# Patient Record
Sex: MALE
[De-identification: names, ages, dates, MRNs, and addresses within clinical notes are randomized per-mention and may not be internally consistent; named-entity substitution may affect disease eponyms.]

---

## 2017-04-17 PROBLEM — Z00.00 ENCOUNTER FOR PREVENTIVE HEALTH EXAMINATION: Status: ACTIVE | Noted: 2017-04-17

## 2017-04-19 ENCOUNTER — APPOINTMENT (OUTPATIENT)
Dept: PHYSICAL MEDICINE AND REHAB | Facility: CLINIC | Age: 57
End: 2017-04-19

## 2017-04-19 VITALS
DIASTOLIC BLOOD PRESSURE: 78 MMHG | HEART RATE: 78 BPM | SYSTOLIC BLOOD PRESSURE: 154 MMHG | HEIGHT: 63 IN | WEIGHT: 165 LBS | TEMPERATURE: 98.3 F | OXYGEN SATURATION: 99 % | BODY MASS INDEX: 29.23 KG/M2

## 2017-04-19 DIAGNOSIS — Z94.0 KIDNEY TRANSPLANT STATUS: ICD-10-CM

## 2017-04-19 DIAGNOSIS — N18.9 CHRONIC KIDNEY DISEASE, UNSPECIFIED: ICD-10-CM

## 2017-04-19 DIAGNOSIS — E11.9 TYPE 2 DIABETES MELLITUS W/OUT COMPLICATIONS: ICD-10-CM

## 2017-04-19 RX ORDER — ASPIRIN 81 MG/1
81 TABLET ORAL DAILY
Qty: 30 | Refills: 0 | Status: ACTIVE | COMMUNITY
Start: 2017-04-19 | End: 1900-01-01

## 2017-04-19 RX ORDER — INSULIN GLARGINE 100 [IU]/ML
100 INJECTION, SOLUTION SUBCUTANEOUS
Qty: 30 | Refills: 0 | Status: ACTIVE | COMMUNITY
Start: 2017-04-19 | End: 1900-01-01

## 2017-04-19 RX ORDER — INSULIN LISPRO 100 [IU]/ML
100 INJECTION, SOLUTION INTRAVENOUS; SUBCUTANEOUS
Qty: 30 | Refills: 0 | Status: ACTIVE | COMMUNITY
Start: 2017-04-19 | End: 1900-01-01

## 2017-04-19 RX ORDER — TACROLIMUS 1 MG/1
1 CAPSULE ORAL
Qty: 30 | Refills: 0 | Status: ACTIVE | COMMUNITY
Start: 2017-04-19 | End: 1900-01-01

## 2017-04-19 RX ORDER — MYCOPHENOLATE MOFETIL 500 MG/1
500 TABLET ORAL DAILY
Qty: 30 | Refills: 0 | Status: ACTIVE | COMMUNITY
Start: 2017-04-19 | End: 1900-01-01

## 2017-04-19 RX ORDER — PREDNISONE 1 MG/1
1 TABLET ORAL
Qty: 30 | Refills: 0 | Status: ACTIVE | COMMUNITY
Start: 2017-04-19 | End: 1900-01-01

## 2017-05-08 ENCOUNTER — CHART COPY (OUTPATIENT)
Age: 57
End: 2017-05-08

## 2017-05-19 ENCOUNTER — RX RENEWAL (OUTPATIENT)
Age: 57
End: 2017-05-19

## 2017-10-11 ENCOUNTER — RX RENEWAL (OUTPATIENT)
Age: 57
End: 2017-10-11

## 2019-06-10 ENCOUNTER — APPOINTMENT (OUTPATIENT)
Dept: PHYSICAL MEDICINE AND REHAB | Facility: CLINIC | Age: 59
End: 2019-06-10

## 2019-07-30 ENCOUNTER — APPOINTMENT (OUTPATIENT)
Dept: PHYSICAL MEDICINE AND REHAB | Facility: CLINIC | Age: 59
End: 2019-07-30
Payer: MEDICAID

## 2019-07-30 VITALS — DIASTOLIC BLOOD PRESSURE: 85 MMHG | HEART RATE: 71 BPM | SYSTOLIC BLOOD PRESSURE: 179 MMHG

## 2019-07-30 DIAGNOSIS — Z89.611 ACQUIRED ABSENCE OF RIGHT LEG ABOVE KNEE: ICD-10-CM

## 2019-07-30 PROCEDURE — 99214 OFFICE O/P EST MOD 30 MIN: CPT

## 2019-07-30 NOTE — ASSESSMENT
[FreeTextEntry1] : Patient is a 58-year-old male history of DM, renal transplant, status post right AKA (2010) whose current AK prosthetic socket is too large causing discomfort and patient is unable to walk. In addition the patient's silicone liner is torn and requires replacement. Prescription provided for a right custom molded AK socket replacement with a total contact acrylic socket, flexible inner socket, silicone seal-in liner, Allignable components. Prescription provided to start a course of outpatient physical therapy 2- 3 times per week, see RX.

## 2019-07-30 NOTE — REVIEW OF SYSTEMS
[Difficulty Walking] : difficulty walking [Negative] : Gastrointestinal [Fever] : no fever [Muscle Weakness] : no muscle weakness [Skin Wound] : no skin wound

## 2019-07-30 NOTE — HISTORY OF PRESENT ILLNESS
[FreeTextEntry1] : Patient is a 58-year-old male history of DM, renal transplant, status post right AKA (2010) who received his current right AK prosthesis in June 2017. Patient and family report that the prosthesis initially fit well, patient was ambulating independently with a rolling walker in the home but did use a wheelchair/scooter outside. Patient feels that he would be able to ambulate better with his prosthesis but did not receive outpatient physical therapy at the last visit. Independent bed transfers, independent toileting, independent dressing. Patient was able to negotiate one flight of stairs with a handrail independently. Patient states that the current AK socket  is too large causing instability and rotation of the prosthesis. In addition patient was getting pain at the groin and buttocks, no skin breakdown. Patient states that the silicone liner is also torn and unusable. Patient has not ambulated with his prosthesis in approximately 3 months. \par Patient also complains that the prosthesis is heavy and his previous prosthesis with a hydraulic SNS knee unit was lighter.

## 2019-07-30 NOTE — PHYSICAL EXAM
[Normal] : Normal skin color and pigmentation, normal turgor and no rash [FreeTextEntry1] : Functional status: sit to stand transfer (I) [de-identified] : right above knee amputation with conical shaped residual limb, no skin breakdown and no adhesions. 5/5 strength in right hip flexion. Left lower extremity 5/5 strength throughout all muscles groups. [de-identified] : the patient endorsed low mood, his affect was mildly flat.

## 2020-04-10 ENCOUNTER — INPATIENT (INPATIENT)
Facility: HOSPITAL | Age: 60
LOS: 26 days | Discharge: NURSING, REHAB OR LONG-TERM CARE | End: 2020-05-07
Attending: INTERNAL MEDICINE | Admitting: INTERNAL MEDICINE
Payer: MEDICAID

## 2020-04-10 VITALS
SYSTOLIC BLOOD PRESSURE: 102 MMHG | OXYGEN SATURATION: 98 % | DIASTOLIC BLOOD PRESSURE: 62 MMHG | TEMPERATURE: 100 F | HEART RATE: 79 BPM | RESPIRATION RATE: 20 BRPM

## 2020-04-10 DIAGNOSIS — Z94.0 KIDNEY TRANSPLANT STATUS: Chronic | ICD-10-CM

## 2020-04-10 DIAGNOSIS — Z95.5 PRESENCE OF CORONARY ANGIOPLASTY IMPLANT AND GRAFT: Chronic | ICD-10-CM

## 2020-04-10 LAB
ALBUMIN SERPL ELPH-MCNC: 3.7 G/DL — SIGNIFICANT CHANGE UP (ref 3.3–5)
ALP SERPL-CCNC: 65 U/L — SIGNIFICANT CHANGE UP (ref 40–120)
ALT FLD-CCNC: 5 U/L — SIGNIFICANT CHANGE UP (ref 4–41)
ANION GAP SERPL CALC-SCNC: 18 MMO/L — HIGH (ref 7–14)
APTT BLD: 32.8 SEC — SIGNIFICANT CHANGE UP (ref 27.5–36.3)
AST SERPL-CCNC: 11 U/L — SIGNIFICANT CHANGE UP (ref 4–40)
BASE EXCESS BLDV CALC-SCNC: -3.1 MMOL/L — SIGNIFICANT CHANGE UP
BASOPHILS # BLD AUTO: 0.01 K/UL — SIGNIFICANT CHANGE UP (ref 0–0.2)
BASOPHILS NFR BLD AUTO: 0.2 % — SIGNIFICANT CHANGE UP (ref 0–2)
BILIRUB SERPL-MCNC: 0.3 MG/DL — SIGNIFICANT CHANGE UP (ref 0.2–1.2)
BLOOD GAS VENOUS - CREATININE: 6.03 MG/DL — HIGH (ref 0.5–1.3)
BUN SERPL-MCNC: 96 MG/DL — HIGH (ref 7–23)
CALCIUM SERPL-MCNC: 9.3 MG/DL — SIGNIFICANT CHANGE UP (ref 8.4–10.5)
CHLORIDE BLDV-SCNC: 95 MMOL/L — LOW (ref 96–108)
CHLORIDE SERPL-SCNC: 91 MMOL/L — LOW (ref 98–107)
CO2 SERPL-SCNC: 19 MMOL/L — LOW (ref 22–31)
CREAT SERPL-MCNC: 5.47 MG/DL — HIGH (ref 0.5–1.3)
CRP SERPL-MCNC: 130.8 MG/L — HIGH
D DIMER BLD IA.RAPID-MCNC: 534 NG/ML — SIGNIFICANT CHANGE UP
EOSINOPHIL # BLD AUTO: 0.03 K/UL — SIGNIFICANT CHANGE UP (ref 0–0.5)
EOSINOPHIL NFR BLD AUTO: 0.5 % — SIGNIFICANT CHANGE UP (ref 0–6)
GAS PNL BLDV: 130 MMOL/L — LOW (ref 136–146)
GLUCOSE BLDV-MCNC: 101 MG/DL — HIGH (ref 70–99)
GLUCOSE SERPL-MCNC: 105 MG/DL — HIGH (ref 70–99)
HCO3 BLDV-SCNC: 21 MMOL/L — SIGNIFICANT CHANGE UP (ref 20–27)
HCT VFR BLD CALC: 35.9 % — LOW (ref 39–50)
HCT VFR BLDV CALC: 35.2 % — LOW (ref 39–51)
HGB BLD-MCNC: 11.2 G/DL — LOW (ref 13–17)
HGB BLDV-MCNC: 11.4 G/DL — LOW (ref 13–17)
IMM GRANULOCYTES NFR BLD AUTO: 0.4 % — SIGNIFICANT CHANGE UP (ref 0–1.5)
INR BLD: 1.31 — HIGH (ref 0.88–1.17)
LACTATE BLDV-MCNC: 1.6 MMOL/L — SIGNIFICANT CHANGE UP (ref 0.5–2)
LYMPHOCYTES # BLD AUTO: 0.9 K/UL — LOW (ref 1–3.3)
LYMPHOCYTES # BLD AUTO: 15.8 % — SIGNIFICANT CHANGE UP (ref 13–44)
MCHC RBC-ENTMCNC: 25.1 PG — LOW (ref 27–34)
MCHC RBC-ENTMCNC: 31.2 % — LOW (ref 32–36)
MCV RBC AUTO: 80.5 FL — SIGNIFICANT CHANGE UP (ref 80–100)
MONOCYTES # BLD AUTO: 0.62 K/UL — SIGNIFICANT CHANGE UP (ref 0–0.9)
MONOCYTES NFR BLD AUTO: 10.9 % — SIGNIFICANT CHANGE UP (ref 2–14)
NEUTROPHILS # BLD AUTO: 4.13 K/UL — SIGNIFICANT CHANGE UP (ref 1.8–7.4)
NEUTROPHILS NFR BLD AUTO: 72.2 % — SIGNIFICANT CHANGE UP (ref 43–77)
NRBC # FLD: 0 K/UL — SIGNIFICANT CHANGE UP (ref 0–0)
PCO2 BLDV: 41 MMHG — SIGNIFICANT CHANGE UP (ref 41–51)
PH BLDV: 7.34 PH — SIGNIFICANT CHANGE UP (ref 7.32–7.43)
PLATELET # BLD AUTO: 160 K/UL — SIGNIFICANT CHANGE UP (ref 150–400)
PMV BLD: 8.8 FL — SIGNIFICANT CHANGE UP (ref 7–13)
PO2 BLDV: 27 MMHG — LOW (ref 35–40)
POTASSIUM BLDV-SCNC: 4.4 MMOL/L — SIGNIFICANT CHANGE UP (ref 3.4–4.5)
POTASSIUM SERPL-MCNC: 4.5 MMOL/L — SIGNIFICANT CHANGE UP (ref 3.5–5.3)
POTASSIUM SERPL-SCNC: 4.5 MMOL/L — SIGNIFICANT CHANGE UP (ref 3.5–5.3)
PROT SERPL-MCNC: 6.9 G/DL — SIGNIFICANT CHANGE UP (ref 6–8.3)
PROTHROM AB SERPL-ACNC: 15.2 SEC — HIGH (ref 9.8–13.1)
RBC # BLD: 4.46 M/UL — SIGNIFICANT CHANGE UP (ref 4.2–5.8)
RBC # FLD: 14.2 % — SIGNIFICANT CHANGE UP (ref 10.3–14.5)
SAO2 % BLDV: 45.8 % — LOW (ref 60–85)
SODIUM SERPL-SCNC: 128 MMOL/L — LOW (ref 135–145)
WBC # BLD: 5.71 K/UL — SIGNIFICANT CHANGE UP (ref 3.8–10.5)
WBC # FLD AUTO: 5.71 K/UL — SIGNIFICANT CHANGE UP (ref 3.8–10.5)

## 2020-04-10 PROCEDURE — 71045 X-RAY EXAM CHEST 1 VIEW: CPT | Mod: 26

## 2020-04-10 RX ORDER — ONDANSETRON 8 MG/1
4 TABLET, FILM COATED ORAL ONCE
Refills: 0 | Status: COMPLETED | OUTPATIENT
Start: 2020-04-10 | End: 2020-04-10

## 2020-04-10 RX ORDER — ACETAMINOPHEN 500 MG
650 TABLET ORAL EVERY 6 HOURS
Refills: 0 | Status: DISCONTINUED | OUTPATIENT
Start: 2020-04-10 | End: 2020-05-07

## 2020-04-10 RX ADMIN — ONDANSETRON 4 MILLIGRAM(S): 8 TABLET, FILM COATED ORAL at 22:03

## 2020-04-10 RX ADMIN — Medication 650 MILLIGRAM(S): at 22:03

## 2020-04-10 NOTE — ED ADULT NURSE NOTE - OBJECTIVE STATEMENT
Pt a&ox3, calm/cooperative, from nursing home, c/o of cough, fever, and sob. pt hx of renal transplant, stents placed about a month ago and right above the knee amputation, left av fistula. pt noted to have hiccups which he states has been ongoing for awhile. respirations even/unlabored, nad noted, 100% o2 room air, provider at bedside to eval. labs to be obtained. will continue to monitor

## 2020-04-10 NOTE — ED PROVIDER NOTE - PMH
BPH (benign prostatic hyperplasia)    CAD (coronary artery disease)    Chronic systolic heart failure    Diabetes    ESRD (end stage renal disease)    HTN (hypertension)    Hypothyroid    Kidney transplant recipient

## 2020-04-10 NOTE — ED PROVIDER NOTE - CLINICAL SUMMARY MEDICAL DECISION MAKING FREE TEXT BOX
59M w/ hx of HTN, HLD, DM, ESRD s/p kidney transplant in 05, CAD s/p stent a few months ago at [place], PVD s/p R AKA in 2010 presents today with fever, rhinorrhea and not feeling well for three days. Pt immunosuppressed. Fever of unclear source on initial presentation. will do full sepsis workup in addition to rule out covid. will need admission. will give cefepime pending cultures.

## 2020-04-10 NOTE — ED PROVIDER NOTE - NS ED ROS FT
Gen: normal appetite  Eyes: No eye irritation or discharge  ENT: +sore throat,  No ear pain, sore throat  Resp: No cough  Cardiovascular: No chest pain or palpitation  Gastroenteric: No  diarrhea, constipation  :  No change in urine output; no dysuria  MS: No joint or muscle pain  Skin: No rashes  Neuro: No headache; no abnormal movements  Remainder negative, except as per the HPI

## 2020-04-10 NOTE — ED ADULT TRIAGE NOTE - CHIEF COMPLAINT QUOTE
Sent in from [facility] for fever of 102.9. Patient has no complaint of cough and shortness of breath. no pain or difficulty with urination. patient is a kidney transplant patient- kidney transplant in 2005. complaining of some pain to lower back x 2 weeks. Sent in for r/o covid19/sepsis

## 2020-04-10 NOTE — ED PROVIDER NOTE - PROGRESS NOTE DETAILS
ED Attending: Fady  Pt signed out to me from Dr. Son to f/u and reassess. Pt is immunosuppressed and p/w fever and non specific sx. Though possible COVID given immunosupp will provide Cefepime x 1. Sepsis panel sent. Accepted to hospitalist.

## 2020-04-10 NOTE — ED PROVIDER NOTE - OBJECTIVE STATEMENT
59M w/ hx of HTN, HLD, DM, ESRD s/p kidney transplant in 05, CAD s/p stent a few months ago at [place], PVD s/p R AKA in 2010 presents today with fever, rhinorrhea and not feeling well for three days. Pt comes from [facility]. Denies urinary symptoms. Denies SOB or chest pain. Endorses hiccups and intermittent nausea. Tmax was 102.9 at NH.

## 2020-04-10 NOTE — ED PROVIDER NOTE - PHYSICAL EXAMINATION
General: Thin appearing, chronically ill, NAD  HEENT: PERRLA, EOMI, dry mucous membranes  Neurology: A&Ox3, nonfocal, MIRANDA x 3 (has amputation of RLE above the knee)  Respiratory: CTA B/L, normal respiratory effort, no wheezes, crackles, rales  CV: RRR, S1S2, no murmurs, rubs or gallops  Abdominal: Soft, NT, ND +BS  Extremities: s/p R AKA, No edema, + peripheral pulses  Incisions: well healed R AKA, LUE has AVF with thrill  Tubes: PIV

## 2020-04-11 DIAGNOSIS — E11.22 TYPE 2 DIABETES MELLITUS WITH DIABETIC CHRONIC KIDNEY DISEASE: ICD-10-CM

## 2020-04-11 DIAGNOSIS — N39.0 URINARY TRACT INFECTION, SITE NOT SPECIFIED: ICD-10-CM

## 2020-04-11 DIAGNOSIS — E03.9 HYPOTHYROIDISM, UNSPECIFIED: ICD-10-CM

## 2020-04-11 DIAGNOSIS — Z94.0 KIDNEY TRANSPLANT STATUS: ICD-10-CM

## 2020-04-11 DIAGNOSIS — I50.22 CHRONIC SYSTOLIC (CONGESTIVE) HEART FAILURE: ICD-10-CM

## 2020-04-11 DIAGNOSIS — R50.9 FEVER, UNSPECIFIED: ICD-10-CM

## 2020-04-11 DIAGNOSIS — U07.1 COVID-19: ICD-10-CM

## 2020-04-11 DIAGNOSIS — N18.6 END STAGE RENAL DISEASE: ICD-10-CM

## 2020-04-11 DIAGNOSIS — I10 ESSENTIAL (PRIMARY) HYPERTENSION: ICD-10-CM

## 2020-04-11 DIAGNOSIS — I25.10 ATHEROSCLEROTIC HEART DISEASE OF NATIVE CORONARY ARTERY WITHOUT ANGINA PECTORIS: ICD-10-CM

## 2020-04-11 LAB
APPEARANCE UR: SIGNIFICANT CHANGE UP
B PERT DNA SPEC QL NAA+PROBE: NOT DETECTED — SIGNIFICANT CHANGE UP
BACTERIA # UR AUTO: HIGH
BILIRUB UR-MCNC: NEGATIVE — SIGNIFICANT CHANGE UP
BLOOD UR QL VISUAL: HIGH
C PNEUM DNA SPEC QL NAA+PROBE: NOT DETECTED — SIGNIFICANT CHANGE UP
COLOR SPEC: COLORLESS — SIGNIFICANT CHANGE UP
EPI CELLS # UR: SIGNIFICANT CHANGE UP
FLUAV H1 2009 PAND RNA SPEC QL NAA+PROBE: NOT DETECTED — SIGNIFICANT CHANGE UP
FLUAV H1 RNA SPEC QL NAA+PROBE: NOT DETECTED — SIGNIFICANT CHANGE UP
FLUAV H3 RNA SPEC QL NAA+PROBE: NOT DETECTED — SIGNIFICANT CHANGE UP
FLUAV SUBTYP SPEC NAA+PROBE: NOT DETECTED — SIGNIFICANT CHANGE UP
FLUBV RNA SPEC QL NAA+PROBE: NOT DETECTED — SIGNIFICANT CHANGE UP
GLUCOSE BLDC GLUCOMTR-MCNC: 148 MG/DL — HIGH (ref 70–99)
GLUCOSE BLDC GLUCOMTR-MCNC: 189 MG/DL — HIGH (ref 70–99)
GLUCOSE BLDC GLUCOMTR-MCNC: 227 MG/DL — HIGH (ref 70–99)
GLUCOSE BLDC GLUCOMTR-MCNC: 279 MG/DL — HIGH (ref 70–99)
GLUCOSE UR-MCNC: NEGATIVE — SIGNIFICANT CHANGE UP
HADV DNA SPEC QL NAA+PROBE: NOT DETECTED — SIGNIFICANT CHANGE UP
HCOV PNL SPEC NAA+PROBE: SIGNIFICANT CHANGE UP
HMPV RNA SPEC QL NAA+PROBE: NOT DETECTED — SIGNIFICANT CHANGE UP
HPIV1 RNA SPEC QL NAA+PROBE: NOT DETECTED — SIGNIFICANT CHANGE UP
HPIV2 RNA SPEC QL NAA+PROBE: NOT DETECTED — SIGNIFICANT CHANGE UP
HPIV3 RNA SPEC QL NAA+PROBE: NOT DETECTED — SIGNIFICANT CHANGE UP
HPIV4 RNA SPEC QL NAA+PROBE: NOT DETECTED — SIGNIFICANT CHANGE UP
KETONES UR-MCNC: SIGNIFICANT CHANGE UP
LEUKOCYTE ESTERASE UR-ACNC: SIGNIFICANT CHANGE UP
MUCOUS THREADS # UR AUTO: PRESENT — SIGNIFICANT CHANGE UP
NITRITE UR-MCNC: POSITIVE — SIGNIFICANT CHANGE UP
PH UR: 6 — SIGNIFICANT CHANGE UP (ref 5–8)
PROT UR-MCNC: 100 — HIGH
RBC CASTS # UR COMP ASSIST: SIGNIFICANT CHANGE UP (ref 0–?)
RSV RNA SPEC QL NAA+PROBE: NOT DETECTED — SIGNIFICANT CHANGE UP
RV+EV RNA SPEC QL NAA+PROBE: NOT DETECTED — SIGNIFICANT CHANGE UP
SARS-COV-2 RNA SPEC QL NAA+PROBE: DETECTED
SP GR SPEC: 1.01 — SIGNIFICANT CHANGE UP (ref 1–1.04)
T3 SERPL-MCNC: 49.8 NG/DL — LOW (ref 80–200)
T4 FREE SERPL-MCNC: 1.4 NG/DL — SIGNIFICANT CHANGE UP (ref 0.9–1.8)
TACROLIMUS SERPL-MCNC: < 2 NG/ML — SIGNIFICANT CHANGE UP
UROBILINOGEN FLD QL: NORMAL — SIGNIFICANT CHANGE UP
WBC UR QL: >50 — HIGH (ref 0–?)

## 2020-04-11 PROCEDURE — 93010 ELECTROCARDIOGRAM REPORT: CPT

## 2020-04-11 PROCEDURE — 99223 1ST HOSP IP/OBS HIGH 75: CPT | Mod: GC

## 2020-04-11 RX ORDER — ASCORBIC ACID 60 MG
500 TABLET,CHEWABLE ORAL
Refills: 0 | Status: DISCONTINUED | OUTPATIENT
Start: 2020-04-11 | End: 2020-05-07

## 2020-04-11 RX ORDER — SODIUM CHLORIDE 9 MG/ML
1 INJECTION INTRAMUSCULAR; INTRAVENOUS; SUBCUTANEOUS
Refills: 0 | Status: DISCONTINUED | OUTPATIENT
Start: 2020-04-11 | End: 2020-04-23

## 2020-04-11 RX ORDER — FERROUS SULFATE 325(65) MG
325 TABLET ORAL DAILY
Refills: 0 | Status: DISCONTINUED | OUTPATIENT
Start: 2020-04-11 | End: 2020-05-07

## 2020-04-11 RX ORDER — CARVEDILOL PHOSPHATE 80 MG/1
3.12 CAPSULE, EXTENDED RELEASE ORAL
Refills: 0 | Status: DISCONTINUED | OUTPATIENT
Start: 2020-04-11 | End: 2020-04-17

## 2020-04-11 RX ORDER — SEVELAMER CARBONATE 2400 MG/1
800 POWDER, FOR SUSPENSION ORAL
Refills: 0 | Status: DISCONTINUED | OUTPATIENT
Start: 2020-04-11 | End: 2020-05-07

## 2020-04-11 RX ORDER — DEXTROSE 50 % IN WATER 50 %
12.5 SYRINGE (ML) INTRAVENOUS ONCE
Refills: 0 | Status: DISCONTINUED | OUTPATIENT
Start: 2020-04-11 | End: 2020-05-07

## 2020-04-11 RX ORDER — DEXTROSE 50 % IN WATER 50 %
25 SYRINGE (ML) INTRAVENOUS ONCE
Refills: 0 | Status: DISCONTINUED | OUTPATIENT
Start: 2020-04-11 | End: 2020-05-07

## 2020-04-11 RX ORDER — INSULIN LISPRO 100/ML
VIAL (ML) SUBCUTANEOUS
Refills: 0 | Status: DISCONTINUED | OUTPATIENT
Start: 2020-04-11 | End: 2020-05-07

## 2020-04-11 RX ORDER — POLYETHYLENE GLYCOL 3350 17 G/17G
1 POWDER, FOR SOLUTION ORAL
Qty: 0 | Refills: 0 | DISCHARGE

## 2020-04-11 RX ORDER — LEVOTHYROXINE SODIUM 125 MCG
75 TABLET ORAL DAILY
Refills: 0 | Status: DISCONTINUED | OUTPATIENT
Start: 2020-04-11 | End: 2020-05-07

## 2020-04-11 RX ORDER — MYCOPHENOLATE MOFETIL 250 MG/1
1000 CAPSULE ORAL
Refills: 0 | Status: DISCONTINUED | OUTPATIENT
Start: 2020-04-11 | End: 2020-04-13

## 2020-04-11 RX ORDER — CLOPIDOGREL BISULFATE 75 MG/1
75 TABLET, FILM COATED ORAL DAILY
Refills: 0 | Status: DISCONTINUED | OUTPATIENT
Start: 2020-04-11 | End: 2020-05-07

## 2020-04-11 RX ORDER — POTASSIUM CHLORIDE 20 MEQ
10 PACKET (EA) ORAL DAILY
Refills: 0 | Status: DISCONTINUED | OUTPATIENT
Start: 2020-04-11 | End: 2020-04-23

## 2020-04-11 RX ORDER — INSULIN LISPRO 100/ML
VIAL (ML) SUBCUTANEOUS AT BEDTIME
Refills: 0 | Status: DISCONTINUED | OUTPATIENT
Start: 2020-04-11 | End: 2020-05-07

## 2020-04-11 RX ORDER — TACROLIMUS 5 MG/1
1 CAPSULE ORAL EVERY 12 HOURS
Refills: 0 | Status: DISCONTINUED | OUTPATIENT
Start: 2020-04-11 | End: 2020-04-22

## 2020-04-11 RX ORDER — INSULIN GLARGINE 100 [IU]/ML
6 INJECTION, SOLUTION SUBCUTANEOUS
Qty: 0 | Refills: 0 | DISCHARGE

## 2020-04-11 RX ORDER — DEXTROSE 50 % IN WATER 50 %
15 SYRINGE (ML) INTRAVENOUS ONCE
Refills: 0 | Status: DISCONTINUED | OUTPATIENT
Start: 2020-04-11 | End: 2020-05-07

## 2020-04-11 RX ORDER — CEFEPIME 1 G/1
1000 INJECTION, POWDER, FOR SOLUTION INTRAMUSCULAR; INTRAVENOUS ONCE
Refills: 0 | Status: COMPLETED | OUTPATIENT
Start: 2020-04-11 | End: 2020-04-11

## 2020-04-11 RX ORDER — SUCRALFATE 1 G
1 TABLET ORAL EVERY 6 HOURS
Refills: 0 | Status: DISCONTINUED | OUTPATIENT
Start: 2020-04-11 | End: 2020-05-07

## 2020-04-11 RX ORDER — ACETAMINOPHEN 500 MG
650 TABLET ORAL EVERY 4 HOURS
Refills: 0 | Status: DISCONTINUED | OUTPATIENT
Start: 2020-04-11 | End: 2020-05-07

## 2020-04-11 RX ORDER — INSULIN GLARGINE 100 [IU]/ML
6 INJECTION, SOLUTION SUBCUTANEOUS AT BEDTIME
Refills: 0 | Status: DISCONTINUED | OUTPATIENT
Start: 2020-04-11 | End: 2020-04-27

## 2020-04-11 RX ORDER — ASPIRIN/CALCIUM CARB/MAGNESIUM 324 MG
81 TABLET ORAL DAILY
Refills: 0 | Status: DISCONTINUED | OUTPATIENT
Start: 2020-04-11 | End: 2020-05-07

## 2020-04-11 RX ORDER — POLYETHYLENE GLYCOL 3350 17 G/17G
17 POWDER, FOR SOLUTION ORAL DAILY
Refills: 0 | Status: DISCONTINUED | OUTPATIENT
Start: 2020-04-11 | End: 2020-05-07

## 2020-04-11 RX ORDER — ATORVASTATIN CALCIUM 80 MG/1
80 TABLET, FILM COATED ORAL AT BEDTIME
Refills: 0 | Status: DISCONTINUED | OUTPATIENT
Start: 2020-04-11 | End: 2020-05-07

## 2020-04-11 RX ORDER — CEFTRIAXONE 500 MG/1
1000 INJECTION, POWDER, FOR SOLUTION INTRAMUSCULAR; INTRAVENOUS EVERY 24 HOURS
Refills: 0 | Status: DISCONTINUED | OUTPATIENT
Start: 2020-04-11 | End: 2020-04-13

## 2020-04-11 RX ORDER — BUMETANIDE 0.25 MG/ML
1 INJECTION INTRAMUSCULAR; INTRAVENOUS
Refills: 0 | Status: DISCONTINUED | OUTPATIENT
Start: 2020-04-11 | End: 2020-04-23

## 2020-04-11 RX ORDER — CARVEDILOL PHOSPHATE 80 MG/1
1 CAPSULE, EXTENDED RELEASE ORAL
Qty: 0 | Refills: 0 | DISCHARGE

## 2020-04-11 RX ORDER — SODIUM CHLORIDE 9 MG/ML
1000 INJECTION, SOLUTION INTRAVENOUS
Refills: 0 | Status: DISCONTINUED | OUTPATIENT
Start: 2020-04-11 | End: 2020-05-07

## 2020-04-11 RX ORDER — HYDRALAZINE HCL 50 MG
25 TABLET ORAL
Refills: 0 | Status: DISCONTINUED | OUTPATIENT
Start: 2020-04-11 | End: 2020-05-07

## 2020-04-11 RX ORDER — HEPARIN SODIUM 5000 [USP'U]/ML
5000 INJECTION INTRAVENOUS; SUBCUTANEOUS EVERY 8 HOURS
Refills: 0 | Status: DISCONTINUED | OUTPATIENT
Start: 2020-04-11 | End: 2020-04-27

## 2020-04-11 RX ORDER — ASCORBIC ACID 60 MG
1 TABLET,CHEWABLE ORAL
Qty: 0 | Refills: 0 | DISCHARGE

## 2020-04-11 RX ORDER — GLUCAGON INJECTION, SOLUTION 0.5 MG/.1ML
1 INJECTION, SOLUTION SUBCUTANEOUS ONCE
Refills: 0 | Status: DISCONTINUED | OUTPATIENT
Start: 2020-04-11 | End: 2020-05-07

## 2020-04-11 RX ORDER — ONDANSETRON 8 MG/1
4 TABLET, FILM COATED ORAL ONCE
Refills: 0 | Status: COMPLETED | OUTPATIENT
Start: 2020-04-11 | End: 2020-04-11

## 2020-04-11 RX ORDER — ENOXAPARIN SODIUM 100 MG/ML
40 INJECTION SUBCUTANEOUS DAILY
Refills: 0 | Status: DISCONTINUED | OUTPATIENT
Start: 2020-04-11 | End: 2020-04-11

## 2020-04-11 RX ORDER — INSULIN ASPART 100 [IU]/ML
1 INJECTION, SOLUTION SUBCUTANEOUS
Qty: 0 | Refills: 0 | DISCHARGE

## 2020-04-11 RX ORDER — CHOLECALCIFEROL (VITAMIN D3) 125 MCG
50000 CAPSULE ORAL
Qty: 0 | Refills: 0 | DISCHARGE

## 2020-04-11 RX ADMIN — INSULIN GLARGINE 6 UNIT(S): 100 INJECTION, SOLUTION SUBCUTANEOUS at 23:06

## 2020-04-11 RX ADMIN — CLOPIDOGREL BISULFATE 75 MILLIGRAM(S): 75 TABLET, FILM COATED ORAL at 12:03

## 2020-04-11 RX ADMIN — Medication 6: at 18:24

## 2020-04-11 RX ADMIN — Medication 5 MILLIGRAM(S): at 12:03

## 2020-04-11 RX ADMIN — SEVELAMER CARBONATE 800 MILLIGRAM(S): 2400 POWDER, FOR SUSPENSION ORAL at 12:05

## 2020-04-11 RX ADMIN — Medication 325 MILLIGRAM(S): at 12:02

## 2020-04-11 RX ADMIN — CEFEPIME 100 MILLIGRAM(S): 1 INJECTION, POWDER, FOR SOLUTION INTRAMUSCULAR; INTRAVENOUS at 00:56

## 2020-04-11 RX ADMIN — TACROLIMUS 1 MILLIGRAM(S): 5 CAPSULE ORAL at 23:10

## 2020-04-11 RX ADMIN — HEPARIN SODIUM 5000 UNIT(S): 5000 INJECTION INTRAVENOUS; SUBCUTANEOUS at 23:07

## 2020-04-11 RX ADMIN — Medication 1 GRAM(S): at 12:04

## 2020-04-11 RX ADMIN — Medication 10 MILLIEQUIVALENT(S): at 12:03

## 2020-04-11 RX ADMIN — MYCOPHENOLATE MOFETIL 1000 MILLIGRAM(S): 250 CAPSULE ORAL at 23:07

## 2020-04-11 RX ADMIN — Medication 500 MILLIGRAM(S): at 18:25

## 2020-04-11 RX ADMIN — Medication 75 MICROGRAM(S): at 12:03

## 2020-04-11 RX ADMIN — CARVEDILOL PHOSPHATE 3.12 MILLIGRAM(S): 80 CAPSULE, EXTENDED RELEASE ORAL at 18:25

## 2020-04-11 RX ADMIN — Medication 81 MILLIGRAM(S): at 12:02

## 2020-04-11 RX ADMIN — ATORVASTATIN CALCIUM 80 MILLIGRAM(S): 80 TABLET, FILM COATED ORAL at 23:07

## 2020-04-11 RX ADMIN — ONDANSETRON 4 MILLIGRAM(S): 8 TABLET, FILM COATED ORAL at 08:59

## 2020-04-11 RX ADMIN — POLYETHYLENE GLYCOL 3350 17 GRAM(S): 17 POWDER, FOR SOLUTION ORAL at 12:04

## 2020-04-11 RX ADMIN — BUMETANIDE 1 MILLIGRAM(S): 0.25 INJECTION INTRAMUSCULAR; INTRAVENOUS at 23:07

## 2020-04-11 RX ADMIN — CEFTRIAXONE 100 MILLIGRAM(S): 500 INJECTION, POWDER, FOR SOLUTION INTRAMUSCULAR; INTRAVENOUS at 18:26

## 2020-04-11 RX ADMIN — SODIUM CHLORIDE 1 GRAM(S): 9 INJECTION INTRAMUSCULAR; INTRAVENOUS; SUBCUTANEOUS at 23:07

## 2020-04-11 RX ADMIN — Medication 25 MILLIGRAM(S): at 18:25

## 2020-04-11 NOTE — H&P ADULT - PROBLEM SELECTOR PLAN 3
- Monitor Glycemia three times a day with meals and at bed time   - Continue LANTUS 6 U at bed time + Sliding Novolog scale Prior labs per patient's Nursing home:  March 27/2020: Cr/BUN: 3.6/24  March 30/2020: Cr/BUN: 3.9/24  April 6/2020: Cr/BUN: 4.8/20   - Consulted Nephrology regarding increase in Cr from prior values, f/u recommendations  - Last hemodialyses > 15 years ago  - Primary Nephrologist: Dr. Adrián Hawkins

## 2020-04-11 NOTE — H&P ADULT - PROBLEM SELECTOR PROBLEM 7
Kidney transplant recipient Type 2 diabetes mellitus with chronic kidney disease, with long-term current use of insulin, unspecified CKD stage

## 2020-04-11 NOTE — H&P ADULT - HISTORY OF PRESENT ILLNESS
59M w/ hx of HTN, HLD, DM, ESRD s/p kidney transplant in 05, CAD s/p stent a few months ago at [place], PVD s/p R AKA in 2010 presents today with fever, rhinorrhea and not feeling well for three days. Pt comes from [facility]. Denies urinary symptoms. Denies SOB or chest pain. Endorses hiccups and intermittent nausea. Tmax was 102.9 at NH.

## 2020-04-11 NOTE — H&P ADULT - PROBLEM SELECTOR PLAN 1
- Continue double anti-thrombotic therapy: Aspirin + Plavix  - EKG as clinically indicated - Continue 2 LPM NC. Will escalate to Non-rebreather if saturations < 92%   - Relative (brother) refused to use Plaquenil  - CXR clear  - Patient full code

## 2020-04-11 NOTE — H&P ADULT - ASSESSMENT
59M w/ hx of HTN, HLD, DM, ESRD s/p kidney transplant in 05, CAD s/p stent a few months ago at [place], PVD s/p R AKA in 2010 now presenting with hypoxic respiratory failure must likely due to COVID 19 infection. 59M w/ hx of HTN, HLD, DM, ESRD s/p kidney transplant in 05, CAD s/p stent a few months ago at [place], PVD s/p R AKA in 2010 now presenting with hypoxic respiratory failure must likely due to COVID 19 infection.     - Asked patient regarding intubation status. Patient wants everything done. 59M w/ hx of HTN, HLD, DM, ESRD s/p kidney transplant in 05, CAD s/p stent a few months ago at [facility] s/p R AKA in 2010 now presenting with hypoxic respiratory failure must likely due to COVID 19 infection.     - Asked patient regarding GOC and intubation - patient wants everything done.     Prior labs per patient's Nursing home:  March 27/2020: Cr/BUN: 3.6/24  March 30/2020: Cr/BUN: 3.9/24  April 6/2020: Cr/BUN: 4.8/20     Prior Urine Culture + : Klebsiella Pneumoniae 02/11/2020  S Ciprofloxacin <=1  S Levofloxacin <= 2  S Cefuroxime 8  S Ceftriaxone S <=1  S Cefepime < 2  R Ampicillin 16   R Augmentin >16  R Cefazolin >16  R PIP/TAZOBACTAM >64

## 2020-04-11 NOTE — H&P ADULT - PROBLEM SELECTOR PLAN 2
- Continue Carvedilol 3.125 PO BID - UA suggestive of infection, prior UTI from 2/2020 sensitive to ceftriaxone, resistant to zosyn  - f/u Urinary culture.   - Patient already received one dose of Cefepime in ED. Based on Prior urine culture results continue with ceftriaxone 1g IV q 24 hours until culture results

## 2020-04-11 NOTE — H&P ADULT - PROBLEM SELECTOR PROBLEM 3
Type 2 diabetes mellitus with chronic kidney disease, with long-term current use of insulin, unspecified CKD stage ESRD (end stage renal disease)

## 2020-04-11 NOTE — H&P ADULT - PROBLEM SELECTOR PLAN 4
- Consult Nephrology regarding slow increase in Cr.  - Continue Vitamin C twice daily and Vitamin D once a month.   - Last hemodialyses > 15 years ago  - Primary Nephrologist: Dr. Adrián Hawkins. Will continue existing regimen of Immunosuppressants:   Mycophenolate + Tacrolimus and Prednisone   Monitor and trend Cr/BUN  - f/u renal recommendations

## 2020-04-11 NOTE — H&P ADULT - NSICDXPASTMEDICALHX_GEN_ALL_CORE_FT
PAST MEDICAL HISTORY:  BPH (benign prostatic hyperplasia)     CAD (coronary artery disease)     Chronic systolic heart failure     Diabetes     ESRD (end stage renal disease)     HTN (hypertension)     Hypothyroid     Kidney transplant recipient

## 2020-04-11 NOTE — H&P ADULT - PROBLEM SELECTOR PLAN 9
- Obtain Urinary culture.   - Patient already received one dose of Cefepime in ED. Based on Prior urine culture results will start Ceftriaxone 1g IV q 24 hours tomorrow. - Continue Synthroid 75 mcg PO q 24 hours

## 2020-04-11 NOTE — H&P ADULT - NSHPLABSRESULTS_GEN_ALL_CORE
.  LABS:                         11.2   5.71  )-----------( 160      ( 10 Apr 2020 22:15 )             35.9     04-10    128<L>  |  91<L>  |  96<H>  ----------------------------<  105<H>  4.5   |  19<L>  |  5.47<H>    Ca    9.3      10 Apr 2020 21:52    TPro  6.9  /  Alb  3.7  /  TBili  0.3  /  DBili  x   /  AST  11  /  ALT  5   /  AlkPhos  65  04-10    PT/INR - ( 10 Apr 2020 22:00 )   PT: 15.2 SEC;   INR: 1.31          PTT - ( 10 Apr 2020 22:00 )  PTT:32.8 SEC  Urinalysis Basic - ( 2020 08:30 )    Color: COLORLESS / Appearance: TURBID / S.014 / pH: 6.0  Gluc: NEGATIVE / Ketone: TRACE  / Bili: NEGATIVE / Urobili: NORMAL   Blood: MODERATE / Protein: 100 / Nitrite: POSITIVE   Leuk Esterase: LARGE / RBC: 10-20 / WBC >50   Sq Epi: x / Non Sq Epi: OCC / Bacteria: MANY                RADIOLOGY, EKG & ADDITIONAL TESTS: Reviewed. .  LABS:                         11.2   5.71  )-----------( 160      ( 10 Apr 2020 22:15 )             35.9     04-10    128<L>  |  91<L>  |  96<H>  ----------------------------<  105<H>  4.5   |  19<L>  |  5.47<H>    Ca    9.3      10 Apr 2020 21:52    TPro  6.9  /  Alb  3.7  /  TBili  0.3  /  DBili  x   /  AST  11  /  ALT  5   /  AlkPhos  65  04-10    PT/INR - ( 10 Apr 2020 22:00 )   PT: 15.2 SEC;   INR: 1.31          PTT - ( 10 Apr 2020 22:00 )  PTT:32.8 SEC  Urinalysis Basic - ( 2020 08:30 )    Color: COLORLESS / Appearance: TURBID / S.014 / pH: 6.0  Gluc: NEGATIVE / Ketone: TRACE  / Bili: NEGATIVE / Urobili: NORMAL   Blood: MODERATE / Protein: 100 / Nitrite: POSITIVE   Leuk Esterase: LARGE / RBC: 10-20 / WBC >50   Sq Epi: x / Non Sq Epi: OCC / Bacteria: MANY                RADIOLOGY, EKG & ADDITIONAL TESTS: Reviewed.        Called Nursing home for Prior Labs:    : Cr/BUN: 3.6/24  : Cr/BUN: 3.9/24  2020: Cr/BUN: 4.8/20       Prior Urine Culture + : Klebsiella Pneumoniae 2020  S Ciprofloxacin <=1  S Levofloxacin <= 2  S Cefuroxime 8  S Ceftriaxone S <=1  S Cefepime < 2    R Ampicillin 16   R Augmentin >16  R Cefazolin >16  R PIP/TAZOBACTAM >64 LABS:                         11.2   5.71  )-----------( 160      ( 10 Apr 2020 22:15 )             35.9     04-10    128<L>  |  91<L>  |  96<H>  ----------------------------<  105<H>  4.5   |  19<L>  |  5.47<H>    Ca    9.3      10 Apr 2020 21:52    TPro  6.9  /  Alb  3.7  /  TBili  0.3  /  DBili  x   /  AST  11  /  ALT  5   /  AlkPhos  65  04-10    PT/INR - ( 10 Apr 2020 22:00 )   PT: 15.2 SEC;   INR: 1.31          PTT - ( 10 Apr 2020 22:00 )  PTT:32.8 SEC  Urinalysis Basic - ( 2020 08:30 )    Color: COLORLESS / Appearance: TURBID / S.014 / pH: 6.0  Gluc: NEGATIVE / Ketone: TRACE  / Bili: NEGATIVE / Urobili: NORMAL   Blood: MODERATE / Protein: 100 / Nitrite: POSITIVE   Leuk Esterase: LARGE / RBC: 10-20 / WBC >50   Sq Epi: x / Non Sq Epi: OCC / Bacteria: MANY    RADIOLOGY, EKG & ADDITIONAL TESTS: Reviewed.      Prior labs per patient's Nursing home:    : Cr/BUN: 3.6/24  : Cr/BUN: 3.9/24  2020: Cr/BUN: 4.8/20     Prior Urine Culture + : Klebsiella Pneumoniae 2020  S Ciprofloxacin <=1  S Levofloxacin <= 2  S Cefuroxime 8  S Ceftriaxone S <=1  S Cefepime < 2  R Ampicillin 16   R Augmentin >16  R Cefazolin >16  R PIP/TAZOBACTAM >64    < from: Xray Chest 1 View-PORTABLE IMMEDIATE (04.10.20 @ 22:06) >  IMPRESSION:  No focal consolidation.  < end of copied text >

## 2020-04-11 NOTE — H&P ADULT - PROBLEM SELECTOR PLAN 7
Will continue Immunosuppressants:   Mycophenolate + Tacrolimus and Prednisone   Monitor and trend Cr/BUN - Monitor Glycemia three times a day with meals and at bed time   - Continue LANTUS 6 U at bed time + Sliding Novolog scale

## 2020-04-11 NOTE — H&P ADULT - PROBLEM SELECTOR PLAN 5
- Monitor BP q 4 hour s  - Continue Hydralazine 25 mg PO q 12 hours - Continue double anti-thrombotic therapy: Aspirin + Plavix  - Continue Carvedilol 3.125 PO BID  - EKG as clinically indicated

## 2020-04-11 NOTE — H&P ADULT - NSHPPHYSICALEXAM_GEN_ALL_CORE
Refer to ED physician note Vital Signs Last 24 Hrs  T(C): 36.4 (11 Apr 2020 14:37), Max: 37.6 (10 Apr 2020 20:38)  T(F): 97.6 (11 Apr 2020 14:37), Max: 99.6 (10 Apr 2020 20:38)  HR: 70 (11 Apr 2020 14:37) (62 - 91)  BP: 133/58 (11 Apr 2020 14:37) (102/62 - 166/71)  RR: 16 (11 Apr 2020 14:37) (16 - 20)  SpO2: 100% (11 Apr 2020 14:37) (96% - 100%)

## 2020-04-11 NOTE — H&P ADULT - PROBLEM SELECTOR PLAN 8
- Continue 2 LPM NC. Will escalate to Non-rebreather if saturations < 92%   - Relative (brother) refused to use Plaquenil - Monitor BP q 4 hour s  - Continue Hydralazine 25 mg PO q 12 hours

## 2020-04-12 DIAGNOSIS — N17.9 ACUTE KIDNEY FAILURE, UNSPECIFIED: ICD-10-CM

## 2020-04-12 DIAGNOSIS — Z94.0 KIDNEY TRANSPLANT STATUS: ICD-10-CM

## 2020-04-12 LAB
ALBUMIN SERPL ELPH-MCNC: 2.8 G/DL — LOW (ref 3.3–5)
ALP SERPL-CCNC: 62 U/L — SIGNIFICANT CHANGE UP (ref 40–120)
ALT FLD-CCNC: 6 U/L — SIGNIFICANT CHANGE UP (ref 4–41)
ANION GAP SERPL CALC-SCNC: 17 MMO/L — HIGH (ref 7–14)
AST SERPL-CCNC: 11 U/L — SIGNIFICANT CHANGE UP (ref 4–40)
BASOPHILS # BLD AUTO: 0.01 K/UL — SIGNIFICANT CHANGE UP (ref 0–0.2)
BASOPHILS NFR BLD AUTO: 0.2 % — SIGNIFICANT CHANGE UP (ref 0–2)
BILIRUB SERPL-MCNC: 0.2 MG/DL — SIGNIFICANT CHANGE UP (ref 0.2–1.2)
BUN SERPL-MCNC: 97 MG/DL — HIGH (ref 7–23)
CALCIUM SERPL-MCNC: 9 MG/DL — SIGNIFICANT CHANGE UP (ref 8.4–10.5)
CHLORIDE SERPL-SCNC: 92 MMOL/L — LOW (ref 98–107)
CO2 SERPL-SCNC: 19 MMOL/L — LOW (ref 22–31)
CREAT ?TM UR-MCNC: 78.3 MG/DL — SIGNIFICANT CHANGE UP
CREAT SERPL-MCNC: 5.55 MG/DL — HIGH (ref 0.5–1.3)
EOSINOPHIL # BLD AUTO: 0.04 K/UL — SIGNIFICANT CHANGE UP (ref 0–0.5)
EOSINOPHIL NFR BLD AUTO: 0.9 % — SIGNIFICANT CHANGE UP (ref 0–6)
GLUCOSE BLDC GLUCOMTR-MCNC: 166 MG/DL — HIGH (ref 70–99)
GLUCOSE BLDC GLUCOMTR-MCNC: 199 MG/DL — HIGH (ref 70–99)
GLUCOSE BLDC GLUCOMTR-MCNC: 205 MG/DL — HIGH (ref 70–99)
GLUCOSE BLDC GLUCOMTR-MCNC: 266 MG/DL — HIGH (ref 70–99)
GLUCOSE SERPL-MCNC: 161 MG/DL — HIGH (ref 70–99)
HBA1C BLD-MCNC: 6.6 % — HIGH (ref 4–5.6)
HCT VFR BLD CALC: 36.2 % — LOW (ref 39–50)
HCV AB S/CO SERPL IA: 0.35 S/CO — SIGNIFICANT CHANGE UP (ref 0–0.99)
HCV AB SERPL-IMP: SIGNIFICANT CHANGE UP
HGB BLD-MCNC: 11.4 G/DL — LOW (ref 13–17)
IMM GRANULOCYTES NFR BLD AUTO: 0.2 % — SIGNIFICANT CHANGE UP (ref 0–1.5)
LYMPHOCYTES # BLD AUTO: 0.69 K/UL — LOW (ref 1–3.3)
LYMPHOCYTES # BLD AUTO: 15.8 % — SIGNIFICANT CHANGE UP (ref 13–44)
MCHC RBC-ENTMCNC: 25.3 PG — LOW (ref 27–34)
MCHC RBC-ENTMCNC: 31.5 % — LOW (ref 32–36)
MCV RBC AUTO: 80.3 FL — SIGNIFICANT CHANGE UP (ref 80–100)
MONOCYTES # BLD AUTO: 0.48 K/UL — SIGNIFICANT CHANGE UP (ref 0–0.9)
MONOCYTES NFR BLD AUTO: 11 % — SIGNIFICANT CHANGE UP (ref 2–14)
NEUTROPHILS # BLD AUTO: 3.13 K/UL — SIGNIFICANT CHANGE UP (ref 1.8–7.4)
NEUTROPHILS NFR BLD AUTO: 71.9 % — SIGNIFICANT CHANGE UP (ref 43–77)
NRBC # FLD: 0 K/UL — SIGNIFICANT CHANGE UP (ref 0–0)
PLATELET # BLD AUTO: 153 K/UL — SIGNIFICANT CHANGE UP (ref 150–400)
PMV BLD: 8.8 FL — SIGNIFICANT CHANGE UP (ref 7–13)
POTASSIUM SERPL-MCNC: 4.5 MMOL/L — SIGNIFICANT CHANGE UP (ref 3.5–5.3)
POTASSIUM SERPL-SCNC: 4.5 MMOL/L — SIGNIFICANT CHANGE UP (ref 3.5–5.3)
PROT SERPL-MCNC: 6.3 G/DL — SIGNIFICANT CHANGE UP (ref 6–8.3)
RBC # BLD: 4.51 M/UL — SIGNIFICANT CHANGE UP (ref 4.2–5.8)
RBC # FLD: 14.1 % — SIGNIFICANT CHANGE UP (ref 10.3–14.5)
SODIUM SERPL-SCNC: 128 MMOL/L — LOW (ref 135–145)
WBC # BLD: 4.36 K/UL — SIGNIFICANT CHANGE UP (ref 3.8–10.5)
WBC # FLD AUTO: 4.36 K/UL — SIGNIFICANT CHANGE UP (ref 3.8–10.5)

## 2020-04-12 PROCEDURE — 99223 1ST HOSP IP/OBS HIGH 75: CPT | Mod: GC

## 2020-04-12 PROCEDURE — 76770 US EXAM ABDO BACK WALL COMP: CPT | Mod: 26

## 2020-04-12 PROCEDURE — 99233 SBSQ HOSP IP/OBS HIGH 50: CPT

## 2020-04-12 RX ORDER — ERGOCALCIFEROL 1.25 MG/1
50000 CAPSULE ORAL ONCE
Refills: 0 | Status: COMPLETED | OUTPATIENT
Start: 2020-04-12 | End: 2020-04-12

## 2020-04-12 RX ADMIN — Medication 5 MILLIGRAM(S): at 06:25

## 2020-04-12 RX ADMIN — BUMETANIDE 1 MILLIGRAM(S): 0.25 INJECTION INTRAMUSCULAR; INTRAVENOUS at 06:24

## 2020-04-12 RX ADMIN — HEPARIN SODIUM 5000 UNIT(S): 5000 INJECTION INTRAVENOUS; SUBCUTANEOUS at 21:50

## 2020-04-12 RX ADMIN — MYCOPHENOLATE MOFETIL 1000 MILLIGRAM(S): 250 CAPSULE ORAL at 12:46

## 2020-04-12 RX ADMIN — Medication 500 MILLIGRAM(S): at 17:48

## 2020-04-12 RX ADMIN — SEVELAMER CARBONATE 800 MILLIGRAM(S): 2400 POWDER, FOR SUSPENSION ORAL at 12:45

## 2020-04-12 RX ADMIN — CARVEDILOL PHOSPHATE 3.12 MILLIGRAM(S): 80 CAPSULE, EXTENDED RELEASE ORAL at 17:49

## 2020-04-12 RX ADMIN — Medication 10 MILLIEQUIVALENT(S): at 17:48

## 2020-04-12 RX ADMIN — SEVELAMER CARBONATE 800 MILLIGRAM(S): 2400 POWDER, FOR SUSPENSION ORAL at 17:48

## 2020-04-12 RX ADMIN — HEPARIN SODIUM 5000 UNIT(S): 5000 INJECTION INTRAVENOUS; SUBCUTANEOUS at 06:25

## 2020-04-12 RX ADMIN — ATORVASTATIN CALCIUM 80 MILLIGRAM(S): 80 TABLET, FILM COATED ORAL at 21:50

## 2020-04-12 RX ADMIN — SODIUM CHLORIDE 1 GRAM(S): 9 INJECTION INTRAMUSCULAR; INTRAVENOUS; SUBCUTANEOUS at 17:48

## 2020-04-12 RX ADMIN — Medication 500 MILLIGRAM(S): at 06:24

## 2020-04-12 RX ADMIN — Medication 2: at 12:45

## 2020-04-12 RX ADMIN — MYCOPHENOLATE MOFETIL 1000 MILLIGRAM(S): 250 CAPSULE ORAL at 21:50

## 2020-04-12 RX ADMIN — CLOPIDOGREL BISULFATE 75 MILLIGRAM(S): 75 TABLET, FILM COATED ORAL at 12:45

## 2020-04-12 RX ADMIN — CEFTRIAXONE 100 MILLIGRAM(S): 500 INJECTION, POWDER, FOR SOLUTION INTRAMUSCULAR; INTRAVENOUS at 17:47

## 2020-04-12 RX ADMIN — ERGOCALCIFEROL 50000 UNIT(S): 1.25 CAPSULE ORAL at 13:54

## 2020-04-12 RX ADMIN — Medication 5 MILLIGRAM(S): at 13:54

## 2020-04-12 RX ADMIN — SODIUM CHLORIDE 1 GRAM(S): 9 INJECTION INTRAMUSCULAR; INTRAVENOUS; SUBCUTANEOUS at 06:24

## 2020-04-12 RX ADMIN — TACROLIMUS 1 MILLIGRAM(S): 5 CAPSULE ORAL at 12:46

## 2020-04-12 RX ADMIN — Medication 325 MILLIGRAM(S): at 12:45

## 2020-04-12 RX ADMIN — Medication 81 MILLIGRAM(S): at 13:54

## 2020-04-12 RX ADMIN — Medication 2: at 09:11

## 2020-04-12 RX ADMIN — SEVELAMER CARBONATE 800 MILLIGRAM(S): 2400 POWDER, FOR SUSPENSION ORAL at 09:27

## 2020-04-12 RX ADMIN — Medication 25 MILLIGRAM(S): at 06:24

## 2020-04-12 RX ADMIN — Medication 6: at 17:46

## 2020-04-12 RX ADMIN — BUMETANIDE 1 MILLIGRAM(S): 0.25 INJECTION INTRAMUSCULAR; INTRAVENOUS at 17:49

## 2020-04-12 RX ADMIN — Medication 25 MILLIGRAM(S): at 17:49

## 2020-04-12 RX ADMIN — INSULIN GLARGINE 6 UNIT(S): 100 INJECTION, SOLUTION SUBCUTANEOUS at 21:50

## 2020-04-12 RX ADMIN — CARVEDILOL PHOSPHATE 3.12 MILLIGRAM(S): 80 CAPSULE, EXTENDED RELEASE ORAL at 06:24

## 2020-04-12 RX ADMIN — Medication 75 MICROGRAM(S): at 06:24

## 2020-04-12 RX ADMIN — TACROLIMUS 1 MILLIGRAM(S): 5 CAPSULE ORAL at 21:50

## 2020-04-12 RX ADMIN — HEPARIN SODIUM 5000 UNIT(S): 5000 INJECTION INTRAVENOUS; SUBCUTANEOUS at 13:55

## 2020-04-12 NOTE — PROGRESS NOTE ADULT - PROBLEM SELECTOR PLAN 2
- UA suggestive of infection, prior UTI from 2/2020 sensitive to ceftriaxone, resistant to zosyn  - f/u Urinary culture.   - Patient already received one dose of Cefepime in ED. Based on Prior urine culture results continue with ceftriaxone 1g IV q 24 hours until culture results

## 2020-04-12 NOTE — PROGRESS NOTE ADULT - PROBLEM SELECTOR PLAN 7
- Monitor Glycemia three times a day with meals and at bed time   - Continue LANTUS 6 U at bed time + Sliding Novolog scale - Monitor blood glucose QAC and QHS  - Continue LANTUS 6 U at bed time + Sliding Novolog scale

## 2020-04-12 NOTE — CONSULT NOTE ADULT - SUBJECTIVE AND OBJECTIVE BOX
[facility] Division of Kidney Diseases & Hypertension  INITIAL CONSULT NOTE  [phone]--------------------------------------------------------------------------------  HPI:  59M w/ hx of HTN, HLD, DM, ESRD s/p kidney transplant,  CAD s/p stent a few months ago at [place], PVD s/p R AKA in 2010 presents for fever, rhinorrhea and not feeling well for three days. Pt comes from [facility]. Patient found to be COVID 19 positive on 4/11/20. Nephrology team consulted for YURI and management of immunosuppressant.    Pt with history of ESRD s/p DDRT on 2005. Pt. does not recall his nephrologist but goes to [facility] with last ff-up 3 months prior. Pt recently residing in [facility] for the past 2 months. Pt is a poor historian and is not able to recall his transplant medications. On review pt is on Tacrolimus 1mg BID, Mycophenolate 1gm BID and Prednisone 5mg daily. On review of labs, pt with SCr: 3.6 on 3/27/20 increased to 3.9 on 3/30/20. Pt with last SCr: 4.8 as outpatient at [facility].  On admission, pt found to have SCr: 5.47 (4/10/20) increased to 4.5 today. Pt reports last renal biopsy 5-6 months ago. Pt found to have Tacro level <2. Pt reports being compliant to medications On examination at bedside, pt without subjective complaints.         PAST HISTORY  --------------------------------------------------------------------------------  PAST MEDICAL & SURGICAL HISTORY:  BPH (benign prostatic hyperplasia)  Hypothyroid  Diabetes  Chronic systolic heart failure  HTN (hypertension)  CAD (coronary artery disease)  Kidney transplant recipient  ESRD (end stage renal disease)  S/P drug eluting coronary stent placement  Kidney transplanted    FAMILY HISTORY:    PAST SOCIAL HISTORY:    ALLERGIES & MEDICATIONS  --------------------------------------------------------------------------------  Allergies    iodine (Vomiting)  IV Contrast (Unknown)    Intolerances      Standing Inpatient Medications  ascorbic acid  Oral Tab/Cap - Peds 500 milliGRAM(s) Oral two times a day  aspirin  chewable 81 milliGRAM(s) Oral daily  atorvastatin 80 milliGRAM(s) Oral at bedtime  bisacodyl 5 milliGRAM(s) Oral daily  buMETAnide 1 milliGRAM(s) Oral two times a day  carvedilol Oral Tab/Cap - Peds 3.125 milliGRAM(s) Oral two times a day  cefTRIAXone   IVPB 1000 milliGRAM(s) IV Intermittent every 24 hours  clopidogrel Tablet 75 milliGRAM(s) Oral daily  dextrose 5%. 1000 milliLiter(s) IV Continuous <Continuous>  dextrose 50% Injectable 12.5 Gram(s) IV Push once  dextrose 50% Injectable 25 Gram(s) IV Push once  dextrose 50% Injectable 25 Gram(s) IV Push once  ergocalciferol 69703 Unit(s) Oral once  ferrous sulfate Oral Tab/Cap - Peds 325 milliGRAM(s) Oral daily  heparin  Injectable 5000 Unit(s) SubCutaneous every 8 hours  hydrALAZINE 25 milliGRAM(s) Oral two times a day  insulin glargine SubCutaneous Injection (LANTUS) - Peds 6 Unit(s) SubCutaneous at bedtime  insulin lispro (HumaLOG) corrective regimen sliding scale   SubCutaneous three times a day before meals  insulin lispro (HumaLOG) corrective regimen sliding scale   SubCutaneous at bedtime  levothyroxine 75 MICROGram(s) Oral daily  mycophenolate mofetil  Oral Tab/Cap - Peds 1000 milliGRAM(s) Oral two times a day  polyethylene glycol 3350 17 Gram(s) Oral daily  potassium chloride    Tablet ER 10 milliEquivalent(s) Oral daily  predniSONE   Tablet 5 milliGRAM(s) Oral daily  sevelamer carbonate 800 milliGRAM(s) Oral three times a day with meals  sodium chloride 1 Gram(s) Oral two times a day  tacrolimus 1 milliGRAM(s) Oral every 12 hours    PRN Inpatient Medications  acetaminophen   Tablet .. 650 milliGRAM(s) Oral every 4 hours PRN  acetaminophen   Tablet .. 650 milliGRAM(s) Oral every 6 hours PRN  acetaminophen  Suppository .. 650 milliGRAM(s) Rectal every 4 hours PRN  dextrose 40% Gel 15 Gram(s) Oral once PRN  glucagon  Injectable 1 milliGRAM(s) IntraMuscular once PRN  sucralfate 1 Gram(s) Oral every 6 hours PRN      REVIEW OF SYSTEMS  --------------------------------------------------------------------------------  Gen: No  fevers/chills, weakness  Skin: No rashes  Head/Eyes/Ears/Mouth: No headache; Normal hearing; Normal vision w/o blurriness;   Respiratory: No dyspnea, cough, wheezing, hemoptysis  CV: No chest pain,   GI: No abdominal pain, diarrhea, constipation, nausea, vomiting  : No increased frequency, dysuria, hematuria, nocturia  MSK: No joint pain/swelling;   Neuro: No dizziness/lightheadedness, weakness, seizures    All other systems were reviewed and are negative, except as noted.    VITALS/PHYSICAL EXAM  --------------------------------------------------------------------------------  T(C): 36.8 (04-12-20 @ 09:29), Max: 37.1 (04-11-20 @ 18:54)  HR: 60 (04-12-20 @ 09:29) (60 - 70)  BP: 122/55 (04-12-20 @ 09:29) (122/55 - 151/68)  RR: 20 (04-12-20 @ 09:29) (16 - 20)  SpO2: 100% (04-12-20 @ 09:29) (98% - 100%)  Wt(kg): --        04-11-20 @ 07:01  -  04-12-20 @ 07:00  --------------------------------------------------------  IN: 610 mL / OUT: 250 mL / NET: 360 mL      Physical Exam:  	Gen: NAD, well-appearing  	HEENT: PERRL, supple neck  	Pulm: CTA B/L  	CV:  S1S2  	Abd: +BS, soft   	Ext: No B/L Lower ext edema  	Neuro: No focal deficits  	Skin: Warm, without rashes  	Vascular access:     LABS/STUDIES  --------------------------------------------------------------------------------              11.4   4.36  >-----------<  153      [04-12-20 @ 05:21]              36.2     128  |  92  |  97  ----------------------------<  161      [04-12-20 @ 05:21]  4.5   |  19  |  5.55        Ca     9.0     [04-12-20 @ 05:21]    TPro  6.3  /  Alb  2.8  /  TBili  0.2  /  DBili  x   /  AST  11  /  ALT  6   /  AlkPhos  62  [04-12-20 @ 05:21]    PT/INR: PT 15.2 , INR 1.31       [04-10-20 @ 22:00]  PTT: 32.8       [04-10-20 @ 22:00]      Creatinine Trend:  SCr 5.55 [04-12 @ 05:21]  SCr 5.47 [04-10 @ 21:52]    Urinalysis - [04-11-20 @ 08:30]      Color COLORLESS / Appearance TURBID / SG 1.014 / pH 6.0      Gluc NEGATIVE / Ketone TRACE  / Bili NEGATIVE / Urobili NORMAL       Blood MODERATE / Protein 100 / Leuk Est LARGE / Nitrite POSITIVE      RBC 10-20 / WBC >50 / Hyaline  / Gran  / Sq Epi  / Non Sq Epi OCC / Bacteria MANY      HbA1c 6.6      [04-12-20 @ 05:21]    HCV 0.35, Nonreactive Hepatitis C AB  S/CO Ratio                        Interpretation  < 1.00                                   Non-Reactive  1.00 - 4.99                         Weakly-Reactive  >= 5.00                                Reactive  Non-Reactive: Aperson with a non-reactive HCV antibody  result is considered uninfected.  No further action is  needed unless recent infection is suspected.  In these  cases, consider repeat testing later to detect  seroconversion..  Weakly-Reactive: HCV antibody test is abnormal, HCV RNA  Qualitative test will follow.  Reactive: HCV antibody test is abnormal, HCV RNA  Qualitative test will follow.  Note: HCV antibody testing is performed on the Abbott   system.      [04-12-20 @ 05:21] [facility] Division of Kidney Diseases & Hypertension  INITIAL CONSULT NOTE  [phone]--------------------------------------------------------------------------------  HPI:  59M w/ hx of HTN, HLD, DM, ESRD s/p kidney transplant,  CAD s/p stent a few months ago at [place], PVD s/p R AKA in 2010 presents for fever, rhinorrhea and not feeling well for three days. Pt comes from [facility]. Patient found to be COVID 19 positive on 4/11/20. Nephrology team consulted for YURI and management of immunosuppressant.    Pt with history of ESRD s/p DDRT on 2005. Pt. does not recall his nephrologist but goes to [facility] with last ff-up 3 months prior. Pt recently residing in [facility] [place] for the past 2 months. Pt is a poor historian and is not able to recall his transplant medications. On review pt is on Tacrolimus 1mg BID, Mycophenolate 1gm BID and Prednisone 5mg daily. On review of labs, pt with SCr: 3.6 on 3/27/20 increased to 3.9 on 3/30/20. Pt with last SCr: 4.8 as outpatient at [facility].  On admission, pt found to have SCr: 5.47 (4/10/20) increased to 5.5 today. Pt reports last renal biopsy 5-6 months ago. Pt found to have Tacro level <2. Pt reports being compliant to medications On examination at bedside, pt without subjective complaints.         PAST HISTORY  --------------------------------------------------------------------------------  PAST MEDICAL & SURGICAL HISTORY:  BPH (benign prostatic hyperplasia)  Hypothyroid  Diabetes  Chronic systolic heart failure  HTN (hypertension)  CAD (coronary artery disease)  Kidney transplant recipient  ESRD (end stage renal disease)  S/P drug eluting coronary stent placement  Kidney transplanted    FAMILY HISTORY:    PAST SOCIAL HISTORY:    ALLERGIES & MEDICATIONS  --------------------------------------------------------------------------------  Allergies    iodine (Vomiting)  IV Contrast (Unknown)    Intolerances      Standing Inpatient Medications  ascorbic acid  Oral Tab/Cap - Peds 500 milliGRAM(s) Oral two times a day  aspirin  chewable 81 milliGRAM(s) Oral daily  atorvastatin 80 milliGRAM(s) Oral at bedtime  bisacodyl 5 milliGRAM(s) Oral daily  buMETAnide 1 milliGRAM(s) Oral two times a day  carvedilol Oral Tab/Cap - Peds 3.125 milliGRAM(s) Oral two times a day  cefTRIAXone   IVPB 1000 milliGRAM(s) IV Intermittent every 24 hours  clopidogrel Tablet 75 milliGRAM(s) Oral daily  dextrose 5%. 1000 milliLiter(s) IV Continuous <Continuous>  dextrose 50% Injectable 12.5 Gram(s) IV Push once  dextrose 50% Injectable 25 Gram(s) IV Push once  dextrose 50% Injectable 25 Gram(s) IV Push once  ergocalciferol 68364 Unit(s) Oral once  ferrous sulfate Oral Tab/Cap - Peds 325 milliGRAM(s) Oral daily  heparin  Injectable 5000 Unit(s) SubCutaneous every 8 hours  hydrALAZINE 25 milliGRAM(s) Oral two times a day  insulin glargine SubCutaneous Injection (LANTUS) - Peds 6 Unit(s) SubCutaneous at bedtime  insulin lispro (HumaLOG) corrective regimen sliding scale   SubCutaneous three times a day before meals  insulin lispro (HumaLOG) corrective regimen sliding scale   SubCutaneous at bedtime  levothyroxine 75 MICROGram(s) Oral daily  mycophenolate mofetil  Oral Tab/Cap - Peds 1000 milliGRAM(s) Oral two times a day  polyethylene glycol 3350 17 Gram(s) Oral daily  potassium chloride    Tablet ER 10 milliEquivalent(s) Oral daily  predniSONE   Tablet 5 milliGRAM(s) Oral daily  sevelamer carbonate 800 milliGRAM(s) Oral three times a day with meals  sodium chloride 1 Gram(s) Oral two times a day  tacrolimus 1 milliGRAM(s) Oral every 12 hours    PRN Inpatient Medications  acetaminophen   Tablet .. 650 milliGRAM(s) Oral every 4 hours PRN  acetaminophen   Tablet .. 650 milliGRAM(s) Oral every 6 hours PRN  acetaminophen  Suppository .. 650 milliGRAM(s) Rectal every 4 hours PRN  dextrose 40% Gel 15 Gram(s) Oral once PRN  glucagon  Injectable 1 milliGRAM(s) IntraMuscular once PRN  sucralfate 1 Gram(s) Oral every 6 hours PRN      REVIEW OF SYSTEMS  --------------------------------------------------------------------------------  Gen: No  fevers/chills, weakness  Skin: No rashes  Head/Eyes/Ears/Mouth: No headache; Normal hearing; Normal vision w/o blurriness;   Respiratory: No dyspnea, cough, wheezing, hemoptysis  CV: No chest pain,   GI: No abdominal pain, diarrhea, constipation, nausea, vomiting  : No increased frequency, dysuria, hematuria, nocturia  MSK: No joint pain/swelling;   Neuro: No dizziness/lightheadedness, weakness, seizures    All other systems were reviewed and are negative, except as noted.    VITALS/PHYSICAL EXAM  --------------------------------------------------------------------------------  T(C): 36.8 (04-12-20 @ 09:29), Max: 37.1 (04-11-20 @ 18:54)  HR: 60 (04-12-20 @ 09:29) (60 - 70)  BP: 122/55 (04-12-20 @ 09:29) (122/55 - 151/68)  RR: 20 (04-12-20 @ 09:29) (16 - 20)  SpO2: 100% (04-12-20 @ 09:29) (98% - 100%)  Wt(kg): --        04-11-20 @ 07:01  -  04-12-20 @ 07:00  --------------------------------------------------------  IN: 610 mL / OUT: 250 mL / NET: 360 mL      Physical Exam:  	Gen: NAD, well-appearing  	HEENT: PERRL, supple neck  	Pulm: CTA B/L  	CV:  S1S2  	Abd: +BS, soft   	Ext: No B/L Lower ext edema  	Neuro: No focal deficits  	Skin: Warm, without rashes  	Vascular access:     LABS/STUDIES  --------------------------------------------------------------------------------              11.4   4.36  >-----------<  153      [04-12-20 @ 05:21]              36.2     128  |  92  |  97  ----------------------------<  161      [04-12-20 @ 05:21]  4.5   |  19  |  5.55        Ca     9.0     [04-12-20 @ 05:21]    TPro  6.3  /  Alb  2.8  /  TBili  0.2  /  DBili  x   /  AST  11  /  ALT  6   /  AlkPhos  62  [04-12-20 @ 05:21]    PT/INR: PT 15.2 , INR 1.31       [04-10-20 @ 22:00]  PTT: 32.8       [04-10-20 @ 22:00]      Creatinine Trend:  SCr 5.55 [04-12 @ 05:21]  SCr 5.47 [04-10 @ 21:52]    Urinalysis - [04-11-20 @ 08:30]      Color COLORLESS / Appearance TURBID / SG 1.014 / pH 6.0      Gluc NEGATIVE / Ketone TRACE  / Bili NEGATIVE / Urobili NORMAL       Blood MODERATE / Protein 100 / Leuk Est LARGE / Nitrite POSITIVE      RBC 10-20 / WBC >50 / Hyaline  / Gran  / Sq Epi  / Non Sq Epi OCC / Bacteria MANY      HbA1c 6.6      [04-12-20 @ 05:21]    HCV 0.35, Nonreactive Hepatitis C AB  S/CO Ratio                        Interpretation  < 1.00                                   Non-Reactive  1.00 - 4.99                         Weakly-Reactive  >= 5.00                                Reactive  Non-Reactive: Aperson with a non-reactive HCV antibody  result is considered uninfected.  No further action is  needed unless recent infection is suspected.  In these  cases, consider repeat testing later to detect  seroconversion..  Weakly-Reactive: HCV antibody test is abnormal, HCV RNA  Qualitative test will follow.  Reactive: HCV antibody test is abnormal, HCV RNA  Qualitative test will follow.  Note: HCV antibody testing is performed on the Abbott   system.      [04-12-20 @ 05:21] [facility] Division of Kidney Diseases & Hypertension  INITIAL CONSULT NOTE  [phone]--------------------------------------------------------------------------------  HPI:  59M w/ hx of HTN, HLD, DM, ESRD s/p kidney transplant,  CAD s/p stent a few months ago at [place], PVD s/p R AKA in 2010 presents for fever, rhinorrhea and not feeling well for three days. Pt comes from [facility]. Patient found to be COVID 19 positive on 4/11/20. Nephrology team consulted for YURI and management of immunosuppressant.    Pt with history of ESRD s/p DDRT on 2005. Pt. does not recall his nephrologist but goes to [facility] with last ff-up 3 months prior. Pt recently residing in [facility] for the past 2 months. Pt is a poor historian and is not able to recall his transplant medications. On review pt is on Tacrolimus 1mg BID, Mycophenolate 1gm BID and Prednisone 5mg daily. On review of labs, pt with SCr: 3.6 on 3/27/20 increased to 3.9 on 3/30/20. Pt with last SCr: 4.8 as outpatient at [facility].  On admission, pt found to have SCr: 5.47 (4/10/20) increased to 5.5 today. Pt reports last renal biopsy 5-6 months ago. Pt found to have Tacro level <2. Pt reports being compliant to medications On examination at bedside, pt without subjective complaints.         PAST HISTORY  --------------------------------------------------------------------------------  PAST MEDICAL & SURGICAL HISTORY:  BPH (benign prostatic hyperplasia)  Hypothyroid  Diabetes  Chronic systolic heart failure  HTN (hypertension)  CAD (coronary artery disease)  Kidney transplant recipient  ESRD (end stage renal disease)  S/P drug eluting coronary stent placement  Kidney transplanted    FAMILY HISTORY:    PAST SOCIAL HISTORY:    ALLERGIES & MEDICATIONS  --------------------------------------------------------------------------------  Allergies    iodine (Vomiting)  IV Contrast (Unknown)    Intolerances      Standing Inpatient Medications  ascorbic acid  Oral Tab/Cap - Peds 500 milliGRAM(s) Oral two times a day  aspirin  chewable 81 milliGRAM(s) Oral daily  atorvastatin 80 milliGRAM(s) Oral at bedtime  bisacodyl 5 milliGRAM(s) Oral daily  buMETAnide 1 milliGRAM(s) Oral two times a day  carvedilol Oral Tab/Cap - Peds 3.125 milliGRAM(s) Oral two times a day  cefTRIAXone   IVPB 1000 milliGRAM(s) IV Intermittent every 24 hours  clopidogrel Tablet 75 milliGRAM(s) Oral daily  dextrose 5%. 1000 milliLiter(s) IV Continuous <Continuous>  dextrose 50% Injectable 12.5 Gram(s) IV Push once  dextrose 50% Injectable 25 Gram(s) IV Push once  dextrose 50% Injectable 25 Gram(s) IV Push once  ergocalciferol 55553 Unit(s) Oral once  ferrous sulfate Oral Tab/Cap - Peds 325 milliGRAM(s) Oral daily  heparin  Injectable 5000 Unit(s) SubCutaneous every 8 hours  hydrALAZINE 25 milliGRAM(s) Oral two times a day  insulin glargine SubCutaneous Injection (LANTUS) - Peds 6 Unit(s) SubCutaneous at bedtime  insulin lispro (HumaLOG) corrective regimen sliding scale   SubCutaneous three times a day before meals  insulin lispro (HumaLOG) corrective regimen sliding scale   SubCutaneous at bedtime  levothyroxine 75 MICROGram(s) Oral daily  mycophenolate mofetil  Oral Tab/Cap - Peds 1000 milliGRAM(s) Oral two times a day  polyethylene glycol 3350 17 Gram(s) Oral daily  potassium chloride    Tablet ER 10 milliEquivalent(s) Oral daily  predniSONE   Tablet 5 milliGRAM(s) Oral daily  sevelamer carbonate 800 milliGRAM(s) Oral three times a day with meals  sodium chloride 1 Gram(s) Oral two times a day  tacrolimus 1 milliGRAM(s) Oral every 12 hours    PRN Inpatient Medications  acetaminophen   Tablet .. 650 milliGRAM(s) Oral every 4 hours PRN  acetaminophen   Tablet .. 650 milliGRAM(s) Oral every 6 hours PRN  acetaminophen  Suppository .. 650 milliGRAM(s) Rectal every 4 hours PRN  dextrose 40% Gel 15 Gram(s) Oral once PRN  glucagon  Injectable 1 milliGRAM(s) IntraMuscular once PRN  sucralfate 1 Gram(s) Oral every 6 hours PRN      REVIEW OF SYSTEMS  --------------------------------------------------------------------------------  Gen: No  fevers/chills  Skin: No rashes  Respiratory: No dyspnea  CV: No chest pain,   GI: No abdominal pain, diarrhea  MSK: No joint pain/swelling;   Neuro: No dizziness/lightheadedness    All other systems were reviewed and are negative, except as noted.    VITALS/PHYSICAL EXAM  --------------------------------------------------------------------------------  T(C): 36.8 (04-12-20 @ 09:29), Max: 37.1 (04-11-20 @ 18:54)  HR: 60 (04-12-20 @ 09:29) (60 - 70)  BP: 122/55 (04-12-20 @ 09:29) (122/55 - 151/68)  RR: 20 (04-12-20 @ 09:29) (16 - 20)  SpO2: 100% (04-12-20 @ 09:29) (98% - 100%)  Wt(kg): --        04-11-20 @ 07:01  -  04-12-20 @ 07:00  --------------------------------------------------------  IN: 610 mL / OUT: 250 mL / NET: 360 mL      Physical Exam:  	Gen: NAD, well-appearing  	HEENT:supple, no JVD  	Pulm: CTA B/L  	CV:  S1S2  	Abd: +BS, soft, transplant area without tenderness  	Ext: No B/L Lower ext edema  	Neuro: No focal deficits  	Skin: Warm, without rashes  	Vascular access: Left Arm AVF with palpable thrill, + aneurysmal dilation    LABS/STUDIES  --------------------------------------------------------------------------------              11.4   4.36  >-----------<  153      [04-12-20 @ 05:21]              36.2     128  |  92  |  97  ----------------------------<  161      [04-12-20 @ 05:21]  4.5   |  19  |  5.55        Ca     9.0     [04-12-20 @ 05:21]    TPro  6.3  /  Alb  2.8  /  TBili  0.2  /  DBili  x   /  AST  11  /  ALT  6   /  AlkPhos  62  [04-12-20 @ 05:21]    PT/INR: PT 15.2 , INR 1.31       [04-10-20 @ 22:00]  PTT: 32.8       [04-10-20 @ 22:00]      Creatinine Trend:  SCr 5.55 [04-12 @ 05:21]  SCr 5.47 [04-10 @ 21:52]    Urinalysis - [04-11-20 @ 08:30]      Color COLORLESS / Appearance TURBID / SG 1.014 / pH 6.0      Gluc NEGATIVE / Ketone TRACE  / Bili NEGATIVE / Urobili NORMAL       Blood MODERATE / Protein 100 / Leuk Est LARGE / Nitrite POSITIVE      RBC 10-20 / WBC >50 / Hyaline  / Gran  / Sq Epi  / Non Sq Epi OCC / Bacteria MANY      HbA1c 6.6      [04-12-20 @ 05:21]    HCV 0.35, Nonreactive Hepatitis C AB  S/CO Ratio                        Interpretation  < 1.00                                   Non-Reactive  1.00 - 4.99                         Weakly-Reactive  >= 5.00                                Reactive  Non-Reactive: Aperson with a non-reactive HCV antibody  result is considered uninfected.  No further action is  needed unless recent infection is suspected.  In these  cases, consider repeat testing later to detect  seroconversion..  Weakly-Reactive: HCV antibody test is abnormal, HCV RNA  Qualitative test will follow.  Reactive: HCV antibody test is abnormal, HCV RNA  Qualitative test will follow.  Note: HCV antibody testing is performed on the Abbott   system.      [04-12-20 @ 05:21] [facility] Division of Kidney Diseases & Hypertension  INITIAL CONSULT NOTE  [phone]--------------------------------------------------------------------------------  HPI:  59M w/ hx of HTN, HLD, DM, ESRD s/p kidney transplant,  CAD s/p stent a few months ago at [place], PVD s/p R AKA in 2010 presents for fever, rhinorrhea and not feeling well for three days. Pt comes from [facility]. Patient found to be COVID 19 positive on 4/11/20. Nephrology team consulted for YURI and management of immunosuppressant.    Pt with history of ESRD s/p DDRT on 2005. Pt. does not recall his nephrologist but goes to [facility] with last ff-up 3 months prior. Pt recently residing in [facility] for the past 2 months. Pt is a poor historian and is not able to recall his transplant medications. On review pt is on Tacrolimus 1mg BID, Mycophenolate 1gm BID and Prednisone 5mg daily. On review of labs, pt with SCr: 3.6 on 3/27/20 increased to 3.9 on 3/30/20. Pt with last SCr: 4.8 as outpatient at [facility].  On admission, pt found to have SCr: 5.47 (4/10/20) increased to 5.5 today. Pt reports last renal biopsy 5-6 months ago. Pt found to have Tacro level <2. Pt reports being compliant to medications On examination at bedside, pt without subjective complaints.         PAST HISTORY  --------------------------------------------------------------------------------  PAST MEDICAL & SURGICAL HISTORY:  BPH (benign prostatic hyperplasia)  Hypothyroid  Diabetes  Chronic systolic heart failure  HTN (hypertension)  CAD (coronary artery disease)  Kidney transplant recipient  ESRD (end stage renal disease)  S/P drug eluting coronary stent placement  Kidney transplanted    FAMILY HISTORY: patient unable to provide    PAST SOCIAL HISTORY: patient unable to provide    ALLERGIES & MEDICATIONS  --------------------------------------------------------------------------------  Allergies    iodine (Vomiting)  IV Contrast (Unknown)    Intolerances      Standing Inpatient Medications  ascorbic acid  Oral Tab/Cap - Peds 500 milliGRAM(s) Oral two times a day  aspirin  chewable 81 milliGRAM(s) Oral daily  atorvastatin 80 milliGRAM(s) Oral at bedtime  bisacodyl 5 milliGRAM(s) Oral daily  buMETAnide 1 milliGRAM(s) Oral two times a day  carvedilol Oral Tab/Cap - Peds 3.125 milliGRAM(s) Oral two times a day  cefTRIAXone   IVPB 1000 milliGRAM(s) IV Intermittent every 24 hours  clopidogrel Tablet 75 milliGRAM(s) Oral daily  dextrose 5%. 1000 milliLiter(s) IV Continuous <Continuous>  dextrose 50% Injectable 12.5 Gram(s) IV Push once  dextrose 50% Injectable 25 Gram(s) IV Push once  dextrose 50% Injectable 25 Gram(s) IV Push once  ergocalciferol 12512 Unit(s) Oral once  ferrous sulfate Oral Tab/Cap - Peds 325 milliGRAM(s) Oral daily  heparin  Injectable 5000 Unit(s) SubCutaneous every 8 hours  hydrALAZINE 25 milliGRAM(s) Oral two times a day  insulin glargine SubCutaneous Injection (LANTUS) - Peds 6 Unit(s) SubCutaneous at bedtime  insulin lispro (HumaLOG) corrective regimen sliding scale   SubCutaneous three times a day before meals  insulin lispro (HumaLOG) corrective regimen sliding scale   SubCutaneous at bedtime  levothyroxine 75 MICROGram(s) Oral daily  mycophenolate mofetil  Oral Tab/Cap - Peds 1000 milliGRAM(s) Oral two times a day  polyethylene glycol 3350 17 Gram(s) Oral daily  potassium chloride    Tablet ER 10 milliEquivalent(s) Oral daily  predniSONE   Tablet 5 milliGRAM(s) Oral daily  sevelamer carbonate 800 milliGRAM(s) Oral three times a day with meals  sodium chloride 1 Gram(s) Oral two times a day  tacrolimus 1 milliGRAM(s) Oral every 12 hours    PRN Inpatient Medications  acetaminophen   Tablet .. 650 milliGRAM(s) Oral every 4 hours PRN  acetaminophen   Tablet .. 650 milliGRAM(s) Oral every 6 hours PRN  acetaminophen  Suppository .. 650 milliGRAM(s) Rectal every 4 hours PRN  dextrose 40% Gel 15 Gram(s) Oral once PRN  glucagon  Injectable 1 milliGRAM(s) IntraMuscular once PRN  sucralfate 1 Gram(s) Oral every 6 hours PRN      REVIEW OF SYSTEMS  --------------------------------------------------------------------------------  Gen: No  fevers/chills  Skin: No rashes  Respiratory: No dyspnea  CV: No chest pain,   GI: No abdominal pain, diarrhea  MSK: No joint pain/swelling;   Neuro: No dizziness/lightheadedness    All other systems were reviewed and are negative, except as noted.    VITALS/PHYSICAL EXAM  --------------------------------------------------------------------------------  T(C): 36.8 (04-12-20 @ 09:29), Max: 37.1 (04-11-20 @ 18:54)  HR: 60 (04-12-20 @ 09:29) (60 - 70)  BP: 122/55 (04-12-20 @ 09:29) (122/55 - 151/68)  RR: 20 (04-12-20 @ 09:29) (16 - 20)  SpO2: 100% (04-12-20 @ 09:29) (98% - 100%)  Wt(kg): --        04-11-20 @ 07:01  -  04-12-20 @ 07:00  --------------------------------------------------------  IN: 610 mL / OUT: 250 mL / NET: 360 mL      Physical Exam:  	Gen: NAD, well-appearing  	HEENT:supple, no JVD  	Pulm: CTA B/L  	CV:  S1S2  	Abd: +BS, soft, transplant area without tenderness  	Ext: No B/L Lower ext edema  	Neuro: No focal deficits  	Skin: Warm, without rashes  	Vascular access: Left Arm AVF with palpable thrill, + aneurysmal dilation    LABS/STUDIES  --------------------------------------------------------------------------------              11.4   4.36  >-----------<  153      [04-12-20 @ 05:21]              36.2     128  |  92  |  97  ----------------------------<  161      [04-12-20 @ 05:21]  4.5   |  19  |  5.55        Ca     9.0     [04-12-20 @ 05:21]    TPro  6.3  /  Alb  2.8  /  TBili  0.2  /  DBili  x   /  AST  11  /  ALT  6   /  AlkPhos  62  [04-12-20 @ 05:21]    PT/INR: PT 15.2 , INR 1.31       [04-10-20 @ 22:00]  PTT: 32.8       [04-10-20 @ 22:00]      Creatinine Trend:  SCr 5.55 [04-12 @ 05:21]  SCr 5.47 [04-10 @ 21:52]    Urinalysis - [04-11-20 @ 08:30]      Color COLORLESS / Appearance TURBID / SG 1.014 / pH 6.0      Gluc NEGATIVE / Ketone TRACE  / Bili NEGATIVE / Urobili NORMAL       Blood MODERATE / Protein 100 / Leuk Est LARGE / Nitrite POSITIVE      RBC 10-20 / WBC >50 / Hyaline  / Gran  / Sq Epi  / Non Sq Epi OCC / Bacteria MANY      HbA1c 6.6      [04-12-20 @ 05:21]    HCV 0.35, Nonreactive Hepatitis C AB  S/CO Ratio                        Interpretation  < 1.00                                   Non-Reactive  1.00 - 4.99                         Weakly-Reactive  >= 5.00                                Reactive  Non-Reactive: Aperson with a non-reactive HCV antibody  result is considered uninfected.  No further action is  needed unless recent infection is suspected.  In these  cases, consider repeat testing later to detect  seroconversion..  Weakly-Reactive: HCV antibody test is abnormal, HCV RNA  Qualitative test will follow.  Reactive: HCV antibody test is abnormal, HCV RNA  Qualitative test will follow.  Note: HCV antibody testing is performed on the [employer]   system.      [04-12-20 @ 05:21]

## 2020-04-12 NOTE — PROGRESS NOTE ADULT - PROBLEM SELECTOR PLAN 1
- Continue 2 LPM NC. Will escalate to Non-rebreather if saturations < 92%   - Relative (brother) refused to use Plaquenil  - CXR clear  - Patient full code - Maintaining normal oxygenation off of supplemental O2. Cont to monitor.   -Trend inflammatory markers Q48h  - CXR clear  - Patient full code

## 2020-04-12 NOTE — PROGRESS NOTE ADULT - PROBLEM SELECTOR PLAN 4
Will continue existing regimen of Immunosuppressants:   Mycophenolate + Tacrolimus and Prednisone   Monitor and trend Cr/BUN  - f/u renal recommendations

## 2020-04-12 NOTE — CONSULT NOTE ADULT - PROBLEM SELECTOR RECOMMENDATION 9
Pt with YURI on CKD in the setting of COVID 19 infection.  Pt with last SCr: 4.8 as outpatient at senior living.  On admission, pt found to have SCr: 5.47 (4/10/20) increased to 4.5 today. Pt on chronic immunosuppression s/p DDRT. Current Tacro level <2. 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 Recommend check renal doppler US of transplanted kidney. Check urine spot TP/SCr. Please obtain previous records from [facility] and regarding pts last biopsy. Continue to monitor renal function. Avoid other potential nephrotoxins. Pt with YURI on CKD in the setting of COVID 19 infection.  Pt with last SCr: 4.8 as outpatient at senior care.  On admission, pt found to have SCr: 5.47 (4/10/20) increased to 5.55 today. Pt on chronic immunosuppression s/p DDRT. Current Tacro level <2. 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 Recommend check renal doppler US of transplanted kidney. Check urine spot TP/SCr. Please obtain previous records from [facility] and regarding pts last biopsy. Continue to monitor renal function. Avoid other potential nephrotoxins.

## 2020-04-12 NOTE — PROGRESS NOTE ADULT - ASSESSMENT
59M w/ hx of HTN, HLD, DM, ESRD s/p kidney transplant in 05, CAD s/p stent a few months ago at [facility] s/p R AKA in 2010 now presenting with hypoxic respiratory failure must likely due to COVID 19 infection.     - Asked patient regarding GOC and intubation - patient wants everything done.     Prior labs per patient's Nursing home:  March 27/2020: Cr/BUN: 3.6/24  March 30/2020: Cr/BUN: 3.9/24  April 6/2020: Cr/BUN: 4.8/20     Prior Urine Culture + : Klebsiella Pneumoniae 02/11/2020  S Ciprofloxacin <=1  S Levofloxacin <= 2  S Cefuroxime 8  S Ceftriaxone S <=1  S Cefepime < 2  R Ampicillin 16   R Augmentin >16  R Cefazolin >16  R PIP/TAZOBACTAM >64 Detail Level: Detailed 59M w/ hx of HTN, HLD, DM, ESRD s/p kidney transplant in 05, [facility] now presenting with hypoxic respiratory failure must likely due to COVID 19 infection.

## 2020-04-12 NOTE — PROGRESS NOTE ADULT - PROBLEM SELECTOR PLAN 3
Prior labs per patient's Nursing home:  March 27/2020: Cr/BUN: 3.6/24  March 30/2020: Cr/BUN: 3.9/24  April 6/2020: Cr/BUN: 4.8/20   - Consulted Nephrology regarding increase in Cr from prior values, f/u recommendations  - Last hemodialyses > 15 years ago  - Primary Nephrologist: Dr. Adrián Hawkins Prior labs per patient's Nursing home:  March 27/2020: Cr/BUN: 3.6/24  March 30/2020: Cr/BUN: 3.9/24  April 6/2020: Cr/BUN: 4.8/20   - Appreciate Nephrology consult - renal u/s ordered for transplanted kidney, spot urine protein, urine creatinine ordered, morning Tacrolimus level ordered  - Primary Nephrologist: Dr. Adrián Hawkins

## 2020-04-12 NOTE — PROGRESS NOTE ADULT - SUBJECTIVE AND OBJECTIVE BOX
Patient is a 59y old  Male who presents with a chief complaint of Rule out COVID 19 infection (2020 10:41)      SUBJECTIVE / OVERNIGHT EVENTS: No acute events overnight. +Hiccups    MEDICATIONS  (STANDING):  ascorbic acid  Oral Tab/Cap - Peds 500 milliGRAM(s) Oral two times a day  aspirin  chewable 81 milliGRAM(s) Oral daily  atorvastatin 80 milliGRAM(s) Oral at bedtime  bisacodyl 5 milliGRAM(s) Oral daily  buMETAnide 1 milliGRAM(s) Oral two times a day  carvedilol Oral Tab/Cap - Peds 3.125 milliGRAM(s) Oral two times a day  cefTRIAXone   IVPB 1000 milliGRAM(s) IV Intermittent every 24 hours  clopidogrel Tablet 75 milliGRAM(s) Oral daily  dextrose 5%. 1000 milliLiter(s) (50 mL/Hr) IV Continuous <Continuous>  dextrose 50% Injectable 12.5 Gram(s) IV Push once  dextrose 50% Injectable 25 Gram(s) IV Push once  dextrose 50% Injectable 25 Gram(s) IV Push once  ergocalciferol 26783 Unit(s) Oral once  ferrous sulfate Oral Tab/Cap - Peds 325 milliGRAM(s) Oral daily  heparin  Injectable 5000 Unit(s) SubCutaneous every 8 hours  hydrALAZINE 25 milliGRAM(s) Oral two times a day  insulin glargine SubCutaneous Injection (LANTUS) - Peds 6 Unit(s) SubCutaneous at bedtime  insulin lispro (HumaLOG) corrective regimen sliding scale   SubCutaneous three times a day before meals  insulin lispro (HumaLOG) corrective regimen sliding scale   SubCutaneous at bedtime  levothyroxine 75 MICROGram(s) Oral daily  mycophenolate mofetil  Oral Tab/Cap - Peds 1000 milliGRAM(s) Oral two times a day  polyethylene glycol 3350 17 Gram(s) Oral daily  potassium chloride    Tablet ER 10 milliEquivalent(s) Oral daily  predniSONE   Tablet 5 milliGRAM(s) Oral daily  sevelamer carbonate 800 milliGRAM(s) Oral three times a day with meals  sodium chloride 1 Gram(s) Oral two times a day  tacrolimus 1 milliGRAM(s) Oral every 12 hours    MEDICATIONS  (PRN):  acetaminophen   Tablet .. 650 milliGRAM(s) Oral every 4 hours PRN Temp greater or equal to 38.5C (101.3F)  acetaminophen   Tablet .. 650 milliGRAM(s) Oral every 6 hours PRN Temp greater or equal to 38C (100.4F), Mild Pain (1 - 3), Moderate Pain (4 - 6)  acetaminophen  Suppository .. 650 milliGRAM(s) Rectal every 4 hours PRN Temp greater or equal to 38.5C (101.3F)  dextrose 40% Gel 15 Gram(s) Oral once PRN Blood Glucose LESS THAN 70 milliGRAM(s)/deciliter  glucagon  Injectable 1 milliGRAM(s) IntraMuscular once PRN Glucose LESS THAN 70 milligrams/deciliter  sucralfate 1 Gram(s) Oral every 6 hours PRN With each meal      T(C): 36.8 (20 @ 09:29), Max: 37.1 (20 @ 18:54)  HR: 60 (20 @ 09:29) (60 - 70)  BP: 122/55 (20 @ 09:29) (122/55 - 151/68)  RR: 20 (20 @ 09:29) (16 - 20)  SpO2: 100% (20 @ 09:29) (98% - 100%)  CAPILLARY BLOOD GLUCOSE      POCT Blood Glucose.: 199 mg/dL (2020 12:05)  POCT Blood Glucose.: 166 mg/dL (2020 08:28)  POCT Blood Glucose.: 227 mg/dL (2020 22:53)  POCT Blood Glucose.: 279 mg/dL (2020 17:26)  POCT Blood Glucose.: 189 mg/dL (2020 12:53)    I&O's Summary    2020 07:01  -  2020 07:00  --------------------------------------------------------  IN: 610 mL / OUT: 250 mL / NET: 360 mL        PHYSICAL EXAM:  GENERAL: not in distress, on room air  EYES: open, sclera clear b/l  CHEST/LUNG: normal respiratory effort, not tachypneic, speaking in full sentences without difficulty  HEART: Not tachycardic, no lower extremity edema b/l  ABDOMEN: Soft, Nontender, Nondistended  EXTREMITIES: R AKA, L forearm AV fistula  NEUROLOGY: awake, alert, responds to Qs appropriately, no gross deficits  PSYCH: calm, cooperative  SKIN: No visible rashes or lesions    LABS:                        11.4   4.36  )-----------( 153      ( 2020 05:21 )             36.2     04-12    128<L>  |  92<L>  |  97<H>  ----------------------------<  161<H>  4.5   |  19<L>  |  5.55<H>    Ca    9.0      2020 05:21    TPro  6.3  /  Alb  2.8<L>  /  TBili  0.2  /  DBili  x   /  AST  11  /  ALT  6   /  AlkPhos  62  04-12    PT/INR - ( 10 Apr 2020 22:00 )   PT: 15.2 SEC;   INR: 1.31          PTT - ( 10 Apr 2020 22:00 )  PTT:32.8 SEC      Urinalysis Basic - ( 2020 08:30 )    Color: COLORLESS / Appearance: TURBID / S.014 / pH: 6.0  Gluc: NEGATIVE / Ketone: TRACE  / Bili: NEGATIVE / Urobili: NORMAL   Blood: MODERATE / Protein: 100 / Nitrite: POSITIVE   Leuk Esterase: LARGE / RBC: 10-20 / WBC >50   Sq Epi: x / Non Sq Epi: OCC / Bacteria: MANY        RADIOLOGY & ADDITIONAL TESTS:

## 2020-04-12 NOTE — PROGRESS NOTE ADULT - PROBLEM SELECTOR PLAN 5
- Continue double anti-thrombotic therapy: Aspirin + Plavix  - Continue Carvedilol 3.125 PO BID  - EKG as clinically indicated

## 2020-04-12 NOTE — CONSULT NOTE ADULT - PROBLEM SELECTOR RECOMMENDATION 2
Pt with history of ESRD s/p DDRT on 2005. Pt is on Tacrolimus 1mg BID, Mycophenolate 1gm BID and Prednisone 5mg daily. Last Tacro level <2.  Recommend continue current immunosuppressant medications.  Check early morning daily Tacro levels (30 min prior to morning dose)

## 2020-04-13 LAB
-  AMIKACIN: SIGNIFICANT CHANGE UP
-  AMPICILLIN/SULBACTAM: SIGNIFICANT CHANGE UP
-  AMPICILLIN: SIGNIFICANT CHANGE UP
-  AZTREONAM: SIGNIFICANT CHANGE UP
-  CEFAZOLIN: SIGNIFICANT CHANGE UP
-  CEFEPIME: SIGNIFICANT CHANGE UP
-  CEFOXITIN: SIGNIFICANT CHANGE UP
-  CEFTRIAXONE: SIGNIFICANT CHANGE UP
-  CIPROFLOXACIN: SIGNIFICANT CHANGE UP
-  ERTAPENEM: SIGNIFICANT CHANGE UP
-  GENTAMICIN: SIGNIFICANT CHANGE UP
-  IMIPENEM: SIGNIFICANT CHANGE UP
-  LEVOFLOXACIN: SIGNIFICANT CHANGE UP
-  MEROPENEM: SIGNIFICANT CHANGE UP
-  NITROFURANTOIN: SIGNIFICANT CHANGE UP
-  PIPERACILLIN/TAZOBACTAM: SIGNIFICANT CHANGE UP
-  TIGECYCLINE: SIGNIFICANT CHANGE UP
-  TOBRAMYCIN: SIGNIFICANT CHANGE UP
-  TRIMETHOPRIM/SULFAMETHOXAZOLE: SIGNIFICANT CHANGE UP
ALBUMIN SERPL ELPH-MCNC: 3.2 G/DL — LOW (ref 3.3–5)
ALP SERPL-CCNC: 71 U/L — SIGNIFICANT CHANGE UP (ref 40–120)
ALT FLD-CCNC: 10 U/L — SIGNIFICANT CHANGE UP (ref 4–41)
ANION GAP SERPL CALC-SCNC: 17 MMO/L — HIGH (ref 7–14)
AST SERPL-CCNC: 16 U/L — SIGNIFICANT CHANGE UP (ref 4–40)
BASOPHILS # BLD AUTO: 0.01 K/UL — SIGNIFICANT CHANGE UP (ref 0–0.2)
BASOPHILS NFR BLD AUTO: 0.3 % — SIGNIFICANT CHANGE UP (ref 0–2)
BILIRUB SERPL-MCNC: 0.2 MG/DL — SIGNIFICANT CHANGE UP (ref 0.2–1.2)
BUN SERPL-MCNC: 87 MG/DL — HIGH (ref 7–23)
CALCIUM SERPL-MCNC: 9.3 MG/DL — SIGNIFICANT CHANGE UP (ref 8.4–10.5)
CHLORIDE SERPL-SCNC: 94 MMOL/L — LOW (ref 98–107)
CO2 SERPL-SCNC: 19 MMOL/L — LOW (ref 22–31)
CREAT SERPL-MCNC: 5.01 MG/DL — HIGH (ref 0.5–1.3)
CULTURE RESULTS: SIGNIFICANT CHANGE UP
EOSINOPHIL # BLD AUTO: 0.07 K/UL — SIGNIFICANT CHANGE UP (ref 0–0.5)
EOSINOPHIL NFR BLD AUTO: 1.8 % — SIGNIFICANT CHANGE UP (ref 0–6)
GLUCOSE BLDC GLUCOMTR-MCNC: 133 MG/DL — HIGH (ref 70–99)
GLUCOSE BLDC GLUCOMTR-MCNC: 142 MG/DL — HIGH (ref 70–99)
GLUCOSE BLDC GLUCOMTR-MCNC: 162 MG/DL — HIGH (ref 70–99)
GLUCOSE BLDC GLUCOMTR-MCNC: 192 MG/DL — HIGH (ref 70–99)
GLUCOSE SERPL-MCNC: 153 MG/DL — HIGH (ref 70–99)
HCT VFR BLD CALC: 38.6 % — LOW (ref 39–50)
HGB BLD-MCNC: 12.2 G/DL — LOW (ref 13–17)
IMM GRANULOCYTES NFR BLD AUTO: 0.8 % — SIGNIFICANT CHANGE UP (ref 0–1.5)
LYMPHOCYTES # BLD AUTO: 0.71 K/UL — LOW (ref 1–3.3)
LYMPHOCYTES # BLD AUTO: 18.3 % — SIGNIFICANT CHANGE UP (ref 13–44)
MCHC RBC-ENTMCNC: 25.4 PG — LOW (ref 27–34)
MCHC RBC-ENTMCNC: 31.6 % — LOW (ref 32–36)
MCV RBC AUTO: 80.4 FL — SIGNIFICANT CHANGE UP (ref 80–100)
METHOD TYPE: SIGNIFICANT CHANGE UP
MONOCYTES # BLD AUTO: 0.47 K/UL — SIGNIFICANT CHANGE UP (ref 0–0.9)
MONOCYTES NFR BLD AUTO: 12.1 % — SIGNIFICANT CHANGE UP (ref 2–14)
NEUTROPHILS # BLD AUTO: 2.58 K/UL — SIGNIFICANT CHANGE UP (ref 1.8–7.4)
NEUTROPHILS NFR BLD AUTO: 66.7 % — SIGNIFICANT CHANGE UP (ref 43–77)
NRBC # FLD: 0 K/UL — SIGNIFICANT CHANGE UP (ref 0–0)
ORGANISM # SPEC MICROSCOPIC CNT: SIGNIFICANT CHANGE UP
ORGANISM # SPEC MICROSCOPIC CNT: SIGNIFICANT CHANGE UP
PLATELET # BLD AUTO: 182 K/UL — SIGNIFICANT CHANGE UP (ref 150–400)
PMV BLD: 8.8 FL — SIGNIFICANT CHANGE UP (ref 7–13)
POTASSIUM SERPL-MCNC: 4.2 MMOL/L — SIGNIFICANT CHANGE UP (ref 3.5–5.3)
POTASSIUM SERPL-SCNC: 4.2 MMOL/L — SIGNIFICANT CHANGE UP (ref 3.5–5.3)
PROT SERPL-MCNC: 6.6 G/DL — SIGNIFICANT CHANGE UP (ref 6–8.3)
RBC # BLD: 4.8 M/UL — SIGNIFICANT CHANGE UP (ref 4.2–5.8)
RBC # FLD: 14.4 % — SIGNIFICANT CHANGE UP (ref 10.3–14.5)
SODIUM SERPL-SCNC: 130 MMOL/L — LOW (ref 135–145)
SPECIMEN SOURCE: SIGNIFICANT CHANGE UP
TACROLIMUS SERPL-MCNC: < 2 NG/ML — SIGNIFICANT CHANGE UP
WBC # BLD: 3.87 K/UL — SIGNIFICANT CHANGE UP (ref 3.8–10.5)
WBC # FLD AUTO: 3.87 K/UL — SIGNIFICANT CHANGE UP (ref 3.8–10.5)

## 2020-04-13 PROCEDURE — 99233 SBSQ HOSP IP/OBS HIGH 50: CPT

## 2020-04-13 RX ORDER — ONDANSETRON 8 MG/1
4 TABLET, FILM COATED ORAL ONCE
Refills: 0 | Status: COMPLETED | OUTPATIENT
Start: 2020-04-13 | End: 2020-04-13

## 2020-04-13 RX ORDER — ERTAPENEM SODIUM 1 G/1
500 INJECTION, POWDER, LYOPHILIZED, FOR SOLUTION INTRAMUSCULAR; INTRAVENOUS EVERY 24 HOURS
Refills: 0 | Status: DISCONTINUED | OUTPATIENT
Start: 2020-04-14 | End: 2020-04-20

## 2020-04-13 RX ORDER — ERTAPENEM SODIUM 1 G/1
INJECTION, POWDER, LYOPHILIZED, FOR SOLUTION INTRAMUSCULAR; INTRAVENOUS
Refills: 0 | Status: DISCONTINUED | OUTPATIENT
Start: 2020-04-13 | End: 2020-04-20

## 2020-04-13 RX ORDER — ONDANSETRON 8 MG/1
4 TABLET, FILM COATED ORAL EVERY 8 HOURS
Refills: 0 | Status: DISCONTINUED | OUTPATIENT
Start: 2020-04-13 | End: 2020-05-07

## 2020-04-13 RX ORDER — ERTAPENEM SODIUM 1 G/1
500 INJECTION, POWDER, LYOPHILIZED, FOR SOLUTION INTRAMUSCULAR; INTRAVENOUS ONCE
Refills: 0 | Status: COMPLETED | OUTPATIENT
Start: 2020-04-13 | End: 2020-04-13

## 2020-04-13 RX ORDER — ERTAPENEM SODIUM 1 G/1
INJECTION, POWDER, LYOPHILIZED, FOR SOLUTION INTRAMUSCULAR; INTRAVENOUS
Refills: 0 | Status: DISCONTINUED | OUTPATIENT
Start: 2020-04-13 | End: 2020-04-13

## 2020-04-13 RX ADMIN — SEVELAMER CARBONATE 800 MILLIGRAM(S): 2400 POWDER, FOR SUSPENSION ORAL at 09:07

## 2020-04-13 RX ADMIN — ATORVASTATIN CALCIUM 80 MILLIGRAM(S): 80 TABLET, FILM COATED ORAL at 22:12

## 2020-04-13 RX ADMIN — HEPARIN SODIUM 5000 UNIT(S): 5000 INJECTION INTRAVENOUS; SUBCUTANEOUS at 13:40

## 2020-04-13 RX ADMIN — CARVEDILOL PHOSPHATE 3.12 MILLIGRAM(S): 80 CAPSULE, EXTENDED RELEASE ORAL at 05:41

## 2020-04-13 RX ADMIN — SEVELAMER CARBONATE 800 MILLIGRAM(S): 2400 POWDER, FOR SUSPENSION ORAL at 18:39

## 2020-04-13 RX ADMIN — Medication 2: at 18:15

## 2020-04-13 RX ADMIN — HEPARIN SODIUM 5000 UNIT(S): 5000 INJECTION INTRAVENOUS; SUBCUTANEOUS at 22:12

## 2020-04-13 RX ADMIN — BUMETANIDE 1 MILLIGRAM(S): 0.25 INJECTION INTRAMUSCULAR; INTRAVENOUS at 05:41

## 2020-04-13 RX ADMIN — CLOPIDOGREL BISULFATE 75 MILLIGRAM(S): 75 TABLET, FILM COATED ORAL at 12:39

## 2020-04-13 RX ADMIN — ONDANSETRON 4 MILLIGRAM(S): 8 TABLET, FILM COATED ORAL at 13:11

## 2020-04-13 RX ADMIN — Medication 5 MILLIGRAM(S): at 05:41

## 2020-04-13 RX ADMIN — Medication 325 MILLIGRAM(S): at 12:39

## 2020-04-13 RX ADMIN — CARVEDILOL PHOSPHATE 3.12 MILLIGRAM(S): 80 CAPSULE, EXTENDED RELEASE ORAL at 18:39

## 2020-04-13 RX ADMIN — ONDANSETRON 4 MILLIGRAM(S): 8 TABLET, FILM COATED ORAL at 06:30

## 2020-04-13 RX ADMIN — ONDANSETRON 4 MILLIGRAM(S): 8 TABLET, FILM COATED ORAL at 22:12

## 2020-04-13 RX ADMIN — SODIUM CHLORIDE 1 GRAM(S): 9 INJECTION INTRAMUSCULAR; INTRAVENOUS; SUBCUTANEOUS at 18:39

## 2020-04-13 RX ADMIN — Medication 81 MILLIGRAM(S): at 12:40

## 2020-04-13 RX ADMIN — Medication 10 MILLIEQUIVALENT(S): at 12:39

## 2020-04-13 RX ADMIN — TACROLIMUS 1 MILLIGRAM(S): 5 CAPSULE ORAL at 12:39

## 2020-04-13 RX ADMIN — Medication 500 MILLIGRAM(S): at 05:41

## 2020-04-13 RX ADMIN — SEVELAMER CARBONATE 800 MILLIGRAM(S): 2400 POWDER, FOR SUSPENSION ORAL at 12:40

## 2020-04-13 RX ADMIN — ERTAPENEM SODIUM 100 MILLIGRAM(S): 1 INJECTION, POWDER, LYOPHILIZED, FOR SOLUTION INTRAMUSCULAR; INTRAVENOUS at 22:11

## 2020-04-13 RX ADMIN — Medication 25 MILLIGRAM(S): at 18:39

## 2020-04-13 RX ADMIN — TACROLIMUS 1 MILLIGRAM(S): 5 CAPSULE ORAL at 22:12

## 2020-04-13 RX ADMIN — Medication 75 MICROGRAM(S): at 05:41

## 2020-04-13 RX ADMIN — Medication 500 MILLIGRAM(S): at 18:39

## 2020-04-13 RX ADMIN — Medication 5 MILLIGRAM(S): at 12:40

## 2020-04-13 RX ADMIN — SODIUM CHLORIDE 1 GRAM(S): 9 INJECTION INTRAMUSCULAR; INTRAVENOUS; SUBCUTANEOUS at 05:41

## 2020-04-13 RX ADMIN — Medication 25 MILLIGRAM(S): at 05:41

## 2020-04-13 RX ADMIN — BUMETANIDE 1 MILLIGRAM(S): 0.25 INJECTION INTRAMUSCULAR; INTRAVENOUS at 18:39

## 2020-04-13 RX ADMIN — INSULIN GLARGINE 6 UNIT(S): 100 INJECTION, SOLUTION SUBCUTANEOUS at 22:11

## 2020-04-13 RX ADMIN — POLYETHYLENE GLYCOL 3350 17 GRAM(S): 17 POWDER, FOR SOLUTION ORAL at 12:39

## 2020-04-13 RX ADMIN — HEPARIN SODIUM 5000 UNIT(S): 5000 INJECTION INTRAVENOUS; SUBCUTANEOUS at 05:40

## 2020-04-13 RX ADMIN — ONDANSETRON 4 MILLIGRAM(S): 8 TABLET, FILM COATED ORAL at 00:53

## 2020-04-13 RX ADMIN — Medication 2: at 12:40

## 2020-04-13 NOTE — PROGRESS NOTE ADULT - PROBLEM SELECTOR PLAN 3
Prior labs per patient's Nursing home:  March 27/2020: Cr/BUN: 3.6/24  March 30/2020: Cr/BUN: 3.9/24  April 6/2020: Cr/BUN: 4.8/20   - Appreciate Nephrology consult - renal u/s ordered for transplanted kidney, spot urine protein, urine creatinine ordered, morning Tacrolimus level ordered  - Primary Nephrologist: Dr. Adrián Hawkins Prior labs per patient's Nursing home:  March 27/2020: Cr/BUN: 3.6/24  March 30/2020: Cr/BUN: 3.9/24  April 6/2020: Cr/BUN: 4.8/20   - Appreciate Nephrology consult - renal u/s  for transplanted kidney WNL ,    -concern for noncompliance as tacrolimus level was low  -F/U spot urine protein, urine creatinine ordered, morning Tacrolimus level ordered  - Primary Nephrologist: Dr. Adrián Hawkins Prior labs per patient's Nursing home:  March 27/2020: Cr/BUN: 3.6/24  March 30/2020: Cr/BUN: 3.9/24  April 6/2020: Cr/BUN: 4.8/20   - Appreciate Nephrology consult - renal u/s  for transplanted kidney WNL ,    -concern for noncompliance as tacrolimus level was low  -F/U spot urine protein, urine creatinine ordered, morning Tacrolimus level ordered  -Cr slightly improved today   - Primary Nephrologist: Dr. Adrián Hawkins

## 2020-04-13 NOTE — PROGRESS NOTE ADULT - ASSESSMENT
59M w/ hx of HTN, HLD, DM, ESRD s/p kidney transplant in 05, [facility] now presenting with hypoxic respiratory failure must likely due to COVID 19 infection.

## 2020-04-13 NOTE — PROGRESS NOTE ADULT - SUBJECTIVE AND OBJECTIVE BOX
Patient is a 59y old  Male who presents with a chief complaint of Rule out COVID 19 infection (12 Apr 2020 13:44)      SUBJECTIVE / OVERNIGHT EVENTS:    MEDICATIONS  (STANDING):  ascorbic acid  Oral Tab/Cap - Peds 500 milliGRAM(s) Oral two times a day  aspirin  chewable 81 milliGRAM(s) Oral daily  atorvastatin 80 milliGRAM(s) Oral at bedtime  bisacodyl 5 milliGRAM(s) Oral daily  buMETAnide 1 milliGRAM(s) Oral two times a day  carvedilol Oral Tab/Cap - Peds 3.125 milliGRAM(s) Oral two times a day  cefTRIAXone   IVPB 1000 milliGRAM(s) IV Intermittent every 24 hours  clopidogrel Tablet 75 milliGRAM(s) Oral daily  dextrose 5%. 1000 milliLiter(s) (50 mL/Hr) IV Continuous <Continuous>  dextrose 50% Injectable 12.5 Gram(s) IV Push once  dextrose 50% Injectable 25 Gram(s) IV Push once  dextrose 50% Injectable 25 Gram(s) IV Push once  ferrous sulfate Oral Tab/Cap - Peds 325 milliGRAM(s) Oral daily  heparin  Injectable 5000 Unit(s) SubCutaneous every 8 hours  hydrALAZINE 25 milliGRAM(s) Oral two times a day  insulin glargine SubCutaneous Injection (LANTUS) - Peds 6 Unit(s) SubCutaneous at bedtime  insulin lispro (HumaLOG) corrective regimen sliding scale   SubCutaneous three times a day before meals  insulin lispro (HumaLOG) corrective regimen sliding scale   SubCutaneous at bedtime  levothyroxine 75 MICROGram(s) Oral daily  polyethylene glycol 3350 17 Gram(s) Oral daily  potassium chloride    Tablet ER 10 milliEquivalent(s) Oral daily  predniSONE   Tablet 5 milliGRAM(s) Oral daily  sevelamer carbonate 800 milliGRAM(s) Oral three times a day with meals  sodium chloride 1 Gram(s) Oral two times a day  tacrolimus 1 milliGRAM(s) Oral every 12 hours    MEDICATIONS  (PRN):  acetaminophen   Tablet .. 650 milliGRAM(s) Oral every 4 hours PRN Temp greater or equal to 38.5C (101.3F)  acetaminophen   Tablet .. 650 milliGRAM(s) Oral every 6 hours PRN Temp greater or equal to 38C (100.4F), Mild Pain (1 - 3), Moderate Pain (4 - 6)  acetaminophen  Suppository .. 650 milliGRAM(s) Rectal every 4 hours PRN Temp greater or equal to 38.5C (101.3F)  dextrose 40% Gel 15 Gram(s) Oral once PRN Blood Glucose LESS THAN 70 milliGRAM(s)/deciliter  glucagon  Injectable 1 milliGRAM(s) IntraMuscular once PRN Glucose LESS THAN 70 milligrams/deciliter  sucralfate 1 Gram(s) Oral every 6 hours PRN With each meal      Vital Signs Last 24 Hrs  T(C): 36.7 (13 Apr 2020 10:19), Max: 37.2 (13 Apr 2020 05:41)  T(F): 98.1 (13 Apr 2020 10:19), Max: 98.9 (13 Apr 2020 05:41)  HR: 61 (13 Apr 2020 10:19) (60 - 61)  BP: 130/60 (13 Apr 2020 10:19) (130/60 - 158/64)  BP(mean): --  RR: 20 (13 Apr 2020 10:19) (18 - 20)  SpO2: 100% (13 Apr 2020 10:19) (96% - 100%)  CAPILLARY BLOOD GLUCOSE      POCT Blood Glucose.: 133 mg/dL (13 Apr 2020 08:14)  POCT Blood Glucose.: 205 mg/dL (12 Apr 2020 21:36)  POCT Blood Glucose.: 266 mg/dL (12 Apr 2020 17:30)  POCT Blood Glucose.: 199 mg/dL (12 Apr 2020 12:05)    I&O's Summary    12 Apr 2020 07:01  -  13 Apr 2020 07:00  --------------------------------------------------------  IN: 0 mL / OUT: 600 mL / NET: -600 mL        PHYSICAL EXAM:  GENERAL: NAD, well-developed  HEAD:  Atraumatic, Normocephalic  EYES: EOMI, PERRLA, conjunctiva and sclera clear  NECK: Supple, No JVD  CHEST/LUNG: Clear to auscultation bilaterally; No wheeze  HEART: Regular rate and rhythm; No murmurs, rubs, or gallops  ABDOMEN: Soft, Nontender, Nondistended; Bowel sounds present  EXTREMITIES:  2+ Peripheral Pulses, No clubbing, cyanosis, or edema  PSYCH: AAOx3  NEUROLOGY: non-focal  SKIN: No rashes or lesions    LABS:                        11.4   4.36  )-----------( 153      ( 12 Apr 2020 05:21 )             36.2     04-12    128<L>  |  92<L>  |  97<H>  ----------------------------<  161<H>  4.5   |  19<L>  |  5.55<H>    Ca    9.0      12 Apr 2020 05:21    TPro  6.3  /  Alb  2.8<L>  /  TBili  0.2  /  DBili  x   /  AST  11  /  ALT  6   /  AlkPhos  62  04-12              RADIOLOGY & ADDITIONAL TESTS:    Imaging Personally Reviewed:    Consultant(s) Notes Reviewed:      Care Discussed with Consultants/Other Providers: Patient is a 59y old  Male who presents with a chief complaint of Rule out COVID 19 infection (12 Apr 2020 13:44)      SUBJECTIVE / OVERNIGHT EVENTS: patient seen and examined by bedside, pt denies headache, dizziness, SOB, CP, Palpitations , N/V/D, abdominal pain        MEDICATIONS  (STANDING):  ascorbic acid  Oral Tab/Cap - Peds 500 milliGRAM(s) Oral two times a day  aspirin  chewable 81 milliGRAM(s) Oral daily  atorvastatin 80 milliGRAM(s) Oral at bedtime  bisacodyl 5 milliGRAM(s) Oral daily  buMETAnide 1 milliGRAM(s) Oral two times a day  carvedilol Oral Tab/Cap - Peds 3.125 milliGRAM(s) Oral two times a day  cefTRIAXone   IVPB 1000 milliGRAM(s) IV Intermittent every 24 hours  clopidogrel Tablet 75 milliGRAM(s) Oral daily  dextrose 5%. 1000 milliLiter(s) (50 mL/Hr) IV Continuous <Continuous>  dextrose 50% Injectable 12.5 Gram(s) IV Push once  dextrose 50% Injectable 25 Gram(s) IV Push once  dextrose 50% Injectable 25 Gram(s) IV Push once  ferrous sulfate Oral Tab/Cap - Peds 325 milliGRAM(s) Oral daily  heparin  Injectable 5000 Unit(s) SubCutaneous every 8 hours  hydrALAZINE 25 milliGRAM(s) Oral two times a day  insulin glargine SubCutaneous Injection (LANTUS) - Peds 6 Unit(s) SubCutaneous at bedtime  insulin lispro (HumaLOG) corrective regimen sliding scale   SubCutaneous three times a day before meals  insulin lispro (HumaLOG) corrective regimen sliding scale   SubCutaneous at bedtime  levothyroxine 75 MICROGram(s) Oral daily  polyethylene glycol 3350 17 Gram(s) Oral daily  potassium chloride    Tablet ER 10 milliEquivalent(s) Oral daily  predniSONE   Tablet 5 milliGRAM(s) Oral daily  sevelamer carbonate 800 milliGRAM(s) Oral three times a day with meals  sodium chloride 1 Gram(s) Oral two times a day  tacrolimus 1 milliGRAM(s) Oral every 12 hours    MEDICATIONS  (PRN):  acetaminophen   Tablet .. 650 milliGRAM(s) Oral every 4 hours PRN Temp greater or equal to 38.5C (101.3F)  acetaminophen   Tablet .. 650 milliGRAM(s) Oral every 6 hours PRN Temp greater or equal to 38C (100.4F), Mild Pain (1 - 3), Moderate Pain (4 - 6)  acetaminophen  Suppository .. 650 milliGRAM(s) Rectal every 4 hours PRN Temp greater or equal to 38.5C (101.3F)  dextrose 40% Gel 15 Gram(s) Oral once PRN Blood Glucose LESS THAN 70 milliGRAM(s)/deciliter  glucagon  Injectable 1 milliGRAM(s) IntraMuscular once PRN Glucose LESS THAN 70 milligrams/deciliter  sucralfate 1 Gram(s) Oral every 6 hours PRN With each meal      Vital Signs Last 24 Hrs  T(C): 36.7 (13 Apr 2020 10:19), Max: 37.2 (13 Apr 2020 05:41)  T(F): 98.1 (13 Apr 2020 10:19), Max: 98.9 (13 Apr 2020 05:41)  HR: 61 (13 Apr 2020 10:19) (60 - 61)  BP: 130/60 (13 Apr 2020 10:19) (130/60 - 158/64)  BP(mean): --  RR: 20 (13 Apr 2020 10:19) (18 - 20)  SpO2: 100% (13 Apr 2020 10:19) (96% - 100%)  CAPILLARY BLOOD GLUCOSE      POCT Blood Glucose.: 133 mg/dL (13 Apr 2020 08:14)  POCT Blood Glucose.: 205 mg/dL (12 Apr 2020 21:36)  POCT Blood Glucose.: 266 mg/dL (12 Apr 2020 17:30)  POCT Blood Glucose.: 199 mg/dL (12 Apr 2020 12:05)    I&O's Summary    12 Apr 2020 07:01  -  13 Apr 2020 07:00  --------------------------------------------------------  IN: 0 mL / OUT: 600 mL / NET: -600 mL    PHYSICAL EXAM:  GENERAL: not in distress, on room air  EYES: open, sclera clear b/l  CHEST/LUNG: normal respiratory effort, not tachypneic, speaking in full sentences without difficulty  HEART: Not tachycardic, no lower extremity edema b/l  ABDOMEN: Soft, Nontender, Nondistended  EXTREMITIES: R AKA, L forearm AV fistula  NEUROLOGY: awake, alert, responds to Qs appropriately, no gross deficits  PSYCH: calm, cooperative        LABS:                        11.4   4.36  )-----------( 153      ( 12 Apr 2020 05:21 )             36.2     04-12    128<L>  |  92<L>  |  97<H>  ----------------------------<  161<H>  4.5   |  19<L>  |  5.55<H>    Ca    9.0      12 Apr 2020 05:21    TPro  6.3  /  Alb  2.8<L>  /  TBili  0.2  /  DBili  x   /  AST  11  /  ALT  6   /  AlkPhos  62  04-12              RADIOLOGY & ADDITIONAL TESTS:  < from: US Kidney and Bladder (04.12.20 @ 15:39) >    Native Right kidney: Diminutive measuring 7.1 cm. Diffusely increased echogenicity. No renal mass, hydronephrosis or calculi.    Native Left kidney: Diminutive measuring 7.6 cm. Diffusely increased echogenicity No renal mass, hydronephrosis or calculi.    Right pelvic transplant kidney: 13.1 cm. Normal echogenicity and echotexture. A 3.6 cm lower pole central cyst. No hydronephrosis, renal mass, calculi, or perinephric fluid collection.    Urinary bladder: Within normal limits.    IMPRESSION:   Normal-appearing right pelvic transplant kidney.        < end of copied text >    Imaging Personally Reviewed:    Consultant(s) Notes Reviewed:      Care Discussed with Consultants/Other Providers: Patient is a 59y old  Male who presents with a chief complaint of Rule out COVID 19 infection (12 Apr 2020 13:44)      SUBJECTIVE / OVERNIGHT EVENTS: patient seen and examined by bedside, pt denies headache, dizziness, SOB, CP, Palpitations , N/V/D, abdominal pain  pt saturating 100 % on RA      MEDICATIONS  (STANDING):  ascorbic acid  Oral Tab/Cap - Peds 500 milliGRAM(s) Oral two times a day  aspirin  chewable 81 milliGRAM(s) Oral daily  atorvastatin 80 milliGRAM(s) Oral at bedtime  bisacodyl 5 milliGRAM(s) Oral daily  buMETAnide 1 milliGRAM(s) Oral two times a day  carvedilol Oral Tab/Cap - Peds 3.125 milliGRAM(s) Oral two times a day  cefTRIAXone   IVPB 1000 milliGRAM(s) IV Intermittent every 24 hours  clopidogrel Tablet 75 milliGRAM(s) Oral daily  dextrose 5%. 1000 milliLiter(s) (50 mL/Hr) IV Continuous <Continuous>  dextrose 50% Injectable 12.5 Gram(s) IV Push once  dextrose 50% Injectable 25 Gram(s) IV Push once  dextrose 50% Injectable 25 Gram(s) IV Push once  ferrous sulfate Oral Tab/Cap - Peds 325 milliGRAM(s) Oral daily  heparin  Injectable 5000 Unit(s) SubCutaneous every 8 hours  hydrALAZINE 25 milliGRAM(s) Oral two times a day  insulin glargine SubCutaneous Injection (LANTUS) - Peds 6 Unit(s) SubCutaneous at bedtime  insulin lispro (HumaLOG) corrective regimen sliding scale   SubCutaneous three times a day before meals  insulin lispro (HumaLOG) corrective regimen sliding scale   SubCutaneous at bedtime  levothyroxine 75 MICROGram(s) Oral daily  polyethylene glycol 3350 17 Gram(s) Oral daily  potassium chloride    Tablet ER 10 milliEquivalent(s) Oral daily  predniSONE   Tablet 5 milliGRAM(s) Oral daily  sevelamer carbonate 800 milliGRAM(s) Oral three times a day with meals  sodium chloride 1 Gram(s) Oral two times a day  tacrolimus 1 milliGRAM(s) Oral every 12 hours    MEDICATIONS  (PRN):  acetaminophen   Tablet .. 650 milliGRAM(s) Oral every 4 hours PRN Temp greater or equal to 38.5C (101.3F)  acetaminophen   Tablet .. 650 milliGRAM(s) Oral every 6 hours PRN Temp greater or equal to 38C (100.4F), Mild Pain (1 - 3), Moderate Pain (4 - 6)  acetaminophen  Suppository .. 650 milliGRAM(s) Rectal every 4 hours PRN Temp greater or equal to 38.5C (101.3F)  dextrose 40% Gel 15 Gram(s) Oral once PRN Blood Glucose LESS THAN 70 milliGRAM(s)/deciliter  glucagon  Injectable 1 milliGRAM(s) IntraMuscular once PRN Glucose LESS THAN 70 milligrams/deciliter  sucralfate 1 Gram(s) Oral every 6 hours PRN With each meal      Vital Signs Last 24 Hrs  T(C): 36.7 (13 Apr 2020 10:19), Max: 37.2 (13 Apr 2020 05:41)  T(F): 98.1 (13 Apr 2020 10:19), Max: 98.9 (13 Apr 2020 05:41)  HR: 61 (13 Apr 2020 10:19) (60 - 61)  BP: 130/60 (13 Apr 2020 10:19) (130/60 - 158/64)  BP(mean): --  RR: 20 (13 Apr 2020 10:19) (18 - 20)  SpO2: 100% (13 Apr 2020 10:19) (96% - 100%)  CAPILLARY BLOOD GLUCOSE      POCT Blood Glucose.: 133 mg/dL (13 Apr 2020 08:14)  POCT Blood Glucose.: 205 mg/dL (12 Apr 2020 21:36)  POCT Blood Glucose.: 266 mg/dL (12 Apr 2020 17:30)  POCT Blood Glucose.: 199 mg/dL (12 Apr 2020 12:05)    I&O's Summary    12 Apr 2020 07:01  -  13 Apr 2020 07:00  --------------------------------------------------------  IN: 0 mL / OUT: 600 mL / NET: -600 mL    PHYSICAL EXAM:  GENERAL: not in distress, on room air  EYES: open, sclera clear b/l  CHEST/LUNG: normal respiratory effort, not tachypneic, speaking in full sentences without difficulty  HEART: Not tachycardic, no lower extremity edema b/l  ABDOMEN: Soft, Nontender, Nondistended  EXTREMITIES: R AKA, L forearm AV fistula  NEUROLOGY: awake, alert, responds to Qs appropriately, no gross deficits  PSYCH: calm, cooperative        LABS:                        11.4   4.36  )-----------( 153      ( 12 Apr 2020 05:21 )             36.2     04-12    128<L>  |  92<L>  |  97<H>  ----------------------------<  161<H>  4.5   |  19<L>  |  5.55<H>    Ca    9.0      12 Apr 2020 05:21    TPro  6.3  /  Alb  2.8<L>  /  TBili  0.2  /  DBili  x   /  AST  11  /  ALT  6   /  AlkPhos  62  04-12              RADIOLOGY & ADDITIONAL TESTS:  < from: US Kidney and Bladder (04.12.20 @ 15:39) >    Native Right kidney: Diminutive measuring 7.1 cm. Diffusely increased echogenicity. No renal mass, hydronephrosis or calculi.    Native Left kidney: Diminutive measuring 7.6 cm. Diffusely increased echogenicity No renal mass, hydronephrosis or calculi.    Right pelvic transplant kidney: 13.1 cm. Normal echogenicity and echotexture. A 3.6 cm lower pole central cyst. No hydronephrosis, renal mass, calculi, or perinephric fluid collection.    Urinary bladder: Within normal limits.    IMPRESSION:   Normal-appearing right pelvic transplant kidney.        < end of copied text >    Imaging Personally Reviewed:    Consultant(s) Notes Reviewed:      Care Discussed with Consultants/Other Providers:

## 2020-04-13 NOTE — PROGRESS NOTE ADULT - PROBLEM SELECTOR PLAN 1
- Maintaining normal oxygenation off of supplemental O2. Cont to monitor.   -Trend inflammatory markers Q48h  - CXR clear  - Patient full code

## 2020-04-14 LAB
ALBUMIN SERPL ELPH-MCNC: 3 G/DL — LOW (ref 3.3–5)
ALP SERPL-CCNC: 69 U/L — SIGNIFICANT CHANGE UP (ref 40–120)
ALT FLD-CCNC: 9 U/L — SIGNIFICANT CHANGE UP (ref 4–41)
ANION GAP SERPL CALC-SCNC: 14 MMO/L — SIGNIFICANT CHANGE UP (ref 7–14)
AST SERPL-CCNC: 12 U/L — SIGNIFICANT CHANGE UP (ref 4–40)
BASOPHILS # BLD AUTO: 0 K/UL — SIGNIFICANT CHANGE UP (ref 0–0.2)
BASOPHILS NFR BLD AUTO: 0 % — SIGNIFICANT CHANGE UP (ref 0–2)
BILIRUB SERPL-MCNC: < 0.2 MG/DL — LOW (ref 0.2–1.2)
BUN SERPL-MCNC: 88 MG/DL — HIGH (ref 7–23)
CALCIUM SERPL-MCNC: 9 MG/DL — SIGNIFICANT CHANGE UP (ref 8.4–10.5)
CHLORIDE SERPL-SCNC: 95 MMOL/L — LOW (ref 98–107)
CO2 SERPL-SCNC: 20 MMOL/L — LOW (ref 22–31)
CREAT SERPL-MCNC: 4.85 MG/DL — HIGH (ref 0.5–1.3)
CRP SERPL-MCNC: 43.7 MG/L — HIGH
D DIMER BLD IA.RAPID-MCNC: 355 NG/ML — SIGNIFICANT CHANGE UP
EOSINOPHIL # BLD AUTO: 0.06 K/UL — SIGNIFICANT CHANGE UP (ref 0–0.5)
EOSINOPHIL NFR BLD AUTO: 1.7 % — SIGNIFICANT CHANGE UP (ref 0–6)
FERRITIN SERPL-MCNC: 1169 NG/ML — HIGH (ref 30–400)
GLUCOSE BLDC GLUCOMTR-MCNC: 110 MG/DL — HIGH (ref 70–99)
GLUCOSE BLDC GLUCOMTR-MCNC: 120 MG/DL — HIGH (ref 70–99)
GLUCOSE BLDC GLUCOMTR-MCNC: 162 MG/DL — HIGH (ref 70–99)
GLUCOSE BLDC GLUCOMTR-MCNC: 98 MG/DL — SIGNIFICANT CHANGE UP (ref 70–99)
GLUCOSE SERPL-MCNC: 106 MG/DL — HIGH (ref 70–99)
HCT VFR BLD CALC: 35.4 % — LOW (ref 39–50)
HGB BLD-MCNC: 11.3 G/DL — LOW (ref 13–17)
IMM GRANULOCYTES NFR BLD AUTO: 0.8 % — SIGNIFICANT CHANGE UP (ref 0–1.5)
LYMPHOCYTES # BLD AUTO: 0.55 K/UL — LOW (ref 1–3.3)
LYMPHOCYTES # BLD AUTO: 15.3 % — SIGNIFICANT CHANGE UP (ref 13–44)
MCHC RBC-ENTMCNC: 25.6 PG — LOW (ref 27–34)
MCHC RBC-ENTMCNC: 31.9 % — LOW (ref 32–36)
MCV RBC AUTO: 80.3 FL — SIGNIFICANT CHANGE UP (ref 80–100)
MONOCYTES # BLD AUTO: 0.4 K/UL — SIGNIFICANT CHANGE UP (ref 0–0.9)
MONOCYTES NFR BLD AUTO: 11.1 % — SIGNIFICANT CHANGE UP (ref 2–14)
NEUTROPHILS # BLD AUTO: 2.56 K/UL — SIGNIFICANT CHANGE UP (ref 1.8–7.4)
NEUTROPHILS NFR BLD AUTO: 71.1 % — SIGNIFICANT CHANGE UP (ref 43–77)
NRBC # FLD: 0 K/UL — SIGNIFICANT CHANGE UP (ref 0–0)
PLATELET # BLD AUTO: 161 K/UL — SIGNIFICANT CHANGE UP (ref 150–400)
PMV BLD: 9 FL — SIGNIFICANT CHANGE UP (ref 7–13)
POTASSIUM SERPL-MCNC: 4.4 MMOL/L — SIGNIFICANT CHANGE UP (ref 3.5–5.3)
POTASSIUM SERPL-SCNC: 4.4 MMOL/L — SIGNIFICANT CHANGE UP (ref 3.5–5.3)
PROT SERPL-MCNC: 6.1 G/DL — SIGNIFICANT CHANGE UP (ref 6–8.3)
RBC # BLD: 4.41 M/UL — SIGNIFICANT CHANGE UP (ref 4.2–5.8)
RBC # FLD: 14.4 % — SIGNIFICANT CHANGE UP (ref 10.3–14.5)
SODIUM SERPL-SCNC: 129 MMOL/L — LOW (ref 135–145)
WBC # BLD: 3.6 K/UL — LOW (ref 3.8–10.5)
WBC # FLD AUTO: 3.6 K/UL — LOW (ref 3.8–10.5)

## 2020-04-14 PROCEDURE — 99254 IP/OBS CNSLTJ NEW/EST MOD 60: CPT

## 2020-04-14 PROCEDURE — 99233 SBSQ HOSP IP/OBS HIGH 50: CPT

## 2020-04-14 RX ADMIN — Medication 500 MILLIGRAM(S): at 18:09

## 2020-04-14 RX ADMIN — HEPARIN SODIUM 5000 UNIT(S): 5000 INJECTION INTRAVENOUS; SUBCUTANEOUS at 05:38

## 2020-04-14 RX ADMIN — Medication 75 MICROGRAM(S): at 05:39

## 2020-04-14 RX ADMIN — SEVELAMER CARBONATE 800 MILLIGRAM(S): 2400 POWDER, FOR SUSPENSION ORAL at 13:27

## 2020-04-14 RX ADMIN — CARVEDILOL PHOSPHATE 3.12 MILLIGRAM(S): 80 CAPSULE, EXTENDED RELEASE ORAL at 05:38

## 2020-04-14 RX ADMIN — Medication 25 MILLIGRAM(S): at 05:38

## 2020-04-14 RX ADMIN — Medication 25 MILLIGRAM(S): at 22:53

## 2020-04-14 RX ADMIN — Medication 5 MILLIGRAM(S): at 13:29

## 2020-04-14 RX ADMIN — CARVEDILOL PHOSPHATE 3.12 MILLIGRAM(S): 80 CAPSULE, EXTENDED RELEASE ORAL at 22:52

## 2020-04-14 RX ADMIN — ERTAPENEM SODIUM 100 MILLIGRAM(S): 1 INJECTION, POWDER, LYOPHILIZED, FOR SOLUTION INTRAMUSCULAR; INTRAVENOUS at 18:08

## 2020-04-14 RX ADMIN — SEVELAMER CARBONATE 800 MILLIGRAM(S): 2400 POWDER, FOR SUSPENSION ORAL at 18:09

## 2020-04-14 RX ADMIN — BUMETANIDE 1 MILLIGRAM(S): 0.25 INJECTION INTRAMUSCULAR; INTRAVENOUS at 05:38

## 2020-04-14 RX ADMIN — TACROLIMUS 1 MILLIGRAM(S): 5 CAPSULE ORAL at 13:27

## 2020-04-14 RX ADMIN — POLYETHYLENE GLYCOL 3350 17 GRAM(S): 17 POWDER, FOR SOLUTION ORAL at 13:27

## 2020-04-14 RX ADMIN — Medication 500 MILLIGRAM(S): at 05:38

## 2020-04-14 RX ADMIN — Medication 5 MILLIGRAM(S): at 05:39

## 2020-04-14 RX ADMIN — Medication 81 MILLIGRAM(S): at 13:26

## 2020-04-14 RX ADMIN — Medication 10 MILLIEQUIVALENT(S): at 13:27

## 2020-04-14 RX ADMIN — SODIUM CHLORIDE 1 GRAM(S): 9 INJECTION INTRAMUSCULAR; INTRAVENOUS; SUBCUTANEOUS at 05:38

## 2020-04-14 RX ADMIN — BUMETANIDE 1 MILLIGRAM(S): 0.25 INJECTION INTRAMUSCULAR; INTRAVENOUS at 18:08

## 2020-04-14 RX ADMIN — SODIUM CHLORIDE 1 GRAM(S): 9 INJECTION INTRAMUSCULAR; INTRAVENOUS; SUBCUTANEOUS at 18:08

## 2020-04-14 RX ADMIN — HEPARIN SODIUM 5000 UNIT(S): 5000 INJECTION INTRAVENOUS; SUBCUTANEOUS at 22:52

## 2020-04-14 RX ADMIN — INSULIN GLARGINE 6 UNIT(S): 100 INJECTION, SOLUTION SUBCUTANEOUS at 23:14

## 2020-04-14 RX ADMIN — HEPARIN SODIUM 5000 UNIT(S): 5000 INJECTION INTRAVENOUS; SUBCUTANEOUS at 13:26

## 2020-04-14 RX ADMIN — Medication 325 MILLIGRAM(S): at 13:27

## 2020-04-14 RX ADMIN — CLOPIDOGREL BISULFATE 75 MILLIGRAM(S): 75 TABLET, FILM COATED ORAL at 13:26

## 2020-04-14 RX ADMIN — ATORVASTATIN CALCIUM 80 MILLIGRAM(S): 80 TABLET, FILM COATED ORAL at 22:52

## 2020-04-14 RX ADMIN — SEVELAMER CARBONATE 800 MILLIGRAM(S): 2400 POWDER, FOR SUSPENSION ORAL at 09:37

## 2020-04-14 RX ADMIN — TACROLIMUS 1 MILLIGRAM(S): 5 CAPSULE ORAL at 22:53

## 2020-04-14 NOTE — PROGRESS NOTE ADULT - PROBLEM SELECTOR PLAN 2
- UA suggestive of infection, prior UTI from 2/2020 sensitive to ceftriaxone, resistant to zosyn  - f/u Urinary culture.   - Patient already received one dose of Cefepime in ED. Based on Prior urine culture results continue with ceftriaxone 1g IV q 24 hours until culture results - UA suggestive of infection, prior UTI from 2/2020 sensitive to ceftriaxone, resistant to zosyn  -   Urinary culture with ESBL Klebsiella   - Patient already received one dose of Cefepime in ED and was started on ceftriaxone , switched to Ertapenem, on 4/13 as U cx noted to be ESBL   case d/w ID, ID eval appreciated

## 2020-04-14 NOTE — PROGRESS NOTE ADULT - PROBLEM SELECTOR PLAN 1
- Maintaining normal oxygenation off of supplemental O2. Cont to monitor.   -Trend inflammatory markers Q48h  - CXR clear  - Patient full code - Maintaining normal oxygenation off of supplemental O2. Cont to monitor.   -Trend inflammatory markers Q48h, CRP trended down   - CXR clear  - Patient full code

## 2020-04-14 NOTE — CONSULT NOTE ADULT - ASSESSMENT
60 yo man with h/o renal transplant presenting from nursing home   covid+ oxygenating well on RA.  pyelo transplant kidney  ESBL Klebsiella    Suggest:  -ertapenem 500 mg iv q 24 h  -maintain aireborne /contact precautions    Clarita Perdomo MD  Pager: [phone]  After 5 PM or weekends please call fellow on call or office [phone]

## 2020-04-14 NOTE — PROGRESS NOTE ADULT - PROBLEM SELECTOR PLAN 3
Prior labs per patient's Nursing home:  March 27/2020: Cr/BUN: 3.6/24  March 30/2020: Cr/BUN: 3.9/24  April 6/2020: Cr/BUN: 4.8/20   - Appreciate Nephrology consult - renal u/s  for transplanted kidney WNL ,    -concern for noncompliance as tacrolimus level was low  -F/U spot urine protein, urine creatinine ordered, morning Tacrolimus level ordered  -Cr slightly improved today   - Primary Nephrologist: Dr. Adrián Hawkins Prior labs per patient's Nursing home:  March 27/2020: Cr/BUN: 3.6/24 March 30/2020: Cr/BUN: 3.9/24 April 6/2020: Cr/BUN: 4.8/20   - Appreciate Nephrology consult - renal u/s  for transplanted kidney WNL ,    -concern for noncompliance as tacrolimus level was low  -F/U spot urine creatinine noted ,  tac level <2 on 4/13   -Cr  improved  to 4.85 , pt was 4.8 on 4/6 ,  will continue to monitor   -case d/w Renal , will continue current plan and medications , renal does not recommend increasing dose of immunosuppressants with ongoing infection   - will not request records from [facility] as long as Renal fn improving as management not going to change, pt will need to f/u with his nephrologist as outpt after discharge     - Primary Nephrologist: Dr. Adrián Hawkins

## 2020-04-14 NOTE — PROGRESS NOTE ADULT - ASSESSMENT
59M w/ hx of HTN, HLD, DM, ESRD s/p kidney transplant in 05, CAD [facility] s/p R AKA in 2010 now presenting with hypoxic respiratory failure must likely due to COVID 19 infection.

## 2020-04-14 NOTE — PROGRESS NOTE ADULT - PROBLEM SELECTOR PLAN 4
Will continue existing regimen of Immunosuppressants:   Mycophenolate + Tacrolimus and Prednisone   Monitor and trend Cr/BUN  - f/u renal recommendations Will continue existing regimen of Immunosuppressants:   Mycophenolate + Tacrolimus and Prednisone   Monitor and trend Cr/BUN  - plan as above, case d/w Nephrology

## 2020-04-14 NOTE — PROGRESS NOTE ADULT - SUBJECTIVE AND OBJECTIVE BOX
Patient is a 59y old  Male who presents with a chief complaint of Rule out COVID 19 infection (13 Apr 2020 10:52)      SUBJECTIVE / OVERNIGHT EVENTS:    MEDICATIONS  (STANDING):  ascorbic acid  Oral Tab/Cap - Peds 500 milliGRAM(s) Oral two times a day  aspirin  chewable 81 milliGRAM(s) Oral daily  atorvastatin 80 milliGRAM(s) Oral at bedtime  bisacodyl 5 milliGRAM(s) Oral daily  buMETAnide 1 milliGRAM(s) Oral two times a day  carvedilol Oral Tab/Cap - Peds 3.125 milliGRAM(s) Oral two times a day  clopidogrel Tablet 75 milliGRAM(s) Oral daily  dextrose 5%. 1000 milliLiter(s) (50 mL/Hr) IV Continuous <Continuous>  dextrose 50% Injectable 12.5 Gram(s) IV Push once  dextrose 50% Injectable 25 Gram(s) IV Push once  dextrose 50% Injectable 25 Gram(s) IV Push once  ertapenem  IVPB      ertapenem  IVPB 500 milliGRAM(s) IV Intermittent every 24 hours  ferrous sulfate Oral Tab/Cap - Peds 325 milliGRAM(s) Oral daily  heparin  Injectable 5000 Unit(s) SubCutaneous every 8 hours  hydrALAZINE 25 milliGRAM(s) Oral two times a day  insulin glargine SubCutaneous Injection (LANTUS) - Peds 6 Unit(s) SubCutaneous at bedtime  insulin lispro (HumaLOG) corrective regimen sliding scale   SubCutaneous three times a day before meals  insulin lispro (HumaLOG) corrective regimen sliding scale   SubCutaneous at bedtime  levothyroxine 75 MICROGram(s) Oral daily  polyethylene glycol 3350 17 Gram(s) Oral daily  potassium chloride    Tablet ER 10 milliEquivalent(s) Oral daily  predniSONE   Tablet 5 milliGRAM(s) Oral daily  sevelamer carbonate 800 milliGRAM(s) Oral three times a day with meals  sodium chloride 1 Gram(s) Oral two times a day  tacrolimus 1 milliGRAM(s) Oral every 12 hours    MEDICATIONS  (PRN):  acetaminophen   Tablet .. 650 milliGRAM(s) Oral every 4 hours PRN Temp greater or equal to 38.5C (101.3F)  acetaminophen   Tablet .. 650 milliGRAM(s) Oral every 6 hours PRN Temp greater or equal to 38C (100.4F), Mild Pain (1 - 3), Moderate Pain (4 - 6)  acetaminophen  Suppository .. 650 milliGRAM(s) Rectal every 4 hours PRN Temp greater or equal to 38.5C (101.3F)  dextrose 40% Gel 15 Gram(s) Oral once PRN Blood Glucose LESS THAN 70 milliGRAM(s)/deciliter  glucagon  Injectable 1 milliGRAM(s) IntraMuscular once PRN Glucose LESS THAN 70 milligrams/deciliter  ondansetron Injectable 4 milliGRAM(s) IV Push every 8 hours PRN Nausea and/or Vomiting  sucralfate 1 Gram(s) Oral every 6 hours PRN With each meal      Vital Signs Last 24 Hrs  T(C): 37.5 (14 Apr 2020 05:36), Max: 37.5 (14 Apr 2020 05:36)  T(F): 99.5 (14 Apr 2020 05:36), Max: 99.5 (14 Apr 2020 05:36)  HR: 69 (14 Apr 2020 05:36) (58 - 69)  BP: 145/57 (14 Apr 2020 05:36) (140/62 - 149/72)  BP(mean): --  RR: 20 (14 Apr 2020 05:36) (19 - 20)  SpO2: 100% (14 Apr 2020 05:36) (100% - 100%)  CAPILLARY BLOOD GLUCOSE      POCT Blood Glucose.: 98 mg/dL (14 Apr 2020 08:18)  POCT Blood Glucose.: 142 mg/dL (13 Apr 2020 21:22)  POCT Blood Glucose.: 162 mg/dL (13 Apr 2020 17:17)  POCT Blood Glucose.: 192 mg/dL (13 Apr 2020 12:00)    I&O's Summary    13 Apr 2020 07:01  -  14 Apr 2020 07:00  --------------------------------------------------------  IN: 50 mL / OUT: 250 mL / NET: -200 mL        PHYSICAL EXAM:  GENERAL: NAD, well-developed  HEAD:  Atraumatic, Normocephalic  EYES: EOMI, PERRLA, conjunctiva and sclera clear  NECK: Supple, No JVD  CHEST/LUNG: Clear to auscultation bilaterally; No wheeze  HEART: Regular rate and rhythm; No murmurs, rubs, or gallops  ABDOMEN: Soft, Nontender, Nondistended; Bowel sounds present  EXTREMITIES:  2+ Peripheral Pulses, No clubbing, cyanosis, or edema  PSYCH: AAOx3  NEUROLOGY: non-focal  SKIN: No rashes or lesions    LABS:                        11.3   3.60  )-----------( 161      ( 14 Apr 2020 09:44 )             35.4     04-14    129<L>  |  95<L>  |  88<H>  ----------------------------<  106<H>  4.4   |  20<L>  |  4.85<H>    Ca    9.0      14 Apr 2020 09:44    TPro  6.1  /  Alb  3.0<L>  /  TBili  < 0.2<L>  /  DBili  x   /  AST  12  /  ALT  9   /  AlkPhos  69  04-14              RADIOLOGY & ADDITIONAL TESTS:    Imaging Personally Reviewed:    Consultant(s) Notes Reviewed:      Care Discussed with Consultants/Other Providers: Patient is a 59y old  Male who presents with a chief complaint of Rule out COVID 19 infection (13 Apr 2020 10:52)      SUBJECTIVE / OVERNIGHT EVENTS: patient seen and examined by bedside, c/o lower back pain, denies headache, dizziness, SOB, CP, Palpitations , N/V/D, abdominal pain  pt on RA ,saturating 100%       MEDICATIONS  (STANDING):  ascorbic acid  Oral Tab/Cap - Peds 500 milliGRAM(s) Oral two times a day  aspirin  chewable 81 milliGRAM(s) Oral daily  atorvastatin 80 milliGRAM(s) Oral at bedtime  bisacodyl 5 milliGRAM(s) Oral daily  buMETAnide 1 milliGRAM(s) Oral two times a day  carvedilol Oral Tab/Cap - Peds 3.125 milliGRAM(s) Oral two times a day  clopidogrel Tablet 75 milliGRAM(s) Oral daily  dextrose 5%. 1000 milliLiter(s) (50 mL/Hr) IV Continuous <Continuous>  dextrose 50% Injectable 12.5 Gram(s) IV Push once  dextrose 50% Injectable 25 Gram(s) IV Push once  dextrose 50% Injectable 25 Gram(s) IV Push once  ertapenem  IVPB      ertapenem  IVPB 500 milliGRAM(s) IV Intermittent every 24 hours  ferrous sulfate Oral Tab/Cap - Peds 325 milliGRAM(s) Oral daily  heparin  Injectable 5000 Unit(s) SubCutaneous every 8 hours  hydrALAZINE 25 milliGRAM(s) Oral two times a day  insulin glargine SubCutaneous Injection (LANTUS) - Peds 6 Unit(s) SubCutaneous at bedtime  insulin lispro (HumaLOG) corrective regimen sliding scale   SubCutaneous three times a day before meals  insulin lispro (HumaLOG) corrective regimen sliding scale   SubCutaneous at bedtime  levothyroxine 75 MICROGram(s) Oral daily  polyethylene glycol 3350 17 Gram(s) Oral daily  potassium chloride    Tablet ER 10 milliEquivalent(s) Oral daily  predniSONE   Tablet 5 milliGRAM(s) Oral daily  sevelamer carbonate 800 milliGRAM(s) Oral three times a day with meals  sodium chloride 1 Gram(s) Oral two times a day  tacrolimus 1 milliGRAM(s) Oral every 12 hours    MEDICATIONS  (PRN):  acetaminophen   Tablet .. 650 milliGRAM(s) Oral every 4 hours PRN Temp greater or equal to 38.5C (101.3F)  acetaminophen   Tablet .. 650 milliGRAM(s) Oral every 6 hours PRN Temp greater or equal to 38C (100.4F), Mild Pain (1 - 3), Moderate Pain (4 - 6)  acetaminophen  Suppository .. 650 milliGRAM(s) Rectal every 4 hours PRN Temp greater or equal to 38.5C (101.3F)  dextrose 40% Gel 15 Gram(s) Oral once PRN Blood Glucose LESS THAN 70 milliGRAM(s)/deciliter  glucagon  Injectable 1 milliGRAM(s) IntraMuscular once PRN Glucose LESS THAN 70 milligrams/deciliter  ondansetron Injectable 4 milliGRAM(s) IV Push every 8 hours PRN Nausea and/or Vomiting  sucralfate 1 Gram(s) Oral every 6 hours PRN With each meal      Vital Signs Last 24 Hrs  T(C): 37.5 (14 Apr 2020 05:36), Max: 37.5 (14 Apr 2020 05:36)  T(F): 99.5 (14 Apr 2020 05:36), Max: 99.5 (14 Apr 2020 05:36)  HR: 69 (14 Apr 2020 05:36) (58 - 69)  BP: 145/57 (14 Apr 2020 05:36) (140/62 - 149/72)  BP(mean): --  RR: 20 (14 Apr 2020 05:36) (19 - 20)  SpO2: 100% (14 Apr 2020 05:36) (100% - 100%)  CAPILLARY BLOOD GLUCOSE      POCT Blood Glucose.: 98 mg/dL (14 Apr 2020 08:18)  POCT Blood Glucose.: 142 mg/dL (13 Apr 2020 21:22)  POCT Blood Glucose.: 162 mg/dL (13 Apr 2020 17:17)  POCT Blood Glucose.: 192 mg/dL (13 Apr 2020 12:00)    I&O's Summary    13 Apr 2020 07:01  -  14 Apr 2020 07:00  --------------------------------------------------------  IN: 50 mL / OUT: 250 mL / NET: -200 mL      PHYSICAL EXAM:  GENERAL: not in distress, on room air  EYES: open, sclera clear b/l  CHEST/LUNG: normal respiratory effort, not tachypneic, speaking in full sentences without difficulty  HEART: Not tachycardic, no lower extremity edema b/l  ABDOMEN: Soft, Nontender, Nondistended  EXTREMITIES: R AKA, L forearm AV fistula  NEUROLOGY: awake, alert, responds to Qs appropriately, no gross deficits  PSYCH: calm, cooperative        LABS:                        11.3   3.60  )-----------( 161      ( 14 Apr 2020 09:44 )             35.4     04-14    129<L>  |  95<L>  |  88<H>  ----------------------------<  106<H>  4.4   |  20<L>  |  4.85<H>    Ca    9.0      14 Apr 2020 09:44    TPro  6.1  /  Alb  3.0<L>  /  TBili  < 0.2<L>  /  DBili  x   /  AST  12  /  ALT  9   /  AlkPhos  69  04-14          Culture - Urine (04.10.20 @ 08:30)    -  Gentamicin: S <=4    -  Imipenem: S <=1    -  Levofloxacin: S <=2    -  Meropenem: S <=1    -  Nitrofurantoin: S <=32 Should not be used to treat pyelonephritis    -  Piperacillin/Tazobactam: R >64    -  Tigecycline: S <=2    -  Tobramycin: S <=4    -  Trimethoprim/Sulfamethoxazole: R >2/38    -  Amikacin: S <=16    -  Ampicillin: R >16 These ampicillin results predict results for amoxicillin    -  Ampicillin/Sulbactam: R >16/8 Enterobacter, Citrobacter, and Serratia may develop resistance during prolonged therapy (3-4 days)    -  Aztreonam: R <=4    -  Cefazolin: R >16 ([id number]) For uncomplicated UTI with K. pneumoniae, E. coli, or P. mirablis: ISIAH <=16 is sensitive and ISIAH >=32 is resistant. This also predicts results for oral agents cefaclor, cefdinir, cefpodoxime, cefprozil, cefuroxime axetil, cephalexin and locarbef for uncomplicated UTI. Note that some isolates may be susceptible to these agents while testing resistant to cefazolin.    -  Cefepime: R <=4    -  Cefoxitin: S <=8    -  Ceftriaxone: R <=1 Enterobacter, Citrobacter, and Serratia may develop resistance during prolonged therapy    -  Ciprofloxacin: I 2    -  Ertapenem: S <=1    Specimen Source: .Urine Clean Catch (Midstream)    Culture Results:   >100,000 CFU/ml Klebsiella pneumoniae ESBL    Organism Identification: Klebsiella pneumoniae ESBL    Organism: Klebsiella pneumoniae ESBL    Method Type: ISIAH        RADIOLOGY & ADDITIONAL TESTS:    Imaging Personally Reviewed:    Consultant(s) Notes Reviewed:  ID ,     Care Discussed with Consultants/Other Providers: ID , renal

## 2020-04-14 NOTE — CONSULT NOTE ADULT - SUBJECTIVE AND OBJECTIVE BOX
HPI:  59M w/  DM, ESRD s/p kidney transplant in 05, CAD s/p stent a few months ago at [facility], PVD s/p R AKA in 2010 presents today with fever, rhinorrhea and not feeling well for three days. Pt comes from [facility].  Tmax was 102.9 at NH.   Found covid +.    Endorses low back pain right.   Poor appetite.       PAST MEDICAL & SURGICAL HISTORY:  BPH (benign prostatic hyperplasia)  Hypothyroid  Diabetes  Chronic systolic heart failure  HTN (hypertension)  CAD (coronary artery disease)  Kidney transplant recipient  ESRD (end stage renal disease)  S/P drug eluting coronary stent placement  Kidney transplanted      Allergies    iodine (Vomiting)  IV Contrast (Unknown)    Intolerances        ANTIMICROBIALS:  ertapenem  IVPB    ertapenem  IVPB 500 every 24 hours      OTHER MEDS:  acetaminophen   Tablet .. 650 milliGRAM(s) Oral every 4 hours PRN  acetaminophen   Tablet .. 650 milliGRAM(s) Oral every 6 hours PRN  acetaminophen  Suppository .. 650 milliGRAM(s) Rectal every 4 hours PRN  ascorbic acid  Oral Tab/Cap - Peds 500 milliGRAM(s) Oral two times a day  aspirin  chewable 81 milliGRAM(s) Oral daily  atorvastatin 80 milliGRAM(s) Oral at bedtime  bisacodyl 5 milliGRAM(s) Oral daily  buMETAnide 1 milliGRAM(s) Oral two times a day  carvedilol Oral Tab/Cap - Peds 3.125 milliGRAM(s) Oral two times a day  clopidogrel Tablet 75 milliGRAM(s) Oral daily  dextrose 40% Gel 15 Gram(s) Oral once PRN  dextrose 5%. 1000 milliLiter(s) IV Continuous <Continuous>  dextrose 50% Injectable 12.5 Gram(s) IV Push once  dextrose 50% Injectable 25 Gram(s) IV Push once  dextrose 50% Injectable 25 Gram(s) IV Push once  ferrous sulfate Oral Tab/Cap - Peds 325 milliGRAM(s) Oral daily  glucagon  Injectable 1 milliGRAM(s) IntraMuscular once PRN  heparin  Injectable 5000 Unit(s) SubCutaneous every 8 hours  hydrALAZINE 25 milliGRAM(s) Oral two times a day  insulin glargine SubCutaneous Injection (LANTUS) - Peds 6 Unit(s) SubCutaneous at bedtime  insulin lispro (HumaLOG) corrective regimen sliding scale   SubCutaneous three times a day before meals  insulin lispro (HumaLOG) corrective regimen sliding scale   SubCutaneous at bedtime  levothyroxine 75 MICROGram(s) Oral daily  ondansetron Injectable 4 milliGRAM(s) IV Push every 8 hours PRN  polyethylene glycol 3350 17 Gram(s) Oral daily  potassium chloride    Tablet ER 10 milliEquivalent(s) Oral daily  predniSONE   Tablet 5 milliGRAM(s) Oral daily  sevelamer carbonate 800 milliGRAM(s) Oral three times a day with meals  sodium chloride 1 Gram(s) Oral two times a day  sucralfate 1 Gram(s) Oral every 6 hours PRN  tacrolimus 1 milliGRAM(s) Oral every 12 hours      SOCIAL HISTORY: nursing home resident    FAMILY HISTORY: non contrib      REVIEW OF SYSTEMS  [  ] ROS unobtainable because:    [ x ] All other systems negative except as noted below:	    Constitutional:  [ ] fever [ ] chills  [ ] weight loss  [x ] weakness  Skin:  [ ] rash [ ] phlebitis	  Eyes: [ ] icterus [ ] pain  [ ] discharge	  ENMT: [ ] sore throat  [ ] thrush [ ] ulcers [ ] exudates  Respiratory: [ ] dyspnea [ ] hemoptysis [ ] cough [ ] sputum	  Cardiovascular:  [ ] chest pain [ ] palpitations [ ] edema	  Gastrointestinal:  [ ] nausea [ ] vomiting [ ] diarrhea [ ] constipation [ ] pain	  Genitourinary:  [ ] dysuria [ ] frequency [ ] hematuria [ ] discharge [x ] flank pain  [ ] incontinence  Musculoskeletal:  [ ] myalgias [ ] arthralgias [ ] arthritis  [ ] back pain  Neurological:  [ ] headache [ ] seizures  [ ] confusion/altered mental status  Psychiatric:  [ ] anxiety [ ] depression	  Hematology/Lymphatics:  [ ] lymphadenopathy  Endocrine:  [ ] adrenal [ ] thyroid  Allergic/Immunologic:	 [ ] transplant [ ] seasonal    PHYSICAL EXAM:  General: [x ] non-toxic  HEAD/EYES: [ ] PERRL [x ] white sclera [ ] icterus  ENT:  [ ] normal [x ] supple [ ] thrush [ ] pharyngeal exudate  Cardiovascular:   [ ] murmur [ x] normal [ ] PPM/AICD  Respiratory:  [ x] clear to ausculation bilaterally  GI:  [x ] soft, non-tender, normal bowel sounds, well healed scar RLQ  :  tender right flank  Musculoskeletal:  right AKA  Neurologic:  [x ] non-focal exam   Skin:  [x ] no rash  Lymph: [ ] no lymphadenopathy  Psychiatric:  [x ] appropriate affect [x ] alert & oriented  Lines:  [ ] no phlebitis [ ] central line          Drug Dosing Weight      Vital Signs Last 24 Hrs  T(F): 97.3 (04-14-20 @ 14:40), Max: 99.6 (04-10-20 @ 20:38)    Vital Signs Last 24 Hrs  HR: 64 (04-14-20 @ 14:40) (58 - 69)  BP: 140/64 (04-14-20 @ 14:40) (140/64 - 149/72)  RR: 20 (04-14-20 @ 14:40)  SpO2: 100% (04-14-20 @ 14:40) (100% - 100%)  Wt(kg): --                          11.3   3.60  )-----------( 161      ( 14 Apr 2020 09:44 )             35.4       04-14    129<L>  |  95<L>  |  88<H>  ----------------------------<  106<H>  4.4   |  20<L>  |  4.85<H>    Ca    9.0      14 Apr 2020 09:44    TPro  6.1  /  Alb  3.0<L>  /  TBili  < 0.2<L>  /  DBili  x   /  AST  12  /  ALT  9   /  AlkPhos  69  04-14    Urinalysis (04.11.20 @ 08:30)    Color: COLORLESS    Urine Appearance: TURBID    Glucose: NEGATIVE    Bilirubin: NEGATIVE    Ketone - Urine: TRACE    Specific Gravity: 1.014    Blood: MODERATE    pH - Urine: 6.0    Protein, Urine: 100    Urobilinogen: NORMAL    Nitrite: POSITIVE    Leukocyte Esterase Concentration: LARGE    Red Blood Cell - Urine: 10-20    White Blood Cell - Urine: >50    Epithelial Cells: OCC    Mucus: PRESENT    Bacteria: MANY          MICROBIOLOGY:  .Blood Blood-Peripheral  04-11-20   No growth to date.  --  --      .Blood Blood-Peripheral  04-11-20   No growth to date.  --  --      .Urine Clean Catch (Midstream)  04-10-20   >100,000 CFU/ml Klebsiella pneumoniae ESBL  --  Klebsiella pneumoniae ESBL          RADIOLOGY:    < from: US Kidney and Bladder (04.12.20 @ 15:39) >  TECHNIQUE: Sonography of the kidneys and bladder.    FINDINGS:    Native Right kidney: Diminutive measuring 7.1 cm. Diffusely increased echogenicity. No renal mass, hydronephrosis or calculi.    Native Left kidney: Diminutive measuring 7.6 cm. Diffusely increased echogenicity No renal mass, hydronephrosis or calculi.    Right pelvic transplant kidney: 13.1 cm. Normal echogenicity and echotexture. A 3.6 cm lower pole central cyst. No hydronephrosis, renal mass, calculi, or perinephric fluid collection.    Urinary bladder: Within normal limits.    IMPRESSION:   Normal-appearing right pelvic transplant kidney.    < end of copied text >  < from: Xray Chest 1 View-PORTABLE IMMEDIATE (04.10.20 @ 22:06) >  IMPRESSION:  No focal consolidation.      < end of copied text >

## 2020-04-14 NOTE — PROGRESS NOTE ADULT - PROBLEM SELECTOR PLAN 6
- Continue Carvedilol 3.125 PO BID - Continue Carvedilol 3.125 PO BID  - no evidence of exacerbation

## 2020-04-15 LAB
ALBUMIN SERPL ELPH-MCNC: 2.7 G/DL — LOW (ref 3.3–5)
ALP SERPL-CCNC: 68 U/L — SIGNIFICANT CHANGE UP (ref 40–120)
ALT FLD-CCNC: 6 U/L — SIGNIFICANT CHANGE UP (ref 4–41)
ANION GAP SERPL CALC-SCNC: 14 MMO/L — SIGNIFICANT CHANGE UP (ref 7–14)
AST SERPL-CCNC: 9 U/L — SIGNIFICANT CHANGE UP (ref 4–40)
BASOPHILS # BLD AUTO: 0.01 K/UL — SIGNIFICANT CHANGE UP (ref 0–0.2)
BASOPHILS NFR BLD AUTO: 0.4 % — SIGNIFICANT CHANGE UP (ref 0–2)
BILIRUB SERPL-MCNC: < 0.2 MG/DL — LOW (ref 0.2–1.2)
BUN SERPL-MCNC: 84 MG/DL — HIGH (ref 7–23)
CALCIUM SERPL-MCNC: 8.8 MG/DL — SIGNIFICANT CHANGE UP (ref 8.4–10.5)
CHLORIDE SERPL-SCNC: 93 MMOL/L — LOW (ref 98–107)
CO2 SERPL-SCNC: 21 MMOL/L — LOW (ref 22–31)
CREAT SERPL-MCNC: 4.61 MG/DL — HIGH (ref 0.5–1.3)
EOSINOPHIL # BLD AUTO: 0.06 K/UL — SIGNIFICANT CHANGE UP (ref 0–0.5)
EOSINOPHIL NFR BLD AUTO: 2.4 % — SIGNIFICANT CHANGE UP (ref 0–6)
GLUCOSE BLDC GLUCOMTR-MCNC: 110 MG/DL — HIGH (ref 70–99)
GLUCOSE BLDC GLUCOMTR-MCNC: 113 MG/DL — HIGH (ref 70–99)
GLUCOSE BLDC GLUCOMTR-MCNC: 143 MG/DL — HIGH (ref 70–99)
GLUCOSE BLDC GLUCOMTR-MCNC: 168 MG/DL — HIGH (ref 70–99)
GLUCOSE SERPL-MCNC: 101 MG/DL — HIGH (ref 70–99)
HCT VFR BLD CALC: 35.4 % — LOW (ref 39–50)
HGB BLD-MCNC: 11 G/DL — LOW (ref 13–17)
IMM GRANULOCYTES NFR BLD AUTO: 1.6 % — HIGH (ref 0–1.5)
LYMPHOCYTES # BLD AUTO: 0.99 K/UL — LOW (ref 1–3.3)
LYMPHOCYTES # BLD AUTO: 39.6 % — SIGNIFICANT CHANGE UP (ref 13–44)
MAGNESIUM SERPL-MCNC: 1.6 MG/DL — SIGNIFICANT CHANGE UP (ref 1.6–2.6)
MCHC RBC-ENTMCNC: 25 PG — LOW (ref 27–34)
MCHC RBC-ENTMCNC: 31.1 % — LOW (ref 32–36)
MCV RBC AUTO: 80.5 FL — SIGNIFICANT CHANGE UP (ref 80–100)
MONOCYTES # BLD AUTO: 0.36 K/UL — SIGNIFICANT CHANGE UP (ref 0–0.9)
MONOCYTES NFR BLD AUTO: 14.4 % — HIGH (ref 2–14)
NEUTROPHILS # BLD AUTO: 1.04 K/UL — LOW (ref 1.8–7.4)
NEUTROPHILS NFR BLD AUTO: 41.6 % — LOW (ref 43–77)
NRBC # FLD: 0 K/UL — SIGNIFICANT CHANGE UP (ref 0–0)
PHOSPHATE SERPL-MCNC: 4.1 MG/DL — SIGNIFICANT CHANGE UP (ref 2.5–4.5)
PLATELET # BLD AUTO: 149 K/UL — LOW (ref 150–400)
PMV BLD: 8.6 FL — SIGNIFICANT CHANGE UP (ref 7–13)
POTASSIUM SERPL-MCNC: 4.2 MMOL/L — SIGNIFICANT CHANGE UP (ref 3.5–5.3)
POTASSIUM SERPL-SCNC: 4.2 MMOL/L — SIGNIFICANT CHANGE UP (ref 3.5–5.3)
PROT SERPL-MCNC: 5.7 G/DL — LOW (ref 6–8.3)
RBC # BLD: 4.4 M/UL — SIGNIFICANT CHANGE UP (ref 4.2–5.8)
RBC # FLD: 14.5 % — SIGNIFICANT CHANGE UP (ref 10.3–14.5)
SODIUM SERPL-SCNC: 128 MMOL/L — LOW (ref 135–145)
WBC # BLD: 2.5 K/UL — LOW (ref 3.8–10.5)
WBC # FLD AUTO: 2.5 K/UL — LOW (ref 3.8–10.5)

## 2020-04-15 PROCEDURE — 99232 SBSQ HOSP IP/OBS MODERATE 35: CPT

## 2020-04-15 PROCEDURE — 99233 SBSQ HOSP IP/OBS HIGH 50: CPT

## 2020-04-15 PROCEDURE — 99232 SBSQ HOSP IP/OBS MODERATE 35: CPT | Mod: GC

## 2020-04-15 RX ADMIN — SODIUM CHLORIDE 1 GRAM(S): 9 INJECTION INTRAMUSCULAR; INTRAVENOUS; SUBCUTANEOUS at 05:11

## 2020-04-15 RX ADMIN — Medication 2: at 17:39

## 2020-04-15 RX ADMIN — INSULIN GLARGINE 6 UNIT(S): 100 INJECTION, SOLUTION SUBCUTANEOUS at 22:05

## 2020-04-15 RX ADMIN — CARVEDILOL PHOSPHATE 3.12 MILLIGRAM(S): 80 CAPSULE, EXTENDED RELEASE ORAL at 17:41

## 2020-04-15 RX ADMIN — Medication 325 MILLIGRAM(S): at 13:44

## 2020-04-15 RX ADMIN — ONDANSETRON 4 MILLIGRAM(S): 8 TABLET, FILM COATED ORAL at 05:11

## 2020-04-15 RX ADMIN — Medication 5 MILLIGRAM(S): at 05:11

## 2020-04-15 RX ADMIN — CARVEDILOL PHOSPHATE 3.12 MILLIGRAM(S): 80 CAPSULE, EXTENDED RELEASE ORAL at 10:43

## 2020-04-15 RX ADMIN — Medication 25 MILLIGRAM(S): at 17:42

## 2020-04-15 RX ADMIN — TACROLIMUS 1 MILLIGRAM(S): 5 CAPSULE ORAL at 22:05

## 2020-04-15 RX ADMIN — ERTAPENEM SODIUM 100 MILLIGRAM(S): 1 INJECTION, POWDER, LYOPHILIZED, FOR SOLUTION INTRAMUSCULAR; INTRAVENOUS at 17:53

## 2020-04-15 RX ADMIN — HEPARIN SODIUM 5000 UNIT(S): 5000 INJECTION INTRAVENOUS; SUBCUTANEOUS at 22:06

## 2020-04-15 RX ADMIN — HEPARIN SODIUM 5000 UNIT(S): 5000 INJECTION INTRAVENOUS; SUBCUTANEOUS at 05:10

## 2020-04-15 RX ADMIN — BUMETANIDE 1 MILLIGRAM(S): 0.25 INJECTION INTRAMUSCULAR; INTRAVENOUS at 05:11

## 2020-04-15 RX ADMIN — BUMETANIDE 1 MILLIGRAM(S): 0.25 INJECTION INTRAMUSCULAR; INTRAVENOUS at 17:43

## 2020-04-15 RX ADMIN — Medication 10 MILLIEQUIVALENT(S): at 13:45

## 2020-04-15 RX ADMIN — SEVELAMER CARBONATE 800 MILLIGRAM(S): 2400 POWDER, FOR SUSPENSION ORAL at 10:44

## 2020-04-15 RX ADMIN — HEPARIN SODIUM 5000 UNIT(S): 5000 INJECTION INTRAVENOUS; SUBCUTANEOUS at 13:44

## 2020-04-15 RX ADMIN — Medication 75 MICROGRAM(S): at 05:11

## 2020-04-15 RX ADMIN — TACROLIMUS 1 MILLIGRAM(S): 5 CAPSULE ORAL at 10:43

## 2020-04-15 RX ADMIN — Medication 500 MILLIGRAM(S): at 17:43

## 2020-04-15 RX ADMIN — ATORVASTATIN CALCIUM 80 MILLIGRAM(S): 80 TABLET, FILM COATED ORAL at 22:05

## 2020-04-15 RX ADMIN — Medication 25 MILLIGRAM(S): at 10:43

## 2020-04-15 RX ADMIN — SODIUM CHLORIDE 1 GRAM(S): 9 INJECTION INTRAMUSCULAR; INTRAVENOUS; SUBCUTANEOUS at 17:42

## 2020-04-15 RX ADMIN — SEVELAMER CARBONATE 800 MILLIGRAM(S): 2400 POWDER, FOR SUSPENSION ORAL at 13:43

## 2020-04-15 RX ADMIN — Medication 81 MILLIGRAM(S): at 13:43

## 2020-04-15 RX ADMIN — Medication 500 MILLIGRAM(S): at 05:11

## 2020-04-15 RX ADMIN — SEVELAMER CARBONATE 800 MILLIGRAM(S): 2400 POWDER, FOR SUSPENSION ORAL at 17:40

## 2020-04-15 RX ADMIN — CLOPIDOGREL BISULFATE 75 MILLIGRAM(S): 75 TABLET, FILM COATED ORAL at 13:44

## 2020-04-15 NOTE — PROGRESS NOTE ADULT - PROBLEM SELECTOR PLAN 3
Prior labs per patient's Nursing home:  March 27/2020: Cr/BUN: 3.6/24 March 30/2020: Cr/BUN: 3.9/24 April 6/2020: Cr/BUN: 4.8/20   - Appreciate Nephrology consult - renal u/s  for transplanted kidney WNL ,    -concern for noncompliance as tacrolimus level was low  -F/U spot urine creatinine noted ,  tac level <2 on 4/13   -Cr  improved  to 4.85 , pt was 4.8 on 4/6 ,  will continue to monitor   -case d/w Renal , will continue current plan and medications , renal does not recommend increasing dose of immunosuppressants with ongoing infection   - will not request records from [facility] as long as Renal fn improving as management not going to change, pt will need to f/u with his nephrologist as outpt after discharge     - Primary Nephrologist: Dr. Adrián Hawkins Prior labs per patient's Nursing home:  March 27/2020: Cr/BUN: 3.6/24 March 30/2020: Cr/BUN: 3.9/24 April 6/2020: Cr/BUN: 4.8/20   - Appreciate Nephrology consult - renal u/s  for transplanted kidney WNL ,    -concern for noncompliance as tacrolimus level was low  -F/U spot urine creatinine noted ,  tac level <2 on 4/13   -Cr  improved  to 4.6 today  , pt was 4.8 on 4/6 ,  will continue to monitor   - Renal f/u appreciated , will continue current plan and medications , renal does not recommend increasing dose of immunosuppressants with ongoing infection, holding Cellcept, c/w tacrolimus and prednisone  , will recheck AM trough 30 minutes prior to dose. Goal Tacrolimus level is 3-5 as per Renal   - will not request records from [facility] as long as Renal fn improving as management not going to change, pt will need to f/u with his nephrologist as outpt after discharge     - Primary Nephrologist: Dr. Adrián Hawkins

## 2020-04-15 NOTE — PROGRESS NOTE ADULT - SUBJECTIVE AND OBJECTIVE BOX
[facility] DIVISION OF KIDNEY DISEASES AND HYPERTENSION -- FOLLOW UP NOTE  --------------------------------------------------------------------------------  HPI: 59M PMH of HTN, HLD, DM, ESRD s/p kidney transplant, admitted for COVID-19. Nephrology team consulted for YURI and management of immunosuppressant. Pt with history of ESRD s/p DDRT on 2005. Pt. does not recall his nephrologist but goes to [facility] with last ff-up 3 months prior. Pt recently residing in [facility] for the past 2 months. On review pt is on Tacrolimus 1mg BID, Mycophenolate 1gm BID and Prednisone 5mg daily. On review of labs, pt with SCr: 3.6 on 3/27/20 increased to 3.9 on 3/30/20. Pt with last outpatient SCr of 4.8 as outpatient at snf. On admission, SCr was 5.47 (4/10/20) which has improved to 4.61 today.    PAST HISTORY  --------------------------------------------------------------------------------  No significant changes to PMH, PSH, FHx, SHx, unless otherwise noted    ALLERGIES & MEDICATIONS  --------------------------------------------------------------------------------  Allergies    iodine (Vomiting)  IV Contrast (Unknown)    Intolerances    Standing Inpatient Medications  ascorbic acid  Oral Tab/Cap - Peds 500 milliGRAM(s) Oral two times a day  aspirin  chewable 81 milliGRAM(s) Oral daily  atorvastatin 80 milliGRAM(s) Oral at bedtime  bisacodyl 5 milliGRAM(s) Oral daily  buMETAnide 1 milliGRAM(s) Oral two times a day  carvedilol Oral Tab/Cap - Peds 3.125 milliGRAM(s) Oral two times a day  clopidogrel Tablet 75 milliGRAM(s) Oral daily  dextrose 5%. 1000 milliLiter(s) IV Continuous <Continuous>  dextrose 50% Injectable 12.5 Gram(s) IV Push once  dextrose 50% Injectable 25 Gram(s) IV Push once  dextrose 50% Injectable 25 Gram(s) IV Push once  ertapenem  IVPB      ertapenem  IVPB 500 milliGRAM(s) IV Intermittent every 24 hours  ferrous sulfate Oral Tab/Cap - Peds 325 milliGRAM(s) Oral daily  heparin  Injectable 5000 Unit(s) SubCutaneous every 8 hours  hydrALAZINE 25 milliGRAM(s) Oral two times a day  insulin glargine SubCutaneous Injection (LANTUS) - Peds 6 Unit(s) SubCutaneous at bedtime  insulin lispro (HumaLOG) corrective regimen sliding scale   SubCutaneous three times a day before meals  insulin lispro (HumaLOG) corrective regimen sliding scale   SubCutaneous at bedtime  levothyroxine 75 MICROGram(s) Oral daily  polyethylene glycol 3350 17 Gram(s) Oral daily  potassium chloride    Tablet ER 10 milliEquivalent(s) Oral daily  predniSONE   Tablet 5 milliGRAM(s) Oral daily  sevelamer carbonate 800 milliGRAM(s) Oral three times a day with meals  sodium chloride 1 Gram(s) Oral two times a day  tacrolimus 1 milliGRAM(s) Oral every 12 hours    REVIEW OF SYSTEMS  --------------------------------------------------------------------------------  Gen: No  fevers/chills  Skin: No rashes  Respiratory: No dyspnea  CV: No chest pain,   GI: No abdominal pain, diarrhea  MSK: No joint pain/swelling;   Neuro: No dizziness/lightheadedness    All other systems were reviewed and are negative, except as noted.    VITALS/PHYSICAL EXAM  --------------------------------------------------------------------------------  T(C): 36.6 (04-15-20 @ 10:40), Max: 36.8 (04-14-20 @ 22:33)  HR: 61 (04-15-20 @ 10:40) (60 - 64)  BP: 140/56 (04-15-20 @ 10:40) (105/55 - 140/64)  RR: 18 (04-15-20 @ 10:40) (18 - 20)  SpO2: 100% (04-15-20 @ 10:40) (99% - 100%)  Wt(kg): --    04-14-20 @ 07:01  -  04-15-20 @ 07:00  --------------------------------------------------------  IN: 0 mL / OUT: 300 mL / NET: -300 mL    Physical Exam:  	Gen: NAD, well-appearing  	HEENT:supple, no JVD  	Pulm: CTA B/L  	CV:  S1S2  	Abd: +BS, soft, transplant area without tenderness  	Ext: No B/L Lower ext edema  	Neuro: No focal deficits  	Skin: Warm, without rashes  	Vascular access: Left Arm AVF with palpable thrill, + aneurysmal dilation    LABS/STUDIES  --------------------------------------------------------------------------------              11.0   2.50  >-----------<  149      [04-15-20 @ 04:10]              35.4     128  |  93  |  84  ----------------------------<  101      [04-15-20 @ 04:10]  4.2   |  21  |  4.61        Ca     8.8     [04-15-20 @ 04:10]      Mg     1.6     [04-15-20 @ 04:10]      Phos  4.1     [04-15-20 @ 04:10]    TPro  5.7  /  Alb  2.7  /  TBili  < 0.2  /  DBili  x   /  AST  9   /  ALT  6   /  AlkPhos  68  [04-15-20 @ 04:10]    Creatinine Trend:  SCr 4.61 [04-15 @ 04:10]  SCr 4.85 [04-14 @ 09:44]  SCr 5.01 [04-13 @ 11:21]  SCr 5.55 [04-12 @ 05:21]  SCr 5.47 [04-10 @ 21:52]    Urinalysis - [04-11-20 @ 08:30]      Color COLORLESS / Appearance TURBID / SG 1.014 / pH 6.0      Gluc NEGATIVE / Ketone TRACE  / Bili NEGATIVE / Urobili NORMAL       Blood MODERATE / Protein 100 / Leuk Est LARGE / Nitrite POSITIVE      RBC 10-20 / WBC >50 / Hyaline  / Gran  / Sq Epi  / Non Sq Epi OCC / Bacteria MANY    Urine Creatinine 78.30      [04-12-20 @ 16:15]    Ferritin 1169      [04-14-20 @ 09:44]  HbA1c 6.6      [04-12-20 @ 05:21]    HCV 0.35, Nonreactive Hepatitis C AB

## 2020-04-15 NOTE — PROGRESS NOTE ADULT - PROBLEM SELECTOR PLAN 1
Pt with YURI on CKD in the setting of COVID-19 vs chronic graft rejection. Pt with last SCr of 4.8 as outpatient at care home. On admission, pt found to have SCr: 5.47 (4/10/20) which has improved to 4.6 today. Encourage PO intake. Continue to monitor renal function. Avoid other potential nephrotoxins.

## 2020-04-15 NOTE — PROGRESS NOTE ADULT - SUBJECTIVE AND OBJECTIVE BOX
Follow Up:  covid infection, pyelo with ESBL klebsiella, renal dysfunction    Inverval History/ROS: no respiratory distress.     Allergies  iodine (Vomiting)  IV Contrast (Unknown)        ANTIMICROBIALS:  ertapenem  IVPB    ertapenem  IVPB 500 every 24 hours      OTHER MEDS:  acetaminophen   Tablet .. 650 milliGRAM(s) Oral every 4 hours PRN  acetaminophen   Tablet .. 650 milliGRAM(s) Oral every 6 hours PRN  acetaminophen  Suppository .. 650 milliGRAM(s) Rectal every 4 hours PRN  ascorbic acid  Oral Tab/Cap - Peds 500 milliGRAM(s) Oral two times a day  aspirin  chewable 81 milliGRAM(s) Oral daily  atorvastatin 80 milliGRAM(s) Oral at bedtime  bisacodyl 5 milliGRAM(s) Oral daily  buMETAnide 1 milliGRAM(s) Oral two times a day  carvedilol Oral Tab/Cap - Peds 3.125 milliGRAM(s) Oral two times a day  clopidogrel Tablet 75 milliGRAM(s) Oral daily  dextrose 40% Gel 15 Gram(s) Oral once PRN  dextrose 5%. 1000 milliLiter(s) IV Continuous <Continuous>  dextrose 50% Injectable 12.5 Gram(s) IV Push once  dextrose 50% Injectable 25 Gram(s) IV Push once  dextrose 50% Injectable 25 Gram(s) IV Push once  ferrous sulfate Oral Tab/Cap - Peds 325 milliGRAM(s) Oral daily  glucagon  Injectable 1 milliGRAM(s) IntraMuscular once PRN  heparin  Injectable 5000 Unit(s) SubCutaneous every 8 hours  hydrALAZINE 25 milliGRAM(s) Oral two times a day  insulin glargine SubCutaneous Injection (LANTUS) - Peds 6 Unit(s) SubCutaneous at bedtime  insulin lispro (HumaLOG) corrective regimen sliding scale   SubCutaneous three times a day before meals  insulin lispro (HumaLOG) corrective regimen sliding scale   SubCutaneous at bedtime  levothyroxine 75 MICROGram(s) Oral daily  ondansetron Injectable 4 milliGRAM(s) IV Push every 8 hours PRN  polyethylene glycol 3350 17 Gram(s) Oral daily  potassium chloride    Tablet ER 10 milliEquivalent(s) Oral daily  predniSONE   Tablet 5 milliGRAM(s) Oral daily  sevelamer carbonate 800 milliGRAM(s) Oral three times a day with meals  sodium chloride 1 Gram(s) Oral two times a day  sucralfate 1 Gram(s) Oral every 6 hours PRN  tacrolimus 1 milliGRAM(s) Oral every 12 hours      Vital Signs Last 24 Hrs  T(C): 36.6 (15 Apr 2020 10:40), Max: 36.8 (14 Apr 2020 22:33)  T(F): 97.9 (15 Apr 2020 10:40), Max: 98.3 (14 Apr 2020 22:33)  HR: 61 (15 Apr 2020 10:40) (60 - 64)  BP: 140/56 (15 Apr 2020 10:40) (105/55 - 140/56)  BP(mean): --  RR: 18 (15 Apr 2020 10:40) (18 - 20)  SpO2: 100% (15 Apr 2020 10:40) (99% - 100%)    PHYSICAL EXAM:  General: [x ] non-toxic  HEAD/EYES: [ ] PERRL [x ] white sclera [ ] icterus  ENT:  [ ] normal [x ] supple [ ] thrush [ ] pharyngeal exudate  Cardiovascular:   [ ] murmur [x ] normal [ ] PPM/AICD  Respiratory:  [x ] clear to ausculation bilaterally  GI:  [x ] soft, non-tender, well healed scar RLQ  :  right lower flank pain  Musculoskeletal:  right AKA  Neurologic:  [x ] non-focal exam   Skin:  [x ] no rash  Lymph: [ ] no lymphadenopathy  Psychiatric:  [ ] appropriate affect [x ] alert & oriented  Lines:  [ ] no phlebitis [ ] central line                                11.0   2.50  )-----------( 149      ( 15 Apr 2020 04:10 )             35.4       04-15    128<L>  |  93<L>  |  84<H>  ----------------------------<  101<H>  4.2   |  21<L>  |  4.61<H>    Ca    8.8      15 Apr 2020 04:10  Phos  4.1     04-15  Mg     1.6     04-15    TPro  5.7<L>  /  Alb  2.7<L>  /  TBili  < 0.2<L>  /  DBili  x   /  AST  9   /  ALT  6   /  AlkPhos  68  04-15          MICROBIOLOGY:  v  .Urine Clean Catch (Midstream)  04-11-20   >100,000 CFU/ml Klebsiella pneumoniae ESBL  --  Klebsiella pneumoniae ESBL      .Blood Blood-Peripheral  04-11-20   No growth to date.  --  --      .Blood Blood-Peripheral  04-11-20   No growth to date.  --  --                RADIOLOGY:

## 2020-04-15 NOTE — PROGRESS NOTE ADULT - PROBLEM SELECTOR PLAN 2
Pt with history of ESRD s/p DDRT on 2005. Pt is on Tacrolimus 1mg BID, Cellcept 1 gm BID and Prednisone 5mg daily. Last Tacro level <2. Hold Cellcept and continue Tacrolimus and Prednisone. Please check AM trough 30 minutes prior to dose. Goal Tacrolimus level is 3-5. Monitor Scr.    If any questions, please feel free to contact me  Chu Mims   Nephrology Fellow  [phone]  (After 5 pm or on weekends please page the on-call fellow)

## 2020-04-15 NOTE — PROGRESS NOTE ADULT - ASSESSMENT
59M w/ hx of HTN, HLD, DM, ESRD s/p kidney transplant in 05, [facility] now presenting with hypoxic respiratory failure must likely due to COVID 19 infection.

## 2020-04-15 NOTE — PROGRESS NOTE ADULT - PROBLEM SELECTOR PLAN 1
- Maintaining normal oxygenation off of supplemental O2. Cont to monitor.   -Trend inflammatory markers Q48h, CRP trended down   - CXR clear  - Patient full code

## 2020-04-15 NOTE — PROGRESS NOTE ADULT - PROBLEM SELECTOR PLAN 2
- UA suggestive of infection, prior UTI from 2/2020 sensitive to ceftriaxone, resistant to zosyn  -   Urinary culture with ESBL Klebsiella   - Patient already received one dose of Cefepime in ED and was started on ceftriaxone , switched to Ertapenem, on 4/13 as U cx noted to be ESBL   case d/w ID, ID eval appreciated

## 2020-04-15 NOTE — PROGRESS NOTE ADULT - PROBLEM SELECTOR PLAN 4
Will continue existing regimen of Immunosuppressants:   Mycophenolate + Tacrolimus and Prednisone   Monitor and trend Cr/BUN  - plan as above, case d/w Nephrology Will continue existing regimen of Immunosuppressants:   on Mycophenolate + Tacrolimus and Prednisone , c/w Tacrolimus and prednisone, holding Cellcept for now   Monitor and trend Cr/BUN  - plan as above,  Nephrology f/u appreciated  hyponatremia: chronic pt on Nacl tabs, fluid restriction to 1 L

## 2020-04-15 NOTE — PROGRESS NOTE ADULT - SUBJECTIVE AND OBJECTIVE BOX
Patient is a 59y old  Male who presents with a chief complaint of Rule out COVID 19 infection (14 Apr 2020 11:34)      SUBJECTIVE / OVERNIGHT EVENTS:    MEDICATIONS  (STANDING):  ascorbic acid  Oral Tab/Cap - Peds 500 milliGRAM(s) Oral two times a day  aspirin  chewable 81 milliGRAM(s) Oral daily  atorvastatin 80 milliGRAM(s) Oral at bedtime  bisacodyl 5 milliGRAM(s) Oral daily  buMETAnide 1 milliGRAM(s) Oral two times a day  carvedilol Oral Tab/Cap - Peds 3.125 milliGRAM(s) Oral two times a day  clopidogrel Tablet 75 milliGRAM(s) Oral daily  dextrose 5%. 1000 milliLiter(s) (50 mL/Hr) IV Continuous <Continuous>  dextrose 50% Injectable 12.5 Gram(s) IV Push once  dextrose 50% Injectable 25 Gram(s) IV Push once  dextrose 50% Injectable 25 Gram(s) IV Push once  ertapenem  IVPB      ertapenem  IVPB 500 milliGRAM(s) IV Intermittent every 24 hours  ferrous sulfate Oral Tab/Cap - Peds 325 milliGRAM(s) Oral daily  heparin  Injectable 5000 Unit(s) SubCutaneous every 8 hours  hydrALAZINE 25 milliGRAM(s) Oral two times a day  insulin glargine SubCutaneous Injection (LANTUS) - Peds 6 Unit(s) SubCutaneous at bedtime  insulin lispro (HumaLOG) corrective regimen sliding scale   SubCutaneous three times a day before meals  insulin lispro (HumaLOG) corrective regimen sliding scale   SubCutaneous at bedtime  levothyroxine 75 MICROGram(s) Oral daily  polyethylene glycol 3350 17 Gram(s) Oral daily  potassium chloride    Tablet ER 10 milliEquivalent(s) Oral daily  predniSONE   Tablet 5 milliGRAM(s) Oral daily  sevelamer carbonate 800 milliGRAM(s) Oral three times a day with meals  sodium chloride 1 Gram(s) Oral two times a day  tacrolimus 1 milliGRAM(s) Oral every 12 hours    MEDICATIONS  (PRN):  acetaminophen   Tablet .. 650 milliGRAM(s) Oral every 4 hours PRN Temp greater or equal to 38.5C (101.3F)  acetaminophen   Tablet .. 650 milliGRAM(s) Oral every 6 hours PRN Temp greater or equal to 38C (100.4F), Mild Pain (1 - 3), Moderate Pain (4 - 6)  acetaminophen  Suppository .. 650 milliGRAM(s) Rectal every 4 hours PRN Temp greater or equal to 38.5C (101.3F)  dextrose 40% Gel 15 Gram(s) Oral once PRN Blood Glucose LESS THAN 70 milliGRAM(s)/deciliter  glucagon  Injectable 1 milliGRAM(s) IntraMuscular once PRN Glucose LESS THAN 70 milligrams/deciliter  ondansetron Injectable 4 milliGRAM(s) IV Push every 8 hours PRN Nausea and/or Vomiting  sucralfate 1 Gram(s) Oral every 6 hours PRN With each meal      Vital Signs Last 24 Hrs  T(C): 36.2 (15 Apr 2020 04:52), Max: 36.8 (14 Apr 2020 22:33)  T(F): 97.2 (15 Apr 2020 04:52), Max: 98.3 (14 Apr 2020 22:33)  HR: 62 (15 Apr 2020 04:52) (60 - 64)  BP: 105/55 (15 Apr 2020 04:52) (105/55 - 140/64)  BP(mean): --  RR: 20 (15 Apr 2020 04:52) (19 - 20)  SpO2: 100% (15 Apr 2020 04:52) (99% - 100%)  CAPILLARY BLOOD GLUCOSE      POCT Blood Glucose.: 110 mg/dL (15 Apr 2020 07:58)  POCT Blood Glucose.: 162 mg/dL (14 Apr 2020 22:59)  POCT Blood Glucose.: 120 mg/dL (14 Apr 2020 17:13)  POCT Blood Glucose.: 110 mg/dL (14 Apr 2020 12:20)    I&O's Summary    14 Apr 2020 07:01  -  15 Apr 2020 07:00  --------------------------------------------------------  IN: 0 mL / OUT: 300 mL / NET: -300 mL      PHYSICAL EXAM:  GENERAL: not in distress, on room air  EYES: open, sclera clear b/l  CHEST/LUNG: normal respiratory effort, not tachypneic, speaking in full sentences without difficulty  HEART: Not tachycardic, no lower extremity edema b/l  ABDOMEN: Soft, Nontender, Nondistended  EXTREMITIES: R AKA, L forearm AV fistula  NEUROLOGY: awake, alert, responds to Qs appropriately, no gross deficits  PSYCH: calm, cooperative        LABS:                        11.0   2.50  )-----------( 149      ( 15 Apr 2020 04:10 )             35.4     04-15    128<L>  |  93<L>  |  84<H>  ----------------------------<  101<H>  4.2   |  21<L>  |  4.61<H>    Ca    8.8      15 Apr 2020 04:10  Phos  4.1     04-15  Mg     1.6     04-15    TPro  5.7<L>  /  Alb  2.7<L>  /  TBili  < 0.2<L>  /  DBili  x   /  AST  9   /  ALT  6   /  AlkPhos  68  04-15              RADIOLOGY & ADDITIONAL TESTS:    Imaging Personally Reviewed:    Consultant(s) Notes Reviewed:      Care Discussed with Consultants/Other Providers: Patient is a 59y old  Male who presents with a chief complaint of Rule out COVID 19 infection (14 Apr 2020 11:34)      SUBJECTIVE / OVERNIGHT EVENTS: patient seen and examined by bedside, awake alert, denies headache, dizziness, SOB, CP, Palpitations , N/V/D, abdominal pain, Pt on RA, no respiratory distress noted         MEDICATIONS  (STANDING):  ascorbic acid  Oral Tab/Cap - Peds 500 milliGRAM(s) Oral two times a day  aspirin  chewable 81 milliGRAM(s) Oral daily  atorvastatin 80 milliGRAM(s) Oral at bedtime  bisacodyl 5 milliGRAM(s) Oral daily  buMETAnide 1 milliGRAM(s) Oral two times a day  carvedilol Oral Tab/Cap - Peds 3.125 milliGRAM(s) Oral two times a day  clopidogrel Tablet 75 milliGRAM(s) Oral daily  dextrose 5%. 1000 milliLiter(s) (50 mL/Hr) IV Continuous <Continuous>  dextrose 50% Injectable 12.5 Gram(s) IV Push once  dextrose 50% Injectable 25 Gram(s) IV Push once  dextrose 50% Injectable 25 Gram(s) IV Push once  ertapenem  IVPB      ertapenem  IVPB 500 milliGRAM(s) IV Intermittent every 24 hours  ferrous sulfate Oral Tab/Cap - Peds 325 milliGRAM(s) Oral daily  heparin  Injectable 5000 Unit(s) SubCutaneous every 8 hours  hydrALAZINE 25 milliGRAM(s) Oral two times a day  insulin glargine SubCutaneous Injection (LANTUS) - Peds 6 Unit(s) SubCutaneous at bedtime  insulin lispro (HumaLOG) corrective regimen sliding scale   SubCutaneous three times a day before meals  insulin lispro (HumaLOG) corrective regimen sliding scale   SubCutaneous at bedtime  levothyroxine 75 MICROGram(s) Oral daily  polyethylene glycol 3350 17 Gram(s) Oral daily  potassium chloride    Tablet ER 10 milliEquivalent(s) Oral daily  predniSONE   Tablet 5 milliGRAM(s) Oral daily  sevelamer carbonate 800 milliGRAM(s) Oral three times a day with meals  sodium chloride 1 Gram(s) Oral two times a day  tacrolimus 1 milliGRAM(s) Oral every 12 hours    MEDICATIONS  (PRN):  acetaminophen   Tablet .. 650 milliGRAM(s) Oral every 4 hours PRN Temp greater or equal to 38.5C (101.3F)  acetaminophen   Tablet .. 650 milliGRAM(s) Oral every 6 hours PRN Temp greater or equal to 38C (100.4F), Mild Pain (1 - 3), Moderate Pain (4 - 6)  acetaminophen  Suppository .. 650 milliGRAM(s) Rectal every 4 hours PRN Temp greater or equal to 38.5C (101.3F)  dextrose 40% Gel 15 Gram(s) Oral once PRN Blood Glucose LESS THAN 70 milliGRAM(s)/deciliter  glucagon  Injectable 1 milliGRAM(s) IntraMuscular once PRN Glucose LESS THAN 70 milligrams/deciliter  ondansetron Injectable 4 milliGRAM(s) IV Push every 8 hours PRN Nausea and/or Vomiting  sucralfate 1 Gram(s) Oral every 6 hours PRN With each meal      Vital Signs Last 24 Hrs  T(C): 36.2 (15 Apr 2020 04:52), Max: 36.8 (14 Apr 2020 22:33)  T(F): 97.2 (15 Apr 2020 04:52), Max: 98.3 (14 Apr 2020 22:33)  HR: 62 (15 Apr 2020 04:52) (60 - 64)  BP: 105/55 (15 Apr 2020 04:52) (105/55 - 140/64)  BP(mean): --  RR: 20 (15 Apr 2020 04:52) (19 - 20)  SpO2: 100% (15 Apr 2020 04:52) (99% - 100%)  CAPILLARY BLOOD GLUCOSE      POCT Blood Glucose.: 110 mg/dL (15 Apr 2020 07:58)  POCT Blood Glucose.: 162 mg/dL (14 Apr 2020 22:59)  POCT Blood Glucose.: 120 mg/dL (14 Apr 2020 17:13)  POCT Blood Glucose.: 110 mg/dL (14 Apr 2020 12:20)    I&O's Summary    14 Apr 2020 07:01  -  15 Apr 2020 07:00  --------------------------------------------------------  IN: 0 mL / OUT: 300 mL / NET: -300 mL      PHYSICAL EXAM:  GENERAL: not in distress, on room air  EYES: open, sclera clear b/l  CHEST/LUNG: normal respiratory effort, not tachypneic, speaking in full sentences without difficulty  HEART: Not tachycardic, no lower extremity edema b/l  ABDOMEN: Soft, Nontender, Nondistended  EXTREMITIES: R AKA, L forearm AV fistula  NEUROLOGY: awake, alert, responds to Qs appropriately, no gross deficits  PSYCH: calm, cooperative        LABS:                        11.0   2.50  )-----------( 149      ( 15 Apr 2020 04:10 )             35.4     04-15    128<L>  |  93<L>  |  84<H>  ----------------------------<  101<H>  4.2   |  21<L>  |  4.61<H>    Ca    8.8      15 Apr 2020 04:10  Phos  4.1     04-15  Mg     1.6     04-15    TPro  5.7<L>  /  Alb  2.7<L>  /  TBili  < 0.2<L>  /  DBili  x   /  AST  9   /  ALT  6   /  AlkPhos  68  04-15              RADIOLOGY & ADDITIONAL TESTS:    Imaging Personally Reviewed:    Consultant(s) Notes Reviewed:  nephrology     Care Discussed with Consultants/Other Providers:

## 2020-04-15 NOTE — PROGRESS NOTE ADULT - ASSESSMENT
60 yo man with h/o renal transplant presenting from nursing home   covid+ oxygenating well on RA.  pyelo transplant kidney with  ESBL Klebsiella    Suggest:  - continue ertapenem 500 mg iv q 24 h x 14 days (longer duration in view of transplanted kidney)  -maintain aireborne /contact precautions    Clarita Perdomo MD  Pager: [phone]  After 5 PM or weekends please call fellow on call or office [phone]

## 2020-04-16 LAB
ANION GAP SERPL CALC-SCNC: 17 MMO/L — HIGH (ref 7–14)
BASOPHILS # BLD AUTO: 0.01 K/UL — SIGNIFICANT CHANGE UP (ref 0–0.2)
BASOPHILS NFR BLD AUTO: 0.4 % — SIGNIFICANT CHANGE UP (ref 0–2)
BUN SERPL-MCNC: 88 MG/DL — HIGH (ref 7–23)
CALCIUM SERPL-MCNC: 8.7 MG/DL — SIGNIFICANT CHANGE UP (ref 8.4–10.5)
CHLORIDE SERPL-SCNC: 99 MMOL/L — SIGNIFICANT CHANGE UP (ref 98–107)
CO2 SERPL-SCNC: 18 MMOL/L — LOW (ref 22–31)
CREAT SERPL-MCNC: 4.85 MG/DL — HIGH (ref 0.5–1.3)
CULTURE RESULTS: SIGNIFICANT CHANGE UP
CULTURE RESULTS: SIGNIFICANT CHANGE UP
D DIMER BLD IA.RAPID-MCNC: 304 NG/ML — SIGNIFICANT CHANGE UP
EOSINOPHIL # BLD AUTO: 0.06 K/UL — SIGNIFICANT CHANGE UP (ref 0–0.5)
EOSINOPHIL NFR BLD AUTO: 2.2 % — SIGNIFICANT CHANGE UP (ref 0–6)
FERRITIN SERPL-MCNC: 1360 NG/ML — HIGH (ref 30–400)
GLUCOSE BLDC GLUCOMTR-MCNC: 100 MG/DL — HIGH (ref 70–99)
GLUCOSE BLDC GLUCOMTR-MCNC: 112 MG/DL — HIGH (ref 70–99)
GLUCOSE BLDC GLUCOMTR-MCNC: 177 MG/DL — HIGH (ref 70–99)
GLUCOSE BLDC GLUCOMTR-MCNC: 88 MG/DL — SIGNIFICANT CHANGE UP (ref 70–99)
GLUCOSE SERPL-MCNC: 94 MG/DL — SIGNIFICANT CHANGE UP (ref 70–99)
HCT VFR BLD CALC: 36.7 % — LOW (ref 39–50)
HGB BLD-MCNC: 11.2 G/DL — LOW (ref 13–17)
IMM GRANULOCYTES NFR BLD AUTO: 1.1 % — SIGNIFICANT CHANGE UP (ref 0–1.5)
LYMPHOCYTES # BLD AUTO: 0.86 K/UL — LOW (ref 1–3.3)
LYMPHOCYTES # BLD AUTO: 31.3 % — SIGNIFICANT CHANGE UP (ref 13–44)
MAGNESIUM SERPL-MCNC: 1.7 MG/DL — SIGNIFICANT CHANGE UP (ref 1.6–2.6)
MCHC RBC-ENTMCNC: 24.8 PG — LOW (ref 27–34)
MCHC RBC-ENTMCNC: 30.5 % — LOW (ref 32–36)
MCV RBC AUTO: 81.2 FL — SIGNIFICANT CHANGE UP (ref 80–100)
MONOCYTES # BLD AUTO: 0.36 K/UL — SIGNIFICANT CHANGE UP (ref 0–0.9)
MONOCYTES NFR BLD AUTO: 13.1 % — SIGNIFICANT CHANGE UP (ref 2–14)
NEUTROPHILS # BLD AUTO: 1.43 K/UL — LOW (ref 1.8–7.4)
NEUTROPHILS NFR BLD AUTO: 51.9 % — SIGNIFICANT CHANGE UP (ref 43–77)
NRBC # FLD: 0 K/UL — SIGNIFICANT CHANGE UP (ref 0–0)
PHOSPHATE SERPL-MCNC: 3.9 MG/DL — SIGNIFICANT CHANGE UP (ref 2.5–4.5)
PLATELET # BLD AUTO: 133 K/UL — LOW (ref 150–400)
PMV BLD: 8.4 FL — SIGNIFICANT CHANGE UP (ref 7–13)
POTASSIUM SERPL-MCNC: 4.2 MMOL/L — SIGNIFICANT CHANGE UP (ref 3.5–5.3)
POTASSIUM SERPL-SCNC: 4.2 MMOL/L — SIGNIFICANT CHANGE UP (ref 3.5–5.3)
PROCALCITONIN SERPL-MCNC: 0.22 NG/ML — HIGH (ref 0.02–0.1)
RBC # BLD: 4.52 M/UL — SIGNIFICANT CHANGE UP (ref 4.2–5.8)
RBC # FLD: 14.5 % — SIGNIFICANT CHANGE UP (ref 10.3–14.5)
SODIUM SERPL-SCNC: 134 MMOL/L — LOW (ref 135–145)
SPECIMEN SOURCE: SIGNIFICANT CHANGE UP
SPECIMEN SOURCE: SIGNIFICANT CHANGE UP
TACROLIMUS SERPL-MCNC: < 2 NG/ML — SIGNIFICANT CHANGE UP
WBC # BLD: 2.75 K/UL — LOW (ref 3.8–10.5)
WBC # FLD AUTO: 2.75 K/UL — LOW (ref 3.8–10.5)

## 2020-04-16 PROCEDURE — 99232 SBSQ HOSP IP/OBS MODERATE 35: CPT | Mod: GC

## 2020-04-16 PROCEDURE — 99233 SBSQ HOSP IP/OBS HIGH 50: CPT

## 2020-04-16 RX ADMIN — Medication 500 MILLIGRAM(S): at 06:17

## 2020-04-16 RX ADMIN — TACROLIMUS 1 MILLIGRAM(S): 5 CAPSULE ORAL at 09:55

## 2020-04-16 RX ADMIN — HEPARIN SODIUM 5000 UNIT(S): 5000 INJECTION INTRAVENOUS; SUBCUTANEOUS at 06:17

## 2020-04-16 RX ADMIN — TACROLIMUS 1 MILLIGRAM(S): 5 CAPSULE ORAL at 21:54

## 2020-04-16 RX ADMIN — SEVELAMER CARBONATE 800 MILLIGRAM(S): 2400 POWDER, FOR SUSPENSION ORAL at 08:33

## 2020-04-16 RX ADMIN — POLYETHYLENE GLYCOL 3350 17 GRAM(S): 17 POWDER, FOR SOLUTION ORAL at 12:23

## 2020-04-16 RX ADMIN — BUMETANIDE 1 MILLIGRAM(S): 0.25 INJECTION INTRAMUSCULAR; INTRAVENOUS at 17:09

## 2020-04-16 RX ADMIN — Medication 25 MILLIGRAM(S): at 06:17

## 2020-04-16 RX ADMIN — SEVELAMER CARBONATE 800 MILLIGRAM(S): 2400 POWDER, FOR SUSPENSION ORAL at 17:09

## 2020-04-16 RX ADMIN — SODIUM CHLORIDE 1 GRAM(S): 9 INJECTION INTRAMUSCULAR; INTRAVENOUS; SUBCUTANEOUS at 06:17

## 2020-04-16 RX ADMIN — CARVEDILOL PHOSPHATE 3.12 MILLIGRAM(S): 80 CAPSULE, EXTENDED RELEASE ORAL at 06:17

## 2020-04-16 RX ADMIN — HEPARIN SODIUM 5000 UNIT(S): 5000 INJECTION INTRAVENOUS; SUBCUTANEOUS at 21:54

## 2020-04-16 RX ADMIN — Medication 325 MILLIGRAM(S): at 12:24

## 2020-04-16 RX ADMIN — SODIUM CHLORIDE 1 GRAM(S): 9 INJECTION INTRAMUSCULAR; INTRAVENOUS; SUBCUTANEOUS at 17:09

## 2020-04-16 RX ADMIN — Medication 5 MILLIGRAM(S): at 06:17

## 2020-04-16 RX ADMIN — Medication 25 MILLIGRAM(S): at 17:09

## 2020-04-16 RX ADMIN — Medication 81 MILLIGRAM(S): at 12:24

## 2020-04-16 RX ADMIN — CARVEDILOL PHOSPHATE 3.12 MILLIGRAM(S): 80 CAPSULE, EXTENDED RELEASE ORAL at 17:09

## 2020-04-16 RX ADMIN — ATORVASTATIN CALCIUM 80 MILLIGRAM(S): 80 TABLET, FILM COATED ORAL at 21:54

## 2020-04-16 RX ADMIN — BUMETANIDE 1 MILLIGRAM(S): 0.25 INJECTION INTRAMUSCULAR; INTRAVENOUS at 06:17

## 2020-04-16 RX ADMIN — INSULIN GLARGINE 6 UNIT(S): 100 INJECTION, SOLUTION SUBCUTANEOUS at 21:57

## 2020-04-16 RX ADMIN — Medication 10 MILLIEQUIVALENT(S): at 12:23

## 2020-04-16 RX ADMIN — Medication 75 MICROGRAM(S): at 06:17

## 2020-04-16 RX ADMIN — SEVELAMER CARBONATE 800 MILLIGRAM(S): 2400 POWDER, FOR SUSPENSION ORAL at 12:24

## 2020-04-16 RX ADMIN — ERTAPENEM SODIUM 100 MILLIGRAM(S): 1 INJECTION, POWDER, LYOPHILIZED, FOR SOLUTION INTRAMUSCULAR; INTRAVENOUS at 17:09

## 2020-04-16 RX ADMIN — Medication 500 MILLIGRAM(S): at 17:09

## 2020-04-16 RX ADMIN — CLOPIDOGREL BISULFATE 75 MILLIGRAM(S): 75 TABLET, FILM COATED ORAL at 12:24

## 2020-04-16 NOTE — PROGRESS NOTE ADULT - ASSESSMENT
60 yo man with h/o renal transplant presenting from nursing home  covid+ oxygenating well on RA.  pyelo transplant kidney with  ESBL Klebsiella    Suggest:  - continue ertapenem 500 mg iv q 24 h x 14 days (longer duration in view of transplanted kidney)  -maintain airborne /contact precautions    d/w primary team

## 2020-04-16 NOTE — PROGRESS NOTE ADULT - PROBLEM SELECTOR PLAN 2
- UA suggestive of infection, prior UTI from 2/2020 sensitive to ceftriaxone, resistant to zosyn  -   Urinary culture with ESBL Klebsiella   - Patient already received one dose of Cefepime in ED and was started on ceftriaxone , switched to Ertapenem, on 4/13 as U cx noted to be ESBL, ID recs appreciated, 14 day course per ID

## 2020-04-16 NOTE — PROGRESS NOTE ADULT - SUBJECTIVE AND OBJECTIVE BOX
Follow Up:  pyelonephritis, renal transplant    Interval History: No fevers overnight. Still some flank pain. Complains of decreased appetite.      Allergies  iodine (Vomiting)  IV Contrast (Unknown)        ANTIMICROBIALS:  ertapenem  IVPB    ertapenem  IVPB 500 every 24 hours      OTHER MEDS:  acetaminophen   Tablet .. 650 milliGRAM(s) Oral every 4 hours PRN  acetaminophen   Tablet .. 650 milliGRAM(s) Oral every 6 hours PRN  acetaminophen  Suppository .. 650 milliGRAM(s) Rectal every 4 hours PRN  ascorbic acid  Oral Tab/Cap - Peds 500 milliGRAM(s) Oral two times a day  aspirin  chewable 81 milliGRAM(s) Oral daily  atorvastatin 80 milliGRAM(s) Oral at bedtime  bisacodyl 5 milliGRAM(s) Oral daily  buMETAnide 1 milliGRAM(s) Oral two times a day  carvedilol Oral Tab/Cap - Peds 3.125 milliGRAM(s) Oral two times a day  clopidogrel Tablet 75 milliGRAM(s) Oral daily  dextrose 40% Gel 15 Gram(s) Oral once PRN  dextrose 5%. 1000 milliLiter(s) IV Continuous <Continuous>  dextrose 50% Injectable 12.5 Gram(s) IV Push once  dextrose 50% Injectable 25 Gram(s) IV Push once  dextrose 50% Injectable 25 Gram(s) IV Push once  ferrous sulfate Oral Tab/Cap - Peds 325 milliGRAM(s) Oral daily  glucagon  Injectable 1 milliGRAM(s) IntraMuscular once PRN  heparin  Injectable 5000 Unit(s) SubCutaneous every 8 hours  hydrALAZINE 25 milliGRAM(s) Oral two times a day  insulin glargine SubCutaneous Injection (LANTUS) - Peds 6 Unit(s) SubCutaneous at bedtime  insulin lispro (HumaLOG) corrective regimen sliding scale   SubCutaneous three times a day before meals  insulin lispro (HumaLOG) corrective regimen sliding scale   SubCutaneous at bedtime  levothyroxine 75 MICROGram(s) Oral daily  ondansetron Injectable 4 milliGRAM(s) IV Push every 8 hours PRN  polyethylene glycol 3350 17 Gram(s) Oral daily  potassium chloride    Tablet ER 10 milliEquivalent(s) Oral daily  predniSONE   Tablet 5 milliGRAM(s) Oral daily  sevelamer carbonate 800 milliGRAM(s) Oral three times a day with meals  sodium chloride 1 Gram(s) Oral two times a day  sucralfate 1 Gram(s) Oral every 6 hours PRN  tacrolimus 1 milliGRAM(s) Oral every 12 hours      Vital Signs Last 24 Hrs  T(C): 36.7 (16 Apr 2020 06:15), Max: 36.9 (15 Apr 2020 22:02)  T(F): 98.1 (16 Apr 2020 06:15), Max: 98.5 (15 Apr 2020 22:02)  HR: 63 (16 Apr 2020 06:15) (61 - 63)  BP: 130/59 (16 Apr 2020 06:15) (130/59 - 145/68)  BP(mean): --  RR: 18 (16 Apr 2020 06:15) (18 - 18)  SpO2: 100% (16 Apr 2020 06:15) (100% - 100%)    Physical Exam:  General:    NAD,  non toxic  HEENt: atraumatic, normocephalic, normal sclera and conjunctiva  Respiratory:  breathing comfortably  :   no  duvall  Musculoskeletal:   no edema  Skin:    no rash  Neurologic:     no focal deficit  psych: normal affect                          11.2   2.75  )-----------( 133      ( 16 Apr 2020 09:44 )             36.7       04-16    134<L>  |  99  |  88<H>  ----------------------------<  94  4.2   |  18<L>  |  4.85<H>    Ca    8.7      16 Apr 2020 09:44  Phos  3.9     04-16  Mg     1.7     04-16    TPro  5.7<L>  /  Alb  2.7<L>  /  TBili  < 0.2<L>  /  DBili  x   /  AST  9   /  ALT  6   /  AlkPhos  68  04-15          MICROBIOLOGY:  Urine Clean Catch (Midstream)  04-11-20   >100,000 CFU/ml Klebsiella pneumoniae ESBL  --  Klebsiella pneumoniae ESBL      .Blood Blood-Peripheral  04-11-20   No Growth Final  --  --      .Blood Blood-Peripheral  04-10-20   No Growth Final  --  --      RADIOLOGY:      EXAM:  US KIDNEYS AND BLADDER        PROCEDURE DATE:  Apr 12 2020         INTERPRETATION:  CLINICAL INFORMATION: Elevated creatinine. Right pelvic renal transplant.    COMPARISON: None available.    TECHNIQUE: Sonography of the kidneys and bladder.    FINDINGS:    Native Right kidney: Diminutive measuring 7.1 cm. Diffusely increased echogenicity. No renal mass, hydronephrosis or calculi.    Native Left kidney: Diminutive measuring 7.6 cm. Diffusely increased echogenicity No renal mass, hydronephrosis or calculi.    Right pelvic transplant kidney: 13.1 cm. Normal echogenicity and echotexture. A 3.6 cm lower pole central cyst. No hydronephrosis, renal mass, calculi, or perinephric fluid collection.    Urinary bladder: Within normal limits.    IMPRESSION:   Normal-appearing right pelvic transplant kidney.                      MARIA A FREDERICK M.D., RADIOLOGY RESIDENT  This document has been electronically signed.  CYNDY HAWKINS M.D., ATTENDING RADIOLOGIST  This document has been electronically signed. Apr 12 2020  4:16PM

## 2020-04-16 NOTE — CHART NOTE - NSCHARTNOTEFT_GEN_A_CORE
Please add sodium bicarbonate 650 mg TID and call when Tacrolimus level available.    If any questions, please feel free to contact me  Chu Mims   Nephrology Fellow  [phone]  (After 5 pm or on weekends please page the on-call fellow)

## 2020-04-16 NOTE — PROGRESS NOTE ADULT - PROBLEM SELECTOR PLAN 4
Will continue existing regimen of Immunosuppressants:   on Mycophenolate + Tacrolimus and Prednisone , c/w Tacrolimus and prednisone, holding Cellcept for now   Monitor and trend Cr/BUN  - plan as above,  Nephrology f/u appreciated  hyponatremia: chronic pt on Nacl tabs, fluid restriction to 1 L

## 2020-04-16 NOTE — PROGRESS NOTE ADULT - ATTENDING COMMENTS
Clarita Perdomo MD  Pager: [phone]  After 5 PM or weekends please call fellow on call or office [phone]

## 2020-04-16 NOTE — PROGRESS NOTE ADULT - SUBJECTIVE AND OBJECTIVE BOX
Patient is a 59y old  Male who presents with a chief complaint of Rule out COVID 19 infection (14 Apr 2020 11:34)    SUBJECTIVE / OVERNIGHT EVENTS: No acute events. this AM denies pain or discomfort    MEDICATIONS  (STANDING):  ascorbic acid  Oral Tab/Cap - Peds 500 milliGRAM(s) Oral two times a day  aspirin  chewable 81 milliGRAM(s) Oral daily  atorvastatin 80 milliGRAM(s) Oral at bedtime  bisacodyl 5 milliGRAM(s) Oral daily  buMETAnide 1 milliGRAM(s) Oral two times a day  carvedilol Oral Tab/Cap - Peds 3.125 milliGRAM(s) Oral two times a day  clopidogrel Tablet 75 milliGRAM(s) Oral daily  dextrose 5%. 1000 milliLiter(s) (50 mL/Hr) IV Continuous <Continuous>  dextrose 50% Injectable 12.5 Gram(s) IV Push once  dextrose 50% Injectable 25 Gram(s) IV Push once  dextrose 50% Injectable 25 Gram(s) IV Push once  ertapenem  IVPB      ertapenem  IVPB 500 milliGRAM(s) IV Intermittent every 24 hours  ferrous sulfate Oral Tab/Cap - Peds 325 milliGRAM(s) Oral daily  heparin  Injectable 5000 Unit(s) SubCutaneous every 8 hours  hydrALAZINE 25 milliGRAM(s) Oral two times a day  insulin glargine SubCutaneous Injection (LANTUS) - Peds 6 Unit(s) SubCutaneous at bedtime  insulin lispro (HumaLOG) corrective regimen sliding scale   SubCutaneous three times a day before meals  insulin lispro (HumaLOG) corrective regimen sliding scale   SubCutaneous at bedtime  levothyroxine 75 MICROGram(s) Oral daily  polyethylene glycol 3350 17 Gram(s) Oral daily  potassium chloride    Tablet ER 10 milliEquivalent(s) Oral daily  predniSONE   Tablet 5 milliGRAM(s) Oral daily  sevelamer carbonate 800 milliGRAM(s) Oral three times a day with meals  sodium chloride 1 Gram(s) Oral two times a day  tacrolimus 1 milliGRAM(s) Oral every 12 hours    MEDICATIONS  (PRN):  acetaminophen   Tablet .. 650 milliGRAM(s) Oral every 4 hours PRN Temp greater or equal to 38.5C (101.3F)  acetaminophen   Tablet .. 650 milliGRAM(s) Oral every 6 hours PRN Temp greater or equal to 38C (100.4F), Mild Pain (1 - 3), Moderate Pain (4 - 6)  acetaminophen  Suppository .. 650 milliGRAM(s) Rectal every 4 hours PRN Temp greater or equal to 38.5C (101.3F)  dextrose 40% Gel 15 Gram(s) Oral once PRN Blood Glucose LESS THAN 70 milliGRAM(s)/deciliter  glucagon  Injectable 1 milliGRAM(s) IntraMuscular once PRN Glucose LESS THAN 70 milligrams/deciliter  ondansetron Injectable 4 milliGRAM(s) IV Push every 8 hours PRN Nausea and/or Vomiting  sucralfate 1 Gram(s) Oral every 6 hours PRN With each meal      Vital Signs Last 24 Hrs  T(C): 36.7 (16 Apr 2020 06:15), Max: 36.9 (15 Apr 2020 22:02)  T(F): 98.1 (16 Apr 2020 06:15), Max: 98.5 (15 Apr 2020 22:02)  HR: 63 (16 Apr 2020 06:15) (61 - 63)  BP: 130/59 (16 Apr 2020 06:15) (130/59 - 145/68)  BP(mean): --  RR: 18 (16 Apr 2020 06:15) (18 - 18)  SpO2: 100% (16 Apr 2020 06:15) (100% - 100%)    I&O's Detail    CAPILLARY BLOOD GLUCOSE    POCT Blood Glucose.: 88 mg/dL (16 Apr 2020 11:53)  POCT Blood Glucose.: 100 mg/dL (16 Apr 2020 08:00)  POCT Blood Glucose.: 113 mg/dL (15 Apr 2020 21:30)  POCT Blood Glucose.: 168 mg/dL (15 Apr 2020 17:04)    PHYSICAL EXAM:  GENERAL: NAD, laying in bed  EYES: open, sclera clear b/l  CHEST/LUNG: normal respiratory effort, not tachypneic, speaking in full sentences without difficulty  HEART: Not tachycardic, no lower extremity edema b/l  ABDOMEN: Soft, Nontender, Nondistended  EXTREMITIES: R AKA, L forearm AV fistula  NEUROLOGY: awake, alert, no gross deficits  PSYCH: calm, cooperative    LABS:                         11.2   2.75  )-----------( 133      ( 16 Apr 2020 09:44 )             36.7     04-16    134<L>  |  99  |  88<H>  ----------------------------<  94  4.2   |  18<L>  |  4.85<H>    Ca    8.7      16 Apr 2020 09:44  Phos  3.9     04-16  Mg     1.7     04-16    TPro  5.7<L>  /  Alb  2.7<L>  /  TBili  < 0.2<L>  /  DBili  x   /  AST  9   /  ALT  6   /  AlkPhos  68  04-15    RADIOLOGY & ADDITIONAL TESTS: Reviewed.

## 2020-04-16 NOTE — PROGRESS NOTE ADULT - PROBLEM SELECTOR PLAN 1
- Maintaining normal oxygenation off of supplemental O2. Cont to monitor.   - Trend inflammatory markers Q48h, CRP trended down   - CXR clear  - Patient full code

## 2020-04-16 NOTE — PROGRESS NOTE ADULT - ASSESSMENT
59M w/ hx of HTN, HLD, DM, ESRD s/p kidney transplant in 05, [facility] now presenting with hypoxic respiratory failure must likely due to COVID 19 infection.

## 2020-04-16 NOTE — PROGRESS NOTE ADULT - PROBLEM SELECTOR PLAN 3
Prior labs per patient's Nursing home:  March 27/2020: Cr/BUN: 3.6/24  March 30/2020: Cr/BUN: 3.9/24  April 6/2020: Cr/BUN: 4.8/20   - Appreciate Nephrology consult - renal u/s  for transplanted kidney WNL ,    -concern for noncompliance as tacrolimus level was low  -F/U spot urine creatinine noted ,  tac level <2 on 4/13   - Renal f/u appreciated , will continue current plan and medications , renal does not recommend increasing dose of immunosuppressants with ongoing infection, holding Cellcept, c/w tacrolimus and prednisone, tac level pending - trough 30 minutes prior to dose. Goal Tacrolimus level is 3-5 as per Renal   - will not request records from [facility] as long as Renal fn improving as management not going to change, pt will need to f/u with his nephrologist as outpt after discharge   - Primary Nephrologist: Dr. Adrián Hawkins

## 2020-04-17 ENCOUNTER — TRANSCRIPTION ENCOUNTER (OUTPATIENT)
Age: 60
End: 2020-04-17

## 2020-04-17 LAB
ANION GAP SERPL CALC-SCNC: 14 MMO/L — SIGNIFICANT CHANGE UP (ref 7–14)
BASOPHILS # BLD AUTO: 0.01 K/UL — SIGNIFICANT CHANGE UP (ref 0–0.2)
BASOPHILS NFR BLD AUTO: 0.4 % — SIGNIFICANT CHANGE UP (ref 0–2)
BUN SERPL-MCNC: 79 MG/DL — HIGH (ref 7–23)
CALCIUM SERPL-MCNC: 9.1 MG/DL — SIGNIFICANT CHANGE UP (ref 8.4–10.5)
CHLORIDE SERPL-SCNC: 100 MMOL/L — SIGNIFICANT CHANGE UP (ref 98–107)
CO2 SERPL-SCNC: 20 MMOL/L — LOW (ref 22–31)
CREAT SERPL-MCNC: 4.29 MG/DL — HIGH (ref 0.5–1.3)
EOSINOPHIL # BLD AUTO: 0.02 K/UL — SIGNIFICANT CHANGE UP (ref 0–0.5)
EOSINOPHIL NFR BLD AUTO: 0.9 % — SIGNIFICANT CHANGE UP (ref 0–6)
GLUCOSE BLDC GLUCOMTR-MCNC: 103 MG/DL — HIGH (ref 70–99)
GLUCOSE BLDC GLUCOMTR-MCNC: 127 MG/DL — HIGH (ref 70–99)
GLUCOSE BLDC GLUCOMTR-MCNC: 176 MG/DL — HIGH (ref 70–99)
GLUCOSE BLDC GLUCOMTR-MCNC: 179 MG/DL — HIGH (ref 70–99)
GLUCOSE SERPL-MCNC: 104 MG/DL — HIGH (ref 70–99)
HCT VFR BLD CALC: 35.9 % — LOW (ref 39–50)
HGB BLD-MCNC: 11.3 G/DL — LOW (ref 13–17)
IMM GRANULOCYTES NFR BLD AUTO: 0.9 % — SIGNIFICANT CHANGE UP (ref 0–1.5)
LYMPHOCYTES # BLD AUTO: 0.79 K/UL — LOW (ref 1–3.3)
LYMPHOCYTES # BLD AUTO: 35 % — SIGNIFICANT CHANGE UP (ref 13–44)
MAGNESIUM SERPL-MCNC: 1.7 MG/DL — SIGNIFICANT CHANGE UP (ref 1.6–2.6)
MCHC RBC-ENTMCNC: 25.5 PG — LOW (ref 27–34)
MCHC RBC-ENTMCNC: 31.5 % — LOW (ref 32–36)
MCV RBC AUTO: 81 FL — SIGNIFICANT CHANGE UP (ref 80–100)
MONOCYTES # BLD AUTO: 0.28 K/UL — SIGNIFICANT CHANGE UP (ref 0–0.9)
MONOCYTES NFR BLD AUTO: 12.4 % — SIGNIFICANT CHANGE UP (ref 2–14)
NEUTROPHILS # BLD AUTO: 1.14 K/UL — LOW (ref 1.8–7.4)
NEUTROPHILS NFR BLD AUTO: 50.4 % — SIGNIFICANT CHANGE UP (ref 43–77)
NRBC # FLD: 0 K/UL — SIGNIFICANT CHANGE UP (ref 0–0)
PLATELET # BLD AUTO: 130 K/UL — LOW (ref 150–400)
PMV BLD: 8.6 FL — SIGNIFICANT CHANGE UP (ref 7–13)
POTASSIUM SERPL-MCNC: 4.5 MMOL/L — SIGNIFICANT CHANGE UP (ref 3.5–5.3)
POTASSIUM SERPL-SCNC: 4.5 MMOL/L — SIGNIFICANT CHANGE UP (ref 3.5–5.3)
RBC # BLD: 4.43 M/UL — SIGNIFICANT CHANGE UP (ref 4.2–5.8)
RBC # FLD: 14.5 % — SIGNIFICANT CHANGE UP (ref 10.3–14.5)
SODIUM SERPL-SCNC: 134 MMOL/L — LOW (ref 135–145)
WBC # BLD: 2.26 K/UL — LOW (ref 3.8–10.5)
WBC # FLD AUTO: 2.26 K/UL — LOW (ref 3.8–10.5)

## 2020-04-17 PROCEDURE — 99232 SBSQ HOSP IP/OBS MODERATE 35: CPT | Mod: GC

## 2020-04-17 PROCEDURE — 99232 SBSQ HOSP IP/OBS MODERATE 35: CPT

## 2020-04-17 RX ORDER — CARVEDILOL PHOSPHATE 80 MG/1
3.12 CAPSULE, EXTENDED RELEASE ORAL EVERY 12 HOURS
Refills: 0 | Status: DISCONTINUED | OUTPATIENT
Start: 2020-04-17 | End: 2020-05-07

## 2020-04-17 RX ORDER — TACROLIMUS 5 MG/1
1 CAPSULE ORAL
Qty: 0 | Refills: 0 | DISCHARGE

## 2020-04-17 RX ORDER — ATORVASTATIN CALCIUM 80 MG/1
1 TABLET, FILM COATED ORAL
Qty: 0 | Refills: 0 | DISCHARGE

## 2020-04-17 RX ORDER — POTASSIUM CHLORIDE 20 MEQ
1 PACKET (EA) ORAL
Qty: 0 | Refills: 0 | DISCHARGE

## 2020-04-17 RX ORDER — ERTAPENEM SODIUM 1 G/1
1 INJECTION, POWDER, LYOPHILIZED, FOR SOLUTION INTRAMUSCULAR; INTRAVENOUS
Qty: 0 | Refills: 0 | DISCHARGE
Start: 2020-04-17

## 2020-04-17 RX ORDER — FERROUS SULFATE 325(65) MG
1 TABLET ORAL
Qty: 0 | Refills: 0 | DISCHARGE
Start: 2020-04-17

## 2020-04-17 RX ORDER — TACROLIMUS 5 MG/1
1 CAPSULE ORAL
Qty: 0 | Refills: 0 | DISCHARGE
Start: 2020-04-17

## 2020-04-17 RX ORDER — SODIUM CHLORIDE 9 MG/ML
1 INJECTION INTRAMUSCULAR; INTRAVENOUS; SUBCUTANEOUS
Qty: 0 | Refills: 0 | DISCHARGE
Start: 2020-04-17

## 2020-04-17 RX ORDER — FERROUS SULFATE 325(65) MG
1 TABLET ORAL
Qty: 0 | Refills: 0 | DISCHARGE

## 2020-04-17 RX ORDER — HYDRALAZINE HCL 50 MG
1 TABLET ORAL
Qty: 0 | Refills: 0 | DISCHARGE

## 2020-04-17 RX ORDER — POTASSIUM CHLORIDE 20 MEQ
1 PACKET (EA) ORAL
Qty: 0 | Refills: 0 | DISCHARGE
Start: 2020-04-17

## 2020-04-17 RX ORDER — ASPIRIN/CALCIUM CARB/MAGNESIUM 324 MG
1 TABLET ORAL
Qty: 0 | Refills: 0 | DISCHARGE
Start: 2020-04-17

## 2020-04-17 RX ORDER — BUMETANIDE 0.25 MG/ML
1 INJECTION INTRAMUSCULAR; INTRAVENOUS
Qty: 0 | Refills: 0 | DISCHARGE

## 2020-04-17 RX ORDER — LEVOTHYROXINE SODIUM 125 MCG
1 TABLET ORAL
Qty: 0 | Refills: 0 | DISCHARGE

## 2020-04-17 RX ORDER — BUMETANIDE 0.25 MG/ML
1 INJECTION INTRAMUSCULAR; INTRAVENOUS
Qty: 0 | Refills: 0 | DISCHARGE
Start: 2020-04-17

## 2020-04-17 RX ORDER — CLOPIDOGREL BISULFATE 75 MG/1
1 TABLET, FILM COATED ORAL
Qty: 0 | Refills: 0 | DISCHARGE

## 2020-04-17 RX ORDER — SODIUM CHLORIDE 9 MG/ML
1 INJECTION INTRAMUSCULAR; INTRAVENOUS; SUBCUTANEOUS
Qty: 0 | Refills: 0 | DISCHARGE

## 2020-04-17 RX ORDER — ASPIRIN/CALCIUM CARB/MAGNESIUM 324 MG
1 TABLET ORAL
Qty: 0 | Refills: 0 | DISCHARGE

## 2020-04-17 RX ORDER — HYDRALAZINE HCL 50 MG
1 TABLET ORAL
Qty: 0 | Refills: 0 | DISCHARGE
Start: 2020-04-17

## 2020-04-17 RX ORDER — MYCOPHENOLATE MOFETIL 250 MG/1
4 CAPSULE ORAL
Qty: 0 | Refills: 0 | DISCHARGE

## 2020-04-17 RX ORDER — CLOPIDOGREL BISULFATE 75 MG/1
1 TABLET, FILM COATED ORAL
Qty: 0 | Refills: 0 | DISCHARGE
Start: 2020-04-17

## 2020-04-17 RX ORDER — ATORVASTATIN CALCIUM 80 MG/1
1 TABLET, FILM COATED ORAL
Qty: 0 | Refills: 0 | DISCHARGE
Start: 2020-04-17

## 2020-04-17 RX ORDER — LEVOTHYROXINE SODIUM 125 MCG
1 TABLET ORAL
Qty: 0 | Refills: 0 | DISCHARGE
Start: 2020-04-17

## 2020-04-17 RX ORDER — SEVELAMER CARBONATE 2400 MG/1
1 POWDER, FOR SUSPENSION ORAL
Qty: 0 | Refills: 0 | DISCHARGE

## 2020-04-17 RX ORDER — SUCRALFATE 1 G
1 TABLET ORAL
Qty: 0 | Refills: 0 | DISCHARGE

## 2020-04-17 RX ORDER — ACETAMINOPHEN 500 MG
2 TABLET ORAL
Qty: 0 | Refills: 0 | DISCHARGE
Start: 2020-04-17

## 2020-04-17 RX ORDER — SEVELAMER CARBONATE 2400 MG/1
1 POWDER, FOR SUSPENSION ORAL
Qty: 0 | Refills: 0 | DISCHARGE
Start: 2020-04-17

## 2020-04-17 RX ORDER — ISOSORBIDE 45 %
60 SOLUTION, ORAL ORAL
Qty: 0 | Refills: 0 | DISCHARGE

## 2020-04-17 RX ORDER — SUCRALFATE 1 G
1 TABLET ORAL
Qty: 0 | Refills: 0 | DISCHARGE
Start: 2020-04-17

## 2020-04-17 RX ADMIN — CLOPIDOGREL BISULFATE 75 MILLIGRAM(S): 75 TABLET, FILM COATED ORAL at 12:54

## 2020-04-17 RX ADMIN — SEVELAMER CARBONATE 800 MILLIGRAM(S): 2400 POWDER, FOR SUSPENSION ORAL at 12:47

## 2020-04-17 RX ADMIN — SEVELAMER CARBONATE 800 MILLIGRAM(S): 2400 POWDER, FOR SUSPENSION ORAL at 17:34

## 2020-04-17 RX ADMIN — Medication 2: at 12:46

## 2020-04-17 RX ADMIN — ERTAPENEM SODIUM 100 MILLIGRAM(S): 1 INJECTION, POWDER, LYOPHILIZED, FOR SOLUTION INTRAMUSCULAR; INTRAVENOUS at 17:35

## 2020-04-17 RX ADMIN — POLYETHYLENE GLYCOL 3350 17 GRAM(S): 17 POWDER, FOR SOLUTION ORAL at 12:47

## 2020-04-17 RX ADMIN — SEVELAMER CARBONATE 800 MILLIGRAM(S): 2400 POWDER, FOR SUSPENSION ORAL at 08:36

## 2020-04-17 RX ADMIN — Medication 25 MILLIGRAM(S): at 06:30

## 2020-04-17 RX ADMIN — TACROLIMUS 1 MILLIGRAM(S): 5 CAPSULE ORAL at 12:47

## 2020-04-17 RX ADMIN — CARVEDILOL PHOSPHATE 3.12 MILLIGRAM(S): 80 CAPSULE, EXTENDED RELEASE ORAL at 08:36

## 2020-04-17 RX ADMIN — SODIUM CHLORIDE 1 GRAM(S): 9 INJECTION INTRAMUSCULAR; INTRAVENOUS; SUBCUTANEOUS at 06:31

## 2020-04-17 RX ADMIN — HEPARIN SODIUM 5000 UNIT(S): 5000 INJECTION INTRAVENOUS; SUBCUTANEOUS at 21:20

## 2020-04-17 RX ADMIN — Medication 325 MILLIGRAM(S): at 12:47

## 2020-04-17 RX ADMIN — Medication 75 MICROGRAM(S): at 06:30

## 2020-04-17 RX ADMIN — SODIUM CHLORIDE 1 GRAM(S): 9 INJECTION INTRAMUSCULAR; INTRAVENOUS; SUBCUTANEOUS at 17:34

## 2020-04-17 RX ADMIN — Medication 5 MILLIGRAM(S): at 12:47

## 2020-04-17 RX ADMIN — Medication 81 MILLIGRAM(S): at 12:48

## 2020-04-17 RX ADMIN — HEPARIN SODIUM 5000 UNIT(S): 5000 INJECTION INTRAVENOUS; SUBCUTANEOUS at 13:39

## 2020-04-17 RX ADMIN — Medication 5 MILLIGRAM(S): at 06:31

## 2020-04-17 RX ADMIN — HEPARIN SODIUM 5000 UNIT(S): 5000 INJECTION INTRAVENOUS; SUBCUTANEOUS at 06:31

## 2020-04-17 RX ADMIN — BUMETANIDE 1 MILLIGRAM(S): 0.25 INJECTION INTRAMUSCULAR; INTRAVENOUS at 17:35

## 2020-04-17 RX ADMIN — INSULIN GLARGINE 6 UNIT(S): 100 INJECTION, SOLUTION SUBCUTANEOUS at 21:41

## 2020-04-17 RX ADMIN — Medication 500 MILLIGRAM(S): at 17:35

## 2020-04-17 RX ADMIN — Medication 10 MILLIEQUIVALENT(S): at 12:53

## 2020-04-17 RX ADMIN — CARVEDILOL PHOSPHATE 3.12 MILLIGRAM(S): 80 CAPSULE, EXTENDED RELEASE ORAL at 21:47

## 2020-04-17 RX ADMIN — BUMETANIDE 1 MILLIGRAM(S): 0.25 INJECTION INTRAMUSCULAR; INTRAVENOUS at 06:30

## 2020-04-17 RX ADMIN — ATORVASTATIN CALCIUM 80 MILLIGRAM(S): 80 TABLET, FILM COATED ORAL at 21:20

## 2020-04-17 RX ADMIN — Medication 25 MILLIGRAM(S): at 17:35

## 2020-04-17 RX ADMIN — TACROLIMUS 1 MILLIGRAM(S): 5 CAPSULE ORAL at 21:20

## 2020-04-17 RX ADMIN — Medication 500 MILLIGRAM(S): at 06:30

## 2020-04-17 NOTE — DISCHARGE NOTE PROVIDER - NSDCCAREPROVSEEN_GEN_ALL_CORE_FT
[facility] Medicine ACP, Team [facility] Medicine ACP, Team  [facility] Medicine,  4

## 2020-04-17 NOTE — PROGRESS NOTE ADULT - ASSESSMENT
59M w/ hx of HTN, HLD, DM, ESRD s/p kidney transplant in 05, [facility] now presenting with hypoxic respiratory failure must likely due to COVID 19 infection.

## 2020-04-17 NOTE — DISCHARGE NOTE PROVIDER - NSDCCPCAREPLAN_GEN_ALL_CORE_FT
PRINCIPAL DISCHARGE DIAGNOSIS  Diagnosis: COVID-19  Assessment and Plan of Treatment: You were found to be positive COVID 19 virus. Please follow full instructions listed in your paperwork. Please self quarantine at home for 14 days from discharge and stay 6 feet away minimum from other individuals or animals. Do not go to work, school, public areas, or use public transportaion. Restrict outside activity except for  medical care. Please call ahead if you have an appointment with your doctor to inform them of your COVID positive status. Always wear a facemask at home. Cover your sneezes and coughs, and wash hands with soap and water for at least 20 seconds frequently. Avoid sharing personal household items. Clean all surfaces where you contact frequently such as coutners and doorknobs frequently.  If you have any worsening breathing, faster breathing or trouble with breathing call 911 immediately or your healthcare provider ahead of time and wear facemask before being seen by medical personel.   Take tylenol for your fevers. Please follow state and local rules and regulations regarding coming off of isolation.      SECONDARY DISCHARGE DIAGNOSES  Diagnosis: Kidney transplant recipient  Assessment and Plan of Treatment: Continue Tacro and Prednisone, Holding Cellcept.    Diagnosis: Acute lower UTI  Assessment and Plan of Treatment: Continue antibiotics as directed and monitor for signs/symptoms of infection, such as, fever/chills, burning/pain with urination, urinary frequency/hesitancy, cloudy urine, or blood in urine. Continue Ertapenem through 4/26 (via PIV)

## 2020-04-17 NOTE — PROGRESS NOTE ADULT - PROBLEM SELECTOR PLAN 2
- UA suggestive of infection, prior UTI from 2/2020 sensitive to ceftriaxone, resistant to zosyn. Urinary culture with ESBL Klebsiella   - Patient already received one dose of Cefepime in ED and was started on ceftriaxone, switched to Ertapenem, on 4/13 as U cx noted to be ESBL, ID recs appreciated, 14 day course per ID through 4/26/20

## 2020-04-17 NOTE — DISCHARGE NOTE NURSING/CASE MANAGEMENT/SOCIAL WORK - PATIENT PORTAL LINK FT
You can access the FollowMyHealth Patient Portal offered by [facility] by registering at the following website: [email or web address]. By joining [facility]’s FollowMyHealth portal, you will also be able to view your health information using other applications (apps) compatible with our system.

## 2020-04-17 NOTE — PROGRESS NOTE ADULT - PROBLEM SELECTOR PLAN 4
Will continue existing regimen of Immunosuppressants:   on Mycophenolate + Tacrolimus and Prednisone , c/w Tacrolimus and prednisone, holding Cellcept for now   Monitor and trend Cr/BUN  - plan as above,  Nephrology f/u appreciated  hyponatremia: chronic pt on Nacl tabs, fluid restriction to 1 L Will continue existing regimen of Immunosuppressants:   on Mycophenolate + Tacrolimus and Prednisone , c/w Tacrolimus and prednisone, holding Cellcept for now while inpatient  - plan as above,  Nephrology f/u appreciated  hyponatremia: chronic pt on Nacl tabs, fluid restriction to 1 L

## 2020-04-17 NOTE — PROGRESS NOTE ADULT - PROBLEM SELECTOR PLAN 3
Prior labs per patient's Nursing home:  March 27/2020: Cr/BUN: 3.6/24  March 30/2020: Cr/BUN: 3.9/24  April 6/2020: Cr/BUN: 4.8/20   - Appreciate Nephrology consult - renal u/s  for transplanted kidney WNL ,    -concern for noncompliance as tacrolimus level was low  -F/U spot urine creatinine noted ,  tac level <2 on 4/13   - Renal f/u appreciated , will continue current plan and medications , renal does not recommend increasing dose of immunosuppressants with ongoing infection, holding Cellcept, c/w tacrolimus and prednisone, Goal Tacrolimus level is 3-5 as per Renal, holding off on dose adjustment today given YURI per renal  - will not request records from [facility] as long as Renal fn improving as management not going to change, pt will need to f/u with his nephrologist as outpt after discharge   - Primary Nephrologist: Dr. Adrián Hawkins

## 2020-04-17 NOTE — DISCHARGE NOTE PROVIDER - CARE PROVIDER_API CALL
your primary care doctor,   Phone: (   )    -  Fax: (   )    -  Follow Up Time: your primary care doctor,   Phone: (   )    -  Fax: (   )    -  Follow Up Time:     Dr. Adrián Hawkins,   Phone: (   )    -  Fax: (   )    -  Follow Up Time: 1-3 days

## 2020-04-17 NOTE — PROGRESS NOTE ADULT - PROBLEM SELECTOR PLAN 1
- Maintaining normal oxygenation off of supplemental O2. Cont to monitor.   - Trend inflammatory markers Q48h, CRP trended down   - CXR clear  - Patient full code - Maintaining normal oxygenation off of supplemental O2. No acute respiratory issues.   - CXR clear  - Patient full code

## 2020-04-17 NOTE — PROGRESS NOTE ADULT - ATTENDING COMMENTS
Updated Brother via telephone  Case discussed with ID, Nephrology, ACP, patient, and SW  Pending return to nursing home when possible, discussed with CM/SW, attempting to continue antibiotics via arrow/IV if possible at NH as ertapenem needed until 4/26/20. If PICC required, will reach out to IR for placement. Updated Brother via telephone  Case discussed with ID, Nephrology, ACP, patient, and SW  Medically stable for return to nursing home when possible, discussed with CM/SW,  ertapenem needed through 4/26/20 per ID recommendations. Discharge planning time 35 minutes.

## 2020-04-17 NOTE — PROGRESS NOTE ADULT - PROBLEM SELECTOR PLAN 5
- Continue double anti-thrombotic therapy: Aspirin + Plavix  - Continue Carvedilol 3.125 PO BID  - EKG as clinically indicated - Continue dual anti-platelet therapy: Aspirin + Plavix  - Continue Carvedilol 3.125 PO BID

## 2020-04-17 NOTE — PROGRESS NOTE ADULT - PROBLEM SELECTOR PLAN 1
Pt with YURI on CKD in the setting of COVID-19 vs chronic graft rejection. Pt with last SCr of 4.8 as outpatient at [facility]. On admission, pt found to have SCr of 5.47 (4/10/20) which has improved to 4.29 today. Encourage PO intake. Continue to monitor renal function. Avoid other potential nephrotoxins.

## 2020-04-17 NOTE — PROGRESS NOTE ADULT - SUBJECTIVE AND OBJECTIVE BOX
Patient is a 59y old  Male who presents with a chief complaint of Rule out COVID 19 infection (14 Apr 2020 11:34)    SUBJECTIVE / OVERNIGHT EVENTS: No acute events. patient denies pain or discomfort    MEDICATIONS  (STANDING):  ascorbic acid  Oral Tab/Cap - Peds 500 milliGRAM(s) Oral two times a day  aspirin  chewable 81 milliGRAM(s) Oral daily  atorvastatin 80 milliGRAM(s) Oral at bedtime  bisacodyl 5 milliGRAM(s) Oral daily  buMETAnide 1 milliGRAM(s) Oral two times a day  carvedilol Oral Tab/Cap - Peds 3.125 milliGRAM(s) Oral two times a day  clopidogrel Tablet 75 milliGRAM(s) Oral daily  dextrose 5%. 1000 milliLiter(s) (50 mL/Hr) IV Continuous <Continuous>  dextrose 50% Injectable 12.5 Gram(s) IV Push once  dextrose 50% Injectable 25 Gram(s) IV Push once  dextrose 50% Injectable 25 Gram(s) IV Push once  ertapenem  IVPB      ertapenem  IVPB 500 milliGRAM(s) IV Intermittent every 24 hours  ferrous sulfate Oral Tab/Cap - Peds 325 milliGRAM(s) Oral daily  heparin  Injectable 5000 Unit(s) SubCutaneous every 8 hours  hydrALAZINE 25 milliGRAM(s) Oral two times a day  insulin glargine SubCutaneous Injection (LANTUS) - Peds 6 Unit(s) SubCutaneous at bedtime  insulin lispro (HumaLOG) corrective regimen sliding scale   SubCutaneous three times a day before meals  insulin lispro (HumaLOG) corrective regimen sliding scale   SubCutaneous at bedtime  levothyroxine 75 MICROGram(s) Oral daily  polyethylene glycol 3350 17 Gram(s) Oral daily  potassium chloride    Tablet ER 10 milliEquivalent(s) Oral daily  predniSONE   Tablet 5 milliGRAM(s) Oral daily  sevelamer carbonate 800 milliGRAM(s) Oral three times a day with meals  sodium chloride 1 Gram(s) Oral two times a day  tacrolimus 1 milliGRAM(s) Oral every 12 hours    MEDICATIONS  (PRN):  acetaminophen   Tablet .. 650 milliGRAM(s) Oral every 4 hours PRN Temp greater or equal to 38.5C (101.3F)  acetaminophen   Tablet .. 650 milliGRAM(s) Oral every 6 hours PRN Temp greater or equal to 38C (100.4F), Mild Pain (1 - 3), Moderate Pain (4 - 6)  acetaminophen  Suppository .. 650 milliGRAM(s) Rectal every 4 hours PRN Temp greater or equal to 38.5C (101.3F)  dextrose 40% Gel 15 Gram(s) Oral once PRN Blood Glucose LESS THAN 70 milliGRAM(s)/deciliter  glucagon  Injectable 1 milliGRAM(s) IntraMuscular once PRN Glucose LESS THAN 70 milligrams/deciliter  ondansetron Injectable 4 milliGRAM(s) IV Push every 8 hours PRN Nausea and/or Vomiting  sucralfate 1 Gram(s) Oral every 6 hours PRN With each meal    Vital Signs Last 24 Hrs  T(C): 37.1 (17 Apr 2020 12:13), Max: 37.1 (17 Apr 2020 12:13)  T(F): 98.7 (17 Apr 2020 12:13), Max: 98.7 (17 Apr 2020 12:13)  HR: 63 (17 Apr 2020 12:13) (59 - 63)  BP: 118/50 (17 Apr 2020 12:13) (118/50 - 160/63)  BP(mean): --  RR: 18 (17 Apr 2020 12:13) (18 - 18)  SpO2: 98% (17 Apr 2020 12:13) (98% - 100%)    I&O's Detail    16 Apr 2020 07:01  -  17 Apr 2020 07:00  --------------------------------------------------------  IN:    Oral Fluid: 500 mL  Total IN: 500 mL    OUT:    Voided: 200 mL  Total OUT: 200 mL    Total NET: 300 mL    CAPILLARY BLOOD GLUCOSE    POCT Blood Glucose.: 179 mg/dL (17 Apr 2020 12:00)  POCT Blood Glucose.: 103 mg/dL (17 Apr 2020 08:25)  POCT Blood Glucose.: 177 mg/dL (16 Apr 2020 21:27)  POCT Blood Glucose.: 112 mg/dL (16 Apr 2020 17:01)    PHYSICAL EXAM:  GENERAL: NAD, laying in bed  EYES: sclera clear b/l  CHEST/LUNG: normal respiratory effort, not tachypneic, speaking in full sentences without difficulty  HEART: Not tachycardic, no lower extremity edema b/l  ABDOMEN: Soft, Nontender, Nondistended  EXTREMITIES: R AKA, L forearm AV fistula  NEUROLOGY: awake, alert, no gross deficits  PSYCH: calm, cooperative    LABS:                         11.3   2.26  )-----------( 130      ( 17 Apr 2020 05:07 )             35.9     04-17    134<L>  |  100  |  79<H>  ----------------------------<  104<H>  4.5   |  20<L>  |  4.29<H>    Ca    9.1      17 Apr 2020 05:07  Phos  3.9     04-16  Mg     1.7     04-17    RADIOLOGY & ADDITIONAL TESTS: Reviewed.

## 2020-04-17 NOTE — DISCHARGE NOTE NURSING/CASE MANAGEMENT/SOCIAL WORK - NSDCCRNAME_GEN_ALL_CORE_FT
[facility]   [address] [facility] [address] [facility] , [address] [place] [phone]- Ambulance  at 4pm

## 2020-04-17 NOTE — PROGRESS NOTE ADULT - SUBJECTIVE AND OBJECTIVE BOX
Occupational Therapy Evaluation    Initial Evaluation Date: 4/12/2017  Referred by: Dr. Juan Jose Jade MD  Medical Diagnosis (from order):    Left hand pain [M79.642]    Contusion of left hand, subsequent encounter [S60.222D]    Left hand tendonitis [M77.8]    Treatment Diagnosis: Wrist/Hand Symptoms with Pain, Impaired Range of Motion, Impaired Motor Function/Muscle Performance and edema   Primary Insurance: [employer] MEDICARE SOLUTION  Secondary Insurance: MEDICAID [id number]  Requirements:    [facility]  Co-pay= none  Visit limit= none  Auth req? = none  No ded  6510 out of 6700 oop remains  80% coinsurance  4/11 brit  Date of Onset: new onset;  Left dorsal hand pain after falling on 3/20/2017 and hitting the back of her left hand.   Date of Surgery: history of:  Left ORIF of distal radius on 9/10/2015  Status post ORIF Hardware removal of left wrist on 9/15/2016  Diagnosis Precautions:  History of left wrist fracture, fall risk   Relevant co-morbidities and medications: history of left ORIF distal radius fracture 9/10/2016    Relevant Tests: Relevant diagnostic tests: plain film radiograph   FINDINGS:   Radiographs reveal no evidence of any type of metacarpal fracture, especially on the ulnar side where she is complaining of discomfort. There is some soft tissue swelling noted directly over that fifth metacarpal. There are no obvious lytic bony lesions. Soft tissue shadows otherwise unremarkable.  Medical/surgical history, medications and relevant tests have been reviewed.    SUBJECTIVE   Refer above for details   Pain:  Intensity: Now: 7/10; Best: 7/10; Worst: 9/10 (in the last 2 weeks)  Location: Base of 5th metacarpal   Quality/Description: Ache, Sharp, Sore, Numbness, Tingling  Relieving/Alleviating factors: rest, over the counter medication, avoiding movement in the involved area  Hand Dominance: right    Function:  Limitations and exacerbating factors (patient reported): pain, pain avoidance  [facility] DIVISION OF KIDNEY DISEASES AND HYPERTENSION -- FOLLOW UP NOTE  --------------------------------------------------------------------------------  HPI: 59M PMH of HTN, HLD, DM, ESRD s/p kidney transplant, admitted for COVID-19. Nephrology team consulted for YURI and management of immunosuppressant. Pt with history of ESRD s/p DDRT on 2005. On review pt is on Tacrolimus 1mg BID, Mycophenolate 1gm BID and Prednisone 5mg daily. On review of labs, pt with SCr: 3.6 on 3/27/20 increased to 3.9 on 3/30/20. Pt with last outpatient SCr of 4.8 as outpatient at halfway. On admission, SCr was 5.47 (4/10/20) which has continued to improve to 4.29.    PAST HISTORY  --------------------------------------------------------------------------------  No significant changes to PMH, PSH, FHx, SHx, unless otherwise noted    ALLERGIES & MEDICATIONS  --------------------------------------------------------------------------------  Allergies    iodine (Vomiting)  IV Contrast (Unknown)    Intolerances    Standing Inpatient Medications  ascorbic acid  Oral Tab/Cap - Peds 500 milliGRAM(s) Oral two times a day  aspirin  chewable 81 milliGRAM(s) Oral daily  atorvastatin 80 milliGRAM(s) Oral at bedtime  bisacodyl 5 milliGRAM(s) Oral daily  buMETAnide 1 milliGRAM(s) Oral two times a day  carvedilol Oral Tab/Cap - Peds 3.125 milliGRAM(s) Oral two times a day  clopidogrel Tablet 75 milliGRAM(s) Oral daily  dextrose 5%. 1000 milliLiter(s) IV Continuous <Continuous>  dextrose 50% Injectable 12.5 Gram(s) IV Push once  dextrose 50% Injectable 25 Gram(s) IV Push once  dextrose 50% Injectable 25 Gram(s) IV Push once  ertapenem  IVPB      ertapenem  IVPB 500 milliGRAM(s) IV Intermittent every 24 hours  ferrous sulfate Oral Tab/Cap - Peds 325 milliGRAM(s) Oral daily  heparin  Injectable 5000 Unit(s) SubCutaneous every 8 hours  hydrALAZINE 25 milliGRAM(s) Oral two times a day  insulin glargine SubCutaneous Injection (LANTUS) - Peds 6 Unit(s) SubCutaneous at bedtime  insulin lispro (HumaLOG) corrective regimen sliding scale   SubCutaneous three times a day before meals  insulin lispro (HumaLOG) corrective regimen sliding scale   SubCutaneous at bedtime  levothyroxine 75 MICROGram(s) Oral daily  polyethylene glycol 3350 17 Gram(s) Oral daily  potassium chloride    Tablet ER 10 milliEquivalent(s) Oral daily  predniSONE   Tablet 5 milliGRAM(s) Oral daily  sevelamer carbonate 800 milliGRAM(s) Oral three times a day with meals  sodium chloride 1 Gram(s) Oral two times a day  tacrolimus 1 milliGRAM(s) Oral every 12 hours    REVIEW OF SYSTEMS  --------------------------------------------------------------------------------  Gen: No  fevers/chills  Skin: No rashes  Respiratory: No dyspnea  CV: No chest pain,   GI: No abdominal pain, diarrhea  MSK: No joint pain/swelling;   Neuro: No dizziness/lightheadedness    All other systems were reviewed and are negative, except as noted.    VITALS/PHYSICAL EXAM  --------------------------------------------------------------------------------  T(C): 37.1 (04-17-20 @ 12:13), Max: 37.1 (04-17-20 @ 12:13)  HR: 63 (04-17-20 @ 12:13) (59 - 63)  BP: 118/50 (04-17-20 @ 12:13) (118/50 - 160/63)  RR: 18 (04-17-20 @ 12:13) (18 - 18)  SpO2: 98% (04-17-20 @ 12:13) (98% - 100%)  Wt(kg): --    04-16-20 @ 07:01  -  04-17-20 @ 07:00  --------------------------------------------------------  IN: 500 mL / OUT: 200 mL / NET: 300 mL    Physical Exam:  	Gen: NAD, well-appearing  	HEENT: supple, no JVD  	Pulm: CTA B/L  	CV:  S1S2  	Abd: +BS, soft, transplant area without tenderness  	Ext: No B/L Lower ext edema  	Neuro: No focal deficits  	Skin: Warm, without rashes  	Vascular access: Left Arm AVF with palpable thrill, + aneurysmal dilation    LABS/STUDIES  --------------------------------------------------------------------------------              11.3   2.26  >-----------<  130      [04-17-20 @ 05:07]              35.9     134  |  100  |  79  ----------------------------<  104      [04-17-20 @ 05:07]  4.5   |  20  |  4.29        Ca     9.1     [04-17-20 @ 05:07]      Mg     1.7     [04-17-20 @ 05:07]      Phos  3.9     [04-16-20 @ 09:44]    Creatinine Trend:  SCr 4.29 [04-17 @ 05:07]  SCr 4.85 [04-16 @ 09:44]  SCr 4.61 [04-15 @ 04:10]  SCr 4.85 [04-14 @ 09:44]  SCr 5.01 [04-13 @ 11:21]    Urinalysis - [04-11-20 @ 08:30]      Color COLORLESS / Appearance TURBID / SG 1.014 / pH 6.0      Gluc NEGATIVE / Ketone TRACE  / Bili NEGATIVE / Urobili NORMAL       Blood MODERATE / Protein 100 / Leuk Est LARGE / Nitrite POSITIVE      RBC 10-20 / WBC >50 / Hyaline  / Gran  / Sq Epi  / Non Sq Epi OCC / Bacteria MANY    Urine Creatinine 78.30      [04-12-20 @ 16:15]    Ferritin 1360      [04-16-20 @ 09:44]  HbA1c 6.6      [04-12-20 @ 05:21]    HCV 0.35, Nonreactive Hepatitis C AB patterns/compensation, increased pain level with all activities/tasks with involved extremity  Prior level (patient reported): patient has caregiver to assist with functional tasks at home     Prior Treatment: no therapies in the past year for current condition.  Patient was seen for outpatient occupational therapy following distal radius fracture Hospitalization, home health services or skilled nursing facility in the last 30 days: No, per patient.    Home Environment/Social Support: Patient lives with significant other and children.   Patient has consistent assist from family/friends due to dizziness     Safety:  Do you feel safe at home, work and/or school? yes, per patient  Falls: Yes - 3 or more falls in past year indicating further testing recommended  Injuries: Yes: Left wrist contusion   Patient reports she has a caregiver/family member due to increased fall risk    Patient Goals/Concerns:   Be able to use hand and increase wrist stability/decrease pain.      OBJECTIVE   Posture/Observation:  Patient ambulated into clinic without wearing immobilizing splint (ulnar gutter)    Range of Motion (degrees): Active Range of Motion  [] All motions within functional/normal limits except those noted.   [x] Only those motions that were assessed are noted.   [] Uninvolved motion within functional/normal limits.   Norm Left Right Involved   Date  Initial Initial    Wrist Flexion 80°  49 81    Wrist Extension 70° 54 62    Radial Deviation 20° 11 30    Ulnar Deviation 45° 19 42    Forearm Supination 90° WFL WFL    Forearm Pronation 90° WFL WFL    [standard testing positions unless otherwise noted]  Comments: history of left ORIF of distal radius * denotes pain    Strength: /Pinch (measured in pounds of force)  [] Gross muscle strength within functional/normal limits except as noted.  [x] Only muscle strength that was assessed are noted.  [] Uninvolved muscle strength within functional/normal limits.   Left Right  Involved   Date Initial Initial Current    43 95    Lateral Pinch 14 24    3 Point Pinch 13 21    [standard testing positions unless otherwise noted]  Comments: average of 3 reported,  *denotes pain  Norms:  : Age: 40-49: male: left 94.1-123.6, right 96.1-132.9; female: left 48.5-68.7, right 56.9-77.3  Key/lateral pinch: Age: 45-49: male: left 24.8+/-4.4, right 25.8+/-3.9; female: left 16.6+/-2.9, right 17.6+/-2.9  Palmar/3 point pinch: Age: 45-49: male: left 23.7+/-3.8, right 24.0+/-3.3; female: left 17.5+/-2.8, right 17.9+/-3.0  Tip pinch: Age: 45-49: male: left 17.6+/-4.1, right 18.7+/-4.9; female: left 12.1+/-2.7, right 13.2+/-3.0          Wrist/Hand Circumferential: (cm)   Left Right   Date: 4/12/2017     Proximal palmar crease 21.2 cm 21.0 cm   Metacarpophalangeal Row 20 cm 20 cm   Wrist at Styloids     Comment: localized swelling of dorsal 5th metacarpal      Palpation:  Tender to palpate 5th metacarpal; swelling noted     Outcome Measures:   Disabilities of the Arm, Shoulder and Hand (DASH): DASH Score Calculated: 89.66 (scored 0-100; a higher score indicates greater disability)     Initial Treatment   Initial evaluation completed.    Therapeutic Exercise:   Flexor tendon glides x 5 reps each   Wrist AROM     -Flexion/extension x 10 reps     - Radial/ulnar deviation x 10 reps   Forearm AROM     - Supination/pronation x 10 reps     Therapeutic Activity:  Fabricated ulnar gutter splint with IP joints free      Home Exercise Program:  Exercise: Date issued Date DC Comments   Flexor Tendon glides  4/12/17  5 Reps; 2-3 times/day    Wrist AROM flexion/extension and radial/ulnar deviation  4/12/17  10-12 reps; 2-3 times/day    Forearm AROM; supination and pronation  4/12/17  10-12; 2-3 times/day                  Plan for next session:   Check swelling   Fludio to start   Gentle wrist PROM with gentle curved glides   Wrist AROM  Flexor tendon glides   Patient begin weaning out of splint after 4/19/17, per  MD's note on 4/5/17    ASSESSMENT   50 year old female presents to therapy with significant decline in prior level of function due to signs and symptoms consistent with left wrist pain. Patient demonstrates increased swelling localized over the proximal portion of the 5th metacarpal. Patient also demonstrates decreased wrist active range of motion, limited /pinch strength, and increased wrist pain. Initial home exercise program reviewed to begin gentle wrist active range of motion and tendon glides. Patient arrived to initial therapy session without wearing splint as MD recommended. An ulnar gutter splint was fabricated to immobilize the 4th and 5th digits, leaving the IP joints free. Therapist encouraged patient to use ice to decrease inflammation and help control painful symptoms.     Outcome:    Benefit from skilled therapy: yes   Rehab potential is good due to positive factors motivation level and negative factors not apparent at this time, multiple surgeries    Predicted patient presentation: clinical decision making of low complexity, no task modification   Plan of care to increase strength/stability, increase range of motion, decrease pain, decrease swelling/edema, improve activities of daily living and instrumental activites of daily living, improve functional independence to address functional limitations listed above.    Goals:  To be obtained by end of this plan of care:  1. Patient independent with modified and progressed home exercise program.  2. Patient will decrease left wrist pain to 2/10 to carry over into all activities of daily living and instrumental activities of daily living  3. Patient will increase left wrist active range of motion for flexion and extension to 55 degrees to aid in dressing and bathing activities.   4. Patient will increase left wrist strength to 55# to increase ease with opening jars and increased pinch strength to 15# to increase ease with turning a key.    5. DASH:  Patient will complete form to reflect an improved score from initial score of 89.66 to less than or equal to 40 (scored 0-100; a higher score indicates greater disability) to indicate pt reported improvement in function/disability/impairment (minimal detectable change: 15 points).    PLAN   Frequency/Duration: 2 times per week for 4 weeks with tapering as the patient progresses  Skilled training and instruction for the following interventions:  Manual Therapy (30665)  Neuromuscular Re-Education (98793)  Therapeutic Activity (95869)  Therapeutic Exercise (19283)  Fluidotherapy/Whirlpool (99187/32948)  Heat/Cold (51454)  Ultrasound/Phonophoresis (63117)    The plan of care and goals were established with the patient who concurs. The referring provider's initial service to therapy order and/or electronic signature on the evaluation authorizes the therapy plan of care. Patient has been given attendance policy at time of initial evaluation.    Patient Education:  Who will be receiving education: patient  Are they ready to learn: yes  Preferred learning style: written, verbal, demonstration  Barriers to learning: no barriers apparent at this time   Result of initial outlined education: Verbalizes understanding, Demonstrates understanding and Needs reinforcement    THERAPY DAILY BILLING   Primary Insurance: [facility] MEDICARE SOLUTION  Secondary Insurance: MEDICAID [id number]    Evaluation Procedures:  Occupational Therapy Evaluation: Low Complexity    Timed Procedures:  Therapeutic Activity, 20 minutes  Therapeutic Exercise, 10 minutes    Untimed Procedures:  No untimed codes were used on this date of service    Total Treatment Time: 60 minutes    Certification from: 4/12/2017 through: 7/12/2017.  I certify the need for these services, furnished under this plan of treatment and while under my care  Electronically sent for physician signature  Care approved by and performed under the direction of therapist.  Student's  note read and approved.   Ludmila Short, OTR CHT

## 2020-04-17 NOTE — PROGRESS NOTE ADULT - SUBJECTIVE AND OBJECTIVE BOX
Follow Up:  covid-19, pyelonephritis, renal transplant    Interval History: No fevers overnight. Pt has body aches. Still decreased appetite- wants applesauce and fruit.        Allergies  iodine (Vomiting)  IV Contrast (Unknown)        ANTIMICROBIALS:  ertapenem  IVPB    ertapenem  IVPB 500 every 24 hours      OTHER MEDS:  acetaminophen   Tablet .. 650 milliGRAM(s) Oral every 4 hours PRN  acetaminophen   Tablet .. 650 milliGRAM(s) Oral every 6 hours PRN  acetaminophen  Suppository .. 650 milliGRAM(s) Rectal every 4 hours PRN  ascorbic acid  Oral Tab/Cap - Peds 500 milliGRAM(s) Oral two times a day  aspirin  chewable 81 milliGRAM(s) Oral daily  atorvastatin 80 milliGRAM(s) Oral at bedtime  bisacodyl 5 milliGRAM(s) Oral daily  buMETAnide 1 milliGRAM(s) Oral two times a day  carvedilol Oral Tab/Cap - Peds 3.125 milliGRAM(s) Oral two times a day  clopidogrel Tablet 75 milliGRAM(s) Oral daily  dextrose 40% Gel 15 Gram(s) Oral once PRN  dextrose 5%. 1000 milliLiter(s) IV Continuous <Continuous>  dextrose 50% Injectable 12.5 Gram(s) IV Push once  dextrose 50% Injectable 25 Gram(s) IV Push once  dextrose 50% Injectable 25 Gram(s) IV Push once  ferrous sulfate Oral Tab/Cap - Peds 325 milliGRAM(s) Oral daily  glucagon  Injectable 1 milliGRAM(s) IntraMuscular once PRN  heparin  Injectable 5000 Unit(s) SubCutaneous every 8 hours  hydrALAZINE 25 milliGRAM(s) Oral two times a day  insulin glargine SubCutaneous Injection (LANTUS) - Peds 6 Unit(s) SubCutaneous at bedtime  insulin lispro (HumaLOG) corrective regimen sliding scale   SubCutaneous three times a day before meals  insulin lispro (HumaLOG) corrective regimen sliding scale   SubCutaneous at bedtime  levothyroxine 75 MICROGram(s) Oral daily  ondansetron Injectable 4 milliGRAM(s) IV Push every 8 hours PRN  polyethylene glycol 3350 17 Gram(s) Oral daily  potassium chloride    Tablet ER 10 milliEquivalent(s) Oral daily  predniSONE   Tablet 5 milliGRAM(s) Oral daily  sevelamer carbonate 800 milliGRAM(s) Oral three times a day with meals  sodium chloride 1 Gram(s) Oral two times a day  sucralfate 1 Gram(s) Oral every 6 hours PRN  tacrolimus 1 milliGRAM(s) Oral every 12 hours      Vital Signs Last 24 Hrs  T(C): 37.1 (17 Apr 2020 12:13), Max: 37.1 (17 Apr 2020 12:13)  T(F): 98.7 (17 Apr 2020 12:13), Max: 98.7 (17 Apr 2020 12:13)  HR: 63 (17 Apr 2020 12:13) (59 - 63)  BP: 118/50 (17 Apr 2020 12:13) (118/50 - 160/63)  BP(mean): --  RR: 18 (17 Apr 2020 12:13) (18 - 18)  SpO2: 98% (17 Apr 2020 12:13) (98% - 100%)    Physical Exam:  General:   looks unhappy  HEENT: atraumatic, normocephalic, normal sclera and conjunctiva  Respiratory:   breathing comfortably  :   no  duvall  Musculoskeletal:   no edema  Skin:    no rash  Neurologic: no focal deficit  psych: normal affect                          11.3   2.26  )-----------( 130      ( 17 Apr 2020 05:07 )             35.9       04-17    134<L>  |  100  |  79<H>  ----------------------------<  104<H>  4.5   |  20<L>  |  4.29<H>    Ca    9.1      17 Apr 2020 05:07  Phos  3.9     04-16  Mg     1.7     04-17            MICROBIOLOGY:    .Urine Clean Catch (Midstream)  04-11-20   >100,000 CFU/ml Klebsiella pneumoniae ESBL  --  Klebsiella pneumoniae ESBL      .Blood Blood-Peripheral  04-11-20   No Growth Final  --  --      .Blood Blood-Peripheral  04-10-20   No Growth Final  --  --      RADIOLOGY:      EXAM:  US KIDNEYS AND BLADDER        PROCEDURE DATE:  Apr 12 2020         INTERPRETATION:  CLINICAL INFORMATION: Elevated creatinine. Right pelvic renal transplant.    COMPARISON: None available.    TECHNIQUE: Sonography of the kidneys and bladder.    FINDINGS:    Native Right kidney: Diminutive measuring 7.1 cm. Diffusely increased echogenicity. No renal mass, hydronephrosis or calculi.    Native Left kidney: Diminutive measuring 7.6 cm. Diffusely increased echogenicity No renal mass, hydronephrosis or calculi.    Right pelvic transplant kidney: 13.1 cm. Normal echogenicity and echotexture. A 3.6 cm lower pole central cyst. No hydronephrosis, renal mass, calculi, or perinephric fluid collection.    Urinary bladder: Within normal limits.    IMPRESSION:   Normal-appearing right pelvic transplant kidney.                      MARIA A FREDERICK M.D., RADIOLOGY RESIDENT  This document has been electronically signed.  CYNDY HAWKINS M.D., ATTENDING RADIOLOGIST  This document has been electronically signed. Apr 12 2020  4:16PM

## 2020-04-17 NOTE — DISCHARGE NOTE PROVIDER - HOSPITAL COURSE
59M w/ hx of HTN, HLD, DM, ESRD s/p kidney transplant in 05, CAD s/p stent a few months ago at [facility] s/p R AKA in 2010 now presenting with hypoxic respiratory failure must likely due to COVID 19 infection.         2019 novel coronavirus disease (COVID-19).     - Maintaining normal oxygenation off of supplemental O2. Cont to monitor.     - CRP trended down     - CXR clear    - Patient full code.         Acute lower Uti    - UA suggestive of infection, prior UTI from 2/2020 sensitive to ceftriaxone, resistant to zosyn. Urinary culture with ESBL Klebsiella     - Patient already received one dose of Cefepime in ED and was started on ceftriaxone, switched to Ertapenem, on 4/13 as U cx noted to be ESBL, ID recs appreciated, 14 day course per ID through 4/26/20.         ESRD (end stage renal disease).    - Appreciate Nephrology consult     - renal u/s for transplanted kidney: WNL     - concern for non-compliance as tacrolimus level was low    - Goal Tacrolimus level is 3-5 as per Renal    - will not request records from [facility] as long as Renal fn improving as management not going to change, pt will need to f/u with his nephrologist as outpt after discharge     - Primary Nephrologist: Dr. Adrián Hawkins.         Kidney transplant recipient.     - Will continue existing regimen of Immunosuppressants:     on Mycophenolate + Tacrolimus and Prednisone , c/w Tacrolimus and prednisone, holding Cellcept for now     - Monitor and trend Cr/BUN    - plan as above,  Nephrology f/u appreciated        Hyponatremia: chronic pt on Nacl tabs, fluid restriction to 1 L.         CAD (coronary artery disease).     - Continue double anti-thrombotic therapy: Aspirin + Plavix    - Continue Carvedilol 3.125 PO BID    - EKG as clinically indicated.         Chronic systolic heart failure.    - Continue Carvedilol 3.125 PO BID    - no evidence of exacerbation.        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     - Monitor blood glucose QAC and QHS    - Continue LANTUS 6 U at bed time + Sliding Novolog scale.         Hypertension, unspecified type.      - Monitor BP     - Continue Hydralazine 25 mg PO q 12 hours.         Hypothyroidism, unspecified type.      - Continue Synthroid 75 mcg PO q 24 hours.         Dispo back to NH 59M w/ hx of HTN, HLD, DM, ESRD s/p kidney transplant in 05, CAD s/p stent a few months ago at [facility] s/p R AKA in 2010 now presenting with hypoxic respiratory failure must likely due to COVID 19 infection.         2019 novel coronavirus disease (COVID-19).     - Maintaining normal oxygenation off of supplemental O2. Cont to monitor.     - CRP trended down     - CXR clear    - Patient full code.         Acute lower Uti    - UA suggestive of infection, prior UTI from 2/2020 sensitive to ceftriaxone, resistant to zosyn. Urinary culture with ESBL Klebsiella     - Patient already received one dose of Cefepime in ED and was started on ceftriaxone, switched to Ertapenem, on 4/13 as U cx noted to be ESBL, ID recs appreciated, 14 day course per ID through 4/26/20.         ESRD (end stage renal disease).    - Appreciate Nephrology consult     - renal u/s for transplanted kidney: WNL     - concern for non-compliance as tacrolimus level was low    - Goal Tacrolimus level is 3-5 as per Renal    - will not request records from [facility] as long as Renal fn improving as management not going to change, pt will need to f/u with his nephrologist as outpt after discharge     - Primary Nephrologist: Dr. Adrián Hawkins        Kidney transplant recipient.     - Will continue existing regimen of Immunosuppressants:     on Mycophenolate + Tacrolimus and Prednisone , c/w Tacrolimus and prednisone, holding Cellcept for now     - Monitor and trend Cr/BUN    - plan as above,  Nephrology f/u appreciated        Hyponatremia: chronic pt on Nacl tabs, fluid restriction to 1 L.         CAD (coronary artery disease).     - Continue double anti-thrombotic therapy: Aspirin + Plavix    - Continue Carvedilol 3.125 PO BID    - EKG as clinically indicated.         Chronic systolic heart failure.    - Continue Carvedilol 3.125 PO BID    - no evidence of exacerbation.        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     - Monitor blood glucose QAC and QHS    - Continue LANTUS 6 U at bed time + Sliding Novolog scale.         Hypertension, unspecified type.      - Monitor BP     - Continue Hydralazine 25 mg PO q 12 hours.         Hypothyroidism, unspecified type.      - Continue Synthroid 75 mcg PO q 24 hours.         Dispo back to NH 59M w/ hx of HTN, HLD, DM, ESRD s/p kidney transplant on tacrolimus and prednisone in 2005, CAD s/p stent, PVD s/p R AKA in 2010 admitted to medicine on 4/11 with fever, rhinorrhea and not feeling well for three days found to be COVID19 positive. Course complicated by ESBL UTI and delirium. S/p mechanical fall 5/1.             2019 novel coronavirus disease (COVID-19).     - monitored on room air, resolved, no fevers since 4/17        Acute lower Uti    - UA suggestive of infection, prior UTI from 2/2020 sensitive to ceftriaxone, resistant to zosyn. Urinary culture with ESBL Klebsiella     - Patient already received one dose of Cefepime in ED and was started on ceftriaxone, switched to Ertapenem, on 4/13 as U cx noted to be ESBL, ID recs appreciated, 14 day course per ID through 4/26/20.         ESRD (end stage renal disease).    - given tacrolimus; goal is 3-5; to be followed as outpatient on discharge     Primary Nephrologist: Dr. Adrián Hawkins        Kidney transplant recipient.     - Will continue existing regimen of Immunosuppressants:     on Mycophenolate + Tacrolimus and Prednisone , c/w Tacrolimus and prednisone, holding Cellcept for now     - Monitor and trend Cr/BUN    - plan as above,  Nephrology f/u appreciated        Hyponatremia: chronic pt on Nacl tabs, fluid restriction to 1 L.         CAD (coronary artery disease).     - Continue double anti-thrombotic therapy: Aspirin + Plavix    - Continue Carvedilol 3.125 PO BID    - EKG as clinically indicated.         Chronic systolic heart failure.     Carvedilol 3.125 PO BID        Type 2 diabetes mellitus with chronic kidney disease, with long-term current use of insulin, unspecified CKD stage.     - Monitor blood glucose QAC and QHS    - Continue LANTUS 6 U at bed time + Sliding Novolog scale.         Hypertension, unspecified type.      - Monitor BP     - Continue Hydralazine 25 mg PO q 12 hours.         Hypothyroidism, unspecified type.      - Continue Synthroid 75 mcg PO q 24 hours.         Dispo back to NH

## 2020-04-17 NOTE — DISCHARGE NOTE PROVIDER - NSDCMRMEDTOKEN_GEN_ALL_CORE_FT
acetaminophen 325 mg oral tablet: 2 tab(s) orally every 6 hours, As needed, Temp greater or equal to 38C (100.4F), Mild Pain (1 - 3), Moderate Pain (4 - 6)  aspirin 81 mg oral tablet, chewable: 1 tab(s) orally once a day  atorvastatin 80 mg oral tablet: 1 tab(s) orally once a day (at bedtime)  Basaglar KwikPen 100 units/mL subcutaneous solution: 6 unit(s) subcutaneous once a day (at bedtime)  bisacodyl 5 mg oral delayed release tablet: 1 tab(s) orally once a day  bumetanide 1 mg oral tablet: 1 tab(s) orally 2 times a day  carvedilol 3.125 mg oral tablet: 1 tab(s) orally 2 times a day  cholecalciferol oral tablet: 09946 unit(s) orally every 30 days  clopidogrel 75 mg oral tablet: 1 tab(s) orally once a day  ertapenem 1 g injection: 1 gram(s) injectable once a day  Through 4/26  ferrous sulfate 325 mg (65 mg elemental iron) oral tablet: 1 tab(s) orally once a day  hydrALAZINE 25 mg oral tablet: 1 tab(s) orally 2 times a day  levothyroxine 75 mcg (0.075 mg) oral tablet: 1 tab(s) orally once a day  MiraLax oral powder for reconstitution: 1 dose(s) orally once a day  NovoLOG 100 units/mL injectable solution: Subcutaneous with meals (8:00 AM, 12:00 PM 5:00 pm)   BS 0-200: 0 U  201-250:  2U  251-300: 4U  301-350: 6U  351-400: 8U  Call MD id &lt;70 or &gt;400   potassium chloride 10 mEq oral tablet, extended release: 1 tab(s) orally once a day  predniSONE 5 mg oral tablet: 1 tab(s) orally once a day  sevelamer carbonate 800 mg oral tablet: 1 tab(s) orally 3 times a day (with meals)  sodium chloride 1 g oral tablet: 1 tab(s) orally 2 times a day  sucralfate 1 g oral tablet: 1 tab(s) orally every 6 hours, As needed, With each meal  tacrolimus 1 mg oral capsule: 1 cap(s) orally every 12 hours  Vitamin C 500 mg oral tablet: 1 tab(s) orally 2 times a day acetaminophen 325 mg oral tablet: 2 tab(s) orally every 6 hours, As needed, Temp greater or equal to 38C (100.4F), Mild Pain (1 - 3), Moderate Pain (4 - 6)  aspirin 81 mg oral tablet, chewable: 1 tab(s) orally once a day  atorvastatin 80 mg oral tablet: 1 tab(s) orally once a day (at bedtime)  Basaglar KwikPen 100 units/mL subcutaneous solution: 6 unit(s) subcutaneous once a day (at bedtime)  bisacodyl 5 mg oral delayed release tablet: 1 tab(s) orally once a day  bumetanide 1 mg oral tablet: 1 tab(s) orally 2 times a day  carvedilol 3.125 mg oral tablet: 1 tab(s) orally 2 times a day  cholecalciferol oral tablet: 24068 unit(s) orally every 30 days  clopidogrel 75 mg oral tablet: 1 tab(s) orally once a day  ertapenem 1 g injection: 1 gram(s) injectable once a day  Through 4/26  ferrous sulfate 325 mg (65 mg elemental iron) oral tablet: 1 tab(s) orally once a day  hydrALAZINE 25 mg oral tablet: 1 tab(s) orally 2 times a day  levothyroxine 75 mcg (0.075 mg) oral tablet: 1 tab(s) orally once a day  MiraLax oral powder for reconstitution: 1 dose(s) orally once a day  NovoLOG 100 units/mL injectable solution: Subcutaneous with meals (8:00 AM, 12:00 PM 5:00 pm)   BS 0-200: 0 U  201-250:  2U  251-300: 4U  301-350: 6U  351-400: 8U  Call MD id &lt;70 or &gt;400   potassium chloride 10 mEq oral tablet, extended release: 1 tab(s) orally once a day  predniSONE 5 mg oral tablet: 1 tab(s) orally once a day  sevelamer carbonate 800 mg oral tablet: 1 tab(s) orally 3 times a day (with meals)  sodium chloride 1 g oral tablet: 1 tab(s) orally 2 times a day  sucralfate 1 g oral tablet: 1 tab(s) orally every 6 hours, As needed, With each meal  tacrolimus 1 mg oral capsule: 1 cap(s) orally every 12 hours  Vitamin C 500 mg oral tablet: 1 tab(s) orally 2 times a day acetaminophen 325 mg oral tablet: 2 tab(s) orally every 6 hours, As needed, Temp greater or equal to 38C (100.4F), Mild Pain (1 - 3), Moderate Pain (4 - 6)  aspirin 81 mg oral tablet, chewable: 1 tab(s) orally once a day  atorvastatin 80 mg oral tablet: 1 tab(s) orally once a day (at bedtime)  Basaglar KwikPen 100 units/mL subcutaneous solution: 6 unit(s) subcutaneous once a day (at bedtime)  bisacodyl 5 mg oral delayed release tablet: 1 tab(s) orally once a day  bumetanide 1 mg oral tablet: 1 tab(s) orally 2 times a day  carvedilol 3.125 mg oral tablet: 1 tab(s) orally 2 times a day  cholecalciferol oral tablet: 73055 unit(s) orally every 30 days  clopidogrel 75 mg oral tablet: 1 tab(s) orally once a day  ertapenem 1 g injection: 1 gram(s) injectable once a day  Through 4/26  ferrous sulfate 325 mg (65 mg elemental iron) oral tablet: 1 tab(s) orally once a day  hydrALAZINE 25 mg oral tablet: 1 tab(s) orally 2 times a day  levothyroxine 75 mcg (0.075 mg) oral tablet: 1 tab(s) orally once a day  MiraLax oral powder for reconstitution: 1 dose(s) orally once a day  NovoLOG 100 units/mL injectable solution: Subcutaneous with meals (8:00 AM, 12:00 PM 5:00 pm)   BS 0-200: 0 U  201-250:  2U  251-300: 4U  301-350: 6U  351-400: 8U  Call MD id &lt;70 or &gt;400   potassium chloride 10 mEq oral tablet, extended release: 1 tab(s) orally once a day  predniSONE 5 mg oral tablet: 1 tab(s) orally once a day  sevelamer carbonate 800 mg oral tablet: 1 tab(s) orally 3 times a day (with meals)  sodium chloride 1 g oral tablet: 1 tab(s) orally 2 times a day  sucralfate 1 g oral tablet: 1 tab(s) orally every 6 hours, As needed, With each meal  tacrolimus 1 mg oral capsule: 1 cap(s) orally every 12 hours  Vitamin C 500 mg oral tablet: 1 tab(s) orally 2 times a day acetaminophen 325 mg oral tablet: 2 tab(s) orally every 6 hours, As needed, Temp greater or equal to 38C (100.4F), Mild Pain (1 - 3), Moderate Pain (4 - 6)  aspirin 81 mg oral tablet, chewable: 1 tab(s) orally once a day  atorvastatin 80 mg oral tablet: 1 tab(s) orally once a day (at bedtime)  Basaglar KwikPen 100 units/mL subcutaneous solution: 6 unit(s) subcutaneous once a day (at bedtime)  bisacodyl 5 mg oral delayed release tablet: 1 tab(s) orally once a day  carvedilol 3.125 mg oral tablet: 1 tab(s) orally 2 times a day  cholecalciferol oral tablet: 94789 unit(s) orally every 30 days  clopidogrel 75 mg oral tablet: 1 tab(s) orally once a day  ferrous sulfate 325 mg (65 mg elemental iron) oral tablet: 1 tab(s) orally once a day  folic acid 1 mg oral tablet: 1 tab(s) orally once a day  hydrALAZINE 25 mg oral tablet: 1 tab(s) orally 2 times a day  levothyroxine 75 mcg (0.075 mg) oral tablet: 1 tab(s) orally once a day  MiraLax oral powder for reconstitution: 1 dose(s) orally once a day  NovoLOG 100 units/mL injectable solution: Subcutaneous with meals (8:00 AM, 12:00 PM 5:00 pm)   BS 0-200: 0 U  201-250:  2U  251-300: 4U  301-350: 6U  351-400: 8U  Call MD id &lt;70 or &gt;400   potassium chloride 10 mEq oral tablet, extended release: 1 tab(s) orally once a day  predniSONE 5 mg oral tablet: 1 tab(s) orally once a day  sodium chloride 1 g oral tablet: 1 tab(s) orally 2 times a day  sucralfate 1 g oral tablet: 1 tab(s) orally every 6 hours, As needed, With each meal  tacrolimus 1 mg oral capsule: 2 cap(s) orally   tacrolimus 1 mg oral capsule: 2 cap(s) orally   tamsulosin 0.4 mg oral capsule: 1 cap(s) orally once a day (at bedtime)  thiamine 100 mg oral tablet: 1 tab(s) orally once a day  Vitamin C 500 mg oral tablet: 1 tab(s) orally 2 times a day acetaminophen 325 mg oral tablet: 2 tab(s) orally every 6 hours, As needed, Temp greater or equal to 38C (100.4F), Mild Pain (1 - 3), Moderate Pain (4 - 6)  aspirin 81 mg oral tablet, chewable: 1 tab(s) orally once a day  atorvastatin 80 mg oral tablet: 1 tab(s) orally once a day (at bedtime)  Basaglar KwikPen 100 units/mL subcutaneous solution: 6 unit(s) subcutaneous once a day (at bedtime)  bisacodyl 5 mg oral delayed release tablet: 1 tab(s) orally once a day  carvedilol 3.125 mg oral tablet: 1 tab(s) orally 2 times a day  cholecalciferol oral tablet: 18626 unit(s) orally every 30 days  clopidogrel 75 mg oral tablet: 1 tab(s) orally once a day  ferrous sulfate 325 mg (65 mg elemental iron) oral tablet: 1 tab(s) orally once a day  folic acid 1 mg oral tablet: 1 tab(s) orally once a day  hydrALAZINE 25 mg oral tablet: 1 tab(s) orally 2 times a day  levothyroxine 75 mcg (0.075 mg) oral tablet: 1 tab(s) orally once a day  MiraLax oral powder for reconstitution: 1 dose(s) orally once a day  NovoLOG 100 units/mL injectable solution: Subcutaneous with meals (8:00 AM, 12:00 PM 5:00 pm)   BS 0-200: 0 U  201-250:  2U  251-300: 4U  301-350: 6U  351-400: 8U  Call MD id &lt;70 or &gt;400   potassium chloride 10 mEq oral tablet, extended release: 1 tab(s) orally once a day  predniSONE 5 mg oral tablet: 1 tab(s) orally once a day  sodium chloride 1 g oral tablet: 1 tab(s) orally 2 times a day  sucralfate 1 g oral tablet: 1 tab(s) orally every 6 hours, As needed, With each meal  tacrolimus 1 mg oral capsule: 3 cap(s) orally   tacrolimus 1 mg oral capsule: 3 cap(s) orally   tamsulosin 0.4 mg oral capsule: 1 cap(s) orally once a day (at bedtime)  thiamine 100 mg oral tablet: 1 tab(s) orally once a day  Vitamin C 500 mg oral tablet: 1 tab(s) orally 2 times a day

## 2020-04-17 NOTE — PROGRESS NOTE ADULT - ASSESSMENT
60 yo man with h/o renal transplant presenting from nursing home  covid+ oxygenating well on RA.  pyelo transplant kidney with  ESBL Klebsiella    1) Pyelonephritis  - continue ertapenem 500 mg iv q 24 h x 14 days (longer duration in view of transplanted kidney) (4/13 --> 4/26)  - nephrology following for renal transplant    2) COVID-19  - oxygenating well on RA  - supportive care  -maintain airborne /contact precautions    d/w primary team

## 2020-04-17 NOTE — DISCHARGE NOTE PROVIDER - PROVIDER TOKENS
FREE:[LAST:[your primary care doctor],PHONE:[(   )    -],FAX:[(   )    -]] FREE:[LAST:[your primary care doctor],PHONE:[(   )    -],FAX:[(   )    -]],FREE:[LAST:[Dr. Adrián Hawkins],PHONE:[(   )    -],FAX:[(   )    -],FOLLOWUP:[1-3 days]]

## 2020-04-17 NOTE — PROGRESS NOTE ADULT - ATTENDING COMMENTS
Clarita Perdomo MD  Pager: [phone]  After 5 PM or weekends please call fellow on call or office [phone]

## 2020-04-17 NOTE — CHART NOTE - NSCHARTNOTEFT_GEN_A_CORE
Called by nurse because patient refusing discharge to [facility].  Patient alert and oriented x 4, stating that he was not informed of discharge prior to .  Patient does not want not want to return back to the same facility.  I explained to the patient that it would be impossible to make different arrangement at current time and he might be in the hospital until Monday to get acceptance into another facility.  Also explained the risk of remaining in the hospital during covid crisis, patient insists on not wanting to leave.  on call notified.  Will sign out to AM [facility] staff to follow up with .

## 2020-04-17 NOTE — PROGRESS NOTE ADULT - PROBLEM SELECTOR PLAN 2
Pt with history of ESRD s/p DDRT on 2005. Pt is on Tacrolimus 1mg BID, Cellcept 1 gm BID and Prednisone 5mg daily. Last Tacro level <2. Hold Cellcept and continue Tacrolimus at same dose and Prednisone. Would not increase Tacrolimus at this time as patient is in recovering YURI. Please check AM trough 30 minutes prior to dose. Goal Tacrolimus level is 3-5. Monitor Scr.    If any questions, please feel free to contact me  Chu Mims   Nephrology Fellow  [phone]  (After 5 pm or on weekends please page the on-call fellow)

## 2020-04-18 LAB
GLUCOSE BLDC GLUCOMTR-MCNC: 113 MG/DL — HIGH (ref 70–99)
GLUCOSE BLDC GLUCOMTR-MCNC: 85 MG/DL — SIGNIFICANT CHANGE UP (ref 70–99)
GLUCOSE BLDC GLUCOMTR-MCNC: 89 MG/DL — SIGNIFICANT CHANGE UP (ref 70–99)
GLUCOSE BLDC GLUCOMTR-MCNC: 90 MG/DL — SIGNIFICANT CHANGE UP (ref 70–99)

## 2020-04-18 PROCEDURE — 99232 SBSQ HOSP IP/OBS MODERATE 35: CPT

## 2020-04-18 RX ADMIN — SODIUM CHLORIDE 1 GRAM(S): 9 INJECTION INTRAMUSCULAR; INTRAVENOUS; SUBCUTANEOUS at 21:30

## 2020-04-18 RX ADMIN — INSULIN GLARGINE 6 UNIT(S): 100 INJECTION, SOLUTION SUBCUTANEOUS at 22:04

## 2020-04-18 RX ADMIN — SEVELAMER CARBONATE 800 MILLIGRAM(S): 2400 POWDER, FOR SUSPENSION ORAL at 11:55

## 2020-04-18 RX ADMIN — BUMETANIDE 1 MILLIGRAM(S): 0.25 INJECTION INTRAMUSCULAR; INTRAVENOUS at 11:54

## 2020-04-18 RX ADMIN — Medication 25 MILLIGRAM(S): at 22:23

## 2020-04-18 RX ADMIN — ATORVASTATIN CALCIUM 80 MILLIGRAM(S): 80 TABLET, FILM COATED ORAL at 21:30

## 2020-04-18 RX ADMIN — Medication 75 MICROGRAM(S): at 11:54

## 2020-04-18 RX ADMIN — CARVEDILOL PHOSPHATE 3.12 MILLIGRAM(S): 80 CAPSULE, EXTENDED RELEASE ORAL at 09:37

## 2020-04-18 RX ADMIN — Medication 500 MILLIGRAM(S): at 09:38

## 2020-04-18 RX ADMIN — Medication 500 MILLIGRAM(S): at 21:30

## 2020-04-18 RX ADMIN — Medication 5 MILLIGRAM(S): at 11:54

## 2020-04-18 RX ADMIN — ONDANSETRON 4 MILLIGRAM(S): 8 TABLET, FILM COATED ORAL at 09:29

## 2020-04-18 RX ADMIN — TACROLIMUS 1 MILLIGRAM(S): 5 CAPSULE ORAL at 09:38

## 2020-04-18 RX ADMIN — SEVELAMER CARBONATE 800 MILLIGRAM(S): 2400 POWDER, FOR SUSPENSION ORAL at 17:10

## 2020-04-18 RX ADMIN — POLYETHYLENE GLYCOL 3350 17 GRAM(S): 17 POWDER, FOR SOLUTION ORAL at 11:54

## 2020-04-18 RX ADMIN — Medication 81 MILLIGRAM(S): at 11:54

## 2020-04-18 RX ADMIN — Medication 5 MILLIGRAM(S): at 11:55

## 2020-04-18 RX ADMIN — ERTAPENEM SODIUM 100 MILLIGRAM(S): 1 INJECTION, POWDER, LYOPHILIZED, FOR SOLUTION INTRAMUSCULAR; INTRAVENOUS at 17:38

## 2020-04-18 RX ADMIN — CLOPIDOGREL BISULFATE 75 MILLIGRAM(S): 75 TABLET, FILM COATED ORAL at 11:55

## 2020-04-18 RX ADMIN — SODIUM CHLORIDE 1 GRAM(S): 9 INJECTION INTRAMUSCULAR; INTRAVENOUS; SUBCUTANEOUS at 11:55

## 2020-04-18 RX ADMIN — HEPARIN SODIUM 5000 UNIT(S): 5000 INJECTION INTRAVENOUS; SUBCUTANEOUS at 21:30

## 2020-04-18 RX ADMIN — HEPARIN SODIUM 5000 UNIT(S): 5000 INJECTION INTRAVENOUS; SUBCUTANEOUS at 17:10

## 2020-04-18 RX ADMIN — TACROLIMUS 1 MILLIGRAM(S): 5 CAPSULE ORAL at 21:30

## 2020-04-18 RX ADMIN — BUMETANIDE 1 MILLIGRAM(S): 0.25 INJECTION INTRAMUSCULAR; INTRAVENOUS at 21:30

## 2020-04-18 RX ADMIN — Medication 325 MILLIGRAM(S): at 11:54

## 2020-04-18 RX ADMIN — CARVEDILOL PHOSPHATE 3.12 MILLIGRAM(S): 80 CAPSULE, EXTENDED RELEASE ORAL at 21:30

## 2020-04-18 RX ADMIN — Medication 25 MILLIGRAM(S): at 11:56

## 2020-04-18 NOTE — PROGRESS NOTE ADULT - ASSESSMENT
59M w/ hx of HTN, HLD, DM, ESRD s/p kidney transplant in 05, [facility] 2010 now presenting with hypoxic respiratory failure must likely due to COVID 19 infection.

## 2020-04-18 NOTE — PHYSICAL THERAPY INITIAL EVALUATION ADULT - ADDITIONAL COMMENTS
Pt presents to [facility] from [facility]. Pt states he uses a wheelchair to get around and has not walked in 3 months. Pt states he requires assistance for ADL including transfers.

## 2020-04-18 NOTE — PROGRESS NOTE ADULT - PROBLEM SELECTOR PLAN 1
- Maintaining normal oxygenation off of supplemental O2. No acute respiratory issues.   - CXR clear  - Patient full code

## 2020-04-18 NOTE — PROGRESS NOTE ADULT - PROBLEM SELECTOR PLAN 3
Prior labs per patient's Nursing home:  March 27/2020: Cr/BUN: 3.6/24  March 30/2020: Cr/BUN: 3.9/24  April 6/2020: Cr/BUN: 4.8/20   - Appreciate Nephrology consult - renal u/s  for transplanted kidney WNL ,    -concern for noncompliance as tacrolimus level was low  -F/U spot urine creatinine noted ,  tac level <2 on 4/13   - Renal f/u appreciated , will continue current plan and medications , renal does not recommend increasing dose of immunosuppressants with ongoing infection, holding Cellcept, c/w tacrolimus and prednisone, Goal Tacrolimus level is 3-5 as per Renal, holding off on dose adjustment today given YURI per renal  - will not request records from [facility] as long as Renal fn improving as management not going to change, pt will need to f/u with his nephrologist as outpt after discharge   - Primary Nephrologist: Dr. Adrián Hawkins

## 2020-04-18 NOTE — PROGRESS NOTE ADULT - ATTENDING COMMENTS
Plan was for DC to NH on 4/17 but pt refused   will clarify IV access for abx at NH  case d/w brother, patient and family want to go to [facility], will inform SW

## 2020-04-18 NOTE — PROGRESS NOTE ADULT - PROBLEM SELECTOR PLAN 4
Will continue existing regimen of Immunosuppressants:   on Mycophenolate + Tacrolimus and Prednisone , c/w Tacrolimus and prednisone, holding Cellcept for now while inpatient  - plan as above,  Nephrology f/u appreciated  hyponatremia: chronic pt on Nacl tabs, fluid restriction to 1 L

## 2020-04-18 NOTE — PROGRESS NOTE ADULT - SUBJECTIVE AND OBJECTIVE BOX
Patient is a 59y old  Male who presents with a chief complaint of Rule out COVID 19 infection (17 Apr 2020 17:15)      SUBJECTIVE / OVERNIGHT EVENTS:    MEDICATIONS  (STANDING):  ascorbic acid  Oral Tab/Cap - Peds 500 milliGRAM(s) Oral two times a day  aspirin  chewable 81 milliGRAM(s) Oral daily  atorvastatin 80 milliGRAM(s) Oral at bedtime  bisacodyl 5 milliGRAM(s) Oral daily  buMETAnide 1 milliGRAM(s) Oral two times a day  carvedilol 3.125 milliGRAM(s) Oral every 12 hours  clopidogrel Tablet 75 milliGRAM(s) Oral daily  dextrose 5%. 1000 milliLiter(s) (50 mL/Hr) IV Continuous <Continuous>  dextrose 50% Injectable 12.5 Gram(s) IV Push once  dextrose 50% Injectable 25 Gram(s) IV Push once  dextrose 50% Injectable 25 Gram(s) IV Push once  ertapenem  IVPB      ertapenem  IVPB 500 milliGRAM(s) IV Intermittent every 24 hours  ferrous sulfate Oral Tab/Cap - Peds 325 milliGRAM(s) Oral daily  heparin  Injectable 5000 Unit(s) SubCutaneous every 8 hours  hydrALAZINE 25 milliGRAM(s) Oral two times a day  insulin glargine SubCutaneous Injection (LANTUS) - Peds 6 Unit(s) SubCutaneous at bedtime  insulin lispro (HumaLOG) corrective regimen sliding scale   SubCutaneous three times a day before meals  insulin lispro (HumaLOG) corrective regimen sliding scale   SubCutaneous at bedtime  levothyroxine 75 MICROGram(s) Oral daily  polyethylene glycol 3350 17 Gram(s) Oral daily  potassium chloride    Tablet ER 10 milliEquivalent(s) Oral daily  predniSONE   Tablet 5 milliGRAM(s) Oral daily  sevelamer carbonate 800 milliGRAM(s) Oral three times a day with meals  sodium chloride 1 Gram(s) Oral two times a day  tacrolimus 1 milliGRAM(s) Oral every 12 hours    MEDICATIONS  (PRN):  acetaminophen   Tablet .. 650 milliGRAM(s) Oral every 4 hours PRN Temp greater or equal to 38.5C (101.3F)  acetaminophen   Tablet .. 650 milliGRAM(s) Oral every 6 hours PRN Temp greater or equal to 38C (100.4F), Mild Pain (1 - 3), Moderate Pain (4 - 6)  acetaminophen  Suppository .. 650 milliGRAM(s) Rectal every 4 hours PRN Temp greater or equal to 38.5C (101.3F)  dextrose 40% Gel 15 Gram(s) Oral once PRN Blood Glucose LESS THAN 70 milliGRAM(s)/deciliter  glucagon  Injectable 1 milliGRAM(s) IntraMuscular once PRN Glucose LESS THAN 70 milligrams/deciliter  ondansetron Injectable 4 milliGRAM(s) IV Push every 8 hours PRN Nausea and/or Vomiting  sucralfate 1 Gram(s) Oral every 6 hours PRN With each meal      Vital Signs Last 24 Hrs  T(C): 37.1 (18 Apr 2020 09:27), Max: 37.1 (17 Apr 2020 12:13)  T(F): 98.7 (18 Apr 2020 09:27), Max: 98.7 (17 Apr 2020 12:13)  HR: 65 (18 Apr 2020 09:27) (60 - 65)  BP: 129/68 (18 Apr 2020 09:27) (114/53 - 141/61)  BP(mean): --  RR: 18 (18 Apr 2020 09:27) (18 - 18)  SpO2: 99% (18 Apr 2020 09:27) (98% - 100%)  CAPILLARY BLOOD GLUCOSE      POCT Blood Glucose.: 113 mg/dL (18 Apr 2020 08:30)  POCT Blood Glucose.: 176 mg/dL (17 Apr 2020 21:26)  POCT Blood Glucose.: 127 mg/dL (17 Apr 2020 16:59)  POCT Blood Glucose.: 179 mg/dL (17 Apr 2020 12:00)    I&O's Summary    17 Apr 2020 07:01  -  18 Apr 2020 07:00  --------------------------------------------------------  IN: 0 mL / OUT: 250 mL / NET: -250 mL        PHYSICAL EXAM:  GENERAL: NAD, well-developed  HEAD:  Atraumatic, Normocephalic  EYES: EOMI, PERRLA, conjunctiva and sclera clear  NECK: Supple, No JVD  CHEST/LUNG: Clear to auscultation bilaterally; No wheeze  HEART: Regular rate and rhythm; No murmurs, rubs, or gallops  ABDOMEN: Soft, Nontender, Nondistended; Bowel sounds present  EXTREMITIES:  2+ Peripheral Pulses, No clubbing, cyanosis, or edema  PSYCH: AAOx3  NEUROLOGY: non-focal  SKIN: No rashes or lesions    LABS:                        11.3   2.26  )-----------( 130      ( 17 Apr 2020 05:07 )             35.9     04-17    134<L>  |  100  |  79<H>  ----------------------------<  104<H>  4.5   |  20<L>  |  4.29<H>    Ca    9.1      17 Apr 2020 05:07  Mg     1.7     04-17                RADIOLOGY & ADDITIONAL TESTS:    Imaging Personally Reviewed:    Consultant(s) Notes Reviewed:      Care Discussed with Consultants/Other Providers: Patient is a 59y old  Male who presents with a chief complaint of Rule out COVID 19 infection (17 Apr 2020 17:15)      SUBJECTIVE / OVERNIGHT EVENTS: patient seen and examined by bedside, no acute distress noted , denies headache, dizziness, SOB, CP, Palpitations , N/V/D,   Pt says he does not want to go back to the same NH     MEDICATIONS  (STANDING):  ascorbic acid  Oral Tab/Cap - Peds 500 milliGRAM(s) Oral two times a day  aspirin  chewable 81 milliGRAM(s) Oral daily  atorvastatin 80 milliGRAM(s) Oral at bedtime  bisacodyl 5 milliGRAM(s) Oral daily  buMETAnide 1 milliGRAM(s) Oral two times a day  carvedilol 3.125 milliGRAM(s) Oral every 12 hours  clopidogrel Tablet 75 milliGRAM(s) Oral daily  dextrose 5%. 1000 milliLiter(s) (50 mL/Hr) IV Continuous <Continuous>  dextrose 50% Injectable 12.5 Gram(s) IV Push once  dextrose 50% Injectable 25 Gram(s) IV Push once  dextrose 50% Injectable 25 Gram(s) IV Push once  ertapenem  IVPB      ertapenem  IVPB 500 milliGRAM(s) IV Intermittent every 24 hours  ferrous sulfate Oral Tab/Cap - Peds 325 milliGRAM(s) Oral daily  heparin  Injectable 5000 Unit(s) SubCutaneous every 8 hours  hydrALAZINE 25 milliGRAM(s) Oral two times a day  insulin glargine SubCutaneous Injection (LANTUS) - Peds 6 Unit(s) SubCutaneous at bedtime  insulin lispro (HumaLOG) corrective regimen sliding scale   SubCutaneous three times a day before meals  insulin lispro (HumaLOG) corrective regimen sliding scale   SubCutaneous at bedtime  levothyroxine 75 MICROGram(s) Oral daily  polyethylene glycol 3350 17 Gram(s) Oral daily  potassium chloride    Tablet ER 10 milliEquivalent(s) Oral daily  predniSONE   Tablet 5 milliGRAM(s) Oral daily  sevelamer carbonate 800 milliGRAM(s) Oral three times a day with meals  sodium chloride 1 Gram(s) Oral two times a day  tacrolimus 1 milliGRAM(s) Oral every 12 hours    MEDICATIONS  (PRN):  acetaminophen   Tablet .. 650 milliGRAM(s) Oral every 4 hours PRN Temp greater or equal to 38.5C (101.3F)  acetaminophen   Tablet .. 650 milliGRAM(s) Oral every 6 hours PRN Temp greater or equal to 38C (100.4F), Mild Pain (1 - 3), Moderate Pain (4 - 6)  acetaminophen  Suppository .. 650 milliGRAM(s) Rectal every 4 hours PRN Temp greater or equal to 38.5C (101.3F)  dextrose 40% Gel 15 Gram(s) Oral once PRN Blood Glucose LESS THAN 70 milliGRAM(s)/deciliter  glucagon  Injectable 1 milliGRAM(s) IntraMuscular once PRN Glucose LESS THAN 70 milligrams/deciliter  ondansetron Injectable 4 milliGRAM(s) IV Push every 8 hours PRN Nausea and/or Vomiting  sucralfate 1 Gram(s) Oral every 6 hours PRN With each meal      Vital Signs Last 24 Hrs  T(C): 37.1 (18 Apr 2020 09:27), Max: 37.1 (17 Apr 2020 12:13)  T(F): 98.7 (18 Apr 2020 09:27), Max: 98.7 (17 Apr 2020 12:13)  HR: 65 (18 Apr 2020 09:27) (60 - 65)  BP: 129/68 (18 Apr 2020 09:27) (114/53 - 141/61)  BP(mean): --  RR: 18 (18 Apr 2020 09:27) (18 - 18)  SpO2: 99% (18 Apr 2020 09:27) (98% - 100%)  CAPILLARY BLOOD GLUCOSE      POCT Blood Glucose.: 113 mg/dL (18 Apr 2020 08:30)  POCT Blood Glucose.: 176 mg/dL (17 Apr 2020 21:26)  POCT Blood Glucose.: 127 mg/dL (17 Apr 2020 16:59)  POCT Blood Glucose.: 179 mg/dL (17 Apr 2020 12:00)    I&O's Summary    17 Apr 2020 07:01  -  18 Apr 2020 07:00  --------------------------------------------------------  IN: 0 mL / OUT: 250 mL / NET: -250 mL      PHYSICAL EXAM:  GENERAL: NAD, laying in bed  EYES: sclera clear b/l  CHEST/LUNG: normal respiratory effort, not tachypneic,   HEART:  S1 S2 regular   ABDOMEN: Soft, Nontender, Nondistended  EXTREMITIES: R AKA, L forearm AV fistula  NEUROLOGY: awake, alert, no gross deficits  PSYCH: calm, cooperative      LABS:                        11.3   2.26  )-----------( 130      ( 17 Apr 2020 05:07 )             35.9     04-17    134<L>  |  100  |  79<H>  ----------------------------<  104<H>  4.5   |  20<L>  |  4.29<H>    Ca    9.1      17 Apr 2020 05:07  Mg     1.7     04-17                RADIOLOGY & ADDITIONAL TESTS:    Imaging Personally Reviewed:    Consultant(s) Notes Reviewed:      Care Discussed with Consultants/Other Providers:

## 2020-04-19 LAB
ANION GAP SERPL CALC-SCNC: 11 MMO/L — SIGNIFICANT CHANGE UP (ref 7–14)
BUN SERPL-MCNC: 74 MG/DL — HIGH (ref 7–23)
CALCIUM SERPL-MCNC: 9.2 MG/DL — SIGNIFICANT CHANGE UP (ref 8.4–10.5)
CHLORIDE SERPL-SCNC: 103 MMOL/L — SIGNIFICANT CHANGE UP (ref 98–107)
CO2 SERPL-SCNC: 22 MMOL/L — SIGNIFICANT CHANGE UP (ref 22–31)
CREAT SERPL-MCNC: 4.34 MG/DL — HIGH (ref 0.5–1.3)
GLUCOSE BLDC GLUCOMTR-MCNC: 108 MG/DL — HIGH (ref 70–99)
GLUCOSE BLDC GLUCOMTR-MCNC: 114 MG/DL — HIGH (ref 70–99)
GLUCOSE BLDC GLUCOMTR-MCNC: 73 MG/DL — SIGNIFICANT CHANGE UP (ref 70–99)
GLUCOSE SERPL-MCNC: 81 MG/DL — SIGNIFICANT CHANGE UP (ref 70–99)
HCT VFR BLD CALC: 37 % — LOW (ref 39–50)
HGB BLD-MCNC: 11.4 G/DL — LOW (ref 13–17)
MCHC RBC-ENTMCNC: 25.4 PG — LOW (ref 27–34)
MCHC RBC-ENTMCNC: 30.8 % — LOW (ref 32–36)
MCV RBC AUTO: 82.4 FL — SIGNIFICANT CHANGE UP (ref 80–100)
NRBC # FLD: 0 K/UL — SIGNIFICANT CHANGE UP (ref 0–0)
PLATELET # BLD AUTO: 133 K/UL — LOW (ref 150–400)
PMV BLD: 9 FL — SIGNIFICANT CHANGE UP (ref 7–13)
POTASSIUM SERPL-MCNC: 4.4 MMOL/L — SIGNIFICANT CHANGE UP (ref 3.5–5.3)
POTASSIUM SERPL-SCNC: 4.4 MMOL/L — SIGNIFICANT CHANGE UP (ref 3.5–5.3)
RBC # BLD: 4.49 M/UL — SIGNIFICANT CHANGE UP (ref 4.2–5.8)
RBC # FLD: 14.6 % — HIGH (ref 10.3–14.5)
SODIUM SERPL-SCNC: 136 MMOL/L — SIGNIFICANT CHANGE UP (ref 135–145)
WBC # BLD: 2.98 K/UL — LOW (ref 3.8–10.5)
WBC # FLD AUTO: 2.98 K/UL — LOW (ref 3.8–10.5)

## 2020-04-19 PROCEDURE — 99233 SBSQ HOSP IP/OBS HIGH 50: CPT

## 2020-04-19 RX ADMIN — BUMETANIDE 1 MILLIGRAM(S): 0.25 INJECTION INTRAMUSCULAR; INTRAVENOUS at 13:25

## 2020-04-19 RX ADMIN — Medication 10 MILLIEQUIVALENT(S): at 13:25

## 2020-04-19 RX ADMIN — SODIUM CHLORIDE 1 GRAM(S): 9 INJECTION INTRAMUSCULAR; INTRAVENOUS; SUBCUTANEOUS at 17:49

## 2020-04-19 RX ADMIN — HEPARIN SODIUM 5000 UNIT(S): 5000 INJECTION INTRAVENOUS; SUBCUTANEOUS at 08:25

## 2020-04-19 RX ADMIN — Medication 25 MILLIGRAM(S): at 08:25

## 2020-04-19 RX ADMIN — Medication 325 MILLIGRAM(S): at 13:24

## 2020-04-19 RX ADMIN — HEPARIN SODIUM 5000 UNIT(S): 5000 INJECTION INTRAVENOUS; SUBCUTANEOUS at 17:49

## 2020-04-19 RX ADMIN — Medication 500 MILLIGRAM(S): at 08:25

## 2020-04-19 RX ADMIN — TACROLIMUS 1 MILLIGRAM(S): 5 CAPSULE ORAL at 08:25

## 2020-04-19 RX ADMIN — POLYETHYLENE GLYCOL 3350 17 GRAM(S): 17 POWDER, FOR SOLUTION ORAL at 13:24

## 2020-04-19 RX ADMIN — INSULIN GLARGINE 6 UNIT(S): 100 INJECTION, SOLUTION SUBCUTANEOUS at 22:28

## 2020-04-19 RX ADMIN — Medication 5 MILLIGRAM(S): at 13:24

## 2020-04-19 RX ADMIN — SEVELAMER CARBONATE 800 MILLIGRAM(S): 2400 POWDER, FOR SUSPENSION ORAL at 17:49

## 2020-04-19 RX ADMIN — CARVEDILOL PHOSPHATE 3.12 MILLIGRAM(S): 80 CAPSULE, EXTENDED RELEASE ORAL at 22:55

## 2020-04-19 RX ADMIN — ERTAPENEM SODIUM 100 MILLIGRAM(S): 1 INJECTION, POWDER, LYOPHILIZED, FOR SOLUTION INTRAMUSCULAR; INTRAVENOUS at 18:11

## 2020-04-19 RX ADMIN — TACROLIMUS 1 MILLIGRAM(S): 5 CAPSULE ORAL at 22:29

## 2020-04-19 RX ADMIN — SEVELAMER CARBONATE 800 MILLIGRAM(S): 2400 POWDER, FOR SUSPENSION ORAL at 13:24

## 2020-04-19 RX ADMIN — Medication 81 MILLIGRAM(S): at 13:24

## 2020-04-19 RX ADMIN — Medication 500 MILLIGRAM(S): at 17:54

## 2020-04-19 RX ADMIN — Medication 650 MILLIGRAM(S): at 14:54

## 2020-04-19 RX ADMIN — Medication 25 MILLIGRAM(S): at 17:49

## 2020-04-19 RX ADMIN — BUMETANIDE 1 MILLIGRAM(S): 0.25 INJECTION INTRAMUSCULAR; INTRAVENOUS at 22:29

## 2020-04-19 RX ADMIN — Medication 75 MICROGRAM(S): at 08:21

## 2020-04-19 RX ADMIN — ATORVASTATIN CALCIUM 80 MILLIGRAM(S): 80 TABLET, FILM COATED ORAL at 22:25

## 2020-04-19 RX ADMIN — SEVELAMER CARBONATE 800 MILLIGRAM(S): 2400 POWDER, FOR SUSPENSION ORAL at 08:23

## 2020-04-19 RX ADMIN — Medication 5 MILLIGRAM(S): at 08:23

## 2020-04-19 RX ADMIN — HEPARIN SODIUM 5000 UNIT(S): 5000 INJECTION INTRAVENOUS; SUBCUTANEOUS at 22:27

## 2020-04-19 RX ADMIN — CLOPIDOGREL BISULFATE 75 MILLIGRAM(S): 75 TABLET, FILM COATED ORAL at 13:24

## 2020-04-19 RX ADMIN — CARVEDILOL PHOSPHATE 3.12 MILLIGRAM(S): 80 CAPSULE, EXTENDED RELEASE ORAL at 08:25

## 2020-04-19 RX ADMIN — SODIUM CHLORIDE 1 GRAM(S): 9 INJECTION INTRAMUSCULAR; INTRAVENOUS; SUBCUTANEOUS at 08:21

## 2020-04-19 NOTE — PROGRESS NOTE ADULT - PROBLEM SELECTOR PLAN 3
Prior labs per patient's Nursing home:  March 27/2020: Cr/BUN: 3.6/24  March 30/2020: Cr/BUN: 3.9/24  April 6/2020: Cr/BUN: 4.8/20   - Appreciate Nephrology consult - renal u/s  for transplanted kidney WNL ,    -concern for noncompliance as tacrolimus level was low  -F/U spot urine creatinine noted ,  tac level <2 on 4/13   - Renal f/u appreciated , will continue current plan and medications , renal does not recommend increasing dose of immunosuppressants with ongoing infection, holding Cellcept, c/w tacrolimus and prednisone, Goal Tacrolimus level is 3-5 as per Renal, holding off on dose adjustment today given YURI per renal  - will not request records from [facility] as long as Renal fn improving as management not going to change, pt will need to f/u with his nephrologist as outpt after discharge   - Primary Nephrologist: Dr. Adrián Hawkins

## 2020-04-19 NOTE — PROVIDER CONTACT NOTE (FALL NOTIFICATION) - RECOMMENDATIONS
MD was present at bedside, patient assisted back into bed, vitals obtained and patient assessed. Bed alarm turned back on, and patient re-educated on using call bell for assistance.

## 2020-04-19 NOTE — PROGRESS NOTE ADULT - PROBLEM SELECTOR PLAN 2
- UA suggestive of infection, prior UTI from 2/2020 sensitive to ceftriaxone, resistant to zosyn. Urinary culture with ESBL Klebsiella   - Patient already received one dose of Cefepime in ED and was started on ceftriaxone, switched to Ertapenem, on 4/13 as U cx noted to be ESBL, ID recs appreciated, 14 day course per ID through 4/26/20 - UA suggestive of infection, prior UTI from 2/2020 sensitive to ceftriaxone, resistant to zosyn. Urinary culture with ESBL Klebsiella   - Patient already received one dose of Cefepime in ED and was started on ceftriaxone, switched to Ertapenem, on 4/13 as U cx noted to be ESBL, ID recs appreciated, 14 day course per ID through 4/26/20  pt febrile today, will repeat blood cx and request ID f/u    will check C diff if pt has diarrhea as pt on Abx

## 2020-04-19 NOTE — PROGRESS NOTE ADULT - SUBJECTIVE AND OBJECTIVE BOX
Patient is a 59y old  Male who presents with a chief complaint of Rule out COVID 19 infection (18 Apr 2020 10:47)      SUBJECTIVE / OVERNIGHT EVENTS:    MEDICATIONS  (STANDING):  ascorbic acid  Oral Tab/Cap - Peds 500 milliGRAM(s) Oral two times a day  aspirin  chewable 81 milliGRAM(s) Oral daily  atorvastatin 80 milliGRAM(s) Oral at bedtime  bisacodyl 5 milliGRAM(s) Oral daily  buMETAnide 1 milliGRAM(s) Oral two times a day  carvedilol 3.125 milliGRAM(s) Oral every 12 hours  clopidogrel Tablet 75 milliGRAM(s) Oral daily  dextrose 5%. 1000 milliLiter(s) (50 mL/Hr) IV Continuous <Continuous>  dextrose 50% Injectable 12.5 Gram(s) IV Push once  dextrose 50% Injectable 25 Gram(s) IV Push once  dextrose 50% Injectable 25 Gram(s) IV Push once  ertapenem  IVPB      ertapenem  IVPB 500 milliGRAM(s) IV Intermittent every 24 hours  ferrous sulfate Oral Tab/Cap - Peds 325 milliGRAM(s) Oral daily  heparin  Injectable 5000 Unit(s) SubCutaneous every 8 hours  hydrALAZINE 25 milliGRAM(s) Oral two times a day  insulin glargine SubCutaneous Injection (LANTUS) - Peds 6 Unit(s) SubCutaneous at bedtime  insulin lispro (HumaLOG) corrective regimen sliding scale   SubCutaneous three times a day before meals  insulin lispro (HumaLOG) corrective regimen sliding scale   SubCutaneous at bedtime  levothyroxine 75 MICROGram(s) Oral daily  polyethylene glycol 3350 17 Gram(s) Oral daily  potassium chloride    Tablet ER 10 milliEquivalent(s) Oral daily  predniSONE   Tablet 5 milliGRAM(s) Oral daily  sevelamer carbonate 800 milliGRAM(s) Oral three times a day with meals  sodium chloride 1 Gram(s) Oral two times a day  tacrolimus 1 milliGRAM(s) Oral every 12 hours    MEDICATIONS  (PRN):  acetaminophen   Tablet .. 650 milliGRAM(s) Oral every 4 hours PRN Temp greater or equal to 38.5C (101.3F)  acetaminophen   Tablet .. 650 milliGRAM(s) Oral every 6 hours PRN Temp greater or equal to 38C (100.4F), Mild Pain (1 - 3), Moderate Pain (4 - 6)  acetaminophen  Suppository .. 650 milliGRAM(s) Rectal every 4 hours PRN Temp greater or equal to 38.5C (101.3F)  dextrose 40% Gel 15 Gram(s) Oral once PRN Blood Glucose LESS THAN 70 milliGRAM(s)/deciliter  glucagon  Injectable 1 milliGRAM(s) IntraMuscular once PRN Glucose LESS THAN 70 milligrams/deciliter  ondansetron Injectable 4 milliGRAM(s) IV Push every 8 hours PRN Nausea and/or Vomiting  sucralfate 1 Gram(s) Oral every 6 hours PRN With each meal      Vital Signs Last 24 Hrs  T(C): 38.1 (19 Apr 2020 08:20), Max: 38.1 (19 Apr 2020 08:20)  T(F): 100.6 (19 Apr 2020 08:20), Max: 100.6 (19 Apr 2020 08:20)  HR: 70 (19 Apr 2020 08:20) (70 - 77)  BP: 139/55 (19 Apr 2020 08:20) (112/60 - 139/55)  BP(mean): --  RR: 17 (19 Apr 2020 08:20) (17 - 18)  SpO2: 98% (19 Apr 2020 08:20) (98% - 100%)  CAPILLARY BLOOD GLUCOSE      POCT Blood Glucose.: 85 mg/dL (18 Apr 2020 21:25)  POCT Blood Glucose.: 90 mg/dL (18 Apr 2020 16:56)  POCT Blood Glucose.: 89 mg/dL (18 Apr 2020 12:05)    I&O's Summary    18 Apr 2020 07:01  -  19 Apr 2020 07:00  --------------------------------------------------------  IN: 0 mL / OUT: 700 mL / NET: -700 mL        PHYSICAL EXAM:  GENERAL: NAD, well-developed  HEAD:  Atraumatic, Normocephalic  EYES: EOMI, PERRLA, conjunctiva and sclera clear  NECK: Supple, No JVD  CHEST/LUNG: Clear to auscultation bilaterally; No wheeze  HEART: Regular rate and rhythm; No murmurs, rubs, or gallops  ABDOMEN: Soft, Nontender, Nondistended; Bowel sounds present  EXTREMITIES:  2+ Peripheral Pulses, No clubbing, cyanosis, or edema  PSYCH: AAOx3  NEUROLOGY: non-focal  SKIN: No rashes or lesions    LABS:                    RADIOLOGY & ADDITIONAL TESTS:    Imaging Personally Reviewed:    Consultant(s) Notes Reviewed:      Care Discussed with Consultants/Other Providers: Patient is a 59y old  Male who presents with a chief complaint of Rule out COVID 19 infection (18 Apr 2020 10:47)      SUBJECTIVE / OVERNIGHT EVENTS: patient seen and examined by bedside, pt was found on the floor  this morning , was trying to go the bathroom , denies any injury. Pt also noted to be febrile, c/o abdominal discomfort , denies dysuria       MEDICATIONS  (STANDING):  ascorbic acid  Oral Tab/Cap - Peds 500 milliGRAM(s) Oral two times a day  aspirin  chewable 81 milliGRAM(s) Oral daily  atorvastatin 80 milliGRAM(s) Oral at bedtime  bisacodyl 5 milliGRAM(s) Oral daily  buMETAnide 1 milliGRAM(s) Oral two times a day  carvedilol 3.125 milliGRAM(s) Oral every 12 hours  clopidogrel Tablet 75 milliGRAM(s) Oral daily  dextrose 5%. 1000 milliLiter(s) (50 mL/Hr) IV Continuous <Continuous>  dextrose 50% Injectable 12.5 Gram(s) IV Push once  dextrose 50% Injectable 25 Gram(s) IV Push once  dextrose 50% Injectable 25 Gram(s) IV Push once  ertapenem  IVPB      ertapenem  IVPB 500 milliGRAM(s) IV Intermittent every 24 hours  ferrous sulfate Oral Tab/Cap - Peds 325 milliGRAM(s) Oral daily  heparin  Injectable 5000 Unit(s) SubCutaneous every 8 hours  hydrALAZINE 25 milliGRAM(s) Oral two times a day  insulin glargine SubCutaneous Injection (LANTUS) - Peds 6 Unit(s) SubCutaneous at bedtime  insulin lispro (HumaLOG) corrective regimen sliding scale   SubCutaneous three times a day before meals  insulin lispro (HumaLOG) corrective regimen sliding scale   SubCutaneous at bedtime  levothyroxine 75 MICROGram(s) Oral daily  polyethylene glycol 3350 17 Gram(s) Oral daily  potassium chloride    Tablet ER 10 milliEquivalent(s) Oral daily  predniSONE   Tablet 5 milliGRAM(s) Oral daily  sevelamer carbonate 800 milliGRAM(s) Oral three times a day with meals  sodium chloride 1 Gram(s) Oral two times a day  tacrolimus 1 milliGRAM(s) Oral every 12 hours    MEDICATIONS  (PRN):  acetaminophen   Tablet .. 650 milliGRAM(s) Oral every 4 hours PRN Temp greater or equal to 38.5C (101.3F)  acetaminophen   Tablet .. 650 milliGRAM(s) Oral every 6 hours PRN Temp greater or equal to 38C (100.4F), Mild Pain (1 - 3), Moderate Pain (4 - 6)  acetaminophen  Suppository .. 650 milliGRAM(s) Rectal every 4 hours PRN Temp greater or equal to 38.5C (101.3F)  dextrose 40% Gel 15 Gram(s) Oral once PRN Blood Glucose LESS THAN 70 milliGRAM(s)/deciliter  glucagon  Injectable 1 milliGRAM(s) IntraMuscular once PRN Glucose LESS THAN 70 milligrams/deciliter  ondansetron Injectable 4 milliGRAM(s) IV Push every 8 hours PRN Nausea and/or Vomiting  sucralfate 1 Gram(s) Oral every 6 hours PRN With each meal      Vital Signs Last 24 Hrs  T(C): 38.1 (19 Apr 2020 08:20), Max: 38.1 (19 Apr 2020 08:20)  T(F): 100.6 (19 Apr 2020 08:20), Max: 100.6 (19 Apr 2020 08:20)  HR: 70 (19 Apr 2020 08:20) (70 - 77)  BP: 139/55 (19 Apr 2020 08:20) (112/60 - 139/55)  BP(mean): --  RR: 17 (19 Apr 2020 08:20) (17 - 18)  SpO2: 98% (19 Apr 2020 08:20) (98% - 100%)  CAPILLARY BLOOD GLUCOSE      POCT Blood Glucose.: 85 mg/dL (18 Apr 2020 21:25)  POCT Blood Glucose.: 90 mg/dL (18 Apr 2020 16:56)  POCT Blood Glucose.: 89 mg/dL (18 Apr 2020 12:05)    I&O's Summary    18 Apr 2020 07:01  -  19 Apr 2020 07:00  --------------------------------------------------------  IN: 0 mL / OUT: 700 mL / NET: -700 mL  PHYSICAL EXAM:  GENERAL: NAD, laying in bed  EYES: sclera clear b/l  CHEST/LUNG: normal respiratory effort, not tachypneic,   HEART:  S1 S2 regular   ABDOMEN: Soft, Nontender, Nondistended  EXTREMITIES: R AKA, L forearm AV fistula  NEUROLOGY: awake, alert, no gross deficits  PSYCH: calm, cooperative          LABS:                          11.4   2.98  )-----------( 133      ( 19 Apr 2020 10:35 )             37.0       04-19    136  |  103  |  74<H>  ----------------------------<  81  4.4   |  22  |  4.34<H>    Ca    9.2      19 Apr 2020 10:35                RADIOLOGY & ADDITIONAL TESTS:    Imaging Personally Reviewed:    Consultant(s) Notes Reviewed:      Care Discussed with Consultants/Other Providers: Patient is a 59y old  Male who presents with a chief complaint of Rule out COVID 19 infection (18 Apr 2020 10:47)      SUBJECTIVE / OVERNIGHT EVENTS: patient seen and examined by bedside, pt was found on the floor  this morning , was trying to go the bathroom , denies any injury. Pt also noted to be febrile, c/o abdominal discomfort , denies dysuria       MEDICATIONS  (STANDING):  ascorbic acid  Oral Tab/Cap - Peds 500 milliGRAM(s) Oral two times a day  aspirin  chewable 81 milliGRAM(s) Oral daily  atorvastatin 80 milliGRAM(s) Oral at bedtime  bisacodyl 5 milliGRAM(s) Oral daily  buMETAnide 1 milliGRAM(s) Oral two times a day  carvedilol 3.125 milliGRAM(s) Oral every 12 hours  clopidogrel Tablet 75 milliGRAM(s) Oral daily  dextrose 5%. 1000 milliLiter(s) (50 mL/Hr) IV Continuous <Continuous>  dextrose 50% Injectable 12.5 Gram(s) IV Push once  dextrose 50% Injectable 25 Gram(s) IV Push once  dextrose 50% Injectable 25 Gram(s) IV Push once  ertapenem  IVPB      ertapenem  IVPB 500 milliGRAM(s) IV Intermittent every 24 hours  ferrous sulfate Oral Tab/Cap - Peds 325 milliGRAM(s) Oral daily  heparin  Injectable 5000 Unit(s) SubCutaneous every 8 hours  hydrALAZINE 25 milliGRAM(s) Oral two times a day  insulin glargine SubCutaneous Injection (LANTUS) - Peds 6 Unit(s) SubCutaneous at bedtime  insulin lispro (HumaLOG) corrective regimen sliding scale   SubCutaneous three times a day before meals  insulin lispro (HumaLOG) corrective regimen sliding scale   SubCutaneous at bedtime  levothyroxine 75 MICROGram(s) Oral daily  polyethylene glycol 3350 17 Gram(s) Oral daily  potassium chloride    Tablet ER 10 milliEquivalent(s) Oral daily  predniSONE   Tablet 5 milliGRAM(s) Oral daily  sevelamer carbonate 800 milliGRAM(s) Oral three times a day with meals  sodium chloride 1 Gram(s) Oral two times a day  tacrolimus 1 milliGRAM(s) Oral every 12 hours    MEDICATIONS  (PRN):  acetaminophen   Tablet .. 650 milliGRAM(s) Oral every 4 hours PRN Temp greater or equal to 38.5C (101.3F)  acetaminophen   Tablet .. 650 milliGRAM(s) Oral every 6 hours PRN Temp greater or equal to 38C (100.4F), Mild Pain (1 - 3), Moderate Pain (4 - 6)  acetaminophen  Suppository .. 650 milliGRAM(s) Rectal every 4 hours PRN Temp greater or equal to 38.5C (101.3F)  dextrose 40% Gel 15 Gram(s) Oral once PRN Blood Glucose LESS THAN 70 milliGRAM(s)/deciliter  glucagon  Injectable 1 milliGRAM(s) IntraMuscular once PRN Glucose LESS THAN 70 milligrams/deciliter  ondansetron Injectable 4 milliGRAM(s) IV Push every 8 hours PRN Nausea and/or Vomiting  sucralfate 1 Gram(s) Oral every 6 hours PRN With each meal      Vital Signs Last 24 Hrs  T(C): 38.1 (19 Apr 2020 08:20), Max: 38.1 (19 Apr 2020 08:20)  T(F): 100.6 (19 Apr 2020 08:20), Max: 100.6 (19 Apr 2020 08:20)  HR: 70 (19 Apr 2020 08:20) (70 - 77)  BP: 139/55 (19 Apr 2020 08:20) (112/60 - 139/55)  BP(mean): --  RR: 17 (19 Apr 2020 08:20) (17 - 18)  SpO2: 98% (19 Apr 2020 08:20) (98% - 100%)  CAPILLARY BLOOD GLUCOSE      POCT Blood Glucose.: 85 mg/dL (18 Apr 2020 21:25)  POCT Blood Glucose.: 90 mg/dL (18 Apr 2020 16:56)  POCT Blood Glucose.: 89 mg/dL (18 Apr 2020 12:05)    I&O's Summary    18 Apr 2020 07:01  -  19 Apr 2020 07:00  --------------------------------------------------------  IN: 0 mL / OUT: 700 mL / NET: -700 mL  PHYSICAL EXAM:  GENERAL: NAD, laying in bed  EYES: sclera clear b/l  CHEST/LUNG: normal respiratory effort, not tachypneic,   HEART:  S1 S2 regular   ABDOMEN: Soft, Nontender, Nondistended  EXTREMITIES: R AKA, L forearm AV fistula  NEUROLOGY: awake, alert, no gross deficits  PSYCH: calm, cooperative          LABS:                          11.4   2.98  )-----------( 133      ( 19 Apr 2020 10:35 )             37.0       04-19    136  |  103  |  74<H>  ----------------------------<  81  4.4   |  22  |  4.34<H>    Ca    9.2      19 Apr 2020 10:35                RADIOLOGY & ADDITIONAL TESTS:    Imaging Personally Reviewed:    Consultant(s) Notes Reviewed:      Care Discussed with Consultants/Other Providers: ID fellow

## 2020-04-19 NOTE — PROVIDER CONTACT NOTE (FALL NOTIFICATION) - ASSESSMENT
Patient denies shortness of breath, chest pain, trouble breathing. Patient denies any signs/symptoms of distress, pain or headache. Patient currently back in bed and appears comfortable.

## 2020-04-19 NOTE — PROGRESS NOTE ADULT - ASSESSMENT
59M w/ hx of HTN, HLD, DM, ESRD s/p kidney transplant in 05, [facility] now presenting with hypoxic respiratory failure must likely due to COVID 19 infection.

## 2020-04-19 NOTE — CHART NOTE - NSCHARTNOTEFT_GEN_A_CORE
Pt was found on floor in bathroom when I entered room.  Pt was awake, alert oriented x3. He states that he had called for the RN to try to go to the bathroom, but then stated he could not wait so got up himself to try and go.  Then he fell on the floor. Pt was found lying on his left side. He denies hitting his head, he denies LOC, he denies pain at this current time.  Pt was assisted back to bed by myself and RNs. He was able to assist us - moving all extremities. He was educated about not getting out of bed himself and he in agreement.   No further testing needed at this time.  Pt's brother was made aware of incident via phone.  Attending aware.

## 2020-04-19 NOTE — PROVIDER CONTACT NOTE (FALL NOTIFICATION) - ACTION/TREATMENT ORDERED:
MD aware and present at bedside. Patient was assisted back into bed and assessed, vitals were taken. MD aware and present at bedside. Patient was assisted back into bed and assessed, vitals were taken. Call bell at bedside with patient. Toileting plan scheduled.

## 2020-04-19 NOTE — PROVIDER CONTACT NOTE (FALL NOTIFICATION) - SITUATION
Patient found on floor in bathroom and reports that he was going to the bathroom with walker as assistive device. 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 Patient found on floor in bathroom and reports that he was going to the bathroom with walker as assistive device. Patient walks with prosthetic at baseline for R BKA but is without prosthetic.

## 2020-04-19 NOTE — PROGRESS NOTE ADULT - ATTENDING COMMENTS
Plan was for DC to NH on 4/17 but pt refused   will clarify IV access for abx at NH  case d/w brother, patient and family want to go to [facility], will inform SW Plan was for DC to NH on 4/17 but pt refused   As per brother, patient and family want to go to [facility], will inform SW  plan d/w bother again on 4/19 , updates provided

## 2020-04-20 LAB
ALBUMIN SERPL ELPH-MCNC: 2.8 G/DL — LOW (ref 3.3–5)
ALP SERPL-CCNC: 67 U/L — SIGNIFICANT CHANGE UP (ref 40–120)
ALT FLD-CCNC: < 5 U/L — SIGNIFICANT CHANGE UP (ref 4–41)
ANION GAP SERPL CALC-SCNC: 12 MMO/L — SIGNIFICANT CHANGE UP (ref 7–14)
AST SERPL-CCNC: 11 U/L — SIGNIFICANT CHANGE UP (ref 4–40)
BASOPHILS # BLD AUTO: 0.01 K/UL — SIGNIFICANT CHANGE UP (ref 0–0.2)
BASOPHILS NFR BLD AUTO: 0.3 % — SIGNIFICANT CHANGE UP (ref 0–2)
BILIRUB SERPL-MCNC: 0.2 MG/DL — SIGNIFICANT CHANGE UP (ref 0.2–1.2)
BUN SERPL-MCNC: 78 MG/DL — HIGH (ref 7–23)
CALCIUM SERPL-MCNC: 9.1 MG/DL — SIGNIFICANT CHANGE UP (ref 8.4–10.5)
CHLORIDE SERPL-SCNC: 105 MMOL/L — SIGNIFICANT CHANGE UP (ref 98–107)
CO2 SERPL-SCNC: 19 MMOL/L — LOW (ref 22–31)
CREAT SERPL-MCNC: 4.36 MG/DL — HIGH (ref 0.5–1.3)
CRP SERPL-MCNC: 23.9 MG/L — HIGH
EOSINOPHIL # BLD AUTO: 0.01 K/UL — SIGNIFICANT CHANGE UP (ref 0–0.5)
EOSINOPHIL NFR BLD AUTO: 0.3 % — SIGNIFICANT CHANGE UP (ref 0–6)
FERRITIN SERPL-MCNC: 1601 NG/ML — HIGH (ref 30–400)
GLUCOSE BLDC GLUCOMTR-MCNC: 130 MG/DL — HIGH (ref 70–99)
GLUCOSE BLDC GLUCOMTR-MCNC: 144 MG/DL — HIGH (ref 70–99)
GLUCOSE BLDC GLUCOMTR-MCNC: 156 MG/DL — HIGH (ref 70–99)
GLUCOSE BLDC GLUCOMTR-MCNC: 95 MG/DL — SIGNIFICANT CHANGE UP (ref 70–99)
GLUCOSE SERPL-MCNC: 84 MG/DL — SIGNIFICANT CHANGE UP (ref 70–99)
HCT VFR BLD CALC: 36.4 % — LOW (ref 39–50)
HGB BLD-MCNC: 11 G/DL — LOW (ref 13–17)
IMM GRANULOCYTES NFR BLD AUTO: 0.7 % — SIGNIFICANT CHANGE UP (ref 0–1.5)
LYMPHOCYTES # BLD AUTO: 0.98 K/UL — LOW (ref 1–3.3)
LYMPHOCYTES # BLD AUTO: 34.1 % — SIGNIFICANT CHANGE UP (ref 13–44)
MCHC RBC-ENTMCNC: 24.8 PG — LOW (ref 27–34)
MCHC RBC-ENTMCNC: 30.2 % — LOW (ref 32–36)
MCV RBC AUTO: 82.2 FL — SIGNIFICANT CHANGE UP (ref 80–100)
MONOCYTES # BLD AUTO: 0.31 K/UL — SIGNIFICANT CHANGE UP (ref 0–0.9)
MONOCYTES NFR BLD AUTO: 10.8 % — SIGNIFICANT CHANGE UP (ref 2–14)
NEUTROPHILS # BLD AUTO: 1.54 K/UL — LOW (ref 1.8–7.4)
NEUTROPHILS NFR BLD AUTO: 53.8 % — SIGNIFICANT CHANGE UP (ref 43–77)
NRBC # FLD: 0 K/UL — SIGNIFICANT CHANGE UP (ref 0–0)
PLATELET # BLD AUTO: 119 K/UL — LOW (ref 150–400)
PMV BLD: 8.8 FL — SIGNIFICANT CHANGE UP (ref 7–13)
POTASSIUM SERPL-MCNC: 4.8 MMOL/L — SIGNIFICANT CHANGE UP (ref 3.5–5.3)
POTASSIUM SERPL-SCNC: 4.8 MMOL/L — SIGNIFICANT CHANGE UP (ref 3.5–5.3)
PROT SERPL-MCNC: 6.3 G/DL — SIGNIFICANT CHANGE UP (ref 6–8.3)
RBC # BLD: 4.43 M/UL — SIGNIFICANT CHANGE UP (ref 4.2–5.8)
RBC # FLD: 14.8 % — HIGH (ref 10.3–14.5)
SODIUM SERPL-SCNC: 136 MMOL/L — SIGNIFICANT CHANGE UP (ref 135–145)
TACROLIMUS SERPL-MCNC: < 2 NG/ML — SIGNIFICANT CHANGE UP
WBC # BLD: 2.87 K/UL — LOW (ref 3.8–10.5)
WBC # FLD AUTO: 2.87 K/UL — LOW (ref 3.8–10.5)

## 2020-04-20 PROCEDURE — 99233 SBSQ HOSP IP/OBS HIGH 50: CPT

## 2020-04-20 PROCEDURE — 99232 SBSQ HOSP IP/OBS MODERATE 35: CPT

## 2020-04-20 RX ORDER — ERTAPENEM SODIUM 1 G/1
500 INJECTION, POWDER, LYOPHILIZED, FOR SOLUTION INTRAMUSCULAR; INTRAVENOUS EVERY 24 HOURS
Refills: 0 | Status: COMPLETED | OUTPATIENT
Start: 2020-04-20 | End: 2020-04-25

## 2020-04-20 RX ADMIN — Medication 10 MILLIEQUIVALENT(S): at 12:12

## 2020-04-20 RX ADMIN — BUMETANIDE 1 MILLIGRAM(S): 0.25 INJECTION INTRAMUSCULAR; INTRAVENOUS at 06:12

## 2020-04-20 RX ADMIN — Medication 75 MICROGRAM(S): at 06:11

## 2020-04-20 RX ADMIN — HEPARIN SODIUM 5000 UNIT(S): 5000 INJECTION INTRAVENOUS; SUBCUTANEOUS at 06:12

## 2020-04-20 RX ADMIN — SODIUM CHLORIDE 1 GRAM(S): 9 INJECTION INTRAMUSCULAR; INTRAVENOUS; SUBCUTANEOUS at 06:12

## 2020-04-20 RX ADMIN — ERTAPENEM SODIUM 100 MILLIGRAM(S): 1 INJECTION, POWDER, LYOPHILIZED, FOR SOLUTION INTRAMUSCULAR; INTRAVENOUS at 18:00

## 2020-04-20 RX ADMIN — HEPARIN SODIUM 5000 UNIT(S): 5000 INJECTION INTRAVENOUS; SUBCUTANEOUS at 12:27

## 2020-04-20 RX ADMIN — Medication 25 MILLIGRAM(S): at 18:01

## 2020-04-20 RX ADMIN — SEVELAMER CARBONATE 800 MILLIGRAM(S): 2400 POWDER, FOR SUSPENSION ORAL at 08:16

## 2020-04-20 RX ADMIN — POLYETHYLENE GLYCOL 3350 17 GRAM(S): 17 POWDER, FOR SOLUTION ORAL at 12:12

## 2020-04-20 RX ADMIN — HEPARIN SODIUM 5000 UNIT(S): 5000 INJECTION INTRAVENOUS; SUBCUTANEOUS at 22:41

## 2020-04-20 RX ADMIN — Medication 500 MILLIGRAM(S): at 07:54

## 2020-04-20 RX ADMIN — SEVELAMER CARBONATE 800 MILLIGRAM(S): 2400 POWDER, FOR SUSPENSION ORAL at 18:01

## 2020-04-20 RX ADMIN — Medication 81 MILLIGRAM(S): at 12:12

## 2020-04-20 RX ADMIN — ATORVASTATIN CALCIUM 80 MILLIGRAM(S): 80 TABLET, FILM COATED ORAL at 22:41

## 2020-04-20 RX ADMIN — CARVEDILOL PHOSPHATE 3.12 MILLIGRAM(S): 80 CAPSULE, EXTENDED RELEASE ORAL at 18:01

## 2020-04-20 RX ADMIN — Medication 5 MILLIGRAM(S): at 06:11

## 2020-04-20 RX ADMIN — SODIUM CHLORIDE 1 GRAM(S): 9 INJECTION INTRAMUSCULAR; INTRAVENOUS; SUBCUTANEOUS at 18:01

## 2020-04-20 RX ADMIN — BUMETANIDE 1 MILLIGRAM(S): 0.25 INJECTION INTRAMUSCULAR; INTRAVENOUS at 18:01

## 2020-04-20 RX ADMIN — TACROLIMUS 1 MILLIGRAM(S): 5 CAPSULE ORAL at 22:41

## 2020-04-20 RX ADMIN — SEVELAMER CARBONATE 800 MILLIGRAM(S): 2400 POWDER, FOR SUSPENSION ORAL at 12:12

## 2020-04-20 RX ADMIN — Medication 25 MILLIGRAM(S): at 06:11

## 2020-04-20 RX ADMIN — INSULIN GLARGINE 6 UNIT(S): 100 INJECTION, SOLUTION SUBCUTANEOUS at 22:41

## 2020-04-20 RX ADMIN — Medication 500 MILLIGRAM(S): at 18:03

## 2020-04-20 RX ADMIN — CARVEDILOL PHOSPHATE 3.12 MILLIGRAM(S): 80 CAPSULE, EXTENDED RELEASE ORAL at 06:12

## 2020-04-20 RX ADMIN — TACROLIMUS 1 MILLIGRAM(S): 5 CAPSULE ORAL at 08:16

## 2020-04-20 RX ADMIN — CLOPIDOGREL BISULFATE 75 MILLIGRAM(S): 75 TABLET, FILM COATED ORAL at 12:12

## 2020-04-20 RX ADMIN — Medication 5 MILLIGRAM(S): at 12:12

## 2020-04-20 RX ADMIN — Medication 325 MILLIGRAM(S): at 12:12

## 2020-04-20 NOTE — PROGRESS NOTE ADULT - ATTENDING COMMENTS
Clarita Perdomo MD  Pager: [phone]  After 5 PM or weekends please call fellow on call or office [phone]

## 2020-04-20 NOTE — PROGRESS NOTE ADULT - PROBLEM SELECTOR PLAN 2
- UA suggestive of infection, prior UTI from 2/2020 sensitive to ceftriaxone, resistant to zosyn. Urinary culture with ESBL Klebsiella   - Patient already received one dose of Cefepime in ED and was started on ceftriaxone, switched to Ertapenem, on 4/13 as U cx noted to be ESBL, ID recs appreciated, 14 day course per ID through 4/26/20  pt febrile yesterday, no sign of new infection, f/u repeat blood cx

## 2020-04-20 NOTE — PROGRESS NOTE ADULT - ASSESSMENT
59M w/ hx of HTN, HLD, DM, ESRD s/p kidney transplant in 05, CAD s/p stent a few months ago at [facility] s/p R AKA in 2010 now COVID positive, initially requiring O2 now improved not requiring O2, also with ESBL klebsiella urinary infection on IV abx per ID.

## 2020-04-20 NOTE — PROGRESS NOTE ADULT - ATTENDING COMMENTS
Awaiting disposition to NH  Spiked fever on 4/19/20, cultures obtained, no signs of new infection, may be 2/2 recent COVID+ infection  Continue IV antibiotics per ID  Updated brother via telephone

## 2020-04-20 NOTE — PROGRESS NOTE ADULT - PROBLEM SELECTOR PLAN 3
Prior labs per patient's Nursing home:  March 27/2020: Cr/BUN: 3.6/24  March 30/2020: Cr/BUN: 3.9/24  April 6/2020: Cr/BUN: 4.8/20   - Appreciate Nephrology consult - renal u/s  for transplanted kidney WNL ,    -concern for noncompliance as tacrolimus level was low  -F/U spot urine creatinine noted ,  tac level <2 on 4/13   - Renal f/u appreciated , will continue current plan and medications , renal does not recommend increasing dose of immunosuppressants with ongoing infection, holding Cellcept, c/w tacrolimus and prednisone, Goal Tacrolimus level is 3-5 as per Renal, holding off on dose adjustment given YURI per renal  - will not request records from [facility] as long as Renal fn improving as management not going to change, pt will need to f/u with his nephrologist as outpt after discharge   - Primary Nephrologist: Dr. Adrián Hawkins

## 2020-04-20 NOTE — PROGRESS NOTE ADULT - ASSESSMENT
58 yo man with h/o renal transplant presenting 4/11 from nursing home  covid+ oxygenating well on RA.  pyelo transplant kidney with  ESBL Klebsiella in urine    1) Pyelonephritis  - continue ertapenem 500 mg iv q 24 h x 14 days (longer duration in view of transplanted kidney) (4/13 --> 4/26)  - nephrology following for renal transplant    2) COVID-19  - oxygenating well on RA  - supportive care  -maintain airborne /contact precautions    3) fever 4/19  -blood culture testing    4) abdominal pain   - monitor exam  -if diarrhea send stool for c dif    Clarita Perdomo MD  Pager: [phone]  After 5 PM or weekends please call fellow on call or office [phone]

## 2020-04-20 NOTE — PROGRESS NOTE ADULT - SUBJECTIVE AND OBJECTIVE BOX
Patient is a 59y old  Male who presents with a chief complaint of Rule out COVID 19 infection (18 Apr 2020 10:47)    SUBJECTIVE / OVERNIGHT EVENTS: Spiked fever to 102 at 3pm yesterday. No acute overnight events. Patient comfortable this AM, no breathing difficulty, no fever or chills.     MEDICATIONS  (STANDING):  ascorbic acid  Oral Tab/Cap - Peds 500 milliGRAM(s) Oral two times a day  aspirin  chewable 81 milliGRAM(s) Oral daily  atorvastatin 80 milliGRAM(s) Oral at bedtime  bisacodyl 5 milliGRAM(s) Oral daily  buMETAnide 1 milliGRAM(s) Oral two times a day  carvedilol 3.125 milliGRAM(s) Oral every 12 hours  clopidogrel Tablet 75 milliGRAM(s) Oral daily  dextrose 5%. 1000 milliLiter(s) (50 mL/Hr) IV Continuous <Continuous>  dextrose 50% Injectable 12.5 Gram(s) IV Push once  dextrose 50% Injectable 25 Gram(s) IV Push once  dextrose 50% Injectable 25 Gram(s) IV Push once  ertapenem  IVPB 500 milliGRAM(s) IV Intermittent every 24 hours  ferrous sulfate Oral Tab/Cap - Peds 325 milliGRAM(s) Oral daily  heparin  Injectable 5000 Unit(s) SubCutaneous every 8 hours  hydrALAZINE 25 milliGRAM(s) Oral two times a day  insulin glargine SubCutaneous Injection (LANTUS) - Peds 6 Unit(s) SubCutaneous at bedtime  insulin lispro (HumaLOG) corrective regimen sliding scale   SubCutaneous three times a day before meals  insulin lispro (HumaLOG) corrective regimen sliding scale   SubCutaneous at bedtime  levothyroxine 75 MICROGram(s) Oral daily  polyethylene glycol 3350 17 Gram(s) Oral daily  potassium chloride    Tablet ER 10 milliEquivalent(s) Oral daily  predniSONE   Tablet 5 milliGRAM(s) Oral daily  sevelamer carbonate 800 milliGRAM(s) Oral three times a day with meals  sodium chloride 1 Gram(s) Oral two times a day  tacrolimus 1 milliGRAM(s) Oral every 12 hours    MEDICATIONS  (PRN):  acetaminophen   Tablet .. 650 milliGRAM(s) Oral every 4 hours PRN Temp greater or equal to 38.5C (101.3F)  acetaminophen   Tablet .. 650 milliGRAM(s) Oral every 6 hours PRN Temp greater or equal to 38C (100.4F), Mild Pain (1 - 3), Moderate Pain (4 - 6)  acetaminophen  Suppository .. 650 milliGRAM(s) Rectal every 4 hours PRN Temp greater or equal to 38.5C (101.3F)  dextrose 40% Gel 15 Gram(s) Oral once PRN Blood Glucose LESS THAN 70 milliGRAM(s)/deciliter  glucagon  Injectable 1 milliGRAM(s) IntraMuscular once PRN Glucose LESS THAN 70 milligrams/deciliter  ondansetron Injectable 4 milliGRAM(s) IV Push every 8 hours PRN Nausea and/or Vomiting  sucralfate 1 Gram(s) Oral every 6 hours PRN With each meal    Vital Signs Last 24 Hrs  T(C): 37 (19 Apr 2020 22:54), Max: 38.5 (19 Apr 2020 14:53)  T(F): 98.6 (19 Apr 2020 22:54), Max: 101.3 (19 Apr 2020 14:53)  HR: 65 (20 Apr 2020 05:59) (62 - 65)  BP: 143/70 (20 Apr 2020 05:59) (139/69 - 143/70)  BP(mean): --  RR: 20 (20 Apr 2020 09:23) (18 - 20)  SpO2: 98% (20 Apr 2020 09:23) (98% - 100%)    I&O's Detail    19 Apr 2020 07:01  -  20 Apr 2020 07:00  --------------------------------------------------------  IN:  Total IN: 0 mL    OUT:    Voided: 200 mL  Total OUT: 200 mL    Total NET: -200 mL    20 Apr 2020 07:01  -  20 Apr 2020 14:20  --------------------------------------------------------  IN:  Total IN: 0 mL    OUT:    Voided: 250 mL  Total OUT: 250 mL    Total NET: -250 mL    CAPILLARY BLOOD GLUCOSE  POCT Blood Glucose.: 130 mg/dL (20 Apr 2020 11:27)  POCT Blood Glucose.: 95 mg/dL (20 Apr 2020 08:14)  POCT Blood Glucose.: 108 mg/dL (19 Apr 2020 21:56)  POCT Blood Glucose.: 114 mg/dL (19 Apr 2020 17:26)    PHYSICAL EXAM:  GENERAL: NAD, sitting up in bed  EYES: sclera clear b/l  CHEST/LUNG: normal respiratory effort, not tachypneic,   HEART: RRR, nl S1 S2   ABDOMEN: Soft, Nontender, Nondistended  EXTREMITIES: R AKA, L forearm AV fistula  NEUROLOGY: awake, alert, no gross deficits  PSYCH: calm, cooperative    LABS:                         11.0   2.87  )-----------( 119      ( 20 Apr 2020 05:33 )             36.4     04-20    136  |  105  |  78<H>  ----------------------------<  84  4.8   |  19<L>  |  4.36<H>    Ca    9.1      20 Apr 2020 05:33    TPro  6.3  /  Alb  2.8<L>  /  TBili  0.2  /  DBili  x   /  AST  11  /  ALT  < 5  /  AlkPhos  67  04-20    RADIOLOGY & ADDITIONAL TESTS: Reviewed.

## 2020-04-20 NOTE — PROGRESS NOTE ADULT - SUBJECTIVE AND OBJECTIVE BOX
Follow Up:  covid-19, pyelonephritis, renal transplant    Interval History:   fell yesterday in bathroom.  febrile late afternoon yesterday- blood culture repeated.    notes mid abdominal pain "comes and goes"   8/10        Allergies  iodine (Vomiting)  IV Contrast (Unknown)        ANTIMICROBIALS:  ertapenem  IVPB 500 every 24 hours    MEDICATIONS  (STANDING):  acetaminophen   Tablet .. 650 every 4 hours PRN  acetaminophen   Tablet .. 650 every 6 hours PRN  acetaminophen  Suppository .. 650 every 4 hours PRN  aspirin  chewable 81 daily  atorvastatin 80 at bedtime  bisacodyl 5 daily  buMETAnide 1 two times a day  carvedilol 3.125 every 12 hours  clopidogrel Tablet 75 daily  dextrose 40% Gel 15 once PRN  dextrose 50% Injectable 12.5 once  dextrose 50% Injectable 25 once  dextrose 50% Injectable 25 once  glucagon  Injectable 1 once PRN  heparin  Injectable 5000 every 8 hours  hydrALAZINE 25 two times a day  insulin glargine SubCutaneous Injection (LANTUS) - Peds 6 at bedtime  insulin lispro (HumaLOG) corrective regimen sliding scale  three times a day before meals  insulin lispro (HumaLOG) corrective regimen sliding scale  at bedtime  levothyroxine 75 daily  ondansetron Injectable 4 every 8 hours PRN  polyethylene glycol 3350 17 daily  predniSONE   Tablet 5 daily  sucralfate 1 every 6 hours PRN  tacrolimus 1 every 12 hours    Vital Signs Last 24 Hrs  T(F): 98.6 (04-19-20 @ 22:54), Max: 99.3 (04-19-20 @ 16:00)  HR: 65 (04-20-20 @ 05:59)  BP: 143/70 (04-20-20 @ 05:59)  RR: 20 (04-20-20 @ 09:23)  SpO2: 98% (04-20-20 @ 09:23) (98% - 100%)    Physical Exam:  General:   no distress  HEENT:  normal sclera and conjunctiva  Respiratory:   breathing comfortably  :   no  duvall  Musculoskeletal: right AKA   Skin:    no rash  Neurologic: no focal deficit  psych: normal affect                                     11.0   2.87  )-----------( 119      ( 20 Apr 2020 05:33 )             36.4 04-20    136  |  105  |  78  ----------------------------<  84  4.8   |  19  |  4.36  Ca    9.1      20 Apr 2020 05:33  TPro  6.3  /  Alb  2.8  /  TBili  0.2  /  DBili  x   /  AST  11  /  ALT  < 5  /  AlkPhos  67  04-20        MICROBIOLOGY:    blood culture 4/19  testing                       4/20 testing    .Urine Clean Catch (Midstream)  04-11-20   >100,000 CFU/ml Klebsiella pneumoniae ESBL  --  Klebsiella pneumoniae ESBL      .Blood Blood-Peripheral  04-11-20   No Growth Final  --  --      .Blood Blood-Peripheral  04-10-20   No Growth Final  --  --      RADIOLOGY:      EXAM:  US KIDNEYS AND BLADDER        PROCEDURE DATE:  Apr 12 2020         INTERPRETATION:  CLINICAL INFORMATION: Elevated creatinine. Right pelvic renal transplant.    COMPARISON: None available.    TECHNIQUE: Sonography of the kidneys and bladder.    FINDINGS:    Native Right kidney: Diminutive measuring 7.1 cm. Diffusely increased echogenicity. No renal mass, hydronephrosis or calculi.    Native Left kidney: Diminutive measuring 7.6 cm. Diffusely increased echogenicity No renal mass, hydronephrosis or calculi.    Right pelvic transplant kidney: 13.1 cm. Normal echogenicity and echotexture. A 3.6 cm lower pole central cyst. No hydronephrosis, renal mass, calculi, or perinephric fluid collection.    Urinary bladder: Within normal limits.    IMPRESSION:   Normal-appearing right pelvic transplant kidney.                      MARIA A FREDERICK M.D., RADIOLOGY RESIDENT  This document has been electronically signed.  CYNDY HAWKINS M.D., ATTENDING RADIOLOGIST  This document has been electronically signed. Apr 12 2020  4:16PM

## 2020-04-21 DIAGNOSIS — R50.9 FEVER, UNSPECIFIED: ICD-10-CM

## 2020-04-21 LAB
ANION GAP SERPL CALC-SCNC: 12 MMO/L — SIGNIFICANT CHANGE UP (ref 7–14)
BUN SERPL-MCNC: 78 MG/DL — HIGH (ref 7–23)
CALCIUM SERPL-MCNC: 8.9 MG/DL — SIGNIFICANT CHANGE UP (ref 8.4–10.5)
CHLORIDE SERPL-SCNC: 103 MMOL/L — SIGNIFICANT CHANGE UP (ref 98–107)
CO2 SERPL-SCNC: 21 MMOL/L — LOW (ref 22–31)
CREAT SERPL-MCNC: 4.3 MG/DL — HIGH (ref 0.5–1.3)
GLUCOSE BLDC GLUCOMTR-MCNC: 113 MG/DL — HIGH (ref 70–99)
GLUCOSE BLDC GLUCOMTR-MCNC: 116 MG/DL — HIGH (ref 70–99)
GLUCOSE BLDC GLUCOMTR-MCNC: 133 MG/DL — HIGH (ref 70–99)
GLUCOSE BLDC GLUCOMTR-MCNC: 158 MG/DL — HIGH (ref 70–99)
GLUCOSE SERPL-MCNC: 111 MG/DL — HIGH (ref 70–99)
HCT VFR BLD CALC: 35.2 % — LOW (ref 39–50)
HGB BLD-MCNC: 10.7 G/DL — LOW (ref 13–17)
MAGNESIUM SERPL-MCNC: 1.6 MG/DL — SIGNIFICANT CHANGE UP (ref 1.6–2.6)
MCHC RBC-ENTMCNC: 24.8 PG — LOW (ref 27–34)
MCHC RBC-ENTMCNC: 30.4 % — LOW (ref 32–36)
MCV RBC AUTO: 81.5 FL — SIGNIFICANT CHANGE UP (ref 80–100)
NRBC # FLD: 0 K/UL — SIGNIFICANT CHANGE UP (ref 0–0)
PHOSPHATE SERPL-MCNC: 3.4 MG/DL — SIGNIFICANT CHANGE UP (ref 2.5–4.5)
PLATELET # BLD AUTO: 117 K/UL — LOW (ref 150–400)
PMV BLD: 8.4 FL — SIGNIFICANT CHANGE UP (ref 7–13)
POTASSIUM SERPL-MCNC: 4.8 MMOL/L — SIGNIFICANT CHANGE UP (ref 3.5–5.3)
POTASSIUM SERPL-SCNC: 4.8 MMOL/L — SIGNIFICANT CHANGE UP (ref 3.5–5.3)
RBC # BLD: 4.32 M/UL — SIGNIFICANT CHANGE UP (ref 4.2–5.8)
RBC # FLD: 14.9 % — HIGH (ref 10.3–14.5)
SODIUM SERPL-SCNC: 136 MMOL/L — SIGNIFICANT CHANGE UP (ref 135–145)
WBC # BLD: 3.65 K/UL — LOW (ref 3.8–10.5)
WBC # FLD AUTO: 3.65 K/UL — LOW (ref 3.8–10.5)

## 2020-04-21 PROCEDURE — 99232 SBSQ HOSP IP/OBS MODERATE 35: CPT | Mod: GC

## 2020-04-21 PROCEDURE — 71250 CT THORAX DX C-: CPT | Mod: 26

## 2020-04-21 PROCEDURE — 74176 CT ABD & PELVIS W/O CONTRAST: CPT | Mod: 26

## 2020-04-21 PROCEDURE — 99233 SBSQ HOSP IP/OBS HIGH 50: CPT

## 2020-04-21 RX ADMIN — Medication 10 MILLIEQUIVALENT(S): at 11:54

## 2020-04-21 RX ADMIN — ATORVASTATIN CALCIUM 80 MILLIGRAM(S): 80 TABLET, FILM COATED ORAL at 21:49

## 2020-04-21 RX ADMIN — BUMETANIDE 1 MILLIGRAM(S): 0.25 INJECTION INTRAMUSCULAR; INTRAVENOUS at 06:13

## 2020-04-21 RX ADMIN — Medication 25 MILLIGRAM(S): at 06:14

## 2020-04-21 RX ADMIN — BUMETANIDE 1 MILLIGRAM(S): 0.25 INJECTION INTRAMUSCULAR; INTRAVENOUS at 18:20

## 2020-04-21 RX ADMIN — SODIUM CHLORIDE 1 GRAM(S): 9 INJECTION INTRAMUSCULAR; INTRAVENOUS; SUBCUTANEOUS at 06:13

## 2020-04-21 RX ADMIN — CLOPIDOGREL BISULFATE 75 MILLIGRAM(S): 75 TABLET, FILM COATED ORAL at 11:54

## 2020-04-21 RX ADMIN — Medication 325 MILLIGRAM(S): at 11:54

## 2020-04-21 RX ADMIN — SODIUM CHLORIDE 1 GRAM(S): 9 INJECTION INTRAMUSCULAR; INTRAVENOUS; SUBCUTANEOUS at 18:21

## 2020-04-21 RX ADMIN — HEPARIN SODIUM 5000 UNIT(S): 5000 INJECTION INTRAVENOUS; SUBCUTANEOUS at 23:52

## 2020-04-21 RX ADMIN — Medication 75 MICROGRAM(S): at 06:13

## 2020-04-21 RX ADMIN — SEVELAMER CARBONATE 800 MILLIGRAM(S): 2400 POWDER, FOR SUSPENSION ORAL at 11:54

## 2020-04-21 RX ADMIN — SEVELAMER CARBONATE 800 MILLIGRAM(S): 2400 POWDER, FOR SUSPENSION ORAL at 18:20

## 2020-04-21 RX ADMIN — Medication 500 MILLIGRAM(S): at 06:13

## 2020-04-21 RX ADMIN — Medication 500 MILLIGRAM(S): at 16:27

## 2020-04-21 RX ADMIN — HEPARIN SODIUM 5000 UNIT(S): 5000 INJECTION INTRAVENOUS; SUBCUTANEOUS at 06:13

## 2020-04-21 RX ADMIN — SEVELAMER CARBONATE 800 MILLIGRAM(S): 2400 POWDER, FOR SUSPENSION ORAL at 09:06

## 2020-04-21 RX ADMIN — Medication 5 MILLIGRAM(S): at 11:54

## 2020-04-21 RX ADMIN — Medication 5 MILLIGRAM(S): at 06:13

## 2020-04-21 RX ADMIN — Medication 81 MILLIGRAM(S): at 11:54

## 2020-04-21 RX ADMIN — Medication 25 MILLIGRAM(S): at 18:20

## 2020-04-21 RX ADMIN — TACROLIMUS 1 MILLIGRAM(S): 5 CAPSULE ORAL at 21:49

## 2020-04-21 RX ADMIN — Medication 2: at 11:54

## 2020-04-21 RX ADMIN — Medication 650 MILLIGRAM(S): at 09:54

## 2020-04-21 RX ADMIN — HEPARIN SODIUM 5000 UNIT(S): 5000 INJECTION INTRAVENOUS; SUBCUTANEOUS at 16:27

## 2020-04-21 RX ADMIN — POLYETHYLENE GLYCOL 3350 17 GRAM(S): 17 POWDER, FOR SOLUTION ORAL at 11:53

## 2020-04-21 RX ADMIN — TACROLIMUS 1 MILLIGRAM(S): 5 CAPSULE ORAL at 09:06

## 2020-04-21 RX ADMIN — CARVEDILOL PHOSPHATE 3.12 MILLIGRAM(S): 80 CAPSULE, EXTENDED RELEASE ORAL at 06:14

## 2020-04-21 RX ADMIN — INSULIN GLARGINE 6 UNIT(S): 100 INJECTION, SOLUTION SUBCUTANEOUS at 21:49

## 2020-04-21 RX ADMIN — ERTAPENEM SODIUM 100 MILLIGRAM(S): 1 INJECTION, POWDER, LYOPHILIZED, FOR SOLUTION INTRAMUSCULAR; INTRAVENOUS at 16:27

## 2020-04-21 NOTE — PROGRESS NOTE ADULT - ASSESSMENT
58 yo man with h/o renal transplant presenting 4/11 from nursing home  covid+ oxygenating well on RA.  pyelo transplant kidney with  ESBL Klebsiella in urine    1) Pyelonephritis  - continue ertapenem 500 mg iv q 24 h  - nephrology following for renal transplant    2) COVID-19  - oxygenating well on RA  - supportive care  -maintain airborne /contact precautions    3) fever  - Bcx from 4/19 and 4/20 are so far neg to date  - monitor fevers  - check ct c/a/p (unlikely to be able to get IV contrast in setting of renal transplant)  - check CMV PCR    4) abdominal pain   - monitor exam  -if diarrhea send stool for c diff  - check CMV PCR    d/w primary team

## 2020-04-21 NOTE — PROGRESS NOTE ADULT - SUBJECTIVE AND OBJECTIVE BOX
Patient is a 59y old  Male who presents with a chief complaint of Rule out COVID 19 infection (18 Apr 2020 10:47)    SUBJECTIVE / OVERNIGHT EVENTS: Spiked fever to 102.7 at 9:30am today. No acute overnight events. Patient denies pain or discomfort, feels fatigued, no shortness of breath.     MEDICATIONS  (STANDING):  ascorbic acid  Oral Tab/Cap - Peds 500 milliGRAM(s) Oral two times a day  aspirin  chewable 81 milliGRAM(s) Oral daily  atorvastatin 80 milliGRAM(s) Oral at bedtime  bisacodyl 5 milliGRAM(s) Oral daily  buMETAnide 1 milliGRAM(s) Oral two times a day  carvedilol 3.125 milliGRAM(s) Oral every 12 hours  clopidogrel Tablet 75 milliGRAM(s) Oral daily  dextrose 5%. 1000 milliLiter(s) (50 mL/Hr) IV Continuous <Continuous>  dextrose 50% Injectable 12.5 Gram(s) IV Push once  dextrose 50% Injectable 25 Gram(s) IV Push once  dextrose 50% Injectable 25 Gram(s) IV Push once  ertapenem  IVPB 500 milliGRAM(s) IV Intermittent every 24 hours  ferrous sulfate Oral Tab/Cap - Peds 325 milliGRAM(s) Oral daily  heparin  Injectable 5000 Unit(s) SubCutaneous every 8 hours  hydrALAZINE 25 milliGRAM(s) Oral two times a day  insulin glargine SubCutaneous Injection (LANTUS) - Peds 6 Unit(s) SubCutaneous at bedtime  insulin lispro (HumaLOG) corrective regimen sliding scale   SubCutaneous three times a day before meals  insulin lispro (HumaLOG) corrective regimen sliding scale   SubCutaneous at bedtime  levothyroxine 75 MICROGram(s) Oral daily  polyethylene glycol 3350 17 Gram(s) Oral daily  potassium chloride    Tablet ER 10 milliEquivalent(s) Oral daily  predniSONE   Tablet 5 milliGRAM(s) Oral daily  sevelamer carbonate 800 milliGRAM(s) Oral three times a day with meals  sodium chloride 1 Gram(s) Oral two times a day  tacrolimus 1 milliGRAM(s) Oral every 12 hours    MEDICATIONS  (PRN):  acetaminophen   Tablet .. 650 milliGRAM(s) Oral every 4 hours PRN Temp greater or equal to 38.5C (101.3F)  acetaminophen   Tablet .. 650 milliGRAM(s) Oral every 6 hours PRN Temp greater or equal to 38C (100.4F), Mild Pain (1 - 3), Moderate Pain (4 - 6)  acetaminophen  Suppository .. 650 milliGRAM(s) Rectal every 4 hours PRN Temp greater or equal to 38.5C (101.3F)  dextrose 40% Gel 15 Gram(s) Oral once PRN Blood Glucose LESS THAN 70 milliGRAM(s)/deciliter  glucagon  Injectable 1 milliGRAM(s) IntraMuscular once PRN Glucose LESS THAN 70 milligrams/deciliter  ondansetron Injectable 4 milliGRAM(s) IV Push every 8 hours PRN Nausea and/or Vomiting  sucralfate 1 Gram(s) Oral every 6 hours PRN With each meal    Vital Signs Last 24 Hrs  T(C): 37.2 (21 Apr 2020 12:45), Max: 39.3 (21 Apr 2020 09:24)  T(F): 99 (21 Apr 2020 12:45), Max: 102.7 (21 Apr 2020 09:24)  HR: 68 (21 Apr 2020 12:45) (62 - 69)  BP: 98/55 (21 Apr 2020 12:45) (98/55 - 153/50)  BP(mean): --  RR: 20 (21 Apr 2020 12:45) (20 - 20)  SpO2: 99% (21 Apr 2020 12:45) (97% - 100%)    I&O's Detail    20 Apr 2020 07:01  -  21 Apr 2020 07:00  --------------------------------------------------------  IN:  Total IN: 0 mL    OUT:    Voided: 450 mL  Total OUT: 450 mL    Total NET: -450 mL    21 Apr 2020 07:01  -  21 Apr 2020 13:46  --------------------------------------------------------  IN:  Total IN: 0 mL    OUT:    Voided: 150 mL  Total OUT: 150 mL    Total NET: -150 mL        CAPILLARY BLOOD GLUCOSE      POCT Blood Glucose.: 158 mg/dL (21 Apr 2020 11:50)  POCT Blood Glucose.: 133 mg/dL (21 Apr 2020 08:53)  POCT Blood Glucose.: 156 mg/dL (20 Apr 2020 22:13)  POCT Blood Glucose.: 144 mg/dL (20 Apr 2020 17:12)    PHYSICAL EXAM:  GENERAL: NAD, sitting up in bed  EYES: sclera clear b/l  CHEST/LUNG: normal respiratory effort, not tachypneic  HEART: RRR, nl S1 S2   ABDOMEN: Soft, Nontender, Nondistended  EXTREMITIES: R AKA, L forearm AV fistula  NEUROLOGY: awake, alert, no gross deficits  PSYCH: calm, cooperative    LABS:                         10.7   3.65  )-----------( 117      ( 21 Apr 2020 05:36 )             35.2     04-21    136  |  103  |  78<H>  ----------------------------<  111<H>  4.8   |  21<L>  |  4.30<H>    Ca    8.9      21 Apr 2020 05:36  Phos  3.4     04-21  Mg     1.6     04-21    TPro  6.3  /  Alb  2.8<L>  /  TBili  0.2  /  DBili  x   /  AST  11  /  ALT  < 5  /  AlkPhos  67  04-20      RADIOLOGY & ADDITIONAL TESTS: Reviewed.

## 2020-04-21 NOTE — PROGRESS NOTE ADULT - ATTENDING COMMENTS
Clarita Perdomo MD  Pager: [phone]  After 5 PM or weekends please call fellow on call or office [phone]

## 2020-04-21 NOTE — PROGRESS NOTE ADULT - ASSESSMENT
59M w/ hx of HTN, HLD, DM, ESRD s/p kidney transplant in 05, CAD s/p stent a few months ago at [facility] s/p R AKA in 2010 now COVID positive, initially requiring O2 now improved not requiring O2, also with ESBL klebsiella urinary infection on IV abx per ID.

## 2020-04-21 NOTE — PROGRESS NOTE ADULT - PROBLEM SELECTOR PLAN 3
- UA suggestive of infection, prior UTI from 2/2020 sensitive to ceftriaxone, resistant to zosyn. Urinary culture with ESBL Klebsiella   - Patient already received one dose of Cefepime in ED and was started on ceftriaxone, switched to Ertapenem, on 4/13 as U cx noted to be ESBL, ID recs appreciated, 14 day course per ID through 4/26/20  pt febrile yesterday, no sign of new infection, repeat blood cx without growth

## 2020-04-21 NOTE — PROVIDER CONTACT NOTE (OTHER) - SITUATION
Pt. is lethargic but arousable, BP=98/55 manually checked, afebrile. Pt. had 150cc urine output since this am.

## 2020-04-21 NOTE — PROGRESS NOTE ADULT - PROBLEM SELECTOR PLAN 5
Will continue existing regimen of Immunosuppressants:   on Mycophenolate + Tacrolimus and Prednisone , c/w Tacrolimus and prednisone, holding Cellcept for now while inpatient  - plan as above,  Nephrology f/u appreciated  hyponatremia: chronic pt on Nacl tabs, fluid restriction to 1 L Will continue existing regimen of Immunosuppressants:   on Mycophenolate + Tacrolimus and Prednisone , c/w Tacrolimus and prednisone, holding Cellcept for now while inpatient  - plan as above,  Nephrology f/u appreciated, repeat tac level ordered  hyponatremia: chronic pt on Nacl tabs, fluid restriction to 1 L

## 2020-04-21 NOTE — PROGRESS NOTE ADULT - PROBLEM SELECTOR PLAN 4
Prior labs per patient's Nursing home:  March 27/2020: Cr/BUN: 3.6/24  March 30/2020: Cr/BUN: 3.9/24  April 6/2020: Cr/BUN: 4.8/20   - Appreciate Nephrology consult - renal u/s  for transplanted kidney WNL ,    -concern for noncompliance as tacrolimus level was low  -F/U spot urine creatinine noted ,  tac level <2 on 4/13   - Renal f/u appreciated , will continue current plan and medications , renal does not recommend increasing dose of immunosuppressants with ongoing infection, holding Cellcept, c/w tacrolimus and prednisone, Goal Tacrolimus level is 3-5 as per Renal, holding off on dose adjustment given YURI per renal  - will not request records from [facility] as long as Renal fn improving as management not going to change, pt will need to f/u with his nephrologist as outpt after discharge   - Primary Nephrologist: Dr. Adrián Hawkins

## 2020-04-21 NOTE — PROGRESS NOTE ADULT - PROBLEM SELECTOR PLAN 1
Continues to spike fevers, most recently this AM to 102  - Known COVID19+ and currently on IV ertapenem for +urine with klebsiella ESBL as below  - Repeat blood culture from prior fever without growth so far  - Continue to monitor, prn tylenol, follow up additional ID recommendations

## 2020-04-21 NOTE — PROGRESS NOTE ADULT - SUBJECTIVE AND OBJECTIVE BOX
Follow Up:  fever, renal transplant    Interval History: has high grade fever 102.7F. No diarrhea. pt more lethargic today, RN states pt not complaining of any cp or abd pain.     Allergies  iodine (Vomiting)  IV Contrast (Unknown)        ANTIMICROBIALS:  ertapenem  IVPB 500 every 24 hours      OTHER MEDS:  acetaminophen   Tablet .. 650 milliGRAM(s) Oral every 4 hours PRN  acetaminophen   Tablet .. 650 milliGRAM(s) Oral every 6 hours PRN  acetaminophen  Suppository .. 650 milliGRAM(s) Rectal every 4 hours PRN  ascorbic acid  Oral Tab/Cap - Peds 500 milliGRAM(s) Oral two times a day  aspirin  chewable 81 milliGRAM(s) Oral daily  atorvastatin 80 milliGRAM(s) Oral at bedtime  bisacodyl 5 milliGRAM(s) Oral daily  buMETAnide 1 milliGRAM(s) Oral two times a day  carvedilol 3.125 milliGRAM(s) Oral every 12 hours  clopidogrel Tablet 75 milliGRAM(s) Oral daily  dextrose 40% Gel 15 Gram(s) Oral once PRN  dextrose 5%. 1000 milliLiter(s) IV Continuous <Continuous>  dextrose 50% Injectable 12.5 Gram(s) IV Push once  dextrose 50% Injectable 25 Gram(s) IV Push once  dextrose 50% Injectable 25 Gram(s) IV Push once  ferrous sulfate Oral Tab/Cap - Peds 325 milliGRAM(s) Oral daily  glucagon  Injectable 1 milliGRAM(s) IntraMuscular once PRN  heparin  Injectable 5000 Unit(s) SubCutaneous every 8 hours  hydrALAZINE 25 milliGRAM(s) Oral two times a day  insulin glargine SubCutaneous Injection (LANTUS) - Peds 6 Unit(s) SubCutaneous at bedtime  insulin lispro (HumaLOG) corrective regimen sliding scale   SubCutaneous three times a day before meals  insulin lispro (HumaLOG) corrective regimen sliding scale   SubCutaneous at bedtime  levothyroxine 75 MICROGram(s) Oral daily  ondansetron Injectable 4 milliGRAM(s) IV Push every 8 hours PRN  polyethylene glycol 3350 17 Gram(s) Oral daily  potassium chloride    Tablet ER 10 milliEquivalent(s) Oral daily  predniSONE   Tablet 5 milliGRAM(s) Oral daily  sevelamer carbonate 800 milliGRAM(s) Oral three times a day with meals  sodium chloride 1 Gram(s) Oral two times a day  sucralfate 1 Gram(s) Oral every 6 hours PRN  tacrolimus 1 milliGRAM(s) Oral every 12 hours      Vital Signs Last 24 Hrs  T(C): 37.2 (21 Apr 2020 12:45), Max: 39.3 (21 Apr 2020 09:24)  T(F): 99 (21 Apr 2020 12:45), Max: 102.7 (21 Apr 2020 09:24)  HR: 68 (21 Apr 2020 12:45) (62 - 69)  BP: 98/55 (21 Apr 2020 12:45) (98/55 - 153/50)  BP(mean): --  RR: 20 (21 Apr 2020 12:45) (20 - 20)  SpO2: 99% (21 Apr 2020 12:45) (97% - 100%)    Physical Exam:  General: looks weak  Head: atraumatic, normocephalic, normal sclera and conjunctiva  Respiratory:   breathing comfortably  abd:   not distended  :   no  duvall  vascular: Nurse placing RUE PIV  Skin:    no rash  Neurologic:   no focal deficit                            10.7   3.65  )-----------( 117      ( 21 Apr 2020 05:36 )             35.2       04-21    136  |  103  |  78<H>  ----------------------------<  111<H>  4.8   |  21<L>  |  4.30<H>    Ca    8.9      21 Apr 2020 05:36  Phos  3.4     04-21  Mg     1.6     04-21    TPro  6.3  /  Alb  2.8<L>  /  TBili  0.2  /  DBili  x   /  AST  11  /  ALT  < 5  /  AlkPhos  67  04-20          MICROBIOLOGY:    .Blood Blood  04-20-20   No growth to date.  --  --      .Blood Blood  04-19-20   No growth to date.  --  --      .Urine Clean Catch (Midstream)  04-11-20   >100,000 CFU/ml Klebsiella pneumoniae ESBL  --  Klebsiella pneumoniae ESBL      .Blood Blood-Peripheral  04-11-20   No Growth Final  --  --      .Blood Blood-Peripheral  04-10-20   No Growth Final  --  --    RADIOLOGY:      EXAM:  US KIDNEYS AND BLADDER    PROCEDURE DATE:  Apr 12 2020   IMPRESSION:   Normal-appearing right pelvic transplant kidney.

## 2020-04-22 LAB
ALBUMIN SERPL ELPH-MCNC: 3.2 G/DL — LOW (ref 3.3–5)
ALP SERPL-CCNC: 69 U/L — SIGNIFICANT CHANGE UP (ref 40–120)
ALT FLD-CCNC: 8 U/L — SIGNIFICANT CHANGE UP (ref 4–41)
ANION GAP SERPL CALC-SCNC: 12 MMO/L — SIGNIFICANT CHANGE UP (ref 7–14)
AST SERPL-CCNC: 13 U/L — SIGNIFICANT CHANGE UP (ref 4–40)
BASOPHILS # BLD AUTO: 0.01 K/UL — SIGNIFICANT CHANGE UP (ref 0–0.2)
BASOPHILS NFR BLD AUTO: 0.3 % — SIGNIFICANT CHANGE UP (ref 0–2)
BILIRUB SERPL-MCNC: 0.2 MG/DL — SIGNIFICANT CHANGE UP (ref 0.2–1.2)
BUN SERPL-MCNC: 74 MG/DL — HIGH (ref 7–23)
CALCIUM SERPL-MCNC: 9.4 MG/DL — SIGNIFICANT CHANGE UP (ref 8.4–10.5)
CHLORIDE SERPL-SCNC: 103 MMOL/L — SIGNIFICANT CHANGE UP (ref 98–107)
CO2 SERPL-SCNC: 22 MMOL/L — SIGNIFICANT CHANGE UP (ref 22–31)
CREAT SERPL-MCNC: 4.38 MG/DL — HIGH (ref 0.5–1.3)
D DIMER BLD IA.RAPID-MCNC: 361 NG/ML — SIGNIFICANT CHANGE UP
EOSINOPHIL # BLD AUTO: 0.01 K/UL — SIGNIFICANT CHANGE UP (ref 0–0.5)
EOSINOPHIL NFR BLD AUTO: 0.3 % — SIGNIFICANT CHANGE UP (ref 0–6)
FERRITIN SERPL-MCNC: 2157 NG/ML — HIGH (ref 30–400)
GLUCOSE BLDC GLUCOMTR-MCNC: 120 MG/DL — HIGH (ref 70–99)
GLUCOSE BLDC GLUCOMTR-MCNC: 81 MG/DL — SIGNIFICANT CHANGE UP (ref 70–99)
GLUCOSE BLDC GLUCOMTR-MCNC: 83 MG/DL — SIGNIFICANT CHANGE UP (ref 70–99)
GLUCOSE BLDC GLUCOMTR-MCNC: 90 MG/DL — SIGNIFICANT CHANGE UP (ref 70–99)
GLUCOSE SERPL-MCNC: 62 MG/DL — LOW (ref 70–99)
HCT VFR BLD CALC: 38.1 % — LOW (ref 39–50)
HGB BLD-MCNC: 11.7 G/DL — LOW (ref 13–17)
IMM GRANULOCYTES NFR BLD AUTO: 0.5 % — SIGNIFICANT CHANGE UP (ref 0–1.5)
LYMPHOCYTES # BLD AUTO: 1.24 K/UL — SIGNIFICANT CHANGE UP (ref 1–3.3)
LYMPHOCYTES # BLD AUTO: 32.9 % — SIGNIFICANT CHANGE UP (ref 13–44)
MCHC RBC-ENTMCNC: 25.2 PG — LOW (ref 27–34)
MCHC RBC-ENTMCNC: 30.7 % — LOW (ref 32–36)
MCV RBC AUTO: 82.1 FL — SIGNIFICANT CHANGE UP (ref 80–100)
MONOCYTES # BLD AUTO: 0.32 K/UL — SIGNIFICANT CHANGE UP (ref 0–0.9)
MONOCYTES NFR BLD AUTO: 8.5 % — SIGNIFICANT CHANGE UP (ref 2–14)
NEUTROPHILS # BLD AUTO: 2.17 K/UL — SIGNIFICANT CHANGE UP (ref 1.8–7.4)
NEUTROPHILS NFR BLD AUTO: 57.5 % — SIGNIFICANT CHANGE UP (ref 43–77)
NRBC # FLD: 0 K/UL — SIGNIFICANT CHANGE UP (ref 0–0)
PLATELET # BLD AUTO: 149 K/UL — LOW (ref 150–400)
PMV BLD: 9.2 FL — SIGNIFICANT CHANGE UP (ref 7–13)
POTASSIUM SERPL-MCNC: 4.7 MMOL/L — SIGNIFICANT CHANGE UP (ref 3.5–5.3)
POTASSIUM SERPL-SCNC: 4.7 MMOL/L — SIGNIFICANT CHANGE UP (ref 3.5–5.3)
PROCALCITONIN SERPL-MCNC: 0.17 NG/ML — HIGH (ref 0.02–0.1)
PROT SERPL-MCNC: 6.9 G/DL — SIGNIFICANT CHANGE UP (ref 6–8.3)
RBC # BLD: 4.64 M/UL — SIGNIFICANT CHANGE UP (ref 4.2–5.8)
RBC # FLD: 15 % — HIGH (ref 10.3–14.5)
SODIUM SERPL-SCNC: 137 MMOL/L — SIGNIFICANT CHANGE UP (ref 135–145)
TACROLIMUS SERPL-MCNC: < 2 NG/ML — SIGNIFICANT CHANGE UP
WBC # BLD: 3.77 K/UL — LOW (ref 3.8–10.5)
WBC # FLD AUTO: 3.77 K/UL — LOW (ref 3.8–10.5)

## 2020-04-22 PROCEDURE — 99233 SBSQ HOSP IP/OBS HIGH 50: CPT

## 2020-04-22 PROCEDURE — 99233 SBSQ HOSP IP/OBS HIGH 50: CPT | Mod: GC

## 2020-04-22 RX ORDER — TACROLIMUS 5 MG/1
1 CAPSULE ORAL
Refills: 0 | Status: DISCONTINUED | OUTPATIENT
Start: 2020-04-22 | End: 2020-04-24

## 2020-04-22 RX ADMIN — BUMETANIDE 1 MILLIGRAM(S): 0.25 INJECTION INTRAMUSCULAR; INTRAVENOUS at 06:23

## 2020-04-22 RX ADMIN — SEVELAMER CARBONATE 800 MILLIGRAM(S): 2400 POWDER, FOR SUSPENSION ORAL at 18:05

## 2020-04-22 RX ADMIN — CARVEDILOL PHOSPHATE 3.12 MILLIGRAM(S): 80 CAPSULE, EXTENDED RELEASE ORAL at 06:24

## 2020-04-22 RX ADMIN — Medication 81 MILLIGRAM(S): at 12:16

## 2020-04-22 RX ADMIN — Medication 500 MILLIGRAM(S): at 06:23

## 2020-04-22 RX ADMIN — SODIUM CHLORIDE 1 GRAM(S): 9 INJECTION INTRAMUSCULAR; INTRAVENOUS; SUBCUTANEOUS at 18:05

## 2020-04-22 RX ADMIN — ONDANSETRON 4 MILLIGRAM(S): 8 TABLET, FILM COATED ORAL at 12:44

## 2020-04-22 RX ADMIN — Medication 500 MILLIGRAM(S): at 18:04

## 2020-04-22 RX ADMIN — INSULIN GLARGINE 6 UNIT(S): 100 INJECTION, SOLUTION SUBCUTANEOUS at 22:00

## 2020-04-22 RX ADMIN — Medication 25 MILLIGRAM(S): at 06:23

## 2020-04-22 RX ADMIN — ATORVASTATIN CALCIUM 80 MILLIGRAM(S): 80 TABLET, FILM COATED ORAL at 22:00

## 2020-04-22 RX ADMIN — Medication 75 MICROGRAM(S): at 06:23

## 2020-04-22 RX ADMIN — SEVELAMER CARBONATE 800 MILLIGRAM(S): 2400 POWDER, FOR SUSPENSION ORAL at 07:42

## 2020-04-22 RX ADMIN — HEPARIN SODIUM 5000 UNIT(S): 5000 INJECTION INTRAVENOUS; SUBCUTANEOUS at 18:05

## 2020-04-22 RX ADMIN — TACROLIMUS 1 MILLIGRAM(S): 5 CAPSULE ORAL at 09:02

## 2020-04-22 RX ADMIN — SODIUM CHLORIDE 1 GRAM(S): 9 INJECTION INTRAMUSCULAR; INTRAVENOUS; SUBCUTANEOUS at 06:23

## 2020-04-22 RX ADMIN — ERTAPENEM SODIUM 100 MILLIGRAM(S): 1 INJECTION, POWDER, LYOPHILIZED, FOR SOLUTION INTRAMUSCULAR; INTRAVENOUS at 17:27

## 2020-04-22 RX ADMIN — Medication 650 MILLIGRAM(S): at 07:42

## 2020-04-22 RX ADMIN — TACROLIMUS 1 MILLIGRAM(S): 5 CAPSULE ORAL at 17:27

## 2020-04-22 RX ADMIN — HEPARIN SODIUM 5000 UNIT(S): 5000 INJECTION INTRAVENOUS; SUBCUTANEOUS at 07:42

## 2020-04-22 RX ADMIN — Medication 5 MILLIGRAM(S): at 06:24

## 2020-04-22 RX ADMIN — CLOPIDOGREL BISULFATE 75 MILLIGRAM(S): 75 TABLET, FILM COATED ORAL at 12:17

## 2020-04-22 NOTE — PROGRESS NOTE ADULT - PROBLEM SELECTOR PLAN 2
- Maintaining normal oxygenation off of supplemental O2. No acute respiratory issues.   - CXR clear  - Patient full code - Maintaining normal oxygenation off of supplemental O2. No acute respiratory issues.   - CT chest with  Patchy bilateral ground glass opacities noted predominantly in the periphery of both lungs in keeping with known Covid 19 infection  - Patient full code

## 2020-04-22 NOTE — PROGRESS NOTE ADULT - SUBJECTIVE AND OBJECTIVE BOX
[facility] Division of Kidney Diseases & Hypertension  FOLLOW UP NOTE  [phone]--------------------------------------------------------------------------------  HPI: 59M PMH of HTN, HLD, DM, ESRD s/p kidney transplant, admitted for COVID-19. Nephrology team consulted for YURI and management of immunosuppressant. Pt with history of ESRD s/p DDRT on 2005. On review pt is on Tacrolimus 1mg BID, Mycophenolate 1gm BID and Prednisone 5mg daily. On review of labs, pt with SCr: 3.6 on 3/27/20 increased to 3.9 on 3/30/20. Pt with last outpatient SCr of 4.8 as outpatient at [facility]. On admission, SCr was 5.47 (4/10/20) which has continued to improve. SCr: 4.38 today stable. Last Tacro level <2    Pt see this morning having hiccups having chills  Temp: 101.9 documented with RN this AM  Pt without other complaints         PAST HISTORY  --------------------------------------------------------------------------------  No significant changes to PMH, PSH, FHx, SHx, unless otherwise noted    ALLERGIES & MEDICATIONS  --------------------------------------------------------------------------------  Allergies    iodine (Vomiting)  IV Contrast (Unknown)    Intolerances      Standing Inpatient Medications  ascorbic acid  Oral Tab/Cap - Peds 500 milliGRAM(s) Oral two times a day  aspirin  chewable 81 milliGRAM(s) Oral daily  atorvastatin 80 milliGRAM(s) Oral at bedtime  bisacodyl 5 milliGRAM(s) Oral daily  buMETAnide 1 milliGRAM(s) Oral two times a day  carvedilol 3.125 milliGRAM(s) Oral every 12 hours  clopidogrel Tablet 75 milliGRAM(s) Oral daily  dextrose 5%. 1000 milliLiter(s) IV Continuous <Continuous>  dextrose 50% Injectable 12.5 Gram(s) IV Push once  dextrose 50% Injectable 25 Gram(s) IV Push once  dextrose 50% Injectable 25 Gram(s) IV Push once  ertapenem  IVPB 500 milliGRAM(s) IV Intermittent every 24 hours  ferrous sulfate Oral Tab/Cap - Peds 325 milliGRAM(s) Oral daily  heparin  Injectable 5000 Unit(s) SubCutaneous every 8 hours  hydrALAZINE 25 milliGRAM(s) Oral two times a day  insulin glargine SubCutaneous Injection (LANTUS) - Peds 6 Unit(s) SubCutaneous at bedtime  insulin lispro (HumaLOG) corrective regimen sliding scale   SubCutaneous three times a day before meals  insulin lispro (HumaLOG) corrective regimen sliding scale   SubCutaneous at bedtime  levothyroxine 75 MICROGram(s) Oral daily  polyethylene glycol 3350 17 Gram(s) Oral daily  potassium chloride    Tablet ER 10 milliEquivalent(s) Oral daily  predniSONE   Tablet 5 milliGRAM(s) Oral daily  sevelamer carbonate 800 milliGRAM(s) Oral three times a day with meals  sodium chloride 1 Gram(s) Oral two times a day  tacrolimus 1 milliGRAM(s) Oral every 12 hours    PRN Inpatient Medications  acetaminophen   Tablet .. 650 milliGRAM(s) Oral every 4 hours PRN  acetaminophen   Tablet .. 650 milliGRAM(s) Oral every 6 hours PRN  acetaminophen  Suppository .. 650 milliGRAM(s) Rectal every 4 hours PRN  dextrose 40% Gel 15 Gram(s) Oral once PRN  glucagon  Injectable 1 milliGRAM(s) IntraMuscular once PRN  ondansetron Injectable 4 milliGRAM(s) IV Push every 8 hours PRN  sucralfate 1 Gram(s) Oral every 6 hours PRN    REVIEW OF SYSTEMS  --------------------------------------------------------------------------------  Gen: + fever, + chills  Skin: No rashes  Respiratory: No dyspnea  CV: No chest pain,   GI: No abdominal pain, diarrhea  Neuro: No dizziness/lightheadedness    All other systems were reviewed and are negative, except as noted.    VITALS/PHYSICAL EXAM  --------------------------------------------------------------------------------  T(C): 37.6 (04-22-20 @ 09:01), Max: 38.8 (04-22-20 @ 07:31)  HR: 88 (04-22-20 @ 09:01) (57 - 88)  BP: 145/79 (04-22-20 @ 09:01) (98/55 - 174/74)  RR: 16 (04-22-20 @ 09:01) (16 - 20)  SpO2: 100% (04-22-20 @ 09:01) (99% - 100%)  Wt(kg): --        04-21-20 @ 07:01  -  04-22-20 @ 07:00  --------------------------------------------------------  IN: 0 mL / OUT: 900 mL / NET: -900 mL      Physical Exam:  	Gen: + having chills and hiccups  	HEENT: , no JVD  	Pulm: + rale on right lung anteriorly  	CV:  S1S2  	Abd: +BS, soft, transplant area without tenderness  	Ext: No B/L Lower ext edema  	Neuro: awake, responsive to commands  	Skin: Warm, without rashes  	Vascular access: Left Arm AVF with palpable thrill, + aneurysmal dilation    LABS/STUDIES  --------------------------------------------------------------------------------              11.7   3.77  >-----------<  149      [04-22-20 @ 06:15]              38.1     137  |  103  |  74  ----------------------------<  62      [04-22-20 @ 06:15]  4.7   |  22  |  4.38        Ca     9.4     [04-22-20 @ 06:15]      Mg     1.6     [04-21-20 @ 05:36]      Phos  3.4     [04-21-20 @ 05:36]    TPro  6.9  /  Alb  3.2  /  TBili  0.2  /  DBili  x   /  AST  13  /  ALT  8   /  AlkPhos  69  [04-22-20 @ 06:15]          Creatinine Trend:  SCr 4.38 [04-22 @ 06:15]  SCr 4.30 [04-21 @ 05:36]  SCr 4.36 [04-20 @ 05:33]  SCr 4.34 [04-19 @ 10:35]  SCr 4.29 [04-17 @ 05:07]        Ferritin 2157      [04-22-20 @ 06:15] [facility] Division of Kidney Diseases & Hypertension  FOLLOW UP NOTE  [phone]--------------------------------------------------------------------------------  HPI: 59M PMH of HTN, HLD, DM, ESRD s/p kidney transplant, admitted for COVID-19. Nephrology team consulted for YURI and management of immunosuppressant. Pt with history of ESRD s/p DDRT on 2005. On review pt is on Tacrolimus 1mg BID, Mycophenolate 1gm BID and Prednisone 5mg daily. On review of labs, pt with SCr: 3.6 on 3/27/20 increased to 3.9 on 3/30/20. Pt with last outpatient SCr of 4.8 as outpatient at [facility]. On admission, SCr was 5.47 (4/10/20) which has continued to improve. SCr: 4.38 today stable. Last Tacro level <2    Pt see this morning having hiccups having chills  Temp: 101.9 documented with RN this AM  Pt without other complaints         PAST HISTORY  --------------------------------------------------------------------------------  No significant changes to PMH, PSH, FHx, SHx, unless otherwise noted    ALLERGIES & MEDICATIONS  --------------------------------------------------------------------------------  Allergies    iodine (Vomiting)  IV Contrast (Unknown)    Intolerances      Standing Inpatient Medications  ascorbic acid  Oral Tab/Cap - Peds 500 milliGRAM(s) Oral two times a day  aspirin  chewable 81 milliGRAM(s) Oral daily  atorvastatin 80 milliGRAM(s) Oral at bedtime  bisacodyl 5 milliGRAM(s) Oral daily  buMETAnide 1 milliGRAM(s) Oral two times a day  carvedilol 3.125 milliGRAM(s) Oral every 12 hours  clopidogrel Tablet 75 milliGRAM(s) Oral daily  dextrose 5%. 1000 milliLiter(s) IV Continuous <Continuous>  dextrose 50% Injectable 12.5 Gram(s) IV Push once  dextrose 50% Injectable 25 Gram(s) IV Push once  dextrose 50% Injectable 25 Gram(s) IV Push once  ertapenem  IVPB 500 milliGRAM(s) IV Intermittent every 24 hours  ferrous sulfate Oral Tab/Cap - Peds 325 milliGRAM(s) Oral daily  heparin  Injectable 5000 Unit(s) SubCutaneous every 8 hours  hydrALAZINE 25 milliGRAM(s) Oral two times a day  insulin glargine SubCutaneous Injection (LANTUS) - Peds 6 Unit(s) SubCutaneous at bedtime  insulin lispro (HumaLOG) corrective regimen sliding scale   SubCutaneous three times a day before meals  insulin lispro (HumaLOG) corrective regimen sliding scale   SubCutaneous at bedtime  levothyroxine 75 MICROGram(s) Oral daily  polyethylene glycol 3350 17 Gram(s) Oral daily  potassium chloride    Tablet ER 10 milliEquivalent(s) Oral daily  predniSONE   Tablet 5 milliGRAM(s) Oral daily  sevelamer carbonate 800 milliGRAM(s) Oral three times a day with meals  sodium chloride 1 Gram(s) Oral two times a day  tacrolimus 1 milliGRAM(s) Oral every 12 hours    PRN Inpatient Medications  acetaminophen   Tablet .. 650 milliGRAM(s) Oral every 4 hours PRN  acetaminophen   Tablet .. 650 milliGRAM(s) Oral every 6 hours PRN  acetaminophen  Suppository .. 650 milliGRAM(s) Rectal every 4 hours PRN  dextrose 40% Gel 15 Gram(s) Oral once PRN  glucagon  Injectable 1 milliGRAM(s) IntraMuscular once PRN  ondansetron Injectable 4 milliGRAM(s) IV Push every 8 hours PRN  sucralfate 1 Gram(s) Oral every 6 hours PRN    REVIEW OF SYSTEMS  --------------------------------------------------------------------------------  Gen: + fever, + chills  Skin: No rashes  Respiratory: No dyspnea  CV: No chest pain,   GI: No abdominal pain, diarrhea  Neuro: No dizziness/lightheadedness    All other systems were reviewed and are negative, except as noted.    VITALS/PHYSICAL EXAM  --------------------------------------------------------------------------------  T(C): 37.6 (04-22-20 @ 09:01), Max: 38.8 (04-22-20 @ 07:31)  HR: 88 (04-22-20 @ 09:01) (57 - 88)  BP: 145/79 (04-22-20 @ 09:01) (98/55 - 174/74)  RR: 16 (04-22-20 @ 09:01) (16 - 20)  SpO2: 100% (04-22-20 @ 09:01) (99% - 100%)  Wt(kg): --        04-21-20 @ 07:01  -  04-22-20 @ 07:00  --------------------------------------------------------  IN: 0 mL / OUT: 900 mL / NET: -900 mL      Physical Exam:  	Gen: no distress  	HEENT: , no JVD  	Pulm: normal respiratory pattern  	CV:  not tachy  	Abd: +BS, soft, transplant area without tenderness  	Ext: No B/L Lower ext edema s/p amputation  	Neuro: awake, responsive to commands  	Skin: Warm, without rashes  	Vascular access: Left Arm AVF with palpable thrill, + aneurysmal dilation    LABS/STUDIES  --------------------------------------------------------------------------------              11.7   3.77  >-----------<  149      [04-22-20 @ 06:15]              38.1     137  |  103  |  74  ----------------------------<  62      [04-22-20 @ 06:15]  4.7   |  22  |  4.38        Ca     9.4     [04-22-20 @ 06:15]      Mg     1.6     [04-21-20 @ 05:36]      Phos  3.4     [04-21-20 @ 05:36]    TPro  6.9  /  Alb  3.2  /  TBili  0.2  /  DBili  x   /  AST  13  /  ALT  8   /  AlkPhos  69  [04-22-20 @ 06:15]          Creatinine Trend:  SCr 4.38 [04-22 @ 06:15]  SCr 4.30 [04-21 @ 05:36]  SCr 4.36 [04-20 @ 05:33]  SCr 4.34 [04-19 @ 10:35]  SCr 4.29 [04-17 @ 05:07]        Ferritin 2157      [04-22-20 @ 06:15]

## 2020-04-22 NOTE — PROGRESS NOTE ADULT - PROBLEM SELECTOR PLAN 3
- UA suggestive of infection, prior UTI from 2/2020 sensitive to ceftriaxone, resistant to zosyn. Urinary culture with ESBL Klebsiella   - Patient already received one dose of Cefepime in ED and was started on ceftriaxone, switched to Ertapenem, on 4/13 as U cx noted to be ESBL, ID recs appreciated, 14 day course per ID through 4/26/20  pt febrile yesterday, no sign of new infection, repeat blood cx without growth - UA suggestive of infection, prior UTI from 2/2020 sensitive to ceftriaxone, resistant to zosyn. Urinary culture with ESBL Klebsiella   - Patient already received one dose of Cefepime in ED and was started on ceftriaxone, switched to Ertapenem, on 4/13 as U cx noted to be ESBL, ID recs appreciated, 14 day course per ID through 4/26/20  pt febrile,  no sign of new infection, repeat blood cx without growth

## 2020-04-22 NOTE — PROGRESS NOTE ADULT - PROBLEM SELECTOR PLAN 4
Prior labs per patient's Nursing home:  March 27/2020: Cr/BUN: 3.6/24  March 30/2020: Cr/BUN: 3.9/24  April 6/2020: Cr/BUN: 4.8/20   - Appreciate Nephrology consult - renal u/s  for transplanted kidney WNL ,    -concern for noncompliance as tacrolimus level was low  -F/U spot urine creatinine noted ,  tac level <2 on 4/13   - Renal f/u appreciated , will continue current plan and medications , renal does not recommend increasing dose of immunosuppressants with ongoing infection, holding Cellcept, c/w tacrolimus and prednisone, Goal Tacrolimus level is 3-5 as per Renal, holding off on dose adjustment given YURI per renal  - will not request records from [facility] as long as Renal fn improving as management not going to change, pt will need to f/u with his nephrologist as outpt after discharge   - Primary Nephrologist: Dr. Adrián Hawkins Prior labs per patient's Nursing home:  March 27/2020: Cr/BUN: 3.6/24 March 30/2020: Cr/BUN: 3.9/24 April 6/2020: Cr/BUN: 4.8/20   - Appreciate Nephrology consult - renal u/s  for transplanted kidney WNL ,    -concern for noncompliance as tacrolimus level was low  -F/U spot urine creatinine noted ,  tac level <2 on 4/22  - Renal f/u appreciated , will continue current plan and medications , renal does not recommend increasing dose of immunosuppressants with ongoing infection, holding Cellcept, c/w tacrolimus and prednisone, Goal Tacrolimus level is 3-5 as per Renal, holding off on dose adjustment given YURI per renal  - will not request records from [facility] as long as Renal fn improving as management not going to change, pt will need to f/u with his nephrologist as outpt after discharge   - Primary Nephrologist: Dr. Adrián Hawkins

## 2020-04-22 NOTE — PROGRESS NOTE ADULT - PROBLEM SELECTOR PLAN 2
Pt with history of ESRD s/p DDRT on 2005. Pt is on Tacrolimus 1mg BID, Cellcept 1 gm BID and Prednisone 5mg daily. Last Tacro level <2. Hold Cellcept and continue Tacrolimus at same dose and Prednisone. Would not increase Tacrolimus at this time as patient is in recovering YURI. Please check daily AM trough 30 minutes prior to dose. Goal Tacrolimus level is 3-5. Monitor Scr.    Candida Diana  Nephrology Fellow  Pager: [phone] Pt with history of ESRD s/p DDRT on 2005. Pt is on Tacrolimus 1mg BID, Cellcept 1 gm BID and Prednisone 5mg daily. Last Tacro level <2. Hold Cellcept until discharge and continue Tacrolimus at same dose and Prednisone. Would not increase Tacrolimus at this time as patient is in recovering YURI. Please check daily AM trough 30 minutes prior to dose. Goal Tacrolimus level is 3-5. Monitor Scr.    Candida Diana  Nephrology Fellow  Pager: [phone]

## 2020-04-22 NOTE — PROGRESS NOTE ADULT - SUBJECTIVE AND OBJECTIVE BOX
Patient is a 59y old  Male who presents with a chief complaint of Rule out COVID 19 infection (22 Apr 2020 09:53)      SUBJECTIVE / OVERNIGHT EVENTS:    MEDICATIONS  (STANDING):  ascorbic acid  Oral Tab/Cap - Peds 500 milliGRAM(s) Oral two times a day  aspirin  chewable 81 milliGRAM(s) Oral daily  atorvastatin 80 milliGRAM(s) Oral at bedtime  bisacodyl 5 milliGRAM(s) Oral daily  buMETAnide 1 milliGRAM(s) Oral two times a day  carvedilol 3.125 milliGRAM(s) Oral every 12 hours  clopidogrel Tablet 75 milliGRAM(s) Oral daily  dextrose 5%. 1000 milliLiter(s) (50 mL/Hr) IV Continuous <Continuous>  dextrose 50% Injectable 12.5 Gram(s) IV Push once  dextrose 50% Injectable 25 Gram(s) IV Push once  dextrose 50% Injectable 25 Gram(s) IV Push once  ertapenem  IVPB 500 milliGRAM(s) IV Intermittent every 24 hours  ferrous sulfate Oral Tab/Cap - Peds 325 milliGRAM(s) Oral daily  heparin  Injectable 5000 Unit(s) SubCutaneous every 8 hours  hydrALAZINE 25 milliGRAM(s) Oral two times a day  insulin glargine SubCutaneous Injection (LANTUS) - Peds 6 Unit(s) SubCutaneous at bedtime  insulin lispro (HumaLOG) corrective regimen sliding scale   SubCutaneous three times a day before meals  insulin lispro (HumaLOG) corrective regimen sliding scale   SubCutaneous at bedtime  levothyroxine 75 MICROGram(s) Oral daily  polyethylene glycol 3350 17 Gram(s) Oral daily  potassium chloride    Tablet ER 10 milliEquivalent(s) Oral daily  predniSONE   Tablet 5 milliGRAM(s) Oral daily  sevelamer carbonate 800 milliGRAM(s) Oral three times a day with meals  sodium chloride 1 Gram(s) Oral two times a day  tacrolimus 1 milliGRAM(s) Oral <User Schedule>    MEDICATIONS  (PRN):  acetaminophen   Tablet .. 650 milliGRAM(s) Oral every 4 hours PRN Temp greater or equal to 38.5C (101.3F)  acetaminophen   Tablet .. 650 milliGRAM(s) Oral every 6 hours PRN Temp greater or equal to 38C (100.4F), Mild Pain (1 - 3), Moderate Pain (4 - 6)  acetaminophen  Suppository .. 650 milliGRAM(s) Rectal every 4 hours PRN Temp greater or equal to 38.5C (101.3F)  dextrose 40% Gel 15 Gram(s) Oral once PRN Blood Glucose LESS THAN 70 milliGRAM(s)/deciliter  glucagon  Injectable 1 milliGRAM(s) IntraMuscular once PRN Glucose LESS THAN 70 milligrams/deciliter  ondansetron Injectable 4 milliGRAM(s) IV Push every 8 hours PRN Nausea and/or Vomiting  sucralfate 1 Gram(s) Oral every 6 hours PRN With each meal      Vital Signs Last 24 Hrs  T(C): 37.6 (22 Apr 2020 09:01), Max: 38.8 (22 Apr 2020 07:31)  T(F): 99.6 (22 Apr 2020 09:01), Max: 101.9 (22 Apr 2020 07:31)  HR: 88 (22 Apr 2020 09:01) (57 - 88)  BP: 145/79 (22 Apr 2020 09:01) (98/55 - 174/74)  BP(mean): --  RR: 16 (22 Apr 2020 09:01) (16 - 20)  SpO2: 100% (22 Apr 2020 09:01) (99% - 100%)  CAPILLARY BLOOD GLUCOSE      POCT Blood Glucose.: 83 mg/dL (22 Apr 2020 07:52)  POCT Blood Glucose.: 116 mg/dL (21 Apr 2020 21:25)  POCT Blood Glucose.: 113 mg/dL (21 Apr 2020 17:10)  POCT Blood Glucose.: 158 mg/dL (21 Apr 2020 11:50)    I&O's Summary    21 Apr 2020 07:01  -  22 Apr 2020 07:00  --------------------------------------------------------  IN: 0 mL / OUT: 900 mL / NET: -900 mL        PHYSICAL EXAM:  GENERAL: NAD, well-developed  HEAD:  Atraumatic, Normocephalic  EYES: EOMI, PERRLA, conjunctiva and sclera clear  NECK: Supple, No JVD  CHEST/LUNG: Clear to auscultation bilaterally; No wheeze  HEART: Regular rate and rhythm; No murmurs, rubs, or gallops  ABDOMEN: Soft, Nontender, Nondistended; Bowel sounds present  EXTREMITIES:  2+ Peripheral Pulses, No clubbing, cyanosis, or edema  PSYCH: AAOx3  NEUROLOGY: non-focal  SKIN: No rashes or lesions    LABS:                        11.7   3.77  )-----------( 149      ( 22 Apr 2020 06:15 )             38.1     04-22    137  |  103  |  74<H>  ----------------------------<  62<L>  4.7   |  22  |  4.38<H>    Ca    9.4      22 Apr 2020 06:15  Phos  3.4     04-21  Mg     1.6     04-21    TPro  6.9  /  Alb  3.2<L>  /  TBili  0.2  /  DBili  x   /  AST  13  /  ALT  8   /  AlkPhos  69  04-22              RADIOLOGY & ADDITIONAL TESTS:    Imaging Personally Reviewed:    Consultant(s) Notes Reviewed:      Care Discussed with Consultants/Other Providers: Patient is a 59y old  Male who presents with a chief complaint of Rule out COVID 19 infection (22 Apr 2020 09:53)      SUBJECTIVE / OVERNIGHT EVENTS: patient seen and examined by bedside, Pt lying in bed with wyes closed ,  pt still febrile, says he does not feel good but denies headache, dizziness, SOB, CP, Palpitations , N/V/D, , Pt says he has mild abdominal pain ,      MEDICATIONS  (STANDING):  ascorbic acid  Oral Tab/Cap - Peds 500 milliGRAM(s) Oral two times a day  aspirin  chewable 81 milliGRAM(s) Oral daily  atorvastatin 80 milliGRAM(s) Oral at bedtime  bisacodyl 5 milliGRAM(s) Oral daily  buMETAnide 1 milliGRAM(s) Oral two times a day  carvedilol 3.125 milliGRAM(s) Oral every 12 hours  clopidogrel Tablet 75 milliGRAM(s) Oral daily  dextrose 5%. 1000 milliLiter(s) (50 mL/Hr) IV Continuous <Continuous>  dextrose 50% Injectable 12.5 Gram(s) IV Push once  dextrose 50% Injectable 25 Gram(s) IV Push once  dextrose 50% Injectable 25 Gram(s) IV Push once  ertapenem  IVPB 500 milliGRAM(s) IV Intermittent every 24 hours  ferrous sulfate Oral Tab/Cap - Peds 325 milliGRAM(s) Oral daily  heparin  Injectable 5000 Unit(s) SubCutaneous every 8 hours  hydrALAZINE 25 milliGRAM(s) Oral two times a day  insulin glargine SubCutaneous Injection (LANTUS) - Peds 6 Unit(s) SubCutaneous at bedtime  insulin lispro (HumaLOG) corrective regimen sliding scale   SubCutaneous three times a day before meals  insulin lispro (HumaLOG) corrective regimen sliding scale   SubCutaneous at bedtime  levothyroxine 75 MICROGram(s) Oral daily  polyethylene glycol 3350 17 Gram(s) Oral daily  potassium chloride    Tablet ER 10 milliEquivalent(s) Oral daily  predniSONE   Tablet 5 milliGRAM(s) Oral daily  sevelamer carbonate 800 milliGRAM(s) Oral three times a day with meals  sodium chloride 1 Gram(s) Oral two times a day  tacrolimus 1 milliGRAM(s) Oral <User Schedule>    MEDICATIONS  (PRN):  acetaminophen   Tablet .. 650 milliGRAM(s) Oral every 4 hours PRN Temp greater or equal to 38.5C (101.3F)  acetaminophen   Tablet .. 650 milliGRAM(s) Oral every 6 hours PRN Temp greater or equal to 38C (100.4F), Mild Pain (1 - 3), Moderate Pain (4 - 6)  acetaminophen  Suppository .. 650 milliGRAM(s) Rectal every 4 hours PRN Temp greater or equal to 38.5C (101.3F)  dextrose 40% Gel 15 Gram(s) Oral once PRN Blood Glucose LESS THAN 70 milliGRAM(s)/deciliter  glucagon  Injectable 1 milliGRAM(s) IntraMuscular once PRN Glucose LESS THAN 70 milligrams/deciliter  ondansetron Injectable 4 milliGRAM(s) IV Push every 8 hours PRN Nausea and/or Vomiting  sucralfate 1 Gram(s) Oral every 6 hours PRN With each meal      Vital Signs Last 24 Hrs  T(C): 37.6 (22 Apr 2020 09:01), Max: 38.8 (22 Apr 2020 07:31)  T(F): 99.6 (22 Apr 2020 09:01), Max: 101.9 (22 Apr 2020 07:31)  HR: 88 (22 Apr 2020 09:01) (57 - 88)  BP: 145/79 (22 Apr 2020 09:01) (98/55 - 174/74)  BP(mean): --  RR: 16 (22 Apr 2020 09:01) (16 - 20)  SpO2: 100% (22 Apr 2020 09:01) (99% - 100%)  CAPILLARY BLOOD GLUCOSE      POCT Blood Glucose.: 83 mg/dL (22 Apr 2020 07:52)  POCT Blood Glucose.: 116 mg/dL (21 Apr 2020 21:25)  POCT Blood Glucose.: 113 mg/dL (21 Apr 2020 17:10)  POCT Blood Glucose.: 158 mg/dL (21 Apr 2020 11:50)    I&O's Summary    21 Apr 2020 07:01  -  22 Apr 2020 07:00  --------------------------------------------------------  IN: 0 mL / OUT: 900 mL / NET: -900 mL        PHYSICAL EXAM:  GENERAL:  pt  lying in bed with eyes closed, appears tired   EYES: sclera clear b/l  CHEST/LUNG: normal respiratory effort, not tachypneic  HEART: RRR, nl S1 S2   ABDOMEN: Soft, Nontender, Nondistended  EXTREMITIES: R AKA, L forearm AV fistula  NEUROLOGY: appears drowsy but answering questions appropriately ,  no gross deficits  PSYCH: calm,     LABS:                        11.7   3.77  )-----------( 149      ( 22 Apr 2020 06:15 )             38.1     04-22    137  |  103  |  74<H>  ----------------------------<  62<L>  4.7   |  22  |  4.38<H>    Ca    9.4      22 Apr 2020 06:15  Phos  3.4     04-21  Mg     1.6     04-21    TPro  6.9  /  Alb  3.2<L>  /  TBili  0.2  /  DBili  x   /  AST  13  /  ALT  8   /  AlkPhos  69  04-22              RADIOLOGY & ADDITIONAL TESTS:    Imaging Personally Reviewed:< from: CT Abdomen and Pelvis No Cont (04.21.20 @ 17:45) >    Tubes/Lines: None.    Mediastinum/Vessels/Heart: Aorta and pulmonary arteries are normal in size. There is trace pericardial effusion. No lymphadenopathy. Thyroid gland is unremarkable    Lungs/Pleura/Airways: Patchy bilateral groundglass opacities noted predominantly in the periphery of both lungs in keeping with known Covid 19 infection. No pleural effusion, pneumothorax or lobar consolidation.    CT abdomen and pelvis: The unenhanced liver, spleen, pancreas and adrenals are unremarkable. Bilateral renal atrophy is noted. No retroperitoneal lymphadenopathy. The abdominal vasculature demonstrates vascular calcifications. There is a transplanted kidney in the right lower quadrant of the abdomen high density material within the transplant likely a surgical clip. No hydronephrosis.    The urinary bladder is unremarkable. No evidence of bowel obstruction.    Bones and soft tissues: No suspicious osseous lesions. Degenerative changes noted throughout the spine.    IMPRESSION:    CT chest:  Pattern of GGO suggests infection including atypical pneumonia/viral infection from atypical agents including COVID-19 (C19V-1). No empyema or abscess.     CT abdomen and pelvis: No acute intra-abdominal pathology.    Incidental findings as above      < end of copied text >      Consultant(s) Notes Reviewed:  ID, Nephrology     Care Discussed with Consultants/Other Providers:

## 2020-04-22 NOTE — PROGRESS NOTE ADULT - ASSESSMENT
59M w/ hx of HTN, HLD, DM, ESRD s/p kidney transplant in 05, CAD s/p stent a few months ago at [facility] s/p R AKA in 2010 now COVID positive, initially requiring O2 now improved not requiring O2, also with ESBL klebsiella urinary infection on IV abx per ID.

## 2020-04-22 NOTE — PROVIDER CONTACT NOTE (OTHER) - BACKGROUND
Pt with history of ESRD with kidney transplant, CAD with cardiac stents DM, BPH, systolic heart failure, HTN

## 2020-04-22 NOTE — PROGRESS NOTE ADULT - PROBLEM SELECTOR PLAN 1
Pt with YURI on CKD in the setting of COVID-19 vs chronic graft rejection. Pt with last SCr of 4.8 as outpatient at [facility]. On admission, pt found to have SCr of 5.47 (4/10/20) which has improved to 4.38 today. Pt with SCr stable. Encourage PO intake. Continue to monitor renal function. Avoid other potential nephrotoxins. Pt with YURI on CKD in the setting of COVID-19 vs chronic graft rejection. Pt with last SCr of 4.8 as outpatient at residential. On admission, pt found to have SCr of 5.47 (4/10/20) which has improved to 4.38 today. Pt with SCr stable. Encourage PO intake. Continue to monitor renal function. Consider stopping salt tabs.  Avoid other potential nephrotoxins.

## 2020-04-22 NOTE — PROVIDER CONTACT NOTE (OTHER) - ASSESSMENT
Bp 98/53, HR 57. Pt sleeping but easily arousable. Pt also refusing multiple medications through out the day

## 2020-04-22 NOTE — PROGRESS NOTE ADULT - PROBLEM SELECTOR PLAN 1
Continues to spike fevers, most recently this AM to 102  - Known COVID19+ and currently on IV ertapenem for +urine with klebsiella ESBL as below  - Repeat blood culture from prior fever without growth so far  - Continue to monitor, prn tylenol, follow up additional ID recommendations Continues to spike fevers, most recently this AM to 101.9   - Known COVID19+ and currently on IV ertapenem for +urine with klebsiella ESBL as below  - Repeat blood culture from prior fever without growth so far  - Continue to monitor, prn tylenol, follow up additional ID recommendations  -CT chest and A/P noted as above,  Patchy bilateral groundglass opacities noted predominantly in the periphery of both lungs but no  acute intra-abdominal pathology.  ID f/u appreciated , will f/u CMV PCR

## 2020-04-22 NOTE — PROGRESS NOTE ADULT - PROBLEM SELECTOR PLAN 5
Will continue existing regimen of Immunosuppressants:   on Mycophenolate + Tacrolimus and Prednisone , c/w Tacrolimus and prednisone, holding Cellcept for now while inpatient  - plan as above,  Nephrology f/u appreciated, repeat tac level ordered  hyponatremia: chronic pt on Nacl tabs, fluid restriction to 1 L

## 2020-04-23 LAB
ALBUMIN SERPL ELPH-MCNC: 2.8 G/DL — LOW (ref 3.3–5)
ALP SERPL-CCNC: 60 U/L — SIGNIFICANT CHANGE UP (ref 40–120)
ALT FLD-CCNC: 8 U/L — SIGNIFICANT CHANGE UP (ref 4–41)
ANION GAP SERPL CALC-SCNC: 12 MMO/L — SIGNIFICANT CHANGE UP (ref 7–14)
AST SERPL-CCNC: 16 U/L — SIGNIFICANT CHANGE UP (ref 4–40)
BASOPHILS # BLD AUTO: 0.01 K/UL — SIGNIFICANT CHANGE UP (ref 0–0.2)
BASOPHILS NFR BLD AUTO: 0.3 % — SIGNIFICANT CHANGE UP (ref 0–2)
BILIRUB SERPL-MCNC: 0.3 MG/DL — SIGNIFICANT CHANGE UP (ref 0.2–1.2)
BUN SERPL-MCNC: 83 MG/DL — HIGH (ref 7–23)
C DIFF TOX GENS STL QL NAA+PROBE: SIGNIFICANT CHANGE UP
CALCIUM SERPL-MCNC: 9 MG/DL — SIGNIFICANT CHANGE UP (ref 8.4–10.5)
CHLORIDE SERPL-SCNC: 106 MMOL/L — SIGNIFICANT CHANGE UP (ref 98–107)
CMV DNA CSF QL NAA+PROBE: 379 IU/ML — HIGH
CMV DNA SPEC NAA+PROBE-LOG#: 2.58 — HIGH
CO2 SERPL-SCNC: 20 MMOL/L — LOW (ref 22–31)
CREAT SERPL-MCNC: 5.06 MG/DL — HIGH (ref 0.5–1.3)
EOSINOPHIL # BLD AUTO: 0.01 K/UL — SIGNIFICANT CHANGE UP (ref 0–0.5)
EOSINOPHIL NFR BLD AUTO: 0.3 % — SIGNIFICANT CHANGE UP (ref 0–6)
GLUCOSE BLDC GLUCOMTR-MCNC: 124 MG/DL — HIGH (ref 70–99)
GLUCOSE BLDC GLUCOMTR-MCNC: 130 MG/DL — HIGH (ref 70–99)
GLUCOSE BLDC GLUCOMTR-MCNC: 151 MG/DL — HIGH (ref 70–99)
GLUCOSE BLDC GLUCOMTR-MCNC: 99 MG/DL — SIGNIFICANT CHANGE UP (ref 70–99)
GLUCOSE BLDC GLUCOMTR-MCNC: 99 MG/DL — SIGNIFICANT CHANGE UP (ref 70–99)
GLUCOSE SERPL-MCNC: 80 MG/DL — SIGNIFICANT CHANGE UP (ref 70–99)
HCT VFR BLD CALC: 35.1 % — LOW (ref 39–50)
HGB BLD-MCNC: 10.8 G/DL — LOW (ref 13–17)
IMM GRANULOCYTES NFR BLD AUTO: 0.6 % — SIGNIFICANT CHANGE UP (ref 0–1.5)
LYMPHOCYTES # BLD AUTO: 1.11 K/UL — SIGNIFICANT CHANGE UP (ref 1–3.3)
LYMPHOCYTES # BLD AUTO: 30.7 % — SIGNIFICANT CHANGE UP (ref 13–44)
MAGNESIUM SERPL-MCNC: 1.7 MG/DL — SIGNIFICANT CHANGE UP (ref 1.6–2.6)
MCHC RBC-ENTMCNC: 25.1 PG — LOW (ref 27–34)
MCHC RBC-ENTMCNC: 30.8 % — LOW (ref 32–36)
MCV RBC AUTO: 81.4 FL — SIGNIFICANT CHANGE UP (ref 80–100)
MONOCYTES # BLD AUTO: 0.34 K/UL — SIGNIFICANT CHANGE UP (ref 0–0.9)
MONOCYTES NFR BLD AUTO: 9.4 % — SIGNIFICANT CHANGE UP (ref 2–14)
NEUTROPHILS # BLD AUTO: 2.12 K/UL — SIGNIFICANT CHANGE UP (ref 1.8–7.4)
NEUTROPHILS NFR BLD AUTO: 58.7 % — SIGNIFICANT CHANGE UP (ref 43–77)
NRBC # FLD: 0 K/UL — SIGNIFICANT CHANGE UP (ref 0–0)
PHOSPHATE SERPL-MCNC: 4.1 MG/DL — SIGNIFICANT CHANGE UP (ref 2.5–4.5)
PLATELET # BLD AUTO: 124 K/UL — LOW (ref 150–400)
PMV BLD: 8.9 FL — SIGNIFICANT CHANGE UP (ref 7–13)
POTASSIUM SERPL-MCNC: 5.2 MMOL/L — SIGNIFICANT CHANGE UP (ref 3.5–5.3)
POTASSIUM SERPL-SCNC: 5.2 MMOL/L — SIGNIFICANT CHANGE UP (ref 3.5–5.3)
PROT SERPL-MCNC: 6.2 G/DL — SIGNIFICANT CHANGE UP (ref 6–8.3)
RBC # BLD: 4.31 M/UL — SIGNIFICANT CHANGE UP (ref 4.2–5.8)
RBC # FLD: 15.1 % — HIGH (ref 10.3–14.5)
SODIUM SERPL-SCNC: 138 MMOL/L — SIGNIFICANT CHANGE UP (ref 135–145)
WBC # BLD: 3.61 K/UL — LOW (ref 3.8–10.5)
WBC # FLD AUTO: 3.61 K/UL — LOW (ref 3.8–10.5)

## 2020-04-23 PROCEDURE — 99232 SBSQ HOSP IP/OBS MODERATE 35: CPT | Mod: GC

## 2020-04-23 PROCEDURE — 99233 SBSQ HOSP IP/OBS HIGH 50: CPT

## 2020-04-23 RX ORDER — POTASSIUM CHLORIDE 20 MEQ
10 PACKET (EA) ORAL DAILY
Refills: 0 | Status: DISCONTINUED | OUTPATIENT
Start: 2020-04-23 | End: 2020-04-28

## 2020-04-23 RX ORDER — ACETAMINOPHEN 500 MG
1000 TABLET ORAL ONCE
Refills: 0 | Status: COMPLETED | OUTPATIENT
Start: 2020-04-23 | End: 2020-04-23

## 2020-04-23 RX ORDER — SODIUM CHLORIDE 9 MG/ML
1000 INJECTION INTRAMUSCULAR; INTRAVENOUS; SUBCUTANEOUS
Refills: 0 | Status: DISCONTINUED | OUTPATIENT
Start: 2020-04-23 | End: 2020-04-26

## 2020-04-23 RX ORDER — CHLORHEXIDINE GLUCONATE 213 G/1000ML
1 SOLUTION TOPICAL
Refills: 0 | Status: DISCONTINUED | OUTPATIENT
Start: 2020-04-23 | End: 2020-05-07

## 2020-04-23 RX ADMIN — ERTAPENEM SODIUM 100 MILLIGRAM(S): 1 INJECTION, POWDER, LYOPHILIZED, FOR SOLUTION INTRAMUSCULAR; INTRAVENOUS at 17:58

## 2020-04-23 RX ADMIN — BUMETANIDE 1 MILLIGRAM(S): 0.25 INJECTION INTRAMUSCULAR; INTRAVENOUS at 05:45

## 2020-04-23 RX ADMIN — TACROLIMUS 1 MILLIGRAM(S): 5 CAPSULE ORAL at 17:58

## 2020-04-23 RX ADMIN — Medication 650 MILLIGRAM(S): at 04:55

## 2020-04-23 RX ADMIN — CLOPIDOGREL BISULFATE 75 MILLIGRAM(S): 75 TABLET, FILM COATED ORAL at 12:37

## 2020-04-23 RX ADMIN — HEPARIN SODIUM 5000 UNIT(S): 5000 INJECTION INTRAVENOUS; SUBCUTANEOUS at 17:58

## 2020-04-23 RX ADMIN — Medication 2: at 17:51

## 2020-04-23 RX ADMIN — Medication 25 MILLIGRAM(S): at 17:58

## 2020-04-23 RX ADMIN — INSULIN GLARGINE 6 UNIT(S): 100 INJECTION, SOLUTION SUBCUTANEOUS at 21:26

## 2020-04-23 RX ADMIN — SEVELAMER CARBONATE 800 MILLIGRAM(S): 2400 POWDER, FOR SUSPENSION ORAL at 12:37

## 2020-04-23 RX ADMIN — Medication 325 MILLIGRAM(S): at 12:37

## 2020-04-23 RX ADMIN — Medication 75 MICROGRAM(S): at 05:45

## 2020-04-23 RX ADMIN — Medication 400 MILLIGRAM(S): at 10:17

## 2020-04-23 RX ADMIN — Medication 500 MILLIGRAM(S): at 17:58

## 2020-04-23 RX ADMIN — Medication 500 MILLIGRAM(S): at 05:45

## 2020-04-23 RX ADMIN — TACROLIMUS 1 MILLIGRAM(S): 5 CAPSULE ORAL at 05:45

## 2020-04-23 RX ADMIN — HEPARIN SODIUM 5000 UNIT(S): 5000 INJECTION INTRAVENOUS; SUBCUTANEOUS at 00:04

## 2020-04-23 RX ADMIN — ATORVASTATIN CALCIUM 80 MILLIGRAM(S): 80 TABLET, FILM COATED ORAL at 21:26

## 2020-04-23 RX ADMIN — SODIUM CHLORIDE 1 GRAM(S): 9 INJECTION INTRAMUSCULAR; INTRAVENOUS; SUBCUTANEOUS at 05:45

## 2020-04-23 RX ADMIN — Medication 5 MILLIGRAM(S): at 05:45

## 2020-04-23 RX ADMIN — SEVELAMER CARBONATE 800 MILLIGRAM(S): 2400 POWDER, FOR SUSPENSION ORAL at 08:38

## 2020-04-23 RX ADMIN — SEVELAMER CARBONATE 800 MILLIGRAM(S): 2400 POWDER, FOR SUSPENSION ORAL at 17:58

## 2020-04-23 RX ADMIN — Medication 81 MILLIGRAM(S): at 12:36

## 2020-04-23 RX ADMIN — HEPARIN SODIUM 5000 UNIT(S): 5000 INJECTION INTRAVENOUS; SUBCUTANEOUS at 08:38

## 2020-04-23 RX ADMIN — SODIUM CHLORIDE 50 MILLILITER(S): 9 INJECTION INTRAMUSCULAR; INTRAVENOUS; SUBCUTANEOUS at 15:26

## 2020-04-23 NOTE — PROGRESS NOTE ADULT - PROBLEM SELECTOR PLAN 3
- UA suggestive of infection, prior UTI from 2/2020 sensitive to ceftriaxone, resistant to zosyn. Urinary culture with ESBL Klebsiella   - Patient already received one dose of Cefepime in ED and was started on ceftriaxone, switched to Ertapenem, on 4/13 as U cx noted to be ESBL, ID recs appreciated, 14 day course per ID through 4/26/20  pt febrile,  no sign of new infection, repeat blood cx without growth

## 2020-04-23 NOTE — PROGRESS NOTE ADULT - ATTENDING COMMENTS
Clarita Perdomo MD  Pager: [phone]  After 5 PM or weekends please call fellow on call or office [phone]

## 2020-04-23 NOTE — PROGRESS NOTE ADULT - ASSESSMENT
59M w/ hx of HTN, HLD, DM, ESRD s/p kidney transplant in 05, CAD s/p stent a few months ago at [facility] s/p R AKA in 2010 now COVID positive, initially requiring O2 now improved not requiring O2, also with ESBL klebsiella urinary infection on IV abx per ID.

## 2020-04-23 NOTE — PROGRESS NOTE ADULT - ASSESSMENT
60 yo man with h/o renal transplant presenting 4/11 from nursing home  covid+ oxygenating well on RA.  pyelo transplant kidney with  ESBL Klebsiella in urine    1) Pyelonephritis  - d/c ertapenem  - start meropenem, renally dosed   - nephrology following for renal transplant    2) COVID-19  - oxygenating well on RA  - supportive care  - maintain airborne /contact precautions    3) fever  - Bcx from 4/19 and 4/20 are so far neg to date  - monitor fevers  - ct c/a/p reviewed -->somewhat limited given no IV contrast but no acute abd pathology, ct chest w some ggo  - f/u cdiff  - f/u CMV PCR    4) abdominal pain   - monitor exam  - f/u c diff  - f/u CMV PCR    d/w Dr. Buchanan

## 2020-04-23 NOTE — PROGRESS NOTE ADULT - PROBLEM SELECTOR PLAN 4
Prior labs per patient's Nursing home:  March 27/2020: Cr/BUN: 3.6/24 March 30/2020: Cr/BUN: 3.9/24 April 6/2020: Cr/BUN: 4.8/20   - Appreciate Nephrology consult - renal u/s  for transplanted kidney WNL ,    -concern for noncompliance as tacrolimus level was low  -F/U spot urine creatinine noted ,  tac level <2 on 4/22  - Renal f/u appreciated , will continue current plan and medications , renal does not recommend increasing dose of immunosuppressants with ongoing infection, holding Cellcept, c/w tacrolimus and prednisone, Goal Tacrolimus level is 3-5 as per Renal, holding off on dose adjustment given YURI per renal  - will not request records from [facility] as long as Renal fn improving as management not going to change, pt will need to f/u with his nephrologist as outpt after discharge   - Primary Nephrologist: Dr. Adrián Hawkins Prior labs per patient's Nursing home:  March 27/2020: Cr/BUN: 3.6/24 March 30/2020: Cr/BUN: 3.9/24 April 6/2020: Cr/BUN: 4.8/20   - Appreciate Nephrology consult - renal u/s  for transplanted kidney WNL ,    -concern for noncompliance as tacrolimus level was low  -F/U spot urine creatinine noted ,  tac level <2 on 4/22  - Renal f/u appreciated , will continue current plan and medications , renal does not recommend increasing dose of immunosuppressants with ongoing infection, holding Cellcept, c/w tacrolimus and prednisone, Goal Tacrolimus level is 3-5 as per Renal, holding off on dose adjustment given YURI per renal  - Case d/w Renal, Renal function worse today, will hold Bumex and NaCl tabs, give gentle hydration for 10 hrs , will monitor renal function closely , avoid nephrotoxics, Renally dose meds   , pt will need to f/u with his nephrologist as outpt after discharge   - Primary Nephrologist: Dr. Adrián Hawkins

## 2020-04-23 NOTE — PROGRESS NOTE ADULT - PROBLEM SELECTOR PLAN 5
Will continue existing regimen of Immunosuppressants:   on Mycophenolate + Tacrolimus and Prednisone , c/w Tacrolimus and prednisone, holding Cellcept for now while inpatient  - plan as above,  Nephrology f/u appreciated, repeat tac level ordered  hyponatremia: chronic pt on Nacl tabs, fluid restriction to 1 L Will continue existing regimen of Immunosuppressants:   on Mycophenolate + Tacrolimus and Prednisone , c/w Tacrolimus and prednisone, holding Cellcept for now while inpatient  - plan as above,  Nephrology f/u appreciated, repeat tac level ordered  hyponatremia: chronic pt on Nacl tabs, fluid restriction to 1 L, now resolved, case d/w renal, will hold off on Nacl tabs for now

## 2020-04-23 NOTE — PROGRESS NOTE ADULT - SUBJECTIVE AND OBJECTIVE BOX
Follow Up: fevers, pyelo    Interval History:  Fevers overnight. Sleeping but easily awoken. Some abd discomfort.    Allergies  iodine (Vomiting)  IV Contrast (Unknown)      ANTIMICROBIALS:  ertapenem  IVPB 500 every 24 hours      OTHER MEDS:  acetaminophen   Tablet .. 650 milliGRAM(s) Oral every 4 hours PRN  acetaminophen   Tablet .. 650 milliGRAM(s) Oral every 6 hours PRN  acetaminophen  Suppository .. 650 milliGRAM(s) Rectal every 4 hours PRN  ascorbic acid  Oral Tab/Cap - Peds 500 milliGRAM(s) Oral two times a day  aspirin  chewable 81 milliGRAM(s) Oral daily  atorvastatin 80 milliGRAM(s) Oral at bedtime  bisacodyl 5 milliGRAM(s) Oral daily  buMETAnide 1 milliGRAM(s) Oral two times a day  carvedilol 3.125 milliGRAM(s) Oral every 12 hours  clopidogrel Tablet 75 milliGRAM(s) Oral daily  dextrose 40% Gel 15 Gram(s) Oral once PRN  dextrose 5%. 1000 milliLiter(s) IV Continuous <Continuous>  dextrose 50% Injectable 12.5 Gram(s) IV Push once  dextrose 50% Injectable 25 Gram(s) IV Push once  dextrose 50% Injectable 25 Gram(s) IV Push once  ferrous sulfate Oral Tab/Cap - Peds 325 milliGRAM(s) Oral daily  glucagon  Injectable 1 milliGRAM(s) IntraMuscular once PRN  heparin  Injectable 5000 Unit(s) SubCutaneous every 8 hours  hydrALAZINE 25 milliGRAM(s) Oral two times a day  insulin glargine SubCutaneous Injection (LANTUS) - Peds 6 Unit(s) SubCutaneous at bedtime  insulin lispro (HumaLOG) corrective regimen sliding scale   SubCutaneous three times a day before meals  insulin lispro (HumaLOG) corrective regimen sliding scale   SubCutaneous at bedtime  levothyroxine 75 MICROGram(s) Oral daily  ondansetron Injectable 4 milliGRAM(s) IV Push every 8 hours PRN  polyethylene glycol 3350 17 Gram(s) Oral daily  potassium chloride    Tablet ER 10 milliEquivalent(s) Oral daily  predniSONE   Tablet 5 milliGRAM(s) Oral daily  sevelamer carbonate 800 milliGRAM(s) Oral three times a day with meals  sodium chloride 1 Gram(s) Oral two times a day  sucralfate 1 Gram(s) Oral every 6 hours PRN  tacrolimus 1 milliGRAM(s) Oral <User Schedule>      Vital Signs Last 24 Hrs  T(C): 37.4 (23 Apr 2020 12:36), Max: 38.9 (23 Apr 2020 09:16)  T(F): 99.3 (23 Apr 2020 12:36), Max: 102.1 (23 Apr 2020 09:16)  HR: 62 (23 Apr 2020 12:36) (57 - 80)  BP: 124/58 (23 Apr 2020 09:16) (91/49 - 135/60)  BP(mean): --  RR: 20 (23 Apr 2020 09:16) (18 - 20)  SpO2: 100% (23 Apr 2020 12:36) (97% - 100%)    Physical Exam:  General: NAD,  non toxic  HEENT: atraumatic, normocephalic, normal sclera and conjunctiva  Cardio:     regular S1, S2  Respiratory:    clear b/l,    no wheezing  abd:     soft,   BS +,   no tenderness  Musculoskeletal:  no edema  Skin:    no rash  Neurologic:   no focal deficit  psych: calm                          10.8   3.61  )-----------( 124      ( 23 Apr 2020 05:50 )             35.1       04-23    138  |  106  |  83<H>  ----------------------------<  80  5.2   |  20<L>  |  5.06<H>    Ca    9.0      23 Apr 2020 05:50  Phos  4.1     04-23  Mg     1.7     04-23    TPro  6.2  /  Alb  2.8<L>  /  TBili  0.3  /  DBili  x   /  AST  16  /  ALT  8   /  AlkPhos  60  04-23          MICROBIOLOGY:    .Blood Blood  04-20-20   No growth to date.  --  --      .Blood Blood  04-19-20   No growth to date.  --  --      .Urine Clean Catch (Midstream)  04-11-20   >100,000 CFU/ml Klebsiella pneumoniae ESBL  --  Klebsiella pneumoniae ESBL      .Blood Blood-Peripheral  04-11-20   No Growth Final  --  --      .Blood Blood-Peripheral  04-10-20   No Growth Final  --  --      RADIOLOGY:    EXAM:  CT ABDOMEN AND PELVIS    EXAM:  CT CHEST    PROCEDURE DATE:  Apr 21 2020   IMPRESSION:  CT chest:  Pattern of GGO suggests infection including atypical pneumonia/viral infection from atypical agents including COVID-19 (C19V-1). No empyema or abscess.   CT abdomen and pelvis: No acute intra-abdominal pathology.  Incidental findings as above    GHADA DON M.D., ATTENDING RADIOLOGIST  This document has been electronically signed. Apr 21 2020  6:18PM

## 2020-04-23 NOTE — PROGRESS NOTE ADULT - SUBJECTIVE AND OBJECTIVE BOX
Patient is a 59y old  Male who presents with a chief complaint of Rule out COVID 19 infection (22 Apr 2020 10:40)      SUBJECTIVE / OVERNIGHT EVENTS:    MEDICATIONS  (STANDING):  ascorbic acid  Oral Tab/Cap - Peds 500 milliGRAM(s) Oral two times a day  aspirin  chewable 81 milliGRAM(s) Oral daily  atorvastatin 80 milliGRAM(s) Oral at bedtime  bisacodyl 5 milliGRAM(s) Oral daily  buMETAnide 1 milliGRAM(s) Oral two times a day  carvedilol 3.125 milliGRAM(s) Oral every 12 hours  clopidogrel Tablet 75 milliGRAM(s) Oral daily  dextrose 5%. 1000 milliLiter(s) (50 mL/Hr) IV Continuous <Continuous>  dextrose 50% Injectable 12.5 Gram(s) IV Push once  dextrose 50% Injectable 25 Gram(s) IV Push once  dextrose 50% Injectable 25 Gram(s) IV Push once  ertapenem  IVPB 500 milliGRAM(s) IV Intermittent every 24 hours  ferrous sulfate Oral Tab/Cap - Peds 325 milliGRAM(s) Oral daily  heparin  Injectable 5000 Unit(s) SubCutaneous every 8 hours  hydrALAZINE 25 milliGRAM(s) Oral two times a day  insulin glargine SubCutaneous Injection (LANTUS) - Peds 6 Unit(s) SubCutaneous at bedtime  insulin lispro (HumaLOG) corrective regimen sliding scale   SubCutaneous three times a day before meals  insulin lispro (HumaLOG) corrective regimen sliding scale   SubCutaneous at bedtime  levothyroxine 75 MICROGram(s) Oral daily  polyethylene glycol 3350 17 Gram(s) Oral daily  potassium chloride    Tablet ER 10 milliEquivalent(s) Oral daily  predniSONE   Tablet 5 milliGRAM(s) Oral daily  sevelamer carbonate 800 milliGRAM(s) Oral three times a day with meals  sodium chloride 1 Gram(s) Oral two times a day  tacrolimus 1 milliGRAM(s) Oral <User Schedule>    MEDICATIONS  (PRN):  acetaminophen   Tablet .. 650 milliGRAM(s) Oral every 4 hours PRN Temp greater or equal to 38.5C (101.3F)  acetaminophen   Tablet .. 650 milliGRAM(s) Oral every 6 hours PRN Temp greater or equal to 38C (100.4F), Mild Pain (1 - 3), Moderate Pain (4 - 6)  acetaminophen  Suppository .. 650 milliGRAM(s) Rectal every 4 hours PRN Temp greater or equal to 38.5C (101.3F)  dextrose 40% Gel 15 Gram(s) Oral once PRN Blood Glucose LESS THAN 70 milliGRAM(s)/deciliter  glucagon  Injectable 1 milliGRAM(s) IntraMuscular once PRN Glucose LESS THAN 70 milligrams/deciliter  ondansetron Injectable 4 milliGRAM(s) IV Push every 8 hours PRN Nausea and/or Vomiting  sucralfate 1 Gram(s) Oral every 6 hours PRN With each meal      Vital Signs Last 24 Hrs  T(C): 38.9 (23 Apr 2020 09:16), Max: 38.9 (23 Apr 2020 09:16)  T(F): 102.1 (23 Apr 2020 09:16), Max: 102.1 (23 Apr 2020 09:16)  HR: 71 (23 Apr 2020 09:16) (57 - 80)  BP: 124/58 (23 Apr 2020 09:16) (91/49 - 147/76)  BP(mean): --  RR: 20 (23 Apr 2020 09:16) (16 - 20)  SpO2: 97% (23 Apr 2020 09:16) (97% - 100%)  CAPILLARY BLOOD GLUCOSE      POCT Blood Glucose.: 99 mg/dL (23 Apr 2020 07:45)  POCT Blood Glucose.: 99 mg/dL (23 Apr 2020 07:35)  POCT Blood Glucose.: 120 mg/dL (22 Apr 2020 21:25)  POCT Blood Glucose.: 81 mg/dL (22 Apr 2020 16:54)  POCT Blood Glucose.: 90 mg/dL (22 Apr 2020 11:42)    I&O's Summary    22 Apr 2020 07:01  -  23 Apr 2020 07:00  --------------------------------------------------------  IN: 0 mL / OUT: 300 mL / NET: -300 mL    23 Apr 2020 07:01  -  23 Apr 2020 10:37  --------------------------------------------------------  IN: 0 mL / OUT: 0 mL / NET: 0 mL        PHYSICAL EXAM:  GENERAL: NAD, well-developed  HEAD:  Atraumatic, Normocephalic  EYES: EOMI, PERRLA, conjunctiva and sclera clear  NECK: Supple, No JVD  CHEST/LUNG: Clear to auscultation bilaterally; No wheeze  HEART: Regular rate and rhythm; No murmurs, rubs, or gallops  ABDOMEN: Soft, Nontender, Nondistended; Bowel sounds present  EXTREMITIES:  2+ Peripheral Pulses, No clubbing, cyanosis, or edema  PSYCH: AAOx3  NEUROLOGY: non-focal  SKIN: No rashes or lesions    LABS:                        10.8   3.61  )-----------( 124      ( 23 Apr 2020 05:50 )             35.1     04-23    138  |  106  |  83<H>  ----------------------------<  80  5.2   |  20<L>  |  5.06<H>    Ca    9.0      23 Apr 2020 05:50  Phos  4.1     04-23  Mg     1.7     04-23    TPro  6.2  /  Alb  2.8<L>  /  TBili  0.3  /  DBili  x   /  AST  16  /  ALT  8   /  AlkPhos  60  04-23              RADIOLOGY & ADDITIONAL TESTS:    Imaging Personally Reviewed:    Consultant(s) Notes Reviewed:      Care Discussed with Consultants/Other Providers: Patient is a 59y old  Male who presents with a chief complaint of Rule out COVID 19 infection (22 Apr 2020 10:40)      SUBJECTIVE / OVERNIGHT EVENTS: patient seen and examined by bedside, pt was febrile to 102 this morning , pt lying in bed with eyes closed , answering on repeating questions several times, keeps eyes closed during interview  , says he feels better than yesterday , denies headache, dizziness, SOB, CP, Palpitations , N/V/D, Abdominal pain has improved today         MEDICATIONS  (STANDING):  ascorbic acid  Oral Tab/Cap - Peds 500 milliGRAM(s) Oral two times a day  aspirin  chewable 81 milliGRAM(s) Oral daily  atorvastatin 80 milliGRAM(s) Oral at bedtime  bisacodyl 5 milliGRAM(s) Oral daily  buMETAnide 1 milliGRAM(s) Oral two times a day  carvedilol 3.125 milliGRAM(s) Oral every 12 hours  clopidogrel Tablet 75 milliGRAM(s) Oral daily  dextrose 5%. 1000 milliLiter(s) (50 mL/Hr) IV Continuous <Continuous>  dextrose 50% Injectable 12.5 Gram(s) IV Push once  dextrose 50% Injectable 25 Gram(s) IV Push once  dextrose 50% Injectable 25 Gram(s) IV Push once  ertapenem  IVPB 500 milliGRAM(s) IV Intermittent every 24 hours  ferrous sulfate Oral Tab/Cap - Peds 325 milliGRAM(s) Oral daily  heparin  Injectable 5000 Unit(s) SubCutaneous every 8 hours  hydrALAZINE 25 milliGRAM(s) Oral two times a day  insulin glargine SubCutaneous Injection (LANTUS) - Peds 6 Unit(s) SubCutaneous at bedtime  insulin lispro (HumaLOG) corrective regimen sliding scale   SubCutaneous three times a day before meals  insulin lispro (HumaLOG) corrective regimen sliding scale   SubCutaneous at bedtime  levothyroxine 75 MICROGram(s) Oral daily  polyethylene glycol 3350 17 Gram(s) Oral daily  potassium chloride    Tablet ER 10 milliEquivalent(s) Oral daily  predniSONE   Tablet 5 milliGRAM(s) Oral daily  sevelamer carbonate 800 milliGRAM(s) Oral three times a day with meals  sodium chloride 1 Gram(s) Oral two times a day  tacrolimus 1 milliGRAM(s) Oral <User Schedule>    MEDICATIONS  (PRN):  acetaminophen   Tablet .. 650 milliGRAM(s) Oral every 4 hours PRN Temp greater or equal to 38.5C (101.3F)  acetaminophen   Tablet .. 650 milliGRAM(s) Oral every 6 hours PRN Temp greater or equal to 38C (100.4F), Mild Pain (1 - 3), Moderate Pain (4 - 6)  acetaminophen  Suppository .. 650 milliGRAM(s) Rectal every 4 hours PRN Temp greater or equal to 38.5C (101.3F)  dextrose 40% Gel 15 Gram(s) Oral once PRN Blood Glucose LESS THAN 70 milliGRAM(s)/deciliter  glucagon  Injectable 1 milliGRAM(s) IntraMuscular once PRN Glucose LESS THAN 70 milligrams/deciliter  ondansetron Injectable 4 milliGRAM(s) IV Push every 8 hours PRN Nausea and/or Vomiting  sucralfate 1 Gram(s) Oral every 6 hours PRN With each meal      Vital Signs Last 24 Hrs  T(C): 38.9 (23 Apr 2020 09:16), Max: 38.9 (23 Apr 2020 09:16)  T(F): 102.1 (23 Apr 2020 09:16), Max: 102.1 (23 Apr 2020 09:16)  HR: 71 (23 Apr 2020 09:16) (57 - 80)  BP: 124/58 (23 Apr 2020 09:16) (91/49 - 147/76)  BP(mean): --  RR: 20 (23 Apr 2020 09:16) (16 - 20)  SpO2: 97% (23 Apr 2020 09:16) (97% - 100%)  CAPILLARY BLOOD GLUCOSE      POCT Blood Glucose.: 99 mg/dL (23 Apr 2020 07:45)  POCT Blood Glucose.: 99 mg/dL (23 Apr 2020 07:35)  POCT Blood Glucose.: 120 mg/dL (22 Apr 2020 21:25)  POCT Blood Glucose.: 81 mg/dL (22 Apr 2020 16:54)  POCT Blood Glucose.: 90 mg/dL (22 Apr 2020 11:42)    I&O's Summary    22 Apr 2020 07:01  -  23 Apr 2020 07:00  --------------------------------------------------------  IN: 0 mL / OUT: 300 mL / NET: -300 mL    23 Apr 2020 07:01  -  23 Apr 2020 10:37  --------------------------------------------------------  IN: 0 mL / OUT: 0 mL / NET: 0 mL        PHYSICAL EXAM:  GENERAL:  pt  lying in bed with eyes closed, appears tired   EYES: sclera clear b/l  CHEST/LUNG: normal respiratory effort, not tachypneic  HEART: RRR, nl S1 S2   ABDOMEN: Soft, Nontender, Nondistended  EXTREMITIES: R AKA, L forearm AV fistula  NEUROLOGY: appears drowsy but answering questions appropriately ,  no gross deficits  PSYCH: calm,       LABS:                        10.8   3.61  )-----------( 124      ( 23 Apr 2020 05:50 )             35.1     04-23    138  |  106  |  83<H>  ----------------------------<  80  5.2   |  20<L>  |  5.06<H>    Ca    9.0      23 Apr 2020 05:50  Phos  4.1     04-23  Mg     1.7     04-23    TPro  6.2  /  Alb  2.8<L>  /  TBili  0.3  /  DBili  x   /  AST  16  /  ALT  8   /  AlkPhos  60  04-23              RADIOLOGY & ADDITIONAL TESTS:    Imaging Personally Reviewed:    Consultant(s) Notes Reviewed:      Care Discussed with Consultants/Other Providers: ID , Nephrology

## 2020-04-23 NOTE — PROVIDER CONTACT NOTE (OTHER) - BACKGROUND
pt. admitted for fever and hypoxia ; covid -19 positive, Hx: ESRD, CAD, HTN, DM, BPH, HF, Hypothyroidism

## 2020-04-23 NOTE — PROGRESS NOTE ADULT - PROBLEM SELECTOR PLAN 1
Continues to spike fevers, most recently this AM to 101.9   - Known COVID19+ and currently on IV ertapenem for +urine with klebsiella ESBL as below  - Repeat blood culture from prior fever without growth so far  - Continue to monitor, prn tylenol, follow up additional ID recommendations  -CT chest and A/P noted as above,  Patchy bilateral groundglass opacities noted predominantly in the periphery of both lungs but no  acute intra-abdominal pathology.  ID f/u appreciated , will f/u CMV PCR Continues to spike fevers, most recently this AM to 102.1   - Known COVID19+ and currently on IV ertapenem for +urine with klebsiella ESBL as below  - Repeat blood culture from prior fever without growth so far  - Continue to monitor, prn tylenol, follow up additional ID recommendations  -CT chest and A/P noted   Patchy bilateral ground glass opacities noted predominantly in the periphery of both lungs but no  acute intra-abdominal pathology.  case d/w ID  , will f/u CMV PCR and stool for Cdiff

## 2020-04-23 NOTE — PROGRESS NOTE ADULT - PROBLEM SELECTOR PLAN 2
- Maintaining normal oxygenation off of supplemental O2. No acute respiratory issues.   - CT chest with  Patchy bilateral ground glass opacities noted predominantly in the periphery of both lungs in keeping with known Covid 19 infection  - Patient full code

## 2020-04-23 NOTE — PROVIDER CONTACT NOTE (OTHER) - SITUATION
EA=032.1, HR=71, HH=759/58, O2 sat=97% RA, RR=20 CO=752.1, HR=71, PK=240/58, O2 sat=97% RA, RR=20, Pt. had Tylenol PO 650mg at 0455am.

## 2020-04-24 LAB
ALBUMIN SERPL ELPH-MCNC: 2.7 G/DL — LOW (ref 3.3–5)
ALP SERPL-CCNC: 66 U/L — SIGNIFICANT CHANGE UP (ref 40–120)
ALT FLD-CCNC: 10 U/L — SIGNIFICANT CHANGE UP (ref 4–41)
ANION GAP SERPL CALC-SCNC: 13 MMO/L — SIGNIFICANT CHANGE UP (ref 7–14)
AST SERPL-CCNC: 26 U/L — SIGNIFICANT CHANGE UP (ref 4–40)
BASOPHILS # BLD AUTO: 0.01 K/UL — SIGNIFICANT CHANGE UP (ref 0–0.2)
BASOPHILS NFR BLD AUTO: 0.3 % — SIGNIFICANT CHANGE UP (ref 0–2)
BILIRUB SERPL-MCNC: 0.2 MG/DL — SIGNIFICANT CHANGE UP (ref 0.2–1.2)
BUN SERPL-MCNC: 93 MG/DL — HIGH (ref 7–23)
CALCIUM SERPL-MCNC: 8.3 MG/DL — LOW (ref 8.4–10.5)
CHLORIDE SERPL-SCNC: 107 MMOL/L — SIGNIFICANT CHANGE UP (ref 98–107)
CO2 SERPL-SCNC: 20 MMOL/L — LOW (ref 22–31)
CREAT SERPL-MCNC: 5.25 MG/DL — HIGH (ref 0.5–1.3)
CRP SERPL-MCNC: 32.8 MG/L — HIGH
CULTURE RESULTS: SIGNIFICANT CHANGE UP
D DIMER BLD IA.RAPID-MCNC: 300 NG/ML — SIGNIFICANT CHANGE UP
EOSINOPHIL # BLD AUTO: 0.03 K/UL — SIGNIFICANT CHANGE UP (ref 0–0.5)
EOSINOPHIL NFR BLD AUTO: 0.8 % — SIGNIFICANT CHANGE UP (ref 0–6)
FERRITIN SERPL-MCNC: 2513 NG/ML — HIGH (ref 30–400)
GLUCOSE BLDC GLUCOMTR-MCNC: 102 MG/DL — HIGH (ref 70–99)
GLUCOSE BLDC GLUCOMTR-MCNC: 103 MG/DL — HIGH (ref 70–99)
GLUCOSE BLDC GLUCOMTR-MCNC: 103 MG/DL — HIGH (ref 70–99)
GLUCOSE BLDC GLUCOMTR-MCNC: 90 MG/DL — SIGNIFICANT CHANGE UP (ref 70–99)
GLUCOSE SERPL-MCNC: 84 MG/DL — SIGNIFICANT CHANGE UP (ref 70–99)
HCT VFR BLD CALC: 33.8 % — LOW (ref 39–50)
HGB BLD-MCNC: 10.1 G/DL — LOW (ref 13–17)
IMM GRANULOCYTES NFR BLD AUTO: 0.5 % — SIGNIFICANT CHANGE UP (ref 0–1.5)
LDH SERPL L TO P-CCNC: 229 U/L — HIGH (ref 135–225)
LYMPHOCYTES # BLD AUTO: 1.11 K/UL — SIGNIFICANT CHANGE UP (ref 1–3.3)
LYMPHOCYTES # BLD AUTO: 28.8 % — SIGNIFICANT CHANGE UP (ref 13–44)
MAGNESIUM SERPL-MCNC: 1.6 MG/DL — SIGNIFICANT CHANGE UP (ref 1.6–2.6)
MCHC RBC-ENTMCNC: 24.7 PG — LOW (ref 27–34)
MCHC RBC-ENTMCNC: 29.9 % — LOW (ref 32–36)
MCV RBC AUTO: 82.6 FL — SIGNIFICANT CHANGE UP (ref 80–100)
MONOCYTES # BLD AUTO: 0.3 K/UL — SIGNIFICANT CHANGE UP (ref 0–0.9)
MONOCYTES NFR BLD AUTO: 7.8 % — SIGNIFICANT CHANGE UP (ref 2–14)
NEUTROPHILS # BLD AUTO: 2.38 K/UL — SIGNIFICANT CHANGE UP (ref 1.8–7.4)
NEUTROPHILS NFR BLD AUTO: 61.8 % — SIGNIFICANT CHANGE UP (ref 43–77)
NRBC # FLD: 0 K/UL — SIGNIFICANT CHANGE UP (ref 0–0)
PHOSPHATE SERPL-MCNC: 4.1 MG/DL — SIGNIFICANT CHANGE UP (ref 2.5–4.5)
PLATELET # BLD AUTO: 130 K/UL — LOW (ref 150–400)
PMV BLD: 9.4 FL — SIGNIFICANT CHANGE UP (ref 7–13)
POTASSIUM SERPL-MCNC: 4.9 MMOL/L — SIGNIFICANT CHANGE UP (ref 3.5–5.3)
POTASSIUM SERPL-SCNC: 4.9 MMOL/L — SIGNIFICANT CHANGE UP (ref 3.5–5.3)
PROT SERPL-MCNC: 5.3 G/DL — LOW (ref 6–8.3)
RBC # BLD: 4.09 M/UL — LOW (ref 4.2–5.8)
RBC # FLD: 15.2 % — HIGH (ref 10.3–14.5)
SODIUM SERPL-SCNC: 140 MMOL/L — SIGNIFICANT CHANGE UP (ref 135–145)
SPECIMEN SOURCE: SIGNIFICANT CHANGE UP
TACROLIMUS SERPL-MCNC: < 2 NG/ML — SIGNIFICANT CHANGE UP
WBC # BLD: 3.85 K/UL — SIGNIFICANT CHANGE UP (ref 3.8–10.5)
WBC # FLD AUTO: 3.85 K/UL — SIGNIFICANT CHANGE UP (ref 3.8–10.5)

## 2020-04-24 PROCEDURE — 99233 SBSQ HOSP IP/OBS HIGH 50: CPT | Mod: GC

## 2020-04-24 PROCEDURE — 99232 SBSQ HOSP IP/OBS MODERATE 35: CPT | Mod: GC

## 2020-04-24 PROCEDURE — 99233 SBSQ HOSP IP/OBS HIGH 50: CPT

## 2020-04-24 RX ORDER — TACROLIMUS 5 MG/1
2 CAPSULE ORAL
Refills: 0 | Status: DISCONTINUED | OUTPATIENT
Start: 2020-04-24 | End: 2020-04-28

## 2020-04-24 RX ADMIN — HEPARIN SODIUM 5000 UNIT(S): 5000 INJECTION INTRAVENOUS; SUBCUTANEOUS at 07:59

## 2020-04-24 RX ADMIN — CARVEDILOL PHOSPHATE 3.12 MILLIGRAM(S): 80 CAPSULE, EXTENDED RELEASE ORAL at 17:22

## 2020-04-24 RX ADMIN — ERTAPENEM SODIUM 100 MILLIGRAM(S): 1 INJECTION, POWDER, LYOPHILIZED, FOR SOLUTION INTRAMUSCULAR; INTRAVENOUS at 17:21

## 2020-04-24 RX ADMIN — INSULIN GLARGINE 6 UNIT(S): 100 INJECTION, SOLUTION SUBCUTANEOUS at 22:11

## 2020-04-24 RX ADMIN — Medication 325 MILLIGRAM(S): at 11:49

## 2020-04-24 RX ADMIN — Medication 650 MILLIGRAM(S): at 02:07

## 2020-04-24 RX ADMIN — Medication 81 MILLIGRAM(S): at 11:49

## 2020-04-24 RX ADMIN — CHLORHEXIDINE GLUCONATE 1 APPLICATION(S): 213 SOLUTION TOPICAL at 06:00

## 2020-04-24 RX ADMIN — CLOPIDOGREL BISULFATE 75 MILLIGRAM(S): 75 TABLET, FILM COATED ORAL at 11:49

## 2020-04-24 RX ADMIN — TACROLIMUS 1 MILLIGRAM(S): 5 CAPSULE ORAL at 06:21

## 2020-04-24 RX ADMIN — Medication 500 MILLIGRAM(S): at 06:00

## 2020-04-24 RX ADMIN — SEVELAMER CARBONATE 800 MILLIGRAM(S): 2400 POWDER, FOR SUSPENSION ORAL at 11:49

## 2020-04-24 RX ADMIN — TACROLIMUS 2 MILLIGRAM(S): 5 CAPSULE ORAL at 17:22

## 2020-04-24 RX ADMIN — Medication 75 MICROGRAM(S): at 06:00

## 2020-04-24 RX ADMIN — HEPARIN SODIUM 5000 UNIT(S): 5000 INJECTION INTRAVENOUS; SUBCUTANEOUS at 23:14

## 2020-04-24 RX ADMIN — ATORVASTATIN CALCIUM 80 MILLIGRAM(S): 80 TABLET, FILM COATED ORAL at 22:11

## 2020-04-24 RX ADMIN — HEPARIN SODIUM 5000 UNIT(S): 5000 INJECTION INTRAVENOUS; SUBCUTANEOUS at 00:26

## 2020-04-24 RX ADMIN — Medication 5 MILLIGRAM(S): at 06:00

## 2020-04-24 RX ADMIN — Medication 10 MILLIEQUIVALENT(S): at 11:49

## 2020-04-24 RX ADMIN — Medication 500 MILLIGRAM(S): at 17:22

## 2020-04-24 RX ADMIN — SEVELAMER CARBONATE 800 MILLIGRAM(S): 2400 POWDER, FOR SUSPENSION ORAL at 07:59

## 2020-04-24 RX ADMIN — CHLORHEXIDINE GLUCONATE 1 APPLICATION(S): 213 SOLUTION TOPICAL at 17:22

## 2020-04-24 NOTE — PROGRESS NOTE ADULT - SUBJECTIVE AND OBJECTIVE BOX
Patient is a 59y old  Male who presents with a chief complaint of Rule out COVID 19 infection (22 Apr 2020 10:40)    SUBJECTIVE / OVERNIGHT EVENTS: No acute events. Reports feeling better than yesterday. Reports feeling "cool" despite numerous blankets. No other discomfort.    MEDICATIONS  (STANDING):  ascorbic acid  Oral Tab/Cap - Peds 500 milliGRAM(s) Oral two times a day  aspirin  chewable 81 milliGRAM(s) Oral daily  atorvastatin 80 milliGRAM(s) Oral at bedtime  bisacodyl 5 milliGRAM(s) Oral daily  carvedilol 3.125 milliGRAM(s) Oral every 12 hours  chlorhexidine 4% Liquid 1 Application(s) Topical two times a day  clopidogrel Tablet 75 milliGRAM(s) Oral daily  dextrose 5%. 1000 milliLiter(s) (50 mL/Hr) IV Continuous <Continuous>  dextrose 50% Injectable 12.5 Gram(s) IV Push once  dextrose 50% Injectable 25 Gram(s) IV Push once  dextrose 50% Injectable 25 Gram(s) IV Push once  ertapenem  IVPB 500 milliGRAM(s) IV Intermittent every 24 hours  ferrous sulfate Oral Tab/Cap - Peds 325 milliGRAM(s) Oral daily  heparin  Injectable 5000 Unit(s) SubCutaneous every 8 hours  hydrALAZINE 25 milliGRAM(s) Oral two times a day  insulin glargine SubCutaneous Injection (LANTUS) - Peds 6 Unit(s) SubCutaneous at bedtime  insulin lispro (HumaLOG) corrective regimen sliding scale   SubCutaneous three times a day before meals  insulin lispro (HumaLOG) corrective regimen sliding scale   SubCutaneous at bedtime  levothyroxine 75 MICROGram(s) Oral daily  polyethylene glycol 3350 17 Gram(s) Oral daily  potassium chloride    Tablet ER 10 milliEquivalent(s) Oral daily  predniSONE   Tablet 5 milliGRAM(s) Oral daily  sevelamer carbonate 800 milliGRAM(s) Oral three times a day with meals  sodium chloride 0.9%. 1000 milliLiter(s) (50 mL/Hr) IV Continuous <Continuous>  tacrolimus 2 milliGRAM(s) Oral <User Schedule>    MEDICATIONS  (PRN):  acetaminophen   Tablet .. 650 milliGRAM(s) Oral every 4 hours PRN Temp greater or equal to 38.5C (101.3F)  acetaminophen   Tablet .. 650 milliGRAM(s) Oral every 6 hours PRN Temp greater or equal to 38C (100.4F), Mild Pain (1 - 3), Moderate Pain (4 - 6)  acetaminophen  Suppository .. 650 milliGRAM(s) Rectal every 4 hours PRN Temp greater or equal to 38.5C (101.3F)  dextrose 40% Gel 15 Gram(s) Oral once PRN Blood Glucose LESS THAN 70 milliGRAM(s)/deciliter  glucagon  Injectable 1 milliGRAM(s) IntraMuscular once PRN Glucose LESS THAN 70 milligrams/deciliter  ondansetron Injectable 4 milliGRAM(s) IV Push every 8 hours PRN Nausea and/or Vomiting  sucralfate 1 Gram(s) Oral every 6 hours PRN With each meal    Vital Signs Last 24 Hrs  T(C): 36.9 (24 Apr 2020 11:45), Max: 37.9 (24 Apr 2020 02:05)  T(F): 98.4 (24 Apr 2020 11:45), Max: 100.2 (24 Apr 2020 02:05)  HR: 69 (24 Apr 2020 11:45) (58 - 69)  BP: 146/78 (24 Apr 2020 11:45) (93/46 - 146/78)  BP(mean): --  RR: 18 (24 Apr 2020 11:45) (18 - 19)  SpO2: 99% (24 Apr 2020 11:45) (98% - 100%)    I&O's Detail    23 Apr 2020 07:01  -  24 Apr 2020 07:00  --------------------------------------------------------  IN:  Total IN: 0 mL    OUT:    Voided: 300 mL  Total OUT: 300 mL    Total NET: -300 mL    CAPILLARY BLOOD GLUCOSE    POCT Blood Glucose.: 103 mg/dL (24 Apr 2020 11:39)  POCT Blood Glucose.: 103 mg/dL (24 Apr 2020 07:45)  POCT Blood Glucose.: 130 mg/dL (23 Apr 2020 21:08)  POCT Blood Glucose.: 151 mg/dL (23 Apr 2020 16:59)    PHYSICAL EXAM:  GENERAL:  laying in bed, NAD  EYES: sclera clear b/l  CHEST/LUNG: normal respiratory effort, not tachypneic, no wheezing  HEART: RRR, nl S1 S2   ABDOMEN: Soft, Nontender, Nondistended  EXTREMITIES: R AKA, L forearm AV fistula  NEUROLOGY: appears drowsy but answering questions appropriately ,  no gross deficits  PSYCH: calm    LABS:                         10.1   3.85  )-----------( 130      ( 24 Apr 2020 05:10 )             33.8     04-24    140  |  107  |  93<H>  ----------------------------<  84  4.9   |  20<L>  |  5.25<H>    Ca    8.3<L>      24 Apr 2020 05:10  Phos  4.1     04-24  Mg     1.6     04-24    TPro  5.3<L>  /  Alb  2.7<L>  /  TBili  0.2  /  DBili  x   /  AST  26  /  ALT  10  /  AlkPhos  66  04-24      RADIOLOGY, & ADDITIONAL TESTS: Reviewed.

## 2020-04-24 NOTE — PROGRESS NOTE ADULT - SUBJECTIVE AND OBJECTIVE BOX
[facility] Division of Kidney Diseases & Hypertension  FOLLOW UP NOTE  [phone]--------------------------------------------------------------------------------  HPI: 59M PMH of HTN, HLD, DM, ESRD s/p kidney transplant, admitted for COVID-19. Nephrology team consulted for YURI and management of immunosuppressant. Pt with history of ESRD s/p DDRT on 2005. On review pt is on Tacrolimus 1mg BID, Mycophenolate 1gm BID and Prednisone 5mg daily. On review of labs, pt with SCr: 3.6 on 3/27/20 increased to 3.9 on 3/30/20. Pt with last outpatient SCr of 4.8 as outpatient at [facility]. On admission, SCr was 5.47 (4/10/20) which has continued to improve. SCr: 4.30 however increased to 5.25 today.    Pt see this morning breathing well on RA without significant signs of distress.  Pt reports decreased PO intake.  No other subjective complaints  No acute events overnight.     PAST HISTORY  --------------------------------------------------------------------------------  No significant changes to PMH, PSH, FHx, SHx, unless otherwise noted    ALLERGIES & MEDICATIONS  --------------------------------------------------------------------------------  Allergies    iodine (Vomiting)  IV Contrast (Unknown)    Intolerances      Standing Inpatient Medications  ascorbic acid  Oral Tab/Cap - Peds 500 milliGRAM(s) Oral two times a day  aspirin  chewable 81 milliGRAM(s) Oral daily  atorvastatin 80 milliGRAM(s) Oral at bedtime  bisacodyl 5 milliGRAM(s) Oral daily  carvedilol 3.125 milliGRAM(s) Oral every 12 hours  chlorhexidine 4% Liquid 1 Application(s) Topical two times a day  clopidogrel Tablet 75 milliGRAM(s) Oral daily  dextrose 5%. 1000 milliLiter(s) IV Continuous <Continuous>  dextrose 50% Injectable 12.5 Gram(s) IV Push once  dextrose 50% Injectable 25 Gram(s) IV Push once  dextrose 50% Injectable 25 Gram(s) IV Push once  ertapenem  IVPB 500 milliGRAM(s) IV Intermittent every 24 hours  ferrous sulfate Oral Tab/Cap - Peds 325 milliGRAM(s) Oral daily  heparin  Injectable 5000 Unit(s) SubCutaneous every 8 hours  hydrALAZINE 25 milliGRAM(s) Oral two times a day  insulin glargine SubCutaneous Injection (LANTUS) - Peds 6 Unit(s) SubCutaneous at bedtime  insulin lispro (HumaLOG) corrective regimen sliding scale   SubCutaneous three times a day before meals  insulin lispro (HumaLOG) corrective regimen sliding scale   SubCutaneous at bedtime  levothyroxine 75 MICROGram(s) Oral daily  polyethylene glycol 3350 17 Gram(s) Oral daily  potassium chloride    Tablet ER 10 milliEquivalent(s) Oral daily  predniSONE   Tablet 5 milliGRAM(s) Oral daily  sevelamer carbonate 800 milliGRAM(s) Oral three times a day with meals  sodium chloride 0.9%. 1000 milliLiter(s) IV Continuous <Continuous>  tacrolimus 1 milliGRAM(s) Oral <User Schedule>    PRN Inpatient Medications  acetaminophen   Tablet .. 650 milliGRAM(s) Oral every 4 hours PRN  acetaminophen   Tablet .. 650 milliGRAM(s) Oral every 6 hours PRN  acetaminophen  Suppository .. 650 milliGRAM(s) Rectal every 4 hours PRN  dextrose 40% Gel 15 Gram(s) Oral once PRN  glucagon  Injectable 1 milliGRAM(s) IntraMuscular once PRN  ondansetron Injectable 4 milliGRAM(s) IV Push every 8 hours PRN  sucralfate 1 Gram(s) Oral every 6 hours PRN      REVIEW OF SYSTEMS  --------------------------------------------------------------------------------  Gen: no fever/ chills   Skin: No rashes  Respiratory: No dyspnea  CV: No chest pain,   GI: No abdominal pain, diarrhea  Neuro: No dizziness/lightheadedness    All other systems were reviewed and are negative, except as noted.  VITALS/PHYSICAL EXAM  --------------------------------------------------------------------------------  T(C): 36.8 (04-24-20 @ 05:57), Max: 37.9 (04-24-20 @ 02:05)  HR: 65 (04-24-20 @ 05:57) (58 - 65)  BP: 93/46 (04-24-20 @ 05:57) (93/46 - 127/66)  RR: 19 (04-24-20 @ 05:57) (18 - 19)  SpO2: 98% (04-24-20 @ 05:57) (98% - 100%)  Wt(kg): --  Height (cm): 160 (04-23-20 @ 09:16)  Weight (kg): 56.4 (04-23-20 @ 10:23)  BMI (kg/m2): 22 (04-23-20 @ 10:23)  BSA (m2): 1.58 (04-23-20 @ 10:23)      04-23-20 @ 07:01  -  04-24-20 @ 07:00  --------------------------------------------------------  IN: 0 mL / OUT: 300 mL / NET: -300 mL      Physical Exam:  	Gen: no distress  	HEENT: , no JVD  	Pulm: normal respiratory pattern  	CV:  not tachy  	Abd: +BS, soft, transplant area without tenderness  	Ext: No B/L Lower ext edema s/p R amputation  	Neuro: awake, responsive to commands  	Skin: Warm, without rashes  	Vascular access: Left Arm AVF with palpable thrill, + aneurysmal dilation      LABS/STUDIES  --------------------------------------------------------------------------------              10.1   3.85  >-----------<  130      [04-24-20 @ 05:10]              33.8     140  |  107  |  93  ----------------------------<  84      [04-24-20 @ 05:10]  4.9   |  20  |  5.25        Ca     8.3     [04-24-20 @ 05:10]      Mg     1.6     [04-24-20 @ 05:10]      Phos  4.1     [04-24-20 @ 05:10]    TPro  5.3  /  Alb  2.7  /  TBili  0.2  /  DBili  x   /  AST  26  /  ALT  10  /  AlkPhos  66  [04-24-20 @ 05:10]              [04-24-20 @ 05:10]    Creatinine Trend:  SCr 5.25 [04-24 @ 05:10]  SCr 5.06 [04-23 @ 05:50]  SCr 4.38 [04-22 @ 06:15]  SCr 4.30 [04-21 @ 05:36]  SCr 4.36 [04-20 @ 05:33]        Ferritin 2513      [04-24-20 @ 05:10]

## 2020-04-24 NOTE — PROGRESS NOTE ADULT - PROBLEM SELECTOR PLAN 2
Pt with history of ESRD s/p DDRT on 2005. Pt is on Tacrolimus 1mg BID, Cellcept 1 gm BID and Prednisone 5mg daily. Last Tacro level <2. Hold Cellcept until discharge and continue Tacrolimus at same dose and Prednisone. Increase Tacrolimus 2mg BID. Please check daily AM trough 30 minutes prior to dose. Goal Tacrolimus level is 3-5. Monitor Scr.      Candida Diana  Nephrology Fellow  Pager: [phone] Pt with history of ESRD s/p DDRT on 2005. Pt is on Tacrolimus 1mg BID, Cellcept 1 gm BID and Prednisone 5mg daily. Last Tacro level <2. Hold Cellcept until discharge and continue Tacrolimus at same dose and Prednisone. Increase Tacrolimus 2mg BID to be given specifically at 6 am and 6 pm. Please check daily AM trough 30 minutes prior to dose. Goal Tacrolimus level is 3-5. Monitor Scr.      Candida Diana  Nephrology Fellow  Pager: [phone]

## 2020-04-24 NOTE — PROGRESS NOTE ADULT - PROBLEM SELECTOR PLAN 4
Prior labs per patient's Nursing home:  March 27/2020: Cr/BUN: 3.6/24 March 30/2020: Cr/BUN: 3.9/24 April 6/2020: Cr/BUN: 4.8/20   - Appreciate Nephrology consult - renal u/s  for transplanted kidney WNL ,    -concern for noncompliance as tacrolimus level was low  -F/U spot urine creatinine noted ,  tac level <2 on 4/22  - Renal f/u appreciated , will continue current plan and medications, renal does not recommend increasing dose of immunosuppressants with ongoing infection, holding Cellcept, c/w tacrolimus and prednisone, Goal Tacrolimus level is 3-5 as per Renal, holding off on dose adjustment given YURI per renal  - Case d/w Renal, Renal function worse today, will hold Bumex and NaCl tabs, give gentle hydration for 10 hrs , will monitor renal function closely , avoid nephrotoxics, Renally dose meds   , pt will need to f/u with his nephrologist as outpt after discharge   - Primary Nephrologist: Dr. Adrián Hawkins

## 2020-04-24 NOTE — DIETITIAN INITIAL EVALUATION ADULT. - PERTINENT MEDS FT
MEDICATIONS  (STANDING):  ascorbic acid  Oral Tab/Cap - Peds 500 milliGRAM(s) Oral two times a day  aspirin  chewable 81 milliGRAM(s) Oral daily  atorvastatin 80 milliGRAM(s) Oral at bedtime  bisacodyl 5 milliGRAM(s) Oral daily  carvedilol 3.125 milliGRAM(s) Oral every 12 hours  chlorhexidine 4% Liquid 1 Application(s) Topical two times a day  clopidogrel Tablet 75 milliGRAM(s) Oral daily  dextrose 5%. 1000 milliLiter(s) (50 mL/Hr) IV Continuous <Continuous>  dextrose 50% Injectable 12.5 Gram(s) IV Push once  dextrose 50% Injectable 25 Gram(s) IV Push once  dextrose 50% Injectable 25 Gram(s) IV Push once  ertapenem  IVPB 500 milliGRAM(s) IV Intermittent every 24 hours  ferrous sulfate Oral Tab/Cap - Peds 325 milliGRAM(s) Oral daily  heparin  Injectable 5000 Unit(s) SubCutaneous every 8 hours  hydrALAZINE 25 milliGRAM(s) Oral two times a day  insulin glargine SubCutaneous Injection (LANTUS) - Peds 6 Unit(s) SubCutaneous at bedtime  insulin lispro (HumaLOG) corrective regimen sliding scale   SubCutaneous three times a day before meals  insulin lispro (HumaLOG) corrective regimen sliding scale   SubCutaneous at bedtime  levothyroxine 75 MICROGram(s) Oral daily  polyethylene glycol 3350 17 Gram(s) Oral daily  potassium chloride    Tablet ER 10 milliEquivalent(s) Oral daily  predniSONE   Tablet 5 milliGRAM(s) Oral daily  sevelamer carbonate 800 milliGRAM(s) Oral three times a day with meals  sodium chloride 0.9%. 1000 milliLiter(s) (50 mL/Hr) IV Continuous <Continuous>  tacrolimus 1 milliGRAM(s) Oral <User Schedule>    MEDICATIONS  (PRN):  acetaminophen   Tablet .. 650 milliGRAM(s) Oral every 4 hours PRN Temp greater or equal to 38.5C (101.3F)  acetaminophen   Tablet .. 650 milliGRAM(s) Oral every 6 hours PRN Temp greater or equal to 38C (100.4F), Mild Pain (1 - 3), Moderate Pain (4 - 6)  acetaminophen  Suppository .. 650 milliGRAM(s) Rectal every 4 hours PRN Temp greater or equal to 38.5C (101.3F)  dextrose 40% Gel 15 Gram(s) Oral once PRN Blood Glucose LESS THAN 70 milliGRAM(s)/deciliter  glucagon  Injectable 1 milliGRAM(s) IntraMuscular once PRN Glucose LESS THAN 70 milligrams/deciliter  ondansetron Injectable 4 milliGRAM(s) IV Push every 8 hours PRN Nausea and/or Vomiting  sucralfate 1 Gram(s) Oral every 6 hours PRN With each meal

## 2020-04-24 NOTE — DIETITIAN INITIAL EVALUATION ADULT. - PROBLEM SELECTOR PLAN 1
- Continue 2 LPM NC. Will escalate to Non-rebreather if saturations < 92%   - Relative (brother) refused to use Plaquenil  - CXR clear  - Patient full code

## 2020-04-24 NOTE — PROGRESS NOTE ADULT - PROBLEM SELECTOR PLAN 5
Will continue existing regimen of Immunosuppressants:   on Mycophenolate + Tacrolimus and Prednisone , c/w Tacrolimus and prednisone, holding Cellcept for now while inpatient  - plan as above,  Nephrology f/u appreciated, tac dose increased per renal  hyponatremia: chronic pt on Nacl tabs, fluid restriction to 1 L, now resolved, per renal, will hold off on Nacl tabs for now

## 2020-04-24 NOTE — PROGRESS NOTE ADULT - SUBJECTIVE AND OBJECTIVE BOX
Follow Up: pyelo, renal tx     Interval History: No issues overnight. Sleeping and doesn't want to be woken up.     Allergies  iodine (Vomiting)  IV Contrast (Unknown)        ANTIMICROBIALS:  ertapenem  IVPB 500 every 24 hours      OTHER MEDS:  acetaminophen   Tablet .. 650 milliGRAM(s) Oral every 4 hours PRN  acetaminophen   Tablet .. 650 milliGRAM(s) Oral every 6 hours PRN  acetaminophen  Suppository .. 650 milliGRAM(s) Rectal every 4 hours PRN  ascorbic acid  Oral Tab/Cap - Peds 500 milliGRAM(s) Oral two times a day  aspirin  chewable 81 milliGRAM(s) Oral daily  atorvastatin 80 milliGRAM(s) Oral at bedtime  bisacodyl 5 milliGRAM(s) Oral daily  carvedilol 3.125 milliGRAM(s) Oral every 12 hours  chlorhexidine 4% Liquid 1 Application(s) Topical two times a day  clopidogrel Tablet 75 milliGRAM(s) Oral daily  dextrose 40% Gel 15 Gram(s) Oral once PRN  dextrose 5%. 1000 milliLiter(s) IV Continuous <Continuous>  dextrose 50% Injectable 12.5 Gram(s) IV Push once  dextrose 50% Injectable 25 Gram(s) IV Push once  dextrose 50% Injectable 25 Gram(s) IV Push once  ferrous sulfate Oral Tab/Cap - Peds 325 milliGRAM(s) Oral daily  glucagon  Injectable 1 milliGRAM(s) IntraMuscular once PRN  heparin  Injectable 5000 Unit(s) SubCutaneous every 8 hours  hydrALAZINE 25 milliGRAM(s) Oral two times a day  insulin glargine SubCutaneous Injection (LANTUS) - Peds 6 Unit(s) SubCutaneous at bedtime  insulin lispro (HumaLOG) corrective regimen sliding scale   SubCutaneous three times a day before meals  insulin lispro (HumaLOG) corrective regimen sliding scale   SubCutaneous at bedtime  levothyroxine 75 MICROGram(s) Oral daily  ondansetron Injectable 4 milliGRAM(s) IV Push every 8 hours PRN  polyethylene glycol 3350 17 Gram(s) Oral daily  potassium chloride    Tablet ER 10 milliEquivalent(s) Oral daily  predniSONE   Tablet 5 milliGRAM(s) Oral daily  sevelamer carbonate 800 milliGRAM(s) Oral three times a day with meals  sodium chloride 0.9%. 1000 milliLiter(s) IV Continuous <Continuous>  sucralfate 1 Gram(s) Oral every 6 hours PRN  tacrolimus 2 milliGRAM(s) Oral <User Schedule>      Vital Signs Last 24 Hrs  T(C): 36.9 (2020 11:45), Max: 37.9 (2020 02:05)  T(F): 98.4 (2020 11:45), Max: 100.2 (2020 02:05)  HR: 69 (2020 11:45) (58 - 69)  BP: 146/78 (2020 11:45) (93/46 - 146/78)  BP(mean): --  RR: 18 (2020 11:45) (18 - 19)  SpO2: 99% (2020 11:45) (98% - 100%)    Physical Exam:  General:  sleeping  HEENT: atraumatic, normocephalic, normal sclera and conjunctiva  Respiratory:    breathing comfortably   :   no  duvall  Musculoskeletal:   no edema  vascular: old L AVF +thrill  Skin:    no rash  Neurologic:   hard to assess as sleeping and didn't want to wake up                          10.1   3.85  )-----------( 130      ( 2020 05:10 )             33.8           140  |  107  |  93<H>  ----------------------------<  84  4.9   |  20<L>  |  5.25<H>    Ca    8.3<L>      2020 05:10  Phos  4.1       Mg     1.6         TPro  5.3<L>  /  Alb  2.7<L>  /  TBili  0.2  /  DBili  x   /  AST  26  /  ALT  10  /  AlkPhos  66            MICROBIOLOGY:    .Blood Blood  20   No growth to date.  --  --      .Blood Blood  20   No growth to date.  --  --      .Urine Clean Catch (Midstream)  20   >100,000 CFU/ml Klebsiella pneumoniae ESBL  --  Klebsiella pneumoniae ESBL      .Blood Blood-Peripheral  20   No Growth Final  --  --      .Blood Blood-Peripheral  04-10-20   No Growth Final  --  --    CMVPCR Lo.58 ( @ 06:15)      RADIOLOGY:      EXAM:  CT ABDOMEN AND PELVIS      EXAM:  CT CHEST        PROCEDURE DATE:  2020         INTERPRETATION:  Reason for Exam:  Fever and shortness of breath    CT of the chest, abdomen and pelvis was performed from the thoracic inlet to the levelof the adrenal glands without contrast injection.    Comparison: Chest x-ray performed on April 10, 2020 and ultrasound abdomen performed on 2020    CT CHEST:    Tubes/Lines: None.    Mediastinum/Vessels/Heart: Aorta and pulmonary arteries are normal in size. There is trace pericardial effusion. No lymphadenopathy. Thyroid gland is unremarkable    Lungs/Pleura/Airways: Patchy bilateral groundglass opacities noted predominantly in the periphery of both lungs in keeping with known Covid 19 infection. No pleural effusion, pneumothorax or lobar consolidation.    CT abdomen and pelvis: The unenhanced liver, spleen, pancreas and adrenals are unremarkable. Bilateral renal atrophy is noted. No retroperitoneal lymphadenopathy. The abdominal vasculature demonstrates vascular calcifications. There is a transplanted kidney in the right lower quadrant of the abdomen high density material within the transplant likely a surgical clip. No hydronephrosis.    The urinary bladder is unremarkable. No evidence of bowel obstruction.    Bones and soft tissues: No suspicious osseous lesions. Degenerative changes noted throughout the spine.    IMPRESSION:    CT chest:  Pattern of GGO suggests infection including atypical pneumonia/viral infection from atypical agents including COVID-19 (C19V-1). No empyema or abscess.     CT abdomen and pelvis: No acute intra-abdominal pathology.    Incidental findings as above    GHADA DON M.D., ATTENDING RADIOLOGIST  This document has been electronically signed. 2020  6:18PM

## 2020-04-24 NOTE — PROGRESS NOTE ADULT - PROBLEM SELECTOR PLAN 1
Pt with YURI on CKD in the setting of COVID-19 vs. chronic graft rejection. Pt with last SCr of 4.8 outpatient. On admission, SCr of 5.47 (4/10/20) which has improved to 4.38 however increased to 5.25 today. Pt started on IVF. Recommend bladder scan and straight cath if necessary. Continue IV NS @ 75 cc/hour Continue to monitor renal function.  Avoid other potential nephrotoxins. Pt with YURI on CKD in the setting of COVID-19 vs. chronic graft rejection. Pt with last SCr of 4.8 outpatient. On admission, SCr of 5.47 (4/10/20) which has improved to 4.38 however increased to 5.25 today. Pt started on IVF. Recommend bladder scan and straight cath if necessary. Continue with IV NS @ 75 cc/hour Continue to monitor renal function.  Avoid other potential nephrotoxins.

## 2020-04-24 NOTE — DIETITIAN INITIAL EVALUATION ADULT. - OTHER INFO
59-year-old male patient with PMH of HTN, HLD, type 2 diabetes mellitus with long-term insulin use, ESRD s/p kidney transplant in 2005, CAD s/p stent x few months, PVD s/p R AKA in 2010. Presented to hospital with hypoxic respiratory failure. Now + COVID-19.    Unable to conduct a face to face interview or nutrition-focused physical exam due to limited contact restrictions related to patient's medical condition and isolation precautions. Obtained subjective information from extensive chart review. Patient with current weight of 56.4 kg (4/23) Unable to obtain information on PO intake and usual body weight to assess trend. Patient with 3 noted BMs (4/23) and intermittent nausea upon admission. Patient also reported mild abdominal pain (4/22) which has since resolved. Given elevated BUN and creatinine in the setting of ESRD, patient to continue on renal diet. Patient to also continue with Nepro 1x daily (425 kcals, 19.1g protein) for additional protein and calories. If PO intake poor, suggest increasing Nepro 2x daily (850 kcals, 38.2g protein).     RD services to remain available.

## 2020-04-24 NOTE — PROGRESS NOTE ADULT - ASSESSMENT
60 yo man with h/o renal transplant presenting 4/11 from nursing home  covid+ oxygenating well on RA.  pyelo transplant kidney with  ESBL Klebsiella in urine    1) Pyelonephritis  - c/w ertapenem x 14 days (4/13 -->4/26) (longer duration given renal transplant)   - nephrology following for renal transplant    2) COVID-19  - oxygenating well on RA  - supportive care  - maintain airborne /contact precautions    3) fever  - suspect from pyelo and covid-19  - Bcx from 4/19 and 4/20 are so far neg to date  - monitor fevers  - ct c/a/p reviewed -->somewhat limited given no IV contrast but no acute abd pathology, ct chest w some ggo    4) abdominal pain   - monitor exam  - cdiff negative    5) CMV infection  - CMV  --> low viremia, not suggestive of CMV disease  - no anti-virals needed at this time    d/w  primary team

## 2020-04-24 NOTE — PROGRESS NOTE ADULT - ASSESSMENT
59M w/ hx of HTN, HLD, DM, ESRD s/p kidney transplant in 05, CAD s/p stent a few months ago at [facility] s/p R AKA in 2010 now COVID positive, initially requiring O2 now improved not requiring O2, also with ESBL klebsiella urinary infection on IV abx per ID.

## 2020-04-24 NOTE — DIETITIAN INITIAL EVALUATION ADULT. - ADD RECOMMEND
1) If PO intake poor, suggest Nepro 2x daily (850 kcals, 38.2g protein) 2) Monitor weights, pertinent labs, BMs, skin integrity, and PO intake 3) RD services to remain available

## 2020-04-24 NOTE — PROGRESS NOTE ADULT - PROBLEM SELECTOR PLAN 1
Fever curve appears to be downtrending, most recent 100.2 at 2am last night  - Known COVID19+ and currently on IV ertapenem for +urine with klebsiella ESBL as below  - Repeat blood culture from prior fever without growth so far  - Continue to monitor, prn tylenol, follow up additional ID recommendations  -CT chest and A/P noted   Patchy bilateral ground glass opacities noted predominantly in the periphery of both lungs but no  acute intra-abdominal pathology.  C.diff negative  - f/u CMV PCR

## 2020-04-24 NOTE — DIETITIAN INITIAL EVALUATION ADULT. - PROBLEM SELECTOR PLAN 7
- Monitor Glycemia three times a day with meals and at bed time   - Continue LANTUS 6 U at bed time + Sliding Novolog scale

## 2020-04-24 NOTE — PROGRESS NOTE ADULT - ATTENDING COMMENTS
Clarita Perdomo MD  Pager: [phone]  After 5 PM or weekends please call fellow on call or office [phone]

## 2020-04-24 NOTE — DIETITIAN INITIAL EVALUATION ADULT. - PERTINENT LABORATORY DATA
04-24 Na 140 mmol/L Glu 84 mg/dL K+ 4.9 mmol/L Cr 5.25 mg/dL<H> BUN 93 mg/dL<H> Phos 4.1 mg/dL  04-12-20 HbA1c 6.6 %  04-24 @ 07:45 POCT 103 mg/dL  04-23 @ 21:08 POCT 130 mg/dL  04-23 @ 16:59 POCT 151 mg/dL  04-23 @ 11:35 POCT 124 mg/dL

## 2020-04-24 NOTE — DIETITIAN INITIAL EVALUATION ADULT. - DIET TYPE
Diet, Renal Restrictions:   For patients receiving Renal Replacement - No Protein Restr, No Conc K, No Conc Phos, Low Sodium  Supplement Feeding Modality:  Oral  Nepro Cans or Servings Per Day:  1       Frequency:  Three Times a day (04-20-20 @ 14:17)

## 2020-04-25 LAB
ALBUMIN SERPL ELPH-MCNC: 2.4 G/DL — LOW (ref 3.3–5)
ALP SERPL-CCNC: 64 U/L — SIGNIFICANT CHANGE UP (ref 40–120)
ALT FLD-CCNC: 14 U/L — SIGNIFICANT CHANGE UP (ref 4–41)
ANION GAP SERPL CALC-SCNC: 9 MMO/L — SIGNIFICANT CHANGE UP (ref 7–14)
AST SERPL-CCNC: 24 U/L — SIGNIFICANT CHANGE UP (ref 4–40)
BILIRUB SERPL-MCNC: < 0.2 MG/DL — LOW (ref 0.2–1.2)
BUN SERPL-MCNC: 90 MG/DL — HIGH (ref 7–23)
CALCIUM SERPL-MCNC: 8.8 MG/DL — SIGNIFICANT CHANGE UP (ref 8.4–10.5)
CHLORIDE SERPL-SCNC: 107 MMOL/L — SIGNIFICANT CHANGE UP (ref 98–107)
CO2 SERPL-SCNC: 19 MMOL/L — LOW (ref 22–31)
CREAT SERPL-MCNC: 4.79 MG/DL — HIGH (ref 0.5–1.3)
CULTURE RESULTS: SIGNIFICANT CHANGE UP
GLUCOSE BLDC GLUCOMTR-MCNC: 105 MG/DL — HIGH (ref 70–99)
GLUCOSE BLDC GLUCOMTR-MCNC: 120 MG/DL — HIGH (ref 70–99)
GLUCOSE BLDC GLUCOMTR-MCNC: 150 MG/DL — HIGH (ref 70–99)
GLUCOSE BLDC GLUCOMTR-MCNC: 98 MG/DL — SIGNIFICANT CHANGE UP (ref 70–99)
GLUCOSE SERPL-MCNC: 90 MG/DL — SIGNIFICANT CHANGE UP (ref 70–99)
HCT VFR BLD CALC: 33.7 % — LOW (ref 39–50)
HGB BLD-MCNC: 10 G/DL — LOW (ref 13–17)
MAGNESIUM SERPL-MCNC: 1.9 MG/DL — SIGNIFICANT CHANGE UP (ref 1.6–2.6)
MCHC RBC-ENTMCNC: 24.8 PG — LOW (ref 27–34)
MCHC RBC-ENTMCNC: 29.7 % — LOW (ref 32–36)
MCV RBC AUTO: 83.4 FL — SIGNIFICANT CHANGE UP (ref 80–100)
NRBC # FLD: 0 K/UL — SIGNIFICANT CHANGE UP (ref 0–0)
PHOSPHATE SERPL-MCNC: 4.3 MG/DL — SIGNIFICANT CHANGE UP (ref 2.5–4.5)
PLATELET # BLD AUTO: 138 K/UL — LOW (ref 150–400)
PMV BLD: 9.2 FL — SIGNIFICANT CHANGE UP (ref 7–13)
POTASSIUM SERPL-MCNC: 4.9 MMOL/L — SIGNIFICANT CHANGE UP (ref 3.5–5.3)
POTASSIUM SERPL-SCNC: 4.9 MMOL/L — SIGNIFICANT CHANGE UP (ref 3.5–5.3)
PROT SERPL-MCNC: 5.8 G/DL — LOW (ref 6–8.3)
RBC # BLD: 4.04 M/UL — LOW (ref 4.2–5.8)
RBC # FLD: 15.1 % — HIGH (ref 10.3–14.5)
SODIUM SERPL-SCNC: 135 MMOL/L — SIGNIFICANT CHANGE UP (ref 135–145)
SPECIMEN SOURCE: SIGNIFICANT CHANGE UP
WBC # BLD: 3.18 K/UL — LOW (ref 3.8–10.5)
WBC # FLD AUTO: 3.18 K/UL — LOW (ref 3.8–10.5)

## 2020-04-25 PROCEDURE — 93010 ELECTROCARDIOGRAM REPORT: CPT | Mod: 76

## 2020-04-25 PROCEDURE — 99233 SBSQ HOSP IP/OBS HIGH 50: CPT

## 2020-04-25 RX ADMIN — Medication 81 MILLIGRAM(S): at 12:01

## 2020-04-25 RX ADMIN — CHLORHEXIDINE GLUCONATE 1 APPLICATION(S): 213 SOLUTION TOPICAL at 16:42

## 2020-04-25 RX ADMIN — ERTAPENEM SODIUM 100 MILLIGRAM(S): 1 INJECTION, POWDER, LYOPHILIZED, FOR SOLUTION INTRAMUSCULAR; INTRAVENOUS at 16:42

## 2020-04-25 RX ADMIN — CHLORHEXIDINE GLUCONATE 1 APPLICATION(S): 213 SOLUTION TOPICAL at 06:27

## 2020-04-25 RX ADMIN — ATORVASTATIN CALCIUM 80 MILLIGRAM(S): 80 TABLET, FILM COATED ORAL at 21:43

## 2020-04-25 RX ADMIN — Medication 500 MILLIGRAM(S): at 17:30

## 2020-04-25 RX ADMIN — Medication 500 MILLIGRAM(S): at 06:26

## 2020-04-25 RX ADMIN — Medication 325 MILLIGRAM(S): at 12:01

## 2020-04-25 RX ADMIN — INSULIN GLARGINE 6 UNIT(S): 100 INJECTION, SOLUTION SUBCUTANEOUS at 21:44

## 2020-04-25 RX ADMIN — Medication 25 MILLIGRAM(S): at 06:26

## 2020-04-25 RX ADMIN — Medication 10 MILLIEQUIVALENT(S): at 12:01

## 2020-04-25 RX ADMIN — HEPARIN SODIUM 5000 UNIT(S): 5000 INJECTION INTRAVENOUS; SUBCUTANEOUS at 17:30

## 2020-04-25 RX ADMIN — Medication 25 MILLIGRAM(S): at 17:30

## 2020-04-25 RX ADMIN — SEVELAMER CARBONATE 800 MILLIGRAM(S): 2400 POWDER, FOR SUSPENSION ORAL at 09:07

## 2020-04-25 RX ADMIN — Medication 5 MILLIGRAM(S): at 06:26

## 2020-04-25 RX ADMIN — HEPARIN SODIUM 5000 UNIT(S): 5000 INJECTION INTRAVENOUS; SUBCUTANEOUS at 09:06

## 2020-04-25 RX ADMIN — SEVELAMER CARBONATE 800 MILLIGRAM(S): 2400 POWDER, FOR SUSPENSION ORAL at 17:30

## 2020-04-25 RX ADMIN — CLOPIDOGREL BISULFATE 75 MILLIGRAM(S): 75 TABLET, FILM COATED ORAL at 12:01

## 2020-04-25 RX ADMIN — SEVELAMER CARBONATE 800 MILLIGRAM(S): 2400 POWDER, FOR SUSPENSION ORAL at 12:01

## 2020-04-25 RX ADMIN — TACROLIMUS 2 MILLIGRAM(S): 5 CAPSULE ORAL at 09:06

## 2020-04-25 RX ADMIN — CARVEDILOL PHOSPHATE 3.12 MILLIGRAM(S): 80 CAPSULE, EXTENDED RELEASE ORAL at 17:30

## 2020-04-25 RX ADMIN — HEPARIN SODIUM 5000 UNIT(S): 5000 INJECTION INTRAVENOUS; SUBCUTANEOUS at 21:43

## 2020-04-25 RX ADMIN — TACROLIMUS 2 MILLIGRAM(S): 5 CAPSULE ORAL at 17:30

## 2020-04-25 RX ADMIN — Medication 75 MICROGRAM(S): at 06:26

## 2020-04-25 RX ADMIN — CARVEDILOL PHOSPHATE 3.12 MILLIGRAM(S): 80 CAPSULE, EXTENDED RELEASE ORAL at 06:26

## 2020-04-25 NOTE — PROGRESS NOTE ADULT - SUBJECTIVE AND OBJECTIVE BOX
Patient is a 59y old  Male who presents with a chief complaint of Rule out COVID 19 infection (24 Apr 2020 16:02)    Contact:  [facility] Pager: [phone]  [facility] Pager: [phone]    SUBJECTIVE / OVERNIGHT EVENTS:  No acute events reported overnight. Pt seen and examined at bedside.     **Note in progress  MEDICATIONS  (STANDING):  ascorbic acid  Oral Tab/Cap - Peds 500 milliGRAM(s) Oral two times a day  aspirin  chewable 81 milliGRAM(s) Oral daily  atorvastatin 80 milliGRAM(s) Oral at bedtime  bisacodyl 5 milliGRAM(s) Oral daily  carvedilol 3.125 milliGRAM(s) Oral every 12 hours  chlorhexidine 4% Liquid 1 Application(s) Topical two times a day  clopidogrel Tablet 75 milliGRAM(s) Oral daily  dextrose 5%. 1000 milliLiter(s) (50 mL/Hr) IV Continuous <Continuous>  dextrose 50% Injectable 12.5 Gram(s) IV Push once  dextrose 50% Injectable 25 Gram(s) IV Push once  dextrose 50% Injectable 25 Gram(s) IV Push once  ertapenem  IVPB 500 milliGRAM(s) IV Intermittent every 24 hours  ferrous sulfate Oral Tab/Cap - Peds 325 milliGRAM(s) Oral daily  heparin  Injectable 5000 Unit(s) SubCutaneous every 8 hours  hydrALAZINE 25 milliGRAM(s) Oral two times a day  insulin glargine SubCutaneous Injection (LANTUS) - Peds 6 Unit(s) SubCutaneous at bedtime  insulin lispro (HumaLOG) corrective regimen sliding scale   SubCutaneous three times a day before meals  insulin lispro (HumaLOG) corrective regimen sliding scale   SubCutaneous at bedtime  levothyroxine 75 MICROGram(s) Oral daily  polyethylene glycol 3350 17 Gram(s) Oral daily  potassium chloride    Tablet ER 10 milliEquivalent(s) Oral daily  predniSONE   Tablet 5 milliGRAM(s) Oral daily  sevelamer carbonate 800 milliGRAM(s) Oral three times a day with meals  sodium chloride 0.9%. 1000 milliLiter(s) (50 mL/Hr) IV Continuous <Continuous>  tacrolimus 2 milliGRAM(s) Oral <User Schedule>    MEDICATIONS  (PRN):  acetaminophen   Tablet .. 650 milliGRAM(s) Oral every 4 hours PRN Temp greater or equal to 38.5C (101.3F)  acetaminophen   Tablet .. 650 milliGRAM(s) Oral every 6 hours PRN Temp greater or equal to 38C (100.4F), Mild Pain (1 - 3), Moderate Pain (4 - 6)  acetaminophen  Suppository .. 650 milliGRAM(s) Rectal every 4 hours PRN Temp greater or equal to 38.5C (101.3F)  dextrose 40% Gel 15 Gram(s) Oral once PRN Blood Glucose LESS THAN 70 milliGRAM(s)/deciliter  glucagon  Injectable 1 milliGRAM(s) IntraMuscular once PRN Glucose LESS THAN 70 milligrams/deciliter  ondansetron Injectable 4 milliGRAM(s) IV Push every 8 hours PRN Nausea and/or Vomiting  sucralfate 1 Gram(s) Oral every 6 hours PRN With each meal      Vital Signs Last 24 Hrs  T(C): 37.2 (25 Apr 2020 05:50), Max: 37.4 (24 Apr 2020 19:06)  T(F): 99 (25 Apr 2020 05:50), Max: 99.3 (24 Apr 2020 19:06)  HR: 60 (25 Apr 2020 05:50) (60 - 69)  BP: 118/64 (25 Apr 2020 05:50) (100/57 - 146/78)  BP(mean): --  RR: 18 (25 Apr 2020 05:50) (18 - 18)  SpO2: 100% (25 Apr 2020 05:50) (98% - 100%)    CAPILLARY BLOOD GLUCOSE      POCT Blood Glucose.: 98 mg/dL (25 Apr 2020 07:51)  POCT Blood Glucose.: 102 mg/dL (24 Apr 2020 21:54)  POCT Blood Glucose.: 90 mg/dL (24 Apr 2020 16:57)  POCT Blood Glucose.: 103 mg/dL (24 Apr 2020 11:39)    I&O's Summary    24 Apr 2020 07:01  -  25 Apr 2020 07:00  --------------------------------------------------------  IN: 655 mL / OUT: 250 mL / NET: 405 mL        PHYSICAL EXAM:  GENERAL: NAD, well-developed  HEAD:  Atraumatic, Normocephalic  EYES: EOMI, PERRLA, conjunctiva and sclera clear  NECK: Supple, No JVD  CHEST/LUNG: Clear to auscultation bilaterally; No wheeze  HEART: Regular rate and rhythm; No murmurs, rubs, or gallops  ABDOMEN: Soft, Nontender, Nondistended; Bowel sounds present  EXTREMITIES:  2+ Peripheral Pulses, No clubbing, cyanosis, or edema  PSYCH: AAOx3  NEUROLOGY: non-focal  SKIN: No rashes or lesions    LABS:                        10.0   3.18  )-----------( 138      ( 25 Apr 2020 05:00 )             33.7     Auto Eosinophil # x     / Auto Eosinophil % x     / Auto Neutrophil # x     / Auto Neutrophil % x     / BANDS % x                            10.1   3.85  )-----------( 130      ( 24 Apr 2020 05:10 )             33.8     Auto Eosinophil # 0.03  / Auto Eosinophil % 0.8   / Auto Neutrophil # 2.38  / Auto Neutrophil % 61.8  / BANDS % x        04-25    135  |  107  |  90<H>  ----------------------------<  90  4.9   |  19<L>  |  4.79<H>  04-24    140  |  107  |  93<H>  ----------------------------<  84  4.9   |  20<L>  |  5.25<H>    Ca    8.8      25 Apr 2020 05:00  Mg     1.9     04-25  Phos  4.3     04-25  TPro  5.8<L>  /  Alb  2.4<L>  /  TBili  < 0.2<L>  /  DBili  x   /  AST  24  /  ALT  14  /  AlkPhos  64  04-25  TPro  5.3<L>  /  Alb  2.7<L>  /  TBili  0.2  /  DBili  x   /  AST  26  /  ALT  10  /  AlkPhos  66  04-24                RESPIRATORY  VENT:    ABG:     VBG:     RADIOLOGY & ADDITIONAL TESTS:  (Imaging Personally Reviewed)    Consultant(s) Notes Reviewed:      Care Discussed with Consultants/Other Providers:

## 2020-04-25 NOTE — PROGRESS NOTE ADULT - PROBLEM SELECTOR PLAN 2
Pt with history of ESRD s/p DDRT on 2005. Pt is on Tacrolimus 1mg BID, Cellcept 1 gm BID and Prednisone 5mg daily. Last Tacro level <2. Hold Cellcept until discharge and continue Tacrolimus 2 mg BID and Prednisone. Please check daily AM trough 30 minutes prior to dose (remains subtherapeutic, may need to increase dose). Goal Tacrolimus level is 3-5. Monitor Scr

## 2020-04-25 NOTE — PROGRESS NOTE ADULT - PROBLEM SELECTOR PLAN 1
Pt with YURI on CKD in the setting of COVID-19 vs. chronic graft rejection. Pt with last SCr of 4.8 outpatient. On admission, SCr of 5.47 (4/10/20) which has improved to 4.38 however increased to 5.25 yesterday. Pt started on IVF with improvement of Scr to 4.79 today. Continue with IV NS @ 75 cc/hour x 1 liter. Encourage PO intake. Continue to monitor renal function.  Avoid other potential nephrotoxins.

## 2020-04-25 NOTE — PROGRESS NOTE ADULT - ASSESSMENT
59M w/ hx of HTN, HLD, DM, ESRD s/p kidney transplant in 05, CAD s/p stent a few months ago at [facility] [place] s/p R AKA in 2010 now COVID positive, initially requiring O2 now improved not requiring O2, also with ESBL klebsiella urinary infection on IV abx per ID.

## 2020-04-25 NOTE — PROGRESS NOTE ADULT - PROBLEM SELECTOR PLAN 1
no fevers over the last 24 hours  - Known COVID19+ and currently on IV ertapenem for +urine with klebsiella ESBL as below  - Repeat blood culture from prior fever without growth so far  - Continue to monitor, prn tylenol, follow up additional ID recommendations  -CT chest and A/P noted   Patchy bilateral ground glass opacities noted predominantly in the periphery of both lungs but no  acute intra-abdominal pathology.  C.diff negative  - f/u CMV PCR

## 2020-04-25 NOTE — PROGRESS NOTE ADULT - SUBJECTIVE AND OBJECTIVE BOX
[facility] DIVISION OF KIDNEY DISEASES AND HYPERTENSION -- FOLLOW UP NOTE  --------------------------------------------------------------------------------  HPI: 59M PMH of HTN, HLD, DM, ESRD s/p kidney transplant, admitted for COVID-19. Nephrology team consulted for YURI and management of immunosuppressant. Pt with history of ESRD s/p DDRT on 2005. On review pt is on Tacrolimus 1mg BID, Mycophenolate 1gm BID and Prednisone 5mg daily. On review of labs, pt with SCr: 3.6 on 3/27/20 increased to 3.9 on 3/30/20. Pt with last outpatient SCr of 4.8 as outpatient at senior living. On admission, SCr was 5.47 (4/10/20) which continued to improve to  4.30 on 4/21/20. Scr began to increase to 5.25 yesterday, responded to IVF, repeat today is 4.79.    PAST HISTORY  --------------------------------------------------------------------------------  No significant changes to PMH, PSH, FHx, SHx, unless otherwise noted    ALLERGIES & MEDICATIONS  --------------------------------------------------------------------------------  Allergies    iodine (Vomiting)  IV Contrast (Unknown)    Intolerances      Standing Inpatient Medications  ascorbic acid  Oral Tab/Cap - Peds 500 milliGRAM(s) Oral two times a day  aspirin  chewable 81 milliGRAM(s) Oral daily  atorvastatin 80 milliGRAM(s) Oral at bedtime  bisacodyl 5 milliGRAM(s) Oral daily  carvedilol 3.125 milliGRAM(s) Oral every 12 hours  chlorhexidine 4% Liquid 1 Application(s) Topical two times a day  clopidogrel Tablet 75 milliGRAM(s) Oral daily  dextrose 5%. 1000 milliLiter(s) IV Continuous <Continuous>  dextrose 50% Injectable 12.5 Gram(s) IV Push once  dextrose 50% Injectable 25 Gram(s) IV Push once  dextrose 50% Injectable 25 Gram(s) IV Push once  ertapenem  IVPB 500 milliGRAM(s) IV Intermittent every 24 hours  ferrous sulfate Oral Tab/Cap - Peds 325 milliGRAM(s) Oral daily  heparin  Injectable 5000 Unit(s) SubCutaneous every 8 hours  hydrALAZINE 25 milliGRAM(s) Oral two times a day  insulin glargine SubCutaneous Injection (LANTUS) - Peds 6 Unit(s) SubCutaneous at bedtime  insulin lispro (HumaLOG) corrective regimen sliding scale   SubCutaneous three times a day before meals  insulin lispro (HumaLOG) corrective regimen sliding scale   SubCutaneous at bedtime  levothyroxine 75 MICROGram(s) Oral daily  polyethylene glycol 3350 17 Gram(s) Oral daily  potassium chloride    Tablet ER 10 milliEquivalent(s) Oral daily  predniSONE   Tablet 5 milliGRAM(s) Oral daily  sevelamer carbonate 800 milliGRAM(s) Oral three times a day with meals  sodium chloride 0.9%. 1000 milliLiter(s) IV Continuous <Continuous>  tacrolimus 2 milliGRAM(s) Oral <User Schedule>    REVIEW OF SYSTEMS  --------------------------------------------------------------------------------  Gen: no fever/ chills   Skin: No rashes  Respiratory: No dyspnea  CV: No chest pain,   GI: No abdominal pain, diarrhea  Neuro: No dizziness/lightheadedness    All other systems were reviewed and are negative, except as noted.    VITALS/PHYSICAL EXAM  --------------------------------------------------------------------------------  T(C): 37.2 (04-25-20 @ 05:50), Max: 37.4 (04-24-20 @ 19:06)  HR: 60 (04-25-20 @ 05:50) (60 - 69)  BP: 118/64 (04-25-20 @ 05:50) (100/57 - 146/78)  RR: 18 (04-25-20 @ 05:50) (18 - 18)  SpO2: 100% (04-25-20 @ 05:50) (98% - 100%)  Wt(kg): --    Weight (kg): 56.4 (04-23-20 @ 10:23)    04-24-20 @ 07:01  -  04-25-20 @ 07:00  --------------------------------------------------------  IN: 655 mL / OUT: 250 mL / NET: 405 mL    Physical Exam:  	Gen: no distress  	HEENT: , no JVD  	Pulm: normal respiratory pattern  	CV:  not tachy  	Abd: +BS, soft, transplant area without tenderness  	Ext: No B/L Lower ext edema s/p R amputation  	Neuro: awake, responsive to commands  	Skin: Warm, without rashes  	Vascular access: Left Arm AVF with palpable thrill, + aneurysmal dilation    LABS/STUDIES  --------------------------------------------------------------------------------              10.0   3.18  >-----------<  138      [04-25-20 @ 05:00]              33.7     135  |  107  |  90  ----------------------------<  90      [04-25-20 @ 05:00]  4.9   |  19  |  4.79        Ca     8.8     [04-25-20 @ 05:00]      Mg     1.9     [04-25-20 @ 05:00]      Phos  4.3     [04-25-20 @ 05:00]    TPro  5.8  /  Alb  2.4  /  TBili  < 0.2  /  DBili  x   /  AST  24  /  ALT  14  /  AlkPhos  64  [04-25-20 @ 05:00]          [04-24-20 @ 05:10]    Creatinine Trend:  SCr 4.79 [04-25 @ 05:00]  SCr 5.25 [04-24 @ 05:10]  SCr 5.06 [04-23 @ 05:50]  SCr 4.38 [04-22 @ 06:15]  SCr 4.30 [04-21 @ 05:36]    Urinalysis - [04-11-20 @ 08:30]      Color COLORLESS / Appearance TURBID / SG 1.014 / pH 6.0      Gluc NEGATIVE / Ketone TRACE  / Bili NEGATIVE / Urobili NORMAL       Blood MODERATE / Protein 100 / Leuk Est LARGE / Nitrite POSITIVE      RBC 10-20 / WBC >50 / Hyaline  / Gran  / Sq Epi  / Non Sq Epi OCC / Bacteria MANY    Ferritin 2513      [04-24-20 @ 05:10]  HbA1c 6.6      [04-12-20 @ 05:21]    HCV 0.35, Nonreactive Hepatitis C AB [facility] DIVISION OF KIDNEY DISEASES AND HYPERTENSION -- FOLLOW UP NOTE  --------------------------------------------------------------------------------  HPI: 59M PMH of HTN, HLD, DM, ESRD s/p kidney transplant, admitted for COVID-19. Nephrology team consulted for YURI and management of immunosuppressant. Pt with history of ESRD s/p DDRT on 2005. On review pt is on Tacrolimus 1mg BID, Mycophenolate 1gm BID and Prednisone 5mg daily. On review of labs, pt with SCr: 3.6 on 3/27/20 increased to 3.9 on 3/30/20. Pt with last outpatient SCr of 4.8 as outpatient at [facility]. On admission, SCr was 5.47 (4/10/20) which continued to improve to  4.30 on 4/21/20. Scr began to increase to 5.25 yesterday, responded to IVF, repeat today is 4.79.    On evaluation today he was able to express more history stating his baseline creatinine is 4.5.    PAST HISTORY  --------------------------------------------------------------------------------  No significant changes to PMH, PSH, FHx, SHx, unless otherwise noted    ALLERGIES & MEDICATIONS  --------------------------------------------------------------------------------  Allergies    iodine (Vomiting)  IV Contrast (Unknown)    Intolerances      Standing Inpatient Medications  ascorbic acid  Oral Tab/Cap - Peds 500 milliGRAM(s) Oral two times a day  aspirin  chewable 81 milliGRAM(s) Oral daily  atorvastatin 80 milliGRAM(s) Oral at bedtime  bisacodyl 5 milliGRAM(s) Oral daily  carvedilol 3.125 milliGRAM(s) Oral every 12 hours  chlorhexidine 4% Liquid 1 Application(s) Topical two times a day  clopidogrel Tablet 75 milliGRAM(s) Oral daily  dextrose 5%. 1000 milliLiter(s) IV Continuous <Continuous>  dextrose 50% Injectable 12.5 Gram(s) IV Push once  dextrose 50% Injectable 25 Gram(s) IV Push once  dextrose 50% Injectable 25 Gram(s) IV Push once  ertapenem  IVPB 500 milliGRAM(s) IV Intermittent every 24 hours  ferrous sulfate Oral Tab/Cap - Peds 325 milliGRAM(s) Oral daily  heparin  Injectable 5000 Unit(s) SubCutaneous every 8 hours  hydrALAZINE 25 milliGRAM(s) Oral two times a day  insulin glargine SubCutaneous Injection (LANTUS) - Peds 6 Unit(s) SubCutaneous at bedtime  insulin lispro (HumaLOG) corrective regimen sliding scale   SubCutaneous three times a day before meals  insulin lispro (HumaLOG) corrective regimen sliding scale   SubCutaneous at bedtime  levothyroxine 75 MICROGram(s) Oral daily  polyethylene glycol 3350 17 Gram(s) Oral daily  potassium chloride    Tablet ER 10 milliEquivalent(s) Oral daily  predniSONE   Tablet 5 milliGRAM(s) Oral daily  sevelamer carbonate 800 milliGRAM(s) Oral three times a day with meals  sodium chloride 0.9%. 1000 milliLiter(s) IV Continuous <Continuous>  tacrolimus 2 milliGRAM(s) Oral <User Schedule>    REVIEW OF SYSTEMS  --------------------------------------------------------------------------------  Gen: no fever/ chills   Skin: No rashes  Respiratory: No dyspnea  CV: No chest pain,   GI: No abdominal pain, diarrhea  Neuro: No dizziness/lightheadedness    All other systems were reviewed and are negative, except as noted.    VITALS/PHYSICAL EXAM  --------------------------------------------------------------------------------  T(C): 37.2 (04-25-20 @ 05:50), Max: 37.4 (04-24-20 @ 19:06)  HR: 60 (04-25-20 @ 05:50) (60 - 69)  BP: 118/64 (04-25-20 @ 05:50) (100/57 - 146/78)  RR: 18 (04-25-20 @ 05:50) (18 - 18)  SpO2: 100% (04-25-20 @ 05:50) (98% - 100%)  Wt(kg): --    Weight (kg): 56.4 (04-23-20 @ 10:23)    04-24-20 @ 07:01  -  04-25-20 @ 07:00  --------------------------------------------------------  IN: 655 mL / OUT: 250 mL / NET: 405 mL    Physical Exam:  	Gen: no distress  	HEENT: , no JVD  	Pulm: normal respiratory pattern  	CV:  not tachy  	Abd: +BS, soft, transplant area without tenderness  	Ext: No B/L Lower ext edema s/p R amputation  	Neuro: awake, responsive to commands  	Skin: Warm, without rashes  	Vascular access: Left Arm AVF with palpable thrill, + aneurysmal dilation    LABS/STUDIES  --------------------------------------------------------------------------------              10.0   3.18  >-----------<  138      [04-25-20 @ 05:00]              33.7     135  |  107  |  90  ----------------------------<  90      [04-25-20 @ 05:00]  4.9   |  19  |  4.79        Ca     8.8     [04-25-20 @ 05:00]      Mg     1.9     [04-25-20 @ 05:00]      Phos  4.3     [04-25-20 @ 05:00]    TPro  5.8  /  Alb  2.4  /  TBili  < 0.2  /  DBili  x   /  AST  24  /  ALT  14  /  AlkPhos  64  [04-25-20 @ 05:00]          [04-24-20 @ 05:10]    Creatinine Trend:  SCr 4.79 [04-25 @ 05:00]  SCr 5.25 [04-24 @ 05:10]  SCr 5.06 [04-23 @ 05:50]  SCr 4.38 [04-22 @ 06:15]  SCr 4.30 [04-21 @ 05:36]    Urinalysis - [04-11-20 @ 08:30]      Color COLORLESS / Appearance TURBID / SG 1.014 / pH 6.0      Gluc NEGATIVE / Ketone TRACE  / Bili NEGATIVE / Urobili NORMAL       Blood MODERATE / Protein 100 / Leuk Est LARGE / Nitrite POSITIVE      RBC 10-20 / WBC >50 / Hyaline  / Gran  / Sq Epi  / Non Sq Epi OCC / Bacteria MANY    Ferritin 2513      [04-24-20 @ 05:10]  HbA1c 6.6      [04-12-20 @ 05:21]    HCV 0.35, Nonreactive Hepatitis C AB

## 2020-04-25 NOTE — PROGRESS NOTE ADULT - ASSESSMENT
59M w/ hx of HTN, HLD, DM, ESRD s/p kidney transplant in 05, CAD s/p stent a few months ago at [facility] s/p R AKA in 2010 now COVID positive, initially requiring O2 now improved not requiring O2, also with ESBL klebsiella urinary infection on IV abx per ID.

## 2020-04-26 DIAGNOSIS — R41.82 ALTERED MENTAL STATUS, UNSPECIFIED: ICD-10-CM

## 2020-04-26 LAB
ANION GAP SERPL CALC-SCNC: 10 MMO/L — SIGNIFICANT CHANGE UP (ref 7–14)
BUN SERPL-MCNC: 94 MG/DL — HIGH (ref 7–23)
CALCIUM SERPL-MCNC: 9 MG/DL — SIGNIFICANT CHANGE UP (ref 8.4–10.5)
CHLORIDE SERPL-SCNC: 110 MMOL/L — HIGH (ref 98–107)
CO2 SERPL-SCNC: 19 MMOL/L — LOW (ref 22–31)
CREAT SERPL-MCNC: 4.23 MG/DL — HIGH (ref 0.5–1.3)
GLUCOSE BLDC GLUCOMTR-MCNC: 101 MG/DL — HIGH (ref 70–99)
GLUCOSE BLDC GLUCOMTR-MCNC: 105 MG/DL — HIGH (ref 70–99)
GLUCOSE BLDC GLUCOMTR-MCNC: 106 MG/DL — HIGH (ref 70–99)
GLUCOSE BLDC GLUCOMTR-MCNC: 115 MG/DL — HIGH (ref 70–99)
GLUCOSE SERPL-MCNC: 103 MG/DL — HIGH (ref 70–99)
HCT VFR BLD CALC: 37.1 % — LOW (ref 39–50)
HGB BLD-MCNC: 11 G/DL — LOW (ref 13–17)
MAGNESIUM SERPL-MCNC: 1.9 MG/DL — SIGNIFICANT CHANGE UP (ref 1.6–2.6)
MCHC RBC-ENTMCNC: 24.6 PG — LOW (ref 27–34)
MCHC RBC-ENTMCNC: 29.6 % — LOW (ref 32–36)
MCV RBC AUTO: 82.8 FL — SIGNIFICANT CHANGE UP (ref 80–100)
NRBC # FLD: 0 K/UL — SIGNIFICANT CHANGE UP (ref 0–0)
PHOSPHATE SERPL-MCNC: 3.7 MG/DL — SIGNIFICANT CHANGE UP (ref 2.5–4.5)
PLATELET # BLD AUTO: 163 K/UL — SIGNIFICANT CHANGE UP (ref 150–400)
PMV BLD: 9.1 FL — SIGNIFICANT CHANGE UP (ref 7–13)
POTASSIUM SERPL-MCNC: 5.1 MMOL/L — SIGNIFICANT CHANGE UP (ref 3.5–5.3)
POTASSIUM SERPL-SCNC: 5.1 MMOL/L — SIGNIFICANT CHANGE UP (ref 3.5–5.3)
RBC # BLD: 4.48 M/UL — SIGNIFICANT CHANGE UP (ref 4.2–5.8)
RBC # FLD: 15.1 % — HIGH (ref 10.3–14.5)
SODIUM SERPL-SCNC: 139 MMOL/L — SIGNIFICANT CHANGE UP (ref 135–145)
WBC # BLD: 4.08 K/UL — SIGNIFICANT CHANGE UP (ref 3.8–10.5)
WBC # FLD AUTO: 4.08 K/UL — SIGNIFICANT CHANGE UP (ref 3.8–10.5)

## 2020-04-26 PROCEDURE — 70450 CT HEAD/BRAIN W/O DYE: CPT | Mod: 26

## 2020-04-26 PROCEDURE — 99233 SBSQ HOSP IP/OBS HIGH 50: CPT

## 2020-04-26 RX ORDER — LANOLIN ALCOHOL/MO/W.PET/CERES
3 CREAM (GRAM) TOPICAL AT BEDTIME
Refills: 0 | Status: DISCONTINUED | OUTPATIENT
Start: 2020-04-26 | End: 2020-05-07

## 2020-04-26 RX ORDER — OLANZAPINE 15 MG/1
5 TABLET, FILM COATED ORAL ONCE
Refills: 0 | Status: COMPLETED | OUTPATIENT
Start: 2020-04-26 | End: 2020-04-26

## 2020-04-26 RX ADMIN — OLANZAPINE 5 MILLIGRAM(S): 15 TABLET, FILM COATED ORAL at 11:16

## 2020-04-26 RX ADMIN — Medication 500 MILLIGRAM(S): at 17:16

## 2020-04-26 RX ADMIN — Medication 25 MILLIGRAM(S): at 04:39

## 2020-04-26 RX ADMIN — Medication 5 MILLIGRAM(S): at 04:39

## 2020-04-26 RX ADMIN — POLYETHYLENE GLYCOL 3350 17 GRAM(S): 17 POWDER, FOR SOLUTION ORAL at 12:24

## 2020-04-26 RX ADMIN — Medication 81 MILLIGRAM(S): at 12:23

## 2020-04-26 RX ADMIN — Medication 1 GRAM(S): at 12:24

## 2020-04-26 RX ADMIN — Medication 5 MILLIGRAM(S): at 12:23

## 2020-04-26 RX ADMIN — SEVELAMER CARBONATE 800 MILLIGRAM(S): 2400 POWDER, FOR SUSPENSION ORAL at 12:24

## 2020-04-26 RX ADMIN — Medication 500 MILLIGRAM(S): at 04:41

## 2020-04-26 RX ADMIN — Medication 650 MILLIGRAM(S): at 12:35

## 2020-04-26 RX ADMIN — Medication 75 MICROGRAM(S): at 04:39

## 2020-04-26 RX ADMIN — Medication 325 MILLIGRAM(S): at 12:24

## 2020-04-26 RX ADMIN — HEPARIN SODIUM 5000 UNIT(S): 5000 INJECTION INTRAVENOUS; SUBCUTANEOUS at 04:39

## 2020-04-26 RX ADMIN — SEVELAMER CARBONATE 800 MILLIGRAM(S): 2400 POWDER, FOR SUSPENSION ORAL at 17:16

## 2020-04-26 RX ADMIN — Medication 1 MILLIGRAM(S): at 15:19

## 2020-04-26 RX ADMIN — Medication 10 MILLIEQUIVALENT(S): at 12:24

## 2020-04-26 RX ADMIN — CHLORHEXIDINE GLUCONATE 1 APPLICATION(S): 213 SOLUTION TOPICAL at 17:16

## 2020-04-26 RX ADMIN — CHLORHEXIDINE GLUCONATE 1 APPLICATION(S): 213 SOLUTION TOPICAL at 04:41

## 2020-04-26 RX ADMIN — Medication 25 MILLIGRAM(S): at 17:16

## 2020-04-26 RX ADMIN — HEPARIN SODIUM 5000 UNIT(S): 5000 INJECTION INTRAVENOUS; SUBCUTANEOUS at 15:19

## 2020-04-26 RX ADMIN — CLOPIDOGREL BISULFATE 75 MILLIGRAM(S): 75 TABLET, FILM COATED ORAL at 12:24

## 2020-04-26 RX ADMIN — TACROLIMUS 2 MILLIGRAM(S): 5 CAPSULE ORAL at 04:39

## 2020-04-26 RX ADMIN — CARVEDILOL PHOSPHATE 3.12 MILLIGRAM(S): 80 CAPSULE, EXTENDED RELEASE ORAL at 04:39

## 2020-04-26 RX ADMIN — INSULIN GLARGINE 6 UNIT(S): 100 INJECTION, SOLUTION SUBCUTANEOUS at 21:49

## 2020-04-26 RX ADMIN — SEVELAMER CARBONATE 800 MILLIGRAM(S): 2400 POWDER, FOR SUSPENSION ORAL at 08:20

## 2020-04-26 RX ADMIN — TACROLIMUS 2 MILLIGRAM(S): 5 CAPSULE ORAL at 17:16

## 2020-04-26 NOTE — PROGRESS NOTE ADULT - PROBLEM SELECTOR PLAN 4
Prior labs per patient's Nursing home:  March 27/2020: Cr/BUN: 3.6/24 March 30/2020: Cr/BUN: 3.9/24 April 6/2020: Cr/BUN: 4.8/20   - Appreciate Nephrology consult - renal u/s  for transplanted kidney WNL ,    -concern for noncompliance as tacrolimus level was low  -F/U spot urine creatinine noted ,  tac level <2 on 4/22  - Renal f/u appreciated , will continue current plan and medications, renal does not recommend increasing dose of immunosuppressants with ongoing infection, holding Cellcept, c/w tacrolimus and prednisone, Goal Tacrolimus level is 3-5 as per Renal, holding off on dose adjustment given YURI per renal  - Case d/w Renal, Renal function worse today, will hold Bumex and NaCl tabs, give gentle hydration for 10 hrs , will monitor renal function closely , avoid nephrotoxics, Renally dose meds   , pt will need to f/u with his nephrologist as outpt after discharge   - Primary Nephrologist: Dr. Adrián Hawkins Noted to have YURI on CKD of unclear etiology. Differential includes COVID19 infection vs. chronic rejection. Per outpatient records: SCr 4.8 as outpatient on 4/6, elevated from SCr 3.6 on 3/27.   - Appreciate Nephrology recs  - Continue immunosuppressant regimen (see below)  - Holding Bumex and NaCl tabs in setting of worsened renal function; s/p trial of IV fluid x10 hours  - Trend BMP  - Avoid nephrotoxins  - Renally dose medications

## 2020-04-26 NOTE — PROGRESS NOTE ADULT - PROBLEM SELECTOR PLAN 6
S/p recent stent at [facility].   - C/w DAPT  - Continue carvedilol S/p recent stent at [facility].   - C/w DAPT  - Continue carvedilol  - Continue atorvastatin

## 2020-04-26 NOTE — PROGRESS NOTE ADULT - PROBLEM SELECTOR PLAN 8
- Monitor BP q 4 hour s  - Continue Hydralazine 25 mg PO q 12 hours - Continue hydralazine 25mg q12h

## 2020-04-26 NOTE — PROGRESS NOTE ADULT - PROBLEM SELECTOR PLAN 7
- Monitor blood glucose QAC and QHS  - Continue LANTUS 6 U at bed time + Sliding Novolog scale - Lantus 6 units qHS  - ISS  - Monitor FSG

## 2020-04-26 NOTE — PROGRESS NOTE ADULT - ASSESSMENT
59M with ESRD s/p renal transplant (2005) on chronic immunosuppressive therapy, HTN, HLD, CAD s/p recent stent, and PVD s/p R AKA admitted with COVID19 pneumonia with course c/b ESBL Klebsiella UTI.

## 2020-04-26 NOTE — PROGRESS NOTE ADULT - PROBLEM SELECTOR PLAN 2
Found to have positive UA with urine culture growing ESBL Klebsiella. Treating for longer course per ID due to renal transplant history.   - C/w ertapenem (4/13-4/26)

## 2020-04-26 NOTE — PROGRESS NOTE ADULT - PROBLEM SELECTOR PLAN 5
Will continue existing regimen of Immunosuppressants:   on Mycophenolate + Tacrolimus and Prednisone , c/w Tacrolimus and prednisone, holding Cellcept for now while inpatient  - plan as above,  Nephrology f/u appreciated, tac dose increased per renal  hyponatremia: chronic pt on Nacl tabs, fluid restriction to 1 L, now resolved, per renal, will hold off on Nacl tabs for now Home immunosuppressant regimen: Tacrolimus 1mg BID, prednisone 5mg daily, Cellcept 1g BID. Tacro level subtherapeutic on admission concerning for medication non-adherence.   - Continue tacrolimus 2mg BID  - Continue prednisone 5mg daily  - Continue to hold Cellcept until discharge  - Monitor tacrolimus level daily in AM 30 minutes prior to administration of dose  - Will need to follow up with outpatient nephrologist upon discharge (Dr. Adrián Hawkins)

## 2020-04-26 NOTE — PROGRESS NOTE ADULT - PROBLEM SELECTOR PLAN 1
Noted to be confused this morning with reported baseline of AOx4. Suspect related to hospital-associated delirium given that patient was just moved to a different hospital floor, however, will need to r/o organic causes of encephalopathy including new infection.   - Start melatonin to help regulate circadian rhythm  - Will monitor over the course of the day and consider CTH if persists  - Consult Psych if remains confused and agitated

## 2020-04-26 NOTE — PROGRESS NOTE ADULT - SUBJECTIVE AND OBJECTIVE BOX
Medicine Progress Note  Authored by Kathi Campos MD PGY3, pager [phone]    Patient is a 59y old  Male who presents with a chief complaint of Rule out COVID 19 infection (25 Apr 2020 13:45)    SUBJECTIVE / OVERNIGHT EVENTS:    MEDICATIONS  (STANDING):  ascorbic acid  Oral Tab/Cap - Peds 500 milliGRAM(s) Oral two times a day  aspirin  chewable 81 milliGRAM(s) Oral daily  atorvastatin 80 milliGRAM(s) Oral at bedtime  bisacodyl 5 milliGRAM(s) Oral daily  carvedilol 3.125 milliGRAM(s) Oral every 12 hours  chlorhexidine 4% Liquid 1 Application(s) Topical two times a day  clopidogrel Tablet 75 milliGRAM(s) Oral daily  dextrose 5%. 1000 milliLiter(s) (50 mL/Hr) IV Continuous <Continuous>  dextrose 50% Injectable 12.5 Gram(s) IV Push once  dextrose 50% Injectable 25 Gram(s) IV Push once  dextrose 50% Injectable 25 Gram(s) IV Push once  ferrous sulfate Oral Tab/Cap - Peds 325 milliGRAM(s) Oral daily  heparin  Injectable 5000 Unit(s) SubCutaneous every 8 hours  hydrALAZINE 25 milliGRAM(s) Oral two times a day  insulin glargine SubCutaneous Injection (LANTUS) - Peds 6 Unit(s) SubCutaneous at bedtime  insulin lispro (HumaLOG) corrective regimen sliding scale   SubCutaneous three times a day before meals  insulin lispro (HumaLOG) corrective regimen sliding scale   SubCutaneous at bedtime  levothyroxine 75 MICROGram(s) Oral daily  polyethylene glycol 3350 17 Gram(s) Oral daily  potassium chloride    Tablet ER 10 milliEquivalent(s) Oral daily  predniSONE   Tablet 5 milliGRAM(s) Oral daily  sevelamer carbonate 800 milliGRAM(s) Oral three times a day with meals  sodium chloride 0.9%. 1000 milliLiter(s) (50 mL/Hr) IV Continuous <Continuous>  tacrolimus 2 milliGRAM(s) Oral <User Schedule>    MEDICATIONS  (PRN):  acetaminophen   Tablet .. 650 milliGRAM(s) Oral every 4 hours PRN Temp greater or equal to 38.5C (101.3F)  acetaminophen   Tablet .. 650 milliGRAM(s) Oral every 6 hours PRN Temp greater or equal to 38C (100.4F), Mild Pain (1 - 3), Moderate Pain (4 - 6)  acetaminophen  Suppository .. 650 milliGRAM(s) Rectal every 4 hours PRN Temp greater or equal to 38.5C (101.3F)  dextrose 40% Gel 15 Gram(s) Oral once PRN Blood Glucose LESS THAN 70 milliGRAM(s)/deciliter  glucagon  Injectable 1 milliGRAM(s) IntraMuscular once PRN Glucose LESS THAN 70 milligrams/deciliter  ondansetron Injectable 4 milliGRAM(s) IV Push every 8 hours PRN Nausea and/or Vomiting  sucralfate 1 Gram(s) Oral every 6 hours PRN With each meal    CAPILLARY BLOOD GLUCOSE  POCT Blood Glucose.: 105 mg/dL (25 Apr 2020 21:41)  POCT Blood Glucose.: 150 mg/dL (25 Apr 2020 16:49)  POCT Blood Glucose.: 120 mg/dL (25 Apr 2020 11:46)  POCT Blood Glucose.: 98 mg/dL (25 Apr 2020 07:51)    PHYSICAL EXAM:  Vital Signs Last 24 Hrs  T(C): 36.4 (26 Apr 2020 04:35), Max: 36.7 (25 Apr 2020 12:36)  T(F): 97.6 (26 Apr 2020 04:35), Max: 98 (25 Apr 2020 12:36)  HR: 60 (26 Apr 2020 04:35) (58 - 62)  BP: 138/70 (26 Apr 2020 04:35) (132/71 - 150/73)  RR: 18 (26 Apr 2020 04:35) (18 - 18)  SpO2: 100% (26 Apr 2020 04:35) (100% - 100%)    CONSTITUTIONAL: NAD  HEENT: EOMI, PERRL, normal sclera and conjunctiva; normal nasal and oral mucosa, no oral lesions  RESPIRATORY: Normal respiratory effort; lungs are clear to auscultation bilaterally  CARDIOVASCULAR: Regular rate and rhythm, normal S1 and S2, no murmur/rub/gallop; No lower extremity edema; Peripheral pulses are 2+ bilaterally  ABDOMEN: Nontender to palpation, normoactive bowel sounds, no rebound/guarding; No hepatosplenomegaly  NEUROLOGY: AOx3, CN 2-12 are intact and symmetric; no gross sensory deficits   SKIN: No rashes; no palpable lesions    LABS:      COVID-19 PCR: Detected (11 Apr 2020 05:25)    RADIOLOGY & ADDITIONAL TESTS:  Imaging from Last 24 Hours:    Electrocardiogram/QTc Interval:    COORDINATION OF CARE:  Care Discussed with Consultants/Other Providers: Medicine Progress Note  Authored by Kathi Campos MD PGY3, pager [phone]    Patient is a 59y old  Male who presents with a chief complaint of Rule out COVID 19 infection (25 Apr 2020 13:45)    SUBJECTIVE / OVERNIGHT EVENTS:  No overnight events reported.   Patient seen and examined this morning. He is confused, trying to call someone and asking unrelated questions. When asked his location, [place]". He is otherwise without complaints.     MEDICATIONS  (STANDING):  ascorbic acid  Oral Tab/Cap - Peds 500 milliGRAM(s) Oral two times a day  aspirin  chewable 81 milliGRAM(s) Oral daily  atorvastatin 80 milliGRAM(s) Oral at bedtime  bisacodyl 5 milliGRAM(s) Oral daily  carvedilol 3.125 milliGRAM(s) Oral every 12 hours  chlorhexidine 4% Liquid 1 Application(s) Topical two times a day  clopidogrel Tablet 75 milliGRAM(s) Oral daily  dextrose 5%. 1000 milliLiter(s) (50 mL/Hr) IV Continuous <Continuous>  dextrose 50% Injectable 12.5 Gram(s) IV Push once  dextrose 50% Injectable 25 Gram(s) IV Push once  dextrose 50% Injectable 25 Gram(s) IV Push once  ferrous sulfate Oral Tab/Cap - Peds 325 milliGRAM(s) Oral daily  heparin  Injectable 5000 Unit(s) SubCutaneous every 8 hours  hydrALAZINE 25 milliGRAM(s) Oral two times a day  insulin glargine SubCutaneous Injection (LANTUS) - Peds 6 Unit(s) SubCutaneous at bedtime  insulin lispro (HumaLOG) corrective regimen sliding scale   SubCutaneous three times a day before meals  insulin lispro (HumaLOG) corrective regimen sliding scale   SubCutaneous at bedtime  levothyroxine 75 MICROGram(s) Oral daily  polyethylene glycol 3350 17 Gram(s) Oral daily  potassium chloride    Tablet ER 10 milliEquivalent(s) Oral daily  predniSONE   Tablet 5 milliGRAM(s) Oral daily  sevelamer carbonate 800 milliGRAM(s) Oral three times a day with meals  sodium chloride 0.9%. 1000 milliLiter(s) (50 mL/Hr) IV Continuous <Continuous>  tacrolimus 2 milliGRAM(s) Oral <User Schedule>    MEDICATIONS  (PRN):  acetaminophen   Tablet .. 650 milliGRAM(s) Oral every 4 hours PRN Temp greater or equal to 38.5C (101.3F)  acetaminophen   Tablet .. 650 milliGRAM(s) Oral every 6 hours PRN Temp greater or equal to 38C (100.4F), Mild Pain (1 - 3), Moderate Pain (4 - 6)  acetaminophen  Suppository .. 650 milliGRAM(s) Rectal every 4 hours PRN Temp greater or equal to 38.5C (101.3F)  dextrose 40% Gel 15 Gram(s) Oral once PRN Blood Glucose LESS THAN 70 milliGRAM(s)/deciliter  glucagon  Injectable 1 milliGRAM(s) IntraMuscular once PRN Glucose LESS THAN 70 milligrams/deciliter  ondansetron Injectable 4 milliGRAM(s) IV Push every 8 hours PRN Nausea and/or Vomiting  sucralfate 1 Gram(s) Oral every 6 hours PRN With each meal    CAPILLARY BLOOD GLUCOSE  POCT Blood Glucose.: 105 mg/dL (25 Apr 2020 21:41)  POCT Blood Glucose.: 150 mg/dL (25 Apr 2020 16:49)  POCT Blood Glucose.: 120 mg/dL (25 Apr 2020 11:46)  POCT Blood Glucose.: 98 mg/dL (25 Apr 2020 07:51)    PHYSICAL EXAM:  Vital Signs Last 24 Hrs  T(C): 36.4 (26 Apr 2020 04:35), Max: 36.7 (25 Apr 2020 12:36)  T(F): 97.6 (26 Apr 2020 04:35), Max: 98 (25 Apr 2020 12:36)  HR: 60 (26 Apr 2020 04:35) (58 - 62)  BP: 138/70 (26 Apr 2020 04:35) (132/71 - 150/73)  RR: 18 (26 Apr 2020 04:35) (18 - 18)  SpO2: 100% (26 Apr 2020 04:35) (100% - 100%)    CONSTITUTIONAL: Restless  HEENT: EOMI, PERRL, normal sclera and conjunctiva; normal nasal and oral mucosa, no oral lesions  RESPIRATORY: Normal respiratory effort; lungs are clear to auscultation bilaterally  CARDIOVASCULAR: Regular rate and rhythm, normal S1 and S2, no murmur/rub/gallop; No lower extremity edema; Peripheral pulses are 2+ bilaterally  ABDOMEN: Nontender to palpation, normoactive bowel sounds, no rebound/guarding; No hepatosplenomegaly  NEUROLOGY: AOx1, CN 2-12 are intact and symmetric; no gross sensory deficits   SKIN: No rashes; no palpable lesions    LABS:  Pending    COVID-19 PCR: Detected (11 Apr 2020 05:25)    RADIOLOGY & ADDITIONAL TESTS:  Imaging from Last 24 Hours: n/a    Electrocardiogram/QTc Interval:    COORDINATION OF CARE:  Care Discussed with Consultants/Other Providers: n/a

## 2020-04-27 DIAGNOSIS — Z71.89 OTHER SPECIFIED COUNSELING: ICD-10-CM

## 2020-04-27 LAB
ALBUMIN SERPL ELPH-MCNC: 3 G/DL — LOW (ref 3.3–5)
ALP SERPL-CCNC: 64 U/L — SIGNIFICANT CHANGE UP (ref 40–120)
ALT FLD-CCNC: 12 U/L — SIGNIFICANT CHANGE UP (ref 4–41)
AMMONIA BLD-MCNC: 17 UMOL/L — SIGNIFICANT CHANGE UP (ref 11–55)
ANION GAP SERPL CALC-SCNC: 11 MMO/L — SIGNIFICANT CHANGE UP (ref 7–14)
ANION GAP SERPL CALC-SCNC: 13 MMO/L — SIGNIFICANT CHANGE UP (ref 7–14)
APPEARANCE UR: CLEAR — SIGNIFICANT CHANGE UP
APTT BLD: 37.4 SEC — HIGH (ref 27.5–36.3)
AST SERPL-CCNC: 30 U/L — SIGNIFICANT CHANGE UP (ref 4–40)
BACTERIA # UR AUTO: NEGATIVE — SIGNIFICANT CHANGE UP
BASE EXCESS BLDV CALC-SCNC: -3 MMOL/L — SIGNIFICANT CHANGE UP
BILIRUB SERPL-MCNC: 0.3 MG/DL — SIGNIFICANT CHANGE UP (ref 0.2–1.2)
BILIRUB UR-MCNC: NEGATIVE — SIGNIFICANT CHANGE UP
BLOOD GAS VENOUS - CREATININE: 3.75 MG/DL — HIGH (ref 0.5–1.3)
BLOOD UR QL VISUAL: NEGATIVE — SIGNIFICANT CHANGE UP
BUN SERPL-MCNC: 96 MG/DL — HIGH (ref 7–23)
BUN SERPL-MCNC: 97 MG/DL — HIGH (ref 7–23)
CALCIUM SERPL-MCNC: 9.2 MG/DL — SIGNIFICANT CHANGE UP (ref 8.4–10.5)
CALCIUM SERPL-MCNC: 9.5 MG/DL — SIGNIFICANT CHANGE UP (ref 8.4–10.5)
CHLORIDE BLDV-SCNC: 116 MMOL/L — HIGH (ref 96–108)
CHLORIDE SERPL-SCNC: 107 MMOL/L — SIGNIFICANT CHANGE UP (ref 98–107)
CHLORIDE SERPL-SCNC: 107 MMOL/L — SIGNIFICANT CHANGE UP (ref 98–107)
CO2 SERPL-SCNC: 17 MMOL/L — LOW (ref 22–31)
CO2 SERPL-SCNC: 22 MMOL/L — SIGNIFICANT CHANGE UP (ref 22–31)
COLOR SPEC: YELLOW — SIGNIFICANT CHANGE UP
CREAT SERPL-MCNC: 4.13 MG/DL — HIGH (ref 0.5–1.3)
CREAT SERPL-MCNC: 4.18 MG/DL — HIGH (ref 0.5–1.3)
CRP SERPL-MCNC: 22 MG/L — HIGH
CRP SERPL-MCNC: 45.1 MG/L — HIGH
D DIMER BLD IA.RAPID-MCNC: 355 NG/ML — SIGNIFICANT CHANGE UP
D DIMER BLD IA.RAPID-MCNC: SIGNIFICANT CHANGE UP NG/ML
FERRITIN SERPL-MCNC: 2209 NG/ML — HIGH (ref 30–400)
GAS PNL BLDV: 140 MMOL/L — SIGNIFICANT CHANGE UP (ref 136–146)
GLUCOSE BLDC GLUCOMTR-MCNC: 103 MG/DL — HIGH (ref 70–99)
GLUCOSE BLDC GLUCOMTR-MCNC: 56 MG/DL — LOW (ref 70–99)
GLUCOSE BLDC GLUCOMTR-MCNC: 64 MG/DL — LOW (ref 70–99)
GLUCOSE BLDC GLUCOMTR-MCNC: 75 MG/DL — SIGNIFICANT CHANGE UP (ref 70–99)
GLUCOSE BLDC GLUCOMTR-MCNC: 86 MG/DL — SIGNIFICANT CHANGE UP (ref 70–99)
GLUCOSE BLDC GLUCOMTR-MCNC: 86 MG/DL — SIGNIFICANT CHANGE UP (ref 70–99)
GLUCOSE BLDC GLUCOMTR-MCNC: 98 MG/DL — SIGNIFICANT CHANGE UP (ref 70–99)
GLUCOSE BLDV-MCNC: 350 MG/DL — HIGH (ref 70–99)
GLUCOSE SERPL-MCNC: 50 MG/DL — LOW (ref 70–99)
GLUCOSE SERPL-MCNC: 97 MG/DL — SIGNIFICANT CHANGE UP (ref 70–99)
GLUCOSE UR-MCNC: NEGATIVE — SIGNIFICANT CHANGE UP
HCO3 BLDV-SCNC: 21 MMOL/L — SIGNIFICANT CHANGE UP (ref 20–27)
HCT VFR BLD CALC: 42.2 % — SIGNIFICANT CHANGE UP (ref 39–50)
HCT VFR BLDV CALC: 31.4 % — LOW (ref 39–51)
HGB BLD-MCNC: 12.5 G/DL — LOW (ref 13–17)
HGB BLDV-MCNC: 10.2 G/DL — LOW (ref 13–17)
HYALINE CASTS # UR AUTO: NEGATIVE — SIGNIFICANT CHANGE UP
INR BLD: 1.18 — HIGH (ref 0.88–1.17)
KETONES UR-MCNC: NEGATIVE — SIGNIFICANT CHANGE UP
LACTATE BLDV-MCNC: 0.9 MMOL/L — SIGNIFICANT CHANGE UP (ref 0.5–2)
LDH SERPL L TO P-CCNC: 304 U/L — HIGH (ref 135–225)
LEUKOCYTE ESTERASE UR-ACNC: NEGATIVE — SIGNIFICANT CHANGE UP
MAGNESIUM SERPL-MCNC: 2 MG/DL — SIGNIFICANT CHANGE UP (ref 1.6–2.6)
MAGNESIUM SERPL-MCNC: 2 MG/DL — SIGNIFICANT CHANGE UP (ref 1.6–2.6)
MCHC RBC-ENTMCNC: 24.5 PG — LOW (ref 27–34)
MCHC RBC-ENTMCNC: 29.6 % — LOW (ref 32–36)
MCV RBC AUTO: 82.6 FL — SIGNIFICANT CHANGE UP (ref 80–100)
NITRITE UR-MCNC: NEGATIVE — SIGNIFICANT CHANGE UP
NRBC # FLD: 0 K/UL — SIGNIFICANT CHANGE UP (ref 0–0)
PCO2 BLDV: 42 MMHG — SIGNIFICANT CHANGE UP (ref 41–51)
PH BLDV: 7.34 PH — SIGNIFICANT CHANGE UP (ref 7.32–7.43)
PH UR: 6.5 — SIGNIFICANT CHANGE UP (ref 5–8)
PHOSPHATE SERPL-MCNC: 3.6 MG/DL — SIGNIFICANT CHANGE UP (ref 2.5–4.5)
PHOSPHATE SERPL-MCNC: 3.8 MG/DL — SIGNIFICANT CHANGE UP (ref 2.5–4.5)
PLATELET # BLD AUTO: 213 K/UL — SIGNIFICANT CHANGE UP (ref 150–400)
PMV BLD: 9.2 FL — SIGNIFICANT CHANGE UP (ref 7–13)
PO2 BLDV: 27 MMHG — LOW (ref 35–40)
POTASSIUM BLDV-SCNC: 4.4 MMOL/L — SIGNIFICANT CHANGE UP (ref 3.4–4.5)
POTASSIUM SERPL-MCNC: 5 MMOL/L — SIGNIFICANT CHANGE UP (ref 3.5–5.3)
POTASSIUM SERPL-MCNC: 5.7 MMOL/L — HIGH (ref 3.5–5.3)
POTASSIUM SERPL-SCNC: 5 MMOL/L — SIGNIFICANT CHANGE UP (ref 3.5–5.3)
POTASSIUM SERPL-SCNC: 5.7 MMOL/L — HIGH (ref 3.5–5.3)
PROCALCITONIN SERPL-MCNC: 0.17 NG/ML — HIGH (ref 0.02–0.1)
PROT SERPL-MCNC: 7 G/DL — SIGNIFICANT CHANGE UP (ref 6–8.3)
PROT UR-MCNC: 600 — HIGH
PROTHROM AB SERPL-ACNC: 13.5 SEC — HIGH (ref 9.8–13.1)
RBC # BLD: 5.11 M/UL — SIGNIFICANT CHANGE UP (ref 4.2–5.8)
RBC # FLD: 14.7 % — HIGH (ref 10.3–14.5)
RBC CASTS # UR COMP ASSIST: HIGH (ref 0–?)
SAO2 % BLDV: 36 % — LOW (ref 60–85)
SODIUM SERPL-SCNC: 137 MMOL/L — SIGNIFICANT CHANGE UP (ref 135–145)
SODIUM SERPL-SCNC: 140 MMOL/L — SIGNIFICANT CHANGE UP (ref 135–145)
SP GR SPEC: 1.02 — SIGNIFICANT CHANGE UP (ref 1–1.04)
SQUAMOUS # UR AUTO: SIGNIFICANT CHANGE UP
UROBILINOGEN FLD QL: NORMAL — SIGNIFICANT CHANGE UP
WBC # BLD: 5.54 K/UL — SIGNIFICANT CHANGE UP (ref 3.8–10.5)
WBC # FLD AUTO: 5.54 K/UL — SIGNIFICANT CHANGE UP (ref 3.8–10.5)
WBC UR QL: SIGNIFICANT CHANGE UP (ref 0–?)

## 2020-04-27 PROCEDURE — 99233 SBSQ HOSP IP/OBS HIGH 50: CPT

## 2020-04-27 PROCEDURE — 99291 CRITICAL CARE FIRST HOUR: CPT

## 2020-04-27 PROCEDURE — 99231 SBSQ HOSP IP/OBS SF/LOW 25: CPT | Mod: GC

## 2020-04-27 PROCEDURE — 99232 SBSQ HOSP IP/OBS MODERATE 35: CPT

## 2020-04-27 PROCEDURE — 99233 SBSQ HOSP IP/OBS HIGH 50: CPT | Mod: GC

## 2020-04-27 RX ORDER — SODIUM CHLORIDE 9 MG/ML
1000 INJECTION, SOLUTION INTRAVENOUS
Refills: 0 | Status: DISCONTINUED | OUTPATIENT
Start: 2020-04-27 | End: 2020-04-28

## 2020-04-27 RX ORDER — INSULIN GLARGINE 100 [IU]/ML
3 INJECTION, SOLUTION SUBCUTANEOUS AT BEDTIME
Refills: 0 | Status: DISCONTINUED | OUTPATIENT
Start: 2020-04-27 | End: 2020-04-27

## 2020-04-27 RX ORDER — HALOPERIDOL DECANOATE 100 MG/ML
5 INJECTION INTRAMUSCULAR ONCE
Refills: 0 | Status: COMPLETED | OUTPATIENT
Start: 2020-04-27 | End: 2020-04-27

## 2020-04-27 RX ORDER — SODIUM CHLORIDE 9 MG/ML
1000 INJECTION, SOLUTION INTRAVENOUS
Refills: 0 | Status: DISCONTINUED | OUTPATIENT
Start: 2020-04-27 | End: 2020-04-27

## 2020-04-27 RX ORDER — ARGATROBAN 50 MG/50ML
0.5 INJECTION, SOLUTION INTRAVENOUS
Qty: 250 | Refills: 0 | Status: DISCONTINUED | OUTPATIENT
Start: 2020-04-27 | End: 2020-04-27

## 2020-04-27 RX ORDER — HEPARIN SODIUM 5000 [USP'U]/ML
5000 INJECTION INTRAVENOUS; SUBCUTANEOUS EVERY 8 HOURS
Refills: 0 | Status: DISCONTINUED | OUTPATIENT
Start: 2020-04-27 | End: 2020-05-07

## 2020-04-27 RX ORDER — SODIUM ZIRCONIUM CYCLOSILICATE 10 G/10G
10 POWDER, FOR SUSPENSION ORAL ONCE
Refills: 0 | Status: COMPLETED | OUTPATIENT
Start: 2020-04-27 | End: 2020-04-27

## 2020-04-27 RX ADMIN — Medication 25 MILLIGRAM(S): at 05:46

## 2020-04-27 RX ADMIN — Medication 1 MILLIGRAM(S): at 20:27

## 2020-04-27 RX ADMIN — POLYETHYLENE GLYCOL 3350 17 GRAM(S): 17 POWDER, FOR SOLUTION ORAL at 12:01

## 2020-04-27 RX ADMIN — CHLORHEXIDINE GLUCONATE 1 APPLICATION(S): 213 SOLUTION TOPICAL at 05:47

## 2020-04-27 RX ADMIN — HEPARIN SODIUM 5000 UNIT(S): 5000 INJECTION INTRAVENOUS; SUBCUTANEOUS at 08:17

## 2020-04-27 RX ADMIN — ATORVASTATIN CALCIUM 80 MILLIGRAM(S): 80 TABLET, FILM COATED ORAL at 21:18

## 2020-04-27 RX ADMIN — SODIUM CHLORIDE 50 MILLILITER(S): 9 INJECTION, SOLUTION INTRAVENOUS at 18:11

## 2020-04-27 RX ADMIN — HALOPERIDOL DECANOATE 5 MILLIGRAM(S): 100 INJECTION INTRAMUSCULAR at 15:42

## 2020-04-27 RX ADMIN — Medication 5 MILLIGRAM(S): at 12:01

## 2020-04-27 RX ADMIN — Medication 10 MILLIEQUIVALENT(S): at 12:02

## 2020-04-27 RX ADMIN — SEVELAMER CARBONATE 800 MILLIGRAM(S): 2400 POWDER, FOR SUSPENSION ORAL at 12:02

## 2020-04-27 RX ADMIN — CARVEDILOL PHOSPHATE 3.12 MILLIGRAM(S): 80 CAPSULE, EXTENDED RELEASE ORAL at 17:28

## 2020-04-27 RX ADMIN — SODIUM CHLORIDE 50 MILLILITER(S): 9 INJECTION, SOLUTION INTRAVENOUS at 12:00

## 2020-04-27 RX ADMIN — SODIUM CHLORIDE 50 MILLILITER(S): 9 INJECTION, SOLUTION INTRAVENOUS at 21:18

## 2020-04-27 RX ADMIN — CLOPIDOGREL BISULFATE 75 MILLIGRAM(S): 75 TABLET, FILM COATED ORAL at 12:01

## 2020-04-27 RX ADMIN — Medication 500 MILLIGRAM(S): at 17:28

## 2020-04-27 RX ADMIN — HEPARIN SODIUM 5000 UNIT(S): 5000 INJECTION INTRAVENOUS; SUBCUTANEOUS at 21:18

## 2020-04-27 RX ADMIN — SEVELAMER CARBONATE 800 MILLIGRAM(S): 2400 POWDER, FOR SUSPENSION ORAL at 17:27

## 2020-04-27 RX ADMIN — Medication 500 MILLIGRAM(S): at 05:46

## 2020-04-27 RX ADMIN — SODIUM ZIRCONIUM CYCLOSILICATE 10 GRAM(S): 10 POWDER, FOR SUSPENSION ORAL at 11:58

## 2020-04-27 RX ADMIN — Medication 5 MILLIGRAM(S): at 05:46

## 2020-04-27 RX ADMIN — TACROLIMUS 2 MILLIGRAM(S): 5 CAPSULE ORAL at 05:45

## 2020-04-27 RX ADMIN — Medication 325 MILLIGRAM(S): at 12:01

## 2020-04-27 RX ADMIN — CARVEDILOL PHOSPHATE 3.12 MILLIGRAM(S): 80 CAPSULE, EXTENDED RELEASE ORAL at 05:46

## 2020-04-27 RX ADMIN — Medication 25 MILLIGRAM(S): at 17:28

## 2020-04-27 RX ADMIN — CHLORHEXIDINE GLUCONATE 1 APPLICATION(S): 213 SOLUTION TOPICAL at 17:28

## 2020-04-27 RX ADMIN — Medication 75 MICROGRAM(S): at 05:46

## 2020-04-27 RX ADMIN — Medication 81 MILLIGRAM(S): at 12:01

## 2020-04-27 RX ADMIN — TACROLIMUS 2 MILLIGRAM(S): 5 CAPSULE ORAL at 18:05

## 2020-04-27 NOTE — PROGRESS NOTE ADULT - PROBLEM SELECTOR PLAN 1
Worsening encephalopathy, with reported baseline of AOx4.   Suspect related to hospital-associated delirium given that patient was just moved to a different hospital floor, however, will need to r/o organic causes of encephalopathy including new infection.   Can also be encephalopathic as a result of the COVID infection  BUN elevated but has been elevated this entire course   CTH neg for acute hemorrhage, but evidence of older infarcts   - f/u B12, folate, TSH, RPR   - repeat UA   - Consider LP   - c/w melatonin to help regulate circadian rhythm  - Consult Psych if remains confused and agitated Worsening encephalopathy, with reported baseline of AOx4.   Suspect related to hospital-associated delirium given that patient was just moved to a different hospital floor, however, will need to r/o organic causes of encephalopathy including new infection.   Can also be encephalopathic as a result of the COVID infection  BUN elevated but has been elevated this entire course   CTH neg for acute hemorrhage, but evidence of older infarcts   - f/u B12, folate, TSH, RPR   - repeat UA   - Consider LP, MR brain if feasible   - c/w melatonin to help regulate circadian rhythm  - Consult Psych if remains confused and agitated

## 2020-04-27 NOTE — PROGRESS NOTE ADULT - PROBLEM SELECTOR PLAN 3
currently on room air  - Ddimer 21158 from [facility] 4/24   - No evidence of respiratory compromise   - Consult heme - is heparin gtt indicated?   - f/u LE dopplers   - May not comply with V/Q scan which may limit clinical utility currently on room air  - Ddimer 53792 from 300 4/24   - No evidence of respiratory compromise   - Consult heme rec appreciated  - MICU consulted, will start argatroban gtt  - f/u LE dopplers   - May not comply with V/Q scan which may limit clinical utility

## 2020-04-27 NOTE — PROGRESS NOTE ADULT - ASSESSMENT
58 yo man with h/o renal transplant presenting 4/11 from nursing home  covid+ pneumonia  oxygenating well on RA.  pyelo transplant kidney with  ESBL Klebsiella in urine.  completed 2 weeks ertapenem.  Today noted restless and confused.     1) Pyelonephritis  - completed 2 weeks antibiotics   - nephrology following for renal transplant    2) COVID-19  - oxygenating well on RA  - supportive care  - maintain airborne /contact precautions    3) encephalopathy  - unclear etiology. ?drug related  ?sepsis  -check tacrolimus serum level  -check blood cultures      4) CMV viremia  - CMV  --> low viremia, not suggestive of CMV disease  - no anti-virals needed at this time    Clarita Perdomo MD  Pager: [phone]  After 5 PM or weekends please call fellow on call or office [phone]

## 2020-04-27 NOTE — PROGRESS NOTE ADULT - ATTENDING COMMENTS
YURI on CKD  Encephalopathy    Unlikely renal related given acuity of symptoms and patient has had elevated BUN for a while. Please r/o other causes ie intracranial pathology, infection.

## 2020-04-27 NOTE — PROGRESS NOTE ADULT - PROBLEM SELECTOR PLAN 6
S/p recent stent at [facility].   - C/w DAPT  - Continue carvedilol  - Continue atorvastatin

## 2020-04-27 NOTE — PROGRESS NOTE ADULT - PROBLEM SELECTOR PLAN 10
Family updated on clinical status. Patient remains encephalopathic, and will now begin systemic anticoagulation given elevated D-Dimer.   Remains full code

## 2020-04-27 NOTE — PROGRESS NOTE ADULT - PROBLEM SELECTOR PLAN 4
Noted to have YURI on CKD of unclear etiology. Differential includes COVID19 infection vs. chronic rejection. Per outpatient records: SCr 4.8 as outpatient on 4/6, elevated from SCr 3.6 on 3/27.   - Fluid responsive will administer D5LR @ 50cc/hr   - Per nephrology does not this Uremia is source of encephalopathy   - Appreciate Nephrology recs  - Continue immunosuppressant regimen (see below)  - Holding Bumex and NaCl tabs in setting of worsened renal function  - Trend BMP  - Avoid nephrotoxins  - Renally dose medications

## 2020-04-27 NOTE — CHART NOTE - NSCHARTNOTEFT_GEN_A_CORE
Evens is a 59-year-old with ESRD s/p renal transplant (2005), HTN, HL, CAD, and PVD who is admitted with COVID pneumonia. He has become progressively more encephalopathic since his admission on 4/11.    MICU called for D-dimer trend (300 on 4/24 to >40,000 today).    Chart review shows no acute explanation on head CT from 4/26.    On evaluation, the patient is combative, confused, and disoriented. Recommend the following:  - start anticoagulation with argatroban (or heparin as a last resort if unable to use argatroban)  - check LEs for DVTs  - check echo for RV strain  - repeat labs: CRP, D-dimer, pro-calcitonin, coags Evens is a 59-year-old with ESRD s/p renal transplant (2005), HTN, HL, CAD, and PVD who is admitted with COVID pneumonia. He has become progressively more encephalopathic since his admission on 4/11.    MICU called for D-dimer trend (300 on 4/24 to >40,000 today).    Chart review shows no acute explanation on head CT from 4/26.    On evaluation, the patient is combative, confused, and disoriented. Recommend the following:  - start anticoagulation with argatroban (or heparin as a last resort if unable to use argatroban)  - check LEs for DVTs  - check echo for RV strain  - repeat labs: CRP, D-dimer, pro-calcitonin, coags; add on ammonia to further investigate encephalopathy  - if able, obtain an MRI of the brain Evens is a 59-year-old with ESRD s/p renal transplant (2005), HTN, HL, CAD, and PVD who is admitted with COVID pneumonia. He has become progressively more encephalopathic since his admission on 4/11.    MICU called for D-dimer trend (300 on 4/24 to >40,000 today).    Chart review shows no acute explanation on head CT from 4/26.    On evaluation, the patient is combative, confused, and disoriented. Recommend the following:  - start anticoagulation with argatroban (or heparin as a last resort if unable to use argatroban)  - check LEs for DVTs  - check echo for RV strain  - repeat labs: CRP, D-dimer, pro-calcitonin, coags; add on ammonia to further investigate encephalopathy  - if able, obtain an MRI of the brain      ---ATTENDING ATTESTATION---    Agree with above. Patient with COVID positive pneumonia now with AMS of unknown etiology. Would suggest to treat as though the patient is coagulopathic. Start argatroban or heparin. Please check TTE for RV strain. Please get CRP, D-dimer, and pro-hermilo. Please check for usual metabolic encephalopathy (this may need an LP ). finally, if the patient has worsening COVID he may need an MRI brain to evaluate for COVID related neurologic symptoms.     Thank you for your consult. Please call back if you have any additional questions. Evens is a 59-year-old with ESRD s/p renal transplant (2005), HTN, HL, CAD, and PVD who is admitted with COVID pneumonia. He has become progressively more encephalopathic since his admission on 4/11.    MICU called for D-dimer trend (300 on 4/24 to >40,000 today).    Chart review shows no acute explanation on head CT from 4/26.    On evaluation, the patient is combative, confused, and disoriented. Recommend the following:  - start anticoagulation with argatroban (or heparin as a last resort if unable to use argatroban)  - check LEs for DVTs  - check echo for RV strain  - repeat labs: CRP, D-dimer, pro-calcitonin, coags; add on ammonia to further investigate encephalopathy  - if able, obtain an MRI of the brain      ---ATTENDING ATTESTATION---    Agree with above. Patient with COVID positive pneumonia now with AMS of unknown etiology. Would suggest to treat as though the patient is coagulopathic. Start argatroban or heparin. Please check TTE for RV strain. Please get CRP, D-dimer, and pro-hermilo. Please check for usual metabolic encephalopathy (this may need an LP ). finally, if the patient has worsening COVID he may need an MRI brain to evaluate for COVID related neurologic symptoms.     Critical Care Attestation:  Attending with resident/fellow:  I have personally provided 35 minutes of critical care time concurrently with the resident/fellow. This time excludes time spent on separate procedures and time spent teaching. I have reviewed the resident/fellow’s documentation and I agree with the assessment and plan of care.

## 2020-04-27 NOTE — CHART NOTE - NSCHARTNOTEFT_GEN_A_CORE
59M with ESRD s/p renal transplant (2005) on chronic immunosuppressive therapy (Mycophenolate + Tacrolimus and Prednisone(,  HTN, HLD, CAD s/p recent stent, and PVD s/p R AKA admitted with COVID19 pneumonia with course c/b ESBL Klebsiella UTI, s/p 14 day course of ertapenem (4/13-4/26).  Hematology consulted for Ddimer 53896 from 300 4/24 and AC recommendations. Patient was on heparin prophylactic dose in this hospitalization.       REVIEW OF SYSTEMS: as above    Vital Signs Last 24 Hrs  T(C): 36.6 (27 Apr 2020 11:19), Max: 36.9 (27 Apr 2020 08:17)  T(F): 97.8 (27 Apr 2020 11:19), Max: 98.4 (27 Apr 2020 08:17)  HR: 73 (27 Apr 2020 11:19) (55 - 74)  BP: 132/78 (27 Apr 2020 11:19) (119/76 - 155/75)  BP(mean): --  RR: 18 (27 Apr 2020 11:19) (16 - 18)  SpO2: 100% (27 Apr 2020 11:19) (100% - 100%)                          12.5   5.54  )-----------( 213      ( 27 Apr 2020 05:26 )             42.2     04-27    140  |  107  |  97<H>  ----------------------------<  97  5.0   |  22  |  4.18<H>    Ca    9.2      27 Apr 2020 09:53  Phos  3.6     04-27  Mg     2.0     04-27    TPro  7.0  /  Alb  3.0<L>  /  TBili  0.3  /  DBili  x   /  AST  30  /  ALT  12  /  AlkPhos  64  04-27      Assessment/Plan: 59M with ESRD s/p renal transplant (2005) on chronic immunosuppressive therapy (Mycophenolate + Tacrolimus and Prednisone(,  HTN, HLD, CAD s/p recent stent, and PVD s/p R AKA admitted with COVID19 pneumonia with course c/b ESBL Klebsiella UTI, s/p 14 day course of ertapenem (4/13-4/26).  Hematology consulted for Ddimer 69302 from 300 4/24 and AC recommendations. Patient was on heparin prophylactic dose in this hospitalization.       REVIEW OF SYSTEMS: as above    Vital Signs Last 24 Hrs  T(C): 36.6 (27 Apr 2020 11:19), Max: 36.9 (27 Apr 2020 08:17)  T(F): 97.8 (27 Apr 2020 11:19), Max: 98.4 (27 Apr 2020 08:17)  HR: 73 (27 Apr 2020 11:19) (55 - 74)  BP: 132/78 (27 Apr 2020 11:19) (119/76 - 155/75)  BP(mean): --  RR: 18 (27 Apr 2020 11:19) (16 - 18)  SpO2: 100% (27 Apr 2020 11:19) (100% - 100%)                          12.5   5.54  )-----------( 213      ( 27 Apr 2020 05:26 )             42.2     04-27    140  |  107  |  97<H>  ----------------------------<  97  5.0   |  22  |  4.18<H>    Ca    9.2      27 Apr 2020 09:53  Phos  3.6     04-27  Mg     2.0     04-27    TPro  7.0  /  Alb  3.0<L>  /  TBili  0.3  /  DBili  x   /  AST  30  /  ALT  12  /  AlkPhos  64  04-27      Assessment/Plan:          Attg Addendum  - COVID pos with rising D-dimer  - recc obtain US bilateral LEs to assess for DVT  - consider VQ scan if any evidence of decline in pulm respiratory status  - cont px anticoagulation.   - would not start full dose AC unless e/o acute VTE. 59M with ESRD s/p renal transplant (2005) on chronic immunosuppressive therapy (Mycophenolate + Tacrolimus and Prednisone(,  HTN, HLD, CAD s/p recent stent, and PVD s/p R AKA admitted with COVID19 pneumonia with course c/b ESBL Klebsiella UTI, s/p 14 day course of ertapenem (4/13-4/26).  Hematology consulted for Ddimer 36239 from 300 4/24 and AC recommendations. Patient was on heparin prophylactic dose in this hospitalization.       REVIEW OF SYSTEMS: as above    Vital Signs Last 24 Hrs  T(C): 36.6 (27 Apr 2020 11:19), Max: 36.9 (27 Apr 2020 08:17)  T(F): 97.8 (27 Apr 2020 11:19), Max: 98.4 (27 Apr 2020 08:17)  HR: 73 (27 Apr 2020 11:19) (55 - 74)  BP: 132/78 (27 Apr 2020 11:19) (119/76 - 155/75)  BP(mean): --  RR: 18 (27 Apr 2020 11:19) (16 - 18)  SpO2: 100% (27 Apr 2020 11:19) (100% - 100%)                          12.5   5.54  )-----------( 213      ( 27 Apr 2020 05:26 )             42.2     04-27    140  |  107  |  97<H>  ----------------------------<  97  5.0   |  22  |  4.18<H>    Ca    9.2      27 Apr 2020 09:53  Phos  3.6     04-27  Mg     2.0     04-27    TPro  7.0  /  Alb  3.0<L>  /  TBili  0.3  /  DBili  x   /  AST  30  /  ALT  12  /  AlkPhos  64  04-27      Assessment/Plan:  59M with ESRD s/p renal transplant (2005) on chronic immunosuppressive therapy (Mycophenolate + Tacrolimus and Prednisone), also HTN, HLD, CAD s/p recent stent, and PVD s/p R AKA admitted with COVID19 pneumonia with course c/b ESBL Klebsiella UTI.  Hematology consulted for rinsing D-dimer level.    #Rising D-dimer level  -D-Dimer 91914 from 300 on 4/24.  -Please obtain ultrasound of LE b/l and V/Q scan if any evidence of hypoxia and decline in respiratory status  -Can c/w prophylactic AC for now  -Hematology will follow    Mallory Esparza PGY4  Heme/Onc fellow  [phone]      Attg Addendum  - COVID pos with rising D-dimer  - recc obtain US bilateral LEs to assess for DVT  - consider VQ scan if any evidence of decline in pulm respiratory status  - cont px anticoagulation.   - would not start full dose AC unless e/o acute VTE.

## 2020-04-27 NOTE — PROGRESS NOTE ADULT - PROBLEM SELECTOR PLAN 7
- Lantus 6 units qHS  - ISS  - Monitor FSG - d/c Lantus 6 units qHS in setting of hypoglycemia 2/2 poor PO intake   - ISS  - Monitor FSG

## 2020-04-27 NOTE — PROGRESS NOTE ADULT - PROBLEM SELECTOR PLAN 5
Home immunosuppressant regimen: Tacrolimus 1mg BID, prednisone 5mg daily, Cellcept 1g BID. Tacro level subtherapeutic on admission concerning for medication non-adherence.   - Continue tacrolimus 2mg BID  - Continue prednisone 5mg daily  - Continue to hold Cellcept until discharge  - Monitor tacrolimus level daily in AM 30 minutes prior to administration of dose  - Will need to follow up with outpatient nephrologist upon discharge (Dr. Adrián Hawkins)

## 2020-04-27 NOTE — CHART NOTE - NSCHARTNOTEFT_GEN_A_CORE
AM D-Dimer 40k  MICU consulted recommended Agatroban gtt  Repeat D-Dimer 355 consistent with hospital trend  - Elevated D-Dimer may be lab error  - Will hold off on systemic full AC, d/c agatroban gtt  - Repeat D-Dimer in AM

## 2020-04-27 NOTE — PROGRESS NOTE ADULT - SUBJECTIVE AND OBJECTIVE BOX
[facility] Division of Kidney Diseases & Hypertension  FOLLOW UP NOTE  [phone]--------------------------------------------------------------------------------  HPI: 59M PMH of HTN, HLD, DM, ESRD s/p kidney transplant, admitted for COVID-19. Nephrology team consulted for YURI and management of immunosuppressant. Pt with history of ESRD s/p DDRT on . On review pt is on Tacrolimus 1mg BID, Mycophenolate 1gm BID and Prednisone 5mg daily. On review of labs, pt with SCr: 3.6 on 3/27/20 increased to 3.9 on 3/30/20. Pt with last outpatient SCr of 4.8 as outpatient at [facility]. On admission, SCr was 5.47 (4/10/20) which continued to improve to  4.30 on 20. Scr began to increase to 5.25 and responded to IVF with repeat  4.79.    Labs and charts reviewed  Pt appears confused this AM and is unable to respond to commands  Pt hypoglycemic this AM B  No other significant events overnight      PAST HISTORY  --------------------------------------------------------------------------------  No significant changes to PMH, PSH, FHx, SHx, unless otherwise noted    ALLERGIES & MEDICATIONS  --------------------------------------------------------------------------------  Allergies    iodine (Vomiting)  IV Contrast (Unknown)    Intolerances      Standing Inpatient Medications  ascorbic acid  Oral Tab/Cap - Peds 500 milliGRAM(s) Oral two times a day  aspirin  chewable 81 milliGRAM(s) Oral daily  atorvastatin 80 milliGRAM(s) Oral at bedtime  bisacodyl 5 milliGRAM(s) Oral daily  carvedilol 3.125 milliGRAM(s) Oral every 12 hours  chlorhexidine 4% Liquid 1 Application(s) Topical two times a day  clopidogrel Tablet 75 milliGRAM(s) Oral daily  dextrose 5% + sodium chloride 0.9%. 1000 milliLiter(s) IV Continuous <Continuous>  dextrose 5%. 1000 milliLiter(s) IV Continuous <Continuous>  dextrose 50% Injectable 12.5 Gram(s) IV Push once  dextrose 50% Injectable 25 Gram(s) IV Push once  dextrose 50% Injectable 25 Gram(s) IV Push once  ferrous sulfate Oral Tab/Cap - Peds 325 milliGRAM(s) Oral daily  heparin  Injectable 5000 Unit(s) SubCutaneous every 8 hours  hydrALAZINE 25 milliGRAM(s) Oral two times a day  insulin glargine SubCutaneous Injection (LANTUS) - Peds 6 Unit(s) SubCutaneous at bedtime  insulin lispro (HumaLOG) corrective regimen sliding scale   SubCutaneous three times a day before meals  insulin lispro (HumaLOG) corrective regimen sliding scale   SubCutaneous at bedtime  levothyroxine 75 MICROGram(s) Oral daily  melatonin 3 milliGRAM(s) Oral at bedtime  polyethylene glycol 3350 17 Gram(s) Oral daily  potassium chloride    Tablet ER 10 milliEquivalent(s) Oral daily  predniSONE   Tablet 5 milliGRAM(s) Oral daily  sevelamer carbonate 800 milliGRAM(s) Oral three times a day with meals  tacrolimus 2 milliGRAM(s) Oral <User Schedule>    PRN Inpatient Medications  acetaminophen   Tablet .. 650 milliGRAM(s) Oral every 4 hours PRN  acetaminophen   Tablet .. 650 milliGRAM(s) Oral every 6 hours PRN  acetaminophen  Suppository .. 650 milliGRAM(s) Rectal every 4 hours PRN  dextrose 40% Gel 15 Gram(s) Oral once PRN  glucagon  Injectable 1 milliGRAM(s) IntraMuscular once PRN  ondansetron Injectable 4 milliGRAM(s) IV Push every 8 hours PRN  sucralfate 1 Gram(s) Oral every 6 hours PRN      REVIEW OF SYSTEMS  --------------------------------------------------------------------------------  Gen: No  fevers/chills  Skin: No rashes  Head/Eyes/Ears/Mouth: No headache; Normal hearing; Normal vision w/o blurriness  Respiratory: No dyspnea, cough, wheezing, hemoptysis  CV: No chest pain, PND, orthopnea  GI: No abdominal pain, diarrhea, constipation, nausea, vomiting  : No increased frequency, dysuria, hematuria, nocturia  MSK: No joint pain/swelling; no back pain; no edema  Neuro: No dizziness/lightheadedness, weakness, seizures, numbness, tingling      All other systems were reviewed and are negative, except as noted.    VITALS/PHYSICAL EXAM  --------------------------------------------------------------------------------  T(C): 36.6 (20 @ 11:19), Max: 36.9 (20 @ 08:17)  HR: 73 (20 @ 11:19) (55 - 74)  BP: 132/78 (20 @ 11:19) (119/76 - 155/75)  RR: 18 (20 @ 11:19) (16 - 18)  SpO2: 100% (20 @ 11:19) (100% - 100%)  Wt(kg): --        20 @ 07:01  -  20 @ 07:00  --------------------------------------------------------  IN: 100 mL / OUT: 0 mL / NET: 100 mL      Physical Exam:  	Gen: NAD, well-appearing  	HEENT: PERRL, supple neck, clear oropharynx  	Pulm: CTA B/L  	CV: RRR, S1S2;  	Back: No spinal or CVA tenderness  	Abd: +BS, soft, nontender/nondistended  	: No suprapubic tenderness                      Extremities: no bilateral LE edema noted.                       Neuro: No focal deficits, intact gait  	Skin: Warm, without rashes  	Vascular access:    LABS/STUDIES  --------------------------------------------------------------------------------              12.5   5.54  >-----------<  213      [20 05:26]              42.2     140  |  107  |  97  ----------------------------<  97      [20 09:53]  5.0   |  22  |  4.18        Ca     9.2     [20 09:53]      Mg     2.0     [20 09:53]      Phos  3.6     [20 09:53]    TPro  7.0  /  Alb  3.0  /  TBili  0.3  /  DBili  x   /  AST  30  /  ALT  12  /  AlkPhos  64  [20 05:26]              [20 05:26]    Creatinine Trend:  SCr 4.18 [ 09:53]  SCr 4.13 [:26]  SCr 4.23 [ 10:03]  SCr 4.79 [ 05:00]  SCr 5.25 [ 05:10]        Ferritin 2209      [20 05:26] [facility] Division of Kidney Diseases & Hypertension  FOLLOW UP NOTE  [phone]--------------------------------------------------------------------------------  HPI: 59M PMH of HTN, HLD, DM, ESRD s/p kidney transplant, admitted for COVID-19. Nephrology team consulted for YURI and management of immunosuppressant. Pt with history of ESRD s/p DDRT on . On review pt is on Tacrolimus 1mg BID, Mycophenolate 1gm BID and Prednisone 5mg daily. On review of labs, pt with SCr: 3.6 on 3/27/20 increased to 3.9 on 3/30/20. Pt with last outpatient SCr of 4.8 as outpatient at [facility] term. On admission, SCr was 5.47 (4/10/20) which continued to improve to  4.30 on 20. Scr began to increase to 5.25 and responded to IVF with repeat  4.79.    Labs and charts reviewed  Pt appears confused this AM and is unable to respond to commands  Pt hypoglycemic this AM B  No other significant events overnight      PAST HISTORY  --------------------------------------------------------------------------------  No significant changes to PMH, PSH, FHx, SHx, unless otherwise noted    ALLERGIES & MEDICATIONS  --------------------------------------------------------------------------------  Allergies    iodine (Vomiting)  IV Contrast (Unknown)    Intolerances      Standing Inpatient Medications  ascorbic acid  Oral Tab/Cap - Peds 500 milliGRAM(s) Oral two times a day  aspirin  chewable 81 milliGRAM(s) Oral daily  atorvastatin 80 milliGRAM(s) Oral at bedtime  bisacodyl 5 milliGRAM(s) Oral daily  carvedilol 3.125 milliGRAM(s) Oral every 12 hours  chlorhexidine 4% Liquid 1 Application(s) Topical two times a day  clopidogrel Tablet 75 milliGRAM(s) Oral daily  dextrose 5% + sodium chloride 0.9%. 1000 milliLiter(s) IV Continuous <Continuous>  dextrose 5%. 1000 milliLiter(s) IV Continuous <Continuous>  dextrose 50% Injectable 12.5 Gram(s) IV Push once  dextrose 50% Injectable 25 Gram(s) IV Push once  dextrose 50% Injectable 25 Gram(s) IV Push once  ferrous sulfate Oral Tab/Cap - Peds 325 milliGRAM(s) Oral daily  heparin  Injectable 5000 Unit(s) SubCutaneous every 8 hours  hydrALAZINE 25 milliGRAM(s) Oral two times a day  insulin glargine SubCutaneous Injection (LANTUS) - Peds 6 Unit(s) SubCutaneous at bedtime  insulin lispro (HumaLOG) corrective regimen sliding scale   SubCutaneous three times a day before meals  insulin lispro (HumaLOG) corrective regimen sliding scale   SubCutaneous at bedtime  levothyroxine 75 MICROGram(s) Oral daily  melatonin 3 milliGRAM(s) Oral at bedtime  polyethylene glycol 3350 17 Gram(s) Oral daily  potassium chloride    Tablet ER 10 milliEquivalent(s) Oral daily  predniSONE   Tablet 5 milliGRAM(s) Oral daily  sevelamer carbonate 800 milliGRAM(s) Oral three times a day with meals  tacrolimus 2 milliGRAM(s) Oral <User Schedule>    PRN Inpatient Medications  acetaminophen   Tablet .. 650 milliGRAM(s) Oral every 4 hours PRN  acetaminophen   Tablet .. 650 milliGRAM(s) Oral every 6 hours PRN  acetaminophen  Suppository .. 650 milliGRAM(s) Rectal every 4 hours PRN  dextrose 40% Gel 15 Gram(s) Oral once PRN  glucagon  Injectable 1 milliGRAM(s) IntraMuscular once PRN  ondansetron Injectable 4 milliGRAM(s) IV Push every 8 hours PRN  sucralfate 1 Gram(s) Oral every 6 hours PRN      REVIEW OF SYSTEMS  --------------------------------------------------------------------------------  Unable to assess due to current clinical condition    VITALS/PHYSICAL EXAM  --------------------------------------------------------------------------------  T(C): 36.6 (20 @ 11:19), Max: 36.9 (20 @ 08:17)  HR: 73 (20 @ 11:19) (55 - 74)  BP: 132/78 (20 @ 11:19) (119/76 - 155/75)  RR: 18 (20 @ 11:19) (16 - 18)  SpO2: 100% (20 @ 11:19) (100% - 100%)  Wt(kg): --        20 @ 07:01  -  20 @ 07:00  --------------------------------------------------------  IN: 100 mL / OUT: 0 mL / NET: 100 mL    Physical Exam:  	Gen: no distress  	HEENT: no JVD  	Pulm: normal respiratory pattern  	CV:  not tachy  	Abd: +BS, soft, transplant area without tenderness  	Ext: No B/L Lower ext edema s/p R amputation  	Neuro: awake,  confused  	Skin: Warm, without rashes  	Vascular access: Left Arm AVF with palpable thrill, + aneurysmal dilation    LABS/STUDIES  --------------------------------------------------------------------------------              12.5   5.54  >-----------<  213      [20 05:26]              42.2     140  |  107  |  97  ----------------------------<  97      [20 09:53]  5.0   |  22  |  4.18        Ca     9.2     [20:53]      Mg     2.0     [20:53]      Phos  3.6     [20:53]    TPro  7.0  /  Alb  3.0  /  TBili  0.3  /  DBili  x   /  AST  30  /  ALT  12  /  AlkPhos  64  [20 05:26]              [20 05:26]    Creatinine Trend:  SCr 4.18 [:53]  SCr 4.13 [:26]  SCr 4.23 [ 10:03]  SCr 4.79 [ 05:00]  SCr 5.25 [ 05:10]        Ferritin 2209      [20 05:26]

## 2020-04-27 NOTE — PROGRESS NOTE ADULT - PROBLEM SELECTOR PLAN 1
Pt with YURI on CKD in the setting of COVID-19 vs. chronic graft rejection. Pt with last SCr of 4.8 outpatient. On admission, SCr of 5.47 (4/10/20) which has improved to 4.38 however increased to 5.25.  Pt started on IVF with improvement of Scr to 4.79. Labs and charts reviewed. Pt now appears confused. BUN 97 however stable. Doubt uremia as source of AMS. Recommend IV fluids and Encourage PO intake. Continue to monitor renal function.  Avoid other potential nephrotoxins.

## 2020-04-27 NOTE — PROGRESS NOTE ADULT - SUBJECTIVE AND OBJECTIVE BOX
Follow Up: pyelo, renal transplant, covid pneumonia    Interval History:  agitiated and confused today.  on 1:1.      Allergies  iodine (Vomiting)  IV Contrast (Unknown)        ANTIMICROBIALS:  ertapenem  IVPB 500 every 24 hours  -       OTHER MEDS:  acetaminophen   Tablet .. 650 milliGRAM(s) Oral every 4 hours PRN  acetaminophen   Tablet .. 650 milliGRAM(s) Oral every 6 hours PRN  acetaminophen  Suppository .. 650 milliGRAM(s) Rectal every 4 hours PRN  ascorbic acid  Oral Tab/Cap - Peds 500 milliGRAM(s) Oral two times a day  aspirin  chewable 81 milliGRAM(s) Oral daily  atorvastatin 80 milliGRAM(s) Oral at bedtime  bisacodyl 5 milliGRAM(s) Oral daily  carvedilol 3.125 milliGRAM(s) Oral every 12 hours  chlorhexidine 4% Liquid 1 Application(s) Topical two times a day  clopidogrel Tablet 75 milliGRAM(s) Oral daily  dextrose 40% Gel 15 Gram(s) Oral once PRN  dextrose 5%. 1000 milliLiter(s) IV Continuous <Continuous>  dextrose 50% Injectable 12.5 Gram(s) IV Push once  dextrose 50% Injectable 25 Gram(s) IV Push once  dextrose 50% Injectable 25 Gram(s) IV Push once  ferrous sulfate Oral Tab/Cap - Peds 325 milliGRAM(s) Oral daily  glucagon  Injectable 1 milliGRAM(s) IntraMuscular once PRN  heparin  Injectable 5000 Unit(s) SubCutaneous every 8 hours  hydrALAZINE 25 milliGRAM(s) Oral two times a day  insulin glargine SubCutaneous Injection (LANTUS) - Peds 6 Unit(s) SubCutaneous at bedtime  insulin lispro (HumaLOG) corrective regimen sliding scale   SubCutaneous three times a day before meals  insulin lispro (HumaLOG) corrective regimen sliding scale   SubCutaneous at bedtime  levothyroxine 75 MICROGram(s) Oral daily  ondansetron Injectable 4 milliGRAM(s) IV Push every 8 hours PRN  polyethylene glycol 3350 17 Gram(s) Oral daily  potassium chloride    Tablet ER 10 milliEquivalent(s) Oral daily  predniSONE   Tablet 5 milliGRAM(s) Oral daily  sevelamer carbonate 800 milliGRAM(s) Oral three times a day with meals  sodium chloride 0.9%. 1000 milliLiter(s) IV Continuous <Continuous>  sucralfate 1 Gram(s) Oral every 6 hours PRN  tacrolimus 2 milliGRAM(s) Oral <User Schedule>    Vital Signs Last 24 Hrs  T(F): 97.8 (20 @ 11:19), Max: 98.4 (20 @ 08:17)  HR: 73 (20 @ 11:19)  BP: 132/78 (20 @ 11:19)  RR: 18 (20 @ 11:19)  SpO2: 100% (20 @ 11:19) (100% - 100%)    Physical Exam:  General: awake  HEENT: atraumatic  Respiratory:    breathing comfortably on RA  :   no  duvall  Musculoskeletal:  right AKA  vascular: old L AVF +thrill  Skin:    no rash  Neurologic: restless                          12.5   5.54  )-----------( 213      ( 2020 05:26 )             42.2     140  |  107  |  97  ----------------------------<  97  5.0   |  22  |  4.18  Ca    9.2      2020 09:53Phos  3.6     Mg     2.0       TPro  7.0  /  Alb  3.0  /  TBili  0.3  /  DBili  x   /  AST  30  /  ALT  12  /  AlkPhos  64            MICROBIOLOGY:    Culture - Blood in AM (20 @ 05:33)    Specimen Source: .Blood Blood    Culture Results:   No Growth Final    Culture - Blood (20 @ 17:56)    Specimen Source: .Blood Blood    Culture Results:   No Growth Final        .Urine Clean Catch (Midstream)  20   >100,000 CFU/ml Klebsiella pneumoniae ESBL  --  Klebsiella pneumoniae ESBL      .Blood Blood-Peripheral  20   No Growth Final  --  --      .Blood Blood-Peripheral  04-10-20   No Growth Final  --  --    CMVPCR Lo.58 ( @ 06:15)      RADIOLOGY:      EXAM:  CT ABDOMEN AND PELVIS      EXAM:  CT CHEST        PROCEDURE DATE:  2020         INTERPRETATION:  Reason for Exam:  Fever and shortness of breath    CT of the chest, abdomen and pelvis was performed from the thoracic inlet to the levelof the adrenal glands without contrast injection.    Comparison: Chest x-ray performed on April 10, 2020 and ultrasound abdomen performed on 2020    CT CHEST:    Tubes/Lines: None.    Mediastinum/Vessels/Heart: Aorta and pulmonary arteries are normal in size. There is trace pericardial effusion. No lymphadenopathy. Thyroid gland is unremarkable    Lungs/Pleura/Airways: Patchy bilateral groundglass opacities noted predominantly in the periphery of both lungs in keeping with known Covid 19 infection. No pleural effusion, pneumothorax or lobar consolidation.    CT abdomen and pelvis: The unenhanced liver, spleen, pancreas and adrenals are unremarkable. Bilateral renal atrophy is noted. No retroperitoneal lymphadenopathy. The abdominal vasculature demonstrates vascular calcifications. There is a transplanted kidney in the right lower quadrant of the abdomen high density material within the transplant likely a surgical clip. No hydronephrosis.    The urinary bladder is unremarkable. No evidence of bowel obstruction.    Bones and soft tissues: No suspicious osseous lesions. Degenerative changes noted throughout the spine.    IMPRESSION:    CT chest:  Pattern of GGO suggests infection including atypical pneumonia/viral infection from atypical agents including COVID-19 (C19V-1). No empyema or abscess.     CT abdomen and pelvis: No acute intra-abdominal pathology.    Incidental findings as above    GHADA DON M.D., ATTENDING RADIOLOGIST  This document has been electronically signed. 2020  6:18PM

## 2020-04-27 NOTE — PROGRESS NOTE ADULT - PROBLEM SELECTOR PLAN 2
Pt with history of ESRD s/p DDRT on 2005. Pt is on Tacrolimus 1mg BID, Cellcept 1 gm BID and Prednisone 5mg daily. Last Tacro level <2. Hold Cellcept until discharge and continue Tacrolimus 2 mg BID and Prednisone. Please check daily AM trough 30 minutes prior to dose (remains subtherapeutic, may need to increase dose). Goal Tacrolimus level is 3-5. Monitor Scr. Pending AM tacro level today.      Candida Diana  Nephrology Fellow  Pager: [phone]

## 2020-04-27 NOTE — PROGRESS NOTE ADULT - SUBJECTIVE AND OBJECTIVE BOX
Medicine Progress Note    Patient is a 59y old  Male who presents with a chief complaint of Rule out COVID 19 infection (26 Apr 2020 07:20)      SUBJECTIVE / OVERNIGHT EVENTS:    ADDITIONAL REVIEW OF SYSTEMS:    MEDICATIONS  (STANDING):  ascorbic acid  Oral Tab/Cap - Peds 500 milliGRAM(s) Oral two times a day  aspirin  chewable 81 milliGRAM(s) Oral daily  atorvastatin 80 milliGRAM(s) Oral at bedtime  bisacodyl 5 milliGRAM(s) Oral daily  carvedilol 3.125 milliGRAM(s) Oral every 12 hours  chlorhexidine 4% Liquid 1 Application(s) Topical two times a day  clopidogrel Tablet 75 milliGRAM(s) Oral daily  dextrose 5% + sodium chloride 0.9%. 1000 milliLiter(s) (50 mL/Hr) IV Continuous <Continuous>  dextrose 5%. 1000 milliLiter(s) (50 mL/Hr) IV Continuous <Continuous>  dextrose 50% Injectable 12.5 Gram(s) IV Push once  dextrose 50% Injectable 25 Gram(s) IV Push once  dextrose 50% Injectable 25 Gram(s) IV Push once  ferrous sulfate Oral Tab/Cap - Peds 325 milliGRAM(s) Oral daily  heparin  Injectable 5000 Unit(s) SubCutaneous every 8 hours  hydrALAZINE 25 milliGRAM(s) Oral two times a day  insulin glargine SubCutaneous Injection (LANTUS) - Peds 6 Unit(s) SubCutaneous at bedtime  insulin lispro (HumaLOG) corrective regimen sliding scale   SubCutaneous three times a day before meals  insulin lispro (HumaLOG) corrective regimen sliding scale   SubCutaneous at bedtime  levothyroxine 75 MICROGram(s) Oral daily  melatonin 3 milliGRAM(s) Oral at bedtime  polyethylene glycol 3350 17 Gram(s) Oral daily  potassium chloride    Tablet ER 10 milliEquivalent(s) Oral daily  predniSONE   Tablet 5 milliGRAM(s) Oral daily  sevelamer carbonate 800 milliGRAM(s) Oral three times a day with meals  sodium zirconium cyclosilicate 10 Gram(s) Oral once  tacrolimus 2 milliGRAM(s) Oral <User Schedule>    MEDICATIONS  (PRN):  acetaminophen   Tablet .. 650 milliGRAM(s) Oral every 4 hours PRN Temp greater or equal to 38.5C (101.3F)  acetaminophen   Tablet .. 650 milliGRAM(s) Oral every 6 hours PRN Temp greater or equal to 38C (100.4F), Mild Pain (1 - 3), Moderate Pain (4 - 6)  acetaminophen  Suppository .. 650 milliGRAM(s) Rectal every 4 hours PRN Temp greater or equal to 38.5C (101.3F)  dextrose 40% Gel 15 Gram(s) Oral once PRN Blood Glucose LESS THAN 70 milliGRAM(s)/deciliter  glucagon  Injectable 1 milliGRAM(s) IntraMuscular once PRN Glucose LESS THAN 70 milligrams/deciliter  ondansetron Injectable 4 milliGRAM(s) IV Push every 8 hours PRN Nausea and/or Vomiting  sucralfate 1 Gram(s) Oral every 6 hours PRN With each meal    CAPILLARY BLOOD GLUCOSE      POCT Blood Glucose.: 75 mg/dL (27 Apr 2020 08:02)  POCT Blood Glucose.: 64 mg/dL (27 Apr 2020 07:25)  POCT Blood Glucose.: 56 mg/dL (27 Apr 2020 07:24)  POCT Blood Glucose.: 105 mg/dL (26 Apr 2020 20:53)  POCT Blood Glucose.: 115 mg/dL (26 Apr 2020 16:40)  POCT Blood Glucose.: 101 mg/dL (26 Apr 2020 12:04)    I&O's Summary    26 Apr 2020 07:01  -  27 Apr 2020 07:00  --------------------------------------------------------  IN: 100 mL / OUT: 0 mL / NET: 100 mL        PHYSICAL EXAM:  Vital Signs Last 24 Hrs  T(C): 36.9 (27 Apr 2020 08:17), Max: 36.9 (27 Apr 2020 08:17)  T(F): 98.4 (27 Apr 2020 08:17), Max: 98.4 (27 Apr 2020 08:17)  HR: 74 (27 Apr 2020 08:17) (55 - 74)  BP: 119/76 (27 Apr 2020 08:17) (119/72 - 155/75)  BP(mean): --  RR: 18 (27 Apr 2020 08:17) (16 - 18)  SpO2: 100% (27 Apr 2020 08:17) (100% - 100%)  CONSTITUTIONAL: NAD, well-developed, well-groomed  ENMT: Moist oral mucosa, no pharyngeal injection or exudates; normal dentition  RESPIRATORY: Normal respiratory effort; lungs are clear to auscultation bilaterally  CARDIOVASCULAR: Regular rate and rhythm, normal S1 and S2, no murmur/rub/gallop; No lower extremity edema; Peripheral pulses are 2+ bilaterally  ABDOMEN: Nontender to palpation, normoactive bowel sounds, no rebound/guarding; No hepatosplenomegaly  PSYCH: A+O to person, place, and time; affect appropriate  NEUROLOGY: CN 2-12 are intact and symmetric; no gross sensory deficits   SKIN: No rashes; no palpable lesions    LABS:                        12.5   5.54  )-----------( 213      ( 27 Apr 2020 05:26 )             42.2     04-27    137  |  107  |  96<H>  ----------------------------<  50<L>  5.7<H>   |  17<L>  |  4.13<H>    Ca    9.5      27 Apr 2020 05:26  Phos  3.8     04-27  Mg     2.0     04-27    TPro  7.0  /  Alb  3.0<L>  /  TBili  0.3  /  DBili  x   /  AST  30  /  ALT  12  /  AlkPhos  64  04-27              COVID-19 PCR: Detected (11 Apr 2020 05:25)      RADIOLOGY & ADDITIONAL TESTS:  Imaging from Last 24 Hours:    Electrocardiogram/QTc Interval:    COORDINATION OF CARE:  Care Discussed with Consultants/Other Providers: Medicine Progress Note    Patient is a 59y old  Male who presents with a chief complaint of Rule out COVID 19 infection (26 Apr 2020 07:20)    SUBJECTIVE / OVERNIGHT EVENTS:  Still remains encephalopathic   hypoglycemic this am  Combative and altered unable to obtain subjective history     MEDICATIONS  (STANDING):  ascorbic acid  Oral Tab/Cap - Peds 500 milliGRAM(s) Oral two times a day  aspirin  chewable 81 milliGRAM(s) Oral daily  atorvastatin 80 milliGRAM(s) Oral at bedtime  bisacodyl 5 milliGRAM(s) Oral daily  carvedilol 3.125 milliGRAM(s) Oral every 12 hours  chlorhexidine 4% Liquid 1 Application(s) Topical two times a day  clopidogrel Tablet 75 milliGRAM(s) Oral daily  dextrose 5% + sodium chloride 0.9%. 1000 milliLiter(s) (50 mL/Hr) IV Continuous <Continuous>  dextrose 5%. 1000 milliLiter(s) (50 mL/Hr) IV Continuous <Continuous>  dextrose 50% Injectable 12.5 Gram(s) IV Push once  dextrose 50% Injectable 25 Gram(s) IV Push once  dextrose 50% Injectable 25 Gram(s) IV Push once  ferrous sulfate Oral Tab/Cap - Peds 325 milliGRAM(s) Oral daily  heparin  Injectable 5000 Unit(s) SubCutaneous every 8 hours  hydrALAZINE 25 milliGRAM(s) Oral two times a day  insulin glargine SubCutaneous Injection (LANTUS) - Peds 6 Unit(s) SubCutaneous at bedtime  insulin lispro (HumaLOG) corrective regimen sliding scale   SubCutaneous three times a day before meals  insulin lispro (HumaLOG) corrective regimen sliding scale   SubCutaneous at bedtime  levothyroxine 75 MICROGram(s) Oral daily  melatonin 3 milliGRAM(s) Oral at bedtime  polyethylene glycol 3350 17 Gram(s) Oral daily  potassium chloride    Tablet ER 10 milliEquivalent(s) Oral daily  predniSONE   Tablet 5 milliGRAM(s) Oral daily  sevelamer carbonate 800 milliGRAM(s) Oral three times a day with meals  sodium zirconium cyclosilicate 10 Gram(s) Oral once  tacrolimus 2 milliGRAM(s) Oral <User Schedule>    MEDICATIONS  (PRN):  acetaminophen   Tablet .. 650 milliGRAM(s) Oral every 4 hours PRN Temp greater or equal to 38.5C (101.3F)  acetaminophen   Tablet .. 650 milliGRAM(s) Oral every 6 hours PRN Temp greater or equal to 38C (100.4F), Mild Pain (1 - 3), Moderate Pain (4 - 6)  acetaminophen  Suppository .. 650 milliGRAM(s) Rectal every 4 hours PRN Temp greater or equal to 38.5C (101.3F)  dextrose 40% Gel 15 Gram(s) Oral once PRN Blood Glucose LESS THAN 70 milliGRAM(s)/deciliter  glucagon  Injectable 1 milliGRAM(s) IntraMuscular once PRN Glucose LESS THAN 70 milligrams/deciliter  ondansetron Injectable 4 milliGRAM(s) IV Push every 8 hours PRN Nausea and/or Vomiting  sucralfate 1 Gram(s) Oral every 6 hours PRN With each meal    CAPILLARY BLOOD GLUCOSE  POCT Blood Glucose.: 75 mg/dL (27 Apr 2020 08:02)  POCT Blood Glucose.: 64 mg/dL (27 Apr 2020 07:25)  POCT Blood Glucose.: 56 mg/dL (27 Apr 2020 07:24)    I&O's Summary    26 Apr 2020 07:01  -  27 Apr 2020 07:00  --------------------------------------------------------  IN: 100 mL / OUT: 0 mL / NET: 100 mL    PHYSICAL EXAM:  Vital Signs Last 24 Hrs  T(C): 36.9 (27 Apr 2020 08:17), Max: 36.9 (27 Apr 2020 08:17)  T(F): 98.4 (27 Apr 2020 08:17), Max: 98.4 (27 Apr 2020 08:17)  HR: 74 (27 Apr 2020 08:17) (55 - 74)  BP: 119/76 (27 Apr 2020 08:17) (119/72 - 155/75)  RR: 18 (27 Apr 2020 08:17) (16 - 18)  SpO2: 100% (27 Apr 2020 08:17) (100% - 100%)    CONSTITUTIONAL: Agitated, combative   ENMT: EOMI, PERRL, normal sclera and conjunctiva; normal nasal and oral mucosa, no oral lesions  RESPIRATORY: Normal respiratory effort; lungs are clear to auscultation bilaterally  CARDIOVASCULAR: Regular rate and rhythm, normal S1 and S2, no murmur/rub/gallop; No lower extremity edema; Peripheral pulses are 2+ bilaterally  ABDOMEN: Nontender to palpation, normoactive bowel sounds, no rebound/guarding; No hepatosplenomegaly  NEUROLOGY: AOx1, CN 2-12 are intact and symmetric; no gross sensory deficits   SKIN: No rashes; no palpable lesions    LABS:                        12.5   5.54  )-----------( 213      ( 27 Apr 2020 05:26 )             42.2     04-27    137  |  107  |  96<H>  ----------------------------<  50<L>  5.7<H>   |  17<L>  |  4.13<H>    Ca    9.5      27 Apr 2020 05:26  Phos  3.8     04-27  Mg     2.0     04-27    TPro  7.0  /  Alb  3.0<L>  /  TBili  0.3  /  DBili  x   /  AST  30  /  ALT  12  /  AlkPhos  64  04-27    COVID-19 PCR: Detected (11 Apr 2020 05:25)

## 2020-04-27 NOTE — PROGRESS NOTE ADULT - PROBLEM SELECTOR PLAN 2
Found to have positive on admission based on UA with urine culture growing ESBL Klebsiella. Treating for longer course per ID due to renal transplant history.   - s/p 14 day course of ertapenem (4/13-4/26)

## 2020-04-28 LAB
AMMONIA BLD-MCNC: 23 UMOL/L — SIGNIFICANT CHANGE UP (ref 11–55)
ANION GAP SERPL CALC-SCNC: 10 MMO/L — SIGNIFICANT CHANGE UP (ref 7–14)
ANION GAP SERPL CALC-SCNC: 11 MMO/L — SIGNIFICANT CHANGE UP (ref 7–14)
ANION GAP SERPL CALC-SCNC: 13 MMO/L — SIGNIFICANT CHANGE UP (ref 7–14)
BUN SERPL-MCNC: 84 MG/DL — HIGH (ref 7–23)
BUN SERPL-MCNC: 86 MG/DL — HIGH (ref 7–23)
BUN SERPL-MCNC: 86 MG/DL — HIGH (ref 7–23)
CALCIUM SERPL-MCNC: 8.9 MG/DL — SIGNIFICANT CHANGE UP (ref 8.4–10.5)
CALCIUM SERPL-MCNC: 9 MG/DL — SIGNIFICANT CHANGE UP (ref 8.4–10.5)
CALCIUM SERPL-MCNC: 9.4 MG/DL — SIGNIFICANT CHANGE UP (ref 8.4–10.5)
CHLORIDE SERPL-SCNC: 110 MMOL/L — HIGH (ref 98–107)
CHLORIDE SERPL-SCNC: 110 MMOL/L — HIGH (ref 98–107)
CHLORIDE SERPL-SCNC: 112 MMOL/L — HIGH (ref 98–107)
CO2 SERPL-SCNC: 18 MMOL/L — LOW (ref 22–31)
CO2 SERPL-SCNC: 19 MMOL/L — LOW (ref 22–31)
CO2 SERPL-SCNC: 20 MMOL/L — LOW (ref 22–31)
CREAT SERPL-MCNC: 3.52 MG/DL — HIGH (ref 0.5–1.3)
CREAT SERPL-MCNC: 3.56 MG/DL — HIGH (ref 0.5–1.3)
CREAT SERPL-MCNC: 3.58 MG/DL — HIGH (ref 0.5–1.3)
D DIMER BLD IA.RAPID-MCNC: 438 NG/ML — SIGNIFICANT CHANGE UP
FOLATE SERPL-MCNC: 3.8 NG/ML — LOW (ref 4.7–20)
GLUCOSE BLDC GLUCOMTR-MCNC: 125 MG/DL — HIGH (ref 70–99)
GLUCOSE BLDC GLUCOMTR-MCNC: 156 MG/DL — HIGH (ref 70–99)
GLUCOSE BLDC GLUCOMTR-MCNC: 221 MG/DL — HIGH (ref 70–99)
GLUCOSE BLDC GLUCOMTR-MCNC: 74 MG/DL — SIGNIFICANT CHANGE UP (ref 70–99)
GLUCOSE SERPL-MCNC: 169 MG/DL — HIGH (ref 70–99)
GLUCOSE SERPL-MCNC: 218 MG/DL — HIGH (ref 70–99)
GLUCOSE SERPL-MCNC: 79 MG/DL — SIGNIFICANT CHANGE UP (ref 70–99)
HCT VFR BLD CALC: 39 % — SIGNIFICANT CHANGE UP (ref 39–50)
HGB BLD-MCNC: 11.8 G/DL — LOW (ref 13–17)
MAGNESIUM SERPL-MCNC: 1.8 MG/DL — SIGNIFICANT CHANGE UP (ref 1.6–2.6)
MAGNESIUM SERPL-MCNC: 2.1 MG/DL — SIGNIFICANT CHANGE UP (ref 1.6–2.6)
MCHC RBC-ENTMCNC: 25.3 PG — LOW (ref 27–34)
MCHC RBC-ENTMCNC: 30.3 % — LOW (ref 32–36)
MCV RBC AUTO: 83.5 FL — SIGNIFICANT CHANGE UP (ref 80–100)
NRBC # FLD: 0 K/UL — SIGNIFICANT CHANGE UP (ref 0–0)
PHOSPHATE SERPL-MCNC: 3.5 MG/DL — SIGNIFICANT CHANGE UP (ref 2.5–4.5)
PHOSPHATE SERPL-MCNC: 4.2 MG/DL — SIGNIFICANT CHANGE UP (ref 2.5–4.5)
PLATELET # BLD AUTO: 204 K/UL — SIGNIFICANT CHANGE UP (ref 150–400)
PMV BLD: 9.4 FL — SIGNIFICANT CHANGE UP (ref 7–13)
POTASSIUM SERPL-MCNC: 5.4 MMOL/L — HIGH (ref 3.5–5.3)
POTASSIUM SERPL-MCNC: 5.4 MMOL/L — HIGH (ref 3.5–5.3)
POTASSIUM SERPL-MCNC: 5.5 MMOL/L — HIGH (ref 3.5–5.3)
POTASSIUM SERPL-SCNC: 5.4 MMOL/L — HIGH (ref 3.5–5.3)
POTASSIUM SERPL-SCNC: 5.4 MMOL/L — HIGH (ref 3.5–5.3)
POTASSIUM SERPL-SCNC: 5.5 MMOL/L — HIGH (ref 3.5–5.3)
RBC # BLD: 4.67 M/UL — SIGNIFICANT CHANGE UP (ref 4.2–5.8)
RBC # FLD: 15.1 % — HIGH (ref 10.3–14.5)
SODIUM SERPL-SCNC: 139 MMOL/L — SIGNIFICANT CHANGE UP (ref 135–145)
SODIUM SERPL-SCNC: 142 MMOL/L — SIGNIFICANT CHANGE UP (ref 135–145)
SODIUM SERPL-SCNC: 142 MMOL/L — SIGNIFICANT CHANGE UP (ref 135–145)
TACROLIMUS SERPL-MCNC: 6.6 NG/ML — SIGNIFICANT CHANGE UP
TSH SERPL-MCNC: 3.3 UIU/ML — SIGNIFICANT CHANGE UP (ref 0.27–4.2)
VIT B12 SERPL-MCNC: 1015 PG/ML — HIGH (ref 200–900)
WBC # BLD: 4.8 K/UL — SIGNIFICANT CHANGE UP (ref 3.8–10.5)
WBC # FLD AUTO: 4.8 K/UL — SIGNIFICANT CHANGE UP (ref 3.8–10.5)

## 2020-04-28 PROCEDURE — 99223 1ST HOSP IP/OBS HIGH 75: CPT | Mod: GC

## 2020-04-28 PROCEDURE — 93306 TTE W/DOPPLER COMPLETE: CPT | Mod: 26

## 2020-04-28 PROCEDURE — 99231 SBSQ HOSP IP/OBS SF/LOW 25: CPT

## 2020-04-28 PROCEDURE — 99233 SBSQ HOSP IP/OBS HIGH 50: CPT

## 2020-04-28 PROCEDURE — 99233 SBSQ HOSP IP/OBS HIGH 50: CPT | Mod: GC

## 2020-04-28 RX ORDER — INSULIN HUMAN 100 [IU]/ML
5 INJECTION, SOLUTION SUBCUTANEOUS ONCE
Refills: 0 | Status: COMPLETED | OUTPATIENT
Start: 2020-04-28 | End: 2020-04-28

## 2020-04-28 RX ORDER — FUROSEMIDE 40 MG
40 TABLET ORAL ONCE
Refills: 0 | Status: COMPLETED | OUTPATIENT
Start: 2020-04-28 | End: 2020-04-28

## 2020-04-28 RX ORDER — TACROLIMUS 5 MG/1
1 CAPSULE ORAL
Refills: 0 | Status: DISCONTINUED | OUTPATIENT
Start: 2020-04-28 | End: 2020-05-01

## 2020-04-28 RX ORDER — DEXTROSE 50 % IN WATER 50 %
50 SYRINGE (ML) INTRAVENOUS ONCE
Refills: 0 | Status: COMPLETED | OUTPATIENT
Start: 2020-04-28 | End: 2020-04-28

## 2020-04-28 RX ORDER — SODIUM CHLORIDE 9 MG/ML
1000 INJECTION, SOLUTION INTRAVENOUS
Refills: 0 | Status: DISCONTINUED | OUTPATIENT
Start: 2020-04-28 | End: 2020-04-29

## 2020-04-28 RX ORDER — DEXTROSE 50 % IN WATER 50 %
25 SYRINGE (ML) INTRAVENOUS ONCE
Refills: 0 | Status: COMPLETED | OUTPATIENT
Start: 2020-04-28 | End: 2020-04-28

## 2020-04-28 RX ORDER — SODIUM ZIRCONIUM CYCLOSILICATE 10 G/10G
10 POWDER, FOR SUSPENSION ORAL ONCE
Refills: 0 | Status: COMPLETED | OUTPATIENT
Start: 2020-04-28 | End: 2020-04-28

## 2020-04-28 RX ORDER — TACROLIMUS 5 MG/1
2 CAPSULE ORAL
Refills: 0 | Status: DISCONTINUED | OUTPATIENT
Start: 2020-04-28 | End: 2020-05-07

## 2020-04-28 RX ORDER — THIAMINE MONONITRATE (VIT B1) 100 MG
500 TABLET ORAL
Refills: 0 | Status: COMPLETED | OUTPATIENT
Start: 2020-04-29 | End: 2020-05-01

## 2020-04-28 RX ORDER — FOLIC ACID 0.8 MG
1 TABLET ORAL DAILY
Refills: 0 | Status: DISCONTINUED | OUTPATIENT
Start: 2020-04-28 | End: 2020-05-07

## 2020-04-28 RX ADMIN — SEVELAMER CARBONATE 800 MILLIGRAM(S): 2400 POWDER, FOR SUSPENSION ORAL at 08:28

## 2020-04-28 RX ADMIN — Medication 25 MILLIGRAM(S): at 06:41

## 2020-04-28 RX ADMIN — CHLORHEXIDINE GLUCONATE 1 APPLICATION(S): 213 SOLUTION TOPICAL at 08:27

## 2020-04-28 RX ADMIN — CLOPIDOGREL BISULFATE 75 MILLIGRAM(S): 75 TABLET, FILM COATED ORAL at 13:14

## 2020-04-28 RX ADMIN — Medication 3 MILLIGRAM(S): at 21:35

## 2020-04-28 RX ADMIN — HEPARIN SODIUM 5000 UNIT(S): 5000 INJECTION INTRAVENOUS; SUBCUTANEOUS at 18:45

## 2020-04-28 RX ADMIN — SEVELAMER CARBONATE 800 MILLIGRAM(S): 2400 POWDER, FOR SUSPENSION ORAL at 13:14

## 2020-04-28 RX ADMIN — TACROLIMUS 2 MILLIGRAM(S): 5 CAPSULE ORAL at 23:24

## 2020-04-28 RX ADMIN — Medication 75 MICROGRAM(S): at 06:40

## 2020-04-28 RX ADMIN — Medication 81 MILLIGRAM(S): at 13:14

## 2020-04-28 RX ADMIN — Medication 5 MILLIGRAM(S): at 06:40

## 2020-04-28 RX ADMIN — Medication 2: at 18:47

## 2020-04-28 RX ADMIN — Medication 500 MILLIGRAM(S): at 06:40

## 2020-04-28 RX ADMIN — HEPARIN SODIUM 5000 UNIT(S): 5000 INJECTION INTRAVENOUS; SUBCUTANEOUS at 21:35

## 2020-04-28 RX ADMIN — Medication 50 MILLILITER(S): at 18:45

## 2020-04-28 RX ADMIN — Medication 325 MILLIGRAM(S): at 13:14

## 2020-04-28 RX ADMIN — ATORVASTATIN CALCIUM 80 MILLIGRAM(S): 80 TABLET, FILM COATED ORAL at 21:27

## 2020-04-28 RX ADMIN — CHLORHEXIDINE GLUCONATE 1 APPLICATION(S): 213 SOLUTION TOPICAL at 18:30

## 2020-04-28 RX ADMIN — INSULIN HUMAN 5 UNIT(S): 100 INJECTION, SOLUTION SUBCUTANEOUS at 18:47

## 2020-04-28 RX ADMIN — Medication 5 MILLIGRAM(S): at 13:14

## 2020-04-28 RX ADMIN — SODIUM CHLORIDE 75 MILLILITER(S): 9 INJECTION, SOLUTION INTRAVENOUS at 09:30

## 2020-04-28 RX ADMIN — CARVEDILOL PHOSPHATE 3.12 MILLIGRAM(S): 80 CAPSULE, EXTENDED RELEASE ORAL at 06:40

## 2020-04-28 RX ADMIN — TACROLIMUS 2 MILLIGRAM(S): 5 CAPSULE ORAL at 08:27

## 2020-04-28 RX ADMIN — POLYETHYLENE GLYCOL 3350 17 GRAM(S): 17 POWDER, FOR SOLUTION ORAL at 13:14

## 2020-04-28 RX ADMIN — HEPARIN SODIUM 5000 UNIT(S): 5000 INJECTION INTRAVENOUS; SUBCUTANEOUS at 06:39

## 2020-04-28 NOTE — PROGRESS NOTE ADULT - PROBLEM SELECTOR PLAN 6
S/p recent stent at [facility].   - C/w DAPT  - Continue carvedilol  - Continue atorvastatin

## 2020-04-28 NOTE — CONSULT NOTE ADULT - ATTENDING COMMENTS
Patient seen and examined and above neurology note reviewed and I agree with assessment and plan as outlined. Patient remains obtunded and stuporous and has episodes of apnea while I am examining him.  He has no response to verbal stimuli and minimal to tactile sternal rub. He has a right leg amputation and left leg the plantars are downgoing bilaterally.    I agree with obtaining ABG to assess respiratory status and would also obtain folate, ammonia and thiamine levels.  Would check routine EEG to assess for seizure activity although low yield but would still want to rule out nonconvulsive seizures.     Continue medical mgt and supportive care.
Patient with progressive deterioration in renal transplant function.  Unclear if compliant with Tacrolimus given low levels, though patient receiving transplant medication via nursing facility.  As noted above, need to get last renal biopsy and baseline renal function from the [facility] transplant division where he is receiving care.  For now, continue with current medications.   1. YURI - monitor  2. Renal Transplant - repeat Tacrolimus levels with patient currently taking stated dosing.  Further adjustments per clinical course.   3. Hypertension - monitor on current regimen   4. Anemia - remains on oral iron.  Monitor CBCs  5. Hyponatremia - continue with NaCl and monitor serial labs.   6. Hyperphosphatemia - monitor serial phosphorus levels on Sevelamer.   Will monitor with you.

## 2020-04-28 NOTE — PROGRESS NOTE ADULT - PROBLEM SELECTOR PLAN 1
Worsening encephalopathy, with reported baseline of AOx4.   Suspect related to hospital-associated delirium given that patient was just moved to a different hospital floor, however, will need to r/o organic causes of encephalopathy including new infection.   Can also be encephalopathic as a result of the COVID infection  BUN elevated but has been elevated this entire course   CTH neg for acute hemorrhage, but evidence of older infarcts   - f/u B12, folate, TSH, RPR   - repeat UA   - Consider LP, MR brain if feasible   - c/w melatonin to help regulate circadian rhythm  - Consult Psych if remains confused and agitated Worsening encephalopathy, with reported baseline of AOx4.   Suspect related to hospital-associated delirium given that patient was just moved to a different hospital floor, however, will need to r/o organic causes of encephalopathy including new infection.   Can also be encephalopathic as a result of the COVID infection  BUN elevated but has been elevated this entire course   CTH neg for acute hemorrhage, but evidence of older infarcts   - f/u B12, folate, TSH, RPR   - repeat UA   - Consider LP, MR brain if feasible, Neurology consult requested.   - c/w melatonin to help regulate circadian rhythm

## 2020-04-28 NOTE — CHART NOTE - NSCHARTNOTEFT_GEN_A_CORE
59M with ESRD s/p renal transplant (2005) on chronic immunosuppressive therapy (Mycophenolate + Tacrolimus and Prednisone), also HTN, HLD, CAD s/p recent stent, and PVD s/p R AKA admitted with COVID19 pneumonia with course c/b ESBL Klebsiella UTI.  Hematology consulted for rinsing D-dimer level.    #Rising D-dimer level  -D-Dimer today 438 (yesterday 355), so the high number was lab error  -Can c/w prophylactic AC for now  -Ultrasound of LE if clinically indicated  -Hematology will sign off, please call us if any other questions    Mallory Esparza PGY4  Heme/Onc fellow  [phone] 59M with ESRD s/p renal transplant (2005) on chronic immunosuppressive therapy (Mycophenolate + Tacrolimus and Prednisone), also HTN, HLD, CAD s/p recent stent, and PVD s/p R AKA admitted with COVID19 pneumonia with course c/b ESBL Klebsiella UTI.  Hematology consulted for rinsing D-dimer level.    #Rising D-dimer level  -D-Dimer today 438 (yesterday 355), so the high number was lab error  -Can c/w prophylactic AC for now  -Ultrasound of LE if clinically indicated  -Hematology will sign off, please call us if any other questions    Mallory Esparza PGY4  Heme/Onc fellow  [phone]      Attg addendum  - lab error for D-dimer, no acute hematologic intervention needed at this time  - reconsult prn  - will sign off

## 2020-04-28 NOTE — PROGRESS NOTE ADULT - PROBLEM SELECTOR PLAN 3
currently on room air  - Ddimer [id number] from 300 4/24   - No evidence of respiratory compromise   - Consult heme rec appreciated  - MICU consulted, will start argatroban gtt  - f/u LE dopplers   - May not comply with V/Q scan which may limit clinical utility currently on room air  - No evidence of respiratory compromise   - Consult heme rec appreciated  - MICU consulted, will start argatroban gtt  - f/u LE dopplers   - May not comply with V/Q scan which may limit clinical utility currently on room air  - No evidence of respiratory compromise   - Consult heme rec appreciated  - D-dimer down to 400s

## 2020-04-28 NOTE — PROGRESS NOTE ADULT - PROBLEM SELECTOR PLAN 10
Family updated on clinical status. Patient remains encephalopathic, and will now begin systemic anticoagulation given elevated D-Dimer.   Remains full code Family updated on clinical status, questions answered. Patient remains encephalopathic, advised calling neurology consult. GOC reviewed, remains full code.

## 2020-04-28 NOTE — CONSULT NOTE ADULT - SUBJECTIVE AND OBJECTIVE BOX
*************************************  NEUROLOGY CONSULT  SERVICE  **************************************    WANG ARELLANO  Male  MRN-[id number]    HPI:  59M w/ hx of HTN, HLD, DM, ESRD s/p kidney transplant on tacrolimus and prednisone in 2005, CAD s/p stent a few months ago at [place], PVD s/p R AKA in 2010 admitted to medicine on 4/11 with fever, rhinorrhea and not feeling well for three days found to be COVID19 +ve, with low tacrolimus level and course complicated by ESBL pyelo, YURI, uremia. Pt comes from [facility]. Tmax was 102.9 at NH. Per chart review pt was deemed to be 'confused' since 4/26 thinking [place] the beach and the following days found agitated requiring 1-1. Neurology consulted for encephalopathy. PT found snoring but comfortable in his bed, with noted periods of apnea followed by loud snoring. Did not open eyes to voice but localized in all 3 limbs robustly to pain.     PAST MEDICAL & SURGICAL HISTORY:  BPH (benign prostatic hyperplasia)  Hypothyroid  Diabetes  Chronic systolic heart failure  HTN (hypertension)  CAD (coronary artery disease)  Kidney transplant recipient  ESRD (end stage renal disease)  S/P drug eluting coronary stent placement  Kidney transplanted    MEDICATIONS  (STANDING):  ascorbic acid  Oral Tab/Cap - Peds 500 milliGRAM(s) Oral two times a day  aspirin  chewable 81 milliGRAM(s) Oral daily  atorvastatin 80 milliGRAM(s) Oral at bedtime  bisacodyl 5 milliGRAM(s) Oral daily  carvedilol 3.125 milliGRAM(s) Oral every 12 hours  chlorhexidine 4% Liquid 1 Application(s) Topical two times a day  clopidogrel Tablet 75 milliGRAM(s) Oral daily  dextrose 5%. 1000 milliLiter(s) (75 mL/Hr) IV Continuous <Continuous>  dextrose 5%. 1000 milliLiter(s) (50 mL/Hr) IV Continuous <Continuous>  dextrose 50% Injectable 12.5 Gram(s) IV Push once  dextrose 50% Injectable 25 Gram(s) IV Push once  dextrose 50% Injectable 25 Gram(s) IV Push once  ferrous sulfate Oral Tab/Cap - Peds 325 milliGRAM(s) Oral daily  heparin   Injectable 5000 Unit(s) SubCutaneous every 8 hours  hydrALAZINE 25 milliGRAM(s) Oral two times a day  insulin lispro (HumaLOG) corrective regimen sliding scale   SubCutaneous three times a day before meals  insulin lispro (HumaLOG) corrective regimen sliding scale   SubCutaneous at bedtime  levothyroxine 75 MICROGram(s) Oral daily  melatonin 3 milliGRAM(s) Oral at bedtime  polyethylene glycol 3350 17 Gram(s) Oral daily  potassium chloride    Tablet ER 10 milliEquivalent(s) Oral daily  predniSONE   Tablet 5 milliGRAM(s) Oral daily  sevelamer carbonate 800 milliGRAM(s) Oral three times a day with meals  tacrolimus 2 milliGRAM(s) Oral <User Schedule>  tacrolimus 1 milliGRAM(s) Oral <User Schedule>    MEDICATIONS  (PRN):  acetaminophen   Tablet .. 650 milliGRAM(s) Oral every 4 hours PRN Temp greater or equal to 38.5C (101.3F)  acetaminophen   Tablet .. 650 milliGRAM(s) Oral every 6 hours PRN Temp greater or equal to 38C (100.4F), Mild Pain (1 - 3), Moderate Pain (4 - 6)  acetaminophen  Suppository .. 650 milliGRAM(s) Rectal every 4 hours PRN Temp greater or equal to 38.5C (101.3F)  dextrose 40% Gel 15 Gram(s) Oral once PRN Blood Glucose LESS THAN 70 milliGRAM(s)/deciliter  glucagon  Injectable 1 milliGRAM(s) IntraMuscular once PRN Glucose LESS THAN 70 milligrams/deciliter  ondansetron Injectable 4 milliGRAM(s) IV Push every 8 hours PRN Nausea and/or Vomiting  sucralfate 1 Gram(s) Oral every 6 hours PRN With each meal    Allergies  iodine (Vomiting)  IV Contrast (Unknown)    Intolerances        VITAL SIGNS:  Vital Signs Last 24 Hrs  T(C): 36.4 (28 Apr 2020 11:38), Max: 36.7 (28 Apr 2020 06:36)  T(F): 97.6 (28 Apr 2020 11:38), Max: 98 (28 Apr 2020 06:36)  HR: 65 (28 Apr 2020 11:38) (65 - 79)  BP: 104/57 (28 Apr 2020 11:38) (104/57 - 151/96)  BP(mean): --  RR: 18 (28 Apr 2020 11:38) (17 - 18)  SpO2: 100% (28 Apr 2020 11:38) (99% - 100%)    PHYSICAL EXAMINATION:  General:  appears much older than age, unkempt   neck supple, soft, non-tender  asleep, snoring, with notable episodic apnea followed by snoring. Grimaced and localized with all limbs with noxious stimuli but did not awake  resists eye opening with good strength, face grossly symmetric  withdraws in all 3 limbs. RBKA. moves spont rest    LABS:                          11.8   4.80  )-----------( 204      ( 28 Apr 2020 05:14 )             39.0     04-28    142  |  112<H>  |  86<H>  ----------------------------<  79  5.5<H>   |  20<L>  |  3.58<H>    Ca    9.4      28 Apr 2020 05:14  Phos  3.5     04-28  Mg     2.1     04-28    TPro  7.0  /  Alb  3.0<L>  /  TBili  0.3  /  DBili  x   /  AST  30  /  ALT  12  /  AlkPhos  64  04-27    PT/INR - ( 27 Apr 2020 16:44 )   PT: 13.5 SEC;   INR: 1.18          PTT - ( 27 Apr 2020 16:44 )  PTT:37.4 SEC    RADIOLOGY & ADDITIONAL STUDIES:    < from: CT Head No Cont (04.26.20 @ 17:35) >  IMPRESSION:   No acute intracranial bleeding, mass effect, or shift.  Cerebral volume loss.  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    < end of copied text >

## 2020-04-28 NOTE — PROGRESS NOTE ADULT - PROBLEM SELECTOR PLAN 4
Noted to have YURI on CKD of unclear etiology. Differential includes COVID19 infection vs. chronic rejection. Per outpatient records: SCr 4.8 as outpatient on 4/6, elevated from SCr 3.6 on 3/27.   - Fluid responsive will administer D5LR @ 50cc/hr   - Per nephrology does not this Uremia is source of encephalopathy   - Appreciate Nephrology recs  - Continue immunosuppressant regimen (see below)  - Holding Bumex and NaCl tabs in setting of worsened renal function  - Trend BMP  - Avoid nephrotoxins  - Renally dose medications Noted to have YURI on CKD of unclear etiology. Differential includes COVID19 infection vs. chronic rejection. Per outpatient records: SCr 4.8 as outpatient on 4/6, elevated from SCr 3.6 on 3/27.   - Fluid responsive will administer D5LR @ 50cc/hr   - Per nephrology does not this Uremia is source of encephalopathy   - Appreciate Nephrology recs  - Continue immunosuppressant regimen (see below)  - Holding Bumex and NaCl tabs in setting of worsened renal function  - Trend BMP, recheck today for hemolyzed K result  - Avoid nephrotoxins  - Renally dose medications

## 2020-04-28 NOTE — PROGRESS NOTE ADULT - SUBJECTIVE AND OBJECTIVE BOX
Division of Kidney Diseases & Hypertension  FOLLOW UP NOTE  [phone]--------------------------------------------------------------------------------  HPI: 59M PMH of HTN, HLD, DM, ESRD s/p kidney transplant, admitted for COVID-19. Nephrology team consulted for YURI and management of immunosuppressant. Pt with history of ESRD s/p DDRT on 2005. On review pt is on Tacrolimus 1mg BID, Mycophenolate 1gm BID and Prednisone 5mg daily. On review of labs, pt with SCr: 3.6 on 3/27/20 increased to 3.9 on 3/30/20. Pt with last outpatient SCr of 4.8 as outpatient at skilled nursing. On admission, SCr was 5.47 (4/10/20) which continued to improve to  4.30 on 4/21/20. Scr began to increase to 5.25 and responded to IVF with repeat  4.79 then 3.58 today.    Labs and charts reviewed  Pt still appears confused this AM, worsening mental status  Renal function improved on IVF        PAST HISTORY  --------------------------------------------------------------------------------  No significant changes to PMH, PSH, FHx, SHx, unless otherwise noted    ALLERGIES & MEDICATIONS  --------------------------------------------------------------------------------  Allergies    iodine (Vomiting)  IV Contrast (Unknown)    Intolerances      Standing Inpatient Medications  ascorbic acid  Oral Tab/Cap - Peds 500 milliGRAM(s) Oral two times a day  aspirin  chewable 81 milliGRAM(s) Oral daily  atorvastatin 80 milliGRAM(s) Oral at bedtime  bisacodyl 5 milliGRAM(s) Oral daily  carvedilol 3.125 milliGRAM(s) Oral every 12 hours  chlorhexidine 4% Liquid 1 Application(s) Topical two times a day  clopidogrel Tablet 75 milliGRAM(s) Oral daily  dextrose 5%. 1000 milliLiter(s) IV Continuous <Continuous>  dextrose 5%. 1000 milliLiter(s) IV Continuous <Continuous>  dextrose 50% Injectable 12.5 Gram(s) IV Push once  dextrose 50% Injectable 25 Gram(s) IV Push once  dextrose 50% Injectable 25 Gram(s) IV Push once  ferrous sulfate Oral Tab/Cap - Peds 325 milliGRAM(s) Oral daily  heparin   Injectable 5000 Unit(s) SubCutaneous every 8 hours  hydrALAZINE 25 milliGRAM(s) Oral two times a day  insulin lispro (HumaLOG) corrective regimen sliding scale   SubCutaneous three times a day before meals  insulin lispro (HumaLOG) corrective regimen sliding scale   SubCutaneous at bedtime  levothyroxine 75 MICROGram(s) Oral daily  melatonin 3 milliGRAM(s) Oral at bedtime  polyethylene glycol 3350 17 Gram(s) Oral daily  potassium chloride    Tablet ER 10 milliEquivalent(s) Oral daily  predniSONE   Tablet 5 milliGRAM(s) Oral daily  sevelamer carbonate 800 milliGRAM(s) Oral three times a day with meals  tacrolimus 2 milliGRAM(s) Oral <User Schedule>    PRN Inpatient Medications  acetaminophen   Tablet .. 650 milliGRAM(s) Oral every 4 hours PRN  acetaminophen   Tablet .. 650 milliGRAM(s) Oral every 6 hours PRN  acetaminophen  Suppository .. 650 milliGRAM(s) Rectal every 4 hours PRN  dextrose 40% Gel 15 Gram(s) Oral once PRN  glucagon  Injectable 1 milliGRAM(s) IntraMuscular once PRN  ondansetron Injectable 4 milliGRAM(s) IV Push every 8 hours PRN  sucralfate 1 Gram(s) Oral every 6 hours PRN        REVIEW OF SYSTEMS  --------------------------------------------------------------------------------  Unable to assess due to current clinical condition    VITALS/PHYSICAL EXAM  --------------------------------------------------------------------------------  T(C): 36.7 (04-28-20 @ 06:36), Max: 36.7 (04-28-20 @ 06:36)  HR: 79 (04-28-20 @ 06:36) (73 - 79)  BP: 151/96 (04-28-20 @ 06:36) (126/77 - 151/96)  RR: 18 (04-28-20 @ 06:36) (17 - 18)  SpO2: 99% (04-28-20 @ 06:36) (99% - 100%)  Wt(kg): --        Physical Exam:  	Gen: no distress  	HEENT: no JVD  	Pulm: normal respiratory pattern  	CV:  not tachy  	Abd: +BS, soft, transplant area without tenderness  	Ext: No B/L Lower ext edema s/p R amputation  	Neuro: awake,  confused  	Skin: Warm, without rashes  	Vascular access: Left Arm AVF with palpable thrill, + aneurysmal dilation    LABS/STUDIES  --------------------------------------------------------------------------------              11.8   4.80  >-----------<  204      [04-28-20 @ 05:14]              39.0     142  |  112  |  86  ----------------------------<  79      [04-28-20 @ 05:14]  5.5   |  20  |  3.58        Ca     9.4     [04-28-20 @ 05:14]      Mg     2.1     [04-28-20 @ 05:14]      Phos  3.5     [04-28-20 @ 05:14]    TPro  7.0  /  Alb  3.0  /  TBili  0.3  /  DBili  x   /  AST  30  /  ALT  12  /  AlkPhos  64  [04-27-20 @ 05:26]    PT/INR: PT 13.5 , INR 1.18       [04-27-20 @ 16:44]  PTT: 37.4       [04-27-20 @ 16:44]          [04-27-20 @ 05:26]    Creatinine Trend:  SCr 3.58 [04-28 @ 05:14]  SCr 4.18 [04-27 @ 09:53]  SCr 4.13 [04-27 @ 05:26]  SCr 4.23 [04-26 @ 10:03]  SCr 4.79 [04-25 @ 05:00]    Urinalysis - [04-27-20 @ 18:41]      Color YELLOW / Appearance CLEAR / SG 1.016 / pH 6.5      Gluc NEGATIVE / Ketone NEGATIVE  / Bili NEGATIVE / Urobili NORMAL       Blood NEGATIVE / Protein 600 / Leuk Est NEGATIVE / Nitrite NEGATIVE      RBC 6-10 / WBC 3-5 / Hyaline NEGATIVE / Gran  / Sq Epi FEW / Non Sq Epi  / Bacteria NEGATIVE      Ferritin 2209      [04-27-20 @ 05:26]  TSH 3.30      [04-28-20 @ 05:14]

## 2020-04-28 NOTE — PROGRESS NOTE ADULT - PROBLEM SELECTOR PLAN 2
Pt with history of ESRD s/p DDRT on 2005. Pt is on Tacrolimus 1mg BID, Cellcept 1 gm BID and Prednisone 5mg daily. Last Tacro level <2. Hold Cellcept until discharge and continue Tacrolimus 2 mg BID and Prednisone. Please check daily AM trough 30 minutes prior to dose (remains subtherapeutic, may need to increase dose). Goal Tacrolimus level is 3-5. Monitor Scr. Please check tacro level today and tomorrow.      Candida Diana  Nephrology Fellow  Pager: [phone] Pt with history of ESRD s/p DDRT on 2005. Pt is on Tacrolimus 1mg BID, Cellcept 1 gm BID and Prednisone 5mg daily. Last Tacro level 6.6. Hold Cellcept until discharge. Change Tacrolimus to 2mg in AM and 1mg in PM. Continue  Prednisone. Please check daily AM trough 30 minutes prior to dose (remains subtherapeutic, may need to increase dose). Goal Tacrolimus level is 3-5. Monitor Scr. Please check tacro level today and tomorrow.      Candida Diana  Nephrology Fellow  Pager: [phone] Pt with history of ESRD s/p DDRT on 2005. Pt is on Tacrolimus 1mg BID, Cellcept 1 gm BID and Prednisone 5mg daily. Last Tacro level 6.6. Hold Cellcept until discharge. Change Tacrolimus to 2mg in AM and 1mg in PM. Continue  Prednisone.  Goal Tacrolimus level is 3-5. Monitor Scr. Please check AM Tacro level on Thursay 4/29/20      Candida Diana  Nephrology Fellow  Pager: [phone]

## 2020-04-28 NOTE — CONSULT NOTE ADULT - ASSESSMENT
59M w/ hx of HTN, HLD, DM, ESRD s/p kidney transplant on tacrolimus and prednisone in 2005, CAD s/p stent a few months ago at [place], PVD s/p R AKA in 2010 admitted to medicine on 4/11 with fever, rhinorrhea and not feeling well for three days found to be COVID19 +ve, with low tacrolimus level and course complicated by ESBL pyelo, YURI, uremia. Neurology consulted for AMS.    Impression: Pt noted to have apneic episodes while sleeping. Suspect narcolepsy in the setting of dysregulated circadian rhythym which may explain his agitation at times and sleepiness at others; all compounded by COVID19 and low cerebral reserve. Low suspicion for CNS infection or acute ischemic insult at this time.     [] eval and address likely sleep apnea  [] check ABG  [] can consider trial of low dose stimulant in early AM and melatonin in PM to help cycle - if no medical contraindications  [x]  B12, ammonia, UA  wnl  [] In my judgment MRI would be low yield 59M w/ hx of HTN, HLD, DM, ESRD s/p kidney transplant on tacrolimus and prednisone in 2005, CAD s/p stent a few months ago at [place], PVD s/p R AKA in 2010 admitted to medicine on 4/11 with fever, rhinorrhea and not feeling well for three days found to be COVID19 +ve, with low tacrolimus level and course complicated by ESBL pyelo, YURI, uremia. Neurology consulted for AMS.    Impression: Pt noted to have apneic episodes while sleeping. Suspect narcolepsy in the setting of dysregulated circadian rhythm which may explain his agitation at times and sleepiness at others; all compounded by COVID19 and low cerebral reserve. Low suspicion for CNS infection or acute ischemic insult at this time.     [] eval and address likely sleep apnea  [] check ABG  [] low folate , would check serum thiamine    [] then replete thiamine 500mg IV BID x 3 days  [x]  B12, ammonia, UA  wnl  [] In my judgment MRI and EEG would be low yield at this time.     d/w neuro attending Dr. Huang 59M w/ hx of HTN, HLD, DM, ESRD s/p kidney transplant on tacrolimus and prednisone in 2005, CAD s/p stent a few months ago at [place], PVD s/p R AKA in 2010 admitted to medicine on 4/11 with fever, rhinorrhea and not feeling well for three days found to be COVID19 +ve, with low tacrolimus level and course complicated by ESBL pyelo, YURI, uremia. Neurology consulted for AMS.    Impression:  Suspect sleep apnea with dysregulated circadian rhythm which may explain his agitation at times and sleepiness at others; all compounded by COVID19 and low cerebral reserve. Low suspicion for CNS infection or acute ischemic insult at this time.     [] eval and address likely sleep apnea  [] check ABG  [] low folate , would check serum thiamine    [] then replete thiamine 500mg IV BID x 3 days  [x]  B12, ammonia, UA  wnl  [] In my judgment MRI and EEG would be low yield at this time.     d/w neuro attending Dr. Huang 59M w/ hx of HTN, HLD, DM, ESRD s/p kidney transplant on tacrolimus and prednisone in 2005, CAD s/p stent a few months ago at [place], PVD s/p R AKA in 2010 admitted to medicine on 4/11 with fever, rhinorrhea and not feeling well for three days found to be COVID19 +ve, with low tacrolimus level and course complicated by ESBL pyelo, YURI, uremia. Neurology consulted for AMS.    Impression:  Suspect sleep apnea with dysregulated circadian rhythm which may explain his agitation at times and sleepiness at others; all compounded by COVID19 and low cerebral reserve. Low suspicion for CNS infection or acute ischemic insult at this time.     [] eval and address likely sleep apnea  [] check ABG vs. trending vbg  [] low folate , would check serum thiamine    [] then replete thiamine 500mg IV BID x 3 days  [x]  B12, ammonia, UA  wnl  [] In my judgment MRI and EEG would be low yield at this time.     d/w neuro attending Dr. Huang 59M w/ hx of HTN, HLD, DM, ESRD s/p kidney transplant on tacrolimus and prednisone in 2005, CAD s/p stent a few months ago at [place], PVD s/p R AKA in 2010 admitted to medicine on 4/11 with fever, rhinorrhea and not feeling well for three days found to be COVID19 +ve, with low tacrolimus level and course complicated by ESBL pyelo, YURI, uremia. Neurology consulted for AMS.    Impression:  Suspect sleep apnea with dysregulated circadian rhythm which may explain his agitation at times and sleepiness at others; all compounded by COVID19 and low cerebral reserve. Low suspicion for CNS infection or acute ischemic insult at this time.     [] eval and address likely sleep apnea  [] check ABG vs. trending VBG  [] low folate , would check serum thiamine    [] then replete thiamine 500mg IV BID x 3 days  [x]  B12, ammonia, UA  wnl  [] In my judgment MRI and EEG would be low yield at this time.     d/w neuro attending Dr. Huang

## 2020-04-28 NOTE — PROGRESS NOTE ADULT - SUBJECTIVE AND OBJECTIVE BOX
Medicine Progress Note    Patient is a 59y old  Male who presents with a chief complaint of Rule out COVID 19 infection (26 Apr 2020 07:20)    SUBJECTIVE / OVERNIGHT EVENTS:  Still remains encephalopathic   hypoglycemic this am  Combative and altered unable to obtain subjective history     MEDICATIONS  (STANDING):  ascorbic acid  Oral Tab/Cap - Peds 500 milliGRAM(s) Oral two times a day  aspirin  chewable 81 milliGRAM(s) Oral daily  atorvastatin 80 milliGRAM(s) Oral at bedtime  bisacodyl 5 milliGRAM(s) Oral daily  carvedilol 3.125 milliGRAM(s) Oral every 12 hours  chlorhexidine 4% Liquid 1 Application(s) Topical two times a day  clopidogrel Tablet 75 milliGRAM(s) Oral daily  dextrose 5% + sodium chloride 0.9%. 1000 milliLiter(s) (50 mL/Hr) IV Continuous <Continuous>  dextrose 5%. 1000 milliLiter(s) (50 mL/Hr) IV Continuous <Continuous>  dextrose 50% Injectable 12.5 Gram(s) IV Push once  dextrose 50% Injectable 25 Gram(s) IV Push once  dextrose 50% Injectable 25 Gram(s) IV Push once  ferrous sulfate Oral Tab/Cap - Peds 325 milliGRAM(s) Oral daily  heparin  Injectable 5000 Unit(s) SubCutaneous every 8 hours  hydrALAZINE 25 milliGRAM(s) Oral two times a day  insulin glargine SubCutaneous Injection (LANTUS) - Peds 6 Unit(s) SubCutaneous at bedtime  insulin lispro (HumaLOG) corrective regimen sliding scale   SubCutaneous three times a day before meals  insulin lispro (HumaLOG) corrective regimen sliding scale   SubCutaneous at bedtime  levothyroxine 75 MICROGram(s) Oral daily  melatonin 3 milliGRAM(s) Oral at bedtime  polyethylene glycol 3350 17 Gram(s) Oral daily  potassium chloride    Tablet ER 10 milliEquivalent(s) Oral daily  predniSONE   Tablet 5 milliGRAM(s) Oral daily  sevelamer carbonate 800 milliGRAM(s) Oral three times a day with meals  sodium zirconium cyclosilicate 10 Gram(s) Oral once  tacrolimus 2 milliGRAM(s) Oral <User Schedule>    MEDICATIONS  (PRN):  acetaminophen   Tablet .. 650 milliGRAM(s) Oral every 4 hours PRN Temp greater or equal to 38.5C (101.3F)  acetaminophen   Tablet .. 650 milliGRAM(s) Oral every 6 hours PRN Temp greater or equal to 38C (100.4F), Mild Pain (1 - 3), Moderate Pain (4 - 6)  acetaminophen  Suppository .. 650 milliGRAM(s) Rectal every 4 hours PRN Temp greater or equal to 38.5C (101.3F)  dextrose 40% Gel 15 Gram(s) Oral once PRN Blood Glucose LESS THAN 70 milliGRAM(s)/deciliter  glucagon  Injectable 1 milliGRAM(s) IntraMuscular once PRN Glucose LESS THAN 70 milligrams/deciliter  ondansetron Injectable 4 milliGRAM(s) IV Push every 8 hours PRN Nausea and/or Vomiting  sucralfate 1 Gram(s) Oral every 6 hours PRN With each meal    CAPILLARY BLOOD GLUCOSE  POCT Blood Glucose.: 75 mg/dL (27 Apr 2020 08:02)  POCT Blood Glucose.: 64 mg/dL (27 Apr 2020 07:25)  POCT Blood Glucose.: 56 mg/dL (27 Apr 2020 07:24)    I&O's Summary    26 Apr 2020 07:01  -  27 Apr 2020 07:00  --------------------------------------------------------  IN: 100 mL / OUT: 0 mL / NET: 100 mL    PHYSICAL EXAM:  Vital Signs Last 24 Hrs  T(C): 36.9 (27 Apr 2020 08:17), Max: 36.9 (27 Apr 2020 08:17)  T(F): 98.4 (27 Apr 2020 08:17), Max: 98.4 (27 Apr 2020 08:17)  HR: 74 (27 Apr 2020 08:17) (55 - 74)  BP: 119/76 (27 Apr 2020 08:17) (119/72 - 155/75)  RR: 18 (27 Apr 2020 08:17) (16 - 18)  SpO2: 100% (27 Apr 2020 08:17) (100% - 100%)    CONSTITUTIONAL: Agitated, combative   ENMT: EOMI, PERRL, normal sclera and conjunctiva; normal nasal and oral mucosa, no oral lesions  RESPIRATORY: Normal respiratory effort; lungs are clear to auscultation bilaterally  CARDIOVASCULAR: Regular rate and rhythm, normal S1 and S2, no murmur/rub/gallop; No lower extremity edema; Peripheral pulses are 2+ bilaterally  ABDOMEN: Nontender to palpation, normoactive bowel sounds, no rebound/guarding; No hepatosplenomegaly  NEUROLOGY: AOx1, CN 2-12 are intact and symmetric; no gross sensory deficits   SKIN: No rashes; no palpable lesions    LABS:                        12.5   5.54  )-----------( 213      ( 27 Apr 2020 05:26 )             42.2     04-27    137  |  107  |  96<H>  ----------------------------<  50<L>  5.7<H>   |  17<L>  |  4.13<H>    Ca    9.5      27 Apr 2020 05:26  Phos  3.8     04-27  Mg     2.0     04-27    TPro  7.0  /  Alb  3.0<L>  /  TBili  0.3  /  DBili  x   /  AST  30  /  ALT  12  /  AlkPhos  64  04-27    COVID-19 PCR: Detected (11 Apr 2020 05:25) Medicine Progress Note    Patient is a 59y old  Male who presents with a chief complaint of COVID 19 infection (26 Apr 2020 07:20)    SUBJECTIVE / OVERNIGHT EVENTS:  Still remains encephalopathic. Combative and altered unable to obtain subjective history     MEDICATIONS  (STANDING):  ascorbic acid  Oral Tab/Cap - Peds 500 milliGRAM(s) Oral two times a day  aspirin  chewable 81 milliGRAM(s) Oral daily  atorvastatin 80 milliGRAM(s) Oral at bedtime  bisacodyl 5 milliGRAM(s) Oral daily  carvedilol 3.125 milliGRAM(s) Oral every 12 hours  chlorhexidine 4% Liquid 1 Application(s) Topical two times a day  clopidogrel Tablet 75 milliGRAM(s) Oral daily  dextrose 5% + sodium chloride 0.9%. 1000 milliLiter(s) (50 mL/Hr) IV Continuous <Continuous>  dextrose 5%. 1000 milliLiter(s) (50 mL/Hr) IV Continuous <Continuous>  dextrose 50% Injectable 12.5 Gram(s) IV Push once  dextrose 50% Injectable 25 Gram(s) IV Push once  dextrose 50% Injectable 25 Gram(s) IV Push once  ferrous sulfate Oral Tab/Cap - Peds 325 milliGRAM(s) Oral daily  heparin  Injectable 5000 Unit(s) SubCutaneous every 8 hours  hydrALAZINE 25 milliGRAM(s) Oral two times a day  insulin glargine SubCutaneous Injection (LANTUS) - Peds 6 Unit(s) SubCutaneous at bedtime  insulin lispro (HumaLOG) corrective regimen sliding scale   SubCutaneous three times a day before meals  insulin lispro (HumaLOG) corrective regimen sliding scale   SubCutaneous at bedtime  levothyroxine 75 MICROGram(s) Oral daily  melatonin 3 milliGRAM(s) Oral at bedtime  polyethylene glycol 3350 17 Gram(s) Oral daily  potassium chloride    Tablet ER 10 milliEquivalent(s) Oral daily  predniSONE   Tablet 5 milliGRAM(s) Oral daily  sevelamer carbonate 800 milliGRAM(s) Oral three times a day with meals  sodium zirconium cyclosilicate 10 Gram(s) Oral once  tacrolimus 2 milliGRAM(s) Oral <User Schedule>    MEDICATIONS  (PRN):  acetaminophen   Tablet .. 650 milliGRAM(s) Oral every 4 hours PRN Temp greater or equal to 38.5C (101.3F)  acetaminophen   Tablet .. 650 milliGRAM(s) Oral every 6 hours PRN Temp greater or equal to 38C (100.4F), Mild Pain (1 - 3), Moderate Pain (4 - 6)  acetaminophen  Suppository .. 650 milliGRAM(s) Rectal every 4 hours PRN Temp greater or equal to 38.5C (101.3F)  dextrose 40% Gel 15 Gram(s) Oral once PRN Blood Glucose LESS THAN 70 milliGRAM(s)/deciliter  glucagon  Injectable 1 milliGRAM(s) IntraMuscular once PRN Glucose LESS THAN 70 milligrams/deciliter  ondansetron Injectable 4 milliGRAM(s) IV Push every 8 hours PRN Nausea and/or Vomiting  sucralfate 1 Gram(s) Oral every 6 hours PRN With each meal    CAPILLARY BLOOD GLUCOSE  POCT Blood Glucose.: 75 mg/dL (27 Apr 2020 08:02)  POCT Blood Glucose.: 64 mg/dL (27 Apr 2020 07:25)  POCT Blood Glucose.: 56 mg/dL (27 Apr 2020 07:24)    I&O's Summary    26 Apr 2020 07:01  -  27 Apr 2020 07:00  --------------------------------------------------------  IN: 100 mL / OUT: 0 mL / NET: 100 mL    PHYSICAL EXAM:  Vital Signs Last 24 Hrs  T(C): 36.9 (27 Apr 2020 08:17), Max: 36.9 (27 Apr 2020 08:17)  T(F): 98.4 (27 Apr 2020 08:17), Max: 98.4 (27 Apr 2020 08:17)  HR: 74 (27 Apr 2020 08:17) (55 - 74)  BP: 119/76 (27 Apr 2020 08:17) (119/72 - 155/75)  RR: 18 (27 Apr 2020 08:17) (16 - 18)  SpO2: 100% (27 Apr 2020 08:17) (100% - 100%)    CONSTITUTIONAL: Agitated, combative   ENMT: EOMI, PERRL, normal sclera and conjunctiva; normal nasal and oral mucosa, no oral lesions  RESPIRATORY: Normal respiratory effort; lungs are clear to auscultation bilaterally  CARDIOVASCULAR: Regular rate and rhythm, normal S1 and S2, no murmur/rub/gallop; No lower extremity edema; Peripheral pulses are 2+ bilaterally  ABDOMEN: Nontender to palpation, normoactive bowel sounds, no rebound/guarding; No hepatosplenomegaly  NEUROLOGY: AOx1, CN 2-12 are intact and symmetric; no gross sensory deficits   SKIN: No rashes; no palpable lesions    LABS:                        12.5   5.54  )-----------( 213      ( 27 Apr 2020 05:26 )             42.2     04-27    137  |  107  |  96<H>  ----------------------------<  50<L>  5.7<H>   |  17<L>  |  4.13<H>    Ca    9.5      27 Apr 2020 05:26  Phos  3.8     04-27  Mg     2.0     04-27    TPro  7.0  /  Alb  3.0<L>  /  TBili  0.3  /  DBili  x   /  AST  30  /  ALT  12  /  AlkPhos  64  04-27    COVID-19 PCR: Detected (11 Apr 2020 05:25)

## 2020-04-28 NOTE — PROGRESS NOTE ADULT - PROBLEM SELECTOR PLAN 1
Pt with YURI on CKD in the setting of COVID-19 vs. chronic graft rejection. Pt with last SCr of 4.8 outpatient. On admission, SCr of 5.47 (4/10/20) which has improved to 4.38 however increased to 5.25.  Pt started on IVF with improvement. Labs and charts reviewed. Pt with worsening AMS. BUN has been stable throughout admission.  Doubt uremia as source of AMS. Pt responding to IVF. Recommend Continue IV fluids and Encourage PO intake. Continue to monitor renal function.  Avoid other potential nephrotoxins.

## 2020-04-29 DIAGNOSIS — F05 DELIRIUM DUE TO KNOWN PHYSIOLOGICAL CONDITION: ICD-10-CM

## 2020-04-29 LAB
ALBUMIN SERPL ELPH-MCNC: 2.6 G/DL — LOW (ref 3.3–5)
ALP SERPL-CCNC: 49 U/L — SIGNIFICANT CHANGE UP (ref 40–120)
ALT FLD-CCNC: 10 U/L — SIGNIFICANT CHANGE UP (ref 4–41)
AMMONIA BLD-MCNC: 26 UMOL/L — SIGNIFICANT CHANGE UP (ref 11–55)
ANION GAP SERPL CALC-SCNC: 9 MMO/L — SIGNIFICANT CHANGE UP (ref 7–14)
AST SERPL-CCNC: 17 U/L — SIGNIFICANT CHANGE UP (ref 4–40)
BASE EXCESS BLDCOV CALC-SCNC: -2.6 MMOL/L — SIGNIFICANT CHANGE UP (ref -9.3–0.3)
BILIRUB SERPL-MCNC: 0.2 MG/DL — SIGNIFICANT CHANGE UP (ref 0.2–1.2)
BUN SERPL-MCNC: 83 MG/DL — HIGH (ref 7–23)
CALCIUM SERPL-MCNC: 9.1 MG/DL — SIGNIFICANT CHANGE UP (ref 8.4–10.5)
CHLORIDE SERPL-SCNC: 113 MMOL/L — HIGH (ref 98–107)
CO2 SERPL-SCNC: 21 MMOL/L — LOW (ref 22–31)
CREAT SERPL-MCNC: 3.69 MG/DL — HIGH (ref 0.5–1.3)
FOLATE SERPL-MCNC: 2.4 NG/ML — LOW (ref 4.7–20)
GLUCOSE BLDC GLUCOMTR-MCNC: 112 MG/DL — HIGH (ref 70–99)
GLUCOSE BLDC GLUCOMTR-MCNC: 132 MG/DL — HIGH (ref 70–99)
GLUCOSE BLDC GLUCOMTR-MCNC: 155 MG/DL — HIGH (ref 70–99)
GLUCOSE BLDC GLUCOMTR-MCNC: 245 MG/DL — HIGH (ref 70–99)
GLUCOSE BLDC GLUCOMTR-MCNC: 96 MG/DL — SIGNIFICANT CHANGE UP (ref 70–99)
GLUCOSE SERPL-MCNC: 108 MG/DL — HIGH (ref 70–99)
HCT VFR BLD CALC: 34.9 % — LOW (ref 39–50)
HGB BLD-MCNC: 10.1 G/DL — LOW (ref 13–17)
MCHC RBC-ENTMCNC: 24.1 PG — LOW (ref 27–34)
MCHC RBC-ENTMCNC: 28.9 % — LOW (ref 32–36)
MCV RBC AUTO: 83.3 FL — SIGNIFICANT CHANGE UP (ref 80–100)
NRBC # FLD: 0 K/UL — SIGNIFICANT CHANGE UP (ref 0–0)
PCO2 BLDCOV: 45 MMHG — SIGNIFICANT CHANGE UP (ref 27–49)
PH BLDCOV: 7.32 PH — SIGNIFICANT CHANGE UP (ref 7.25–7.45)
PLATELET # BLD AUTO: 176 K/UL — SIGNIFICANT CHANGE UP (ref 150–400)
PMV BLD: 8.6 FL — SIGNIFICANT CHANGE UP (ref 7–13)
PO2 BLDCOA: 33.9 MMHG — SIGNIFICANT CHANGE UP (ref 17–41)
POTASSIUM SERPL-MCNC: 4.9 MMOL/L — SIGNIFICANT CHANGE UP (ref 3.5–5.3)
POTASSIUM SERPL-SCNC: 4.9 MMOL/L — SIGNIFICANT CHANGE UP (ref 3.5–5.3)
PROT SERPL-MCNC: 5.9 G/DL — LOW (ref 6–8.3)
RBC # BLD: 4.19 M/UL — LOW (ref 4.2–5.8)
RBC # FLD: 15.2 % — HIGH (ref 10.3–14.5)
SODIUM SERPL-SCNC: 143 MMOL/L — SIGNIFICANT CHANGE UP (ref 135–145)
T PALLIDUM AB TITR SER: NEGATIVE — SIGNIFICANT CHANGE UP
WBC # BLD: 3.7 K/UL — LOW (ref 3.8–10.5)
WBC # FLD AUTO: 3.7 K/UL — LOW (ref 3.8–10.5)

## 2020-04-29 PROCEDURE — 93970 EXTREMITY STUDY: CPT | Mod: 26

## 2020-04-29 PROCEDURE — 90792 PSYCH DIAG EVAL W/MED SRVCS: CPT | Mod: 95

## 2020-04-29 PROCEDURE — 99233 SBSQ HOSP IP/OBS HIGH 50: CPT | Mod: GC

## 2020-04-29 PROCEDURE — 99233 SBSQ HOSP IP/OBS HIGH 50: CPT

## 2020-04-29 PROCEDURE — 99232 SBSQ HOSP IP/OBS MODERATE 35: CPT | Mod: GC

## 2020-04-29 RX ORDER — HALOPERIDOL DECANOATE 100 MG/ML
2.5 INJECTION INTRAMUSCULAR EVERY 6 HOURS
Refills: 0 | Status: DISCONTINUED | OUTPATIENT
Start: 2020-04-29 | End: 2020-05-07

## 2020-04-29 RX ORDER — HALOPERIDOL DECANOATE 100 MG/ML
2.5 INJECTION INTRAMUSCULAR EVERY 6 HOURS
Refills: 0 | Status: DISCONTINUED | OUTPATIENT
Start: 2020-04-29 | End: 2020-04-29

## 2020-04-29 RX ADMIN — Medication 2: at 18:17

## 2020-04-29 RX ADMIN — CARVEDILOL PHOSPHATE 3.12 MILLIGRAM(S): 80 CAPSULE, EXTENDED RELEASE ORAL at 08:14

## 2020-04-29 RX ADMIN — Medication 25 MILLILITER(S): at 00:00

## 2020-04-29 RX ADMIN — CARVEDILOL PHOSPHATE 3.12 MILLIGRAM(S): 80 CAPSULE, EXTENDED RELEASE ORAL at 18:17

## 2020-04-29 RX ADMIN — Medication 5 MILLIGRAM(S): at 12:16

## 2020-04-29 RX ADMIN — TACROLIMUS 2 MILLIGRAM(S): 5 CAPSULE ORAL at 06:20

## 2020-04-29 RX ADMIN — HEPARIN SODIUM 5000 UNIT(S): 5000 INJECTION INTRAVENOUS; SUBCUTANEOUS at 14:35

## 2020-04-29 RX ADMIN — Medication 5 MILLIGRAM(S): at 06:20

## 2020-04-29 RX ADMIN — Medication 25 MILLIGRAM(S): at 08:14

## 2020-04-29 RX ADMIN — Medication 325 MILLIGRAM(S): at 12:16

## 2020-04-29 RX ADMIN — SEVELAMER CARBONATE 800 MILLIGRAM(S): 2400 POWDER, FOR SUSPENSION ORAL at 12:16

## 2020-04-29 RX ADMIN — Medication 1 MILLIGRAM(S): at 12:16

## 2020-04-29 RX ADMIN — ATORVASTATIN CALCIUM 80 MILLIGRAM(S): 80 TABLET, FILM COATED ORAL at 20:39

## 2020-04-29 RX ADMIN — Medication 75 MICROGRAM(S): at 06:20

## 2020-04-29 RX ADMIN — Medication 500 MILLIGRAM(S): at 06:20

## 2020-04-29 RX ADMIN — Medication 105 MILLIGRAM(S): at 20:40

## 2020-04-29 RX ADMIN — Medication 105 MILLIGRAM(S): at 09:34

## 2020-04-29 RX ADMIN — INSULIN HUMAN 5 UNIT(S): 100 INJECTION, SOLUTION SUBCUTANEOUS at 00:53

## 2020-04-29 RX ADMIN — Medication 3 MILLIGRAM(S): at 20:40

## 2020-04-29 RX ADMIN — TACROLIMUS 1 MILLIGRAM(S): 5 CAPSULE ORAL at 18:18

## 2020-04-29 RX ADMIN — Medication 40 MILLIGRAM(S): at 02:26

## 2020-04-29 RX ADMIN — CLOPIDOGREL BISULFATE 75 MILLIGRAM(S): 75 TABLET, FILM COATED ORAL at 12:16

## 2020-04-29 RX ADMIN — Medication 500 MILLIGRAM(S): at 18:17

## 2020-04-29 RX ADMIN — SEVELAMER CARBONATE 800 MILLIGRAM(S): 2400 POWDER, FOR SUSPENSION ORAL at 18:18

## 2020-04-29 RX ADMIN — POLYETHYLENE GLYCOL 3350 17 GRAM(S): 17 POWDER, FOR SOLUTION ORAL at 12:16

## 2020-04-29 RX ADMIN — CHLORHEXIDINE GLUCONATE 1 APPLICATION(S): 213 SOLUTION TOPICAL at 18:02

## 2020-04-29 RX ADMIN — HEPARIN SODIUM 5000 UNIT(S): 5000 INJECTION INTRAVENOUS; SUBCUTANEOUS at 20:40

## 2020-04-29 RX ADMIN — Medication 25 MILLIGRAM(S): at 18:17

## 2020-04-29 RX ADMIN — Medication 81 MILLIGRAM(S): at 12:16

## 2020-04-29 RX ADMIN — HEPARIN SODIUM 5000 UNIT(S): 5000 INJECTION INTRAVENOUS; SUBCUTANEOUS at 06:20

## 2020-04-29 NOTE — PROGRESS NOTE ADULT - ATTENDING COMMENTS
Clarita Perdomo MD  Pager: [phone]  After 5 PM or weekends please call fellow on call or office [phone]

## 2020-04-29 NOTE — BEHAVIORAL HEALTH ASSESSMENT NOTE - NSBHCHARTREVIEWLAB_PSY_A_CORE FT
CBC Full  -  ( 29 Apr 2020 04:35 )  WBC Count : 3.70 K/uL  RBC Count : 4.19 M/uL  Hemoglobin : 10.1 g/dL  Hematocrit : 34.9 %  Platelet Count - Automated : 176 K/uL  Mean Cell Volume : 83.3 fL  Mean Cell Hemoglobin : 24.1 pg  Mean Cell Hemoglobin Concentration : 28.9 %  Auto Neutrophil # : x  Auto Lymphocyte # : x  Auto Monocyte # : x  Auto Eosinophil # : x  Auto Basophil # : x  Auto Neutrophil % : x  Auto Lymphocyte % : x  Auto Monocyte % : x  Auto Eosinophil % : x  Auto Basophil % : x

## 2020-04-29 NOTE — PROGRESS NOTE ADULT - PROBLEM SELECTOR PLAN 2
Pt with history of ESRD s/p DDRT on 2005. Pt is on Tacrolimus 1mg BID, Cellcept 1 gm BID and Prednisone 5mg daily. Last Tacro level 6.6. Hold Cellcept until discharge. Change Tacrolimus to 2mg in AM and 1mg in PM. Continue  Prednisone.  Goal Tacrolimus level is 3-5. Monitor Scr. Please check AM Tacro level on Thursday 4/29/20      Candida Diana  Nephrology Fellow  Pager: [phone]

## 2020-04-29 NOTE — PROGRESS NOTE ADULT - SUBJECTIVE AND OBJECTIVE BOX
Medicine Progress Note  Authored by Kahti Campos MD PGY3, pager [phone]    Patient is a 59y old  Male who presents with a chief complaint of Rule out COVID 19 infection (2020 15:13)    SUBJECTIVE / OVERNIGHT EVENTS:      MEDICATIONS  (STANDING):  ascorbic acid  Oral Tab/Cap - Peds 500 milliGRAM(s) Oral two times a day  aspirin  chewable 81 milliGRAM(s) Oral daily  atorvastatin 80 milliGRAM(s) Oral at bedtime  bisacodyl 5 milliGRAM(s) Oral daily  carvedilol 3.125 milliGRAM(s) Oral every 12 hours  chlorhexidine 4% Liquid 1 Application(s) Topical two times a day  clopidogrel Tablet 75 milliGRAM(s) Oral daily  dextrose 5%. 1000 milliLiter(s) (75 mL/Hr) IV Continuous <Continuous>  dextrose 5%. 1000 milliLiter(s) (50 mL/Hr) IV Continuous <Continuous>  dextrose 50% Injectable 12.5 Gram(s) IV Push once  dextrose 50% Injectable 25 Gram(s) IV Push once  dextrose 50% Injectable 25 Gram(s) IV Push once  ferrous sulfate Oral Tab/Cap - Peds 325 milliGRAM(s) Oral daily  folic acid 1 milliGRAM(s) Oral daily  heparin   Injectable 5000 Unit(s) SubCutaneous every 8 hours  hydrALAZINE 25 milliGRAM(s) Oral two times a day  insulin lispro (HumaLOG) corrective regimen sliding scale   SubCutaneous three times a day before meals  insulin lispro (HumaLOG) corrective regimen sliding scale   SubCutaneous at bedtime  levothyroxine 75 MICROGram(s) Oral daily  melatonin 3 milliGRAM(s) Oral at bedtime  polyethylene glycol 3350 17 Gram(s) Oral daily  predniSONE   Tablet 5 milliGRAM(s) Oral daily  sevelamer carbonate 800 milliGRAM(s) Oral three times a day with meals  tacrolimus 2 milliGRAM(s) Oral <User Schedule>  tacrolimus 1 milliGRAM(s) Oral <User Schedule>  thiamine IVPB 500 milliGRAM(s) IV Intermittent <User Schedule>    MEDICATIONS  (PRN):  acetaminophen   Tablet .. 650 milliGRAM(s) Oral every 4 hours PRN Temp greater or equal to 38.5C (101.3F)  acetaminophen   Tablet .. 650 milliGRAM(s) Oral every 6 hours PRN Temp greater or equal to 38C (100.4F), Mild Pain (1 - 3), Moderate Pain (4 - 6)  acetaminophen  Suppository .. 650 milliGRAM(s) Rectal every 4 hours PRN Temp greater or equal to 38.5C (101.3F)  dextrose 40% Gel 15 Gram(s) Oral once PRN Blood Glucose LESS THAN 70 milliGRAM(s)/deciliter  glucagon  Injectable 1 milliGRAM(s) IntraMuscular once PRN Glucose LESS THAN 70 milligrams/deciliter  ondansetron Injectable 4 milliGRAM(s) IV Push every 8 hours PRN Nausea and/or Vomiting  sucralfate 1 Gram(s) Oral every 6 hours PRN With each meal    CAPILLARY BLOOD GLUCOSE  POCT Blood Glucose.: 245 mg/dL (2020 00:51)  POCT Blood Glucose.: 221 mg/dL (2020 21:33)  POCT Blood Glucose.: 156 mg/dL (2020 17:21)  POCT Blood Glucose.: 125 mg/dL (2020 11:34)  POCT Blood Glucose.: 74 mg/dL (2020 07:39)    I&O's Summary    2020 07:01  -  2020 07:00  --------------------------------------------------------  IN: 600 mL / OUT: 400 mL / NET: 200 mL    PHYSICAL EXAM:  Vital Signs Last 24 Hrs  T(C): 36.4 (2020 15:13), Max: 36.4 (2020 11:38)  T(F): 97.6 (2020 15:13), Max: 97.6 (2020 11:38)  HR: 73 (2020 02:25) (61 - 74)  BP: 107/63 (2020 02:25) (104/57 - 149/65)  RR: 18 (2020 21:00) (18 - 18)  SpO2: 100% (2020 21:00) (100% - 100%)    CONSTITUTIONAL: NAD  HEENT: EOMI, PERRL, normal sclera and conjunctiva; normal nasal and oral mucosa, no oral lesions  RESPIRATORY: Normal respiratory effort; lungs are clear to auscultation bilaterally  CARDIOVASCULAR: Regular rate and rhythm, normal S1 and S2, no murmur/rub/gallop; No lower extremity edema; Peripheral pulses are 2+ bilaterally  ABDOMEN: Nontender to palpation, normoactive bowel sounds, no rebound/guarding; No hepatosplenomegaly  NEUROLOGY: AOx3, CN 2-12 are intact and symmetric; no gross sensory deficits   SKIN: No rashes; no palpable lesions    LABS:                        10.1   3.70  )-----------( 176      ( 2020 04:35 )             34.9     04    143  |  113<H>  |  83<H>  ----------------------------<  108<H>  4.9   |  21<L>  |  3.69<H>    Ca    9.1      2020 04:35    TPro  5.9<L>  /  Alb  2.6<L>  /  TBili  0.2  /  DBili  x   /  AST  17  /  ALT  10  /  AlkPhos  49  29    Urinalysis Basic - ( 2020 18:41 )  Color: YELLOW / Appearance: CLEAR / S.016 / pH: 6.5  Gluc: NEGATIVE / Ketone: NEGATIVE  / Bili: NEGATIVE / Urobili: NORMAL   Blood: NEGATIVE / Protein: 600 / Nitrite: NEGATIVE   Leuk Esterase: NEGATIVE / RBC: 6-10 / WBC 3-5   Sq Epi: FEW / Non Sq Epi: x / Bacteria: NEGATIVE    Culture - Blood (collected 2020 01:53)  Source: .Blood Blood-Venous  Preliminary Report (2020 02:01):    No growth to date.    Culture - Blood (collected 2020 01:53)  Source: .Blood Blood-Peripheral  Preliminary Report (2020 02:01):    No growth to date.    COVID-19 PCR: Detected (2020 05:25)    RADIOLOGY & ADDITIONAL TESTS:  Imaging from Last 24 Hours:    Electrocardiogram/QTc Interval:    COORDINATION OF CARE:  Care Discussed with Consultants/Other Providers: Medicine Progress Note  Authored by Kathi Campos MD PGY3, pager [phone]    Patient is a 59y old  Male who presents with a chief complaint of Rule out COVID 19 infection (2020 15:13)    SUBJECTIVE / OVERNIGHT EVENTS:  No overnight events reported.   Patient seen and examined this morning. Sitting in bed with blanket over his head. Distracted, not answering questions appropriately or following commands.     MEDICATIONS  (STANDING):  ascorbic acid  Oral Tab/Cap - Peds 500 milliGRAM(s) Oral two times a day  aspirin  chewable 81 milliGRAM(s) Oral daily  atorvastatin 80 milliGRAM(s) Oral at bedtime  bisacodyl 5 milliGRAM(s) Oral daily  carvedilol 3.125 milliGRAM(s) Oral every 12 hours  chlorhexidine 4% Liquid 1 Application(s) Topical two times a day  clopidogrel Tablet 75 milliGRAM(s) Oral daily  dextrose 5%. 1000 milliLiter(s) (75 mL/Hr) IV Continuous <Continuous>  dextrose 5%. 1000 milliLiter(s) (50 mL/Hr) IV Continuous <Continuous>  dextrose 50% Injectable 12.5 Gram(s) IV Push once  dextrose 50% Injectable 25 Gram(s) IV Push once  dextrose 50% Injectable 25 Gram(s) IV Push once  ferrous sulfate Oral Tab/Cap - Peds 325 milliGRAM(s) Oral daily  folic acid 1 milliGRAM(s) Oral daily  heparin   Injectable 5000 Unit(s) SubCutaneous every 8 hours  hydrALAZINE 25 milliGRAM(s) Oral two times a day  insulin lispro (HumaLOG) corrective regimen sliding scale   SubCutaneous three times a day before meals  insulin lispro (HumaLOG) corrective regimen sliding scale   SubCutaneous at bedtime  levothyroxine 75 MICROGram(s) Oral daily  melatonin 3 milliGRAM(s) Oral at bedtime  polyethylene glycol 3350 17 Gram(s) Oral daily  predniSONE   Tablet 5 milliGRAM(s) Oral daily  sevelamer carbonate 800 milliGRAM(s) Oral three times a day with meals  tacrolimus 2 milliGRAM(s) Oral <User Schedule>  tacrolimus 1 milliGRAM(s) Oral <User Schedule>  thiamine IVPB 500 milliGRAM(s) IV Intermittent <User Schedule>    MEDICATIONS  (PRN):  acetaminophen   Tablet .. 650 milliGRAM(s) Oral every 4 hours PRN Temp greater or equal to 38.5C (101.3F)  acetaminophen   Tablet .. 650 milliGRAM(s) Oral every 6 hours PRN Temp greater or equal to 38C (100.4F), Mild Pain (1 - 3), Moderate Pain (4 - 6)  acetaminophen  Suppository .. 650 milliGRAM(s) Rectal every 4 hours PRN Temp greater or equal to 38.5C (101.3F)  dextrose 40% Gel 15 Gram(s) Oral once PRN Blood Glucose LESS THAN 70 milliGRAM(s)/deciliter  glucagon  Injectable 1 milliGRAM(s) IntraMuscular once PRN Glucose LESS THAN 70 milligrams/deciliter  ondansetron Injectable 4 milliGRAM(s) IV Push every 8 hours PRN Nausea and/or Vomiting  sucralfate 1 Gram(s) Oral every 6 hours PRN With each meal    CAPILLARY BLOOD GLUCOSE  POCT Blood Glucose.: 245 mg/dL (2020 00:51)  POCT Blood Glucose.: 221 mg/dL (2020 21:33)  POCT Blood Glucose.: 156 mg/dL (2020 17:21)  POCT Blood Glucose.: 125 mg/dL (2020 11:34)  POCT Blood Glucose.: 74 mg/dL (2020 07:39)    I&O's Summary    2020 07:01  -  2020 07:00  --------------------------------------------------------  IN: 600 mL / OUT: 400 mL / NET: 200 mL    PHYSICAL EXAM:  Vital Signs Last 24 Hrs  T(C): 36.4 (2020 15:13), Max: 36.4 (2020 11:38)  T(F): 97.6 (2020 15:13), Max: 97.6 (2020 11:38)  HR: 73 (2020 02:25) (61 - 74)  BP: 107/63 (2020 02:25) (104/57 - 149/65)  RR: 18 (2020 21:00) (18 - 18)  SpO2: 100% (2020 21:00) (100% - 100%)    CONSTITUTIONAL: NAD  HEENT: EOMI, PERRL, normal sclera and conjunctiva; normal nasal and oral mucosa, no oral lesions  RESPIRATORY: Normal respiratory effort; lungs are clear to auscultation bilaterally  CARDIOVASCULAR: Regular rate and rhythm, normal S1 and S2, no murmur/rub/gallop; No lower extremity edema; Peripheral pulses are 2+ bilaterally  ABDOMEN: Nontender to palpation, normoactive bowel sounds, no rebound/guarding; No hepatosplenomegaly  NEUROLOGY: AOx2, no focal deficits  SKIN: No rashes; no palpable lesions    LABS:                        10.1   3.70  )-----------( 176      ( 2020 04:35 )             34.9     04-29    143  |  113<H>  |  83<H>  ----------------------------<  108<H>  4.9   |  21<L>  |  3.69<H>    Ca    9.1      2020 04:35    TPro  5.9<L>  /  Alb  2.6<L>  /  TBili  0.2  /  DBili  x   /  AST  17  /  ALT  10  /  AlkPhos  49  04-29    Urinalysis Basic - ( 2020 18:41 )  Color: YELLOW / Appearance: CLEAR / S.016 / pH: 6.5  Gluc: NEGATIVE / Ketone: NEGATIVE  / Bili: NEGATIVE / Urobili: NORMAL   Blood: NEGATIVE / Protein: 600 / Nitrite: NEGATIVE   Leuk Esterase: NEGATIVE / RBC: 6-10 / WBC 3-5   Sq Epi: FEW / Non Sq Epi: x / Bacteria: NEGATIVE    Culture - Blood (collected 2020 01:53)  Source: .Blood Blood-Venous  Preliminary Report (2020 02:01):    No growth to date.    Culture - Blood (collected 2020 01:53)  Source: .Blood Blood-Peripheral  Preliminary Report (2020 02:01):    No growth to date.    COVID-19 PCR: Detected (2020 05:25)    RADIOLOGY & ADDITIONAL TESTS:  Imaging from Last 24 Hours: n/a    Electrocardiogram/QTc Interval:    COORDINATION OF CARE:  Care Discussed with Consultants/Other Providers:

## 2020-04-29 NOTE — BEHAVIORAL HEALTH ASSESSMENT NOTE - RISK ASSESSMENT
Low Acute Suicide Risk Risk factors; acute/chronic medical issues Risk factors; acute/chronic medical issues, agitated in the context of delirium  Protective factors; Denies past SA, denies h/o inpt. admission, denies SI and HI    Pt. can be agitated in the context of delirium

## 2020-04-29 NOTE — BEHAVIORAL HEALTH ASSESSMENT NOTE - SUMMARY
Patient is a 59 man with pmh of  ESRD s/p renal transplant (2005) on chronic immunosuppressive therapy, HTN, HLD, CAD s/p recent stent, and PVD s/p R AKA admitted with COVID19 pneumonia with course c/b ESBL Klebsiella UTI. Psychiatry consulted for delirium.   Patient received Zyprexa 5mg x1 and Ativan 1mg x1 on 4/26.   Haldol 5mg x1and ATivan 1mg x1 on 4/27.    The following interview was performed using Zoom video platform with the help of telepresenter. Patient was not seen in person due to infection risk mitigation efforts, in light of novel coronavirus pandemic. Patient consented to tele-psychiatric eval.    Patient initially irritable, however as the interview progressed he became more cooperative, AAOX2, stated that he came ot the hospital for breathing issues, not able to elaborate further. Patient stated that he lives alone  and gave consent to talk to his brother. Patient was confused with impaired attention during an interview. Denies past psychiatric admission, denies h/o SA, denies h/o drinking alcohol, denies other substance use. Patient denies AH and VH, denies SI and HI. Reported feeling safe in the hospital. Patient was not able to further engage in interview. Patient is a 59 man with pmh of  ESRD s/p renal transplant (2005) on chronic immunosuppressive therapy, HTN, HLD, CAD s/p recent stent, and PVD s/p R AKA admitted with COVID19 pneumonia with course c/b ESBL Klebsiella UTI. Psychiatry consulted for delirium.   Patient received Zyprexa 5mg x1 and Ativan 1mg x1 on 4/26.   Haldol 5mg x1and ATivan 1mg x1 on 4/27.    The following interview was performed using Zoom video platform with the help of telepresenter. Patient was not seen in person due to infection risk mitigation efforts, in light of novel coronavirus pandemic. Patient consented to tele-psychiatric eval.    Patient initially irritable, however as the interview progressed he became more cooperative, AAOX2, stated that he came ot the hospital for breathing issues, not able to elaborate further. Patient stated that he lives alone  and gave consent to talk to his brother. Patient was confused with impaired attention during an interview. Denies past psychiatric admission, denies h/o SA, denies h/o drinking alcohol, denies other substance use. Patient denies AH and VH, denies SI and HI. Reported feeling safe in the hospital. Patient was not able to further engage in interview.    [place] 2[place]2 [facility] [place]    [place]:  1-Haldol 2.5mg PO/IM/IV q6h prn for agitation if qtc <500  2- Monitor EKG for qtc    Case discussed with DR. Gaona, recs given  [place] TSH, B12 and folate Patient is a 59 man with pmh of  ESRD s/p renal transplant (2005) on chronic immunosuppressive therapy, HTN, HLD, CAD s/p recent stent, and PVD s/p R AKA admitted with COVID19 pneumonia with course c/b ESBL Klebsiella UTI. Psychiatry consulted for delirium.   Patient received Zyprexa 5mg x1 and Ativan 1mg x1 on 4/26.   Haldol 5mg x1and ATivan 1mg x1 on 4/27.    The following interview was performed using Zoom video platform with the help of telepresenter. Patient was not seen in person due to infection risk mitigation efforts, in light of novel coronavirus pandemic. Patient consented to tele-psychiatric eval.    Patient initially irritable, however as the interview progressed he became more cooperative, AAOX2, stated that he came ot the hospital for breathing issues, not able to elaborate further. Patient stated that he lives alone  and gave consent to talk to his brother. Patient was confused with impaired attention during an interview. Denies past psychiatric admission, denies h/o SA, denies h/o drinking alcohol, denies other substance use. Patient denies AH and VH, denies SI and HI. Reported feeling safe in the hospital. Patient was not able to further engage in interview.    [place] 2[place] [facility] [place]    [place]AN:  1-Haldol 2.5mg PO/IM/IV q6h prn for agitation if qtc <500  2- Monitor EKG for qtc  3- Melatonin 3mg po hs    Case discussed with Dr. Gaona, recs given  [place] TSH, B12 and folate Patient is a 59 man with pmh of  ESRD s/p renal transplant (2005) on chronic immunosuppressive therapy, HTN, HLD, CAD s/p recent stent, and PVD s/p R AKA admitted with COVID19 pneumonia with course c/b ESBL Klebsiella UTI. Psychiatry consulted for delirium.   Patient received Zyprexa 5mg x1 and Ativan 1mg x1 on 4/26.   Haldol 5mg x1and ATivan 1mg x1 on 4/27.    The following interview was performed using Zoom video platform with the help of telepresenter. Patient was not seen in person due to infection risk mitigation efforts, in light of novel coronavirus pandemic. Patient consented to tele-psychiatric eval.    Patient initially irritable, however as the interview progressed he became more cooperative, AAOX2, stated that he came ot the hospital for breathing issues, not able to elaborate further. Patient stated that he lives alone  and gave consent to talk to his brother. Patient was confused with impaired attention during an interview. Denies past psychiatric admission, denies h/o SA, denies h/o drinking alcohol, denies other substance use. Patient denies AH and VH, denies SI and HI. Reported feeling safe in the hospital. Patient was not able to further engage in interview.    Presentation is c/w Delirium 2/2 [facility] [place]    PLAN:  1-Haldol 2.5mg PO/IM/IV q6h prn for agitation if qtc <500  2- Monitor EKG for qtc  3- Melatonin 3mg po hs  4- Check TSH, B12 and folate    Case discussed with Dr. Gaona, recs given Patient is a 59 man with pmh of  ESRD s/p renal transplant (2005) on chronic immunosuppressive therapy, HTN, HLD, CAD s/p recent stent, and PVD s/p R AKA admitted with COVID19 pneumonia with course c/b ESBL Klebsiella UTI. Psychiatry consulted for delirium.   Patient received Zyprexa 5mg x1 and Ativan 1mg x1 on 4/26.   Haldol 5mg x1and ATivan 1mg x1 on 4/27.    The following interview was performed using Zoom video platform with the help of telepresenter. Patient was not seen in person due to infection risk mitigation efforts, in light of novel coronavirus pandemic. Patient consented to tele-psychiatric eval.    Patient initially irritable, however as the interview progressed he became more cooperative, AAOX2, stated that he came ot the hospital for breathing issues, not able to elaborate further. Patient stated that he lives alone  and gave consent to talk to his brother. Patient was confused with impaired attention during an interview. Denies past psychiatric admission, denies h/o SA, denies h/o drinking alcohol, denies other substance use. Patient denies AH and VH, denies SI and HI. Reported feeling safe in the hospital. Patient was not able to further engage in interview.    Presentation is c[place]w [place]irium 2[place]2 [facility] [place]    [place]AN:  1-Haldol 2mg PO/IM/IV q6h prn for agitation if qtc <500  2- Monitor EKG for qtc  3- Melatonin 3mg po hs  4- Check TSH, B12 and folate    Case discussed with Dr. Gaona, recs given

## 2020-04-29 NOTE — PROGRESS NOTE ADULT - PROBLEM SELECTOR PLAN 1
Worsening encephalopathy, with reported baseline of AOx4. Suspect multifactorial related to hospital-associated delirium and worsening renal function. Patient found to have acute urinary retention today with >1100mL urine on bladder scan s/p straight cath. CTH on 4/26 unremarkable.   - Appreciate Neurology recs  - Will proceed with EEG to r/o subclinical seizure  - Place [place] for urinary retention  - Folate and thiamine repletion  - Will consider MRI if not improving  - Psychiatry consulted today, appreciate recs  - C/w melatonin to help regulate circadian rhythm

## 2020-04-29 NOTE — PROGRESS NOTE ADULT - ASSESSMENT
60 yo man with h/o renal transplant presenting 4/11 from nursing home  covid+ pneumonia  oxygenating well on RA.  pyelo transplant kidney with  ESBL Klebsiella in urine.  completed 2 weeks ertapenem.      1) Pyelonephritis  - completed 2 weeks antibiotics   - nephrology following for renal transplant    2) COVID-19  - oxygenating well on RA  - supportive care  - maintain airborne /contact precautions    3) encephalopathy  - unclear etiology. ?drug related  ?sepsis  - tacrolimus 6.6 (within range)  - BCx 4/28 - neg to date, continue to monitor  - neurology following- wants eeg      4) CMV viremia  - CMV  --> low viremia, not suggestive of CMV disease  - no anti-virals needed at this time

## 2020-04-29 NOTE — PROGRESS NOTE ADULT - ASSESSMENT
59M with ESRD s/p renal transplant (2005) on chronic immunosuppressive therapy, HTN, HLD, CAD s/p recent stent, and PVD s/p R AKA admitted with COVID19 pneumonia with course c/b ESBL Klebsiella UTI. Course further c/b acute encephalopathy.

## 2020-04-29 NOTE — BEHAVIORAL HEALTH ASSESSMENT NOTE - OTHER
delirium acute emdical issues delirious denies HI cooperative but irritable, difficult to engage at times not assessed, interview was performed using Zoom video platform at times difficult to understand fine confused, confused, denies SI and HI acute medical issues

## 2020-04-29 NOTE — BEHAVIORAL HEALTH ASSESSMENT NOTE - HPI (INCLUDE ILLNESS QUALITY, SEVERITY, DURATION, TIMING, CONTEXT, MODIFYING FACTORS, ASSOCIATED SIGNS AND SYMPTOMS)
Patient is a 59 man with pmh of  ESRD s/p renal transplant (2005) on chronic immunosuppressive therapy, HTN, HLD, CAD s/p recent stent, and PVD s/p R AKA admitted with COVID19 pneumonia with course c/b ESBL Klebsiella UTI. Psychiatry consulted for delirium.   Patient received Zyprexa 5mg x1 and Ativan 1mg x1 on 4/26.   Haldol 5mg x1and ATivan 1mg x1 on 4/27.    The following interview was performed using Zoom video platform with the help of telepresenter. Patient was not seen in person due to infection risk mitigation efforts, in light of novel coronavirus pandemic. Patient consented to tele-psychiatric eval.    Patient initially irritable, however as the interview progressed he became more cooperative, AAOX2, stated that he came ot the hospital for breathing issues, not able to elaborate further. Patient stated that he lives alone  and gave consent to talk to his brother. Patient was confused with impaired attention during an interview. Denies past psychiatric admission, denies h/o SA, denies h/o drinking alcohol, denies other substance use. Patient denies AH and VH, denies SI and HI. Reported feeling safe in the hospital. Patient was not able to further engage in interview. Patient is a 59 man with pmh of  ESRD s/p renal transplant (2005) on chronic immunosuppressive therapy, HTN, HLD, CAD s/p recent stent, and PVD s/p R AKA admitted with COVID19 pneumonia with course c/b ESBL Klebsiella UTI. Psychiatry consulted for delirium.   Patient received Zyprexa 5mg x1 and Ativan 1mg x1 on 4/26.   Haldol 5mg x1and ATivan 1mg x1 on 4/27.    The following interview was performed using Zoom video platform with the help of telepresenter. Patient was not seen in person due to infection risk mitigation efforts, in light of novel coronavirus pandemic. Patient consented to tele-psychiatric eval. Primary team in agreement.    Patient initially irritable, however as the interview progressed he became more cooperative, AAOX2, stated that he came ot the hospital for breathing issues, not able to elaborate further. Patient stated that he lives alone  and gave consent to talk to his brother. Patient was confused with impaired attention during an interview. Denies past psychiatric admission, denies h/o SA, denies h/o drinking alcohol, denies other substance use. Patient denies AH and VH, denies SI and HI. Reported feeling safe in the hospital. Patient was not able to further engage in interview.

## 2020-04-29 NOTE — PROGRESS NOTE ADULT - ATTENDING COMMENTS
AMS  YURI  Renal Transplant    Cont to monitor while on IVF AMS  YURI  Renal Transplant  Urinary retention    Please place duvall, Is/Os, consider trial of void tomorrow

## 2020-04-29 NOTE — BEHAVIORAL HEALTH ASSESSMENT NOTE - NSBHCHARTREVIEWVS_PSY_A_CORE FT
ICU Vital Signs Last 24 Hrs  T(C): 36.4 (28 Apr 2020 15:13), Max: 36.4 (28 Apr 2020 15:13)  T(F): 97.6 (28 Apr 2020 15:13), Max: 97.6 (28 Apr 2020 15:13)  HR: 67 (29 Apr 2020 08:00) (61 - 74)  BP: 114/53 (29 Apr 2020 08:00) (107/63 - 149/65)  BP(mean): --  ABP: --  ABP(mean): --  RR: 18 (28 Apr 2020 21:00) (18 - 18)  SpO2: 100% (28 Apr 2020 21:00) (100% - 100%)

## 2020-04-29 NOTE — BEHAVIORAL HEALTH ASSESSMENT NOTE - NSBHCONSULTFOLLOWAFTERCARE_PSY_A_CORE FT
May provide following referrals  [facility] outpt. walk in clinic : [phone] (9AM-3PM)  [facility] Outpatient clinic: [phone]

## 2020-04-29 NOTE — PROGRESS NOTE ADULT - PROBLEM SELECTOR PLAN 10
Family (patient's brother) updated via phone today on clinical status. Patient remains encephalopathic; confirmed with brother patient is AOx4 at baseline and this is an acute change. Discussed with brother we are actively investigating potential causes and will continue to update.

## 2020-04-29 NOTE — PROGRESS NOTE ADULT - PROBLEM SELECTOR PLAN 2
Noted to have YURI on CKD of unclear etiology. Differential includes COVID19 infection vs. chronic rejection. Per outpatient records: SCr 4.8 as outpatient on 4/6, elevated from SCr 3.6 on 3/27.   - Appreciate Nephrology recs  - Continue immunosuppressant regimen (see below)  - Holding Bumex and NaCl tabs in setting of worsened renal function  - Trend BMP  - Avoid nephrotoxins  - Renally dose medications

## 2020-04-29 NOTE — PROGRESS NOTE ADULT - SUBJECTIVE AND OBJECTIVE BOX
Follow Up: covid, leukocytosis, pyelo    Interval History: Has had periods of agitation.       Allergies  iodine (Vomiting)  IV Contrast (Unknown)      ANTIMICROBIALS:      OTHER MEDS:  acetaminophen   Tablet .. 650 milliGRAM(s) Oral every 4 hours PRN  acetaminophen   Tablet .. 650 milliGRAM(s) Oral every 6 hours PRN  acetaminophen  Suppository .. 650 milliGRAM(s) Rectal every 4 hours PRN  ascorbic acid  Oral Tab/Cap - Peds 500 milliGRAM(s) Oral two times a day  aspirin  chewable 81 milliGRAM(s) Oral daily  atorvastatin 80 milliGRAM(s) Oral at bedtime  bisacodyl 5 milliGRAM(s) Oral daily  carvedilol 3.125 milliGRAM(s) Oral every 12 hours  chlorhexidine 4% Liquid 1 Application(s) Topical two times a day  clopidogrel Tablet 75 milliGRAM(s) Oral daily  dextrose 40% Gel 15 Gram(s) Oral once PRN  dextrose 5%. 1000 milliLiter(s) IV Continuous <Continuous>  dextrose 5%. 1000 milliLiter(s) IV Continuous <Continuous>  dextrose 50% Injectable 12.5 Gram(s) IV Push once  dextrose 50% Injectable 25 Gram(s) IV Push once  dextrose 50% Injectable 25 Gram(s) IV Push once  ferrous sulfate Oral Tab/Cap - Peds 325 milliGRAM(s) Oral daily  folic acid 1 milliGRAM(s) Oral daily  glucagon  Injectable 1 milliGRAM(s) IntraMuscular once PRN  haloperidol IVPB 2.5 milliGRAM(s) IV Intermittent every 6 hours PRN  heparin   Injectable 5000 Unit(s) SubCutaneous every 8 hours  hydrALAZINE 25 milliGRAM(s) Oral two times a day  insulin lispro (HumaLOG) corrective regimen sliding scale   SubCutaneous three times a day before meals  insulin lispro (HumaLOG) corrective regimen sliding scale   SubCutaneous at bedtime  levothyroxine 75 MICROGram(s) Oral daily  melatonin 3 milliGRAM(s) Oral at bedtime  ondansetron Injectable 4 milliGRAM(s) IV Push every 8 hours PRN  polyethylene glycol 3350 17 Gram(s) Oral daily  predniSONE   Tablet 5 milliGRAM(s) Oral daily  sevelamer carbonate 800 milliGRAM(s) Oral three times a day with meals  sucralfate 1 Gram(s) Oral every 6 hours PRN  tacrolimus 2 milliGRAM(s) Oral <User Schedule>  tacrolimus 1 milliGRAM(s) Oral <User Schedule>  thiamine IVPB 500 milliGRAM(s) IV Intermittent <User Schedule>      Vital Signs Last 24 Hrs  T(C): 36.4 (2020 15:13), Max: 36.4 (2020 15:13)  T(F): 97.6 (2020 15:13), Max: 97.6 (2020 15:13)  HR: 67 (2020 08:00) (61 - 74)  BP: 114/53 (2020 08:00) (107/63 - 149/65)  BP(mean): --  RR: 18 (2020 21:00) (18 - 18)  SpO2: 100% (2020 21:00) (100% - 100%)    Physical Exam:  General:    NAD  HEENT: atraumatic, normocephalic  Respiratory: breathing comfortably  :   no  duvall  Musculoskeletal:  no edema  Skin:    no rash  Neuro: sleeping  psych: unable to assess                          10.1   3.70  )-----------( 176      ( 2020 04:35 )             34.9           143  |  113<H>  |  83<H>  ----------------------------<  108<H>  4.9   |  21<L>  |  3.69<H>    Ca    9.1      2020 04:35  Phos  4.2       Mg     1.8         TPro  5.9<L>  /  Alb  2.6<L>  /  TBili  0.2  /  DBili  x   /  AST  17  /  ALT  10  /  AlkPhos  49        Urinalysis Basic - ( 2020 18:41 )    Color: YELLOW / Appearance: CLEAR / S.016 / pH: 6.5  Gluc: NEGATIVE / Ketone: NEGATIVE  / Bili: NEGATIVE / Urobili: NORMAL   Blood: NEGATIVE / Protein: 600 / Nitrite: NEGATIVE   Leuk Esterase: NEGATIVE / RBC: 6-10 / WBC 3-5   Sq Epi: FEW / Non Sq Epi: x / Bacteria: NEGATIVE        MICROBIOLOGY:  v  .Blood Blood-Peripheral  20   No growth to date.  --  --      .Blood Blood  20   No Growth Final  --  --      .Blood Blood  20   No Growth Final  --  --      .Urine Clean Catch (Midstream)  20   >100,000 CFU/ml Klebsiella pneumoniae ESBL  --  Klebsiella pneumoniae ESBL      .Blood Blood-Peripheral  20   No Growth Final  --  --      .Blood Blood-Peripheral  04-10-20   No Growth Final  --  --      RADIOLOGY:      EXAM:  CT BRAIN        PROCEDURE DATE:  2020         INTERPRETATION:  HISTORY: Patient COVID+, status post renal transplant. New altered mental status.    COMPARISON: None.    TECHNIQUE: Axial noncontrast CT images from the skull base to thevertex were obtained and submitted for interpretation. Coronal and sagittal reformatted images were performed. Bone and soft tissue windows were evaluated.    FINDINGS:      The current study is limited due to motion artifact.    There is no acute intracranial mass-effect, hemorrhage, midline shift, or abnormal extra-axial fluid collection. The gray-white junction is grossly preserved. Chronic mild to moderate microvascular ischemic changes.    Small areas of low density are seen within the left corona radiata/lentiform nucleus and right caudate head compatible with lacunae which appear chronic. An area of low density is seen within the left inferior cerebellum compatible with an infarct which appears long-standing.    Ventricles, sulci, and cisterns are enlarged for the patient's age compatible with moderate cerebral volume loss. No hydrocephalus. Basal cisterns are patent.     Paranasal sinuses and mastoid air cells are clear. Calvarium is intact.     IMPRESSION:     No acute intracranial bleeding, mass effect, or shift.    Cerebral volume loss.    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    This represents a change from the preliminary interpretation which stated only no acute findingsand was submitted electronically to the emergency department 2020 at 7:53 AM.?        EMILY RAVI M.D., RADIOLOGY RESIDENT  This document has been electronically signed.  NATTY ZAYAS M.D., ATTENDING RADIOLOGIST  This document has been electronically signed. 2020  7:55AM

## 2020-04-29 NOTE — PROGRESS NOTE ADULT - SUBJECTIVE AND OBJECTIVE BOX
[facility] Division of Kidney Diseases & Hypertension  FOLLOW UP NOTE  [phone]--------------------------------------------------------------------------------  HPI: 59M PMH of HTN, HLD, DM, ESRD s/p kidney transplant, admitted for COVID-19. Nephrology team consulted for YURI and management of immunosuppressant. Pt with history of ESRD s/p DDRT on 2005. On review pt is on Tacrolimus 1mg BID, Mycophenolate 1gm BID and Prednisone 5mg daily. On review of labs, pt with SCr: 3.6 on 3/27/20 increased to 3.9 on 3/30/20. Pt with last outpatient SCr of 4.8 as outpatient at [facility]. On admission, SCr was 5.47 (4/10/20) which continued to improve to  4.30 on 4/21/20. Scr began to increase to 5.25 and responded to IVF with repeat  3.58 yesterday.    Labs and charts reviewed  Pt appears more alert today and responds to commands.  Pt complaints of abdominal pain  Serum creatinine increased today  Bladder scan with 1100 urine    PAST HISTORY  --------------------------------------------------------------------------------  No significant changes to PMH, PSH, FHx, SHx, unless otherwise noted    ALLERGIES & MEDICATIONS  --------------------------------------------------------------------------------  Allergies    iodine (Vomiting)  IV Contrast (Unknown)    Intolerances      Standing Inpatient Medications  ascorbic acid  Oral Tab/Cap - Peds 500 milliGRAM(s) Oral two times a day  aspirin  chewable 81 milliGRAM(s) Oral daily  atorvastatin 80 milliGRAM(s) Oral at bedtime  bisacodyl 5 milliGRAM(s) Oral daily  carvedilol 3.125 milliGRAM(s) Oral every 12 hours  chlorhexidine 4% Liquid 1 Application(s) Topical two times a day  clopidogrel Tablet 75 milliGRAM(s) Oral daily  dextrose 5%. 1000 milliLiter(s) IV Continuous <Continuous>  dextrose 5%. 1000 milliLiter(s) IV Continuous <Continuous>  dextrose 50% Injectable 12.5 Gram(s) IV Push once  dextrose 50% Injectable 25 Gram(s) IV Push once  dextrose 50% Injectable 25 Gram(s) IV Push once  ferrous sulfate Oral Tab/Cap - Peds 325 milliGRAM(s) Oral daily  folic acid 1 milliGRAM(s) Oral daily  heparin   Injectable 5000 Unit(s) SubCutaneous every 8 hours  hydrALAZINE 25 milliGRAM(s) Oral two times a day  insulin lispro (HumaLOG) corrective regimen sliding scale   SubCutaneous three times a day before meals  insulin lispro (HumaLOG) corrective regimen sliding scale   SubCutaneous at bedtime  levothyroxine 75 MICROGram(s) Oral daily  melatonin 3 milliGRAM(s) Oral at bedtime  polyethylene glycol 3350 17 Gram(s) Oral daily  predniSONE   Tablet 5 milliGRAM(s) Oral daily  sevelamer carbonate 800 milliGRAM(s) Oral three times a day with meals  tacrolimus 2 milliGRAM(s) Oral <User Schedule>  tacrolimus 1 milliGRAM(s) Oral <User Schedule>  thiamine IVPB 500 milliGRAM(s) IV Intermittent <User Schedule>    PRN Inpatient Medications  acetaminophen   Tablet .. 650 milliGRAM(s) Oral every 4 hours PRN  acetaminophen   Tablet .. 650 milliGRAM(s) Oral every 6 hours PRN  acetaminophen  Suppository .. 650 milliGRAM(s) Rectal every 4 hours PRN  dextrose 40% Gel 15 Gram(s) Oral once PRN  glucagon  Injectable 1 milliGRAM(s) IntraMuscular once PRN  ondansetron Injectable 4 milliGRAM(s) IV Push every 8 hours PRN  sucralfate 1 Gram(s) Oral every 6 hours PRN      REVIEW OF SYSTEMS  --------------------------------------------------------------------------------  Unable to assess due to current clinical condition    VITALS/PHYSICAL EXAM  --------------------------------------------------------------------------------  T(C): 36.4 (04-28-20 @ 15:13), Max: 36.4 (04-28-20 @ 15:13)  HR: 67 (04-29-20 @ 08:00) (61 - 74)  BP: 114/53 (04-29-20 @ 08:00) (107/63 - 149/65)  RR: 18 (04-28-20 @ 21:00) (18 - 18)  SpO2: 100% (04-28-20 @ 21:00) (100% - 100%)  Wt(kg): --        04-28-20 @ 07:01  -  04-29-20 @ 07:00  --------------------------------------------------------  IN: 600 mL / OUT: 400 mL / NET: 200 mL      Physical Exam:  	Gen: no distress  	HEENT: no JVD  	Pulm: normal respiratory pattern  	CV:  not tachy  	Abd: +BS, soft, transplant area without tenderness  	Ext: No B/L Lower ext edema s/p R amputation  	Neuro: awake,  confused  	Skin: Warm, without rashes  	Vascular access: Left Arm AVF with palpable thrill, + aneurysmal dilation    LABS/STUDIES  --------------------------------------------------------------------------------              10.1   3.70  >-----------<  176      [04-29-20 @ 04:35]              34.9     143  |  113  |  83  ----------------------------<  108      [04-29-20 @ 04:35]  4.9   |  21  |  3.69        Ca     9.1     [04-29-20 @ 04:35]      Mg     1.8     [04-28-20 @ 16:36]      Phos  4.2     [04-28-20 @ 16:36]    TPro  5.9  /  Alb  2.6  /  TBili  0.2  /  DBili  x   /  AST  17  /  ALT  10  /  AlkPhos  49  [04-29-20 @ 04:35]    PT/INR: PT 13.5 , INR 1.18       [04-27-20 @ 16:44]  PTT: 37.4       [04-27-20 @ 16:44]      Creatinine Trend:  SCr 3.69 [04-29 @ 04:35]  SCr 3.52 [04-28 @ 20:44]  SCr 3.56 [04-28 @ 16:36]  SCr 3.58 [04-28 @ 05:14]  SCr 4.18 [04-27 @ 09:53]    Urinalysis - [04-27-20 @ 18:41]      Color YELLOW / Appearance CLEAR / SG 1.016 / pH 6.5      Gluc NEGATIVE / Ketone NEGATIVE  / Bili NEGATIVE / Urobili NORMAL       Blood NEGATIVE / Protein 600 / Leuk Est NEGATIVE / Nitrite NEGATIVE      RBC 6-10 / WBC 3-5 / Hyaline NEGATIVE / Gran  / Sq Epi FEW / Non Sq Epi  / Bacteria NEGATIVE      Ferritin 2209      [04-27-20 @ 05:26]  TSH 3.30      [04-28-20 @ 05:14]      Syphilis Screen (Treponema Pallidum Ab) Negative      [04-28-20 @ 05:14]

## 2020-04-29 NOTE — PROGRESS NOTE ADULT - PROBLEM SELECTOR PLAN 1
Pt with YURI on CKD in the setting of COVID-19 vs. chronic graft rejection. Pt with last SCr of 4.8 outpatient. On admission, SCr of 5.47 (4/10/20) which has improved to 4.38 however increased to 5.25.  Pt started on IVF with improvement to 3.58 yesterday however increased to 6.25 today. Pt with urinary obstruction. Labs and charts reviewed. Recommend Moody catheter placement. Hold IVF for now. Continue to monitor renal function.  Avoid other potential nephrotoxins.

## 2020-04-30 LAB
ANION GAP SERPL CALC-SCNC: 14 MMO/L — SIGNIFICANT CHANGE UP (ref 7–14)
BASOPHILS # BLD AUTO: 0.01 K/UL — SIGNIFICANT CHANGE UP (ref 0–0.2)
BASOPHILS NFR BLD AUTO: 0.2 % — SIGNIFICANT CHANGE UP (ref 0–2)
BUN SERPL-MCNC: 87 MG/DL — HIGH (ref 7–23)
CALCIUM SERPL-MCNC: 9 MG/DL — SIGNIFICANT CHANGE UP (ref 8.4–10.5)
CHLORIDE SERPL-SCNC: 111 MMOL/L — HIGH (ref 98–107)
CO2 SERPL-SCNC: 19 MMOL/L — LOW (ref 22–31)
CREAT SERPL-MCNC: 4.14 MG/DL — HIGH (ref 0.5–1.3)
EOSINOPHIL # BLD AUTO: 0.11 K/UL — SIGNIFICANT CHANGE UP (ref 0–0.5)
EOSINOPHIL NFR BLD AUTO: 2 % — SIGNIFICANT CHANGE UP (ref 0–6)
GLUCOSE BLDC GLUCOMTR-MCNC: 109 MG/DL — HIGH (ref 70–99)
GLUCOSE BLDC GLUCOMTR-MCNC: 113 MG/DL — HIGH (ref 70–99)
GLUCOSE BLDC GLUCOMTR-MCNC: 82 MG/DL — SIGNIFICANT CHANGE UP (ref 70–99)
GLUCOSE BLDC GLUCOMTR-MCNC: 86 MG/DL — SIGNIFICANT CHANGE UP (ref 70–99)
GLUCOSE SERPL-MCNC: 100 MG/DL — HIGH (ref 70–99)
HCT VFR BLD CALC: 32.6 % — LOW (ref 39–50)
HGB BLD-MCNC: 9.9 G/DL — LOW (ref 13–17)
IMM GRANULOCYTES NFR BLD AUTO: 0.7 % — SIGNIFICANT CHANGE UP (ref 0–1.5)
LYMPHOCYTES # BLD AUTO: 0.89 K/UL — LOW (ref 1–3.3)
LYMPHOCYTES # BLD AUTO: 16.1 % — SIGNIFICANT CHANGE UP (ref 13–44)
MAGNESIUM SERPL-MCNC: 1.9 MG/DL — SIGNIFICANT CHANGE UP (ref 1.6–2.6)
MCHC RBC-ENTMCNC: 25.3 PG — LOW (ref 27–34)
MCHC RBC-ENTMCNC: 30.4 % — LOW (ref 32–36)
MCV RBC AUTO: 83.2 FL — SIGNIFICANT CHANGE UP (ref 80–100)
MONOCYTES # BLD AUTO: 0.42 K/UL — SIGNIFICANT CHANGE UP (ref 0–0.9)
MONOCYTES NFR BLD AUTO: 7.6 % — SIGNIFICANT CHANGE UP (ref 2–14)
NEUTROPHILS # BLD AUTO: 4.05 K/UL — SIGNIFICANT CHANGE UP (ref 1.8–7.4)
NEUTROPHILS NFR BLD AUTO: 73.4 % — SIGNIFICANT CHANGE UP (ref 43–77)
NRBC # FLD: 0 K/UL — SIGNIFICANT CHANGE UP (ref 0–0)
PHOSPHATE SERPL-MCNC: 3.5 MG/DL — SIGNIFICANT CHANGE UP (ref 2.5–4.5)
PLATELET # BLD AUTO: 203 K/UL — SIGNIFICANT CHANGE UP (ref 150–400)
PMV BLD: 9 FL — SIGNIFICANT CHANGE UP (ref 7–13)
POTASSIUM SERPL-MCNC: 5.2 MMOL/L — SIGNIFICANT CHANGE UP (ref 3.5–5.3)
POTASSIUM SERPL-SCNC: 5.2 MMOL/L — SIGNIFICANT CHANGE UP (ref 3.5–5.3)
RBC # BLD: 3.92 M/UL — LOW (ref 4.2–5.8)
RBC # FLD: 15.4 % — HIGH (ref 10.3–14.5)
SODIUM SERPL-SCNC: 144 MMOL/L — SIGNIFICANT CHANGE UP (ref 135–145)
TACROLIMUS SERPL-MCNC: < 2 NG/ML — SIGNIFICANT CHANGE UP
VIT B12 SERPL-MCNC: 898 PG/ML — SIGNIFICANT CHANGE UP (ref 200–900)
WBC # BLD: 5.52 K/UL — SIGNIFICANT CHANGE UP (ref 3.8–10.5)
WBC # FLD AUTO: 5.52 K/UL — SIGNIFICANT CHANGE UP (ref 3.8–10.5)

## 2020-04-30 PROCEDURE — 99233 SBSQ HOSP IP/OBS HIGH 50: CPT

## 2020-04-30 PROCEDURE — 95816 EEG AWAKE AND DROWSY: CPT | Mod: 26

## 2020-04-30 RX ORDER — TAMSULOSIN HYDROCHLORIDE 0.4 MG/1
0.4 CAPSULE ORAL AT BEDTIME
Refills: 0 | Status: DISCONTINUED | OUTPATIENT
Start: 2020-04-30 | End: 2020-05-07

## 2020-04-30 RX ADMIN — ATORVASTATIN CALCIUM 80 MILLIGRAM(S): 80 TABLET, FILM COATED ORAL at 22:38

## 2020-04-30 RX ADMIN — Medication 0: at 09:20

## 2020-04-30 RX ADMIN — Medication 5 MILLIGRAM(S): at 07:06

## 2020-04-30 RX ADMIN — Medication 500 MILLIGRAM(S): at 07:05

## 2020-04-30 RX ADMIN — Medication 500 MILLIGRAM(S): at 17:25

## 2020-04-30 RX ADMIN — TACROLIMUS 2 MILLIGRAM(S): 5 CAPSULE ORAL at 07:05

## 2020-04-30 RX ADMIN — Medication 3 MILLIGRAM(S): at 22:38

## 2020-04-30 RX ADMIN — Medication 75 MICROGRAM(S): at 07:06

## 2020-04-30 RX ADMIN — HEPARIN SODIUM 5000 UNIT(S): 5000 INJECTION INTRAVENOUS; SUBCUTANEOUS at 22:38

## 2020-04-30 RX ADMIN — Medication 325 MILLIGRAM(S): at 13:36

## 2020-04-30 RX ADMIN — CARVEDILOL PHOSPHATE 3.12 MILLIGRAM(S): 80 CAPSULE, EXTENDED RELEASE ORAL at 17:25

## 2020-04-30 RX ADMIN — HEPARIN SODIUM 5000 UNIT(S): 5000 INJECTION INTRAVENOUS; SUBCUTANEOUS at 13:36

## 2020-04-30 RX ADMIN — Medication 25 MILLIGRAM(S): at 07:06

## 2020-04-30 RX ADMIN — Medication 5 MILLIGRAM(S): at 13:36

## 2020-04-30 RX ADMIN — POLYETHYLENE GLYCOL 3350 17 GRAM(S): 17 POWDER, FOR SOLUTION ORAL at 13:37

## 2020-04-30 RX ADMIN — SEVELAMER CARBONATE 800 MILLIGRAM(S): 2400 POWDER, FOR SUSPENSION ORAL at 17:24

## 2020-04-30 RX ADMIN — HEPARIN SODIUM 5000 UNIT(S): 5000 INJECTION INTRAVENOUS; SUBCUTANEOUS at 07:07

## 2020-04-30 RX ADMIN — TAMSULOSIN HYDROCHLORIDE 0.4 MILLIGRAM(S): 0.4 CAPSULE ORAL at 22:38

## 2020-04-30 RX ADMIN — TACROLIMUS 1 MILLIGRAM(S): 5 CAPSULE ORAL at 17:24

## 2020-04-30 RX ADMIN — Medication 25 MILLIGRAM(S): at 17:24

## 2020-04-30 RX ADMIN — CARVEDILOL PHOSPHATE 3.12 MILLIGRAM(S): 80 CAPSULE, EXTENDED RELEASE ORAL at 07:05

## 2020-04-30 RX ADMIN — SEVELAMER CARBONATE 800 MILLIGRAM(S): 2400 POWDER, FOR SUSPENSION ORAL at 07:07

## 2020-04-30 RX ADMIN — Medication 1 MILLIGRAM(S): at 13:36

## 2020-04-30 RX ADMIN — Medication 81 MILLIGRAM(S): at 13:36

## 2020-04-30 RX ADMIN — Medication 105 MILLIGRAM(S): at 09:20

## 2020-04-30 RX ADMIN — SEVELAMER CARBONATE 800 MILLIGRAM(S): 2400 POWDER, FOR SUSPENSION ORAL at 13:37

## 2020-04-30 RX ADMIN — CLOPIDOGREL BISULFATE 75 MILLIGRAM(S): 75 TABLET, FILM COATED ORAL at 13:36

## 2020-04-30 RX ADMIN — CHLORHEXIDINE GLUCONATE 1 APPLICATION(S): 213 SOLUTION TOPICAL at 13:36

## 2020-04-30 RX ADMIN — Medication 105 MILLIGRAM(S): at 22:38

## 2020-04-30 NOTE — EEG REPORT - NS EEG TEXT BOX
WANG ARELLANO MRN-[id number]     Study Date: 		04-30-20    ROUTINE EEG    Technical Information:			  		  Placement and Labeling of Electrodes:  The EEG was performed utilizing 20 channels referential EEG connections (coronal over temporal over parasagittal montage) using all standard 10-20 electrode placements with EKG.  Recording was at a sampling rate of 256 samples per second per channel.  Time synchronized digital video recording was done simultaneously with EEG recording.  A low light infrared camera was used for low light recording.  Diaz and seizure detection algorithms were utilized.    CSA Technical Component:  Quantitative EEG analysis using a separate Compressed Spectral Array (CSA) software package was conducted in real-time and run at bedside after set up by the technician, digitally displaying the power of electrographic frequencies included in the 1-30Hz band using a graded color map.  This data was reviewed and interpreted independently, and is reported in a separate section below.    --------------------------------------------------------------------------------------------------  History:  CC/ HPI Patient is a 59y old  Male who presents with a chief complaint of Rule out COVID 19 infection (30 Apr 2020 08:51)    MEDICATIONS  (STANDING):  ascorbic acid  Oral Tab/Cap - Peds 500 milliGRAM(s) Oral two times a day  aspirin  chewable 81 milliGRAM(s) Oral daily  atorvastatin 80 milliGRAM(s) Oral at bedtime  bisacodyl 5 milliGRAM(s) Oral daily  carvedilol 3.125 milliGRAM(s) Oral every 12 hours  chlorhexidine 4% Liquid 1 Application(s) Topical two times a day  clopidogrel Tablet 75 milliGRAM(s) Oral daily  dextrose 5%. 1000 milliLiter(s) (50 mL/Hr) IV Continuous <Continuous>  dextrose 50% Injectable 12.5 Gram(s) IV Push once  dextrose 50% Injectable 25 Gram(s) IV Push once  dextrose 50% Injectable 25 Gram(s) IV Push once  ferrous sulfate Oral Tab/Cap - Peds 325 milliGRAM(s) Oral daily  folic acid 1 milliGRAM(s) Oral daily  heparin   Injectable 5000 Unit(s) SubCutaneous every 8 hours  hydrALAZINE 25 milliGRAM(s) Oral two times a day  insulin lispro (HumaLOG) corrective regimen sliding scale   SubCutaneous three times a day before meals  insulin lispro (HumaLOG) corrective regimen sliding scale   SubCutaneous at bedtime  levothyroxine 75 MICROGram(s) Oral daily  melatonin 3 milliGRAM(s) Oral at bedtime  polyethylene glycol 3350 17 Gram(s) Oral daily  predniSONE   Tablet 5 milliGRAM(s) Oral daily  sevelamer carbonate 800 milliGRAM(s) Oral three times a day with meals  tacrolimus 2 milliGRAM(s) Oral <User Schedule>  tacrolimus 1 milliGRAM(s) Oral <User Schedule>  tamsulosin 0.4 milliGRAM(s) Oral at bedtime  thiamine IVPB 500 milliGRAM(s) IV Intermittent <User Schedule>    --------------------------------------------------------------------------------------------------  Study Interpretation:    [[[Abbreviation Key:  PDR=alpha rhythm/posterior dominant rhythm. A-P=anterior posterior gradient.  Amplitude: ‘very low’:<20; ‘low’:20-50; ‘medium’:; ‘high’:>200uV.  Persistence for periodic/rhythmic patterns (% of epoch) ‘rare’:<1%; ‘occasional’:1-10%; ‘frequent’:10-50%; ‘abundant’:50-90%; ‘continuous’:>90%.  Persistence for sporadic discharges: ‘rare’:<1/hr; ‘occasional’:1/min-1/hr; ‘frequent’:>1/min; ‘abundant’:>1/10 sec.  GRDA=generalized rhythmic delta activity, LRDA=lateralized rhythmic delta activity, TIRDA=temporal intermittent rhythmic delta activity, FIRDA=frontal intermittent rhythmic activity. LPD=PLED=lateralized periodic discharges, GPD=generalized periodic discharges, BiPDs=BiPLEDs=bilateral independent periodic epileptiform discharges, SIRPID=stimulus induced rhythmic, periodic, or ictal appearing discharges.  Modifiers: +F=with fast component, +S=with spike component, +R=with rhythmic component.  S-B=burst suppression pattern.  Max=maximal. N1-drowsy, N2-stage II sleep, N3-slow wave sleep.  HV=hyperventilation, PS=photic stimulation]]]    FINDINGS:  The background was continuous, spontaneously variable and reactive.  During wakefulness, the posteriorly dominant rhythm was not seen.      There was more diffuse irregular theta and polymorphic delta activity present.    Focal slowing: Continuous right posterior quadrant polymorphic delta/ theta slowing.     Sleep Background:  Stage II sleep transients were not recorded.    Epileptiform Activity:   No epileptiform discharges were present.    Events:  No clinical events were recorded.  No seizures were recorded.    Activation Procedures:   -Hyperventilation was not performed.    -Photic stimulation was not performed.    Artifacts:  Intermittent myogenic and movement artifacts were noted.    ECG:  The heart rate on single channel ECG at baseline was predominantly near BPM = 60-70  -----------------------------------------------------------------------------------------------------    EEG Classification / Summary:  Abnormal EEG study in an altered patient.  Continuous right posterior quadrant polymorphic delta/ theta slowing.  Moderate background slowing    -----------------------------------------------------------------------------------------------------    Clinical Impression:  Structural abnormality in the right posterior quadrant.   Moderate diffuse or multifocal cerebral dysfunction, not specific as to etiology.  There were no epileptiform abnormalities recorded.      -------------------------------------------------------------------------------------------------------  Renan Brown DO  Epilepsy Fellow, [facility]ilepsy [place] WANG ARELLANO [id number]     Study Date: 		04-30-20    ROUTINE EEG    Technical Information:			  		  Placement and Labeling of Electrodes:  The EEG was performed utilizing 20 channels referential EEG connections (coronal over temporal over parasagittal montage) using all standard 10-20 electrode placements with EKG.  Recording was at a sampling rate of 256 samples per second per channel.  Time synchronized digital video recording was done simultaneously with EEG recording.  A low light infrared camera was used for low light recording.  Diaz and seizure detection algorithms were utilized.    CSA Technical Component:  Quantitative EEG analysis using a separate Compressed Spectral Array (CSA) software package was conducted in real-time and run at bedside after set up by the technician, digitally displaying the power of electrographic frequencies included in the 1-30Hz band using a graded color map.  This data was reviewed and interpreted independently, and is reported in a separate section below.    --------------------------------------------------------------------------------------------------  History:  CC/ HPI Patient is a 59y old  Male who presents with a chief complaint of Rule out COVID 19 infection (30 Apr 2020 08:51)    MEDICATIONS  (STANDING):  ascorbic acid  Oral Tab/Cap - Peds 500 milliGRAM(s) Oral two times a day  aspirin  chewable 81 milliGRAM(s) Oral daily  atorvastatin 80 milliGRAM(s) Oral at bedtime  bisacodyl 5 milliGRAM(s) Oral daily  carvedilol 3.125 milliGRAM(s) Oral every 12 hours  chlorhexidine 4% Liquid 1 Application(s) Topical two times a day  clopidogrel Tablet 75 milliGRAM(s) Oral daily  dextrose 5%. 1000 milliLiter(s) (50 mL/Hr) IV Continuous <Continuous>  dextrose 50% Injectable 12.5 Gram(s) IV Push once  dextrose 50% Injectable 25 Gram(s) IV Push once  dextrose 50% Injectable 25 Gram(s) IV Push once  ferrous sulfate Oral Tab/Cap - Peds 325 milliGRAM(s) Oral daily  folic acid 1 milliGRAM(s) Oral daily  heparin   Injectable 5000 Unit(s) SubCutaneous every 8 hours  hydrALAZINE 25 milliGRAM(s) Oral two times a day  insulin lispro (HumaLOG) corrective regimen sliding scale   SubCutaneous three times a day before meals  insulin lispro (HumaLOG) corrective regimen sliding scale   SubCutaneous at bedtime  levothyroxine 75 MICROGram(s) Oral daily  melatonin 3 milliGRAM(s) Oral at bedtime  polyethylene glycol 3350 17 Gram(s) Oral daily  predniSONE   Tablet 5 milliGRAM(s) Oral daily  sevelamer carbonate 800 milliGRAM(s) Oral three times a day with meals  tacrolimus 2 milliGRAM(s) Oral <User Schedule>  tacrolimus 1 milliGRAM(s) Oral <User Schedule>  tamsulosin 0.4 milliGRAM(s) Oral at bedtime  thiamine IVPB 500 milliGRAM(s) IV Intermittent <User Schedule>    --------------------------------------------------------------------------------------------------  Study Interpretation:    [[[Abbreviation Key:  PDR=alpha rhythm/posterior dominant rhythm. A-P=anterior posterior gradient.  Amplitude: ‘very low’:<20; ‘low’:20-50; ‘medium’:; ‘high’:>200uV.  Persistence for periodic/rhythmic patterns (% of epoch) ‘rare’:<1%; ‘occasional’:1-10%; ‘frequent’:10-50%; ‘abundant’:50-90%; ‘continuous’:>90%.  Persistence for sporadic discharges: ‘rare’:<1/hr; ‘occasional’:1/min-1/hr; ‘frequent’:>1/min; ‘abundant’:>1/10 sec.  GRDA=generalized rhythmic delta activity, LRDA=lateralized rhythmic delta activity, TIRDA=temporal intermittent rhythmic delta activity, FIRDA=frontal intermittent rhythmic activity. LPD=PLED=lateralized periodic discharges, GPD=generalized periodic discharges, BiPDs=BiPLEDs=bilateral independent periodic epileptiform discharges, SIRPID=stimulus induced rhythmic, periodic, or ictal appearing discharges.  Modifiers: +F=with fast component, +S=with spike component, +R=with rhythmic component.  S-B=burst suppression pattern.  Max=maximal. N1-drowsy, N2-stage II sleep, N3-slow wave sleep.  HV=hyperventilation, PS=photic stimulation]]]    FINDINGS:  The background was continuous, spontaneously variable and reactive.  During wakefulness, the posteriorly dominant rhythm was not seen.      There was more diffuse irregular theta and polymorphic delta activity present.    Focal slowing: Continuous right posterior quadrant polymorphic delta/ theta slowing.     Sleep Background:  Stage II sleep transients were not recorded.    Epileptiform Activity:   No epileptiform discharges were present.    Events:  No clinical events were recorded.  No seizures were recorded.    Activation Procedures:   -Hyperventilation was not performed.    -Photic stimulation was not performed.    Artifacts:  Intermittent myogenic and movement artifacts were noted.    ECG:  The heart rate on single channel ECG at baseline was predominantly near BPM = 60-70  -----------------------------------------------------------------------------------------------------    EEG Classification / Summary:  Abnormal EEG study in an altered patient.  Continuous right posterior quadrant polymorphic delta/ theta slowing.  Moderate background slowing    -----------------------------------------------------------------------------------------------------    Clinical Impression:  Structural abnormality in the right posterior quadrant.   Moderate diffuse or multifocal cerebral dysfunction, not specific as to etiology.  There were no epileptiform abnormalities recorded.      -------------------------------------------------------------------------------------------------------  Renan Brown DO  Epilepsy Fellow, [facility] Epilepsy [place]    Refugio Mccann MD

## 2020-04-30 NOTE — PROGRESS NOTE ADULT - PROBLEM SELECTOR PLAN 2
Noted to have YURI on CKD, worsening over past 24 hours. Found to have acute urinary retention with >1L urine on bladder scan s/p Moody placement on 4/29.   - Appreciate Nephrology recs  - C/w Moody; will need ToV prior to discharge  - Continue immunosuppressant regimen (see below)  - Holding home Cellcept and Bumex  - Continue to trend BMP  - Avoid nephrotoxins, renally dose medications

## 2020-04-30 NOTE — PROGRESS NOTE ADULT - SUBJECTIVE AND OBJECTIVE BOX
Medicine Progress Note  Authored by Kathi Campos MD PGY3, pager [phone]    Patient is a 59y old  Male who presents with a chief complaint of Rule out COVID 19 infection (29 Apr 2020 14:58)    SUBJECTIVE / OVERNIGHT EVENTS:      MEDICATIONS  (STANDING):  ascorbic acid  Oral Tab/Cap - Peds 500 milliGRAM(s) Oral two times a day  aspirin  chewable 81 milliGRAM(s) Oral daily  atorvastatin 80 milliGRAM(s) Oral at bedtime  bisacodyl 5 milliGRAM(s) Oral daily  carvedilol 3.125 milliGRAM(s) Oral every 12 hours  chlorhexidine 4% Liquid 1 Application(s) Topical two times a day  clopidogrel Tablet 75 milliGRAM(s) Oral daily  dextrose 5%. 1000 milliLiter(s) (50 mL/Hr) IV Continuous <Continuous>  dextrose 50% Injectable 12.5 Gram(s) IV Push once  dextrose 50% Injectable 25 Gram(s) IV Push once  dextrose 50% Injectable 25 Gram(s) IV Push once  ferrous sulfate Oral Tab/Cap - Peds 325 milliGRAM(s) Oral daily  folic acid 1 milliGRAM(s) Oral daily  heparin   Injectable 5000 Unit(s) SubCutaneous every 8 hours  hydrALAZINE 25 milliGRAM(s) Oral two times a day  insulin lispro (HumaLOG) corrective regimen sliding scale   SubCutaneous three times a day before meals  insulin lispro (HumaLOG) corrective regimen sliding scale   SubCutaneous at bedtime  levothyroxine 75 MICROGram(s) Oral daily  melatonin 3 milliGRAM(s) Oral at bedtime  polyethylene glycol 3350 17 Gram(s) Oral daily  predniSONE   Tablet 5 milliGRAM(s) Oral daily  sevelamer carbonate 800 milliGRAM(s) Oral three times a day with meals  tacrolimus 2 milliGRAM(s) Oral <User Schedule>  tacrolimus 1 milliGRAM(s) Oral <User Schedule>  thiamine IVPB 500 milliGRAM(s) IV Intermittent <User Schedule>    MEDICATIONS  (PRN):  acetaminophen   Tablet .. 650 milliGRAM(s) Oral every 4 hours PRN Temp greater or equal to 38.5C (101.3F)  acetaminophen   Tablet .. 650 milliGRAM(s) Oral every 6 hours PRN Temp greater or equal to 38C (100.4F), Mild Pain (1 - 3), Moderate Pain (4 - 6)  acetaminophen  Suppository .. 650 milliGRAM(s) Rectal every 4 hours PRN Temp greater or equal to 38.5C (101.3F)  dextrose 40% Gel 15 Gram(s) Oral once PRN Blood Glucose LESS THAN 70 milliGRAM(s)/deciliter  glucagon  Injectable 1 milliGRAM(s) IntraMuscular once PRN Glucose LESS THAN 70 milligrams/deciliter  haloperidol    Injectable 2.5 milliGRAM(s) IV Push every 6 hours PRN Agitation  ondansetron Injectable 4 milliGRAM(s) IV Push every 8 hours PRN Nausea and/or Vomiting  sucralfate 1 Gram(s) Oral every 6 hours PRN With each meal    PHYSICAL EXAM:  Vital Signs Last 24 Hrs  T(C): 36.9 (30 Apr 2020 07:00), Max: 36.9 (30 Apr 2020 07:00)  T(F): 98.5 (30 Apr 2020 07:00), Max: 98.5 (30 Apr 2020 07:00)  HR: 79 (30 Apr 2020 07:00) (77 - 79)  BP: 140/67 (30 Apr 2020 07:00) (138/60 - 140/67)  RR: 18 (30 Apr 2020 07:00) (18 - 18)  SpO2: 100% (30 Apr 2020 07:00) (100% - 100%)    CONSTITUTIONAL: NAD  HEENT: EOMI, PERRL, normal sclera and conjunctiva; normal nasal and oral mucosa, no oral lesions  RESPIRATORY: Normal respiratory effort; lungs are clear to auscultation bilaterally  CARDIOVASCULAR: Regular rate and rhythm, normal S1 and S2, no murmur/rub/gallop; No lower extremity edema; Peripheral pulses are 2+ bilaterally  ABDOMEN: Nontender to palpation, normoactive bowel sounds, no rebound/guarding; No hepatosplenomegaly  NEUROLOGY: AOx3, CN 2-12 are intact and symmetric; no gross sensory deficits   SKIN: No rashes; no palpable lesions    LABS:      Culture - Blood (collected 28 Apr 2020 01:53)  Source: .Blood Blood-Venous  Preliminary Report (29 Apr 2020 02:01):    No growth to date.    Culture - Blood (collected 28 Apr 2020 01:53)  Source: .Blood Blood-Peripheral  Preliminary Report (29 Apr 2020 02:01):    No growth to date.    COVID-19 PCR: Detected (11 Apr 2020 05:25)    RADIOLOGY & ADDITIONAL TESTS:  Imaging from Last 24 Hours:    Electrocardiogram/QTc Interval:    COORDINATION OF CARE:  Care Discussed with Consultants/Other Providers: Medicine Progress Note  Authored by Kathi Campos MD PGY3, pager [phone]    Patient is a 59y old  Male who presents with a chief complaint of Rule out COVID 19 infection (29 Apr 2020 14:58)    SUBJECTIVE / OVERNIGHT EVENTS:  No overnight events reported.   Patient seen and examined this morning. More alert today, able to converse and answer questions appropriately.     MEDICATIONS  (STANDING):  ascorbic acid  Oral Tab/Cap - Peds 500 milliGRAM(s) Oral two times a day  aspirin  chewable 81 milliGRAM(s) Oral daily  atorvastatin 80 milliGRAM(s) Oral at bedtime  bisacodyl 5 milliGRAM(s) Oral daily  carvedilol 3.125 milliGRAM(s) Oral every 12 hours  chlorhexidine 4% Liquid 1 Application(s) Topical two times a day  clopidogrel Tablet 75 milliGRAM(s) Oral daily  dextrose 5%. 1000 milliLiter(s) (50 mL/Hr) IV Continuous <Continuous>  dextrose 50% Injectable 12.5 Gram(s) IV Push once  dextrose 50% Injectable 25 Gram(s) IV Push once  dextrose 50% Injectable 25 Gram(s) IV Push once  ferrous sulfate Oral Tab/Cap - Peds 325 milliGRAM(s) Oral daily  folic acid 1 milliGRAM(s) Oral daily  heparin   Injectable 5000 Unit(s) SubCutaneous every 8 hours  hydrALAZINE 25 milliGRAM(s) Oral two times a day  insulin lispro (HumaLOG) corrective regimen sliding scale   SubCutaneous three times a day before meals  insulin lispro (HumaLOG) corrective regimen sliding scale   SubCutaneous at bedtime  levothyroxine 75 MICROGram(s) Oral daily  melatonin 3 milliGRAM(s) Oral at bedtime  polyethylene glycol 3350 17 Gram(s) Oral daily  predniSONE   Tablet 5 milliGRAM(s) Oral daily  sevelamer carbonate 800 milliGRAM(s) Oral three times a day with meals  tacrolimus 2 milliGRAM(s) Oral <User Schedule>  tacrolimus 1 milliGRAM(s) Oral <User Schedule>  thiamine IVPB 500 milliGRAM(s) IV Intermittent <User Schedule>    MEDICATIONS  (PRN):  acetaminophen   Tablet .. 650 milliGRAM(s) Oral every 4 hours PRN Temp greater or equal to 38.5C (101.3F)  acetaminophen   Tablet .. 650 milliGRAM(s) Oral every 6 hours PRN Temp greater or equal to 38C (100.4F), Mild Pain (1 - 3), Moderate Pain (4 - 6)  acetaminophen  Suppository .. 650 milliGRAM(s) Rectal every 4 hours PRN Temp greater or equal to 38.5C (101.3F)  dextrose 40% Gel 15 Gram(s) Oral once PRN Blood Glucose LESS THAN 70 milliGRAM(s)/deciliter  glucagon  Injectable 1 milliGRAM(s) IntraMuscular once PRN Glucose LESS THAN 70 milligrams/deciliter  haloperidol    Injectable 2.5 milliGRAM(s) IV Push every 6 hours PRN Agitation  ondansetron Injectable 4 milliGRAM(s) IV Push every 8 hours PRN Nausea and/or Vomiting  sucralfate 1 Gram(s) Oral every 6 hours PRN With each meal    PHYSICAL EXAM:  Vital Signs Last 24 Hrs  T(C): 36.9 (30 Apr 2020 07:00), Max: 36.9 (30 Apr 2020 07:00)  T(F): 98.5 (30 Apr 2020 07:00), Max: 98.5 (30 Apr 2020 07:00)  HR: 79 (30 Apr 2020 07:00) (77 - 79)  BP: 140/67 (30 Apr 2020 07:00) (138/60 - 140/67)  RR: 18 (30 Apr 2020 07:00) (18 - 18)  SpO2: 100% (30 Apr 2020 07:00) (100% - 100%)    CONSTITUTIONAL: NAD  HEENT: EOMI, PERRL, normal sclera and conjunctiva; normal nasal and oral mucosa, no oral lesions  RESPIRATORY: Normal respiratory effort; lungs are clear to auscultation bilaterally  CARDIOVASCULAR: Regular rate and rhythm, normal S1 and S2, no murmur/rub/gallop; No lower extremity edema; Peripheral pulses are 2+ bilaterally  ABDOMEN: Nontender to palpation, normoactive bowel sounds, no rebound/guarding; No hepatosplenomegaly  NEUROLOGY: AOx3, CN 2-12 are intact and symmetric; no gross sensory deficits   SKIN: No rashes; no palpable lesions    LABS:  CBC Full  -  ( 30 Apr 2020 10:27 )  WBC Count : 5.52 K/uL  RBC Count : 3.92 M/uL  Hemoglobin : 9.9 g/dL  Hematocrit : 32.6 %  Platelet Count - Automated : 203 K/uL  Mean Cell Volume : 83.2 fL  Mean Cell Hemoglobin : 25.3 pg  Mean Cell Hemoglobin Concentration : 30.4 %  Auto Neutrophil # : 4.05 K/uL  Auto Lymphocyte # : 0.89 K/uL  Auto Monocyte # : 0.42 K/uL  Auto Eosinophil # : 0.11 K/uL  Auto Basophil # : 0.01 K/uL  Auto Neutrophil % : 73.4 %  Auto Lymphocyte % : 16.1 %  Auto Monocyte % : 7.6 %  Auto Eosinophil % : 2.0 %  Auto Basophil % : 0.2 %    04-30    144  |  111<H>  |  87<H>  ----------------------------<  100<H>  5.2   |  19<L>  |  4.14<H>    04-29    143  |  113<H>  |  83<H>  ----------------------------<  108<H>  4.9   |  21<L>  |  3.69<H>    Ca    9.0      30 Apr 2020 10:05  Phos  3.5     04-30  Mg     1.9     04-30    Culture - Blood (collected 28 Apr 2020 01:53)  Source: .Blood Blood-Venous  Preliminary Report (29 Apr 2020 02:01):    No growth to date.    Culture - Blood (collected 28 Apr 2020 01:53)  Source: .Blood Blood-Peripheral  Preliminary Report (29 Apr 2020 02:01):    No growth to date.    COVID-19 PCR: Detected (11 Apr 2020 05:25)    RADIOLOGY & ADDITIONAL TESTS:  Imaging from Last 24 Hours: n/a    Electrocardiogram/QTc Interval: n/a    COORDINATION OF CARE:  Care Discussed with Consultants/Other Providers: n/a

## 2020-04-30 NOTE — CHART NOTE - NSCHARTNOTEFT_GEN_A_CORE
Nephrology Follow-up    Tacro <2.2 today.  Would recommend to continue Tacro 2mg at 6 AM and 1mg at 6PM  Repeat Tacro trough levels tomorrow.      Candida Diana  Nephrology Fellow  Pager: [phone]

## 2020-04-30 NOTE — PROGRESS NOTE ADULT - PROBLEM SELECTOR PLAN 1
Significantly improved today. Suspect etiology was multifactorial related to acute urinary retention w/ associated acute renal failure, hospital-associated delirium, and vitamin deficiency (?).   - Appreciate Neurology recs  - EEG on 4/30 with nonspecific moderate diffuse cerebral dysfunction, no seizure activity  - Appreciate Psychiatry recs; Haldol 2.5mg IV/IM/PO q6h PRN agitation  - C/w folate and thiamine repletion  - C/w melatonin to help regulate circadian rhythm

## 2020-04-30 NOTE — PROGRESS NOTE ADULT - ASSESSMENT
59M with ESRD s/p renal transplant (2005) on chronic immunosuppressive therapy, HTN, HLD, CAD s/p recent stent, and PVD s/p R AKA admitted with COVID19 pneumonia with course c/b ESBL Klebsiella UTI. Course further c/b acute encephalopathy and urinary retention s/p Moody.

## 2020-04-30 NOTE — PROGRESS NOTE ADULT - PROBLEM SELECTOR PLAN 5
Home immunosuppressant regimen: Tacrolimus 1mg BID, prednisone 5mg daily, Cellcept 1g BID. Tacro level subtherapeutic on admission concerning for medication non-adherence.   - C/w Tacrolimus 2mg in AM, 1mg in PM  - Continue prednisone 5mg daily  - Continue to hold Cellcept until discharge  - Next Tacrolimus level tomorrow morning prior to AM dose (5/1)  - Will need to follow up with outpatient nephrologist upon discharge (Dr. Adrián Hawkins)

## 2020-04-30 NOTE — PROGRESS NOTE ADULT - PROBLEM SELECTOR PLAN 6
S/p recent stent at [facility].   - C/w DAPT  - Continue carvedilol  - Continue atorvastatin

## 2020-05-01 LAB
ANION GAP SERPL CALC-SCNC: 14 MMO/L — SIGNIFICANT CHANGE UP (ref 7–14)
APPEARANCE UR: CLEAR — SIGNIFICANT CHANGE UP
BACTERIA # UR AUTO: NEGATIVE — SIGNIFICANT CHANGE UP
BILIRUB UR-MCNC: NEGATIVE — SIGNIFICANT CHANGE UP
BLOOD UR QL VISUAL: SIGNIFICANT CHANGE UP
BUN SERPL-MCNC: 90 MG/DL — HIGH (ref 7–23)
CALCIUM SERPL-MCNC: 9 MG/DL — SIGNIFICANT CHANGE UP (ref 8.4–10.5)
CHLORIDE SERPL-SCNC: 112 MMOL/L — HIGH (ref 98–107)
CHLORIDE UR-SCNC: < 20 MMOL/L — SIGNIFICANT CHANGE UP
CO2 SERPL-SCNC: 19 MMOL/L — LOW (ref 22–31)
COLOR SPEC: YELLOW — SIGNIFICANT CHANGE UP
CREAT SERPL-MCNC: 4.43 MG/DL — HIGH (ref 0.5–1.3)
GLUCOSE BLDC GLUCOMTR-MCNC: 137 MG/DL — HIGH (ref 70–99)
GLUCOSE BLDC GLUCOMTR-MCNC: 142 MG/DL — HIGH (ref 70–99)
GLUCOSE BLDC GLUCOMTR-MCNC: 162 MG/DL — HIGH (ref 70–99)
GLUCOSE BLDC GLUCOMTR-MCNC: 226 MG/DL — HIGH (ref 70–99)
GLUCOSE SERPL-MCNC: 143 MG/DL — HIGH (ref 70–99)
GLUCOSE UR-MCNC: 50 — SIGNIFICANT CHANGE UP
HCT VFR BLD CALC: 35 % — LOW (ref 39–50)
HGB BLD-MCNC: 10.2 G/DL — LOW (ref 13–17)
HYALINE CASTS # UR AUTO: NEGATIVE — SIGNIFICANT CHANGE UP
KETONES UR-MCNC: NEGATIVE — SIGNIFICANT CHANGE UP
LEUKOCYTE ESTERASE UR-ACNC: NEGATIVE — SIGNIFICANT CHANGE UP
MAGNESIUM SERPL-MCNC: 2.1 MG/DL — SIGNIFICANT CHANGE UP (ref 1.6–2.6)
MCHC RBC-ENTMCNC: 24.5 PG — LOW (ref 27–34)
MCHC RBC-ENTMCNC: 29.1 % — LOW (ref 32–36)
MCV RBC AUTO: 83.9 FL — SIGNIFICANT CHANGE UP (ref 80–100)
NITRITE UR-MCNC: NEGATIVE — SIGNIFICANT CHANGE UP
NRBC # FLD: 0 K/UL — SIGNIFICANT CHANGE UP (ref 0–0)
PH UR: 6 — SIGNIFICANT CHANGE UP (ref 5–8)
PHOSPHATE SERPL-MCNC: 4.3 MG/DL — SIGNIFICANT CHANGE UP (ref 2.5–4.5)
PLATELET # BLD AUTO: 213 K/UL — SIGNIFICANT CHANGE UP (ref 150–400)
PMV BLD: 8.9 FL — SIGNIFICANT CHANGE UP (ref 7–13)
POTASSIUM SERPL-MCNC: 4.8 MMOL/L — SIGNIFICANT CHANGE UP (ref 3.5–5.3)
POTASSIUM SERPL-SCNC: 4.8 MMOL/L — SIGNIFICANT CHANGE UP (ref 3.5–5.3)
POTASSIUM UR-SCNC: 33.6 MMOL/L — SIGNIFICANT CHANGE UP
PROT UR-MCNC: >600 — HIGH
RBC # BLD: 4.17 M/UL — LOW (ref 4.2–5.8)
RBC # FLD: 15.3 % — HIGH (ref 10.3–14.5)
RBC CASTS # UR COMP ASSIST: HIGH (ref 0–?)
SODIUM SERPL-SCNC: 145 MMOL/L — SIGNIFICANT CHANGE UP (ref 135–145)
SODIUM UR-SCNC: < 20 MMOL/L — SIGNIFICANT CHANGE UP
SP GR SPEC: 1.02 — SIGNIFICANT CHANGE UP (ref 1–1.04)
SQUAMOUS # UR AUTO: SIGNIFICANT CHANGE UP
TACROLIMUS SERPL-MCNC: 2.9 NG/ML — SIGNIFICANT CHANGE UP
UROBILINOGEN FLD QL: NORMAL — SIGNIFICANT CHANGE UP
WBC # BLD: 4.29 K/UL — SIGNIFICANT CHANGE UP (ref 3.8–10.5)
WBC # FLD AUTO: 4.29 K/UL — SIGNIFICANT CHANGE UP (ref 3.8–10.5)
WBC UR QL: SIGNIFICANT CHANGE UP (ref 0–?)

## 2020-05-01 PROCEDURE — 70490 CT SOFT TISSUE NECK W/O DYE: CPT | Mod: 26

## 2020-05-01 PROCEDURE — 99233 SBSQ HOSP IP/OBS HIGH 50: CPT | Mod: GC

## 2020-05-01 PROCEDURE — 70450 CT HEAD/BRAIN W/O DYE: CPT | Mod: 26,77

## 2020-05-01 PROCEDURE — 99233 SBSQ HOSP IP/OBS HIGH 50: CPT

## 2020-05-01 PROCEDURE — 70450 CT HEAD/BRAIN W/O DYE: CPT | Mod: 26

## 2020-05-01 RX ORDER — TACROLIMUS 5 MG/1
1.5 CAPSULE ORAL
Refills: 0 | Status: DISCONTINUED | OUTPATIENT
Start: 2020-05-01 | End: 2020-05-03

## 2020-05-01 RX ADMIN — Medication 325 MILLIGRAM(S): at 11:30

## 2020-05-01 RX ADMIN — SEVELAMER CARBONATE 800 MILLIGRAM(S): 2400 POWDER, FOR SUSPENSION ORAL at 06:09

## 2020-05-01 RX ADMIN — CLOPIDOGREL BISULFATE 75 MILLIGRAM(S): 75 TABLET, FILM COATED ORAL at 11:30

## 2020-05-01 RX ADMIN — Medication 105 MILLIGRAM(S): at 09:26

## 2020-05-01 RX ADMIN — HEPARIN SODIUM 5000 UNIT(S): 5000 INJECTION INTRAVENOUS; SUBCUTANEOUS at 06:09

## 2020-05-01 RX ADMIN — HEPARIN SODIUM 5000 UNIT(S): 5000 INJECTION INTRAVENOUS; SUBCUTANEOUS at 22:00

## 2020-05-01 RX ADMIN — Medication 25 MILLIGRAM(S): at 06:09

## 2020-05-01 RX ADMIN — CARVEDILOL PHOSPHATE 3.12 MILLIGRAM(S): 80 CAPSULE, EXTENDED RELEASE ORAL at 06:44

## 2020-05-01 RX ADMIN — Medication 5 MILLIGRAM(S): at 11:30

## 2020-05-01 RX ADMIN — POLYETHYLENE GLYCOL 3350 17 GRAM(S): 17 POWDER, FOR SOLUTION ORAL at 11:30

## 2020-05-01 RX ADMIN — Medication 500 MILLIGRAM(S): at 06:09

## 2020-05-01 RX ADMIN — TACROLIMUS 2 MILLIGRAM(S): 5 CAPSULE ORAL at 06:09

## 2020-05-01 RX ADMIN — Medication 1 MILLIGRAM(S): at 11:30

## 2020-05-01 RX ADMIN — TACROLIMUS 1.5 MILLIGRAM(S): 5 CAPSULE ORAL at 17:46

## 2020-05-01 RX ADMIN — Medication 500 MILLIGRAM(S): at 17:45

## 2020-05-01 RX ADMIN — Medication 5 MILLIGRAM(S): at 06:09

## 2020-05-01 RX ADMIN — Medication 2: at 12:22

## 2020-05-01 RX ADMIN — Medication 4: at 17:46

## 2020-05-01 RX ADMIN — CARVEDILOL PHOSPHATE 3.12 MILLIGRAM(S): 80 CAPSULE, EXTENDED RELEASE ORAL at 17:45

## 2020-05-01 RX ADMIN — SEVELAMER CARBONATE 800 MILLIGRAM(S): 2400 POWDER, FOR SUSPENSION ORAL at 17:46

## 2020-05-01 RX ADMIN — CHLORHEXIDINE GLUCONATE 1 APPLICATION(S): 213 SOLUTION TOPICAL at 17:45

## 2020-05-01 RX ADMIN — Medication 81 MILLIGRAM(S): at 11:30

## 2020-05-01 RX ADMIN — Medication 25 MILLIGRAM(S): at 17:45

## 2020-05-01 RX ADMIN — CHLORHEXIDINE GLUCONATE 1 APPLICATION(S): 213 SOLUTION TOPICAL at 06:09

## 2020-05-01 RX ADMIN — Medication 105 MILLIGRAM(S): at 22:00

## 2020-05-01 RX ADMIN — SEVELAMER CARBONATE 800 MILLIGRAM(S): 2400 POWDER, FOR SUSPENSION ORAL at 11:30

## 2020-05-01 RX ADMIN — Medication 75 MICROGRAM(S): at 06:09

## 2020-05-01 RX ADMIN — HEPARIN SODIUM 5000 UNIT(S): 5000 INJECTION INTRAVENOUS; SUBCUTANEOUS at 13:03

## 2020-05-01 NOTE — RAPID RESPONSE TEAM SUMMARY - NSSITUATIONBACKGROUNDRRT_GEN_ALL_CORE
59M w/ hx of HTN, HLD, DM, ESRD s/p kidney transplant on tacrolimus and prednisone in 2005, CAD s/p stent, PVD s/p R AKA in 2010 admitted to medicine on 4/11 with fever, rhinorrhea and not feeling well for three days found to be COVID19 positive. Course complicated by ESBL UTI and delirium. S/p mechanical fall 5/1. RRT called for repeat fall. Patient states he was trying to get up to go to the bathroom and fell (has R AKA). On floor, patient alert and awake with normal vitals signs. Reporting neck pain and R shoulder/upper arm pain. ROM limited of RUE d/t pain, but is able to move it. Patient transferred to bed. Had a CT earlier this morning for fall, but will obtain again given patient on DAPT and heparin subq. Will also obtain CT neck soft tissue (altho patient without midline TTP). Neuro exam otherwise intact except for chronic issues. Pelvis stable. Tenderness to R shoulder. Will obtain shoulder xray and humerus xray (on R). Attempted to inform primary team. Imaging ordered by me. 59M w/ hx of HTN, HLD, DM, ESRD s/p kidney transplant on tacrolimus and prednisone in 2005, CAD s/p stent, PVD s/p R AKA in 2010 admitted to medicine on 4/11 with fever, rhinorrhea and not feeling well for three days found to be COVID19 positive. Course complicated by ESBL UTI and delirium. S/p mechanical fall 5/1. RRT called for repeat fall. Patient states he was trying to get up to go to the bathroom and fell (has R AKA). On floor, patient alert and awake with normal vitals signs. Reporting neck pain and R shoulder/upper arm pain. ROM limited of RUE d/t pain, but is able to move it. Patient transferred to bed. Had a CT earlier this morning for fall, but will obtain again given patient on DAPT and heparin subq. Will also obtain CT neck soft tissue (altho patient without midline TTP). Neuro exam otherwise intact except for chronic issues. Pelvis stable. Tenderness to R shoulder. Will obtain shoulder xray and humerus xray (on R). Informed primary team. Imaging ordered by me.

## 2020-05-01 NOTE — PROGRESS NOTE ADULT - PROBLEM SELECTOR PLAN 2
Noted to have YURI on CKD, worsening over past 24 hours. Found to have acute urinary retention with >1L urine on bladder scan s/p Moody placement on 4/29.   - Appreciate Nephrology recs  - C/w Moody; will need ToV prior to discharge  - Continue immunosuppressant regimen (see below)  - Holding home Cellcept and Bumex  - Continue to trend BMP  - Avoid nephrotoxins, renally dose medications Noted to have YURI on CKD, worsening over past 24 hours. Found to have acute urinary retention with >1L urine on bladder scan s/p Moody placement on 4/29.   - Appreciate Nephrology recs  - C/w Moody; will need ToV prior to discharge.  Will Saturday or Sunday  - f/u U/A and urine electrolytes   - Continue immunosuppressant regimen (see below)  - Holding home Cellcept and Bumex  - Continue to trend BMP  - Avoid nephrotoxins, renally dose medications

## 2020-05-01 NOTE — PROGRESS NOTE ADULT - SUBJECTIVE AND OBJECTIVE BOX
[facility] Division of Kidney Diseases & Hypertension  FOLLOW UP NOTE  [phone]--------------------------------------------------------------------------------  HPI: 59M PMH of HTN, HLD, DM, ESRD s/p kidney transplant, admitted for COVID-19. Nephrology team consulted for YURI and management of immunosuppressant. Pt with history of ESRD s/p DDRT on 2005. On review pt is on Tacrolimus 1mg BID, Mycophenolate 1gm BID and Prednisone 5mg daily. On review of labs, pt with SCr: 3.6 on 3/27/20 increased to 3.9 on 3/30/20. Pt with last outpatient SCr of 4.8. On admission, SCr was 5.47 (4/10/20) which continued to improve to SCr: 3.6. with IVF. Pt off IV, duvall catheter place. SCr: 4.43 today increased.    Labs and charts reviewed.  Patient more awake and verbal this AM denies any complaints    PAST HISTORY  --------------------------------------------------------------------------------  No significant changes to PMH, PSH, FHx, SHx, unless otherwise noted    ALLERGIES & MEDICATIONS  --------------------------------------------------------------------------------  Allergies    iodine (Vomiting)  IV Contrast (Unknown)    Intolerances      Standing Inpatient Medications  ascorbic acid  Oral Tab/Cap - Peds 500 milliGRAM(s) Oral two times a day  aspirin  chewable 81 milliGRAM(s) Oral daily  atorvastatin 80 milliGRAM(s) Oral at bedtime  bisacodyl 5 milliGRAM(s) Oral daily  carvedilol 3.125 milliGRAM(s) Oral every 12 hours  chlorhexidine 4% Liquid 1 Application(s) Topical two times a day  clopidogrel Tablet 75 milliGRAM(s) Oral daily  dextrose 5%. 1000 milliLiter(s) IV Continuous <Continuous>  dextrose 50% Injectable 12.5 Gram(s) IV Push once  dextrose 50% Injectable 25 Gram(s) IV Push once  dextrose 50% Injectable 25 Gram(s) IV Push once  ferrous sulfate Oral Tab/Cap - Peds 325 milliGRAM(s) Oral daily  folic acid 1 milliGRAM(s) Oral daily  heparin   Injectable 5000 Unit(s) SubCutaneous every 8 hours  hydrALAZINE 25 milliGRAM(s) Oral two times a day  insulin lispro (HumaLOG) corrective regimen sliding scale   SubCutaneous three times a day before meals  insulin lispro (HumaLOG) corrective regimen sliding scale   SubCutaneous at bedtime  levothyroxine 75 MICROGram(s) Oral daily  melatonin 3 milliGRAM(s) Oral at bedtime  polyethylene glycol 3350 17 Gram(s) Oral daily  predniSONE   Tablet 5 milliGRAM(s) Oral daily  sevelamer carbonate 800 milliGRAM(s) Oral three times a day with meals  tacrolimus 2 milliGRAM(s) Oral <User Schedule>  tacrolimus 1 milliGRAM(s) Oral <User Schedule>  tamsulosin 0.4 milliGRAM(s) Oral at bedtime  thiamine IVPB 500 milliGRAM(s) IV Intermittent <User Schedule>    PRN Inpatient Medications  acetaminophen   Tablet .. 650 milliGRAM(s) Oral every 4 hours PRN  acetaminophen   Tablet .. 650 milliGRAM(s) Oral every 6 hours PRN  acetaminophen  Suppository .. 650 milliGRAM(s) Rectal every 4 hours PRN  dextrose 40% Gel 15 Gram(s) Oral once PRN  glucagon  Injectable 1 milliGRAM(s) IntraMuscular once PRN  haloperidol    Injectable 2.5 milliGRAM(s) IV Push every 6 hours PRN  ondansetron Injectable 4 milliGRAM(s) IV Push every 8 hours PRN  sucralfate 1 Gram(s) Oral every 6 hours PRN      REVIEW OF SYSTEMS  --------------------------------------------------------------------------------    Unable to assess due to current clinical condition    VITALS/PHYSICAL EXAM  --------------------------------------------------------------------------------  T(C): 36.7 (05-01-20 @ 10:53), Max: 36.7 (04-30-20 @ 22:18)  HR: 70 (05-01-20 @ 10:53) (70 - 80)  BP: 146/64 (05-01-20 @ 10:53) (121/58 - 155/75)  RR: 18 (05-01-20 @ 10:53) (17 - 18)  SpO2: 100% (05-01-20 @ 10:53) (98% - 100%)  Wt(kg): --        04-30-20 @ 07:01  -  05-01-20 @ 07:00  --------------------------------------------------------  IN: 0 mL / OUT: 950 mL / NET: -950 mL    05-01-20 @ 07:01  -  05-01-20 @ 13:21  --------------------------------------------------------  IN: 100 mL / OUT: 150 mL / NET: -50 mL        Physical Exam:  	Gen: no distress  	HEENT: no JVD  	Pulm: normal respiratory pattern  	CV:  not tachy  	Abd: +BS, soft, transplant area without tenderness  	Ext: No B/L Lower ext edema s/p R amputation  	Neuro: awake,  confused  	Skin: Warm, without rashes  	Vascular access: Left Arm AVF with palpable thrill, + aneurysmal dilation    LABS/STUDIES  --------------------------------------------------------------------------------              10.2   4.29  >-----------<  213      [05-01-20 @ 05:55]              35.0     145  |  112  |  90  ----------------------------<  143      [05-01-20 @ 05:55]  4.8   |  19  |  4.43        Ca     9.0     [05-01-20 @ 05:55]      Mg     2.1     [05-01-20 @ 05:55]      Phos  4.3     [05-01-20 @ 05:55]            Creatinine Trend:  SCr 4.43 [05-01 @ 05:55]  SCr 4.14 [04-30 @ 10:05]  SCr 3.69 [04-29 @ 04:35]  SCr 3.52 [04-28 @ 20:44]  SCr 3.56 [04-28 @ 16:36]    Urinalysis - [04-27-20 @ 18:41]      Color YELLOW / Appearance CLEAR / SG 1.016 / pH 6.5      Gluc NEGATIVE / Ketone NEGATIVE  / Bili NEGATIVE / Urobili NORMAL       Blood NEGATIVE / Protein 600 / Leuk Est NEGATIVE / Nitrite NEGATIVE      RBC 6-10 / WBC 3-5 / Hyaline NEGATIVE / Gran  / Sq Epi FEW / Non Sq Epi  / Bacteria NEGATIVE      Ferritin 2209      [04-27-20 @ 05:26]  TSH 3.30      [04-28-20 @ 05:14]      Syphilis Screen (Treponema Pallidum Ab) Negative      [04-28-20 @ 05:14]

## 2020-05-01 NOTE — PROGRESS NOTE ADULT - PROBLEM SELECTOR PLAN 1
Significantly improved.  Suspect etiology was multifactorial related to acute urinary retention w/ associated acute renal failure, hospital-associated delirium, and vitamin deficiency (?).   - Appreciate Neurology recs  - EEG on 4/30 with nonspecific moderate diffuse cerebral dysfunction, no seizure activity  - Appreciate Psychiatry recs; Haldol 2.5mg IV/IM/PO q6h PRN agitation  - C/w folate and thiamine repletion  - C/w melatonin to help regulate circadian rhythm

## 2020-05-01 NOTE — PROGRESS NOTE ADULT - PROBLEM SELECTOR PLAN 2
Pt with history of ESRD s/p DDRT on 2005. Pt is on Tacrolimus 1mg BID, Cellcept 1 gm BID and Prednisone 5mg daily. Last Tacro level 2.9. Hold Cellcept until discharge. Change Tacrolimus to 2mg in AM and 1mg in PM. Continue  Prednisone.  Goal Tacrolimus level is 3-5. Monitor Scr. Please check AM Tacro level on Monday 5/4/20      Candida Diana  Nephrology Fellow  Pager: [phone]

## 2020-05-01 NOTE — CHART NOTE - NSCHARTNOTEFT_GEN_A_CORE
Briefly, 59M w/ hx of HTN, HLD, DM, ESRD s/p kidney transplant on tacrolimus and prednisone in 2005, CAD s/p stent a few months ago at [place], PVD s/p R AKA in 2010 admitted to medicine on 4/11 with fever, rhinorrhea and not feeling well for three days found to be COVID19 +ve, with low tacrolimus level and course complicated by ESBL pyelo, YURI, uremia. Neurology consulted for AMS. Mental status appears to be improving as per primary team.     EEG 5/1/2020: Continuos R. posterior quadrant polymorphic delta/theta slowing. Moderate background slowing.  CTH 5/1/2020: No acute pathology. Chronic bilateral BG infarcts, Bilateral cerebellar infarcts.     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 Neurology consulted for AMS. Mental status appears to be improving as per primary team.     EEG 5/1/2020: Continuos R. posterior quadrant polymorphic delta/theta slowing. Moderate background slowing.  CTH 5/1/2020: No acute pathology. Chronic bilateral BG infarcts, Bilateral cerebellar infarcts.     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 Narinder Huang Neurology Chart Note:      Briefly, 59M w/ hx of HTN, HLD, DM, ESRD s/p kidney transplant on tacrolimus and prednisone in 2005, CAD s/p stent a few months ago at [place], PVD s/p R AKA in 2010 admitted to medicine on 4/11 with fever, rhinorrhea and not feeling well for three days found to be COVID19 +ve, with low tacrolimus level and course complicated by ESBL pyelo, YURI, uremia. Neurology consulted for AMS. Mental status appears to be improving as per primary team.     EEG 5/1/2020: Continuos R. posterior quadrant polymorphic delta/theta slowing. Moderate background slowing.    CTH 5/1/2020: No acute pathology. Chronic bilateral BG infarcts, Bilateral cerebellar infarcts.     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 Daniel Huang    Please call with any additional questions

## 2020-05-01 NOTE — PROVIDER CONTACT NOTE (FALL NOTIFICATION) - BACKGROUND
COVID19. Course c/b YURI on CKD and UTI 2/2 ESBL Klebsiella. Course c/b acute encephalopathy and urinary retention s/p Moody.

## 2020-05-01 NOTE — PROGRESS NOTE ADULT - SUBJECTIVE AND OBJECTIVE BOX
Gianna Fitch MD  Internal Medicine, PGY-2   [phone]    Patient is a 59y old  Male who presents with a chief complaint of Rule out COVID 19 infection (30 Apr 2020 08:51)      SUBJECTIVE / OVERNIGHT EVENTS:      REVIEW OF SYSTEMS:    CONSTITUTIONAL: No weakness, fevers or chills  EYES: No visual changes; No blurry vision  ENT:  No pain or stiffness; No vertigo or throat pain  RESPIRATORY: No cough, wheezing, hemoptysis; No shortness of breath  CARDIOVASCULAR: No chest pain or palpitations  GASTROINTESTINAL: No abdominal or epigastric pain. No nausea, vomiting, or hematemesis; No diarrhea or constipation. No melena or hematochezia.  GENITOURINARY: No dysuria, frequency or hematuria  NEUROLOGICAL: No numbness, No HA  SKIN: No itching, rashes  MSK: no joint pain, no muscle pain  MEDICATIONS  (STANDING):  ascorbic acid  Oral Tab/Cap - Peds 500 milliGRAM(s) Oral two times a day  aspirin  chewable 81 milliGRAM(s) Oral daily  atorvastatin 80 milliGRAM(s) Oral at bedtime  bisacodyl 5 milliGRAM(s) Oral daily  carvedilol 3.125 milliGRAM(s) Oral every 12 hours  chlorhexidine 4% Liquid 1 Application(s) Topical two times a day  clopidogrel Tablet 75 milliGRAM(s) Oral daily  dextrose 5%. 1000 milliLiter(s) (50 mL/Hr) IV Continuous <Continuous>  dextrose 50% Injectable 12.5 Gram(s) IV Push once  dextrose 50% Injectable 25 Gram(s) IV Push once  dextrose 50% Injectable 25 Gram(s) IV Push once  ferrous sulfate Oral Tab/Cap - Peds 325 milliGRAM(s) Oral daily  folic acid 1 milliGRAM(s) Oral daily  heparin   Injectable 5000 Unit(s) SubCutaneous every 8 hours  hydrALAZINE 25 milliGRAM(s) Oral two times a day  insulin lispro (HumaLOG) corrective regimen sliding scale   SubCutaneous three times a day before meals  insulin lispro (HumaLOG) corrective regimen sliding scale   SubCutaneous at bedtime  levothyroxine 75 MICROGram(s) Oral daily  melatonin 3 milliGRAM(s) Oral at bedtime  polyethylene glycol 3350 17 Gram(s) Oral daily  predniSONE   Tablet 5 milliGRAM(s) Oral daily  sevelamer carbonate 800 milliGRAM(s) Oral three times a day with meals  tacrolimus 2 milliGRAM(s) Oral <User Schedule>  tacrolimus 1 milliGRAM(s) Oral <User Schedule>  tamsulosin 0.4 milliGRAM(s) Oral at bedtime  thiamine IVPB 500 milliGRAM(s) IV Intermittent <User Schedule>    MEDICATIONS  (PRN):  acetaminophen   Tablet .. 650 milliGRAM(s) Oral every 4 hours PRN Temp greater or equal to 38.5C (101.3F)  acetaminophen   Tablet .. 650 milliGRAM(s) Oral every 6 hours PRN Temp greater or equal to 38C (100.4F), Mild Pain (1 - 3), Moderate Pain (4 - 6)  acetaminophen  Suppository .. 650 milliGRAM(s) Rectal every 4 hours PRN Temp greater or equal to 38.5C (101.3F)  dextrose 40% Gel 15 Gram(s) Oral once PRN Blood Glucose LESS THAN 70 milliGRAM(s)/deciliter  glucagon  Injectable 1 milliGRAM(s) IntraMuscular once PRN Glucose LESS THAN 70 milligrams/deciliter  haloperidol    Injectable 2.5 milliGRAM(s) IV Push every 6 hours PRN Agitation  ondansetron Injectable 4 milliGRAM(s) IV Push every 8 hours PRN Nausea and/or Vomiting  sucralfate 1 Gram(s) Oral every 6 hours PRN With each meal      CAPILLARY BLOOD GLUCOSE      POCT Blood Glucose.: 142 mg/dL (01 May 2020 08:27)  POCT Blood Glucose.: 82 mg/dL (30 Apr 2020 21:59)  POCT Blood Glucose.: 109 mg/dL (30 Apr 2020 18:13)  POCT Blood Glucose.: 113 mg/dL (30 Apr 2020 11:55)  POCT Blood Glucose.: 86 mg/dL (30 Apr 2020 08:59)    I&O's Summary    30 Apr 2020 07:01  -  01 May 2020 07:00  --------------------------------------------------------  IN: 0 mL / OUT: 950 mL / NET: -950 mL        PHYSICAL EXAM:  Vital Signs Last 24 Hrs  T(C): 36.7 (01 May 2020 06:05), Max: 37 (30 Apr 2020 10:19)  T(F): 98 (01 May 2020 06:05), Max: 98.6 (30 Apr 2020 10:19)  HR: 72 (01 May 2020 06:05) (72 - 80)  BP: 121/58 (01 May 2020 06:05) (121/58 - 155/75)  BP(mean): --  RR: 17 (01 May 2020 06:05) (17 - 19)  SpO2: 100% (01 May 2020 06:05) (98% - 100%)    PHYSICAL EXAM:  GENERAL: NAD, well-groomed, well-developed  HEAD:  Atraumatic, Normocephalic  EYES: EOMI, PERRLA, conjunctiva and sclera clear  ENMT: No tonsillar erythema, exudates, or enlargement; Moist mucous membranes, Good dentition, No lesions  NECK: Supple, No JVD, Normal thyroid  CHEST/LUNG: Clear to ausculation bilaterally; No rales, rhonchi, wheezing, or rubs  HEART: Regular rate and rhythm; No murmurs, rubs, or gallops  ABDOMEN: Soft, Nontender, Nondistended; Bowel sounds present  EXTREMITIES:  2+ Peripheral Pulses, No clubbing, cyanosis, or edema  LYMPH: No lymphadenopathy noted  SKIN: No rashes or lesions  NERVOUS SYSTEM:  Alert & Oriented X3, Good concentration; Motor Strength 5/5 B/L upper and lower extremities; DTRs 2+ intact and symmetric      LABS:                        10.2   4.29  )-----------( 213      ( 01 May 2020 05:55 )             35.0     05-01    145  |  112<H>  |  90<H>  ----------------------------<  143<H>  4.8   |  19<L>  |  4.43<H>    Ca    9.0      01 May 2020 05:55  Phos  4.3     05-01  Mg     2.1     05-01                Procalcitonin, Serum: 0.17 (04-27)  Procalcitonin, Serum: 0.17 (04-22)    C-Reactive Protein, Serum: 45.1 (04-27)  C-Reactive Protein, Serum: 22.0 (04-27)  C-Reactive Protein, Serum: 32.8 (04-24)  C-Reactive Protein, Serum: 23.9 (04-20)    Ferritin, Serum: 2209 (04-27)  Ferritin, Serum: 2513 (04-24)  Ferritin, Serum: 2157 (04-22)  Ferritin, Serum: 1601 (04-20)      D-Dimer Assay, Quantitative: 438 (04-28)  D-Dimer Assay, Quantitative: 355 (04-27)  D-Dimer Assay, Quantitative: 16506 (04-27)  D-Dimer Assay, Quantitative: 300 (04-24)  D-Dimer Assay, Quantitative: 361 (04-22)        RADIOLOGY & ADDITIONAL TESTS:  Results Reviewed:   Imaging Personally Reviewed:  Electrocardiogram Personally Reviewed:    COORDINATION OF CARE:  Care Discussed with Consultants/Other Providers [Y/N]:  Prior or Outpatient Records Reviewed [Y/N]: Gianna Fitch MD  Internal Medicine, PGY-2   [phone]    Patient is a 59y old  Male who presents with a chief complaint of Rule out COVID 19 infection (30 Apr 2020 08:51)      SUBJECTIVE / OVERNIGHT EVENTS: Pt fell overnight, CT head was negative for bleed       REVIEW OF SYSTEMS: unable to obtain 2/2 poor participation in interview however pt denies CP, SOB, abdominal pain, N/V/D/C     CONSTITUTIONAL: No weakness, fevers or chills  RESPIRATORY: No cough, SOB  CARDIOVASCULAR: No chest pain or palpitations  GASTROINTESTINAL: No abdominal pain. No nausea, vomiting.   GENITOURINARY: No dysuria, frequency or hematuria      MEDICATIONS  (STANDING):  ascorbic acid  Oral Tab/Cap - Peds 500 milliGRAM(s) Oral two times a day  aspirin  chewable 81 milliGRAM(s) Oral daily  atorvastatin 80 milliGRAM(s) Oral at bedtime  bisacodyl 5 milliGRAM(s) Oral daily  carvedilol 3.125 milliGRAM(s) Oral every 12 hours  chlorhexidine 4% Liquid 1 Application(s) Topical two times a day  clopidogrel Tablet 75 milliGRAM(s) Oral daily  dextrose 5%. 1000 milliLiter(s) (50 mL/Hr) IV Continuous <Continuous>  dextrose 50% Injectable 12.5 Gram(s) IV Push once  dextrose 50% Injectable 25 Gram(s) IV Push once  dextrose 50% Injectable 25 Gram(s) IV Push once  ferrous sulfate Oral Tab/Cap - Peds 325 milliGRAM(s) Oral daily  folic acid 1 milliGRAM(s) Oral daily  heparin   Injectable 5000 Unit(s) SubCutaneous every 8 hours  hydrALAZINE 25 milliGRAM(s) Oral two times a day  insulin lispro (HumaLOG) corrective regimen sliding scale   SubCutaneous three times a day before meals  insulin lispro (HumaLOG) corrective regimen sliding scale   SubCutaneous at bedtime  levothyroxine 75 MICROGram(s) Oral daily  melatonin 3 milliGRAM(s) Oral at bedtime  polyethylene glycol 3350 17 Gram(s) Oral daily  predniSONE   Tablet 5 milliGRAM(s) Oral daily  sevelamer carbonate 800 milliGRAM(s) Oral three times a day with meals  tacrolimus 2 milliGRAM(s) Oral <User Schedule>  tacrolimus 1 milliGRAM(s) Oral <User Schedule>  tamsulosin 0.4 milliGRAM(s) Oral at bedtime  thiamine IVPB 500 milliGRAM(s) IV Intermittent <User Schedule>    MEDICATIONS  (PRN):  acetaminophen   Tablet .. 650 milliGRAM(s) Oral every 4 hours PRN Temp greater or equal to 38.5C (101.3F)  acetaminophen   Tablet .. 650 milliGRAM(s) Oral every 6 hours PRN Temp greater or equal to 38C (100.4F), Mild Pain (1 - 3), Moderate Pain (4 - 6)  acetaminophen  Suppository .. 650 milliGRAM(s) Rectal every 4 hours PRN Temp greater or equal to 38.5C (101.3F)  dextrose 40% Gel 15 Gram(s) Oral once PRN Blood Glucose LESS THAN 70 milliGRAM(s)/deciliter  glucagon  Injectable 1 milliGRAM(s) IntraMuscular once PRN Glucose LESS THAN 70 milligrams/deciliter  haloperidol    Injectable 2.5 milliGRAM(s) IV Push every 6 hours PRN Agitation  ondansetron Injectable 4 milliGRAM(s) IV Push every 8 hours PRN Nausea and/or Vomiting  sucralfate 1 Gram(s) Oral every 6 hours PRN With each meal      CAPILLARY BLOOD GLUCOSE      POCT Blood Glucose.: 142 mg/dL (01 May 2020 08:27)  POCT Blood Glucose.: 82 mg/dL (30 Apr 2020 21:59)  POCT Blood Glucose.: 109 mg/dL (30 Apr 2020 18:13)  POCT Blood Glucose.: 113 mg/dL (30 Apr 2020 11:55)  POCT Blood Glucose.: 86 mg/dL (30 Apr 2020 08:59)    I&O's Summary    30 Apr 2020 07:01  -  01 May 2020 07:00  --------------------------------------------------------  IN: 0 mL / OUT: 950 mL / NET: -950 mL        PHYSICAL EXAM:  Vital Signs Last 24 Hrs  T(C): 36.7 (01 May 2020 06:05), Max: 37 (30 Apr 2020 10:19)  T(F): 98 (01 May 2020 06:05), Max: 98.6 (30 Apr 2020 10:19)  HR: 72 (01 May 2020 06:05) (72 - 80)  BP: 121/58 (01 May 2020 06:05) (121/58 - 155/75)  BP(mean): --  RR: 17 (01 May 2020 06:05) (17 - 19)  SpO2: 100% (01 May 2020 06:05) (98% - 100%)    PHYSICAL EXAM:  GENERAL: NAD, well-groomed, well-developed  HEAD:  Atraumatic, Normocephalic  EYES: EOMI, PERRLA, conjunctiva and sclera clear  ENMT: No tonsillar erythema, exudates, or enlargement; Moist mucous membranes, Good dentition, No lesions  NECK: Supple, No JVD, Normal thyroid  CHEST/LUNG: Clear to ausculation bilaterally; No rales, rhonchi, wheezing, or rubs  HEART: Regular rate and rhythm; No murmurs, rubs, or gallops  ABDOMEN: Soft, Nontender, Nondistended; Bowel sounds present  EXTREMITIES:  2+ Peripheral Pulses, No clubbing, cyanosis, or edema  LYMPH: No lymphadenopathy noted  SKIN: No rashes or lesions  NERVOUS SYSTEM:  Alert & Oriented X3, Good concentration; Motor Strength 5/5 B/L upper and lower extremities; DTRs 2+ intact and symmetric      LABS:                        10.2   4.29  )-----------( 213      ( 01 May 2020 05:55 )             35.0     05-01    145  |  112<H>  |  90<H>  ----------------------------<  143<H>  4.8   |  19<L>  |  4.43<H>    Ca    9.0      01 May 2020 05:55  Phos  4.3     05-01  Mg     2.1     05-01                Procalcitonin, Serum: 0.17 (04-27)  Procalcitonin, Serum: 0.17 (04-22)    C-Reactive Protein, Serum: 45.1 (04-27)  C-Reactive Protein, Serum: 22.0 (04-27)  C-Reactive Protein, Serum: 32.8 (04-24)  C-Reactive Protein, Serum: 23.9 (04-20)    Ferritin, Serum: 2209 (04-27)  Ferritin, Serum: 2513 (04-24)  Ferritin, Serum: 2157 (04-22)  Ferritin, Serum: 1601 (04-20)      D-Dimer Assay, Quantitative: 438 (04-28)  D-Dimer Assay, Quantitative: 355 (04-27)  D-Dimer Assay, Quantitative: 71897 (04-27)  D-Dimer Assay, Quantitative: 300 (04-24)  D-Dimer Assay, Quantitative: 361 (04-22)

## 2020-05-01 NOTE — PROGRESS NOTE ADULT - PROBLEM SELECTOR PLAN 6
S/p recent stent at [facility].   - C/w DAPT  - Continue carvedilol  - Continue atorvastatin

## 2020-05-01 NOTE — PROVIDER CONTACT NOTE (FALL NOTIFICATION) - ASSESSMENT
Patient was found on floor next to bed. Patient was laying on stool and did not recall when and how he fell. Patient did admit to hitting head but didn't feel any pain or discomfort on head. Patient was immediately cleaned on floor and then assisted to bed. Patient denied any injuries and discomfort. Vitals were taken.

## 2020-05-01 NOTE — PROGRESS NOTE ADULT - PROBLEM SELECTOR PLAN 1
Pt with YURI on CKD in the setting of COVID-19 vs. chronic graft rejection. Pt with last SCr of 4.8 outpatient. On admission, SCr of 5.47 (4/10/20) which has improved to 4.38 however increased to 5.25.  Pt started on IVF with improvement to 3.58  however increased to 6.25 again in the setting of   urinary obstruction. Labs and charts reviewed. Recommend  repeat UA with urine electrolytes. Continue to monitor renal function.  Avoid other potential nephrotoxins. Pt with YURI on CKD in the setting of COVID-19 vs. chronic graft rejection. Pt with last SCr of 4.8 outpatient. On admission, SCr of 5.47 (4/10/20) which has improved to 4.38 however increased to 5.25.  Pt started on IVF with improvement to 3.58  however increased to 6.25 again in the setting of   urinary obstruction. Labs and charts reviewed. Recommend  repeat UA with urine electrolytes. Continue to monitor renal function.  Avoid other potential nephrotoxins. Can start IV LR 75cc/hour.

## 2020-05-01 NOTE — PROGRESS NOTE ADULT - ATTENDING COMMENTS
ESRD s/p renal transplant   Cr elevation   - chronic rejection v acute on chronic process  - duvall placed  - flomax started   - consider voiding trial in 48-72 h   - monitor Cr  - appreciate nephrology input

## 2020-05-01 NOTE — CHART NOTE - NSCHARTNOTEFT_GEN_A_CORE
CC: Nurses found pt lying on floor covered in stool.     Subjective: On questioning pt states he had sudden urge to defecate and tried to rush to the toilet but felt weak and fell to the floor. Denies LOC or head trauma. Denies pain. States he feels cold but no other complaints. Asking for Ensure with ice.     Objective:  VSS  General: middle aged male lying in bed, no acute distress   Head: no obvious lesions   Neuro: AAOx3, knows name, states "[facility]," "5th month 2020". EOMI intact. Left eye ptosis however states it is chronic. Strength and sensation intact in bilateral upper extremities. Able to move R AKA stump and LLE. Appears tremulous, states he feels cold.     Assessment:  59M w/ hx of HTN, HLD, DM, ESRD s/p kidney transplant on tacrolimus and prednisone in 2005, CAD s/p stent, PVD s/p R AKA in 2010 admitted to medicine on 4/11 with fever, rhinorrhea and not feeling well for three days found to be COVID19 positive. Course complicated by USBL UTI and delirium.  S/p mechanical fall 5/1.     Plan:  -CT Head ordered to rule out occult bleed given DAPT and HSQ, however exam reassuring.   -urged pt to ring call bell in the future, pt expressed understanding    Nikki Kimura, PGY-1 CC: Nurses found pt lying on floor covered in stool.     Subjective: On questioning pt states he had sudden urge to defecate and tried to rush to the toilet but felt weak and fell to the floor. Denies LOC or head trauma. Denies pain. States he feels cold but no other complaints. Asking for Ensure with ice.     Objective:  VSS  General: middle aged male lying in bed, no acute distress   Head: no obvious lesions   Neuro: AAOx3, knows name, states "[facility]," "5th month 2020". EOMI intact. Left eye ptosis however states it is chronic. Strength and sensation intact in bilateral upper extremities. Able to move R AKA stump and LLE. Appears tremulous, states he feels cold.     Assessment:  59M w/ hx of HTN, HLD, DM, ESRD s/p kidney transplant on tacrolimus and prednisone in 2005, CAD s/p stent, PVD s/p R AKA in 2010 admitted to medicine on 4/11 with fever, rhinorrhea and not feeling well for three days found to be COVID19 positive. Course complicated by ESBL UTI and delirium.  S/p mechanical fall 5/1.     Plan:  -CT Head ordered to rule out occult bleed given DAPT and HSQ, however exam reassuring.   -urged pt to ring call bell in the future, pt expressed understanding    Nikki Kimura, PGY-1

## 2020-05-01 NOTE — PROVIDER CONTACT NOTE (FALL NOTIFICATION) - SITUATION
Patient was found on floor next to bed. Patient was laying on stool and did not recall when and how he fell. VS Patient was found on floor next to bed. Patient was laying on stool and did not recall when and how he fell. Temp 97.6, HR 79, /80, 98% O2, 18 RR
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
## 2020-05-02 LAB
ANION GAP SERPL CALC-SCNC: 8 MMO/L — SIGNIFICANT CHANGE UP (ref 7–14)
BUN SERPL-MCNC: 89 MG/DL — HIGH (ref 7–23)
CALCIUM SERPL-MCNC: 8.9 MG/DL — SIGNIFICANT CHANGE UP (ref 8.4–10.5)
CHLORIDE SERPL-SCNC: 112 MMOL/L — HIGH (ref 98–107)
CO2 SERPL-SCNC: 20 MMOL/L — LOW (ref 22–31)
CREAT ?TM UR-MCNC: 123.3 MG/DL — SIGNIFICANT CHANGE UP
CREAT SERPL-MCNC: 4.85 MG/DL — HIGH (ref 0.5–1.3)
GLUCOSE BLDC GLUCOMTR-MCNC: 155 MG/DL — HIGH (ref 70–99)
GLUCOSE BLDC GLUCOMTR-MCNC: 164 MG/DL — HIGH (ref 70–99)
GLUCOSE BLDC GLUCOMTR-MCNC: 183 MG/DL — HIGH (ref 70–99)
GLUCOSE BLDC GLUCOMTR-MCNC: 215 MG/DL — HIGH (ref 70–99)
GLUCOSE BLDC GLUCOMTR-MCNC: 244 MG/DL — HIGH (ref 70–99)
GLUCOSE SERPL-MCNC: 132 MG/DL — HIGH (ref 70–99)
HCT VFR BLD CALC: 30.4 % — LOW (ref 39–50)
HGB BLD-MCNC: 9.1 G/DL — LOW (ref 13–17)
MAGNESIUM SERPL-MCNC: 2.2 MG/DL — SIGNIFICANT CHANGE UP (ref 1.6–2.6)
MCHC RBC-ENTMCNC: 24.9 PG — LOW (ref 27–34)
MCHC RBC-ENTMCNC: 29.9 % — LOW (ref 32–36)
MCV RBC AUTO: 83.1 FL — SIGNIFICANT CHANGE UP (ref 80–100)
NRBC # FLD: 0 K/UL — SIGNIFICANT CHANGE UP (ref 0–0)
PHOSPHATE SERPL-MCNC: 3.1 MG/DL — SIGNIFICANT CHANGE UP (ref 2.5–4.5)
PLATELET # BLD AUTO: 207 K/UL — SIGNIFICANT CHANGE UP (ref 150–400)
PMV BLD: 8.8 FL — SIGNIFICANT CHANGE UP (ref 7–13)
POTASSIUM SERPL-MCNC: 4.5 MMOL/L — SIGNIFICANT CHANGE UP (ref 3.5–5.3)
POTASSIUM SERPL-SCNC: 4.5 MMOL/L — SIGNIFICANT CHANGE UP (ref 3.5–5.3)
PROT UR-MCNC: 506.2 MG/DL — SIGNIFICANT CHANGE UP
RBC # BLD: 3.66 M/UL — LOW (ref 4.2–5.8)
RBC # FLD: 15.3 % — HIGH (ref 10.3–14.5)
SODIUM SERPL-SCNC: 140 MMOL/L — SIGNIFICANT CHANGE UP (ref 135–145)
VIT B1 SERPL-MCNC: 43.4 NMOL/L — LOW (ref 66.5–200)
WBC # BLD: 3.7 K/UL — LOW (ref 3.8–10.5)
WBC # FLD AUTO: 3.7 K/UL — LOW (ref 3.8–10.5)

## 2020-05-02 PROCEDURE — 73030 X-RAY EXAM OF SHOULDER: CPT | Mod: 26,RT

## 2020-05-02 PROCEDURE — 99233 SBSQ HOSP IP/OBS HIGH 50: CPT

## 2020-05-02 PROCEDURE — 73060 X-RAY EXAM OF HUMERUS: CPT | Mod: 26,RT

## 2020-05-02 RX ORDER — SODIUM CHLORIDE 9 MG/ML
1000 INJECTION INTRAMUSCULAR; INTRAVENOUS; SUBCUTANEOUS
Refills: 0 | Status: DISCONTINUED | OUTPATIENT
Start: 2020-05-02 | End: 2020-05-04

## 2020-05-02 RX ADMIN — Medication 81 MILLIGRAM(S): at 11:51

## 2020-05-02 RX ADMIN — Medication 2: at 16:52

## 2020-05-02 RX ADMIN — Medication 75 MICROGRAM(S): at 05:44

## 2020-05-02 RX ADMIN — SEVELAMER CARBONATE 800 MILLIGRAM(S): 2400 POWDER, FOR SUSPENSION ORAL at 08:26

## 2020-05-02 RX ADMIN — Medication 5 MILLIGRAM(S): at 11:52

## 2020-05-02 RX ADMIN — TAMSULOSIN HYDROCHLORIDE 0.4 MILLIGRAM(S): 0.4 CAPSULE ORAL at 23:28

## 2020-05-02 RX ADMIN — SEVELAMER CARBONATE 800 MILLIGRAM(S): 2400 POWDER, FOR SUSPENSION ORAL at 16:52

## 2020-05-02 RX ADMIN — CARVEDILOL PHOSPHATE 3.12 MILLIGRAM(S): 80 CAPSULE, EXTENDED RELEASE ORAL at 05:44

## 2020-05-02 RX ADMIN — CLOPIDOGREL BISULFATE 75 MILLIGRAM(S): 75 TABLET, FILM COATED ORAL at 11:51

## 2020-05-02 RX ADMIN — Medication 3 MILLIGRAM(S): at 23:29

## 2020-05-02 RX ADMIN — SODIUM CHLORIDE 50 MILLILITER(S): 9 INJECTION INTRAMUSCULAR; INTRAVENOUS; SUBCUTANEOUS at 11:52

## 2020-05-02 RX ADMIN — CHLORHEXIDINE GLUCONATE 1 APPLICATION(S): 213 SOLUTION TOPICAL at 10:24

## 2020-05-02 RX ADMIN — Medication 325 MILLIGRAM(S): at 11:51

## 2020-05-02 RX ADMIN — ATORVASTATIN CALCIUM 80 MILLIGRAM(S): 80 TABLET, FILM COATED ORAL at 23:28

## 2020-05-02 RX ADMIN — HEPARIN SODIUM 5000 UNIT(S): 5000 INJECTION INTRAVENOUS; SUBCUTANEOUS at 13:25

## 2020-05-02 RX ADMIN — Medication 3 MILLIGRAM(S): at 01:26

## 2020-05-02 RX ADMIN — HEPARIN SODIUM 5000 UNIT(S): 5000 INJECTION INTRAVENOUS; SUBCUTANEOUS at 05:44

## 2020-05-02 RX ADMIN — Medication 5 MILLIGRAM(S): at 05:44

## 2020-05-02 RX ADMIN — Medication 500 MILLIGRAM(S): at 05:44

## 2020-05-02 RX ADMIN — CARVEDILOL PHOSPHATE 3.12 MILLIGRAM(S): 80 CAPSULE, EXTENDED RELEASE ORAL at 16:53

## 2020-05-02 RX ADMIN — TACROLIMUS 2 MILLIGRAM(S): 5 CAPSULE ORAL at 05:44

## 2020-05-02 RX ADMIN — Medication 25 MILLIGRAM(S): at 16:52

## 2020-05-02 RX ADMIN — ATORVASTATIN CALCIUM 80 MILLIGRAM(S): 80 TABLET, FILM COATED ORAL at 01:25

## 2020-05-02 RX ADMIN — Medication 2: at 08:26

## 2020-05-02 RX ADMIN — Medication 1 MILLIGRAM(S): at 11:51

## 2020-05-02 RX ADMIN — Medication 4: at 11:56

## 2020-05-02 RX ADMIN — CHLORHEXIDINE GLUCONATE 1 APPLICATION(S): 213 SOLUTION TOPICAL at 16:52

## 2020-05-02 RX ADMIN — TACROLIMUS 1.5 MILLIGRAM(S): 5 CAPSULE ORAL at 17:54

## 2020-05-02 RX ADMIN — Medication 500 MILLIGRAM(S): at 16:52

## 2020-05-02 RX ADMIN — SEVELAMER CARBONATE 800 MILLIGRAM(S): 2400 POWDER, FOR SUSPENSION ORAL at 11:51

## 2020-05-02 RX ADMIN — TAMSULOSIN HYDROCHLORIDE 0.4 MILLIGRAM(S): 0.4 CAPSULE ORAL at 01:26

## 2020-05-02 RX ADMIN — HEPARIN SODIUM 5000 UNIT(S): 5000 INJECTION INTRAVENOUS; SUBCUTANEOUS at 23:29

## 2020-05-02 RX ADMIN — Medication 25 MILLIGRAM(S): at 05:44

## 2020-05-02 NOTE — PROGRESS NOTE ADULT - PROBLEM SELECTOR PLAN 2
Noted to have YURI on CKD, worsening over past 24 hours. Found to have acute urinary retention with >1L urine on bladder scan s/p Moody placement on 4/29.   - TOV 5/2 -   - Appreciate Nephrology recs  - f/u U/A and urine electrolytes   - Continue immunosuppressant regimen (see below)  - Holding home Cellcept and Bumex  - Continue to trend BMP  - Avoid nephrotoxins, renally dose medications Noted to have YURI on CKD, worsening over past 24 hours. Found to have acute urinary retention with >1L urine on bladder scan s/p Moody placement on 4/29.   - TOV 5/3.   - Appreciate Nephrology recs  - f/u U/A and urine electrolytes   - Continue immunosuppressant regimen (see below)  - Holding home Cellcept and Bumex  - Continue to trend BMP  - Avoid nephrotoxins, renally dose medications

## 2020-05-02 NOTE — PROGRESS NOTE ADULT - PROBLEM SELECTOR PLAN 7
S/p recent stent at [facility].   - C/w DAPT  - Continue carvedilol  - Continue atorvastatin

## 2020-05-02 NOTE — PROGRESS NOTE ADULT - PROBLEM SELECTOR PLAN 4
- Pt now s/p 2 falls in hospital - 4/30, 5/1.  [facility] 4/30 and 5/1 showed no bleed   - enhanced supervision  - fall precautions

## 2020-05-02 NOTE — PROGRESS NOTE ADULT - PROBLEM SELECTOR PLAN 8
"5/4/2017       RE: Vince Krueger  [id number]  [facility] [address]     Dear Colleague,    Thank you for referring your patient, Vince Krueger, to the [facility] at [facility]. Please see a copy of my visit note below.        [facility], Orthopaedic Surgery Consultation  by John Hudson M.D.    Vince Krueger MRN# [id number]   Age: 44 year old YOB: 1972     Requesting physician: Georgi Hammond MD [facility]  Dr. Marlon Pulliam, [facility]  Dr. Radames Alvares [facility]  Tanya Wen NP primary care, Kanu Collins DC, [facility]            Assessment and Plan:   Assessment:  1. 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 R/o lymphoma, myeloma, MetCA or other aggressive benign neoplasms.  2. High risk of path fracture (Mirel's score 10 of 12)  3. Humeral fracture brace orthosis and sling while at work.    Plan:  1. Metastatic screening labs  2. SPEP  3. Whole body bone scan  4. CT of chest abd pelvis  5. MRI of humerus  6. Ultimately may require open biopsy, intralesional curettage depending upon dx, and prophylactic internal fixation.  7. RTC in 1 week to review above testing results          History of Present Illness:   44 year old male  chief complaint    R hand dominant male.   Otherwise healthy.  Initially saw Kimber Alvares and Kang who Rx'd PT and brace and injection for clinical dx of \"tennis elbow\".  Eventually saw Chiropracter Steven Collins who obtained XR and lesion noted on XR R humerus.  No personal hx of CA.  No night sweats or chills.    Current symptoms:  Problem: Progressive Pain and weakness in R arm, trouble turning arm, twisting problematic, can lift and flex elbow.  Cannot turn steering wheel easily.  Onset and duration: x 1.5 years  Awakens from sleep due to sx's:  No  Precipitating Injury:  No    Other joints or sites painful:  No  Fever: " No  Appetite change or weight loss: No           Physical Exam:     EXAMINATION pertinent findings:   VITAL SIGNS: Height 1.829 m (6'), weight 78.7 kg (173 lb 6.4 oz).  Body mass index is 23.52 kg/(m^2).  RESP: non labored breathing   ABD: benign . No organomegaly.  SKIN: grossly normal   LYMPHATIC: grossly normal . Supraclavicular or cervical or axillary adenopathy.  NEURO: grossly normal . Marked weakness R shoulder. Strength not tested due to fx risk.  VASCULAR: satisfactory perfusion of all extremities   MUSCULOSKELETAL:   Tender R humerus.  Full AROM R shoulder            Data:   All laboratory data reviewed  All imaging studies reviewed by me         John Hudson MD  [facility] Family Professor  Oncology and Adult Reconstructive Surgery  Dept Orthopaedic Surgery, [facility]  [phone] office, [phone] pager  [email or web address]            DATA for DOCUMENTATION:         Past Medical History:     Patient Active Problem List   Diagnosis     Gastroesophageal reflux disease     Atopic rhinitis     Contact dermatitis     Mass of neck     History of fundoplication     No past medical history on file.    Also see scanned health assessment forms.       Past Surgical History:   No past surgical history on file.         Social History:     Social History     Social History     Marital status:      Spouse name: N/A     Number of children: N/A     Years of education: N/A     Occupational History     Not on file.     Social History Main Topics     Smoking status: Not on file     Smokeless tobacco: Not on file     Alcohol use Not on file     Drug use: Not on file     Sexual activity: Not on file     Other Topics Concern     Not on file     Social History Narrative            Family History:     No family history on file.         Medications:     No current outpatient prescriptions on file.     No current facility-administered medications for this visit.               Review of Systems:   A comprehensive  10 point review of systems (constitutional, ENT, cardiac, peripheral vascular, lymphatic, respiratory, GI, , Musculoskeletal, skin, Neurological) was performed and found to be negative except as described in this note.     See intake form completed by patient      Again, thank you for allowing me to participate in the care of your patient.      Sincerely,    John Hudson MD       - Lantus on hold in setting of hypoglycemia d/t poor PO intake  - ISS  - Monitor FSG

## 2020-05-02 NOTE — PROGRESS NOTE ADULT - SUBJECTIVE AND OBJECTIVE BOX
Gianna Fitch MD  Internal Medicine, PGY-2   [phone]    Patient is a 59y old  Male who presents with a chief complaint of Rule out COVID 19 infection (01 May 2020 14:46)      SUBJECTIVE / OVERNIGHT EVENTS:      REVIEW OF SYSTEMS:    CONSTITUTIONAL: No weakness, fevers or chills  EYES: No visual changes; No blurry vision  ENT:  No pain or stiffness; No vertigo or throat pain  RESPIRATORY: No cough, wheezing, hemoptysis; No shortness of breath  CARDIOVASCULAR: No chest pain or palpitations  GASTROINTESTINAL: No abdominal or epigastric pain. No nausea, vomiting, or hematemesis; No diarrhea or constipation. No melena or hematochezia.  GENITOURINARY: No dysuria, frequency or hematuria  NEUROLOGICAL: No numbness, No HA  SKIN: No itching, rashes  MSK: no joint pain, no muscle pain  MEDICATIONS  (STANDING):  ascorbic acid  Oral Tab/Cap - Peds 500 milliGRAM(s) Oral two times a day  aspirin  chewable 81 milliGRAM(s) Oral daily  atorvastatin 80 milliGRAM(s) Oral at bedtime  bisacodyl 5 milliGRAM(s) Oral daily  carvedilol 3.125 milliGRAM(s) Oral every 12 hours  chlorhexidine 4% Liquid 1 Application(s) Topical two times a day  clopidogrel Tablet 75 milliGRAM(s) Oral daily  dextrose 5%. 1000 milliLiter(s) (50 mL/Hr) IV Continuous <Continuous>  dextrose 50% Injectable 12.5 Gram(s) IV Push once  dextrose 50% Injectable 25 Gram(s) IV Push once  dextrose 50% Injectable 25 Gram(s) IV Push once  ferrous sulfate Oral Tab/Cap - Peds 325 milliGRAM(s) Oral daily  folic acid 1 milliGRAM(s) Oral daily  heparin   Injectable 5000 Unit(s) SubCutaneous every 8 hours  hydrALAZINE 25 milliGRAM(s) Oral two times a day  insulin lispro (HumaLOG) corrective regimen sliding scale   SubCutaneous three times a day before meals  insulin lispro (HumaLOG) corrective regimen sliding scale   SubCutaneous at bedtime  levothyroxine 75 MICROGram(s) Oral daily  melatonin 3 milliGRAM(s) Oral at bedtime  polyethylene glycol 3350 17 Gram(s) Oral daily  predniSONE   Tablet 5 milliGRAM(s) Oral daily  sevelamer carbonate 800 milliGRAM(s) Oral three times a day with meals  tacrolimus 2 milliGRAM(s) Oral <User Schedule>  tacrolimus 1.5 milliGRAM(s) Oral <User Schedule>  tamsulosin 0.4 milliGRAM(s) Oral at bedtime    MEDICATIONS  (PRN):  acetaminophen   Tablet .. 650 milliGRAM(s) Oral every 4 hours PRN Temp greater or equal to 38.5C (101.3F)  acetaminophen   Tablet .. 650 milliGRAM(s) Oral every 6 hours PRN Temp greater or equal to 38C (100.4F), Mild Pain (1 - 3), Moderate Pain (4 - 6)  acetaminophen  Suppository .. 650 milliGRAM(s) Rectal every 4 hours PRN Temp greater or equal to 38.5C (101.3F)  dextrose 40% Gel 15 Gram(s) Oral once PRN Blood Glucose LESS THAN 70 milliGRAM(s)/deciliter  glucagon  Injectable 1 milliGRAM(s) IntraMuscular once PRN Glucose LESS THAN 70 milligrams/deciliter  haloperidol    Injectable 2.5 milliGRAM(s) IV Push every 6 hours PRN Agitation  ondansetron Injectable 4 milliGRAM(s) IV Push every 8 hours PRN Nausea and/or Vomiting  sucralfate 1 Gram(s) Oral every 6 hours PRN With each meal      CAPILLARY BLOOD GLUCOSE      POCT Blood Glucose.: 137 mg/dL (01 May 2020 21:47)  POCT Blood Glucose.: 226 mg/dL (01 May 2020 17:13)  POCT Blood Glucose.: 162 mg/dL (01 May 2020 12:10)  POCT Blood Glucose.: 142 mg/dL (01 May 2020 08:27)    I&O's Summary    01 May 2020 07:01  -  02 May 2020 07:00  --------------------------------------------------------  IN: 440 mL / OUT: 450 mL / NET: -10 mL        PHYSICAL EXAM:  Vital Signs Last 24 Hrs  T(C): 36.6 (02 May 2020 05:45), Max: 36.8 (01 May 2020 22:03)  T(F): 97.9 (02 May 2020 05:45), Max: 98.2 (01 May 2020 22:03)  HR: 64 (02 May 2020 05:45) (64 - 78)  BP: 118/57 (02 May 2020 05:45) (118/57 - 146/64)  BP(mean): --  RR: 18 (02 May 2020 05:45) (18 - 18)  SpO2: 97% (02 May 2020 05:45) (97% - 100%)    PHYSICAL EXAM:  GENERAL: NAD, well-groomed, well-developed  HEAD:  Atraumatic, Normocephalic  EYES: EOMI, PERRLA, conjunctiva and sclera clear  ENMT: No tonsillar erythema, exudates, or enlargement; Moist mucous membranes, Good dentition, No lesions  NECK: Supple, No JVD, Normal thyroid  CHEST/LUNG: Clear to ausculation bilaterally; No rales, rhonchi, wheezing, or rubs  HEART: Regular rate and rhythm; No murmurs, rubs, or gallops  ABDOMEN: Soft, Nontender, Nondistended; Bowel sounds present  EXTREMITIES:  2+ Peripheral Pulses, No clubbing, cyanosis, or edema  LYMPH: No lymphadenopathy noted  SKIN: No rashes or lesions  NERVOUS SYSTEM:  Alert & Oriented X3, Good concentration; Motor Strength 5/5 B/L upper and lower extremities; DTRs 2+ intact and symmetric      LABS:                        9.1    3.70  )-----------( 207      ( 02 May 2020 06:15 )             30.4     05-02    140  |  112<H>  |  89<H>  ----------------------------<  132<H>  4.5   |  20<L>  |  4.85<H>    Ca    8.9      02 May 2020 06:15  Phos  3.1     05-02  Mg     2.2     05-02            Urinalysis Basic - ( 01 May 2020 13:40 )    Color: YELLOW / Appearance: CLEAR / S.017 / pH: 6.0  Gluc: 50 / Ketone: NEGATIVE  / Bili: NEGATIVE / Urobili: NORMAL   Blood: SMALL / Protein: >600 / Nitrite: NEGATIVE   Leuk Esterase: NEGATIVE / RBC: 6-10 / WBC 3-5   Sq Epi: OCC / Non Sq Epi: x / Bacteria: NEGATIVE        Procalcitonin, Serum: 0.17 (04-27)  Procalcitonin, Serum: 0.17 ()    C-Reactive Protein, Serum: 45.1 (-)  C-Reactive Protein, Serum: 22.0 ()  C-Reactive Protein, Serum: 32.8 (-)  C-Reactive Protein, Serum: 23.9 (-)    Ferritin, Serum: 2209 ()  Ferritin, Serum: 2513 ()  Ferritin, Serum: 2157 ()  Ferritin, Serum: 1601 (-)      D-Dimer Assay, Quantitative: 438 ()  D-Dimer Assay, Quantitative: 355 ()  D-Dimer Assay, Quantitative: 28010 ()  D-Dimer Assay, Quantitative: 300 ()  D-Dimer Assay, Quantitative: 361 ()    < from: CT Head No Cont (20 @ 11:16) >    IMPRESSION:  No change from 2020    < end of copied text >    x-ray humerus and shoulder - pending       RADIOLOGY & ADDITIONAL TESTS:  Results Reviewed:  yes  Imaging Personally Reviewed: yes       COORDINATION OF CARE:  Care Discussed with Consultants/Other Providers [Y/N]:  Prior or Outpatient Records Reviewed [Y/N]: Gianna Fitch MD  Internal Medicine, PGY-2   [phone]    Patient is a 59y old  Male who presents with a chief complaint of Rule out COVID 19 infection (01 May 2020 14:46)      SUBJECTIVE / OVERNIGHT EVENTS: No events overnight, no falls w/ enhanced supervision.  No other complaints.       REVIEW OF SYSTEMS:    CONSTITUTIONAL: No fevers or chills  RESPIRATORY: No cough, No shortness of breath  CARDIOVASCULAR: No chest pain or palpitations  GASTROINTESTINAL: No abdominal pain. No nausea, vomiting.    MEDICATIONS  (STANDING):  ascorbic acid  Oral Tab/Cap - Peds 500 milliGRAM(s) Oral two times a day  aspirin  chewable 81 milliGRAM(s) Oral daily  atorvastatin 80 milliGRAM(s) Oral at bedtime  bisacodyl 5 milliGRAM(s) Oral daily  carvedilol 3.125 milliGRAM(s) Oral every 12 hours  chlorhexidine 4% Liquid 1 Application(s) Topical two times a day  clopidogrel Tablet 75 milliGRAM(s) Oral daily  dextrose 5%. 1000 milliLiter(s) (50 mL/Hr) IV Continuous <Continuous>  dextrose 50% Injectable 12.5 Gram(s) IV Push once  dextrose 50% Injectable 25 Gram(s) IV Push once  dextrose 50% Injectable 25 Gram(s) IV Push once  ferrous sulfate Oral Tab/Cap - Peds 325 milliGRAM(s) Oral daily  folic acid 1 milliGRAM(s) Oral daily  heparin   Injectable 5000 Unit(s) SubCutaneous every 8 hours  hydrALAZINE 25 milliGRAM(s) Oral two times a day  insulin lispro (HumaLOG) corrective regimen sliding scale   SubCutaneous three times a day before meals  insulin lispro (HumaLOG) corrective regimen sliding scale   SubCutaneous at bedtime  levothyroxine 75 MICROGram(s) Oral daily  melatonin 3 milliGRAM(s) Oral at bedtime  polyethylene glycol 3350 17 Gram(s) Oral daily  predniSONE   Tablet 5 milliGRAM(s) Oral daily  sevelamer carbonate 800 milliGRAM(s) Oral three times a day with meals  tacrolimus 2 milliGRAM(s) Oral <User Schedule>  tacrolimus 1.5 milliGRAM(s) Oral <User Schedule>  tamsulosin 0.4 milliGRAM(s) Oral at bedtime    MEDICATIONS  (PRN):  acetaminophen   Tablet .. 650 milliGRAM(s) Oral every 4 hours PRN Temp greater or equal to 38.5C (101.3F)  acetaminophen   Tablet .. 650 milliGRAM(s) Oral every 6 hours PRN Temp greater or equal to 38C (100.4F), Mild Pain (1 - 3), Moderate Pain (4 - 6)  acetaminophen  Suppository .. 650 milliGRAM(s) Rectal every 4 hours PRN Temp greater or equal to 38.5C (101.3F)  dextrose 40% Gel 15 Gram(s) Oral once PRN Blood Glucose LESS THAN 70 milliGRAM(s)/deciliter  glucagon  Injectable 1 milliGRAM(s) IntraMuscular once PRN Glucose LESS THAN 70 milligrams/deciliter  haloperidol    Injectable 2.5 milliGRAM(s) IV Push every 6 hours PRN Agitation  ondansetron Injectable 4 milliGRAM(s) IV Push every 8 hours PRN Nausea and/or Vomiting  sucralfate 1 Gram(s) Oral every 6 hours PRN With each meal      CAPILLARY BLOOD GLUCOSE      POCT Blood Glucose.: 137 mg/dL (01 May 2020 21:47)  POCT Blood Glucose.: 226 mg/dL (01 May 2020 17:13)  POCT Blood Glucose.: 162 mg/dL (01 May 2020 12:10)  POCT Blood Glucose.: 142 mg/dL (01 May 2020 08:27)    I&O's Summary    01 May 2020 07:01  -  02 May 2020 07:00  --------------------------------------------------------  IN: 440 mL / OUT: 450 mL / NET: -10 mL        PHYSICAL EXAM:  Vital Signs Last 24 Hrs  T(C): 36.6 (02 May 2020 05:45), Max: 36.8 (01 May 2020 22:03)  T(F): 97.9 (02 May 2020 05:45), Max: 98.2 (01 May 2020 22:03)  HR: 64 (02 May 2020 05:45) (64 - 78)  BP: 118/57 (02 May 2020 05:45) (118/57 - 146/64)  BP(mean): --  RR: 18 (02 May 2020 05:45) (18 - 18)  SpO2: 97% (02 May 2020 05:45) (97% - 100%)    PHYSICAL EXAM:  GENERAL: NAD, well-groomed, well-developed  HEAD:  Atraumatic, Normocephalic  EYES: EOMI, PERRLA, conjunctiva and sclera clear  ENMT: No tonsillar erythema, exudates, or enlargement; Moist mucous membranes, Good dentition, No lesions  NECK: Supple, No JVD, Normal thyroid  CHEST/LUNG: Clear to ausculation bilaterally; No rales, rhonchi, wheezing, or rubs  HEART: Regular rate and rhythm; No murmurs, rubs, or gallops  ABDOMEN: Soft, Nontender, Nondistended; Bowel sounds present  EXTREMITIES:  2+ Peripheral Pulses, No clubbing, cyanosis, or edema  LYMPH: No lymphadenopathy noted  SKIN: No rashes or lesions  NERVOUS SYSTEM:  Alert & Oriented X3, Good concentration; Motor Strength 5/5 B/L upper and lower extremities; DTRs 2+ intact and symmetric      LABS:                        9.1    3.70  )-----------( 207      ( 02 May 2020 06:15 )             30.4     05-02    140  |  112<H>  |  89<H>  ----------------------------<  132<H>  4.5   |  20<L>  |  4.85<H>    Ca    8.9      02 May 2020 06:15  Phos  3.1     05-02  Mg     2.2     05-02            Urinalysis Basic - ( 01 May 2020 13:40 )    Color: YELLOW / Appearance: CLEAR / S.017 / pH: 6.0  Gluc: 50 / Ketone: NEGATIVE  / Bili: NEGATIVE / Urobili: NORMAL   Blood: SMALL / Protein: >600 / Nitrite: NEGATIVE   Leuk Esterase: NEGATIVE / RBC: 6-10 / WBC 3-5   Sq Epi: OCC / Non Sq Epi: x / Bacteria: NEGATIVE        Procalcitonin, Serum: 0.17 (-27)  Procalcitonin, Serum: 0.17 (-22)    C-Reactive Protein, Serum: 45.1 (-27)  C-Reactive Protein, Serum: 22.0 (-27)  C-Reactive Protein, Serum: 32.8 (-24)  C-Reactive Protein, Serum: 23.9 (04-20)    Ferritin, Serum: 2209 ()  Ferritin, Serum: 2513 ()  Ferritin, Serum: 2157 ()  Ferritin, Serum: 1601 ()      D-Dimer Assay, Quantitative: 438 ()  D-Dimer Assay, Quantitative: 355 ()  D-Dimer Assay, Quantitative: 62663 ()  D-Dimer Assay, Quantitative: 300 ()  D-Dimer Assay, Quantitative: 361 ()    < from: CT Head No Cont (20 @ 11:16) >    IMPRESSION:  No change from 2020    < end of copied text >    x-ray humerus and shoulder - pending Gianna Fitch MD  Internal Medicine, PGY-2   [phone]    Patient is a 59y old  Male who presents with a chief complaint of Rule out COVID 19 infection.       SUBJECTIVE / OVERNIGHT EVENTS: No events overnight, no falls w/ enhanced supervision.  No other complaints.       REVIEW OF SYSTEMS:    CONSTITUTIONAL: No fevers or chills  RESPIRATORY: No cough, No shortness of breath  CARDIOVASCULAR: No chest pain or palpitations  GASTROINTESTINAL: No abdominal pain. No nausea, vomiting.    MEDICATIONS  (STANDING):  ascorbic acid  Oral Tab/Cap - Peds 500 milliGRAM(s) Oral two times a day  aspirin  chewable 81 milliGRAM(s) Oral daily  atorvastatin 80 milliGRAM(s) Oral at bedtime  bisacodyl 5 milliGRAM(s) Oral daily  carvedilol 3.125 milliGRAM(s) Oral every 12 hours  chlorhexidine 4% Liquid 1 Application(s) Topical two times a day  clopidogrel Tablet 75 milliGRAM(s) Oral daily  dextrose 5%. 1000 milliLiter(s) (50 mL/Hr) IV Continuous <Continuous>  dextrose 50% Injectable 12.5 Gram(s) IV Push once  dextrose 50% Injectable 25 Gram(s) IV Push once  dextrose 50% Injectable 25 Gram(s) IV Push once  ferrous sulfate Oral Tab/Cap - Peds 325 milliGRAM(s) Oral daily  folic acid 1 milliGRAM(s) Oral daily  heparin   Injectable 5000 Unit(s) SubCutaneous every 8 hours  hydrALAZINE 25 milliGRAM(s) Oral two times a day  insulin lispro (HumaLOG) corrective regimen sliding scale   SubCutaneous three times a day before meals  insulin lispro (HumaLOG) corrective regimen sliding scale   SubCutaneous at bedtime  levothyroxine 75 MICROGram(s) Oral daily  melatonin 3 milliGRAM(s) Oral at bedtime  polyethylene glycol 3350 17 Gram(s) Oral daily  predniSONE   Tablet 5 milliGRAM(s) Oral daily  sevelamer carbonate 800 milliGRAM(s) Oral three times a day with meals  tacrolimus 2 milliGRAM(s) Oral <User Schedule>  tacrolimus 1.5 milliGRAM(s) Oral <User Schedule>  tamsulosin 0.4 milliGRAM(s) Oral at bedtime    MEDICATIONS  (PRN):  acetaminophen   Tablet .. 650 milliGRAM(s) Oral every 4 hours PRN Temp greater or equal to 38.5C (101.3F)  acetaminophen   Tablet .. 650 milliGRAM(s) Oral every 6 hours PRN Temp greater or equal to 38C (100.4F), Mild Pain (1 - 3), Moderate Pain (4 - 6)  acetaminophen  Suppository .. 650 milliGRAM(s) Rectal every 4 hours PRN Temp greater or equal to 38.5C (101.3F)  dextrose 40% Gel 15 Gram(s) Oral once PRN Blood Glucose LESS THAN 70 milliGRAM(s)/deciliter  glucagon  Injectable 1 milliGRAM(s) IntraMuscular once PRN Glucose LESS THAN 70 milligrams/deciliter  haloperidol    Injectable 2.5 milliGRAM(s) IV Push every 6 hours PRN Agitation  ondansetron Injectable 4 milliGRAM(s) IV Push every 8 hours PRN Nausea and/or Vomiting  sucralfate 1 Gram(s) Oral every 6 hours PRN With each meal      CAPILLARY BLOOD GLUCOSE      POCT Blood Glucose.: 137 mg/dL (01 May 2020 21:47)  POCT Blood Glucose.: 226 mg/dL (01 May 2020 17:13)  POCT Blood Glucose.: 162 mg/dL (01 May 2020 12:10)  POCT Blood Glucose.: 142 mg/dL (01 May 2020 08:27)    I&O's Summary    01 May 2020 07:01  -  02 May 2020 07:00  --------------------------------------------------------  IN: 440 mL / OUT: 450 mL / NET: -10 mL        PHYSICAL EXAM:  Vital Signs Last 24 Hrs  T(C): 36.6 (02 May 2020 05:45), Max: 36.8 (01 May 2020 22:03)  T(F): 97.9 (02 May 2020 05:45), Max: 98.2 (01 May 2020 22:03)  HR: 64 (02 May 2020 05:45) (64 - 78)  BP: 118/57 (02 May 2020 05:45) (118/57 - 146/64)  BP(mean): --  RR: 18 (02 May 2020 05:45) (18 - 18)  SpO2: 97% (02 May 2020 05:45) (97% - 100%)    PHYSICAL EXAM:  GENERAL: NAD, well-groomed, well-developed  HEAD:  Atraumatic, Normocephalic  EYES: EOMI, PERRLA, conjunctiva and sclera clear  ENMT: No tonsillar erythema, exudates, or enlargement; Moist mucous membranes, Good dentition, No lesions  NECK: Supple, No JVD, Normal thyroid  CHEST/LUNG: Clear to ausculation bilaterally; No rales, rhonchi, wheezing, or rubs  HEART: Regular rate and rhythm; No murmurs, rubs, or gallops  ABDOMEN: Soft, Nontender, Nondistended; Bowel sounds present  EXTREMITIES:  2+ Peripheral Pulses, No clubbing, cyanosis, or edema  LYMPH: No lymphadenopathy noted  SKIN: No rashes or lesions  NERVOUS SYSTEM:  Alert & Oriented X3, Good concentration; Motor Strength 5/5 B/L upper and lower extremities; DTRs 2+ intact and symmetric      LABS:                        9.1    3.70  )-----------( 207      ( 02 May 2020 06:15 )             30.4     05-02    140  |  112<H>  |  89<H>  ----------------------------<  132<H>  4.5   |  20<L>  |  4.85<H>    Ca    8.9      02 May 2020 06:15  Phos  3.1     05-02  Mg     2.2     05-02            Urinalysis Basic - ( 01 May 2020 13:40 )    Color: YELLOW / Appearance: CLEAR / S.017 / pH: 6.0  Gluc: 50 / Ketone: NEGATIVE  / Bili: NEGATIVE / Urobili: NORMAL   Blood: SMALL / Protein: >600 / Nitrite: NEGATIVE   Leuk Esterase: NEGATIVE / RBC: 6-10 / WBC 3-5   Sq Epi: OCC / Non Sq Epi: x / Bacteria: NEGATIVE        Procalcitonin, Serum: 0.17 (-27)  Procalcitonin, Serum: 0.17 (-22)    C-Reactive Protein, Serum: 45.1 (-27)  C-Reactive Protein, Serum: 22.0 (-27)  C-Reactive Protein, Serum: 32.8 (-24)  C-Reactive Protein, Serum: 23.9 (-20)    Ferritin, Serum: 2209 ()  Ferritin, Serum: 2513 ()  Ferritin, Serum: 2157 ()  Ferritin, Serum: 1601 ()      D-Dimer Assay, Quantitative: 438 ()  D-Dimer Assay, Quantitative: 355 ()  D-Dimer Assay, Quantitative: 23715 ()  D-Dimer Assay, Quantitative: 300 ()  D-Dimer Assay, Quantitative: 361 ()    < from: CT Head No Cont (20 @ 11:16) >    IMPRESSION:  No change from 2020    < end of copied text >    x-ray humerus and shoulder - pending

## 2020-05-03 DIAGNOSIS — Z02.9 ENCOUNTER FOR ADMINISTRATIVE EXAMINATIONS, UNSPECIFIED: ICD-10-CM

## 2020-05-03 LAB
ANION GAP SERPL CALC-SCNC: 10 MMO/L — SIGNIFICANT CHANGE UP (ref 7–14)
BUN SERPL-MCNC: 82 MG/DL — HIGH (ref 7–23)
CALCIUM SERPL-MCNC: 8.2 MG/DL — LOW (ref 8.4–10.5)
CHLORIDE SERPL-SCNC: 111 MMOL/L — HIGH (ref 98–107)
CO2 SERPL-SCNC: 14 MMOL/L — LOW (ref 22–31)
CREAT SERPL-MCNC: 4.09 MG/DL — HIGH (ref 0.5–1.3)
CULTURE RESULTS: SIGNIFICANT CHANGE UP
CULTURE RESULTS: SIGNIFICANT CHANGE UP
GLUCOSE BLDC GLUCOMTR-MCNC: 143 MG/DL — HIGH (ref 70–99)
GLUCOSE BLDC GLUCOMTR-MCNC: 151 MG/DL — HIGH (ref 70–99)
GLUCOSE BLDC GLUCOMTR-MCNC: 172 MG/DL — HIGH (ref 70–99)
GLUCOSE BLDC GLUCOMTR-MCNC: 198 MG/DL — HIGH (ref 70–99)
GLUCOSE SERPL-MCNC: 106 MG/DL — HIGH (ref 70–99)
HCT VFR BLD CALC: 28.8 % — LOW (ref 39–50)
HGB BLD-MCNC: 8.5 G/DL — LOW (ref 13–17)
MAGNESIUM SERPL-MCNC: 2.1 MG/DL — SIGNIFICANT CHANGE UP (ref 1.6–2.6)
MCHC RBC-ENTMCNC: 24.7 PG — LOW (ref 27–34)
MCHC RBC-ENTMCNC: 29.5 % — LOW (ref 32–36)
MCV RBC AUTO: 83.7 FL — SIGNIFICANT CHANGE UP (ref 80–100)
NRBC # FLD: 0 K/UL — SIGNIFICANT CHANGE UP (ref 0–0)
PHOSPHATE SERPL-MCNC: 2 MG/DL — LOW (ref 2.5–4.5)
PLATELET # BLD AUTO: 174 K/UL — SIGNIFICANT CHANGE UP (ref 150–400)
PMV BLD: 8.8 FL — SIGNIFICANT CHANGE UP (ref 7–13)
POTASSIUM SERPL-MCNC: 4.4 MMOL/L — SIGNIFICANT CHANGE UP (ref 3.5–5.3)
POTASSIUM SERPL-SCNC: 4.4 MMOL/L — SIGNIFICANT CHANGE UP (ref 3.5–5.3)
RBC # BLD: 3.44 M/UL — LOW (ref 4.2–5.8)
RBC # FLD: 15.3 % — HIGH (ref 10.3–14.5)
SODIUM SERPL-SCNC: 135 MMOL/L — SIGNIFICANT CHANGE UP (ref 135–145)
SPECIMEN SOURCE: SIGNIFICANT CHANGE UP
SPECIMEN SOURCE: SIGNIFICANT CHANGE UP
TACROLIMUS SERPL-MCNC: < 2 NG/ML — SIGNIFICANT CHANGE UP
WBC # BLD: 3.89 K/UL — SIGNIFICANT CHANGE UP (ref 3.8–10.5)
WBC # FLD AUTO: 3.89 K/UL — SIGNIFICANT CHANGE UP (ref 3.8–10.5)

## 2020-05-03 PROCEDURE — 99233 SBSQ HOSP IP/OBS HIGH 50: CPT | Mod: GC

## 2020-05-03 PROCEDURE — 99233 SBSQ HOSP IP/OBS HIGH 50: CPT

## 2020-05-03 RX ORDER — THIAMINE MONONITRATE (VIT B1) 100 MG
100 TABLET ORAL DAILY
Refills: 0 | Status: DISCONTINUED | OUTPATIENT
Start: 2020-05-03 | End: 2020-05-07

## 2020-05-03 RX ORDER — TACROLIMUS 5 MG/1
1 CAPSULE ORAL
Refills: 0 | Status: DISCONTINUED | OUTPATIENT
Start: 2020-05-03 | End: 2020-05-04

## 2020-05-03 RX ADMIN — Medication 25 MILLIGRAM(S): at 06:29

## 2020-05-03 RX ADMIN — ATORVASTATIN CALCIUM 80 MILLIGRAM(S): 80 TABLET, FILM COATED ORAL at 22:49

## 2020-05-03 RX ADMIN — CHLORHEXIDINE GLUCONATE 1 APPLICATION(S): 213 SOLUTION TOPICAL at 12:30

## 2020-05-03 RX ADMIN — Medication 1 MILLIGRAM(S): at 12:58

## 2020-05-03 RX ADMIN — Medication 5 MILLIGRAM(S): at 12:57

## 2020-05-03 RX ADMIN — Medication 2: at 12:59

## 2020-05-03 RX ADMIN — TACROLIMUS 2 MILLIGRAM(S): 5 CAPSULE ORAL at 06:29

## 2020-05-03 RX ADMIN — SEVELAMER CARBONATE 800 MILLIGRAM(S): 2400 POWDER, FOR SUSPENSION ORAL at 06:28

## 2020-05-03 RX ADMIN — Medication 81 MILLIGRAM(S): at 12:56

## 2020-05-03 RX ADMIN — SEVELAMER CARBONATE 800 MILLIGRAM(S): 2400 POWDER, FOR SUSPENSION ORAL at 12:57

## 2020-05-03 RX ADMIN — Medication 325 MILLIGRAM(S): at 12:57

## 2020-05-03 RX ADMIN — Medication 500 MILLIGRAM(S): at 16:31

## 2020-05-03 RX ADMIN — HEPARIN SODIUM 5000 UNIT(S): 5000 INJECTION INTRAVENOUS; SUBCUTANEOUS at 13:10

## 2020-05-03 RX ADMIN — HEPARIN SODIUM 5000 UNIT(S): 5000 INJECTION INTRAVENOUS; SUBCUTANEOUS at 08:31

## 2020-05-03 RX ADMIN — Medication 100 MILLIGRAM(S): at 13:09

## 2020-05-03 RX ADMIN — Medication 75 MICROGRAM(S): at 06:27

## 2020-05-03 RX ADMIN — Medication 5 MILLIGRAM(S): at 06:27

## 2020-05-03 RX ADMIN — TAMSULOSIN HYDROCHLORIDE 0.4 MILLIGRAM(S): 0.4 CAPSULE ORAL at 22:49

## 2020-05-03 RX ADMIN — Medication 500 MILLIGRAM(S): at 06:36

## 2020-05-03 RX ADMIN — Medication 25 MILLIGRAM(S): at 16:31

## 2020-05-03 RX ADMIN — CLOPIDOGREL BISULFATE 75 MILLIGRAM(S): 75 TABLET, FILM COATED ORAL at 12:57

## 2020-05-03 RX ADMIN — CARVEDILOL PHOSPHATE 3.12 MILLIGRAM(S): 80 CAPSULE, EXTENDED RELEASE ORAL at 16:31

## 2020-05-03 RX ADMIN — Medication 3 MILLIGRAM(S): at 22:49

## 2020-05-03 RX ADMIN — TACROLIMUS 1 MILLIGRAM(S): 5 CAPSULE ORAL at 17:22

## 2020-05-03 RX ADMIN — CARVEDILOL PHOSPHATE 3.12 MILLIGRAM(S): 80 CAPSULE, EXTENDED RELEASE ORAL at 06:30

## 2020-05-03 RX ADMIN — HEPARIN SODIUM 5000 UNIT(S): 5000 INJECTION INTRAVENOUS; SUBCUTANEOUS at 22:48

## 2020-05-03 RX ADMIN — SEVELAMER CARBONATE 800 MILLIGRAM(S): 2400 POWDER, FOR SUSPENSION ORAL at 16:30

## 2020-05-03 RX ADMIN — CHLORHEXIDINE GLUCONATE 1 APPLICATION(S): 213 SOLUTION TOPICAL at 16:32

## 2020-05-03 RX ADMIN — Medication 2: at 17:22

## 2020-05-03 RX ADMIN — POLYETHYLENE GLYCOL 3350 17 GRAM(S): 17 POWDER, FOR SOLUTION ORAL at 13:10

## 2020-05-03 NOTE — PROGRESS NOTE ADULT - PROBLEM SELECTOR PLAN 2
Noted to have YURI on CKD, worsening over past 24 hours. Found to have acute urinary retention with >1L urine on bladder scan s/p Moody placement on 4/29.   - Start trial of void today 5/3  - Appreciate Nephrology recs  - Continue immunosuppressant regimen (see below)  - Holding home Cellcept and Bumex  - Continue to trend BMP  - Avoid nephrotoxins, renally dose medications Noted to have YURI on CKD, worsening over past 24 hours. Found to have acute urinary retention with >1L urine on bladder scan s/p Moody placement on 4/29.   - Start trial of void today 5/3  - C/w tamsulosin  - Appreciate Nephrology recs  - Continue immunosuppressant regimen (see below)  - Holding home Cellcept and Bumex  - Continue to trend BMP  - Avoid nephrotoxins, renally dose medications Noted to have YURI on CKD, worsening over past 24 hours. Found to have acute urinary retention with >1L urine on bladder scan s/p Moody placement on 4/29.   - Trial of void 5/4  - C/w tamsulosin  - Appreciate Nephrology recs  - Continue immunosuppressant regimen (see below)  - Holding home Cellcept and Bumex  - Continue to trend BMP  - Avoid nephrotoxins, renally dose medications Noted to have YURI on CKD. Found to have acute urinary retention with >1L urine on bladder scan s/p Moody placement on 4/29.   - Gentle IV fluid hydration x24h (scheduled to stop @ 11AM today)  - C/w Moody for now, plan for ToV tomorrow 5/4 now that renal function has started to improve  - C/w tamsulosin  - Appreciate Nephrology recs  - Continue immunosuppressant regimen (see below)  - Holding home Cellcept and Bumex  - Trend BMP  - Avoid nephrotoxins, renally dose medications

## 2020-05-03 NOTE — PROGRESS NOTE ADULT - PROBLEM SELECTOR PROBLEM 7
CAD (coronary artery disease) Type 2 diabetes mellitus with chronic kidney disease, with long-term current use of insulin, unspecified CKD stage

## 2020-05-03 NOTE — PROGRESS NOTE ADULT - NSHPATTENDINGPLANDISCUSS_GEN_ALL_CORE
nurse
patient
patient
team
team
patient and ACP

## 2020-05-03 NOTE — PROGRESS NOTE ADULT - PROBLEM SELECTOR PLAN 3
Pt now s/p 2 falls in hospital (on 4/30 and again 5/1). CTH x2 without acute pathology. Noted to have low thiamine level which could contribute to difficulty ambulating.   - Enhanced supervision  - Replete thiamine & folate  - Fall precautions  - PT tungal

## 2020-05-03 NOTE — PROGRESS NOTE ADULT - SUBJECTIVE AND OBJECTIVE BOX
Medicine Progress Note  Authored by Kathi Campos MD PGY3, pager [phone]    Patient is a 59y old  Male who presents with a chief complaint of Rule out COVID 19 infection (02 May 2020 07:50)    SUBJECTIVE / OVERNIGHT EVENTS:  No overnight events reported.   Patient seen and examined this morning.     MEDICATIONS  (STANDING):  ascorbic acid  Oral Tab/Cap - Peds 500 milliGRAM(s) Oral two times a day  aspirin  chewable 81 milliGRAM(s) Oral daily  atorvastatin 80 milliGRAM(s) Oral at bedtime  bisacodyl 5 milliGRAM(s) Oral daily  carvedilol 3.125 milliGRAM(s) Oral every 12 hours  chlorhexidine 4% Liquid 1 Application(s) Topical two times a day  clopidogrel Tablet 75 milliGRAM(s) Oral daily  dextrose 5%. 1000 milliLiter(s) (50 mL/Hr) IV Continuous <Continuous>  dextrose 50% Injectable 12.5 Gram(s) IV Push once  dextrose 50% Injectable 25 Gram(s) IV Push once  dextrose 50% Injectable 25 Gram(s) IV Push once  ferrous sulfate Oral Tab/Cap - Peds 325 milliGRAM(s) Oral daily  folic acid 1 milliGRAM(s) Oral daily  heparin   Injectable 5000 Unit(s) SubCutaneous every 8 hours  hydrALAZINE 25 milliGRAM(s) Oral two times a day  insulin lispro (HumaLOG) corrective regimen sliding scale   SubCutaneous three times a day before meals  insulin lispro (HumaLOG) corrective regimen sliding scale   SubCutaneous at bedtime  levothyroxine 75 MICROGram(s) Oral daily  melatonin 3 milliGRAM(s) Oral at bedtime  polyethylene glycol 3350 17 Gram(s) Oral daily  predniSONE   Tablet 5 milliGRAM(s) Oral daily  sevelamer carbonate 800 milliGRAM(s) Oral three times a day with meals  sodium chloride 0.9%. 1000 milliLiter(s) (50 mL/Hr) IV Continuous <Continuous>  tacrolimus 2 milliGRAM(s) Oral <User Schedule>  tacrolimus 1.5 milliGRAM(s) Oral <User Schedule>  tamsulosin 0.4 milliGRAM(s) Oral at bedtime  thiamine 100 milliGRAM(s) Oral daily    MEDICATIONS  (PRN):  acetaminophen   Tablet .. 650 milliGRAM(s) Oral every 4 hours PRN Temp greater or equal to 38.5C (101.3F)  acetaminophen   Tablet .. 650 milliGRAM(s) Oral every 6 hours PRN Temp greater or equal to 38C (100.4F), Mild Pain (1 - 3), Moderate Pain (4 - 6)  acetaminophen  Suppository .. 650 milliGRAM(s) Rectal every 4 hours PRN Temp greater or equal to 38.5C (101.3F)  dextrose 40% Gel 15 Gram(s) Oral once PRN Blood Glucose LESS THAN 70 milliGRAM(s)/deciliter  glucagon  Injectable 1 milliGRAM(s) IntraMuscular once PRN Glucose LESS THAN 70 milligrams/deciliter  haloperidol    Injectable 2.5 milliGRAM(s) IV Push every 6 hours PRN Agitation  ondansetron Injectable 4 milliGRAM(s) IV Push every 8 hours PRN Nausea and/or Vomiting  sucralfate 1 Gram(s) Oral every 6 hours PRN With each meal    CAPILLARY BLOOD GLUCOSE  POCT Blood Glucose.: 164 mg/dL (02 May 2020 23:34)  POCT Blood Glucose.: 215 mg/dL (02 May 2020 21:50)  POCT Blood Glucose.: 155 mg/dL (02 May 2020 16:43)  POCT Blood Glucose.: 244 mg/dL (02 May 2020 11:54)    I&O's Summary    02 May 2020 07:01  -  03 May 2020 07:00  --------------------------------------------------------  IN: 620 mL / OUT: 1100 mL / NET: -480 mL    PHYSICAL EXAM:  Vital Signs Last 24 Hrs  T(C): 36.7 (03 May 2020 06:25), Max: 37 (02 May 2020 16:47)  T(F): 98.1 (03 May 2020 06:25), Max: 98.6 (02 May 2020 16:47)  HR: 75 (03 May 2020 06:25) (70 - 76)  BP: 121/61 (03 May 2020 06:25) (110/64 - 152/70)  RR: 19 (03 May 2020 06:25) (19 - 19)  SpO2: 100% (03 May 2020 06:25) (97% - 100%)    CONSTITUTIONAL: NAD  HEENT: EOMI, PERRL, normal sclera and conjunctiva; normal nasal and oral mucosa, no oral lesions  RESPIRATORY: Normal respiratory effort; lungs are clear to auscultation bilaterally  CARDIOVASCULAR: Regular rate and rhythm, normal S1 and S2, no murmur/rub/gallop; No lower extremity edema; Peripheral pulses are 2+ bilaterally  ABDOMEN: Nontender to palpation, normoactive bowel sounds, no rebound/guarding; No hepatosplenomegaly  NEUROLOGY: AOx3, CN 2-12 are intact and symmetric; no gross sensory deficits   SKIN: No rashes; no palpable lesions    LABS:                        8.5    3.89  )-----------( 174      ( 03 May 2020 06:21 )             28.8     05-03    135  |  111<H>  |  82<H>  ----------------------------<  106<H>  4.4   |  14<L>  |  4.09<H>    Ca    8.2<L>      03 May 2020 06:21  Phos  2.0     05-03  Mg     2.1     05-03    COVID-19 PCR: Detected (11 Apr 2020 05:25)    RADIOLOGY & ADDITIONAL TESTS:  Imaging from Last 24 Hours:    < from: Xray Shoulder 2 Views, Right (05.02.20 @ 11:47) >  FINDINGS:  No right glenohumeral fracture or dislocation. The right acromioclavicular joint is maintained.  Mild underlying osteoarthritic changes.  Regional soft tissues are preserved.  IMPRESSION: No acute fracture-subluxation demonstrated.  < end of copied text >    < from: Xray Humerus, Right (05.02.20 @ 11:45) >  FINDINGS:  No right humeral fracture dislocation.  No bony erosive or destructive lesions.  The regional soft tissues are maintained.  IMPRESSION: No fracture-subluxation demonstrated.  < end of copied text >      Electrocardiogram/QTc Interval:    COORDINATION OF CARE:  Care Discussed with Consultants/Other Providers: Medicine Progress Note  Authored by Kathi Campos MD PGY3, pager [phone]    Patient is a 59y old  Male who presents with a chief complaint of Rule out COVID 19 infection.     SUBJECTIVE / OVERNIGHT EVENTS:  No overnight events reported.   Patient seen and examined this morning.     MEDICATIONS  (STANDING):  ascorbic acid  Oral Tab/Cap - Peds 500 milliGRAM(s) Oral two times a day  aspirin  chewable 81 milliGRAM(s) Oral daily  atorvastatin 80 milliGRAM(s) Oral at bedtime  bisacodyl 5 milliGRAM(s) Oral daily  carvedilol 3.125 milliGRAM(s) Oral every 12 hours  chlorhexidine 4% Liquid 1 Application(s) Topical two times a day  clopidogrel Tablet 75 milliGRAM(s) Oral daily  dextrose 5%. 1000 milliLiter(s) (50 mL/Hr) IV Continuous <Continuous>  dextrose 50% Injectable 12.5 Gram(s) IV Push once  dextrose 50% Injectable 25 Gram(s) IV Push once  dextrose 50% Injectable 25 Gram(s) IV Push once  ferrous sulfate Oral Tab/Cap - Peds 325 milliGRAM(s) Oral daily  folic acid 1 milliGRAM(s) Oral daily  heparin   Injectable 5000 Unit(s) SubCutaneous every 8 hours  hydrALAZINE 25 milliGRAM(s) Oral two times a day  insulin lispro (HumaLOG) corrective regimen sliding scale   SubCutaneous three times a day before meals  insulin lispro (HumaLOG) corrective regimen sliding scale   SubCutaneous at bedtime  levothyroxine 75 MICROGram(s) Oral daily  melatonin 3 milliGRAM(s) Oral at bedtime  polyethylene glycol 3350 17 Gram(s) Oral daily  predniSONE   Tablet 5 milliGRAM(s) Oral daily  sevelamer carbonate 800 milliGRAM(s) Oral three times a day with meals  sodium chloride 0.9%. 1000 milliLiter(s) (50 mL/Hr) IV Continuous <Continuous>  tacrolimus 2 milliGRAM(s) Oral <User Schedule>  tacrolimus 1.5 milliGRAM(s) Oral <User Schedule>  tamsulosin 0.4 milliGRAM(s) Oral at bedtime  thiamine 100 milliGRAM(s) Oral daily    MEDICATIONS  (PRN):  acetaminophen   Tablet .. 650 milliGRAM(s) Oral every 4 hours PRN Temp greater or equal to 38.5C (101.3F)  acetaminophen   Tablet .. 650 milliGRAM(s) Oral every 6 hours PRN Temp greater or equal to 38C (100.4F), Mild Pain (1 - 3), Moderate Pain (4 - 6)  acetaminophen  Suppository .. 650 milliGRAM(s) Rectal every 4 hours PRN Temp greater or equal to 38.5C (101.3F)  dextrose 40% Gel 15 Gram(s) Oral once PRN Blood Glucose LESS THAN 70 milliGRAM(s)/deciliter  glucagon  Injectable 1 milliGRAM(s) IntraMuscular once PRN Glucose LESS THAN 70 milligrams/deciliter  haloperidol    Injectable 2.5 milliGRAM(s) IV Push every 6 hours PRN Agitation  ondansetron Injectable 4 milliGRAM(s) IV Push every 8 hours PRN Nausea and/or Vomiting  sucralfate 1 Gram(s) Oral every 6 hours PRN With each meal    CAPILLARY BLOOD GLUCOSE  POCT Blood Glucose.: 164 mg/dL (02 May 2020 23:34)  POCT Blood Glucose.: 215 mg/dL (02 May 2020 21:50)  POCT Blood Glucose.: 155 mg/dL (02 May 2020 16:43)  POCT Blood Glucose.: 244 mg/dL (02 May 2020 11:54)    I&O's Summary    02 May 2020 07:01  -  03 May 2020 07:00  --------------------------------------------------------  IN: 620 mL / OUT: 1100 mL / NET: -480 mL    PHYSICAL EXAM:  Vital Signs Last 24 Hrs  T(C): 36.7 (03 May 2020 06:25), Max: 37 (02 May 2020 16:47)  T(F): 98.1 (03 May 2020 06:25), Max: 98.6 (02 May 2020 16:47)  HR: 75 (03 May 2020 06:25) (70 - 76)  BP: 121/61 (03 May 2020 06:25) (110/64 - 152/70)  RR: 19 (03 May 2020 06:25) (19 - 19)  SpO2: 100% (03 May 2020 06:25) (97% - 100%)    CONSTITUTIONAL: NAD  HEENT: EOMI, PERRL, normal sclera and conjunctiva; normal nasal and oral mucosa, no oral lesions  RESPIRATORY: Normal respiratory effort; lungs are clear to auscultation bilaterally  CARDIOVASCULAR: Regular rate and rhythm, normal S1 and S2, no murmur/rub/gallop; No lower extremity edema; Peripheral pulses are 2+ bilaterally  ABDOMEN: Nontender to palpation, normoactive bowel sounds, no rebound/guarding; No hepatosplenomegaly  NEUROLOGY: AOx3, CN 2-12 are intact and symmetric; no gross sensory deficits   SKIN: No rashes; no palpable lesions    LABS:                        8.5    3.89  )-----------( 174      ( 03 May 2020 06:21 )             28.8     05-03    135  |  111<H>  |  82<H>  ----------------------------<  106<H>  4.4   |  14<L>  |  4.09<H>    Ca    8.2<L>      03 May 2020 06:21  Phos  2.0     05-03  Mg     2.1     05-03    COVID-19 PCR: Detected (11 Apr 2020 05:25)    RADIOLOGY & ADDITIONAL TESTS:  Imaging from Last 24 Hours:    < from: Xray Shoulder 2 Views, Right (05.02.20 @ 11:47) >  FINDINGS:  No right glenohumeral fracture or dislocation. The right acromioclavicular joint is maintained.  Mild underlying osteoarthritic changes.  Regional soft tissues are preserved.  IMPRESSION: No acute fracture-subluxation demonstrated.  < end of copied text >    < from: Xray Humerus, Right (05.02.20 @ 11:45) >  FINDINGS:  No right humeral fracture dislocation.  No bony erosive or destructive lesions.  The regional soft tissues are maintained.  IMPRESSION: No fracture-subluxation demonstrated.  < end of copied text >      Electrocardiogram/QTc Interval:    COORDINATION OF CARE:  Care Discussed with Consultants/Other Providers: Medicine Progress Note  Authored by Kathi Campos MD PGY3, pager [phone]    Patient is a 59y old  Male who presents with a chief complaint of Rule out COVID 19 infection (02 May 2020 07:50)    SUBJECTIVE / OVERNIGHT EVENTS:  No overnight events reported.   Patient seen and examined this morning. Awake and alert, responding appropriately to questions. Denies complaints.     MEDICATIONS  (STANDING):  ascorbic acid  Oral Tab/Cap - Peds 500 milliGRAM(s) Oral two times a day  aspirin  chewable 81 milliGRAM(s) Oral daily  atorvastatin 80 milliGRAM(s) Oral at bedtime  bisacodyl 5 milliGRAM(s) Oral daily  carvedilol 3.125 milliGRAM(s) Oral every 12 hours  chlorhexidine 4% Liquid 1 Application(s) Topical two times a day  clopidogrel Tablet 75 milliGRAM(s) Oral daily  dextrose 5%. 1000 milliLiter(s) (50 mL/Hr) IV Continuous <Continuous>  dextrose 50% Injectable 12.5 Gram(s) IV Push once  dextrose 50% Injectable 25 Gram(s) IV Push once  dextrose 50% Injectable 25 Gram(s) IV Push once  ferrous sulfate Oral Tab/Cap - Peds 325 milliGRAM(s) Oral daily  folic acid 1 milliGRAM(s) Oral daily  heparin   Injectable 5000 Unit(s) SubCutaneous every 8 hours  hydrALAZINE 25 milliGRAM(s) Oral two times a day  insulin lispro (HumaLOG) corrective regimen sliding scale   SubCutaneous three times a day before meals  insulin lispro (HumaLOG) corrective regimen sliding scale   SubCutaneous at bedtime  levothyroxine 75 MICROGram(s) Oral daily  melatonin 3 milliGRAM(s) Oral at bedtime  polyethylene glycol 3350 17 Gram(s) Oral daily  predniSONE   Tablet 5 milliGRAM(s) Oral daily  sevelamer carbonate 800 milliGRAM(s) Oral three times a day with meals  sodium chloride 0.9%. 1000 milliLiter(s) (50 mL/Hr) IV Continuous <Continuous>  tacrolimus 2 milliGRAM(s) Oral <User Schedule>  tacrolimus 1.5 milliGRAM(s) Oral <User Schedule>  tamsulosin 0.4 milliGRAM(s) Oral at bedtime  thiamine 100 milliGRAM(s) Oral daily    MEDICATIONS  (PRN):  acetaminophen   Tablet .. 650 milliGRAM(s) Oral every 4 hours PRN Temp greater or equal to 38.5C (101.3F)  acetaminophen   Tablet .. 650 milliGRAM(s) Oral every 6 hours PRN Temp greater or equal to 38C (100.4F), Mild Pain (1 - 3), Moderate Pain (4 - 6)  acetaminophen  Suppository .. 650 milliGRAM(s) Rectal every 4 hours PRN Temp greater or equal to 38.5C (101.3F)  dextrose 40% Gel 15 Gram(s) Oral once PRN Blood Glucose LESS THAN 70 milliGRAM(s)/deciliter  glucagon  Injectable 1 milliGRAM(s) IntraMuscular once PRN Glucose LESS THAN 70 milligrams/deciliter  haloperidol    Injectable 2.5 milliGRAM(s) IV Push every 6 hours PRN Agitation  ondansetron Injectable 4 milliGRAM(s) IV Push every 8 hours PRN Nausea and/or Vomiting  sucralfate 1 Gram(s) Oral every 6 hours PRN With each meal    CAPILLARY BLOOD GLUCOSE  POCT Blood Glucose.: 164 mg/dL (02 May 2020 23:34)  POCT Blood Glucose.: 215 mg/dL (02 May 2020 21:50)  POCT Blood Glucose.: 155 mg/dL (02 May 2020 16:43)  POCT Blood Glucose.: 244 mg/dL (02 May 2020 11:54)    I&O's Summary    02 May 2020 07:01  -  03 May 2020 07:00  --------------------------------------------------------  IN: 620 mL / OUT: 1100 mL / NET: -480 mL    PHYSICAL EXAM:  Vital Signs Last 24 Hrs  T(C): 36.7 (03 May 2020 06:25), Max: 37 (02 May 2020 16:47)  T(F): 98.1 (03 May 2020 06:25), Max: 98.6 (02 May 2020 16:47)  HR: 75 (03 May 2020 06:25) (70 - 76)  BP: 121/61 (03 May 2020 06:25) (110/64 - 152/70)  RR: 19 (03 May 2020 06:25) (19 - 19)  SpO2: 100% (03 May 2020 06:25) (97% - 100%)    CONSTITUTIONAL: NAD  HEENT: EOMI, PERRL, normal sclera and conjunctiva; normal nasal and oral mucosa, no oral lesions  RESPIRATORY: Normal respiratory effort; lungs are clear to auscultation bilaterally  CARDIOVASCULAR: Regular rate and rhythm, normal S1 and S2, no murmur/rub/gallop  ABDOMEN: Nontender to palpation, normoactive bowel sounds, no rebound/guarding; No hepatosplenomegaly  NEUROLOGY: AOx3, CN 2-12 are intact and symmetric; no gross sensory deficits   SKIN: No rashes; no palpable lesions  EXTREMITIES: No LE edema, pulses intact, +R AKA    LABS:                        8.5    3.89  )-----------( 174      ( 03 May 2020 06:21 )             28.8     05-03    135  |  111<H>  |  82<H>  ----------------------------<  106<H>  4.4   |  14<L>  |  4.09<H>    Ca    8.2<L>      03 May 2020 06:21  Phos  2.0     05-03  Mg     2.1     05-03    COVID-19 PCR: Detected (11 Apr 2020 05:25)    RADIOLOGY & ADDITIONAL TESTS:  Imaging from Last 24 Hours:    < from: Xray Shoulder 2 Views, Right (05.02.20 @ 11:47) >  FINDINGS:  No right glenohumeral fracture or dislocation. The right acromioclavicular joint is maintained.  Mild underlying osteoarthritic changes.  Regional soft tissues are preserved.  IMPRESSION: No acute fracture-subluxation demonstrated.  < end of copied text >    < from: Xray Humerus, Right (05.02.20 @ 11:45) >  FINDINGS:  No right humeral fracture dislocation.  No bony erosive or destructive lesions.  The regional soft tissues are maintained.  IMPRESSION: No fracture-subluxation demonstrated.  < end of copied text >      Electrocardiogram/QTc Interval: n/a    COORDINATION OF CARE:  Care Discussed with Consultants/Other Providers: n/a

## 2020-05-03 NOTE — PROGRESS NOTE ADULT - PROBLEM SELECTOR PROBLEM 8
Type 2 diabetes mellitus with chronic kidney disease, with long-term current use of insulin, unspecified CKD stage Hypertension, unspecified type

## 2020-05-03 NOTE — PROGRESS NOTE ADULT - PROBLEM SELECTOR PLAN 1
Significantly improved.  Suspect etiology was multifactorial related to acute urinary retention w/ associated acute renal failure, hospital-associated delirium, and vitamin deficiency (?).   - Appreciate Neurology recs  - EEG on 4/30 with nonspecific moderate diffuse cerebral dysfunction, no seizure activity  - Appreciate Psychiatry recs; Haldol 2.5mg IV/IM/PO q6h PRN agitation  - C/w folate and thiamine repletion  - C/w melatonin to help regulate circadian rhythm Significantly improved.  Suspect etiology was multifactorial related to acute urinary retention w/ associated acute renal failure, hospital-associated delirium, and vitamin deficiency.   - Appreciate Neurology recs  - EEG on 4/30 with nonspecific moderate diffuse cerebral dysfunction, no seizure activity  - Appreciate Psychiatry recs; Haldol 2.5mg IV/IM/PO q6h PRN agitation  - C/w folate and thiamine repletion  - C/w melatonin to help regulate circadian rhythm  - C/w Heidy for now, plan for ToV tomorrow 5/4

## 2020-05-03 NOTE — PROGRESS NOTE ADULT - PROBLEM SELECTOR PLAN 7
S/p recent stent at [facility].   - C/w DAPT  - Continue carvedilol  - Continue atorvastatin - Lantus on hold in setting of hypoglycemia d/t poor PO intake, resume as needed  - ISS  - Monitor FSG

## 2020-05-03 NOTE — PROGRESS NOTE ADULT - PROBLEM SELECTOR PLAN 6
Home immunosuppressant regimen: Tacrolimus 1mg BID, prednisone 5mg daily, Cellcept 1g BID. Tacro level subtherapeutic on admission concerning for medication non-adherence.   - C/w Tacrolimus 2mg in AM, 1mg in PM  - Continue prednisone 5mg daily  - Continue to hold Cellcept until discharge  - Tacro level before each AM dose  - Will need to follow up with outpatient nephrologist upon discharge (Dr. Adrián Hawkins) S/p recent stent at [facility].   - C/w DAPT  - Continue carvedilol  - Continue atorvastatin

## 2020-05-03 NOTE — PROGRESS NOTE ADULT - PROBLEM SELECTOR PLAN 2
Pt with history of ESRD s/p DDRT on 2005. Pt is on Tacrolimus 1mg BID, Cellcept 1 gm BID and Prednisone 5mg daily. Last Tacro level 2.9. Hold Cellcept until discharge. Change Tacrolimus to 2mg in AM and 1mg in PM. Continue  Prednisone.  Goal Tacrolimus level is 3-5. Monitor Scr. Please check AM Tacro level on Monday 5/4/20

## 2020-05-03 NOTE — PROGRESS NOTE ADULT - PROBLEM SELECTOR PLAN 1
Pt with YURI on CKD in the setting of COVID-19 vs. chronic graft rejection. Pt with last SCr of 4.8 outpatient. On admission, SCr of 5.47 (4/10/20) which has improved to 4.38 however increased to 5.25.  Pt started on IVF with improvement to 3.58  however increased to 6.25 again in the setting of   urinary obstruction. Labs and charts reviewed. Recommend  repeat UA with urine electrolytes. Continue to monitor renal function.  Avoid other potential nephrotoxins. Cont on IVF as patient does not appear to be eating. Monitor I/Os closely

## 2020-05-03 NOTE — PROGRESS NOTE ADULT - SUBJECTIVE AND OBJECTIVE BOX
[facility] DIVISION OF KIDNEY DISEASES AND HYPERTENSION -- FOLLOW UP NOTE  --------------------------------------------------------------------------------  HPI: 59M PMH of HTN, HLD, DM, ESRD s/p kidney transplant, admitted for COVID-19. Nephrology team consulted for YURI and management of immunosuppressant. Pt with history of ESRD s/p DDRT on 2005. On review pt is on Tacrolimus 1mg BID, Mycophenolate 1gm BID and Prednisone 5mg daily. On review of labs, pt with SCr: 3.6 on 3/27/20 increased to 3.9 on 3/30/20. Pt with last outpatient SCr of 4.8. On admission, SCr was 5.47 (4/10/20) which continued to improve to SCr: 3.6. with IVF. Pt off IV, duvall catheter place. SCr vikas and then improved over the last day.    Labs and charts reviewed. Patient more awake and verbal this AM denies any complaints    PAST HISTORY  --------------------------------------------------------------------------------  No significant changes to PMH, PSH, FHx, SHx, unless otherwise noted    ALLERGIES & MEDICATIONS  --------------------------------------------------------------------------------  Allergies    iodine (Vomiting)  IV Contrast (Unknown)    Intolerances      Standing Inpatient Medications  ascorbic acid  Oral Tab/Cap - Peds 500 milliGRAM(s) Oral two times a day  aspirin  chewable 81 milliGRAM(s) Oral daily  atorvastatin 80 milliGRAM(s) Oral at bedtime  bisacodyl 5 milliGRAM(s) Oral daily  carvedilol 3.125 milliGRAM(s) Oral every 12 hours  chlorhexidine 4% Liquid 1 Application(s) Topical two times a day  clopidogrel Tablet 75 milliGRAM(s) Oral daily  dextrose 5%. 1000 milliLiter(s) IV Continuous <Continuous>  dextrose 50% Injectable 12.5 Gram(s) IV Push once  dextrose 50% Injectable 25 Gram(s) IV Push once  dextrose 50% Injectable 25 Gram(s) IV Push once  ferrous sulfate Oral Tab/Cap - Peds 325 milliGRAM(s) Oral daily  folic acid 1 milliGRAM(s) Oral daily  heparin   Injectable 5000 Unit(s) SubCutaneous every 8 hours  hydrALAZINE 25 milliGRAM(s) Oral two times a day  insulin lispro (HumaLOG) corrective regimen sliding scale   SubCutaneous three times a day before meals  insulin lispro (HumaLOG) corrective regimen sliding scale   SubCutaneous at bedtime  levothyroxine 75 MICROGram(s) Oral daily  melatonin 3 milliGRAM(s) Oral at bedtime  polyethylene glycol 3350 17 Gram(s) Oral daily  predniSONE   Tablet 5 milliGRAM(s) Oral daily  sevelamer carbonate 800 milliGRAM(s) Oral three times a day with meals  sodium chloride 0.9%. 1000 milliLiter(s) IV Continuous <Continuous>  tacrolimus 2 milliGRAM(s) Oral <User Schedule>  tacrolimus 1.5 milliGRAM(s) Oral <User Schedule>  tamsulosin 0.4 milliGRAM(s) Oral at bedtime  thiamine 100 milliGRAM(s) Oral daily    PRN Inpatient Medications  acetaminophen   Tablet .. 650 milliGRAM(s) Oral every 4 hours PRN  acetaminophen   Tablet .. 650 milliGRAM(s) Oral every 6 hours PRN  acetaminophen  Suppository .. 650 milliGRAM(s) Rectal every 4 hours PRN  dextrose 40% Gel 15 Gram(s) Oral once PRN  glucagon  Injectable 1 milliGRAM(s) IntraMuscular once PRN  haloperidol    Injectable 2.5 milliGRAM(s) IV Push every 6 hours PRN  ondansetron Injectable 4 milliGRAM(s) IV Push every 8 hours PRN  sucralfate 1 Gram(s) Oral every 6 hours PRN      REVIEW OF SYSTEMS  --------------------------------------------------------------------------------  Unable to attain as a result of mental status.  All other systems were reviewed and are negative, except as noted.    VITALS/PHYSICAL EXAM  --------------------------------------------------------------------------------  T(C): 36.7 (05-03-20 @ 06:25), Max: 37 (05-02-20 @ 16:47)  HR: 75 (05-03-20 @ 06:25) (73 - 76)  BP: 121/61 (05-03-20 @ 06:25) (121/61 - 152/70)  RR: 19 (05-03-20 @ 06:25) (19 - 19)  SpO2: 100% (05-03-20 @ 06:25) (97% - 100%)  Wt(kg): --        05-02-20 @ 07:01  -  05-03-20 @ 07:00  --------------------------------------------------------  IN: 620 mL / OUT: 1100 mL / NET: -480 mL      Physical Exam:  	Gen: no distress  	HEENT: no JVD  	Pulm: normal respiratory pattern  	CV:  not tachy  	Abd: +BS, soft, transplant area without tenderness  	Ext: No B/L Lower ext edema s/p R amputation  	Neuro: awake,  confused  	Skin: Warm, without rashes  	Vascular access: Left Arm AVF with palpable thrill, + aneurysmal dilation    LABS/STUDIES  --------------------------------------------------------------------------------              8.5    3.89  >-----------<  174      [05-03-20 @ 06:21]              28.8     135  |  111  |  82  ----------------------------<  106      [05-03-20 @ 06:21]  4.4   |  14  |  4.09        Ca     8.2     [05-03-20 @ 06:21]      Mg     2.1     [05-03-20 @ 06:21]      Phos  2.0     [05-03-20 @ 06:21]            Creatinine Trend:  SCr 4.09 [05-03 @ 06:21]  SCr 4.85 [05-02 @ 06:15]  SCr 4.43 [05-01 @ 05:55]  SCr 4.14 [04-30 @ 10:05]  SCr 3.69 [04-29 @ 04:35]    Urinalysis - [05-01-20 @ 13:40]      Color YELLOW / Appearance CLEAR / SG 1.017 / pH 6.0      Gluc 50 / Ketone NEGATIVE  / Bili NEGATIVE / Urobili NORMAL       Blood SMALL / Protein >600 / Leuk Est NEGATIVE / Nitrite NEGATIVE      RBC 6-10 / WBC 3-5 / Hyaline NEGATIVE / Gran  / Sq Epi OCC / Non Sq Epi  / Bacteria NEGATIVE    Urine Creatinine 123.30      [05-02-20 @ 15:40]  Urine Protein 506.2      [05-02-20 @ 15:40]  Urine Sodium < 20      [05-01-20 @ 13:40]  Urine Potassium 33.6      [05-01-20 @ 13:40]  Urine Chloride < 20      [05-01-20 @ 13:40]    Ferritin 2209      [04-27-20 @ 05:26]  HbA1c 6.6      [04-12-20 @ 05:21]  TSH 3.30      [04-28-20 @ 05:14]    HCV 0.35, Nonreactive Hepatitis C AB  S/CO Ratio                        Interpretation  < 1.00                                   Non-Reactive  1.00 - 4.99                         Weakly-Reactive  >= 5.00                                Reactive  Non-Reactive: Aperson with a non-reactive HCV antibody  result is considered uninfected.  No further action is  needed unless recent infection is suspected.  In these  cases, consider repeat testing later to detect  seroconversion..  Weakly-Reactive: HCV antibody test is abnormal, HCV RNA  Qualitative test will follow.  Reactive: HCV antibody test is abnormal, HCV RNA  Qualitative test will follow.  Note: HCV antibody testing is performed on the [employer]   system.      [04-12-20 @ 05:21]    Syphilis Screen (Treponema Pallidum Ab) Negative      [04-28-20 @ 05:14]

## 2020-05-03 NOTE — PROGRESS NOTE ADULT - PROBLEM SELECTOR PLAN 5
- Now resolved Home immunosuppressant regimen: Tacrolimus 1mg BID, prednisone 5mg daily, Cellcept 1g BID. Tacro level subtherapeutic on admission concerning for medication non-adherence.   - C/w Tacrolimus 2mg in AM, 1mg in PM  - Continue prednisone 5mg daily  - Continue to hold Cellcept until discharge  - Tacro level before each AM dose  - Will need to follow up with outpatient nephrologist upon discharge (Dr. Adrián Hawkins)

## 2020-05-03 NOTE — PROGRESS NOTE ADULT - PROBLEM SELECTOR PLAN 10
- Continue home Synthroid 75mcg daily DVT ppx: HSQ  Renal diet  Full Code  Dispo: Pending PT re-eval

## 2020-05-03 NOTE — PROGRESS NOTE ADULT - PROBLEM SELECTOR PLAN 8
- Lantus on hold in setting of hypoglycemia d/t poor PO intake  - ISS  - Monitor FSG - Continue hydralazine 25mg q12h

## 2020-05-04 LAB
ANION GAP SERPL CALC-SCNC: 9 MMO/L — SIGNIFICANT CHANGE UP (ref 7–14)
BUN SERPL-MCNC: 84 MG/DL — HIGH (ref 7–23)
CALCIUM SERPL-MCNC: 8.1 MG/DL — LOW (ref 8.4–10.5)
CHLORIDE SERPL-SCNC: 109 MMOL/L — HIGH (ref 98–107)
CO2 SERPL-SCNC: 19 MMOL/L — LOW (ref 22–31)
CREAT SERPL-MCNC: 3.76 MG/DL — HIGH (ref 0.5–1.3)
GLUCOSE BLDC GLUCOMTR-MCNC: 145 MG/DL — HIGH (ref 70–99)
GLUCOSE BLDC GLUCOMTR-MCNC: 187 MG/DL — HIGH (ref 70–99)
GLUCOSE BLDC GLUCOMTR-MCNC: 189 MG/DL — HIGH (ref 70–99)
GLUCOSE BLDC GLUCOMTR-MCNC: 253 MG/DL — HIGH (ref 70–99)
GLUCOSE SERPL-MCNC: 126 MG/DL — HIGH (ref 70–99)
HCT VFR BLD CALC: 28.3 % — LOW (ref 39–50)
HGB BLD-MCNC: 8.6 G/DL — LOW (ref 13–17)
MAGNESIUM SERPL-MCNC: 2 MG/DL — SIGNIFICANT CHANGE UP (ref 1.6–2.6)
MCHC RBC-ENTMCNC: 25.1 PG — LOW (ref 27–34)
MCHC RBC-ENTMCNC: 30.4 % — LOW (ref 32–36)
MCV RBC AUTO: 82.5 FL — SIGNIFICANT CHANGE UP (ref 80–100)
NRBC # FLD: 0 K/UL — SIGNIFICANT CHANGE UP (ref 0–0)
PHOSPHATE SERPL-MCNC: 2 MG/DL — LOW (ref 2.5–4.5)
PLATELET # BLD AUTO: 181 K/UL — SIGNIFICANT CHANGE UP (ref 150–400)
PMV BLD: 8.3 FL — SIGNIFICANT CHANGE UP (ref 7–13)
POTASSIUM SERPL-MCNC: 4.4 MMOL/L — SIGNIFICANT CHANGE UP (ref 3.5–5.3)
POTASSIUM SERPL-SCNC: 4.4 MMOL/L — SIGNIFICANT CHANGE UP (ref 3.5–5.3)
RBC # BLD: 3.43 M/UL — LOW (ref 4.2–5.8)
RBC # FLD: 15.3 % — HIGH (ref 10.3–14.5)
SODIUM SERPL-SCNC: 137 MMOL/L — SIGNIFICANT CHANGE UP (ref 135–145)
TACROLIMUS SERPL-MCNC: < 2 NG/ML — SIGNIFICANT CHANGE UP
WBC # BLD: 4.64 K/UL — SIGNIFICANT CHANGE UP (ref 3.8–10.5)
WBC # FLD AUTO: 4.64 K/UL — SIGNIFICANT CHANGE UP (ref 3.8–10.5)

## 2020-05-04 PROCEDURE — 99233 SBSQ HOSP IP/OBS HIGH 50: CPT | Mod: GC

## 2020-05-04 RX ORDER — SODIUM,POTASSIUM PHOSPHATES 278-250MG
1 POWDER IN PACKET (EA) ORAL ONCE
Refills: 0 | Status: COMPLETED | OUTPATIENT
Start: 2020-05-04 | End: 2020-05-04

## 2020-05-04 RX ORDER — TACROLIMUS 5 MG/1
2 CAPSULE ORAL
Refills: 0 | Status: DISCONTINUED | OUTPATIENT
Start: 2020-05-04 | End: 2020-05-07

## 2020-05-04 RX ADMIN — TACROLIMUS 2 MILLIGRAM(S): 5 CAPSULE ORAL at 05:23

## 2020-05-04 RX ADMIN — Medication 3 MILLIGRAM(S): at 23:19

## 2020-05-04 RX ADMIN — CARVEDILOL PHOSPHATE 3.12 MILLIGRAM(S): 80 CAPSULE, EXTENDED RELEASE ORAL at 17:21

## 2020-05-04 RX ADMIN — HEPARIN SODIUM 5000 UNIT(S): 5000 INJECTION INTRAVENOUS; SUBCUTANEOUS at 05:22

## 2020-05-04 RX ADMIN — CHLORHEXIDINE GLUCONATE 1 APPLICATION(S): 213 SOLUTION TOPICAL at 17:21

## 2020-05-04 RX ADMIN — SEVELAMER CARBONATE 800 MILLIGRAM(S): 2400 POWDER, FOR SUSPENSION ORAL at 17:21

## 2020-05-04 RX ADMIN — Medication 6: at 12:27

## 2020-05-04 RX ADMIN — Medication 81 MILLIGRAM(S): at 12:33

## 2020-05-04 RX ADMIN — SEVELAMER CARBONATE 800 MILLIGRAM(S): 2400 POWDER, FOR SUSPENSION ORAL at 09:11

## 2020-05-04 RX ADMIN — CARVEDILOL PHOSPHATE 3.12 MILLIGRAM(S): 80 CAPSULE, EXTENDED RELEASE ORAL at 05:23

## 2020-05-04 RX ADMIN — TACROLIMUS 2 MILLIGRAM(S): 5 CAPSULE ORAL at 17:23

## 2020-05-04 RX ADMIN — TAMSULOSIN HYDROCHLORIDE 0.4 MILLIGRAM(S): 0.4 CAPSULE ORAL at 23:19

## 2020-05-04 RX ADMIN — Medication 25 MILLIGRAM(S): at 17:22

## 2020-05-04 RX ADMIN — ATORVASTATIN CALCIUM 80 MILLIGRAM(S): 80 TABLET, FILM COATED ORAL at 23:19

## 2020-05-04 RX ADMIN — POLYETHYLENE GLYCOL 3350 17 GRAM(S): 17 POWDER, FOR SOLUTION ORAL at 12:33

## 2020-05-04 RX ADMIN — SEVELAMER CARBONATE 800 MILLIGRAM(S): 2400 POWDER, FOR SUSPENSION ORAL at 12:34

## 2020-05-04 RX ADMIN — HEPARIN SODIUM 5000 UNIT(S): 5000 INJECTION INTRAVENOUS; SUBCUTANEOUS at 23:19

## 2020-05-04 RX ADMIN — CHLORHEXIDINE GLUCONATE 1 APPLICATION(S): 213 SOLUTION TOPICAL at 05:07

## 2020-05-04 RX ADMIN — HEPARIN SODIUM 5000 UNIT(S): 5000 INJECTION INTRAVENOUS; SUBCUTANEOUS at 16:02

## 2020-05-04 RX ADMIN — Medication 500 MILLIGRAM(S): at 17:21

## 2020-05-04 RX ADMIN — Medication 500 MILLIGRAM(S): at 05:23

## 2020-05-04 RX ADMIN — Medication 1 MILLIGRAM(S): at 12:33

## 2020-05-04 RX ADMIN — Medication 5 MILLIGRAM(S): at 05:23

## 2020-05-04 RX ADMIN — Medication 5 MILLIGRAM(S): at 12:33

## 2020-05-04 RX ADMIN — Medication 1 PACKET(S): at 10:11

## 2020-05-04 RX ADMIN — Medication 25 MILLIGRAM(S): at 05:23

## 2020-05-04 RX ADMIN — Medication 100 MILLIGRAM(S): at 12:33

## 2020-05-04 RX ADMIN — Medication 325 MILLIGRAM(S): at 12:33

## 2020-05-04 RX ADMIN — Medication 2: at 09:09

## 2020-05-04 RX ADMIN — Medication 2: at 17:21

## 2020-05-04 RX ADMIN — Medication 75 MICROGRAM(S): at 05:23

## 2020-05-04 RX ADMIN — CLOPIDOGREL BISULFATE 75 MILLIGRAM(S): 75 TABLET, FILM COATED ORAL at 12:33

## 2020-05-04 NOTE — PROGRESS NOTE ADULT - PROBLEM SELECTOR PLAN 2
Noted to have YURI on CKD. Found to have acute urinary retention with >1L urine on bladder scan s/p Moody placement on 4/29.   - Gentle IV fluid hydration x24h (scheduled to stop @ 11AM today)  - C/w Moody for now, plan for ToV today 5/4 now that renal function has started to improve  - C/w tamsulosin  - Appreciate Nephrology recs  - Continue immunosuppressant regimen (see below)  - Holding home Cellcept and Bumex  - Trend BMP  - Avoid nephrotoxins, renally dose medications

## 2020-05-04 NOTE — CHART NOTE - NSCHARTNOTEFT_GEN_A_CORE
59M with ESRD s/p renal transplant (2005) on chronic immunosuppressive therapy, HTN, HLD, CAD s/p recent stent, and PVD s/p R AKA admitted with COVID19 pneumonia with course c/b ESBL Klebsiella UTI. Course further c/b acute encephalopathy and urinary retention s/p Moody.     Nutrition follow up for LOS Day 23. Unable to conduct a face to face interview or nutrition-focused physical exam due to current Pandemic related precautions. Spoke with Pt's Brother Jeri [phone]. Family reports Pt was very confused and was not eating for approximately one week. Family unable to state amount of meals presently consumed but did state that Pt is asking for food in last few phone conversations. Suggested to encourage increased PO which Family has been doing especially the Nepro Carb Steady Supplement. No reported chewing, swallowing difficulties or any nausea, vomiting, diarrhea, constipation. Nursing notes probable inadequate nutrition. Noted Pt with low Phos, if results remain low, suggest liberalizing diet/ d/c Phos restrictions.     DIET : Diet, Renal Restrictions:   For patients receiving Renal Replacement - No Protein Restr, No Conc K, No Conc Phos, Low Sodium  Supplement Feeding Modality:  Oral  Nepro Cans or Servings Per Day:  1       Frequency:  Three Times a day (04-20-20 @ 14:17)    WEIGHT: last weight 56 kg  % Weight Change    PERTINENT MEDICATIONS: MEDICATIONS  (STANDING):  ascorbic acid  Oral Tab/Cap - Peds 500 milliGRAM(s) Oral two times a day  aspirin  chewable 81 milliGRAM(s) Oral daily  atorvastatin 80 milliGRAM(s) Oral at bedtime  bisacodyl 5 milliGRAM(s) Oral daily  carvedilol 3.125 milliGRAM(s) Oral every 12 hours  clopidogrel Tablet 75 milliGRAM(s) Oral daily  dextrose 5%. 1000 milliLiter(s) (50 mL/Hr) IV Continuous <Continuous>  dextrose 50% Injectable 12.5 Gram(s) IV Push once  dextrose 50% Injectable 25 Gram(s) IV Push once  dextrose 50% Injectable 25 Gram(s) IV Push once  ferrous sulfate Oral Tab/Cap - Peds 325 milliGRAM(s) Oral daily  folic acid 1 milliGRAM(s) Oral daily  heparin   Injectable 5000 Unit(s) SubCutaneous every 8 hours  hydrALAZINE 25 milliGRAM(s) Oral two times a day  insulin lispro (HumaLOG) corrective regimen sliding scale   SubCutaneous three times a day before meals  insulin lispro (HumaLOG) corrective regimen sliding scale   SubCutaneous at bedtime  levothyroxine 75 MICROGram(s) Oral daily  melatonin 3 milliGRAM(s) Oral at bedtime  polyethylene glycol 3350 17 Gram(s) Oral daily  predniSONE   Tablet 5 milliGRAM(s) Oral daily  sevelamer carbonate 800 milliGRAM(s) Oral three times a day with meals  tacrolimus 2 milliGRAM(s) Oral <User Schedule>  tacrolimus 2 milliGRAM(s) Oral <User Schedule>  tamsulosin 0.4 milliGRAM(s) Oral at bedtime  thiamine 100 milliGRAM(s) Oral daily    LABS:  05-04 Na137 mmol/L Glu 126 mg/dL<H> K+ 4.4 mmol/L Cr  3.76 mg/dL<H> BUN 84 mg/dL<H> 05-04 Phos 2.0 mg/dL<L> 04-29 Alb 2.6 g/dL<L> 04-12 [id number] 6.6 %<H>    SKIN: No pressure injury, no edema.     Previous Nutrition Diagnosis:     [ ] Altered nutrition related lab values. Elevated BUN/Creat/Glucose   Nutrition Diagnosis is [ ] ongoing      ~~~~~RECOMMEND~~~~~    1. Continue with current and Supplement.    2. Encourage PO intakes and Nepro Carb Steady 2x daily.    3. Nutrition remains available.     YAO Beyer RD, CDN, CDE 59M with ESRD s/p renal transplant (2005) on chronic immunosuppressive therapy, HTN, HLD, CAD s/p recent stent, and PVD s/p R AKA admitted with COVID19 pneumonia with course c/b ESBL Klebsiella UTI. Course further c/b acute encephalopathy and urinary retention s/p Moody.     Nutrition follow up for LOS Day 23. Unable to conduct a face to face interview or nutrition-focused physical exam due to current Pandemic related precautions. Spoke with Pt's Brother Jeri [phone]. Family reports Pt was very confused and was not eating for approximately one week. Family unable to state amount of meals presently consumed but did state that Pt is asking for food in last few phone conversations. Suggested to encourage increased PO which Family has been doing especially the Nepro Carb Steady Supplement. No reported chewing, swallowing difficulties or any nausea, vomiting, diarrhea, constipation. Nursing notes probable inadequate nutrition. Noted Pt with low Phos, if results remain low, suggest liberalizing diet/ d/c Phos restrictions.     DIET : Diet, Renal Restrictions:   For patients receiving Renal Replacement - No Protein Restr, No Conc K, No Conc Phos, Low Sodium  Supplement Feeding Modality:  Oral  Nepro Cans or Servings Per Day:  1       Frequency:  Three Times a day (04-20-20 @ 14:17)    WEIGHT: last weight 56 kg  % Weight Change    PERTINENT MEDICATIONS: MEDICATIONS  (STANDING):  ascorbic acid  Oral Tab/Cap - Peds 500 milliGRAM(s) Oral two times a day  aspirin  chewable 81 milliGRAM(s) Oral daily  atorvastatin 80 milliGRAM(s) Oral at bedtime  bisacodyl 5 milliGRAM(s) Oral daily  carvedilol 3.125 milliGRAM(s) Oral every 12 hours  clopidogrel Tablet 75 milliGRAM(s) Oral daily  dextrose 5%. 1000 milliLiter(s) (50 mL/Hr) IV Continuous <Continuous>  dextrose 50% Injectable 12.5 Gram(s) IV Push once  dextrose 50% Injectable 25 Gram(s) IV Push once  dextrose 50% Injectable 25 Gram(s) IV Push once  ferrous sulfate Oral Tab/Cap - Peds 325 milliGRAM(s) Oral daily  folic acid 1 milliGRAM(s) Oral daily  heparin   Injectable 5000 Unit(s) SubCutaneous every 8 hours  hydrALAZINE 25 milliGRAM(s) Oral two times a day  insulin lispro (HumaLOG) corrective regimen sliding scale   SubCutaneous three times a day before meals  insulin lispro (HumaLOG) corrective regimen sliding scale   SubCutaneous at bedtime  levothyroxine 75 MICROGram(s) Oral daily  melatonin 3 milliGRAM(s) Oral at bedtime  polyethylene glycol 3350 17 Gram(s) Oral daily  predniSONE   Tablet 5 milliGRAM(s) Oral daily  sevelamer carbonate 800 milliGRAM(s) Oral three times a day with meals  tacrolimus 2 milliGRAM(s) Oral <User Schedule>  tacrolimus 2 milliGRAM(s) Oral <User Schedule>  tamsulosin 0.4 milliGRAM(s) Oral at bedtime  thiamine 100 milliGRAM(s) Oral daily    LABS:  05-04 Na137 mmol/L Glu 126 mg/dL<H> K+ 4.4 mmol/L Cr  3.76 mg/dL<H> BUN 84 mg/dL<H> 05-04 Phos 2.0 mg/dL<L> 04-29 Alb 2.6 g/dL<L> 04-12 [id number] 6.6 %<H>    SKIN: No pressure injury, no edema.     Previous Nutrition Diagnosis:     [ ] Altered nutrition related lab values. Elevated BUN/Creat/Glucose   Nutrition Diagnosis is [ ] ongoing      ~~~~~RECOMMEND~~~~~    1. Continue with current and Supplement.    2. Encourage PO intakes and Nepro Carb Steady.    3. Nutrition remains available.     YAO Beyer RD, CDN, CDE

## 2020-05-04 NOTE — PROVIDER CONTACT NOTE (OTHER) - NAME OF MD/NP/PA/DO NOTIFIED:
Alcides GARLAND
GILL Andersen
GILL Gongora
GILL Laughlin
GILL Paul # [id number]
HS 6 Gee Bowman
HS 6 Nikki Kimura
acp [id number] Gelacio GARLAND
Patient is a 73y old  Female with hx of MR who presents with a chief complaint of SOB

## 2020-05-04 NOTE — PROGRESS NOTE ADULT - ATTENDING COMMENTS
59M with ESRD s/p renal transplant (2005) on chronic immunosuppressive therapy, HTN, HLD, CAD s/p recent stent, and PVD s/p R AKA admitted with COVID19 pneumonia with course c/b ESBL Klebsiella UTI. Course further c/b acute encephalopathy and urinary retention s/p Duvall.   Cr again improving after duvall, therefore attempt trial of void (TOV) today.  Tacro okay to restart per nephro, Tacro level this AM pending  Will need final PT rec's before dispo given extended hospital stay with falls.

## 2020-05-04 NOTE — PROVIDER CONTACT NOTE (OTHER) - ACTION/TREATMENT ORDERED:
No treatment ordered. Will continue to monitor
PA aware.
PA aware. Tylenol given as ordered.
PA aware. Will  give Tylenol IV as ordered. Cooling measures rendered.
15g P.O apple juice given. glucose monitoring rechecked
PA made aware, stated will order C-diff labs, specimen obtain, contact precautions ,continue to monitor
continue to monitor. safety maintained.
provider made aware. ok to give am dose and send tacrolimus level with 6am labs. will consult with am team regarding delay in results. No new orders at this time.

## 2020-05-04 NOTE — PROGRESS NOTE ADULT - PROBLEM SELECTOR PLAN 7
- Lantus on hold in setting of hypoglycemia d/t poor PO intake, resume as needed  - ISS  - Monitor FSG

## 2020-05-04 NOTE — PROGRESS NOTE ADULT - PROBLEM SELECTOR PLAN 6
S/p recent stent at [facility].   - C/w DAPT  - Continue carvedilol  - Continue atorvastatin

## 2020-05-04 NOTE — PROVIDER CONTACT NOTE (OTHER) - SITUATION
Tacrolimus ordered for 6am with order to send blood 30mins prior to dose/ lab unable to generate until afternoon since specimen is sent outhouse.

## 2020-05-04 NOTE — PROGRESS NOTE ADULT - PROBLEM SELECTOR PLAN 1
Significantly improved.  Suspect etiology was multifactorial related to acute urinary retention w/ associated acute renal failure, hospital-associated delirium, and vitamin deficiency.   - Appreciate Neurology recs  - EEG on 4/30 with nonspecific moderate diffuse cerebral dysfunction, no seizure activity  - Appreciate Psychiatry recs; Haldol 2.5mg IV/IM/PO q6h PRN agitation  - C/w folate and thiamine repletion  - C/w melatonin to help regulate circadian rhythm  - C/w Heidy for now, plan for ToV today 5/4

## 2020-05-04 NOTE — PROGRESS NOTE ADULT - SUBJECTIVE AND OBJECTIVE BOX
Reji Abernathy, pgy 1  [facility] Team 4  [id number]  After 7pm; page night float    Patient is a 59y old  Male who presents with a chief complaint of Rule out COVID 19 infection (03 May 2020 12:21)      SUBJECTIVE / OVERNIGHT EVENTS:  ADDITIONAL REVIEW OF SYSTEMS:    MEDICATIONS  (STANDING):  ascorbic acid  Oral Tab/Cap - Peds 500 milliGRAM(s) Oral two times a day  aspirin  chewable 81 milliGRAM(s) Oral daily  atorvastatin 80 milliGRAM(s) Oral at bedtime  bisacodyl 5 milliGRAM(s) Oral daily  carvedilol 3.125 milliGRAM(s) Oral every 12 hours  chlorhexidine 4% Liquid 1 Application(s) Topical two times a day  clopidogrel Tablet 75 milliGRAM(s) Oral daily  dextrose 5%. 1000 milliLiter(s) (50 mL/Hr) IV Continuous <Continuous>  dextrose 50% Injectable 12.5 Gram(s) IV Push once  dextrose 50% Injectable 25 Gram(s) IV Push once  dextrose 50% Injectable 25 Gram(s) IV Push once  ferrous sulfate Oral Tab/Cap - Peds 325 milliGRAM(s) Oral daily  folic acid 1 milliGRAM(s) Oral daily  heparin   Injectable 5000 Unit(s) SubCutaneous every 8 hours  hydrALAZINE 25 milliGRAM(s) Oral two times a day  insulin lispro (HumaLOG) corrective regimen sliding scale   SubCutaneous three times a day before meals  insulin lispro (HumaLOG) corrective regimen sliding scale   SubCutaneous at bedtime  levothyroxine 75 MICROGram(s) Oral daily  melatonin 3 milliGRAM(s) Oral at bedtime  polyethylene glycol 3350 17 Gram(s) Oral daily  predniSONE   Tablet 5 milliGRAM(s) Oral daily  sevelamer carbonate 800 milliGRAM(s) Oral three times a day with meals  sodium chloride 0.9%. 1000 milliLiter(s) (50 mL/Hr) IV Continuous <Continuous>  tacrolimus 2 milliGRAM(s) Oral <User Schedule>  tacrolimus 1 milliGRAM(s) Oral <User Schedule>  tamsulosin 0.4 milliGRAM(s) Oral at bedtime  thiamine 100 milliGRAM(s) Oral daily    MEDICATIONS  (PRN):  acetaminophen   Tablet .. 650 milliGRAM(s) Oral every 4 hours PRN Temp greater or equal to 38.5C (101.3F)  acetaminophen   Tablet .. 650 milliGRAM(s) Oral every 6 hours PRN Temp greater or equal to 38C (100.4F), Mild Pain (1 - 3), Moderate Pain (4 - 6)  acetaminophen  Suppository .. 650 milliGRAM(s) Rectal every 4 hours PRN Temp greater or equal to 38.5C (101.3F)  dextrose 40% Gel 15 Gram(s) Oral once PRN Blood Glucose LESS THAN 70 milliGRAM(s)/deciliter  glucagon  Injectable 1 milliGRAM(s) IntraMuscular once PRN Glucose LESS THAN 70 milligrams/deciliter  haloperidol    Injectable 2.5 milliGRAM(s) IV Push every 6 hours PRN Agitation  ondansetron Injectable 4 milliGRAM(s) IV Push every 8 hours PRN Nausea and/or Vomiting  sucralfate 1 Gram(s) Oral every 6 hours PRN With each meal      CAPILLARY BLOOD GLUCOSE      POCT Blood Glucose.: 151 mg/dL (03 May 2020 22:24)  POCT Blood Glucose.: 198 mg/dL (03 May 2020 17:10)  POCT Blood Glucose.: 172 mg/dL (03 May 2020 12:10)  POCT Blood Glucose.: 143 mg/dL (03 May 2020 09:07)    I&O's Summary    03 May 2020 07:01  -  04 May 2020 07:00  --------------------------------------------------------  IN: 1190 mL / OUT: 850 mL / NET: 340 mL        PHYSICAL EXAM:  Vital Signs Last 24 Hrs  T(C): 36.4 (04 May 2020 05:15), Max: 36.5 (03 May 2020 23:05)  T(F): 97.6 (04 May 2020 05:15), Max: 97.7 (03 May 2020 23:05)  HR: 73 (04 May 2020 05:15) (70 - 73)  BP: 129/69 (04 May 2020 05:15) (129/61 - 130/55)  BP(mean): --  RR: 18 (04 May 2020 05:15) (16 - 20)  SpO2: 100% (04 May 2020 05:15) (100% - 100%)  CONSTITUTIONAL: NAD, well-developed, well-groomed  EYES: PERRLA; conjunctiva and sclera clear  ENMT: Moist oral mucosa, no pharyngeal injection or exudates; normal dentition  NECK: Supple, no palpable masses; no thyromegaly  RESPIRATORY: Normal respiratory effort; lungs are clear to auscultation bilaterally  CARDIOVASCULAR: Regular rate and rhythm, normal S1 and S2, no murmur/rub/gallop; No lower extremity edema; Peripheral pulses are 2+ bilaterally  ABDOMEN: Nontender to palpation, normoactive bowel sounds, no rebound/guarding; No hepatosplenomegaly  MUSCULOSKELETAL:  Normal gait; no clubbing or cyanosis of digits; no joint swelling or tenderness to palpation  PSYCH: A+O to person, place, and time; affect appropriate  NEUROLOGY: CN 2-12 are intact and symmetric; no gross sensory deficits   SKIN: No rashes; no palpable lesions    LABS:                        8.6    4.64  )-----------( 181      ( 04 May 2020 05:24 )             28.3     05-04    137  |  109<H>  |  84<H>  ----------------------------<  126<H>  4.4   |  19<L>  |  3.76<H>    Ca    8.1<L>      04 May 2020 05:24  Phos  2.0     05-04  Mg     2.0     05-04                  RADIOLOGY & ADDITIONAL TESTS:  Results Reviewed:   Imaging Personally Reviewed:  Electrocardiogram Personally Reviewed:    COORDINATION OF CARE:  Care Discussed with Consultants/Other Providers [Y/N]:  Prior or Outpatient Records Reviewed [Y/N]: Reji Abernathy, pgy 1  [facility] Team 4  [id number]  After 7pm; page night float    Patient is a 59y old  Male who presents with a chief complaint of Rule out COVID 19 infection (03 May 2020 12:21)      SUBJECTIVE / OVERNIGHT EVENTS: Pt seen and examined at bedside. Mentation improved today, answering all questions appropriately. He slept well overnight and states he has an appetite. Eager to get duvall out. Not having any problems with bowel movements. No chest pain, sob, abdominal pain.     ADDITIONAL REVIEW OF SYSTEMS:  negative except as noted above. +      MEDICATIONS  (STANDING):  ascorbic acid  Oral Tab/Cap - Peds 500 milliGRAM(s) Oral two times a day  aspirin  chewable 81 milliGRAM(s) Oral daily  atorvastatin 80 milliGRAM(s) Oral at bedtime  bisacodyl 5 milliGRAM(s) Oral daily  carvedilol 3.125 milliGRAM(s) Oral every 12 hours  chlorhexidine 4% Liquid 1 Application(s) Topical two times a day  clopidogrel Tablet 75 milliGRAM(s) Oral daily  dextrose 5%. 1000 milliLiter(s) (50 mL/Hr) IV Continuous <Continuous>  dextrose 50% Injectable 12.5 Gram(s) IV Push once  dextrose 50% Injectable 25 Gram(s) IV Push once  dextrose 50% Injectable 25 Gram(s) IV Push once  ferrous sulfate Oral Tab/Cap - Peds 325 milliGRAM(s) Oral daily  folic acid 1 milliGRAM(s) Oral daily  heparin   Injectable 5000 Unit(s) SubCutaneous every 8 hours  hydrALAZINE 25 milliGRAM(s) Oral two times a day  insulin lispro (HumaLOG) corrective regimen sliding scale   SubCutaneous three times a day before meals  insulin lispro (HumaLOG) corrective regimen sliding scale   SubCutaneous at bedtime  levothyroxine 75 MICROGram(s) Oral daily  melatonin 3 milliGRAM(s) Oral at bedtime  polyethylene glycol 3350 17 Gram(s) Oral daily  predniSONE   Tablet 5 milliGRAM(s) Oral daily  sevelamer carbonate 800 milliGRAM(s) Oral three times a day with meals  sodium chloride 0.9%. 1000 milliLiter(s) (50 mL/Hr) IV Continuous <Continuous>  tacrolimus 2 milliGRAM(s) Oral <User Schedule>  tacrolimus 1 milliGRAM(s) Oral <User Schedule>  tamsulosin 0.4 milliGRAM(s) Oral at bedtime  thiamine 100 milliGRAM(s) Oral daily    MEDICATIONS  (PRN):  acetaminophen   Tablet .. 650 milliGRAM(s) Oral every 4 hours PRN Temp greater or equal to 38.5C (101.3F)  acetaminophen   Tablet .. 650 milliGRAM(s) Oral every 6 hours PRN Temp greater or equal to 38C (100.4F), Mild Pain (1 - 3), Moderate Pain (4 - 6)  acetaminophen  Suppository .. 650 milliGRAM(s) Rectal every 4 hours PRN Temp greater or equal to 38.5C (101.3F)  dextrose 40% Gel 15 Gram(s) Oral once PRN Blood Glucose LESS THAN 70 milliGRAM(s)/deciliter  glucagon  Injectable 1 milliGRAM(s) IntraMuscular once PRN Glucose LESS THAN 70 milligrams/deciliter  haloperidol    Injectable 2.5 milliGRAM(s) IV Push every 6 hours PRN Agitation  ondansetron Injectable 4 milliGRAM(s) IV Push every 8 hours PRN Nausea and/or Vomiting  sucralfate 1 Gram(s) Oral every 6 hours PRN With each meal      CAPILLARY BLOOD GLUCOSE      POCT Blood Glucose.: 151 mg/dL (03 May 2020 22:24)  POCT Blood Glucose.: 198 mg/dL (03 May 2020 17:10)  POCT Blood Glucose.: 172 mg/dL (03 May 2020 12:10)  POCT Blood Glucose.: 143 mg/dL (03 May 2020 09:07)    I&O's Summary    03 May 2020 07:01  -  04 May 2020 07:00  --------------------------------------------------------  IN: 1190 mL / OUT: 850 mL / NET: 340 mL        PHYSICAL EXAM:  Vital Signs Last 24 Hrs  T(C): 36.4 (04 May 2020 05:15), Max: 36.5 (03 May 2020 23:05)  T(F): 97.6 (04 May 2020 05:15), Max: 97.7 (03 May 2020 23:05)  HR: 73 (04 May 2020 05:15) (70 - 73)  BP: 129/69 (04 May 2020 05:15) (129/61 - 130/55)  BP(mean): --  RR: 18 (04 May 2020 05:15) (16 - 20)  SpO2: 100% (04 May 2020 05:15) (100% - 100%)  CONSTITUTIONAL: NAD, well-developed, well-groomed  EYES: PERRLA; conjunctiva and sclera clear  ENMT: Moist oral mucosa, no pharyngeal injection or exudates; normal dentition  NECK: Supple, no palpable masses; no thyromegaly  RESPIRATORY: Normal respiratory effort; lungs are clear to auscultation bilaterally  CARDIOVASCULAR: Regular rate and rhythm, normal S1 and S2, no murmur/rub/gallop; No lower extremity edema; Peripheral pulses are 2+ bilaterally  ABDOMEN: Nontender to palpation, normoactive bowel sounds, no rebound/guarding; No hepatosplenomegaly  MUSCULOSKELETAL:  Normal gait; no clubbing or cyanosis of digits; no joint swelling or tenderness to palpation  PSYCH: A+O to person, place, and time; affect appropriate  NEUROLOGY: CN 2-12 are intact and symmetric; no gross sensory deficits   SKIN: No rashes; no palpable lesions    LABS:                        8.6    4.64  )-----------( 181      ( 04 May 2020 05:24 )             28.3     05-04    137  |  109<H>  |  84<H>  ----------------------------<  126<H>  4.4   |  19<L>  |  3.76<H>    Ca    8.1<L>      04 May 2020 05:24  Phos  2.0     05-04  Mg     2.0     05-04                  RADIOLOGY & ADDITIONAL TESTS:  Results Reviewed:   Imaging Personally Reviewed:  Electrocardiogram Personally Reviewed:    COORDINATION OF CARE:  Care Discussed with Consultants/Other Providers [Y/N]:  Prior or Outpatient Records Reviewed [Y/N]:

## 2020-05-04 NOTE — PROGRESS NOTE ADULT - PROBLEM SELECTOR PLAN 5
Home immunosuppressant regimen: Tacrolimus 1mg BID, prednisone 5mg daily, Cellcept 1g BID. Tacro level subtherapeutic on admission concerning for medication non-adherence.   - C/w Tacrolimus 2mg in AM, 1mg in PM  - Continue prednisone 5mg daily  - Continue to hold Cellcept until discharge  - Tacro level before each AM dose  - Will need to follow up with outpatient nephrologist upon discharge (Dr. Adrián Hawkins)

## 2020-05-05 LAB
ALBUMIN SERPL ELPH-MCNC: 2.4 G/DL — LOW (ref 3.3–5)
ALP SERPL-CCNC: 79 U/L — SIGNIFICANT CHANGE UP (ref 40–120)
ALT FLD-CCNC: 25 U/L — SIGNIFICANT CHANGE UP (ref 4–41)
ANION GAP SERPL CALC-SCNC: 9 MMO/L — SIGNIFICANT CHANGE UP (ref 7–14)
ANISOCYTOSIS BLD QL: SIGNIFICANT CHANGE UP
AST SERPL-CCNC: 18 U/L — SIGNIFICANT CHANGE UP (ref 4–40)
BASOPHILS # BLD AUTO: 0.02 K/UL — SIGNIFICANT CHANGE UP (ref 0–0.2)
BASOPHILS NFR BLD AUTO: 0.4 % — SIGNIFICANT CHANGE UP (ref 0–2)
BASOPHILS NFR SPEC: 1.9 % — SIGNIFICANT CHANGE UP (ref 0–2)
BILIRUB SERPL-MCNC: < 0.2 MG/DL — LOW (ref 0.2–1.2)
BLASTS # FLD: 0 % — SIGNIFICANT CHANGE UP (ref 0–0)
BUN SERPL-MCNC: 89 MG/DL — HIGH (ref 7–23)
BURR CELLS BLD QL SMEAR: PRESENT — SIGNIFICANT CHANGE UP
CALCIUM SERPL-MCNC: 8.1 MG/DL — LOW (ref 8.4–10.5)
CHLORIDE SERPL-SCNC: 106 MMOL/L — SIGNIFICANT CHANGE UP (ref 98–107)
CO2 SERPL-SCNC: 20 MMOL/L — LOW (ref 22–31)
CREAT SERPL-MCNC: 3.37 MG/DL — HIGH (ref 0.5–1.3)
ELLIPTOCYTES BLD QL SMEAR: SLIGHT — SIGNIFICANT CHANGE UP
EOSINOPHIL # BLD AUTO: 0.15 K/UL — SIGNIFICANT CHANGE UP (ref 0–0.5)
EOSINOPHIL NFR BLD AUTO: 3.2 % — SIGNIFICANT CHANGE UP (ref 0–6)
EOSINOPHIL NFR FLD: 0 % — SIGNIFICANT CHANGE UP (ref 0–6)
GIANT PLATELETS BLD QL SMEAR: PRESENT — SIGNIFICANT CHANGE UP
GLUCOSE BLDC GLUCOMTR-MCNC: 164 MG/DL — HIGH (ref 70–99)
GLUCOSE BLDC GLUCOMTR-MCNC: 182 MG/DL — HIGH (ref 70–99)
GLUCOSE BLDC GLUCOMTR-MCNC: 188 MG/DL — HIGH (ref 70–99)
GLUCOSE BLDC GLUCOMTR-MCNC: 221 MG/DL — HIGH (ref 70–99)
GLUCOSE SERPL-MCNC: 140 MG/DL — HIGH (ref 70–99)
HCT VFR BLD CALC: 28.2 % — LOW (ref 39–50)
HGB BLD-MCNC: 8.5 G/DL — LOW (ref 13–17)
IMM GRANULOCYTES NFR BLD AUTO: 3.2 % — HIGH (ref 0–1.5)
LYMPHOCYTES # BLD AUTO: 1.53 K/UL — SIGNIFICANT CHANGE UP (ref 1–3.3)
LYMPHOCYTES # BLD AUTO: 33 % — SIGNIFICANT CHANGE UP (ref 13–44)
LYMPHOCYTES NFR SPEC AUTO: 15.9 % — SIGNIFICANT CHANGE UP (ref 13–44)
MACROCYTES BLD QL: SLIGHT — SIGNIFICANT CHANGE UP
MAGNESIUM SERPL-MCNC: 2 MG/DL — SIGNIFICANT CHANGE UP (ref 1.6–2.6)
MCHC RBC-ENTMCNC: 24.9 PG — LOW (ref 27–34)
MCHC RBC-ENTMCNC: 30.1 % — LOW (ref 32–36)
MCV RBC AUTO: 82.5 FL — SIGNIFICANT CHANGE UP (ref 80–100)
METAMYELOCYTES # FLD: 0 % — SIGNIFICANT CHANGE UP (ref 0–1)
MICROCYTES BLD QL: SIGNIFICANT CHANGE UP
MONOCYTES # BLD AUTO: 0.5 K/UL — SIGNIFICANT CHANGE UP (ref 0–0.9)
MONOCYTES NFR BLD AUTO: 10.8 % — SIGNIFICANT CHANGE UP (ref 2–14)
MONOCYTES NFR BLD: 11.2 % — HIGH (ref 2–9)
MYELOCYTES NFR BLD: 0.9 % — HIGH (ref 0–0)
NEUTROPHIL AB SER-ACNC: 58.9 % — SIGNIFICANT CHANGE UP (ref 43–77)
NEUTROPHILS # BLD AUTO: 2.29 K/UL — SIGNIFICANT CHANGE UP (ref 1.8–7.4)
NEUTROPHILS NFR BLD AUTO: 49.4 % — SIGNIFICANT CHANGE UP (ref 43–77)
NEUTS BAND # BLD: 0 % — SIGNIFICANT CHANGE UP (ref 0–6)
NRBC # BLD: 1 /100WBC — SIGNIFICANT CHANGE UP
NRBC # FLD: 0 K/UL — SIGNIFICANT CHANGE UP (ref 0–0)
OTHER - HEMATOLOGY %: 0 — SIGNIFICANT CHANGE UP
OVALOCYTES BLD QL SMEAR: SLIGHT — SIGNIFICANT CHANGE UP
PHOSPHATE SERPL-MCNC: 1.4 MG/DL — LOW (ref 2.5–4.5)
PLATELET # BLD AUTO: 186 K/UL — SIGNIFICANT CHANGE UP (ref 150–400)
PLATELET COUNT - ESTIMATE: NORMAL — SIGNIFICANT CHANGE UP
PMV BLD: 8.6 FL — SIGNIFICANT CHANGE UP (ref 7–13)
POIKILOCYTOSIS BLD QL AUTO: SIGNIFICANT CHANGE UP
POTASSIUM SERPL-MCNC: 4.9 MMOL/L — SIGNIFICANT CHANGE UP (ref 3.5–5.3)
POTASSIUM SERPL-SCNC: 4.9 MMOL/L — SIGNIFICANT CHANGE UP (ref 3.5–5.3)
PROMYELOCYTES # FLD: 0 % — SIGNIFICANT CHANGE UP (ref 0–0)
PROT SERPL-MCNC: 5.7 G/DL — LOW (ref 6–8.3)
RBC # BLD: 3.42 M/UL — LOW (ref 4.2–5.8)
RBC # FLD: 15.5 % — HIGH (ref 10.3–14.5)
SCHISTOCYTES BLD QL AUTO: SLIGHT — SIGNIFICANT CHANGE UP
SMUDGE CELLS # BLD: PRESENT — SIGNIFICANT CHANGE UP
SODIUM SERPL-SCNC: 135 MMOL/L — SIGNIFICANT CHANGE UP (ref 135–145)
VARIANT LYMPHS # BLD: 11.2 % — SIGNIFICANT CHANGE UP
WBC # BLD: 4.64 K/UL — SIGNIFICANT CHANGE UP (ref 3.8–10.5)
WBC # FLD AUTO: 4.64 K/UL — SIGNIFICANT CHANGE UP (ref 3.8–10.5)

## 2020-05-05 PROCEDURE — 99232 SBSQ HOSP IP/OBS MODERATE 35: CPT | Mod: GC

## 2020-05-05 RX ORDER — SODIUM,POTASSIUM PHOSPHATES 278-250MG
1 POWDER IN PACKET (EA) ORAL THREE TIMES A DAY
Refills: 0 | Status: DISCONTINUED | OUTPATIENT
Start: 2020-05-05 | End: 2020-05-05

## 2020-05-05 RX ADMIN — Medication 500 MILLIGRAM(S): at 18:03

## 2020-05-05 RX ADMIN — POLYETHYLENE GLYCOL 3350 17 GRAM(S): 17 POWDER, FOR SOLUTION ORAL at 12:25

## 2020-05-05 RX ADMIN — Medication 81 MILLIGRAM(S): at 12:24

## 2020-05-05 RX ADMIN — Medication 3 MILLIGRAM(S): at 22:11

## 2020-05-05 RX ADMIN — CHLORHEXIDINE GLUCONATE 1 APPLICATION(S): 213 SOLUTION TOPICAL at 18:03

## 2020-05-05 RX ADMIN — Medication 1 MILLIGRAM(S): at 12:34

## 2020-05-05 RX ADMIN — ATORVASTATIN CALCIUM 80 MILLIGRAM(S): 80 TABLET, FILM COATED ORAL at 22:11

## 2020-05-05 RX ADMIN — Medication 5 MILLIGRAM(S): at 12:25

## 2020-05-05 RX ADMIN — Medication 500 MILLIGRAM(S): at 06:38

## 2020-05-05 RX ADMIN — Medication 63.75 MILLIMOLE(S): at 12:28

## 2020-05-05 RX ADMIN — SEVELAMER CARBONATE 800 MILLIGRAM(S): 2400 POWDER, FOR SUSPENSION ORAL at 12:24

## 2020-05-05 RX ADMIN — CARVEDILOL PHOSPHATE 3.12 MILLIGRAM(S): 80 CAPSULE, EXTENDED RELEASE ORAL at 06:38

## 2020-05-05 RX ADMIN — Medication 4: at 17:20

## 2020-05-05 RX ADMIN — CHLORHEXIDINE GLUCONATE 1 APPLICATION(S): 213 SOLUTION TOPICAL at 06:38

## 2020-05-05 RX ADMIN — CARVEDILOL PHOSPHATE 3.12 MILLIGRAM(S): 80 CAPSULE, EXTENDED RELEASE ORAL at 18:03

## 2020-05-05 RX ADMIN — Medication 25 MILLIGRAM(S): at 06:38

## 2020-05-05 RX ADMIN — CLOPIDOGREL BISULFATE 75 MILLIGRAM(S): 75 TABLET, FILM COATED ORAL at 12:25

## 2020-05-05 RX ADMIN — TACROLIMUS 2 MILLIGRAM(S): 5 CAPSULE ORAL at 18:04

## 2020-05-05 RX ADMIN — TACROLIMUS 2 MILLIGRAM(S): 5 CAPSULE ORAL at 06:37

## 2020-05-05 RX ADMIN — Medication 100 MILLIGRAM(S): at 12:25

## 2020-05-05 RX ADMIN — Medication 5 MILLIGRAM(S): at 13:50

## 2020-05-05 RX ADMIN — HEPARIN SODIUM 5000 UNIT(S): 5000 INJECTION INTRAVENOUS; SUBCUTANEOUS at 06:38

## 2020-05-05 RX ADMIN — SEVELAMER CARBONATE 800 MILLIGRAM(S): 2400 POWDER, FOR SUSPENSION ORAL at 18:03

## 2020-05-05 RX ADMIN — Medication 325 MILLIGRAM(S): at 12:25

## 2020-05-05 RX ADMIN — Medication 75 MICROGRAM(S): at 06:38

## 2020-05-05 RX ADMIN — TAMSULOSIN HYDROCHLORIDE 0.4 MILLIGRAM(S): 0.4 CAPSULE ORAL at 22:12

## 2020-05-05 RX ADMIN — Medication 25 MILLIGRAM(S): at 18:03

## 2020-05-05 RX ADMIN — HEPARIN SODIUM 5000 UNIT(S): 5000 INJECTION INTRAVENOUS; SUBCUTANEOUS at 22:11

## 2020-05-05 RX ADMIN — HEPARIN SODIUM 5000 UNIT(S): 5000 INJECTION INTRAVENOUS; SUBCUTANEOUS at 13:49

## 2020-05-05 NOTE — PROGRESS NOTE ADULT - PROBLEM SELECTOR PLAN 6
S/p recent stent at [facility].   - C/w DAPT  - Continue carvedilol  - Continue atorvastatin

## 2020-05-05 NOTE — PROGRESS NOTE ADULT - PROBLEM SELECTOR PLAN 2
Noted to have YURI on CKD. Found to have acute urinary retention with >1L urine on bladder scan s/p Moody placement on 4/29.   - IV hydration prn   - passed TOV on 5/4  - C/w tamsulosin  - Appreciate Nephrology recs  - Continue immunosuppressant regimen (see below)  - Holding home Cellcept and Bumex  - Trend BMP  - Avoid nephrotoxins, renally dose medications

## 2020-05-05 NOTE — PROGRESS NOTE ADULT - PROBLEM SELECTOR PLAN 1
Significantly improved.  Suspect etiology was multifactorial related to acute urinary retention w/ associated acute renal failure, hospital-associated delirium, and vitamin deficiency.   - Appreciate Neurology recs  - EEG on 4/30 with nonspecific moderate diffuse cerebral dysfunction, no seizure activity  - Appreciate Psychiatry recs; Haldol 2.5mg IV/IM/PO q6h PRN agitation  - C/w folate and thiamine repletion  - C/w melatonin to help regulate circadian rhythm  - passed TOV on 5/4

## 2020-05-05 NOTE — PROGRESS NOTE ADULT - SUBJECTIVE AND OBJECTIVE BOX
Reji Abernathy, pgy 1  [facility] Team 4  [id number]  After 7pm; page night float    Patient is a 59y old  Male who presents with a chief complaint of Rule out COVID 19 infection (04 May 2020 07:24)      SUBJECTIVE / OVERNIGHT EVENTS:  ADDITIONAL REVIEW OF SYSTEMS:    MEDICATIONS  (STANDING):  ascorbic acid  Oral Tab/Cap - Peds 500 milliGRAM(s) Oral two times a day  aspirin  chewable 81 milliGRAM(s) Oral daily  atorvastatin 80 milliGRAM(s) Oral at bedtime  bisacodyl 5 milliGRAM(s) Oral daily  carvedilol 3.125 milliGRAM(s) Oral every 12 hours  chlorhexidine 4% Liquid 1 Application(s) Topical two times a day  clopidogrel Tablet 75 milliGRAM(s) Oral daily  dextrose 5%. 1000 milliLiter(s) (50 mL/Hr) IV Continuous <Continuous>  dextrose 50% Injectable 12.5 Gram(s) IV Push once  dextrose 50% Injectable 25 Gram(s) IV Push once  dextrose 50% Injectable 25 Gram(s) IV Push once  ferrous sulfate Oral Tab/Cap - Peds 325 milliGRAM(s) Oral daily  folic acid 1 milliGRAM(s) Oral daily  heparin   Injectable 5000 Unit(s) SubCutaneous every 8 hours  hydrALAZINE 25 milliGRAM(s) Oral two times a day  insulin lispro (HumaLOG) corrective regimen sliding scale   SubCutaneous three times a day before meals  insulin lispro (HumaLOG) corrective regimen sliding scale   SubCutaneous at bedtime  levothyroxine 75 MICROGram(s) Oral daily  melatonin 3 milliGRAM(s) Oral at bedtime  polyethylene glycol 3350 17 Gram(s) Oral daily  predniSONE   Tablet 5 milliGRAM(s) Oral daily  sevelamer carbonate 800 milliGRAM(s) Oral three times a day with meals  tacrolimus 2 milliGRAM(s) Oral <User Schedule>  tacrolimus 2 milliGRAM(s) Oral <User Schedule>  tamsulosin 0.4 milliGRAM(s) Oral at bedtime  thiamine 100 milliGRAM(s) Oral daily    MEDICATIONS  (PRN):  acetaminophen   Tablet .. 650 milliGRAM(s) Oral every 4 hours PRN Temp greater or equal to 38.5C (101.3F)  acetaminophen   Tablet .. 650 milliGRAM(s) Oral every 6 hours PRN Temp greater or equal to 38C (100.4F), Mild Pain (1 - 3), Moderate Pain (4 - 6)  acetaminophen  Suppository .. 650 milliGRAM(s) Rectal every 4 hours PRN Temp greater or equal to 38.5C (101.3F)  dextrose 40% Gel 15 Gram(s) Oral once PRN Blood Glucose LESS THAN 70 milliGRAM(s)/deciliter  glucagon  Injectable 1 milliGRAM(s) IntraMuscular once PRN Glucose LESS THAN 70 milligrams/deciliter  haloperidol    Injectable 2.5 milliGRAM(s) IV Push every 6 hours PRN Agitation  ondansetron Injectable 4 milliGRAM(s) IV Push every 8 hours PRN Nausea and/or Vomiting  sucralfate 1 Gram(s) Oral every 6 hours PRN With each meal      CAPILLARY BLOOD GLUCOSE      POCT Blood Glucose.: 145 mg/dL (04 May 2020 21:52)  POCT Blood Glucose.: 187 mg/dL (04 May 2020 16:56)  POCT Blood Glucose.: 253 mg/dL (04 May 2020 11:53)  POCT Blood Glucose.: 189 mg/dL (04 May 2020 08:57)    I&O's Summary    04 May 2020 07:01  -  05 May 2020 07:00  --------------------------------------------------------  IN: 0 mL / OUT: 175 mL / NET: -175 mL        PHYSICAL EXAM:  Vital Signs Last 24 Hrs  T(C): 36.2 (04 May 2020 21:37), Max: 37.1 (04 May 2020 14:06)  T(F): 97.2 (04 May 2020 21:37), Max: 98.7 (04 May 2020 14:06)  HR: 77 (04 May 2020 21:37) (71 - 83)  BP: 149/87 (04 May 2020 21:37) (121/68 - 149/87)  BP(mean): --  RR: 18 (04 May 2020 21:37) (18 - 18)  SpO2: 100% (04 May 2020 21:37) (97% - 100%)  CONSTITUTIONAL: NAD, well-developed, well-groomed  EYES: PERRLA; conjunctiva and sclera clear  ENMT: Moist oral mucosa, no pharyngeal injection or exudates; normal dentition  NECK: Supple, no palpable masses; no thyromegaly  RESPIRATORY: Normal respiratory effort; lungs are clear to auscultation bilaterally  CARDIOVASCULAR: Regular rate and rhythm, normal S1 and S2, no murmur/rub/gallop; No lower extremity edema; Peripheral pulses are 2+ bilaterally  ABDOMEN: Nontender to palpation, normoactive bowel sounds, no rebound/guarding; No hepatosplenomegaly  MUSCULOSKELETAL:  Normal gait; no clubbing or cyanosis of digits; no joint swelling or tenderness to palpation  PSYCH: A+O to person, place, and time; affect appropriate  NEUROLOGY: CN 2-12 are intact and symmetric; no gross sensory deficits   SKIN: No rashes; no palpable lesions    LABS:                        8.5    4.64  )-----------( 186      ( 05 May 2020 06:00 )             28.2     05-04    137  |  109<H>  |  84<H>  ----------------------------<  126<H>  4.4   |  19<L>  |  3.76<H>    Ca    8.1<L>      04 May 2020 05:24  Phos  2.0     05-04  Mg     2.0     05-04                  RADIOLOGY & ADDITIONAL TESTS:  Results Reviewed:   Imaging Personally Reviewed:  Electrocardiogram Personally Reviewed:    COORDINATION OF CARE:  Care Discussed with Consultants/Other Providers [Y/N]:  Prior or Outpatient Records Reviewed [Y/N]: Reji Abernathy, pgy 1  [facility] Team 4  [id number]  After 7pm; page night float    Patient is a 59y old  Male who presents with a chief complaint of Rule out COVID 19 infection (04 May 2020 07:24)      SUBJECTIVE / OVERNIGHT EVENTS: Pt seen and examined at bedside. No acute events overnight. he feels well and would like to leave the hospital. Urinating without any problems. Eating and drinking. Denies chest pain, sob, abdominal pain.     ADDITIONAL REVIEW OF SYSTEMS:  negative     MEDICATIONS  (STANDING):  ascorbic acid  Oral Tab/Cap - Peds 500 milliGRAM(s) Oral two times a day  aspirin  chewable 81 milliGRAM(s) Oral daily  atorvastatin 80 milliGRAM(s) Oral at bedtime  bisacodyl 5 milliGRAM(s) Oral daily  carvedilol 3.125 milliGRAM(s) Oral every 12 hours  chlorhexidine 4% Liquid 1 Application(s) Topical two times a day  clopidogrel Tablet 75 milliGRAM(s) Oral daily  dextrose 5%. 1000 milliLiter(s) (50 mL/Hr) IV Continuous <Continuous>  dextrose 50% Injectable 12.5 Gram(s) IV Push once  dextrose 50% Injectable 25 Gram(s) IV Push once  dextrose 50% Injectable 25 Gram(s) IV Push once  ferrous sulfate Oral Tab/Cap - Peds 325 milliGRAM(s) Oral daily  folic acid 1 milliGRAM(s) Oral daily  heparin   Injectable 5000 Unit(s) SubCutaneous every 8 hours  hydrALAZINE 25 milliGRAM(s) Oral two times a day  insulin lispro (HumaLOG) corrective regimen sliding scale   SubCutaneous three times a day before meals  insulin lispro (HumaLOG) corrective regimen sliding scale   SubCutaneous at bedtime  levothyroxine 75 MICROGram(s) Oral daily  melatonin 3 milliGRAM(s) Oral at bedtime  polyethylene glycol 3350 17 Gram(s) Oral daily  predniSONE   Tablet 5 milliGRAM(s) Oral daily  sevelamer carbonate 800 milliGRAM(s) Oral three times a day with meals  tacrolimus 2 milliGRAM(s) Oral <User Schedule>  tacrolimus 2 milliGRAM(s) Oral <User Schedule>  tamsulosin 0.4 milliGRAM(s) Oral at bedtime  thiamine 100 milliGRAM(s) Oral daily    MEDICATIONS  (PRN):  acetaminophen   Tablet .. 650 milliGRAM(s) Oral every 4 hours PRN Temp greater or equal to 38.5C (101.3F)  acetaminophen   Tablet .. 650 milliGRAM(s) Oral every 6 hours PRN Temp greater or equal to 38C (100.4F), Mild Pain (1 - 3), Moderate Pain (4 - 6)  acetaminophen  Suppository .. 650 milliGRAM(s) Rectal every 4 hours PRN Temp greater or equal to 38.5C (101.3F)  dextrose 40% Gel 15 Gram(s) Oral once PRN Blood Glucose LESS THAN 70 milliGRAM(s)/deciliter  glucagon  Injectable 1 milliGRAM(s) IntraMuscular once PRN Glucose LESS THAN 70 milligrams/deciliter  haloperidol    Injectable 2.5 milliGRAM(s) IV Push every 6 hours PRN Agitation  ondansetron Injectable 4 milliGRAM(s) IV Push every 8 hours PRN Nausea and/or Vomiting  sucralfate 1 Gram(s) Oral every 6 hours PRN With each meal      CAPILLARY BLOOD GLUCOSE      POCT Blood Glucose.: 145 mg/dL (04 May 2020 21:52)  POCT Blood Glucose.: 187 mg/dL (04 May 2020 16:56)  POCT Blood Glucose.: 253 mg/dL (04 May 2020 11:53)  POCT Blood Glucose.: 189 mg/dL (04 May 2020 08:57)    I&O's Summary    04 May 2020 07:01  -  05 May 2020 07:00  --------------------------------------------------------  IN: 0 mL / OUT: 175 mL / NET: -175 mL        PHYSICAL EXAM:  Vital Signs Last 24 Hrs  T(C): 36.2 (04 May 2020 21:37), Max: 37.1 (04 May 2020 14:06)  T(F): 97.2 (04 May 2020 21:37), Max: 98.7 (04 May 2020 14:06)  HR: 77 (04 May 2020 21:37) (71 - 83)  BP: 149/87 (04 May 2020 21:37) (121/68 - 149/87)  BP(mean): --  RR: 18 (04 May 2020 21:37) (18 - 18)  SpO2: 100% (04 May 2020 21:37) (97% - 100%)  CONSTITUTIONAL: NAD, well-developed, well-groomed  EYES: PERRLA; conjunctiva and sclera clear  ENMT: Moist oral mucosa, no pharyngeal injection or exudates; normal dentition  NECK: Supple, no palpable masses; no thyromegaly  RESPIRATORY: Normal respiratory effort; lungs are clear to auscultation bilaterally  CARDIOVASCULAR: Regular rate and rhythm, normal S1 and S2, no murmur/rub/gallop; No lower extremity edema; Peripheral pulses are 2+ bilaterally  ABDOMEN: Nontender to palpation, normoactive bowel sounds, no rebound/guarding; No hepatosplenomegaly  MUSCULOSKELETAL:  R AKA  PSYCH: A+O to person, place, and time; affect appropriate  NEUROLOGY: CN 2-12 are intact and symmetric; no gross sensory deficits   SKIN: No rashes; no palpable lesions    LABS:                        8.5    4.64  )-----------( 186      ( 05 May 2020 06:00 )             28.2     05-04    137  |  109<H>  |  84<H>  ----------------------------<  126<H>  4.4   |  19<L>  |  3.76<H>    Ca    8.1<L>      04 May 2020 05:24  Phos  2.0     05-04  Mg     2.0     05-04                  RADIOLOGY & ADDITIONAL TESTS:  Results Reviewed:   Imaging Personally Reviewed:  Electrocardiogram Personally Reviewed:    COORDINATION OF CARE:  Care Discussed with Consultants/Other Providers [Y/N]:  Prior or Outpatient Records Reviewed [Y/N]:

## 2020-05-05 NOTE — PROGRESS NOTE ADULT - ATTENDING COMMENTS
59M with ESRD s/p renal transplant (2005) on chronic immunosuppressive therapy, HTN, HLD, CAD s/p recent stent, and PVD s/p R AKA admitted with COVID19 pneumonia with course c/b ESBL Klebsiella UTI. Course further c/b acute encephalopathy and urinary retention s/p Duvall.   Cr again improving after duvall, therefore attempt trial of void (TOV) today.  Tacro okay to restart per nephro, Tacro level next AM pending  Will need final PT rec's before dispo given extended hospital stay with falls.    Discharge pending appropriate tacro levels

## 2020-05-05 NOTE — PROGRESS NOTE ADULT - SUBJECTIVE AND OBJECTIVE BOX
[facility] Division of Kidney Diseases & Hypertension  FOLLOW UP NOTE  [phone]--------------------------------------------------------------------------------  HPI: 59M PMH of HTN, HLD, DM, ESRD s/p kidney transplant, admitted for COVID-19. Nephrology team consulted for YURI and management of immunosuppressant. Pt with history of ESRD s/p DDRT on 2005. On review pt is on Tacrolimus 1mg BID, Mycophenolate 1gm BID and Prednisone 5mg daily. On review of labs, pt with SCr: 3.6 on 3/27/20. Pt with last outpatient SCr of 4.8. On admission, SCr was 5.47 (4/10/20). Tacrolimus and Cellcept discontinued due to COVID 19 infection and YURI. Pt continued to improve with IVF. Tacrolimus resumed on 4/28/20.    Labs and charts reviewed.   No acute significant events overnight.  Patient seen this morning, comfortable without any signs of distress  Patient reports no subjective complaints this AM.      PAST HISTORY  --------------------------------------------------------------------------------  No significant changes to PMH, PSH, FHx, SHx, unless otherwise noted    ALLERGIES & MEDICATIONS  --------------------------------------------------------------------------------  Allergies    iodine (Vomiting)  IV Contrast (Unknown)    Intolerances      Standing Inpatient Medications  ascorbic acid  Oral Tab/Cap - Peds 500 milliGRAM(s) Oral two times a day  aspirin  chewable 81 milliGRAM(s) Oral daily  atorvastatin 80 milliGRAM(s) Oral at bedtime  bisacodyl 5 milliGRAM(s) Oral daily  carvedilol 3.125 milliGRAM(s) Oral every 12 hours  chlorhexidine 4% Liquid 1 Application(s) Topical two times a day  clopidogrel Tablet 75 milliGRAM(s) Oral daily  dextrose 5%. 1000 milliLiter(s) IV Continuous <Continuous>  dextrose 50% Injectable 12.5 Gram(s) IV Push once  dextrose 50% Injectable 25 Gram(s) IV Push once  dextrose 50% Injectable 25 Gram(s) IV Push once  ferrous sulfate Oral Tab/Cap - Peds 325 milliGRAM(s) Oral daily  folic acid 1 milliGRAM(s) Oral daily  heparin   Injectable 5000 Unit(s) SubCutaneous every 8 hours  hydrALAZINE 25 milliGRAM(s) Oral two times a day  insulin lispro (HumaLOG) corrective regimen sliding scale   SubCutaneous three times a day before meals  insulin lispro (HumaLOG) corrective regimen sliding scale   SubCutaneous at bedtime  levothyroxine 75 MICROGram(s) Oral daily  melatonin 3 milliGRAM(s) Oral at bedtime  polyethylene glycol 3350 17 Gram(s) Oral daily  predniSONE   Tablet 5 milliGRAM(s) Oral daily  sevelamer carbonate 800 milliGRAM(s) Oral three times a day with meals  sodium phosphate IVPB 15 milliMole(s) IV Intermittent once  tacrolimus 2 milliGRAM(s) Oral <User Schedule>  tacrolimus 2 milliGRAM(s) Oral <User Schedule>  tamsulosin 0.4 milliGRAM(s) Oral at bedtime  thiamine 100 milliGRAM(s) Oral daily    PRN Inpatient Medications  acetaminophen   Tablet .. 650 milliGRAM(s) Oral every 4 hours PRN  acetaminophen   Tablet .. 650 milliGRAM(s) Oral every 6 hours PRN  acetaminophen  Suppository .. 650 milliGRAM(s) Rectal every 4 hours PRN  dextrose 40% Gel 15 Gram(s) Oral once PRN  glucagon  Injectable 1 milliGRAM(s) IntraMuscular once PRN  haloperidol    Injectable 2.5 milliGRAM(s) IV Push every 6 hours PRN  ondansetron Injectable 4 milliGRAM(s) IV Push every 8 hours PRN  sucralfate 1 Gram(s) Oral every 6 hours PRN      REVIEW OF SYSTEMS  --------------------------------------------------------------------------------  Gen: No  fevers/chills  Respiratory: No dyspnea, cough  CV: No chest pain  GI: No abdominal pain, diarrhea  : No hematuria  MSK: No joint pain/swelling    All other systems were reviewed and are negative, except as noted.    VITALS/PHYSICAL EXAM  --------------------------------------------------------------------------------  T(C): 36.2 (05-04-20 @ 21:37), Max: 37.1 (05-04-20 @ 14:06)  HR: 77 (05-04-20 @ 21:37) (71 - 83)  BP: 149/87 (05-04-20 @ 21:37) (121/68 - 149/87)  RR: 18 (05-04-20 @ 21:37) (18 - 18)  SpO2: 100% (05-04-20 @ 21:37) (97% - 100%)  Wt(kg): --        05-04-20 @ 07:01  -  05-05-20 @ 07:00  --------------------------------------------------------  IN: 0 mL / OUT: 175 mL / NET: -175 mL      Physical Exam:  	Gen: no distress  	HEENT: no JVD, MMM  	Pulm: normal respiratory pattern  	CV:  not tachy  	Abd: +BS, soft, transplant area without tenderness  	Ext: No B/L Lower ext edema s/p R amputation  	Neuro: awake, AOx2  	Skin: Warm, without rashes  	Vascular access: Left Arm AVF with palpable thrill, + aneurysmal dilation    LABS/STUDIES  --------------------------------------------------------------------------------              8.5    4.64  >-----------<  186      [05-05-20 @ 06:00]              28.2     135  |  106  |  89  ----------------------------<  140      [05-05-20 @ 06:00]  4.9   |  20  |  3.37        Ca     8.1     [05-05-20 @ 06:00]      Mg     2.0     [05-05-20 @ 06:00]      Phos  1.4     [05-05-20 @ 06:00]    TPro  5.7  /  Alb  2.4  /  TBili  < 0.2  /  DBili  x   /  AST  18  /  ALT  25  /  AlkPhos  79  [05-05-20 @ 06:00]          Creatinine Trend:  SCr 3.37 [05-05 @ 06:00]  SCr 3.76 [05-04 @ 05:24]  SCr 4.09 [05-03 @ 06:21]  SCr 4.85 [05-02 @ 06:15]  SCr 4.43 [05-01 @ 05:55]    Urinalysis - [05-01-20 @ 13:40]      Color YELLOW / Appearance CLEAR / SG 1.017 / pH 6.0      Gluc 50 / Ketone NEGATIVE  / Bili NEGATIVE / Urobili NORMAL       Blood SMALL / Protein >600 / Leuk Est NEGATIVE / Nitrite NEGATIVE      RBC 6-10 / WBC 3-5 / Hyaline NEGATIVE / Gran  / Sq Epi OCC / Non Sq Epi  / Bacteria NEGATIVE    Urine Creatinine 123.30      [05-02-20 @ 15:40]  Urine Protein 506.2      [05-02-20 @ 15:40]  Urine Sodium < 20      [05-01-20 @ 13:40]  Urine Potassium 33.6      [05-01-20 @ 13:40]  Urine Chloride < 20      [05-01-20 @ 13:40] [facility] Division of Kidney Diseases & Hypertension  FOLLOW UP NOTE  [phone]--------------------------------------------------------------------------------    HPI: 59M PMH of HTN, HLD, DM, ESRD s/p kidney transplant, admitted for COVID-19. Nephrology team consulted for YURI and management of immunosuppressant. Pt with history of ESRD s/p DDRT on 2005. On review pt is on Tacrolimus 1mg BID, Mycophenolate 1gm BID and Prednisone 5mg daily. On review of labs, pt with SCr: 3.6 on 3/27/20. Pt with last outpatient SCr of 4.8. On admission, Scr was 5.47 (4/10/20). Tacrolimus and Cellcept discontinued due to COVID 19 infection and YURI. Pt. continued to improve with IVF. Tacrolimus resumed on 4/28/20.    Labs and charts reviewed.   No acute significant events overnight.  Patient seen this morning, comfortable without any signs of distress  Patient reports no subjective complaints this AM.      PAST HISTORY  --------------------------------------------------------------------------------  No significant changes to PMH, PSH, FHx, SHx, unless otherwise noted    ALLERGIES & MEDICATIONS  --------------------------------------------------------------------------------  Allergies    iodine (Vomiting)  IV Contrast (Unknown)    Intolerances    Standing Inpatient Medications  ascorbic acid  Oral Tab/Cap - Peds 500 milliGRAM(s) Oral two times a day  aspirin  chewable 81 milliGRAM(s) Oral daily  atorvastatin 80 milliGRAM(s) Oral at bedtime  bisacodyl 5 milliGRAM(s) Oral daily  carvedilol 3.125 milliGRAM(s) Oral every 12 hours  chlorhexidine 4% Liquid 1 Application(s) Topical two times a day  clopidogrel Tablet 75 milliGRAM(s) Oral daily  dextrose 5%. 1000 milliLiter(s) IV Continuous <Continuous>  dextrose 50% Injectable 12.5 Gram(s) IV Push once  dextrose 50% Injectable 25 Gram(s) IV Push once  dextrose 50% Injectable 25 Gram(s) IV Push once  ferrous sulfate Oral Tab/Cap - Peds 325 milliGRAM(s) Oral daily  folic acid 1 milliGRAM(s) Oral daily  heparin   Injectable 5000 Unit(s) SubCutaneous every 8 hours  hydrALAZINE 25 milliGRAM(s) Oral two times a day  insulin lispro (HumaLOG) corrective regimen sliding scale   SubCutaneous three times a day before meals  insulin lispro (HumaLOG) corrective regimen sliding scale   SubCutaneous at bedtime  levothyroxine 75 MICROGram(s) Oral daily  melatonin 3 milliGRAM(s) Oral at bedtime  polyethylene glycol 3350 17 Gram(s) Oral daily  predniSONE   Tablet 5 milliGRAM(s) Oral daily  sevelamer carbonate 800 milliGRAM(s) Oral three times a day with meals  sodium phosphate IVPB 15 milliMole(s) IV Intermittent once  tacrolimus 2 milliGRAM(s) Oral <User Schedule>  tacrolimus 2 milliGRAM(s) Oral <User Schedule>  tamsulosin 0.4 milliGRAM(s) Oral at bedtime  thiamine 100 milliGRAM(s) Oral daily    PRN Inpatient Medications  acetaminophen   Tablet .. 650 milliGRAM(s) Oral every 4 hours PRN  acetaminophen   Tablet .. 650 milliGRAM(s) Oral every 6 hours PRN  acetaminophen  Suppository .. 650 milliGRAM(s) Rectal every 4 hours PRN  dextrose 40% Gel 15 Gram(s) Oral once PRN  glucagon  Injectable 1 milliGRAM(s) IntraMuscular once PRN  haloperidol    Injectable 2.5 milliGRAM(s) IV Push every 6 hours PRN  ondansetron Injectable 4 milliGRAM(s) IV Push every 8 hours PRN  sucralfate 1 Gram(s) Oral every 6 hours PRN    REVIEW OF SYSTEMS  --------------------------------------------------------------------------------  Gen: No  fevers/chills  Respiratory: No dyspnea, cough  CV: No chest pain  GI: No abdominal pain, diarrhea  : No hematuria  MSK: No joint pain/swelling    All other systems were reviewed and are negative, except as noted.    VITALS/PHYSICAL EXAM  --------------------------------------------------------------------------------  T(C): 36.2 (05-04-20 @ 21:37), Max: 37.1 (05-04-20 @ 14:06)  HR: 77 (05-04-20 @ 21:37) (71 - 83)  BP: 149/87 (05-04-20 @ 21:37) (121/68 - 149/87)  RR: 18 (05-04-20 @ 21:37) (18 - 18)  SpO2: 100% (05-04-20 @ 21:37) (97% - 100%)  Wt(kg): --    05-04-20 @ 07:01  -  05-05-20 @ 07:00  --------------------------------------------------------  IN: 0 mL / OUT: 175 mL / NET: -175 mL    Physical Exam:  	Gen: no distress  	HEENT: no JVD, MMM  	Pulm: normal respiratory pattern  	CV:  not tachy  	Abd: +BS, soft, transplant area without tenderness  	Ext: No B/L Lower ext edema s/p R amputation  	Neuro: awake, AOx2  	Skin: Warm, without rashes  	Vascular access: Left Arm AVF with palpable thrill, + aneurysmal dilation    LABS/STUDIES  --------------------------------------------------------------------------------              8.5    4.64  >-----------<  186      [05-05-20 @ 06:00]              28.2     135  |  106  |  89  ----------------------------<  140      [05-05-20 @ 06:00]  4.9   |  20  |  3.37        Ca     8.1     [05-05-20 @ 06:00]      Mg     2.0     [05-05-20 @ 06:00]      Phos  1.4     [05-05-20 @ 06:00]    TPro  5.7  /  Alb  2.4  /  TBili  < 0.2  /  DBili  x   /  AST  18  /  ALT  25  /  AlkPhos  79  [05-05-20 @ 06:00]    Creatinine Trend:  SCr 3.37 [05-05 @ 06:00]  SCr 3.76 [05-04 @ 05:24]  SCr 4.09 [05-03 @ 06:21]  SCr 4.85 [05-02 @ 06:15]  SCr 4.43 [05-01 @ 05:55]    Urinalysis - [05-01-20 @ 13:40]      Color YELLOW / Appearance CLEAR / SG 1.017 / pH 6.0      Gluc 50 / Ketone NEGATIVE  / Bili NEGATIVE / Urobili NORMAL       Blood SMALL / Protein >600 / Leuk Est NEGATIVE / Nitrite NEGATIVE      RBC 6-10 / WBC 3-5 / Hyaline NEGATIVE / Gran  / Sq Epi OCC / Non Sq Epi  / Bacteria NEGATIVE    Urine Creatinine 123.30      [05-02-20 @ 15:40]  Urine Protein 506.2      [05-02-20 @ 15:40]  Urine Sodium < 20      [05-01-20 @ 13:40]  Urine Potassium 33.6      [05-01-20 @ 13:40]  Urine Chloride < 20      [05-01-20 @ 13:40]

## 2020-05-05 NOTE — PROGRESS NOTE ADULT - PROBLEM SELECTOR PLAN 1
Pt with YURI on CKD in the setting of COVID-19 vs. chronic graft rejection. On admission, SCr of 5.47 (4/10/20) which has improved with IVF and holding Tacrolimus. Pt with SCr: 3.37 today. Patient stable and non-oliguric. Labs and charts reviewed. Continue to monitor renal function.  Avoid other potential nephrotoxins. Encourage PO intake. Monitor intake and output. Pt with YURI on CKD in the setting of COVID-19. On admission, Scr was elevates at 5.47 (4/10/20) which has improved with IVF and holding Tacrolimus. Scr improved to 3.37 today. Patient clinically stable and non-oliguric. Labs reviewed. Continue to monitor renal function.  Avoid other potential nephrotoxins. Encourage PO intake. Monitor intake and output

## 2020-05-06 LAB
ANION GAP SERPL CALC-SCNC: 10 MMO/L — SIGNIFICANT CHANGE UP (ref 7–14)
BUN SERPL-MCNC: 89 MG/DL — HIGH (ref 7–23)
CALCIUM SERPL-MCNC: 8.4 MG/DL — SIGNIFICANT CHANGE UP (ref 8.4–10.5)
CHLORIDE SERPL-SCNC: 111 MMOL/L — HIGH (ref 98–107)
CO2 SERPL-SCNC: 18 MMOL/L — LOW (ref 22–31)
CREAT SERPL-MCNC: 3.04 MG/DL — HIGH (ref 0.5–1.3)
GLUCOSE BLDC GLUCOMTR-MCNC: 196 MG/DL — HIGH (ref 70–99)
GLUCOSE BLDC GLUCOMTR-MCNC: 229 MG/DL — HIGH (ref 70–99)
GLUCOSE BLDC GLUCOMTR-MCNC: 267 MG/DL — HIGH (ref 70–99)
GLUCOSE BLDC GLUCOMTR-MCNC: 274 MG/DL — HIGH (ref 70–99)
GLUCOSE SERPL-MCNC: 146 MG/DL — HIGH (ref 70–99)
MAGNESIUM SERPL-MCNC: 2.1 MG/DL — SIGNIFICANT CHANGE UP (ref 1.6–2.6)
PHOSPHATE SERPL-MCNC: 2.1 MG/DL — LOW (ref 2.5–4.5)
POTASSIUM SERPL-MCNC: 5 MMOL/L — SIGNIFICANT CHANGE UP (ref 3.5–5.3)
POTASSIUM SERPL-SCNC: 5 MMOL/L — SIGNIFICANT CHANGE UP (ref 3.5–5.3)
SODIUM SERPL-SCNC: 139 MMOL/L — SIGNIFICANT CHANGE UP (ref 135–145)

## 2020-05-06 PROCEDURE — 99233 SBSQ HOSP IP/OBS HIGH 50: CPT | Mod: GC

## 2020-05-06 RX ADMIN — Medication 1 MILLIGRAM(S): at 11:10

## 2020-05-06 RX ADMIN — CARVEDILOL PHOSPHATE 3.12 MILLIGRAM(S): 80 CAPSULE, EXTENDED RELEASE ORAL at 17:10

## 2020-05-06 RX ADMIN — CARVEDILOL PHOSPHATE 3.12 MILLIGRAM(S): 80 CAPSULE, EXTENDED RELEASE ORAL at 05:33

## 2020-05-06 RX ADMIN — Medication 500 MILLIGRAM(S): at 05:34

## 2020-05-06 RX ADMIN — CHLORHEXIDINE GLUCONATE 1 APPLICATION(S): 213 SOLUTION TOPICAL at 05:34

## 2020-05-06 RX ADMIN — TACROLIMUS 2 MILLIGRAM(S): 5 CAPSULE ORAL at 17:09

## 2020-05-06 RX ADMIN — TACROLIMUS 2 MILLIGRAM(S): 5 CAPSULE ORAL at 05:33

## 2020-05-06 RX ADMIN — Medication 325 MILLIGRAM(S): at 11:01

## 2020-05-06 RX ADMIN — SEVELAMER CARBONATE 800 MILLIGRAM(S): 2400 POWDER, FOR SUSPENSION ORAL at 08:53

## 2020-05-06 RX ADMIN — HEPARIN SODIUM 5000 UNIT(S): 5000 INJECTION INTRAVENOUS; SUBCUTANEOUS at 05:34

## 2020-05-06 RX ADMIN — Medication 3 MILLIGRAM(S): at 23:07

## 2020-05-06 RX ADMIN — HEPARIN SODIUM 5000 UNIT(S): 5000 INJECTION INTRAVENOUS; SUBCUTANEOUS at 14:26

## 2020-05-06 RX ADMIN — ATORVASTATIN CALCIUM 80 MILLIGRAM(S): 80 TABLET, FILM COATED ORAL at 23:06

## 2020-05-06 RX ADMIN — Medication 500 MILLIGRAM(S): at 17:09

## 2020-05-06 RX ADMIN — SEVELAMER CARBONATE 800 MILLIGRAM(S): 2400 POWDER, FOR SUSPENSION ORAL at 12:23

## 2020-05-06 RX ADMIN — Medication 25 MILLIGRAM(S): at 17:10

## 2020-05-06 RX ADMIN — Medication 5 MILLIGRAM(S): at 05:33

## 2020-05-06 RX ADMIN — CHLORHEXIDINE GLUCONATE 1 APPLICATION(S): 213 SOLUTION TOPICAL at 17:10

## 2020-05-06 RX ADMIN — Medication 6: at 12:23

## 2020-05-06 RX ADMIN — Medication 4: at 17:08

## 2020-05-06 RX ADMIN — Medication 5 MILLIGRAM(S): at 11:01

## 2020-05-06 RX ADMIN — HEPARIN SODIUM 5000 UNIT(S): 5000 INJECTION INTRAVENOUS; SUBCUTANEOUS at 23:06

## 2020-05-06 RX ADMIN — Medication 75 MICROGRAM(S): at 05:34

## 2020-05-06 RX ADMIN — TAMSULOSIN HYDROCHLORIDE 0.4 MILLIGRAM(S): 0.4 CAPSULE ORAL at 23:08

## 2020-05-06 RX ADMIN — Medication 2: at 23:07

## 2020-05-06 RX ADMIN — Medication 2: at 07:46

## 2020-05-06 RX ADMIN — SEVELAMER CARBONATE 800 MILLIGRAM(S): 2400 POWDER, FOR SUSPENSION ORAL at 17:08

## 2020-05-06 RX ADMIN — CLOPIDOGREL BISULFATE 75 MILLIGRAM(S): 75 TABLET, FILM COATED ORAL at 11:01

## 2020-05-06 RX ADMIN — Medication 100 MILLIGRAM(S): at 11:01

## 2020-05-06 RX ADMIN — Medication 81 MILLIGRAM(S): at 11:01

## 2020-05-06 RX ADMIN — Medication 25 MILLIGRAM(S): at 05:34

## 2020-05-06 NOTE — PROGRESS NOTE ADULT - ATTENDING COMMENTS
59 year old male with hypertension, hypercholesterolemia, diabetes, s.p kidney transplant in 2005 for ESRD, CAD s/p stent few months ago at [facility], PVD s/p right AKA IN 2010 admitted with fever and not feeling well. Acute on chronic renal failure improving. Encephalopathy resolved. COVID positive. On Antibiotics for ESBL Klebsiella in urine.  Moody removed. Tacro levels are low. Waiting for Tacro levels to be therapeutic. Discussed with resident team.     Surge hospitalist

## 2020-05-06 NOTE — PROGRESS NOTE ADULT - PROBLEM SELECTOR PLAN 6
S/p recent stent at [facility].   - C/w DAPT  - Continue carvedilol  - Continue atorvastatin

## 2020-05-06 NOTE — PROGRESS NOTE ADULT - SUBJECTIVE AND OBJECTIVE BOX
Reji Abernathy, pgy 1  [facility] Team 4  [phone]  After 7pm; page night float    Patient is a 59y old  Male who presents with a chief complaint of Rule out COVID 19 infection (05 May 2020 10:38)      SUBJECTIVE / OVERNIGHT EVENTS:  ADDITIONAL REVIEW OF SYSTEMS:    MEDICATIONS  (STANDING):  ascorbic acid  Oral Tab/Cap - Peds 500 milliGRAM(s) Oral two times a day  aspirin  chewable 81 milliGRAM(s) Oral daily  atorvastatin 80 milliGRAM(s) Oral at bedtime  bisacodyl 5 milliGRAM(s) Oral daily  carvedilol 3.125 milliGRAM(s) Oral every 12 hours  chlorhexidine 4% Liquid 1 Application(s) Topical two times a day  clopidogrel Tablet 75 milliGRAM(s) Oral daily  dextrose 5%. 1000 milliLiter(s) (50 mL/Hr) IV Continuous <Continuous>  dextrose 50% Injectable 12.5 Gram(s) IV Push once  dextrose 50% Injectable 25 Gram(s) IV Push once  dextrose 50% Injectable 25 Gram(s) IV Push once  ferrous sulfate Oral Tab/Cap - Peds 325 milliGRAM(s) Oral daily  folic acid 1 milliGRAM(s) Oral daily  heparin   Injectable 5000 Unit(s) SubCutaneous every 8 hours  hydrALAZINE 25 milliGRAM(s) Oral two times a day  insulin lispro (HumaLOG) corrective regimen sliding scale   SubCutaneous three times a day before meals  insulin lispro (HumaLOG) corrective regimen sliding scale   SubCutaneous at bedtime  levothyroxine 75 MICROGram(s) Oral daily  melatonin 3 milliGRAM(s) Oral at bedtime  polyethylene glycol 3350 17 Gram(s) Oral daily  predniSONE   Tablet 5 milliGRAM(s) Oral daily  sevelamer carbonate 800 milliGRAM(s) Oral three times a day with meals  tacrolimus 2 milliGRAM(s) Oral <User Schedule>  tacrolimus 2 milliGRAM(s) Oral <User Schedule>  tamsulosin 0.4 milliGRAM(s) Oral at bedtime  thiamine 100 milliGRAM(s) Oral daily    MEDICATIONS  (PRN):  acetaminophen   Tablet .. 650 milliGRAM(s) Oral every 4 hours PRN Temp greater or equal to 38.5C (101.3F)  acetaminophen   Tablet .. 650 milliGRAM(s) Oral every 6 hours PRN Temp greater or equal to 38C (100.4F), Mild Pain (1 - 3), Moderate Pain (4 - 6)  acetaminophen  Suppository .. 650 milliGRAM(s) Rectal every 4 hours PRN Temp greater or equal to 38.5C (101.3F)  dextrose 40% Gel 15 Gram(s) Oral once PRN Blood Glucose LESS THAN 70 milliGRAM(s)/deciliter  glucagon  Injectable 1 milliGRAM(s) IntraMuscular once PRN Glucose LESS THAN 70 milligrams/deciliter  haloperidol    Injectable 2.5 milliGRAM(s) IV Push every 6 hours PRN Agitation  ondansetron Injectable 4 milliGRAM(s) IV Push every 8 hours PRN Nausea and/or Vomiting  sucralfate 1 Gram(s) Oral every 6 hours PRN With each meal      CAPILLARY BLOOD GLUCOSE      POCT Blood Glucose.: 196 mg/dL (06 May 2020 07:40)  POCT Blood Glucose.: 164 mg/dL (05 May 2020 21:44)  POCT Blood Glucose.: 221 mg/dL (05 May 2020 16:53)  POCT Blood Glucose.: 188 mg/dL (05 May 2020 12:10)  POCT Blood Glucose.: 182 mg/dL (05 May 2020 08:32)    I&O's Summary    05 May 2020 07:01  -  06 May 2020 07:00  --------------------------------------------------------  IN: 0 mL / OUT: 300 mL / NET: -300 mL        PHYSICAL EXAM:  Vital Signs Last 24 Hrs  T(C): 36.4 (06 May 2020 05:30), Max: 36.8 (05 May 2020 10:47)  T(F): 97.5 (06 May 2020 05:30), Max: 98.3 (05 May 2020 21:42)  HR: 70 (06 May 2020 05:30) (67 - 78)  BP: 148/71 (06 May 2020 05:30) (106/44 - 149/65)  BP(mean): --  RR: 18 (06 May 2020 05:30) (18 - 18)  SpO2: 100% (06 May 2020 05:30) (98% - 100%)  CONSTITUTIONAL: NAD, well-developed, well-groomed  EYES: PERRLA; conjunctiva and sclera clear  ENMT: Moist oral mucosa, no pharyngeal injection or exudates; normal dentition  NECK: Supple, no palpable masses; no thyromegaly  RESPIRATORY: Normal respiratory effort; lungs are clear to auscultation bilaterally  CARDIOVASCULAR: Regular rate and rhythm, normal S1 and S2, no murmur/rub/gallop; No lower extremity edema; Peripheral pulses are 2+ bilaterally  ABDOMEN: Nontender to palpation, normoactive bowel sounds, no rebound/guarding; No hepatosplenomegaly  MUSCULOSKELETAL:  Normal gait; no clubbing or cyanosis of digits; no joint swelling or tenderness to palpation  PSYCH: A+O to person, place, and time; affect appropriate  NEUROLOGY: CN 2-12 are intact and symmetric; no gross sensory deficits   SKIN: No rashes; no palpable lesions    LABS:                        8.5    4.64  )-----------( 186      ( 05 May 2020 06:00 )             28.2     05-06    139  |  111<H>  |  89<H>  ----------------------------<  146<H>  5.0   |  18<L>  |  3.04<H>    Ca    8.4      06 May 2020 06:15  Phos  2.1     05-06  Mg     2.1     05-06    TPro  5.7<L>  /  Alb  2.4<L>  /  TBili  < 0.2<L>  /  DBili  x   /  AST  18  /  ALT  25  /  AlkPhos  79  05-05                RADIOLOGY & ADDITIONAL TESTS:  Results Reviewed:   Imaging Personally Reviewed:  Electrocardiogram Personally Reviewed:    COORDINATION OF CARE:  Care Discussed with Consultants/Other Providers [Y/N]:  Prior or Outpatient Records Reviewed [Y/N]: Reji Abernathy, pgy 1  [facility] Team 4  [phone]  After 7pm; page night float    Patient is a 59y old  Male who presents with a chief complaint of Rule out COVID 19 infection (05 May 2020 10:38)      SUBJECTIVE / OVERNIGHT EVENTS: No acute events, pt seen and examined at bedside. Wants to go to a different rehab facility, does not like his old one. Otherwise, no chest pain, sob, abdominal pain.     ADDITIONAL REVIEW OF SYSTEMS:  negative ros   MEDICATIONS  (STANDING):  ascorbic acid  Oral Tab/Cap - Peds 500 milliGRAM(s) Oral two times a day  aspirin  chewable 81 milliGRAM(s) Oral daily  atorvastatin 80 milliGRAM(s) Oral at bedtime  bisacodyl 5 milliGRAM(s) Oral daily  carvedilol 3.125 milliGRAM(s) Oral every 12 hours  chlorhexidine 4% Liquid 1 Application(s) Topical two times a day  clopidogrel Tablet 75 milliGRAM(s) Oral daily  dextrose 5%. 1000 milliLiter(s) (50 mL/Hr) IV Continuous <Continuous>  dextrose 50% Injectable 12.5 Gram(s) IV Push once  dextrose 50% Injectable 25 Gram(s) IV Push once  dextrose 50% Injectable 25 Gram(s) IV Push once  ferrous sulfate Oral Tab/Cap - Peds 325 milliGRAM(s) Oral daily  folic acid 1 milliGRAM(s) Oral daily  heparin   Injectable 5000 Unit(s) SubCutaneous every 8 hours  hydrALAZINE 25 milliGRAM(s) Oral two times a day  insulin lispro (HumaLOG) corrective regimen sliding scale   SubCutaneous three times a day before meals  insulin lispro (HumaLOG) corrective regimen sliding scale   SubCutaneous at bedtime  levothyroxine 75 MICROGram(s) Oral daily  melatonin 3 milliGRAM(s) Oral at bedtime  polyethylene glycol 3350 17 Gram(s) Oral daily  predniSONE   Tablet 5 milliGRAM(s) Oral daily  sevelamer carbonate 800 milliGRAM(s) Oral three times a day with meals  tacrolimus 2 milliGRAM(s) Oral <User Schedule>  tacrolimus 2 milliGRAM(s) Oral <User Schedule>  tamsulosin 0.4 milliGRAM(s) Oral at bedtime  thiamine 100 milliGRAM(s) Oral daily    MEDICATIONS  (PRN):  acetaminophen   Tablet .. 650 milliGRAM(s) Oral every 4 hours PRN Temp greater or equal to 38.5C (101.3F)  acetaminophen   Tablet .. 650 milliGRAM(s) Oral every 6 hours PRN Temp greater or equal to 38C (100.4F), Mild Pain (1 - 3), Moderate Pain (4 - 6)  acetaminophen  Suppository .. 650 milliGRAM(s) Rectal every 4 hours PRN Temp greater or equal to 38.5C (101.3F)  dextrose 40% Gel 15 Gram(s) Oral once PRN Blood Glucose LESS THAN 70 milliGRAM(s)/deciliter  glucagon  Injectable 1 milliGRAM(s) IntraMuscular once PRN Glucose LESS THAN 70 milligrams/deciliter  haloperidol    Injectable 2.5 milliGRAM(s) IV Push every 6 hours PRN Agitation  ondansetron Injectable 4 milliGRAM(s) IV Push every 8 hours PRN Nausea and/or Vomiting  sucralfate 1 Gram(s) Oral every 6 hours PRN With each meal      CAPILLARY BLOOD GLUCOSE      POCT Blood Glucose.: 196 mg/dL (06 May 2020 07:40)  POCT Blood Glucose.: 164 mg/dL (05 May 2020 21:44)  POCT Blood Glucose.: 221 mg/dL (05 May 2020 16:53)  POCT Blood Glucose.: 188 mg/dL (05 May 2020 12:10)  POCT Blood Glucose.: 182 mg/dL (05 May 2020 08:32)    I&O's Summary    05 May 2020 07:01  -  06 May 2020 07:00  --------------------------------------------------------  IN: 0 mL / OUT: 300 mL / NET: -300 mL        PHYSICAL EXAM:  Vital Signs Last 24 Hrs  T(C): 36.4 (06 May 2020 05:30), Max: 36.8 (05 May 2020 10:47)  T(F): 97.5 (06 May 2020 05:30), Max: 98.3 (05 May 2020 21:42)  HR: 70 (06 May 2020 05:30) (67 - 78)  BP: 148/71 (06 May 2020 05:30) (106/44 - 149/65)  BP(mean): --  RR: 18 (06 May 2020 05:30) (18 - 18)  SpO2: 100% (06 May 2020 05:30) (98% - 100%)  CONSTITUTIONAL: NAD, well-developed, well-groomed  EYES: PERRLA; conjunctiva and sclera clear  ENMT: Moist oral mucosa, no pharyngeal injection or exudates; normal dentition  NECK: Supple, no palpable masses; no thyromegaly  RESPIRATORY: Normal respiratory effort; lungs are clear to auscultation bilaterally  CARDIOVASCULAR: Regular rate and rhythm, normal S1 and S2, no murmur/rub/gallop; No lower extremity edema; Peripheral pulses are 2+ bilaterally  ABDOMEN: Nontender to palpation, normoactive bowel sounds, no rebound/guarding; No hepatosplenomegaly  MUSCULOSKELETAL:  R AKA  PSYCH: A+O to person, place, and time; affect appropriate  NEUROLOGY: CN 2-12 are intact and symmetric; no gross sensory deficits   SKIN: No rashes; no palpable lesions  LABS:                        8.5    4.64  )-----------( 186      ( 05 May 2020 06:00 )             28.2     05-06    139  |  111<H>  |  89<H>  ----------------------------<  146<H>  5.0   |  18<L>  |  3.04<H>    Ca    8.4      06 May 2020 06:15  Phos  2.1     05-06  Mg     2.1     05-06    TPro  5.7<L>  /  Alb  2.4<L>  /  TBili  < 0.2<L>  /  DBili  x   /  AST  18  /  ALT  25  /  AlkPhos  79  05-05                RADIOLOGY & ADDITIONAL TESTS:  Results Reviewed:   Imaging Personally Reviewed:  Electrocardiogram Personally Reviewed:    COORDINATION OF CARE:  Care Discussed with Consultants/Other Providers [Y/N]:  Prior or Outpatient Records Reviewed [Y/N]:

## 2020-05-06 NOTE — PROGRESS NOTE ADULT - PROBLEM SELECTOR PLAN 5
Home immunosuppressant regimen: Tacrolimus 1mg BID, prednisone 5mg daily, Cellcept 1g BID. Tacro level subtherapeutic on admission concerning for medication non-adherence.   - C/w Tacrolimus 2mg in AM, 2mg in PM  - Continue prednisone 5mg daily  - Continue to hold Cellcept until discharge  - Tacro level before each AM dose  - Will need to follow up with outpatient nephrologist upon discharge (Dr. Adrián Hawkins)

## 2020-05-07 VITALS
DIASTOLIC BLOOD PRESSURE: 48 MMHG | SYSTOLIC BLOOD PRESSURE: 113 MMHG | OXYGEN SATURATION: 100 % | HEART RATE: 72 BPM | TEMPERATURE: 97 F | RESPIRATION RATE: 18 BRPM

## 2020-05-07 DIAGNOSIS — E83.39 OTHER DISORDERS OF PHOSPHORUS METABOLISM: ICD-10-CM

## 2020-05-07 LAB
GLUCOSE BLDC GLUCOMTR-MCNC: 185 MG/DL — HIGH (ref 70–99)
TACROLIMUS SERPL-MCNC: < 2 NG/ML — SIGNIFICANT CHANGE UP
TACROLIMUS SERPL-MCNC: < 2 NG/ML — SIGNIFICANT CHANGE UP

## 2020-05-07 PROCEDURE — 99233 SBSQ HOSP IP/OBS HIGH 50: CPT | Mod: GC

## 2020-05-07 PROCEDURE — 99232 SBSQ HOSP IP/OBS MODERATE 35: CPT | Mod: GC

## 2020-05-07 RX ORDER — TACROLIMUS 5 MG/1
2 CAPSULE ORAL
Qty: 0 | Refills: 0 | DISCHARGE
Start: 2020-05-07

## 2020-05-07 RX ORDER — TAMSULOSIN HYDROCHLORIDE 0.4 MG/1
1 CAPSULE ORAL
Qty: 0 | Refills: 0 | DISCHARGE
Start: 2020-05-07

## 2020-05-07 RX ORDER — TACROLIMUS 5 MG/1
3 CAPSULE ORAL
Refills: 0 | Status: DISCONTINUED | OUTPATIENT
Start: 2020-05-07 | End: 2020-05-07

## 2020-05-07 RX ORDER — TACROLIMUS 5 MG/1
3 CAPSULE ORAL
Qty: 0 | Refills: 0 | DISCHARGE
Start: 2020-05-07

## 2020-05-07 RX ORDER — THIAMINE MONONITRATE (VIT B1) 100 MG
1 TABLET ORAL
Qty: 0 | Refills: 0 | DISCHARGE
Start: 2020-05-07

## 2020-05-07 RX ORDER — HEPARIN SODIUM 5000 [USP'U]/ML
5000 INJECTION INTRAVENOUS; SUBCUTANEOUS
Qty: 1 | Refills: 0
Start: 2020-05-07

## 2020-05-07 RX ORDER — FOLIC ACID 0.8 MG
1 TABLET ORAL
Qty: 0 | Refills: 0 | DISCHARGE
Start: 2020-05-07

## 2020-05-07 RX ADMIN — CLOPIDOGREL BISULFATE 75 MILLIGRAM(S): 75 TABLET, FILM COATED ORAL at 14:46

## 2020-05-07 RX ADMIN — Medication 2: at 13:05

## 2020-05-07 RX ADMIN — Medication 5 MILLIGRAM(S): at 14:47

## 2020-05-07 RX ADMIN — Medication 1 MILLIGRAM(S): at 14:47

## 2020-05-07 RX ADMIN — HEPARIN SODIUM 5000 UNIT(S): 5000 INJECTION INTRAVENOUS; SUBCUTANEOUS at 14:53

## 2020-05-07 RX ADMIN — CARVEDILOL PHOSPHATE 3.12 MILLIGRAM(S): 80 CAPSULE, EXTENDED RELEASE ORAL at 08:26

## 2020-05-07 RX ADMIN — Medication 500 MILLIGRAM(S): at 08:26

## 2020-05-07 RX ADMIN — Medication 75 MICROGRAM(S): at 06:07

## 2020-05-07 RX ADMIN — HEPARIN SODIUM 5000 UNIT(S): 5000 INJECTION INTRAVENOUS; SUBCUTANEOUS at 06:05

## 2020-05-07 RX ADMIN — Medication 81 MILLIGRAM(S): at 14:46

## 2020-05-07 RX ADMIN — CHLORHEXIDINE GLUCONATE 1 APPLICATION(S): 213 SOLUTION TOPICAL at 08:26

## 2020-05-07 RX ADMIN — Medication 325 MILLIGRAM(S): at 14:46

## 2020-05-07 RX ADMIN — Medication 100 MILLIGRAM(S): at 14:52

## 2020-05-07 RX ADMIN — Medication 25 MILLIGRAM(S): at 06:07

## 2020-05-07 NOTE — PROGRESS NOTE ADULT - PROBLEM SELECTOR PLAN 1
mental status at baseline. most likely 2/2 sepsis from COVID infxn vs UTI.   - Haldol 2.5mg IV/IM/PO q6h PRN agitation  - C/w folate and thiamine repletion  - C/w melatonin to help regulate circadian rhythm

## 2020-05-07 NOTE — PROGRESS NOTE ADULT - ASSESSMENT
Pt with history of ESRD s/p DDRT presents with YURI on CKD Pt with history of ESRD s/p DDRT presents with YURI on CKD.

## 2020-05-07 NOTE — PROGRESS NOTE ADULT - PROBLEM SELECTOR PROBLEM 2
2019 novel coronavirus disease (COVID-19)
Acute lower UTI
Renal transplant recipient
YURI (acute kidney injury)
Renal transplant recipient
Acute lower UTI
Acute lower UTI
Renal transplant recipient
YURI (acute kidney injury)
YURI (acute kidney injury)

## 2020-05-07 NOTE — PROGRESS NOTE ADULT - PROBLEM SELECTOR PLAN 3
Pt now s/p 2 falls in hospital (on 4/30 and again 5/1). CTH x2 without acute pathology.   - Enhanced supervision  - Replete thiamine & folate  - Fall precautions  - PT tungal

## 2020-05-07 NOTE — PROGRESS NOTE ADULT - PROBLEM SELECTOR PLAN 6
S/p recent stent at [facility].   - C/w DAPT  - Continue carvedilol  - Continue atorvastatin

## 2020-05-07 NOTE — PROGRESS NOTE ADULT - PROBLEM SELECTOR PLAN 2
Pt with history of ESRD s/p DDRT on 2005. Pt. currently on oral Tacrolimus and Prednisone. Last Tacro level <2 on 5/6/20 after Tacrolimus was increased to 2 mg PO BID. Recommend to increase to 3 mg BID. Continue Prednisone 5 mg daily. Check Tacro level daily (30 minutes before AM dose).    If any questions, please feel free to contact me  Chu Mims   Nephrology Fellow  [phone]  (After 5 pm or on weekends please page the on-call fellow) Pt with history of ESRD s/p DDRT on 2005. Pt. currently on oral Tacrolimus and Prednisone. Last Tacro level <2 on 5/6/20 after Tacrolimus was increased to 2 mg PO BID. Recommend to increase to 3 mg BID. Continue Prednisone 5 mg daily. Check Tacro level daily (30 minutes before AM dose). Pt with history of ESRD s/p DDRT on 2005. Pt. currently on oral Tacrolimus and Prednisone. Last Tacro level <2 on 5/6/20 after Tacrolimus was increased to 2 mg PO BID. Recommend to increase to 3 mg BID. Continue Prednisone 5 mg daily. Check Tacro level daily (30 minutes before AM dose)

## 2020-05-07 NOTE — PROGRESS NOTE ADULT - PROBLEM SELECTOR PLAN 5
Home immunosuppressant regimen: Tacrolimus 1mg BID, prednisone 5mg daily, Cellcept 1g BID. Tacro level subtherapeutic on admission concerning for medication non-adherence.   - C/w Tacrolimus 2mg in AM, 2mg in PM; tacro level still low. Will follow up with transplant team for dosing.   - Continue prednisone 5mg daily  - Continue to hold Cellcept until discharge  - Tacro level before each AM dose  - Will need to follow up with outpatient nephrologist upon discharge (Dr. Adrián Hawkins)

## 2020-05-07 NOTE — PROGRESS NOTE ADULT - PROBLEM SELECTOR PLAN 1
Pt with YURI on CKD in the setting of COVID-19. On admission, Scr was elevated at 5.47 (4/10/20) which has improved with IVF and holding Tacrolimus. Tacrolimus resumed on 4/28/20. Scr improved to 3.04 yesterday. Patient clinically stable and non-oliguric. Labs reviewed. Continue to monitor renal function.  Avoid other potential nephrotoxins. Encourage PO intake. Monitor intake and output

## 2020-05-07 NOTE — PROGRESS NOTE ADULT - PROBLEM SELECTOR PROBLEM 3
2019 novel coronavirus disease (COVID-19)
Acute lower UTI
ESRD (end stage renal disease)
Fall
Hypophosphatemia
2019 novel coronavirus disease (COVID-19)
2019 novel coronavirus disease (COVID-19)
Acute lower UTI
Acute lower UTI

## 2020-05-07 NOTE — PROGRESS NOTE ADULT - ATTENDING COMMENTS
59 year old male with HTN, HLD, DM ESRD s/p kidney transplant, CAD s/p stent, PAD s/p right AKA,   admitted with COVID pneumonia and ESBL klebsiella UTI treated. Follow Tacro levels as out patient. Discharge planning. Discussed with resident team.     Surge hospitalist.

## 2020-05-07 NOTE — PROGRESS NOTE ADULT - PROBLEM SELECTOR PROBLEM 1
2019 novel coronavirus disease (COVID-19)
Altered mental status
Fever
YURI (acute kidney injury)
Altered mental status
YURI (acute kidney injury)

## 2020-05-07 NOTE — PROGRESS NOTE ADULT - ASSESSMENT
59M with ESRD s/p renal transplant (2005) on chronic immunosuppressive therapy, HTN, HLD, CAD s/p recent stent, and PVD s/p R AKA admitted with COVID19 pneumonia with course c/b ESBL Klebsiella UTI. Course further c/b acute encephalopathy and urinary retention s/p Moody. Now pending dispo once tacro level is therapeutic

## 2020-05-07 NOTE — PROGRESS NOTE ADULT - PROBLEM SELECTOR PLAN 3
Patient with hypophosphatemia in the setting of decreased PO intake and phosphorus binders. Serum phosphorus low at 2.1 yesterday (5/6/20). Discontinue Renvela. Encourage PO intake. Monitor serum phosphorus    If any questions, please feel free to contact me  Chu Mims   Nephrology Fellow  [phone]  (After 5 pm or on weekends please page the on-call fellow)

## 2020-05-07 NOTE — PROGRESS NOTE ADULT - PROBLEM SELECTOR PLAN 2
Resolving. Noted to have YURI on CKD.  - IV hydration prn   - C/w tamsulosin  - Appreciate Nephrology recs  - Continue immunosuppressant regimen (see below)  - Holding home Cellcept and Bumex  - Trend BMP  - Avoid nephrotoxins, renally dose medications

## 2020-05-07 NOTE — PROGRESS NOTE ADULT - SUBJECTIVE AND OBJECTIVE BOX
Reji Abernathy, pgy 1  [facility] Team 4  [id number]  After 7pm; page night float    Patient is a 59y old  Male who presents with a chief complaint of Rule out COVID 19 infection (06 May 2020 07:52)      SUBJECTIVE / OVERNIGHT EVENTS:  ADDITIONAL REVIEW OF SYSTEMS:    MEDICATIONS  (STANDING):  ascorbic acid  Oral Tab/Cap - Peds 500 milliGRAM(s) Oral two times a day  aspirin  chewable 81 milliGRAM(s) Oral daily  atorvastatin 80 milliGRAM(s) Oral at bedtime  bisacodyl 5 milliGRAM(s) Oral daily  carvedilol 3.125 milliGRAM(s) Oral every 12 hours  chlorhexidine 4% Liquid 1 Application(s) Topical two times a day  clopidogrel Tablet 75 milliGRAM(s) Oral daily  dextrose 5%. 1000 milliLiter(s) (50 mL/Hr) IV Continuous <Continuous>  dextrose 50% Injectable 12.5 Gram(s) IV Push once  dextrose 50% Injectable 25 Gram(s) IV Push once  dextrose 50% Injectable 25 Gram(s) IV Push once  ferrous sulfate Oral Tab/Cap - Peds 325 milliGRAM(s) Oral daily  folic acid 1 milliGRAM(s) Oral daily  heparin   Injectable 5000 Unit(s) SubCutaneous every 8 hours  hydrALAZINE 25 milliGRAM(s) Oral two times a day  insulin lispro (HumaLOG) corrective regimen sliding scale   SubCutaneous three times a day before meals  insulin lispro (HumaLOG) corrective regimen sliding scale   SubCutaneous at bedtime  levothyroxine 75 MICROGram(s) Oral daily  melatonin 3 milliGRAM(s) Oral at bedtime  polyethylene glycol 3350 17 Gram(s) Oral daily  predniSONE   Tablet 5 milliGRAM(s) Oral daily  sevelamer carbonate 800 milliGRAM(s) Oral three times a day with meals  tacrolimus 2 milliGRAM(s) Oral <User Schedule>  tacrolimus 2 milliGRAM(s) Oral <User Schedule>  tamsulosin 0.4 milliGRAM(s) Oral at bedtime  thiamine 100 milliGRAM(s) Oral daily    MEDICATIONS  (PRN):  acetaminophen   Tablet .. 650 milliGRAM(s) Oral every 4 hours PRN Temp greater or equal to 38.5C (101.3F)  acetaminophen   Tablet .. 650 milliGRAM(s) Oral every 6 hours PRN Temp greater or equal to 38C (100.4F), Mild Pain (1 - 3), Moderate Pain (4 - 6)  acetaminophen  Suppository .. 650 milliGRAM(s) Rectal every 4 hours PRN Temp greater or equal to 38.5C (101.3F)  dextrose 40% Gel 15 Gram(s) Oral once PRN Blood Glucose LESS THAN 70 milliGRAM(s)/deciliter  glucagon  Injectable 1 milliGRAM(s) IntraMuscular once PRN Glucose LESS THAN 70 milligrams/deciliter  haloperidol    Injectable 2.5 milliGRAM(s) IV Push every 6 hours PRN Agitation  ondansetron Injectable 4 milliGRAM(s) IV Push every 8 hours PRN Nausea and/or Vomiting  sucralfate 1 Gram(s) Oral every 6 hours PRN With each meal      CAPILLARY BLOOD GLUCOSE      POCT Blood Glucose.: 267 mg/dL (06 May 2020 22:37)  POCT Blood Glucose.: 229 mg/dL (06 May 2020 17:04)  POCT Blood Glucose.: 274 mg/dL (06 May 2020 11:42)  POCT Blood Glucose.: 196 mg/dL (06 May 2020 07:40)    I&O's Summary    06 May 2020 07:01  -  07 May 2020 07:00  --------------------------------------------------------  IN: 350 mL / OUT: 600 mL / NET: -250 mL        PHYSICAL EXAM:  Vital Signs Last 24 Hrs  T(C): 36.8 (06 May 2020 22:44), Max: 36.8 (06 May 2020 22:44)  T(F): 98.2 (06 May 2020 22:44), Max: 98.2 (06 May 2020 22:44)  HR: 72 (07 May 2020 05:48) (72 - 80)  BP: 137/61 (07 May 2020 05:48) (129/60 - 142/59)  BP(mean): --  RR: 18 (06 May 2020 22:44) (17 - 18)  SpO2: 100% (06 May 2020 22:44) (100% - 100%)  CONSTITUTIONAL: NAD, well-developed, well-groomed  EYES: PERRLA; conjunctiva and sclera clear  ENMT: Moist oral mucosa, no pharyngeal injection or exudates; normal dentition  NECK: Supple, no palpable masses; no thyromegaly  RESPIRATORY: Normal respiratory effort; lungs are clear to auscultation bilaterally  CARDIOVASCULAR: Regular rate and rhythm, normal S1 and S2, no murmur/rub/gallop; No lower extremity edema; Peripheral pulses are 2+ bilaterally  ABDOMEN: Nontender to palpation, normoactive bowel sounds, no rebound/guarding; No hepatosplenomegaly  MUSCULOSKELETAL:  Normal gait; no clubbing or cyanosis of digits; no joint swelling or tenderness to palpation  PSYCH: A+O to person, place, and time; affect appropriate  NEUROLOGY: CN 2-12 are intact and symmetric; no gross sensory deficits   SKIN: No rashes; no palpable lesions    LABS:    05-06    139  |  111<H>  |  89<H>  ----------------------------<  146<H>  5.0   |  18<L>  |  3.04<H>    Ca    8.4      06 May 2020 06:15  Phos  2.1     05-06  Mg     2.1     05-06                  RADIOLOGY & ADDITIONAL TESTS:  Results Reviewed:   Imaging Personally Reviewed:  Electrocardiogram Personally Reviewed:    COORDINATION OF CARE:  Care Discussed with Consultants/Other Providers [Y/N]:  Prior or Outpatient Records Reviewed [Y/N]: Reji Abernathy, pgy 1  [facility] Team 4  [id number]  After 7pm; page night float    Patient is a 59y old  Male who presents with a chief complaint of Rule out COVID 19 infection (06 May 2020 07:52)      SUBJECTIVE / OVERNIGHT EVENTS: No acute events overnight, pt seen and examined at bedside. Eager to leave the hospital   ADDITIONAL REVIEW OF SYSTEMS: negative unless previously noted.     MEDICATIONS  (STANDING):  ascorbic acid  Oral Tab/Cap - Peds 500 milliGRAM(s) Oral two times a day  aspirin  chewable 81 milliGRAM(s) Oral daily  atorvastatin 80 milliGRAM(s) Oral at bedtime  bisacodyl 5 milliGRAM(s) Oral daily  carvedilol 3.125 milliGRAM(s) Oral every 12 hours  chlorhexidine 4% Liquid 1 Application(s) Topical two times a day  clopidogrel Tablet 75 milliGRAM(s) Oral daily  dextrose 5%. 1000 milliLiter(s) (50 mL/Hr) IV Continuous <Continuous>  dextrose 50% Injectable 12.5 Gram(s) IV Push once  dextrose 50% Injectable 25 Gram(s) IV Push once  dextrose 50% Injectable 25 Gram(s) IV Push once  ferrous sulfate Oral Tab/Cap - Peds 325 milliGRAM(s) Oral daily  folic acid 1 milliGRAM(s) Oral daily  heparin   Injectable 5000 Unit(s) SubCutaneous every 8 hours  hydrALAZINE 25 milliGRAM(s) Oral two times a day  insulin lispro (HumaLOG) corrective regimen sliding scale   SubCutaneous three times a day before meals  insulin lispro (HumaLOG) corrective regimen sliding scale   SubCutaneous at bedtime  levothyroxine 75 MICROGram(s) Oral daily  melatonin 3 milliGRAM(s) Oral at bedtime  polyethylene glycol 3350 17 Gram(s) Oral daily  predniSONE   Tablet 5 milliGRAM(s) Oral daily  sevelamer carbonate 800 milliGRAM(s) Oral three times a day with meals  tacrolimus 2 milliGRAM(s) Oral <User Schedule>  tacrolimus 2 milliGRAM(s) Oral <User Schedule>  tamsulosin 0.4 milliGRAM(s) Oral at bedtime  thiamine 100 milliGRAM(s) Oral daily    MEDICATIONS  (PRN):  acetaminophen   Tablet .. 650 milliGRAM(s) Oral every 4 hours PRN Temp greater or equal to 38.5C (101.3F)  acetaminophen   Tablet .. 650 milliGRAM(s) Oral every 6 hours PRN Temp greater or equal to 38C (100.4F), Mild Pain (1 - 3), Moderate Pain (4 - 6)  acetaminophen  Suppository .. 650 milliGRAM(s) Rectal every 4 hours PRN Temp greater or equal to 38.5C (101.3F)  dextrose 40% Gel 15 Gram(s) Oral once PRN Blood Glucose LESS THAN 70 milliGRAM(s)/deciliter  glucagon  Injectable 1 milliGRAM(s) IntraMuscular once PRN Glucose LESS THAN 70 milligrams/deciliter  haloperidol    Injectable 2.5 milliGRAM(s) IV Push every 6 hours PRN Agitation  ondansetron Injectable 4 milliGRAM(s) IV Push every 8 hours PRN Nausea and/or Vomiting  sucralfate 1 Gram(s) Oral every 6 hours PRN With each meal      CAPILLARY BLOOD GLUCOSE      POCT Blood Glucose.: 267 mg/dL (06 May 2020 22:37)  POCT Blood Glucose.: 229 mg/dL (06 May 2020 17:04)  POCT Blood Glucose.: 274 mg/dL (06 May 2020 11:42)  POCT Blood Glucose.: 196 mg/dL (06 May 2020 07:40)    I&O's Summary    06 May 2020 07:01  -  07 May 2020 07:00  --------------------------------------------------------  IN: 350 mL / OUT: 600 mL / NET: -250 mL        PHYSICAL EXAM:  Vital Signs Last 24 Hrs  T(C): 36.8 (06 May 2020 22:44), Max: 36.8 (06 May 2020 22:44)  T(F): 98.2 (06 May 2020 22:44), Max: 98.2 (06 May 2020 22:44)  HR: 72 (07 May 2020 05:48) (72 - 80)  BP: 137/61 (07 May 2020 05:48) (129/60 - 142/59)  BP(mean): --  RR: 18 (06 May 2020 22:44) (17 - 18)  SpO2: 100% (06 May 2020 22:44) (100% - 100%)  CONSTITUTIONAL: NAD, well-developed, well-groomed  EYES: PERRLA; conjunctiva and sclera clear  ENMT: Moist oral mucosa, no pharyngeal injection or exudates; normal dentition  NECK: Supple, no palpable masses; no thyromegaly  RESPIRATORY: Normal respiratory effort; lungs are clear to auscultation bilaterally  CARDIOVASCULAR: Regular rate and rhythm, normal S1 and S2, no murmur/rub/gallop; No lower extremity edema; Peripheral pulses are 2+ bilaterally  ABDOMEN: Nontender to palpation, normoactive bowel sounds, no rebound/guarding; No hepatosplenomegaly  MUSCULOSKELETAL:  R AKA  PSYCH: A+O to person, place, and time; affect appropriate  NEUROLOGY: CN 2-12 are intact and symmetric; no gross sensory deficits   SKIN: No rashes; no palpable lesions  LABS:    05-06    139  |  111<H>  |  89<H>  ----------------------------<  146<H>  5.0   |  18<L>  |  3.04<H>    Ca    8.4      06 May 2020 06:15  Phos  2.1     05-06  Mg     2.1     05-06                  RADIOLOGY & ADDITIONAL TESTS:  Results Reviewed:   Imaging Personally Reviewed:  Electrocardiogram Personally Reviewed:    COORDINATION OF CARE:  Care Discussed with Consultants/Other Providers [Y/N]:  Prior or Outpatient Records Reviewed [Y/N]:

## 2020-05-07 NOTE — PROGRESS NOTE ADULT - SUBJECTIVE AND OBJECTIVE BOX
[facility] DIVISION OF KIDNEY DISEASES AND HYPERTENSION -- FOLLOW UP NOTE  --------------------------------------------------------------------------------  HPI: 59M PMH of HTN, HLD, DM, ESRD s/p kidney transplant, admitted for COVID-19. Nephrology team consulted for YURI and management of immunosuppression. Pt with history of ESRD s/p DDRT on 2005. On review pt is on Tacrolimus 1mg BID, Mycophenolate 1gm BID and Prednisone 5mg daily. On review of labs, Scr was 3.6 on 3/27/20. Pt with last outpatient (4/6/20) SCr of 4.8 at nursing home. On admission, Scr was 5.47 (4/10/20). Tacrolimus and Cellcept discontinued due to COVID 19 infection and YURI. Pt. continued to improve with IVF. Tacrolimus resumed on 4/28/20. Scr has improved to 3.04 yesterday (5/6/20) but Tacrolimus level remains subtherapeutic.    Patient evaluated at bedside, in no acute distress. Denies any complaints at this time.     PAST HISTORY  --------------------------------------------------------------------------------  No significant changes to PMH, PSH, FHx, SHx, unless otherwise noted    ALLERGIES & MEDICATIONS  --------------------------------------------------------------------------------  Allergies    iodine (Vomiting)  IV Contrast (Unknown)    Intolerances    Standing Inpatient Medications  ascorbic acid  Oral Tab/Cap - Peds 500 milliGRAM(s) Oral two times a day  aspirin  chewable 81 milliGRAM(s) Oral daily  atorvastatin 80 milliGRAM(s) Oral at bedtime  bisacodyl 5 milliGRAM(s) Oral daily  carvedilol 3.125 milliGRAM(s) Oral every 12 hours  chlorhexidine 4% Liquid 1 Application(s) Topical two times a day  clopidogrel Tablet 75 milliGRAM(s) Oral daily  dextrose 5%. 1000 milliLiter(s) IV Continuous <Continuous>  dextrose 50% Injectable 12.5 Gram(s) IV Push once  dextrose 50% Injectable 25 Gram(s) IV Push once  dextrose 50% Injectable 25 Gram(s) IV Push once  ferrous sulfate Oral Tab/Cap - Peds 325 milliGRAM(s) Oral daily  folic acid 1 milliGRAM(s) Oral daily  heparin   Injectable 5000 Unit(s) SubCutaneous every 8 hours  hydrALAZINE 25 milliGRAM(s) Oral two times a day  insulin lispro (HumaLOG) corrective regimen sliding scale   SubCutaneous three times a day before meals  insulin lispro (HumaLOG) corrective regimen sliding scale   SubCutaneous at bedtime  levothyroxine 75 MICROGram(s) Oral daily  melatonin 3 milliGRAM(s) Oral at bedtime  polyethylene glycol 3350 17 Gram(s) Oral daily  predniSONE   Tablet 5 milliGRAM(s) Oral daily  sevelamer carbonate 800 milliGRAM(s) Oral three times a day with meals  tacrolimus 2 milliGRAM(s) Oral <User Schedule>  tacrolimus 2 milliGRAM(s) Oral <User Schedule>  tamsulosin 0.4 milliGRAM(s) Oral at bedtime  thiamine 100 milliGRAM(s) Oral daily    REVIEW OF SYSTEMS  --------------------------------------------------------------------------------  Gen: no lethargy  Respiratory: No dyspnea  CV: No chest pain  GI: No abdominal pain  MSK: no LE edema  Neuro: No dizziness  Heme: No bleeding    All other systems were reviewed and are negative, except as noted.    VITALS/PHYSICAL EXAM  --------------------------------------------------------------------------------  T(C): 36.8 (05-06-20 @ 22:44), Max: 36.8 (05-06-20 @ 22:44)  HR: 72 (05-07-20 @ 05:48) (72 - 80)  BP: 137/61 (05-07-20 @ 05:48) (129/60 - 142/59)  RR: 18 (05-06-20 @ 22:44) (17 - 18)  SpO2: 100% (05-06-20 @ 22:44) (100% - 100%)  Wt(kg): --    05-06-20 @ 07:01  -  05-07-20 @ 07:00  --------------------------------------------------------  IN: 350 mL / OUT: 600 mL / NET: -250 mL    Physical Exam:  	Gen: no distress  	HEENT: no JVD, MMM  	Pulm: CTA bilaterally  	CV:  S1S2  	Abd: +BS, soft, transplant area without tenderness  	Ext: No B/L Lower ext edema, s/p R AKA   	Neuro: awake, alert  	Skin: Warm, without rashes  	Vascular access: Left Arm AVF with palpable thrill, + aneurysmal dilation    LABS/STUDIES  --------------------------------------------------------------------------------    139  |  111  |  89  ----------------------------<  146      [05-06-20 @ 06:15]  5.0   |  18  |  3.04        Ca     8.4     [05-06-20 @ 06:15]      Mg     2.1     [05-06-20 @ 06:15]      Phos  2.1     [05-06-20 @ 06:15]    Creatinine Trend:  SCr 3.04 [05-06 @ 06:15]  SCr 3.37 [05-05 @ 06:00]  SCr 3.76 [05-04 @ 05:24]  SCr 4.09 [05-03 @ 06:21]  SCr 4.85 [05-02 @ 06:15] [facility] DIVISION OF KIDNEY DISEASES AND HYPERTENSION -- FOLLOW UP NOTE  --------------------------------------------------------------------------------  HPI: 59-year-old male with PMH of HTN, HLD, DM, ESRD s/p kidney transplant, admitted for COVID-19. Nephrology team consulted for YURI and management of immunosuppression. Pt with history of ESRD s/p DDRT on 2005. On review pt is on Tacrolimus 1mg BID, Mycophenolate 1gm BID and Prednisone 5mg daily. On review of labs, Scr was 3.6 on 3/27/20. Pt with last outpatient (4/6/20) SCr of 4.8 at nursing home. On admission, Scr was 5.47 (4/10/20). Tacrolimus and Cellcept discontinued due to COVID-19  and YURI. Pt. continued to improve with IVF. Tacrolimus resumed on 4/28/20. Scr has improved to 3.04 yesterday (5/6/20).     Patient evaluated at bedside, in no acute distress. Denies any complaints at this time.     PAST HISTORY  --------------------------------------------------------------------------------  No significant changes to PMH, PSH, FHx, SHx, unless otherwise noted    ALLERGIES & MEDICATIONS  --------------------------------------------------------------------------------  Allergies    iodine (Vomiting)  IV Contrast (Unknown)    Intolerances    Standing Inpatient Medications  ascorbic acid  Oral Tab/Cap - Peds 500 milliGRAM(s) Oral two times a day  aspirin  chewable 81 milliGRAM(s) Oral daily  atorvastatin 80 milliGRAM(s) Oral at bedtime  bisacodyl 5 milliGRAM(s) Oral daily  carvedilol 3.125 milliGRAM(s) Oral every 12 hours  chlorhexidine 4% Liquid 1 Application(s) Topical two times a day  clopidogrel Tablet 75 milliGRAM(s) Oral daily  dextrose 5%. 1000 milliLiter(s) IV Continuous <Continuous>  dextrose 50% Injectable 12.5 Gram(s) IV Push once  dextrose 50% Injectable 25 Gram(s) IV Push once  dextrose 50% Injectable 25 Gram(s) IV Push once  ferrous sulfate Oral Tab/Cap - Peds 325 milliGRAM(s) Oral daily  folic acid 1 milliGRAM(s) Oral daily  heparin   Injectable 5000 Unit(s) SubCutaneous every 8 hours  hydrALAZINE 25 milliGRAM(s) Oral two times a day  insulin lispro (HumaLOG) corrective regimen sliding scale   SubCutaneous three times a day before meals  insulin lispro (HumaLOG) corrective regimen sliding scale   SubCutaneous at bedtime  levothyroxine 75 MICROGram(s) Oral daily  melatonin 3 milliGRAM(s) Oral at bedtime  polyethylene glycol 3350 17 Gram(s) Oral daily  predniSONE   Tablet 5 milliGRAM(s) Oral daily  sevelamer carbonate 800 milliGRAM(s) Oral three times a day with meals  tacrolimus 2 milliGRAM(s) Oral <User Schedule>  tacrolimus 2 milliGRAM(s) Oral <User Schedule>  tamsulosin 0.4 milliGRAM(s) Oral at bedtime  thiamine 100 milliGRAM(s) Oral daily    REVIEW OF SYSTEMS  --------------------------------------------------------------------------------  Gen: no lethargy  Respiratory: No dyspnea  CV: No chest pain  GI: No abdominal pain  MSK: no LE edema  Neuro: No dizziness  Heme: No bleeding    All other systems were reviewed and are negative, except as noted.    VITALS/PHYSICAL EXAM  --------------------------------------------------------------------------------  T(C): 36.8 (05-06-20 @ 22:44), Max: 36.8 (05-06-20 @ 22:44)  HR: 72 (05-07-20 @ 05:48) (72 - 80)  BP: 137/61 (05-07-20 @ 05:48) (129/60 - 142/59)  RR: 18 (05-06-20 @ 22:44) (17 - 18)  SpO2: 100% (05-06-20 @ 22:44) (100% - 100%)  Wt(kg): --    05-06-20 @ 07:01  -  05-07-20 @ 07:00  --------------------------------------------------------  IN: 350 mL / OUT: 600 mL / NET: -250 mL    Physical Exam:  	Gen: no distress  	HEENT: no JVD, MMM  	Pulm: CTA bilaterally  	CV:  S1S2  	Abd: +BS, soft, transplant area without tenderness  	Ext: No B/L Lower ext edema, s/p R AKA   	Neuro: awake, alert  	Skin: Warm, without rashes  	Vascular access: Left Arm AVF with palpable thrill, + aneurysmal dilation    LABS/STUDIES  --------------------------------------------------------------------------------    139  |  111  |  89  ----------------------------<  146      [05-06-20 @ 06:15]  5.0   |  18  |  3.04        Ca     8.4     [05-06-20 @ 06:15]      Mg     2.1     [05-06-20 @ 06:15]      Phos  2.1     [05-06-20 @ 06:15]    Creatinine Trend:  SCr 3.04 [05-06 @ 06:15]  SCr 3.37 [05-05 @ 06:00]  SCr 3.76 [05-04 @ 05:24]  SCr 4.09 [05-03 @ 06:21]  SCr 4.85 [05-02 @ 06:15]

## 2020-10-01 PROBLEM — Z94.0 KIDNEY TRANSPLANT RECIPIENT: Status: ACTIVE | Noted: 2017-04-19

## 2022-03-18 NOTE — PROGRESS NOTE ADULT - PROBLEM SELECTOR PROBLEM 7
Routed to [facility] / Torres Johnson,     Patient ended up cancelling appointment for MRI since authorization was not yet authorized. Please provide status update. Type 2 diabetes mellitus with chronic kidney disease, with long-term current use of insulin, unspecified CKD stage

## 2022-06-15 NOTE — PROGRESS NOTE ADULT - PROBLEM SELECTOR PLAN 8
Bed: RT4  Expected date:   Expected time:   Means of arrival:   Comments:  ready   - Monitor blood glucose QAC and QHS  - Continue LANTUS 6 U at bed time + Sliding Novolog scale

## 2023-03-17 ENCOUNTER — TRANSCRIPTION ENCOUNTER (OUTPATIENT)
Age: 63
End: 2023-03-17

## 2023-03-17 ENCOUNTER — INPATIENT (INPATIENT)
Facility: HOSPITAL | Age: 63
LOS: 25 days | Discharge: NURSING, REHAB OR LONG-TERM CARE | DRG: 377 | End: 2023-04-12
Attending: STUDENT IN AN ORGANIZED HEALTH CARE EDUCATION/TRAINING PROGRAM | Admitting: INTERNAL MEDICINE
Payer: MEDICARE

## 2023-03-17 VITALS
RESPIRATION RATE: 18 BRPM | SYSTOLIC BLOOD PRESSURE: 160 MMHG | DIASTOLIC BLOOD PRESSURE: 74 MMHG | OXYGEN SATURATION: 96 % | HEART RATE: 64 BPM

## 2023-03-17 DIAGNOSIS — E78.5 HYPERLIPIDEMIA, UNSPECIFIED: ICD-10-CM

## 2023-03-17 DIAGNOSIS — E11.9 TYPE 2 DIABETES MELLITUS WITHOUT COMPLICATIONS: ICD-10-CM

## 2023-03-17 DIAGNOSIS — Z94.0 KIDNEY TRANSPLANT STATUS: ICD-10-CM

## 2023-03-17 DIAGNOSIS — I10 ESSENTIAL (PRIMARY) HYPERTENSION: ICD-10-CM

## 2023-03-17 DIAGNOSIS — Z86.79 PERSONAL HISTORY OF OTHER DISEASES OF THE CIRCULATORY SYSTEM: ICD-10-CM

## 2023-03-17 DIAGNOSIS — I25.10 ATHEROSCLEROTIC HEART DISEASE OF NATIVE CORONARY ARTERY WITHOUT ANGINA PECTORIS: ICD-10-CM

## 2023-03-17 DIAGNOSIS — N18.6 END STAGE RENAL DISEASE: ICD-10-CM

## 2023-03-17 DIAGNOSIS — K62.5 HEMORRHAGE OF ANUS AND RECTUM: ICD-10-CM

## 2023-03-17 LAB
A1C WITH ESTIMATED AVERAGE GLUCOSE RESULT: 5.9 % — HIGH (ref 4–5.6)
ALBUMIN SERPL ELPH-MCNC: 2.8 G/DL — LOW (ref 3.3–5)
ALBUMIN SERPL ELPH-MCNC: 2.9 G/DL — LOW (ref 3.3–5)
ALP SERPL-CCNC: 141 U/L — HIGH (ref 40–120)
ALT FLD-CCNC: 10 U/L — SIGNIFICANT CHANGE UP (ref 10–45)
ALT FLD-CCNC: 11 U/L — SIGNIFICANT CHANGE UP (ref 10–45)
ANION GAP SERPL CALC-SCNC: 7 MMOL/L — SIGNIFICANT CHANGE UP (ref 5–17)
ANION GAP SERPL CALC-SCNC: 8 MMOL/L — SIGNIFICANT CHANGE UP (ref 5–17)
ANISOCYTOSIS BLD QL: SLIGHT — SIGNIFICANT CHANGE UP
APTT BLD: 28.8 SEC — SIGNIFICANT CHANGE UP (ref 27.5–35.5)
APTT BLD: 30.3 SEC — SIGNIFICANT CHANGE UP (ref 27.5–35.5)
AST SERPL-CCNC: 15 U/L — SIGNIFICANT CHANGE UP (ref 10–40)
BASOPHILS # BLD AUTO: 0.07 K/UL — SIGNIFICANT CHANGE UP (ref 0–0.2)
BASOPHILS # BLD AUTO: 0.14 K/UL — SIGNIFICANT CHANGE UP (ref 0–0.2)
BASOPHILS NFR BLD AUTO: 1.3 % — SIGNIFICANT CHANGE UP (ref 0–2)
BASOPHILS NFR BLD AUTO: 2.6 % — HIGH (ref 0–2)
BILIRUB SERPL-MCNC: 1 MG/DL — SIGNIFICANT CHANGE UP (ref 0.2–1.2)
BLD GP AB SCN SERPL QL: NEGATIVE — SIGNIFICANT CHANGE UP
BUN SERPL-MCNC: 23 MG/DL — SIGNIFICANT CHANGE UP (ref 7–23)
CALCIUM SERPL-MCNC: 7.9 MG/DL — LOW (ref 8.4–10.5)
CALCIUM SERPL-MCNC: 8 MG/DL — LOW (ref 8.4–10.5)
CHLORIDE SERPL-SCNC: 98 MMOL/L — SIGNIFICANT CHANGE UP (ref 96–108)
CHOLEST SERPL-MCNC: 88 MG/DL — SIGNIFICANT CHANGE UP
CO2 SERPL-SCNC: 28 MMOL/L — SIGNIFICANT CHANGE UP (ref 22–31)
CO2 SERPL-SCNC: 29 MMOL/L — SIGNIFICANT CHANGE UP (ref 22–31)
CREAT SERPL-MCNC: 3.83 MG/DL — HIGH (ref 0.5–1.3)
CREAT SERPL-MCNC: 3.89 MG/DL — HIGH (ref 0.5–1.3)
EGFR: 17 ML/MIN/1.73M2 — LOW
EOSINOPHIL # BLD AUTO: 0.09 K/UL — SIGNIFICANT CHANGE UP (ref 0–0.5)
EOSINOPHIL # BLD AUTO: 0.19 K/UL — SIGNIFICANT CHANGE UP (ref 0–0.5)
EOSINOPHIL NFR BLD AUTO: 1.7 % — SIGNIFICANT CHANGE UP (ref 0–6)
EOSINOPHIL NFR BLD AUTO: 3.6 % — SIGNIFICANT CHANGE UP (ref 0–6)
ESTIMATED AVERAGE GLUCOSE: 123 MG/DL — HIGH (ref 68–114)
GLUCOSE BLDC GLUCOMTR-MCNC: 107 MG/DL — HIGH (ref 70–99)
GLUCOSE BLDC GLUCOMTR-MCNC: 126 MG/DL — HIGH (ref 70–99)
GLUCOSE SERPL-MCNC: 131 MG/DL — HIGH (ref 70–99)
GLUCOSE SERPL-MCNC: 138 MG/DL — HIGH (ref 70–99)
HCT VFR BLD CALC: 33.1 % — LOW (ref 39–50)
HCT VFR BLD CALC: 33.6 % — LOW (ref 39–50)
HCT VFR BLD CALC: 35.2 % — LOW (ref 39–50)
HCT VFR BLD CALC: 35.4 % — LOW (ref 39–50)
HCV AB S/CO SERPL IA: 0.04 S/CO — SIGNIFICANT CHANGE UP
HCV AB SERPL-IMP: SIGNIFICANT CHANGE UP
HDLC SERPL-MCNC: 44 MG/DL — SIGNIFICANT CHANGE UP
HGB BLD-MCNC: 10.8 G/DL — LOW (ref 13–17)
HGB BLD-MCNC: 11.3 G/DL — LOW (ref 13–17)
HGB BLD-MCNC: 11.4 G/DL — LOW (ref 13–17)
IMM GRANULOCYTES NFR BLD AUTO: 0.6 % — SIGNIFICANT CHANGE UP (ref 0–0.9)
INR BLD: 1.1 — SIGNIFICANT CHANGE UP (ref 0.88–1.16)
INR BLD: 1.11 — SIGNIFICANT CHANGE UP (ref 0.88–1.16)
LACTATE SERPL-SCNC: 1.2 MMOL/L — SIGNIFICANT CHANGE UP (ref 0.5–2)
LIPID PNL WITH DIRECT LDL SERPL: 12 MG/DL — SIGNIFICANT CHANGE UP
LYMPHOCYTES # BLD AUTO: 1.16 K/UL — SIGNIFICANT CHANGE UP (ref 1–3.3)
LYMPHOCYTES # BLD AUTO: 1.22 K/UL — SIGNIFICANT CHANGE UP (ref 1–3.3)
LYMPHOCYTES # BLD AUTO: 21.8 % — SIGNIFICANT CHANGE UP (ref 13–44)
LYMPHOCYTES # BLD AUTO: 22.9 % — SIGNIFICANT CHANGE UP (ref 13–44)
MAGNESIUM SERPL-MCNC: 1.8 MG/DL — SIGNIFICANT CHANGE UP (ref 1.6–2.6)
MANUAL SMEAR VERIFICATION: SIGNIFICANT CHANGE UP
MCHC RBC-ENTMCNC: 29.2 PG — SIGNIFICANT CHANGE UP (ref 27–34)
MCHC RBC-ENTMCNC: 29.3 PG — SIGNIFICANT CHANGE UP (ref 27–34)
MCHC RBC-ENTMCNC: 29.4 PG — SIGNIFICANT CHANGE UP (ref 27–34)
MCHC RBC-ENTMCNC: 29.7 PG — SIGNIFICANT CHANGE UP (ref 27–34)
MCHC RBC-ENTMCNC: 32.1 GM/DL — SIGNIFICANT CHANGE UP (ref 32–36)
MCHC RBC-ENTMCNC: 32.2 GM/DL — SIGNIFICANT CHANGE UP (ref 32–36)
MCHC RBC-ENTMCNC: 32.6 GM/DL — SIGNIFICANT CHANGE UP (ref 32–36)
MCV RBC AUTO: 89.5 FL — SIGNIFICANT CHANGE UP (ref 80–100)
MCV RBC AUTO: 91.3 FL — SIGNIFICANT CHANGE UP (ref 80–100)
MCV RBC AUTO: 91.4 FL — SIGNIFICANT CHANGE UP (ref 80–100)
MCV RBC AUTO: 92.2 FL — SIGNIFICANT CHANGE UP (ref 80–100)
MONOCYTES # BLD AUTO: 0.74 K/UL — SIGNIFICANT CHANGE UP (ref 0–0.9)
MONOCYTES # BLD AUTO: 0.95 K/UL — HIGH (ref 0–0.9)
MONOCYTES NFR BLD AUTO: 13.9 % — SIGNIFICANT CHANGE UP (ref 2–14)
MONOCYTES NFR BLD AUTO: 17.8 % — HIGH (ref 2–14)
NEUTROPHILS # BLD AUTO: 2.87 K/UL — SIGNIFICANT CHANGE UP (ref 1.8–7.4)
NEUTROPHILS # BLD AUTO: 3.2 K/UL — SIGNIFICANT CHANGE UP (ref 1.8–7.4)
NEUTROPHILS NFR BLD AUTO: 53.8 % — SIGNIFICANT CHANGE UP (ref 43–77)
NEUTROPHILS NFR BLD AUTO: 60 % — SIGNIFICANT CHANGE UP (ref 43–77)
NON HDL CHOLESTEROL: 44 MG/DL — SIGNIFICANT CHANGE UP
NRBC # BLD: 0 /100 WBCS — SIGNIFICANT CHANGE UP (ref 0–0)
OVALOCYTES BLD QL SMEAR: SLIGHT — SIGNIFICANT CHANGE UP
PHOSPHATE SERPL-MCNC: 1.9 MG/DL — LOW (ref 2.5–4.5)
PLAT MORPH BLD: NORMAL — SIGNIFICANT CHANGE UP
PLATELET # BLD AUTO: 132 K/UL — LOW (ref 150–400)
PLATELET # BLD AUTO: 147 K/UL — LOW (ref 150–400)
PLATELET # BLD AUTO: 155 K/UL — SIGNIFICANT CHANGE UP (ref 150–400)
POLYCHROMASIA BLD QL SMEAR: SLIGHT — SIGNIFICANT CHANGE UP
POTASSIUM SERPL-MCNC: 4.2 MMOL/L — SIGNIFICANT CHANGE UP (ref 3.5–5.3)
POTASSIUM SERPL-MCNC: 4.3 MMOL/L — SIGNIFICANT CHANGE UP (ref 3.5–5.3)
POTASSIUM SERPL-SCNC: 4.2 MMOL/L — SIGNIFICANT CHANGE UP (ref 3.5–5.3)
POTASSIUM SERPL-SCNC: 4.3 MMOL/L — SIGNIFICANT CHANGE UP (ref 3.5–5.3)
PROT SERPL-MCNC: 5.1 G/DL — LOW (ref 6–8.3)
PROTHROM AB SERPL-ACNC: 13.1 SEC — SIGNIFICANT CHANGE UP (ref 10.5–13.4)
PROTHROM AB SERPL-ACNC: 13.2 SEC — SIGNIFICANT CHANGE UP (ref 10.5–13.4)
RBC # BLD: 3.68 M/UL — LOW (ref 4.2–5.8)
RBC # BLD: 3.7 M/UL — LOW (ref 4.2–5.8)
RBC # BLD: 3.84 M/UL — LOW (ref 4.2–5.8)
RBC # BLD: 3.85 M/UL — LOW (ref 4.2–5.8)
RBC # FLD: 14.8 % — HIGH (ref 10.3–14.5)
RBC # FLD: 14.9 % — HIGH (ref 10.3–14.5)
RBC # FLD: 15 % — HIGH (ref 10.3–14.5)
RBC # FLD: 15.2 % — HIGH (ref 10.3–14.5)
RBC BLD AUTO: ABNORMAL
RH IG SCN BLD-IMP: POSITIVE — SIGNIFICANT CHANGE UP
SARS-COV-2 RNA SPEC QL NAA+PROBE: SIGNIFICANT CHANGE UP
SODIUM SERPL-SCNC: 134 MMOL/L — LOW (ref 135–145)
SPHEROCYTES BLD QL SMEAR: SLIGHT — SIGNIFICANT CHANGE UP
TACROLIMUS SERPL-MCNC: 2.1 NG/ML — SIGNIFICANT CHANGE UP
TRIGL SERPL-MCNC: 158 MG/DL — HIGH
WBC # BLD: 5.13 K/UL — SIGNIFICANT CHANGE UP (ref 3.8–10.5)
WBC # BLD: 5.33 K/UL — SIGNIFICANT CHANGE UP (ref 3.8–10.5)
WBC # BLD: 5.34 K/UL — SIGNIFICANT CHANGE UP (ref 3.8–10.5)
WBC # BLD: 5.45 K/UL — SIGNIFICANT CHANGE UP (ref 3.8–10.5)
WBC # FLD AUTO: 5.13 K/UL — SIGNIFICANT CHANGE UP (ref 3.8–10.5)
WBC # FLD AUTO: 5.33 K/UL — SIGNIFICANT CHANGE UP (ref 3.8–10.5)
WBC # FLD AUTO: 5.34 K/UL — SIGNIFICANT CHANGE UP (ref 3.8–10.5)
WBC # FLD AUTO: 5.45 K/UL — SIGNIFICANT CHANGE UP (ref 3.8–10.5)

## 2023-03-17 PROCEDURE — 99223 1ST HOSP IP/OBS HIGH 75: CPT

## 2023-03-17 PROCEDURE — 99222 1ST HOSP IP/OBS MODERATE 55: CPT

## 2023-03-17 PROCEDURE — 93010 ELECTROCARDIOGRAM REPORT: CPT

## 2023-03-17 PROCEDURE — 99232 SBSQ HOSP IP/OBS MODERATE 35: CPT | Mod: GC

## 2023-03-17 PROCEDURE — 99222 1ST HOSP IP/OBS MODERATE 55: CPT | Mod: 25

## 2023-03-17 PROCEDURE — 45330 DIAGNOSTIC SIGMOIDOSCOPY: CPT

## 2023-03-17 RX ORDER — DEXTROSE 50 % IN WATER 50 %
12.5 SYRINGE (ML) INTRAVENOUS ONCE
Refills: 0 | Status: DISCONTINUED | OUTPATIENT
Start: 2023-03-17 | End: 2023-04-12

## 2023-03-17 RX ORDER — TAMSULOSIN HYDROCHLORIDE 0.4 MG/1
0.4 CAPSULE ORAL AT BEDTIME
Refills: 0 | Status: DISCONTINUED | OUTPATIENT
Start: 2023-03-17 | End: 2023-03-18

## 2023-03-17 RX ORDER — PANTOPRAZOLE SODIUM 20 MG/1
40 TABLET, DELAYED RELEASE ORAL
Refills: 0 | Status: DISCONTINUED | OUTPATIENT
Start: 2023-03-17 | End: 2023-03-17

## 2023-03-17 RX ORDER — TACROLIMUS 5 MG/1
2 CAPSULE ORAL ONCE
Refills: 0 | Status: COMPLETED | OUTPATIENT
Start: 2023-03-17 | End: 2023-03-17

## 2023-03-17 RX ORDER — LOSARTAN POTASSIUM 100 MG/1
100 TABLET, FILM COATED ORAL DAILY
Refills: 0 | Status: DISCONTINUED | OUTPATIENT
Start: 2023-03-17 | End: 2023-03-26

## 2023-03-17 RX ORDER — SEVELAMER CARBONATE 2400 MG/1
800 POWDER, FOR SUSPENSION ORAL
Refills: 0 | Status: DISCONTINUED | OUTPATIENT
Start: 2023-03-17 | End: 2023-03-18

## 2023-03-17 RX ORDER — SODIUM CHLORIDE 9 MG/ML
1000 INJECTION, SOLUTION INTRAVENOUS
Refills: 0 | Status: DISCONTINUED | OUTPATIENT
Start: 2023-03-17 | End: 2023-04-12

## 2023-03-17 RX ORDER — DEXTROSE 50 % IN WATER 50 %
25 SYRINGE (ML) INTRAVENOUS ONCE
Refills: 0 | Status: DISCONTINUED | OUTPATIENT
Start: 2023-03-17 | End: 2023-04-12

## 2023-03-17 RX ORDER — PANTOPRAZOLE SODIUM 20 MG/1
8 TABLET, DELAYED RELEASE ORAL
Qty: 80 | Refills: 0 | Status: DISCONTINUED | OUTPATIENT
Start: 2023-03-17 | End: 2023-03-18

## 2023-03-17 RX ORDER — ISOSORBIDE DINITRATE 5 MG/1
20 TABLET ORAL THREE TIMES A DAY
Refills: 0 | Status: DISCONTINUED | OUTPATIENT
Start: 2023-03-17 | End: 2023-03-26

## 2023-03-17 RX ORDER — ATORVASTATIN CALCIUM 80 MG/1
40 TABLET, FILM COATED ORAL AT BEDTIME
Refills: 0 | Status: DISCONTINUED | OUTPATIENT
Start: 2023-03-17 | End: 2023-04-12

## 2023-03-17 RX ORDER — FERROUS SULFATE 325(65) MG
325 TABLET ORAL DAILY
Refills: 0 | Status: DISCONTINUED | OUTPATIENT
Start: 2023-03-17 | End: 2023-04-12

## 2023-03-17 RX ORDER — DEXTROSE 50 % IN WATER 50 %
25 SYRINGE (ML) INTRAVENOUS ONCE
Refills: 0 | Status: DISCONTINUED | OUTPATIENT
Start: 2023-03-17 | End: 2023-03-24

## 2023-03-17 RX ORDER — HYDRALAZINE HCL 50 MG
50 TABLET ORAL EVERY 8 HOURS
Refills: 0 | Status: DISCONTINUED | OUTPATIENT
Start: 2023-03-17 | End: 2023-03-26

## 2023-03-17 RX ORDER — DEXTROSE 50 % IN WATER 50 %
15 SYRINGE (ML) INTRAVENOUS ONCE
Refills: 0 | Status: DISCONTINUED | OUTPATIENT
Start: 2023-03-17 | End: 2023-04-12

## 2023-03-17 RX ORDER — GLUCAGON INJECTION, SOLUTION 0.5 MG/.1ML
1 INJECTION, SOLUTION SUBCUTANEOUS ONCE
Refills: 0 | Status: DISCONTINUED | OUTPATIENT
Start: 2023-03-17 | End: 2023-03-24

## 2023-03-17 RX ORDER — AMLODIPINE BESYLATE 2.5 MG/1
10 TABLET ORAL DAILY
Refills: 0 | Status: DISCONTINUED | OUTPATIENT
Start: 2023-03-17 | End: 2023-03-22

## 2023-03-17 RX ORDER — CALCIUM ACETATE 667 MG
667 TABLET ORAL
Refills: 0 | Status: DISCONTINUED | OUTPATIENT
Start: 2023-03-17 | End: 2023-03-18

## 2023-03-17 RX ORDER — TACROLIMUS 5 MG/1
2 CAPSULE ORAL
Refills: 0 | Status: DISCONTINUED | OUTPATIENT
Start: 2023-03-17 | End: 2023-03-17

## 2023-03-17 RX ORDER — INSULIN LISPRO 100/ML
VIAL (ML) SUBCUTANEOUS
Refills: 0 | Status: DISCONTINUED | OUTPATIENT
Start: 2023-03-17 | End: 2023-04-12

## 2023-03-17 RX ORDER — POLYETHYLENE GLYCOL 3350 17 G/17G
17 POWDER, FOR SOLUTION ORAL DAILY
Refills: 0 | Status: DISCONTINUED | OUTPATIENT
Start: 2023-03-17 | End: 2023-03-26

## 2023-03-17 RX ADMIN — LOSARTAN POTASSIUM 100 MILLIGRAM(S): 100 TABLET, FILM COATED ORAL at 14:29

## 2023-03-17 RX ADMIN — TACROLIMUS 2 MILLIGRAM(S): 5 CAPSULE ORAL at 14:30

## 2023-03-17 RX ADMIN — PANTOPRAZOLE SODIUM 10 MG/HR: 20 TABLET, DELAYED RELEASE ORAL at 20:55

## 2023-03-17 RX ADMIN — SEVELAMER CARBONATE 800 MILLIGRAM(S): 2400 POWDER, FOR SUSPENSION ORAL at 14:29

## 2023-03-17 RX ADMIN — SEVELAMER CARBONATE 800 MILLIGRAM(S): 2400 POWDER, FOR SUSPENSION ORAL at 19:31

## 2023-03-17 RX ADMIN — ISOSORBIDE DINITRATE 20 MILLIGRAM(S): 5 TABLET ORAL at 14:27

## 2023-03-17 RX ADMIN — Medication 667 MILLIGRAM(S): at 19:31

## 2023-03-17 RX ADMIN — Medication 50 MILLIGRAM(S): at 05:35

## 2023-03-17 RX ADMIN — Medication 325 MILLIGRAM(S): at 14:27

## 2023-03-17 RX ADMIN — Medication 50 MILLIGRAM(S): at 22:31

## 2023-03-17 RX ADMIN — ISOSORBIDE DINITRATE 20 MILLIGRAM(S): 5 TABLET ORAL at 19:32

## 2023-03-17 RX ADMIN — TAMSULOSIN HYDROCHLORIDE 0.4 MILLIGRAM(S): 0.4 CAPSULE ORAL at 22:30

## 2023-03-17 RX ADMIN — AMLODIPINE BESYLATE 10 MILLIGRAM(S): 2.5 TABLET ORAL at 05:34

## 2023-03-17 RX ADMIN — ATORVASTATIN CALCIUM 40 MILLIGRAM(S): 80 TABLET, FILM COATED ORAL at 22:30

## 2023-03-17 RX ADMIN — PANTOPRAZOLE SODIUM 10 MG/HR: 20 TABLET, DELAYED RELEASE ORAL at 09:41

## 2023-03-17 RX ADMIN — Medication 50 MILLIGRAM(S): at 14:28

## 2023-03-17 NOTE — DIETITIAN INITIAL EVALUATION ADULT - OTHER CALCULATIONS
5'4''  pounds +-10%  ABW s/p R AKA: 113 pounds, %PFW=541   ABW s/p R AKA for EER as %ABW is greater then 120%  Adjust for age, ESRD/HD, Skin/pressure ulcer, CHF; fluids per team  5'4''  pounds +-10%  ABW s/p R AKA: 113 pounds, %CBE=917   ABW s/p R AKA for EER as %ABW is greater then 120%  Adjust for age, ESRD/HD, Skin/pressure ulcer, CHF; fluids per team  5'4''  pounds +-10%  ABW s/p R AKA: 113 pounds, %BER=880   ABW s/p R AKA for EER as %ABW is greater then 120%  Adjust for age, ESRD/HD, Skin/pressure ulcer, CHF; fluids per team

## 2023-03-17 NOTE — H&P ADULT - HISTORY OF PRESENT ILLNESS
63yo M PMHx HTN, HLD, DM,  ICM (s/p cath years ago, RCA 99%, never intervened on), HFrEF 25-30%, ESRD s/p failed kidney transplant (last HD 3/1 via Left Arm AVF; HD TTS), Right AKA presented to [facility] 2/27 for respiratory distress after a dialysis session and admitted to cardiac telemetry on 2/27 with SIRS criteria. Placed on vanc/zosyn. Pt was placed on BiPAP which failed, where he was then intubated on 3/1 and moved to the MICU. He went into cardiac arrest (RoSC achieved after 8mins). Went into Vtach arrest, was placed on amio and pressors, HR ellen into 30s and atropine given twice. Weaned off levophed and sedation and extubated 3/2. Pt developed coffee ground emesis, hemoccult positive, with Hgb dipping to 3.5; he then received 7units PRBCs, 1Unit FFP, 1 unit donor packed platelets. EGD and colonoscopy showing melanosis duodenii but no acute bleed. BP dropped again, transferred to ICU and started on levophed gtt. CTA Abdomen showing no active bleeding into GI lumen, possible focal proctitis; colonoscopy showing rectal ulcer. Started on hydrocortisone suppository and mesalamine. Hgb now stable in 11s (per handoff), now transferred for ICD placement 2/2 Vtach and cardiac cath. 63yo M PMHx HTN, HLD, DM,  ICM (s/p cath years ago, RCA 99%, never intervened on), HFrEF 25-30%, ESRD s/p failed kidney transplant (last HD 3/1 via Left Arm AVF; HD TTS), Right AKA presented to [facility] 2/27 for respiratory distress after a dialysis session and admitted to cardiac telemetry on 2/27 with SIRS criteria. Placed on vanc/zosyn. Pt was placed on BiPAP which failed, where he was then intubated on 3/1 and moved to the MICU. He went into cardiac arrest (RoSC achieved after 8mins). Went into Vtach arrest, was placed on amio and pressors, HR ellen into 30s and atropine given twice. Weaned off levophed and sedation and extubated 3/2. Pt developed coffee ground emesis, hemoccult positive, with Hgb dipping to 3.5; he then received 7units PRBCs, 1Unit FFP, 1 unit donor packed platelets. EGD and colonoscopy showing melanosis duodenii but no acute bleed. BP dropped again, transferred to ICU and started on levophed gtt. CTA Abdomen showing no active bleeding into GI lumen, possible focal proctitis; colonoscopy showing rectal ulcer. Started on hydrocortisone suppository and mesalamine. Hgb now stable in 11s (per handoff), now transferred for ICD placement 2/2 Vtach and cardiac cath. 63yo M PMHx HTN, HLD, DM,  ICM (s/p cath years ago, RCA 99%, never intervened on), HFrEF 25-30%, ESRD s/p failed kidney transplant (last HD 3/1 via Left Arm AVF; HD TTS), Right AKA presented to [facility] 2/27 for respiratory distress after a dialysis session and admitted to cardiac telemetry on 2/27 with SIRS criteria. Placed on vanc/zosyn. Pt was placed on BiPAP which failed, where he was then intubated on 3/1 and moved to the MICU. He went into cardiac arrest (RoSC achieved after 8mins). Went into Vtach arrest, was placed on amio and pressors, HR ellen into 30s and atropine given twice. Weaned off levophed and sedation and extubated 3/2. Pt developed coffee ground emesis, hemoccult positive, with Hgb dipping to 3.5; he then received 7units PRBCs, 1Unit FFP, 1 unit donor packed platelets. EGD and colonoscopy showing melanosis duodenii but no acute bleed. BP dropped again, transferred to ICU and started on levophed gtt. CTA Abdomen showing no active bleeding into GI lumen, possible focal proctitis; colonoscopy showing rectal ulcer. Started on hydrocortisone suppository and mesalamine. Hgb now stable in 11s (per handoff), now transferred for ICD placement 2/2 Vtach and cardiac cath.

## 2023-03-17 NOTE — DIETITIAN INITIAL EVALUATION ADULT - PERTINENT LABORATORY DATA
03-17    134<L>  |  98  |  23  ----------------------------<  131<H>  4.3   |  28  |  3.89<H>    Ca    8.0<L>      17 Mar 2023 08:14  Mg     1.8     03-17    TPro  5.1<L>  /  Alb  2.8<L>  /  TBili  1.0  /  DBili  x   /  AST  15  /  ALT  11  /  AlkPhos  141<H>  03-17  A1C with Estimated Average Glucose Result: 5.9 % (03-17-23 @ 08:14)

## 2023-03-17 NOTE — H&P ADULT - PROBLEM SELECTOR PLAN 4
s/p failed kidney transplant  -- per [place] pt takes Tacrolimus 2mg 2 times /day  -- ordered one time dose tacrolimus 2mg  -- f/u tacrolimus serum levels @10AM dialysis Tues, Thurs, Sat  -- last received 3/16  -- nephro consult in AM

## 2023-03-17 NOTE — DIETITIAN INITIAL EVALUATION ADULT - NSFNSGIIOFT_GEN_A_CORE
Flex sig performed at bedside with c attending 3/17:   -Brown stool was seen in the rectum and sigmoid colon.	  -Evidence of prior hemoclip was found in the sigmoid colon.	  -Localized ulceration and erosive with no bleeding were noted in the rectum, suggestive of solitary ulcer syndrome.

## 2023-03-17 NOTE — CONSULT NOTE ADULT - NSCONSULTADDITIONALINFOA_GEN_ALL_CORE
***Chief Addendum***    Agree with A/P. Briefly, 61yo M, PMX/PSx as above, transferred from OSH following GI bleed, w cardiac arrest/Vtach, ROSC achieved after 8mo, currently admitted to regional [facility] floor pending ICD placement, general surgery consulted for BRBPR. Pt remains hemodynamically normal, w stable H/H, most recent EGD/Cscope w stigmata consistent w UGIB, however solitary rectal ulcer appreciated. Abd soft/NT/ND denies abd pain, COLTON w maroon stool on finger, no masses, fissures, pathologic hemorrhoids appreciated. No definitive surgical intervention warranted at this time, would maintain active T&S/appropriate IV access w transfusion goal to keep Hgb >8. Would consult GI service to determine if repeat endoscopy indicated. Discussed w attending surgeon on call. ***Chief Addendum***    Agree with A/P. Briefly, 61yo M, PMX/PSx as above, transferred from OSH following GI bleed, w cardiac arrest/Vtach, ROSC achieved after 8mo, currently admitted to regional [facility] floor pending ICD placement, general surgery consulted for BRBPR. Pt remains hemodynamically normal, w stable H/H, most recent EGD/Cscope w stigmata consistent w UGIB, however solitary rectal ulcer appreciated. Abd soft/NT/ND denies abd pain, COLTON w maroon stool on finger, no masses, fissures, pathologic hemorrhoids appreciated. No definitive surgical intervention warranted at this time, would maintain active T&S/appropriate IV access w transfusion goal to keep Hgb >8. Would consult GI service to determine if repeat endoscopy indicated. Discussed w attending surgeon on call. ***Chief Addendum***    Agree with A/P. Briefly, 63yo M, PMX/PSx as above, transferred from OSH following GI bleed, w cardiac arrest/Vtach, ROSC achieved after 8mo, currently admitted to regional [facility] floor pending ICD placement, general surgery consulted for BRBPR. Pt remains hemodynamically normal, w stable H/H, most recent EGD/Cscope w stigmata consistent w UGIB, however solitary rectal ulcer appreciated. Abd soft/NT/ND denies abd pain, COLTON w maroon stool on finger, no masses, fissures, pathologic hemorrhoids appreciated. No definitive surgical intervention warranted at this time, would maintain active T&S/appropriate IV access w transfusion goal to keep Hgb >8. Would consult GI service to determine if repeat endoscopy indicated. Discussed w attending surgeon on call.

## 2023-03-17 NOTE — CONSULT NOTE ADULT - ASSESSMENT
Patient is a 62y old  Male w/ PMH of HTN, HLD, DM, CAD< HFrEF (25-30%), ESRD s/p failed kidney transplant who was transferred to [facility] from [place] for ICD placement and Cardiac Cath after complicated admission c/p cardiac arrest, vtach, and GIB.   ICU Consulted for bright red blood per rectum.     NEUROLOGY  stu    CARDIOVASCULAR  #CAD        RESPIRATORY       RENAL       ENDOCRINE      GASTROINTESTINAL      INFECTIOUS DISEASE      HEMATOLOGY/ONCOLOGY      MSK      METABOLIC      ACCESS/LINES/DRAINS      ETHICS/GOALS OF CARE      PROPHYLACTIC  FLUIDS: none at this time  ELECTROLYTES: Mg<2, K<4  DIET: regular  GI PPX: protonix  VTE PPX: SCD, lovenox  CODE: FULL  DISPO:    Patient is a 62y old  Male w/ PMH of HTN, HLD, DM, CAD< HFrEF (25-30%), ESRD s/p failed kidney transplant who was transferred to [facility] from [place] for ICD placement and Cardiac Cath after complicated admission c/p cardiac arrest, vtach, and GIB.   ICU Consulted for bright red blood per rectum.     NEUROLOGY  stu    CARDIOVASCULAR  #CAD        RESPIRATORY       RENAL       ENDOCRINE      GASTROINTESTINAL      INFECTIOUS DISEASE      HEMATOLOGY/ONCOLOGY      MSK      METABOLIC      ACCESS/LINES/DRAINS      ETHICS/GOALS OF CARE      PROPHYLACTIC  FLUIDS: none at this time  ELECTROLYTES: Mg<2, K<4  DIET: regular  GI PPX: protonix  VTE PPX: SCD, lovenox  CODE: FULL  DISPO:    Patient is a 62y old  Male w/ PMH of HTN, HLD, DM, CAD< HFrEF (25-30%), ESRD s/p failed kidney transplant who was transferred to [facility] from [place] for ICD placement and Cardiac Cath after complicated admission c/p cardiac arrest, vtach, and GIB.   ICU Consulted for bright red blood per rectum.     NEUROLOGY  stu    CARDIOVASCULAR  #CAD        RESPIRATORY       RENAL       ENDOCRINE      GASTROINTESTINAL      INFECTIOUS DISEASE      HEMATOLOGY/ONCOLOGY      MSK      METABOLIC      ACCESS/LINES/DRAINS      ETHICS/GOALS OF CARE      PROPHYLACTIC  FLUIDS: none at this time  ELECTROLYTES: Mg<2, K<4  DIET: regular  GI PPX: protonix  VTE PPX: SCD, lovenox  CODE: FULL  DISPO:  63yo M PMHx HTN, HLD, DM, CAD(s/p cath years ago, RCA 99%, never intervened on), HFrEF 25-30%, ESRD s/p failed kidney transplant (last HD 3/1 via Left Arm AVF; HD TTS), Right AKA initially presented to [facility] 2/27 for respiratory distress after a dialysis, found to be septic, h/c complicated by AHRF requiring intubation for 24hr followed by VT arrest, h/c the complicated by massive LGIB (rectal ulcer) requiring 7u pRBC, 1u FFP and 1u PLT. Transferred to [facility] for ICD placement 2/2 Vtach and cardiac cath.  ICU Consulted for bright red blood per rectum.     NEUROLOGY  stu    CARDIOVASCULAR  #CAD   - Previous cath at [facility] showing oLM 30% and RCA 99% that is not amenable to intervention.  [facility], EF 25-30%, severe global wall motion dysfunction, mildly dilated LA, RV mildly dilated, all leaflets mild calcified, mod pHTN  - hx coffee ground emesis @ [facility]; s/p EGD/colonoscopy showing rectal ulcer and [facility]  flex sig suggestive of solitary ulcer syndrome - per cardiology will hold off loading the patient due to recent LGIB  - No absolute GI contraindications to anticoag/antplt therapy  - EP consulted for possible ICD placement  - Angiogram and possible PCI per cardiology    #H/O ventricular tachycardia   - Vtach arrest @[facility]  - agree with EP consult for ICD placement    #HTN  - SBP on admission 155-170s  - c/w home meds. Amlodipine 10mg qd, Losartan 100mg qd, Hydralazine 50m TID, Isordil 20mg TID    #HLD   - c/w Lipitor 40mg qd  - f/u AM lipid panel.    RESPIRATORY   STU    RENAL   #ESRD on dialysis  - dialysis Tues, Thurs, Sat  - last received 3/16  - f/u nephro recs  - Electrolytes wnl, k 4.3, phos 1.9  - c/w calcium citrate 667mg TID  - on Renvela 800mg TID, f/u with renal as patient is with hypophosphatemia on admission     #S/P kidney transplant.   - per [place] pt takes Tacrolimus 2mg 2 times /day  - Obtain collateral regarding renal transplant, if indeed failed, what indications patient have for c/w tacrolimus 2mg?  - f/u tacrolimus serum levels  - f/u nephro recs    ENDOCRINE  #DM  - no meds per [place], obtain collaterals for med recs prior [place] stay  - mISS while inpt  - A1c 5.9 on admission    GASTROINTESTINAL  #LGIB  #SANTOS  - hx coffee ground emesis @ [facility]; s/p EGD/colonoscopy/sigmoidoscopy showing rectal ulcer  - 3/17AM pt passed loose BM along with large amount of BRBPR  - per gen surgery no acute surgical intervention   - per GI:      - increase Protonix 40 mg IVP BID      - PIV large bore x2      - s/p flex sig for rectal ulcers, found Evidence of prior hemoclip in the sigmoid colon. Localized ulceration and erosive with no bleeding were noted in the rectum, suggestive of solitary ulcer syndrome           - Avoid anal digitation and enemas           - High-fiber diet and optimize laxatives to avoid constipation using miralax and dulcolax           - No absolute GI contraindications to anticoag/antplt therapy           - Repeat endoscopic evaluation recommended in 3 months  - Active T&S  - Transfuse for hgb <8 (active CAD)    PROPHYLACTIC  FLUIDS: none at this time  ELECTROLYTES: Mg<2, K<4  DIET: regular  GI PPX: protonix 40 IV BID  VTE PPX: SCD  CODE: FULL  DISPO: Remain on [facility] service    At this time, patient's active issue requiring inpatient admission remains primarily Cardiac, in that he would benefit from ICD placement given recent Vtach arrest, and Cardiac Cath, as per history above. Patient's rectal ulcer is stable, as per GI evaluation, and is not at this moment indication for transfer to medical intensive care unit. Patient will most benefit to remain with cardiology service.   Please reconsult ICU as needed.     Ca Worley, PGY3  Discussed with ICU attending, Dr. Mcgee. 61yo M PMHx HTN, HLD, DM, CAD(s/p cath years ago, RCA 99%, never intervened on), HFrEF 25-30%, ESRD s/p failed kidney transplant (last HD 3/1 via Left Arm AVF; HD TTS), Right AKA initially presented to [facility] 2/27 for respiratory distress after a dialysis, found to be septic, h/c complicated by AHRF requiring intubation for 24hr followed by VT arrest, h/c the complicated by massive LGIB (rectal ulcer) requiring 7u pRBC, 1u FFP and 1u PLT. Transferred to [facility] for ICD placement 2/2 Vtach and cardiac cath.  ICU Consulted for bright red blood per rectum.     NEUROLOGY  stu    CARDIOVASCULAR  #CAD   - Previous cath at [facility] showing oLM 30% and RCA 99% that is not amenable to intervention.  [facility], EF 25-30%, severe global wall motion dysfunction, mildly dilated LA, RV mildly dilated, all leaflets mild calcified, mod pHTN  - hx coffee ground emesis @ [facility]; s/p EGD/colonoscopy showing rectal ulcer and [facility]  flex sig suggestive of solitary ulcer syndrome - per cardiology will hold off loading the patient due to recent LGIB  - No absolute GI contraindications to anticoag/antplt therapy  - EP consulted for possible ICD placement  - Angiogram and possible PCI per cardiology    #H/O ventricular tachycardia   - [facility] arrest [facility]  - agree with EP consult for ICD placement    #HTN  - SBP on admission 155-170s  - c/w home meds. Amlodipine 10mg qd, Losartan 100mg qd, Hydralazine 50m TID, Isordil 20mg TID    #HLD   - c/w Lipitor 40mg qd  - f/u AM lipid panel.    RESPIRATORY   STU    RENAL   #ESRD on dialysis  - dialysis Tues, Thurs, Sat  - last received 3/16  - f/u nephro recs  - Electrolytes wnl, k 4.3, phos 1.9  - c/w calcium citrate 667mg TID  - on Renvela 800mg TID, f/u with renal as patient is with hypophosphatemia on admission     #S/P kidney transplant.   - per [place] pt takes Tacrolimus 2mg 2 times /day  - Obtain collateral regarding renal transplant, if indeed failed, what indications patient have for c/w tacrolimus 2mg?  - f/u tacrolimus serum levels  - f/u nephro recs    ENDOCRINE  #DM  - no meds per [place], obtain collaterals for med recs prior [place] stay  - mISS while inpt  - A1c 5.9 on admission    GASTROINTESTINAL  #LGIB  #SANTOS  - hx coffee ground emesis @ [facility]; s/p EGD/colonoscopy/sigmoidoscopy showing rectal ulcer  - 3/17AM pt passed loose BM along with large amount of BRBPR  - per gen surgery no acute surgical intervention   - per GI:      - increase Protonix 40 mg IVP BID      - PIV large bore x2      - s/p flex sig for rectal ulcers, found Evidence of prior hemoclip in the sigmoid colon. Localized ulceration and erosive with no bleeding were noted in the rectum, suggestive of solitary ulcer syndrome           - Avoid anal digitation and enemas           - High-fiber diet and optimize laxatives to avoid constipation using miralax and dulcolax           - No absolute GI contraindications to anticoag/antplt therapy           - Repeat endoscopic evaluation recommended in 3 months  - Active T&S  - Transfuse for hgb <8 (active CAD)    PROPHYLACTIC  FLUIDS: none at this time  ELECTROLYTES: Mg<2, K<4  DIET: regular  GI PPX: protonix 40 IV BID  VTE PPX: SCD  CODE: FULL  DISPO: Remain on [facility] service    At this time, patient's active issue requiring inpatient admission remains primarily Cardiac, in that he would benefit from ICD placement given recent Vtach arrest, and Cardiac Cath, as per history above. Patient's rectal ulcer is stable, as per GI evaluation, and is not at this moment indication for transfer to medical intensive care unit. Patient will most benefit to remain with cardiology service.   Please reconsult ICU as needed.     Ca Worley, PGY3  Discussed with ICU attending, Dr. Mcgee. 63yo M PMHx HTN, HLD, DM, CAD(s/p cath years ago, RCA 99%, never intervened on), HFrEF 25-30%, ESRD s/p failed kidney transplant (last HD 3/1 via Left Arm AVF; HD TTS), Right AKA initially presented to [facility] 2/27 for respiratory distress after a dialysis, found to be septic, h/c complicated by AHRF requiring intubation for 24hr followed by VT arrest, h/c the complicated by massive LGIB (rectal ulcer) requiring 7u pRBC, 1u FFP and 1u PLT. Transferred to [facility] for ICD placement 2/2 Vtach and cardiac cath.  ICU Consulted for bright red blood per rectum.     NEUROLOGY  stu    CARDIOVASCULAR  #CAD   - Previous cath at [facility] showing oLM 30% and RCA 99% that is not amenable to intervention.  [facility], EF 25-30%, severe global wall motion dysfunction, mildly dilated LA, RV mildly dilated, all leaflets mild calcified, mod pHTN  - hx coffee ground emesis @ [facility]; s/p EGD/colonoscopy showing rectal ulcer and [facility]  flex sig suggestive of solitary ulcer syndrome - per cardiology will hold off loading the patient due to recent LGIB  - No absolute GI contraindications to anticoag/antplt therapy  - EP consulted for possible ICD placement  - Angiogram and possible PCI per cardiology    #H/O ventricular tachycardia   - Vtach arrest @[facility]  - agree with EP consult for ICD placement    #HTN  - SBP on admission 155-170s  - c/w home meds. Amlodipine 10mg qd, Losartan 100mg qd, Hydralazine 50m TID, Isordil 20mg TID    #HLD   - c/w Lipitor 40mg qd  - f/u AM lipid panel.    RESPIRATORY   STU    RENAL   #ESRD on dialysis  - dialysis Tues, Thurs, Sat  - last received 3/16  - f/u nephro recs  - Electrolytes wnl, k 4.3, phos 1.9  - c/w calcium citrate 667mg TID  - on Renvela 800mg TID, f/u with renal as patient is with hypophosphatemia on admission     #S/P kidney transplant.   - per [place] pt takes Tacrolimus 2mg 2 times /day  - Obtain collateral regarding renal transplant, if indeed failed, what indications patient have for c/w tacrolimus 2mg?  - f/u tacrolimus serum levels  - f/u nephro recs    ENDOCRINE  #DM  - no meds per [place], obtain collaterals for med recs prior [place] stay  - mISS while inpt  - A1c 5.9 on admission    GASTROINTESTINAL  #LGIB  #SANTOS  - hx coffee ground emesis @ [facility]; s/p EGD/colonoscopy/sigmoidoscopy showing rectal ulcer  - 3/17AM pt passed loose BM along with large amount of BRBPR  - per gen surgery no acute surgical intervention   - per GI:      - increase Protonix 40 mg IVP BID      - PIV large bore x2      - s/p flex sig for rectal ulcers, found Evidence of prior hemoclip in the sigmoid colon. Localized ulceration and erosive with no bleeding were noted in the rectum, suggestive of solitary ulcer syndrome           - Avoid anal digitation and enemas           - High-fiber diet and optimize laxatives to avoid constipation using miralax and dulcolax           - No absolute GI contraindications to anticoag/antplt therapy           - Repeat endoscopic evaluation recommended in 3 months  - Active T&S  - Transfuse for hgb <8 (active CAD)    PROPHYLACTIC  FLUIDS: none at this time  ELECTROLYTES: Mg<2, K<4  DIET: regular  GI PPX: protonix 40 IV BID  VTE PPX: SCD  CODE: FULL  DISPO: Remain on [facility]    At this time, patient's active issue requiring inpatient admission remains primarily Cardiac, in that he would benefit from ICD placement given recent Vtach arrest, and Cardiac Cath, as per history above. Patient's rectal ulcer is stable, as per GI evaluation, and is not at this moment indication for transfer to medical intensive care unit. Patient will most benefit to remain with cardiology service.   Please reconsult ICU as needed.     Ca Worley, PGY3  Discussed with ICU attending, Dr. Mcgee.

## 2023-03-17 NOTE — CONSULT NOTE ADULT - ASSESSMENT
61yo M PMHx HTN, HLD, DM,  ICM (s/p cath years ago, RCA 99%, never intervened on), HFrEF 25-30%, ESRD s/p failed kidney transplant (last HD 3/1 via Left Arm AVF; HD TTS), Right AKA presented to [facility] 2/27 for respiratory distress after a dialysis session and admitted to cardiac telemetry on 2/27 with SIRS criteria. Placed on vanc/zosyn. Pt was placed on BiPAP which failed, where he was then intubated on 3/1 and moved to the MICU. He went into cardiac arrest (RoSC achieved after 8mins). Went into Vtach arrest, was placed on amio and pressors, HR ellen into 30s and atropine given twice. Weaned off levophed and sedation and extubated 3/2. Pt developed coffee ground emesis, hemoccult positive, with Hgb dipping to 3.5; he then received 7units PRBCs, 1Unit FFP, 1 unit donor packed platelets. EGD and colonoscopy showing melanosis duodenii but no acute bleed. BP dropped again, transferred to ICU and started on levophed gtt. CTA Abdomen showing no active bleeding into GI lumen, possible focal proctitis; colonoscopy showing rectal ulcer. Episode of rectal bleeding 3/17. No tachycardia. Rectal exam with dark blood. Hb stable from prior. High risk patient.    Plan  -No acute surgical intervention / not surgical candidate at this time  -Maintain active T/S, 2x large bore IVs  -Trend CBC  -Transfusion goal 8 iso active CAD  -Endoscopic intervention per GI  -Care per primary  -Discussed with chief resident/attg  -Team 4c will continue to follow, [phone] 61yo M PMHx HTN, HLD, DM,  ICM (s/p cath years ago, RCA 99%, never intervened on), HFrEF 25-30%, ESRD s/p failed kidney transplant (last HD 3/1 via Left Arm AVF; HD TTS), Right AKA presented to [facility] 2/27 for respiratory distress after a dialysis session and admitted to cardiac telemetry on 2/27 with SIRS criteria. Placed on vanc/zosyn. Pt was placed on BiPAP which failed, where he was then intubated on 3/1 and moved to the MICU. He went into cardiac arrest (RoSC achieved after 8mins). Went into Vtach arrest, was placed on amio and pressors, HR ellen into 30s and atropine given twice. Weaned off levophed and sedation and extubated 3/2. Pt developed coffee ground emesis, hemoccult positive, with Hgb dipping to 3.5; he then received 7units PRBCs, 1Unit FFP, 1 unit donor packed platelets. EGD and colonoscopy showing melanosis duodenii but no acute bleed. BP dropped again, transferred to ICU and started on levophed gtt. CTA Abdomen showing no active bleeding into GI lumen, possible focal proctitis; colonoscopy showing rectal ulcer. Episode of rectal bleeding 3/17. No tachycardia. Rectal exam with dark blood. Hb stable from prior. High risk patient.    Plan  -No acute surgical intervention / not surgical candidate at this time  -Maintain active T/S, 2x large bore IVs  -Trend CBC  -Transfusion goal 8 iso active CAD  -Endoscopic intervention per GI  -Care per primary  -Discussed with chief resident/attg  -Team 4c will continue to follow, [phone] 61yo M PMHx HTN, HLD, DM,  ICM (s/p cath years ago, RCA 99%, never intervened on), HFrEF 25-30%, ESRD s/p failed kidney transplant (last HD 3/1 via Left Arm AVF; HD TTS), Right AKA presented to [facility] 2/27 for respiratory distress after a dialysis session and admitted to cardiac telemetry on 2/27 with SIRS criteria. Placed on vanc/zosyn. Pt was placed on BiPAP which failed, where he was then intubated on 3/1 and moved to the MICU. He went into cardiac arrest (RoSC achieved after 8mins). Went into Vtach arrest, was placed on amio and pressors, HR ellen into 30s and atropine given twice. Weaned off levophed and sedation and extubated 3/2. Pt developed coffee ground emesis, hemoccult positive, with Hgb dipping to 3.5; he then received 7units PRBCs, 1Unit FFP, 1 unit donor packed platelets. EGD and colonoscopy showing melanosis duodenii but no acute bleed. BP dropped again, transferred to ICU and started on levophed gtt. CTA Abdomen showing no active bleeding into GI lumen, possible focal proctitis; colonoscopy showing rectal ulcer. Episode of rectal bleeding 3/17. No tachycardia. Rectal exam with dark blood. Hb stable from prior. High risk patient.    Plan  -No acute surgical intervention / not surgical candidate at this time  -Maintain active T/S, 2x large bore IVs  -Trend CBC  -Transfusion goal 8 iso active CAD  -Endoscopic intervention per GI  -Care per primary  -Discussed with chief resident/attg  -Team 4c will continue to follow, [phone]

## 2023-03-17 NOTE — CONSULT NOTE ADULT - ATTENDING COMMENTS
Patient seen and d/w Dr. Thorne.  Flex sig with nonbleeding rectal ulcer with stool in the rectal vault. Given hx and presentation, suspect solitary rectal ulcer syndrome. Optimize bowel regimen and avoid anal digitation/manipulation.

## 2023-03-17 NOTE — CONSULT NOTE ADULT - SUBJECTIVE AND OBJECTIVE BOX
ICU CONSULT NOTE    Patient is a 62y old  Male w/ PMH of HTN, HLD, DM, CAD< HFrEF (25-30%), ESRD s/p failed kidney transplant who was transferred to [facility] Cardiology from [place] for ICD placement and Cardiac Cath after complicated admission c/p cardiac arrest and vtach arrest.   ICU Consulted for bright red blood per rectum.     HOSPITAL COURSE  [place]:   -presented to [facility] with shortness of breath, respiratory distress and admitted to cardiac tele unit  3/1 - Intubated --> MICU --> Cardiac arrest, achieved ROSC after 8 minuts. Went into Vtach --> Started on Amio and pressors. Bradycardic to 30s --> Received Atropine 2 x.   3/2 - Extubated and off pressors  Then developed coffee ground emesis, Hb dropped to 3.5 --> received 7 units PRBC, 1 u FFP, 1 u platelets. Hypotensive/shock --> transferred back to MICU for pressors/Levophed.   EGD & Cscope revealed no active bleed, but melanosis duodenii seen. CTA w/o bleeding.   Found to have rectal ulcer, and started on hydrocortisone suppository and mesalamine.  Hb stable in 11's.   3/16 transferred to [facility] Cardiology service with plan for ICD given history of vtach arrest and for cardiac catheterization.  ***  [facility]  3/17 - in AM noted to have streaks of blood in a loose watery bowel movement.     SUBJECTIVE:  ROS: patient reports feeling very cold, tired, and does not want to engage in much conversation. Reports that he has never had any history of GERD/PUD or GIB prior to the hospitalization as above. THis morning had one episode of small amount of blood in stool. States it was streaks/not much. No pain in rectum. Denies pain anywhere.   Reports no other complaints other than wanting to go back to sleep.     PHYSICAL EXAM   Vital Signs Last 24 Hrs  T(C): 37.2 (17 Mar 2023 09:29), Max: 37.2 (17 Mar 2023 09:29)  T(F): 99 (17 Mar 2023 09:29), Max: 99 (17 Mar 2023 09:29)  HR: 67 (17 Mar 2023 08:20) (64 - 67)  BP: 155/72 (17 Mar 2023 08:20) (155/72 - 174/77)  BP(mean): 106 (17 Mar 2023 02:03) (106 - 106)  RR: 18 (17 Mar 2023 08:20) (17 - 18)  SpO2: 96% (17 Mar 2023 08:20) (95% - 96%)    Parameters below as of 17 Mar 2023 08:20  Patient On (Oxygen Delivery Method): room air    PHYSICAL EXAM:  General: NAD, sleeping with eyes closed but easily arousable. Able to speak in full sentences and follow commands. Not fully participating in interview and exam.   HEENT: NC/AT; PERRL, clear conjunctiva  Neck: supple  Respiratory: CTA b/l  Cardiovascular: +S1/S2; RRR  Abdomen: TTP in all 4 quadrants. Soft, nondistended. No rebound or guarding.   Extremities: 2+ peripheral pulses b/l; no LE edema  Skin: normal color and turgor; no rash  Neurological: AAO x 3. no FND.  Psychiatry: appropriate mood/affect       LABS                        11.3   5.34  )-----------( 147      ( 17 Mar 2023 10:04 )             35.2     -17    134<L>  |  98  |  23  ----------------------------<  131<H>  4.3   |  28  |  3.89<H>    Ca    8.0<L>      17 Mar 2023 08:14  Mg     1.8         TPro  5.1<L>  /  Alb  2.8<L>  /  TBili  1.0  /  DBili  x   /  AST  15  /  ALT  11  /  AlkPhos  141<H>  17    PT/INR - ( 17 Mar 2023 08:14 )   PT: 13.1 sec;   INR: 1.10          PTT - ( 17 Mar 2023 08:14 )  PTT:30.3 sec  Lactate, Blood: 1.2 mmol/L (23 @ 06:45)       ICU CONSULT NOTE    Patient is a 62y old  Male w/ PMH of HTN, HLD, DM, CAD< HFrEF (25-30%), ESRD s/p failed kidney transplant who was transferred to [facility] Cardiology from [place] for ICD placement and Cardiac Cath after complicated admission c/p cardiac arrest and vtach arrest.   ICU Consulted for bright red blood per rectum.     HOSPITAL COURSE  [place]:   -presented to [facility] with shortness of breath, respiratory distress and admitted to cardiac tele unit  3/1 - Intubated --> MICU --> Cardiac arrest, achieved ROSC after 8 minuts. Went into Vtach --> Started on Amio and pressors. Bradycardic to 30s --> Received Atropine 2 x.   3/2 - Extubated and off pressors  Then developed coffee ground emesis, Hb dropped to 3.5 --> received 7 units PRBC, 1 u FFP, 1 u platelets. Hypotensive/shock --> transferred back to MICU for pressors/Levophed.   EGD & Cscope revealed no active bleed, but melanosis duodenii seen. CTA w/o bleeding.   Found to have rectal ulcer, and started on hydrocortisone suppository and mesalamine.  Hb stable in 11's.   3/16 transferred to [facility] Cardiology service with plan for ICD given history of vtach arrest and for cardiac catheterization.  ***  [facility]  3/17 - in AM noted to have streaks of blood in a loose watery bowel movement.     SUBJECTIVE:  ROS: patient reports feeling very cold, tired, and does not want to engage in much conversation. Reports that he has never had any history of GERD/PUD or GIB prior to the hospitalization as above. THis morning had one episode of small amount of blood in stool. States it was streaks/not much. No pain in rectum. Denies pain anywhere.   Reports no other complaints other than wanting to go back to sleep.     PHYSICAL EXAM   Vital Signs Last 24 Hrs  T(C): 37.2 (17 Mar 2023 09:29), Max: 37.2 (17 Mar 2023 09:29)  T(F): 99 (17 Mar 2023 09:29), Max: 99 (17 Mar 2023 09:29)  HR: 67 (17 Mar 2023 08:20) (64 - 67)  BP: 155/72 (17 Mar 2023 08:20) (155/72 - 174/77)  BP(mean): 106 (17 Mar 2023 02:03) (106 - 106)  RR: 18 (17 Mar 2023 08:20) (17 - 18)  SpO2: 96% (17 Mar 2023 08:20) (95% - 96%)    Parameters below as of 17 Mar 2023 08:20  Patient On (Oxygen Delivery Method): room air    PHYSICAL EXAM:  General: NAD, sleeping with eyes closed but easily arousable. Able to speak in full sentences and follow commands. Not fully participating in interview and exam.   HEENT: NC/AT; PERRL, clear conjunctiva  Neck: supple  Respiratory: CTA b/l  Cardiovascular: +S1/S2; RRR  Abdomen: TTP in all 4 quadrants. Soft, nondistended. No rebound or guarding.   Extremities: 2+ peripheral pulses b/l; no LE edema  Skin: normal color and turgor; no rash  Neurological: AAO x 3. no FND.  Psychiatry: appropriate mood/affect       LABS                        11.3   5.34  )-----------( 147      ( 17 Mar 2023 10:04 )             35.2     -17    134<L>  |  98  |  23  ----------------------------<  131<H>  4.3   |  28  |  3.89<H>    Ca    8.0<L>      17 Mar 2023 08:14  Mg     1.8         TPro  5.1<L>  /  Alb  2.8<L>  /  TBili  1.0  /  DBili  x   /  AST  15  /  ALT  11  /  AlkPhos  141<H>  17    PT/INR - ( 17 Mar 2023 08:14 )   PT: 13.1 sec;   INR: 1.10          PTT - ( 17 Mar 2023 08:14 )  PTT:30.3 sec  Lactate, Blood: 1.2 mmol/L (23 @ 06:45)       ICU CONSULT NOTE    Patient is a 62y old  Male w/ PMH of HTN, HLD, DM, CAD< HFrEF (25-30%), ESRD s/p failed kidney transplant who was transferred to [facility] Cardiology from [place] for ICD placement and Cardiac Cath after complicated admission c/p cardiac arrest and vtach arrest.   ICU Consulted for bright red blood per rectum.     HOSPITAL COURSE  [place]:   -presented to [facility] with shortness of breath, respiratory distress and admitted to cardiac tele unit  3/1 - Intubated --> MICU --> Cardiac arrest, achieved ROSC after 8 minuts. Went into Vtach --> Started on Amio and pressors. Bradycardic to 30s --> Received Atropine 2 x.   3/2 - Extubated and off pressors  Then developed coffee ground emesis, Hb dropped to 3.5 --> received 7 units PRBC, 1 u FFP, 1 u platelets. Hypotensive/shock --> transferred back to MICU for pressors/Levophed.   EGD & Cscope revealed no active bleed, but melanosis duodenii seen. CTA w/o bleeding.   Found to have rectal ulcer, and started on hydrocortisone suppository and mesalamine.  Hb stable in 11's.   3/16 transferred to [facility] Cardiology service with plan for ICD given history of vtach arrest and for cardiac catheterization.  ***  [facility]  3/17 - in AM noted to have streaks of blood in a loose watery bowel movement.     SUBJECTIVE:  ROS: patient reports feeling very cold, tired, and does not want to engage in much conversation. Reports that he has never had any history of GERD/PUD or GIB prior to the hospitalization as above. THis morning had one episode of small amount of blood in stool. States it was streaks/not much. No pain in rectum. Denies pain anywhere.   Reports no other complaints other than wanting to go back to sleep.     PHYSICAL EXAM   Vital Signs Last 24 Hrs  T(C): 37.2 (17 Mar 2023 09:29), Max: 37.2 (17 Mar 2023 09:29)  T(F): 99 (17 Mar 2023 09:29), Max: 99 (17 Mar 2023 09:29)  HR: 67 (17 Mar 2023 08:20) (64 - 67)  BP: 155/72 (17 Mar 2023 08:20) (155/72 - 174/77)  BP(mean): 106 (17 Mar 2023 02:03) (106 - 106)  RR: 18 (17 Mar 2023 08:20) (17 - 18)  SpO2: 96% (17 Mar 2023 08:20) (95% - 96%)    Parameters below as of 17 Mar 2023 08:20  Patient On (Oxygen Delivery Method): room air    PHYSICAL EXAM:  General: NAD, sleeping with eyes closed but easily arousable. Able to speak in full sentences and follow commands. Not fully participating in interview and exam.   HEENT: NC/AT; PERRL, clear conjunctiva  Neck: supple  Respiratory: CTA b/l  Cardiovascular: +S1/S2; RRR  Abdomen: TTP in all 4 quadrants. Soft, nondistended. No rebound or guarding.   Extremities: 2+ peripheral pulses b/l; no LE edema  Skin: normal color and turgor; no rash  Neurological: AAO x 3. no FND.  Psychiatry: appropriate mood/affect       LABS                        11.3   5.34  )-----------( 147      ( 17 Mar 2023 10:04 )             35.2     -17    134<L>  |  98  |  23  ----------------------------<  131<H>  4.3   |  28  |  3.89<H>    Ca    8.0<L>      17 Mar 2023 08:14  Mg     1.8         TPro  5.1<L>  /  Alb  2.8<L>  /  TBili  1.0  /  DBili  x   /  AST  15  /  ALT  11  /  AlkPhos  141<H>  17    PT/INR - ( 17 Mar 2023 08:14 )   PT: 13.1 sec;   INR: 1.10          PTT - ( 17 Mar 2023 08:14 )  PTT:30.3 sec  Lactate, Blood: 1.2 mmol/L (23 @ 06:45)

## 2023-03-17 NOTE — H&P ADULT - PROBLEM SELECTOR PLAN 8
no meds per [place], f/u lipid panel in AM    F: Oral intake  E: Replete electrolytes as needed for K<4 and Mg<2  N: NPO    DVT PPX: held for cath/ICD placement  Dispo: pending ICD/cardiac cath

## 2023-03-17 NOTE — CONSULT NOTE ADULT - SUBJECTIVE AND OBJECTIVE BOX
61yo M PMHx HTN, HLD, DM,  ICM (s/p cath years ago, RCA 99%, never intervened on), HFrEF 25-30%, ESRD s/p failed kidney transplant (last HD 3/1 via Left Arm AVF; HD TTS), Right AKA presented to [facility] 2/27 for respiratory distress after a dialysis session and admitted to cardiac telemetry on 2/27 with SIRS criteria. Placed on vanc/zosyn.     Pt was placed on BiPAP which failed, where he was then intubated on 3/1 and moved to the MICU. He went into cardiac arrest (RoSC achieved after 8mins). Went into Vtach arrest, was placed on amio and pressors, HR ellen into 30s and atropine given twice. Weaned off levophed and sedation and extubated 3/2.     Pt developed coffee ground emesis, hemoccult positive, with Hgb dipping to 3.5; he then received 7units PRBCs, 1Unit FFP, 1 unit donor packed platelets.     EGD and colonoscopy showing melanosis duodenii but no acute bleed. BP dropped again, transferred to ICU and started on levophed gtt. CTA Abdomen showing no active bleeding into GI lumen, possible focal proctitis;     Colonoscopy performed showing rectal ulcer. Started on hydrocortisone suppository and mesalamine. Hgb now stable in 11s (per handoff), transferred to [facility] for ICD placement 2/2 Vtach and cardiac cath. (17 Mar 2023 03:32)    This AM, pt developed hematochezia and GI consulted BP stable with SBP 160s.      At bedside, pt describes numerous prior GIB. Doesnt remember why. Denies complaints. No abd pain, nausea, vomiting      Allergies    No Known Allergies    Intolerances      Home Medications:  amLODIPine 10 mg oral tablet: 1 tab(s) orally once a day (17 Mar 2023 04:18)  atorvastatin 40 mg oral tablet: 1 tab(s) orally once a day (17 Mar 2023 04:18)  calcium acetate 667 mg oral tablet: 1 tab(s) orally 3 times a day (17 Mar 2023 04:18)  Colace 50 mg oral capsule: 1 cap(s) orally 2 times a day (17 Mar 2023 04:18)  ferrous sulfate 324 mg (65 mg elemental iron) oral delayed release tablet: 1 tab(s) orally once a day (17 Mar 2023 04:18)  Flomax 0.4 mg oral capsule: 1 cap(s) orally once a day (17 Mar 2023 04:18)  hydrALAZINE 50 mg oral tablet: 1 tab(s) orally every 8 hours (17 Mar 2023 04:18)  Isordil: 20 milligram(s) sublingual 3 times a day (17 Mar 2023 04:18)  losartan 100 mg oral tablet: 1 tab(s) orally once a day (17 Mar 2023 04:18)  predniSONE 5 mg oral tablet: 1 tab(s) orally once a day (17 Mar 2023 04:18)  Protonix 40 mg oral delayed release tablet: 1 tab(s) orally once a day (17 Mar 2023 04:18)  Renvela 800 mg oral tablet: 1 tab(s) orally 3 times a day (with meals) (17 Mar 2023 04:18)  tacrolimus 1 mg oral capsule: 2 cap(s) orally 2 times a day (17 Mar 2023 04:18)    MEDICATIONS:  MEDICATIONS  (STANDING):  amLODIPine   Tablet 10 milliGRAM(s) Oral daily  atorvastatin 40 milliGRAM(s) Oral at bedtime  calcium acetate 667 milliGRAM(s) Oral three times a day with meals  ferrous    sulfate 325 milliGRAM(s) Oral daily  hydrALAZINE 50 milliGRAM(s) Oral every 8 hours  isosorbide   dinitrate Tablet (ISORDIL) 20 milliGRAM(s) Oral three times a day  losartan 100 milliGRAM(s) Oral daily  pantoprazole    Tablet 40 milliGRAM(s) Oral before breakfast  polyethylene glycol 3350 17 Gram(s) Oral daily  sevelamer carbonate 800 milliGRAM(s) Oral three times a day with meals  tacrolimus 2 milliGRAM(s) Oral once  tamsulosin 0.4 milliGRAM(s) Oral at bedtime    MEDICATIONS  (PRN):    PAST MEDICAL & SURGICAL HISTORY:  HTN (hypertension)      HLD (hyperlipidemia)      DM (diabetes mellitus)      GERD (gastroesophageal reflux disease)      ESRD on dialysis      NSTEMI (non-ST elevation myocardial infarction)      CAD (coronary artery disease)      REVIEW OF SYSTEMS:  All other 10 review of systems is negative unless indicated above.    Vital Signs Last 24 Hrs  T(C): 37.1 (17 Mar 2023 05:14), Max: 37.1 (17 Mar 2023 05:14)  T(F): 98.8 (17 Mar 2023 05:14), Max: 98.8 (17 Mar 2023 05:14)  HR: 64 (17 Mar 2023 02:03) (64 - 64)  BP: 171/77 (17 Mar 2023 04:15) (160/74 - 171/77)  BP(mean): 106 (17 Mar 2023 02:03) (106 - 106)  RR: 17 (17 Mar 2023 04:15) (17 - 18)  SpO2: 96% (17 Mar 2023 02:03) (96% - 96%)    Parameters below as of 17 Mar 2023 02:03  Patient On (Oxygen Delivery Method): room air          PHYSICAL EXAM:    General: No acute distress  Eyes: Anicteric sclerae, moist conjunctivae  HENT: Moist mucous membranes  Neck: Trachea midline, supple  Lungs: Normal respiratory effort and no intercostal retractions  Cardiovascular: RRR  Abdomen: Soft, non-tender non-distended; No rebound or guarding  Rectal exam: Scant bright red blood on gloved finger. No perianal disease.  Extremities: R AKA  Neurological: Alert and oriented x3  Skin: Warm and dry. No obvious rash    LABS:                        10.8   5.13  )-----------( 155      ( 17 Mar 2023 06:45 )             33.1     03-17    134<L>  |  98  |  23  ----------------------------<  138<H>  4.2   |  29  |  3.83<H>    Ca    7.9<L>      17 Mar 2023 06:45    TPro  5.1<L>  /  Alb  2.9<L>  /  TBili  1.0  /  DBili  x   /  AST  15  /  ALT  10  /  AlkPhos  141<H>  03-17        PT/INR - ( 17 Mar 2023 06:45 )   PT: 13.2 sec;   INR: 1.11          PTT - ( 17 Mar 2023 06:45 )  PTT:28.8 sec    RADIOLOGY & ADDITIONAL STUDIES:    61yo M PMHx HTN, HLD, DM,  ICM (s/p cath years ago, RCA 99%, never intervened on), HFrEF 25-30%, ESRD s/p failed kidney transplant (last HD 3/1 via Left Arm AVF; HD TTS), Right AKA presented to [facility] 2/27 for respiratory distress after a dialysis session and admitted to cardiac telemetry on 2/27 with SIRS criteria. Placed on vanc/zosyn.     Pt was placed on BiPAP which failed, where he was then intubated on 3/1 and moved to the MICU. He went into cardiac arrest (RoSC achieved after 8mins). Went into Vtach arrest, was placed on amio and pressors, HR ellen into 30s and atropine given twice. Weaned off levophed and sedation and extubated 3/2.     Pt developed coffee ground emesis, hemoccult positive, with Hgb dipping to 3.5; he then received 7units PRBCs, 1Unit FFP, 1 unit donor packed platelets.     EGD and colonoscopy showing melanosis duodenii but no acute bleed. BP dropped again, transferred to ICU and started on levophed gtt. CTA Abdomen showing no active bleeding into GI lumen, possible focal proctitis;     Colonoscopy performed showing rectal ulcer. Started on hydrocortisone suppository and mesalamine. Hgb now stable in 11s (per handoff), transferred to [facility] for ICD placement 2/2 Vtach and cardiac cath. (17 Mar 2023 03:32)    This AM, pt developed hematochezia and GI consulted BP stable with SBP 160s.      At bedside, pt describes numerous prior GIB. Doesnt remember why. Denies complaints. No abd pain, nausea, vomiting      Allergies    No Known Allergies    Intolerances      Home Medications:  amLODIPine 10 mg oral tablet: 1 tab(s) orally once a day (17 Mar 2023 04:18)  atorvastatin 40 mg oral tablet: 1 tab(s) orally once a day (17 Mar 2023 04:18)  calcium acetate 667 mg oral tablet: 1 tab(s) orally 3 times a day (17 Mar 2023 04:18)  Colace 50 mg oral capsule: 1 cap(s) orally 2 times a day (17 Mar 2023 04:18)  ferrous sulfate 324 mg (65 mg elemental iron) oral delayed release tablet: 1 tab(s) orally once a day (17 Mar 2023 04:18)  Flomax 0.4 mg oral capsule: 1 cap(s) orally once a day (17 Mar 2023 04:18)  hydrALAZINE 50 mg oral tablet: 1 tab(s) orally every 8 hours (17 Mar 2023 04:18)  Isordil: 20 milligram(s) sublingual 3 times a day (17 Mar 2023 04:18)  losartan 100 mg oral tablet: 1 tab(s) orally once a day (17 Mar 2023 04:18)  predniSONE 5 mg oral tablet: 1 tab(s) orally once a day (17 Mar 2023 04:18)  Protonix 40 mg oral delayed release tablet: 1 tab(s) orally once a day (17 Mar 2023 04:18)  Renvela 800 mg oral tablet: 1 tab(s) orally 3 times a day (with meals) (17 Mar 2023 04:18)  tacrolimus 1 mg oral capsule: 2 cap(s) orally 2 times a day (17 Mar 2023 04:18)    MEDICATIONS:  MEDICATIONS  (STANDING):  amLODIPine   Tablet 10 milliGRAM(s) Oral daily  atorvastatin 40 milliGRAM(s) Oral at bedtime  calcium acetate 667 milliGRAM(s) Oral three times a day with meals  ferrous    sulfate 325 milliGRAM(s) Oral daily  hydrALAZINE 50 milliGRAM(s) Oral every 8 hours  isosorbide   dinitrate Tablet (ISORDIL) 20 milliGRAM(s) Oral three times a day  losartan 100 milliGRAM(s) Oral daily  pantoprazole    Tablet 40 milliGRAM(s) Oral before breakfast  polyethylene glycol 3350 17 Gram(s) Oral daily  sevelamer carbonate 800 milliGRAM(s) Oral three times a day with meals  tacrolimus 2 milliGRAM(s) Oral once  tamsulosin 0.4 milliGRAM(s) Oral at bedtime    MEDICATIONS  (PRN):    PAST MEDICAL & SURGICAL HISTORY:  HTN (hypertension)      HLD (hyperlipidemia)      DM (diabetes mellitus)      GERD (gastroesophageal reflux disease)      ESRD on dialysis      NSTEMI (non-ST elevation myocardial infarction)      CAD (coronary artery disease)      REVIEW OF SYSTEMS:  All other 10 review of systems is negative unless indicated above.    Vital Signs Last 24 Hrs  T(C): 37.1 (17 Mar 2023 05:14), Max: 37.1 (17 Mar 2023 05:14)  T(F): 98.8 (17 Mar 2023 05:14), Max: 98.8 (17 Mar 2023 05:14)  HR: 64 (17 Mar 2023 02:03) (64 - 64)  BP: 171/77 (17 Mar 2023 04:15) (160/74 - 171/77)  BP(mean): 106 (17 Mar 2023 02:03) (106 - 106)  RR: 17 (17 Mar 2023 04:15) (17 - 18)  SpO2: 96% (17 Mar 2023 02:03) (96% - 96%)    Parameters below as of 17 Mar 2023 02:03  Patient On (Oxygen Delivery Method): room air          PHYSICAL EXAM:    General: No acute distress  Eyes: Anicteric sclerae, moist conjunctivae  HENT: Moist mucous membranes  Neck: Trachea midline, supple  Lungs: Normal respiratory effort and no intercostal retractions  Cardiovascular: RRR  Abdomen: Soft, non-tender non-distended; No rebound or guarding  Rectal exam: Scant bright red blood on gloved finger. No perianal disease.  Extremities: R AKA  Neurological: Alert and oriented x3  Skin: Warm and dry. No obvious rash    LABS:                        10.8   5.13  )-----------( 155      ( 17 Mar 2023 06:45 )             33.1     03-17    134<L>  |  98  |  23  ----------------------------<  138<H>  4.2   |  29  |  3.83<H>    Ca    7.9<L>      17 Mar 2023 06:45    TPro  5.1<L>  /  Alb  2.9<L>  /  TBili  1.0  /  DBili  x   /  AST  15  /  ALT  10  /  AlkPhos  141<H>  03-17        PT/INR - ( 17 Mar 2023 06:45 )   PT: 13.2 sec;   INR: 1.11          PTT - ( 17 Mar 2023 06:45 )  PTT:28.8 sec    RADIOLOGY & ADDITIONAL STUDIES:    61yo M PMHx HTN, HLD, DM,  ICM (s/p cath years ago, RCA 99%, never intervened on), HFrEF 25-30%, ESRD s/p failed kidney transplant (last HD 3/1 via Left Arm AVF; HD TTS), Right AKA presented to [facility] 2/27 for respiratory distress after a dialysis session and admitted to cardiac telemetry on 2/27 with SIRS criteria. Placed on vanc/zosyn.     Pt was placed on BiPAP which failed, where he was then intubated on 3/1 and moved to the MICU. He went into cardiac arrest (RoSC achieved after 8mins). Went into Vtach arrest, was placed on amio and pressors, HR ellen into 30s and atropine given twice. Weaned off levophed and sedation and extubated 3/2.     Pt developed coffee ground emesis, hemoccult positive, with Hgb dipping to 3.5; he then received 7units PRBCs, 1Unit FFP, 1 unit donor packed platelets.     EGD and colonoscopy showing melanosis duodenii but no acute bleed. BP dropped again, transferred to ICU and started on levophed gtt. CTA Abdomen showing no active bleeding into GI lumen, possible focal proctitis;     Colonoscopy performed showing rectal ulcer. Started on hydrocortisone suppository and mesalamine. Hgb now stable in 11s (per handoff), transferred to [facility] for ICD placement 2/2 Vtach and cardiac cath. (17 Mar 2023 03:32)    This AM, pt developed hematochezia and GI consulted BP stable with SBP 160s.      At bedside, pt describes numerous prior GIB. Doesnt remember why. Denies complaints. No abd pain, nausea, vomiting      Allergies    No Known Allergies    Intolerances      Home Medications:  amLODIPine 10 mg oral tablet: 1 tab(s) orally once a day (17 Mar 2023 04:18)  atorvastatin 40 mg oral tablet: 1 tab(s) orally once a day (17 Mar 2023 04:18)  calcium acetate 667 mg oral tablet: 1 tab(s) orally 3 times a day (17 Mar 2023 04:18)  Colace 50 mg oral capsule: 1 cap(s) orally 2 times a day (17 Mar 2023 04:18)  ferrous sulfate 324 mg (65 mg elemental iron) oral delayed release tablet: 1 tab(s) orally once a day (17 Mar 2023 04:18)  Flomax 0.4 mg oral capsule: 1 cap(s) orally once a day (17 Mar 2023 04:18)  hydrALAZINE 50 mg oral tablet: 1 tab(s) orally every 8 hours (17 Mar 2023 04:18)  Isordil: 20 milligram(s) sublingual 3 times a day (17 Mar 2023 04:18)  losartan 100 mg oral tablet: 1 tab(s) orally once a day (17 Mar 2023 04:18)  predniSONE 5 mg oral tablet: 1 tab(s) orally once a day (17 Mar 2023 04:18)  Protonix 40 mg oral delayed release tablet: 1 tab(s) orally once a day (17 Mar 2023 04:18)  Renvela 800 mg oral tablet: 1 tab(s) orally 3 times a day (with meals) (17 Mar 2023 04:18)  tacrolimus 1 mg oral capsule: 2 cap(s) orally 2 times a day (17 Mar 2023 04:18)    MEDICATIONS:  MEDICATIONS  (STANDING):  amLODIPine   Tablet 10 milliGRAM(s) Oral daily  atorvastatin 40 milliGRAM(s) Oral at bedtime  calcium acetate 667 milliGRAM(s) Oral three times a day with meals  ferrous    sulfate 325 milliGRAM(s) Oral daily  hydrALAZINE 50 milliGRAM(s) Oral every 8 hours  isosorbide   dinitrate Tablet (ISORDIL) 20 milliGRAM(s) Oral three times a day  losartan 100 milliGRAM(s) Oral daily  pantoprazole    Tablet 40 milliGRAM(s) Oral before breakfast  polyethylene glycol 3350 17 Gram(s) Oral daily  sevelamer carbonate 800 milliGRAM(s) Oral three times a day with meals  tacrolimus 2 milliGRAM(s) Oral once  tamsulosin 0.4 milliGRAM(s) Oral at bedtime    MEDICATIONS  (PRN):    PAST MEDICAL & SURGICAL HISTORY:  HTN (hypertension)      HLD (hyperlipidemia)      DM (diabetes mellitus)      GERD (gastroesophageal reflux disease)      ESRD on dialysis      NSTEMI (non-ST elevation myocardial infarction)      CAD (coronary artery disease)      REVIEW OF SYSTEMS:  All other 10 review of systems is negative unless indicated above.    Vital Signs Last 24 Hrs  T(C): 37.1 (17 Mar 2023 05:14), Max: 37.1 (17 Mar 2023 05:14)  T(F): 98.8 (17 Mar 2023 05:14), Max: 98.8 (17 Mar 2023 05:14)  HR: 64 (17 Mar 2023 02:03) (64 - 64)  BP: 171/77 (17 Mar 2023 04:15) (160/74 - 171/77)  BP(mean): 106 (17 Mar 2023 02:03) (106 - 106)  RR: 17 (17 Mar 2023 04:15) (17 - 18)  SpO2: 96% (17 Mar 2023 02:03) (96% - 96%)    Parameters below as of 17 Mar 2023 02:03  Patient On (Oxygen Delivery Method): room air          PHYSICAL EXAM:    General: No acute distress  Eyes: Anicteric sclerae, moist conjunctivae  HENT: Moist mucous membranes  Neck: Trachea midline, supple  Lungs: Normal respiratory effort and no intercostal retractions  Cardiovascular: RRR  Abdomen: Soft, non-tender non-distended; No rebound or guarding  Rectal exam: Scant bright red blood on gloved finger. No perianal disease.  Extremities: R AKA  Neurological: Alert and oriented x3  Skin: Warm and dry. No obvious rash    LABS:                        10.8   5.13  )-----------( 155      ( 17 Mar 2023 06:45 )             33.1     03-17    134<L>  |  98  |  23  ----------------------------<  138<H>  4.2   |  29  |  3.83<H>    Ca    7.9<L>      17 Mar 2023 06:45    TPro  5.1<L>  /  Alb  2.9<L>  /  TBili  1.0  /  DBili  x   /  AST  15  /  ALT  10  /  AlkPhos  141<H>  03-17        PT/INR - ( 17 Mar 2023 06:45 )   PT: 13.2 sec;   INR: 1.11          PTT - ( 17 Mar 2023 06:45 )  PTT:28.8 sec    RADIOLOGY & ADDITIONAL STUDIES:

## 2023-03-17 NOTE — DIETITIAN INITIAL EVALUATION ADULT - OTHER INFO
63yo M PMHx HTN, HLD, DM, ICM (s/p cath years ago, RCA 99%, never intervened on), HFrEF 25-30%, ESRD s/p failed kidney transplant (last HD 3/1 via Left Arm AVF; HD TTS), Right AKA presented to [facility] 2/27 for respiratory distress after a dialysis session and admitted to cardiac telemetry 2/27 with SIRS criteria. Pt was placed on BiPAP which failed, where he was then intubated 3/1 and moved to the MICU. He went into cardiac arrest (RoSC achieved after 8mins). Went into Vtach arrest, was placed on amio and pressors, HR ellen into 30s and atropine given twice. Weaned off levophed and sedation and extubated 3/2. Pt developed coffee ground emesis, hemoccult positive, with Hgb dipping to 3.5; he then received 7units PRBCs, 1Unit FFP, 1 unit donor packed platelets. EGD and colonoscopy showing melanosis duodenii but no acute bleed. BP dropped again, transferred to ICU and started on levophed gtt. CTA Abdomen showing no active bleeding into GI lumen, possible focal proctitis; colonoscopy showing rectal ulcer. Started on hydrocortisone suppository and mesalamine. Hgb now stable in 11s (per handoff), now transferred for ICD placement 2/2 Vtach and cardiac cath. Hospital course c/b BRBPR 3/17 AM; type and screen sent, surgery, GI, MICU consulted. Flex sig performed at bedside with svc attending 3/17.     Pt on 5UR, seen soundly sleeping this AM- unarousable to name thus spoke with RN, also attended IDR. RN confirms that pt is NPO this AM. Reports enema given this AM, resulted in +Bleeding. Ordered for Miralax and Protonix. Labs Reviewed: Na 134, , , ALK PHOS 141, A1c 5.9%, K/Mg WDL, No Phos at this time. No pain. Alex 15. 1+L Ankle edema. Alex 16. Per skin flow sheet "stage 2 right buttock noted on admit, ecchymosis abdomen, skin pigment redness right upper shoulder, scapula, dry skin to left foot, dry scab top of left foot."   Please see below for nutritions recommendations.  63yo M PMHx HTN, HLD, DM, ICM (s/p cath years ago, RCA 99%, never intervened on), HFrEF 25-30%, ESRD s/p failed kidney transplant (last HD 3/1 via Left Arm AVF; HD TTS), Right AKA presented to [facility] 2/27 for respiratory distress after a dialysis session and admitted to cardiac telemetry 2/27 with SIRS criteria. Pt was placed on BiPAP which failed, where he was then intubated 3/1 and moved to the MICU. He went into cardiac arrest (RoSC achieved after 8mins). Went into Vtach arrest, was placed on amio and pressors, HR ellen into 30s and atropine given twice. Weaned off levophed and sedation and extubated 3/2. Pt developed coffee ground emesis, hemoccult positive, with Hgb dipping to 3.5; he then received 7units PRBCs, 1Unit FFP, 1 unit donor packed platelets. EGD and colonoscopy showing melanosis duodenii but no acute bleed. BP dropped again, transferred to ICU and started on levophed gtt. CTA Abdomen showing no active bleeding into GI lumen, possible focal proctitis; colonoscopy showing rectal ulcer. Started on hydrocortisone suppository and mesalamine. Hgb now stable in 11s (per handoff), now transferred for ICD placement 2/2 Vtach and cardiac cath. Hospital course c/b BRBPR 3/17 AM; type and screen sent, surgery, GI, MICU consulted. Flex sig performed at bedside with svc attending 3/17.     Pt on 5UR, seen soundly sleeping this AM- unarousable to name thus spoke with RN, also attended IDR. RN confirms that pt is NPO this AM. Reports enema given this AM, resulted in +Bleeding. Ordered for Miralax and Protonix. Labs Reviewed: Na 134, , , ALK PHOS 141, A1c 5.9%, K/Mg WDL, No Phos at this time. No pain. Alex 15. 1+L Ankle edema. Alex 16. Per skin flow sheet "stage 2 right buttock noted on admit, ecchymosis abdomen, skin pigment redness right upper shoulder, scapula, dry skin to left foot, dry scab top of left foot."   Please see below for nutritions recommendations.  63yo M PMHx HTN, HLD, DM, ICM (s/p cath years ago, RCA 99%, never intervened on), HFrEF 25-30%, ESRD s/p failed kidney transplant (last HD 3/1 via Left Arm AVF; HD TTS), Right AKA presented to [facility] 2/27 for respiratory distress after a dialysis session and admitted to cardiac telemetry 2/27 with SIRS criteria. Pt was placed on BiPAP which failed, where he was then intubated 3/1 and moved to the MICU. He went into cardiac arrest (RoSC achieved after 8mins). Went into Vtach arrest, was placed on amio and pressors, HR ellen into 30s and atropine given twice. Weaned off levophed and sedation and extubated 3/2. Pt developed coffee ground emesis, hemoccult positive, with Hgb dipping to 3.5; he then received 7units PRBCs, 1Unit FFP, 1 unit donor packed platelets. EGD and colonoscopy showing melanosis duodenii but no acute bleed. BP dropped again, transferred to ICU and started on levophed gtt. CTA Abdomen showing no active bleeding into GI lumen, possible focal proctitis; colonoscopy showing rectal ulcer. Started on hydrocortisone suppository and mesalamine. Hgb now stable in 11s (per handoff), now transferred for ICD placement 2/2 Vtach and cardiac cath. Hospital course c/b BRBPR 3/17 AM; type and screen sent, surgery, GI, MICU consulted. Flex sig performed at bedside with svc attending 3/17.     Pt on 5UR, seen soundly sleeping this AM- unarousable to name thus spoke with RN, also attended IDR. RN confirms that pt is NPO this AM. Reports enema given this AM, resulted in +Bleeding. Ordered for Miralax and Protonix. Labs Reviewed: Na 134, , , ALK PHOS 141, A1c 5.9%, K/Mg WDL, No Phos at this time. No pain. Alex 15. 1+L Ankle edema. Alex 16. Per skin flow sheet "stage 2 right buttock noted on admit, ecchymosis abdomen, skin pigment redness right upper shoulder, scapula, dry skin to left foot, dry scab top of left foot."   Please see below for nutritions recommendations.

## 2023-03-17 NOTE — CONSULT NOTE ADULT - ASSESSMENT
61 y/o M PMHx HTN, HLD, DM, CAD (s/p cath years ago, RCA 99%, never intervened on), ESRD s/p failed kidney transplant, on HD via Left Arm AVF, and Right AKA presented to [facility] 2/27/23 for respiratory distress after a dialysis session. Intubated in MICU there, had reportedly VT cardiac arrest and ROSC was achieved after 8 mins. Unclear if he made troponin (no such info included in transfer paper).  Was put on Amio gtt, noted ellen when on amio gtt requiring atropine. Echo there showed LVEF 25-30%.  Extubated 3/2 and weaned off levophed. Also has rectal ulcer with BRBPR at [facility] requiring multiple PRBC transfusions. Reportedly stable and was then transferred to [facility] this morning for further cardiac workup.  However, had another hematochezia episode this morning and now pending flex sigmoidoscopy today.   - Telemetry so far at [facility] has been arrhythmia free.  NO bradycardia here. Questionable "bradycardia" at [facility] in setting of Amiodarone use?   - Once he is stable from GI perspective, would give beta-blocker a trial.  Pending cath once he is cleared from GI perspective.    - From EP perspective, we would wait for the ischemic workup to be done.  61 y/o M PMHx HTN, HLD, DM, CAD (s/p cath years ago, RCA 99%, never intervened on), ESRD s/p failed kidney transplant, on HD via Left Arm AVF, and Right AKA presented to [facility] 2/27/23 for respiratory distress after a dialysis session. Intubated in MICU there, had reportedly VT cardiac arrest and ROSC was achieved after 8 mins. Unclear if he made troponin (no such info included in transfer paper).  Was put on Amio gtt, noted ellen when on amio gtt requiring atropine. Echo there showed LVEF 25-30%.  Extubated 3/2 and weaned off levophed. Also has rectal ulcer with BRBPR at [facility] requiring multiple PRBC transfusions. Reportedly stable and was then transferred to [facility] this morning for further cardiac workup.  However, had another hematochezia episode this morning and now pending flex sigmoidoscopy today.   - Telemetry so far at [facility] has been arrhythmia free.  NO bradycardia here. Questionable "bradycardia" at [facility] in setting of Amiodarone use?   - Once he is stable from GI perspective, would give beta-blocker a trial.  Pending cath once he is cleared from GI perspective.    - From EP perspective, we would wait for the ischemic workup to be done.  61 y/o M PMHx HTN, HLD, DM, CAD (s/p cath years ago, RCA 99%, never intervened on), ESRD s/p failed kidney transplant, on HD via Left Arm AVF, and Right AKA presented to [facility] 2/27/23 for respiratory distress after a dialysis session. Intubated in MICU there, had reportedly VT cardiac arrest and ROSC was achieved after 8 mins. Unclear if he made troponin (no such info included in transfer paper).  Was put on Amio gtt, noted ellen when on amio gtt requiring atropine. Echo there showed LVEF 25-30%.  Extubated 3/2 and weaned off levophed. Also has rectal ulcer with BRBPR at [facility] requiring multiple PRBC transfusions. Reportedly stable and was then transferred to [facility] this morning for further cardiac workup.  However, had another hematochezia episode this morning and now pending flex sigmoidoscopy today.   - Telemetry so far at [facility] has been arrhythmia free.  NO bradycardia here. Questionable "bradycardia" at [facility] in setting of Amiodarone use?   - Once he is stable from GI perspective, would give beta-blocker a trial.  Pending cath once he is cleared from GI perspective.    - From EP perspective, we would wait for the ischemic workup to be done.

## 2023-03-17 NOTE — DIETITIAN INITIAL EVALUATION ADULT - ADD RECOMMEND
1. Diet to be advanced in 24-48hrs as medically feasible. Prior to adv: Bedside dys screen. Should pt pass, Begin with clears and adv as tolerated to DASH/Renal Diet.  - Monitor %PO intake and need for oral nutrition supplements when diet resumed.  - Monitor diet tolerance.  2. Monitor Skin, Wt. Pain/GI per team.  3. Neprovite daily.  4. RD to remain available for additional nutrition interventions as needed.    * High Nutrition Risk.

## 2023-03-17 NOTE — H&P ADULT - NSICDXPASTMEDICALHX_GEN_ALL_CORE_FT
PAST MEDICAL HISTORY:  CAD (coronary artery disease)     DM (diabetes mellitus)     ESRD on dialysis     GERD (gastroesophageal reflux disease)     HLD (hyperlipidemia)     HTN (hypertension)     NSTEMI (non-ST elevation myocardial infarction)

## 2023-03-17 NOTE — CONSULT NOTE ADULT - SUBJECTIVE AND OBJECTIVE BOX
CONSULT NOTE - INTERNAL MEDICINE  *INCOMPLETE UNTIL DISCUSSED WITH MEDICINE ATTENDING*    ROB ARELLANOENIO  [id number]  62y  Male      Patient is a 62y old  Male who presents with a chief complaint of ICD placement (17 Mar 2023 07:59)        PAST MEDICAL/SURGICAL HISTORY  PAST MEDICAL & SURGICAL HISTORY:  HTN (hypertension)      HLD (hyperlipidemia)      DM (diabetes mellitus)      GERD (gastroesophageal reflux disease)      ESRD on dialysis      NSTEMI (non-ST elevation myocardial infarction)      CAD (coronary artery disease)          REVIEW OF SYSTEMS:  CONSTITUTIONAL: No fever, weight loss, or fatigue  EYES: No eye pain, visual disturbances, or discharge  ENMT:  No difficulty hearing, tinnitus, vertigo; No sinus or throat pain  NECK: No pain or stiffness  BREASTS: No pain, masses, or nipple discharge  RESPIRATORY: No cough, wheezing, chills or hemoptysis; No shortness of breath  CARDIOVASCULAR: No chest pain, palpitations, dizziness, or leg swelling  GASTROINTESTINAL: No abdominal or epigastric pain. No nausea, vomiting, or hematemesis; No diarrhea or constipation. No melena or hematochezia.  GENITOURINARY: No dysuria, frequency, hematuria, or incontinence  NEUROLOGICAL: No headaches, memory loss, loss of strength, numbness, or tremors  SKIN: No itching, burning, rashes, or lesions   LYMPH NODES: No enlarged glands  ENDOCRINE: No heat or cold intolerance; No hair loss  MUSCULOSKELETAL: No joint pain or swelling; No muscle, back, or extremity pain  PSYCHIATRIC: No depression, anxiety, mood swings, or difficulty sleeping  HEME/LYMPH: No easy bruising, or bleeding gums  ALLERY AND IMMUNOLOGIC: No hives or eczema    T(C): 37.2 (03-17-23 @ 09:29), Max: 37.2 (03-17-23 @ 09:29)  HR: 67 (03-17-23 @ 08:20) (64 - 67)  BP: 155/72 (03-17-23 @ 08:20) (155/72 - 174/77)  RR: 18 (03-17-23 @ 08:20) (17 - 18)  SpO2: 96% (03-17-23 @ 08:20) (95% - 96%)  Wt(kg): --Vital Signs Last 24 Hrs  T(C): 37.2 (17 Mar 2023 09:29), Max: 37.2 (17 Mar 2023 09:29)  T(F): 99 (17 Mar 2023 09:29), Max: 99 (17 Mar 2023 09:29)  HR: 67 (17 Mar 2023 08:20) (64 - 67)  BP: 155/72 (17 Mar 2023 08:20) (155/72 - 174/77)  BP(mean): 106 (17 Mar 2023 02:03) (106 - 106)  RR: 18 (17 Mar 2023 08:20) (17 - 18)  SpO2: 96% (17 Mar 2023 08:20) (95% - 96%)    Parameters below as of 17 Mar 2023 08:20  Patient On (Oxygen Delivery Method): room air        PHYSICAL EXAM:  GENERAL: NAD, well-groomed, well-developed  HEAD:  Atraumatic, Normocephalic  EYES: EOMI, PERRLA, conjunctiva and sclera clear  ENMT: No tonsillar erythema, exudates, or enlargement; Moist mucous membranes, Good dentition, No lesions  NECK: Supple, No JVD, Normal thyroid  NERVOUS SYSTEM:  Alert & Oriented X3, Good concentration; Motor Strength 5/5 B/L upper and lower extremities; DTRs 2+ intact and symmetric  CHEST/LUNG: Clear to percussion bilaterally; No rales, rhonchi, wheezing, or rubs  HEART: Regular rate and rhythm; No murmurs, rubs, or gallops  ABDOMEN: Soft, Nontender, Nondistended; Bowel sounds present  EXTREMITIES:  2+ Peripheral Pulses, No clubbing, cyanosis, or edema  LYMPH: No lymphadenopathy noted  SKIN: No rashes or lesions    Consultant(s) Notes Reviewed:  [x ] YES  [ ] NO  Care Discussed with Consultants/Other Providers [ x] YES  [ ] NO    LABS:                        11.3   5.34  )-----------( 147      ( 17 Mar 2023 10:04 )             35.2     03-17    134<L>  |  98  |  23  ----------------------------<  131<H>  4.3   |  28  |  3.89<H>    Ca    8.0<L>      17 Mar 2023 08:14  Mg     1.8     03-17    TPro  5.1<L>  /  Alb  2.8<L>  /  TBili  1.0  /  DBili  x   /  AST  15  /  ALT  11  /  AlkPhos  141<H>  03-17    PT/INR - ( 17 Mar 2023 08:14 )   PT: 13.1 sec;   INR: 1.10          PTT - ( 17 Mar 2023 08:14 )  PTT:30.3 sec    LIVER FUNCTIONS - ( 17 Mar 2023 08:14 )  Alb: 2.8 g/dL / Pro: 5.1 g/dL / ALK PHOS: 141 U/L / ALT: 11 U/L / AST: 15 U/L / GGT: x                 RADIOLOGY & ADDITIONAL TESTS:    Imaging Personally Reviewed:  [ ] YES  [ ] NO CONSULT NOTE - INTERNAL MEDICINE  *INCOMPLETE UNTIL DISCUSSED WITH MEDICINE ATTENDING*    ROB ARELLANOENIO  [id number]  62y  Male      Patient is a 62y old  Male who presents with a chief complaint of ICD placement (17 Mar 2023 07:59)        PAST MEDICAL/SURGICAL HISTORY  PAST MEDICAL & SURGICAL HISTORY:  HTN (hypertension)      HLD (hyperlipidemia)      DM (diabetes mellitus)      GERD (gastroesophageal reflux disease)      ESRD on dialysis      NSTEMI (non-ST elevation myocardial infarction)      CAD (coronary artery disease)          REVIEW OF SYSTEMS:  CONSTITUTIONAL: No fever, weight loss, or fatigue  EYES: No eye pain, visual disturbances, or discharge  ENMT:  No difficulty hearing, tinnitus, vertigo; No sinus or throat pain  NECK: No pain or stiffness  BREASTS: No pain, masses, or nipple discharge  RESPIRATORY: No cough, wheezing, chills or hemoptysis; No shortness of breath  CARDIOVASCULAR: No chest pain, palpitations, dizziness, or leg swelling  GASTROINTESTINAL: No abdominal or epigastric pain. No nausea, vomiting, or hematemesis; No diarrhea or constipation. No melena or hematochezia.  GENITOURINARY: No dysuria, frequency, hematuria, or incontinence  NEUROLOGICAL: No headaches, memory loss, loss of strength, numbness, or tremors  SKIN: No itching, burning, rashes, or lesions   LYMPH NODES: No enlarged glands  ENDOCRINE: No heat or cold intolerance; No hair loss  MUSCULOSKELETAL: No joint pain or swelling; No muscle, back, or extremity pain  PSYCHIATRIC: No depression, anxiety, mood swings, or difficulty sleeping  HEME/LYMPH: No easy bruising, or bleeding gums  ALLERY AND IMMUNOLOGIC: No hives or eczema    T(C): 37.2 (03-17-23 @ 09:29), Max: 37.2 (03-17-23 @ 09:29)  HR: 67 (03-17-23 @ 08:20) (64 - 67)  BP: 155/72 (03-17-23 @ 08:20) (155/72 - 174/77)  RR: 18 (03-17-23 @ 08:20) (17 - 18)  SpO2: 96% (03-17-23 @ 08:20) (95% - 96%)  Wt(kg): --Vital Signs Last 24 Hrs  T(C): 37.2 (17 Mar 2023 09:29), Max: 37.2 (17 Mar 2023 09:29)  T(F): 99 (17 Mar 2023 09:29), Max: 99 (17 Mar 2023 09:29)  HR: 67 (17 Mar 2023 08:20) (64 - 67)  BP: 155/72 (17 Mar 2023 08:20) (155/72 - 174/77)  BP(mean): 106 (17 Mar 2023 02:03) (106 - 106)  RR: 18 (17 Mar 2023 08:20) (17 - 18)  SpO2: 96% (17 Mar 2023 08:20) (95% - 96%)    Parameters below as of 17 Mar 2023 08:20  Patient On (Oxygen Delivery Method): room air        PHYSICAL EXAM:  GENERAL: NAD, well-groomed, well-developed  HEAD:  Atraumatic, Normocephalic  EYES: EOMI, PERRLA, conjunctiva and sclera clear  ENMT: No tonsillar erythema, exudates, or enlargement; Moist mucous membranes, Good dentition, No lesions  NECK: Supple, No JVD, Normal thyroid  NERVOUS SYSTEM:  Alert & Oriented X3, Good concentration; Motor Strength 5/5 B/L upper and lower extremities; DTRs 2+ intact and symmetric  CHEST/LUNG: Clear to percussion bilaterally; No rales, rhonchi, wheezing, or rubs  HEART: Regular rate and rhythm; No murmurs, rubs, or gallops  ABDOMEN: Soft, Nontender, Nondistended; Bowel sounds present  EXTREMITIES:  2+ Peripheral Pulses, No clubbing, cyanosis, or edema  LYMPH: No lymphadenopathy noted  SKIN: No rashes or lesions    Consultant(s) Notes Reviewed:  [x ] YES  [ ] NO  Care Discussed with Consultants/Other Providers [ x] YES  [ ] NO    LABS:                        11.3   5.34  )-----------( 147      ( 17 Mar 2023 10:04 )             35.2     03-17    134<L>  |  98  |  23  ----------------------------<  131<H>  4.3   |  28  |  3.89<H>    Ca    8.0<L>      17 Mar 2023 08:14  Mg     1.8     03-17    TPro  5.1<L>  /  Alb  2.8<L>  /  TBili  1.0  /  DBili  x   /  AST  15  /  ALT  11  /  AlkPhos  141<H>  03-17    PT/INR - ( 17 Mar 2023 08:14 )   PT: 13.1 sec;   INR: 1.10          PTT - ( 17 Mar 2023 08:14 )  PTT:30.3 sec    LIVER FUNCTIONS - ( 17 Mar 2023 08:14 )  Alb: 2.8 g/dL / Pro: 5.1 g/dL / ALK PHOS: 141 U/L / ALT: 11 U/L / AST: 15 U/L / GGT: x                 RADIOLOGY & ADDITIONAL TESTS:    Imaging Personally Reviewed:  [ ] YES  [ ] NO CONSULT NOTE - INTERNAL MEDICINE  *INCOMPLETE UNTIL DISCUSSED WITH MEDICINE ATTENDING*    ROB ARELLANOENIO  [id number]  62y  Male      Patient is a 62y old  Male who presents with a chief complaint of ICD placement (17 Mar 2023 07:59)        PAST MEDICAL/SURGICAL HISTORY  PAST MEDICAL & SURGICAL HISTORY:  HTN (hypertension)      HLD (hyperlipidemia)      DM (diabetes mellitus)      GERD (gastroesophageal reflux disease)      ESRD on dialysis      NSTEMI (non-ST elevation myocardial infarction)      CAD (coronary artery disease)          REVIEW OF SYSTEMS:  CONSTITUTIONAL: No fever, weight loss, or fatigue  EYES: No eye pain, visual disturbances, or discharge  ENMT:  No difficulty hearing, tinnitus, vertigo; No sinus or throat pain  NECK: No pain or stiffness  BREASTS: No pain, masses, or nipple discharge  RESPIRATORY: No cough, wheezing, chills or hemoptysis; No shortness of breath  CARDIOVASCULAR: No chest pain, palpitations, dizziness, or leg swelling  GASTROINTESTINAL: No abdominal or epigastric pain. No nausea, vomiting, or hematemesis; No diarrhea or constipation. No melena or hematochezia.  GENITOURINARY: No dysuria, frequency, hematuria, or incontinence  NEUROLOGICAL: No headaches, memory loss, loss of strength, numbness, or tremors  SKIN: No itching, burning, rashes, or lesions   LYMPH NODES: No enlarged glands  ENDOCRINE: No heat or cold intolerance; No hair loss  MUSCULOSKELETAL: No joint pain or swelling; No muscle, back, or extremity pain  PSYCHIATRIC: No depression, anxiety, mood swings, or difficulty sleeping  HEME/LYMPH: No easy bruising, or bleeding gums  ALLERY AND IMMUNOLOGIC: No hives or eczema    T(C): 37.2 (03-17-23 @ 09:29), Max: 37.2 (03-17-23 @ 09:29)  HR: 67 (03-17-23 @ 08:20) (64 - 67)  BP: 155/72 (03-17-23 @ 08:20) (155/72 - 174/77)  RR: 18 (03-17-23 @ 08:20) (17 - 18)  SpO2: 96% (03-17-23 @ 08:20) (95% - 96%)  Wt(kg): --Vital Signs Last 24 Hrs  T(C): 37.2 (17 Mar 2023 09:29), Max: 37.2 (17 Mar 2023 09:29)  T(F): 99 (17 Mar 2023 09:29), Max: 99 (17 Mar 2023 09:29)  HR: 67 (17 Mar 2023 08:20) (64 - 67)  BP: 155/72 (17 Mar 2023 08:20) (155/72 - 174/77)  BP(mean): 106 (17 Mar 2023 02:03) (106 - 106)  RR: 18 (17 Mar 2023 08:20) (17 - 18)  SpO2: 96% (17 Mar 2023 08:20) (95% - 96%)    Parameters below as of 17 Mar 2023 08:20  Patient On (Oxygen Delivery Method): room air        PHYSICAL EXAM:  GENERAL: NAD, well-groomed, well-developed  HEAD:  Atraumatic, Normocephalic  EYES: EOMI, PERRLA, conjunctiva and sclera clear  ENMT: No tonsillar erythema, exudates, or enlargement; Moist mucous membranes, Good dentition, No lesions  NECK: Supple, No JVD, Normal thyroid  NERVOUS SYSTEM:  Alert & Oriented X3, Good concentration; Motor Strength 5/5 B/L upper and lower extremities; DTRs 2+ intact and symmetric  CHEST/LUNG: Clear to percussion bilaterally; No rales, rhonchi, wheezing, or rubs  HEART: Regular rate and rhythm; No murmurs, rubs, or gallops  ABDOMEN: Soft, Nontender, Nondistended; Bowel sounds present  EXTREMITIES:  2+ Peripheral Pulses, No clubbing, cyanosis, or edema  LYMPH: No lymphadenopathy noted  SKIN: No rashes or lesions    Consultant(s) Notes Reviewed:  [x ] YES  [ ] NO  Care Discussed with Consultants/Other Providers [ x] YES  [ ] NO    LABS:                        11.3   5.34  )-----------( 147      ( 17 Mar 2023 10:04 )             35.2     03-17    134<L>  |  98  |  23  ----------------------------<  131<H>  4.3   |  28  |  3.89<H>    Ca    8.0<L>      17 Mar 2023 08:14  Mg     1.8     03-17    TPro  5.1<L>  /  Alb  2.8<L>  /  TBili  1.0  /  DBili  x   /  AST  15  /  ALT  11  /  AlkPhos  141<H>  03-17    PT/INR - ( 17 Mar 2023 08:14 )   PT: 13.1 sec;   INR: 1.10          PTT - ( 17 Mar 2023 08:14 )  PTT:30.3 sec    LIVER FUNCTIONS - ( 17 Mar 2023 08:14 )  Alb: 2.8 g/dL / Pro: 5.1 g/dL / ALK PHOS: 141 U/L / ALT: 11 U/L / AST: 15 U/L / GGT: x                 RADIOLOGY & ADDITIONAL TESTS:    Imaging Personally Reviewed:  [ ] YES  [ ] NO CONSULT NOTE - INTERNAL MEDICINE  *INCOMPLETE UNTIL DISCUSSED WITH MEDICINE ATTENDING*    HPI:  61yo M PMHx HTN, HLD, DM, CAD(s/p cath years ago, RCA 99%, never intervened on), HFrEF 25-30%, ESRD s/p failed kidney transplant (last HD 3/1 via Left Arm AVF; HD TTS), Right AKA presented to [facility] 2/27 for respiratory distress after a dialysis session and admitted to cardiac telemetry on 2/27 with SIRS criteria. Placed on vanc/zosyn. Pt was placed on BiPAP which failed, where he was then intubated on 3/1 and moved to the MICU. He went into cardiac arrest (RoSC achieved after 8mins). Went into Vtach arrest, was placed on amio and pressors, HR ellen into 30s and atropine given twice. Weaned off levophed and sedation and extubated 3/2. Pt developed coffee ground emesis, hemoccult positive, with Hgb dipping to 3.5; he then received 7units PRBCs, 1Unit FFP, 1 unit donor packed platelets. EGD and colonoscopy showing melanosis duodenii but no acute bleed. BP dropped again, transferred to ICU and started on levophed gtt. CTA Abdomen showing no active bleeding into GI lumen, possible focal proctitis; Colonoscopy performed showing rectal ulcer. Started on hydrocortisone suppository and mesalamine. Hgb prior transfer stable in 11s (per handoff), transferred to [facility] for ICD placement 2/2 Vtach and cardiac cath.     Medicaine consulted for comanagement.       PAST MEDICAL & SURGICAL HISTORY:  HTN (hypertension)  HLD (hyperlipidemia)  DM (diabetes mellitus)  GERD (gastroesophageal reflux disease)  ESRD on dialysis  NSTEMI (non-ST elevation myocardial infarction)  CAD (coronary artery disease)  LGIB    REVIEW OF SYSTEMS:  CONSTITUTIONAL: No fever, weight loss, or fatigue  EYES: No eye pain, visual disturbances, or discharge  ENMT:  No difficulty hearing, tinnitus, vertigo; No sinus or throat pain  NECK: No pain or stiffness  BREASTS: No pain, masses, or nipple discharge  RESPIRATORY: No cough, wheezing, chills or hemoptysis; No shortness of breath  CARDIOVASCULAR: No chest pain, palpitations, dizziness, or leg swelling  GASTROINTESTINAL: No abdominal or epigastric pain. No nausea, vomiting, or hematemesis; No diarrhea or constipation. No melena or hematochezia.  GENITOURINARY: No dysuria, frequency, hematuria, or incontinence  NEUROLOGICAL: No headaches, memory loss, loss of strength, numbness, or tremors  SKIN: No itching, burning, rashes, or lesions   LYMPH NODES: No enlarged glands  ENDOCRINE: No heat or cold intolerance; No hair loss  MUSCULOSKELETAL: No joint pain or swelling; No muscle, back, or extremity pain  PSYCHIATRIC: No depression, anxiety, mood swings, or difficulty sleeping  HEME/LYMPH: No easy bruising, or bleeding gums  ALLERY AND IMMUNOLOGIC: No hives or eczema    T(C): 37.2 (03-17-23 @ 09:29), Max: 37.2 (03-17-23 @ 09:29)  HR: 67 (03-17-23 @ 08:20) (64 - 67)  BP: 155/72 (03-17-23 @ 08:20) (155/72 - 174/77)  RR: 18 (03-17-23 @ 08:20) (17 - 18)  SpO2: 96% (03-17-23 @ 08:20) (95% - 96%)  Wt(kg): --Vital Signs Last 24 Hrs  T(C): 37.2 (17 Mar 2023 09:29), Max: 37.2 (17 Mar 2023 09:29)  T(F): 99 (17 Mar 2023 09:29), Max: 99 (17 Mar 2023 09:29)  HR: 67 (17 Mar 2023 08:20) (64 - 67)  BP: 155/72 (17 Mar 2023 08:20) (155/72 - 174/77)  BP(mean): 106 (17 Mar 2023 02:03) (106 - 106)  RR: 18 (17 Mar 2023 08:20) (17 - 18)  SpO2: 96% (17 Mar 2023 08:20) (95% - 96%)    Parameters below as of 17 Mar 2023 08:20  Patient On (Oxygen Delivery Method): room air    PHYSICAL EXAM:  GENERAL: NAD, well-groomed, well-developed  HEAD:  Atraumatic, Normocephalic  EYES: EOMI, PERRLA, conjunctiva and sclera clear  ENMT: No tonsillar erythema, exudates, or enlargement; Moist mucous membranes, Good dentition, No lesions  NECK: Supple, No JVD, Normal thyroid  NERVOUS SYSTEM:  Alert & Oriented X3, Good concentration; Motor Strength 5/5 B/L upper and lower extremities; DTRs 2+ intact and symmetric  CHEST/LUNG: Clear to percussion bilaterally; No rales, rhonchi, wheezing, or rubs  HEART: Regular rate and rhythm; No murmurs, rubs, or gallops  ABDOMEN: Soft, Nontender, Nondistended; Bowel sounds present  EXTREMITIES:  2+ Peripheral Pulses, No clubbing, cyanosis, or edema  LYMPH: No lymphadenopathy noted  SKIN: No rashes or lesions    Consultant(s) Notes Reviewed:  [x ] YES  [ ] NO  Care Discussed with Consultants/Other Providers [ x] YES  [ ] NO    LABS:                        11.3   5.34  )-----------( 147      ( 17 Mar 2023 10:04 )             35.2     03-17    134<L>  |  98  |  23  ----------------------------<  131<H>  4.3   |  28  |  3.89<H>    Ca    8.0<L>      17 Mar 2023 08:14  Mg     1.8     03-17    TPro  5.1<L>  /  Alb  2.8<L>  /  TBili  1.0  /  DBili  x   /  AST  15  /  ALT  11  /  AlkPhos  141<H>  03-17    PT/INR - ( 17 Mar 2023 08:14 )   PT: 13.1 sec;   INR: 1.10          PTT - ( 17 Mar 2023 08:14 )  PTT:30.3 sec    LIVER FUNCTIONS - ( 17 Mar 2023 08:14 )  Alb: 2.8 g/dL / Pro: 5.1 g/dL / ALK PHOS: 141 U/L / ALT: 11 U/L / AST: 15 U/L / GGT: x                 RADIOLOGY & ADDITIONAL TESTS:    Imaging Personally Reviewed:  [ ] YES  [ ] NO CONSULT NOTE - INTERNAL MEDICINE  *INCOMPLETE UNTIL DISCUSSED WITH MEDICINE ATTENDING*    HPI:  63yo M PMHx HTN, HLD, DM, CAD(s/p cath years ago, RCA 99%, never intervened on), HFrEF 25-30%, ESRD s/p failed kidney transplant (last HD 3/1 via Left Arm AVF; HD TTS), Right AKA presented to [facility] 2/27 for respiratory distress after a dialysis session and admitted to cardiac telemetry on 2/27 with SIRS criteria. Placed on vanc/zosyn. Pt was placed on BiPAP which failed, where he was then intubated on 3/1 and moved to the MICU. He went into cardiac arrest (RoSC achieved after 8mins). Went into Vtach arrest, was placed on amio and pressors, HR ellen into 30s and atropine given twice. Weaned off levophed and sedation and extubated 3/2. Pt developed coffee ground emesis, hemoccult positive, with Hgb dipping to 3.5; he then received 7units PRBCs, 1Unit FFP, 1 unit donor packed platelets. EGD and colonoscopy showing melanosis duodenii but no acute bleed. BP dropped again, transferred to ICU and started on levophed gtt. CTA Abdomen showing no active bleeding into GI lumen, possible focal proctitis; Colonoscopy performed showing rectal ulcer. Started on hydrocortisone suppository and mesalamine. Hgb prior transfer stable in 11s (per handoff), transferred to [facility] for ICD placement 2/2 Vtach and cardiac cath.     Medicaine consulted for comanagement.       PAST MEDICAL & SURGICAL HISTORY:  HTN (hypertension)  HLD (hyperlipidemia)  DM (diabetes mellitus)  GERD (gastroesophageal reflux disease)  ESRD on dialysis  NSTEMI (non-ST elevation myocardial infarction)  CAD (coronary artery disease)  LGIB    REVIEW OF SYSTEMS:  CONSTITUTIONAL: No fever, weight loss, or fatigue  EYES: No eye pain, visual disturbances, or discharge  ENMT:  No difficulty hearing, tinnitus, vertigo; No sinus or throat pain  NECK: No pain or stiffness  BREASTS: No pain, masses, or nipple discharge  RESPIRATORY: No cough, wheezing, chills or hemoptysis; No shortness of breath  CARDIOVASCULAR: No chest pain, palpitations, dizziness, or leg swelling  GASTROINTESTINAL: No abdominal or epigastric pain. No nausea, vomiting, or hematemesis; No diarrhea or constipation. No melena or hematochezia.  GENITOURINARY: No dysuria, frequency, hematuria, or incontinence  NEUROLOGICAL: No headaches, memory loss, loss of strength, numbness, or tremors  SKIN: No itching, burning, rashes, or lesions   LYMPH NODES: No enlarged glands  ENDOCRINE: No heat or cold intolerance; No hair loss  MUSCULOSKELETAL: No joint pain or swelling; No muscle, back, or extremity pain  PSYCHIATRIC: No depression, anxiety, mood swings, or difficulty sleeping  HEME/LYMPH: No easy bruising, or bleeding gums  ALLERY AND IMMUNOLOGIC: No hives or eczema    T(C): 37.2 (03-17-23 @ 09:29), Max: 37.2 (03-17-23 @ 09:29)  HR: 67 (03-17-23 @ 08:20) (64 - 67)  BP: 155/72 (03-17-23 @ 08:20) (155/72 - 174/77)  RR: 18 (03-17-23 @ 08:20) (17 - 18)  SpO2: 96% (03-17-23 @ 08:20) (95% - 96%)  Wt(kg): --Vital Signs Last 24 Hrs  T(C): 37.2 (17 Mar 2023 09:29), Max: 37.2 (17 Mar 2023 09:29)  T(F): 99 (17 Mar 2023 09:29), Max: 99 (17 Mar 2023 09:29)  HR: 67 (17 Mar 2023 08:20) (64 - 67)  BP: 155/72 (17 Mar 2023 08:20) (155/72 - 174/77)  BP(mean): 106 (17 Mar 2023 02:03) (106 - 106)  RR: 18 (17 Mar 2023 08:20) (17 - 18)  SpO2: 96% (17 Mar 2023 08:20) (95% - 96%)    Parameters below as of 17 Mar 2023 08:20  Patient On (Oxygen Delivery Method): room air    PHYSICAL EXAM:  GENERAL: NAD, well-groomed, well-developed  HEAD:  Atraumatic, Normocephalic  EYES: EOMI, PERRLA, conjunctiva and sclera clear  ENMT: No tonsillar erythema, exudates, or enlargement; Moist mucous membranes, Good dentition, No lesions  NECK: Supple, No JVD, Normal thyroid  NERVOUS SYSTEM:  Alert & Oriented X3, Good concentration; Motor Strength 5/5 B/L upper and lower extremities; DTRs 2+ intact and symmetric  CHEST/LUNG: Clear to percussion bilaterally; No rales, rhonchi, wheezing, or rubs  HEART: Regular rate and rhythm; No murmurs, rubs, or gallops  ABDOMEN: Soft, Nontender, Nondistended; Bowel sounds present  EXTREMITIES:  2+ Peripheral Pulses, No clubbing, cyanosis, or edema  LYMPH: No lymphadenopathy noted  SKIN: No rashes or lesions    Consultant(s) Notes Reviewed:  [x ] YES  [ ] NO  Care Discussed with Consultants/Other Providers [ x] YES  [ ] NO    LABS:                        11.3   5.34  )-----------( 147      ( 17 Mar 2023 10:04 )             35.2     03-17    134<L>  |  98  |  23  ----------------------------<  131<H>  4.3   |  28  |  3.89<H>    Ca    8.0<L>      17 Mar 2023 08:14  Mg     1.8     03-17    TPro  5.1<L>  /  Alb  2.8<L>  /  TBili  1.0  /  DBili  x   /  AST  15  /  ALT  11  /  AlkPhos  141<H>  03-17    PT/INR - ( 17 Mar 2023 08:14 )   PT: 13.1 sec;   INR: 1.10          PTT - ( 17 Mar 2023 08:14 )  PTT:30.3 sec    LIVER FUNCTIONS - ( 17 Mar 2023 08:14 )  Alb: 2.8 g/dL / Pro: 5.1 g/dL / ALK PHOS: 141 U/L / ALT: 11 U/L / AST: 15 U/L / GGT: x                 RADIOLOGY & ADDITIONAL TESTS:    Imaging Personally Reviewed:  [ ] YES  [ ] NO CONSULT NOTE - INTERNAL MEDICINE  *INCOMPLETE UNTIL DISCUSSED WITH MEDICINE ATTENDING*    HPI:  63yo M PMHx HTN, HLD, DM, CAD(s/p cath years ago, RCA 99%, never intervened on), HFrEF 25-30%, ESRD s/p failed kidney transplant (last HD 3/1 via Left Arm AVF; HD TTS), Right AKA presented to [facility] 2/27 for respiratory distress after a dialysis session and admitted to cardiac telemetry on 2/27 with SIRS criteria. Placed on vanc/zosyn. Pt was placed on BiPAP which failed, where he was then intubated on 3/1 and moved to the MICU. He went into cardiac arrest (RoSC achieved after 8mins). Went into Vtach arrest, was placed on amio and pressors, HR ellen into 30s and atropine given twice. Weaned off levophed and sedation and extubated 3/2. Pt developed coffee ground emesis, hemoccult positive, with Hgb dipping to 3.5; he then received 7units PRBCs, 1Unit FFP, 1 unit donor packed platelets. EGD and colonoscopy showing melanosis duodenii but no acute bleed. BP dropped again, transferred to ICU and started on levophed gtt. CTA Abdomen showing no active bleeding into GI lumen, possible focal proctitis; Colonoscopy performed showing rectal ulcer. Started on hydrocortisone suppository and mesalamine. Hgb prior transfer stable in 11s (per handoff), transferred to [facility] for ICD placement 2/2 Vtach and cardiac cath.     Medicaine consulted for comanagement.       PAST MEDICAL & SURGICAL HISTORY:  HTN (hypertension)  HLD (hyperlipidemia)  DM (diabetes mellitus)  GERD (gastroesophageal reflux disease)  ESRD on dialysis  NSTEMI (non-ST elevation myocardial infarction)  CAD (coronary artery disease)  LGIB    REVIEW OF SYSTEMS:  CONSTITUTIONAL: No fever, weight loss, or fatigue  EYES: No eye pain, visual disturbances, or discharge  ENMT:  No difficulty hearing, tinnitus, vertigo; No sinus or throat pain  NECK: No pain or stiffness  BREASTS: No pain, masses, or nipple discharge  RESPIRATORY: No cough, wheezing, chills or hemoptysis; No shortness of breath  CARDIOVASCULAR: No chest pain, palpitations, dizziness, or leg swelling  GASTROINTESTINAL: No abdominal or epigastric pain. No nausea, vomiting, or hematemesis; No diarrhea or constipation. No melena or hematochezia.  GENITOURINARY: No dysuria, frequency, hematuria, or incontinence  NEUROLOGICAL: No headaches, memory loss, loss of strength, numbness, or tremors  SKIN: No itching, burning, rashes, or lesions   LYMPH NODES: No enlarged glands  ENDOCRINE: No heat or cold intolerance; No hair loss  MUSCULOSKELETAL: No joint pain or swelling; No muscle, back, or extremity pain  PSYCHIATRIC: No depression, anxiety, mood swings, or difficulty sleeping  HEME/LYMPH: No easy bruising, or bleeding gums  ALLERY AND IMMUNOLOGIC: No hives or eczema    T(C): 37.2 (03-17-23 @ 09:29), Max: 37.2 (03-17-23 @ 09:29)  HR: 67 (03-17-23 @ 08:20) (64 - 67)  BP: 155/72 (03-17-23 @ 08:20) (155/72 - 174/77)  RR: 18 (03-17-23 @ 08:20) (17 - 18)  SpO2: 96% (03-17-23 @ 08:20) (95% - 96%)  Wt(kg): --Vital Signs Last 24 Hrs  T(C): 37.2 (17 Mar 2023 09:29), Max: 37.2 (17 Mar 2023 09:29)  T(F): 99 (17 Mar 2023 09:29), Max: 99 (17 Mar 2023 09:29)  HR: 67 (17 Mar 2023 08:20) (64 - 67)  BP: 155/72 (17 Mar 2023 08:20) (155/72 - 174/77)  BP(mean): 106 (17 Mar 2023 02:03) (106 - 106)  RR: 18 (17 Mar 2023 08:20) (17 - 18)  SpO2: 96% (17 Mar 2023 08:20) (95% - 96%)    Parameters below as of 17 Mar 2023 08:20  Patient On (Oxygen Delivery Method): room air    PHYSICAL EXAM:  GENERAL: NAD, well-groomed, well-developed  HEAD:  Atraumatic, Normocephalic  EYES: EOMI, PERRLA, conjunctiva and sclera clear  ENMT: No tonsillar erythema, exudates, or enlargement; Moist mucous membranes, Good dentition, No lesions  NECK: Supple, No JVD, Normal thyroid  NERVOUS SYSTEM:  Alert & Oriented X3, Good concentration; Motor Strength 5/5 B/L upper and lower extremities; DTRs 2+ intact and symmetric  CHEST/LUNG: Clear to percussion bilaterally; No rales, rhonchi, wheezing, or rubs  HEART: Regular rate and rhythm; No murmurs, rubs, or gallops  ABDOMEN: Soft, Nontender, Nondistended; Bowel sounds present  EXTREMITIES:  2+ Peripheral Pulses, No clubbing, cyanosis, or edema  LYMPH: No lymphadenopathy noted  SKIN: No rashes or lesions    Consultant(s) Notes Reviewed:  [x ] YES  [ ] NO  Care Discussed with Consultants/Other Providers [ x] YES  [ ] NO    LABS:                        11.3   5.34  )-----------( 147      ( 17 Mar 2023 10:04 )             35.2     03-17    134<L>  |  98  |  23  ----------------------------<  131<H>  4.3   |  28  |  3.89<H>    Ca    8.0<L>      17 Mar 2023 08:14  Mg     1.8     03-17    TPro  5.1<L>  /  Alb  2.8<L>  /  TBili  1.0  /  DBili  x   /  AST  15  /  ALT  11  /  AlkPhos  141<H>  03-17    PT/INR - ( 17 Mar 2023 08:14 )   PT: 13.1 sec;   INR: 1.10          PTT - ( 17 Mar 2023 08:14 )  PTT:30.3 sec    LIVER FUNCTIONS - ( 17 Mar 2023 08:14 )  Alb: 2.8 g/dL / Pro: 5.1 g/dL / ALK PHOS: 141 U/L / ALT: 11 U/L / AST: 15 U/L / GGT: x                 RADIOLOGY & ADDITIONAL TESTS:    Imaging Personally Reviewed:  [ ] YES  [ ] NO CONSULT NOTE - INTERNAL MEDICINE  *INCOMPLETE UNTIL DISCUSSED WITH MEDICINE ATTENDING*    HPI:  63yo M PMHx HTN, HLD, DM, CAD(s/p cath years ago, RCA 99%, never intervened on), HFrEF 25-30%, ESRD s/p failed kidney transplant (last HD 3/1 via Left Arm AVF; HD TTS), Right AKA presented to [facility] 2/27 for respiratory distress after a dialysis session and admitted to cardiac telemetry on 2/27 with SIRS criteria. Placed on vanc/zosyn. Pt was placed on BiPAP which failed, where he was then intubated on 3/1 and moved to the MICU. He went into cardiac arrest (RoSC achieved after 8mins). Went into Vtach arrest, was placed on amio and pressors, HR ellen into 30s and atropine given twice. Weaned off levophed and sedation and extubated 3/2. Pt developed coffee ground emesis, hemoccult positive, with Hgb dipping to 3.5; he then received 7units PRBCs, 1Unit FFP, 1 unit donor packed platelets. EGD and colonoscopy showing melanosis duodenii but no acute bleed. BP dropped again, transferred to ICU and started on levophed gtt. CTA Abdomen showing no active bleeding into GI lumen, possible focal proctitis; Colonoscopy performed showing rectal ulcer. Started on hydrocortisone suppository and mesalamine. Hgb prior transfer stable in 11s (per handoff), transferred to [facility] for ICD placement 2/2 Vtach and cardiac cath.     Medicaine consulted for comanagement.       PAST MEDICAL & SURGICAL HISTORY:  HTN (hypertension)  HLD (hyperlipidemia)  DM (diabetes mellitus)  GERD (gastroesophageal reflux disease)  ESRD on dialysis  NSTEMI (non-ST elevation myocardial infarction)  CAD (coronary artery disease)  LGIB    REVIEW OF SYSTEMS:  CONSTITUTIONAL: No fever, weight loss, or fatigue  EYES: No eye pain, visual disturbances, or discharge  ENMT:  No difficulty hearing, tinnitus, vertigo; No sinus or throat pain  NECK: No pain or stiffness  BREASTS: No pain, masses, or nipple discharge  RESPIRATORY: No cough, wheezing, chills or hemoptysis; No shortness of breath  CARDIOVASCULAR: No chest pain, palpitations, dizziness, or leg swelling  GASTROINTESTINAL: No abdominal or epigastric pain. No nausea, vomiting, or hematemesis; No diarrhea or constipation. No melena or hematochezia.  GENITOURINARY: No dysuria, frequency, hematuria, or incontinence  NEUROLOGICAL: No headaches, memory loss, loss of strength, numbness, or tremors  SKIN: No itching, burning, rashes, or lesions   LYMPH NODES: No enlarged glands  ENDOCRINE: No heat or cold intolerance; No hair loss  MUSCULOSKELETAL: No joint pain or swelling; No muscle, back, or extremity pain  PSYCHIATRIC: No depression, anxiety, mood swings, or difficulty sleeping  HEME/LYMPH: No easy bruising, or bleeding gums  ALLERY AND IMMUNOLOGIC: No hives or eczema    T(C): 37.2 (03-17-23 @ 09:29), Max: 37.2 (03-17-23 @ 09:29)  HR: 67 (03-17-23 @ 08:20) (64 - 67)  BP: 155/72 (03-17-23 @ 08:20) (155/72 - 174/77)  RR: 18 (03-17-23 @ 08:20) (17 - 18)  SpO2: 96% (03-17-23 @ 08:20) (95% - 96%)  Wt(kg): --Vital Signs Last 24 Hrs  T(C): 37.2 (17 Mar 2023 09:29), Max: 37.2 (17 Mar 2023 09:29)  T(F): 99 (17 Mar 2023 09:29), Max: 99 (17 Mar 2023 09:29)  HR: 67 (17 Mar 2023 08:20) (64 - 67)  BP: 155/72 (17 Mar 2023 08:20) (155/72 - 174/77)  BP(mean): 106 (17 Mar 2023 02:03) (106 - 106)  RR: 18 (17 Mar 2023 08:20) (17 - 18)  SpO2: 96% (17 Mar 2023 08:20) (95% - 96%)    Parameters below as of 17 Mar 2023 08:20  Patient On (Oxygen Delivery Method): room air    PHYSICAL EXAM:  GENERAL: NAD, well-groomed, well-developed  HEAD:  Atraumatic, Normocephalic  EYES: EOMI, PERRLA, conjunctiva and sclera clear  ENMT: No tonsillar erythema, exudates, or enlargement; Moist mucous membranes, Good dentition, No lesions  NECK: Supple, No JVD, Normal thyroid  NERVOUS SYSTEM:  Alert & Oriented X3, Good concentration; Motor Strength 5/5 B/L upper and lower extremities; DTRs 2+ intact and symmetric  CHEST/LUNG: Clear to percussion bilaterally; No rales, rhonchi, wheezing, or rubs  HEART: Regular rate and rhythm; No murmurs, rubs, or gallops  ABDOMEN: Soft, Nontender, Nondistended; Bowel sounds present  EXTREMITIES:  2+ Peripheral Pulses, No clubbing, cyanosis, or edema  LYMPH: No lymphadenopathy noted  SKIN: No rashes or lesions    Consultant(s) Notes Reviewed:  [x ] YES  [ ] NO  Care Discussed with Consultants/Other Providers [ x] YES  [ ] NO    LABS:                        11.3   5.34  )-----------( 147      ( 17 Mar 2023 10:04 )             35.2     03-17    134<L>  |  98  |  23  ----------------------------<  131<H>  4.3   |  28  |  3.89<H>    Ca    8.0<L>      17 Mar 2023 08:14  Mg     1.8     03-17    TPro  5.1<L>  /  Alb  2.8<L>  /  TBili  1.0  /  DBili  x   /  AST  15  /  ALT  11  /  AlkPhos  141<H>  03-17    PT/INR - ( 17 Mar 2023 08:14 )   PT: 13.1 sec;   INR: 1.10          PTT - ( 17 Mar 2023 08:14 )  PTT:30.3 sec    LIVER FUNCTIONS - ( 17 Mar 2023 08:14 )  Alb: 2.8 g/dL / Pro: 5.1 g/dL / ALK PHOS: 141 U/L / ALT: 11 U/L / AST: 15 U/L / GGT: x             RADIOLOGY & ADDITIONAL TESTS:    Imaging Personally Reviewed:  [ ] YES  [ ] NO CONSULT NOTE - INTERNAL MEDICINE  *INCOMPLETE UNTIL DISCUSSED WITH MEDICINE ATTENDING*    HPI:  63yo M PMHx HTN, HLD, DM, CAD(s/p cath years ago, RCA 99%, never intervened on), HFrEF 25-30%, ESRD s/p failed kidney transplant (last HD 3/1 via Left Arm AVF; HD TTS), Right AKA presented to [facility] 2/27 for respiratory distress after a dialysis session and admitted to cardiac telemetry on 2/27 with SIRS criteria. Placed on vanc/zosyn. Pt was placed on BiPAP which failed, where he was then intubated on 3/1 and moved to the MICU. He went into cardiac arrest (RoSC achieved after 8mins). Went into Vtach arrest, was placed on amio and pressors, HR ellen into 30s and atropine given twice. Weaned off levophed and sedation and extubated 3/2. Pt developed coffee ground emesis, hemoccult positive, with Hgb dipping to 3.5; he then received 7units PRBCs, 1Unit FFP, 1 unit donor packed platelets. EGD and colonoscopy showing melanosis duodenii but no acute bleed. BP dropped again, transferred to ICU and started on levophed gtt. CTA Abdomen showing no active bleeding into GI lumen, possible focal proctitis; Colonoscopy performed showing rectal ulcer. Started on hydrocortisone suppository and mesalamine. Hgb prior transfer stable in 11s (per handoff), transferred to [facility] for ICD placement 2/2 Vtach and cardiac cath.     Medicaine consulted for comanagement.       PAST MEDICAL & SURGICAL HISTORY:  HTN (hypertension)  HLD (hyperlipidemia)  DM (diabetes mellitus)  GERD (gastroesophageal reflux disease)  ESRD on dialysis  NSTEMI (non-ST elevation myocardial infarction)  CAD (coronary artery disease)  LGIB    REVIEW OF SYSTEMS:  CONSTITUTIONAL: No fever, weight loss, or fatigue  EYES: No eye pain, visual disturbances, or discharge  ENMT:  No difficulty hearing, tinnitus, vertigo; No sinus or throat pain  NECK: No pain or stiffness  BREASTS: No pain, masses, or nipple discharge  RESPIRATORY: No cough, wheezing, chills or hemoptysis; No shortness of breath  CARDIOVASCULAR: No chest pain, palpitations, dizziness, or leg swelling  GASTROINTESTINAL: No abdominal or epigastric pain. No nausea, vomiting, or hematemesis; No diarrhea or constipation. No melena or hematochezia.  GENITOURINARY: No dysuria, frequency, hematuria, or incontinence  NEUROLOGICAL: No headaches, memory loss, loss of strength, numbness, or tremors  SKIN: No itching, burning, rashes, or lesions   LYMPH NODES: No enlarged glands  ENDOCRINE: No heat or cold intolerance; No hair loss  MUSCULOSKELETAL: No joint pain or swelling; No muscle, back, or extremity pain  PSYCHIATRIC: No depression, anxiety, mood swings, or difficulty sleeping  HEME/LYMPH: No easy bruising, or bleeding gums  ALLERY AND IMMUNOLOGIC: No hives or eczema    T(C): 37.2 (03-17-23 @ 09:29), Max: 37.2 (03-17-23 @ 09:29)  HR: 67 (03-17-23 @ 08:20) (64 - 67)  BP: 155/72 (03-17-23 @ 08:20) (155/72 - 174/77)  RR: 18 (03-17-23 @ 08:20) (17 - 18)  SpO2: 96% (03-17-23 @ 08:20) (95% - 96%)  Wt(kg): --Vital Signs Last 24 Hrs  T(C): 37.2 (17 Mar 2023 09:29), Max: 37.2 (17 Mar 2023 09:29)  T(F): 99 (17 Mar 2023 09:29), Max: 99 (17 Mar 2023 09:29)  HR: 67 (17 Mar 2023 08:20) (64 - 67)  BP: 155/72 (17 Mar 2023 08:20) (155/72 - 174/77)  BP(mean): 106 (17 Mar 2023 02:03) (106 - 106)  RR: 18 (17 Mar 2023 08:20) (17 - 18)  SpO2: 96% (17 Mar 2023 08:20) (95% - 96%)    Parameters below as of 17 Mar 2023 08:20  Patient On (Oxygen Delivery Method): room air    PHYSICAL EXAM:  GENERAL: NAD, well-groomed, well-developed  HEAD:  Atraumatic, Normocephalic  EYES: EOMI, PERRLA, conjunctiva and sclera clear  ENMT: No tonsillar erythema, exudates, or enlargement; Moist mucous membranes, Good dentition, No lesions  NECK: Supple, No JVD, Normal thyroid  NERVOUS SYSTEM:  Alert & Oriented X3, Good concentration; Motor Strength 5/5 B/L upper and lower extremities; DTRs 2+ intact and symmetric  CHEST/LUNG: Clear to percussion bilaterally; No rales, rhonchi, wheezing, or rubs  HEART: Regular rate and rhythm; No murmurs, rubs, or gallops  ABDOMEN: Soft, Nontender, Nondistended; Bowel sounds present  EXTREMITIES:  2+ Peripheral Pulses, No clubbing, cyanosis, or edema  LYMPH: No lymphadenopathy noted  SKIN: No rashes or lesions    Consultant(s) Notes Reviewed:  [x ] YES  [ ] NO  Care Discussed with Consultants/Other Providers [ x] YES  [ ] NO    LABS:                        11.3   5.34  )-----------( 147      ( 17 Mar 2023 10:04 )             35.2     03-17    134<L>  |  98  |  23  ----------------------------<  131<H>  4.3   |  28  |  3.89<H>    Ca    8.0<L>      17 Mar 2023 08:14  Mg     1.8     03-17    TPro  5.1<L>  /  Alb  2.8<L>  /  TBili  1.0  /  DBili  x   /  AST  15  /  ALT  11  /  AlkPhos  141<H>  03-17    PT/INR - ( 17 Mar 2023 08:14 )   PT: 13.1 sec;   INR: 1.10          PTT - ( 17 Mar 2023 08:14 )  PTT:30.3 sec    LIVER FUNCTIONS - ( 17 Mar 2023 08:14 )  Alb: 2.8 g/dL / Pro: 5.1 g/dL / ALK PHOS: 141 U/L / ALT: 11 U/L / AST: 15 U/L / GGT: x             RADIOLOGY & ADDITIONAL TESTS:    Imaging Personally Reviewed:  [ ] YES  [ ] NO CONSULT NOTE - INTERNAL MEDICINE  *INCOMPLETE UNTIL DISCUSSED WITH MEDICINE ATTENDING*    HPI:  61yo M PMHx HTN, HLD, DM, CAD(s/p cath years ago, RCA 99%, never intervened on), HFrEF 25-30%, ESRD s/p failed kidney transplant (last HD 3/1 via Left Arm AVF; HD TTS), Right AKA presented to [facility] 2/27 for respiratory distress after a dialysis session and admitted to cardiac telemetry on 2/27 with SIRS criteria. Placed on vanc/zosyn. Pt was placed on BiPAP which failed, where he was then intubated on 3/1 and moved to the MICU. He went into cardiac arrest (RoSC achieved after 8mins). Went into Vtach arrest, was placed on amio and pressors, HR ellen into 30s and atropine given twice. Weaned off levophed and sedation and extubated 3/2. Pt developed coffee ground emesis, hemoccult positive, with Hgb dipping to 3.5; he then received 7units PRBCs, 1Unit FFP, 1 unit donor packed platelets. EGD and colonoscopy showing melanosis duodenii but no acute bleed. BP dropped again, transferred to ICU and started on levophed gtt. CTA Abdomen showing no active bleeding into GI lumen, possible focal proctitis; Colonoscopy performed showing rectal ulcer. Started on hydrocortisone suppository and mesalamine. Hgb prior transfer stable in 11s (per handoff), transferred to [facility] for ICD placement 2/2 Vtach and cardiac cath.     Medicaine consulted for comanagement.       PAST MEDICAL & SURGICAL HISTORY:  HTN (hypertension)  HLD (hyperlipidemia)  DM (diabetes mellitus)  GERD (gastroesophageal reflux disease)  ESRD on dialysis  NSTEMI (non-ST elevation myocardial infarction)  CAD (coronary artery disease)  LGIB    REVIEW OF SYSTEMS:  CONSTITUTIONAL: No fever, weight loss, or fatigue  EYES: No eye pain, visual disturbances, or discharge  ENMT:  No difficulty hearing, tinnitus, vertigo; No sinus or throat pain  NECK: No pain or stiffness  BREASTS: No pain, masses, or nipple discharge  RESPIRATORY: No cough, wheezing, chills or hemoptysis; No shortness of breath  CARDIOVASCULAR: No chest pain, palpitations, dizziness, or leg swelling  GASTROINTESTINAL: No abdominal or epigastric pain. No nausea, vomiting, or hematemesis; No diarrhea or constipation. No melena or hematochezia.  GENITOURINARY: No dysuria, frequency, hematuria, or incontinence  NEUROLOGICAL: No headaches, memory loss, loss of strength, numbness, or tremors  SKIN: No itching, burning, rashes, or lesions   LYMPH NODES: No enlarged glands  ENDOCRINE: No heat or cold intolerance; No hair loss  MUSCULOSKELETAL: No joint pain or swelling; No muscle, back, or extremity pain  PSYCHIATRIC: No depression, anxiety, mood swings, or difficulty sleeping  HEME/LYMPH: No easy bruising, or bleeding gums  ALLERY AND IMMUNOLOGIC: No hives or eczema    T(C): 37.2 (03-17-23 @ 09:29), Max: 37.2 (03-17-23 @ 09:29)  HR: 67 (03-17-23 @ 08:20) (64 - 67)  BP: 155/72 (03-17-23 @ 08:20) (155/72 - 174/77)  RR: 18 (03-17-23 @ 08:20) (17 - 18)  SpO2: 96% (03-17-23 @ 08:20) (95% - 96%)  Wt(kg): --Vital Signs Last 24 Hrs  T(C): 37.2 (17 Mar 2023 09:29), Max: 37.2 (17 Mar 2023 09:29)  T(F): 99 (17 Mar 2023 09:29), Max: 99 (17 Mar 2023 09:29)  HR: 67 (17 Mar 2023 08:20) (64 - 67)  BP: 155/72 (17 Mar 2023 08:20) (155/72 - 174/77)  BP(mean): 106 (17 Mar 2023 02:03) (106 - 106)  RR: 18 (17 Mar 2023 08:20) (17 - 18)  SpO2: 96% (17 Mar 2023 08:20) (95% - 96%)    Parameters below as of 17 Mar 2023 08:20  Patient On (Oxygen Delivery Method): room air    PHYSICAL EXAM:  GENERAL: NAD, well-groomed, well-developed  HEAD:  Atraumatic, Normocephalic  EYES: EOMI, PERRLA, conjunctiva and sclera clear  ENMT: No tonsillar erythema, exudates, or enlargement; Moist mucous membranes, Good dentition, No lesions  NECK: Supple, No JVD, Normal thyroid  NERVOUS SYSTEM:  Alert & Oriented X3, Good concentration; Motor Strength 5/5 B/L upper and lower extremities; DTRs 2+ intact and symmetric  CHEST/LUNG: Clear to percussion bilaterally; No rales, rhonchi, wheezing, or rubs  HEART: Regular rate and rhythm; No murmurs, rubs, or gallops  ABDOMEN: Soft, Nontender, Nondistended; Bowel sounds present  EXTREMITIES:  2+ Peripheral Pulses, No clubbing, cyanosis, or edema  LYMPH: No lymphadenopathy noted  SKIN: No rashes or lesions    Consultant(s) Notes Reviewed:  [x ] YES  [ ] NO  Care Discussed with Consultants/Other Providers [ x] YES  [ ] NO    LABS:                        11.3   5.34  )-----------( 147      ( 17 Mar 2023 10:04 )             35.2     03-17    134<L>  |  98  |  23  ----------------------------<  131<H>  4.3   |  28  |  3.89<H>    Ca    8.0<L>      17 Mar 2023 08:14  Mg     1.8     03-17    TPro  5.1<L>  /  Alb  2.8<L>  /  TBili  1.0  /  DBili  x   /  AST  15  /  ALT  11  /  AlkPhos  141<H>  03-17    PT/INR - ( 17 Mar 2023 08:14 )   PT: 13.1 sec;   INR: 1.10          PTT - ( 17 Mar 2023 08:14 )  PTT:30.3 sec    LIVER FUNCTIONS - ( 17 Mar 2023 08:14 )  Alb: 2.8 g/dL / Pro: 5.1 g/dL / ALK PHOS: 141 U/L / ALT: 11 U/L / AST: 15 U/L / GGT: x             RADIOLOGY & ADDITIONAL TESTS:    Imaging Personally Reviewed:  [ ] YES  [ ] NO CONSULT NOTE - INTERNAL MEDICINE  *INCOMPLETE UNTIL DISCUSSED WITH MEDICINE ATTENDING*    HPI:  61yo M PMHx HTN, HLD, DM, CAD(s/p cath years ago, RCA 99%, never intervened on), HFrEF 25-30%, ESRD s/p failed kidney transplant (last HD 3/1 via Left Arm AVF; HD TTS), Right AKA presented to [facility] 2/27 for respiratory distress after a dialysis session and admitted to cardiac telemetry on 2/27 with SIRS criteria. Placed on vanc/zosyn. Pt was placed on BiPAP which failed, where he was then intubated on 3/1 and moved to the MICU. He went into cardiac arrest (RoSC achieved after 8mins). Went into Vtach arrest, was placed on amio and pressors, HR ellen into 30s and atropine given twice. Weaned off levophed and sedation and extubated 3/2. Pt developed coffee ground emesis, hemoccult positive, with Hgb dipping to 3.5; he then received 7units PRBCs, 1Unit FFP, 1 unit donor packed platelets. EGD and colonoscopy showing melanosis duodenii but no acute bleed. BP dropped again, transferred to ICU and started on levophed gtt. CTA Abdomen showing no active bleeding into GI lumen, possible focal proctitis; Colonoscopy performed showing rectal ulcer. Started on hydrocortisone suppository and mesalamine. Hgb prior transfer stable in 11s (per handoff), transferred to [facility] for ICD placement 2/2 Vtach and cardiac cath.     Medicaine consulted for comanagement. Patient has no complaints, Patient denies h/n/v/d, fever, chills, cp, palpitations, sob, abd pain, leg swelling, rashes, dysuria.       PAST MEDICAL & SURGICAL HISTORY:  HTN (hypertension)  HLD (hyperlipidemia)  DM (diabetes mellitus)  GERD (gastroesophageal reflux disease)  ESRD on dialysis  NSTEMI (non-ST elevation myocardial infarction)  CAD (coronary artery disease)  LGIB    REVIEW OF SYSTEMS:  CONSTITUTIONAL: No fever, weight loss, or fatigue  EYES: No eye pain, visual disturbances, or discharge  ENMT:  No difficulty hearing, tinnitus, vertigo; No sinus or throat pain  NECK: No pain or stiffness  BREASTS: No pain, masses, or nipple discharge  RESPIRATORY: No cough, wheezing, chills or hemoptysis; No shortness of breath  CARDIOVASCULAR: No chest pain, palpitations, dizziness, or leg swelling  GASTROINTESTINAL: No abdominal or epigastric pain. No nausea, vomiting, or hematemesis; No diarrhea or constipation. No melena or hematochezia.  GENITOURINARY: No dysuria, frequency, hematuria, or incontinence  NEUROLOGICAL: No headaches, memory loss, loss of strength, numbness, or tremors  SKIN: No itching, burning, rashes, or lesions   LYMPH NODES: No enlarged glands  ENDOCRINE: No heat or cold intolerance; No hair loss  MUSCULOSKELETAL: No joint pain or swelling; No muscle, back, or extremity pain  PSYCHIATRIC: No depression, anxiety, mood swings, or difficulty sleeping  HEME/LYMPH: No easy bruising, or bleeding gums  ALLERY AND IMMUNOLOGIC: No hives or eczema    T(C): 37.2 (03-17-23 @ 09:29), Max: 37.2 (03-17-23 @ 09:29)  HR: 67 (03-17-23 @ 08:20) (64 - 67)  BP: 155/72 (03-17-23 @ 08:20) (155/72 - 174/77)  RR: 18 (03-17-23 @ 08:20) (17 - 18)  SpO2: 96% (03-17-23 @ 08:20) (95% - 96%)  Wt(kg): --Vital Signs Last 24 Hrs  T(C): 37.2 (17 Mar 2023 09:29), Max: 37.2 (17 Mar 2023 09:29)  T(F): 99 (17 Mar 2023 09:29), Max: 99 (17 Mar 2023 09:29)  HR: 67 (17 Mar 2023 08:20) (64 - 67)  BP: 155/72 (17 Mar 2023 08:20) (155/72 - 174/77)  BP(mean): 106 (17 Mar 2023 02:03) (106 - 106)  RR: 18 (17 Mar 2023 08:20) (17 - 18)  SpO2: 96% (17 Mar 2023 08:20) (95% - 96%)    Parameters below as of 17 Mar 2023 08:20  Patient On (Oxygen Delivery Method): room air    PHYSICAL EXAM:  GENERAL: NAD, well-groomed, well-developed  HEAD:  Atraumatic, Normocephalic  EYES: EOMI, PERRLA, conjunctiva and sclera clear  ENMT: No tonsillar erythema, exudates, or enlargement; Moist mucous membranes, Good dentition, No lesions  NECK: Supple, No JVD, Normal thyroid  NERVOUS SYSTEM:  Alert & Oriented X3, Good concentration; Motor Strength 5/5 B/L upper and lower extremities; DTRs 2+ intact and symmetric  CHEST/LUNG: Clear to percussion bilaterally; No rales, rhonchi, wheezing, or rubs  HEART: Regular rate and rhythm; No murmurs, rubs, or gallops  ABDOMEN: Soft, Nontender, Nondistended; Bowel sounds present  EXTREMITIES:  right AKA, 2+ Peripheral Pulses, No clubbing, cyanosis, or edema  LYMPH: No lymphadenopathy noted  SKIN: No rashes or lesions    Consultant(s) Notes Reviewed:  [x ] YES  [ ] NO  Care Discussed with Consultants/Other Providers [ x] YES  [ ] NO    LABS:                        11.3   5.34  )-----------( 147      ( 17 Mar 2023 10:04 )             35.2     03-17    134<L>  |  98  |  23  ----------------------------<  131<H>  4.3   |  28  |  3.89<H>    Ca    8.0<L>      17 Mar 2023 08:14  Mg     1.8     03-17    TPro  5.1<L>  /  Alb  2.8<L>  /  TBili  1.0  /  DBili  x   /  AST  15  /  ALT  11  /  AlkPhos  141<H>  03-17    PT/INR - ( 17 Mar 2023 08:14 )   PT: 13.1 sec;   INR: 1.10          PTT - ( 17 Mar 2023 08:14 )  PTT:30.3 sec    LIVER FUNCTIONS - ( 17 Mar 2023 08:14 )  Alb: 2.8 g/dL / Pro: 5.1 g/dL / ALK PHOS: 141 U/L / ALT: 11 U/L / AST: 15 U/L / GGT: x             RADIOLOGY & ADDITIONAL TESTS:    Imaging Personally Reviewed:  [ ] YES  [ ] NO CONSULT NOTE - INTERNAL MEDICINE  *INCOMPLETE UNTIL DISCUSSED WITH MEDICINE ATTENDING*    HPI:  61yo M PMHx HTN, HLD, DM, CAD(s/p cath years ago, RCA 99%, never intervened on), HFrEF 25-30%, ESRD s/p failed kidney transplant (last HD 3/1 via Left Arm AVF; HD TTS), Right AKA presented to [facility] 2/27 for respiratory distress after a dialysis session and admitted to cardiac telemetry on 2/27 with SIRS criteria. Placed on vanc/zosyn. Pt was placed on BiPAP which failed, where he was then intubated on 3/1 and moved to the MICU. He went into cardiac arrest (RoSC achieved after 8mins). Went into Vtach arrest, was placed on amio and pressors, HR ellen into 30s and atropine given twice. Weaned off levophed and sedation and extubated 3/2. Pt developed coffee ground emesis, hemoccult positive, with Hgb dipping to 3.5; he then received 7units PRBCs, 1Unit FFP, 1 unit donor packed platelets. EGD and colonoscopy showing melanosis duodenii but no acute bleed. BP dropped again, transferred to ICU and started on levophed gtt. CTA Abdomen showing no active bleeding into GI lumen, possible focal proctitis; Colonoscopy performed showing rectal ulcer. Started on hydrocortisone suppository and mesalamine. Hgb prior transfer stable in 11s (per handoff), transferred to [facility] for ICD placement 2/2 Vtach and cardiac cath.     Medicaine consulted for comanagement. Patient has no complaints, Patient denies h/n/v/d, fever, chills, cp, palpitations, sob, abd pain, leg swelling, rashes, dysuria.       PAST MEDICAL & SURGICAL HISTORY:  HTN (hypertension)  HLD (hyperlipidemia)  DM (diabetes mellitus)  GERD (gastroesophageal reflux disease)  ESRD on dialysis  NSTEMI (non-ST elevation myocardial infarction)  CAD (coronary artery disease)  LGIB    REVIEW OF SYSTEMS:  CONSTITUTIONAL: No fever, weight loss, or fatigue  EYES: No eye pain, visual disturbances, or discharge  ENMT:  No difficulty hearing, tinnitus, vertigo; No sinus or throat pain  NECK: No pain or stiffness  BREASTS: No pain, masses, or nipple discharge  RESPIRATORY: No cough, wheezing, chills or hemoptysis; No shortness of breath  CARDIOVASCULAR: No chest pain, palpitations, dizziness, or leg swelling  GASTROINTESTINAL: No abdominal or epigastric pain. No nausea, vomiting, or hematemesis; No diarrhea or constipation. No melena or hematochezia.  GENITOURINARY: No dysuria, frequency, hematuria, or incontinence  NEUROLOGICAL: No headaches, memory loss, loss of strength, numbness, or tremors  SKIN: No itching, burning, rashes, or lesions   LYMPH NODES: No enlarged glands  ENDOCRINE: No heat or cold intolerance; No hair loss  MUSCULOSKELETAL: No joint pain or swelling; No muscle, back, or extremity pain  PSYCHIATRIC: No depression, anxiety, mood swings, or difficulty sleeping  HEME/LYMPH: No easy bruising, or bleeding gums  ALLERY AND IMMUNOLOGIC: No hives or eczema    T(C): 37.2 (03-17-23 @ 09:29), Max: 37.2 (03-17-23 @ 09:29)  HR: 67 (03-17-23 @ 08:20) (64 - 67)  BP: 155/72 (03-17-23 @ 08:20) (155/72 - 174/77)  RR: 18 (03-17-23 @ 08:20) (17 - 18)  SpO2: 96% (03-17-23 @ 08:20) (95% - 96%)  Wt(kg): --Vital Signs Last 24 Hrs  T(C): 37.2 (17 Mar 2023 09:29), Max: 37.2 (17 Mar 2023 09:29)  T(F): 99 (17 Mar 2023 09:29), Max: 99 (17 Mar 2023 09:29)  HR: 67 (17 Mar 2023 08:20) (64 - 67)  BP: 155/72 (17 Mar 2023 08:20) (155/72 - 174/77)  BP(mean): 106 (17 Mar 2023 02:03) (106 - 106)  RR: 18 (17 Mar 2023 08:20) (17 - 18)  SpO2: 96% (17 Mar 2023 08:20) (95% - 96%)    Parameters below as of 17 Mar 2023 08:20  Patient On (Oxygen Delivery Method): room air    PHYSICAL EXAM:  GENERAL: NAD, well-groomed, well-developed  HEAD:  Atraumatic, Normocephalic  EYES: EOMI, PERRLA, conjunctiva and sclera clear  ENMT: No tonsillar erythema, exudates, or enlargement; Moist mucous membranes, Good dentition, No lesions  NECK: Supple, No JVD, Normal thyroid  NERVOUS SYSTEM:  Alert & Oriented X3, Good concentration; Motor Strength 5/5 B/L upper and lower extremities; DTRs 2+ intact and symmetric  CHEST/LUNG: Clear to percussion bilaterally; No rales, rhonchi, wheezing, or rubs  HEART: Regular rate and rhythm; No murmurs, rubs, or gallops  ABDOMEN: Soft, Nontender, Nondistended; Bowel sounds present  EXTREMITIES:  right AKA, 2+ Peripheral Pulses, No clubbing, cyanosis, or edema  LYMPH: No lymphadenopathy noted  SKIN: No rashes or lesions    Consultant(s) Notes Reviewed:  [x ] YES  [ ] NO  Care Discussed with Consultants/Other Providers [ x] YES  [ ] NO    LABS:                        11.3   5.34  )-----------( 147      ( 17 Mar 2023 10:04 )             35.2     03-17    134<L>  |  98  |  23  ----------------------------<  131<H>  4.3   |  28  |  3.89<H>    Ca    8.0<L>      17 Mar 2023 08:14  Mg     1.8     03-17    TPro  5.1<L>  /  Alb  2.8<L>  /  TBili  1.0  /  DBili  x   /  AST  15  /  ALT  11  /  AlkPhos  141<H>  03-17    PT/INR - ( 17 Mar 2023 08:14 )   PT: 13.1 sec;   INR: 1.10          PTT - ( 17 Mar 2023 08:14 )  PTT:30.3 sec    LIVER FUNCTIONS - ( 17 Mar 2023 08:14 )  Alb: 2.8 g/dL / Pro: 5.1 g/dL / ALK PHOS: 141 U/L / ALT: 11 U/L / AST: 15 U/L / GGT: x             RADIOLOGY & ADDITIONAL TESTS:    Imaging Personally Reviewed:  [ ] YES  [ ] NO CONSULT NOTE - INTERNAL MEDICINE  *INCOMPLETE UNTIL DISCUSSED WITH MEDICINE ATTENDING*    HPI:  63yo M PMHx HTN, HLD, DM, CAD(s/p cath years ago, RCA 99%, never intervened on), HFrEF 25-30%, ESRD s/p failed kidney transplant (last HD 3/1 via Left Arm AVF; HD TTS), Right AKA presented to [facility] 2/27 for respiratory distress after a dialysis session and admitted to cardiac telemetry on 2/27 with SIRS criteria. Placed on vanc/zosyn. Pt was placed on BiPAP which failed, where he was then intubated on 3/1 and moved to the MICU. He went into cardiac arrest (RoSC achieved after 8mins). Went into Vtach arrest, was placed on amio and pressors, HR ellen into 30s and atropine given twice. Weaned off levophed and sedation and extubated 3/2. Pt developed coffee ground emesis, hemoccult positive, with Hgb dipping to 3.5; he then received 7units PRBCs, 1Unit FFP, 1 unit donor packed platelets. EGD and colonoscopy showing melanosis duodenii but no acute bleed. BP dropped again, transferred to ICU and started on levophed gtt. CTA Abdomen showing no active bleeding into GI lumen, possible focal proctitis; Colonoscopy performed showing rectal ulcer. Started on hydrocortisone suppository and mesalamine. Hgb prior transfer stable in 11s (per handoff), transferred to [facility] for ICD placement 2/2 Vtach and cardiac cath.     Medicaine consulted for comanagement. Patient has no complaints, Patient denies h/n/v/d, fever, chills, cp, palpitations, sob, abd pain, leg swelling, rashes, dysuria.       PAST MEDICAL & SURGICAL HISTORY:  HTN (hypertension)  HLD (hyperlipidemia)  DM (diabetes mellitus)  GERD (gastroesophageal reflux disease)  ESRD on dialysis  NSTEMI (non-ST elevation myocardial infarction)  CAD (coronary artery disease)  LGIB    REVIEW OF SYSTEMS:  CONSTITUTIONAL: No fever, weight loss, or fatigue  EYES: No eye pain, visual disturbances, or discharge  ENMT:  No difficulty hearing, tinnitus, vertigo; No sinus or throat pain  NECK: No pain or stiffness  BREASTS: No pain, masses, or nipple discharge  RESPIRATORY: No cough, wheezing, chills or hemoptysis; No shortness of breath  CARDIOVASCULAR: No chest pain, palpitations, dizziness, or leg swelling  GASTROINTESTINAL: No abdominal or epigastric pain. No nausea, vomiting, or hematemesis; No diarrhea or constipation. No melena or hematochezia.  GENITOURINARY: No dysuria, frequency, hematuria, or incontinence  NEUROLOGICAL: No headaches, memory loss, loss of strength, numbness, or tremors  SKIN: No itching, burning, rashes, or lesions   LYMPH NODES: No enlarged glands  ENDOCRINE: No heat or cold intolerance; No hair loss  MUSCULOSKELETAL: No joint pain or swelling; No muscle, back, or extremity pain  PSYCHIATRIC: No depression, anxiety, mood swings, or difficulty sleeping  HEME/LYMPH: No easy bruising, or bleeding gums  ALLERY AND IMMUNOLOGIC: No hives or eczema    T(C): 37.2 (03-17-23 @ 09:29), Max: 37.2 (03-17-23 @ 09:29)  HR: 67 (03-17-23 @ 08:20) (64 - 67)  BP: 155/72 (03-17-23 @ 08:20) (155/72 - 174/77)  RR: 18 (03-17-23 @ 08:20) (17 - 18)  SpO2: 96% (03-17-23 @ 08:20) (95% - 96%)  Wt(kg): --Vital Signs Last 24 Hrs  T(C): 37.2 (17 Mar 2023 09:29), Max: 37.2 (17 Mar 2023 09:29)  T(F): 99 (17 Mar 2023 09:29), Max: 99 (17 Mar 2023 09:29)  HR: 67 (17 Mar 2023 08:20) (64 - 67)  BP: 155/72 (17 Mar 2023 08:20) (155/72 - 174/77)  BP(mean): 106 (17 Mar 2023 02:03) (106 - 106)  RR: 18 (17 Mar 2023 08:20) (17 - 18)  SpO2: 96% (17 Mar 2023 08:20) (95% - 96%)    Parameters below as of 17 Mar 2023 08:20  Patient On (Oxygen Delivery Method): room air    PHYSICAL EXAM:  GENERAL: NAD, well-groomed, well-developed  HEAD:  Atraumatic, Normocephalic  EYES: EOMI, PERRLA, conjunctiva and sclera clear  ENMT: No tonsillar erythema, exudates, or enlargement; Moist mucous membranes, Good dentition, No lesions  NECK: Supple, No JVD, Normal thyroid  NERVOUS SYSTEM:  Alert & Oriented X3, Good concentration; Motor Strength 5/5 B/L upper and lower extremities; DTRs 2+ intact and symmetric  CHEST/LUNG: Clear to percussion bilaterally; No rales, rhonchi, wheezing, or rubs  HEART: Regular rate and rhythm; No murmurs, rubs, or gallops  ABDOMEN: Soft, Nontender, Nondistended; Bowel sounds present  EXTREMITIES:  right AKA, 2+ Peripheral Pulses, No clubbing, cyanosis, or edema  LYMPH: No lymphadenopathy noted  SKIN: No rashes or lesions    Consultant(s) Notes Reviewed:  [x ] YES  [ ] NO  Care Discussed with Consultants/Other Providers [ x] YES  [ ] NO    LABS:                        11.3   5.34  )-----------( 147      ( 17 Mar 2023 10:04 )             35.2     03-17    134<L>  |  98  |  23  ----------------------------<  131<H>  4.3   |  28  |  3.89<H>    Ca    8.0<L>      17 Mar 2023 08:14  Mg     1.8     03-17    TPro  5.1<L>  /  Alb  2.8<L>  /  TBili  1.0  /  DBili  x   /  AST  15  /  ALT  11  /  AlkPhos  141<H>  03-17    PT/INR - ( 17 Mar 2023 08:14 )   PT: 13.1 sec;   INR: 1.10          PTT - ( 17 Mar 2023 08:14 )  PTT:30.3 sec    LIVER FUNCTIONS - ( 17 Mar 2023 08:14 )  Alb: 2.8 g/dL / Pro: 5.1 g/dL / ALK PHOS: 141 U/L / ALT: 11 U/L / AST: 15 U/L / GGT: x             RADIOLOGY & ADDITIONAL TESTS:    Imaging Personally Reviewed:  [ ] YES  [ ] NO

## 2023-03-17 NOTE — CONSULT NOTE ADULT - SUBJECTIVE AND OBJECTIVE BOX
HPI  61yo M PMHx HTN, HLD, DM,  Ischemic cardiomyopathy (s/p cath years ago, RCA 99%, never intervened on), HFrEF 25-30%, ESRD s/p failed kidney transplant (last HD 3/1 via Left Arm AVF; HD TTS), Right AKA initially presented to [facility] 2/27 for respiratory distress after a dialysis session and admitted to cardiac telemetry on 2/27 with SIRS criteria. Placed on vanc/zosyn. Pt was placed on BiPAP which failed, where he was then intubated on 3/1 and moved to the MICU. He went into cardiac arrest (RoSC achieved after 8mins). Went into Vtach arrest, was placed on amio and pressors, HR ellen into 30s and atropine given twice. Weaned off levophed and sedation and extubated 3/2. Pt developed coffee ground emesis, hemoccult positive, with Hgb dipping to 3.5; he then received 7units PRBCs, 1Unit FFP, 1 unit donor packed platelets. EGD and colonoscopy showing melanosis duodenii but no acute bleed. BP dropped again, transferred to ICU and started on levophed gtt. CTA Abdomen showing no active bleeding into GI lumen, possible focal proctitis; colonoscopy showing rectal ulcer. Started on hydrocortisone suppository and mesalamine. Patient transferred to [facility] for cardiology to place ICD. Hgb reported stable in 11s at time of transfer.     At [facility], patient was reported to have large volume of blood per rectum by nursing early am 3/17. Surgery consulted for possible packing of known rectal ulcer.    On interview, patient alert and cooperative. No abd pain. no diarrhea, nausea, or vomiting. No chest pain or dyspnea. No headache.    PAST MEDICAL & SURGICAL HISTORY:  HTN (hypertension)      HLD (hyperlipidemia)      DM (diabetes mellitus)      GERD (gastroesophageal reflux disease)      ESRD on dialysis      NSTEMI (non-ST elevation myocardial infarction)      CAD (coronary artery disease)          MEDICATIONS  (STANDING):  amLODIPine   Tablet 10 milliGRAM(s) Oral daily  atorvastatin 40 milliGRAM(s) Oral at bedtime  calcium acetate 667 milliGRAM(s) Oral three times a day with meals  ferrous    sulfate 325 milliGRAM(s) Oral daily  hydrALAZINE 50 milliGRAM(s) Oral every 8 hours  isosorbide   dinitrate Tablet (ISORDIL) 20 milliGRAM(s) Oral three times a day  losartan 100 milliGRAM(s) Oral daily  pantoprazole    Tablet 40 milliGRAM(s) Oral before breakfast  polyethylene glycol 3350 17 Gram(s) Oral daily  sevelamer carbonate 800 milliGRAM(s) Oral three times a day with meals  tacrolimus 2 milliGRAM(s) Oral once  tamsulosin 0.4 milliGRAM(s) Oral at bedtime    MEDICATIONS  (PRN):      Allergies    No Known Allergies    Intolerances        SOCIAL HISTORY:    FAMILY HISTORY:          Physical Exam:  General: NAD, resting comfortably  HEENT: mmm  Pulmonary: normal resp effort, ra  Cardiovascular: rrr  Abdominal: soft, ND/NT  Rectal: wet, dark blood around anus. Stool in rectal vault, no masses appreciated. dark red blood on withdrawal.  Extremities: s/p aka  Neuro: no focal deficits      Vital Signs Last 24 Hrs  T(C): 37.1 (17 Mar 2023 05:14), Max: 37.1 (17 Mar 2023 05:14)  T(F): 98.8 (17 Mar 2023 05:14), Max: 98.8 (17 Mar 2023 05:14)  HR: 64 (17 Mar 2023 02:03) (64 - 64)  BP: 171/77 (17 Mar 2023 04:15) (160/74 - 171/77)  BP(mean): 106 (17 Mar 2023 02:03) (106 - 106)  RR: 17 (17 Mar 2023 04:15) (17 - 18)  SpO2: 96% (17 Mar 2023 02:03) (96% - 96%)    Parameters below as of 17 Mar 2023 02:03  Patient On (Oxygen Delivery Method): room air        I&O's Summary          LABS:                        10.8   5.13  )-----------( 155      ( 17 Mar 2023 06:45 )             33.1     03-17    134<L>  |  98  |  23  ----------------------------<  138<H>  4.2   |  29  |  3.83<H>    Ca    7.9<L>      17 Mar 2023 06:45    TPro  5.1<L>  /  Alb  2.9<L>  /  TBili  1.0  /  DBili  x   /  AST  15  /  ALT  10  /  AlkPhos  141<H>  03-17    PT/INR - ( 17 Mar 2023 06:45 )   PT: 13.2 sec;   INR: 1.11          PTT - ( 17 Mar 2023 06:45 )  PTT:28.8 sec    CAPILLARY BLOOD GLUCOSE        LIVER FUNCTIONS - ( 17 Mar 2023 06:45 )  Alb: 2.9 g/dL / Pro: 5.1 g/dL / ALK PHOS: 141 U/L / ALT: 10 U/L / AST: 15 U/L / GGT: x             Cultures:      RADIOLOGY & ADDITIONAL STUDIES:           HPI  61yo M PMHx HTN, HLD, DM,  Ischemic cardiomyopathy (s/p cath years ago, RCA 99%, never intervened on), HFrEF 25-30%, ESRD s/p failed kidney transplant (last HD 3/1 via Left Arm AVF; HD TTS), Right AKA initially presented to [facility] 2/27 for respiratory distress after a dialysis session and admitted to cardiac telemetry on 2/27 with SIRS criteria. Placed on vanc/zosyn. Pt was placed on BiPAP which failed, where he was then intubated on 3/1 and moved to the MICU. He went into cardiac arrest (RoSC achieved after 8mins). Went into Vtach arrest, was placed on amio and pressors, HR ellen into 30s and atropine given twice. Weaned off levophed and sedation and extubated 3/2. Pt developed coffee ground emesis, hemoccult positive, with Hgb dipping to 3.5; he then received 7units PRBCs, 1Unit FFP, 1 unit donor packed platelets. EGD and colonoscopy showing melanosis duodenii but no acute bleed. BP dropped again, transferred to ICU and started on levophed gtt. CTA Abdomen showing no active bleeding into GI lumen, possible focal proctitis; colonoscopy showing rectal ulcer. Started on hydrocortisone suppository and mesalamine. Patient transferred to [facility] for cardiology to place ICD. Hgb reported stable in 11s at time of transfer.     At [facility], patient was reported to have large volume of blood per rectum by nursing early am 3/17. Surgery consulted for possible packing of known rectal ulcer.    On interview, patient alert and cooperative. No abd pain. no diarrhea, nausea, or vomiting. No chest pain or dyspnea. No headache.    PAST MEDICAL & SURGICAL HISTORY:  HTN (hypertension)      HLD (hyperlipidemia)      DM (diabetes mellitus)      GERD (gastroesophageal reflux disease)      ESRD on dialysis      NSTEMI (non-ST elevation myocardial infarction)      CAD (coronary artery disease)          MEDICATIONS  (STANDING):  amLODIPine   Tablet 10 milliGRAM(s) Oral daily  atorvastatin 40 milliGRAM(s) Oral at bedtime  calcium acetate 667 milliGRAM(s) Oral three times a day with meals  ferrous    sulfate 325 milliGRAM(s) Oral daily  hydrALAZINE 50 milliGRAM(s) Oral every 8 hours  isosorbide   dinitrate Tablet (ISORDIL) 20 milliGRAM(s) Oral three times a day  losartan 100 milliGRAM(s) Oral daily  pantoprazole    Tablet 40 milliGRAM(s) Oral before breakfast  polyethylene glycol 3350 17 Gram(s) Oral daily  sevelamer carbonate 800 milliGRAM(s) Oral three times a day with meals  tacrolimus 2 milliGRAM(s) Oral once  tamsulosin 0.4 milliGRAM(s) Oral at bedtime    MEDICATIONS  (PRN):      Allergies    No Known Allergies    Intolerances        SOCIAL HISTORY:    FAMILY HISTORY:          Physical Exam:  General: NAD, resting comfortably  HEENT: mmm  Pulmonary: normal resp effort, ra  Cardiovascular: rrr  Abdominal: soft, ND/NT  Rectal: wet, dark blood around anus. Stool in rectal vault, no masses appreciated. dark red blood on withdrawal.  Extremities: s/p aka  Neuro: no focal deficits      Vital Signs Last 24 Hrs  T(C): 37.1 (17 Mar 2023 05:14), Max: 37.1 (17 Mar 2023 05:14)  T(F): 98.8 (17 Mar 2023 05:14), Max: 98.8 (17 Mar 2023 05:14)  HR: 64 (17 Mar 2023 02:03) (64 - 64)  BP: 171/77 (17 Mar 2023 04:15) (160/74 - 171/77)  BP(mean): 106 (17 Mar 2023 02:03) (106 - 106)  RR: 17 (17 Mar 2023 04:15) (17 - 18)  SpO2: 96% (17 Mar 2023 02:03) (96% - 96%)    Parameters below as of 17 Mar 2023 02:03  Patient On (Oxygen Delivery Method): room air        I&O's Summary          LABS:                        10.8   5.13  )-----------( 155      ( 17 Mar 2023 06:45 )             33.1     03-17    134<L>  |  98  |  23  ----------------------------<  138<H>  4.2   |  29  |  3.83<H>    Ca    7.9<L>      17 Mar 2023 06:45    TPro  5.1<L>  /  Alb  2.9<L>  /  TBili  1.0  /  DBili  x   /  AST  15  /  ALT  10  /  AlkPhos  141<H>  03-17    PT/INR - ( 17 Mar 2023 06:45 )   PT: 13.2 sec;   INR: 1.11          PTT - ( 17 Mar 2023 06:45 )  PTT:28.8 sec    CAPILLARY BLOOD GLUCOSE        LIVER FUNCTIONS - ( 17 Mar 2023 06:45 )  Alb: 2.9 g/dL / Pro: 5.1 g/dL / ALK PHOS: 141 U/L / ALT: 10 U/L / AST: 15 U/L / GGT: x             Cultures:      RADIOLOGY & ADDITIONAL STUDIES:           HPI  63yo M PMHx HTN, HLD, DM,  Ischemic cardiomyopathy (s/p cath years ago, RCA 99%, never intervened on), HFrEF 25-30%, ESRD s/p failed kidney transplant (last HD 3/1 via Left Arm AVF; HD TTS), Right AKA initially presented to [facility] 2/27 for respiratory distress after a dialysis session and admitted to cardiac telemetry on 2/27 with SIRS criteria. Placed on vanc/zosyn. Pt was placed on BiPAP which failed, where he was then intubated on 3/1 and moved to the MICU. He went into cardiac arrest (RoSC achieved after 8mins). Went into Vtach arrest, was placed on amio and pressors, HR ellen into 30s and atropine given twice. Weaned off levophed and sedation and extubated 3/2. Pt developed coffee ground emesis, hemoccult positive, with Hgb dipping to 3.5; he then received 7units PRBCs, 1Unit FFP, 1 unit donor packed platelets. EGD and colonoscopy showing melanosis duodenii but no acute bleed. BP dropped again, transferred to ICU and started on levophed gtt. CTA Abdomen showing no active bleeding into GI lumen, possible focal proctitis; colonoscopy showing rectal ulcer. Started on hydrocortisone suppository and mesalamine. Patient transferred to [facility] for cardiology to place ICD. Hgb reported stable in 11s at time of transfer.     At [facility], patient was reported to have large volume of blood per rectum by nursing early am 3/17. Surgery consulted for possible packing of known rectal ulcer.    On interview, patient alert and cooperative. No abd pain. no diarrhea, nausea, or vomiting. No chest pain or dyspnea. No headache.    PAST MEDICAL & SURGICAL HISTORY:  HTN (hypertension)      HLD (hyperlipidemia)      DM (diabetes mellitus)      GERD (gastroesophageal reflux disease)      ESRD on dialysis      NSTEMI (non-ST elevation myocardial infarction)      CAD (coronary artery disease)          MEDICATIONS  (STANDING):  amLODIPine   Tablet 10 milliGRAM(s) Oral daily  atorvastatin 40 milliGRAM(s) Oral at bedtime  calcium acetate 667 milliGRAM(s) Oral three times a day with meals  ferrous    sulfate 325 milliGRAM(s) Oral daily  hydrALAZINE 50 milliGRAM(s) Oral every 8 hours  isosorbide   dinitrate Tablet (ISORDIL) 20 milliGRAM(s) Oral three times a day  losartan 100 milliGRAM(s) Oral daily  pantoprazole    Tablet 40 milliGRAM(s) Oral before breakfast  polyethylene glycol 3350 17 Gram(s) Oral daily  sevelamer carbonate 800 milliGRAM(s) Oral three times a day with meals  tacrolimus 2 milliGRAM(s) Oral once  tamsulosin 0.4 milliGRAM(s) Oral at bedtime    MEDICATIONS  (PRN):      Allergies    No Known Allergies    Intolerances        SOCIAL HISTORY:    FAMILY HISTORY:          Physical Exam:  General: NAD, resting comfortably  HEENT: mmm  Pulmonary: normal resp effort, ra  Cardiovascular: rrr  Abdominal: soft, ND/NT  Rectal: wet, dark blood around anus. Stool in rectal vault, no masses appreciated. dark red blood on withdrawal.  Extremities: s/p aka  Neuro: no focal deficits      Vital Signs Last 24 Hrs  T(C): 37.1 (17 Mar 2023 05:14), Max: 37.1 (17 Mar 2023 05:14)  T(F): 98.8 (17 Mar 2023 05:14), Max: 98.8 (17 Mar 2023 05:14)  HR: 64 (17 Mar 2023 02:03) (64 - 64)  BP: 171/77 (17 Mar 2023 04:15) (160/74 - 171/77)  BP(mean): 106 (17 Mar 2023 02:03) (106 - 106)  RR: 17 (17 Mar 2023 04:15) (17 - 18)  SpO2: 96% (17 Mar 2023 02:03) (96% - 96%)    Parameters below as of 17 Mar 2023 02:03  Patient On (Oxygen Delivery Method): room air        I&O's Summary          LABS:                        10.8   5.13  )-----------( 155      ( 17 Mar 2023 06:45 )             33.1     03-17    134<L>  |  98  |  23  ----------------------------<  138<H>  4.2   |  29  |  3.83<H>    Ca    7.9<L>      17 Mar 2023 06:45    TPro  5.1<L>  /  Alb  2.9<L>  /  TBili  1.0  /  DBili  x   /  AST  15  /  ALT  10  /  AlkPhos  141<H>  03-17    PT/INR - ( 17 Mar 2023 06:45 )   PT: 13.2 sec;   INR: 1.11          PTT - ( 17 Mar 2023 06:45 )  PTT:28.8 sec    CAPILLARY BLOOD GLUCOSE        LIVER FUNCTIONS - ( 17 Mar 2023 06:45 )  Alb: 2.9 g/dL / Pro: 5.1 g/dL / ALK PHOS: 141 U/L / ALT: 10 U/L / AST: 15 U/L / GGT: x             Cultures:      RADIOLOGY & ADDITIONAL STUDIES:

## 2023-03-17 NOTE — H&P ADULT - NEUROLOGICAL
" SLEEP STUDY INTERPRETATION  DIAGNOSTIC POLYSOMNOGRAPHY REPORT      Patient: MC WHITEHEAD  YOB: 1986  Study Date: 11/25/2019  MRN: [id number]  Referring Provider: Ede Nguyen MD  Ordering Provider: Dr. Giraldo      Indications for Polysomnography: The patient is a 33 y old Male who is 5' 10\" and weighs 173.0 lbs. His BMI is 25.0, Benezett sleepiness scale 9.0 and neck circumference is 38.0 cm. A diagnostic polysomnogram was performed to evaluate for Snoring, witnessed apneas, fatigue/sleepiness/unrefreshing sleep (ESS 9)       Polysomnogram Data: A full night polysomnogram recorded the standard physiologic parameters including EEG, EOG, EMG, ECG, nasal and oral airflow. Respiratory parameters of chest and abdominal movements were recorded with respiratory inductance plethysmography. Oxygen saturation was recorded by pulse oximetry. Hypopnea scoring rule used: 1A 3%.      Sleep Architecture:   The total recording time of the polysomnogram was 440.3 minutes. The total sleep time was 366.0 minutes. Sleep latency was normal at 10.8 minutes with the use of a sleep aid (zolpidem). REM latency was 311.5 minutes. Arousal index was increased at 29.8 arousals per hour. Sleep efficiency was normal at 83.1%. Wake after sleep onset was 59.0 minutes. The patient spent 16.0% of total sleep time in Stage N1, 57.4% in Stage N2, 15.3% in Stage N3, and 11.3% in REM. Time in REM supine was 33.5 minutes.      Respiration: It was frequently difficult to discern RERAs from PLMS with arousals. Scored using 3% rules    Events ? The polysomnogram revealed a presence of 15 obstructive, - central, and - mixed apneas resulting in an apnea index of 2.5 events per hour. There were 53 obstructive hypopneas and - central hypopneas resulting in an obstructive hypopnea index of 8.7 and central hypopnea index of - events per hour. The combined apnea/hypopnea index was 11.1 events per hour (central apnea/hypopnea index was - " events per hour). The REM AHI was 26.0 events per hour. The supine AHI was 14.9 events per hour. The RERA index was 4.6 events per hour.  The RDI was 15.7 events per hourSnoring - was reported as moderate snoring noted while supine    Respiratory rate and pattern - was notable for normal respiratory rate and pattern.    Sustained Sleep Associated Hypoventilation - Transcutaneous carbon dioxide monitoring was not used, however significant hypoventilation was not suggested by oximetry    Sleep Associated Hypoxemia - (Greater than 5 minutes O2 sat at or below 88%) was not present. Baseline oxygen saturation was 96.1%. Lowest oxygen saturation was 85.8%. Time spent less than or equal to 88% was 0.1 minutes. Time spent less than or equal to 89% was 0.5 minutes.      Movement Activity:     Periodic Limb Activity - There were 288 PLMs during the entire study. The PLM index was 47.2 movements per hour. The PLM Arousal Index was 5.9 per hour.    REM EMG Activity - Excessive transient/sustained muscle activity was present.    Nocturnal Behavior - Abnormal sleep related behaviors were not noted     Bruxism - None apparent.      Cardiac Summary:   The average pulse rate was 60.9 bpm. The minimum pulse rate was 48.0 bpm while the maximum pulse rate was 110.0 bpm.  Arrhythmias were not noted.        Assessment:     Mild obstructive sleep apnea    Periodic limb movements of sleep    Evidence for REM without atonia    Recommendations:    Consider repeat polysomnography with full night titration of positive airway pressure therapy for the control of sleep disordered breathing.    Based on the presence of mild obstructive sleep apnea and excessive daytime sleepiness, treatment could be empirically initiated with Auto?titrating PAP therapy with a range of 5 to 18 cmH2O. Recommend clinical follow up with sleep management team.    Patient may be a candidate for dental appliance through referral to Sleep Dentistry for the treatment of  obstructive sleep apnea and/or socially disruptive snoring.    If devices are not acceptable or effective, patient may benefit from evaluation of possible surgical options. If he is interested, would recommend referral to specialized ENT-Sleep provider.    Advice regarding the risks of drowsy driving.    Suggest optimizing sleep schedule and avoiding sleep deprivation.    Weight management (if BMI > 30).    Pharmacologic therapy should be used for management of restless legs syndrome only if present and clinically indicated and not based on the presence of periodic limb movements alone.    Diagnostic Codes:   Obstructive Sleep Apnea G47.33      _____________________________________   Electronically Signed By: José Miguel Giraldo MD 11/26/19           Range(%) Time in range (min)   0.0 - 89.0 0.5   0.0 - 88.0 0.1         Stage Min(mm Hg) Max(mm Hg)   Wake - -   NREM(1+2+3) - -   REM - -       Range(mmHg) Time in range (min)   55.0 - 100.0 -   Excluded data <20.0 & >90.0 440.5        normal/cranial nerves II-XII intact/sensation intact/responds to verbal commands

## 2023-03-17 NOTE — CONSULT NOTE ADULT - ASSESSMENT
63yo M PMHx HTN, HLD, DM,  ICM (s/p cath years ago, RCA 99%, never intervened on), HFrEF 25-30%, ESRD s/p failed kidney transplant (last HD 3/1 via Left Arm AVF; HD TTS), Right AKA with recent admission to [facility] for respiratory failure with course c/b ardiac arrest (RoSC achieved after 8mins)/ Vtach arrest and GIB presumed to be 2/2 rectal ulcer following EGD/Colonoscopy transferred to [facility] for ICD placement with BRBPR this AM    #GIB:  Based on recent endoscopic eval, suspected source is rectal ulcer.  Hgb 10.8 this AM. Hemodynamically stable, though severe underlying CVD.  Not actively bleeding at bedside on eval with surgical team who consult was requested for packing if needed.    Recommendations:  -Lower suspicion for UGIB based on hx/exam though increase Protonix 40 mg IVP q12hrs out of abundance of caution  -Recommend ICU consult given concern for active GIB with underlying CVD to help optimize resuscitation/transfusions as clinically needed  -Ensure access with PIV large bore x2  -Keep NPO  -Tap water enema x2 in anticipation for unsedated flex sig to evaluate and potentially treat rectal ulcers if determined to be source of bleeding    Appreciate cardiology, surgical and ICU team assistance    Gee Thorne DO  Gastroenterology Fellow  Pager: [phone]   61yo M PMHx HTN, HLD, DM,  ICM (s/p cath years ago, RCA 99%, never intervened on), HFrEF 25-30%, ESRD s/p failed kidney transplant (last HD 3/1 via Left Arm AVF; HD TTS), Right AKA with recent admission to [facility] for respiratory failure with course c/b ardiac arrest (RoSC achieved after 8mins)/ Vtach arrest and GIB presumed to be 2/2 rectal ulcer following EGD/Colonoscopy transferred to [facility] for ICD placement with BRBPR this AM    #GIB:  Based on recent endoscopic eval, suspected source is rectal ulcer.  Hgb 10.8 this AM. Hemodynamically stable, though severe underlying CVD.  Not actively bleeding at bedside on eval with surgical team who consult was requested for packing if needed.    Recommendations:  -Lower suspicion for UGIB based on hx/exam though increase Protonix 40 mg IVP q12hrs out of abundance of caution  -Recommend ICU consult given concern for active GIB with underlying CVD to help optimize resuscitation/transfusions as clinically needed  -Ensure access with PIV large bore x2  -Keep NPO  -Tap water enema x2 in anticipation for unsedated flex sig to evaluate and potentially treat rectal ulcers if determined to be source of bleeding    Appreciate cardiology, surgical and ICU team assistance    Gee Thorne DO  Gastroenterology Fellow  Pager: [phone]   63yo M PMHx HTN, HLD, DM,  ICM (s/p cath years ago, RCA 99%, never intervened on), HFrEF 25-30%, ESRD s/p failed kidney transplant (last HD 3/1 via Left Arm AVF; HD TTS), Right AKA with recent admission to [facility] for respiratory failure with course c/b ardiac arrest (RoSC achieved after 8mins)/ Vtach arrest and GIB presumed to be 2/2 rectal ulcer following EGD/Colonoscopy transferred to [facility] for ICD placement with BRBPR this AM    #GIB:  Based on recent endoscopic eval, suspected source is rectal ulcer.  Hgb 10.8 this AM. Hemodynamically stable, though severe underlying CVD.  Not actively bleeding at bedside on eval with surgical team who consult was requested for packing if needed.    Recommendations:  -Lower suspicion for UGIB based on hx/exam though increase Protonix 40 mg IVP q12hrs out of abundance of caution  -Recommend ICU consult given concern for active GIB with underlying CVD to help optimize resuscitation/transfusions as clinically needed  -Ensure access with PIV large bore x2  -Keep NPO  -Tap water enema x2 in anticipation for unsedated flex sig to evaluate and potentially treat rectal ulcers if determined to be source of bleeding    Appreciate cardiology, surgical and ICU team assistance    Gee Thorne DO  Gastroenterology Fellow  Pager: [phone]

## 2023-03-17 NOTE — H&P ADULT - PROBLEM SELECTOR PLAN 7
no meds per [place], f/u lipid panel in AM    F: Oral intake  E: Replete electrolytes as needed for K<4 and Mg<2  N: NPO    DVT PPX: held for cath/ICD placement  Dispo: pending ICD/cardiac cath home med atorva 40  -- f/u AM lipid panel

## 2023-03-17 NOTE — H&P ADULT - NS ATTEND AMEND GEN_ALL_CORE FT
Initial attending contact date 3/17/23     . See PA note written above for details. I reviewed the PA documentation. I have personally seen and examined this patient. I reviewed vitals, labs, medications, cardiac studies, and additional imaging. I agree with the above PA's findings and plans as written above with the following additions/statements.    63yo M PMHx HTN, HLD, DM,  ICM (s/p cath years ago, RCA 99%, never intervened on), HFrEF 25-30%, ESRD s/p failed kidney transplant on HD, Right AKA presented to [facility] 2/27 for respiratory distress after a dialysis session and admitted to cardiac telemetry on 2/27 with SIRS criteria. Placed on vanc/zosyn. Pt was placed on BiPAP which failed, where he was then intubated on 3/1 and moved to the MICU. He went into cardiac arrest (RoSC achieved after 8mins). Went into Vtach arrest, was placed on amio and pressors, HR ellen into 30s and atropine given twice. Weaned off levophed and sedation and extubated 3/2. Pt developed coffee ground emesis, hemoccult positive, with Hgb 3.5 s/p 7units PRBCs, 1Unit FFP, 1 unit donor packed platelets. EGD and colonoscopy showing melanosis duodenii but no acute bleed. BP dropped again, transferred to ICU and started on levophed gtt. CTA Abdomen showing no active bleeding into GI lumen, possible focal proctitis; colonoscopy showing rectal ulcer. Started on hydrocortisone suppository and mesalamine. Hgb now stable in 11s (per handoff), now transferred to [facility] for cardiac cath/ICD placement with Dr Bowens.  -This am with large bloody BM x 1   -H/H has remained stable. Vitals stable - stable/hypertensive BP, normal HR ( NSR on tele without events)  -Pt without any complaints, appears comfortable, euvolemic,  sleeping in bed  -GI consulted - plan for flex sig today  -Cont to monitor for recurrent bleed. serial CBC q 4-6 hours, IV PPI , ASA on hold   -Cont statin. Cont imdur/losartan/hydral/amlodipine for BP control. Would not aggressively treat HTN given GI bleed  -cath/ICD on hold for now Initial attending contact date 3/17/23     . See PA note written above for details. I reviewed the PA documentation. I have personally seen and examined this patient. I reviewed vitals, labs, medications, cardiac studies, and additional imaging. I agree with the above PA's findings and plans as written above with the following additions/statements.    61yo M PMHx HTN, HLD, DM,  ICM (s/p cath years ago, RCA 99%, never intervened on), HFrEF 25-30%, ESRD s/p failed kidney transplant on HD, Right AKA presented to [facility] 2/27 for respiratory distress after a dialysis session and admitted to cardiac telemetry on 2/27 with SIRS criteria. Placed on vanc/zosyn. Pt was placed on BiPAP which failed, where he was then intubated on 3/1 and moved to the MICU. He went into cardiac arrest (RoSC achieved after 8mins). Went into Vtach arrest, was placed on amio and pressors, HR ellen into 30s and atropine given twice. Weaned off levophed and sedation and extubated 3/2. Pt developed coffee ground emesis, hemoccult positive, with Hgb 3.5 s/p 7units PRBCs, 1Unit FFP, 1 unit donor packed platelets. EGD and colonoscopy showing melanosis duodenii but no acute bleed. BP dropped again, transferred to ICU and started on levophed gtt. CTA Abdomen showing no active bleeding into GI lumen, possible focal proctitis; colonoscopy showing rectal ulcer. Started on hydrocortisone suppository and mesalamine. Hgb now stable in 11s (per handoff), now transferred to [facility] for cardiac cath/ICD placement with Dr Bowens.  -This am with large bloody BM x 1   -H/H has remained stable. Vitals stable - stable/hypertensive BP, normal HR ( NSR on tele without events)  -Pt without any complaints, appears comfortable, euvolemic,  sleeping in bed  -GI consulted - plan for flex sig today  -Cont to monitor for recurrent bleed. serial CBC q 4-6 hours, IV PPI , ASA on hold   -Cont statin. Cont imdur/losartan/hydral/amlodipine for BP control. Would not aggressively treat HTN given GI bleed  -cath/ICD on hold for now Initial attending contact date 3/17/23     . See PA note written above for details. I reviewed the PA documentation. I have personally seen and examined this patient. I reviewed vitals, labs, medications, cardiac studies, and additional imaging. I agree with the above PA's findings and plans as written above with the following additions/statements.    63yo M PMHx HTN, HLD, DM,  ICM (s/p cath years ago, RCA 99%, never intervened on), HFrEF 25-30%, ESRD s/p failed kidney transplant on HD, Right AKA presented to [facility] 2/27 for respiratory distress after a dialysis session and admitted to cardiac telemetry on 2/27 with SIRS criteria. Placed on vanc/zosyn. Pt was placed on BiPAP which failed, where he was then intubated on 3/1 and moved to the MICU. He went into cardiac arrest (RoSC achieved after 8mins). Went into Vtach arrest, was placed on amio and pressors, HR ellen into 30s and atropine given twice. Weaned off levophed and sedation and extubated 3/2. Pt developed coffee ground emesis, hemoccult positive, with Hgb 3.5 s/p 7units PRBCs, 1Unit FFP, 1 unit donor packed platelets. EGD and colonoscopy showing melanosis duodenii but no acute bleed. BP dropped again, transferred to ICU and started on levophed gtt. CTA Abdomen showing no active bleeding into GI lumen, possible focal proctitis; colonoscopy showing rectal ulcer. Started on hydrocortisone suppository and mesalamine. Hgb now stable in 11s (per handoff), now transferred to [facility] for cardiac cath/ICD placement with Dr Bowens.  -This am with large bloody BM x 1   -H/H has remained stable. Vitals stable - stable/hypertensive BP, normal HR ( NSR on tele without events)  -Pt without any complaints, appears comfortable, euvolemic,  sleeping in bed  -GI consulted - plan for flex sig today  -Cont to monitor for recurrent bleed. serial CBC q 4-6 hours, IV PPI , ASA on hold   -Cont statin. Cont imdur/losartan/hydral/amlodipine for BP control. Would not aggressively treat HTN given GI bleed  -cath/ICD on hold for now Initial attending contact date 3/17/23     . See PA note written above for details. I reviewed the PA documentation. I have personally seen and examined this patient. I reviewed vitals, labs, medications, cardiac studies, and additional imaging. I agree with the above PA's findings and plans as written above with the following additions/statements.    61yo M PMHx HTN, HLD, DM,  ICM (s/p cath years ago, RCA 99%, never intervened on), HFrEF 25-30%, ESRD s/p failed kidney transplant on HD, Right AKA presented to [facility] 2/27 for respiratory distress after a dialysis session and admitted to cardiac telemetry on 2/27 with SIRS criteria. Placed on vanc/zosyn. Pt was placed on BiPAP which failed, where he was then intubated on 3/1 and moved to the MICU. He went into cardiac arrest (RoSC achieved after 8mins). Went into Vtach arrest, was placed on amio and pressors, HR ellen into 30s and atropine given twice. Weaned off levophed and sedation and extubated 3/2. Pt developed coffee ground emesis, hemoccult positive, with Hgb 3.5 s/p 7units PRBCs, 1Unit FFP, 1 unit donor packed platelets. EGD and colonoscopy showing melanosis duodenii but no acute bleed. BP dropped again, transferred to ICU and started on levophed gtt. CTA Abdomen showing no active bleeding into GI lumen, possible focal proctitis; colonoscopy showing rectal ulcer. Started on hydrocortisone suppository and mesalamine. Hgb now stable in 11s (per handoff), now transferred to [facility] for cardiac cath/ICD placement with Dr Bowens.  -This am with large bloody BM x 1  -H/H has remained stable. Vitals stable - stable/hypertensive BP, normal HR ( NSR on tele without events)  -Pt without any complaints, appears comfortable, euvolemic,  sleeping in bed  -GI consulted - s/p flex sig today: no active bleed with rectal ulcer, prior hemoclip at sigmoid colon. Likely solitary "rectal ulcer syndrome" Tx includes optimizing bowel regimen  -Cont to monitor for recurrent bleed. IV PPI , ASA on hold  -If no bleed over next 48 hours, would challenge with asa, plavix prior to cath to ensure pt can tolerate DAPT.  -Cont statin. Cont imdur/losartan/hydral/amlodipine for BP control.   -Eventual plan for cath (if can tolerate DAPT) and subsequent ICD . Initial attending contact date 3/17/23     . See PA note written above for details. I reviewed the PA documentation. I have personally seen and examined this patient. I reviewed vitals, labs, medications, cardiac studies, and additional imaging. I agree with the above PA's findings and plans as written above with the following additions/statements.    61yo M PMHx HTN, HLD, DM,  ICM (s/p cath years ago, RCA 99%, never intervened on), HFrEF 25-30%, ESRD s/p failed kidney transplant on HD, Right AKA presented to [facility] 2/27 for respiratory distress after a dialysis session and admitted to cardiac telemetry on 2/27 with SIRS criteria. Placed on vanc/zosyn. Pt was placed on BiPAP which failed, where he was then intubated on 3/1 and moved to the MICU. He went into cardiac arrest (RoSC achieved after 8mins). Went into Vtach arrest, was placed on amio and pressors, HR ellen into 30s and atropine given twice. Weaned off levophed and sedation and extubated 3/2. Pt developed coffee ground emesis, hemoccult positive, with Hgb 3.5 s/p 7units PRBCs, 1Unit FFP, 1 unit donor packed platelets. EGD and colonoscopy showing melanosis duodenii but no acute bleed. BP dropped again, transferred to ICU and started on levophed gtt. CTA Abdomen showing no active bleeding into GI lumen, possible focal proctitis; colonoscopy showing rectal ulcer. Started on hydrocortisone suppository and mesalamine. Hgb now stable in 11s (per handoff), now transferred to [facility] for cardiac cath/ICD placement with Dr Bowens.  -This am with large bloody BM x 1  -H/H has remained stable. Vitals stable - stable/hypertensive BP, normal HR ( NSR on tele without events)  -Pt without any complaints, appears comfortable, euvolemic,  sleeping in bed  -GI consulted - s/p flex sig today: no active bleed with rectal ulcer, prior hemoclip at sigmoid colon. Likely solitary "rectal ulcer syndrome" Tx includes optimizing bowel regimen  -Cont to monitor for recurrent bleed. IV PPI , ASA on hold  -If no bleed over next 48 hours, would challenge with asa, plavix prior to cath to ensure pt can tolerate DAPT.  -Cont statin. Cont imdur/losartan/hydral/amlodipine for BP control.   -Eventual plan for cath (if can tolerate DAPT) and subsequent ICD . Initial attending contact date 3/17/23     . See PA note written above for details. I reviewed the PA documentation. I have personally seen and examined this patient. I reviewed vitals, labs, medications, cardiac studies, and additional imaging. I agree with the above PA's findings and plans as written above with the following additions/statements.    63yo M PMHx HTN, HLD, DM,  ICM (s/p cath years ago, RCA 99%, never intervened on), HFrEF 25-30%, ESRD s/p failed kidney transplant on HD, Right AKA presented to [facility] 2/27 for respiratory distress after a dialysis session and admitted to cardiac telemetry on 2/27 with SIRS criteria. Placed on vanc/zosyn. Pt was placed on BiPAP which failed, where he was then intubated on 3/1 and moved to the MICU. He went into cardiac arrest (RoSC achieved after 8mins). Went into Vtach arrest, was placed on amio and pressors, HR ellen into 30s and atropine given twice. Weaned off levophed and sedation and extubated 3/2. Pt developed coffee ground emesis, hemoccult positive, with Hgb 3.5 s/p 7units PRBCs, 1Unit FFP, 1 unit donor packed platelets. EGD and colonoscopy showing melanosis duodenii but no acute bleed. BP dropped again, transferred to ICU and started on levophed gtt. CTA Abdomen showing no active bleeding into GI lumen, possible focal proctitis; colonoscopy showing rectal ulcer. Started on hydrocortisone suppository and mesalamine. Hgb now stable in 11s (per handoff), now transferred to [facility] for cardiac cath/ICD placement with Dr Bowens.  -This am with large bloody BM x 1  -H/H has remained stable. Vitals stable - stable/hypertensive BP, normal HR ( NSR on tele without events)  -Pt without any complaints, appears comfortable, euvolemic,  sleeping in bed  -GI consulted - s/p flex sig today: no active bleed with rectal ulcer, prior hemoclip at sigmoid colon. Likely solitary "rectal ulcer syndrome" Tx includes optimizing bowel regimen  -Cont to monitor for recurrent bleed. IV PPI , ASA on hold  -If no bleed over next 48 hours, would challenge with asa, plavix prior to cath to ensure pt can tolerate DAPT.  -Cont statin. Cont imdur/losartan/hydral/amlodipine for BP control.   -Eventual plan for cath (if can tolerate DAPT) and subsequent ICD .

## 2023-03-17 NOTE — PATIENT PROFILE ADULT - FALL HARM RISK - HARM RISK INTERVENTIONS
Assistance with ambulation/Assistance OOB with selected safe patient handling equipment/Communicate Risk of Fall with Harm to all staff/Discuss with provider need for PT consult/Monitor gait and stability/Provide patient with walking aids - walker, cane, crutches/Reinforce activity limits and safety measures with patient and family/Tailored Fall Risk Interventions/Visual Cue: Yellow wristband and red socks/Bed in lowest position, wheels locked, appropriate side rails in place/Call bell, personal items and telephone in reach/Instruct patient to call for assistance before getting out of bed or chair/Non-slip footwear when patient is out of bed/[place] to call system/Physically safe environment - no spills, clutter or unnecessary equipment/Purposeful Proactive Rounding/Room/bathroom lighting operational, light cord in reach Assistance with ambulation/Assistance OOB with selected safe patient handling equipment/Communicate Risk of Fall with Harm to all staff/Discuss with provider need for PT consult/Monitor gait and stability/Provide patient with walking aids - walker, cane, crutches/Reinforce activity limits and safety measures with patient and family/Tailored Fall Risk Interventions/Visual Cue: Yellow wristband and red socks/Bed in lowest position, wheels locked, appropriate side rails in place/Call bell, personal items and telephone in reach/Instruct patient to call for assistance before getting out of bed or chair/Non-slip footwear when patient is out of bed/[place] to call system/Physically safe environment - no spills, clutter or unnecessary equipment/Purposeful Proactive Rounding/Room/bathroom lighting operational, light cord in reach Assistance with ambulation/Assistance OOB with selected safe patient handling equipment/Communicate Risk of Fall with Harm to all staff/Discuss with provider need for PT consult/Monitor gait and stability/Provide patient with walking aids - walker, cane, crutches/Reinforce activity limits and safety measures with patient and family/Tailored Fall Risk Interventions/Visual Cue: Yellow wristband and red socks/Bed in lowest position, wheels locked, appropriate side rails in place/Call bell, personal items and telephone in reach/Instruct patient to call for assistance before getting out of bed or chair/Non-slip footwear when patient is out of bed/[place] to call system/Physically safe environment - no spills, clutter or unnecessary equipment/Purposeful Proactive Rounding/Room/bathroom lighting operational, light cord in reach

## 2023-03-17 NOTE — H&P ADULT - PROBLEM SELECTOR PLAN 5
SBP on admission 170s  -- c/w home meds s/p failed kidney transplant  -- per [place] pt takes Tacrolimus 2mg 2 times /day  -- ordered one time dose tacrolimus 2mg  -- f/u tacrolimus serum levels @10AM

## 2023-03-17 NOTE — CONSULT NOTE ADULT - SUBJECTIVE AND OBJECTIVE BOX
Electrophysiology Consult Note:     CHIEF COMPLAINT:  Patient is a 62y old  Male who presents with a chief complaint of ICD placement (17 Mar 2023 07:59)        HISTORY OF PRESENT ILLNESS:   63 y/o M PMHx HTN, HLD, DM, CAD (s/p cath years ago, RCA 99%, never intervened on), HFrEF 25-30%, ESRD s/p failed kidney transplant, on HD via Left Arm AVF, and Right AKA presented to [facility] 2/27/23 for respiratory distress after a dialysis session and admitted to cardiac telemetry with SIRS criteria. Placed on vanc/zosyn.  Pt was placed on BiPAP which failed, where he was then intubated on 3/1/23 and moved to the MICU. He went into cardiac arrest (RoSC achieved after 8mins). By report, it was VT arrest, was placed on amio and pressors.  HR ellen into 30s and atropine was given twice. Weaned off levophed and sedation and extubated 3/2.  NO rhythm strips of the VT arrest sent over from [facility].    Hospital course at  was complicated by coffee ground emesis on 3/12/23, hemoccult positive, with Hgb dipping to 3.5.  He received 7units PRBCs, 1Unit FFP, 1 unit donor packed platelets.  EGD and colonoscopy showed melanosis duodenii but no acute bleed. BP dropped again, transferred to ICU and started on levophed gtt. CTA Abdomen showing no active bleeding into GI lumen, possible focal proctitis.  Colonoscopy performed showing rectal ulcer. Started on hydrocortisone suppository and mesalamine. Hgb now stable in 11s (per handoff).    He was transferred to [facility] early today 3/17/23 further CAD workup and ICD placement.   This AM, pt developed hematochezia.  GI and surgery were consulted. Plan for flex sigmoidoscopy with GI today.   Pt states that prior to admission, he started having some SOB with activities for couple of months.  Denies prior syncope and palpitations.       PAST MEDICAL & SURGICAL HISTORY:  HTN (hypertension)  HLD (hyperlipidemia)  DM (diabetes mellitus)  GERD (gastroesophageal reflux disease)  ESRD on dialysis  NSTEMI (non-ST elevation myocardial infarction)  CAD (coronary artery disease)      SOCIAL HISTORY:    no tob or illicit drug  no etoh     Allergies  No Known Allergies      MEDICATIONS  (STANDING):  amLODIPine   Tablet 10 milliGRAM(s) Oral daily  atorvastatin 40 milliGRAM(s) Oral at bedtime  calcium acetate 667 milliGRAM(s) Oral three times a day with meals  ferrous    sulfate 325 milliGRAM(s) Oral daily  hydrALAZINE 50 milliGRAM(s) Oral every 8 hours  insulin lispro (ADMELOG) corrective regimen sliding scale   SubCutaneous every 6 hours  isosorbide   dinitrate Tablet (ISORDIL) 20 milliGRAM(s) Oral three times a day  losartan 100 milliGRAM(s) Oral daily  pantoprazole Infusion 8 mG/Hr (10 mL/Hr) IV Continuous <Continuous>  polyethylene glycol 3350 17 Gram(s) Oral daily  sevelamer carbonate 800 milliGRAM(s) Oral three times a day with meals  tacrolimus 2 milliGRAM(s) Oral once  tamsulosin 0.4 milliGRAM(s) Oral at bedtime    MEDICATIONS  (PRN):  dextrose Oral Gel 15 Gram(s) Oral once PRN Blood Glucose LESS THAN 70 milliGRAM(s)/deciliter      REVIEW OF SYSTEMS:  CONSTITUTIONAL: No fever, weight loss, or fatigue  EYES: No eye pain, visual disturbances, or discharge  ENMT:  No difficulty hearing, tinnitus, vertigo; No sinus or throat pain  NECK: No pain or stiffness  RESPIRATORY: No cough, wheezing, chills or hemoptysis; No Shortness of Breath  CARDIOVASCULAR: See HPI. No LE edema.   GASTROINTESTINAL: +hematochezia this morning 3/17.  Admitted to prior h/o LGIB.    GENITOURINARY: No dysuria, frequency, hematuria, or incontinence  NEUROLOGICAL: No headaches, memory loss, loss of strength, numbness, or tremors  SKIN: No itching, burning, rashes, or lesions   LYMPH Nodes: No enlarged glands  ENDOCRINE: No heat or cold intolerance; No hair loss  MUSCULOSKELETAL: No joint pain or swelling.   PSYCHIATRIC: No depression, anxiety, mood swings, or difficulty sleeping  HEME/LYMPH: No easy bruising, or bleeding gums  ALLERY AND IMMUNOLOGIC: No hives or eczema	      PHYSICAL EXAM:  Vital Signs Last 24 Hrs  T(C): 37.2 (17 Mar 2023 09:29), Max: 37.2 (17 Mar 2023 09:29)  T(F): 99 (17 Mar 2023 09:29), Max: 99 (17 Mar 2023 09:29)  HR: 67 (17 Mar 2023 08:20) (64 - 67)  BP: 155/72 (17 Mar 2023 08:20) (155/72 - 174/77)  BP(mean): 106 (17 Mar 2023 02:03) (106 - 106)  RR: 18 (17 Mar 2023 08:20) (17 - 18)  SpO2: 96% (17 Mar 2023 08:20) (95% - 96%)    Parameters below as of 17 Mar 2023 08:20  Patient On (Oxygen Delivery Method): room air      Daily Height in cm: 162.56 (17 Mar 2023 04:15)        Constitutional: NAD, sleepy.   HEENT:   Dry oral mucosa. 	  Neck: No JVD  CVS: Normal S1 / S2, RRR, No murmurs  Pulm: CTA. No wheeze or rale  GI:  + BS, soft, NT / ND   Ext: No LLE edema. Right AKA.  AVF on left arm.   Neurologic: A&O x 3      	  LABS:	                         11.3   5.34  )-----------( 147      ( 17 Mar 2023 10:04 )             35.2     03-17    134<L>  |  98  |  23  ----------------------------<  131<H>  4.3   |  28  |  3.89<H>    Ca    8.0<L>      17 Mar 2023 08:14  Mg     1.8     03-17    TPro  5.1<L>  /  Alb  2.8<L>  /  TBili  1.0  /  DBili  x   /  AST  15  /  ALT  11  /  AlkPhos  141<H>  03-17    EKG: 3/16/23: low atrial ectopic rhythm HR 67 bpm.  LVH.  QRS 96 ms.  QTc 471 ms.  TWI in I,II, AVL. No acute ST change.   Telemetry:  NSR HR 60-70s.  No ventricular arrhythmia        Electrophysiology Consult Note:     CHIEF COMPLAINT:  Patient is a 62y old  Male who presents with a chief complaint of ICD placement (17 Mar 2023 07:59)        HISTORY OF PRESENT ILLNESS:   63 y/o M PMHx HTN, HLD, DM, CAD (s/p cath years ago, RCA 99%, never intervened on), HFrEF 25-30%, ESRD s/p failed kidney transplant, on HD via Left Arm AVF, and Right AKA presented to [facility] 2/27/23 for respiratory distress after a dialysis session and admitted to cardiac telemetry with SIRS criteria. Placed on vanc/zosyn.  Pt was placed on BiPAP which failed, where he was then intubated on 3/1/23 and moved to the MICU. He went into cardiac arrest (RoSC achieved after 8mins). By report, it was VT arrest, was placed on amio and pressors.  HR ellen into 30s and atropine was given twice. Weaned off levophed and sedation and extubated 3/2.  NO rhythm strips of the VT arrest sent over from [facility].    Hospital course at  was complicated by coffee ground emesis on 3/12/23, hemoccult positive, with Hgb dipping to 3.5.  He received 7units PRBCs, 1Unit FFP, 1 unit donor packed platelets.  EGD and colonoscopy showed melanosis duodenii but no acute bleed. BP dropped again, transferred to ICU and started on levophed gtt. CTA Abdomen showing no active bleeding into GI lumen, possible focal proctitis.  Colonoscopy performed showing rectal ulcer. Started on hydrocortisone suppository and mesalamine. Hgb now stable in 11s (per handoff).    He was transferred to [facility] early today 3/17/23 further CAD workup and ICD placement.   This AM, pt developed hematochezia.  GI and surgery were consulted. Plan for flex sigmoidoscopy with GI today.   Pt states that prior to admission, he started having some SOB with activities for couple of months.  Denies prior syncope and palpitations.       PAST MEDICAL & SURGICAL HISTORY:  HTN (hypertension)  HLD (hyperlipidemia)  DM (diabetes mellitus)  GERD (gastroesophageal reflux disease)  ESRD on dialysis  NSTEMI (non-ST elevation myocardial infarction)  CAD (coronary artery disease)      SOCIAL HISTORY:    no tob or illicit drug  no etoh     Allergies  No Known Allergies      MEDICATIONS  (STANDING):  amLODIPine   Tablet 10 milliGRAM(s) Oral daily  atorvastatin 40 milliGRAM(s) Oral at bedtime  calcium acetate 667 milliGRAM(s) Oral three times a day with meals  ferrous    sulfate 325 milliGRAM(s) Oral daily  hydrALAZINE 50 milliGRAM(s) Oral every 8 hours  insulin lispro (ADMELOG) corrective regimen sliding scale   SubCutaneous every 6 hours  isosorbide   dinitrate Tablet (ISORDIL) 20 milliGRAM(s) Oral three times a day  losartan 100 milliGRAM(s) Oral daily  pantoprazole Infusion 8 mG/Hr (10 mL/Hr) IV Continuous <Continuous>  polyethylene glycol 3350 17 Gram(s) Oral daily  sevelamer carbonate 800 milliGRAM(s) Oral three times a day with meals  tacrolimus 2 milliGRAM(s) Oral once  tamsulosin 0.4 milliGRAM(s) Oral at bedtime    MEDICATIONS  (PRN):  dextrose Oral Gel 15 Gram(s) Oral once PRN Blood Glucose LESS THAN 70 milliGRAM(s)/deciliter      REVIEW OF SYSTEMS:  CONSTITUTIONAL: No fever, weight loss, or fatigue  EYES: No eye pain, visual disturbances, or discharge  ENMT:  No difficulty hearing, tinnitus, vertigo; No sinus or throat pain  NECK: No pain or stiffness  RESPIRATORY: No cough, wheezing, chills or hemoptysis; No Shortness of Breath  CARDIOVASCULAR: See HPI. No LE edema.   GASTROINTESTINAL: +hematochezia this morning 3/17.  Admitted to prior h/o LGIB.    GENITOURINARY: No dysuria, frequency, hematuria, or incontinence  NEUROLOGICAL: No headaches, memory loss, loss of strength, numbness, or tremors  SKIN: No itching, burning, rashes, or lesions   LYMPH Nodes: No enlarged glands  ENDOCRINE: No heat or cold intolerance; No hair loss  MUSCULOSKELETAL: No joint pain or swelling.   PSYCHIATRIC: No depression, anxiety, mood swings, or difficulty sleeping  HEME/LYMPH: No easy bruising, or bleeding gums  ALLERY AND IMMUNOLOGIC: No hives or eczema	      PHYSICAL EXAM:  Vital Signs Last 24 Hrs  T(C): 37.2 (17 Mar 2023 09:29), Max: 37.2 (17 Mar 2023 09:29)  T(F): 99 (17 Mar 2023 09:29), Max: 99 (17 Mar 2023 09:29)  HR: 67 (17 Mar 2023 08:20) (64 - 67)  BP: 155/72 (17 Mar 2023 08:20) (155/72 - 174/77)  BP(mean): 106 (17 Mar 2023 02:03) (106 - 106)  RR: 18 (17 Mar 2023 08:20) (17 - 18)  SpO2: 96% (17 Mar 2023 08:20) (95% - 96%)    Parameters below as of 17 Mar 2023 08:20  Patient On (Oxygen Delivery Method): room air      Daily Height in cm: 162.56 (17 Mar 2023 04:15)        Constitutional: NAD, sleepy.   HEENT:   Dry oral mucosa. 	  Neck: No JVD  CVS: Normal S1 / S2, RRR, No murmurs  Pulm: CTA. No wheeze or rale  GI:  + BS, soft, NT / ND   Ext: No LLE edema. Right AKA.  AVF on left arm.   Neurologic: A&O x 3      	  LABS:	                         11.3   5.34  )-----------( 147      ( 17 Mar 2023 10:04 )             35.2     03-17    134<L>  |  98  |  23  ----------------------------<  131<H>  4.3   |  28  |  3.89<H>    Ca    8.0<L>      17 Mar 2023 08:14  Mg     1.8     03-17    TPro  5.1<L>  /  Alb  2.8<L>  /  TBili  1.0  /  DBili  x   /  AST  15  /  ALT  11  /  AlkPhos  141<H>  03-17    EKG: 3/16/23: low atrial ectopic rhythm HR 67 bpm.  LVH.  QRS 96 ms.  QTc 471 ms.  TWI in I,II, AVL. No acute ST change.   Telemetry:  NSR HR 60-70s.  No ventricular arrhythmia        Electrophysiology Consult Note:     CHIEF COMPLAINT:  Patient is a 62y old  Male who presents with a chief complaint of ICD placement (17 Mar 2023 07:59)        HISTORY OF PRESENT ILLNESS:   63 y/o M PMHx HTN, HLD, DM, CAD (s/p cath years ago, RCA 99%, never intervened on), HFrEF 25-30%, ESRD s/p failed kidney transplant, on HD via Left Arm AVF, and Right AKA presented to [facility] 2/27/23 for respiratory distress after a dialysis session and admitted to cardiac telemetry with SIRS criteria. Placed on vanc/zosyn.  Pt was placed on BiPAP which failed, where he was then intubated on 3/1/23 and moved to the MICU. He went into cardiac arrest (RoSC achieved after 8mins). By report, it was VT arrest, was placed on amio and pressors.  HR ellen into 30s and atropine was given twice. Weaned off levophed and sedation and extubated 3/2.  NO rhythm strips of the VT arrest sent over from [facility].    Hospital course at  was complicated by coffee ground emesis on 3/12/23, hemoccult positive, with Hgb dipping to 3.5.  He received 7units PRBCs, 1Unit FFP, 1 unit donor packed platelets.  EGD and colonoscopy showed melanosis duodenii but no acute bleed. BP dropped again, transferred to ICU and started on levophed gtt. CTA Abdomen showing no active bleeding into GI lumen, possible focal proctitis.  Colonoscopy performed showing rectal ulcer. Started on hydrocortisone suppository and mesalamine. Hgb now stable in 11s (per handoff).    He was transferred to [facility] early today 3/17/23 further CAD workup and ICD placement.   This AM, pt developed hematochezia.  GI and surgery were consulted. Plan for flex sigmoidoscopy with GI today.   Pt states that prior to admission, he started having some SOB with activities for couple of months.  Denies prior syncope and palpitations.       PAST MEDICAL & SURGICAL HISTORY:  HTN (hypertension)  HLD (hyperlipidemia)  DM (diabetes mellitus)  GERD (gastroesophageal reflux disease)  ESRD on dialysis  NSTEMI (non-ST elevation myocardial infarction)  CAD (coronary artery disease)      SOCIAL HISTORY:    no tob or illicit drug  no etoh     Allergies  No Known Allergies      MEDICATIONS  (STANDING):  amLODIPine   Tablet 10 milliGRAM(s) Oral daily  atorvastatin 40 milliGRAM(s) Oral at bedtime  calcium acetate 667 milliGRAM(s) Oral three times a day with meals  ferrous    sulfate 325 milliGRAM(s) Oral daily  hydrALAZINE 50 milliGRAM(s) Oral every 8 hours  insulin lispro (ADMELOG) corrective regimen sliding scale   SubCutaneous every 6 hours  isosorbide   dinitrate Tablet (ISORDIL) 20 milliGRAM(s) Oral three times a day  losartan 100 milliGRAM(s) Oral daily  pantoprazole Infusion 8 mG/Hr (10 mL/Hr) IV Continuous <Continuous>  polyethylene glycol 3350 17 Gram(s) Oral daily  sevelamer carbonate 800 milliGRAM(s) Oral three times a day with meals  tacrolimus 2 milliGRAM(s) Oral once  tamsulosin 0.4 milliGRAM(s) Oral at bedtime    MEDICATIONS  (PRN):  dextrose Oral Gel 15 Gram(s) Oral once PRN Blood Glucose LESS THAN 70 milliGRAM(s)/deciliter      REVIEW OF SYSTEMS:  CONSTITUTIONAL: No fever, weight loss, or fatigue  EYES: No eye pain, visual disturbances, or discharge  ENMT:  No difficulty hearing, tinnitus, vertigo; No sinus or throat pain  NECK: No pain or stiffness  RESPIRATORY: No cough, wheezing, chills or hemoptysis; No Shortness of Breath  CARDIOVASCULAR: See HPI. No LE edema.   GASTROINTESTINAL: +hematochezia this morning 3/17.  Admitted to prior h/o LGIB.    GENITOURINARY: No dysuria, frequency, hematuria, or incontinence  NEUROLOGICAL: No headaches, memory loss, loss of strength, numbness, or tremors  SKIN: No itching, burning, rashes, or lesions   LYMPH Nodes: No enlarged glands  ENDOCRINE: No heat or cold intolerance; No hair loss  MUSCULOSKELETAL: No joint pain or swelling.   PSYCHIATRIC: No depression, anxiety, mood swings, or difficulty sleeping  HEME/LYMPH: No easy bruising, or bleeding gums  ALLERY AND IMMUNOLOGIC: No hives or eczema	      PHYSICAL EXAM:  Vital Signs Last 24 Hrs  T(C): 37.2 (17 Mar 2023 09:29), Max: 37.2 (17 Mar 2023 09:29)  T(F): 99 (17 Mar 2023 09:29), Max: 99 (17 Mar 2023 09:29)  HR: 67 (17 Mar 2023 08:20) (64 - 67)  BP: 155/72 (17 Mar 2023 08:20) (155/72 - 174/77)  BP(mean): 106 (17 Mar 2023 02:03) (106 - 106)  RR: 18 (17 Mar 2023 08:20) (17 - 18)  SpO2: 96% (17 Mar 2023 08:20) (95% - 96%)    Parameters below as of 17 Mar 2023 08:20  Patient On (Oxygen Delivery Method): room air      Daily Height in cm: 162.56 (17 Mar 2023 04:15)        Constitutional: NAD, sleepy.   HEENT:   Dry oral mucosa. 	  Neck: No JVD  CVS: Normal S1 / S2, RRR, No murmurs  Pulm: CTA. No wheeze or rale  GI:  + BS, soft, NT / ND   Ext: No LLE edema. Right AKA.  AVF on left arm.   Neurologic: A&O x 3      	  LABS:	                         11.3   5.34  )-----------( 147      ( 17 Mar 2023 10:04 )             35.2     03-17    134<L>  |  98  |  23  ----------------------------<  131<H>  4.3   |  28  |  3.89<H>    Ca    8.0<L>      17 Mar 2023 08:14  Mg     1.8     03-17    TPro  5.1<L>  /  Alb  2.8<L>  /  TBili  1.0  /  DBili  x   /  AST  15  /  ALT  11  /  AlkPhos  141<H>  03-17    EKG: 3/16/23: low atrial ectopic rhythm HR 67 bpm.  LVH.  QRS 96 ms.  QTc 471 ms.  TWI in I,II, AVL. No acute ST change.   Telemetry:  NSR HR 60-70s.  No ventricular arrhythmia

## 2023-03-17 NOTE — CONSULT NOTE ADULT - ATTENDING COMMENTS
63yo M PMHx HTN, HLD, DM, CAD(s/p cath years ago, RCA 99%, never intervened on), HFrEF 25-30%, ESRD s/p failed kidney transplant and required HD x 3yrs (last HD 3/16 via Left Arm AVF; HD TTS), Right AKA initially presented to [facility] 2/27 for respiratory distress after a dialysis, found to be septic, h/c complicated by AHRF requiring intubation for 24hr followed by VT arrest, h/c the complicated by massive LGIB (rectal ulcer seen on colonoscopy) requiring 7u pRBC, 1u FFP and 1u PLT. Transferred to [facility] (3/17) for ICD placement 2/2 Vtach and cardiac cath. Pt noted lassed loose stool with BRBPR, GI and surgery consulted. Flex Sigc ( 3/17) found prior hemoclip in the sigmoid colon, localized ulceration and erosive with no bleeding noted in rectum suggestive of solitary ulcer syndrome. Medicine consulted for comanagement.     #LGIB  #SANTOS  #Solitary ulcer syndrome  - hx coffee ground emesis @ [facility]; s/p EGD/colonoscopy showing rectal ulcer  - 3/17AM pt passed loose BM along with large amount of BRBPR  - per gen surgery no acute surgical intervention   - per GI:      - increased Protonix 40 mg IVP BID      - PIV large bore x2      - s/p flex sig(3/17)  for rectal ulcers, found Evidence of prior hemoclip in the sigmoid colon. Localized ulceration and erosive with no bleeding were noted in the rectum, suggestive of solitary ulcer syndrome           - Avoid anal digitation and enemas           - High-fiber diet and optimize laxatives to avoid constipation using miralax and dulcolax           - No absolute GI contraindications to anticoag/antplt therapy           - Repeat endoscopic evaluation recommended in 3 months  - Active T&S  - Transfuse for hgb <8 (active CAD)    #CAD   - Previous cath at [facility] showing oLM 30% and RCA 99% that is not amenable to intervention.  - TTE 3/1/23 at [facility]: mild LVH, EF 25-30%, severe global wall motion dysfunction, mildly dilated LA, RV mildly dilated, all leaflets mild calcified, mod pHTN  - hx coffee ground emesis @ [facility]; s/p EGD/colonoscopy showing rectal ulcer and LHH  flex sig suggestive of solitary ulcer syndrome - per cardiology will hold off loading the patient due to recent LGIB  - No absolute GI contraindications to anticoag/antplt therapy  - EP consulted for possible ICD placement  - LHC/Angiogram and possible PCI per cardiology    #H/O ventricular tachycardia   - Vtach arrest @[facility]  - agree with EP consult for ICD placement    #ESRD on dialysis  - dialysis Tues, Thurs, Sat  - last received 3/16  - f/u nephro recs  - Electrolytes wnl, k 4.3, phos 1.9  - c/w calcium citrate 667mg TID  - on Renvela 800mg TID, f/u with renal as patient is with hypophosphatemia on admission     #S/P kidney transplant.   - per [place] pt takes Tacrolimus 2mg 2 times /day  - Obtain collateral regarding renal transplant, if indeed failed, what indications patient have for c/w tacrolimus 2mg?  - f/u tacrolimus serum levels  - f/u nephro recs    #HTN  - SBP on admission 155-170s  - c/w home meds. Amlodipine 10mg qd, Losartan 100mg qd, Hydralazine 50m TID, Isordil 20mg TID    #HLD   - c/w Lipitor 40mg qd  - f/u AM lipid panel.    #DM  - no meds per [place], obtain collaterals for med recs prior [place] stay  - mISS while inpt, goal -180   - A1c 5.9 on admission    DVT PPX: SCDs for now iso GIB    Med consult team continues to follow with you. d/w Dr. Lb Smith 61yo M PMHx HTN, HLD, DM, CAD(s/p cath years ago, RCA 99%, never intervened on), HFrEF 25-30%, ESRD s/p failed kidney transplant and required HD x 3yrs (last HD 3/16 via Left Arm AVF; HD TTS), Right AKA initially presented to [facility] 2/27 for respiratory distress after a dialysis, found to be septic, h/c complicated by AHRF requiring intubation for 24hr followed by VT arrest, h/c the complicated by massive LGIB (rectal ulcer seen on colonoscopy) requiring 7u pRBC, 1u FFP and 1u PLT. Transferred to [facility] (3/17) for ICD placement 2/2 Vtach and cardiac cath. Pt noted lassed loose stool with BRBPR, GI and surgery consulted. Flex Sigc ( 3/17) found prior hemoclip in the sigmoid colon, localized ulceration and erosive with no bleeding noted in rectum suggestive of solitary ulcer syndrome. Medicine consulted for comanagement.     #LGIB  #SANTOS  #Solitary ulcer syndrome  - hx coffee ground emesis @ [facility]; s/p EGD/colonoscopy showing rectal ulcer  - 3/17AM pt passed loose BM along with large amount of BRBPR  - per gen surgery no acute surgical intervention   - per GI:      - increased Protonix 40 mg IVP BID      - PIV large bore x2      - s/p flex sig(3/17)  for rectal ulcers, found Evidence of prior hemoclip in the sigmoid colon. Localized ulceration and erosive with no bleeding were noted in the rectum, suggestive of solitary ulcer syndrome           - Avoid anal digitation and enemas           - High-fiber diet and optimize laxatives to avoid constipation using miralax and dulcolax           - No absolute GI contraindications to anticoag/antplt therapy           - Repeat endoscopic evaluation recommended in 3 months  - Active T&S  - Transfuse for hgb <8 (active CAD)    #CAD   - Previous cath at [facility] showing oLM 30% and RCA 99% that is not amenable to intervention.  - TTE 3/1/23 at [facility]: mild LVH, EF 25-30%, severe global wall motion dysfunction, mildly dilated LA, RV mildly dilated, all leaflets mild calcified, mod pHTN  - hx coffee ground emesis @ [facility]; s/p EGD/colonoscopy showing rectal ulcer and LHH  flex sig suggestive of solitary ulcer syndrome - per cardiology will hold off loading the patient due to recent LGIB  - No absolute GI contraindications to anticoag/antplt therapy  - EP consulted for possible ICD placement  - LHC/Angiogram and possible PCI per cardiology    #H/O ventricular tachycardia   - Vtach arrest @[facility]  - agree with EP consult for ICD placement    #ESRD on dialysis  - dialysis Tues, Thurs, Sat  - last received 3/16  - f/u nephro recs  - Electrolytes wnl, k 4.3, phos 1.9  - c/w calcium citrate 667mg TID  - on Renvela 800mg TID, f/u with renal as patient is with hypophosphatemia on admission     #S/P kidney transplant.   - per [place] pt takes Tacrolimus 2mg 2 times /day  - Obtain collateral regarding renal transplant, if indeed failed, what indications patient have for c/w tacrolimus 2mg?  - f/u tacrolimus serum levels  - f/u nephro recs    #HTN  - SBP on admission 155-170s  - c/w home meds. Amlodipine 10mg qd, Losartan 100mg qd, Hydralazine 50m TID, Isordil 20mg TID    #HLD   - c/w Lipitor 40mg qd  - f/u AM lipid panel.    #DM  - no meds per [place], obtain collaterals for med recs prior [place] stay  - mISS while inpt, goal -180   - A1c 5.9 on admission    DVT PPX: SCDs for now iso GIB    Med consult team continues to follow with you. d/w Dr. Lb Smith 63yo M PMHx HTN, HLD, DM, CAD(s/p cath years ago, RCA 99%, never intervened on), HFrEF 25-30%, ESRD s/p failed kidney transplant and required HD x 3yrs (last HD 3/16 via Left Arm AVF; HD TTS), Right AKA initially presented to [facility] 2/27 for respiratory distress after a dialysis, found to be septic, h/c complicated by AHRF requiring intubation for 24hr followed by VT arrest, h/c the complicated by massive LGIB (rectal ulcer seen on colonoscopy) requiring 7u pRBC, 1u FFP and 1u PLT. Transferred to [facility] (3/17) for ICD placement 2/2 Vtach and cardiac cath. Pt noted lassed loose stool with BRBPR, GI and surgery consulted. Flex Sigc ( 3/17) found prior hemoclip in the sigmoid colon, localized ulceration and erosive with no bleeding noted in rectum suggestive of solitary ulcer syndrome. Medicine consulted for comanagement.     #LGIB  #SANTOS  #Solitary ulcer syndrome  - hx coffee ground emesis @ [facility]; s/p EGD/colonoscopy showing rectal ulcer  - 3/17AM pt passed loose BM along with large amount of BRBPR  - per gen surgery no acute surgical intervention   - per GI:      - increased Protonix 40 mg IVP BID      - PIV large bore x2      - s/p flex sig(3/17)  for rectal ulcers, found Evidence of prior hemoclip in the sigmoid colon. Localized ulceration and erosive with no bleeding were noted in the rectum, suggestive of solitary ulcer syndrome           - Avoid anal digitation and enemas           - High-fiber diet and optimize laxatives to avoid constipation using miralax and dulcolax           - No absolute GI contraindications to anticoag/antplt therapy           - Repeat endoscopic evaluation recommended in 3 months  - Active T&S  - Transfuse for hgb <8 (active CAD)    #CAD   - Previous cath at [facility] showing oLM 30% and RCA 99% that is not amenable to intervention.  - TTE 3/1/23 at [facility]: mild LVH, EF 25-30%, severe global wall motion dysfunction, mildly dilated LA, RV mildly dilated, all leaflets mild calcified, mod pHTN  - hx coffee ground emesis @ [facility]; s/p EGD/colonoscopy showing rectal ulcer and LHH  flex sig suggestive of solitary ulcer syndrome - per cardiology will hold off loading the patient due to recent LGIB  - No absolute GI contraindications to anticoag/antplt therapy  - EP consulted for possible ICD placement  - LHC/Angiogram and possible PCI per cardiology    #H/O ventricular tachycardia   - Vtach arrest @[facility]  - agree with EP consult for ICD placement    #ESRD on dialysis  - dialysis Tues, Thurs, Sat  - last received 3/16  - f/u nephro recs  - Electrolytes wnl, k 4.3, phos 1.9  - c/w calcium citrate 667mg TID  - on Renvela 800mg TID, f/u with renal as patient is with hypophosphatemia on admission     #S/P kidney transplant.   - per [place] pt takes Tacrolimus 2mg 2 times /day  - Obtain collateral regarding renal transplant, if indeed failed, what indications patient have for c/w tacrolimus 2mg?  - f/u tacrolimus serum levels  - f/u nephro recs    #HTN  - SBP on admission 155-170s  - c/w home meds. Amlodipine 10mg qd, Losartan 100mg qd, Hydralazine 50m TID, Isordil 20mg TID    #HLD   - c/w Lipitor 40mg qd  - f/u AM lipid panel.    #DM  - no meds per [place], obtain collaterals for med recs prior [place] stay  - mISS while inpt, goal -180   - A1c 5.9 on admission    DVT PPX: SCDs for now iso GIB    Med consult team continues to follow with you. d/w Dr. Lb Smith

## 2023-03-17 NOTE — H&P ADULT - ASSESSMENT
61yo M PMHx HTN, HLD, DM,  ICM (s/p cath years ago, RCA 99%, never intervened on), HFrEF 25-30%, ESRD s/p failed kidney transplant (last HD 3/1 via Left Arm AVF; HD TTS), Right AKA presented to [facility] 2/27 for respiratory distress after a dialysis session and admitted to cardiac telemetry on 2/27 with SIRS criteria. Placed on vanc/zosyn. Pt was placed on BiPAP which failed, where he was then intubated on 3/1 and moved to the MICU. He went into cardiac arrest (RoSC achieved after 8mins). Went into Vtach arrest, was placed on amio and pressors, HR ellen into 30s and atropine given twice. Weaned off levophed and sedation and extubated 3/2. Pt developed coffee ground emesis, hemoccult positive, with Hgb dipping to 3.5; he then received 7units PRBCs, 1Unit FFP, 1 unit donor packed platelets. EGD and colonoscopy showing melanosis duodenii but no acute bleed. BP dropped again, transferred to ICU and started on levophed gtt. CTA Abdomen showing no active bleeding into GI lumen, possible focal proctitis; colonoscopy showing rectal ulcer. Started on hydrocortisone suppository and mesalamine. Hgb now stable in 11s (per handoff), now transferred for ICD placement 2/2 Vtach and cardiac cath.   63yo M PMHx HTN, HLD, DM,  ICM (s/p cath years ago, RCA 99%, never intervened on), HFrEF 25-30%, ESRD s/p failed kidney transplant (last HD 3/1 via Left Arm AVF; HD TTS), Right AKA presented to [facility] 2/27 for respiratory distress after a dialysis session and admitted to cardiac telemetry on 2/27 with SIRS criteria. Placed on vanc/zosyn. Pt was placed on BiPAP which failed, where he was then intubated on 3/1 and moved to the MICU. He went into cardiac arrest (RoSC achieved after 8mins). Went into Vtach arrest, was placed on amio and pressors, HR ellen into 30s and atropine given twice. Weaned off levophed and sedation and extubated 3/2. Pt developed coffee ground emesis, hemoccult positive, with Hgb dipping to 3.5; he then received 7units PRBCs, 1Unit FFP, 1 unit donor packed platelets. EGD and colonoscopy showing melanosis duodenii but no acute bleed. BP dropped again, transferred to ICU and started on levophed gtt. CTA Abdomen showing no active bleeding into GI lumen, possible focal proctitis; colonoscopy showing rectal ulcer. Started on hydrocortisone suppository and mesalamine. Hgb now stable in 11s (per handoff), now transferred for ICD placement 2/2 Vtach and cardiac cath.   63yo M PMHx HTN, HLD, DM,  ICM (s/p cath years ago, RCA 99%, never intervened on), HFrEF 25-30%, ESRD s/p failed kidney transplant (last HD 3/1 via Left Arm AVF; HD TTS), Right AKA presented to [facility] 2/27 for respiratory distress after a dialysis session and admitted to cardiac telemetry on 2/27 with SIRS criteria. Placed on vanc/zosyn. Pt was placed on BiPAP which failed, where he was then intubated on 3/1 and moved to the MICU. He went into cardiac arrest (RoSC achieved after 8mins). Went into Vtach arrest, was placed on amio and pressors, HR ellen into 30s and atropine given twice. Weaned off levophed and sedation and extubated 3/2. Pt developed coffee ground emesis, hemoccult positive, with Hgb dipping to 3.5; he then received 7units PRBCs, 1Unit FFP, 1 unit donor packed platelets. EGD and colonoscopy showing melanosis duodenii but no acute bleed. BP dropped again, transferred to ICU and started on levophed gtt. CTA Abdomen showing no active bleeding into GI lumen, possible focal proctitis; colonoscopy showing rectal ulcer. Started on hydrocortisone suppository and mesalamine. Hgb now stable in 11s (per handoff), now transferred for ICD placement 2/2 Vtach and cardiac cath.   61yo M PMHx HTN, HLD, DM,  ICM (s/p cath years ago, RCA 99%, never intervened on), HFrEF 25-30%, ESRD s/p failed kidney transplant (last HD 3/1 via Left Arm AVF; HD TTS), Right AKA presented to [facility] 2/27 for respiratory distress after a dialysis session and admitted to cardiac telemetry on 2/27 with SIRS criteria. Placed on vanc/zosyn. Pt was placed on BiPAP which failed, where he was then intubated on 3/1 and moved to the MICU. He went into cardiac arrest (RoSC achieved after 8mins). Went into Vtach arrest, was placed on amio and pressors, HR ellen into 30s and atropine given twice. Weaned off levophed and sedation and extubated 3/2. Pt developed coffee ground emesis, hemoccult positive, with Hgb dipping to 3.5; he then received 7units PRBCs, 1Unit FFP, 1 unit donor packed platelets. EGD and colonoscopy showing melanosis duodenii but no acute bleed. BP dropped again, transferred to ICU and started on levophed gtt. CTA Abdomen showing no active bleeding into GI lumen, possible focal proctitis; colonoscopy showing rectal ulcer. Started on hydrocortisone suppository and mesalamine. Hgb now stable in 11s (per handoff), now transferred for ICD placement 2/2 Vtach and cardiac cath. Hospital course c/b BRBPR 3/17 AM; type and screen sent, surgery consulted. 63yo M PMHx HTN, HLD, DM,  ICM (s/p cath years ago, RCA 99%, never intervened on), HFrEF 25-30%, ESRD s/p failed kidney transplant (last HD 3/1 via Left Arm AVF; HD TTS), Right AKA presented to [facility] 2/27 for respiratory distress after a dialysis session and admitted to cardiac telemetry on 2/27 with SIRS criteria. Placed on vanc/zosyn. Pt was placed on BiPAP which failed, where he was then intubated on 3/1 and moved to the MICU. He went into cardiac arrest (RoSC achieved after 8mins). Went into Vtach arrest, was placed on amio and pressors, HR ellen into 30s and atropine given twice. Weaned off levophed and sedation and extubated 3/2. Pt developed coffee ground emesis, hemoccult positive, with Hgb dipping to 3.5; he then received 7units PRBCs, 1Unit FFP, 1 unit donor packed platelets. EGD and colonoscopy showing melanosis duodenii but no acute bleed. BP dropped again, transferred to ICU and started on levophed gtt. CTA Abdomen showing no active bleeding into GI lumen, possible focal proctitis; colonoscopy showing rectal ulcer. Started on hydrocortisone suppository and mesalamine. Hgb now stable in 11s (per handoff), now transferred for ICD placement 2/2 Vtach and cardiac cath. Hospital course c/b BRBPR 3/17 AM; type and screen sent, surgery consulted. 63yo M PMHx HTN, HLD, DM,  ICM (s/p cath years ago, RCA 99%, never intervened on), HFrEF 25-30%, ESRD s/p failed kidney transplant (last HD 3/1 via Left Arm AVF; HD TTS), Right AKA presented to [facility] 2/27 for respiratory distress after a dialysis session and admitted to cardiac telemetry on 2/27 with SIRS criteria. Placed on vanc/zosyn. Pt was placed on BiPAP which failed, where he was then intubated on 3/1 and moved to the MICU. He went into cardiac arrest (RoSC achieved after 8mins). Went into Vtach arrest, was placed on amio and pressors, HR ellen into 30s and atropine given twice. Weaned off levophed and sedation and extubated 3/2. Pt developed coffee ground emesis, hemoccult positive, with Hgb dipping to 3.5; he then received 7units PRBCs, 1Unit FFP, 1 unit donor packed platelets. EGD and colonoscopy showing melanosis duodenii but no acute bleed. BP dropped again, transferred to ICU and started on levophed gtt. CTA Abdomen showing no active bleeding into GI lumen, possible focal proctitis; colonoscopy showing rectal ulcer. Started on hydrocortisone suppository and mesalamine. Hgb now stable in 11s (per handoff), now transferred for ICD placement 2/2 Vtach and cardiac cath. Hospital course c/b BRBPR 3/17 AM; type and screen sent, surgery consulted. 63yo M PMHx HTN, HLD, DM,  ICM (s/p cath years ago, RCA 99%, never intervened on), HFrEF 25-30%, ESRD s/p failed kidney transplant (last HD 3/1 via Left Arm AVF; HD TTS), Right AKA presented to [facility] 2/27 for respiratory distress after a dialysis session and admitted to cardiac telemetry on 2/27 with SIRS criteria. Placed on vanc/zosyn. Pt was placed on BiPAP which failed, where he was then intubated on 3/1 and moved to the MICU. He went into cardiac arrest (RoSC achieved after 8mins). Went into Vtach arrest, was placed on amio and pressors, HR ellen into 30s and atropine given twice. Weaned off levophed and sedation and extubated 3/2. Pt developed coffee ground emesis, hemoccult positive, with Hgb dipping to 3.5; he then received 7units PRBCs, 1Unit FFP, 1 unit donor packed platelets. EGD and colonoscopy showing melanosis duodenii but no acute bleed. BP dropped again, transferred to ICU and started on levophed gtt. CTA Abdomen showing no active bleeding into GI lumen, possible focal proctitis; colonoscopy showing rectal ulcer. Started on hydrocortisone suppository and mesalamine. Hgb now stable in 11s (per handoff), now transferred for ICD placement 2/2 Vtach and cardiac cath. Hospital course c/b BRBPR 3/17 AM; type and screen sent, surgery, GI, MICU consulted. 63yo M PMHx HTN, HLD, DM,  ICM (s/p cath years ago, RCA 99%, never intervened on), HFrEF 25-30%, ESRD s/p failed kidney transplant (last HD 3/1 via Left Arm AVF; HD TTS), Right AKA presented to [facility] 2/27 for respiratory distress after a dialysis session and admitted to cardiac telemetry on 2/27 with SIRS criteria. Placed on vanc/zosyn. Pt was placed on BiPAP which failed, where he was then intubated on 3/1 and moved to the MICU. He went into cardiac arrest (RoSC achieved after 8mins). Went into Vtach arrest, was placed on amio and pressors, HR ellen into 30s and atropine given twice. Weaned off levophed and sedation and extubated 3/2. Pt developed coffee ground emesis, hemoccult positive, with Hgb dipping to 3.5; he then received 7units PRBCs, 1Unit FFP, 1 unit donor packed platelets. EGD and colonoscopy showing melanosis duodenii but no acute bleed. BP dropped again, transferred to ICU and started on levophed gtt. CTA Abdomen showing no active bleeding into GI lumen, possible focal proctitis; colonoscopy showing rectal ulcer. Started on hydrocortisone suppository and mesalamine. Hgb now stable in 11s (per handoff), now transferred for ICD placement 2/2 Vtach and cardiac cath. Hospital course c/b BRBPR 3/17 AM; type and screen sent, surgery, GI, MICU consulted. 61yo M PMHx HTN, HLD, DM,  ICM (s/p cath years ago, RCA 99%, never intervened on), HFrEF 25-30%, ESRD s/p failed kidney transplant (last HD 3/1 via Left Arm AVF; HD TTS), Right AKA presented to [facility] 2/27 for respiratory distress after a dialysis session and admitted to cardiac telemetry on 2/27 with SIRS criteria. Placed on vanc/zosyn. Pt was placed on BiPAP which failed, where he was then intubated on 3/1 and moved to the MICU. He went into cardiac arrest (RoSC achieved after 8mins). Went into Vtach arrest, was placed on amio and pressors, HR ellen into 30s and atropine given twice. Weaned off levophed and sedation and extubated 3/2. Pt developed coffee ground emesis, hemoccult positive, with Hgb dipping to 3.5; he then received 7units PRBCs, 1Unit FFP, 1 unit donor packed platelets. EGD and colonoscopy showing melanosis duodenii but no acute bleed. BP dropped again, transferred to ICU and started on levophed gtt. CTA Abdomen showing no active bleeding into GI lumen, possible focal proctitis; colonoscopy showing rectal ulcer. Started on hydrocortisone suppository and mesalamine. Hgb now stable in 11s (per handoff), now transferred for ICD placement 2/2 Vtach and cardiac cath. Hospital course c/b BRBPR 3/17 AM; type and screen sent, surgery, GI, MICU consulted.

## 2023-03-17 NOTE — CONSULT NOTE ADULT - ATTENDING COMMENTS
patient transferred from outside hospital for management for v/tach and cardiac arrest. recent massive GI bleed. ICu sonulted for concern of recurrent GI bleed. patient is HD stable, had streaks of bloos mixed with stool and stable hgb. does not need transfer to medical telemetry at this time.

## 2023-03-17 NOTE — CHART NOTE - NSCHARTNOTEFT_GEN_A_CORE
Flex sigmoidoscopy revealed localized ulceration and erosive with no bleeding were noted in the rectum, suggestive of solitary ulcer syndrome, Brown stool was seen in the rectum and sigmoid colon.Evidence of prior hemoclip was found in the sigmoid colon.	  -Localized ulceration and erosive with no bleeding were noted in the rectum, suggestive of solitary ulcer syndrome    Recommendations:  -Avoid anal digitation and enemas   -Bowel regimen with miralax and dulcolax to avoid constipation.  -Repeat Endoscopy in 3 months    Per GI no contraindications to DAPT or AC. If no bleed in 48 hrs plan to initiate Aspirin 81 mg daily and Plavix 75 mg daily and monitor CBC (no load)with plan for cath thereafter. VS have remained stable and Hgb has remained stable 10.8-11.3. Follow-up CBC 6 PM. Continue to monitor CBC. Maintain active type and screen. Continue Protonix drip if no recurrence of bleed can transition back to PO.

## 2023-03-17 NOTE — H&P ADULT - PROBLEM SELECTOR PLAN 2
Vtach arrest @[facility]  -- EP consult for ICD placement Vtach arrest @[facility]  -- EP consult for ICD placement Vtach arrest @[facility]  -- EP consult for ICD placement

## 2023-03-17 NOTE — CONSULT NOTE ADULT - ASSESSMENT
63yo M PMHx HTN, HLD, DM, CAD(s/p cath years ago, RCA 99%, never intervened on), HFrEF 25-30%, ESRD s/p failed kidney transplant (last HD 3/1 via Left Arm AVF; HD TTS), Right AKA initially presented to [facility] 2/27 for respiratory distress after a dialysis, found to be septic, h/c complicated by AHRF requiring intubation 61yo M PMHx HTN, HLD, DM, CAD(s/p cath years ago, RCA 99%, never intervened on), HFrEF 25-30%, ESRD s/p failed kidney transplant (last HD 3/1 via Left Arm AVF; HD TTS), Right AKA initially presented to [facility] 2/27 for respiratory distress after a dialysis, found to be septic, h/c complicated by AHRF requiring intubation 61yo M PMHx HTN, HLD, DM, CAD(s/p cath years ago, RCA 99%, never intervened on), HFrEF 25-30%, ESRD s/p failed kidney transplant (last HD 3/1 via Left Arm AVF; HD TTS), Right AKA initially presented to [facility] 2/27 for respiratory distress after a dialysis, found to be septic, h/c complicated by AHRF requiring intubation 63yo M PMHx HTN, HLD, DM, CAD(s/p cath years ago, RCA 99%, never intervened on), HFrEF 25-30%, ESRD s/p failed kidney transplant (last HD 3/1 via Left Arm AVF; HD TTS), Right AKA initially presented to [facility] 2/27 for respiratory distress after a dialysis, found to be septic, h/c complicated by AHRF requiring intubation for 24hr followed by VT arrest, h/c the complicated by massive LGIB (rectal ulcer) requiring 7u pRBC, 1u FFP and 1u PLT. Transferred to [facility] for ICD placement 2/2 Vtach and cardiac cath. Medicine consulted for comanagement.     #LGIB  #SANTOS  - hx coffee ground emesis @ [facility]; s/p EGD/colonoscopy showing rectal ulcer  - 3/17AM pt passed loose BM along with large amount of BRBPR  - per gen surgery no acute surgical intervention   - per GI:      - increase Protonix 40 mg IVP BID      - PIV large bore x2      - s/p flex sig for rectal ulcers, found Evidence of prior hemoclip in the sigmoid colon. Localized ulceration and erosive with no bleeding were noted in the rectum, suggestive of solitary ulcer syndrome           - Avoid anal digitation and enemas           - High-fiber diet and optimize laxatives to avoid constipation using miralax and dulcolax           - No absolute GI contraindications to anticoag/antplt therapy           - Repeat endoscopic evaluation recommended in 3 months  - Active T&S  - Transfuse for hgb <8 (active CAD)    #CAD   - Previous cath at [facility] showing oLM 30% and RCA 99% that is not amenable to intervention.  [facility], EF 25-30%, severe global wall motion dysfunction, mildly dilated LA, RV mildly dilated, all leaflets mild calcified, mod pHTN  - hx coffee ground emesis @ [facility]; s/p EGD/colonoscopy showing rectal ulcer and [facility]  flex sig suggestive of solitary ulcer syndrome - per cardiology will hold off loading the patient due to recent LGIB  - No absolute GI contraindications to anticoag/antplt therapy  - EP consulted for possible ICD placement  - Angiogram and possible PCI per cardiology    #H/O ventricular tachycardia   - Vtach arrest @[facility]  - agree with EP consult for ICD placement    #ESRD on dialysis  - dialysis Tues, Thurs, Sat  - last received 3/16  - f/u nephro recs  - Electrolytes wnl, k 4.3, phos 1.9  - c/w calcium citrate 667mg TID  - on Renvela 800mg TID, f/u with renal as patient is with hypophosphatemia on admission     #S/P kidney transplant.   - per [place] pt takes Tacrolimus 2mg 2 times /day  - Obtain collateral regarding renal transplant, if indeed failed, what indications patient have for c/w tacrolimus 2mg?  - f/u tacrolimus serum levels  - f/u nephro recs    #HTN  - SBP on admission 155-170s  - c/w home meds. Amlodipine 10mg qd, Losartan 100mg qd, Hydralazine 50m TID, Isordil 20mg TID    #HLD   - c/w Lipitor 40mg qd  - f/u AM lipid panel.    #DM  - no meds per [place], obtain collaterals for med recs prior [place] stay  - mISS while inpt  - A1c 5.9 on admission    DVT PPX: SCDs for now iso GIB 63yo M PMHx HTN, HLD, DM, CAD(s/p cath years ago, RCA 99%, never intervened on), HFrEF 25-30%, ESRD s/p failed kidney transplant (last HD 3/1 via Left Arm AVF; HD TTS), Right AKA initially presented to [facility] 2/27 for respiratory distress after a dialysis, found to be septic, h/c complicated by AHRF requiring intubation for 24hr followed by VT arrest, h/c the complicated by massive LGIB (rectal ulcer) requiring 7u pRBC, 1u FFP and 1u PLT. Transferred to [facility] for ICD placement 2/2 Vtach and cardiac cath. Medicine consulted for comanagement.     #LGIB  #SANTOS  - hx coffee ground emesis @ [facility]; s/p EGD/colonoscopy showing rectal ulcer  - 3/17AM pt passed loose BM along with large amount of BRBPR  - per gen surgery no acute surgical intervention   - per GI:      - increase Protonix 40 mg IVP BID      - PIV large bore x2      - s/p flex sig for rectal ulcers, found Evidence of prior hemoclip in the sigmoid colon. Localized ulceration and erosive with no bleeding were noted in the rectum, suggestive of solitary ulcer syndrome           - Avoid anal digitation and enemas           - High-fiber diet and optimize laxatives to avoid constipation using miralax and dulcolax           - No absolute GI contraindications to anticoag/antplt therapy           - Repeat endoscopic evaluation recommended in 3 months  - Active T&S  - Transfuse for hgb <8 (active CAD)    #CAD   - Previous cath at [facility] showing oLM 30% and RCA 99% that is not amenable to intervention.  - [facility], EF 25-30%, severe global wall motion dysfunction, mildly dilated LA, RV mildly dilated, all leaflets mild calcified, mod pHTN  - hx coffee ground emesis @ [facility]; s/p EGD/colonoscopy showing rectal ulcer and [facility]  flex sig suggestive of solitary ulcer syndrome - per cardiology will hold off loading the patient due to recent LGIB  - No absolute GI contraindications to anticoag/antplt therapy  - EP consulted for possible ICD placement  - Angiogram and possible PCI per cardiology    #H/O ventricular tachycardia   - Vtach arrest @[facility]  - agree with EP consult for ICD placement    #ESRD on dialysis  - dialysis Tues, Thurs, Sat  - last received 3/16  - f/u nephro recs  - Electrolytes wnl, k 4.3, phos 1.9  - c/w calcium citrate 667mg TID  - on Renvela 800mg TID, f/u with renal as patient is with hypophosphatemia on admission     #S/P kidney transplant.   - per [place] pt takes Tacrolimus 2mg 2 times /day  - Obtain collateral regarding renal transplant, if indeed failed, what indications patient have for c/w tacrolimus 2mg?  - f/u tacrolimus serum levels  - f/u nephro recs    #HTN  - SBP on admission 155-170s  - c/w home meds. Amlodipine 10mg qd, Losartan 100mg qd, Hydralazine 50m TID, Isordil 20mg TID    #HLD   - c/w Lipitor 40mg qd  - f/u AM lipid panel.    #DM  - no meds per [place], obtain collaterals for med recs prior [place] stay  - mISS while inpt  - A1c 5.9 on admission    DVT PPX: SCDs for now iso GIB 61yo M PMHx HTN, HLD, DM, CAD(s/p cath years ago, RCA 99%, never intervened on), HFrEF 25-30%, ESRD s/p failed kidney transplant (last HD 3/1 via Left Arm AVF; HD TTS), Right AKA initially presented to [facility] 2/27 for respiratory distress after a dialysis, found to be septic, h/c complicated by AHRF requiring intubation for 24hr followed by VT arrest, h/c the complicated by massive LGIB (rectal ulcer) requiring 7u pRBC, 1u FFP and 1u PLT. Transferred to [facility] for ICD placement 2/2 Vtach and cardiac cath. Medicine consulted for comanagement.     #LGIB  #SANTOS  - hx coffee ground emesis @ [facility]; s/p EGD/colonoscopy showing rectal ulcer  - 3/17AM pt passed loose BM along with large amount of BRBPR  - per gen surgery no acute surgical intervention   - per GI:      - increase Protonix 40 mg IVP BID      - PIV large bore x2      - s/p flex sig for rectal ulcers, found Evidence of prior hemoclip in the sigmoid colon. Localized ulceration and erosive with no bleeding were noted in the rectum, suggestive of solitary ulcer syndrome           - Avoid anal digitation and enemas           - High-fiber diet and optimize laxatives to avoid constipation using miralax and dulcolax           - No absolute GI contraindications to anticoag/antplt therapy           - Repeat endoscopic evaluation recommended in 3 months  - Active T&S  - Transfuse for hgb <8 (active CAD)    #CAD   - Previous cath at [facility] showing oLM 30% and RCA 99% that is not amenable to intervention.  - [facility] LVH, EF 25-30%, severe global wall motion dysfunction, mildly dilated LA, RV mildly dilated, all leaflets mild calcified, mod pHTN  - hx coffee ground emesis @ [facility]; s/p EGD/colonoscopy showing rectal ulcer and [facility]  flex sig suggestive of solitary ulcer syndrome - per cardiology will hold off loading the patient due to recent LGIB  - No absolute GI contraindications to anticoag/antplt therapy  - EP consulted for possible ICD placement  - Angiogram and possible PCI per cardiology    #H/O ventricular tachycardia   - [facility]  - agree with EP consult for ICD placement    #ESRD on dialysis  - dialysis Tues, Thurs, Sat  - last received 3/16  - f/u nephro recs  - Electrolytes wnl, k 4.3, phos 1.9  - c/w calcium citrate 667mg TID  - on Renvela 800mg TID, f/u with renal as patient is with hypophosphatemia on admission     #S/P kidney transplant.   - per [place] pt takes Tacrolimus 2mg 2 times /day  - Obtain collateral regarding renal transplant, if indeed failed, what indications patient have for c/w tacrolimus 2mg?  - f/u tacrolimus serum levels  - f/u nephro recs    #HTN  - SBP on admission 155-170s  - c/w home meds. Amlodipine 10mg qd, Losartan 100mg qd, Hydralazine 50m TID, Isordil 20mg TID    #HLD   - c/w Lipitor 40mg qd  - f/u AM lipid panel.    #DM  - no meds per [place], obtain collaterals for med recs prior [place] stay  - mISS while inpt  - A1c 5.9 on admission    DVT PPX: SCDs for now iso GIB 63yo M PMHx HTN, HLD, DM, CAD(s/p cath years ago, RCA 99%, never intervened on), HFrEF 25-30%, ESRD s/p failed kidney transplant (last HD 3/1 via Left Arm AVF; HD TTS), Right AKA initially presented to [facility] 2/27 for respiratory distress after a dialysis, found to be septic, h/c complicated by AHRF requiring intubation for 24hr followed by VT arrest, h/c the complicated by massive LGIB (rectal ulcer) requiring 7u pRBC, 1u FFP and 1u PLT. Transferred to [facility] for ICD placement 2/2 Vtach and cardiac cath. Medicine consulted for comanagement.     #LGIB  #SANTOS  #Solitary ulcer syndrome  - hx coffee ground emesis @ [facility]; s/p EGD/colonoscopy showing rectal ulcer  - 3/17AM pt passed loose BM along with large amount of BRBPR  - per gen surgery no acute surgical intervention   - per GI:      - increase Protonix 40 mg IVP BID      - PIV large bore x2      - s/p flex sig for rectal ulcers, found Evidence of prior hemoclip in the sigmoid colon. Localized ulceration and erosive with no bleeding were noted in the rectum, suggestive of solitary ulcer syndrome           - Avoid anal digitation and enemas           - High-fiber diet and optimize laxatives to avoid constipation using miralax and dulcolax           - No absolute GI contraindications to anticoag/antplt therapy           - Repeat endoscopic evaluation recommended in 3 months  - Active T&S  - Transfuse for hgb <8 (active CAD)    #CAD   - Previous cath at [facility] showing oLM 30% and RCA 99% that is not amenable to intervention.  - [facility], EF 25-30%, severe global wall motion dysfunction, mildly dilated LA, RV mildly dilated, all leaflets mild calcified, mod pHTN  - hx coffee ground emesis @ [facility]; s/p EGD/colonoscopy showing rectal ulcer and [facility]  flex sig suggestive of solitary ulcer syndrome - per cardiology will hold off loading the patient due to recent LGIB  - No absolute GI contraindications to anticoag/antplt therapy  - EP consulted for possible ICD placement  - Angiogram and possible PCI per cardiology    #H/O ventricular tachycardia   - Vtach arrest @[facility]  - agree with EP consult for ICD placement    #ESRD on dialysis  - dialysis Tues, Thurs, Sat  - last received 3/16  - f/u nephro recs  - Electrolytes wnl, k 4.3, phos 1.9  - c/w calcium citrate 667mg TID  - on Renvela 800mg TID, f/u with renal as patient is with hypophosphatemia on admission     #S/P kidney transplant.   - per [place] pt takes Tacrolimus 2mg 2 times /day  - Obtain collateral regarding renal transplant, if indeed failed, what indications patient have for c/w tacrolimus 2mg?  - f/u tacrolimus serum levels  - f/u nephro recs    #HTN  - SBP on admission 155-170s  - c/w home meds. Amlodipine 10mg qd, Losartan 100mg qd, Hydralazine 50m TID, Isordil 20mg TID    #HLD   - c/w Lipitor 40mg qd  - f/u AM lipid panel.    #DM  - no meds per [place], obtain collaterals for med recs prior [place] stay  - mISS while inpt  - A1c 5.9 on admission    DVT PPX: SCDs for now iso GIB 61yo M PMHx HTN, HLD, DM, CAD(s/p cath years ago, RCA 99%, never intervened on), HFrEF 25-30%, ESRD s/p failed kidney transplant (last HD 3/1 via Left Arm AVF; HD TTS), Right AKA initially presented to [facility] 2/27 for respiratory distress after a dialysis, found to be septic, h/c complicated by AHRF requiring intubation for 24hr followed by VT arrest, h/c the complicated by massive LGIB (rectal ulcer) requiring 7u pRBC, 1u FFP and 1u PLT. Transferred to [facility] for ICD placement 2/2 Vtach and cardiac cath. Medicine consulted for comanagement.     #LGIB  #SANTOS  #Solitary ulcer syndrome  - hx coffee ground emesis @ [facility]; s/p EGD/colonoscopy showing rectal ulcer  - 3/17AM pt passed loose BM along with large amount of BRBPR  - per gen surgery no acute surgical intervention   - per GI:      - increase Protonix 40 mg IVP BID      - PIV large bore x2      - s/p flex sig for rectal ulcers, found Evidence of prior hemoclip in the sigmoid colon. Localized ulceration and erosive with no bleeding were noted in the rectum, suggestive of solitary ulcer syndrome           - Avoid anal digitation and enemas           - High-fiber diet and optimize laxatives to avoid constipation using miralax and dulcolax           - No absolute GI contraindications to anticoag/antplt therapy           - Repeat endoscopic evaluation recommended in 3 months  - Active T&S  - Transfuse for hgb <8 (active CAD)    #CAD   - Previous cath at [facility] showing oLM 30% and RCA 99% that is not amenable to intervention.  - [facility], EF 25-30%, severe global wall motion dysfunction, mildly dilated LA, RV mildly dilated, all leaflets mild calcified, mod pHTN  - hx coffee ground emesis @ [facility]; s/p EGD/colonoscopy showing rectal ulcer and [facility]  flex sig suggestive of solitary ulcer syndrome - per cardiology will hold off loading the patient due to recent LGIB  - No absolute GI contraindications to anticoag/antplt therapy  - EP consulted for possible ICD placement  - Angiogram and possible PCI per cardiology    #H/O ventricular tachycardia   - Vtach arrest @[facility]  - agree with EP consult for ICD placement    #ESRD on dialysis  - dialysis Tues, Thurs, Sat  - last received 3/16  - f/u nephro recs  - Electrolytes wnl, k 4.3, phos 1.9  - c/w calcium citrate 667mg TID  - on Renvela 800mg TID, f/u with renal as patient is with hypophosphatemia on admission     #S/P kidney transplant.   - per [place] pt takes Tacrolimus 2mg 2 times /day  - Obtain collateral regarding renal transplant, if indeed failed, what indications patient have for c/w tacrolimus 2mg?  - f/u tacrolimus serum levels  - f/u nephro recs    #HTN  - SBP on admission 155-170s  - c/w home meds. Amlodipine 10mg qd, Losartan 100mg qd, Hydralazine 50m TID, Isordil 20mg TID    #HLD   - c/w Lipitor 40mg qd  - f/u AM lipid panel.    #DM  - no meds per [place], obtain collaterals for med recs prior [place] stay  - mISS while inpt  - A1c 5.9 on admission    DVT PPX: SCDs for now iso GIB 63yo M PMHx HTN, HLD, DM, CAD(s/p cath years ago, RCA 99%, never intervened on), HFrEF 25-30%, ESRD s/p failed kidney transplant (last HD 3/1 via Left Arm AVF; HD TTS), Right AKA initially presented to [facility] 2/27 for respiratory distress after a dialysis, found to be septic, h/c complicated by AHRF requiring intubation for 24hr followed by VT arrest, h/c the complicated by massive LGIB (rectal ulcer) requiring 7u pRBC, 1u FFP and 1u PLT. Transferred to [facility] for ICD placement 2/2 Vtach and cardiac cath. Medicine consulted for comanagement.     #LGIB  #SANTOS  #Solitary ulcer syndrome  - hx coffee ground emesis @ [facility]; s/p EGD/colonoscopy showing rectal ulcer  - 3/17AM pt passed loose BM along with large amount of BRBPR  - per gen surgery no acute surgical intervention   - per GI:      - increase Protonix 40 mg IVP BID      - PIV large bore x2      - s/p flex sig for rectal ulcers, found Evidence of prior hemoclip in the sigmoid colon. Localized ulceration and erosive with no bleeding were noted in the rectum, suggestive of solitary ulcer syndrome           - Avoid anal digitation and enemas           - High-fiber diet and optimize laxatives to avoid constipation using miralax and dulcolax           - No absolute GI contraindications to anticoag/antplt therapy           - Repeat endoscopic evaluation recommended in 3 months  - Active T&S  - Transfuse for hgb <8 (active CAD)    #CAD   - Previous cath at [facility] showing oLM 30% and RCA 99% that is not amenable to intervention.  - [facility], EF 25-30%, severe global wall motion dysfunction, mildly dilated LA, RV mildly dilated, all leaflets mild calcified, mod pHTN  - hx coffee ground emesis @ [facility]; s/p EGD/colonoscopy showing rectal ulcer and [facility]  flex sig suggestive of solitary ulcer syndrome - per cardiology will hold off loading the patient due to recent LGIB  - No absolute GI contraindications to anticoag/antplt therapy  - EP consulted for possible ICD placement  - Angiogram and possible PCI per cardiology    #H/O ventricular tachycardia   - Vtach arrest @[facility]  - agree with EP consult for ICD placement    #ESRD on dialysis  - dialysis Tues, Thurs, Sat  - last received 3/16  - f/u nephro recs  - Electrolytes wnl, k 4.3, phos 1.9  - c/w calcium citrate 667mg TID  - on Renvela 800mg TID, f/u with renal as patient is with hypophosphatemia on admission     #S/P kidney transplant.   - per [place] pt takes Tacrolimus 2mg 2 times /day  - Obtain collateral regarding renal transplant, if indeed failed, what indications patient have for c/w tacrolimus 2mg?  - f/u tacrolimus serum levels  - f/u nephro recs    #HTN  - SBP on admission 155-170s  - c/w home meds. Amlodipine 10mg qd, Losartan 100mg qd, Hydralazine 50m TID, Isordil 20mg TID    #HLD   - c/w Lipitor 40mg qd  - f/u AM lipid panel.    #DM  - no meds per [place], obtain collaterals for med recs prior [place] stay  - mISS while inpt  - A1c 5.9 on admission    DVT PPX: SCDs for now iso GIB

## 2023-03-17 NOTE — PATIENT PROFILE ADULT - FUNCTIONAL ASSESSMENT - BASIC MOBILITY 6.
3-calculated by average/Not able to assess (calculate score using [facility] averaging method)  3-calculated by average/Not able to assess (calculate score using [facility] averaging method)  3-calculated by average/Not able to assess (calculate score using [facility] averaging method)

## 2023-03-17 NOTE — CHART NOTE - NSCHARTNOTEFT_GEN_A_CORE
Flex sig performed at bedside with [facility] attending, Dr. Colbert.  Findings as follows:    -Brown stool was seen in the rectum and sigmoid colon.	  -Evidence of prior hemoclip was found in the sigmoid colon.	  -Localized ulceration and erosive with no bleeding were noted in the rectum, suggestive of solitary ulcer syndrome    Bright red blood per rectum at bedside most likely from solitary rectal syndrome    Recommendations:  -Avoid anal digitation and enemas   - Minimization of time on the commode   -High-fiber diet and optimize laxatives to avoid constipation using miralax and dulcolax  -No absolute GI contraindications to anticoag/antplt therapy  -Repeat endoscopic evaluation recommended in 3 months    Please request that the patient schedule GI follow-up at the clinic located on the fourth floor of [address] [place], [place] [address] by calling the office at [phone]     Gastroenterology service will sign off  Recommendations discussed with primary team  Case discussed with attending physician  Please recall as needed with any GI related questions    Thank you for this consult.     Gee Thorne DO  Gastroenterology Fellow  Pager: [phone] Flex sig performed at bedside with [facility] attending, Dr. Colbetr.  Findings as follows:    -Brown stool was seen in the rectum and sigmoid colon.	  -Evidence of prior hemoclip was found in the sigmoid colon.	  -Localized ulceration and erosive with no bleeding were noted in the rectum, suggestive of solitary ulcer syndrome    Bright red blood per rectum at bedside most likely from solitary rectal syndrome    Recommendations:  -Avoid anal digitation and enemas   - Minimization of time on the commode   -High-fiber diet and optimize laxatives to avoid constipation using miralax and dulcolax  -No absolute GI contraindications to anticoag/antplt therapy  -Repeat endoscopic evaluation recommended in 3 months    Please request that the patient schedule GI follow-up at the clinic located on the fourth floor of [address] [place], [place] [address] by calling the office at [phone]     Gastroenterology service will sign off  Recommendations discussed with primary team  Case discussed with attending physician  Please recall as needed with any GI related questions    Thank you for this consult.     Gee Thorne DO  Gastroenterology Fellow  Pager: [phone] Flex sig performed at bedside with [facility] attending, Dr. Colbert.  Findings as follows:    -Brown stool was seen in the rectum and sigmoid colon.	  -Evidence of prior hemoclip was found in the sigmoid colon.	  -Localized ulceration and erosive with no bleeding were noted in the rectum, suggestive of solitary ulcer syndrome    Bright red blood per rectum at bedside most likely from solitary rectal syndrome    Recommendations:  -Avoid anal digitation and enemas   - Minimization of time on the commode   -High-fiber diet and optimize laxatives to avoid constipation using miralax and dulcolax  -No absolute GI contraindications to anticoag/antplt therapy  -Repeat endoscopic evaluation recommended in 3 months    Please request that the patient schedule GI follow-up at the clinic located on the fourth floor of [address] [place], [place] [address] St by calling the office at [phone]     Gastroenterology service will sign off  Recommendations discussed with primary team  Case discussed with attending physician  Please recall as needed with any GI related questions    Thank you for this consult.     Gee Thorne DO  Gastroenterology Fellow  Pager: [phone]

## 2023-03-17 NOTE — DIETITIAN INITIAL EVALUATION ADULT - PERTINENT MEDS FT
MEDICATIONS  (STANDING):  amLODIPine   Tablet 10 milliGRAM(s) Oral daily  atorvastatin 40 milliGRAM(s) Oral at bedtime  calcium acetate 667 milliGRAM(s) Oral three times a day with meals  dextrose 5%. 1000 milliLiter(s) (50 mL/Hr) IV Continuous <Continuous>  dextrose 5%. 1000 milliLiter(s) (100 mL/Hr) IV Continuous <Continuous>  dextrose 50% Injectable 25 Gram(s) IV Push once  dextrose 50% Injectable 12.5 Gram(s) IV Push once  dextrose 50% Injectable 25 Gram(s) IV Push once  ferrous    sulfate 325 milliGRAM(s) Oral daily  glucagon  Injectable 1 milliGRAM(s) IntraMuscular once  hydrALAZINE 50 milliGRAM(s) Oral every 8 hours  insulin lispro (ADMELOG) corrective regimen sliding scale   SubCutaneous every 6 hours  isosorbide   dinitrate Tablet (ISORDIL) 20 milliGRAM(s) Oral three times a day  losartan 100 milliGRAM(s) Oral daily  pantoprazole Infusion 8 mG/Hr (10 mL/Hr) IV Continuous <Continuous>  polyethylene glycol 3350 17 Gram(s) Oral daily  sevelamer carbonate 800 milliGRAM(s) Oral three times a day with meals  tacrolimus 2 milliGRAM(s) Oral once  tamsulosin 0.4 milliGRAM(s) Oral at bedtime    MEDICATIONS  (PRN):  dextrose Oral Gel 15 Gram(s) Oral once PRN Blood Glucose LESS THAN 70 milliGRAM(s)/deciliter

## 2023-03-17 NOTE — CONSULT NOTE ADULT - CONSULT REASON
[facility]

## 2023-03-17 NOTE — H&P ADULT - PROBLEM SELECTOR PLAN 3
dialysis Tues, Thurs, Sat  -- last received 3/16  -- nephro consult in AM Statement Selected 3/17AM pt passed loose BM along with large amount of BRBPR  -- 3/17AM pt passed loose BM along with large amount of BRBPR  -- GI, surgery, MICU consulted   -- type and screen ordered, f/u recs from above

## 2023-03-17 NOTE — H&P ADULT - PROBLEM SELECTOR PLAN 1
Previous cath at [facility] showing oLM 30% and RCA 99% that is not amenable to intervention.  -- [facility], EF 25-30%, severe global wall motion dysfunction, mildly dilated LA, RV mildly dilated, all leaflets mild calcified, mod pHTN  -- hx coffee ground emesis @ [facility]; s/p EGD/colonoscopy showing melanosis without active bleed  -- f/u when pt can be loaded for cath  -- NPO for procedure Previous cath at [facility] showing oLM 30% and RCA 99% that is not amenable to intervention.  -- [facility], [facility] 25-30%, severe global wall motion dysfunction, mildly dilated LA, RV mildly dilated, all leaflets mild calcified, mod pHTN  -- hx coffee ground emesis @ [facility]; s/p EGD/colonoscopy showing melanosis without active bleed  -- f/u when pt can be loaded for cath  -- NPO for procedure Previous cath at [facility] showing oLM 30% and RCA 99% that is not amenable to intervention.  -- [facility], EF 25-30%, severe global wall motion dysfunction, mildly dilated LA, RV mildly dilated, all leaflets mild calcified, mod pHTN  -- hx coffee ground emesis @ [facility]; s/p EGD/colonoscopy showing melanosis without active bleed  -- f/u when pt can be loaded for cath  -- NPO for procedure

## 2023-03-18 LAB
ALBUMIN SERPL ELPH-MCNC: 2.5 G/DL — LOW (ref 3.3–5)
ALP SERPL-CCNC: 117 U/L — SIGNIFICANT CHANGE UP (ref 40–120)
ALT FLD-CCNC: 9 U/L — LOW (ref 10–45)
ANION GAP SERPL CALC-SCNC: 8 MMOL/L — SIGNIFICANT CHANGE UP (ref 5–17)
AST SERPL-CCNC: 15 U/L — SIGNIFICANT CHANGE UP (ref 10–40)
BILIRUB SERPL-MCNC: 1.1 MG/DL — SIGNIFICANT CHANGE UP (ref 0.2–1.2)
BUN SERPL-MCNC: 28 MG/DL — HIGH (ref 7–23)
CALCIUM SERPL-MCNC: 8 MG/DL — LOW (ref 8.4–10.5)
CHLORIDE SERPL-SCNC: 97 MMOL/L — SIGNIFICANT CHANGE UP (ref 96–108)
CO2 SERPL-SCNC: 27 MMOL/L — SIGNIFICANT CHANGE UP (ref 22–31)
CREAT SERPL-MCNC: 5.19 MG/DL — HIGH (ref 0.5–1.3)
EGFR: 12 ML/MIN/1.73M2 — LOW
GLUCOSE BLDC GLUCOMTR-MCNC: 126 MG/DL — HIGH (ref 70–99)
GLUCOSE BLDC GLUCOMTR-MCNC: 127 MG/DL — HIGH (ref 70–99)
GLUCOSE BLDC GLUCOMTR-MCNC: 142 MG/DL — HIGH (ref 70–99)
GLUCOSE BLDC GLUCOMTR-MCNC: 88 MG/DL — SIGNIFICANT CHANGE UP (ref 70–99)
GLUCOSE SERPL-MCNC: 91 MG/DL — SIGNIFICANT CHANGE UP (ref 70–99)
HBV SURFACE AB SER-ACNC: REACTIVE
HBV SURFACE AG SER-ACNC: SIGNIFICANT CHANGE UP
HCT VFR BLD CALC: 32.5 % — LOW (ref 39–50)
HGB BLD-MCNC: 10.5 G/DL — LOW (ref 13–17)
MAGNESIUM SERPL-MCNC: 1.8 MG/DL — SIGNIFICANT CHANGE UP (ref 1.6–2.6)
MCHC RBC-ENTMCNC: 29.4 PG — SIGNIFICANT CHANGE UP (ref 27–34)
MCHC RBC-ENTMCNC: 32.3 GM/DL — SIGNIFICANT CHANGE UP (ref 32–36)
MCV RBC AUTO: 91 FL — SIGNIFICANT CHANGE UP (ref 80–100)
NRBC # BLD: 0 /100 WBCS — SIGNIFICANT CHANGE UP (ref 0–0)
PHOSPHATE SERPL-MCNC: 3 MG/DL — SIGNIFICANT CHANGE UP (ref 2.5–4.5)
PLATELET # BLD AUTO: 148 K/UL — LOW (ref 150–400)
POTASSIUM SERPL-MCNC: 4.5 MMOL/L — SIGNIFICANT CHANGE UP (ref 3.5–5.3)
POTASSIUM SERPL-SCNC: 4.5 MMOL/L — SIGNIFICANT CHANGE UP (ref 3.5–5.3)
PROT SERPL-MCNC: 4.7 G/DL — LOW (ref 6–8.3)
RBC # BLD: 3.57 M/UL — LOW (ref 4.2–5.8)
RBC # FLD: 14.9 % — HIGH (ref 10.3–14.5)
SODIUM SERPL-SCNC: 132 MMOL/L — LOW (ref 135–145)
WBC # BLD: 4.67 K/UL — SIGNIFICANT CHANGE UP (ref 3.8–10.5)
WBC # FLD AUTO: 4.67 K/UL — SIGNIFICANT CHANGE UP (ref 3.8–10.5)

## 2023-03-18 PROCEDURE — 99232 SBSQ HOSP IP/OBS MODERATE 35: CPT

## 2023-03-18 PROCEDURE — 99233 SBSQ HOSP IP/OBS HIGH 50: CPT

## 2023-03-18 PROCEDURE — 90935 HEMODIALYSIS ONE EVALUATION: CPT | Mod: GC

## 2023-03-18 RX ORDER — PANTOPRAZOLE SODIUM 20 MG/1
40 TABLET, DELAYED RELEASE ORAL EVERY 12 HOURS
Refills: 0 | Status: DISCONTINUED | OUTPATIENT
Start: 2023-03-18 | End: 2023-03-23

## 2023-03-18 RX ORDER — TACROLIMUS 5 MG/1
2 CAPSULE ORAL EVERY 12 HOURS
Refills: 0 | Status: DISCONTINUED | OUTPATIENT
Start: 2023-03-18 | End: 2023-04-12

## 2023-03-18 RX ORDER — ACETAMINOPHEN 500 MG
650 TABLET ORAL EVERY 6 HOURS
Refills: 0 | Status: DISCONTINUED | OUTPATIENT
Start: 2023-03-18 | End: 2023-04-05

## 2023-03-18 RX ORDER — METOPROLOL TARTRATE 50 MG
12.5 TABLET ORAL EVERY 12 HOURS
Refills: 0 | Status: DISCONTINUED | OUTPATIENT
Start: 2023-03-18 | End: 2023-03-21

## 2023-03-18 RX ORDER — TAMSULOSIN HYDROCHLORIDE 0.4 MG/1
0.8 CAPSULE ORAL AT BEDTIME
Refills: 0 | Status: DISCONTINUED | OUTPATIENT
Start: 2023-03-18 | End: 2023-04-12

## 2023-03-18 RX ORDER — ACETAMINOPHEN 500 MG
650 TABLET ORAL ONCE
Refills: 0 | Status: COMPLETED | OUTPATIENT
Start: 2023-03-18 | End: 2023-03-18

## 2023-03-18 RX ADMIN — AMLODIPINE BESYLATE 10 MILLIGRAM(S): 2.5 TABLET ORAL at 06:13

## 2023-03-18 RX ADMIN — PANTOPRAZOLE SODIUM 10 MG/HR: 20 TABLET, DELAYED RELEASE ORAL at 00:56

## 2023-03-18 RX ADMIN — Medication 50 MILLIGRAM(S): at 21:31

## 2023-03-18 RX ADMIN — TAMSULOSIN HYDROCHLORIDE 0.8 MILLIGRAM(S): 0.4 CAPSULE ORAL at 21:30

## 2023-03-18 RX ADMIN — PANTOPRAZOLE SODIUM 40 MILLIGRAM(S): 20 TABLET, DELAYED RELEASE ORAL at 18:52

## 2023-03-18 RX ADMIN — LOSARTAN POTASSIUM 100 MILLIGRAM(S): 100 TABLET, FILM COATED ORAL at 06:13

## 2023-03-18 RX ADMIN — Medication 650 MILLIGRAM(S): at 02:41

## 2023-03-18 RX ADMIN — ISOSORBIDE DINITRATE 20 MILLIGRAM(S): 5 TABLET ORAL at 11:27

## 2023-03-18 RX ADMIN — Medication 667 MILLIGRAM(S): at 09:32

## 2023-03-18 RX ADMIN — Medication 50 MILLIGRAM(S): at 06:13

## 2023-03-18 RX ADMIN — PANTOPRAZOLE SODIUM 10 MG/HR: 20 TABLET, DELAYED RELEASE ORAL at 11:15

## 2023-03-18 RX ADMIN — Medication 650 MILLIGRAM(S): at 23:54

## 2023-03-18 RX ADMIN — POLYETHYLENE GLYCOL 3350 17 GRAM(S): 17 POWDER, FOR SOLUTION ORAL at 11:27

## 2023-03-18 RX ADMIN — TACROLIMUS 2 MILLIGRAM(S): 5 CAPSULE ORAL at 17:54

## 2023-03-18 RX ADMIN — Medication 650 MILLIGRAM(S): at 00:41

## 2023-03-18 RX ADMIN — Medication 325 MILLIGRAM(S): at 11:27

## 2023-03-18 RX ADMIN — ISOSORBIDE DINITRATE 20 MILLIGRAM(S): 5 TABLET ORAL at 06:13

## 2023-03-18 RX ADMIN — ATORVASTATIN CALCIUM 40 MILLIGRAM(S): 80 TABLET, FILM COATED ORAL at 21:31

## 2023-03-18 RX ADMIN — SEVELAMER CARBONATE 800 MILLIGRAM(S): 2400 POWDER, FOR SUSPENSION ORAL at 09:42

## 2023-03-18 NOTE — PROGRESS NOTE ADULT - NS ATTEND AMEND GEN_ALL_CORE FT
63yo M PMHx HTN, HLD, DM,  ICM (s/p cath years ago, RCA 99%, never intervened on), HFrEF 25-30%, ESRD s/p failed kidney transplant (Left Arm AVF; HD TTS), R AKA admitted to [facility] 2/27 for respiratory distress after a HD, met SIRS criteria s/p vanc/zosyn. Intubated on 3/1, cardiac arrest (RoSC after 8mins) w/ subsequent Vtach arrest, placed on amio and pressors, ellen to 30s s/p atropine x2. Weaned off levophed and extubated 3/2. Course c/b coffee ground emesis, hemoccult +, Hgb 3.5 s/p 7units PRBCs, 1Unit FFP, 1 unit donor packed platelets. EGD/colo: melanosis duodeni but no acute bleed. Hypotensive and restarted on levophed gtt. CTA Abd: no active bleeding, possible focal proctitis; colonoscopy showing rectal ulcer. Started on hydrocortisone suppository and mesalamine. Hgb stabilized 11's, weaned off levophed and transferred to [facility] 3/17/23 for ICD eval 2/2 Vtach and cardiac cath. [facility] c/b BRBPR 3/17 AM with flex sig showing localized ulceration and no active rectal bleeding. Plan to observe for bleeding x 48 hrs before restarting DAPT. 61yo M PMHx HTN, HLD, DM,  ICM (s/p cath years ago, RCA 99%, never intervened on), HFrEF 25-30%, ESRD s/p failed kidney transplant (Left Arm AVF; HD TTS), R AKA admitted to [facility] 2/27 for respiratory distress after a HD, met SIRS criteria s/p vanc/zosyn. Intubated on 3/1, cardiac arrest (RoSC after 8mins) w/ subsequent Vtach arrest, placed on amio and pressors, ellen to 30s s/p atropine x2. Weaned off levophed and extubated 3/2. Course c/b coffee ground emesis, hemoccult +, Hgb 3.5 s/p 7units PRBCs, 1Unit FFP, 1 unit donor packed platelets. EGD/colo: melanosis duodeni but no acute bleed. Hypotensive and restarted on levophed gtt. CTA Abd: no active bleeding, possible focal proctitis; colonoscopy showing rectal ulcer. Started on hydrocortisone suppository and mesalamine. Hgb stabilized 11's, weaned off levophed and transferred to [facility] 3/17/23 for ICD eval 2/2 Vtach and cardiac cath. Hospital [facility] c/b BRBPR 3/17 AM with flex sig showing localized ulceration and no active rectal bleeding. Plan to observe for bleeding x 48 hrs before restarting DAPT. 61yo M PMHx HTN, HLD, DM,  ICM (s/p cath years ago, RCA 99%, never intervened on), HFrEF 25-30%, ESRD s/p failed kidney transplant (Left Arm AVF; HD TTS), R AKA admitted to [facility] 2/27 for respiratory distress after a HD, met SIRS criteria s/p vanc/zosyn. Intubated on 3/1, cardiac arrest (RoSC after 8mins) w/ subsequent Vtach arrest, placed on amio and pressors, ellen to 30s s/p atropine x2. Weaned off levophed and extubated 3/2. Course c/b coffee ground emesis, hemoccult +, Hgb 3.5 s/p 7units PRBCs, 1Unit FFP, 1 unit donor packed platelets. EGD/colo: melanosis duodeni but no acute bleed. Hypotensive and restarted on levophed gtt. CTA Abd: no active bleeding, possible focal proctitis; colonoscopy showing rectal ulcer. Started on hydrocortisone suppository and mesalamine. Hgb stabilized 11's, weaned off levophed and transferred to [facility] 3/17/23 for ICD eval 2/2 Vtach and cardiac cath. [facility] c/b BRBPR 3/17 AM with flex sig showing localized ulceration and no active rectal bleeding. Plan to observe for bleeding x 48 hrs before restarting DAPT.

## 2023-03-18 NOTE — PROGRESS NOTE ADULT - ATTENDING COMMENTS
3yo M PMHx HTN, HLD, DM, CAD(s/p cath years ago, RCA 99%, never intervened on), HFrEF 25-30%, ESRD s/p failed kidney transplant and required HD x 3yrs (last HD 3/16 via Left Arm AVF; HD TTS), Right AKA initially presented to [facility] 2/27 for respiratory distress after a dialysis, found to be septic, h/c complicated by AHRF requiring intubation for 24hr followed by VT arrest, h/c the complicated by massive LGIB (rectal ulcer seen on colonoscopy) requiring 7u pRBC, 1u FFP and 1u PLT. Transferred to [facility] (3/17) for ICD placement 2/2 Vtach and cardiac cath. Pt noted lassed loose stool with BRBPR, GI and surgery consulted. Flex Sigc ( 3/17) found prior hemoclip in the sigmoid colon, localized ulceration and erosive with no bleeding noted in rectum suggestive of solitary ulcer syndrome. Medicine consulted for comanagement.     #LGIB  #SANTOS  #Solitary ulcer syndrome  - hx coffee ground emesis @ [facility]; s/p EGD/colonoscopy showing rectal ulcer  - 3/17AM pt passed loose BM along with large amount of BRBPR  - per gen surgery no acute surgical intervention   - per GI:      - increased Protonix 40 mg IVP BID      - PIV large bore x2      - s/p flex sig(3/17)  for rectal ulcers, found Evidence of prior hemoclip in the sigmoid colon. Localized ulceration and erosive with no bleeding were noted in the rectum, suggestive of solitary ulcer syndrome           - Avoid anal digitation and enemas           - High-fiber diet and optimize laxatives to avoid constipation using miralax and dulcolax           - No absolute GI contraindications to anticoag/antplt therapy           - Repeat endoscopic evaluation recommended in 3 months  - Active T&S  - Transfuse for hgb <8 (active CAD)    #CAD   - Previous cath at [facility] showing oLM 30% and RCA 99% that is not amenable to intervention.  - TTE 3/1/23 at [facility]: mild LVH, EF 25-30%, severe global wall motion dysfunction, mildly dilated LA, RV mildly dilated, all leaflets mild calcified, mod pHTN  - hx coffee ground emesis @ [facility]; s/p EGD/colonoscopy showing rectal ulcer and LHH  flex sig suggestive of solitary ulcer syndrome - per cardiology will hold off loading the patient due to recent LGIB  - No absolute GI contraindications to anticoag/antplt therapy  - EP consulted for possible ICD placement  - LHC/Angiogram and possible PCI per cardiology    #H/O ventricular tachycardia   - Vtach arrest @[facility]  - agree with EP consult for ICD placement    #ESRD on dialysis  - dialysis Tues, Thurs, Sat  - last received 3/16  - f/u nephro recs  - Electrolytes wnl, k 4.3, phos 1.9  - c/w calcium citrate 667mg TID  - on Renvela 800mg TID, f/u with renal as patient is with hypophosphatemia on admission     #S/P kidney transplant.   - per [place] pt takes Tacrolimus 2mg 2 times /day  - Obtain collateral regarding renal transplant, if indeed failed, what indications patient have for c/w tacrolimus 2mg?  - f/u tacrolimus serum levels  - f/u nephro recs    #HTN  - SBP on admission 155-170s  - c/w home meds. Amlodipine 10mg qd, Losartan 100mg qd, Hydralazine 50m TID, Isordil 20mg TID    #HLD   - c/w Lipitor 40mg qd  - f/u AM lipid panel.    #DM  - no meds per [place], obtain collaterals for med recs prior [place] stay  - mISS while inpt, goal -180   - A1c 5.9 on admission    DVT PPX: SCDs for now iso GIB    Med consult team continues to follow with you. d/w Dr. Lb Smith . 3yo M PMHx HTN, HLD, DM, CAD(s/p cath years ago, RCA 99%, never intervened on), HFrEF 25-30%, ESRD s/p failed kidney transplant and required HD x 3yrs (last HD 3/16 via Left Arm AVF; HD TTS), Right AKA initially presented to [facility] 2/27 for respiratory distress after a dialysis, found to be septic, h/c complicated by AHRF requiring intubation for 24hr followed by VT arrest, h/c the complicated by massive LGIB (rectal ulcer seen on colonoscopy) requiring 7u pRBC, 1u FFP and 1u PLT. Transferred to [facility] (3/17) for ICD placement 2/2 Vtach and cardiac cath. Pt noted lassed loose stool with BRBPR, GI and surgery consulted. Flex Sigc ( 3/17) found prior hemoclip in the sigmoid colon, localized ulceration and erosive with no bleeding noted in rectum suggestive of solitary ulcer syndrome. Medicine consulted for comanagement.     #LGIB  #SANTOS  #Solitary ulcer syndrome  - hx coffee ground emesis @ [facility]; s/p EGD/colonoscopy showing rectal ulcer  - 3/17AM pt passed loose BM along with large amount of BRBPR  - per gen surgery no acute surgical intervention   - per GI:      - increased Protonix 40 mg IVP BID      - PIV large bore x2      - s/p flex sig(3/17)  for rectal ulcers, found Evidence of prior hemoclip in the sigmoid colon. Localized ulceration and erosive with no bleeding were noted in the rectum, suggestive of solitary ulcer syndrome           - Avoid anal digitation and enemas           - High-fiber diet and optimize laxatives to avoid constipation using miralax and dulcolax           - No absolute GI contraindications to anticoag/antplt therapy           - Repeat endoscopic evaluation recommended in 3 months  - Active T&S  - Transfuse for hgb <8 (active CAD)    #CAD   - Previous cath at [facility] showing oLM 30% and RCA 99% that is not amenable to intervention.  - TTE 3/1/23 at [facility]: mild LVH, EF 25-30%, severe global wall motion dysfunction, mildly dilated LA, RV mildly dilated, all leaflets mild calcified, mod pHTN  - hx coffee ground emesis @ [facility]; s/p EGD/colonoscopy showing rectal ulcer and LHH  flex sig suggestive of solitary ulcer syndrome - per cardiology will hold off loading the patient due to recent LGIB  - No absolute GI contraindications to anticoag/antplt therapy  - EP consulted for possible ICD placement  - LHC/Angiogram and possible PCI per cardiology    #H/O ventricular tachycardia   - Vtach arrest @[facility]  - agree with EP consult for ICD placement    #ESRD on dialysis  - dialysis Tues, Thurs, Sat  - last received 3/16  - f/u nephro recs  - Electrolytes wnl, k 4.3, phos 1.9  - c/w calcium citrate 667mg TID  - on Renvela 800mg TID, f/u with renal as patient is with hypophosphatemia on admission     #S/P kidney transplant.   - per [place] pt takes Tacrolimus 2mg 2 times /day  - Obtain collateral regarding renal transplant, if indeed failed, what indications patient have for c/w tacrolimus 2mg?  - f/u tacrolimus serum levels  - f/u nephro recs    #HTN  - SBP on admission 155-170s  - c/w home meds. Amlodipine 10mg qd, Losartan 100mg qd, Hydralazine 50m TID, Isordil 20mg TID    #HLD   - c/w Lipitor 40mg qd  - f/u AM lipid panel.    #DM  - no meds per [place], obtain collaterals for med recs prior [facility] stay  - mISS while inpt, goal -180   - A1c 5.9 on admission    DVT PPX: SCDs for now iso GIB    Med consult team continues to follow with you. d/w Dr. Lb Smith . 3yo M PMHx HTN, HLD, DM, CAD(s/p cath years ago, RCA 99%, never intervened on), HFrEF 25-30%, ESRD s/p failed kidney transplant and required HD x 3yrs (last HD 3/16 via Left Arm AVF; HD TTS), Right AKA initially presented to [facility] 2/27 for respiratory distress after a dialysis, found to be septic, h/c complicated by AHRF requiring intubation for 24hr followed by VT arrest, h/c the complicated by massive LGIB (rectal ulcer seen on colonoscopy) requiring 7u pRBC, 1u FFP and 1u PLT. Transferred to [facility] (3/17) for ICD placement 2/2 Vtach and cardiac cath. Pt noted lassed loose stool with BRBPR, GI and surgery consulted. Flex Sigc ( 3/17) found prior hemoclip in the sigmoid colon, localized ulceration and erosive with no bleeding noted in rectum suggestive of solitary ulcer syndrome. Medicine consulted for comanagement.     #LGIB  #SANTOS  #Solitary ulcer syndrome  - hx coffee ground emesis @ [facility]; s/p EGD/colonoscopy showing rectal ulcer  - 3/17AM pt passed loose BM along with large amount of BRBPR  - per gen surgery no acute surgical intervention   - per GI:      - increased Protonix 40 mg IVP BID      - PIV large bore x2      - s/p flex sig(3/17)  for rectal ulcers, found Evidence of prior hemoclip in the sigmoid colon. Localized ulceration and erosive with no bleeding were noted in the rectum, suggestive of solitary ulcer syndrome           - Avoid anal digitation and enemas           - High-fiber diet and optimize laxatives to avoid constipation using miralax and dulcolax           - No absolute GI contraindications to anticoag/antplt therapy           - Repeat endoscopic evaluation recommended in 3 months  - Active T&S  - Transfuse for hgb <8 (active CAD)    #CAD   - Previous cath at [facility] showing oLM 30% and RCA 99% that is not amenable to intervention.  - TTE 3/1/23 at [facility]: mild LVH, EF 25-30%, severe global wall motion dysfunction, mildly dilated LA, RV mildly dilated, all leaflets mild calcified, mod pHTN  - hx coffee ground emesis @ [facility]; s/p EGD/colonoscopy showing rectal ulcer and LHH  flex sig suggestive of solitary ulcer syndrome - per cardiology will hold off loading the patient due to recent LGIB  - No absolute GI contraindications to anticoag/antplt therapy  - EP consulted for possible ICD placement  - LHC/Angiogram and possible PCI per cardiology    #H/O ventricular tachycardia   - Vtach arrest @[facility]  - agree with EP consult for ICD placement    #ESRD on dialysis  - dialysis Tues, Thurs, Sat  - last received 3/16  - f/u nephro recs  - Electrolytes wnl, k 4.3, phos 1.9  - c/w calcium citrate 667mg TID  - on Renvela 800mg TID, f/u with renal as patient is with hypophosphatemia on admission     #S/P kidney transplant.   - per [place] pt takes Tacrolimus 2mg 2 times /day  - Obtain collateral regarding renal transplant, if indeed failed, what indications patient have for c/w tacrolimus 2mg?  - f/u tacrolimus serum levels  - f/u nephro recs    #HTN  - SBP on admission 155-170s  - c/w home meds. Amlodipine 10mg qd, Losartan 100mg qd, Hydralazine 50m TID, Isordil 20mg TID    #HLD   - c/w Lipitor 40mg qd  - f/u AM lipid panel.    #DM  - no meds per [facility], obtain collaterals for med recs prior [facility] stay  - mISS while inpt, goal -180   - A1c 5.9 on admission    DVT PPX: SCDs for now iso GIB    Med consult team continues to follow with you. d/w Dr. Lb Smith .

## 2023-03-18 NOTE — PROGRESS NOTE ADULT - SUBJECTIVE AND OBJECTIVE BOX
EPS Progress Note    S:   No complaints this AM    T(C): 36.9 (03-18-23 @ 15:00), Max: 37.8 (03-17-23 @ 22:02)  HR: 72 (03-18-23 @ 15:00) (63 - 75)  BP: 161/79 (03-18-23 @ 15:00) (128/59 - 168/77)  RR: 16 (03-18-23 @ 15:00) (16 - 18)  SpO2: 100% (03-18-23 @ 15:00) (95% - 100%)  Wt(kg): --     Telemetry:   sinus, sinus ellen          General:  No acute distress      Chest:  Chest is clear to auscultation bilaterally without wheezes, crackles, or rhonchi  Cardiac:  Regular rate and rhythm.  No murmur, rubs, or gallops heard.  Abdomen:  Soft without rebound or guarding.  Bowel sounds are presnt in all 4 quadrants.  No hepatosplenomegaly  Extremities:  No lower extremity edema is present.  No cyanosis or clubbing       MEDICATIONS  (STANDING):  amLODIPine   Tablet 10 milliGRAM(s) Oral daily  atorvastatin 40 milliGRAM(s) Oral at bedtime  calcium acetate 667 milliGRAM(s) Oral three times a day with meals  dextrose 5%. 1000 milliLiter(s) (100 mL/Hr) IV Continuous <Continuous>  dextrose 5%. 1000 milliLiter(s) (50 mL/Hr) IV Continuous <Continuous>  dextrose 50% Injectable 25 Gram(s) IV Push once  dextrose 50% Injectable 12.5 Gram(s) IV Push once  dextrose 50% Injectable 25 Gram(s) IV Push once  ferrous    sulfate 325 milliGRAM(s) Oral daily  glucagon  Injectable 1 milliGRAM(s) IntraMuscular once  hydrALAZINE 50 milliGRAM(s) Oral every 8 hours  insulin lispro (ADMELOG) corrective regimen sliding scale   SubCutaneous every 6 hours  isosorbide   dinitrate Tablet (ISORDIL) 20 milliGRAM(s) Oral three times a day  losartan 100 milliGRAM(s) Oral daily  metoprolol tartrate 12.5 milliGRAM(s) Oral every 12 hours  pantoprazole Infusion 8 mG/Hr (10 mL/Hr) IV Continuous <Continuous>  polyethylene glycol 3350 17 Gram(s) Oral daily  sevelamer carbonate 800 milliGRAM(s) Oral three times a day with meals  tacrolimus 2 milliGRAM(s) Oral every 12 hours  tamsulosin 0.8 milliGRAM(s) Oral at bedtime    MEDICATIONS  (PRN):  dextrose Oral Gel 15 Gram(s) Oral once PRN Blood Glucose LESS THAN 70 milliGRAM(s)/deciliter                                                         10.5   4.67  )-----------( 148      ( 18 Mar 2023 08:05 )             32.5     03-18    132<L>  |  97  |  28<H>  ----------------------------<  91  4.5   |  27  |  5.19<H>    Ca    8.0<L>      18 Mar 2023 08:05  Phos  3.0     03-18  Mg     1.8     03-18    TPro  4.7<L>  /  Alb  2.5<L>  /  TBili  1.1  /  DBili  x   /  AST  15  /  ALT  9<L>  /  AlkPhos  117  03-18    PT/INR - ( 17 Mar 2023 08:14 )   PT: 13.1 sec;   INR: 1.10          PTT - ( 17 Mar 2023 08:14 )  PTT:30.3 sec

## 2023-03-18 NOTE — PROGRESS NOTE ADULT - ASSESSMENT
61yo M PMHx HTN, HLD, DM,  ICM (s/p cath years ago, RCA 99%, never intervened on), HFrEF 25-30%, ESRD s/p failed kidney transplant (last HD 3/1 via Left Arm AVF; HD TTS), Right AKA presented to [facility] 2/27 for respiratory distress after a dialysis session and admitted to cardiac telemetry on 2/27 with SIRS criteria. Placed on vanc/zosyn. Pt was placed on BiPAP which failed, where he was then intubated on 3/1 and moved to the MICU. He went into cardiac arrest (RoSC achieved after 8mins). Went into Vtach arrest, was placed on amio and pressors, HR ellen into 30s and atropine given twice. Weaned off levophed and sedation and extubated 3/2. Pt developed coffee ground emesis, hemoccult positive, with Hgb dipping to 3.5; he then received 7units PRBCs, 1Unit FFP, 1 unit donor packed platelets. EGD and colonoscopy showing melanosis duodenii but no acute bleed. BP dropped again, transferred to ICU and started on levophed gtt. CTA Abdomen showing no active bleeding into GI lumen, possible focal proctitis; colonoscopy showing rectal ulcer. Episode of rectal bleeding 3/17. No tachycardia. Rectal exam with dark blood. Hb stable from prior. High risk patient. 63yo M PMHx HTN, HLD, DM,  ICM (s/p cath years ago, RCA 99%, never intervened on), HFrEF 25-30%, ESRD s/p failed kidney transplant (last HD 3/1 via Left Arm AVF; HD TTS), Right AKA presented to [facility] 2/27 for respiratory distress after a dialysis session and admitted to cardiac telemetry on 2/27 with SIRS criteria. Placed on vanc/zosyn. Pt was placed on BiPAP which failed, where he was then intubated on 3/1 and moved to the MICU. He went into cardiac arrest (RoSC achieved after 8mins). Went into Vtach arrest, was placed on amio and pressors, HR ellen into 30s and atropine given twice. Weaned off levophed and sedation and extubated 3/2. Pt developed coffee ground emesis, hemoccult positive, with Hgb dipping to 3.5; he then received 7units PRBCs, 1Unit FFP, 1 unit donor packed platelets. EGD and colonoscopy showing melanosis duodenii but no acute bleed. BP dropped again, transferred to ICU and started on levophed gtt. CTA Abdomen showing no active bleeding into GI lumen, possible focal proctitis; colonoscopy showing rectal ulcer. Episode of rectal bleeding 3/17. No tachycardia. Rectal exam with dark blood. Hb stable from prior. High risk patient. 63yo M PMHx HTN, HLD, DM,  ICM (s/p cath years ago, RCA 99%, never intervened on), HFrEF 25-30%, ESRD s/p failed kidney transplant (last HD 3/1 via Left Arm AVF; HD TTS), Right AKA presented to [facility] 2/27 for respiratory distress after a dialysis session and admitted to cardiac telemetry on 2/27 with SIRS criteria. Placed on vanc/zosyn. Pt was placed on BiPAP which failed, where he was then intubated on 3/1 and moved to the MICU. He went into cardiac arrest (RoSC achieved after 8mins). Went into Vtach arrest, was placed on amio and pressors, HR ellen into 30s and atropine given twice. Weaned off levophed and sedation and extubated 3/2. Pt developed coffee ground emesis, hemoccult positive, with Hgb dipping to 3.5; he then received 7units PRBCs, 1Unit FFP, 1 unit donor packed platelets. EGD and colonoscopy showing melanosis duodenii but no acute bleed. BP dropped again, transferred to ICU and started on levophed gtt. CTA Abdomen showing no active bleeding into GI lumen, possible focal proctitis; colonoscopy showing rectal ulcer. Episode of rectal bleeding 3/17. No tachycardia. Rectal exam with dark blood. Hb stable from prior. High risk patient. 63yo M PMHx HTN, HLD, DM,  ICM (s/p cath years ago, RCA 99%, never intervened on), HFrEF 25-30%, ESRD s/p failed kidney transplant (last HD 3/1 via Left Arm AVF; HD TTS), Right AKA presented to [facility] 2/27 for respiratory distress after a dialysis session and admitted to cardiac telemetry on 2/27 with SIRS criteria. Placed on vanc/zosyn. Pt was placed on BiPAP which failed, where he was then intubated on 3/1 and moved to the MICU. He went into cardiac arrest (RoSC achieved after 8mins). Went into Vtach arrest, was placed on amio and pressors, HR ellen into 30s and atropine given twice. Weaned off levophed and sedation and extubated 3/2. Pt developed coffee ground emesis, hemoccult positive, with Hgb dipping to 3.5; he then received 7units PRBCs, 1Unit FFP, 1 unit donor packed platelets. EGD and colonoscopy showing melanosis duodenii but no acute bleed. BP dropped again, transferred to ICU and started on levophed gtt. CTA Abdomen showing no active bleeding into GI lumen, possible focal proctitis; colonoscopy showing rectal ulcer. Episode of rectal bleeding 3/17. No tachycardia. Rectal exam with dark blood. Hb stable from prior. High risk patient.    Plan  -No acute surgical intervention / not surgical candidate at this time  -Maintain active T/S, 2x large bore IVs  -Trend CBC  -Transfusion goal 8 iso active CAD  -Care per primary  -Team 4c will continue to follow, [phone] 61yo M PMHx HTN, HLD, DM,  ICM (s/p cath years ago, RCA 99%, never intervened on), HFrEF 25-30%, ESRD s/p failed kidney transplant (last HD 3/1 via Left Arm AVF; HD TTS), Right AKA presented to [facility] 2/27 for respiratory distress after a dialysis session and admitted to cardiac telemetry on 2/27 with SIRS criteria. Placed on vanc/zosyn. Pt was placed on BiPAP which failed, where he was then intubated on 3/1 and moved to the MICU. He went into cardiac arrest (RoSC achieved after 8mins). Went into Vtach arrest, was placed on amio and pressors, HR ellen into 30s and atropine given twice. Weaned off levophed and sedation and extubated 3/2. Pt developed coffee ground emesis, hemoccult positive, with Hgb dipping to 3.5; he then received 7units PRBCs, 1Unit FFP, 1 unit donor packed platelets. EGD and colonoscopy showing melanosis duodenii but no acute bleed. BP dropped again, transferred to ICU and started on levophed gtt. CTA Abdomen showing no active bleeding into GI lumen, possible focal proctitis; colonoscopy showing rectal ulcer. Episode of rectal bleeding 3/17. No tachycardia. Rectal exam with dark blood. Hb stable from prior. High risk patient.    Plan  -No acute surgical intervention / not surgical candidate at this time  -Maintain active T/S, 2x large bore IVs  -Trend CBC  -Transfusion goal 8 iso active CAD  -Care per primary  -Team 4c will continue to follow, [phone] 63yo M PMHx HTN, HLD, DM,  ICM (s/p cath years ago, RCA 99%, never intervened on), HFrEF 25-30%, ESRD s/p failed kidney transplant (last HD 3/1 via Left Arm AVF; HD TTS), Right AKA presented to [facility] 2/27 for respiratory distress after a dialysis session and admitted to cardiac telemetry on 2/27 with SIRS criteria. Placed on vanc/zosyn. Pt was placed on BiPAP which failed, where he was then intubated on 3/1 and moved to the MICU. He went into cardiac arrest (RoSC achieved after 8mins). Went into Vtach arrest, was placed on amio and pressors, HR ellen into 30s and atropine given twice. Weaned off levophed and sedation and extubated 3/2. Pt developed coffee ground emesis, hemoccult positive, with Hgb dipping to 3.5; he then received 7units PRBCs, 1Unit FFP, 1 unit donor packed platelets. EGD and colonoscopy showing melanosis duodenii but no acute bleed. BP dropped again, transferred to ICU and started on levophed gtt. CTA Abdomen showing no active bleeding into GI lumen, possible focal proctitis; colonoscopy showing rectal ulcer. Episode of rectal bleeding 3/17. No tachycardia. Rectal exam with dark blood. Hb stable from prior. High risk patient.    Plan  -No acute surgical intervention / not surgical candidate at this time  -Maintain active T/S, 2x large bore IVs  -Trend CBC  -Transfusion goal 8 iso active CAD  -Care per primary  -Team 4c will continue to follow, [phone]

## 2023-03-18 NOTE — CHART NOTE - NSCHARTNOTEFT_GEN_A_CORE
PA called to pt's room as pt endorsed mild numbness/tinging of right radial arm that started after he woke up, stated that he might have slept with his hand "hanging down". Right arm dressing taken down, hand appears soft, no swelling, bleeding or hematoma noted.  Right radial pulse intact, no neuro deficits noted.  Pt was given Percocet for pain.  Of note; plastics is scheduled for dressing change this AM.  Will continue to monitor closely today.

## 2023-03-18 NOTE — PROGRESS NOTE ADULT - SUBJECTIVE AND OBJECTIVE BOX
OVERNIGHT EVENTS: ROSITA, for dialysis today    SUBJECTIVE:  Patient seen and examined at bedside. Patient has no new complaints. Patient denies h/n/v/d, fever, chills, cp, palpitations, sob, abd pain, leg swelling, rashes, dysuria, and changes in BM.     Vital Signs Last 12 Hrs  T(F): 98.8 (03-18-23 @ 13:41), Max: 99.4 (03-18-23 @ 09:31)  HR: 70 (03-18-23 @ 11:30) (63 - 70)  BP: 145/67 (03-18-23 @ 11:30) (128/59 - 168/77)  BP(mean): 85 (03-18-23 @ 08:44) (85 - 110)  RR: 16 (03-18-23 @ 11:30) (16 - 18)  SpO2: 98% (03-18-23 @ 11:30) (96% - 99%)  I&O's Summary    17 Mar 2023 07:01  -  18 Mar 2023 07:00  --------------------------------------------------------  IN: 0 mL / OUT: 575 mL / NET: -575 mL    18 Mar 2023 07:01  -  18 Mar 2023 15:02  --------------------------------------------------------  IN: 180 mL / OUT: 360 mL / NET: -180 mL    PHYSICAL EXAM:  Constitutional: NAD, comfortable in bed.  HEENT: NC/AT, PERRLA, EOMI, no conjunctival pallor or scleral icterus, MMM  Neck: Supple, no JVD  Respiratory: CTA B/L. No w/r/r.   Cardiovascular: RRR, normal S1 and S2, no m/r/g.   Gastrointestinal: +BS, soft NTND, no guarding or rebound tenderness, no palpable masses   Extremities: right AKA wwp; no cyanosis, clubbing or edema.   Vascular: Pulses equal and strong throughout.   Neurological: AAOx3, no CN deficits, strength and sensation intact throughout.   Skin: No gross skin abnormalities or rashes    LABS:                        10.5   4.67  )-----------( 148      ( 18 Mar 2023 08:05 )             32.5     03-18    132<L>  |  97  |  28<H>  ----------------------------<  91  4.5   |  27  |  5.19<H>    Ca    8.0<L>      18 Mar 2023 08:05  Phos  3.0     03-18  Mg     1.8     03-18    TPro  4.7<L>  /  Alb  2.5<L>  /  TBili  1.1  /  DBili  x   /  AST  15  /  ALT  9<L>  /  AlkPhos  117  03-18    PT/INR - ( 17 Mar 2023 08:14 )   PT: 13.1 sec;   INR: 1.10          PTT - ( 17 Mar 2023 08:14 )  PTT:30.3 sec    RADIOLOGY & ADDITIONAL TESTS:    MEDICATIONS  (STANDING):  amLODIPine   Tablet 10 milliGRAM(s) Oral daily  atorvastatin 40 milliGRAM(s) Oral at bedtime  calcium acetate 667 milliGRAM(s) Oral three times a day with meals  dextrose 5%. 1000 milliLiter(s) (100 mL/Hr) IV Continuous <Continuous>  dextrose 5%. 1000 milliLiter(s) (50 mL/Hr) IV Continuous <Continuous>  dextrose 50% Injectable 25 Gram(s) IV Push once  dextrose 50% Injectable 12.5 Gram(s) IV Push once  dextrose 50% Injectable 25 Gram(s) IV Push once  ferrous    sulfate 325 milliGRAM(s) Oral daily  glucagon  Injectable 1 milliGRAM(s) IntraMuscular once  hydrALAZINE 50 milliGRAM(s) Oral every 8 hours  insulin lispro (ADMELOG) corrective regimen sliding scale   SubCutaneous every 6 hours  isosorbide   dinitrate Tablet (ISORDIL) 20 milliGRAM(s) Oral three times a day  losartan 100 milliGRAM(s) Oral daily  metoprolol tartrate 12.5 milliGRAM(s) Oral every 12 hours  pantoprazole Infusion 8 mG/Hr (10 mL/Hr) IV Continuous <Continuous>  polyethylene glycol 3350 17 Gram(s) Oral daily  sevelamer carbonate 800 milliGRAM(s) Oral three times a day with meals  tacrolimus 2 milliGRAM(s) Oral every 12 hours  tamsulosin 0.8 milliGRAM(s) Oral at bedtime    MEDICATIONS  (PRN):  dextrose Oral Gel 15 Gram(s) Oral once PRN Blood Glucose LESS THAN 70 milliGRAM(s)/deciliter

## 2023-03-18 NOTE — PROGRESS NOTE ADULT - PROBLEM SELECTOR PLAN 3
3/17AM pt passed loose BM along with large amount of BRBPR  - GI following  - type and screen 3/17/23  - s/p Flex sigmoidoscopy: localized ulceration and erosive with no bleeding in the rectum, suggestive of solitary ulcer syndrome, Evidence of prior hemoclip was found in the sigmoid colon.  -Avoid anal digitation and enemas   -Bowel regimen with miralax and dulcolax to avoid constipation.  -Repeat Endoscopy in 3 months

## 2023-03-18 NOTE — PROGRESS NOTE ADULT - PROBLEM SELECTOR PLAN 4
dialysis Tues, Thurs, Sat  - last received 3/16  -renal following, plan for HD today 3/18 dialysis Tues, Thurs, Sat  - last received 3/16  - renal following, plan for HD today 3/18

## 2023-03-18 NOTE — PROGRESS NOTE ADULT - ASSESSMENT
63yo M PMHx HTN, HLD, DM, CAD(s/p cath years ago, RCA 99%, never intervened on), HFrEF 25-30%, ESRD s/p failed kidney transplant (last HD 3/1 via Left Arm AVF; HD TTS), Right AKA initially presented to [facility] 2/27 for respiratory distress after a dialysis, found to be septic, h/c complicated by AHRF requiring intubation for 24hr followed by VT arrest, h/c the complicated by massive LGIB (rectal ulcer) requiring 7u pRBC, 1u FFP and 1u PLT. Transferred to [facility] for ICD placement 2/2 Vtach and cardiac cath. Medicine consulted for comanagement.     #Low grade fever  Temporal temp of 100.1f once, not toxic appearing, denies cough, dysuria, abdominal pain, SOB  - monitor off ABx for now    #LGIB  #SANTOS  #Solitary ulcer syndrome  - hx coffee ground emesis @ [facility]; s/p EGD/colonoscopy showing rectal ulcer  - 3/17AM pt passed loose BM along with large amount of BRBPR  - per gen surgery no acute surgical intervention   - per GI:      - c/w Protonix 40 mg IVP BID, low suspicion for UGIB      - PIV large bore x2      - s/p flex sig for rectal ulcers, found Evidence of prior hemoclip in the sigmoid colon. Localized ulceration and erosive with no bleeding were noted in the rectum, suggestive of solitary ulcer syndrome           - Avoid anal digitation and enemas           - High-fiber diet and optimize laxatives to avoid constipation using miralax and dulcolax           - No absolute GI contraindications to anticoag/antplt therapy           - Repeat endoscopic evaluation recommended in 3 months  - Active T&S  - Transfuse for hgb <8 (active CAD)    #CAD   - Previous cath at [facility] showing oLM 30% and RCA 99% that is not amenable to intervention.  - [facility]H, EF 25-30%, severe global wall motion dysfunction, mildly dilated LA, RV mildly dilated, all leaflets mild calcified, mod pHTN  - hx coffee ground emesis @ [facility]; s/p EGD/colonoscopy showing rectal ulcer and [facility]  flex sig suggestive of solitary ulcer syndrome - per cardiology will hold off loading the patient due to recent LGIB  - No absolute GI contraindications to anticoag/antplt therapy  - EP consulted for possible ICD placement  - Angiogram and possible PCI per cardiology, loading dose if hgb stable for 48hr (since 3/17 hgb of 10-11)    #H/O ventricular tachycardia   - [facility]  - agree with EP consult for ICD placement    #ESRD on dialysis  - for dialysis today  - dialysis Tues, Thurs, Sat  - last received 3/16  - f/u nephro recs  - Electrolytes wnl, k 4.3, phos 1.9  - c/w calcium citrate 667mg TID    #S/P kidney transplant.   - per [place] pt takes Tacrolimus 2mg 2 times /day  - Obtain collateral regarding renal transplant, if indeed failed, what indications patient have for c/w tacrolimus 2mg?  - f/u tacrolimus serum levels  - f/u nephro recs    #HTN  - SBP on admission 155-170s  - c/w home meds. Amlodipine 10mg qd, Losartan 100mg qd, Hydralazine 50m TID, Isordil 20mg TID    #HLD   - c/w Lipitor 40mg qd  - f/u AM lipid panel.    #DM  - no meds per [facility], obtain collaterals for med recs prior [facility] stay  - mISS while inpt  - A1c 5.9 on admission    DVT PPX: SCDs for now iso GIB 61yo M PMHx HTN, HLD, DM, CAD(s/p cath years ago, RCA 99%, never intervened on), HFrEF 25-30%, ESRD s/p failed kidney transplant (last HD 3/1 via Left Arm AVF; HD TTS), Right AKA initially presented to [facility] 2/27 for respiratory distress after a dialysis, found to be septic, h/c complicated by AHRF requiring intubation for 24hr followed by VT arrest, h/c the complicated by massive LGIB (rectal ulcer) requiring 7u pRBC, 1u FFP and 1u PLT. Transferred to [facility] for ICD placement 2/2 Vtach and cardiac cath. Medicine consulted for comanagement.     #Low grade fever  Temporal temp of 100.1f once, not toxic appearing, denies cough, dysuria, abdominal pain, SOB  - monitor off ABx for now    #LGIB  #SANTOS  #Solitary ulcer syndrome  - hx coffee ground emesis @ [facility]; s/p EGD/colonoscopy showing rectal ulcer  - 3/17AM pt passed loose BM along with large amount of BRBPR  - per gen surgery no acute surgical intervention   - per GI:      - c/w Protonix 40 mg IVP BID, low suspicion for UGIB      - PIV large bore x2      - s/p flex sig for rectal ulcers, found Evidence of prior hemoclip in the sigmoid colon. Localized ulceration and erosive with no bleeding were noted in the rectum, suggestive of solitary ulcer syndrome           - Avoid anal digitation and enemas           - High-fiber diet and optimize laxatives to avoid constipation using miralax and dulcolax           - No absolute GI contraindications to anticoag/antplt therapy           - Repeat endoscopic evaluation recommended in 3 months  - Active T&S  - Transfuse for hgb <8 (active CAD)    #CAD   - Previous cath at [facility] showing oLM 30% and RCA 99% that is not amenable to intervention.  - [facility], EF 25-30%, severe global wall motion dysfunction, mildly dilated LA, RV mildly dilated, all leaflets mild calcified, mod pHTN  - hx coffee ground emesis @ [facility]; s/p EGD/colonoscopy showing rectal ulcer and [facility]  flex sig suggestive of solitary ulcer syndrome - per cardiology will hold off loading the patient due to recent LGIB  - No absolute GI contraindications to anticoag/antplt therapy  - EP consulted for possible ICD placement  - Angiogram and possible PCI per cardiology, loading dose if hgb stable for 48hr (since 3/17 hgb of 10-11)    #H/O ventricular tachycardia   - [facility] arrest [facility]  - agree with EP consult for ICD placement    #ESRD on dialysis  - for dialysis today  - dialysis Tues, Thurs, Sat  - last received 3/16  - f/u nephro recs  - Electrolytes wnl, k 4.3, phos 1.9  - c/w calcium citrate 667mg TID    #S/P kidney transplant.   - per [place] pt takes Tacrolimus 2mg 2 times /day  - Obtain collateral regarding renal transplant, if indeed failed, what indications patient have for c/w tacrolimus 2mg?  - f/u tacrolimus serum levels  - f/u nephro recs    #HTN  - SBP on admission 155-170s  - c/w home meds. Amlodipine 10mg qd, Losartan 100mg qd, Hydralazine 50m TID, Isordil 20mg TID    #HLD   - c/w Lipitor 40mg qd  - f/u AM lipid panel.    #DM  - no meds per [facility], obtain collaterals for med recs prior [facility] stay  - mISS while inpt  - A1c 5.9 on admission    DVT PPX: SCDs for now iso GIB 63yo M PMHx HTN, HLD, DM, CAD(s/p cath years ago, RCA 99%, never intervened on), HFrEF 25-30%, ESRD s/p failed kidney transplant (last HD 3/1 via Left Arm AVF; HD TTS), Right AKA initially presented to [facility] 2/27 for respiratory distress after a dialysis, found to be septic, h/c complicated by AHRF requiring intubation for 24hr followed by VT arrest, h/c the complicated by massive LGIB (rectal ulcer) requiring 7u pRBC, 1u FFP and 1u PLT. Transferred to [facility] for ICD placement 2/2 Vtach and cardiac cath. Medicine consulted for comanagement.     #Low grade fever  Temporal temp of 100.1f once, not toxic appearing, denies cough, dysuria, abdominal pain, SOB  - monitor off ABx for now    #LGIB  #SANTOS  #Solitary ulcer syndrome  - hx coffee ground emesis @ [facility]; s/p EGD/colonoscopy showing rectal ulcer  - 3/17AM pt passed loose BM along with large amount of BRBPR  - per gen surgery no acute surgical intervention   - per GI:      - c/w Protonix 40 mg IVP BID, low suspicion for UGIB      - PIV large bore x2      - s/p flex sig for rectal ulcers, found Evidence of prior hemoclip in the sigmoid colon. Localized ulceration and erosive with no bleeding were noted in the rectum, suggestive of solitary ulcer syndrome           - Avoid anal digitation and enemas           - High-fiber diet and optimize laxatives to avoid constipation using miralax and dulcolax           - No absolute GI contraindications to anticoag/antplt therapy           - Repeat endoscopic evaluation recommended in 3 months  - Active T&S  - Transfuse for hgb <8 (active CAD)    #CAD   - Previous cath at [facility] showing oLM 30% and RCA 99% that is not amenable to intervention.  - [facility], EF 25-30%, severe global wall motion dysfunction, mildly dilated LA, RV mildly dilated, all leaflets mild calcified, mod pHTN  - hx coffee ground emesis @ [facility]; s/p EGD/colonoscopy showing rectal ulcer and [facility]  flex sig suggestive of solitary ulcer syndrome - per cardiology will hold off loading the patient due to recent LGIB  - No absolute GI contraindications to anticoag/antplt therapy  - EP consulted for possible ICD placement  - Angiogram and possible PCI per cardiology, loading dose if hgb stable for 48hr (since 3/17 hgb of 10-11)    #H/O ventricular tachycardia   - Vtach arrest [facility]  - agree with EP consult for ICD placement    #ESRD on dialysis  - for dialysis today  - dialysis Tues, Thurs, Sat  - last received 3/16  - f/u nephro recs  - Electrolytes wnl, k 4.3, phos 1.9  - c/w calcium citrate 667mg TID    #S/P kidney transplant.   - per [facility] pt takes Tacrolimus 2mg 2 times /day  - Obtain collateral regarding renal transplant, if indeed failed, what indications patient have for c/w tacrolimus 2mg?  - f/u tacrolimus serum levels  - f/u nephro recs    #HTN  - SBP on admission 155-170s  - c/w home meds. Amlodipine 10mg qd, Losartan 100mg qd, Hydralazine 50m TID, Isordil 20mg TID    #HLD   - c/w Lipitor 40mg qd  - f/u AM lipid panel.    #DM  - no meds per [facility], obtain collaterals for med recs prior [facility] stay  - mISS while inpt  - A1c 5.9 on admission    DVT PPX: SCDs for now iso GIB

## 2023-03-18 NOTE — PROGRESS NOTE ADULT - SUBJECTIVE AND OBJECTIVE BOX
Interventional Cardiology PA Adult Progress Note    Subjective Assessment: Pt seen and evaluated at bedside this am. Pt endorsing    ROS negative except for subjective and HPI  	  MEDICATIONS:  amLODIPine   Tablet 10 milliGRAM(s) Oral daily  hydrALAZINE 50 milliGRAM(s) Oral every 8 hours  isosorbide   dinitrate Tablet (ISORDIL) 20 milliGRAM(s) Oral three times a day  losartan 100 milliGRAM(s) Oral daily          pantoprazole Infusion 8 mG/Hr IV Continuous <Continuous>  polyethylene glycol 3350 17 Gram(s) Oral daily    atorvastatin 40 milliGRAM(s) Oral at bedtime  dextrose 50% Injectable 25 Gram(s) IV Push once  dextrose 50% Injectable 12.5 Gram(s) IV Push once  dextrose 50% Injectable 25 Gram(s) IV Push once  dextrose Oral Gel 15 Gram(s) Oral once PRN  glucagon  Injectable 1 milliGRAM(s) IntraMuscular once  insulin lispro (ADMELOG) corrective regimen sliding scale   SubCutaneous every 6 hours    calcium acetate 667 milliGRAM(s) Oral three times a day with meals  dextrose 5%. 1000 milliLiter(s) IV Continuous <Continuous>  dextrose 5%. 1000 milliLiter(s) IV Continuous <Continuous>  ferrous    sulfate 325 milliGRAM(s) Oral daily  tamsulosin 0.4 milliGRAM(s) Oral at bedtime      	    [PHYSICAL EXAM:  TELEMETRY:  T(C): 37 (03-18-23 @ 04:51), Max: 37.8 (03-17-23 @ 22:02)  HR: 63 (03-18-23 @ 04:40) (63 - 75)  BP: 168/77 (03-18-23 @ 04:40) (129/63 - 168/77)  RR: 18 (03-18-23 @ 04:40) (16 - 18)  SpO2: 96% (03-18-23 @ 04:40) (95% - 98%)  Wt(kg): --  I&O's Summary    17 Mar 2023 07:01  -  18 Mar 2023 07:00  --------------------------------------------------------  IN: 0 mL / OUT: 575 mL / NET: -575 mL        Moody:  Central/PICC/Mid Line:                                         Appearance: Normal	  HEENT:   Normal oral mucosa, PERRL, EOMI	  Neck: Supple, + JVD/ - JVD; Carotid Bruit   Cardiovascular: Normal S1 S2, No JVD, No murmurs,   Respiratory: Lungs clear to auscultation/Decreased Breath Sounds/No Rales, Rhonchi, Wheezing	  Gastrointestinal:  Soft, Non-tender, + BS	  Skin: No rashes, No ecchymoses, No cyanosis  Extremities: Normal range of motion, No clubbing, cyanosis or edema  Vascular: Peripheral pulses palpable 2+ bilaterally  Neurologic: Non-focal  Psychiatry: A & O x 3, Mood & affect appropriate      	    ECG:  	  RADIOLOGY:   DIAGNOSTIC TESTING:  [ ] Echocardiogram:  [ ]  Catheterization:  [ ] Stress Test:    [ ] JOSY  OTHER: 	    LABS:	 	  CARDIAC MARKERS:                                  10.8   5.33  )-----------( 132      ( 17 Mar 2023 18:17 )             33.6     03-17    134<L>  |  98  |  23  ----------------------------<  131<H>  4.3   |  28  |  3.89<H>    Ca    8.0<L>      17 Mar 2023 08:14  Phos  1.9     03-17  Mg     1.8     03-17    TPro  5.1<L>  /  Alb  2.8<L>  /  TBili  1.0  /  DBili  x   /  AST  15  /  ALT  11  /  AlkPhos  141<H>  03-17    proBNP:   Lipid Profile:   HgA1c:   TSH:   PT/INR - ( 17 Mar 2023 08:14 )   PT: 13.1 sec;   INR: 1.10          PTT - ( 17 Mar 2023 08:14 )  PTT:30.3 sec    ASSESSMENT/PLAN: 	        DVT ppx:  Dispo:     Interventional Cardiology PA Adult Progress Note    Subjective Assessment: Pt seen and evaluated at bedside this am. Pt endorsing feeling well, denies CP, SOB, palpitations, BRBPR or melena.     ROS negative except for subjective and HPI  	  MEDICATIONS:  amLODIPine   Tablet 10 milliGRAM(s) Oral daily  hydrALAZINE 50 milliGRAM(s) Oral every 8 hours  isosorbide   dinitrate Tablet (ISORDIL) 20 milliGRAM(s) Oral three times a day  losartan 100 milliGRAM(s) Oral daily          pantoprazole Infusion 8 mG/Hr IV Continuous <Continuous>  polyethylene glycol 3350 17 Gram(s) Oral daily    atorvastatin 40 milliGRAM(s) Oral at bedtime  dextrose 50% Injectable 25 Gram(s) IV Push once  dextrose 50% Injectable 12.5 Gram(s) IV Push once  dextrose 50% Injectable 25 Gram(s) IV Push once  dextrose Oral Gel 15 Gram(s) Oral once PRN  glucagon  Injectable 1 milliGRAM(s) IntraMuscular once  insulin lispro (ADMELOG) corrective regimen sliding scale   SubCutaneous every 6 hours    calcium acetate 667 milliGRAM(s) Oral three times a day with meals  dextrose 5%. 1000 milliLiter(s) IV Continuous <Continuous>  dextrose 5%. 1000 milliLiter(s) IV Continuous <Continuous>  ferrous    sulfate 325 milliGRAM(s) Oral daily  tamsulosin 0.4 milliGRAM(s) Oral at bedtime      	    [PHYSICAL EXAM:  TELEMETRY:  T(C): 37 (03-18-23 @ 04:51), Max: 37.8 (03-17-23 @ 22:02)  HR: 63 (03-18-23 @ 04:40) (63 - 75)  BP: 168/77 (03-18-23 @ 04:40) (129/63 - 168/77)  RR: 18 (03-18-23 @ 04:40) (16 - 18)  SpO2: 96% (03-18-23 @ 04:40) (95% - 98%)  Wt(kg): --  I&O's Summary    17 Mar 2023 07:01  -  18 Mar 2023 07:00  --------------------------------------------------------  IN: 0 mL / OUT: 575 mL / NET: -575 mL        Moody:  Central/PICC/Mid Line:                                         Appearance: Normal	  HEENT:   Normal oral mucosa, PERRL, EOMI	  Neck: Supple,- JVD  Cardiovascular: Normal S1 S2, No JVD, No murmurs  Respiratory: Lungs clear to auscultation, No Rales, Rhonchi, Wheezing	  Gastrointestinal:  Soft, Non-tender, + BS	  Skin: No rashes, No ecchymoses, No cyanosis  Extremities: Normal range of motion, No clubbing, cyanosis or edema, Right AKA  Vascular: Peripheral pulses palpable 2+ bilaterally  Neurologic: Non-focal  Psychiatry: A & O x 3, Mood & affect appropriate      	    ECG:  	  RADIOLOGY:   DIAGNOSTIC TESTING:  [ ] Echocardiogram:  [ ]  Catheterization:  [ ] Stress Test:    [ ] JOSY  OTHER: 	    LABS:	 	  CARDIAC MARKERS:                                  10.8   5.33  )-----------( 132      ( 17 Mar 2023 18:17 )             33.6     03-17    134<L>  |  98  |  23  ----------------------------<  131<H>  4.3   |  28  |  3.89<H>    Ca    8.0<L>      17 Mar 2023 08:14  Phos  1.9     03-17  Mg     1.8     03-17    TPro  5.1<L>  /  Alb  2.8<L>  /  TBili  1.0  /  DBili  x   /  AST  15  /  ALT  11  /  AlkPhos  141<H>  03-17    proBNP:   Lipid Profile:   HgA1c:   TSH:   PT/INR - ( 17 Mar 2023 08:14 )   PT: 13.1 sec;   INR: 1.10          PTT - ( 17 Mar 2023 08:14 )  PTT:30.3 sec    ASSESSMENT/PLAN: 	        DVT ppx:  Dispo:

## 2023-03-18 NOTE — PROGRESS NOTE ADULT - PROBLEM SELECTOR PLAN 2
Vtach arrest @[facility]  - no tele events @ [facility]  - EP consulted for ICD placement but will  need ischemic eval first   - questionable bradycardia at [facility] while on amiodarone gtt  - can consider BB trial once cleared from GI perspective Vtach arrest @[facility]  - no tele events @ [facility]  - EP consulted for ICD placement but will  need ischemic eval first   - questionable bradycardia at [facility] while on amiodarone gtt  - can consider BB trial once cleared from GI perspective Vtach arrest @[facility]  - no tele events @ [facility]  - EP consulted for ICD placement but will  need ischemic eval first   - questionable bradycardia at [facility] while on amiodarone gtt  - can consider BB trial once cleared from GI perspective Vtach arrest @[facility]  - no tele events @ [facility]  - EP consulted for ICD placement but will need ischemic eval/cath first   - questionable bradycardia at [facility] while on amiodarone gtt  - can consider BB trial once cleared from GI perspective Vtach arrest @[facility]  - no tele events @ [facility]  - EP consulted for ICD placement but will need ischemic eval/cath first   - questionable bradycardia at [facility] while on amiodarone gtt  - can consider BB trial once cleared from GI perspective Vtach arrest @[facility]  - no tele events @ [facility]  - EP consulted for ICD placement but will need ischemic eval/cath first   - questionable bradycardia at [facility] while on amiodarone gtt  - can consider BB trial once cleared from GI perspective

## 2023-03-18 NOTE — PROGRESS NOTE ADULT - SUBJECTIVE AND OBJECTIVE BOX
SUBJECTIVE: Patient seen and examined bedside. Patient notes 1x episode of bloody BM overnight although unwitnessed/unrecorded. Otherwise denies N/V/F/C. No lightheadedness or CP.     amLODIPine   Tablet 10 milliGRAM(s) Oral daily  hydrALAZINE 50 milliGRAM(s) Oral every 8 hours  isosorbide   dinitrate Tablet (ISORDIL) 20 milliGRAM(s) Oral three times a day  losartan 100 milliGRAM(s) Oral daily      Vital Signs Last 24 Hrs  T(C): 37.4 (18 Mar 2023 09:31), Max: 37.8 (17 Mar 2023 22:02)  T(F): 99.4 (18 Mar 2023 09:31), Max: 100.1 (17 Mar 2023 22:02)  HR: 70 (18 Mar 2023 11:30) (63 - 75)  BP: 145/67 (18 Mar 2023 11:30) (128/59 - 168/77)  BP(mean): 85 (18 Mar 2023 08:44) (85 - 110)  RR: 16 (18 Mar 2023 11:30) (16 - 18)  SpO2: 98% (18 Mar 2023 11:30) (95% - 99%)    Parameters below as of 18 Mar 2023 11:30  Patient On (Oxygen Delivery Method): room air      I&O's Detail    17 Mar 2023 07:01  -  18 Mar 2023 07:00  --------------------------------------------------------  IN:  Total IN: 0 mL    OUT:    Intermittent Catheterization - Urethral (mL): 575 mL    Oral Fluid: 0 mL  Total OUT: 575 mL    Total NET: -575 mL      18 Mar 2023 07:01  -  18 Mar 2023 12:35  --------------------------------------------------------  IN:  Total IN: 0 mL    OUT:    Intermittent Catheterization - Urethral (mL): 360 mL    Oral Fluid: 0 mL  Total OUT: 360 mL    Total NET: -360 mL          General: NAD, resting comfortably in bed  C/V: NSR  Pulm: Nonlabored breathing, no respiratory distress  Abd: obese abdomen, soft, NT/ND.  Extrem: WWP, no edema, SCDs in place        LABS:                        10.5   4.67  )-----------( 148      ( 18 Mar 2023 08:05 )             32.5     03-18    132<L>  |  97  |  28<H>  ----------------------------<  91  4.5   |  27  |  5.19<H>    Ca    8.0<L>      18 Mar 2023 08:05  Phos  3.0     03-18  Mg     1.8     03-18    TPro  4.7<L>  /  Alb  2.5<L>  /  TBili  1.1  /  DBili  x   /  AST  15  /  ALT  9<L>  /  AlkPhos  117  03-18    PT/INR - ( 17 Mar 2023 08:14 )   PT: 13.1 sec;   INR: 1.10          PTT - ( 17 Mar 2023 08:14 )  PTT:30.3 sec      RADIOLOGY & ADDITIONAL STUDIES:

## 2023-03-18 NOTE — PROGRESS NOTE ADULT - ASSESSMENT
61yo M PMHx HTN, HLD, DM,  ICM (s/p cath years ago, RCA 99%, never intervened on), HFrEF 25-30%, ESRD s/p failed kidney transplant (last HD 3/1 via Left Arm AVF; HD TTS), Right AKA presented to [facility] 2/27 for respiratory distress after a dialysis session and admitted to cardiac telemetry on 2/27 with SIRS criteria. Placed on vanc/zosyn. Pt was placed on BiPAP which failed, where he was then intubated on 3/1 and moved to the MICU. He went into cardiac arrest (RoSC achieved after 8mins). Went into Vtach arrest, was placed on amio and pressors, HR ellen into 30s and atropine given twice. Weaned off levophed and sedation and extubated 3/2. Pt developed coffee ground emesis, hemoccult positive, with Hgb dipping to 3.5; he then received 7units PRBCs, 1Unit FFP, 1 unit donor packed platelets. EGD and colonoscopy showing melanosis duodenii but no acute bleed. BP dropped again, transferred to ICU and started on levophed gtt. CTA Abdomen showing no active bleeding into GI lumen, possible focal proctitis; colonoscopy showing rectal ulcer. Started on hydrocortisone suppository and mesalamine. Hgb now stable in 11s (per handoff), now transferred for ICD placement 2/2 Vtach and cardiac cath. Hospital course c/b BRBPR 3/17 AM; type and screen sent, surgery, GI, MICU consulted. 61yo M PMHx HTN, HLD, DM,  ICM (s/p cath years ago, RCA 99%, never intervened on), HFrEF 25-30%, ESRD s/p failed kidney transplant (last HD 3/1 via Left Arm AVF; HD TTS), Right AKA presented to [facility] 2/27 for respiratory distress after a dialysis session and admitted to cardiac telemetry on 2/27 with SIRS criteria. Placed on vanc/zosyn. Pt was placed on BiPAP which failed, where he was then intubated on 3/1 and moved to the MICU. He went into cardiac arrest (RoSC achieved after 8mins). Went into Vtach arrest, was placed on amio and pressors, HR ellen into 30s and atropine given twice. Weaned off levophed and sedation and extubated 3/2. Pt developed coffee ground emesis, hemoccult positive, with Hgb dipping to 3.5; he then received 7units PRBCs, 1Unit FFP, 1 unit donor packed platelets. EGD and colonoscopy showing melanosis duodenii but no acute bleed. BP dropped again, transferred to ICU and started on levophed gtt. CTA Abdomen showing no active bleeding into GI lumen, possible focal proctitis; colonoscopy showing rectal ulcer. Started on hydrocortisone suppository and mesalamine. Hgb now stable in 11s (per handoff), now transferred for ICD placement 2/2 Vtach and cardiac cath. Hospital course c/b BRBPR 3/17 AM; type and screen sent, surgery, GI, MICU consulted. 63yo M PMHx HTN, HLD, DM,  ICM (s/p cath years ago, RCA 99%, never intervened on), HFrEF 25-30%, ESRD s/p failed kidney transplant (last HD 3/1 via Left Arm AVF; HD TTS), Right AKA presented to [facility] 2/27 for respiratory distress after a dialysis session and admitted to cardiac telemetry on 2/27 with SIRS criteria. Placed on vanc/zosyn. Pt was placed on BiPAP which failed, where he was then intubated on 3/1 and moved to the MICU. He went into cardiac arrest (RoSC achieved after 8mins). Went into Vtach arrest, was placed on amio and pressors, HR ellen into 30s and atropine given twice. Weaned off levophed and sedation and extubated 3/2. Pt developed coffee ground emesis, hemoccult positive, with Hgb dipping to 3.5; he then received 7units PRBCs, 1Unit FFP, 1 unit donor packed platelets. EGD and colonoscopy showing melanosis duodenii but no acute bleed. BP dropped again, transferred to ICU and started on levophed gtt. CTA Abdomen showing no active bleeding into GI lumen, possible focal proctitis; colonoscopy showing rectal ulcer. Started on hydrocortisone suppository and mesalamine. Hgb now stable in 11s (per handoff), now transferred for ICD placement 2/2 Vtach and cardiac cath. Hospital course c/b BRBPR 3/17 AM; type and screen sent, surgery, GI, MICU consulted. 61yo M PMHx HTN, HLD, DM,  ICM (s/p cath years ago, RCA 99%, never intervened on), HFrEF 25-30%, ESRD s/p failed kidney transplant (Left Arm AVF; HD TTS), R AKA admitted to [facility] 2/27 for respiratory distress after a HD, met SIRS criteria s/p vanc/zosyn. Intubated on 3/1, cardiac arrest (RoSC after 8mins) w/ subsequent Vtach arrest, placed on amio and pressors, ellen to 30s s/p atropine x2. Weaned off levophed and extubated 3/2. Course c/b coffee ground emesis, hemoccult +, Hgb 3.5 s/p 7units PRBCs, 1Unit FFP, 1 unit donor packed platelets. EGD/colo: melanosis duodeni but no acute bleed. Hypotensive and restarted on levophed gtt. CTA Abd: no active bleeding, possible focal proctitis; colonoscopy showing rectal ulcer. Started on hydrocortisone suppository and mesalamine. Hgb stabilized 11's, weaned off levophed and transferred to [facility] 3/17/23 for ICD eval 2/2 Vtach and cardiac cath. Hospital course @[facility] c/b BRBPR 3/17 AM with flex sig showing localized ulceration and no active rectal bleeding. Plan to observe for bleeding x 48 hrs before restarting DAPT.  61yo M PMHx HTN, HLD, DM,  ICM (s/p cath years ago, RCA 99%, never intervened on), HFrEF 25-30%, ESRD s/p failed kidney transplant (Left Arm AVF; HD TTS), R AKA admitted to [facility] 2/27 for respiratory distress after a HD, met SIRS criteria s/p vanc/zosyn. Intubated on 3/1, cardiac arrest (RoSC after 8mins) w/ subsequent Vtach arrest, placed on amio and pressors, ellen to 30s s/p atropine x2. Weaned off levophed and extubated 3/2. Course c/b coffee ground emesis, hemoccult +, Hgb 3.5 s/p 7units PRBCs, 1Unit FFP, 1 unit donor packed platelets. EGD/colo: melanosis duodeni but no acute bleed. Hypotensive and restarted on levophed gtt. CTA Abd: no active bleeding, possible focal proctitis; colonoscopy showing rectal ulcer. Started on hydrocortisone suppository and mesalamine. Hgb stabilized 11's, weaned off levophed and transferred to [facility] 3/17/23 for ICD eval 2/2 Vtach and cardiac cath. Hospital course @[facility] c/b BRBPR 3/17 AM with flex sig showing localized ulceration and no active rectal bleeding. Plan to observe for bleeding x 48 hrs before restarting DAPT.  61yo M PMHx HTN, HLD, DM,  ICM (s/p cath years ago, RCA 99%, never intervened on), HFrEF 25-30%, ESRD s/p failed kidney transplant (Left Arm AVF; HD TTS), R AKA admitted to [facility] 2/27 for respiratory distress after a HD, met SIRS criteria s/p vanc/zosyn. Intubated on 3/1, cardiac arrest (RoSC after 8mins) w/ subsequent Vtach arrest, placed on amio and pressors, ellen to 30s s/p atropine x2. Weaned off levophed and extubated 3/2. Course c/b coffee ground emesis, hemoccult +, Hgb 3.5 s/p 7units PRBCs, 1Unit FFP, 1 unit donor packed platelets. EGD/colo: melanosis duodeni but no acute bleed. Hypotensive and restarted on levophed gtt. CTA Abd: no active bleeding, possible focal proctitis; colonoscopy showing rectal ulcer. Started on hydrocortisone suppository and mesalamine. Hgb stabilized 11's, weaned off levophed and transferred to [facility] 3/17/23 for ICD eval 2/2 Vtach and cardiac cath. Hospital course @[facility] c/b BRBPR 3/17 AM with flex sig showing localized ulceration and no active rectal bleeding. Plan to observe for bleeding x 48 hrs before restarting DAPT.

## 2023-03-18 NOTE — CONSULT NOTE ADULT - ASSESSMENT
62Y M w.hx of  HTN, HLD, DM, CAD< HFrEF (25-30%), ESRD s/p failed kidney transplant who was transferred to [facility] from [place] for ICD placement and Cardiac Cath after complicated admission c/p cardiac arrest and vtach arrest and hypovolemic shock 2/2 GI bleed       ESRD:  Electrolytes noted   HD today as below   Hemodialysis Treatment.:     Schedule: Once, Modality: Hemodialysis, Access: Arteriovenous Fistula    Dialyzer: Optiflux [id number], Time: 210 Min    Blood Flow: 400 mL/Min , Dialysate Flow: 500 mL/Min, Dialysate Temp: 36.5, Tubinmm (Adult)    Target Fluid Removal: 3 Liters    Dialysate Electrolytes (mEq/L): Potassium 2, Calcium 2.5, Sodium 138, Bicarbonate 35   Daily BMP     Transplant Medication   Continue with [place]    HTN:  UF with HD as tolerated   Continue home antihypertensive medications     Anemia:  Hgb stable at 10.5  No EPO or Iron at this time     MBD:  Calcium: 8.0, corrected 9.2  Phos: 3.0  Hold all binders   Please obtain PTH      62Y M w.hx of  HTN, HLD, DM, CAD< HFrEF (25-30%), ESRD s/p failed kidney transplant who was transferred to [facility] from [place] for ICD placement and Cardiac Cath after complicated admission c/p cardiac arrest and vtach arrest and hypovolemic shock 2/2 GI bleed       ESRD:  Electrolytes noted   HD today as below   Hemodialysis Treatment.:     Schedule: Once, Modality: Hemodialysis, Access: Arteriovenous Fistula    Dialyzer: Optiflux [id number], Time: 210 Min    Blood Flow: 400 mL/Min , Dialysate Flow: 500 mL/Min, Dialysate Temp: 36.5, Tubinmm (Adult)    Target Fluid Removal: 3 Liters    Dialysate Electrolytes (mEq/L): Potassium 2, Calcium 2.5, Sodium 138, Bicarbonate 35   Daily BMP     Transplant Medication   Continue with [place]    HTN:  UF with HD as tolerated   Continue home antihypertensive medications     Anemia:  Hgb stable at 10.5  No EPO or Iron at this time     MBD:  Calcium: 8.0, corrected 9.2  Phos: 3.0  Hold all binders   Please obtain PTH      62Y M w.hx of  HTN, HLD, DM, CAD< HFrEF (25-30%), ESRD s/p failed kidney transplant who was transferred to [facility] from [place] for ICD placement and Cardiac Cath after complicated admission c/p cardiac arrest and vtach arrest and hypovolemic shock 2/2 GI bleed       ESRD:  Electrolytes noted   HD today as below   Hemodialysis Treatment.:     Schedule: Once, Modality: Hemodialysis, Access: Arteriovenous Fistula    Dialyzer: Optiflux [id number], Time: 210 Min    Blood Flow: 400 mL/Min , Dialysate Flow: 500 mL/Min, Dialysate Temp: 36.5, Tubinmm (Adult)    Target Fluid Removal: 3 Liters    Dialysate Electrolytes (mEq/L): Potassium 2, Calcium 2.5, Sodium 138, Bicarbonate 35   Daily BMP     Transplant Medication   Continue with [place]    HTN:  UF with HD as tolerated   Continue home antihypertensive medications     Anemia:  Hgb stable at 10.5  No EPO or Iron at this time     MBD:  Calcium: 8.0, corrected 9.2  Phos: 3.0  Hold all binders   Please obtain PTH

## 2023-03-18 NOTE — CONSULT NOTE ADULT - SUBJECTIVE AND OBJECTIVE BOX
HPI:  63yo M PMHx HTN, HLD, DM,  ICM (s/p cath years ago, RCA 99%, never intervened on), HFrEF 25-30%, ESRD s/p failed kidney transplant (last HD 3/1 via Left Arm AVF; HD TTS), Right AKA presented to [facility] 2/27 for respiratory distress after a dialysis session and admitted to cardiac telemetry on 2/27 with SIRS criteria. Placed on vanc/zosyn. Pt was placed on BiPAP which failed, where he was then intubated on 3/1 and moved to the MICU. He went into cardiac arrest (RoSC achieved after 8mins). Went into Vtach arrest, was placed on amio and pressors, HR ellen into 30s and atropine given twice. Weaned off levophed and sedation and extubated 3/2. Pt developed coffee ground emesis, hemoccult positive, with Hgb dipping to 3.5; he then received 7units PRBCs, 1Unit FFP, 1 unit donor packed platelets. EGD and colonoscopy showing melanosis duodenii but no acute bleed. BP dropped again, transferred to ICU and started on levophed gtt. CTA Abdomen showing no active bleeding into GI lumen, possible focal proctitis; colonoscopy showing rectal ulcer. Started on hydrocortisone suppository and mesalamine. Hgb now stable in 11s (per handoff), now transferred for ICD placement 2/2 Vtach and cardiac cath.  Nephrology consulted for dialysis.     PAST MEDICAL & SURGICAL HISTORY:  HTN (hypertension)      HLD (hyperlipidemia)      DM (diabetes mellitus)      GERD (gastroesophageal reflux disease)      ESRD on dialysis      NSTEMI (non-ST elevation myocardial infarction)      CAD (coronary artery disease)            Allergies:  No Known Allergies      Home Medications:   amLODIPine 10 mg oral tablet: 1 tab(s) orally once a day  atorvastatin 40 mg oral tablet: 1 tab(s) orally once a day  calcium acetate 667 mg oral tablet: 1 tab(s) orally 3 times a day  Colace 50 mg oral capsule: 1 cap(s) orally 2 times a day  ferrous sulfate 324 mg (65 mg elemental iron) oral delayed release tablet: 1 tab(s) orally once a day  Flomax 0.4 mg oral capsule: 1 cap(s) orally once a day  hydrALAZINE 50 mg oral tablet: 1 tab(s) orally every 8 hours  Isordil: 20 milligram(s) sublingual 3 times a day  losartan 100 mg oral tablet: 1 tab(s) orally once a day  predniSONE 5 mg oral tablet: 1 tab(s) orally once a day  Protonix 40 mg oral delayed release tablet: 1 tab(s) orally once a day  Renvela 800 mg oral tablet: 1 tab(s) orally 3 times a day (with meals)  tacrolimus 1 mg oral capsule: 2 cap(s) orally 2 times a day      Hospital Medications:   MEDICATIONS  (STANDING):  amLODIPine   Tablet 10 milliGRAM(s) Oral daily  atorvastatin 40 milliGRAM(s) Oral at bedtime  calcium acetate 667 milliGRAM(s) Oral three times a day with meals  dextrose 5%. 1000 milliLiter(s) (100 mL/Hr) IV Continuous <Continuous>  dextrose 5%. 1000 milliLiter(s) (50 mL/Hr) IV Continuous <Continuous>  dextrose 50% Injectable 25 Gram(s) IV Push once  dextrose 50% Injectable 12.5 Gram(s) IV Push once  dextrose 50% Injectable 25 Gram(s) IV Push once  ferrous    sulfate 325 milliGRAM(s) Oral daily  glucagon  Injectable 1 milliGRAM(s) IntraMuscular once  hydrALAZINE 50 milliGRAM(s) Oral every 8 hours  insulin lispro (ADMELOG) corrective regimen sliding scale   SubCutaneous every 6 hours  isosorbide   dinitrate Tablet (ISORDIL) 20 milliGRAM(s) Oral three times a day  losartan 100 milliGRAM(s) Oral daily  pantoprazole Infusion 8 mG/Hr (10 mL/Hr) IV Continuous <Continuous>  polyethylene glycol 3350 17 Gram(s) Oral daily  sevelamer carbonate 800 milliGRAM(s) Oral three times a day with meals  tamsulosin 0.4 milliGRAM(s) Oral at bedtime      SOCIAL HISTORY:  Denies ETOh, Smoking,     Family History:  FAMILY HISTORY:        VITALS:  T(F): 98.6 (03-18-23 @ 04:51), Max: 100.1 (03-17-23 @ 22:02)  HR: 63 (03-18-23 @ 04:40)  BP: 168/77 (03-18-23 @ 04:40)  RR: 18 (03-18-23 @ 04:40)  SpO2: 96% (03-18-23 @ 04:40)  Wt(kg): --    03-17 @ 07:01  -  03-18 @ 07:00  --------------------------------------------------------  IN: 0 mL / OUT: 575 mL / NET: -575 mL        CAPILLARY BLOOD GLUCOSE      POCT Blood Glucose.: 88 mg/dL (18 Mar 2023 06:09)  POCT Blood Glucose.: 127 mg/dL (18 Mar 2023 00:20)  POCT Blood Glucose.: 126 mg/dL (17 Mar 2023 18:53)  POCT Blood Glucose.: 107 mg/dL (17 Mar 2023 14:25)      Review of Systems:  CONSTITUTIONAL: No fever   RESPIRATORY: No shortness of breath, cough  CARDIOVASCULAR: No Chest pain, shortness of breath  GASTROINTESTINAL: No abdominal pain, nausea, vomiting, diarrhea  GENITOURINARY: No urinary frequency, gross hematuria, dysuria  NEUROLOGICAL: No headache, weakness  SKIN: No rash or skin lesion  MUSCULOSKELETAL: No swelling  Psych: Denies suicidal or homicidal ideation    PHYSICAL EXAM:  GENERAL: Alert, awake, oriented x3 on RA   HEENT: KARTHIK, EOMI, neck supple, no JVP  CHEST/LUNG: Bilateral clear breath sounds  HEART: Regular rate and rhythm, no murmur, no gallops, no rub   ABDOMEN: Soft, nontender, non distended  : No flank or supra pubic tenderness.  EXTREMITIES: no pedal edema  Neurology: AAOx3, no asterixis   SKIN: No rash or skin lesion   ACCESS: LUE AVF w/bruit and thrill     LABS:  03-18    132<L>  |  97  |  28<H>  ----------------------------<  91  4.5   |  27  |  5.19<H>    Ca    8.0<L>      18 Mar 2023 08:05  Phos  3.0     03-18  Mg     1.8     03-18    TPro  4.7<L>  /  Alb  2.5<L>  /  TBili  1.1  /  DBili      /  AST  15  /  ALT  9<L>  /  AlkPhos  117  03-18    Creatinine Trend: 5.19 <--, 3.89 <--, 3.83 <--                        10.5   4.67  )-----------( 148      ( 18 Mar 2023 08:05 )             32.5     Urine Studies:           HPI:  61yo M PMHx HTN, HLD, DM,  ICM (s/p cath years ago, RCA 99%, never intervened on), HFrEF 25-30%, ESRD s/p failed kidney transplant (last HD 3/1 via Left Arm AVF; HD TTS), Right AKA presented to [facility] 2/27 for respiratory distress after a dialysis session and admitted to cardiac telemetry on 2/27 with SIRS criteria. Placed on vanc/zosyn. Pt was placed on BiPAP which failed, where he was then intubated on 3/1 and moved to the MICU. He went into cardiac arrest (RoSC achieved after 8mins). Went into Vtach arrest, was placed on amio and pressors, HR ellen into 30s and atropine given twice. Weaned off levophed and sedation and extubated 3/2. Pt developed coffee ground emesis, hemoccult positive, with Hgb dipping to 3.5; he then received 7units PRBCs, 1Unit FFP, 1 unit donor packed platelets. EGD and colonoscopy showing melanosis duodenii but no acute bleed. BP dropped again, transferred to ICU and started on levophed gtt. CTA Abdomen showing no active bleeding into GI lumen, possible focal proctitis; colonoscopy showing rectal ulcer. Started on hydrocortisone suppository and mesalamine. Hgb now stable in 11s (per handoff), now transferred for ICD placement 2/2 Vtach and cardiac cath.  Nephrology consulted for dialysis.     PAST MEDICAL & SURGICAL HISTORY:  HTN (hypertension)      HLD (hyperlipidemia)      DM (diabetes mellitus)      GERD (gastroesophageal reflux disease)      ESRD on dialysis      NSTEMI (non-ST elevation myocardial infarction)      CAD (coronary artery disease)            Allergies:  No Known Allergies      Home Medications:   amLODIPine 10 mg oral tablet: 1 tab(s) orally once a day  atorvastatin 40 mg oral tablet: 1 tab(s) orally once a day  calcium acetate 667 mg oral tablet: 1 tab(s) orally 3 times a day  Colace 50 mg oral capsule: 1 cap(s) orally 2 times a day  ferrous sulfate 324 mg (65 mg elemental iron) oral delayed release tablet: 1 tab(s) orally once a day  Flomax 0.4 mg oral capsule: 1 cap(s) orally once a day  hydrALAZINE 50 mg oral tablet: 1 tab(s) orally every 8 hours  Isordil: 20 milligram(s) sublingual 3 times a day  losartan 100 mg oral tablet: 1 tab(s) orally once a day  predniSONE 5 mg oral tablet: 1 tab(s) orally once a day  Protonix 40 mg oral delayed release tablet: 1 tab(s) orally once a day  Renvela 800 mg oral tablet: 1 tab(s) orally 3 times a day (with meals)  tacrolimus 1 mg oral capsule: 2 cap(s) orally 2 times a day      Hospital Medications:   MEDICATIONS  (STANDING):  amLODIPine   Tablet 10 milliGRAM(s) Oral daily  atorvastatin 40 milliGRAM(s) Oral at bedtime  calcium acetate 667 milliGRAM(s) Oral three times a day with meals  dextrose 5%. 1000 milliLiter(s) (100 mL/Hr) IV Continuous <Continuous>  dextrose 5%. 1000 milliLiter(s) (50 mL/Hr) IV Continuous <Continuous>  dextrose 50% Injectable 25 Gram(s) IV Push once  dextrose 50% Injectable 12.5 Gram(s) IV Push once  dextrose 50% Injectable 25 Gram(s) IV Push once  ferrous    sulfate 325 milliGRAM(s) Oral daily  glucagon  Injectable 1 milliGRAM(s) IntraMuscular once  hydrALAZINE 50 milliGRAM(s) Oral every 8 hours  insulin lispro (ADMELOG) corrective regimen sliding scale   SubCutaneous every 6 hours  isosorbide   dinitrate Tablet (ISORDIL) 20 milliGRAM(s) Oral three times a day  losartan 100 milliGRAM(s) Oral daily  pantoprazole Infusion 8 mG/Hr (10 mL/Hr) IV Continuous <Continuous>  polyethylene glycol 3350 17 Gram(s) Oral daily  sevelamer carbonate 800 milliGRAM(s) Oral three times a day with meals  tamsulosin 0.4 milliGRAM(s) Oral at bedtime      SOCIAL HISTORY:  Denies ETOh, Smoking,     Family History:  FAMILY HISTORY:        VITALS:  T(F): 98.6 (03-18-23 @ 04:51), Max: 100.1 (03-17-23 @ 22:02)  HR: 63 (03-18-23 @ 04:40)  BP: 168/77 (03-18-23 @ 04:40)  RR: 18 (03-18-23 @ 04:40)  SpO2: 96% (03-18-23 @ 04:40)  Wt(kg): --    03-17 @ 07:01  -  03-18 @ 07:00  --------------------------------------------------------  IN: 0 mL / OUT: 575 mL / NET: -575 mL        CAPILLARY BLOOD GLUCOSE      POCT Blood Glucose.: 88 mg/dL (18 Mar 2023 06:09)  POCT Blood Glucose.: 127 mg/dL (18 Mar 2023 00:20)  POCT Blood Glucose.: 126 mg/dL (17 Mar 2023 18:53)  POCT Blood Glucose.: 107 mg/dL (17 Mar 2023 14:25)      Review of Systems:  CONSTITUTIONAL: No fever   RESPIRATORY: No shortness of breath, cough  CARDIOVASCULAR: No Chest pain, shortness of breath  GASTROINTESTINAL: No abdominal pain, nausea, vomiting, diarrhea  GENITOURINARY: No urinary frequency, gross hematuria, dysuria  NEUROLOGICAL: No headache, weakness  SKIN: No rash or skin lesion  MUSCULOSKELETAL: No swelling  Psych: Denies suicidal or homicidal ideation    PHYSICAL EXAM:  GENERAL: Alert, awake, oriented x3 on RA   HEENT: KARTHIK, EOMI, neck supple, no JVP  CHEST/LUNG: Bilateral clear breath sounds  HEART: Regular rate and rhythm, no murmur, no gallops, no rub   ABDOMEN: Soft, nontender, non distended  : No flank or supra pubic tenderness.  EXTREMITIES: no pedal edema  Neurology: AAOx3, no asterixis   SKIN: No rash or skin lesion   ACCESS: LUE AVF w/bruit and thrill     LABS:  03-18    132<L>  |  97  |  28<H>  ----------------------------<  91  4.5   |  27  |  5.19<H>    Ca    8.0<L>      18 Mar 2023 08:05  Phos  3.0     03-18  Mg     1.8     03-18    TPro  4.7<L>  /  Alb  2.5<L>  /  TBili  1.1  /  DBili      /  AST  15  /  ALT  9<L>  /  AlkPhos  117  03-18    Creatinine Trend: 5.19 <--, 3.89 <--, 3.83 <--                        10.5   4.67  )-----------( 148      ( 18 Mar 2023 08:05 )             32.5     Urine Studies:           HPI:  63yo M PMHx HTN, HLD, DM,  ICM (s/p cath years ago, RCA 99%, never intervened on), HFrEF 25-30%, ESRD s/p failed kidney transplant (last HD 3/1 via Left Arm AVF; HD TTS), Right AKA presented to [facility] 2/27 for respiratory distress after a dialysis session and admitted to cardiac telemetry on 2/27 with SIRS criteria. Placed on vanc/zosyn. Pt was placed on BiPAP which failed, where he was then intubated on 3/1 and moved to the MICU. He went into cardiac arrest (RoSC achieved after 8mins). Went into Vtach arrest, was placed on amio and pressors, HR ellen into 30s and atropine given twice. Weaned off levophed and sedation and extubated 3/2. Pt developed coffee ground emesis, hemoccult positive, with Hgb dipping to 3.5; he then received 7units PRBCs, 1Unit FFP, 1 unit donor packed platelets. EGD and colonoscopy showing melanosis duodenii but no acute bleed. BP dropped again, transferred to ICU and started on levophed gtt. CTA Abdomen showing no active bleeding into GI lumen, possible focal proctitis; colonoscopy showing rectal ulcer. Started on hydrocortisone suppository and mesalamine. Hgb now stable in 11s (per handoff), now transferred for ICD placement 2/2 Vtach and cardiac cath.  Nephrology consulted for dialysis.     PAST MEDICAL & SURGICAL HISTORY:  HTN (hypertension)      HLD (hyperlipidemia)      DM (diabetes mellitus)      GERD (gastroesophageal reflux disease)      ESRD on dialysis      NSTEMI (non-ST elevation myocardial infarction)      CAD (coronary artery disease)            Allergies:  No Known Allergies      Home Medications:   amLODIPine 10 mg oral tablet: 1 tab(s) orally once a day  atorvastatin 40 mg oral tablet: 1 tab(s) orally once a day  calcium acetate 667 mg oral tablet: 1 tab(s) orally 3 times a day  Colace 50 mg oral capsule: 1 cap(s) orally 2 times a day  ferrous sulfate 324 mg (65 mg elemental iron) oral delayed release tablet: 1 tab(s) orally once a day  Flomax 0.4 mg oral capsule: 1 cap(s) orally once a day  hydrALAZINE 50 mg oral tablet: 1 tab(s) orally every 8 hours  Isordil: 20 milligram(s) sublingual 3 times a day  losartan 100 mg oral tablet: 1 tab(s) orally once a day  predniSONE 5 mg oral tablet: 1 tab(s) orally once a day  Protonix 40 mg oral delayed release tablet: 1 tab(s) orally once a day  Renvela 800 mg oral tablet: 1 tab(s) orally 3 times a day (with meals)  tacrolimus 1 mg oral capsule: 2 cap(s) orally 2 times a day      Hospital Medications:   MEDICATIONS  (STANDING):  amLODIPine   Tablet 10 milliGRAM(s) Oral daily  atorvastatin 40 milliGRAM(s) Oral at bedtime  calcium acetate 667 milliGRAM(s) Oral three times a day with meals  dextrose 5%. 1000 milliLiter(s) (100 mL/Hr) IV Continuous <Continuous>  dextrose 5%. 1000 milliLiter(s) (50 mL/Hr) IV Continuous <Continuous>  dextrose 50% Injectable 25 Gram(s) IV Push once  dextrose 50% Injectable 12.5 Gram(s) IV Push once  dextrose 50% Injectable 25 Gram(s) IV Push once  ferrous    sulfate 325 milliGRAM(s) Oral daily  glucagon  Injectable 1 milliGRAM(s) IntraMuscular once  hydrALAZINE 50 milliGRAM(s) Oral every 8 hours  insulin lispro (ADMELOG) corrective regimen sliding scale   SubCutaneous every 6 hours  isosorbide   dinitrate Tablet (ISORDIL) 20 milliGRAM(s) Oral three times a day  losartan 100 milliGRAM(s) Oral daily  pantoprazole Infusion 8 mG/Hr (10 mL/Hr) IV Continuous <Continuous>  polyethylene glycol 3350 17 Gram(s) Oral daily  sevelamer carbonate 800 milliGRAM(s) Oral three times a day with meals  tamsulosin 0.4 milliGRAM(s) Oral at bedtime      SOCIAL HISTORY:  Denies ETOh, Smoking,     Family History:  FAMILY HISTORY:        VITALS:  T(F): 98.6 (03-18-23 @ 04:51), Max: 100.1 (03-17-23 @ 22:02)  HR: 63 (03-18-23 @ 04:40)  BP: 168/77 (03-18-23 @ 04:40)  RR: 18 (03-18-23 @ 04:40)  SpO2: 96% (03-18-23 @ 04:40)  Wt(kg): --    03-17 @ 07:01  -  03-18 @ 07:00  --------------------------------------------------------  IN: 0 mL / OUT: 575 mL / NET: -575 mL        CAPILLARY BLOOD GLUCOSE      POCT Blood Glucose.: 88 mg/dL (18 Mar 2023 06:09)  POCT Blood Glucose.: 127 mg/dL (18 Mar 2023 00:20)  POCT Blood Glucose.: 126 mg/dL (17 Mar 2023 18:53)  POCT Blood Glucose.: 107 mg/dL (17 Mar 2023 14:25)      Review of Systems:  CONSTITUTIONAL: No fever   RESPIRATORY: No shortness of breath, cough  CARDIOVASCULAR: No Chest pain, shortness of breath  GASTROINTESTINAL: No abdominal pain, nausea, vomiting, diarrhea  GENITOURINARY: No urinary frequency, gross hematuria, dysuria  NEUROLOGICAL: No headache, weakness  SKIN: No rash or skin lesion  MUSCULOSKELETAL: No swelling  Psych: Denies suicidal or homicidal ideation    PHYSICAL EXAM:  GENERAL: Alert, awake, oriented x3 on RA   HEENT: KARTHIK, EOMI, neck supple, no JVP  CHEST/LUNG: Bilateral clear breath sounds  HEART: Regular rate and rhythm, no murmur, no gallops, no rub   ABDOMEN: Soft, nontender, non distended  : No flank or supra pubic tenderness.  EXTREMITIES: no pedal edema  Neurology: AAOx3, no asterixis   SKIN: No rash or skin lesion   ACCESS: LUE AVF w/bruit and thrill     LABS:  03-18    132<L>  |  97  |  28<H>  ----------------------------<  91  4.5   |  27  |  5.19<H>    Ca    8.0<L>      18 Mar 2023 08:05  Phos  3.0     03-18  Mg     1.8     03-18    TPro  4.7<L>  /  Alb  2.5<L>  /  TBili  1.1  /  DBili      /  AST  15  /  ALT  9<L>  /  AlkPhos  117  03-18    Creatinine Trend: 5.19 <--, 3.89 <--, 3.83 <--                        10.5   4.67  )-----------( 148      ( 18 Mar 2023 08:05 )             32.5     Urine Studies:

## 2023-03-18 NOTE — PROGRESS NOTE ADULT - PROBLEM SELECTOR PLAN 5
s/p failed kidney transplant  -per [place] pt takes Tacrolimus 2mg 2 times /day  -s/p tacrolimus 2mg x 1  -tacrolimus serum levels 2.1 s/p failed kidney transplant  -per [place] pt takes Tacrolimus 2mg 2 times /day  -s/p tacrolimus 2mg x 1  -tacrolimus serum levels 2.1 and can restart home dose per [facility]

## 2023-03-18 NOTE — PROGRESS NOTE ADULT - PROBLEM SELECTOR PLAN 8
A1C 5.9    F: Oral intake  E: Replete electrolytes as needed for K<4 and Mg<2  N: NPO    DVT PPX: held for cath/ICD placement  Dispo: pending ICD/cardiac cath A1C 5.9    F: Oral intake  E: Replete electrolytes as needed for K<4 and Mg<2  N: renal diet    DVT PPX: held for cath/ICD placement  Dispo: pending ICD/cardiac cath

## 2023-03-18 NOTE — PROGRESS NOTE ADULT - PROBLEM SELECTOR PLAN 1
Previous cath at [facility] showing oLM 30% and RCA 99% that is not amenable to intervention.  - [facility]: mild LVH, EF 25-30%, severe global wall motion dysfunction, mildly dilated LA, RV mildly dilated, all leaflets mild calcified, mod pHTN  - hx coffee ground emesis @ [facility]; s/p EGD/colonoscopy showing melanosis without active bleed  - cleared for by GI for DAPT, plan for observation x 48 hrs and if no recurrence of bleeding can be started on Aspirin 81mg QD and Plavix 75mg QD on Monday 3/20/23 w/ observation on DAPT before cath mid-late week Previous cath at [facility] showing oLM 30% and RCA 99% that is not amenable to intervention.  - [facility] mild [facility]H, EF 25-30%, severe global wall motion dysfunction, mildly dilated LA, RV mildly dilated, all leaflets mild calcified, mod pHTN  - hx coffee ground emesis @ [facility]; s/p EGD/colonoscopy showing melanosis without active bleed  - cleared for by GI for DAPT, plan for observation x 48 hrs and if no recurrence of bleeding can be started on Aspirin 81mg QD and Plavix 75mg QD on Monday 3/20/23 w/ observation on DAPT before cath mid-late week Previous cath at [facility] showing oLM 30% and RCA 99% that is not amenable to intervention.  - [facility], EF 25-30%, severe global wall motion dysfunction, mildly dilated LA, RV mildly dilated, all leaflets mild calcified, mod pHTN  - hx coffee ground emesis @ [facility]; s/p EGD/colonoscopy showing melanosis without active bleed  - cleared for by GI for DAPT, plan for observation x 48 hrs and if no recurrence of bleeding can be started on Aspirin 81mg QD and Plavix 75mg QD on Monday 3/20/23 w/ observation on DAPT before cath mid-late week

## 2023-03-18 NOTE — PROGRESS NOTE ADULT - ASSESSMENT
63 y/o M PMHx HTN, HLD, DM, CAD (s/p cath years ago, RCA 99%, never intervened on), ESRD s/p failed kidney transplant, on HD via Left Arm AVF, and Right AKA presented to [facility] 2/27/23 for respiratory distress after a dialysis session. Intubated in MICU there, had reportedly VT cardiac arrest and ROSC was achieved after 8 mins. Unclear if he made troponin (no such info included in transfer paper).  Was put on Amio gtt, noted ellen when on amio gtt requiring atropine. Echo there showed LVEF 25-30%.  Extubated 3/2 and weaned off levophed. Also has rectal ulcer with BRBPR at [facility] requiring multiple PRBC transfusions. Reportedly stable and was then transferred to [facility].  Hematochezia due to rectal ulcer.      -please introduce low dose beta blocker  -will need ischemic workup  -following [facility], will proceed with implantation of secondary prevention ICD (favor subq over transvenous given ESRD/HD)   -EP will follow with you 63 y/o M PMHx HTN, HLD, DM, CAD (s/p cath years ago, RCA 99%, never intervened on), ESRD s/p failed kidney transplant, on HD via Left Arm AVF, and Right AKA presented to [facility] 2/27/23 for respiratory distress after a dialysis session. Intubated in MICU there, had reportedly VT cardiac arrest and ROSC was achieved after 8 mins. Unclear if he made troponin (no such info included in transfer paper).  Was put on Amio gtt, noted ellen when on amio gtt requiring atropine. Echo there showed LVEF 25-30%.  Extubated 3/2 and weaned off levophed. Also has rectal ulcer with BRBPR at [facility] requiring multiple PRBC transfusions. Reportedly stable and was then transferred to [facility].  Hematochezia due to rectal ulcer.      -please introduce low dose beta blocker  -will need ischemic workup  -following [facility], will proceed with implantation of secondary prevention ICD (favor subq over transvenous given ESRD/HD)   -EP will follow with you 63 y/o M PMHx HTN, HLD, DM, CAD (s/p cath years ago, RCA 99%, never intervened on), ESRD s/p failed kidney transplant, on HD via Left Arm AVF, and Right AKA presented to [facility] 2/27/23 for respiratory distress after a dialysis session. Intubated in MICU there, had reportedly VT cardiac arrest and ROSC was achieved after 8 mins. Unclear if he made troponin (no such info included in transfer paper).  Was put on Amio gtt, noted ellen when on amio gtt requiring atropine. Echo there showed LVEF 25-30%.  Extubated 3/2 and weaned off levophed. Also has rectal ulcer with BRBPR at [facility] requiring multiple PRBC transfusions. Reportedly stable and was then transferred to [facility].  Hematochezia due to rectal ulcer.      -please introduce low dose beta blocker  -will need ischemic workup  -following [facility], will proceed with implantation of secondary prevention ICD (favor subq over transvenous given ESRD/HD)   -EP will follow with you

## 2023-03-19 LAB
ANION GAP SERPL CALC-SCNC: 8 MMOL/L — SIGNIFICANT CHANGE UP (ref 5–17)
BUN SERPL-MCNC: 15 MG/DL — SIGNIFICANT CHANGE UP (ref 7–23)
CALCIUM SERPL-MCNC: 7.7 MG/DL — LOW (ref 8.4–10.5)
CALCIUM SERPL-MCNC: 7.8 MG/DL — LOW (ref 8.4–10.5)
CHLORIDE SERPL-SCNC: 94 MMOL/L — LOW (ref 96–108)
CO2 SERPL-SCNC: 27 MMOL/L — SIGNIFICANT CHANGE UP (ref 22–31)
CREAT SERPL-MCNC: 3.73 MG/DL — HIGH (ref 0.5–1.3)
EGFR: 18 ML/MIN/1.73M2 — LOW
GLUCOSE BLDC GLUCOMTR-MCNC: 102 MG/DL — HIGH (ref 70–99)
GLUCOSE BLDC GLUCOMTR-MCNC: 105 MG/DL — HIGH (ref 70–99)
GLUCOSE BLDC GLUCOMTR-MCNC: 151 MG/DL — HIGH (ref 70–99)
GLUCOSE BLDC GLUCOMTR-MCNC: 80 MG/DL — SIGNIFICANT CHANGE UP (ref 70–99)
GLUCOSE SERPL-MCNC: 88 MG/DL — SIGNIFICANT CHANGE UP (ref 70–99)
HCT VFR BLD CALC: 33 % — LOW (ref 39–50)
HGB BLD-MCNC: 10.3 G/DL — LOW (ref 13–17)
MAGNESIUM SERPL-MCNC: 1.8 MG/DL — SIGNIFICANT CHANGE UP (ref 1.6–2.6)
MCHC RBC-ENTMCNC: 29.4 PG — SIGNIFICANT CHANGE UP (ref 27–34)
MCHC RBC-ENTMCNC: 31.2 GM/DL — LOW (ref 32–36)
MCV RBC AUTO: 94.3 FL — SIGNIFICANT CHANGE UP (ref 80–100)
NRBC # BLD: 0 /100 WBCS — SIGNIFICANT CHANGE UP (ref 0–0)
PLATELET # BLD AUTO: 118 K/UL — LOW (ref 150–400)
POTASSIUM SERPL-MCNC: 4.2 MMOL/L — SIGNIFICANT CHANGE UP (ref 3.5–5.3)
POTASSIUM SERPL-SCNC: 4.2 MMOL/L — SIGNIFICANT CHANGE UP (ref 3.5–5.3)
PTH-INTACT FLD-MCNC: 431 PG/ML — HIGH (ref 15–65)
RBC # BLD: 3.5 M/UL — LOW (ref 4.2–5.8)
RBC # FLD: 14.8 % — HIGH (ref 10.3–14.5)
SODIUM SERPL-SCNC: 129 MMOL/L — LOW (ref 135–145)
WBC # BLD: 4.06 K/UL — SIGNIFICANT CHANGE UP (ref 3.8–10.5)
WBC # FLD AUTO: 4.06 K/UL — SIGNIFICANT CHANGE UP (ref 3.8–10.5)

## 2023-03-19 PROCEDURE — 99232 SBSQ HOSP IP/OBS MODERATE 35: CPT

## 2023-03-19 RX ORDER — ONDANSETRON 8 MG/1
4 TABLET, FILM COATED ORAL ONCE
Refills: 0 | Status: DISCONTINUED | OUTPATIENT
Start: 2023-03-19 | End: 2023-03-20

## 2023-03-19 RX ORDER — SODIUM CHLORIDE 0.65 %
1 AEROSOL, SPRAY (ML) NASAL EVERY 4 HOURS
Refills: 0 | Status: DISCONTINUED | OUTPATIENT
Start: 2023-03-19 | End: 2023-04-11

## 2023-03-19 RX ADMIN — AMLODIPINE BESYLATE 10 MILLIGRAM(S): 2.5 TABLET ORAL at 05:01

## 2023-03-19 RX ADMIN — Medication 650 MILLIGRAM(S): at 22:52

## 2023-03-19 RX ADMIN — Medication 12.5 MILLIGRAM(S): at 05:02

## 2023-03-19 RX ADMIN — LOSARTAN POTASSIUM 100 MILLIGRAM(S): 100 TABLET, FILM COATED ORAL at 05:01

## 2023-03-19 RX ADMIN — ISOSORBIDE DINITRATE 20 MILLIGRAM(S): 5 TABLET ORAL at 05:01

## 2023-03-19 RX ADMIN — PANTOPRAZOLE SODIUM 40 MILLIGRAM(S): 20 TABLET, DELAYED RELEASE ORAL at 05:40

## 2023-03-19 RX ADMIN — TACROLIMUS 2 MILLIGRAM(S): 5 CAPSULE ORAL at 05:02

## 2023-03-19 RX ADMIN — Medication 650 MILLIGRAM(S): at 00:24

## 2023-03-19 RX ADMIN — Medication 1 SPRAY(S): at 11:42

## 2023-03-19 RX ADMIN — Medication 12.5 MILLIGRAM(S): at 17:46

## 2023-03-19 RX ADMIN — ISOSORBIDE DINITRATE 20 MILLIGRAM(S): 5 TABLET ORAL at 11:41

## 2023-03-19 RX ADMIN — Medication 50 MILLIGRAM(S): at 21:40

## 2023-03-19 RX ADMIN — ATORVASTATIN CALCIUM 40 MILLIGRAM(S): 80 TABLET, FILM COATED ORAL at 21:40

## 2023-03-19 RX ADMIN — Medication 325 MILLIGRAM(S): at 11:41

## 2023-03-19 RX ADMIN — Medication 650 MILLIGRAM(S): at 21:40

## 2023-03-19 RX ADMIN — ISOSORBIDE DINITRATE 20 MILLIGRAM(S): 5 TABLET ORAL at 15:02

## 2023-03-19 RX ADMIN — Medication 50 MILLIGRAM(S): at 15:02

## 2023-03-19 RX ADMIN — Medication 650 MILLIGRAM(S): at 12:41

## 2023-03-19 RX ADMIN — Medication 2: at 11:41

## 2023-03-19 RX ADMIN — TACROLIMUS 2 MILLIGRAM(S): 5 CAPSULE ORAL at 17:46

## 2023-03-19 RX ADMIN — Medication 650 MILLIGRAM(S): at 11:41

## 2023-03-19 RX ADMIN — TAMSULOSIN HYDROCHLORIDE 0.8 MILLIGRAM(S): 0.4 CAPSULE ORAL at 21:40

## 2023-03-19 RX ADMIN — Medication 50 MILLIGRAM(S): at 05:01

## 2023-03-19 RX ADMIN — PANTOPRAZOLE SODIUM 40 MILLIGRAM(S): 20 TABLET, DELAYED RELEASE ORAL at 17:46

## 2023-03-19 NOTE — PROGRESS NOTE ADULT - SUBJECTIVE AND OBJECTIVE BOX
Patient is a 62y old  Male who presents with a chief complaint of VT arrest (18 Mar 2023 16:06)    INTERVAL EVENTS: NAEON    SUBJECTIVE:  Patient was seen and examined at bedside. Tolerated HD yesterday, denies BRBPR/melena,etc.     Review of systems: No fever, chills, dizziness, HA, Changes in vision, CP, dyspnea, nausea or vomiting, dysuria, changes in bowel movements, LE edema. Rest of 12 point Review of systems negative unless otherwise documented elsewhere in note.     Diet, DASH/TLC:   Sodium & Cholesterol Restricted  For patients receiving Renal Replacement - No Protein Restr, No Conc K, No Conc Phos, Low Sodium (RENAL) (03-17-23 @ 13:58) [Active]      MEDICATIONS:  MEDICATIONS  (STANDING):  amLODIPine   Tablet 10 milliGRAM(s) Oral daily  atorvastatin 40 milliGRAM(s) Oral at bedtime  dextrose 5%. 1000 milliLiter(s) (100 mL/Hr) IV Continuous <Continuous>  dextrose 5%. 1000 milliLiter(s) (50 mL/Hr) IV Continuous <Continuous>  dextrose 50% Injectable 25 Gram(s) IV Push once  dextrose 50% Injectable 12.5 Gram(s) IV Push once  dextrose 50% Injectable 25 Gram(s) IV Push once  ferrous    sulfate 325 milliGRAM(s) Oral daily  glucagon  Injectable 1 milliGRAM(s) IntraMuscular once  hydrALAZINE 50 milliGRAM(s) Oral every 8 hours  insulin lispro (ADMELOG) corrective regimen sliding scale   SubCutaneous every 6 hours  isosorbide   dinitrate Tablet (ISORDIL) 20 milliGRAM(s) Oral three times a day  losartan 100 milliGRAM(s) Oral daily  metoprolol tartrate 12.5 milliGRAM(s) Oral every 12 hours  ondansetron    Tablet 4 milliGRAM(s) Oral once  pantoprazole  Injectable 40 milliGRAM(s) IV Push every 12 hours  polyethylene glycol 3350 17 Gram(s) Oral daily  tacrolimus 2 milliGRAM(s) Oral every 12 hours  tamsulosin 0.8 milliGRAM(s) Oral at bedtime    MEDICATIONS  (PRN):  acetaminophen     Tablet .. 650 milliGRAM(s) Oral every 6 hours PRN Mild Pain (1 - 3), Moderate Pain (4 - 6)  dextrose Oral Gel 15 Gram(s) Oral once PRN Blood Glucose LESS THAN 70 milliGRAM(s)/deciliter  sodium chloride 0.65% Nasal 1 Spray(s) Both Nostrils every 4 hours PRN Nasal Congestion      Allergies    No Known Allergies    Intolerances        OBJECTIVE:  Vital Signs Last 24 Hrs  T(C): 37.7 (19 Mar 2023 12:59), Max: 37.7 (19 Mar 2023 12:59)  T(F): 99.8 (19 Mar 2023 12:59), Max: 99.8 (19 Mar 2023 12:59)  HR: 60 (19 Mar 2023 09:11) (60 - 76)  BP: 120/56 (19 Mar 2023 09:11) (102/57 - 166/72)  BP(mean): 81 (19 Mar 2023 09:11) (73 - 81)  RR: 18 (19 Mar 2023 09:11) (16 - 20)  SpO2: 100% (19 Mar 2023 09:11) (96% - 100%)    Parameters below as of 19 Mar 2023 09:11  Patient On (Oxygen Delivery Method): room air      I&O's Summary    18 Mar 2023 07:01  -  19 Mar 2023 07:00  --------------------------------------------------------  IN: 760 mL / OUT: 1760 mL / NET: -1000 mL    19 Mar 2023 07:01  -  19 Mar 2023 13:01  --------------------------------------------------------  IN: 180 mL / OUT: 0 mL / NET: 180 mL        PHYSICAL EXAM:  Gen: Reclining in bed at time of exam, appears stated age  HEENT: NCAT, MMM, clear OP  Neck: supple, trachea at midline  CV: RRR, +S1/S2  Pulm: adequate respiratory effort, no increase in work of breathing  Abd: soft, NTND  Skin: warm and dry,   Ext: WWP, no LE edema  Neuro: AOx3, no gross focal neurological deficits  Psych: affect and behavior appropriate, pleasant at time of interview  :     LABS:                        10.3   4.06  )-----------( 118      ( 19 Mar 2023 07:11 )             33.0     03-19    129<L>  |  94<L>  |  15  ----------------------------<  88  4.2   |  27  |  3.73<H>    Ca    7.8<L>      19 Mar 2023 07:11  Phos  3.0     03-18  Mg     1.8     03-19    TPro  4.7<L>  /  Alb  2.5<L>  /  TBili  1.1  /  DBili  x   /  AST  15  /  ALT  9<L>  /  AlkPhos  117  03-18    LIVER FUNCTIONS - ( 18 Mar 2023 08:05 )  Alb: 2.5 g/dL / Pro: 4.7 g/dL / ALK PHOS: 117 U/L / ALT: 9 U/L / AST: 15 U/L / GGT: x             CAPILLARY BLOOD GLUCOSE      POCT Blood Glucose.: 151 mg/dL (19 Mar 2023 11:29)  POCT Blood Glucose.: 102 mg/dL (19 Mar 2023 06:39)  POCT Blood Glucose.: 142 mg/dL (18 Mar 2023 18:47)        MICRODATA:      RADIOLOGY/OTHER STUDIES:

## 2023-03-19 NOTE — PROGRESS NOTE ADULT - PROBLEM SELECTOR PLAN 1
Previous cath at [facility] showing oLM 30% and RCA 99% that is not amenable to intervention.  - TTE 3/1/23 at [facility]: mild LVH, EF 25-30%, severe global wall motion dysfunction, mildly dilated LA, RV mildly dilated, all leaflets mild calcified, mod pHTN  - hx coffee ground emesis @ [facility]; s/p EGD/colonoscopy showing melanosis without active bleed  - cleared for by GI for DAPT, plan for observation x 48 hrs and if no recurrence of bleeding can be started on Aspirin 81mg QD and Plavix 75mg QD on Monday 3/20/23 w/ observation on DAPT before cath mid-late week  - Will need to be consented for procedure Previous cath at [facility] showing oLM 30% and RCA 99% that is not amenable to intervention.  - TTE 3/1/23 at [facility]: mild LVH, EF 25-30%, severe global wall motion dysfunction, mildly dilated LA, RV mildly dilated, all leaflets mild calcified, mod pHTN  - hx coffee ground emesis @ [facility]; s/p EGD/colonoscopy showing melanosis without active bleed  - cleared for by GI for DAPT, plan for observation x 48 hrs and if no recurrence of bleeding can be started on Aspirin 81mg QD and Plavix 75mg QD on Monday 3/20/23 w/ observation on DAPT before cath mid-late week  - Will need to be consented for procedure Previous cath at [facility] showing oLM 30% and RCA 99% that is not amenable to intervention.  - TTE 3/1/23 at [facility]: mild LVH, EF 25-30%, severe global wall motion dysfunction, mildly dilated LA, RV mildly dilated, all leaflets mild calcified, mod pHTN  - hx coffee ground emesis @ [facility]; s/p EGD/colonoscopy showing melanosis without active bleed  - cleared for by GI for DAPT, plan for observation x 48 hrs and if no recurrence of bleeding can be started on Aspirin 81mg QD and Plavix 75mg QD on Monday 3/20/23 w/ observation on DAPT before cath mid-late week  - Will need to be consented for procedure

## 2023-03-19 NOTE — PROGRESS NOTE ADULT - PROBLEM SELECTOR PLAN 5
s/p failed kidney transplant (attempt to obtain collateral, per medicine pt may not require tacrolimus if transplant failed)   -per [place] pt takes Tacrolimus 2mg 2 times /day  -s/p tacrolimus 2mg x 1  -tacrolimus serum levels 2.1 and can restart home dose per Renal

## 2023-03-19 NOTE — PROGRESS NOTE ADULT - SUBJECTIVE AND OBJECTIVE BOX
EPS Progress Note    S:   No complaints this AM.  Febrile yesterday    T(C): 36.9 (03-18-23 @ 15:00), Max: 37.8 (03-17-23 @ 22:02)  HR: 72 (03-18-23 @ 15:00) (63 - 75)  BP: 161/79 (03-18-23 @ 15:00) (128/59 - 168/77)  RR: 16 (03-18-23 @ 15:00) (16 - 18)  SpO2: 100% (03-18-23 @ 15:00) (95% - 100%)  Wt(kg): --     Telemetry:   sinus, sinus ellen          General:  No acute distress      Chest:  Chest is clear to auscultation bilaterally without wheezes, crackles, or rhonchi  Cardiac:  Regular rate and rhythm.  No murmur, rubs, or gallops heard.  Abdomen:  Soft without rebound or guarding.  Bowel sounds are presnt in all 4 quadrants.  No hepatosplenomegaly  Extremities:  No lower extremity edema is present.  No cyanosis or clubbing       MEDICATIONS  (STANDING):  amLODIPine   Tablet 10 milliGRAM(s) Oral daily  atorvastatin 40 milliGRAM(s) Oral at bedtime  calcium acetate 667 milliGRAM(s) Oral three times a day with meals  dextrose 5%. 1000 milliLiter(s) (100 mL/Hr) IV Continuous <Continuous>  dextrose 5%. 1000 milliLiter(s) (50 mL/Hr) IV Continuous <Continuous>  dextrose 50% Injectable 25 Gram(s) IV Push once  dextrose 50% Injectable 12.5 Gram(s) IV Push once  dextrose 50% Injectable 25 Gram(s) IV Push once  ferrous    sulfate 325 milliGRAM(s) Oral daily  glucagon  Injectable 1 milliGRAM(s) IntraMuscular once  hydrALAZINE 50 milliGRAM(s) Oral every 8 hours  insulin lispro (ADMELOG) corrective regimen sliding scale   SubCutaneous every 6 hours  isosorbide   dinitrate Tablet (ISORDIL) 20 milliGRAM(s) Oral three times a day  losartan 100 milliGRAM(s) Oral daily  metoprolol tartrate 12.5 milliGRAM(s) Oral every 12 hours  pantoprazole Infusion 8 mG/Hr (10 mL/Hr) IV Continuous <Continuous>  polyethylene glycol 3350 17 Gram(s) Oral daily  sevelamer carbonate 800 milliGRAM(s) Oral three times a day with meals  tacrolimus 2 milliGRAM(s) Oral every 12 hours  tamsulosin 0.8 milliGRAM(s) Oral at bedtime    MEDICATIONS  (PRN):  dextrose Oral Gel 15 Gram(s) Oral once PRN Blood Glucose LESS THAN 70 milliGRAM(s)/deciliter                                                         10.5   4.67  )-----------( 148      ( 18 Mar 2023 08:05 )             32.5     03-18    132<L>  |  97  |  28<H>  ----------------------------<  91  4.5   |  27  |  5.19<H>    Ca    8.0<L>      18 Mar 2023 08:05  Phos  3.0     03-18  Mg     1.8     03-18    TPro  4.7<L>  /  Alb  2.5<L>  /  TBili  1.1  /  DBili  x   /  AST  15  /  ALT  9<L>  /  AlkPhos  117  03-18    PT/INR - ( 17 Mar 2023 08:14 )   PT: 13.1 sec;   INR: 1.10          PTT - ( 17 Mar 2023 08:14 )  PTT:30.3 sec

## 2023-03-19 NOTE — PROGRESS NOTE ADULT - PROBLEM SELECTOR PLAN 8
A1C 5.9    F: Oral intake  E: Replete electrolytes as needed for K<4 and Mg<2  N: renal diet    DVT PPX: held for cath/ICD placement  Dispo: pending ICD/cardiac cath

## 2023-03-19 NOTE — PROGRESS NOTE ADULT - ASSESSMENT
63 y/o M PMHx HTN, HLD, DM, CAD (s/p cath years ago, RCA 99%, never intervened on), ESRD s/p failed kidney transplant, on HD via Left Arm AVF, and Right AKA presented to [facility] 2/27/23 for respiratory distress after a dialysis session. Intubated in MICU there, had reportedly VT cardiac arrest and ROSC was achieved after 8 mins. Unclear if he made troponin (no such info included in transfer paper).  Was put on Amio gtt, noted ellen when on amio gtt requiring atropine. Echo there showed LVEF 25-30%.  Extubated 3/2 and weaned off levophed. Also has rectal ulcer with BRBPR at [facility] requiring multiple PRBC transfusions. Reportedly stable and was then transferred to [facility].  Hematochezia due to rectal ulcer.      -given fever, may need infectious workup  -please introduce low dose beta blocker  -will need ischemic workup  -following [facility], will proceed with implantation of secondary prevention ICD (favor subq over transvenous given ESRD/HD)   -EP will follow with you 61 y/o M PMHx HTN, HLD, DM, CAD (s/p cath years ago, RCA 99%, never intervened on), ESRD s/p failed kidney transplant, on HD via Left Arm AVF, and Right AKA presented to [facility] 2/27/23 for respiratory distress after a dialysis session. Intubated in MICU there, had reportedly VT cardiac arrest and ROSC was achieved after 8 mins. Unclear if he made troponin (no such info included in transfer paper).  Was put on Amio gtt, noted ellen when on amio gtt requiring atropine. Echo there showed LVEF 25-30%.  Extubated 3/2 and weaned off levophed. Also has rectal ulcer with BRBPR at [facility] requiring multiple PRBC transfusions. Reportedly stable and was then transferred to [facility].  Hematochezia due to rectal ulcer.      -given fever, may need infectious workup  -please introduce low dose beta blocker  -will need ischemic workup  -following [facility], will proceed with implantation of secondary prevention ICD (favor subq over transvenous given ESRD/HD)   -EP will follow with you 63 y/o M PMHx HTN, HLD, DM, CAD (s/p cath years ago, RCA 99%, never intervened on), ESRD s/p failed kidney transplant, on HD via Left Arm AVF, and Right AKA presented to [facility] 2/27/23 for respiratory distress after a dialysis session. Intubated in MICU there, had reportedly VT cardiac arrest and ROSC was achieved after 8 mins. Unclear if he made troponin (no such info included in transfer paper).  Was put on Amio gtt, noted ellen when on amio gtt requiring atropine. Echo there showed LVEF 25-30%.  Extubated 3/2 and weaned off levophed. Also has rectal ulcer with BRBPR at [facility] requiring multiple PRBC transfusions. Reportedly stable and was then transferred to [facility].  Hematochezia due to rectal ulcer.      -given fever, may need infectious workup  -please introduce low dose beta blocker  -will need ischemic workup  -following [facility], will proceed with implantation of secondary prevention ICD (favor subq over transvenous given ESRD/HD)   -EP will follow with you

## 2023-03-19 NOTE — PROGRESS NOTE ADULT - SUBJECTIVE AND OBJECTIVE BOX
SUBJECTIVE: Patient seen and examined bedside by surgery team. Patient reports 1 non-bloody bowel movement overnight, passing flatus. Last bloody BM was 2 days ago. Tolerating diet. -F/C/CP/SOB/N/V    amLODIPine   Tablet 10 milliGRAM(s) Oral daily  hydrALAZINE 50 milliGRAM(s) Oral every 8 hours  isosorbide   dinitrate Tablet (ISORDIL) 20 milliGRAM(s) Oral three times a day  losartan 100 milliGRAM(s) Oral daily  metoprolol tartrate 12.5 milliGRAM(s) Oral every 12 hours      Vital Signs Last 24 Hrs  T(C): 37.4 (19 Mar 2023 09:22), Max: 37.5 (18 Mar 2023 21:52)  T(F): 99.3 (19 Mar 2023 09:22), Max: 99.5 (18 Mar 2023 21:52)  HR: 60 (19 Mar 2023 09:11) (60 - 76)  BP: 120/56 (19 Mar 2023 09:11) (102/57 - 166/72)  BP(mean): 81 (19 Mar 2023 09:11) (73 - 81)  RR: 18 (19 Mar 2023 09:11) (16 - 20)  SpO2: 100% (19 Mar 2023 09:11) (96% - 100%)    Parameters below as of 19 Mar 2023 09:11  Patient On (Oxygen Delivery Method): room air      I&O's Detail    18 Mar 2023 07:01  -  19 Mar 2023 07:00  --------------------------------------------------------  IN:    Oral Fluid: 360 mL    Other (mL): 400 mL  Total IN: 760 mL    OUT:    Intermittent Catheterization - Urethral (mL): 360 mL    Other (mL): 1400 mL  Total OUT: 1760 mL    Total NET: -1000 mL      19 Mar 2023 07:01  -  19 Mar 2023 10:52  --------------------------------------------------------  IN:    Oral Fluid: 180 mL  Total IN: 180 mL    OUT:  Total OUT: 0 mL    Total NET: 180 mL          General: NAD, resting comfortably in bed  C/V: NSR  Pulm: Nonlabored breathing, no respiratory distress  Abd: obese abdomen, soft, NT/ND.  Extrem: WWP, no edema, SCDs in place        LABS:                        10.3   4.06  )-----------( 118      ( 19 Mar 2023 07:11 )             33.0     03-19    129<L>  |  94<L>  |  15  ----------------------------<  88  4.2   |  27  |  3.73<H>    Ca    7.8<L>      19 Mar 2023 07:11  Phos  3.0     03-18  Mg     1.8     03-19    TPro  4.7<L>  /  Alb  2.5<L>  /  TBili  1.1  /  DBili  x   /  AST  15  /  ALT  9<L>  /  AlkPhos  117  03-18          RADIOLOGY & ADDITIONAL STUDIES:

## 2023-03-19 NOTE — PROGRESS NOTE ADULT - ASSESSMENT
63yo M PMHx HTN, HLD, DM,  ICM (s/p cath years ago, RCA 99%, never intervened on), HFrEF 25-30%, ESRD s/p failed kidney transplant (Left Arm AVF; HD TTS), R AKA admitted to [facility] 2/27 for respiratory distress after a HD, met SIRS criteria s/p vanc/zosyn. Intubated on 3/1, cardiac arrest (RoSC after 8mins) w/ subsequent Vtach arrest, placed on amio and pressors, ellen to 30s s/p atropine x2. Weaned off levophed and extubated 3/2. Course c/b coffee ground emesis, hemoccult +, Hgb 3.5 s/p 7units PRBCs, 1Unit FFP, 1 unit donor packed platelets. EGD/colo: melanosis duodeni but no acute bleed. Hypotensive and restarted on levophed gtt. CTA Abd: no active bleeding, possible focal proctitis; colonoscopy showing rectal ulcer. Started on hydrocortisone suppository and mesalamine. Hgb stabilized 11's, weaned off levophed and transferred to [facility] 3/17/23 for ICD eval 2/2 Vtach and cardiac cath. Hospital course @[facility] c/b BRBPR 3/17 AM with flex sig showing localized ulceration and no active rectal bleeding. Plan to observe for bleeding x 48 hrs before restarting DAPT.  63yo M PMHx HTN, HLD, DM,  ICM (s/p cath years ago, RCA 99%, never intervened on), HFrEF 25-30%, ESRD s/p failed kidney transplant (Left Arm AVF; HD TTS), R AKA admitted to [facility] 2/27 for respiratory distress after a HD, met SIRS criteria s/p vanc/zosyn. Intubated on 3/1, cardiac arrest (RoSC after 8mins) w/ subsequent Vtach arrest, placed on amio and pressors, ellen to 30s s/p atropine x2. Weaned off levophed and extubated 3/2. Course c/b coffee ground emesis, hemoccult +, Hgb 3.5 s/p 7units PRBCs, 1Unit FFP, 1 unit donor packed platelets. EGD/colo: melanosis duodeni but no acute bleed. Hypotensive and restarted on levophed gtt. CTA Abd: no active bleeding, possible focal proctitis; colonoscopy showing rectal ulcer. Started on hydrocortisone suppository and mesalamine. Hgb stabilized 11's, weaned off levophed and transferred to [facility] 3/17/23 for ICD eval 2/2 Vtach and cardiac cath. Hospital course @[facility] c/b BRBPR 3/17 AM with flex sig showing localized ulceration and no active rectal bleeding. Plan to observe for bleeding x 48 hrs before restarting DAPT.  63yo M PMHx HTN, HLD, DM,  ICM (s/p cath years ago, RCA 99%, never intervened on), HFrEF 25-30%, ESRD s/p failed kidney transplant (Left Arm AVF; HD TTS), R AKA admitted to [facility] 2/27 for respiratory distress after a HD, met SIRS criteria s/p vanc/zosyn. Intubated on 3/1, cardiac arrest (RoSC after 8mins) w/ subsequent Vtach arrest, placed on amio and pressors, ellen to 30s s/p atropine x2. Weaned off levophed and extubated 3/2. Course c/b coffee ground emesis, hemoccult +, Hgb 3.5 s/p 7units PRBCs, 1Unit FFP, 1 unit donor packed platelets. EGD/colo: melanosis duodeni but no acute bleed. Hypotensive and restarted on levophed gtt. CTA Abd: no active bleeding, possible focal proctitis; colonoscopy showing rectal ulcer. Started on hydrocortisone suppository and mesalamine. Hgb stabilized 11's, weaned off levophed and transferred to [facility] 3/17/23 for ICD eval 2/2 Vtach and cardiac cath. Hospital course @[facility] c/b BRBPR 3/17 AM with flex sig showing localized ulceration and no active rectal bleeding. Plan to observe for bleeding x 48 hrs before restarting DAPT.

## 2023-03-19 NOTE — PROGRESS NOTE ADULT - NS ATTEND AMEND GEN_ALL_CORE FT
63yo M PMHx HTN, HLD, DM,  ICM (s/p cath years ago, RCA 99%, never intervened on), HFrEF 25-30%, ESRD s/p failed kidney transplant (Left Arm AVF; HD TTS), R AKA admitted to [facility] 2/27 for respiratory distress after a HD, met SIRS criteria s/p vanc/zosyn. Intubated on 3/1, cardiac arrest (RoSC after 8mins) w/ subsequent Vtach arrest, placed on amio and pressors, ellen to 30s s/p atropine x2. Weaned off levophed and extubated 3/2. Course c/b coffee ground emesis, hemoccult +, Hgb 3.5 s/p 7units PRBCs, 1Unit FFP, 1 unit donor packed platelets. EGD/colo: melanosis duodeni but no acute bleed. Hypotensive and restarted on levophed gtt. CTA Abd: no active bleeding, possible focal proctitis; colonoscopy showing rectal ulcer. Started on hydrocortisone suppository and mesalamine. Hgb stabilized 11's, weaned off levophed and transferred to [facility] 3/17/23 for ICD eval 2/2 Vtach and cardiac cath. Hospital course @[facility] c/b BRBPR 3/17 AM with flex sig showing localized ulceration and no active rectal bleeding. Plan to observe for bleeding x 48 hrs before restarting DAPT.     Fever yesterday 100.1; T= 99.9 today, iv site ok, AV fistula LUE   A/p   VT  cath pending   plan for ICD this week    fever  w/u neg so far  EBC 4  follow foir now 61yo M PMHx HTN, HLD, DM,  ICM (s/p cath years ago, RCA 99%, never intervened on), HFrEF 25-30%, ESRD s/p failed kidney transplant (Left Arm AVF; HD TTS), R AKA admitted to [facility] 2/27 for respiratory distress after a HD, met SIRS criteria s/p vanc/zosyn. Intubated on 3/1, cardiac arrest (RoSC after 8mins) w/ subsequent Vtach arrest, placed on amio and pressors, ellen to 30s s/p atropine x2. Weaned off levophed and extubated 3/2. Course c/b coffee ground emesis, hemoccult +, Hgb 3.5 s/p 7units PRBCs, 1Unit FFP, 1 unit donor packed platelets. EGD/colo: melanosis duodeni but no acute bleed. Hypotensive and restarted on levophed gtt. CTA Abd: no active bleeding, possible focal proctitis; colonoscopy showing rectal ulcer. Started on hydrocortisone suppository and mesalamine. Hgb stabilized 11's, weaned off levophed and transferred to [facility] 3/17/23 for ICD eval 2/2 Vtach and cardiac cath. Hospital course @[facility] c/b BRBPR 3/17 AM with flex sig showing localized ulceration and no active rectal bleeding. Plan to observe for bleeding x 48 hrs before restarting DAPT.     Fever yesterday 100.1; T= 99.9 today, iv site ok, AV fistula LUE   A/p   VT  cath pending   plan for ICD this week    fever  w/u neg so far  EBC 4  follow foir now 63yo M PMHx HTN, HLD, DM,  ICM (s/p cath years ago, RCA 99%, never intervened on), HFrEF 25-30%, ESRD s/p failed kidney transplant (Left Arm AVF; HD TTS), R AKA admitted to [facility] 2/27 for respiratory distress after a HD, met SIRS criteria s/p vanc/zosyn. Intubated on 3/1, cardiac arrest (RoSC after 8mins) w/ subsequent Vtach arrest, placed on amio and pressors, ellen to 30s s/p atropine x2. Weaned off levophed and extubated 3/2. Course c/b coffee ground emesis, hemoccult +, Hgb 3.5 s/p 7units PRBCs, 1Unit FFP, 1 unit donor packed platelets. EGD/colo: melanosis duodeni but no acute bleed. Hypotensive and restarted on levophed gtt. CTA Abd: no active bleeding, possible focal proctitis; colonoscopy showing rectal ulcer. Started on hydrocortisone suppository and mesalamine. Hgb stabilized 11's, weaned off levophed and transferred to [facility] 3/17/23 for ICD eval 2/2 Vtach and cardiac cath. Hospital course @[facility] c/b BRBPR 3/17 AM with flex sig showing localized ulceration and no active rectal bleeding. Plan to observe for bleeding x 48 hrs before restarting DAPT.     Fever yesterday 100.1; T= 99.9 today, iv site ok, AV fistula LUE   A/p   VT  cath pending   plan for ICD this week    fever  w/u neg so far  EBC 4  follow foir now

## 2023-03-19 NOTE — PROGRESS NOTE ADULT - ASSESSMENT
61yo M PMHx HTN, HLD, DM,  ICM (s/p cath years ago, RCA 99%, never intervened on), HFrEF 25-30%, ESRD s/p failed kidney transplant (last HD 3/1 via Left Arm AVF; HD TTS), Right AKA presented to [facility] 2/27 for respiratory distress after a dialysis session and admitted to cardiac telemetry on 2/27 with SIRS criteria. Placed on vanc/zosyn. Pt was placed on BiPAP which failed, where he was then intubated on 3/1 and moved to the MICU. He went into cardiac arrest (RoSC achieved after 8mins). Went into Vtach arrest, was placed on amio and pressors, HR ellen into 30s and atropine given twice. Weaned off levophed and sedation and extubated 3/2. Pt developed coffee ground emesis, hemoccult positive, with Hgb dipping to 3.5; he then received 7units PRBCs, 1Unit FFP, 1 unit donor packed platelets. EGD and colonoscopy showing melanosis duodenii but no acute bleed. BP dropped again, transferred to ICU and started on levophed gtt. CTA Abdomen showing no active bleeding into GI lumen, possible focal proctitis; colonoscopy showing rectal ulcer. Episode of rectal bleeding 3/17. No tachycardia. Rectal exam with dark blood. Hb stable from prior. High risk patient.    Plan  -No acute surgical intervention / not surgical candidate at this time  -Maintain active T/S, 2x large bore IVs  -Trend CBC  -Transfusion goal 8 i/s/o active CAD  -Care per primary  -Team 4c will continue to follow, [phone]   61yo M PMHx HTN, HLD, DM,  ICM (s/p cath years ago, RCA 99%, never intervened on), HFrEF 25-30%, ESRD s/p failed kidney transplant (last HD 3/1 via Left Arm AVF; HD TTS), Right AKA presented to [facility] 2/27 for respiratory distress after a dialysis session and admitted to cardiac telemetry on 2/27 with SIRS criteria. Placed on vanc/zosyn. Pt was placed on BiPAP which failed, where he was then intubated on 3/1 and moved to the MICU. He went into cardiac arrest (RoSC achieved after 8mins). Went into Vtach arrest, was placed on amio and pressors, HR eleln into 30s and atropine given twice. Weaned off levophed and sedation and extubated 3/2. Pt developed coffee ground emesis, hemoccult positive, with Hgb dipping to 3.5; he then received 7units PRBCs, 1Unit FFP, 1 unit donor packed platelets. EGD and colonoscopy showing melanosis duodenii but no acute bleed. BP dropped again, transferred to ICU and started on levophed gtt. CTA Abdomen showing no active bleeding into GI lumen, possible focal proctitis; colonoscopy showing rectal ulcer. Episode of rectal bleeding 3/17. No tachycardia. Rectal exam with dark blood. Hb stable from prior. High risk patient.    Plan  -No acute surgical intervention / not surgical candidate at this time  -Maintain active T/S, 2x large bore IVs  -Trend CBC  -Transfusion goal 8 i/s/o active CAD  -Care per primary  -Team 4c will continue to follow, [phone]   63yo M PMHx HTN, HLD, DM,  ICM (s/p cath years ago, RCA 99%, never intervened on), HFrEF 25-30%, ESRD s/p failed kidney transplant (last HD 3/1 via Left Arm AVF; HD TTS), Right AKA presented to [facility] 2/27 for respiratory distress after a dialysis session and admitted to cardiac telemetry on 2/27 with SIRS criteria. Placed on vanc/zosyn. Pt was placed on BiPAP which failed, where he was then intubated on 3/1 and moved to the MICU. He went into cardiac arrest (RoSC achieved after 8mins). Went into Vtach arrest, was placed on amio and pressors, HR ellen into 30s and atropine given twice. Weaned off levophed and sedation and extubated 3/2. Pt developed coffee ground emesis, hemoccult positive, with Hgb dipping to 3.5; he then received 7units PRBCs, 1Unit FFP, 1 unit donor packed platelets. EGD and colonoscopy showing melanosis duodenii but no acute bleed. BP dropped again, transferred to ICU and started on levophed gtt. CTA Abdomen showing no active bleeding into GI lumen, possible focal proctitis; colonoscopy showing rectal ulcer. Episode of rectal bleeding 3/17. No tachycardia. Rectal exam with dark blood. Hb stable from prior. High risk patient.    Plan  -No acute surgical intervention / not surgical candidate at this time  -Maintain active T/S, 2x large bore IVs  -Trend CBC  -Transfusion goal 8 i/s/o active CAD  -Care per primary  -Team 4c will continue to follow, [phone]

## 2023-03-19 NOTE — PROGRESS NOTE ADULT - ASSESSMENT
61yo M PMHx HTN, HLD, DM, CAD(s/p cath years ago, RCA 99%, never intervened on), HFrEF 25-30%, ESRD s/p failed kidney transplant and required HD x 3yrs (last HD 3/16 via Left Arm AVF; HD TTS), Right AKA initially presented to [facility] 2/27 for respiratory distress after a dialysis, found to be septic, h/c complicated by AHRF requiring intubation for 24hr followed by VT arrest, h/c the complicated by massive LGIB (rectal ulcer seen on colonoscopy) requiring 7u pRBC, 1u FFP and 1u PLT. Transferred to [facility] (3/17) for ICD placement 2/2 Vtach and cardiac cath. Pt noted lassed loose stool with BRBPR, GI and surgery consulted. Flex Sigc ( 3/17) found prior hemoclip in the sigmoid colon, localized ulceration and erosive with no bleeding noted in rectum suggestive of solitary ulcer syndrome. Medicine consulted for comanagement.     #LGIB  #SANTOS  #Solitary ulcer syndrome  - hx coffee ground emesis @ [facility]; s/p EGD/colonoscopy showing rectal ulcer  - 3/17AM pt passed loose BM along with large amount of BRBPR  - per gen surgery no acute surgical intervention   - per GI:      - increased Protonix 40 mg IVP BID      - PIV large bore x2      - s/p flex sig(3/17)  for rectal ulcers, found Evidence of prior hemoclip in the sigmoid colon. Localized ulceration and erosive with no bleeding were noted in the rectum, suggestive of solitary ulcer syndrome           - Avoid anal digitation and enemas           - High-fiber diet and optimize laxatives to avoid constipation using miralax and dulcolax           - No absolute GI contraindications to anticoag/antplt therapy           - Repeat endoscopic evaluation recommended in 3 months  - Active T&S  - Hgb 10.3 stable  - Transfuse for hgb <8 (active CAD)    #CAD   - Previous cath at [facility] showing oLM 30% and RCA 99% that is not amenable to intervention.  - TTE 3/1/23 at [facility]: mild LVH, EF 25-30%, severe global wall motion dysfunction, mildly dilated LA, RV mildly dilated, all leaflets mild calcified, mod pHTN  - hx coffee ground emesis @ [facility]; s/p EGD/colonoscopy showing rectal ulcer and LHH  flex sig suggestive of solitary ulcer syndrome - per cardiology will hold off loading the patient due to recent LGIB  - No absolute GI contraindications to anticoag/antplt therapy  - EP consulted for possible ICD placement  - LHC/Angiogram and possible PCI per cardiology    #H/O ventricular tachycardia   - Vtach arrest @[facility]  - agree with EP consult for ICD placement    #ESRD on dialysis  - dialysis Tues, Thurs, Sat  - last received 3/16  - f/u nephro recs  - Electrolytes wnl, k 4.3, phos 1.9  - c/w calcium citrate 667mg TID  - on Renvela 800mg TID, f/u with renal as patient is with hypophosphatemia on admission     #S/P kidney transplant.   - per [place] pt takes Tacrolimus 2mg 2 times /day  - Obtain collateral regarding renal transplant, if indeed failed, what indications patient have for c/w tacrolimus 2mg?  - f/u tacrolimus serum levels  - f/u nephro recs    #HTN  - SBP on admission 155-170s  - c/w home meds. Amlodipine 10mg qd, Losartan 100mg qd, Hydralazine 50m TID, Isordil 20mg TID    #HLD   - c/w Lipitor 40mg qd  - f/u AM lipid panel.    #DM  - no meds per [place], obtain collaterals for med recs prior [place] stay  - mISS while inpt, goal -180   - A1c 5.9 on admission    DVT PPX: SCDs for now iso GIB    Med consult team continues to follow with you. d/w Dr. Lb Smith .      61yo M PMHx HTN, HLD, DM, CAD(s/p cath years ago, RCA 99%, never intervened on), HFrEF 25-30%, ESRD s/p failed kidney transplant and required HD x 3yrs (last HD 3/16 via Left Arm AVF; HD TTS), Right AKA initially presented to [facility] 2/27 for respiratory distress after a dialysis, found to be septic, h/c complicated by AHRF requiring intubation for 24hr followed by VT arrest, h/c the complicated by massive LGIB (rectal ulcer seen on colonoscopy) requiring 7u pRBC, 1u FFP and 1u PLT. Transferred to [facility] (3/17) for ICD placement 2/2 Vtach and cardiac cath. Pt noted lassed loose stool with BRBPR, GI and surgery consulted. Flex Sigc ( 3/17) found prior hemoclip in the sigmoid colon, localized ulceration and erosive with no bleeding noted in rectum suggestive of solitary ulcer syndrome. Medicine consulted for comanagement.     #LGIB  #SANTOS  #Solitary ulcer syndrome  - hx coffee ground emesis @ [facility]; s/p EGD/colonoscopy showing rectal ulcer  - 3/17AM pt passed loose BM along with large amount of BRBPR  - per gen surgery no acute surgical intervention   - per GI:      - increased Protonix 40 mg IVP BID      - PIV large bore x2      - s/p flex sig(3/17)  for rectal ulcers, found Evidence of prior hemoclip in the sigmoid colon. Localized ulceration and erosive with no bleeding were noted in the rectum, suggestive of solitary ulcer syndrome           - Avoid anal digitation and enemas           - High-fiber diet and optimize laxatives to avoid constipation using miralax and dulcolax           - No absolute GI contraindications to anticoag/antplt therapy           - Repeat endoscopic evaluation recommended in 3 months  - Active T&S  - Hgb 10.3 stable  - Transfuse for hgb <8 (active CAD)    #CAD   - Previous cath at [facility] showing oLM 30% and RCA 99% that is not amenable to intervention.  - TTE 3/1/23 at [facility]: mild LVH, EF 25-30%, severe global wall motion dysfunction, mildly dilated LA, RV mildly dilated, all leaflets mild calcified, mod pHTN  - hx coffee ground emesis @ [facility]; s/p EGD/colonoscopy showing rectal ulcer and LHH  flex sig suggestive of solitary ulcer syndrome - per cardiology will hold off loading the patient due to recent LGIB  - No absolute GI contraindications to anticoag/antplt therapy  - EP consulted for possible ICD placement  - LHC/Angiogram and possible PCI per cardiology    #H/O ventricular tachycardia   - Vtach arrest @[facility]  - agree with EP consult for ICD placement    #ESRD on dialysis  - dialysis Tues, Thurs, Sat  - last received 3/16  - f/u nephro recs  - Electrolytes wnl, k 4.3, phos 1.9  - c/w calcium citrate 667mg TID  - on Renvela 800mg TID, f/u with renal as patient is with hypophosphatemia on admission     #S/P kidney transplant.   - per [place] pt takes Tacrolimus 2mg 2 times /day  - Obtain collateral regarding renal transplant, if indeed failed, what indications patient have for c/w tacrolimus 2mg?  - f/u tacrolimus serum levels  - f/u nephro recs    #HTN  - SBP on admission 155-170s  - c/w home meds. Amlodipine 10mg qd, Losartan 100mg qd, Hydralazine 50m TID, Isordil 20mg TID    #HLD   - c/w Lipitor 40mg qd  - f/u AM lipid panel.    #DM  - no meds per [place], obtain collaterals for med recs prior [place] stay  - mISS while inpt, goal -180   - A1c 5.9 on admission    DVT PPX: SCDs for now iso GIB    Med consult team continues to follow with you. d/w Dr. Lb Smith .      61yo M PMHx HTN, HLD, DM, CAD(s/p cath years ago, RCA 99%, never intervened on), HFrEF 25-30%, ESRD s/p failed kidney transplant and required HD x 3yrs (last HD 3/16 via Left Arm AVF; HD TTS), Right AKA initially presented to [facility] 2/27 for respiratory distress after a dialysis, found to be septic, h/c complicated by AHRF requiring intubation for 24hr followed by VT arrest, h/c the complicated by massive LGIB (rectal ulcer seen on colonoscopy) requiring 7u pRBC, 1u FFP and 1u PLT. Transferred to [facility] (3/17) for ICD placement 2/2 Vtach and cardiac cath. Pt noted lassed loose stool with BRBPR, GI and surgery consulted. Flex Sigc ( 3/17) found prior hemoclip in the sigmoid colon, localized ulceration and erosive with no bleeding noted in rectum suggestive of solitary ulcer syndrome. Medicine consulted for comanagement.     #LGIB  #SANTOS  #Solitary ulcer syndrome  - hx coffee ground emesis @ [facility]; s/p EGD/colonoscopy showing rectal ulcer  - 3/17AM pt passed loose BM along with large amount of BRBPR  - per gen surgery no acute surgical intervention   - per GI:      - increased Protonix 40 mg IVP BID      - PIV large bore x2      - s/p flex sig(3/17)  for rectal ulcers, found Evidence of prior hemoclip in the sigmoid colon. Localized ulceration and erosive with no bleeding were noted in the rectum, suggestive of solitary ulcer syndrome           - Avoid anal digitation and enemas           - High-fiber diet and optimize laxatives to avoid constipation using miralax and dulcolax           - No absolute GI contraindications to anticoag/antplt therapy           - Repeat endoscopic evaluation recommended in 3 months  - Active T&S  - Hgb 10.3 stable  - Transfuse for hgb <8 (active CAD)    #CAD   - Previous cath at [facility] showing oLM 30% and RCA 99% that is not amenable to intervention.  - TTE 3/1/23 at [facility]: mild LVH, EF 25-30%, severe global wall motion dysfunction, mildly dilated LA, RV mildly dilated, all leaflets mild calcified, mod pHTN  - hx coffee ground emesis @ [facility]; s/p EGD/colonoscopy showing rectal ulcer and LHH  flex sig suggestive of solitary ulcer syndrome - per cardiology will hold off loading the patient due to recent LGIB  - No absolute GI contraindications to anticoag/antplt therapy  - EP consulted for possible ICD placement  - LHC/Angiogram and possible PCI per cardiology    #H/O ventricular tachycardia   - Vtach arrest @[facility]  - agree with EP consult for ICD placement    #ESRD on dialysis  - dialysis Tues, Thurs, Sat  - last received 3/16  - f/u nephro recs  - Electrolytes wnl, k 4.3, phos 1.9  - c/w calcium citrate 667mg TID  - on Renvela 800mg TID, f/u with renal as patient is with hypophosphatemia on admission     #S/P kidney transplant.   - per [place] pt takes Tacrolimus 2mg 2 times /day  - Obtain collateral regarding renal transplant, if indeed failed, what indications patient have for c/w tacrolimus 2mg?  - f/u tacrolimus serum levels  - f/u nephro recs    #HTN  - SBP on admission 155-170s  - c/w home meds. Amlodipine 10mg qd, Losartan 100mg qd, Hydralazine 50m TID, Isordil 20mg TID    #HLD   - c/w Lipitor 40mg qd  - f/u AM lipid panel.    #DM  - no meds per [place], obtain collaterals for med recs prior [facility] stay  - mISS while inpt, goal -180   - A1c 5.9 on admission    DVT PPX: SCDs for now iso GIB    Med consult team continues to follow with you. d/w Dr. Lb Smith .

## 2023-03-19 NOTE — PROGRESS NOTE ADULT - PROBLEM SELECTOR PLAN 2
Vtach arrest @[facility]  - no tele events @ [facility]  - EP consulted for ICD placement but will need ischemic eval/cath first   - questionable bradycardia at [facility] while on amiodarone gtt  - started on lopressor 12.5 mg BID per EP recommendations Vtach arrest [facility]  - no tele events @ [facility]  - EP consulted for ICD placement but will need ischemic eval/cath first   - questionable bradycardia at [facility] while on amiodarone gtt  - started on lopressor 12.5 mg BID per EP recommendations V[facility]ach arrest [facility]  - no tele events @ [facility]  - EP consulted for ICD placement but will need ischemic eval/cath first   - questionable bradycardia at [facility] while on amiodarone gtt  - started on lopressor 12.5 mg BID per EP recommendations

## 2023-03-19 NOTE — PROGRESS NOTE ADULT - SUBJECTIVE AND OBJECTIVE BOX
Interventional Cardiology PA Adult Progress Note    C.C.:     Subjective Assessment:      ROS Negative except as per Subjective and HPI  	  MEDICATIONS:  amLODIPine   Tablet 10 milliGRAM(s) Oral daily  hydrALAZINE 50 milliGRAM(s) Oral every 8 hours  isosorbide   dinitrate Tablet (ISORDIL) 20 milliGRAM(s) Oral three times a day  losartan 100 milliGRAM(s) Oral daily  metoprolol tartrate 12.5 milliGRAM(s) Oral every 12 hours        acetaminophen     Tablet .. 650 milliGRAM(s) Oral every 6 hours PRN    pantoprazole  Injectable 40 milliGRAM(s) IV Push every 12 hours  polyethylene glycol 3350 17 Gram(s) Oral daily    atorvastatin 40 milliGRAM(s) Oral at bedtime  dextrose 50% Injectable 25 Gram(s) IV Push once  dextrose 50% Injectable 12.5 Gram(s) IV Push once  dextrose 50% Injectable 25 Gram(s) IV Push once  dextrose Oral Gel 15 Gram(s) Oral once PRN  glucagon  Injectable 1 milliGRAM(s) IntraMuscular once  insulin lispro (ADMELOG) corrective regimen sliding scale   SubCutaneous every 6 hours    dextrose 5%. 1000 milliLiter(s) IV Continuous <Continuous>  dextrose 5%. 1000 milliLiter(s) IV Continuous <Continuous>  ferrous    sulfate 325 milliGRAM(s) Oral daily  tacrolimus 2 milliGRAM(s) Oral every 12 hours  tamsulosin 0.8 milliGRAM(s) Oral at bedtime      	    [PHYSICAL EXAM:  TELEMETRY:  T(C): 36.3 (03-19-23 @ 05:56), Max: 37.5 (03-18-23 @ 21:52)  HR: 64 (03-19-23 @ 04:02) (64 - 76)  BP: 166/72 (03-19-23 @ 04:02) (102/57 - 166/72)  RR: 20 (03-19-23 @ 04:02) (16 - 20)  SpO2: 98% (03-19-23 @ 04:02) (96% - 100%)  Wt(kg): --  I&O's Summary    18 Mar 2023 07:01  -  19 Mar 2023 07:00  --------------------------------------------------------  IN: 760 mL / OUT: 1760 mL / NET: -1000 mL        Moody:  Central/PICC/Mid Line:                                         Appearance: Normal	  HEENT:   Normal oral mucosa, PERRL, EOMI	  Neck: Supple, + JVD/ - JVD; Carotid Bruit   Cardiovascular: Normal S1 S2, No JVD, No murmurs,   Respiratory: Lungs clear to auscultation/Decreased Breath Sounds/No Rales, Rhonchi, Wheezing	  Gastrointestinal:  Soft, Non-tender, + BS	  Skin: No rashes, No ecchymoses, No cyanosis  Extremities: Normal range of motion, No clubbing, cyanosis or edema  Vascular: Peripheral pulses palpable 2+ bilaterally  Neurologic: Non-focal  Psychiatry: A & O x 3, Mood & affect appropriate      	    ECG:  	  RADIOLOGY:   DIAGNOSTIC TESTING:  [ ] Echocardiogram:  [ ]  Catheterization:  [ ] Stress Test:    [ ] JOSY  OTHER: 	    LABS:	 	  CARDIAC MARKERS:                                  10.3   4.06  )-----------( 118      ( 19 Mar 2023 07:11 )             33.0     03-19    129<L>  |  94<L>  |  15  ----------------------------<  88  4.2   |  27  |  3.73<H>    Ca    7.8<L>      19 Mar 2023 07:11  Phos  3.0     03-18  Mg     1.8     03-19    TPro  4.7<L>  /  Alb  2.5<L>  /  TBili  1.1  /  DBili  x   /  AST  15  /  ALT  9<L>  /  AlkPhos  117  03-18    proBNP:   Lipid Profile:   HgA1c:   TSH:       ASSESSMENT/PLAN: 	        DVT ppx:  Dispo:     Interventional Cardiology PA Adult Progress Note    Subjective Assessment: Patient seen and examined this morning, reports feeling "stifled"     ROS Negative except as per Subjective and HPI  No overnight events, telemetry reviewed  	  MEDICATIONS:  amLODIPine   Tablet 10 milliGRAM(s) Oral daily  hydrALAZINE 50 milliGRAM(s) Oral every 8 hours  isosorbide   dinitrate Tablet (ISORDIL) 20 milliGRAM(s) Oral three times a day  losartan 100 milliGRAM(s) Oral daily  metoprolol tartrate 12.5 milliGRAM(s) Oral every 12 hours  acetaminophen     Tablet .. 650 milliGRAM(s) Oral every 6 hours PRN  pantoprazole  Injectable 40 milliGRAM(s) IV Push every 12 hours  polyethylene glycol 3350 17 Gram(s) Oral daily  atorvastatin 40 milliGRAM(s) Oral at bedtime  dextrose 50% Injectable 25 Gram(s) IV Push once  dextrose 50% Injectable 12.5 Gram(s) IV Push once  dextrose 50% Injectable 25 Gram(s) IV Push once  dextrose Oral Gel 15 Gram(s) Oral once PRN  glucagon  Injectable 1 milliGRAM(s) IntraMuscular once  insulin lispro (ADMELOG) corrective regimen sliding scale   SubCutaneous every 6 hours  dextrose 5%. 1000 milliLiter(s) IV Continuous <Continuous>  dextrose 5%. 1000 milliLiter(s) IV Continuous <Continuous>  ferrous    sulfate 325 milliGRAM(s) Oral daily  tacrolimus 2 milliGRAM(s) Oral every 12 hours  tamsulosin 0.8 milliGRAM(s) Oral at bedtime    PHYSICAL EXAM:  TELEMETRY:  T(C): 36.3 (03-19-23 @ 05:56), Max: 37.5 (03-18-23 @ 21:52)  HR: 64 (03-19-23 @ 04:02) (64 - 76)  BP: 166/72 (03-19-23 @ 04:02) (102/57 - 166/72)  RR: 20 (03-19-23 @ 04:02) (16 - 20)  SpO2: 98% (03-19-23 @ 04:02) (96% - 100%)  Wt(kg): --  I&O's Summary    18 Mar 2023 07:01  -  19 Mar 2023 07:00  --------------------------------------------------------  IN: 760 mL / OUT: 1760 mL / NET: -1000 mL                                        Appearance: Normal	  HEENT:   Normal oral mucosa, PERRL, EOMI	  Neck: Supple, - JVD; Carotid Bruit   Cardiovascular: Normal S1 S2, No JVD, No murmurs,   Respiratory: Lungs clear to auscultation/Decreased Breath Sounds/No Rales, Rhonchi, Wheezing	  Gastrointestinal:  Soft, Non-tender, + BS	  Skin: No rashes, No ecchymoses, No cyanosis  Extremities: Normal range of motion, No clubbing, cyanosis or edema  Vascular: Peripheral pulses palpable  Neurologic: Non-focal  Psychiatry: A & O x 3, Mood & affect appropriate  	    LABS:	 	  CARDIAC MARKERS:                          10.3   4.06  )-----------( 118      ( 19 Mar 2023 07:11 )             33.0     03-19    129<L>  |  94<L>  |  15  ----------------------------<  88  4.2   |  27  |  3.73<H>    Ca    7.8<L>      19 Mar 2023 07:11  Phos  3.0     03-18  Mg     1.8     03-19    TPro  4.7<L>  /  Alb  2.5<L>  /  TBili  1.1  /  DBili  x   /  AST  15  /  ALT  9<L>  /  AlkPhos  117  03-18

## 2023-03-20 DIAGNOSIS — I50.22 CHRONIC SYSTOLIC (CONGESTIVE) HEART FAILURE: ICD-10-CM

## 2023-03-20 LAB
ANION GAP SERPL CALC-SCNC: 12 MMOL/L — SIGNIFICANT CHANGE UP (ref 5–17)
BUN SERPL-MCNC: 24 MG/DL — HIGH (ref 7–23)
CALCIUM SERPL-MCNC: 7.9 MG/DL — LOW (ref 8.4–10.5)
CHLORIDE SERPL-SCNC: 94 MMOL/L — LOW (ref 96–108)
CO2 SERPL-SCNC: 25 MMOL/L — SIGNIFICANT CHANGE UP (ref 22–31)
CREAT SERPL-MCNC: 4.93 MG/DL — HIGH (ref 0.5–1.3)
EGFR: 13 ML/MIN/1.73M2 — LOW
GLUCOSE BLDC GLUCOMTR-MCNC: 102 MG/DL — HIGH (ref 70–99)
GLUCOSE BLDC GLUCOMTR-MCNC: 104 MG/DL — HIGH (ref 70–99)
GLUCOSE BLDC GLUCOMTR-MCNC: 142 MG/DL — HIGH (ref 70–99)
GLUCOSE BLDC GLUCOMTR-MCNC: 83 MG/DL — SIGNIFICANT CHANGE UP (ref 70–99)
GLUCOSE SERPL-MCNC: 81 MG/DL — SIGNIFICANT CHANGE UP (ref 70–99)
HCT VFR BLD CALC: 31.5 % — LOW (ref 39–50)
HGB BLD-MCNC: 10.1 G/DL — LOW (ref 13–17)
MAGNESIUM SERPL-MCNC: 1.8 MG/DL — SIGNIFICANT CHANGE UP (ref 1.6–2.6)
MCHC RBC-ENTMCNC: 29.3 PG — SIGNIFICANT CHANGE UP (ref 27–34)
MCHC RBC-ENTMCNC: 32.1 GM/DL — SIGNIFICANT CHANGE UP (ref 32–36)
MCV RBC AUTO: 91.3 FL — SIGNIFICANT CHANGE UP (ref 80–100)
NRBC # BLD: 0 /100 WBCS — SIGNIFICANT CHANGE UP (ref 0–0)
PLATELET # BLD AUTO: 109 K/UL — LOW (ref 150–400)
POTASSIUM SERPL-MCNC: 4.5 MMOL/L — SIGNIFICANT CHANGE UP (ref 3.5–5.3)
POTASSIUM SERPL-SCNC: 4.5 MMOL/L — SIGNIFICANT CHANGE UP (ref 3.5–5.3)
RBC # BLD: 3.45 M/UL — LOW (ref 4.2–5.8)
RBC # FLD: 14.8 % — HIGH (ref 10.3–14.5)
SODIUM SERPL-SCNC: 131 MMOL/L — LOW (ref 135–145)
WBC # BLD: 3.7 K/UL — LOW (ref 3.8–10.5)
WBC # FLD AUTO: 3.7 K/UL — LOW (ref 3.8–10.5)

## 2023-03-20 PROCEDURE — 99232 SBSQ HOSP IP/OBS MODERATE 35: CPT

## 2023-03-20 RX ORDER — CHLORHEXIDINE GLUCONATE 213 G/1000ML
1 SOLUTION TOPICAL DAILY
Refills: 0 | Status: DISCONTINUED | OUTPATIENT
Start: 2023-03-20 | End: 2023-03-24

## 2023-03-20 RX ORDER — MAGNESIUM OXIDE 400 MG ORAL TABLET 241.3 MG
800 TABLET ORAL ONCE
Refills: 0 | Status: COMPLETED | OUTPATIENT
Start: 2023-03-20 | End: 2023-03-20

## 2023-03-20 RX ORDER — OXYBUTYNIN CHLORIDE 5 MG
5 TABLET ORAL ONCE
Refills: 0 | Status: COMPLETED | OUTPATIENT
Start: 2023-03-20 | End: 2023-03-20

## 2023-03-20 RX ORDER — CLOPIDOGREL BISULFATE 75 MG/1
75 TABLET, FILM COATED ORAL DAILY
Refills: 0 | Status: DISCONTINUED | OUTPATIENT
Start: 2023-03-20 | End: 2023-03-23

## 2023-03-20 RX ORDER — ASPIRIN/CALCIUM CARB/MAGNESIUM 324 MG
81 TABLET ORAL DAILY
Refills: 0 | Status: DISCONTINUED | OUTPATIENT
Start: 2023-03-20 | End: 2023-03-23

## 2023-03-20 RX ADMIN — CLOPIDOGREL BISULFATE 75 MILLIGRAM(S): 75 TABLET, FILM COATED ORAL at 12:31

## 2023-03-20 RX ADMIN — Medication 650 MILLIGRAM(S): at 03:40

## 2023-03-20 RX ADMIN — MAGNESIUM OXIDE 400 MG ORAL TABLET 800 MILLIGRAM(S): 241.3 TABLET ORAL at 13:17

## 2023-03-20 RX ADMIN — Medication 650 MILLIGRAM(S): at 21:30

## 2023-03-20 RX ADMIN — ISOSORBIDE DINITRATE 20 MILLIGRAM(S): 5 TABLET ORAL at 12:33

## 2023-03-20 RX ADMIN — TAMSULOSIN HYDROCHLORIDE 0.8 MILLIGRAM(S): 0.4 CAPSULE ORAL at 21:30

## 2023-03-20 RX ADMIN — ISOSORBIDE DINITRATE 20 MILLIGRAM(S): 5 TABLET ORAL at 18:31

## 2023-03-20 RX ADMIN — AMLODIPINE BESYLATE 10 MILLIGRAM(S): 2.5 TABLET ORAL at 06:41

## 2023-03-20 RX ADMIN — PANTOPRAZOLE SODIUM 40 MILLIGRAM(S): 20 TABLET, DELAYED RELEASE ORAL at 06:42

## 2023-03-20 RX ADMIN — Medication 81 MILLIGRAM(S): at 12:33

## 2023-03-20 RX ADMIN — ATORVASTATIN CALCIUM 40 MILLIGRAM(S): 80 TABLET, FILM COATED ORAL at 21:30

## 2023-03-20 RX ADMIN — PANTOPRAZOLE SODIUM 40 MILLIGRAM(S): 20 TABLET, DELAYED RELEASE ORAL at 18:29

## 2023-03-20 RX ADMIN — Medication 50 MILLIGRAM(S): at 13:16

## 2023-03-20 RX ADMIN — Medication 50 MILLIGRAM(S): at 06:41

## 2023-03-20 RX ADMIN — Medication 650 MILLIGRAM(S): at 05:00

## 2023-03-20 RX ADMIN — TACROLIMUS 2 MILLIGRAM(S): 5 CAPSULE ORAL at 06:42

## 2023-03-20 RX ADMIN — Medication 650 MILLIGRAM(S): at 22:30

## 2023-03-20 RX ADMIN — TACROLIMUS 2 MILLIGRAM(S): 5 CAPSULE ORAL at 18:28

## 2023-03-20 RX ADMIN — Medication 50 MILLIGRAM(S): at 21:30

## 2023-03-20 RX ADMIN — LOSARTAN POTASSIUM 100 MILLIGRAM(S): 100 TABLET, FILM COATED ORAL at 06:42

## 2023-03-20 RX ADMIN — Medication 12.5 MILLIGRAM(S): at 18:31

## 2023-03-20 RX ADMIN — Medication 325 MILLIGRAM(S): at 12:46

## 2023-03-20 NOTE — PROGRESS NOTE ADULT - ASSESSMENT
62Y M w/ HTN, DM, ESRD s/p failed kidney transplant who was transferred to [facility] from [place] for ICD placement and Cardiac Cath after prolonged admission c/b cardiac arrest and vtach arrest and hypovolemic shock 2/2 GI bleed       ESRD on HD TTS  Last HD 3/18; 1 L removed  Electrolytes noted to be stable. Vol status acceptable today. No indication of extra session today  Plan for HD tomorrow   Daily BMP   Continue with Tacro at home dose. Will obtain outpatient records to see if needs to be continued      HTN:  UF with HD as tolerated   Continue home antihypertensive medications; amlodipine 10mg, losartan 100mg, metoprolol 12.5 BID, hydralazine 50mg TID    Anemia:  Hgb at goal at 10.1  Will obtain outpatient HD records    MBD:  Corrected calcium 9.1   Phos: 3.0  iPTH 431  Hold all binders. Check phos daily  Will obtain outpatient records       62Y M w/ HTN, DM, ESRD s/p failed kidney transplant who was transferred to [facility] from [place] for ICD placement and Cardiac Cath after prolonged admission c/b cardiac arrest and vtach arrest and hypovolemic shock 2/2 GI bleed       ESRD on HD TTS  Last HD 3/18; 1 L removed  Electrolytes noted to be stable. Vol status acceptable today. No indication of extra session today  Plan for HD tomorrow   Daily BMP   Continue with Tacro at home dose. Will obtain outpatient records to see if needs to be continued      HTN:  UF with HD as tolerated   Continue home antihypertensive medications; amlodipine 10mg, losartan 100mg, metoprolol 12.5 BID, hydralazine 50mg TID    Anemia:  Hgb at goal at 10.1  Will obtain outpatient HD records    MBD:  Corrected calcium 9.1   Phos: 3.0  iPTH 431  Hold all binders. Check phos daily  Will obtain outpatient records       62Y M w/ HTN, DM, ESRD s/p failed kidney transplant who was transferred to [facility] from [place] for ICD placement and Cardiac Cath after prolonged admission c/b cardiac arrest and vtach arrest and hypovolemic shock 2/2 GI bleed       ESRD on HD TTS  Last HD 3/18; 1 L removed  Electrolytes noted to be stable. Vol status acceptable today. No indication of extra session today  Plan for HD tomorrow   Daily BMP   Continue with Tacro at home dose. Will obtain outpatient records to see if needs to be continued      HTN:  UF with HD as tolerated   Continue home antihypertensive medications; amlodipine 10mg, losartan 100mg, metoprolol 12.5 BID, hydralazine 50mg TID    Anemia:  Hgb at goal at 10.1  Will obtain outpatient HD records    MBD:  Corrected calcium 9.1   Phos: 3.0  iPTH 431  Hold all binders. Check phos daily  Will obtain outpatient records

## 2023-03-20 NOTE — PROGRESS NOTE ADULT - PROBLEM SELECTOR PLAN 7
total 88, try 158, HDL 44, LDL 12  -c/w atorvastatin 40 mg QHS SBP on admission 170s  - C/w: home amlodipine 10 mg daily, Hydralazine 50 mg q8h, isordil 20 mg q8h, losartan 100 mg daily

## 2023-03-20 NOTE — PROGRESS NOTE ADULT - PROBLEM SELECTOR PLAN 3
3/17AM pt passed loose BM along with large amount of BRBPR  - GI following  - type and screen 3/17/23  - s/p Flex sigmoidoscopy: localized ulceration and erosive with no bleeding in the rectum, suggestive of solitary ulcer syndrome, Evidence of prior hemoclip was found in the sigmoid colon.  -Avoid anal digitation and enemas   -Bowel regimen with miralax and dulcolax to avoid constipation.  -Repeat Endoscopy in 3 months Patient euvolemic on exam   - ECHO as above --> EF 25-30%, likely ischemic in origin   - GDMT: Hydral 50mg q8h, isordil 20 mg TID, Lopressor 12.5 mg BID, Losartan 100 mg daily

## 2023-03-20 NOTE — PROGRESS NOTE ADULT - PROBLEM SELECTOR PLAN 2
[facility]  - no tele events @ [facility]  - EP consulted for ICD placement but will need ischemic eval/cath first   - questionable bradycardia at [facility] while on amiodarone gtt  - started on lopressor 12.5 mg BID per EP recommendations Vtach arrest @[facility]  - no tele events @ [facility]  - EP consulted for ICD placement but will need ischemic eval/cath first   - questionable bradycardia at [facility] while on amiodarone gtt  - started on lopressor 12.5 mg BID per EP recommendations Vtach arrest @[facility]  - no tele events @ [facility]  - EP consulted for ICD placement but will need ischemic eval/cath first   - questionable bradycardia at [facility] while on amiodarone gtt  - started on lopressor 12.5 mg BID per EP recommendations Vtach arrest @[facility]  - no tele events @ [facility]  - EP consulted for ICD placement but will need ischemic eval/cath first   - started on lopressor 12.5 mg BID per EP recommendations Vtach arrest @[facility]  - no tele events @ [facility]  - EP consulted for ICD placement but will need ischemic eval/cath first   - started on lopressor 12.5 mg BID per EP recommendations Vtach arrest @[facility]  - no tele events @ [facility]  - EP consulted for ICD placement but will need ischemic eval/cath first   - started on lopressor 12.5 mg BID per EP recommendations

## 2023-03-20 NOTE — PROGRESS NOTE ADULT - SUBJECTIVE AND OBJECTIVE BOX
**INCOMPLETE NOTE    OVERNIGHT EVENTS:    SUBJECTIVE:  Patient seen and examined at bedside.    Vital Signs Last 12 Hrs  T(F): 98.1 (03-20-23 @ 04:53), Max: 98.9 (03-19-23 @ 21:29)  HR: 58 (03-20-23 @ 05:51) (56 - 58)  BP: 147/63 (03-20-23 @ 05:51) (135/62 - 147/63)  BP(mean): 91 (03-20-23 @ 05:51) (91 - 92)  RR: 12 (03-20-23 @ 05:51) (12 - 16)  SpO2: 95% (03-20-23 @ 05:51) (95% - 98%)  I&O's Summary    19 Mar 2023 07:01  -  20 Mar 2023 07:00  --------------------------------------------------------  IN: 180 mL / OUT: 725 mL / NET: -545 mL        PHYSICAL EXAM:  Constitutional: NAD, comfortable in bed.  HEENT: NC/AT, PERRLA, EOMI, no conjunctival pallor or scleral icterus, MMM  Neck: Supple, no JVD  Respiratory: CTA B/L. No w/r/r.   Cardiovascular: RRR, normal S1 and S2, no m/r/g.   Gastrointestinal: +BS, soft NTND, no guarding or rebound tenderness, no palpable masses   Extremities: wwp; no cyanosis, clubbing or edema.   Vascular: Pulses equal and strong throughout.   Neurological: AAOx3, no CN deficits, strength and sensation intact throughout.   Skin: No gross skin abnormalities or rashes        LABS:                        10.1   3.70  )-----------( 109      ( 20 Mar 2023 05:30 )             31.5     03-19    129<L>  |  94<L>  |  15  ----------------------------<  88  4.2   |  27  |  3.73<H>    Ca    7.8<L>      19 Mar 2023 07:11  Mg     1.8     03-19    RADIOLOGY & ADDITIONAL TESTS:    MEDICATIONS  (STANDING):  amLODIPine   Tablet 10 milliGRAM(s) Oral daily  aspirin enteric coated 81 milliGRAM(s) Oral daily  atorvastatin 40 milliGRAM(s) Oral at bedtime  clopidogrel Tablet 75 milliGRAM(s) Oral daily  dextrose 5%. 1000 milliLiter(s) (100 mL/Hr) IV Continuous <Continuous>  dextrose 5%. 1000 milliLiter(s) (50 mL/Hr) IV Continuous <Continuous>  dextrose 50% Injectable 25 Gram(s) IV Push once  dextrose 50% Injectable 12.5 Gram(s) IV Push once  dextrose 50% Injectable 25 Gram(s) IV Push once  ferrous    sulfate 325 milliGRAM(s) Oral daily  glucagon  Injectable 1 milliGRAM(s) IntraMuscular once  hydrALAZINE 50 milliGRAM(s) Oral every 8 hours  insulin lispro (ADMELOG) corrective regimen sliding scale   SubCutaneous every 6 hours  isosorbide   dinitrate Tablet (ISORDIL) 20 milliGRAM(s) Oral three times a day  losartan 100 milliGRAM(s) Oral daily  metoprolol tartrate 12.5 milliGRAM(s) Oral every 12 hours  ondansetron    Tablet 4 milliGRAM(s) Oral once  pantoprazole  Injectable 40 milliGRAM(s) IV Push every 12 hours  polyethylene glycol 3350 17 Gram(s) Oral daily  tacrolimus 2 milliGRAM(s) Oral every 12 hours  tamsulosin 0.8 milliGRAM(s) Oral at bedtime    MEDICATIONS  (PRN):  acetaminophen     Tablet .. 650 milliGRAM(s) Oral every 6 hours PRN Mild Pain (1 - 3), Moderate Pain (4 - 6)  dextrose Oral Gel 15 Gram(s) Oral once PRN Blood Glucose LESS THAN 70 milliGRAM(s)/deciliter  sodium chloride 0.65% Nasal 1 Spray(s) Both Nostrils every 4 hours PRN Nasal Congestion   **INCOMPLETE NOTE    OVERNIGHT EVENTS: ROSITA    SUBJECTIVE:  Patient seen and examined at bedside.    Vital Signs Last 12 Hrs  T(F): 98.1 (03-20-23 @ 04:53), Max: 98.9 (03-19-23 @ 21:29)  HR: 58 (03-20-23 @ 05:51) (56 - 58)  BP: 147/63 (03-20-23 @ 05:51) (135/62 - 147/63)  BP(mean): 91 (03-20-23 @ 05:51) (91 - 92)  RR: 12 (03-20-23 @ 05:51) (12 - 16)  SpO2: 95% (03-20-23 @ 05:51) (95% - 98%)  I&O's Summary    19 Mar 2023 07:01  -  20 Mar 2023 07:00  --------------------------------------------------------  IN: 180 mL / OUT: 725 mL / NET: -545 mL      PHYSICAL EXAM:  Constitutional: NAD, comfortable in bed.  HEENT: NC/AT, PERRLA, EOMI, no conjunctival pallor or scleral icterus, MMM  Neck: Supple, no JVD  Respiratory: CTA B/L. No w/r/r.   Cardiovascular: RRR, normal S1 and S2, no m/r/g.   Gastrointestinal: +BS, soft NTND, no guarding or rebound tenderness, no palpable masses   Extremities: right AKA wwp; no cyanosis, clubbing or edema.   Vascular: Pulses equal and strong throughout.   Neurological: AAOx3, no CN deficits, strength and sensation intact throughout.   Skin: No gross skin abnormalities or rashes      LABS:                        10.1   3.70  )-----------( 109      ( 20 Mar 2023 05:30 )             31.5     03-19    129<L>  |  94<L>  |  15  ----------------------------<  88  4.2   |  27  |  3.73<H>    Ca    7.8<L>      19 Mar 2023 07:11  Mg     1.8     03-19    RADIOLOGY & ADDITIONAL TESTS:    MEDICATIONS  (STANDING):  amLODIPine   Tablet 10 milliGRAM(s) Oral daily  aspirin enteric coated 81 milliGRAM(s) Oral daily  atorvastatin 40 milliGRAM(s) Oral at bedtime  clopidogrel Tablet 75 milliGRAM(s) Oral daily  dextrose 5%. 1000 milliLiter(s) (100 mL/Hr) IV Continuous <Continuous>  dextrose 5%. 1000 milliLiter(s) (50 mL/Hr) IV Continuous <Continuous>  dextrose 50% Injectable 25 Gram(s) IV Push once  dextrose 50% Injectable 12.5 Gram(s) IV Push once  dextrose 50% Injectable 25 Gram(s) IV Push once  ferrous    sulfate 325 milliGRAM(s) Oral daily  glucagon  Injectable 1 milliGRAM(s) IntraMuscular once  hydrALAZINE 50 milliGRAM(s) Oral every 8 hours  insulin lispro (ADMELOG) corrective regimen sliding scale   SubCutaneous every 6 hours  isosorbide   dinitrate Tablet (ISORDIL) 20 milliGRAM(s) Oral three times a day  losartan 100 milliGRAM(s) Oral daily  metoprolol tartrate 12.5 milliGRAM(s) Oral every 12 hours  ondansetron    Tablet 4 milliGRAM(s) Oral once  pantoprazole  Injectable 40 milliGRAM(s) IV Push every 12 hours  polyethylene glycol 3350 17 Gram(s) Oral daily  tacrolimus 2 milliGRAM(s) Oral every 12 hours  tamsulosin 0.8 milliGRAM(s) Oral at bedtime    MEDICATIONS  (PRN):  acetaminophen     Tablet .. 650 milliGRAM(s) Oral every 6 hours PRN Mild Pain (1 - 3), Moderate Pain (4 - 6)  dextrose Oral Gel 15 Gram(s) Oral once PRN Blood Glucose LESS THAN 70 milliGRAM(s)/deciliter  sodium chloride 0.65% Nasal 1 Spray(s) Both Nostrils every 4 hours PRN Nasal Congestion   OVERNIGHT EVENTS: ROSITA    SUBJECTIVE:  Patient seen and examined at bedside. No new complaints. Patient denies h/n/v/d, fever, chills, cp, palpitations, sob, abd pain, leg swelling, rashes, dysuria, and changes in BM.     Vital Signs Last 12 Hrs  T(F): 98.1 (03-20-23 @ 04:53), Max: 98.9 (03-19-23 @ 21:29)  HR: 58 (03-20-23 @ 05:51) (56 - 58)  BP: 147/63 (03-20-23 @ 05:51) (135/62 - 147/63)  BP(mean): 91 (03-20-23 @ 05:51) (91 - 92)  RR: 12 (03-20-23 @ 05:51) (12 - 16)  SpO2: 95% (03-20-23 @ 05:51) (95% - 98%)  I&O's Summary    19 Mar 2023 07:01  -  20 Mar 2023 07:00  --------------------------------------------------------  IN: 180 mL / OUT: 725 mL / NET: -545 mL      PHYSICAL EXAM:  Constitutional: NAD, comfortable in bed.  HEENT: NC/AT, PERRLA, EOMI, no conjunctival pallor or scleral icterus, MMM  Neck: Supple, no JVD  Respiratory: CTA B/L. No w/r/r.   Cardiovascular: RRR, normal S1 and S2, no m/r/g.   Gastrointestinal: +BS, soft NTND, no guarding or rebound tenderness, no palpable masses   Extremities: right AKA wwp; no cyanosis, clubbing or edema.   Vascular: Pulses equal and strong throughout.   Neurological: AAOx3, no CN deficits, strength and sensation intact throughout.   Skin: No gross skin abnormalities or rashes      LABS:                        10.1   3.70  )-----------( 109      ( 20 Mar 2023 05:30 )             31.5     03-19    129<L>  |  94<L>  |  15  ----------------------------<  88  4.2   |  27  |  3.73<H>    Ca    7.8<L>      19 Mar 2023 07:11  Mg     1.8     03-19    RADIOLOGY & ADDITIONAL TESTS:    MEDICATIONS  (STANDING):  amLODIPine   Tablet 10 milliGRAM(s) Oral daily  aspirin enteric coated 81 milliGRAM(s) Oral daily  atorvastatin 40 milliGRAM(s) Oral at bedtime  clopidogrel Tablet 75 milliGRAM(s) Oral daily  dextrose 5%. 1000 milliLiter(s) (100 mL/Hr) IV Continuous <Continuous>  dextrose 5%. 1000 milliLiter(s) (50 mL/Hr) IV Continuous <Continuous>  dextrose 50% Injectable 25 Gram(s) IV Push once  dextrose 50% Injectable 12.5 Gram(s) IV Push once  dextrose 50% Injectable 25 Gram(s) IV Push once  ferrous    sulfate 325 milliGRAM(s) Oral daily  glucagon  Injectable 1 milliGRAM(s) IntraMuscular once  hydrALAZINE 50 milliGRAM(s) Oral every 8 hours  insulin lispro (ADMELOG) corrective regimen sliding scale   SubCutaneous every 6 hours  isosorbide   dinitrate Tablet (ISORDIL) 20 milliGRAM(s) Oral three times a day  losartan 100 milliGRAM(s) Oral daily  metoprolol tartrate 12.5 milliGRAM(s) Oral every 12 hours  ondansetron    Tablet 4 milliGRAM(s) Oral once  pantoprazole  Injectable 40 milliGRAM(s) IV Push every 12 hours  polyethylene glycol 3350 17 Gram(s) Oral daily  tacrolimus 2 milliGRAM(s) Oral every 12 hours  tamsulosin 0.8 milliGRAM(s) Oral at bedtime    MEDICATIONS  (PRN):  acetaminophen     Tablet .. 650 milliGRAM(s) Oral every 6 hours PRN Mild Pain (1 - 3), Moderate Pain (4 - 6)  dextrose Oral Gel 15 Gram(s) Oral once PRN Blood Glucose LESS THAN 70 milliGRAM(s)/deciliter  sodium chloride 0.65% Nasal 1 Spray(s) Both Nostrils every 4 hours PRN Nasal Congestion

## 2023-03-20 NOTE — PROGRESS NOTE ADULT - SUBJECTIVE AND OBJECTIVE BOX
Patient is a 62y Male seen and evaluated at bedside. laying comfortably in bed. Says has had intermittent SOB since last night. Denies nausea, chest pain.       Meds:    acetaminophen     Tablet .. 650 every 6 hours PRN  amLODIPine   Tablet 10 daily  aspirin enteric coated 81 daily  atorvastatin 40 at bedtime  clopidogrel Tablet 75 daily  dextrose 5%. 1000 <Continuous>  dextrose 5%. 1000 <Continuous>  dextrose 50% Injectable 25 once  dextrose 50% Injectable 12.5 once  dextrose 50% Injectable 25 once  dextrose Oral Gel 15 once PRN  ferrous    sulfate 325 daily  glucagon  Injectable 1 once  hydrALAZINE 50 every 8 hours  insulin lispro (ADMELOG) corrective regimen sliding scale  every 6 hours  isosorbide   dinitrate Tablet (ISORDIL) 20 three times a day  losartan 100 daily  magnesium oxide 800 once  metoprolol tartrate 12.5 every 12 hours  pantoprazole  Injectable 40 every 12 hours  polyethylene glycol 3350 17 daily  sodium chloride 0.65% Nasal 1 every 4 hours PRN  tacrolimus 2 every 12 hours  tamsulosin 0.8 at bedtime      T(C): , Max: 37.7 (03-19-23 @ 12:59)  T(F): , Max: 99.8 (03-19-23 @ 12:59)  HR: 60 (03-20-23 @ 08:50)  BP: 137/62 (03-20-23 @ 08:50)  BP(mean): 91 (03-20-23 @ 05:51)  RR: 16 (03-20-23 @ 08:50)  SpO2: 95% (03-20-23 @ 08:50)  Wt(kg): --    03-19 @ 07:01  -  03-20 @ 07:00  --------------------------------------------------------  IN: 180 mL / OUT: 725 mL / NET: -545 mL    03-20 @ 07:01  -  03-20 @ 10:56  --------------------------------------------------------  IN: 180 mL / OUT: 0 mL / NET: 180 mL          Review of Systems:  ROS negative except as per HPI      PHYSICAL EXAM:  GENERAL: Alert, awake, oriented  CHEST/LUNG: Bilateral clear breath sounds, on RA  HEART: S1, S2, RRR  ABDOMEN: Soft, nontender, non distended  EXTREMITIES: no pedal edema  Neurology: AAOx3, no asterixis   ACCESS: LUE AVF w/bruit and thrill      LABS:                        10.1   3.70  )-----------( 109      ( 20 Mar 2023 05:30 )             31.5     03-20    131<L>  |  94<L>  |  24<H>  ----------------------------<  81  4.5   |  25  |  4.93<H>    Ca    7.9<L>      20 Mar 2023 05:30  Mg     1.8     03-20                  RADIOLOGY & ADDITIONAL STUDIES:

## 2023-03-20 NOTE — PROGRESS NOTE ADULT - PROBLEM SELECTOR PLAN 6
SBP on admission 170s  -c/w home meds s/p failed kidney transplant (attempt to obtain collateral, per medicine pt may not require tacrolimus if transplant failed)   - Continue home Tacrolimus 2 mg BID

## 2023-03-20 NOTE — PROGRESS NOTE ADULT - PROBLEM SELECTOR PLAN 9
A1C 5.9  - Continue MICHELINE     F: Oral intake  E: Replete electrolytes as needed for K<4 and Mg<2  N: renal diet  DVT PPX: held for cath/ICD placement  Dispo: pending ICD/cardiac cath  PT ordered, came from rehab     Case discussed with Dr. Jones

## 2023-03-20 NOTE — PROGRESS NOTE ADULT - ATTENDING COMMENTS
chart reviewed, events noted.  63 yo man with ESRD on HD, HTN, DM, s/p failed kidney transplant continues on tacrolimus; transferred to [facility] from [place] for ICD placement and Cardiac Cath after prolonged admission  with cardiac arrest, vtach arrest and hypovolemic shock 2/2 GI bleed   nephrologically stable today; latest HD was on 3/18  for repeat HD tomorrow chart reviewed, events noted.  63 yo man with ESRD on HD, HTN, DM, s/p failed kidney transplant continues on tacrolimus; transferred to [facility] from [place] for ICD placement and Cardiac Cath after prolonged admission  with cardiac arrest, vtach arrest and hypovolemic shock 2/2 GI bleed   nephrologically stable today; latest HD was on 3/18  for repeat HD tomorrow chart reviewed, events noted.  63 yo man with ESRD on HD, HTN, DM, s/p failed kidney transplant continues on tacrolimus; transferred to [facility] from [place] for ICD placement and Cardiac Cath after prolonged admission  with cardiac arrest, vtach arrest and hypovolemic shock 2/2 GI bleed   nephrologically stable today; latest HD was on 3/18  for repeat HD tomorrow

## 2023-03-20 NOTE — PROGRESS NOTE ADULT - PROBLEM SELECTOR PLAN 5
s/p failed kidney transplant (attempt to obtain collateral, per medicine pt may not require tacrolimus if transplant failed)   -per [place] pt takes Tacrolimus 2mg 2 times /day  -s/p tacrolimus 2mg x 1  -tacrolimus serum levels 2.1 and can restart home dose per Renal dialysis Tues, Thurs, Sat  - last received 3/18  - renal following, plan for HD tomorrow 3/21/2023

## 2023-03-20 NOTE — PROGRESS NOTE ADULT - PROBLEM SELECTOR PLAN 8
A1C 5.9    F: Oral intake  E: Replete electrolytes as needed for K<4 and Mg<2  N: renal diet    DVT PPX: held for cath/ICD placement  Dispo: pending ICD/cardiac cath total 88, try 158, HDL 44, LDL 12  -c/w atorvastatin 40 mg QHS

## 2023-03-20 NOTE — PROGRESS NOTE ADULT - PROBLEM SELECTOR PLAN 1
Previous cath at [facility] showing oLM 30% and RCA 99% that is not amenable to intervention.  - TTE 3/1/23 at [facility]: mild LVH, EF 25-30%, severe global wall motion dysfunction, mildly dilated LA, RV mildly dilated, all leaflets mild calcified, mod pHTN  - hx coffee ground emesis @ [facility]; s/p EGD/colonoscopy showing melanosis without active bleed  - cleared for by GI for DAPT, DAPT no bleeding observed for 48 hours and was resumed on ASA 81 ng QD/Plavix 75 mg QD on 03/20/23. Will monitor for bleeding before cath mid-late week   - Will need to be consented for procedure  - Pending infectious workup prior to ICD placement given subjective fevers, chills, and cough Previous cath at [facility] showing oLM 30% and RCA 99% that is not amenable to intervention.  - TTE 3/1/23 at [facility]: mild LVH, EF 25-30%, severe global wall motion dysfunction, mildly dilated LA, RV mildly dilated, all leaflets mild calcified, mod pHTN  - hx coffee ground emesis @ [facility]; s/p EGD/colonoscopy showing melanosis without active bleed  - cleared for by GI for DAPT, DAPT no bleeding observed for 48 hours and was resumed on ASA 81 ng QD/Plavix 75 mg QD on 03/20/23. Will monitor for bleeding before cath mid-late week   - Will need to be consented for procedure  - Pending infectious workup prior to ICD placement given subjective fevers, chills, and cough Previous cath at [facility] showing oLM 30% and RCA 99% that is not amenable to intervention.  - TTE 3/1/23 at [facility]: mild LVH, EF 25-30%, severe global wall motion dysfunction, mildly dilated LA, RV mildly dilated, all leaflets mild calcified, mod pHTN  - hx coffee ground emesis @ [facility]; s/p EGD/colonoscopy showing melanosis without active bleed  - cleared for by GI for DAPT, DAPT no bleeding observed for 48 hours and was resumed on ASA 81 ng QD/Plavix 75 mg QD on 03/20/23. Will monitor for bleeding before cath mid-late week   - Will need to be consented for procedure  - Pending infectious workup prior to ICD placement given subjective fevers, chills, and cough Previous cath at [facility] showing oLM 30% and RCA 99% that is not amenable to intervention.  - TTE 3/1/23 at [facility]: mild LVH, EF 25-30%, severe global wall motion dysfunction, mildly dilated LA, RV mildly dilated, all leaflets mild calcified, mod pHTN  - hx coffee ground emesis @ [facility]; s/p EGD/colonoscopy showing melanosis without active bleed  - cleared for by GI for DAPT, DAPT no bleeding observed for 48 hours and was resumed on ASA 81 ng QD/Plavix 75 mg QD on 03/20/23. Will monitor for bleeding before cath mid-late week   - Will need to be consented for procedure  - Pending infectious workup prior to ICD placement given subjective fevers (oral temp of 100.1 on 3/18), chills, and cough Previous cath at [facility] showing oLM 30% and RCA 99% that is not amenable to intervention.  - TTE 3/1/23 at [facility]: mild LVH, EF 25-30%, severe global wall motion dysfunction, mildly dilated LA, RV mildly dilated, all leaflets mild calcified, mod pHTN  - hx coffee ground emesis @ [facility]; s/p EGD/colonoscopy showing melanosis without active bleed  - cleared for by GI for DAPT, DAPT no bleeding observed for 48 hours and was resumed on ASA 81 ng QD/Plavix 75 mg QD on 03/20/23. Will monitor for bleeding before cath mid-late week   - Will need to be consented for procedure  - Pending infectious workup prior to ICD placement given subjective fevers (oral temp of 100.1 on 3/18), chills, and cough Previous cath at [facility] showing oLM 30% and RCA 99% that is not amenable to intervention.  - TTE 3/1/23 at [facility]: mild LVH, EF 25-30%, severe global wall motion dysfunction, mildly dilated LA, RV mildly dilated, all leaflets mild calcified, mod pHTN  - hx coffee ground emesis @ [facility]; s/p EGD/colonoscopy showing melanosis without active bleed  - cleared for by GI for DAPT, DAPT no bleeding observed for 48 hours and was resumed on ASA 81 ng QD/Plavix 75 mg QD on 03/20/23. Will monitor for bleeding before cath mid-late week   - Will need to be consented for procedure  - Pending infectious workup prior to ICD placement given subjective fevers (oral temp of 100.1 on 3/18), chills, and cough Previous cath at [facility] showing oLM 30% and RCA 99% that is not amenable to intervention.  - TTE 3/1/23 at [facility]: mild LVH, EF 25-30%, severe global wall motion dysfunction, mildly dilated LA, RV mildly dilated, all leaflets mild calcified, mod pHTN  - hx coffee ground emesis @ [facility]; s/p EGD/colonoscopy showing melanosis without active bleed  - cleared for by GI for DAPT no bleeding observed for 48 hours and was resumed on ASA 81 mg QD/Plavix 75 mg QD on 03/20/23. Will monitor for bleeding before cath mid-late week (please obtain consent for cath)  - Pending infectious workup prior to ICD placement given subjective fevers (oral temp of 100.1 on 3/18), chills, and cough  - Continue Aspirin 81 mg daily, Plavix 75 mg daily, losartan 100 mg daily, and Lopressor 12.5 mg BID Previous cath at [facility] showing oLM 30% and RCA 99% that is not amenable to intervention.  - TTE 3/1/23 at [facility]: mild LVH, EF 25-30%, severe global wall motion dysfunction, mildly dilated LA, RV mildly dilated, all leaflets mild calcified, mod pHTN  - hx coffee ground emesis @ [facility]; s/p EGD/colonoscopy showing melanosis without active bleed  - cleared for by GI for DAPT no bleeding observed for 48 hours and was resumed on ASA 81 mg QD/Plavix 75 mg QD on 03/20/23. Will monitor for bleeding before cath mid-late week (please obtain consent for cath)  - Pending infectious workup prior to ICD placement given subjective fevers (oral temp of 100.1 on 3/18), chills, and cough  - Continue Aspirin 81 mg daily, Plavix 75 mg daily, losartan 100 mg daily, and Lopressor 12.5 mg BID Previous cath at [facility] showing oLM 30% and RCA 99% that is not amenable to intervention.  - TTE 3/1/23 at [facility]: mild LVH, EF 25-30%, severe global wall motion dysfunction, mildly dilated LA, RV mildly dilated, all leaflets mild calcified, mod pHTN  - hx coffee ground emesis @ [facility]; s/p EGD/colonoscopy showing melanosis without active bleed  - cleared for by GI for DAPT no bleeding observed for 48 hours and was resumed on ASA 81 mg QD/Plavix 75 mg QD on 03/20/23. Will monitor for bleeding before cath mid-late week (please obtain consent for cath)  - Pending infectious workup prior to ICD placement given subjective fevers (oral temp of 100.1 on 3/18), chills, and cough  - Continue Aspirin 81 mg daily, Plavix 75 mg daily, losartan 100 mg daily, and Lopressor 12.5 mg BID Previous cath at [facility] showing oLM 30% and RCA 99% that is not amenable to intervention.  - TTE 3/1/23 at [facility]: mild LVH, EF 25-30%, severe global wall motion dysfunction, mildly dilated LA, RV mildly dilated, all leaflets mild calcified, mod pHTN  - hx coffee ground emesis @ [facility]; s/p EGD/colonoscopy showing melanosis without active bleed  - cleared for by GI for DAPT no bleeding observed for 48 hours and was resumed on ASA 81 mg QD/Plavix 75 mg QD on 03/20/23. Will monitor for bleeding before cath mid-late week (please obtain consent for cath)  - Pending infectious workup (RSV panel ordered) prior to ICD placement given subjective fevers (oral temp of 100.1 on 3/18), chills, and cough  - Continue Aspirin 81 mg daily, Plavix 75 mg daily, losartan 100 mg daily, and Lopressor 12.5 mg BID Previous cath at [facility] showing oLM 30% and RCA 99% that is not amenable to intervention.  - TTE 3/1/23 at [facility]: mild LVH, EF 25-30%, severe global wall motion dysfunction, mildly dilated LA, RV mildly dilated, all leaflets mild calcified, mod pHTN  - hx coffee ground emesis @ [facility]; s/p EGD/colonoscopy showing melanosis without active bleed  - cleared for by GI for DAPT no bleeding observed for 48 hours and was resumed on ASA 81 mg QD/Plavix 75 mg QD on 03/20/23. Will monitor for bleeding before cath mid-late week (please obtain consent for cath)  - Pending infectious workup (RSV panel ordered) prior to ICD placement given subjective fevers (oral temp of 100.1 on 3/18), chills, and cough  - Continue Aspirin 81 mg daily, Plavix 75 mg daily, losartan 100 mg daily, and Lopressor 12.5 mg BID Previous cath at [facility] showing oLM 30% and RCA 99% that is not amenable to intervention.  - TTE 3/1/23 at [facility]: mild LVH, EF 25-30%, severe global wall motion dysfunction, mildly dilated LA, RV mildly dilated, all leaflets mild calcified, mod pHTN  - hx coffee ground emesis @ [facility]; s/p EGD/colonoscopy showing melanosis without active bleed  - cleared for by GI for DAPT no bleeding observed for 48 hours and was resumed on ASA 81 mg QD/Plavix 75 mg QD on 03/20/23. Will monitor for bleeding before cath mid-late week (please obtain consent for cath)  - Pending infectious workup (RSV panel ordered) prior to ICD placement given subjective fevers (oral temp of 100.1 on 3/18), chills, and cough  - Continue Aspirin 81 mg daily, Plavix 75 mg daily, losartan 100 mg daily, and Lopressor 12.5 mg BID

## 2023-03-20 NOTE — PROGRESS NOTE ADULT - ASSESSMENT
63yo M PMHx HTN, HLD, DM, CAD(s/p cath years ago, RCA 99%, never intervened on), HFrEF 25-30%, ESRD s/p failed kidney transplant (last HD 3/1 via Left Arm AVF; HD TTS), Right AKA initially presented to [facility] 2/27 for respiratory distress after a dialysis, found to be septic, h/c complicated by AHRF requiring intubation for 24hr followed by VT arrest, h/c the complicated by massive LGIB (rectal ulcer) requiring 7u pRBC, 1u FFP and 1u PLT. Transferred to [facility] for ICD placement 2/2 Vtach and cardiac cath. Medicine consulted for comanagement.     #Low grade fever  Temporal temp of 100.1f once, not toxic appearing, denies cough, dysuria, abdominal pain, SOB  - monitor off ABx for now    #LGIB  #SANTOS  #Solitary ulcer syndrome  - hx coffee ground emesis @ [facility]; s/p EGD/colonoscopy showing rectal ulcer  - 3/17AM pt passed loose BM along with large amount of BRBPR  - per gen surgery no acute surgical intervention   - per GI:      - c/w Protonix 40 mg IVP BID, low suspicion for UGIB      - PIV large bore x2      - s/p flex sig for rectal ulcers, found Evidence of prior hemoclip in the sigmoid colon. Localized ulceration and erosive with no bleeding were noted in the rectum, suggestive of solitary ulcer syndrome           - Avoid anal digitation and enemas           - High-fiber diet and optimize laxatives to avoid constipation using miralax and dulcolax           - No absolute GI contraindications to anticoag/antplt therapy           - Repeat endoscopic evaluation recommended in 3 months  - Active T&S  - Transfuse for hgb <8 (active CAD)    #CAD   - Previous cath at [facility] showing oLM 30% and RCA 99% that is not amenable to intervention.  - [facility], EF 25-30%, severe global wall motion dysfunction, mildly dilated LA, RV mildly dilated, all leaflets mild calcified, mod pHTN  - hx coffee ground emesis @ [facility]; s/p EGD/colonoscopy showing rectal ulcer and [facility]  flex sig suggestive of solitary ulcer syndrome - per cardiology will hold off loading the patient due to recent LGIB  - No absolute GI contraindications to anticoag/antplt therapy  - EP consulted for possible ICD placement  - Angiogram and possible PCI per cardiology, loading dose if hgb stable for 48hr (since 3/17 hgb of 10-11)    #H/O ventricular tachycardia   - V[facility] arrest [facility]  - agree with EP consult for ICD placement    #ESRD on dialysis  - for dialysis today  - dialysis Tues, Thurs, Sat  - last received 3/16  - f/u nephro recs  - Electrolytes wnl, k 4.3, phos 1.9  - c/w calcium citrate 667mg TID    #S/P kidney transplant.   - per [place] pt takes Tacrolimus 2mg 2 times /day  - Obtain collateral regarding renal transplant, if indeed failed, what indications patient have for c/w tacrolimus 2mg?  - f/u tacrolimus serum levels  - f/u nephro recs    #HTN  - SBP on admission 155-170s  - c/w home meds. Amlodipine 10mg qd, Losartan 100mg qd, Hydralazine 50m TID, Isordil 20mg TID    #HLD   - c/w Lipitor 40mg qd  - f/u AM lipid panel.    #DM  - no meds per [facility], obtain collaterals for med recs prior [facility] stay  - mISS while inpt  - A1c 5.9 on admission    DVT PPX: SCDs for now iso GIB 61yo M PMHx HTN, HLD, DM, CAD(s/p cath years ago, RCA 99%, never intervened on), HFrEF 25-30%, ESRD s/p failed kidney transplant (last HD 3/1 via Left Arm AVF; HD TTS), Right AKA initially presented to [facility] 2/27 for respiratory distress after a dialysis, found to be septic, h/c complicated by AHRF requiring intubation for 24hr followed by VT arrest, h/c the complicated by massive LGIB (rectal ulcer) requiring 7u pRBC, 1u FFP and 1u PLT. Transferred to [facility] for ICD placement 2/2 Vtach and cardiac cath. Medicine consulted for comanagement.     #Low grade fever  Temporal temp of 100.1f once, not toxic appearing, denies cough, dysuria, abdominal pain, SOB  - monitor off ABx for now    #LGIB  #SANTOS  #Solitary ulcer syndrome  - hx coffee ground emesis @ [facility]; s/p EGD/colonoscopy showing rectal ulcer  - 3/17AM pt passed loose BM along with large amount of BRBPR  - per gen surgery no acute surgical intervention   - per GI:      - c/w Protonix 40 mg IVP BID, low suspicion for UGIB      - PIV large bore x2      - s/p flex sig for rectal ulcers, found Evidence of prior hemoclip in the sigmoid colon. Localized ulceration and erosive with no bleeding were noted in the rectum, suggestive of solitary ulcer syndrome           - Avoid anal digitation and enemas           - High-fiber diet and optimize laxatives to avoid constipation using miralax and dulcolax           - No absolute GI contraindications to anticoag/antplt therapy           - Repeat endoscopic evaluation recommended in 3 months  - Active T&S  - Transfuse for hgb <8 (active CAD)    #CAD   - Previous cath at [facility] showing oLM 30% and RCA 99% that is not amenable to intervention.  - [facility], EF 25-30%, severe global wall motion dysfunction, mildly dilated LA, RV mildly dilated, all leaflets mild calcified, mod pHTN  - hx coffee ground emesis @ [facility]; s/p EGD/colonoscopy showing rectal ulcer and [facility]  flex sig suggestive of solitary ulcer syndrome - per cardiology will hold off loading the patient due to recent LGIB  - No absolute GI contraindications to anticoag/antplt therapy  - EP consulted for possible ICD placement  - Angiogram and possible PCI per cardiology, loading dose if hgb stable for 48hr (since 3/17 hgb of 10-11)    #H/O ventricular tachycardia   - Vtach arrest @[facility]  - agree with EP consult for ICD placement    #ESRD on dialysis  - for dialysis today  - dialysis Tues, Thurs, Sat  - last received 3/16  - f/u nephro recs  - Electrolytes wnl, k 4.3, phos 1.9  - c/w calcium citrate 667mg TID    #S/P kidney transplant.   - per [facility] pt takes Tacrolimus 2mg 2 times /day  - Obtain collateral regarding renal transplant, if indeed failed, what indications patient have for c/w tacrolimus 2mg?  - f/u tacrolimus serum levels  - f/u nephro recs    #HTN  - SBP on admission 155-170s  - c/w home meds. Amlodipine 10mg qd, Losartan 100mg qd, Hydralazine 50m TID, Isordil 20mg TID    #HLD   - c/w Lipitor 40mg qd  - f/u AM lipid panel.    #DM  - no meds per [place], obtain collaterals for med recs prior [facility] stay  - mISS while inpt  - A1c 5.9 on admission    DVT PPX: SCDs for now iso GIB 63yo M PMHx HTN, HLD, DM, CAD(s/p cath years ago, RCA 99%, never intervened on), HFrEF 25-30%, ESRD s/p failed kidney transplant (last HD 3/1 via Left Arm AVF; HD TTS), Right AKA initially presented to [facility] 2/27 for respiratory distress after a dialysis, found to be septic, h/c complicated by AHRF requiring intubation for 24hr followed by VT arrest, h/c the complicated by massive LGIB (rectal ulcer) requiring 7u pRBC, 1u FFP and 1u PLT. Transferred to [facility] for ICD placement 2/2 Vtach and cardiac cath. Medicine consulted for comanagement.     #Low grade fever  Temporal temp of 100.1f once, not toxic appearing, denies cough, dysuria, abdominal pain, SOB  - monitor off ABx for now    #LGIB  #SANTOS  #Solitary ulcer syndrome  - hx coffee ground emesis @ [facility]; s/p EGD/colonoscopy showing rectal ulcer  - 3/17AM pt passed loose BM along with large amount of BRBPR  - per gen surgery no acute surgical intervention   - per GI:      - c/w Protonix 40 mg IVP BID, low suspicion for UGIB      - PIV large bore x2      - s/p flex sig for rectal ulcers, found Evidence of prior hemoclip in the sigmoid colon. Localized ulceration and erosive with no bleeding were noted in the rectum, suggestive of solitary ulcer syndrome           - Avoid anal digitation and enemas           - High-fiber diet and optimize laxatives to avoid constipation using miralax and dulcolax           - No absolute GI contraindications to anticoag/antplt therapy           - Repeat endoscopic evaluation recommended in 3 months  - Active T&S  - Transfuse for hgb <8 (active CAD)    #CAD   - Previous cath at [facility] showing oLM 30% and RCA 99% that is not amenable to intervention.  - [facility], EF 25-30%, severe global wall motion dysfunction, mildly dilated LA, RV mildly dilated, all leaflets mild calcified, mod pHTN  - hx coffee ground emesis @ [facility]; s/p EGD/colonoscopy showing rectal ulcer and [facility]  flex sig suggestive of solitary ulcer syndrome - per cardiology will hold off loading the patient due to recent LGIB  - No absolute GI contraindications to anticoag/antplt therapy  - EP consulted for possible ICD placement  - Angiogram and possible PCI per cardiology, loading dose if hgb stable for 48hr (since 3/17 hgb of 10-11)    #H/O ventricular tachycardia   - Vtach arrest @[facility]  - agree with EP consult for ICD placement    #ESRD on dialysis  - for dialysis today  - dialysis Tues, Thurs, Sat  - last received 3/16  - f/u nephro recs  - Electrolytes wnl, k 4.3, phos 1.9  - c/w calcium citrate 667mg TID    #S/P kidney transplant.   - per [facility] pt takes Tacrolimus 2mg 2 times /day  - Obtain collateral regarding renal transplant, if indeed failed, what indications patient have for c/w tacrolimus 2mg?  - f/u tacrolimus serum levels  - f/u nephro recs    #HTN  - SBP on admission 155-170s  - c/w home meds. Amlodipine 10mg qd, Losartan 100mg qd, Hydralazine 50m TID, Isordil 20mg TID    #HLD   - c/w Lipitor 40mg qd  - f/u AM lipid panel.    #DM  - no meds per [facility], obtain collaterals for med recs prior [facility] stay  - mISS while inpt  - A1c 5.9 on admission    DVT PPX: SCDs for now iso GIB 61yo M PMHx HTN, HLD, DM, CAD(s/p cath years ago, RCA 99%, never intervened on), HFrEF 25-30%, ESRD s/p failed kidney transplant (last HD 3/1 via Left Arm AVF; HD TTS), Right AKA initially presented to [facility] 2/27 for respiratory distress after a dialysis, found to be septic, h/c complicated by AHRF requiring intubation for 24hr followed by VT arrest, h/c the complicated by massive LGIB (rectal ulcer) requiring 7u pRBC, 1u FFP and 1u PLT. Transferred to [facility] for ICD placement 2/2 Vtach and cardiac cath. Medicine consulted for comanagement.     #Low grade fever  Temporal temp of 100.1f once on 3/17, not toxic appearing, denies cough, dysuria, abdominal pain, SOB  - monitor off ABx for now    #LGIB  #SANTOS  #Solitary ulcer syndrome  - hx coffee ground emesis @ [facility]; s/p EGD/colonoscopy showing rectal ulcer  - 3/17AM pt passed loose BM along with large amount of BRBPR  - per gen surgery no acute surgical intervention   - per GI:      - c/w Protonix 40 mg IVP BID, low suspicion for UGIB      - PIV large bore x2      - s/p flex sig for rectal ulcers, found Evidence of prior hemoclip in the sigmoid colon. Localized ulceration and erosive with no bleeding were noted in the rectum, suggestive of solitary ulcer syndrome           - Avoid anal digitation and enemas           - High-fiber diet and optimize laxatives to avoid constipation using miralax and dulcolax           - No absolute GI contraindications to anticoag/antplt therapy           - Repeat endoscopic evaluation recommended in 3 months  - Active T&S  - Transfuse for hgb <8 (active CAD)    #CAD   - Previous cath at [facility] showing oLM 30% and RCA 99% that is not amenable to intervention.  - [facility], EF 25-30%, severe global wall motion dysfunction, mildly dilated LA, RV mildly dilated, all leaflets mild calcified, mod pHTN  - hx coffee ground emesis @ [facility]; s/p EGD/colonoscopy showing rectal ulcer and [facility]  flex sig suggestive of solitary ulcer syndrome - per cardiology will hold off loading the patient due to recent LGIB  - No absolute GI contraindications to anticoag/antplt therapy  - EP consulted for possible ICD placement  - Today for ASA/ Plavix load > if no bleed in the next 48h will get > Angiogram and possible PCI, followed with ICD (since 3/17 hgb of 10-11)    #H/O ventricular tachycardia   - [facility]  - agree with EP consult for ICD placement    #ESRD on dialysis  - for dialysis today  - dialysis Tues, Thurs, Sat  - last received 3/16  - f/u nephro recs  - Electrolytes wnl, k 4.3, phos 1.9  - c/w calcium citrate 667mg TID    #S/P kidney transplant.   - per [place] pt takes Tacrolimus 2mg 2 times /day  - Obtain collateral regarding renal transplant, if indeed failed, what indications patient have for c/w tacrolimus 2mg?  - f/u tacrolimus serum levels  - f/u nephro recs    #HTN  - SBP 130s-140s  - c/w home meds. Amlodipine 10mg qd, Losartan 100mg qd, Hydralazine 50m TID, Isordil 20mg TID    #HLD   - c/w Lipitor 40mg qd  - f/u AM lipid panel.    #DM  - no meds per [facility], obtain collaterals for med recs prior [facility] stay  - mISS while inpt  - A1c 5.9 on admission    DVT PPX: SCDs for now iso GIB    Case discussed with attending Dr. Lopez, recommendations are final upon attending attestation  63yo M PMHx HTN, HLD, DM, CAD(s/p cath years ago, RCA 99%, never intervened on), HFrEF 25-30%, ESRD s/p failed kidney transplant (last HD 3/1 via Left Arm AVF; HD TTS), Right AKA initially presented to [facility] 2/27 for respiratory distress after a dialysis, found to be septic, h/c complicated by AHRF requiring intubation for 24hr followed by VT arrest, h/c the complicated by massive LGIB (rectal ulcer) requiring 7u pRBC, 1u FFP and 1u PLT. Transferred to [facility] for ICD placement 2/2 Vtach and cardiac cath. Medicine consulted for comanagement.     #Low grade fever  Temporal temp of 100.1f once on 3/17, not toxic appearing, denies cough, dysuria, abdominal pain, SOB  - monitor off ABx for now    #LGIB  #SANTOS  #Solitary ulcer syndrome  - hx coffee ground emesis @ [facility]; s/p EGD/colonoscopy showing rectal ulcer  - 3/17AM pt passed loose BM along with large amount of BRBPR  - per gen surgery no acute surgical intervention   - per GI:      - c/w Protonix 40 mg IVP BID, low suspicion for UGIB      - PIV large bore x2      - s/p flex sig for rectal ulcers, found Evidence of prior hemoclip in the sigmoid colon. Localized ulceration and erosive with no bleeding were noted in the rectum, suggestive of solitary ulcer syndrome           - Avoid anal digitation and enemas           - High-fiber diet and optimize laxatives to avoid constipation using miralax and dulcolax           - No absolute GI contraindications to anticoag/antplt therapy           - Repeat endoscopic evaluation recommended in 3 months  - Active T&S  - Transfuse for hgb <8 (active CAD)    #CAD   - Previous cath at [facility] showing oLM 30% and RCA 99% that is not amenable to intervention.  - [facility], EF 25-30%, severe global wall motion dysfunction, mildly dilated LA, RV mildly dilated, all leaflets mild calcified, mod pHTN  - hx coffee ground emesis @ [facility]; s/p EGD/colonoscopy showing rectal ulcer and [facility]  flex sig suggestive of solitary ulcer syndrome - per cardiology will hold off loading the patient due to recent LGIB  - No absolute GI contraindications to anticoag/antplt therapy  - EP consulted for possible ICD placement  - Today for ASA/ Plavix load > if no bleed in the next 48h will get > Angiogram and possible PCI, followed with ICD (since 3/17 hgb of 10-11)    #H/O ventricular tachycardia   - [facility]ach arrest [facility]  - agree with EP consult for ICD placement    #ESRD on dialysis  - for dialysis today  - dialysis Tues, Thurs, Sat  - last received 3/16  - f/u nephro recs  - Electrolytes wnl, k 4.3, phos 1.9  - c/w calcium citrate 667mg TID    #S/P kidney transplant.   - per [facility] pt takes Tacrolimus 2mg 2 times /day  - Obtain collateral regarding renal transplant, if indeed failed, what indications patient have for c/w tacrolimus 2mg?  - f/u tacrolimus serum levels  - f/u nephro recs    #HTN  - SBP 130s-140s  - c/w home meds. Amlodipine 10mg qd, Losartan 100mg qd, Hydralazine 50m TID, Isordil 20mg TID    #HLD   - c/w Lipitor 40mg qd  - f/u AM lipid panel.    #DM  - no meds per [facility], obtain collaterals for med recs prior [facility] stay  - mISS while inpt  - A1c 5.9 on admission    DVT PPX: SCDs for now iso GIB    Case discussed with attending Dr. Lopez, recommendations are final upon attending attestation  63yo M PMHx HTN, HLD, DM, CAD(s/p cath years ago, RCA 99%, never intervened on), HFrEF 25-30%, ESRD s/p failed kidney transplant (last HD 3/1 via Left Arm AVF; HD TTS), Right AKA initially presented to [facility] 2/27 for respiratory distress after a dialysis, found to be septic, h/c complicated by AHRF requiring intubation for 24hr followed by VT arrest, h/c the complicated by massive LGIB (rectal ulcer) requiring 7u pRBC, 1u FFP and 1u PLT. Transferred to [facility] for ICD placement 2/2 Vtach and cardiac cath. Medicine consulted for comanagement.     #Low grade fever  Temporal temp of 100.1f once on 3/17, not toxic appearing, denies cough, dysuria, abdominal pain, SOB  - monitor off ABx for now    #LGIB  #SANTOS  #Solitary ulcer syndrome  - hx coffee ground emesis @ [facility]; s/p EGD/colonoscopy showing rectal ulcer  - 3/17AM pt passed loose BM along with large amount of BRBPR  - per gen surgery no acute surgical intervention   - per GI:      - c/w Protonix 40 mg IVP BID, low suspicion for UGIB      - PIV large bore x2      - s/p flex sig for rectal ulcers, found Evidence of prior hemoclip in the sigmoid colon. Localized ulceration and erosive with no bleeding were noted in the rectum, suggestive of solitary ulcer syndrome           - Avoid anal digitation and enemas           - High-fiber diet and optimize laxatives to avoid constipation using miralax and dulcolax           - No absolute GI contraindications to anticoag/antplt therapy           - Repeat endoscopic evaluation recommended in 3 months  - Active T&S  - Transfuse for hgb <8 (active CAD)    #CAD   - Previous cath at [facility] showing oLM 30% and RCA 99% that is not amenable to intervention.  - [facility], EF 25-30%, severe global wall motion dysfunction, mildly dilated LA, RV mildly dilated, all leaflets mild calcified, mod pHTN  - hx coffee ground emesis @ [facility]; s/p EGD/colonoscopy showing rectal ulcer and [facility]  flex sig suggestive of solitary ulcer syndrome - per cardiology will hold off loading the patient due to recent LGIB  - No absolute GI contraindications to anticoag/antplt therapy  - EP consulted for possible ICD placement  - Today for ASA/ Plavix load > if no bleed in the next 48h will get > Angiogram and possible PCI, followed with ICD (since 3/17 hgb of 10-11)    #H/O ventricular tachycardia   - [facility]  - agree with EP consult for ICD placement    #ESRD on dialysis  - for dialysis today  - dialysis Tues, Thurs, Sat  - last received 3/16  - f/u nephro recs  - Electrolytes wnl, k 4.3, phos 1.9  - c/w calcium citrate 667mg TID    #S/P kidney transplant.   - per [place] pt takes Tacrolimus 2mg 2 times /day  - Obtain collateral regarding renal transplant, if indeed failed, what indications patient have for c/w tacrolimus 2mg?  - f/u tacrolimus serum levels  - f/u nephro recs    #HTN  - SBP 130s-140s  - c/w home meds. Amlodipine 10mg qd, Losartan 100mg qd, Hydralazine 50m TID, Isordil 20mg TID    #HLD   - c/w Lipitor 40mg qd  - f/u AM lipid panel.    #DM  - no meds per [place], obtain collaterals for med recs prior [facility] stay  - mISS while inpt  - A1c 5.9 on admission    DVT PPX: SCDs for now iso GIB    Case discussed with attending Dr. Lopez, recommendations are final upon attending attestation

## 2023-03-20 NOTE — PROGRESS NOTE ADULT - NSPROGADDITIONALINFOA_GEN_ALL_CORE
Attending Attestation:  I was physically present for the key portions of the evaluation and management (E/M) service provided.  I agree with the above history, physical, and plan which I have reviewed.  Stan Jones MD (Cardiology)  35 minutes spent on total encounter; more than 50% of the visit was spent counseling and/or coordinating care by the attending physician.

## 2023-03-20 NOTE — PROGRESS NOTE ADULT - PROBLEM SELECTOR PLAN 4
dialysis Tues, Thurs, Sat  - last received 3/18  - renal following, plan for HD today 3/18 3/17AM pt passed loose BM along with large amount of BRBPR  - GI following  - s/p Flex sigmoidoscopy: localized ulceration and erosive with no bleeding in the rectum, suggestive of solitary ulcer syndrome, Evidence of prior hemoclip was found in the sigmoid colon.  -Avoid anal digitation and enemas   -Bowel regimen with miralax and dulcolax to avoid constipation.  -Repeat Endoscopy in 3 months

## 2023-03-20 NOTE — PROGRESS NOTE ADULT - SUBJECTIVE AND OBJECTIVE BOX
Interventional Cardiology PA Adult Progress Note    C.C.:     Subjective Assessment:      ROS Negative except as per Subjective and HPI  	  MEDICATIONS:  amLODIPine   Tablet 10 milliGRAM(s) Oral daily  hydrALAZINE 50 milliGRAM(s) Oral every 8 hours  isosorbide   dinitrate Tablet (ISORDIL) 20 milliGRAM(s) Oral three times a day  losartan 100 milliGRAM(s) Oral daily  metoprolol tartrate 12.5 milliGRAM(s) Oral every 12 hours        acetaminophen     Tablet .. 650 milliGRAM(s) Oral every 6 hours PRN    pantoprazole  Injectable 40 milliGRAM(s) IV Push every 12 hours  polyethylene glycol 3350 17 Gram(s) Oral daily    atorvastatin 40 milliGRAM(s) Oral at bedtime  dextrose 50% Injectable 25 Gram(s) IV Push once  dextrose 50% Injectable 12.5 Gram(s) IV Push once  dextrose 50% Injectable 25 Gram(s) IV Push once  dextrose Oral Gel 15 Gram(s) Oral once PRN  glucagon  Injectable 1 milliGRAM(s) IntraMuscular once  insulin lispro (ADMELOG) corrective regimen sliding scale   SubCutaneous every 6 hours    aspirin enteric coated 81 milliGRAM(s) Oral daily  clopidogrel Tablet 75 milliGRAM(s) Oral daily  dextrose 5%. 1000 milliLiter(s) IV Continuous <Continuous>  dextrose 5%. 1000 milliLiter(s) IV Continuous <Continuous>  ferrous    sulfate 325 milliGRAM(s) Oral daily  magnesium oxide 800 milliGRAM(s) Oral once  sodium chloride 0.65% Nasal 1 Spray(s) Both Nostrils every 4 hours PRN  tacrolimus 2 milliGRAM(s) Oral every 12 hours  tamsulosin 0.8 milliGRAM(s) Oral at bedtime      	    [PHYSICAL EXAM:  TELEMETRY:  T(C): 36.1 (03-20-23 @ 09:08), Max: 37.7 (03-19-23 @ 12:59)  HR: 60 (03-20-23 @ 08:50) (56 - 66)  BP: 137/62 (03-20-23 @ 08:50) (132/63 - 147/63)  RR: 16 (03-20-23 @ 08:50) (12 - 16)  SpO2: 95% (03-20-23 @ 08:50) (95% - 100%)  Wt(kg): --  I&O's Summary    19 Mar 2023 07:01  -  20 Mar 2023 07:00  --------------------------------------------------------  IN: 180 mL / OUT: 725 mL / NET: -545 mL    20 Mar 2023 07:01  -  20 Mar 2023 10:35  --------------------------------------------------------  IN: 180 mL / OUT: 0 mL / NET: 180 mL        Moody:  Central/PICC/Mid Line:                                         Appearance: Normal	  HEENT:   Normal oral mucosa, PERRL, EOMI	  Neck: Supple, + JVD/ - JVD; Carotid Bruit   Cardiovascular: Normal S1 S2, No JVD, No murmurs,   Respiratory: Lungs clear to auscultation/Decreased Breath Sounds/No Rales, Rhonchi, Wheezing	  Gastrointestinal:  Soft, Non-tender, + BS	  Skin: No rashes, No ecchymoses, No cyanosis  Extremities: Normal range of motion, No clubbing, cyanosis or edema  Vascular: Peripheral pulses palpable 2+ bilaterally  Neurologic: Non-focal  Psychiatry: A & O x 3, Mood & affect appropriate      	    ECG:  	  RADIOLOGY:   DIAGNOSTIC TESTING:  [ ] Echocardiogram:  [ ]  Catheterization:  [ ] Stress Test:    [ ] JOSY  OTHER: 	    LABS:	 	  CARDIAC MARKERS:                                  10.1   3.70  )-----------( 109      ( 20 Mar 2023 05:30 )             31.5     03-20    131<L>  |  94<L>  |  24<H>  ----------------------------<  81  4.5   |  25  |  4.93<H>    Ca    7.9<L>      20 Mar 2023 05:30  Mg     1.8     03-20      proBNP:   Lipid Profile:   HgA1c:   TSH:       ASSESSMENT/PLAN: 	        DVT ppx:  Dispo:     Interventional Cardiology PA Adult Progress Note      Subjective Assessment:  Patient seen and examined at bedside this morning. Denies chest pain, dizziness, abdominal pain, nausea, vomiting, or BRBPR or rectal bleeding.   Endorses shortness of breath while at rest and palpitations which has been present since admission. Also endorsing subjective chills and cough that has been present since admission.       ROS Negative except as per Subjective and HPI  	  MEDICATIONS:  amLODIPine   Tablet 10 milliGRAM(s) Oral daily  hydrALAZINE 50 milliGRAM(s) Oral every 8 hours  isosorbide   dinitrate Tablet (ISORDIL) 20 milliGRAM(s) Oral three times a day  losartan 100 milliGRAM(s) Oral daily  metoprolol tartrate 12.5 milliGRAM(s) Oral every 12 hours        acetaminophen     Tablet .. 650 milliGRAM(s) Oral every 6 hours PRN    pantoprazole  Injectable 40 milliGRAM(s) IV Push every 12 hours  polyethylene glycol 3350 17 Gram(s) Oral daily    atorvastatin 40 milliGRAM(s) Oral at bedtime  dextrose 50% Injectable 25 Gram(s) IV Push once  dextrose 50% Injectable 12.5 Gram(s) IV Push once  dextrose 50% Injectable 25 Gram(s) IV Push once  dextrose Oral Gel 15 Gram(s) Oral once PRN  glucagon  Injectable 1 milliGRAM(s) IntraMuscular once  insulin lispro (ADMELOG) corrective regimen sliding scale   SubCutaneous every 6 hours    aspirin enteric coated 81 milliGRAM(s) Oral daily  clopidogrel Tablet 75 milliGRAM(s) Oral daily  dextrose 5%. 1000 milliLiter(s) IV Continuous <Continuous>  dextrose 5%. 1000 milliLiter(s) IV Continuous <Continuous>  ferrous    sulfate 325 milliGRAM(s) Oral daily  magnesium oxide 800 milliGRAM(s) Oral once  sodium chloride 0.65% Nasal 1 Spray(s) Both Nostrils every 4 hours PRN  tacrolimus 2 milliGRAM(s) Oral every 12 hours  tamsulosin 0.8 milliGRAM(s) Oral at bedtime      	    [PHYSICAL EXAM:  TELEMETRY:  T(C): 36.1 (03-20-23 @ 09:08), Max: 37.7 (03-19-23 @ 12:59)  HR: 60 (03-20-23 @ 08:50) (56 - 66)  BP: 137/62 (03-20-23 @ 08:50) (132/63 - 147/63)  RR: 16 (03-20-23 @ 08:50) (12 - 16)  SpO2: 95% (03-20-23 @ 08:50) (95% - 100%)  Wt(kg): --  I&O's Summary    19 Mar 2023 07:01  -  20 Mar 2023 07:00  --------------------------------------------------------  IN: 180 mL / OUT: 725 mL / NET: -545 mL    20 Mar 2023 07:01  -  20 Mar 2023 10:35  --------------------------------------------------------  IN: 180 mL / OUT: 0 mL / NET: 180 mL        Moody:  Central/PICC/Mid Line:                                         Appearance: sitting upright in bed, no apparent distress   Neck: Supple,- JVD  Cardiovascular: Normal S1 S2   Respiratory: Lungs clear to auscultation	  Gastrointestinal:  Soft, Non-tender, + BS	  Skin: No rashes, No ecchymoses, No cyanosis  Extremities: R AKA,   Vascular: Peripheral pulses palpable 2+ bilaterally  Neurologic: Non-focal  Psychiatry: A& O x 3, Mood & affect appropriate      	      LABS:	 	  CARDIAC MARKERS:                                  10.1   3.70  )-----------( 109      ( 20 Mar 2023 05:30 )             31.5     03-20    131<L>  |  94<L>  |  24<H>  ----------------------------<  81  4.5   |  25  |  4.93<H>    Ca    7.9<L>      20 Mar 2023 05:30  Mg     1.8     03-20       Interventional Cardiology PA Adult Progress Note    Subjective Assessment:  Patient seen and examined at bedside this morning. Denies chest pain, dizziness, abdominal pain, nausea, vomiting, or BRBPR or rectal bleeding.   Endorses shortness of breath while at rest and palpitations which has been present since admission. Also endorsing subjective chills and cough that has been present since admission.       ROS Negative except as per Subjective and HPI  	  MEDICATIONS:  amLODIPine   Tablet 10 milliGRAM(s) Oral daily  hydrALAZINE 50 milliGRAM(s) Oral every 8 hours  isosorbide   dinitrate Tablet (ISORDIL) 20 milliGRAM(s) Oral three times a day  losartan 100 milliGRAM(s) Oral daily  metoprolol tartrate 12.5 milliGRAM(s) Oral every 12 hours  acetaminophen     Tablet .. 650 milliGRAM(s) Oral every 6 hours PRN  pantoprazole  Injectable 40 milliGRAM(s) IV Push every 12 hours  polyethylene glycol 3350 17 Gram(s) Oral daily  atorvastatin 40 milliGRAM(s) Oral at bedtime  dextrose 50% Injectable 25 Gram(s) IV Push once  dextrose 50% Injectable 12.5 Gram(s) IV Push once  dextrose 50% Injectable 25 Gram(s) IV Push once  dextrose Oral Gel 15 Gram(s) Oral once PRN  glucagon  Injectable 1 milliGRAM(s) IntraMuscular once  insulin lispro (ADMELOG) corrective regimen sliding scale   SubCutaneous every 6 hours  aspirin enteric coated 81 milliGRAM(s) Oral daily  clopidogrel Tablet 75 milliGRAM(s) Oral daily  dextrose 5%. 1000 milliLiter(s) IV Continuous <Continuous>  dextrose 5%. 1000 milliLiter(s) IV Continuous <Continuous>  ferrous    sulfate 325 milliGRAM(s) Oral daily  magnesium oxide 800 milliGRAM(s) Oral once  sodium chloride 0.65% Nasal 1 Spray(s) Both Nostrils every 4 hours PRN  tacrolimus 2 milliGRAM(s) Oral every 12 hours  tamsulosin 0.8 milliGRAM(s) Oral at bedtime      	    [PHYSICAL EXAM:  TELEMETRY:  T(C): 36.1 (03-20-23 @ 09:08), Max: 37.7 (03-19-23 @ 12:59)  HR: 60 (03-20-23 @ 08:50) (56 - 66)  BP: 137/62 (03-20-23 @ 08:50) (132/63 - 147/63)  RR: 16 (03-20-23 @ 08:50) (12 - 16)  SpO2: 95% (03-20-23 @ 08:50) (95% - 100%)  Wt(kg): --  I&O's Summary    19 Mar 2023 07:01  -  20 Mar 2023 07:00  --------------------------------------------------------  IN: 180 mL / OUT: 725 mL / NET: -545 mL    20 Mar 2023 07:01  -  20 Mar 2023 10:35  --------------------------------------------------------  IN: 180 mL / OUT: 0 mL / NET: 180 mL        Moody:  Central/PICC/Mid Line:                                         Appearance: sitting upright in bed, no apparent distress   Neck: Supple,- JVD  Cardiovascular: Normal S1 S2   Respiratory: Lungs clear to auscultation	  Gastrointestinal:  Soft, Non-tender, + BS	  Skin: No rashes, No ecchymoses, No cyanosis  Extremities: R AKA,   Vascular: Peripheral pulses palpable 2+ bilaterally  Neurologic: Non-focal  Psychiatry: A& O x 3, Mood & affect appropriate         LABS:	 	                 10.1   3.70  )-----------( 109      ( 20 Mar 2023 05:30 )             31.5     03-20    131<L>  |  94<L>  |  24<H>  ----------------------------<  81  4.5   |  25  |  4.93<H>    Ca    7.9<L>      20 Mar 2023 05:30  Mg     1.8     03-20

## 2023-03-20 NOTE — PROGRESS NOTE ADULT - ATTENDING COMMENTS
61yo M PMHx HTN, HLD, DM, CAD(s/p cath years ago, RCA 99%, never intervened on), HFrEF 25-30%, ESRD s/p failed kidney transplant and required HD x 3yrs (last HD 3/16 via Left Arm AVF; HD TTS), Right AKA initially presented to [facility] 2/27 for respiratory distress after a dialysis, found to be septic, h/c complicated by AHRF requiring intubation for 24hr followed by VT arrest, h/c the complicated by massive LGIB (rectal ulcer seen on colonoscopy) requiring 7u pRBC, 1u FFP and 1u PLT. Transferred to [facility] (3/17) for ICD placement 2/2 Vtach and cardiac cath. Pt noted lassed loose stool with BRBPR, GI and surgery consulted. Flex Sigc ( 3/17) found prior hemoclip in the sigmoid colon, localized ulceration and erosive with no bleeding noted in rectum suggestive of solitary ulcer syndrome. Medicine consulted for comanagement.     #LGIB  #SANTOS  #Solitary ulcer syndrome  - hx coffee ground emesis @ [facility]; s/p EGD/colonoscopy showing rectal ulcer  - 3/17AM pt passed loose BM along with large amount of BRBPR  - per gen surgery no acute surgical intervention   - per GI:      - increased Protonix 40 mg IVP BID      - PIV large bore x2      - s/p flex sig(3/17)  for rectal ulcers, found Evidence of prior hemoclip in the sigmoid colon. Localized ulceration and erosive with no bleeding were noted in the rectum, suggestive of solitary ulcer syndrome           - Avoid anal digitation and enemas           - High-fiber diet and optimize laxatives to avoid constipation using miralax and dulcolax           - No absolute GI contraindications to anticoag/antplt therapy           - Repeat endoscopic evaluation recommended in 3 months  - Active T&S  - Hgb 10.1 stable  - Transfuse for hgb <8 (active CAD)    #CAD   - Previous cath at [facility] showing oLM 30% and RCA 99% that is not amenable to intervention.  - TTE 3/1/23 at [facility]: mild LVH, EF 25-30%, severe global wall motion dysfunction, mildly dilated LA, RV mildly dilated, all leaflets mild calcified, mod pHTN  - hx coffee ground emesis @ [facility]; s/p EGD/colonoscopy showing rectal ulcer and LHH  flex sig suggestive of solitary ulcer syndrome - per cardiology will hold off loading the patient due to recent LGIB  - No absolute GI contraindications to anticoag/antplt therapy  - start ASA and plavix today ( 3/20), if no bleed in few days, proceed with [facility] possible PCI later this week ( Thurs 3/24) per Cardiologist   - EP consulted for possible ICD placement ? Fri 3/24    #H/O ventricular tachycardia   - [facility]  - agree with EP consult for ICD placement    #ESRD on dialysis  - dialysis Tues, Thurs, Sat  - last received 3/16, next HD tomorrow Tues 3/21  - f/u nephro recs  - Electrolytes wnl, k 4.3, phos 1.9  - c/w calcium citrate 667mg TID  - on Renvela 800mg TID, f/u with renal as patient is with hypophosphatemia on admission     #S/P kidney transplant.   - per [place] pt takes Tacrolimus 2mg 2 times /day  - Obtain collateral regarding renal transplant, if indeed failed, what indications patient have for c/w tacrolimus 2mg?  - f/u tacrolimus serum levels  - f/u nephro recs    #HTN  - SBP on admission 155-170s  - c/w home meds. Amlodipine 10mg qd, Losartan 100mg qd, Hydralazine 50m TID, Isordil 20mg TID    #HLD   - c/w Lipitor 40mg qd  - f/u AM lipid panel.    #DM  - no meds per [place], obtain collaterals for med recs prior [place] stay  - mISS while inpt, goal -180   - A1c 5.9 on admission    DVT PPX: SCDs for now iso GIB    Med consult team continues to follow with you. d/w Dr. Lb Smith . 63yo M PMHx HTN, HLD, DM, CAD(s/p cath years ago, RCA 99%, never intervened on), HFrEF 25-30%, ESRD s/p failed kidney transplant and required HD x 3yrs (last HD 3/16 via Left Arm AVF; HD TTS), Right AKA initially presented to [facility] 2/27 for respiratory distress after a dialysis, found to be septic, h/c complicated by AHRF requiring intubation for 24hr followed by VT arrest, h/c the complicated by massive LGIB (rectal ulcer seen on colonoscopy) requiring 7u pRBC, 1u FFP and 1u PLT. Transferred to [facility] (3/17) for ICD placement 2/2 Vtach and cardiac cath. Pt noted lassed loose stool with BRBPR, GI and surgery consulted. Flex Sigc ( 3/17) found prior hemoclip in the sigmoid colon, localized ulceration and erosive with no bleeding noted in rectum suggestive of solitary ulcer syndrome. Medicine consulted for comanagement.     #LGIB  #SANTOS  #Solitary ulcer syndrome  - hx coffee ground emesis @ [facility]; s/p EGD/colonoscopy showing rectal ulcer  - 3/17AM pt passed loose BM along with large amount of BRBPR  - per gen surgery no acute surgical intervention   - per GI:      - increased Protonix 40 mg IVP BID      - PIV large bore x2      - s/p flex sig(3/17)  for rectal ulcers, found Evidence of prior hemoclip in the sigmoid colon. Localized ulceration and erosive with no bleeding were noted in the rectum, suggestive of solitary ulcer syndrome           - Avoid anal digitation and enemas           - High-fiber diet and optimize laxatives to avoid constipation using miralax and dulcolax           - No absolute GI contraindications to anticoag/antplt therapy           - Repeat endoscopic evaluation recommended in 3 months  - Active T&S  - Hgb 10.1 stable  - Transfuse for hgb <8 (active CAD)    #CAD   - Previous cath at [facility] showing oLM 30% and RCA 99% that is not amenable to intervention.  - TTE 3/1/23 at [facility]: mild LVH, EF 25-30%, severe global wall motion dysfunction, mildly dilated LA, RV mildly dilated, all leaflets mild calcified, mod pHTN  - hx coffee ground emesis @ [facility]; s/p EGD/colonoscopy showing rectal ulcer and LHH  flex sig suggestive of solitary ulcer syndrome - per cardiology will hold off loading the patient due to recent LGIB  - No absolute GI contraindications to anticoag/antplt therapy  - start ASA and plavix today ( 3/20), if no bleed in few days, proceed with [facility] possible PCI later this week ( Thurs 3/24) per Cardiologist   - EP consulted for possible ICD placement ? Fri 3/24    #H/O ventricular tachycardia   - Vtach arrest @[facility]  - agree with EP consult for ICD placement    #ESRD on dialysis  - dialysis Tues, Thurs, Sat  - last received 3/16, next HD tomorrow Tues 3/21  - f/u nephro recs  - Electrolytes wnl, k 4.3, phos 1.9  - c/w calcium citrate 667mg TID  - on Renvela 800mg TID, f/u with renal as patient is with hypophosphatemia on admission     #S/P kidney transplant.   - per [place] pt takes Tacrolimus 2mg 2 times /day  - Obtain collateral regarding renal transplant, if indeed failed, what indications patient have for c/w tacrolimus 2mg?  - f/u tacrolimus serum levels  - f/u nephro recs    #HTN  - SBP on admission 155-170s  - c/w home meds. Amlodipine 10mg qd, Losartan 100mg qd, Hydralazine 50m TID, Isordil 20mg TID    #HLD   - c/w Lipitor 40mg qd  - f/u AM lipid panel.    #DM  - no meds per [place], obtain collaterals for med recs prior [facility] stay  - mISS while inpt, goal -180   - A1c 5.9 on admission    DVT PPX: SCDs for now iso GIB    Med consult team continues to follow with you. d/w Dr. Lb Smith . 61yo M PMHx HTN, HLD, DM, CAD(s/p cath years ago, RCA 99%, never intervened on), HFrEF 25-30%, ESRD s/p failed kidney transplant and required HD x 3yrs (last HD 3/16 via Left Arm AVF; HD TTS), Right AKA initially presented to [facility] 2/27 for respiratory distress after a dialysis, found to be septic, h/c complicated by AHRF requiring intubation for 24hr followed by VT arrest, h/c the complicated by massive LGIB (rectal ulcer seen on colonoscopy) requiring 7u pRBC, 1u FFP and 1u PLT. Transferred to [facility] (3/17) for ICD placement 2/2 Vtach and cardiac cath. Pt noted lassed loose stool with BRBPR, GI and surgery consulted. Flex Sigc ( 3/17) found prior hemoclip in the sigmoid colon, localized ulceration and erosive with no bleeding noted in rectum suggestive of solitary ulcer syndrome. Medicine consulted for comanagement.     #LGIB  #SANTOS  #Solitary ulcer syndrome  - hx coffee ground emesis @ [facility]; s/p EGD/colonoscopy showing rectal ulcer  - 3/17AM pt passed loose BM along with large amount of BRBPR  - per gen surgery no acute surgical intervention   - per GI:      - increased Protonix 40 mg IVP BID      - PIV large bore x2      - s/p flex sig(3/17)  for rectal ulcers, found Evidence of prior hemoclip in the sigmoid colon. Localized ulceration and erosive with no bleeding were noted in the rectum, suggestive of solitary ulcer syndrome           - Avoid anal digitation and enemas           - High-fiber diet and optimize laxatives to avoid constipation using miralax and dulcolax           - No absolute GI contraindications to anticoag/antplt therapy           - Repeat endoscopic evaluation recommended in 3 months  - Active T&S  - Hgb 10.1 stable  - Transfuse for hgb <8 (active CAD)    #CAD   - Previous cath at [facility] showing oLM 30% and RCA 99% that is not amenable to intervention.  - TTE 3/1/23 at [facility]: mild LVH, EF 25-30%, severe global wall motion dysfunction, mildly dilated LA, RV mildly dilated, all leaflets mild calcified, mod pHTN  - hx coffee ground emesis @ [facility]; s/p EGD/colonoscopy showing rectal ulcer and LHH  flex sig suggestive of solitary ulcer syndrome - per cardiology will hold off loading the patient due to recent LGIB  - No absolute GI contraindications to anticoag/antplt therapy  - start ASA and plavix today ( 3/20), if no bleed in few days, proceed with [facility] possible PCI later this week ( Thurs 3/24) per Cardiologist   - EP consulted for possible ICD placement ? Fri 3/24    #H/O ventricular tachycardia   - V[facility] arrest [facility]  - agree with EP consult for ICD placement    #ESRD on dialysis  - dialysis Tues, Thurs, Sat  - last received 3/16, next HD tomorrow Tues 3/21  - f/u nephro recs  - Electrolytes wnl, k 4.3, phos 1.9  - c/w calcium citrate 667mg TID  - on Renvela 800mg TID, f/u with renal as patient is with hypophosphatemia on admission     #S/P kidney transplant.   - per [place] pt takes Tacrolimus 2mg 2 times /day  - Obtain collateral regarding renal transplant, if indeed failed, what indications patient have for c/w tacrolimus 2mg?  - f/u tacrolimus serum levels  - f/u nephro recs    #HTN  - SBP on admission 155-170s  - c/w home meds. Amlodipine 10mg qd, Losartan 100mg qd, Hydralazine 50m TID, Isordil 20mg TID    #HLD   - c/w Lipitor 40mg qd  - f/u AM lipid panel.    #DM  - no meds per [place], obtain collaterals for med recs prior [facility] stay  - mISS while inpt, goal -180   - A1c 5.9 on admission    DVT PPX: SCDs for now iso GIB    Med consult team continues to follow with you. d/w Dr. Lb Smith .

## 2023-03-20 NOTE — PROGRESS NOTE ADULT - ASSESSMENT
61yo M PMHx HTN, HLD, DM,  ICM (s/p cath years ago, RCA 99%, never intervened on), HFrEF 25-30%, ESRD s/p failed kidney transplant (Left Arm AVF; HD TTS), R AKA admitted to [facility] 2/27 for respiratory distress after a HD, met SIRS criteria s/p vanc/zosyn. Intubated on 3/1, cardiac arrest (RoSC after 8mins) w/ subsequent Vtach arrest, placed on amio and pressors, ellen to 30s s/p atropine x2. Weaned off levophed and extubated 3/2. Course c/b coffee ground emesis, hemoccult +, Hgb 3.5 s/p 7units PRBCs, 1Unit FFP, 1 unit donor packed platelets. EGD/colo: melanosis duodeni but no acute bleed. Hypotensive and restarted on levophed gtt. CTA Abd: no active bleeding, possible focal proctitis; colonoscopy showing rectal ulcer. Started on hydrocortisone suppository and mesalamine. Hgb stabilized 11's, weaned off levophed and transferred to [facility] 3/17/23 for ICD eval 2/2 Vtach and cardiac cath. Hospital course @[facility] c/b BRBPR 3/17 AM with flex sig showing localized ulceration and no active rectal bleeding. Plan to observe for bleeding after resuming DAPT on 3/20/23 63yo M PMHx HTN, HLD, DM,  ICM (s/p cath years ago, RCA 99%, never intervened on), HFrEF 25-30%, ESRD s/p failed kidney transplant (Left Arm AVF; HD TTS), R AKA admitted to [facility] 2/27 for respiratory distress after a HD, met SIRS criteria s/p vanc/zosyn. Intubated on 3/1, cardiac arrest (RoSC after 8mins) w/ subsequent Vtach arrest, placed on amio and pressors, ellen to 30s s/p atropine x2. Weaned off levophed and extubated 3/2. Course c/b coffee ground emesis, hemoccult +, Hgb 3.5 s/p 7units PRBCs, 1Unit FFP, 1 unit donor packed platelets. EGD/colo: melanosis duodeni but no acute bleed. Hypotensive and restarted on levophed gtt. CTA Abd: no active bleeding, possible focal proctitis; colonoscopy showing rectal ulcer. Started on hydrocortisone suppository and mesalamine. Hgb stabilized 11's, weaned off levophed and transferred to [facility] 3/17/23 for ICD eval 2/2 Vtach and cardiac cath. Hospital course @[facility] c/b BRBPR 3/17 AM with flex sig showing localized ulceration and no active rectal bleeding. Plan to observe for bleeding after resuming DAPT on 3/20/23 61yo M PMHx HTN, HLD, DM,  ICM (s/p cath years ago, RCA 99%, never intervened on), HFrEF 25-30%, ESRD s/p failed kidney transplant (Left Arm AVF; HD TTS), R AKA admitted to [facility] 2/27 for respiratory distress after a HD, met SIRS criteria s/p vanc/zosyn. Intubated on 3/1, cardiac arrest (RoSC after 8mins) w/ subsequent Vtach arrest, placed on amio and pressors, ellen to 30s s/p atropine x2. Weaned off levophed and extubated 3/2. Course c/b coffee ground emesis, hemoccult +, Hgb 3.5 s/p 7units PRBCs, 1Unit FFP, 1 unit donor packed platelets. EGD/colo: melanosis duodeni but no acute bleed. Hypotensive and restarted on levophed gtt. CTA Abd: no active bleeding, possible focal proctitis; colonoscopy showing rectal ulcer. Started on hydrocortisone suppository and mesalamine. Hgb stabilized 11's, weaned off levophed and transferred to [facility] 3/17/23 for ICD eval 2/2 Vtach and cardiac cath. Hospital course @[facility] c/b BRBPR 3/17 AM with flex sig showing localized ulceration and no active rectal bleeding. Plan to observe for bleeding after resuming DAPT on 3/20/23 63yo M PMHx HTN, HLD, DM, ICM (s/p cath years ago, RCA 99%, never intervened on), HFrEF 25-30%, ESRD s/p failed kidney transplant (Left Arm AVF; HD TTS), R AKA admitted to [facility] 2/27 for respiratory distress after a HD, met SIRS criteria s/p vanc/zosyn. Intubated on 3/1, cardiac arrest (RoSC after 8mins) w/ subsequent Vtach arrest, placed on amio and pressors, ellen to 30s s/p atropine x2. Weaned off levophed and extubated 3/2. Course c/b coffee ground emesis, hemoccult +, Hgb 3.5 s/p 7units PRBCs, 1Unit FFP, 1 unit donor packed platelets. EGD/colo: melanosis duodeni but no acute bleed. Hypotensive and restarted on levophed gtt. CTA Abd: no active bleeding, possible focal proctitis; colonoscopy showing rectal ulcer. Started on hydrocortisone suppository and mesalamine. Hgb stabilized 11's, weaned off levophed and transferred to [facility] 3/17/23 for ICD eval 2/2 Vtach and cardiac cath. Hospital course @[facility] c/b BRBPR 3/17 AM with flex sig showing localized ulceration and no active rectal bleeding. Plan to observe for bleeding after resuming DAPT on 3/20/23 61yo M PMHx HTN, HLD, DM, ICM (s/p cath years ago, RCA 99%, never intervened on), HFrEF 25-30%, ESRD s/p failed kidney transplant (Left Arm AVF; HD TTS), R AKA admitted to [facility] 2/27 for respiratory distress after a HD, met SIRS criteria s/p vanc/zosyn. Intubated on 3/1, cardiac arrest (RoSC after 8mins) w/ subsequent Vtach arrest, placed on amio and pressors, ellen to 30s s/p atropine x2. Weaned off levophed and extubated 3/2. Course c/b coffee ground emesis, hemoccult +, Hgb 3.5 s/p 7units PRBCs, 1Unit FFP, 1 unit donor packed platelets. EGD/colo: melanosis duodeni but no acute bleed. Hypotensive and restarted on levophed gtt. CTA Abd: no active bleeding, possible focal proctitis; colonoscopy showing rectal ulcer. Started on hydrocortisone suppository and mesalamine. Hgb stabilized 11's, weaned off levophed and transferred to [facility] 3/17/23 for ICD eval 2/2 Vtach and cardiac cath. Hospital course @[facility] c/b BRBPR 3/17 AM with flex sig showing localized ulceration and no active rectal bleeding. Plan to observe for bleeding after resuming DAPT on 3/20/23 63yo M PMHx HTN, HLD, DM, ICM (s/p cath years ago, RCA 99%, never intervened on), HFrEF 25-30%, ESRD s/p failed kidney transplant (Left Arm AVF; HD TTS), R AKA admitted to [facility] 2/27 for respiratory distress after a HD, met SIRS criteria s/p vanc/zosyn. Intubated on 3/1, cardiac arrest (RoSC after 8mins) w/ subsequent Vtach arrest, placed on amio and pressors, ellen to 30s s/p atropine x2. Weaned off levophed and extubated 3/2. Course c/b coffee ground emesis, hemoccult +, Hgb 3.5 s/p 7units PRBCs, 1Unit FFP, 1 unit donor packed platelets. EGD/colo: melanosis duodeni but no acute bleed. Hypotensive and restarted on levophed gtt. CTA Abd: no active bleeding, possible focal proctitis; colonoscopy showing rectal ulcer. Started on hydrocortisone suppository and mesalamine. Hgb stabilized 11's, weaned off levophed and transferred to [facility] 3/17/23 for ICD eval 2/2 Vtach and cardiac cath. Hospital course @[facility] c/b BRBPR 3/17 AM with flex sig showing localized ulceration and no active rectal bleeding. Plan to observe for bleeding after resuming DAPT on 3/20/23

## 2023-03-21 DIAGNOSIS — R50.9 FEVER, UNSPECIFIED: ICD-10-CM

## 2023-03-21 LAB
ALBUMIN SERPL ELPH-MCNC: 2.9 G/DL — LOW (ref 3.3–5)
ALP SERPL-CCNC: 142 U/L — HIGH (ref 40–120)
ALT FLD-CCNC: 9 U/L — LOW (ref 10–45)
ANION GAP SERPL CALC-SCNC: 10 MMOL/L — SIGNIFICANT CHANGE UP (ref 5–17)
ANION GAP SERPL CALC-SCNC: 9 MMOL/L — SIGNIFICANT CHANGE UP (ref 5–17)
APPEARANCE UR: ABNORMAL
AST SERPL-CCNC: 17 U/L — SIGNIFICANT CHANGE UP (ref 10–40)
BACTERIA # UR AUTO: PRESENT /HPF
BASOPHILS # BLD AUTO: 0.05 K/UL — SIGNIFICANT CHANGE UP (ref 0–0.2)
BASOPHILS NFR BLD AUTO: 1.4 % — SIGNIFICANT CHANGE UP (ref 0–2)
BILIRUB SERPL-MCNC: 0.7 MG/DL — SIGNIFICANT CHANGE UP (ref 0.2–1.2)
BILIRUB UR-MCNC: NEGATIVE — SIGNIFICANT CHANGE UP
BLD GP AB SCN SERPL QL: NEGATIVE — SIGNIFICANT CHANGE UP
BUN SERPL-MCNC: 10 MG/DL — SIGNIFICANT CHANGE UP (ref 7–23)
BUN SERPL-MCNC: 29 MG/DL — HIGH (ref 7–23)
CALCIUM SERPL-MCNC: 7.9 MG/DL — LOW (ref 8.4–10.5)
CALCIUM SERPL-MCNC: 8.1 MG/DL — LOW (ref 8.4–10.5)
CHLORIDE SERPL-SCNC: 89 MMOL/L — LOW (ref 96–108)
CHLORIDE SERPL-SCNC: 97 MMOL/L — SIGNIFICANT CHANGE UP (ref 96–108)
CO2 SERPL-SCNC: 27 MMOL/L — SIGNIFICANT CHANGE UP (ref 22–31)
CO2 SERPL-SCNC: 29 MMOL/L — SIGNIFICANT CHANGE UP (ref 22–31)
COLOR SPEC: YELLOW — SIGNIFICANT CHANGE UP
COMMENT - URINE: SIGNIFICANT CHANGE UP
CREAT SERPL-MCNC: 2.91 MG/DL — HIGH (ref 0.5–1.3)
CREAT SERPL-MCNC: 5.88 MG/DL — HIGH (ref 0.5–1.3)
DIFF PNL FLD: ABNORMAL
EGFR: 10 ML/MIN/1.73M2 — LOW
EGFR: 24 ML/MIN/1.73M2 — LOW
EOSINOPHIL # BLD AUTO: 0.21 K/UL — SIGNIFICANT CHANGE UP (ref 0–0.5)
EOSINOPHIL NFR BLD AUTO: 5.8 % — SIGNIFICANT CHANGE UP (ref 0–6)
EPI CELLS # UR: SIGNIFICANT CHANGE UP /HPF (ref 0–5)
GLUCOSE BLDC GLUCOMTR-MCNC: 109 MG/DL — HIGH (ref 70–99)
GLUCOSE BLDC GLUCOMTR-MCNC: 114 MG/DL — HIGH (ref 70–99)
GLUCOSE BLDC GLUCOMTR-MCNC: 96 MG/DL — SIGNIFICANT CHANGE UP (ref 70–99)
GLUCOSE SERPL-MCNC: 158 MG/DL — HIGH (ref 70–99)
GLUCOSE SERPL-MCNC: 85 MG/DL — SIGNIFICANT CHANGE UP (ref 70–99)
GLUCOSE UR QL: NEGATIVE — SIGNIFICANT CHANGE UP
HCT VFR BLD CALC: 31 % — LOW (ref 39–50)
HCT VFR BLD CALC: 32.2 % — LOW (ref 39–50)
HGB BLD-MCNC: 10.1 G/DL — LOW (ref 13–17)
HGB BLD-MCNC: 10.2 G/DL — LOW (ref 13–17)
IMM GRANULOCYTES NFR BLD AUTO: 0.8 % — SIGNIFICANT CHANGE UP (ref 0–0.9)
KETONES UR-MCNC: NEGATIVE — SIGNIFICANT CHANGE UP
LACTATE SERPL-SCNC: 3 MMOL/L — HIGH (ref 0.5–2)
LEUKOCYTE ESTERASE UR-ACNC: ABNORMAL
LYMPHOCYTES # BLD AUTO: 1.04 K/UL — SIGNIFICANT CHANGE UP (ref 1–3.3)
LYMPHOCYTES # BLD AUTO: 28.5 % — SIGNIFICANT CHANGE UP (ref 13–44)
MAGNESIUM SERPL-MCNC: 1.7 MG/DL — SIGNIFICANT CHANGE UP (ref 1.6–2.6)
MAGNESIUM SERPL-MCNC: 1.8 MG/DL — SIGNIFICANT CHANGE UP (ref 1.6–2.6)
MCHC RBC-ENTMCNC: 28.7 PG — SIGNIFICANT CHANGE UP (ref 27–34)
MCHC RBC-ENTMCNC: 28.8 PG — SIGNIFICANT CHANGE UP (ref 27–34)
MCHC RBC-ENTMCNC: 31.7 GM/DL — LOW (ref 32–36)
MCHC RBC-ENTMCNC: 32.6 GM/DL — SIGNIFICANT CHANGE UP (ref 32–36)
MCV RBC AUTO: 88.3 FL — SIGNIFICANT CHANGE UP (ref 80–100)
MCV RBC AUTO: 90.7 FL — SIGNIFICANT CHANGE UP (ref 80–100)
MONOCYTES # BLD AUTO: 0.52 K/UL — SIGNIFICANT CHANGE UP (ref 0–0.9)
MONOCYTES NFR BLD AUTO: 14.2 % — HIGH (ref 2–14)
NEUTROPHILS # BLD AUTO: 1.8 K/UL — SIGNIFICANT CHANGE UP (ref 1.8–7.4)
NEUTROPHILS NFR BLD AUTO: 49.3 % — SIGNIFICANT CHANGE UP (ref 43–77)
NITRITE UR-MCNC: NEGATIVE — SIGNIFICANT CHANGE UP
NRBC # BLD: 0 /100 WBCS — SIGNIFICANT CHANGE UP (ref 0–0)
PH UR: 8.5 — HIGH (ref 5–8)
PHOSPHATE SERPL-MCNC: 4.6 MG/DL — HIGH (ref 2.5–4.5)
PLATELET # BLD AUTO: 107 K/UL — LOW (ref 150–400)
PLATELET # BLD AUTO: 113 K/UL — LOW (ref 150–400)
POTASSIUM SERPL-MCNC: 4 MMOL/L — SIGNIFICANT CHANGE UP (ref 3.5–5.3)
POTASSIUM SERPL-MCNC: 5.1 MMOL/L — SIGNIFICANT CHANGE UP (ref 3.5–5.3)
POTASSIUM SERPL-SCNC: 4 MMOL/L — SIGNIFICANT CHANGE UP (ref 3.5–5.3)
POTASSIUM SERPL-SCNC: 5.1 MMOL/L — SIGNIFICANT CHANGE UP (ref 3.5–5.3)
PROT SERPL-MCNC: 5.4 G/DL — LOW (ref 6–8.3)
PROT UR-MCNC: >=300 MG/DL
RAPID RVP RESULT: SIGNIFICANT CHANGE UP
RBC # BLD: 3.51 M/UL — LOW (ref 4.2–5.8)
RBC # BLD: 3.55 M/UL — LOW (ref 4.2–5.8)
RBC # FLD: 14.6 % — HIGH (ref 10.3–14.5)
RBC CASTS # UR COMP ASSIST: > 10 /HPF
RH IG SCN BLD-IMP: POSITIVE — SIGNIFICANT CHANGE UP
SARS-COV-2 RNA SPEC QL NAA+PROBE: SIGNIFICANT CHANGE UP
SODIUM SERPL-SCNC: 125 MMOL/L — LOW (ref 135–145)
SODIUM SERPL-SCNC: 136 MMOL/L — SIGNIFICANT CHANGE UP (ref 135–145)
SP GR SPEC: 1.02 — SIGNIFICANT CHANGE UP (ref 1–1.03)
UROBILINOGEN FLD QL: 0.2 E.U./DL — SIGNIFICANT CHANGE UP
WBC # BLD: 3.65 K/UL — LOW (ref 3.8–10.5)
WBC # BLD: 4.3 K/UL — SIGNIFICANT CHANGE UP (ref 3.8–10.5)
WBC # FLD AUTO: 3.65 K/UL — LOW (ref 3.8–10.5)
WBC # FLD AUTO: 4.3 K/UL — SIGNIFICANT CHANGE UP (ref 3.8–10.5)
WBC UR QL: > 10 /HPF

## 2023-03-21 PROCEDURE — 99223 1ST HOSP IP/OBS HIGH 75: CPT

## 2023-03-21 PROCEDURE — 71045 X-RAY EXAM CHEST 1 VIEW: CPT | Mod: 26,77

## 2023-03-21 PROCEDURE — 90935 HEMODIALYSIS ONE EVALUATION: CPT

## 2023-03-21 PROCEDURE — 99233 SBSQ HOSP IP/OBS HIGH 50: CPT | Mod: GC

## 2023-03-21 PROCEDURE — 99232 SBSQ HOSP IP/OBS MODERATE 35: CPT

## 2023-03-21 PROCEDURE — 71045 X-RAY EXAM CHEST 1 VIEW: CPT | Mod: 26

## 2023-03-21 RX ORDER — MAGNESIUM OXIDE 400 MG ORAL TABLET 241.3 MG
800 TABLET ORAL ONCE
Refills: 0 | Status: COMPLETED | OUTPATIENT
Start: 2023-03-21 | End: 2023-03-21

## 2023-03-21 RX ORDER — VANCOMYCIN HCL 1 G
VIAL (EA) INTRAVENOUS
Refills: 0 | Status: DISCONTINUED | OUTPATIENT
Start: 2023-03-21 | End: 2023-03-21

## 2023-03-21 RX ORDER — VANCOMYCIN HCL 1 G
1000 VIAL (EA) INTRAVENOUS ONCE
Refills: 0 | Status: DISCONTINUED | OUTPATIENT
Start: 2023-03-21 | End: 2023-03-21

## 2023-03-21 RX ORDER — ACETAMINOPHEN 500 MG
650 TABLET ORAL ONCE
Refills: 0 | Status: COMPLETED | OUTPATIENT
Start: 2023-03-21 | End: 2023-03-21

## 2023-03-21 RX ORDER — METOPROLOL TARTRATE 50 MG
12.5 TABLET ORAL EVERY 12 HOURS
Refills: 0 | Status: DISCONTINUED | OUTPATIENT
Start: 2023-03-21 | End: 2023-03-24

## 2023-03-21 RX ORDER — MEROPENEM 1 G/30ML
500 INJECTION INTRAVENOUS EVERY 24 HOURS
Refills: 0 | Status: DISCONTINUED | OUTPATIENT
Start: 2023-03-22 | End: 2023-03-27

## 2023-03-21 RX ORDER — MEROPENEM 1 G/30ML
INJECTION INTRAVENOUS
Refills: 0 | Status: DISCONTINUED | OUTPATIENT
Start: 2023-03-21 | End: 2023-03-27

## 2023-03-21 RX ORDER — MEROPENEM 1 G/30ML
500 INJECTION INTRAVENOUS ONCE
Refills: 0 | Status: COMPLETED | OUTPATIENT
Start: 2023-03-21 | End: 2023-03-21

## 2023-03-21 RX ORDER — VANCOMYCIN HCL 1 G
1000 VIAL (EA) INTRAVENOUS ONCE
Refills: 0 | Status: COMPLETED | OUTPATIENT
Start: 2023-03-21 | End: 2023-03-21

## 2023-03-21 RX ORDER — VANCOMYCIN HCL 1 G
1000 VIAL (EA) INTRAVENOUS ONCE
Refills: 0 | Status: DISCONTINUED | OUTPATIENT
Start: 2023-03-21 | End: 2023-03-22

## 2023-03-21 RX ADMIN — TAMSULOSIN HYDROCHLORIDE 0.8 MILLIGRAM(S): 0.4 CAPSULE ORAL at 22:22

## 2023-03-21 RX ADMIN — MEROPENEM 100 MILLIGRAM(S): 1 INJECTION INTRAVENOUS at 22:03

## 2023-03-21 RX ADMIN — Medication 50 MILLIGRAM(S): at 05:44

## 2023-03-21 RX ADMIN — MAGNESIUM OXIDE 400 MG ORAL TABLET 800 MILLIGRAM(S): 241.3 TABLET ORAL at 18:35

## 2023-03-21 RX ADMIN — ISOSORBIDE DINITRATE 20 MILLIGRAM(S): 5 TABLET ORAL at 05:43

## 2023-03-21 RX ADMIN — Medication 5 MILLIGRAM(S): at 00:17

## 2023-03-21 RX ADMIN — ISOSORBIDE DINITRATE 20 MILLIGRAM(S): 5 TABLET ORAL at 19:38

## 2023-03-21 RX ADMIN — CHLORHEXIDINE GLUCONATE 1 APPLICATION(S): 213 SOLUTION TOPICAL at 05:25

## 2023-03-21 RX ADMIN — ATORVASTATIN CALCIUM 40 MILLIGRAM(S): 80 TABLET, FILM COATED ORAL at 22:21

## 2023-03-21 RX ADMIN — Medication 50 MILLIGRAM(S): at 14:39

## 2023-03-21 RX ADMIN — PANTOPRAZOLE SODIUM 40 MILLIGRAM(S): 20 TABLET, DELAYED RELEASE ORAL at 05:44

## 2023-03-21 RX ADMIN — Medication 12.5 MILLIGRAM(S): at 06:29

## 2023-03-21 RX ADMIN — Medication 12.5 MILLIGRAM(S): at 17:55

## 2023-03-21 RX ADMIN — Medication 1000 MILLIGRAM(S): at 18:36

## 2023-03-21 RX ADMIN — TACROLIMUS 2 MILLIGRAM(S): 5 CAPSULE ORAL at 05:43

## 2023-03-21 RX ADMIN — Medication 50 MILLIGRAM(S): at 22:21

## 2023-03-21 RX ADMIN — Medication 650 MILLIGRAM(S): at 22:22

## 2023-03-21 RX ADMIN — Medication 650 MILLIGRAM(S): at 15:32

## 2023-03-21 RX ADMIN — TACROLIMUS 2 MILLIGRAM(S): 5 CAPSULE ORAL at 18:44

## 2023-03-21 RX ADMIN — LOSARTAN POTASSIUM 100 MILLIGRAM(S): 100 TABLET, FILM COATED ORAL at 05:45

## 2023-03-21 RX ADMIN — AMLODIPINE BESYLATE 10 MILLIGRAM(S): 2.5 TABLET ORAL at 05:45

## 2023-03-21 RX ADMIN — Medication 650 MILLIGRAM(S): at 16:30

## 2023-03-21 RX ADMIN — CLOPIDOGREL BISULFATE 75 MILLIGRAM(S): 75 TABLET, FILM COATED ORAL at 14:30

## 2023-03-21 RX ADMIN — PANTOPRAZOLE SODIUM 40 MILLIGRAM(S): 20 TABLET, DELAYED RELEASE ORAL at 17:56

## 2023-03-21 RX ADMIN — Medication 325 MILLIGRAM(S): at 14:30

## 2023-03-21 RX ADMIN — Medication 650 MILLIGRAM(S): at 23:22

## 2023-03-21 RX ADMIN — Medication 81 MILLIGRAM(S): at 14:29

## 2023-03-21 NOTE — CONSULT NOTE ADULT - ASSESSMENT
61 yo M with HTN, DM, HLD, ICM, HFrEF, ESRD s/p failed RT (HD via LUE AVF), R AKA transferred to [facility] from [facility] on 3/17 for ICD placement with fever.  Though he has no leukocytosis, is on tacrolimus (relatively low dose, unclear if being tapered), as well as low dose prednisone.  He recently completed course of broad-spectrum antibiotics, unclear exactly when, for presumed pneumonia.  CT chest from OSH c/c pulmonary edema and atelectasis.  Rigors raises concern for bacterial infection and he has multiple potential sources including lung.  CXR does not clearly show infiltrate.  He has RUQ tenderness - would evaluate for cholecystitis or other RUQ process.  He has positive UA - difficult to interpret in setting of ESRD but transplanted kidney had fullness in the extrarenal pelvis with non-obstructing stone in pelvis.  Also, he has a PIV distal R forearm that has a dressing not used at [facility] - likely from [facility].  Regarding potential infections in setting of RT, his CMV status is unknown and he has recently received a large number of transfusions.  He is on relatively low immunosuppressive doses - PJP unlikely but given report of CT findings c/c interstitial pneumonia from OSH, would evaluate.    Suggest:  - Blood cultures X 2 sets  - F/U urine culture  - rRVP  - Abd US to evaluate RUQ and transplanted kidney  - CMV IgM, IgG and PCR  - Serum Fungitell, LDH  - D/C PIV R distal forearm  - Vancomycin 1000 mg IV X 1.  Please check trough prior to next HD.  - Start meropenem 500 mg IV q24h.  Needs to be given daily but after HD on the days he is dialyzed.  Recommendations discussed with primary team.  Will follow with you - team 2.   63 yo M with HTN, DM, HLD, ICM, HFrEF, ESRD s/p failed RT (HD via LUE AVF), R AKA transferred to [facility] from [facility] on 3/17 for ICD placement with fever.  Though he has no leukocytosis, is on tacrolimus (relatively low dose, unclear if being tapered), as well as low dose prednisone.  He recently completed course of broad-spectrum antibiotics, unclear exactly when, for presumed pneumonia.  CT chest from OSH c/c pulmonary edema and atelectasis.  Rigors raises concern for bacterial infection and he has multiple potential sources including lung.  CXR does not clearly show infiltrate.  He has RUQ tenderness - would evaluate for cholecystitis or other RUQ process.  He has positive UA - difficult to interpret in setting of ESRD but transplanted kidney had fullness in the extrarenal pelvis with non-obstructing stone in pelvis.  Also, he has a PIV distal R forearm that has a dressing not used at [facility] - likely from [facility].  Regarding potential infections in setting of RT, his CMV status is unknown and he has recently received a large number of transfusions.  He is on relatively low immunosuppressive doses - PJP unlikely but given report of CT findings c/c interstitial pneumonia from OSH, would evaluate.    Suggest:  - Blood cultures X 2 sets  - F/U urine culture  - rRVP  - Abd US to evaluate RUQ and transplanted kidney  - CMV IgM, IgG and PCR  - Serum Fungitell, LDH  - D/C PIV R distal forearm  - Vancomycin 1000 mg IV X 1.  Please check trough prior to next HD.  - Start meropenem 500 mg IV q24h.  Needs to be given daily but after HD on the days he is dialyzed.  Recommendations discussed with primary team.  Will follow with you - team 2.   61 yo M with HTN, DM, HLD, ICM, HFrEF, ESRD s/p failed RT (HD via LUE AVF), R AKA transferred to [facility] from [facility] on 3/17 for ICD placement with fever.  Though he has no leukocytosis, is on tacrolimus (relatively low dose, unclear if being tapered), as well as low dose prednisone.  He recently completed course of broad-spectrum antibiotics, unclear exactly when, for presumed pneumonia.  CT chest from OSH c/c pulmonary edema and atelectasis.  Rigors raises concern for bacterial infection and he has multiple potential sources including lung.  CXR does not clearly show infiltrate.  He has RUQ tenderness - would evaluate for cholecystitis or other RUQ process.  He has positive UA - difficult to interpret in setting of ESRD but transplanted kidney had fullness in the extrarenal pelvis with non-obstructing stone in pelvis.  Also, he has a PIV distal R forearm that has a dressing not used at [facility] - likely from [facility].  Regarding potential infections in setting of RT, his CMV status is unknown and he has recently received a large number of transfusions.  He is on relatively low immunosuppressive doses - PJP unlikely but given report of CT findings c/c interstitial pneumonia from OSH, would evaluate.    Suggest:  - Blood cultures X 2 sets  - F/U urine culture  - rRVP  - Abd US to evaluate RUQ and transplanted kidney  - CMV IgM, IgG and PCR  - Serum Fungitell, LDH  - D/C PIV R distal forearm  - Vancomycin 1000 mg IV X 1.  Please check trough prior to next HD.  - Start meropenem 500 mg IV q24h.  Needs to be given daily but after HD on the days he is dialyzed.  Recommendations discussed with primary team.  Will follow with you - team 2.   63 yo M with HTN, DM, HLD, ICM, HFrEF, ESRD s/p failed RT (HD via LUE AVF), R AKA transferred to [facility] from [facility] on 3/17 for ICD placement with fever.  Though he has no leukocytosis, is on tacrolimus (relatively low dose, unclear if being tapered), as well as low dose prednisone.  He recently completed course of broad-spectrum antibiotics, unclear exactly when, for presumed pneumonia.  CT chest from OSH c/c pulmonary edema and atelectasis.  Rigors raises concern for bacterial infection and he has multiple potential sources including lung.  CXR does not clearly show infiltrate.  He has RUQ tenderness - would evaluate for cholecystitis or other RUQ process.  He has rectal ulcer with potential for bacterial translocation.  He has positive UA - difficult to interpret in setting of ESRD but transplanted kidney had fullness in the extrarenal pelvis with non-obstructing stone in pelvis, bladder wall thickening on CT from 3/12.  Also, he has a PIV distal R forearm that has a dressing not used at [facility] - likely from [facility].  Regarding potential infections in setting of RT, his CMV status is unknown and he has recently received a large number of transfusions.  He is on relatively low immunosuppressive doses - PJP unlikely but given report of CT findings c/c interstitial pneumonia from OSH, would evaluate.    Suggest:  - Blood cultures X 2 sets  - F/U urine culture  - rRVP  - Abd US to evaluate RUQ and transplanted kidney  - CMV IgM, IgG and PCR  - Serum Fungitell, LDH  - D/C PIV R distal forearm  - Vancomycin 1000 mg IV X 1.  Please check trough prior to next HD.  - Start meropenem 500 mg IV q24h.  Needs to be given daily but after HD on the days he is dialyzed.  Recommendations discussed with primary team.  Will follow with you - team 2.   61 yo M with HTN, DM, HLD, ICM, HFrEF, ESRD s/p failed RT (HD via LUE AVF), R AKA transferred to [facility] from [facility] on 3/17 for ICD placement with fever.  Though he has no leukocytosis, is on tacrolimus (relatively low dose, unclear if being tapered), as well as low dose prednisone.  He recently completed course of broad-spectrum antibiotics, unclear exactly when, for presumed pneumonia.  CT chest from OSH c/c pulmonary edema and atelectasis.  Rigors raises concern for bacterial infection and he has multiple potential sources including lung.  CXR does not clearly show infiltrate.  He has RUQ tenderness - would evaluate for cholecystitis or other RUQ process.  He has rectal ulcer with potential for bacterial translocation.  He has positive UA - difficult to interpret in setting of ESRD but transplanted kidney had fullness in the extrarenal pelvis with non-obstructing stone in pelvis, bladder wall thickening on CT from 3/12.  Also, he has a PIV distal R forearm that has a dressing not used at [facility] - likely from [facility].  Regarding potential infections in setting of RT, his CMV status is unknown and he has recently received a large number of transfusions.  He is on relatively low immunosuppressive doses - PJP unlikely but given report of CT findings c/c interstitial pneumonia from OSH, would evaluate.    Suggest:  - Blood cultures X 2 sets  - F/U urine culture  - rRVP  - Abd US to evaluate RUQ and transplanted kidney  - CMV IgM, IgG and PCR  - Serum Fungitell, LDH  - D/C PIV R distal forearm  - Vancomycin 1000 mg IV X 1.  Please check trough prior to next HD.  - Start meropenem 500 mg IV q24h.  Needs to be given daily but after HD on the days he is dialyzed.  Recommendations discussed with primary team.  Will follow with you - team 2.   61 yo M with HTN, DM, HLD, ICM, HFrEF, ESRD s/p failed RT (HD via LUE AVF), R AKA transferred to [facility] from [facility] on 3/17 for ICD placement with fever.  Though he has no leukocytosis, is on tacrolimus (relatively low dose, unclear if being tapered), as well as low dose prednisone.  He recently completed course of broad-spectrum antibiotics, unclear exactly when, for presumed pneumonia.  CT chest from OSH c/c pulmonary edema and atelectasis.  Rigors raises concern for bacterial infection and he has multiple potential sources including lung.  CXR does not clearly show infiltrate.  He has RUQ tenderness - would evaluate for cholecystitis or other RUQ process.  He has rectal ulcer with potential for bacterial translocation.  He has positive UA - difficult to interpret in setting of ESRD but transplanted kidney had fullness in the extrarenal pelvis with non-obstructing stone in pelvis, bladder wall thickening on CT from 3/12.  Also, he has a PIV distal R forearm that has a dressing not used at [facility] - likely from [facility].  Regarding potential infections in setting of RT, his CMV status is unknown and he has recently received a large number of transfusions.  He is on relatively low immunosuppressive doses - PJP unlikely but given report of CT findings c/c interstitial pneumonia from OSH, would evaluate.    Suggest:  - Blood cultures X 2 sets  - F/U urine culture  - rRVP  - Abd US to evaluate RUQ and transplanted kidney  - CMV IgM, IgG and PCR  - Serum Fungitell, LDH  - D/C PIV R distal forearm  - Vancomycin 1000 mg IV X 1.  Please check trough prior to next HD.  - Start meropenem 500 mg IV q24h.  Needs to be given daily but after HD on the days he is dialyzed.  Recommendations discussed with primary team.  Will follow with you - team 2.

## 2023-03-21 NOTE — PROGRESS NOTE ADULT - SUBJECTIVE AND OBJECTIVE BOX
OVERNIGHT EVENTS: None    SUBJECTIVE / INTERVAL HPI: Patient seen and examined at bedside. Patient complaining of feeling cold and feverish after HD today. Denies any pain, n/v/d/c, chest pain, shortness of breath.    VITAL SIGNS:  Vital Signs Last 24 Hrs  T(C): 38.9 (21 Mar 2023 15:02), Max: 38.9 (21 Mar 2023 15:02)  T(F): 102 (21 Mar 2023 15:02), Max: 102 (21 Mar 2023 15:02)  HR: 79 (21 Mar 2023 14:56) (52 - 79)  BP: 163/69 (21 Mar 2023 14:56) (120/53 - 163/69)  BP(mean): --  RR: 18 (21 Mar 2023 14:56) (17 - 18)  SpO2: 97% (21 Mar 2023 14:56) (97% - 100%)    Parameters below as of 21 Mar 2023 14:56  Patient On (Oxygen Delivery Method): room air      I&O's Summary    20 Mar 2023 07:01  -  21 Mar 2023 07:00  --------------------------------------------------------  IN: 780 mL / OUT: 325 mL / NET: 455 mL    21 Mar 2023 07:01  -  21 Mar 2023 16:58  --------------------------------------------------------  IN: 730 mL / OUT: 2600 mL / NET: -1870 mL        PHYSICAL EXAM:    General: NAD  HEENT: NC/AT; PERRL, anicteric sclera; MMM  Neck: supple  Cardiovascular: +S1/S2; RRR  Respiratory: CTA B/L; no W/R/R  Gastrointestinal: soft, NT/ND; +BSx4  Extremities: WWP; left forearm AVM, IV in right forearm  Vascular: 2+ radial, DP/PT pulses B/L  Neurological: AAOx3; no focal deficits    MEDICATIONS:  MEDICATIONS  (STANDING):  amLODIPine   Tablet 10 milliGRAM(s) Oral daily  aspirin enteric coated 81 milliGRAM(s) Oral daily  atorvastatin 40 milliGRAM(s) Oral at bedtime  chlorhexidine 2% Cloths 1 Application(s) Topical daily  clopidogrel Tablet 75 milliGRAM(s) Oral daily  dextrose 5%. 1000 milliLiter(s) (50 mL/Hr) IV Continuous <Continuous>  dextrose 5%. 1000 milliLiter(s) (100 mL/Hr) IV Continuous <Continuous>  dextrose 50% Injectable 25 Gram(s) IV Push once  dextrose 50% Injectable 12.5 Gram(s) IV Push once  dextrose 50% Injectable 25 Gram(s) IV Push once  ferrous    sulfate 325 milliGRAM(s) Oral daily  glucagon  Injectable 1 milliGRAM(s) IntraMuscular once  hydrALAZINE 50 milliGRAM(s) Oral every 8 hours  insulin lispro (ADMELOG) corrective regimen sliding scale   SubCutaneous every 6 hours  isosorbide   dinitrate Tablet (ISORDIL) 20 milliGRAM(s) Oral three times a day  losartan 100 milliGRAM(s) Oral daily  magnesium oxide 800 milliGRAM(s) Oral once  metoprolol tartrate 12.5 milliGRAM(s) Oral every 12 hours  pantoprazole  Injectable 40 milliGRAM(s) IV Push every 12 hours  polyethylene glycol 3350 17 Gram(s) Oral daily  tacrolimus 2 milliGRAM(s) Oral every 12 hours  tamsulosin 0.8 milliGRAM(s) Oral at bedtime    MEDICATIONS  (PRN):  acetaminophen     Tablet .. 650 milliGRAM(s) Oral every 6 hours PRN Mild Pain (1 - 3), Moderate Pain (4 - 6)  dextrose Oral Gel 15 Gram(s) Oral once PRN Blood Glucose LESS THAN 70 milliGRAM(s)/deciliter  sodium chloride 0.65% Nasal 1 Spray(s) Both Nostrils every 4 hours PRN Nasal Congestion      ALLERGIES:  Allergies    No Known Allergies    Intolerances        LABS:                        10.1   4.30  )-----------( 107      ( 21 Mar 2023 15:32 )             31.0     -    136  |  97  |  10  ----------------------------<  158<H>  4.0   |  29  |  2.91<H>    Ca    8.1<L>      21 Mar 2023 15:32  Phos  4.6       Mg     1.7     -    TPro  5.4<L>  /  Alb  2.9<L>  /  TBili  0.7  /  DBili  x   /  AST  17  /  ALT  9<L>  /  AlkPhos  142<H>        Urinalysis Basic - ( 21 Mar 2023 15:02 )    Color: Yellow / Appearance: Hazy / S.020 / pH: x  Gluc: x / Ketone: NEGATIVE  / Bili: Negative / Urobili: 0.2 E.U./dL   Blood: x / Protein: >=300 mg/dL / Nitrite: NEGATIVE   Leuk Esterase: Moderate / RBC: > 10 /HPF / WBC > 10 /HPF   Sq Epi: x / Non Sq Epi: 0-5 /HPF / Bacteria: Present /HPF      CAPILLARY BLOOD GLUCOSE      POCT Blood Glucose.: 96 mg/dL (21 Mar 2023 06:43)      RADIOLOGY & ADDITIONAL TESTS: Reviewed.

## 2023-03-21 NOTE — PROGRESS NOTE ADULT - PROBLEM SELECTOR PLAN 2
Vtach arrest @[facility]  - no tele events @ [facility]  - EP consulted for ICD placement but will need ischemic eval/cath first   - started on lopressor 12.5 mg BID per EP recommendations Vtach arrest @[facility]  - no tele events @ [facility]  - EP consulted for ICD placement but will need ischemic eval/cath first   - started on lopressor 12.5 mg BID per EP recommendations Vtach arrest @[facility]  - no tele events @ [facility]  - EP consulted for ICD placement but will need ischemic eval/cath first   - started on lopressor 12.5 mg BID per EP recommendations Patient febrile to 102, associated w/ chills and URI sx on 03/21/23 status post dialysis. Lactate 3, otherwise labs unremarkable   - Vanc/Zoysn   - med consulted and recommended Vanc/Zoysn   - ID consulted   - follow up BCx Patient febrile to 102, associated w/ chills and URI sx on 03/21/23 status post dialysis. Lactate 3, otherwise labs unremarkable   - Vanc/Zoysn   - Medicine consulted   - ID consulted   - follow up BCx and repeat CXR Patient febrile to 102, associated w/ chills and URI sx on 03/21/23 status post dialysis. Lactate 3, otherwise labs unremarkable   - Per ID recs will start Vancomycin 1g and Meropenem 500 mg Q 24 hrs. Follow up CMV PCR, IgG, IgM, RVP, UA, UCx    - Follow up BCx and repeat CXR  - Medicine following Patient febrile to 102, associated w/ chills, HA, nausea, and URI sx on 03/21/23 status post dialysis. Lactate 3, otherwise labs unremarkable. CXR   - Per ID recs will start Vancomycin 1g and Meropenem 500 mg Q 24 hrs  - Follow up CMV PCR/IgG/IgM, RVP, UA, UCx, BCx   - Medicine following Patient febrile to 102, associated w/ chills, HA, nausea, and URI sx on 03/21/23 status post dialysis. Lactate 3, otherwise labs unremarkable. CXR w/ opacity most likely CHF  - Per ID recs will start Vancomycin 1g and Meropenem 500 mg Q 24 hrs  - Follow up CMV PCR/IgG/IgM, RVP, UA, UCx, BCx   - Medicine following Patient febrile to 102, associated w/ chills, HA, nausea, and URI sx on 03/21/23 status post dialysis. Lactate 3, otherwise labs unremarkable. CXR w/ opacity most likely CHF  - Per ID recs will start Vancomycin 1g and Meropenem 500 mg Q 24 hrs (first dose of both 3/21/23 in PM)  - Follow up CMV PCR/IgG/IgM, RVP, UA, UCx, BCx, abdominal US   - Medicine following

## 2023-03-21 NOTE — PROGRESS NOTE ADULT - ATTENDING COMMENTS
pt seen and evaluated while on dialysis   tolerating treatment well.  hemodynamically stable    63 yo man with ESRD on HD, HTN, DM, s/p failed kidney transplant continues on tacrolimus; transferred to [facility] from [place] for ICD placement and Cardiac Cath after prolonged admission  with cardiac arrest, vtach arrest and hypovolemic shock 2/2 GI bleed pt seen and evaluated while on dialysis   tolerating treatment well.  hemodynamically stable    61 yo man with ESRD on HD, HTN, DM, s/p failed kidney transplant continues on tacrolimus; transferred to [facility] from [place] for ICD placement and Cardiac Cath after prolonged admission  with cardiac arrest, vtach arrest and hypovolemic shock 2/2 GI bleed pt seen and evaluated while on dialysis   tolerating treatment well.  hemodynamically stable    63 yo man with ESRD on HD, HTN, DM, s/p failed kidney transplant continues on tacrolimus; transferred to [facility] from [place] for ICD placement and Cardiac Cath after prolonged admission  with cardiac arrest, vtach arrest and hypovolemic shock 2/2 GI bleed

## 2023-03-21 NOTE — PROGRESS NOTE ADULT - ASSESSMENT
62Y M w/ HTN, DM, ESRD s/p failed kidney transplant who was transferred to [facility] from [place] for ICD placement and Cardiac Cath after prolonged admission c/b cardiac arrest and vtach arrest and hypovolemic shock 2/2 GI bleed     #Seen on HD today    #ESRD on HD TTS  Seen on HD today. Completed a 3.5 hr session with F160 dialyzers 2K bath, and achieved 2 L UF as planned without any complications  Daily BMP   Continue with Tacro at home dose. Will obtain outpatient records to see if needs to be continued  Next HD 3/23      HTN:  UF with HD as tolerated   Continue home antihypertensive medications; amlodipine 10mg, losartan 100mg, metoprolol 12.5 BID, hydralazine 50mg TID    Anemia:  Hgb at goal at 10.1  Will obtain outpatient HD records    MBD:  Corrected calcium 8.1   Phos: 4.6  iPTH 431  Check phos twice weekly  Will obtain outpatient records       62Y M w/ HTN, DM, ESRD s/p failed kidney transplant who was transferred to [facility] from [place] for ICD placement and Cardiac Cath after prolonged admission c/b cardiac arrest and vtach arrest and hypovolemic shock 2/2 GI bleed     #Seen on HD today    #ESRD on HD TTS  Seen on HD today. Completed a 3.5 hr session with F160 dialyzers 2K bath, and achieved 2 L UF as planned without any complications  Daily BMP   Continue with Tacro at home dose. Will obtain outpatient records to see if needs to be continued  Next HD 3/23      HTN:  UF with HD as tolerated   Continue home antihypertensive medications; amlodipine 10mg, losartan 100mg, metoprolol 12.5 BID, hydralazine 50mg TID    Anemia:  Hgb at goal at 10.1  Will obtain outpatient HD records    MBD:  Corrected calcium 8.1   Phos: 4.6  iPTH 431  Check phos twice weekly  Will obtain outpatient records       62Y M w/ HTN, DM, ESRD s/p failed kidney transplant who was transferred to [facility] from [place] for ICD placement and Cardiac Cath after prolonged admission c/b cardiac arrest and vtach arrest and hypovolemic shock 2/2 GI bleed     #Seen on HD today    #ESRD on HD TTS  Seen on HD today. Completed a 3.5 hr session with F160 dialyzers 2K bath, and achieved 2 L UF as planned without any complications  Daily BMP   Continue with Tacro at home dose. Will obtain outpatient records to see if needs to be continued  Next HD 3/23      HTN:  UF with HD as tolerated   Continue home antihypertensive medications; amlodipine 10mg, losartan 100mg, metoprolol 12.5 BID, hydralazine 50mg TID    Anemia:  Hgb at goal at 10.1  Will obtain outpatient HD records    MBD:  Corrected calcium 8.1   Phos: 4.6  iPTH 431  Check phos twice weekly  Will obtain outpatient records

## 2023-03-21 NOTE — PROGRESS NOTE ADULT - PROBLEM SELECTOR PLAN 1
Previous cath at [facility] showing oLM 30% and RCA 99% that is not amenable to intervention.  - TTE 3/1/23 at [facility]: mild LVH, EF 25-30%, severe global wall motion dysfunction, mildly dilated LA, RV mildly dilated, all leaflets mild calcified, mod pHTN  - hx coffee ground emesis @ [facility]; s/p EGD/colonoscopy showing melanosis without active bleed  - cleared for by GI for DAPT no bleeding observed for 48 hours and was resumed on ASA 81 mg QD/Plavix 75 mg QD on 03/20/23. Will monitor for bleeding before cath mid-late week (please obtain consent for cath)  - Pending infectious workup (RSV panel ordered) prior to ICD placement given subjective fevers (oral temp of 100.1 on 3/18), chills, and cough  - Continue Aspirin 81 mg daily, Plavix 75 mg daily, losartan 100 mg daily, and Lopressor 12.5 mg BID Previous cath at [facility] showing oLM 30% and RCA 99% that is not amenable to intervention.  - TTE 3/1/23 at [facility]: mild LVH, EF 25-30%, severe global wall motion dysfunction, mildly dilated LA, RV mildly dilated, all leaflets mild calcified, mod pHTN  - hx coffee ground emesis @ [facility]; s/p EGD/colonoscopy showing melanosis without active bleed  - cleared for by GI for DAPT no bleeding observed for 48 hours and was resumed on ASA 81 mg QD/Plavix 75 mg QD on 03/20/23. Will monitor for bleeding before cath mid-late week (please obtain consent for cath)  - Pending infectious workup (RSV panel ordered) prior to ICD placement given subjective fevers (oral temp of 100.1 on 3/18), chills, and cough  - Continue Aspirin 81 mg daily, Plavix 75 mg daily, losartan 100 mg daily, and Lopressor 12.5 mg BID Previous cath at [facility] showing oLM 30% and RCA 99% that is not amenable to intervention.  - TTE 3/1/23 at [facility]: mild LVH, EF 25-30%, severe global wall motion dysfunction, mildly dilated LA, RV mildly dilated, all leaflets mild calcified, mod pHTN  - hx coffee ground emesis @ [facility]; s/p EGD/colonoscopy showing melanosis without active bleed  - cleared for by GI for DAPT no bleeding observed for 48 hours and was resumed on ASA 81 mg QD/Plavix 75 mg QD on 03/20/23. Will monitor for bleeding before cath mid-late week (please obtain consent for cath)  - Pending infectious workup (RSV panel ordered) prior to ICD placement given subjective fevers (oral temp of 100.1 on 3/18), chills, and cough  - Continue Aspirin 81 mg daily, Plavix 75 mg daily, losartan 100 mg daily, and Lopressor 12.5 mg BID Previous cath at [facility] showing oLM 30% and RCA 99% that is not amenable to intervention.  - TTE 3/1/23 at [facility]: mild LVH, EF 25-30%, severe global wall motion dysfunction, mildly dilated LA, RV mildly dilated, all leaflets mild calcified, mod pHTN  - hx coffee ground emesis @ [facility]; s/p EGD/colonoscopy showing melanosis without active bleed  - cleared for by GI for DAPT no bleeding observed for 48 hours and was resumed on ASA 81 mg QD/Plavix 75 mg QD on 03/20/23. Will monitor for bleeding before cath mid-late week (please obtain consent for cath)  - Pending infectious workup (RSV panel ordered) prior to ICD placement given patient was febrile to 102 on 03/21/23 associated with URI sx   - Continue Aspirin 81 mg daily, Plavix 75 mg daily, losartan 100 mg daily, and Lopressor 12.5 mg BID Previous cath at [facility] showing oLM 30% and RCA 99% that is not amenable to intervention.  - TTE 3/1/23 at [facility]: mild LVH, EF 25-30%, severe global wall motion dysfunction, mildly dilated LA, RV mildly dilated, all leaflets mild calcified, mod pHTN  - hx coffee ground emesis @ [facility]; s/p EGD/colonoscopy showing melanosis without active bleed  - cleared for by GI for DAPT no bleeding observed for 48 hours and was resumed on ASA 81 mg QD/Plavix 75 mg QD on 03/20/23. Will monitor for bleeding before cath mid-late week (please obtain consent for cath)  - Pending infectious workup (RSV panel ordered) prior to ICD placement given patient was febrile to 102 on 03/21/23 associated with URI sx   - Continue Aspirin 81 mg daily, Plavix 75 mg daily, losartan 100 mg daily, and Lopressor 12.5 mg BID Previous cath at [facility] showing oLM 30% and RCA 99% that is not amenable to intervention.  - TTE 3/1/23 at [facility]: mild LVH, EF 25-30%, severe global wall motion dysfunction, mildly dilated LA, RV mildly dilated, all leaflets mild calcified, mod pHTN  - hx coffee ground emesis @ [facility]; s/p EGD/colonoscopy showing melanosis without active bleed  - cleared for by GI for DAPT no bleeding observed for 48 hours and was resumed on ASA 81 mg QD/Plavix 75 mg QD on 03/20/23. Will monitor for bleeding before cath mid-late week (please obtain consent for cath)  - Pending infectious workup (RSV panel ordered) prior to ICD placement given patient was febrile to 102 on 03/21/23 associated with URI sx   - Continue Aspirin 81 mg daily, Plavix 75 mg daily, losartan 100 mg daily, and Lopressor 12.5 mg BID Previous cath at [facility] showing oLM 30% and RCA 99% that is not amenable to intervention.  - TTE 3/1/23 at [facility]: mild LVH, EF 25-30%, severe global wall motion dysfunction, mildly dilated LA, RV mildly dilated, all leaflets mild calcified, mod pHTN  - hx coffee ground emesis @ [facility]; s/p EGD/colonoscopy showing melanosis without active bleed  - cleared for by GI for DAPT no bleeding observed for 48 hours and was resumed on ASA 81 mg QD/Plavix 75 mg QD on 03/20/23. Will monitor for bleeding before cath mid-late week (please obtain consent for cath)  - Patient febrile to 102 afternoon of 03/21/23, pending infectious workup as per ID. ICD placement pending patient's stability   - Continue Aspirin 81 mg daily, Plavix 75 mg daily, losartan 100 mg daily, and Lopressor 12.5 mg BID Previous cath at [facility] showing oLM 30% and RCA 99% that is not amenable to intervention.  - TTE 3/1/23 at [facility]: mild LVH, EF 25-30%, severe global wall motion dysfunction, mildly dilated LA, RV mildly dilated, all leaflets mild calcified, mod pHTN  - hx coffee ground emesis @ [facility]; s/p EGD/colonoscopy showing melanosis without active bleed  - cleared for by GI for DAPT no bleeding observed for 48 hours and was resumed on ASA 81 mg QD/Plavix 75 mg QD on 03/20/23. Will monitor for bleeding before cath mid-late week (please obtain consent for cath)  - Patient febrile to 102 afternoon of 03/21/23, pending infectious workup as per ID. ICD placement pending patient's stability   - Continue Aspirin 81 mg daily, Plavix 75 mg daily, losartan 100 mg daily, and Lopressor 12.5 mg BID Previous cath at [facility] showing oLM 30% and RCA 99% that is not amenable to intervention.  - TTE 3/1/23 at [facility]: mild LVH, EF 25-30%, severe global wall motion dysfunction, mildly dilated LA, RV mildly dilated, all leaflets mild calcified, mod pHTN  - hx coffee ground emesis @ [facility]; s/p EGD/colonoscopy showing melanosis without active bleed  - cleared for by GI for DAPT no bleeding observed for 48 hours and was resumed on ASA 81 mg QD/Plavix 75 mg QD on 03/20/23. Will monitor for bleeding before cath mid-late week (please obtain consent for cath)  - Patient febrile to 102 afternoon of 03/21/23, pending infectious workup as per ID. ICD placement pending patient's stability   - Continue Aspirin 81 mg daily, Plavix 75 mg daily, losartan 100 mg daily, and Lopressor 12.5 mg BID Previous cath at [facility] showing oLM 30% and RCA 99% that is not amenable to intervention.  - Patient febrile to 102 afternoon of 03/21/23, pending infectious workup as per ID. ICD placement pending patient's stability/infectious work up  - cleared for by GI for DAPT no bleeding observed for 48 hours and was resumed on ASA 81 mg QD/Plavix 75 mg QD on 03/20/23. H/H stable   - TTE 3/1/23 at [facility]: mild LVH, EF 25-30%, severe global wall motion dysfunction, mildly dilated LA, RV mildly dilated, all leaflets mild calcified, mod pHTN  - Continue Aspirin 81 mg daily, Plavix 75 mg daily, losartan 100 mg daily, and Lopressor 12.5 mg BID Previous cath at [facility] showing oLM 30% and RCA 99% that is not amenable to intervention.  - Patient febrile to 102 afternoon of 03/21/23, pending infectious workup as per ID. ICD placement pending patient's stability/infectious work up  - cleared for by GI for DAPT no bleeding observed for 48 hours and was resumed on ASA 81 mg QD/Plavix 75 mg QD on 03/20/23. H/H stable   - TTE 3/1/23 at [facility]: mild LVH, EF 25-30%, severe global wall motion dysfunction, mildly dilated LA, RV mildly dilated, all leaflets mild calcified, mod pHTN  - Continue Aspirin 81 mg daily, Plavix 75 mg daily, losartan 100 mg daily, and Lopressor 12.5 mg BID Previous cath at [facility] showing oLM 30% and RCA 99% that is not amenable to intervention.  - Patient febrile to 102 afternoon of 03/21/23, pending infectious workup as per ID. ICD placement pending patient's stability/infectious work up  - cleared for by GI for DAPT no bleeding observed for 48 hours and was resumed on ASA 81 mg QD/Plavix 75 mg QD on 03/20/23. H/H stable   - TTE 3/1/23 at [facility]: mild LVH, EF 25-30%, severe global wall motion dysfunction, mildly dilated LA, RV mildly dilated, all leaflets mild calcified, mod pHTN  - Continue Aspirin 81 mg daily, Plavix 75 mg daily, losartan 100 mg daily, and Lopressor 12.5 mg BID

## 2023-03-21 NOTE — PROGRESS NOTE ADULT - SUBJECTIVE AND OBJECTIVE BOX
Patient is a 62y Male seen and evaluated at bedside during dialysis. Laying comfortably in bed. Denies any symptoms      Meds:    acetaminophen     Tablet .. 650 every 6 hours PRN  amLODIPine   Tablet 10 daily  aspirin enteric coated 81 daily  atorvastatin 40 at bedtime  chlorhexidine 2% Cloths 1 daily  clopidogrel Tablet 75 daily  dextrose 5%. 1000 <Continuous>  dextrose 5%. 1000 <Continuous>  dextrose 50% Injectable 25 once  dextrose 50% Injectable 12.5 once  dextrose 50% Injectable 25 once  dextrose Oral Gel 15 once PRN  ferrous    sulfate 325 daily  glucagon  Injectable 1 once  hydrALAZINE 50 every 8 hours  insulin lispro (ADMELOG) corrective regimen sliding scale  every 6 hours  isosorbide   dinitrate Tablet (ISORDIL) 20 three times a day  losartan 100 daily  magnesium oxide 800 once  meropenem  IVPB    metoprolol tartrate 12.5 every 12 hours  pantoprazole  Injectable 40 every 12 hours  polyethylene glycol 3350 17 daily  sodium chloride 0.65% Nasal 1 every 4 hours PRN  tacrolimus 2 every 12 hours  tamsulosin 0.8 at bedtime  vancomycin  IVPB        T(C): , Max: 39.6 (23 @ 17:01)  T(F): , Max: 103.2 (23 @ 17:01)  HR: 79 (23 @ 14:56)  BP: 163/69 (23 @ 14:56)  BP(mean): --  RR: 18 (23 @ 14:56)  SpO2: 97% (23 @ 14:56)  Wt(kg): --     07:01  -   @ 07:00  --------------------------------------------------------  IN: 780 mL / OUT: 325 mL / NET: 455 mL     @ 07:01  -   @ 17:44  --------------------------------------------------------  IN: 730 mL / OUT: 2600 mL / NET: -1870 mL          Review of Systems:  ROS negative except as per HPI      PHYSICAL EXAM:  GENERAL: Alert, awake, oriented  CHEST/LUNG: Bilateral clear breath sounds, on RA  HEART: S1, S2, RRR  ABDOMEN: Soft, nontender, non distended  EXTREMITIES: no pedal edema  Neurology: AAOx3, no asterixis   ACCESS: LUE AVF w/bruit and thrill      LABS:                        10.1   4.30  )-----------( 107      ( 21 Mar 2023 15:32 )             31.0     03-    136  |  97  |  10  ----------------------------<  158<H>  4.0   |  29  |  2.91<H>    Ca    8.1<L>      21 Mar 2023 15:32  Phos  4.6       Mg     1.7         TPro  5.4<L>  /  Alb  2.9<L>  /  TBili  0.7  /  DBili  x   /  AST  17  /  ALT  9<L>  /  AlkPhos  142<H>  21        Urinalysis Basic - ( 21 Mar 2023 15:02 )    Color: Yellow / Appearance: Hazy / S.020 / pH: x  Gluc: x / Ketone: NEGATIVE  / Bili: Negative / Urobili: 0.2 E.U./dL   Blood: x / Protein: >=300 mg/dL / Nitrite: NEGATIVE   Leuk Esterase: Moderate / RBC: > 10 /HPF / WBC > 10 /HPF   Sq Epi: x / Non Sq Epi: 0-5 /HPF / Bacteria: Present /HPF            RADIOLOGY & ADDITIONAL STUDIES:

## 2023-03-21 NOTE — PROGRESS NOTE ADULT - ATTENDING COMMENTS
61yo M PMHx HTN, HLD, DM, CAD(s/p cath years ago, RCA 99%, never intervened on), HFrEF 25-30%, ESRD s/p failed kidney transplant and required HD x 3yrs (last HD 3/16 via Left Arm AVF; HD TTS), Right AKA initially presented to [facility] 2/27 for respiratory distress after a dialysis, found to be septic, h/c complicated by AHRF requiring intubation for 24hr followed by VT arrest, h/c the complicated by massive LGIB (rectal ulcer seen on colonoscopy) requiring 7u pRBC, 1u FFP and 1u PLT. Transferred to [facility] (3/17) for ICD placement 2/2 Vtach and cardiac cath. Pt noted lassed loose stool with BRBPR, GI and surgery consulted. Flex Sigc ( 3/17) found prior hemoclip in the sigmoid colon, localized ulceration and erosive with no bleeding noted in rectum suggestive of solitary ulcer syndrome. Medicine consulted for comanagement.     #LGIB  #SANTOS  #Solitary ulcer syndrome  - hx coffee ground emesis @ [facility]; s/p EGD/colonoscopy showing rectal ulcer  - 3/17AM pt passed loose BM along with large amount of BRBPR  - per gen surgery no acute surgical intervention   - per GI:      - increased Protonix 40 mg IVP BID      - PIV large bore x2      - s/p flex sig(3/17)  for rectal ulcers, found Evidence of prior hemoclip in the sigmoid colon. Localized ulceration and erosive with no bleeding were noted in the rectum, suggestive of solitary ulcer syndrome           - Avoid anal digitation and enemas           - High-fiber diet and optimize laxatives to avoid constipation using miralax and dulcolax           - No absolute GI contraindications to anticoag/antiplt therapy           - Repeat endoscopic evaluation recommended in 3 months  - Active T&S  - Hgb 10.1 stable  - Transfuse for hgb <8 (active CAD)    #CAD   - Previous cath at [facility] showing oLM 30% and RCA 99% that is not amenable to intervention.  - TTE 3/1/23 at [facility]: mild LVH, EF 25-30%, severe global wall motion dysfunction, mildly dilated LA, RV mildly dilated, all leaflets mild calcified, mod pHTN  - hx coffee ground emesis @ [facility]; s/p EGD/colonoscopy showing rectal ulcer and H  flex sig suggestive of solitary ulcer syndrome - per cardiology will hold off loading the patient due to recent LGIB  - No absolute GI contraindications to anticoag/antplt therapy  - start ASA and plavix ( 3/20-), if no bleed in few days, proceed with [facility] possible PCI later this week ( Thurs 3/24) per Cardiologist   - EP consulted for possible ICD placement ? Fri 3/24    #H/O ventricular tachycardia   - Vtach arrest @[facility]  - agree with EP consult for ICD placement    # Fever  - Pt was febrile 102*F today, pt reports cough w. brown sputum production, CXR w/ RUL opacity  - given immunocompromised/hx Kidney transplant , low threshold to start broad spectrum iv abx i.e. Vanco and Zosyn  - f/u COVID swab/RVP, BCx   - ID consult     #ESRD on dialysis  - dialysis Tues, Thurs, Sat  - last received Tues 3/21, nexk HD Thurs 3/23  - f/u nephro recs  - Electrolytes wnl, k 4.3, phos 1.9  - c/w calcium citrate 667mg TID  - on Renvela 800mg TID, f/u with renal as patient is with hypophosphatemia on admission     #S/P kidney transplant.   - per [place] pt takes Tacrolimus 2mg 2 times /day  - f/u tacrolimus serum levels  - f/u nephro recs    #HTN  - SBP on admission 155-170s  - c/w home meds. Amlodipine 10mg qd, Losartan 100mg qd, Hydralazine 50m TID, Isordil 20mg TID    #HLD   - c/w Lipitor 40mg qd  - f/u AM lipid panel.    #DM  - no meds per [place], obtain collaterals for med recs prior [facility] stay  - mISS while inpt, goal -180   - A1c 5.9 on admission    DVT PPX: SCDs for now iso GIB    Med consult team continues to follow with you. d/w Dr. Zack Snow 61yo M PMHx HTN, HLD, DM, CAD(s/p cath years ago, RCA 99%, never intervened on), HFrEF 25-30%, ESRD s/p failed kidney transplant and required HD x 3yrs (last HD 3/16 via Left Arm AVF; HD TTS), Right AKA initially presented to [facility] 2/27 for respiratory distress after a dialysis, found to be septic, h/c complicated by AHRF requiring intubation for 24hr followed by VT arrest, h/c the complicated by massive LGIB (rectal ulcer seen on colonoscopy) requiring 7u pRBC, 1u FFP and 1u PLT. Transferred to [facility] (3/17) for ICD placement 2/2 Vtach and cardiac cath. Pt noted lassed loose stool with BRBPR, GI and surgery consulted. Flex Sigc ( 3/17) found prior hemoclip in the sigmoid colon, localized ulceration and erosive with no bleeding noted in rectum suggestive of solitary ulcer syndrome. Medicine consulted for comanagement.     #LGIB  #SANTOS  #Solitary ulcer syndrome  - hx coffee ground emesis @ [facility]; s/p EGD/colonoscopy showing rectal ulcer  - 3/17AM pt passed loose BM along with large amount of BRBPR  - per gen surgery no acute surgical intervention   - per GI:      - increased Protonix 40 mg IVP BID      - PIV large bore x2      - s/p flex sig(3/17)  for rectal ulcers, found Evidence of prior hemoclip in the sigmoid colon. Localized ulceration and erosive with no bleeding were noted in the rectum, suggestive of solitary ulcer syndrome           - Avoid anal digitation and enemas           - High-fiber diet and optimize laxatives to avoid constipation using miralax and dulcolax           - No absolute GI contraindications to anticoag/antiplt therapy           - Repeat endoscopic evaluation recommended in 3 months  - Active T&S  - Hgb 10.1 stable  - Transfuse for hgb <8 (active CAD)    #CAD   - Previous cath at [facility] showing oLM 30% and RCA 99% that is not amenable to intervention.  - TTE 3/1/23 at [facility]: mild LVH, EF 25-30%, severe global wall motion dysfunction, mildly dilated LA, RV mildly dilated, all leaflets mild calcified, mod pHTN  - hx coffee ground emesis @ [facility]; s/p EGD/colonoscopy showing rectal ulcer and H  flex sig suggestive of solitary ulcer syndrome - per cardiology will hold off loading the patient due to recent LGIB  - No absolute GI contraindications to anticoag/antplt therapy  - start ASA and plavix ( 3/20-), if no bleed in few days, proceed with [facility] possible PCI later this week ( Thurs 3/24) per Cardiologist   - EP consulted for possible ICD placement ? Fri 3/24    #H/O ventricular tachycardia   - [facility] arrest [facility]  - agree with EP consult for ICD placement    # Fever  - Pt was febrile 102*F today, pt reports cough w. brown sputum production, CXR w/ RUL opacity  - given immunocompromised/hx Kidney transplant , low threshold to start broad spectrum iv abx i.e. Vanco and Zosyn  - f/u COVID swab/RVP, BCx   - ID consult     #ESRD on dialysis  - dialysis Tues, Thurs, Sat  - last received Tues 3/21, nexk HD Thurs 3/23  - f/u nephro recs  - Electrolytes wnl, k 4.3, phos 1.9  - c/w calcium citrate 667mg TID  - on Renvela 800mg TID, f/u with renal as patient is with hypophosphatemia on admission     #S/P kidney transplant.   - per [place] pt takes Tacrolimus 2mg 2 times /day  - f/u tacrolimus serum levels  - f/u nephro recs    #HTN  - SBP on admission 155-170s  - c/w home meds. Amlodipine 10mg qd, Losartan 100mg qd, Hydralazine 50m TID, Isordil 20mg TID    #HLD   - c/w Lipitor 40mg qd  - f/u AM lipid panel.    #DM  - no meds per [place], obtain collaterals for med recs prior [place] stay  - mISS while inpt, goal -180   - A1c 5.9 on admission    DVT PPX: SCDs for now iso GIB    Med consult team continues to follow with you. d/w Dr. Zack Snow 61yo M PMHx HTN, HLD, DM, CAD(s/p cath years ago, RCA 99%, never intervened on), HFrEF 25-30%, ESRD s/p failed kidney transplant and required HD x 3yrs (last HD 3/16 via Left Arm AVF; HD TTS), Right AKA initially presented to [facility] 2/27 for respiratory distress after a dialysis, found to be septic, h/c complicated by AHRF requiring intubation for 24hr followed by VT arrest, h/c the complicated by massive LGIB (rectal ulcer seen on colonoscopy) requiring 7u pRBC, 1u FFP and 1u PLT. Transferred to [facility] (3/17) for ICD placement 2/2 Vtach and cardiac cath. Pt noted lassed loose stool with BRBPR, GI and surgery consulted. Flex Sigc ( 3/17) found prior hemoclip in the sigmoid colon, localized ulceration and erosive with no bleeding noted in rectum suggestive of solitary ulcer syndrome. Medicine consulted for comanagement.     #LGIB  #SANTOS  #Solitary ulcer syndrome  - hx coffee ground emesis @ [facility]; s/p EGD/colonoscopy showing rectal ulcer  - 3/17AM pt passed loose BM along with large amount of BRBPR  - per gen surgery no acute surgical intervention   - per GI:      - increased Protonix 40 mg IVP BID      - PIV large bore x2      - s/p flex sig(3/17)  for rectal ulcers, found Evidence of prior hemoclip in the sigmoid colon. Localized ulceration and erosive with no bleeding were noted in the rectum, suggestive of solitary ulcer syndrome           - Avoid anal digitation and enemas           - High-fiber diet and optimize laxatives to avoid constipation using miralax and dulcolax           - No absolute GI contraindications to anticoag/antiplt therapy           - Repeat endoscopic evaluation recommended in 3 months  - Active T&S  - Hgb 10.1 stable  - Transfuse for hgb <8 (active CAD)    #CAD   - Previous cath at [facility] showing oLM 30% and RCA 99% that is not amenable to intervention.  - TTE 3/1/23 at [facility]: mild LVH, EF 25-30%, severe global wall motion dysfunction, mildly dilated LA, RV mildly dilated, all leaflets mild calcified, mod pHTN  - hx coffee ground emesis @ [facility]; s/p EGD/colonoscopy showing rectal ulcer and H  flex sig suggestive of solitary ulcer syndrome - per cardiology will hold off loading the patient due to recent LGIB  - No absolute GI contraindications to anticoag/antplt therapy  - start ASA and plavix ( 3/20-), if no bleed in few days, proceed with [facility] possible PCI later this week ( Thurs 3/24) per Cardiologist   - EP consulted for possible ICD placement ? Fri 3/24    #H/O ventricular tachycardia   - V[facility]ach arrest @[facility]  - agree with EP consult for ICD placement    # Fever  - Pt was febrile 102*F today, pt reports cough w. brown sputum production, CXR w/ RUL opacity  - given immunocompromised/hx Kidney transplant , low threshold to start broad spectrum iv abx i.e. Vanco and Zosyn  - f/u COVID swab/RVP, BCx   - ID consult     #ESRD on dialysis  - dialysis Tues, Thurs, Sat  - last received Tues 3/21, nexk HD Thurs 3/23  - f/u nephro recs  - Electrolytes wnl, k 4.3, phos 1.9  - c/w calcium citrate 667mg TID  - on Renvela 800mg TID, f/u with renal as patient is with hypophosphatemia on admission     #S/P kidney transplant.   - per [place] pt takes Tacrolimus 2mg 2 times /day  - f/u tacrolimus serum levels  - f/u nephro recs    #HTN  - SBP on admission 155-170s  - c/w home meds. Amlodipine 10mg qd, Losartan 100mg qd, Hydralazine 50m TID, Isordil 20mg TID    #HLD   - c/w Lipitor 40mg qd  - f/u AM lipid panel.    #DM  - no meds per [place], obtain collaterals for med recs prior [facility] stay  - mISS while inpt, goal -180   - A1c 5.9 on admission    DVT PPX: SCDs for now iso GIB    Med consult team continues to follow with you. d/w Dr. Zack Snow

## 2023-03-21 NOTE — PROGRESS NOTE ADULT - SUBJECTIVE AND OBJECTIVE BOX
EPS Progress Note  CC: cardiac arrest at [facility]     S: +chills / fever    O: T(C): 39.6 (03-21-23 @ 17:01), Max: 39.6 (03-21-23 @ 17:01)  HR: 79 (03-21-23 @ 14:56) (52 - 79)  BP: 163/69 (03-21-23 @ 14:56) (120/53 - 163/69)  RR: 18 (03-21-23 @ 14:56) (17 - 18)  SpO2: 97% (03-21-23 @ 14:56) (97% - 100%)    TELE:  NSR 60-80s     PHYSICAL  Constitutional:  + feels chills  pulm:  CTA B/L  Cardiac:   + s1/s2, RRR  GI:  +BS , soft ND/NT  Neuro: AAO x 3.    LABS:                        10.1   4.30  )-----------( 107      ( 21 Mar 2023 15:32 )             31.0     03-21    136  |  97  |  10  ----------------------------<  158<H>  4.0   |  29  |  2.91<H>    Ca    8.1<L>      21 Mar 2023 15:32  Phos  4.6     03-21  Mg     1.7     03-21    TPro  5.4<L>  /  Alb  2.9<L>  /  TBili  0.7  /  DBili  x   /  AST  17  /  ALT  9<L>  /  AlkPhos  142<H>  03-21        MEDICATIONS:  acetaminophen     Tablet .. 650 milliGRAM(s) Oral every 6 hours PRN  amLODIPine   Tablet 10 milliGRAM(s) Oral daily  aspirin enteric coated 81 milliGRAM(s) Oral daily  atorvastatin 40 milliGRAM(s) Oral at bedtime  chlorhexidine 2% Cloths 1 Application(s) Topical daily  clopidogrel Tablet 75 milliGRAM(s) Oral daily  dextrose 5%. 1000 milliLiter(s) IV Continuous <Continuous>  dextrose 5%. 1000 milliLiter(s) IV Continuous <Continuous>  dextrose 50% Injectable 25 Gram(s) IV Push once  dextrose 50% Injectable 12.5 Gram(s) IV Push once  dextrose 50% Injectable 25 Gram(s) IV Push once  dextrose Oral Gel 15 Gram(s) Oral once PRN  ferrous    sulfate 325 milliGRAM(s) Oral daily  glucagon  Injectable 1 milliGRAM(s) IntraMuscular once  hydrALAZINE 50 milliGRAM(s) Oral every 8 hours  insulin lispro (ADMELOG) corrective regimen sliding scale   SubCutaneous every 6 hours  isosorbide   dinitrate Tablet (ISORDIL) 20 milliGRAM(s) Oral three times a day  losartan 100 milliGRAM(s) Oral daily  magnesium oxide 800 milliGRAM(s) Oral once  metoprolol tartrate 12.5 milliGRAM(s) Oral every 12 hours  pantoprazole  Injectable 40 milliGRAM(s) IV Push every 12 hours  polyethylene glycol 3350 17 Gram(s) Oral daily  sodium chloride 0.65% Nasal 1 Spray(s) Both Nostrils every 4 hours PRN  tacrolimus 2 milliGRAM(s) Oral every 12 hours  tamsulosin 0.8 milliGRAM(s) Oral at bedtime

## 2023-03-21 NOTE — PROGRESS NOTE ADULT - PROBLEM SELECTOR PLAN 7
SBP on admission 170s  - C/w: home amlodipine 10 mg daily, Hydralazine 50 mg q8h, isordil 20 mg q8h, losartan 100 mg daily s/p failed kidney transplant (attempt to obtain collateral, per medicine pt may not require tacrolimus if transplant failed)   - Continue home Tacrolimus 2 mg BID s/p failed kidney transplant (attempt to obtain collateral, per medicine pt may not require tacrolimus if transplant failed)   - Continue home Tacrolimus 2 mg BID  - F/u prograf level

## 2023-03-21 NOTE — PROGRESS NOTE ADULT - ASSESSMENT
63yo M PMHx HTN, HLD, DM, CAD(s/p cath years ago, RCA 99%, never intervened on), HFrEF 25-30%, ESRD s/p failed kidney transplant (last HD 3/1 via Left Arm AVF; HD TTS), Right AKA initially presented to [facility] 2/27 for respiratory distress after a dialysis, found to be septic, h/c complicated by AHRF requiring intubation for 24hr followed by VT arrest, h/c the complicated by massive LGIB (rectal ulcer) requiring 7u pRBC, 1u FFP and 1u PLT. Transferred to [facility] for ICD placement 2/2 Vtach and cardiac cath. Medicine consulted for comanagement.     Recommend:    #Fever  - Spiked fever of 101.6 on return from HD today 3/21  - Unclear source of infection  - F/u UA, CXR, BCx today  - Replace right hand IV as possible source although does not appear obviously infected  - Start Vanc/Zosyn in immunocomprised patient with new nosocomial fever  - Consult ID    #LGIB  #SANTOS  #Solitary ulcer syndrome  - hx coffee ground emesis @ [facility]; s/p EGD/colonoscopy showing rectal ulcer  - 3/17AM pt passed loose BM along with large amount of BRBPR  - per gen surgery no acute surgical intervention   - per GI:      - c/w Protonix 40 mg IVP BID, low suspicion for UGIB      - PIV large bore x2      - s/p flex sig for rectal ulcers, found Evidence of prior hemoclip in the sigmoid colon. Localized ulceration and erosive with no bleeding were noted in the rectum, suggestive of solitary ulcer syndrome           - Avoid anal digitation and enemas           - High-fiber diet and optimize laxatives to avoid constipation using miralax and dulcolax           - No absolute GI contraindications to anticoag/antplt therapy           - Repeat endoscopic evaluation recommended in 3 months  - Active T&S  - Transfuse for hgb <8 (active CAD)    #CAD   - Previous cath at [facility] showing oLM 30% and RCA 99% that is not amenable to intervention.  - [facility] mild LVH, EF 25-30%, severe global wall motion dysfunction, mildly dilated LA, RV mildly dilated, all leaflets mild calcified, mod pHTN  - hx coffee ground emesis @ [facility]; s/p EGD/colonoscopy showing rectal ulcer and LHH  flex sig suggestive of solitary ulcer syndrome - per cardiology will hold off loading the patient due to recent LGIB  - No absolute GI contraindications to anticoag/antplt therapy  - EP consulted for possible ICD placement  - Today for ASA/ Plavix load > if no bleed in the next 48h will get > Angiogram and possible PCI, followed with ICD (since 3/17 hgb of 10-11)    #H/O ventricular tachycardia   [facility]  - agree with EP consult for ICD placement    #ESRD on dialysis  - for dialysis today  - dialysis Tues, Thurs, Sat  - last received 3/16  - f/u nephro recs  - Electrolytes wnl, k 4.3, phos 1.9  - c/w calcium citrate 667mg TID    #S/P kidney transplant.   - per [place] pt takes Tacrolimus 2mg 2 times /day  - Obtain collateral regarding renal transplant, if indeed failed, what indications patient have for c/w tacrolimus 2mg?  - f/u tacrolimus serum levels  - f/u nephro recs    #HTN  - SBP 130s-140s  - c/w home meds. Amlodipine 10mg qd, Losartan 100mg qd, Hydralazine 50m TID, Isordil 20mg TID    #HLD   - c/w Lipitor 40mg qd  - f/u AM lipid panel.    #DM  - no meds per [place], obtain collaterals for med recs prior [facility] stay  - mISS while inpt  - A1c 5.9 on admission    DVT PPX: SCDs for now iso GIB    Case discussed with attending Dr. Lopez, recommendations are final upon attending attestation    61yo M PMHx HTN, HLD, DM, CAD(s/p cath years ago, RCA 99%, never intervened on), HFrEF 25-30%, ESRD s/p failed kidney transplant (last HD 3/1 via Left Arm AVF; HD TTS), Right AKA initially presented to [facility] 2/27 for respiratory distress after a dialysis, found to be septic, h/c complicated by AHRF requiring intubation for 24hr followed by VT arrest, h/c the complicated by massive LGIB (rectal ulcer) requiring 7u pRBC, 1u FFP and 1u PLT. Transferred to [facility] for ICD placement 2/2 Vtach and cardiac cath. Medicine consulted for comanagement.     Recommend:    #Fever  - Spiked fever of 101.6 on return from HD today 3/21  - Unclear source of infection  - F/u UA, CXR, BCx today  - Replace right hand IV as possible source although does not appear obviously infected  - Start Vanc/Zosyn in immunocomprised patient with new nosocomial fever  - Consult ID    #LGIB  #SANTOS  #Solitary ulcer syndrome  - hx coffee ground emesis @ [facility]; s/p EGD/colonoscopy showing rectal ulcer  - 3/17AM pt passed loose BM along with large amount of BRBPR  - per gen surgery no acute surgical intervention   - per GI:      - c/w Protonix 40 mg IVP BID, low suspicion for UGIB      - PIV large bore x2      - s/p flex sig for rectal ulcers, found Evidence of prior hemoclip in the sigmoid colon. Localized ulceration and erosive with no bleeding were noted in the rectum, suggestive of solitary ulcer syndrome           - Avoid anal digitation and enemas           - High-fiber diet and optimize laxatives to avoid constipation using miralax and dulcolax           - No absolute GI contraindications to anticoag/antplt therapy           - Repeat endoscopic evaluation recommended in 3 months  - Active T&S  - Transfuse for hgb <8 (active CAD)    #CAD   - Previous cath at [facility] showing oLM 30% and RCA 99% that is not amenable to intervention.  - [facility] LVH, EF 25-30%, severe global wall motion dysfunction, mildly dilated LA, RV mildly dilated, all leaflets mild calcified, mod pHTN  - hx coffee ground emesis @ [facility]; s/p EGD/colonoscopy showing rectal ulcer and LHH  flex sig suggestive of solitary ulcer syndrome - per cardiology will hold off loading the patient due to recent LGIB  - No absolute GI contraindications to anticoag/antplt therapy  - EP consulted for possible ICD placement  - Today for ASA/ Plavix load > if no bleed in the next 48h will get > Angiogram and possible PCI, followed with ICD (since 3/17 hgb of 10-11)    #H/O ventricular tachycardia   [facility]  - agree with EP consult for ICD placement    #ESRD on dialysis  - for dialysis today  - dialysis Tues, Thurs, Sat  - last received 3/16  - f/u nephro recs  - Electrolytes wnl, k 4.3, phos 1.9  - c/w calcium citrate 667mg TID    #S/P kidney transplant.   - per [place] pt takes Tacrolimus 2mg 2 times /day  - Obtain collateral regarding renal transplant, if indeed failed, what indications patient have for c/w tacrolimus 2mg?  - f/u tacrolimus serum levels  - f/u nephro recs    #HTN  - SBP 130s-140s  - c/w home meds. Amlodipine 10mg qd, Losartan 100mg qd, Hydralazine 50m TID, Isordil 20mg TID    #HLD   - c/w Lipitor 40mg qd  - f/u AM lipid panel.    #DM  - no meds per [place], obtain collaterals for med recs prior [facility] stay  - mISS while inpt  - A1c 5.9 on admission    DVT PPX: SCDs for now iso GIB    Case discussed with attending Dr. Lopez, recommendations are final upon attending attestation    61yo M PMHx HTN, HLD, DM, CAD(s/p cath years ago, RCA 99%, never intervened on), HFrEF 25-30%, ESRD s/p failed kidney transplant (last HD 3/1 via Left Arm AVF; HD TTS), Right AKA initially presented to [facility] 2/27 for respiratory distress after a dialysis, found to be septic, h/c complicated by AHRF requiring intubation for 24hr followed by VT arrest, h/c the complicated by massive LGIB (rectal ulcer) requiring 7u pRBC, 1u FFP and 1u PLT. Transferred to [facility] for ICD placement 2/2 Vtach and cardiac cath. Medicine consulted for comanagement.     Recommend:    #Fever  - Spiked fever of 101.6 on return from HD today 3/21  - Unclear source of infection  - F/u UA, CXR, BCx today  - Replace right hand IV as possible source although does not appear obviously infected  - Start Vanc/Zosyn in immunocomprised patient with new nosocomial fever  - Consult ID    #LGIB  #SANTOS  #Solitary ulcer syndrome  - hx coffee ground emesis @ [facility]; s/p EGD/colonoscopy showing rectal ulcer  - 3/17AM pt passed loose BM along with large amount of BRBPR  - per gen surgery no acute surgical intervention   - per GI:      - c/w Protonix 40 mg IVP BID, low suspicion for UGIB      - PIV large bore x2      - s/p flex sig for rectal ulcers, found Evidence of prior hemoclip in the sigmoid colon. Localized ulceration and erosive with no bleeding were noted in the rectum, suggestive of solitary ulcer syndrome           - Avoid anal digitation and enemas           - High-fiber diet and optimize laxatives to avoid constipation using miralax and dulcolax           - No absolute GI contraindications to anticoag/antplt therapy           - Repeat endoscopic evaluation recommended in 3 months  - Active T&S  - Transfuse for hgb <8 (active CAD)    #CAD   - Previous cath at [facility] showing oLM 30% and RCA 99% that is not amenable to intervention.  - [facility], EF 25-30%, severe global wall motion dysfunction, mildly dilated LA, RV mildly dilated, all leaflets mild calcified, mod pHTN  - hx coffee ground emesis @ [facility]; s/p EGD/colonoscopy showing rectal ulcer and LHH  flex sig suggestive of solitary ulcer syndrome - per cardiology will hold off loading the patient due to recent LGIB  - No absolute GI contraindications to anticoag/antplt therapy  - EP consulted for possible ICD placement  - Today for ASA/ Plavix load > if no bleed in the next 48h will get > Angiogram and possible PCI, followed with ICD (since 3/17 hgb of 10-11)    #H/O ventricular tachycardia   [facility]  - agree with EP consult for ICD placement    #ESRD on dialysis  - for dialysis today  - dialysis Tues, Thurs, Sat  - last received 3/16  - f/u nephro recs  - Electrolytes wnl, k 4.3, phos 1.9  - c/w calcium citrate 667mg TID    #S/P kidney transplant.   - per [place] pt takes Tacrolimus 2mg 2 times /day  - Obtain collateral regarding renal transplant, if indeed failed, what indications patient have for c/w tacrolimus 2mg?  - f/u tacrolimus serum levels  - f/u nephro recs    #HTN  - SBP 130s-140s  - c/w home meds. Amlodipine 10mg qd, Losartan 100mg qd, Hydralazine 50m TID, Isordil 20mg TID    #HLD   - c/w Lipitor 40mg qd  - f/u AM lipid panel.    #DM  - no meds per [place], obtain collaterals for med recs prior [facility] stay  - mISS while inpt  - A1c 5.9 on admission    DVT PPX: SCDs for now iso GIB    Case discussed with attending Dr. Lopez, recommendations are final upon attending attestation

## 2023-03-21 NOTE — PROGRESS NOTE ADULT - PROBLEM SELECTOR PLAN 6
s/p failed kidney transplant (attempt to obtain collateral, per medicine pt may not require tacrolimus if transplant failed)   - Continue home Tacrolimus 2 mg BID dialysis Tues, Thurs, Sat  - last received 3/18  - renal following, plan for HD tomorrow 3/21/2023 dialysis [facility], Sat  - last received 3/21/2023; next HD session 3/23/23

## 2023-03-21 NOTE — CONSULT NOTE ADULT - SUBJECTIVE AND OBJECTIVE BOX
HPI:  61 yo M with HTN, DM, HLD, ICM, HFrEF, ESRD s/p failed RT (HD via LUE AVF;  on pred 5 mg/tacrolimus 2mg q12h), MADELEINE CHAVEZ transferred to [facility] from [facility] on 3/17 for ICD placement with fever.  He had been admitted to  on  for resp distress after HD, met SIRS criteria and was started on vanc and pip-tazo for presumed HCAP.  He required intubation on 3/1, then had cardiac arrest with ROSC after 8 min.  He was seen by ID – recommended vancomycin, meropenem and doxy X 14 d course.  He then had a V tach arrest, ultimately weaned off pressors and extubated 3/2.  Course next c/b UGI bleed with Hg 3.5, blood in duodenum on EGD but no acute bleed.  He again required pressors for hemorrhagic shock.  CT chest on 3/6 showed diffuse GGO suspicious for pulmonary edema vs. interstitial pneumonia, bi-apical and basilar consolidation that could be atelectasis and small bilateral pleural effusions.  CT A/P with urinary bladder wall thickening due to chronic bladder outlet obstruction vs. cystitis.  CTA on 3/12 showed possible focal proctitis, colonoscopy with rectal ulcers.  Also seen were fullness in the extrarenal pelvis in the transplanted kidney with 2 small hypodensities, likely cysts and an 8 mm non-obstructing stone in the pelvis of the transplanted kidney.  He was transferred on 3/17 for ICD placement and cardiac cath.  Was not on antibiotics at the time of transfer but unclear when antibiotics were stopped.  On the night of admission, he had T 100.1 X 1.  He was otherwise afebrile.  Today, was noted to have rigors, T 102.  Was seen by IM – told to start vanc and pip-tazo, call ID consult.  He has been on low dose tacrolimus, level on 3/17 2.1.  Moody catheter was placed yesterday for urinary retention.  Per Mr. Zavala, he was okay until yesterday, when he began feeling cold, with rhinorrhea, sore throat and cough, no rigors.  Today, after HD, he felt rigors and fever, ongoing rhinorrhea, sore throat, cough with brown sputum and SSCP described as pressure.  Had nausea, no vomiting.  Mild diffuse abd pain, no diarrhea.        PAST MEDICAL & SURGICAL HISTORY:  HTN (hypertension)      HLD (hyperlipidemia)      DM (diabetes mellitus)      GERD (gastroesophageal reflux disease)      ESRD on dialysis      NSTEMI (non-ST elevation myocardial infarction)      CAD (coronary artery disease)      Fever of unknown origin (FUO)              MEDICATIONS  (STANDING):  amLODIPine   Tablet 10 milliGRAM(s) Oral daily  aspirin enteric coated 81 milliGRAM(s) Oral daily  atorvastatin 40 milliGRAM(s) Oral at bedtime  chlorhexidine 2% Cloths 1 Application(s) Topical daily  clopidogrel Tablet 75 milliGRAM(s) Oral daily  dextrose 5%. 1000 milliLiter(s) (50 mL/Hr) IV Continuous <Continuous>  dextrose 5%. 1000 milliLiter(s) (100 mL/Hr) IV Continuous <Continuous>  dextrose 50% Injectable 25 Gram(s) IV Push once  dextrose 50% Injectable 12.5 Gram(s) IV Push once  dextrose 50% Injectable 25 Gram(s) IV Push once  ferrous    sulfate 325 milliGRAM(s) Oral daily  glucagon  Injectable 1 milliGRAM(s) IntraMuscular once  hydrALAZINE 50 milliGRAM(s) Oral every 8 hours  insulin lispro (ADMELOG) corrective regimen sliding scale   SubCutaneous every 6 hours  isosorbide   dinitrate Tablet (ISORDIL) 20 milliGRAM(s) Oral three times a day  losartan 100 milliGRAM(s) Oral daily  magnesium oxide 800 milliGRAM(s) Oral once  meropenem  IVPB      metoprolol tartrate 12.5 milliGRAM(s) Oral every 12 hours  pantoprazole  Injectable 40 milliGRAM(s) IV Push every 12 hours  polyethylene glycol 3350 17 Gram(s) Oral daily  tacrolimus 2 milliGRAM(s) Oral every 12 hours  tamsulosin 0.8 milliGRAM(s) Oral at bedtime  vancomycin  IVPB        MEDICATIONS  (PRN):  acetaminophen     Tablet .. 650 milliGRAM(s) Oral every 6 hours PRN Mild Pain (1 - 3), Moderate Pain (4 - 6)  dextrose Oral Gel 15 Gram(s) Oral once PRN Blood Glucose LESS THAN 70 milliGRAM(s)/deciliter  sodium chloride 0.65% Nasal 1 Spray(s) Both Nostrils every 4 hours PRN Nasal Congestion      Allergies    No Known Allergies    Intolerances        SOCIAL HISTORY:  Is from [facility], , lives at a facility    FAMILY HISTORY:  No pertinent FHx in first degree relatives    ROS:  As above      Vital Signs Last 24 Hrs  T(C): 39.6 (21 Mar 2023 17:01), Max: 39.6 (21 Mar 2023 17:01)  T(F): 103.2 (21 Mar 2023 17:01), Max: 103.2 (21 Mar 2023 17:01)  HR: 79 (21 Mar 2023 14:56) (52 - 79)  BP: 163/69 (21 Mar 2023 14:56) (120/53 - 163/69)  BP(mean): --  RR: 18 (21 Mar 2023 14:56) (17 - 18)  SpO2: 97% (21 Mar 2023 14:56) (97% - 100%)    Parameters below as of 21 Mar 2023 14:56  Patient On (Oxygen Delivery Method): room air        PE:  Ill appearing, slow mentation  HEENT:  NC, PERRL, sclerae anicteric, conjunctivae clear.  Sinuses nontender, no nasal exudate. Unable to visualize pharynx  Neck:  Supple, no adenopathy  Lungs:  Rales L base  Cor:  RRR, S1, S2, no murmur appreciated  Abd:  Symmetric, normoactive BS.  Soft, RUQ tender to palpation, no guarding or rebound.  RLQ transplanted kidney nontender  Back:  Presacral region with stage 1-2 pressure ulcer  :  Moody catheter in place  Extrem:  No cyanosis or edema.  LUE AVF without erythema/tenderness/warmth.  R wrist region PIV with Biopatch;  R forearm IV catheter with medial area of firmness, no tenderness/warmth  Skin:  No rashes.    LABS:                        10.1   4.30  )-----------( 107      ( 21 Mar 2023 15:32 )             31.0     03-21    136  |  97  |  10  ----------------------------<  158<H>  4.0   |  29  |  2.91<H>    Ca    8.1<L>      21 Mar 2023 15:32  Phos  4.6     03-  Mg     1.7     -21    TPro  5.4<L>  /  Alb  2.9<L>  /  TBili  0.7  /  DBili  x   /  AST  17  /  ALT  9<L>  /  AlkPhos  142<H>      Lactate, Blood: 3.0 mmol/L (23 @ 15:32)      Urinalysis Basic - ( 21 Mar 2023 15:02 )    Color: Yellow / Appearance: Hazy / S.020 / pH: x  Gluc: x / Ketone: NEGATIVE  / Bili: Negative / Urobili: 0.2 E.U./dL   Blood: x / Protein: >=300 mg/dL / Nitrite: NEGATIVE   Leuk Esterase: Moderate / RBC: > 10 /HPF / WBC > 10 /HPF   Sq Epi: x / Non Sq Epi: 0-5 /HPF / Bacteria: Present /HPF        RADIOLOGY & ADDITIONAL STUDIES:  < from: Xray Chest 1 View-PORTABLE IMMEDIATE (Xray Chest 1 View-PORTABLE IMMEDIATE .) (23 @ 17:05) >  ******PRELIMINARY REPORT******          INTERPRETATION:  PORTABLE AP CXR    History: 62-year-old male with past medical history of ICM, heart   failure, ESRD status post failed transplant now febrile.    Prior studies: Radiograph 3/21/2023.    Findings:  There is opacification ofthe right lung apex. Possible trace   right-sided effusion. Enlarged cardiac silhouette. Pulmonary vascular   congestion. No acute osseous abnormality. Overlying EKG leads and wires   are noted.    IMPRESSION: Right upper lung zone opacity. Congestive heart failure.    < end of copied text >   HPI:  63 yo M with HTN, DM, HLD, ICM, HFrEF, ESRD s/p failed RT (HD via LUE AVF;  on pred 5 mg/tacrolimus 2mg q12h), MADELEINE CHAVEZ transferred to [facility] from [facility] on 3/17 for ICD placement with fever.  He had been admitted to  on  for resp distress after HD, met SIRS criteria and was started on vanc and pip-tazo for presumed HCAP.  He required intubation on 3/1, then had cardiac arrest with ROSC after 8 min.  He was seen by ID – recommended vancomycin, meropenem and doxy X 14 d course.  He then had a V tach arrest, ultimately weaned off pressors and extubated 3/2.  Course next c/b UGI bleed with Hg 3.5, blood in duodenum on EGD but no acute bleed.  He again required pressors for hemorrhagic shock.  CT chest on 3/6 showed diffuse GGO suspicious for pulmonary edema vs. interstitial pneumonia, bi-apical and basilar consolidation that could be atelectasis and small bilateral pleural effusions.  CT A/P with urinary bladder wall thickening due to chronic bladder outlet obstruction vs. cystitis.  CTA on 3/12 showed possible focal proctitis, colonoscopy with rectal ulcers.  Also seen were fullness in the extrarenal pelvis in the transplanted kidney with 2 small hypodensities, likely cysts and an 8 mm non-obstructing stone in the pelvis of the transplanted kidney.  He was transferred on 3/17 for ICD placement and cardiac cath.  Was not on antibiotics at the time of transfer but unclear when antibiotics were stopped.  On the night of admission, he had T 100.1 X 1.  He was otherwise afebrile.  Today, was noted to have rigors, T 102.  Was seen by IM – told to start vanc and pip-tazo, call ID consult.  He has been on low dose tacrolimus, level on 3/17 2.1.  Moody catheter was placed yesterday for urinary retention.  Per Mr. Zavala, he was okay until yesterday, when he began feeling cold, with rhinorrhea, sore throat and cough, no rigors.  Today, after HD, he felt rigors and fever, ongoing rhinorrhea, sore throat, cough with brown sputum and SSCP described as pressure.  Had nausea, no vomiting.  Mild diffuse abd pain, no diarrhea.        PAST MEDICAL & SURGICAL HISTORY:  HTN (hypertension)      HLD (hyperlipidemia)      DM (diabetes mellitus)      GERD (gastroesophageal reflux disease)      ESRD on dialysis      NSTEMI (non-ST elevation myocardial infarction)      CAD (coronary artery disease)      Fever of unknown origin (FUO)              MEDICATIONS  (STANDING):  amLODIPine   Tablet 10 milliGRAM(s) Oral daily  aspirin enteric coated 81 milliGRAM(s) Oral daily  atorvastatin 40 milliGRAM(s) Oral at bedtime  chlorhexidine 2% Cloths 1 Application(s) Topical daily  clopidogrel Tablet 75 milliGRAM(s) Oral daily  dextrose 5%. 1000 milliLiter(s) (50 mL/Hr) IV Continuous <Continuous>  dextrose 5%. 1000 milliLiter(s) (100 mL/Hr) IV Continuous <Continuous>  dextrose 50% Injectable 25 Gram(s) IV Push once  dextrose 50% Injectable 12.5 Gram(s) IV Push once  dextrose 50% Injectable 25 Gram(s) IV Push once  ferrous    sulfate 325 milliGRAM(s) Oral daily  glucagon  Injectable 1 milliGRAM(s) IntraMuscular once  hydrALAZINE 50 milliGRAM(s) Oral every 8 hours  insulin lispro (ADMELOG) corrective regimen sliding scale   SubCutaneous every 6 hours  isosorbide   dinitrate Tablet (ISORDIL) 20 milliGRAM(s) Oral three times a day  losartan 100 milliGRAM(s) Oral daily  magnesium oxide 800 milliGRAM(s) Oral once  meropenem  IVPB      metoprolol tartrate 12.5 milliGRAM(s) Oral every 12 hours  pantoprazole  Injectable 40 milliGRAM(s) IV Push every 12 hours  polyethylene glycol 3350 17 Gram(s) Oral daily  tacrolimus 2 milliGRAM(s) Oral every 12 hours  tamsulosin 0.8 milliGRAM(s) Oral at bedtime  vancomycin  IVPB        MEDICATIONS  (PRN):  acetaminophen     Tablet .. 650 milliGRAM(s) Oral every 6 hours PRN Mild Pain (1 - 3), Moderate Pain (4 - 6)  dextrose Oral Gel 15 Gram(s) Oral once PRN Blood Glucose LESS THAN 70 milliGRAM(s)/deciliter  sodium chloride 0.65% Nasal 1 Spray(s) Both Nostrils every 4 hours PRN Nasal Congestion      Allergies    No Known Allergies    Intolerances        SOCIAL HISTORY:  Is from [facility], , lives at a facility    FAMILY HISTORY:  No pertinent FHx in first degree relatives    ROS:  As above      Vital Signs Last 24 Hrs  T(C): 39.6 (21 Mar 2023 17:01), Max: 39.6 (21 Mar 2023 17:01)  T(F): 103.2 (21 Mar 2023 17:01), Max: 103.2 (21 Mar 2023 17:01)  HR: 79 (21 Mar 2023 14:56) (52 - 79)  BP: 163/69 (21 Mar 2023 14:56) (120/53 - 163/69)  BP(mean): --  RR: 18 (21 Mar 2023 14:56) (17 - 18)  SpO2: 97% (21 Mar 2023 14:56) (97% - 100%)    Parameters below as of 21 Mar 2023 14:56  Patient On (Oxygen Delivery Method): room air        PE:  Ill appearing, slow mentation  HEENT:  NC, PERRL, sclerae anicteric, conjunctivae clear.  Sinuses nontender, no nasal exudate. Unable to visualize pharynx  Neck:  Supple, no adenopathy  Lungs:  Rales L base  Cor:  RRR, S1, S2, no murmur appreciated  Abd:  Symmetric, normoactive BS.  Soft, RUQ tender to palpation, no guarding or rebound.  RLQ transplanted kidney nontender  Back:  Presacral region with stage 1-2 pressure ulcer  :  Moody catheter in place  Extrem:  No cyanosis or edema.  LUE AVF without erythema/tenderness/warmth.  R wrist region PIV with Biopatch;  R forearm IV catheter with medial area of firmness, no tenderness/warmth  Skin:  No rashes.    LABS:                        10.1   4.30  )-----------( 107      ( 21 Mar 2023 15:32 )             31.0     03-21    136  |  97  |  10  ----------------------------<  158<H>  4.0   |  29  |  2.91<H>    Ca    8.1<L>      21 Mar 2023 15:32  Phos  4.6     03-  Mg     1.7     -21    TPro  5.4<L>  /  Alb  2.9<L>  /  TBili  0.7  /  DBili  x   /  AST  17  /  ALT  9<L>  /  AlkPhos  142<H>      Lactate, Blood: 3.0 mmol/L (23 @ 15:32)      Urinalysis Basic - ( 21 Mar 2023 15:02 )    Color: Yellow / Appearance: Hazy / S.020 / pH: x  Gluc: x / Ketone: NEGATIVE  / Bili: Negative / Urobili: 0.2 E.U./dL   Blood: x / Protein: >=300 mg/dL / Nitrite: NEGATIVE   Leuk Esterase: Moderate / RBC: > 10 /HPF / WBC > 10 /HPF   Sq Epi: x / Non Sq Epi: 0-5 /HPF / Bacteria: Present /HPF        RADIOLOGY & ADDITIONAL STUDIES:  < from: Xray Chest 1 View-PORTABLE IMMEDIATE (Xray Chest 1 View-PORTABLE IMMEDIATE .) (23 @ 17:05) >  ******PRELIMINARY REPORT******          INTERPRETATION:  PORTABLE AP CXR    History: 62-year-old male with past medical history of ICM, heart   failure, ESRD status post failed transplant now febrile.    Prior studies: Radiograph 3/21/2023.    Findings:  There is opacification ofthe right lung apex. Possible trace   right-sided effusion. Enlarged cardiac silhouette. Pulmonary vascular   congestion. No acute osseous abnormality. Overlying EKG leads and wires   are noted.    IMPRESSION: Right upper lung zone opacity. Congestive heart failure.    < end of copied text >   HPI:  63 yo M with HTN, DM, HLD, ICM, HFrEF, ESRD s/p failed RT (HD via LUE AVF;  on pred 5 mg/tacrolimus 2mg q12h), MADELEINE CHAVEZ transferred to [facility] from [facility] on 3/17 for ICD placement with fever.  He had been admitted to  on  for resp distress after HD, met SIRS criteria and was started on vanc and pip-tazo for presumed HCAP.  He required intubation on 3/1, then had cardiac arrest with ROSC after 8 min.  He was seen by ID – recommended vancomycin, meropenem and doxy X 14 d course.  He then had a V tach arrest, ultimately weaned off pressors and extubated 3/2.  Course next c/b UGI bleed with Hg 3.5, blood in duodenum on EGD but no acute bleed.  He again required pressors for hemorrhagic shock.  CT chest on 3/6 showed diffuse GGO suspicious for pulmonary edema vs. interstitial pneumonia, bi-apical and basilar consolidation that could be atelectasis and small bilateral pleural effusions.  CT A/P with urinary bladder wall thickening due to chronic bladder outlet obstruction vs. cystitis.  CTA on 3/12 showed possible focal proctitis, colonoscopy with rectal ulcers.  Also seen were fullness in the extrarenal pelvis in the transplanted kidney with 2 small hypodensities, likely cysts and an 8 mm non-obstructing stone in the pelvis of the transplanted kidney.  He was transferred on 3/17 for ICD placement and cardiac cath.  Was not on antibiotics at the time of transfer but unclear when antibiotics were stopped.  On the night of admission, he had T 100.1 X 1.  He was otherwise afebrile.  Today, was noted to have rigors, T 102.  Was seen by IM – told to start vanc and pip-tazo, call ID consult.  He has been on low dose tacrolimus, level on 3/17 2.1.  Moody catheter was placed yesterday for urinary retention.  Per Mr. Zavala, he was okay until yesterday, when he began feeling cold, with rhinorrhea, sore throat and cough, no rigors.  Today, after HD, he felt rigors and fever, ongoing rhinorrhea, sore throat, cough with brown sputum and SSCP described as pressure.  Had nausea, no vomiting.  Mild diffuse abd pain, no diarrhea.        PAST MEDICAL & SURGICAL HISTORY:  HTN (hypertension)      HLD (hyperlipidemia)      DM (diabetes mellitus)      GERD (gastroesophageal reflux disease)      ESRD on dialysis      NSTEMI (non-ST elevation myocardial infarction)      CAD (coronary artery disease)      Fever of unknown origin (FUO)              MEDICATIONS  (STANDING):  amLODIPine   Tablet 10 milliGRAM(s) Oral daily  aspirin enteric coated 81 milliGRAM(s) Oral daily  atorvastatin 40 milliGRAM(s) Oral at bedtime  chlorhexidine 2% Cloths 1 Application(s) Topical daily  clopidogrel Tablet 75 milliGRAM(s) Oral daily  dextrose 5%. 1000 milliLiter(s) (50 mL/Hr) IV Continuous <Continuous>  dextrose 5%. 1000 milliLiter(s) (100 mL/Hr) IV Continuous <Continuous>  dextrose 50% Injectable 25 Gram(s) IV Push once  dextrose 50% Injectable 12.5 Gram(s) IV Push once  dextrose 50% Injectable 25 Gram(s) IV Push once  ferrous    sulfate 325 milliGRAM(s) Oral daily  glucagon  Injectable 1 milliGRAM(s) IntraMuscular once  hydrALAZINE 50 milliGRAM(s) Oral every 8 hours  insulin lispro (ADMELOG) corrective regimen sliding scale   SubCutaneous every 6 hours  isosorbide   dinitrate Tablet (ISORDIL) 20 milliGRAM(s) Oral three times a day  losartan 100 milliGRAM(s) Oral daily  magnesium oxide 800 milliGRAM(s) Oral once  meropenem  IVPB      metoprolol tartrate 12.5 milliGRAM(s) Oral every 12 hours  pantoprazole  Injectable 40 milliGRAM(s) IV Push every 12 hours  polyethylene glycol 3350 17 Gram(s) Oral daily  tacrolimus 2 milliGRAM(s) Oral every 12 hours  tamsulosin 0.8 milliGRAM(s) Oral at bedtime  vancomycin  IVPB        MEDICATIONS  (PRN):  acetaminophen     Tablet .. 650 milliGRAM(s) Oral every 6 hours PRN Mild Pain (1 - 3), Moderate Pain (4 - 6)  dextrose Oral Gel 15 Gram(s) Oral once PRN Blood Glucose LESS THAN 70 milliGRAM(s)/deciliter  sodium chloride 0.65% Nasal 1 Spray(s) Both Nostrils every 4 hours PRN Nasal Congestion      Allergies    No Known Allergies    Intolerances        SOCIAL HISTORY:  Is from [facility], , lives at a facility    FAMILY HISTORY:  No pertinent FHx in first degree relatives    ROS:  As above      Vital Signs Last 24 Hrs  T(C): 39.6 (21 Mar 2023 17:01), Max: 39.6 (21 Mar 2023 17:01)  T(F): 103.2 (21 Mar 2023 17:01), Max: 103.2 (21 Mar 2023 17:01)  HR: 79 (21 Mar 2023 14:56) (52 - 79)  BP: 163/69 (21 Mar 2023 14:56) (120/53 - 163/69)  BP(mean): --  RR: 18 (21 Mar 2023 14:56) (17 - 18)  SpO2: 97% (21 Mar 2023 14:56) (97% - 100%)    Parameters below as of 21 Mar 2023 14:56  Patient On (Oxygen Delivery Method): room air        PE:  Ill appearing, slow mentation  HEENT:  NC, PERRL, sclerae anicteric, conjunctivae clear.  Sinuses nontender, no nasal exudate. Unable to visualize pharynx  Neck:  Supple, no adenopathy  Lungs:  Rales L base  Cor:  RRR, S1, S2, no murmur appreciated  Abd:  Symmetric, normoactive BS.  Soft, RUQ tender to palpation, no guarding or rebound.  RLQ transplanted kidney nontender  Back:  Presacral region with stage 1-2 pressure ulcer  :  Moody catheter in place  Extrem:  No cyanosis or edema.  LUE AVF without erythema/tenderness/warmth.  R wrist region PIV with Biopatch;  R forearm IV catheter with medial area of firmness, no tenderness/warmth  Skin:  No rashes.    LABS:                        10.1   4.30  )-----------( 107      ( 21 Mar 2023 15:32 )             31.0     03-21    136  |  97  |  10  ----------------------------<  158<H>  4.0   |  29  |  2.91<H>    Ca    8.1<L>      21 Mar 2023 15:32  Phos  4.6     03-  Mg     1.7     -21    TPro  5.4<L>  /  Alb  2.9<L>  /  TBili  0.7  /  DBili  x   /  AST  17  /  ALT  9<L>  /  AlkPhos  142<H>      Lactate, Blood: 3.0 mmol/L (23 @ 15:32)      Urinalysis Basic - ( 21 Mar 2023 15:02 )    Color: Yellow / Appearance: Hazy / S.020 / pH: x  Gluc: x / Ketone: NEGATIVE  / Bili: Negative / Urobili: 0.2 E.U./dL   Blood: x / Protein: >=300 mg/dL / Nitrite: NEGATIVE   Leuk Esterase: Moderate / RBC: > 10 /HPF / WBC > 10 /HPF   Sq Epi: x / Non Sq Epi: 0-5 /HPF / Bacteria: Present /HPF        RADIOLOGY & ADDITIONAL STUDIES:  < from: Xray Chest 1 View-PORTABLE IMMEDIATE (Xray Chest 1 View-PORTABLE IMMEDIATE .) (23 @ 17:05) >  ******PRELIMINARY REPORT******          INTERPRETATION:  PORTABLE AP CXR    History: 62-year-old male with past medical history of ICM, heart   failure, ESRD status post failed transplant now febrile.    Prior studies: Radiograph 3/21/2023.    Findings:  There is opacification ofthe right lung apex. Possible trace   right-sided effusion. Enlarged cardiac silhouette. Pulmonary vascular   congestion. No acute osseous abnormality. Overlying EKG leads and wires   are noted.    IMPRESSION: Right upper lung zone opacity. Congestive heart failure.    < end of copied text >   HPI:  63 yo M with HTN, DM, HLD, ICM, HFrEF, ESRD s/p failed RT (HD via LUE AVF;  on pred 5 mg/tacrolimus 2mg q12h), MADELEINE CHAVEZ transferred to [facility] from [facility] on 3/17 for ICD placement with fever.  He had been admitted to  on  for resp distress after HD, met SIRS criteria and was started on vanc and pip-tazo for presumed HCAP.  He required intubation on 3/1, then had cardiac arrest with ROSC after 8 min.  He was seen by ID – recommended vancomycin, meropenem and doxy X 14 d course.  He then had a V tach arrest, ultimately weaned off pressors and extubated 3/2.  Course next c/b UGI bleed with Hg 3.5, blood in duodenum on EGD but no acute bleed.  He again required pressors for hemorrhagic shock.  CT chest on 3/6 showed diffuse GGO suspicious for pulmonary edema vs. interstitial pneumonia, bi-apical and basilar consolidation that could be atelectasis and small bilateral pleural effusions.  CT A/P with urinary bladder wall thickening due to chronic bladder outlet obstruction vs. cystitis.  CTA on 3/12 showed possible focal proctitis, colonoscopy with rectal ulcers.  Also seen were fullness in the extrarenal pelvis in the transplanted kidney with 2 small hypodensities, likely cysts and an 8 mm non-obstructing stone in the pelvis of the transplanted kidney.  He was transferred on 3/17 for ICD placement and cardiac cath.  Was not on antibiotics at the time of transfer but unclear when antibiotics were stopped.  On the night of admission, he had T 100.1 X 1.  He was otherwise afebrile.  Today, was noted to have rigors, T 102.  Was seen by IM – told to start vanc and pip-tazo, call ID consult.  He has been on low dose tacrolimus, level on 3/17 2.1.  Moody catheter was placed yesterday for urinary retention.  Per Mr. Zavala, he was okay until yesterday, when he began feeling cold, with rhinorrhea, sore throat and cough, no rigors.  Today, after HD, he felt rigors and fever, ongoing rhinorrhea, sore throat, cough with brown sputum and SSCP described as pressure.  Had nausea, no vomiting.  Mild diffuse abd pain, no diarrhea.        PAST MEDICAL & SURGICAL HISTORY:  HTN (hypertension)      HLD (hyperlipidemia)      DM (diabetes mellitus)      GERD (gastroesophageal reflux disease)      ESRD on dialysis      NSTEMI (non-ST elevation myocardial infarction)      CAD (coronary artery disease)      Fever of unknown origin (FUO)              MEDICATIONS  (STANDING):  amLODIPine   Tablet 10 milliGRAM(s) Oral daily  aspirin enteric coated 81 milliGRAM(s) Oral daily  atorvastatin 40 milliGRAM(s) Oral at bedtime  chlorhexidine 2% Cloths 1 Application(s) Topical daily  clopidogrel Tablet 75 milliGRAM(s) Oral daily  dextrose 5%. 1000 milliLiter(s) (50 mL/Hr) IV Continuous <Continuous>  dextrose 5%. 1000 milliLiter(s) (100 mL/Hr) IV Continuous <Continuous>  dextrose 50% Injectable 25 Gram(s) IV Push once  dextrose 50% Injectable 12.5 Gram(s) IV Push once  dextrose 50% Injectable 25 Gram(s) IV Push once  ferrous    sulfate 325 milliGRAM(s) Oral daily  glucagon  Injectable 1 milliGRAM(s) IntraMuscular once  hydrALAZINE 50 milliGRAM(s) Oral every 8 hours  insulin lispro (ADMELOG) corrective regimen sliding scale   SubCutaneous every 6 hours  isosorbide   dinitrate Tablet (ISORDIL) 20 milliGRAM(s) Oral three times a day  losartan 100 milliGRAM(s) Oral daily  magnesium oxide 800 milliGRAM(s) Oral once  meropenem  IVPB      metoprolol tartrate 12.5 milliGRAM(s) Oral every 12 hours  pantoprazole  Injectable 40 milliGRAM(s) IV Push every 12 hours  polyethylene glycol 3350 17 Gram(s) Oral daily  tacrolimus 2 milliGRAM(s) Oral every 12 hours  tamsulosin 0.8 milliGRAM(s) Oral at bedtime  vancomycin  IVPB        MEDICATIONS  (PRN):  acetaminophen     Tablet .. 650 milliGRAM(s) Oral every 6 hours PRN Mild Pain (1 - 3), Moderate Pain (4 - 6)  dextrose Oral Gel 15 Gram(s) Oral once PRN Blood Glucose LESS THAN 70 milliGRAM(s)/deciliter  sodium chloride 0.65% Nasal 1 Spray(s) Both Nostrils every 4 hours PRN Nasal Congestion      Allergies    No Known Allergies    Intolerances        SOCIAL HISTORY:  Is from [facility], , lives at a facility    FAMILY HISTORY:  No pertinent FHx in first degree relatives    ROS:  As above      Vital Signs Last 24 Hrs  T(C): 39.6 (21 Mar 2023 17:01), Max: 39.6 (21 Mar 2023 17:01)  T(F): 103.2 (21 Mar 2023 17:01), Max: 103.2 (21 Mar 2023 17:01)  HR: 79 (21 Mar 2023 14:56) (52 - 79)  BP: 163/69 (21 Mar 2023 14:56) (120/53 - 163/69)  BP(mean): --  RR: 18 (21 Mar 2023 14:56) (17 - 18)  SpO2: 97% (21 Mar 2023 14:56) (97% - 100%)    Parameters below as of 21 Mar 2023 14:56  Patient On (Oxygen Delivery Method): room air        PE:  Ill appearing, slow mentation  HEENT:  NC, PERRL, sclerae anicteric, conjunctivae clear.  Sinuses nontender, no nasal exudate. Unable to visualize pharynx  Neck:  Supple, no adenopathy  Lungs:  Rales L base  Cor:  RRR, S1, S2, no murmur appreciated  Abd:  Symmetric, normoactive BS.  Soft, RUQ tender to palpation, no guarding or rebound.  RLQ transplanted kidney nontender  Back:  Presacral region with stage 1-2 pressure ulcer  :  Moody catheter in place  Extrem:  LUE AVF without erythema/tenderness/warmth.  R wrist region PIV with Biopatch;  R forearm IV catheter with medial area of firmness, no tenderness/warmth.  S/P R BKA  Skin:  No rashes.    LABS:                        10.1   4.30  )-----------( 107      ( 21 Mar 2023 15:32 )             31.0     03-21    136  |  97  |  10  ----------------------------<  158<H>  4.0   |  29  |  2.91<H>    Ca    8.1<L>      21 Mar 2023 15:32  Phos  4.6     03-21  Mg     1.7     03-21    TPro  5.4<L>  /  Alb  2.9<L>  /  TBili  0.7  /  DBili  x   /  AST  17  /  ALT  9<L>  /  AlkPhos  142<H>      Lactate, Blood: 3.0 mmol/L (23 @ 15:32)      Urinalysis Basic - ( 21 Mar 2023 15:02 )    Color: Yellow / Appearance: Hazy / S.020 / pH: x  Gluc: x / Ketone: NEGATIVE  / Bili: Negative / Urobili: 0.2 E.U./dL   Blood: x / Protein: >=300 mg/dL / Nitrite: NEGATIVE   Leuk Esterase: Moderate / RBC: > 10 /HPF / WBC > 10 /HPF   Sq Epi: x / Non Sq Epi: 0-5 /HPF / Bacteria: Present /HPF        RADIOLOGY & ADDITIONAL STUDIES:  < from: Xray Chest 1 View-PORTABLE IMMEDIATE (Xray Chest 1 View-PORTABLE IMMEDIATE .) (23 @ 17:05) >  ******PRELIMINARY REPORT******          INTERPRETATION:  PORTABLE AP CXR    History: 62-year-old male with past medical history of ICM, heart   failure, ESRD status post failed transplant now febrile.    Prior studies: Radiograph 3/21/2023.    Findings:  There is opacification ofthe right lung apex. Possible trace   right-sided effusion. Enlarged cardiac silhouette. Pulmonary vascular   congestion. No acute osseous abnormality. Overlying EKG leads and wires   are noted.    IMPRESSION: Right upper lung zone opacity. Congestive heart failure.    < end of copied text >   HPI:  63 yo M with HTN, DM, HLD, ICM, HFrEF, ESRD s/p failed RT (HD via LUE AVF;  on pred 5 mg/tacrolimus 2mg q12h), MADELEINE CHAVEZ transferred to [facility] from [facility] on 3/17 for ICD placement with fever.  He had been admitted to  on  for resp distress after HD, met SIRS criteria and was started on vanc and pip-tazo for presumed HCAP.  He required intubation on 3/1, then had cardiac arrest with ROSC after 8 min.  He was seen by ID – recommended vancomycin, meropenem and doxy X 14 d course.  He then had a V tach arrest, ultimately weaned off pressors and extubated 3/2.  Course next c/b UGI bleed with Hg 3.5, blood in duodenum on EGD but no acute bleed.  He again required pressors for hemorrhagic shock.  CT chest on 3/6 showed diffuse GGO suspicious for pulmonary edema vs. interstitial pneumonia, bi-apical and basilar consolidation that could be atelectasis and small bilateral pleural effusions.  CT A/P with urinary bladder wall thickening due to chronic bladder outlet obstruction vs. cystitis.  CTA on 3/12 showed possible focal proctitis, colonoscopy with rectal ulcers.  Also seen were fullness in the extrarenal pelvis in the transplanted kidney with 2 small hypodensities, likely cysts and an 8 mm non-obstructing stone in the pelvis of the transplanted kidney.  He was transferred on 3/17 for ICD placement and cardiac cath.  Was not on antibiotics at the time of transfer but unclear when antibiotics were stopped.  On the night of admission, he had T 100.1 X 1.  He was otherwise afebrile.  Today, was noted to have rigors, T 102.  Was seen by IM – told to start vanc and pip-tazo, call ID consult.  He has been on low dose tacrolimus, level on 3/17 2.1.  Moody catheter was placed yesterday for urinary retention.  Per Mr. Zavala, he was okay until yesterday, when he began feeling cold, with rhinorrhea, sore throat and cough, no rigors.  Today, after HD, he felt rigors and fever, ongoing rhinorrhea, sore throat, cough with brown sputum and SSCP described as pressure.  Had nausea, no vomiting.  Mild diffuse abd pain, no diarrhea.        PAST MEDICAL & SURGICAL HISTORY:  HTN (hypertension)      HLD (hyperlipidemia)      DM (diabetes mellitus)      GERD (gastroesophageal reflux disease)      ESRD on dialysis      NSTEMI (non-ST elevation myocardial infarction)      CAD (coronary artery disease)      Fever of unknown origin (FUO)              MEDICATIONS  (STANDING):  amLODIPine   Tablet 10 milliGRAM(s) Oral daily  aspirin enteric coated 81 milliGRAM(s) Oral daily  atorvastatin 40 milliGRAM(s) Oral at bedtime  chlorhexidine 2% Cloths 1 Application(s) Topical daily  clopidogrel Tablet 75 milliGRAM(s) Oral daily  dextrose 5%. 1000 milliLiter(s) (50 mL/Hr) IV Continuous <Continuous>  dextrose 5%. 1000 milliLiter(s) (100 mL/Hr) IV Continuous <Continuous>  dextrose 50% Injectable 25 Gram(s) IV Push once  dextrose 50% Injectable 12.5 Gram(s) IV Push once  dextrose 50% Injectable 25 Gram(s) IV Push once  ferrous    sulfate 325 milliGRAM(s) Oral daily  glucagon  Injectable 1 milliGRAM(s) IntraMuscular once  hydrALAZINE 50 milliGRAM(s) Oral every 8 hours  insulin lispro (ADMELOG) corrective regimen sliding scale   SubCutaneous every 6 hours  isosorbide   dinitrate Tablet (ISORDIL) 20 milliGRAM(s) Oral three times a day  losartan 100 milliGRAM(s) Oral daily  magnesium oxide 800 milliGRAM(s) Oral once  meropenem  IVPB      metoprolol tartrate 12.5 milliGRAM(s) Oral every 12 hours  pantoprazole  Injectable 40 milliGRAM(s) IV Push every 12 hours  polyethylene glycol 3350 17 Gram(s) Oral daily  tacrolimus 2 milliGRAM(s) Oral every 12 hours  tamsulosin 0.8 milliGRAM(s) Oral at bedtime  vancomycin  IVPB        MEDICATIONS  (PRN):  acetaminophen     Tablet .. 650 milliGRAM(s) Oral every 6 hours PRN Mild Pain (1 - 3), Moderate Pain (4 - 6)  dextrose Oral Gel 15 Gram(s) Oral once PRN Blood Glucose LESS THAN 70 milliGRAM(s)/deciliter  sodium chloride 0.65% Nasal 1 Spray(s) Both Nostrils every 4 hours PRN Nasal Congestion      Allergies    No Known Allergies    Intolerances        SOCIAL HISTORY:  Is from [facility], , lives at a facility    FAMILY HISTORY:  No pertinent FHx in first degree relatives    ROS:  As above      Vital Signs Last 24 Hrs  T(C): 39.6 (21 Mar 2023 17:01), Max: 39.6 (21 Mar 2023 17:01)  T(F): 103.2 (21 Mar 2023 17:01), Max: 103.2 (21 Mar 2023 17:01)  HR: 79 (21 Mar 2023 14:56) (52 - 79)  BP: 163/69 (21 Mar 2023 14:56) (120/53 - 163/69)  BP(mean): --  RR: 18 (21 Mar 2023 14:56) (17 - 18)  SpO2: 97% (21 Mar 2023 14:56) (97% - 100%)    Parameters below as of 21 Mar 2023 14:56  Patient On (Oxygen Delivery Method): room air        PE:  Ill appearing, slow mentation  HEENT:  NC, PERRL, sclerae anicteric, conjunctivae clear.  Sinuses nontender, no nasal exudate. Unable to visualize pharynx  Neck:  Supple, no adenopathy  Lungs:  Rales L base  Cor:  RRR, S1, S2, no murmur appreciated  Abd:  Symmetric, normoactive BS.  Soft, RUQ tender to palpation, no guarding or rebound.  RLQ transplanted kidney nontender  Back:  Presacral region with stage 1-2 pressure ulcer  :  Moody catheter in place  Extrem:  LUE AVF without erythema/tenderness/warmth.  R wrist region PIV with Biopatch;  R forearm IV catheter with medial area of firmness, no tenderness/warmth.  S/P R BKA  Skin:  No rashes.    LABS:                        10.1   4.30  )-----------( 107      ( 21 Mar 2023 15:32 )             31.0     03-21    136  |  97  |  10  ----------------------------<  158<H>  4.0   |  29  |  2.91<H>    Ca    8.1<L>      21 Mar 2023 15:32  Phos  4.6     03-21  Mg     1.7     03-21    TPro  5.4<L>  /  Alb  2.9<L>  /  TBili  0.7  /  DBili  x   /  AST  17  /  ALT  9<L>  /  AlkPhos  142<H>      Lactate, Blood: 3.0 mmol/L (23 @ 15:32)      Urinalysis Basic - ( 21 Mar 2023 15:02 )    Color: Yellow / Appearance: Hazy / S.020 / pH: x  Gluc: x / Ketone: NEGATIVE  / Bili: Negative / Urobili: 0.2 E.U./dL   Blood: x / Protein: >=300 mg/dL / Nitrite: NEGATIVE   Leuk Esterase: Moderate / RBC: > 10 /HPF / WBC > 10 /HPF   Sq Epi: x / Non Sq Epi: 0-5 /HPF / Bacteria: Present /HPF        RADIOLOGY & ADDITIONAL STUDIES:  < from: Xray Chest 1 View-PORTABLE IMMEDIATE (Xray Chest 1 View-PORTABLE IMMEDIATE .) (23 @ 17:05) >  ******PRELIMINARY REPORT******          INTERPRETATION:  PORTABLE AP CXR    History: 62-year-old male with past medical history of ICM, heart   failure, ESRD status post failed transplant now febrile.    Prior studies: Radiograph 3/21/2023.    Findings:  There is opacification ofthe right lung apex. Possible trace   right-sided effusion. Enlarged cardiac silhouette. Pulmonary vascular   congestion. No acute osseous abnormality. Overlying EKG leads and wires   are noted.    IMPRESSION: Right upper lung zone opacity. Congestive heart failure.    < end of copied text >   HPI:  63 yo M with HTN, DM, HLD, ICM, HFrEF, ESRD s/p failed RT (HD via LUE AVF;  on pred 5 mg/tacrolimus 2mg q12h), MADELEINE CHAVEZ transferred to [facility] from [facility] on 3/17 for ICD placement with fever.  He had been admitted to  on  for resp distress after HD, met SIRS criteria and was started on vanc and pip-tazo for presumed HCAP.  He required intubation on 3/1, then had cardiac arrest with ROSC after 8 min.  He was seen by ID – recommended vancomycin, meropenem and doxy X 14 d course.  He then had a V tach arrest, ultimately weaned off pressors and extubated 3/2.  Course next c/b UGI bleed with Hg 3.5, blood in duodenum on EGD but no acute bleed.  He again required pressors for hemorrhagic shock.  CT chest on 3/6 showed diffuse GGO suspicious for pulmonary edema vs. interstitial pneumonia, bi-apical and basilar consolidation that could be atelectasis and small bilateral pleural effusions.  CT A/P with urinary bladder wall thickening due to chronic bladder outlet obstruction vs. cystitis.  CTA on 3/12 showed possible focal proctitis, colonoscopy with rectal ulcers.  Also seen were fullness in the extrarenal pelvis in the transplanted kidney with 2 small hypodensities, likely cysts and an 8 mm non-obstructing stone in the pelvis of the transplanted kidney.  He was transferred on 3/17 for ICD placement and cardiac cath.  Was not on antibiotics at the time of transfer but unclear when antibiotics were stopped.  On the night of admission, he had T 100.1 X 1.  He was otherwise afebrile.  Today, was noted to have rigors, T 102.  Was seen by IM – told to start vanc and pip-tazo, call ID consult.  He has been on low dose tacrolimus, level on 3/17 2.1.  Moody catheter was placed yesterday for urinary retention.  Per Mr. Zavala, he was okay until yesterday, when he began feeling cold, with rhinorrhea, sore throat and cough, no rigors.  Today, after HD, he felt rigors and fever, ongoing rhinorrhea, sore throat, cough with brown sputum and SSCP described as pressure.  Had nausea, no vomiting.  Mild diffuse abd pain, no diarrhea.        PAST MEDICAL & SURGICAL HISTORY:  HTN (hypertension)      HLD (hyperlipidemia)      DM (diabetes mellitus)      GERD (gastroesophageal reflux disease)      ESRD on dialysis      NSTEMI (non-ST elevation myocardial infarction)      CAD (coronary artery disease)      Fever of unknown origin (FUO)              MEDICATIONS  (STANDING):  amLODIPine   Tablet 10 milliGRAM(s) Oral daily  aspirin enteric coated 81 milliGRAM(s) Oral daily  atorvastatin 40 milliGRAM(s) Oral at bedtime  chlorhexidine 2% Cloths 1 Application(s) Topical daily  clopidogrel Tablet 75 milliGRAM(s) Oral daily  dextrose 5%. 1000 milliLiter(s) (50 mL/Hr) IV Continuous <Continuous>  dextrose 5%. 1000 milliLiter(s) (100 mL/Hr) IV Continuous <Continuous>  dextrose 50% Injectable 25 Gram(s) IV Push once  dextrose 50% Injectable 12.5 Gram(s) IV Push once  dextrose 50% Injectable 25 Gram(s) IV Push once  ferrous    sulfate 325 milliGRAM(s) Oral daily  glucagon  Injectable 1 milliGRAM(s) IntraMuscular once  hydrALAZINE 50 milliGRAM(s) Oral every 8 hours  insulin lispro (ADMELOG) corrective regimen sliding scale   SubCutaneous every 6 hours  isosorbide   dinitrate Tablet (ISORDIL) 20 milliGRAM(s) Oral three times a day  losartan 100 milliGRAM(s) Oral daily  magnesium oxide 800 milliGRAM(s) Oral once  meropenem  IVPB      metoprolol tartrate 12.5 milliGRAM(s) Oral every 12 hours  pantoprazole  Injectable 40 milliGRAM(s) IV Push every 12 hours  polyethylene glycol 3350 17 Gram(s) Oral daily  tacrolimus 2 milliGRAM(s) Oral every 12 hours  tamsulosin 0.8 milliGRAM(s) Oral at bedtime  vancomycin  IVPB        MEDICATIONS  (PRN):  acetaminophen     Tablet .. 650 milliGRAM(s) Oral every 6 hours PRN Mild Pain (1 - 3), Moderate Pain (4 - 6)  dextrose Oral Gel 15 Gram(s) Oral once PRN Blood Glucose LESS THAN 70 milliGRAM(s)/deciliter  sodium chloride 0.65% Nasal 1 Spray(s) Both Nostrils every 4 hours PRN Nasal Congestion      Allergies    No Known Allergies    Intolerances        SOCIAL HISTORY:  Is from [facility], , lives at a facility    FAMILY HISTORY:  No pertinent FHx in first degree relatives    ROS:  As above      Vital Signs Last 24 Hrs  T(C): 39.6 (21 Mar 2023 17:01), Max: 39.6 (21 Mar 2023 17:01)  T(F): 103.2 (21 Mar 2023 17:01), Max: 103.2 (21 Mar 2023 17:01)  HR: 79 (21 Mar 2023 14:56) (52 - 79)  BP: 163/69 (21 Mar 2023 14:56) (120/53 - 163/69)  BP(mean): --  RR: 18 (21 Mar 2023 14:56) (17 - 18)  SpO2: 97% (21 Mar 2023 14:56) (97% - 100%)    Parameters below as of 21 Mar 2023 14:56  Patient On (Oxygen Delivery Method): room air        PE:  Ill appearing, slow mentation  HEENT:  NC, PERRL, sclerae anicteric, conjunctivae clear.  Sinuses nontender, no nasal exudate. Unable to visualize pharynx  Neck:  Supple, no adenopathy  Lungs:  Rales L base  Cor:  RRR, S1, S2, no murmur appreciated  Abd:  Symmetric, normoactive BS.  Soft, RUQ tender to palpation, no guarding or rebound.  RLQ transplanted kidney nontender  Back:  Presacral region with stage 1-2 pressure ulcer  :  Moody catheter in place  Extrem:  LUE AVF without erythema/tenderness/warmth.  R wrist region PIV with Biopatch;  R forearm IV catheter with medial area of firmness, no tenderness/warmth.  S/P R BKA  Skin:  No rashes.    LABS:                        10.1   4.30  )-----------( 107      ( 21 Mar 2023 15:32 )             31.0     03-21    136  |  97  |  10  ----------------------------<  158<H>  4.0   |  29  |  2.91<H>    Ca    8.1<L>      21 Mar 2023 15:32  Phos  4.6     03-21  Mg     1.7     03-21    TPro  5.4<L>  /  Alb  2.9<L>  /  TBili  0.7  /  DBili  x   /  AST  17  /  ALT  9<L>  /  AlkPhos  142<H>      Lactate, Blood: 3.0 mmol/L (23 @ 15:32)      Urinalysis Basic - ( 21 Mar 2023 15:02 )    Color: Yellow / Appearance: Hazy / S.020 / pH: x  Gluc: x / Ketone: NEGATIVE  / Bili: Negative / Urobili: 0.2 E.U./dL   Blood: x / Protein: >=300 mg/dL / Nitrite: NEGATIVE   Leuk Esterase: Moderate / RBC: > 10 /HPF / WBC > 10 /HPF   Sq Epi: x / Non Sq Epi: 0-5 /HPF / Bacteria: Present /HPF        RADIOLOGY & ADDITIONAL STUDIES:  < from: Xray Chest 1 View-PORTABLE IMMEDIATE (Xray Chest 1 View-PORTABLE IMMEDIATE .) (23 @ 17:05) >  ******PRELIMINARY REPORT******          INTERPRETATION:  PORTABLE AP CXR    History: 62-year-old male with past medical history of ICM, heart   failure, ESRD status post failed transplant now febrile.    Prior studies: Radiograph 3/21/2023.    Findings:  There is opacification ofthe right lung apex. Possible trace   right-sided effusion. Enlarged cardiac silhouette. Pulmonary vascular   congestion. No acute osseous abnormality. Overlying EKG leads and wires   are noted.    IMPRESSION: Right upper lung zone opacity. Congestive heart failure.    < end of copied text >

## 2023-03-21 NOTE — PROGRESS NOTE ADULT - PROBLEM SELECTOR PLAN 3
Patient euvolemic on exam   - ECHO as above --> EF 25-30%, likely ischemic in origin   - GDMT: Hydral 50mg q8h, isordil 20 mg TID, Lopressor 12.5 mg BID, Losartan 100 mg daily Vtach arrest @[facility]  - no tele events @ [facility]  - EP consulted for ICD placement but will need ischemic eval/cath first   - started on lopressor 12.5 mg BID per EP recommendations Vtach arrest @[facility]  - no tele events @ [facility]  - EP consulted for ICD placement but will need ischemic eval/cath first   - started on lopressor 12.5 mg BID per EP recommendations Vtach arrest @[facility]  - no tele events @ [facility]  - EP consulted for ICD placement but will need ischemic eval/cath first   - started on lopressor 12.5 mg BID per EP recommendations

## 2023-03-21 NOTE — PROGRESS NOTE ADULT - ASSESSMENT
61yo M PMHx HTN, HLD, DM, ICM (s/p cath years ago, RCA 99%, never intervened on), HFrEF 25-30%, ESRD s/p failed kidney transplant (Left Arm AVF; HD TTS), R AKA admitted to [facility] 2/27 for respiratory distress after a HD, met SIRS criteria s/p vanc/zosyn. Intubated on 3/1, cardiac arrest (RoSC after 8mins) w/ subsequent Vtach arrest, placed on amio and pressors, ellen to 30s s/p atropine x2. Weaned off levophed and extubated 3/2. Course c/b coffee ground emesis, hemoccult +, Hgb 3.5 s/p 7units PRBCs, 1Unit FFP, 1 unit donor packed platelets. EGD/colo: melanosis duodeni but no acute bleed. Hypotensive and restarted on levophed gtt. CTA Abd: no active bleeding, possible focal proctitis; colonoscopy showing rectal ulcer. Started on hydrocortisone suppository and mesalamine. Hgb stabilized 11's, weaned off levophed and transferred to [facility] 3/17/23 for ICD eval 2/2 Vtach and cardiac cath. Hospital course @[facility] c/b BRBPR 3/17 AM with flex sig showing localized ulceration and no active rectal bleeding. Plan to observe for bleeding after resuming DAPT on 3/20/23 61yo M PMHx HTN, HLD, DM, ICM (s/p cath years ago, RCA 99%, never intervened on), HFrEF 25-30%, ESRD s/p failed kidney transplant (Left Arm AVF; HD TTS), R AKA admitted to [facility] 2/27 for respiratory distress after a HD, met SIRS criteria s/p vanc/zosyn. Intubated on 3/1, cardiac arrest (RoSC after 8mins) w/ subsequent Vtach arrest, placed on amio and pressors, ellen to 30s s/p atropine x2. Weaned off levophed and extubated 3/2. Course c/b coffee ground emesis, hemoccult +, Hgb 3.5 s/p 7units PRBCs, 1Unit FFP, 1 unit donor packed platelets. EGD/colo: melanosis duodeni but no acute bleed. Hypotensive and restarted on levophed gtt. CTA Abd: no active bleeding, possible focal proctitis; colonoscopy showing rectal ulcer. Started on hydrocortisone suppository and mesalamine. Hgb stabilized 11's, weaned off levophed and transferred to [facility] 3/17/23 for ICD eval 2/2 Vtach and cardiac cath. Hospital course @[facility] c/b BRBPR 3/17 AM with flex sig showing localized ulceration and no active rectal bleeding. Plan to observe for bleeding after resuming DAPT on 3/20/23 61yo M PMHx HTN, HLD, DM, ICM (s/p cath years ago, RCA 99%, never intervened on), HFrEF 25-30%, ESRD s/p failed kidney transplant (Left Arm AVF; HD TTS), R AKA admitted to [facility] 2/27 for respiratory distress after a HD, met SIRS criteria s/p vanc/zosyn. Intubated on 3/1, cardiac arrest (RoSC after 8mins) w/ subsequent Vtach arrest, placed on amio and pressors, ellen to 30s s/p atropine x2. Weaned off levophed and extubated 3/2. Course c/b coffee ground emesis, hemoccult +, Hgb 3.5 s/p 7units PRBCs, 1Unit FFP, 1 unit donor packed platelets. EGD/colo: melanosis duodeni but no acute bleed. Hypotensive and restarted on levophed gtt. CTA Abd: no active bleeding, possible focal proctitis; colonoscopy showing rectal ulcer. Started on hydrocortisone suppository and mesalamine. Hgb stabilized 11's, weaned off levophed and transferred to [facility] 3/17/23 for ICD eval 2/2 Vtach and cardiac cath. Hospital course @[facility] c/b BRBPR 3/17 AM with flex sig showing localized ulceration and no active rectal bleeding. Plan to observe for bleeding after resuming DAPT on 3/20/23 61yo M PMHx HTN, HLD, DM, ICM (s/p cath years ago, RCA 99%, never intervened on), HFrEF 25-30%, ESRD s/p failed kidney transplant (Left Arm AVF; HD TTS), R AKA admitted to [facility] 2/27 for respiratory distress requiring intubation, course complicated by cardiac arrest, vtach arrest requiring amio and pressors. Extubated 3/2 and course c/b by LGIVB with hemoglobin of 3.5 requiring multiple transfusions; EGD/colo and CTA w/o evidence of active bleed and hemoglobin subsequently improved to 11's. Transferred to [facility] on 03/17/23 for ICD eval 2/2 Vtach and cardiac cath however on day of arrival patient had episode of BRBPR, flex sign showing localized ulceration and no active bleeding. DAPT resumed on 03/20/23. On 03/21/23 patient febrile to 102F w/ URI symptoms.      after a HD, met SIRS criteria s/p vanc/zosyn. Intubated on 3/1, cardiac arrest (RoSC after 8mins) w/ subsequent Vtach arrest, placed on amio and pressors, ellen to 30s s/p atropine x2. Weaned off levophed and extubated 3/2. Course c/b coffee ground emesis, hemoccult +, Hgb 3.5 s/p 7units PRBCs, 1Unit FFP, 1 unit donor packed platelets. EGD/colo: melanosis duodeni but no acute bleed. Hypotensive and restarted on levophed gtt. CTA Abd: no active bleeding, possible focal proctitis; colonoscopy showing rectal ulcer. Started on hydrocortisone suppository and mesalamine. Hgb stabilized 11's, weaned off levophed and transferred to [facility] 3/17/23 for ICD eval 2/2 Vtach and cardiac cath. Hospital course @[facility] c/b BRBPR 3/17 AM with flex sig showing localized ulceration and no active rectal bleeding. Plan to observe for bleeding after resuming DAPT on 3/20/23. On 03/21/23 patient febrile to 102 associated with URI symptoms s/p dialysis. Infectious workup in process w/ medicine and ID on board  63yo M PMHx HTN, HLD, DM, ICM (s/p cath years ago, RCA 99%, never intervened on), HFrEF 25-30%, ESRD s/p failed kidney transplant (Left Arm AVF; HD TTS), R AKA admitted to [facility] 2/27 for respiratory distress requiring intubation, course complicated by cardiac arrest, vtach arrest requiring amio and pressors. Extubated 3/2 and course c/b by LGIVB with hemoglobin of 3.5 requiring multiple transfusions; EGD/colo and CTA w/o evidence of active bleed and hemoglobin subsequently improved to 11's. Transferred to [facility] on 03/17/23 for ICD eval 2/2 Vtach and cardiac cath however on day of arrival patient had episode of BRBPR, flex sign showing localized ulceration and no active bleeding. DAPT resumed on 03/20/23. On 03/21/23 patient febrile to 102F w/ URI symptoms.      after a HD, met SIRS criteria s/p vanc/zosyn. Intubated on 3/1, cardiac arrest (RoSC after 8mins) w/ subsequent Vtach arrest, placed on amio and pressors, ellen to 30s s/p atropine x2. Weaned off levophed and extubated 3/2. Course c/b coffee ground emesis, hemoccult +, Hgb 3.5 s/p 7units PRBCs, 1Unit FFP, 1 unit donor packed platelets. EGD/colo: melanosis duodeni but no acute bleed. Hypotensive and restarted on levophed gtt. CTA Abd: no active bleeding, possible focal proctitis; colonoscopy showing rectal ulcer. Started on hydrocortisone suppository and mesalamine. Hgb stabilized 11's, weaned off levophed and transferred to [facility] 3/17/23 for ICD eval 2/2 Vtach and cardiac cath. Hospital course @[facility] c/b BRBPR 3/17 AM with flex sig showing localized ulceration and no active rectal bleeding. Plan to observe for bleeding after resuming DAPT on 3/20/23. On 03/21/23 patient febrile to 102 associated with URI symptoms s/p dialysis. Infectious workup in process w/ medicine and ID on board  63yo M PMHx HTN, HLD, DM, ICM (s/p cath years ago, RCA 99%, never intervened on), HFrEF 25-30%, ESRD s/p failed kidney transplant (Left Arm AVF; HD TTS), R AKA admitted to [facility] 2/27 for respiratory distress requiring intubation, course complicated by cardiac arrest, vtach arrest requiring amio and pressors. Extubated 3/2 and course c/b by LGIVB with hemoglobin of 3.5 requiring multiple transfusions; EGD/colo and CTA w/o evidence of active bleed and hemoglobin subsequently improved to 11's. Transferred to [facility] on 03/17/23 for ICD eval 2/2 Vtach and cardiac cath however on day of arrival patient had episode of BRBPR, flex sign showing localized ulceration and no active bleeding. DAPT resumed on 03/20/23. On 03/21/23 patient febrile to 102F w/ URI symptoms.      after a HD, met SIRS criteria s/p vanc/zosyn. Intubated on 3/1, cardiac arrest (RoSC after 8mins) w/ subsequent Vtach arrest, placed on amio and pressors, ellen to 30s s/p atropine x2. Weaned off levophed and extubated 3/2. Course c/b coffee ground emesis, hemoccult +, Hgb 3.5 s/p 7units PRBCs, 1Unit FFP, 1 unit donor packed platelets. EGD/colo: melanosis duodeni but no acute bleed. Hypotensive and restarted on levophed gtt. CTA Abd: no active bleeding, possible focal proctitis; colonoscopy showing rectal ulcer. Started on hydrocortisone suppository and mesalamine. Hgb stabilized 11's, weaned off levophed and transferred to [facility] 3/17/23 for ICD eval 2/2 Vtach and cardiac cath. Hospital course @[facility] c/b BRBPR 3/17 AM with flex sig showing localized ulceration and no active rectal bleeding. Plan to observe for bleeding after resuming DAPT on 3/20/23. On 03/21/23 patient febrile to 102 associated with URI symptoms s/p dialysis. Infectious workup in process w/ medicine and ID on board  63yo M PMHx HTN, HLD, DM, ICM (s/p cath years ago, RCA 99%, never intervened on), HFrEF 25-30%, ESRD s/p failed kidney transplant (Left Arm AVF; HD TTS), R AKA admitted to [facility] 2/27 for respiratory distress triggered SIRS, after HD session, requiring intubation, course complicated by cardiac arrest, vtach arrest requiring amio and pressors. Extubated 3/2 and course c/b by LGIB with hemoglobin of 3.5 requiring multiple transfusions; EGD/colo and CTA w/o evidence of active bleed and hemoglobin subsequently improved to 11's. Transferred to [facility] on 03/17/23 for ICD eval 2/2 Vtach and cardiac cath however on day of arrival patient had episode of BRBPR, flex sign showing localized ulceration and no active bleeding. DAPT resumed on 03/20/23, with plan for cardiac cath on 3/21/2023 - however on 03/21/23 patient febrile to 102F w/ URI symptoms.      63yo M PMHx HTN, HLD, DM, ICM (s/p cath years ago, RCA 99%, never intervened on), HFrEF 25-30%, ESRD s/p failed kidney transplant (Left Arm AVF; HD TTS), R AKA admitted to [facility] 2/27 for respiratory distress triggered SIRS, after HD session, requiring intubation, course complicated by cardiac arrest, vtach arrest requiring amio and pressors. Extubated 3/2 and course c/b by LGIB with hemoglobin of 3.5 requiring multiple transfusions; EGD/colo and CTA w/o evidence of active bleed and hemoglobin subsequently improved to 11's. Transferred to [facility] on 03/17/23 for ICD eval 2/2 Vtach and cardiac cath however on day of arrival patient had episode of BRBPR, flex sign showing localized ulceration and no active bleeding. DAPT resumed on 03/20/23, with plan for cardiac cath on 3/21/2023 - however on 03/21/23 patient febrile to 102F w/ URI symptoms.      63yo M PMHx HTN, HLD, DM, ICM (s/p cath years ago, RCA 99%, never intervened on), HFrEF 25-30%, ESRD s/p failed kidney transplant (Left Arm AVF; HD TTS), R AKA admitted to [facility] 2/27 for respiratory distress triggered SIRS, after HD session, requiring intubation, course complicated by cardiac arrest, vtach arrest requiring amio and pressors. Extubated 3/2 and course c/b by LGIB with hemoglobin of 3.5 requiring multiple transfusions; EGD/colo and CTA w/o evidence of active bleed and hemoglobin subsequently improved to 11's. Transferred to [facility] on 03/17/23 for ICD eval 2/2 Vtach and cardiac cath however on day of arrival patient had episode of BRBPR, flex sign showing localized ulceration and no active bleeding. DAPT resumed on 03/20/23, with plan for cardiac cath on 3/21/2023 - however on 03/21/23 patient febrile to 102F w/ URI symptoms.

## 2023-03-21 NOTE — PROGRESS NOTE ADULT - ASSESSMENT
61 y/o M PMHx HTN, HLD, DM, CAD (s/p cath years ago, RCA 99%, never intervened on), ESRD s/p failed kidney transplant, on HD via Left Arm AVF, ESBL bacteremia (2020), and Right AKA presented to [facility] 2/27/23 for respiratory distress after a dialysis session. Intubated in MICU there, had reportedly VT cardiac arrest and ROSC was achieved after 8 mins. Unclear if he made troponin (no such info included in transfer paper).  Was put on Amio gtt, noted ellen when on amio gtt requiring atropine. Echo there showed LVEF 25-30%.  Extubated 3/2 and weaned off levophed. Also has rectal ulcer with BRBPR at [facility] requiring multiple PRBC transfusions. Reportedly stable and was then transferred to [facility].  Hematochezia on 3/17 due to rectal ulcer, s/p flex sig which didn't show any active bleeding. He was started on DAPT on 3/20.   - need workup for fever (103F) today.   - pending cath after fever and if tolerate DAPT  - Pt was offered ICD and he stated he wants device to "keep him safe".  HR here has been 50-60s mostly and pt has been bedrest for the duration of his stay here.  Don't see marked SB that would require dual chamber ICD.  Also, given h/o ESBL bacteremia in 2020 (according to [place] transfer note) and hemodialysis status, high risk for device infection. As such, would be best to have SQ-ICD implant.      63 y/o M PMHx HTN, HLD, DM, CAD (s/p cath years ago, RCA 99%, never intervened on), ESRD s/p failed kidney transplant, on HD via Left Arm AVF, ESBL bacteremia (2020), and Right AKA presented to [facility] 2/27/23 for respiratory distress after a dialysis session. Intubated in MICU there, had reportedly VT cardiac arrest and ROSC was achieved after 8 mins. Unclear if he made troponin (no such info included in transfer paper).  Was put on Amio gtt, noted ellen when on amio gtt requiring atropine. Echo there showed LVEF 25-30%.  Extubated 3/2 and weaned off levophed. Also has rectal ulcer with BRBPR at [facility] requiring multiple PRBC transfusions. Reportedly stable and was then transferred to [facility].  Hematochezia on 3/17 due to rectal ulcer, s/p flex sig which didn't show any active bleeding. He was started on DAPT on 3/20.   - need workup for fever (103F) today.   - pending cath after fever and if tolerate DAPT  - Pt was offered ICD and he stated he wants device to "keep him safe".  HR here has been 50-60s mostly and pt has been bedrest for the duration of his stay here.  Don't see marked SB that would require dual chamber ICD.  Also, given h/o ESBL bacteremia in 2020 (according to [place] transfer note) and hemodialysis status, high risk for device infection. As such, would be best to have SQ-ICD implant.      61 y/o M PMHx HTN, HLD, DM, CAD (s/p cath years ago, RCA 99%, never intervened on), ESRD s/p failed kidney transplant, on HD via Left Arm AVF, ESBL bacteremia (2020), and Right AKA presented to [facility] 2/27/23 for respiratory distress after a dialysis session. Intubated in MICU there, had reportedly VT cardiac arrest and ROSC was achieved after 8 mins. Unclear if he made troponin (no such info included in transfer paper).  Was put on Amio gtt, noted ellen when on amio gtt requiring atropine. Echo there showed LVEF 25-30%.  Extubated 3/2 and weaned off levophed. Also has rectal ulcer with BRBPR at [facility] requiring multiple PRBC transfusions. Reportedly stable and was then transferred to [facility].  Hematochezia on 3/17 due to rectal ulcer, s/p flex sig which didn't show any active bleeding. He was started on DAPT on 3/20.   - need workup for fever (103F) today.   - pending cath after fever and if tolerate DAPT  - Pt was offered ICD and he stated he wants device to "keep him safe".  HR here has been 50-60s mostly and pt has been bedrest for the duration of his stay here.  Don't see marked SB that would require dual chamber ICD.  Also, given h/o ESBL bacteremia in 2020 (according to [place] transfer note) and hemodialysis status, high risk for device infection. As such, would be best to have SQ-ICD implant.

## 2023-03-21 NOTE — PROGRESS NOTE ADULT - SUBJECTIVE AND OBJECTIVE BOX
Interventional Cardiology PA Adult Progress Note        Subjective Assessment:  Patient seen and examined at bedside.   Denies fever, chills, headache, cough, chest pain, shortness of breath, palpitations, or BRBPR.         ROS Negative except as per Subjective and HPI  	  MEDICATIONS:  amLODIPine   Tablet 10 milliGRAM(s) Oral daily  hydrALAZINE 50 milliGRAM(s) Oral every 8 hours  isosorbide   dinitrate Tablet (ISORDIL) 20 milliGRAM(s) Oral three times a day  losartan 100 milliGRAM(s) Oral daily  metoprolol tartrate 12.5 milliGRAM(s) Oral every 12 hours  acetaminophen     Tablet .. 650 milliGRAM(s) Oral every 6 hours PRN  pantoprazole  Injectable 40 milliGRAM(s) IV Push every 12 hours  polyethylene glycol 3350 17 Gram(s) Oral daily  atorvastatin 40 milliGRAM(s) Oral at bedtime  dextrose 50% Injectable 25 Gram(s) IV Push once  dextrose 50% Injectable 12.5 Gram(s) IV Push once  dextrose 50% Injectable 25 Gram(s) IV Push once  dextrose Oral Gel 15 Gram(s) Oral once PRN  glucagon  Injectable 1 milliGRAM(s) IntraMuscular once  insulin lispro (ADMELOG) corrective regimen sliding scale   SubCutaneous every 6 hours  aspirin enteric coated 81 milliGRAM(s) Oral daily  chlorhexidine 2% Cloths 1 Application(s) Topical daily  clopidogrel Tablet 75 milliGRAM(s) Oral daily  dextrose 5%. 1000 milliLiter(s) IV Continuous <Continuous>  dextrose 5%. 1000 milliLiter(s) IV Continuous <Continuous>  ferrous    sulfate 325 milliGRAM(s) Oral daily  magnesium oxide 800 milliGRAM(s) Oral once  sodium chloride 0.65% Nasal 1 Spray(s) Both Nostrils every 4 hours PRN  tacrolimus 2 milliGRAM(s) Oral every 12 hours  tamsulosin 0.8 milliGRAM(s) Oral at bedtime      	    [PHYSICAL EXAM:  TELEMETRY:  T(C): 37 (03-21-23 @ 09:31), Max: 37.3 (03-20-23 @ 21:14)  HR: 52 (03-21-23 @ 09:31) (52 - 58)  BP: 120/53 (03-21-23 @ 09:31) (120/53 - 151/67)  RR: 17 (03-21-23 @ 09:31) (14 - 18)  SpO2: 99% (03-21-23 @ 09:31) (97% - 100%)  Wt(kg): --  I&O's Summary    20 Mar 2023 07:01  -  21 Mar 2023 07:00  --------------------------------------------------------  IN: 780 mL / OUT: 325 mL / NET: 455 mL    21 Mar 2023 07:01  -  21 Mar 2023 11:55  --------------------------------------------------------  IN: 120 mL / OUT: 0 mL / NET: 120 mL        Appearance: Normal	  HEENT:   Normal oral mucosa, PERRL, EOMI	  Neck: Supple, + JVD/ - JVD; Carotid Bruit   Cardiovascular: Normal S1 S2, No JVD, No murmurs,   Respiratory: Lungs clear to auscultation/Decreased Breath Sounds/No Rales, Rhonchi, Wheezing	  Gastrointestinal:  Soft, Non-tender, + BS	  Skin: No rashes, No ecchymoses, No cyanosis  Extremities: Normal range of motion, No clubbing, cyanosis or edema  Vascular: Peripheral pulses palpable 2+ bilaterally  Neurologic: Non-focal  Psychiatry: A & O x 3, Mood & affect appropriate        LABS:	 	  CARDIAC MARKERS:             10.2   3.65  )-----------( 113      ( 21 Mar 2023 06:59 )             32.2     03-21    125<L>  |  89<L>  |  29<H>  ----------------------------<  85  5.1   |  27  |  5.88<H>    Ca    7.9<L>      21 Mar 2023 06:59  Phos  4.6     03-21  Mg     1.8     03-21         Interventional Cardiology PA Adult Progress Note    Subjective Assessment:  Patient seen and examined at bedside after coming from dialysis. Visibly shivering, covered in blankets complaining of fever, chills, and URI symptoms.   Denies chest pain, palpitations, vomiting, hematuria, urinary frequency, dysuria, or BRBPR.     ROS Negative except as per Subjective and HPI  	  MEDICATIONS:  amLODIPine   Tablet 10 milliGRAM(s) Oral daily  hydrALAZINE 50 milliGRAM(s) Oral every 8 hours  isosorbide   dinitrate Tablet (ISORDIL) 20 milliGRAM(s) Oral three times a day  losartan 100 milliGRAM(s) Oral daily  metoprolol tartrate 12.5 milliGRAM(s) Oral every 12 hours  acetaminophen     Tablet .. 650 milliGRAM(s) Oral every 6 hours PRN  pantoprazole  Injectable 40 milliGRAM(s) IV Push every 12 hours  polyethylene glycol 3350 17 Gram(s) Oral daily  atorvastatin 40 milliGRAM(s) Oral at bedtime  dextrose 50% Injectable 25 Gram(s) IV Push once  dextrose 50% Injectable 12.5 Gram(s) IV Push once  dextrose 50% Injectable 25 Gram(s) IV Push once  dextrose Oral Gel 15 Gram(s) Oral once PRN  glucagon  Injectable 1 milliGRAM(s) IntraMuscular once  insulin lispro (ADMELOG) corrective regimen sliding scale   SubCutaneous every 6 hours  aspirin enteric coated 81 milliGRAM(s) Oral daily  chlorhexidine 2% Cloths 1 Application(s) Topical daily  clopidogrel Tablet 75 milliGRAM(s) Oral daily  dextrose 5%. 1000 milliLiter(s) IV Continuous <Continuous>  dextrose 5%. 1000 milliLiter(s) IV Continuous <Continuous>  ferrous    sulfate 325 milliGRAM(s) Oral daily  magnesium oxide 800 milliGRAM(s) Oral once  sodium chloride 0.65% Nasal 1 Spray(s) Both Nostrils every 4 hours PRN  tacrolimus 2 milliGRAM(s) Oral every 12 hours  tamsulosin 0.8 milliGRAM(s) Oral at bedtime        [PHYSICAL EXAM:  TELEMETRY:  T(C): 37 (03-21-23 @ 09:31), Max: 37.3 (03-20-23 @ 21:14)  HR: 52 (03-21-23 @ 09:31) (52 - 58)  BP: 120/53 (03-21-23 @ 09:31) (120/53 - 151/67)  RR: 17 (03-21-23 @ 09:31) (14 - 18)  SpO2: 99% (03-21-23 @ 09:31) (97% - 100%)  Wt(kg): --  I&O's Summary    20 Mar 2023 07:01  -  21 Mar 2023 07:00  --------------------------------------------------------  IN: 780 mL / OUT: 325 mL / NET: 455 mL    21 Mar 2023 07:01  -  21 Mar 2023 11:55  --------------------------------------------------------  IN: 120 mL / OUT: 0 mL / NET: 120 mL        Appearance: sitting upright, shivering 	  Cardiovascular: Normal S1 S2,  Respiratory: normal effort of breathing   Gastrointestinal:  Soft, Non-tender, + BS	  Skin: warm, no rashes   Extremities: R AKA   Vascular: Peripheral pulses palpable 2+ on LLE   Neurologic: Non-focal  Psychiatry: A&O x 3, Mood & affect appropriate        LABS:	 	  CARDIAC MARKERS:             10.2   3.65  )-----------( 113      ( 21 Mar 2023 06:59 )             32.2     03-21    125<L>  |  89<L>  |  29<H>  ----------------------------<  85  5.1   |  27  |  5.88<H>    Ca    7.9<L>      21 Mar 2023 06:59  Phos  4.6     03-21  Mg     1.8     03-21         Interventional Cardiology PA Adult Progress Note    Subjective Assessment:  Patient seen and examined at bedside after coming from dialysis. Rigoring, covered in blankets complaining of fever, HA, CP, chills, URI symptoms, nausea, and RUQ abdominal pain.    Denies vomiting, hematuria, urinary frequency, dysuria, or BRBPR.     ROS Negative except as per Subjective and HPI  	  MEDICATIONS:  amLODIPine   Tablet 10 milliGRAM(s) Oral daily  hydrALAZINE 50 milliGRAM(s) Oral every 8 hours  isosorbide   dinitrate Tablet (ISORDIL) 20 milliGRAM(s) Oral three times a day  losartan 100 milliGRAM(s) Oral daily  metoprolol tartrate 12.5 milliGRAM(s) Oral every 12 hours  acetaminophen     Tablet .. 650 milliGRAM(s) Oral every 6 hours PRN  pantoprazole  Injectable 40 milliGRAM(s) IV Push every 12 hours  polyethylene glycol 3350 17 Gram(s) Oral daily  atorvastatin 40 milliGRAM(s) Oral at bedtime  dextrose 50% Injectable 25 Gram(s) IV Push once  dextrose 50% Injectable 12.5 Gram(s) IV Push once  dextrose 50% Injectable 25 Gram(s) IV Push once  dextrose Oral Gel 15 Gram(s) Oral once PRN  glucagon  Injectable 1 milliGRAM(s) IntraMuscular once  insulin lispro (ADMELOG) corrective regimen sliding scale   SubCutaneous every 6 hours  aspirin enteric coated 81 milliGRAM(s) Oral daily  chlorhexidine 2% Cloths 1 Application(s) Topical daily  clopidogrel Tablet 75 milliGRAM(s) Oral daily  dextrose 5%. 1000 milliLiter(s) IV Continuous <Continuous>  dextrose 5%. 1000 milliLiter(s) IV Continuous <Continuous>  ferrous    sulfate 325 milliGRAM(s) Oral daily  magnesium oxide 800 milliGRAM(s) Oral once  sodium chloride 0.65% Nasal 1 Spray(s) Both Nostrils every 4 hours PRN  tacrolimus 2 milliGRAM(s) Oral every 12 hours  tamsulosin 0.8 milliGRAM(s) Oral at bedtime        [PHYSICAL EXAM:  TELEMETRY:  T(C): 37 (03-21-23 @ 09:31), Max: 37.3 (03-20-23 @ 21:14)  HR: 52 (03-21-23 @ 09:31) (52 - 58)  BP: 120/53 (03-21-23 @ 09:31) (120/53 - 151/67)  RR: 17 (03-21-23 @ 09:31) (14 - 18)  SpO2: 99% (03-21-23 @ 09:31) (97% - 100%)  Wt(kg): --  I&O's Summary    20 Mar 2023 07:01  -  21 Mar 2023 07:00  --------------------------------------------------------  IN: 780 mL / OUT: 325 mL / NET: 455 mL    21 Mar 2023 07:01  -  21 Mar 2023 11:55  --------------------------------------------------------  IN: 120 mL / OUT: 0 mL / NET: 120 mL        Appearance: sitting upright, shivering 	  Cardiovascular: Normal S1 S2   Respiratory: normal effort of breathing   Gastrointestinal:  Soft, Non-tender, + BS	  Skin: warm, no rashes, stage 1 sacral ulcer   Extremities: R AKA   Vascular: Peripheral pulse palpable 2+ on LLE   Neurologic: Non-focal  Psychiatry: A&O x 3, Mood & affect appropriate        LABS:	 	  CARDIAC MARKERS:             10.2   3.65  )-----------( 113      ( 21 Mar 2023 06:59 )             32.2     03-21    125<L>  |  89<L>  |  29<H>  ----------------------------<  85  5.1   |  27  |  5.88<H>    Ca    7.9<L>      21 Mar 2023 06:59  Phos  4.6     03-21  Mg     1.8     03-21         Interventional Cardiology PA Adult Progress Note    Subjective Assessment:  Patient seen and examined at bedside after coming from dialysis. Rigoring, covered in blankets complaining of fever, HA, CP, chills, URI symptoms, nausea, and RUQ abdominal pain.    Denies vomiting, hematuria, urinary frequency, dysuria, or BRBPR.     ROS Negative except as per Subjective and HPI  	  MEDICATIONS:  amLODIPine   Tablet 10 milliGRAM(s) Oral daily  hydrALAZINE 50 milliGRAM(s) Oral every 8 hours  isosorbide   dinitrate Tablet (ISORDIL) 20 milliGRAM(s) Oral three times a day  losartan 100 milliGRAM(s) Oral daily  metoprolol tartrate 12.5 milliGRAM(s) Oral every 12 hours  acetaminophen     Tablet .. 650 milliGRAM(s) Oral every 6 hours PRN  pantoprazole  Injectable 40 milliGRAM(s) IV Push every 12 hours  polyethylene glycol 3350 17 Gram(s) Oral daily  atorvastatin 40 milliGRAM(s) Oral at bedtime  dextrose 50% Injectable 25 Gram(s) IV Push once  dextrose 50% Injectable 12.5 Gram(s) IV Push once  dextrose 50% Injectable 25 Gram(s) IV Push once  dextrose Oral Gel 15 Gram(s) Oral once PRN  glucagon  Injectable 1 milliGRAM(s) IntraMuscular once  insulin lispro (ADMELOG) corrective regimen sliding scale   SubCutaneous every 6 hours  aspirin enteric coated 81 milliGRAM(s) Oral daily  chlorhexidine 2% Cloths 1 Application(s) Topical daily  clopidogrel Tablet 75 milliGRAM(s) Oral daily  dextrose 5%. 1000 milliLiter(s) IV Continuous <Continuous>  dextrose 5%. 1000 milliLiter(s) IV Continuous <Continuous>  ferrous    sulfate 325 milliGRAM(s) Oral daily  magnesium oxide 800 milliGRAM(s) Oral once  sodium chloride 0.65% Nasal 1 Spray(s) Both Nostrils every 4 hours PRN  tacrolimus 2 milliGRAM(s) Oral every 12 hours  tamsulosin 0.8 milliGRAM(s) Oral at bedtime        [PHYSICAL EXAM:  TELEMETRY:  T(C): 37 (03-21-23 @ 09:31), Max: 37.3 (03-20-23 @ 21:14)  HR: 52 (03-21-23 @ 09:31) (52 - 58)  BP: 120/53 (03-21-23 @ 09:31) (120/53 - 151/67)  RR: 17 (03-21-23 @ 09:31) (14 - 18)  SpO2: 99% (03-21-23 @ 09:31) (97% - 100%)  Wt(kg): --  I&O's Summary    20 Mar 2023 07:01  -  21 Mar 2023 07:00  --------------------------------------------------------  IN: 780 mL / OUT: 325 mL / NET: 455 mL    21 Mar 2023 07:01  -  21 Mar 2023 11:55  --------------------------------------------------------  IN: 120 mL / OUT: 0 mL / NET: 120 mL        Appearance: sitting upright, shivering 	  Cardiovascular: Normal S1 S2   Respiratory: normal effort of breathing   Gastrointestinal:  +BS, non-distended   Skin: warm, no rashes, stage 1 sacral ulcer   Extremities: R AKA   Vascular: Peripheral pulse palpable 2+ on LLE   Neurologic: Non-focal  Psychiatry: A&O x 3, Mood & affect appropriate        LABS:	 	  CARDIAC MARKERS:             10.2   3.65  )-----------( 113      ( 21 Mar 2023 06:59 )             32.2     03-21    125<L>  |  89<L>  |  29<H>  ----------------------------<  85  5.1   |  27  |  5.88<H>    Ca    7.9<L>      21 Mar 2023 06:59  Phos  4.6     03-21  Mg     1.8     03-21

## 2023-03-21 NOTE — PROGRESS NOTE ADULT - PROBLEM SELECTOR PLAN 4
3/17AM pt passed loose BM along with large amount of BRBPR  - GI following  - s/p Flex sigmoidoscopy: localized ulceration and erosive with no bleeding in the rectum, suggestive of solitary ulcer syndrome, Evidence of prior hemoclip was found in the sigmoid colon.  -Avoid anal digitation and enemas   -Bowel regimen with miralax and dulcolax to avoid constipation.  -Repeat Endoscopy in 3 months Patient euvolemic on exam   - ECHO as above --> EF 25-30%, likely ischemic in origin   - GDMT: Hydral 50mg q8h, isordil 20 mg TID, Lopressor 12.5 mg BID, Losartan 100 mg daily

## 2023-03-21 NOTE — PROGRESS NOTE ADULT - PROBLEM SELECTOR PLAN 9
A1C 5.9  - Continue MICHELINE     F: Oral intake  E: Replete electrolytes as needed for K<4 and Mg<2  N: renal diet  DVT PPX: held for cath/ICD placement  Dispo: pending ICD/cardiac cath  PT ordered, came from rehab     Case discussed with Dr. Jones total 88, try 158, HDL 44, LDL 12  -c/w atorvastatin 40 mg QHS

## 2023-03-21 NOTE — PROGRESS NOTE ADULT - NSPROGADDITIONALINFOA_GEN_ALL_CORE
Attending Attestation:  I was physically present for the key portions of the evaluation and management (E/M) service provided.  I agree with the above history, physical, and plan which I have reviewed with the following edits/addendum:    - pt spiked a fever of 102F. D/w IM. panculture and start broad spectrum antibx. close watch overnight.    Stan Jones MD  Cardiology    35 minutes spent on total encounter; more than 50% of the visit was spent counseling and/or coordinating care by the attending physician.

## 2023-03-21 NOTE — PROGRESS NOTE ADULT - PROBLEM SELECTOR PLAN 5
dialysis Tues, Thurs, Sat  - last received 3/18  - renal following, plan for HD tomorrow 3/21/2023 3/17AM pt passed loose BM along with large amount of BRBPR  - GI following  - s/p Flex sigmoidoscopy: localized ulceration and erosive with no bleeding in the rectum, suggestive of solitary ulcer syndrome, Evidence of prior hemoclip was found in the sigmoid colon.  -Avoid anal digitation and enemas   -Bowel regimen with miralax and dulcolax to avoid constipation.  -Repeat Endoscopy in 3 months

## 2023-03-22 LAB
ALBUMIN SERPL ELPH-MCNC: 2.4 G/DL — LOW (ref 3.3–5)
ALBUMIN SERPL ELPH-MCNC: 2.6 G/DL — LOW (ref 3.3–5)
ALP SERPL-CCNC: 120 U/L — SIGNIFICANT CHANGE UP (ref 40–120)
ALP SERPL-CCNC: 151 U/L — HIGH (ref 40–120)
ALT FLD-CCNC: 6 U/L — LOW (ref 10–45)
ALT FLD-CCNC: 8 U/L — LOW (ref 10–45)
ANION GAP SERPL CALC-SCNC: 10 MMOL/L — SIGNIFICANT CHANGE UP (ref 5–17)
ANION GAP SERPL CALC-SCNC: 8 MMOL/L — SIGNIFICANT CHANGE UP (ref 5–17)
AST SERPL-CCNC: 16 U/L — SIGNIFICANT CHANGE UP (ref 10–40)
AST SERPL-CCNC: 19 U/L — SIGNIFICANT CHANGE UP (ref 10–40)
BASOPHILS # BLD AUTO: 0.08 K/UL — SIGNIFICANT CHANGE UP (ref 0–0.2)
BASOPHILS NFR BLD AUTO: 1.1 % — SIGNIFICANT CHANGE UP (ref 0–2)
BILIRUB SERPL-MCNC: 0.8 MG/DL — SIGNIFICANT CHANGE UP (ref 0.2–1.2)
BUN SERPL-MCNC: 16 MG/DL — SIGNIFICANT CHANGE UP (ref 7–23)
CALCIUM SERPL-MCNC: 7.8 MG/DL — LOW (ref 8.4–10.5)
CALCIUM SERPL-MCNC: 8 MG/DL — LOW (ref 8.4–10.5)
CHLORIDE SERPL-SCNC: 95 MMOL/L — LOW (ref 96–108)
CHLORIDE SERPL-SCNC: 96 MMOL/L — SIGNIFICANT CHANGE UP (ref 96–108)
CMV IGG FLD QL: >10 U/ML — HIGH
CMV IGG SERPL-IMP: POSITIVE
CMV IGM FLD-ACNC: <8 AU/ML — SIGNIFICANT CHANGE UP
CMV IGM SERPL QL: NEGATIVE — SIGNIFICANT CHANGE UP
CO2 SERPL-SCNC: 24 MMOL/L — SIGNIFICANT CHANGE UP (ref 22–31)
CO2 SERPL-SCNC: 27 MMOL/L — SIGNIFICANT CHANGE UP (ref 22–31)
CREAT SERPL-MCNC: 4.61 MG/DL — HIGH (ref 0.5–1.3)
CREAT SERPL-MCNC: 4.72 MG/DL — HIGH (ref 0.5–1.3)
EGFR: 13 ML/MIN/1.73M2 — LOW
EGFR: 14 ML/MIN/1.73M2 — LOW
EOSINOPHIL # BLD AUTO: 0.22 K/UL — SIGNIFICANT CHANGE UP (ref 0–0.5)
EOSINOPHIL NFR BLD AUTO: 3 % — SIGNIFICANT CHANGE UP (ref 0–6)
GLUCOSE BLDC GLUCOMTR-MCNC: 81 MG/DL — SIGNIFICANT CHANGE UP (ref 70–99)
GLUCOSE BLDC GLUCOMTR-MCNC: 92 MG/DL — SIGNIFICANT CHANGE UP (ref 70–99)
GLUCOSE BLDC GLUCOMTR-MCNC: 93 MG/DL — SIGNIFICANT CHANGE UP (ref 70–99)
GLUCOSE BLDC GLUCOMTR-MCNC: 95 MG/DL — SIGNIFICANT CHANGE UP (ref 70–99)
GLUCOSE SERPL-MCNC: 87 MG/DL — SIGNIFICANT CHANGE UP (ref 70–99)
GLUCOSE SERPL-MCNC: 89 MG/DL — SIGNIFICANT CHANGE UP (ref 70–99)
HCT VFR BLD CALC: 27.6 % — LOW (ref 39–50)
HCT VFR BLD CALC: 32 % — LOW (ref 39–50)
HGB BLD-MCNC: 10 G/DL — LOW (ref 13–17)
HGB BLD-MCNC: 8.9 G/DL — LOW (ref 13–17)
IMM GRANULOCYTES NFR BLD AUTO: 0.4 % — SIGNIFICANT CHANGE UP (ref 0–0.9)
LACTATE SERPL-SCNC: 0.7 MMOL/L — SIGNIFICANT CHANGE UP (ref 0.5–2)
LACTATE SERPL-SCNC: 1.1 MMOL/L — SIGNIFICANT CHANGE UP (ref 0.5–2)
LDH SERPL L TO P-CCNC: 297 U/L — HIGH (ref 50–242)
LYMPHOCYTES # BLD AUTO: 1.32 K/UL — SIGNIFICANT CHANGE UP (ref 1–3.3)
LYMPHOCYTES # BLD AUTO: 18.2 % — SIGNIFICANT CHANGE UP (ref 13–44)
MAGNESIUM SERPL-MCNC: 1.8 MG/DL — SIGNIFICANT CHANGE UP (ref 1.6–2.6)
MCHC RBC-ENTMCNC: 28.7 PG — SIGNIFICANT CHANGE UP (ref 27–34)
MCHC RBC-ENTMCNC: 29.4 PG — SIGNIFICANT CHANGE UP (ref 27–34)
MCHC RBC-ENTMCNC: 31.3 GM/DL — LOW (ref 32–36)
MCHC RBC-ENTMCNC: 32.2 GM/DL — SIGNIFICANT CHANGE UP (ref 32–36)
MCV RBC AUTO: 91.1 FL — SIGNIFICANT CHANGE UP (ref 80–100)
MCV RBC AUTO: 92 FL — SIGNIFICANT CHANGE UP (ref 80–100)
MONOCYTES # BLD AUTO: 1.04 K/UL — HIGH (ref 0–0.9)
MONOCYTES NFR BLD AUTO: 14.3 % — HIGH (ref 2–14)
NEUTROPHILS # BLD AUTO: 4.58 K/UL — SIGNIFICANT CHANGE UP (ref 1.8–7.4)
NEUTROPHILS NFR BLD AUTO: 63 % — SIGNIFICANT CHANGE UP (ref 43–77)
NRBC # BLD: 0 /100 WBCS — SIGNIFICANT CHANGE UP (ref 0–0)
PHOSPHATE SERPL-MCNC: 3.5 MG/DL — SIGNIFICANT CHANGE UP (ref 2.5–4.5)
PLATELET # BLD AUTO: 116 K/UL — LOW (ref 150–400)
PLATELET # BLD AUTO: 96 K/UL — LOW (ref 150–400)
POTASSIUM SERPL-MCNC: 4.5 MMOL/L — SIGNIFICANT CHANGE UP (ref 3.5–5.3)
POTASSIUM SERPL-MCNC: 4.7 MMOL/L — SIGNIFICANT CHANGE UP (ref 3.5–5.3)
POTASSIUM SERPL-SCNC: 4.5 MMOL/L — SIGNIFICANT CHANGE UP (ref 3.5–5.3)
POTASSIUM SERPL-SCNC: 4.7 MMOL/L — SIGNIFICANT CHANGE UP (ref 3.5–5.3)
PROT SERPL-MCNC: 4.6 G/DL — LOW (ref 6–8.3)
PROT SERPL-MCNC: 5.4 G/DL — LOW (ref 6–8.3)
RBC # BLD: 3.03 M/UL — LOW (ref 4.2–5.8)
RBC # BLD: 3.48 M/UL — LOW (ref 4.2–5.8)
RBC # FLD: 14.8 % — HIGH (ref 10.3–14.5)
SODIUM SERPL-SCNC: 130 MMOL/L — LOW (ref 135–145)
TACROLIMUS SERPL-MCNC: 4 NG/ML — SIGNIFICANT CHANGE UP
VANCOMYCIN FLD-MCNC: 14.7 UG/ML — SIGNIFICANT CHANGE UP
WBC # BLD: 6.57 K/UL — SIGNIFICANT CHANGE UP (ref 3.8–10.5)
WBC # BLD: 7.27 K/UL — SIGNIFICANT CHANGE UP (ref 3.8–10.5)
WBC # FLD AUTO: 6.57 K/UL — SIGNIFICANT CHANGE UP (ref 3.8–10.5)
WBC # FLD AUTO: 7.27 K/UL — SIGNIFICANT CHANGE UP (ref 3.8–10.5)

## 2023-03-22 PROCEDURE — 99233 SBSQ HOSP IP/OBS HIGH 50: CPT | Mod: GC

## 2023-03-22 PROCEDURE — 99232 SBSQ HOSP IP/OBS MODERATE 35: CPT

## 2023-03-22 PROCEDURE — 76700 US EXAM ABDOM COMPLETE: CPT | Mod: 26

## 2023-03-22 PROCEDURE — 99233 SBSQ HOSP IP/OBS HIGH 50: CPT

## 2023-03-22 PROCEDURE — 71045 X-RAY EXAM CHEST 1 VIEW: CPT | Mod: 26

## 2023-03-22 RX ORDER — ACETAMINOPHEN 500 MG
1000 TABLET ORAL ONCE
Refills: 0 | Status: COMPLETED | OUTPATIENT
Start: 2023-03-22 | End: 2023-03-22

## 2023-03-22 RX ADMIN — CLOPIDOGREL BISULFATE 75 MILLIGRAM(S): 75 TABLET, FILM COATED ORAL at 12:59

## 2023-03-22 RX ADMIN — Medication 1000 MILLIGRAM(S): at 18:16

## 2023-03-22 RX ADMIN — AMLODIPINE BESYLATE 10 MILLIGRAM(S): 2.5 TABLET ORAL at 07:12

## 2023-03-22 RX ADMIN — Medication 650 MILLIGRAM(S): at 14:22

## 2023-03-22 RX ADMIN — ISOSORBIDE DINITRATE 20 MILLIGRAM(S): 5 TABLET ORAL at 12:59

## 2023-03-22 RX ADMIN — Medication 50 MILLIGRAM(S): at 06:32

## 2023-03-22 RX ADMIN — Medication 50 MILLIGRAM(S): at 22:27

## 2023-03-22 RX ADMIN — Medication 12.5 MILLIGRAM(S): at 17:47

## 2023-03-22 RX ADMIN — Medication 650 MILLIGRAM(S): at 23:11

## 2023-03-22 RX ADMIN — LOSARTAN POTASSIUM 100 MILLIGRAM(S): 100 TABLET, FILM COATED ORAL at 07:12

## 2023-03-22 RX ADMIN — TACROLIMUS 2 MILLIGRAM(S): 5 CAPSULE ORAL at 18:55

## 2023-03-22 RX ADMIN — Medication 650 MILLIGRAM(S): at 06:31

## 2023-03-22 RX ADMIN — PANTOPRAZOLE SODIUM 40 MILLIGRAM(S): 20 TABLET, DELAYED RELEASE ORAL at 17:49

## 2023-03-22 RX ADMIN — ISOSORBIDE DINITRATE 20 MILLIGRAM(S): 5 TABLET ORAL at 17:44

## 2023-03-22 RX ADMIN — CHLORHEXIDINE GLUCONATE 1 APPLICATION(S): 213 SOLUTION TOPICAL at 06:38

## 2023-03-22 RX ADMIN — Medication 400 MILLIGRAM(S): at 17:02

## 2023-03-22 RX ADMIN — TACROLIMUS 2 MILLIGRAM(S): 5 CAPSULE ORAL at 07:44

## 2023-03-22 RX ADMIN — Medication 650 MILLIGRAM(S): at 15:20

## 2023-03-22 RX ADMIN — Medication 81 MILLIGRAM(S): at 12:59

## 2023-03-22 RX ADMIN — Medication 50 MILLIGRAM(S): at 14:22

## 2023-03-22 RX ADMIN — TAMSULOSIN HYDROCHLORIDE 0.8 MILLIGRAM(S): 0.4 CAPSULE ORAL at 22:27

## 2023-03-22 RX ADMIN — Medication 325 MILLIGRAM(S): at 12:59

## 2023-03-22 RX ADMIN — MEROPENEM 100 MILLIGRAM(S): 1 INJECTION INTRAVENOUS at 16:15

## 2023-03-22 RX ADMIN — Medication 12.5 MILLIGRAM(S): at 07:10

## 2023-03-22 RX ADMIN — ISOSORBIDE DINITRATE 20 MILLIGRAM(S): 5 TABLET ORAL at 07:44

## 2023-03-22 RX ADMIN — PANTOPRAZOLE SODIUM 40 MILLIGRAM(S): 20 TABLET, DELAYED RELEASE ORAL at 06:31

## 2023-03-22 RX ADMIN — ATORVASTATIN CALCIUM 40 MILLIGRAM(S): 80 TABLET, FILM COATED ORAL at 22:27

## 2023-03-22 RX ADMIN — Medication 650 MILLIGRAM(S): at 07:31

## 2023-03-22 NOTE — PROGRESS NOTE ADULT - PROBLEM SELECTOR PLAN 7
s/p failed kidney transplant (attempt to obtain collateral, per medicine pt may not require tacrolimus if transplant failed)   - Continue home Tacrolimus 2 mg BID  - F/u prograf level

## 2023-03-22 NOTE — PROGRESS NOTE ADULT - ASSESSMENT
63yo M PMHx HTN, HLD, DM, ICM (s/p cath years ago, RCA 99%, never intervened on), HFrEF 25-30%, ESRD s/p failed kidney transplant (Left Arm AVF; HD TTS), R AKA admitted to [facility] 2/27 for respiratory distress triggered SIRS, after HD session, requiring intubation, course complicated by cardiac arrest, vtach arrest requiring amio and pressors. Extubated 3/2 and course c/b by LGIB with hemoglobin of 3.5 requiring multiple transfusions; EGD/colo and CTA w/o evidence of active bleed and hemoglobin subsequently improved to 11's. Transferred to [facility] on 03/17/23 for ICD eval 2/2 Vtach and cardiac cath however on day of arrival patient had episode of BRBPR, flex sign showing localized ulceration and no active bleeding. DAPT resumed on 03/20/23, with plan for cardiac cath on 3/21/2023 - however on 03/21/23 patient febrile to 102F w/ URI symptoms.      61yo M PMHx HTN, HLD, DM, ICM (s/p cath years ago, RCA 99%, never intervened on), HFrEF 25-30%, ESRD s/p failed kidney transplant (Left Arm AVF; HD TTS), R AKA admitted to [facility] 2/27 for respiratory distress triggered SIRS, after HD session, requiring intubation, course complicated by cardiac arrest, vtach arrest requiring amio and pressors. Extubated 3/2 and course c/b by LGIB with hemoglobin of 3.5 requiring multiple transfusions; EGD/colo and CTA w/o evidence of active bleed and hemoglobin subsequently improved to 11's. Transferred to [facility] on 03/17/23 for ICD eval 2/2 Vtach and cardiac cath however on day of arrival patient had episode of BRBPR, flex sign showing localized ulceration and no active bleeding. DAPT resumed on 03/20/23, with plan for cardiac cath on 3/21/2023 - however on 03/21/23 patient febrile to 102F w/ URI symptoms.      63yo M PMHx HTN, HLD, DM, ICM (s/p cath years ago, RCA 99%, never intervened on), HFrEF 25-30%, ESRD s/p failed kidney transplant (Left Arm AVF; HD TTS), R AKA admitted to [facility] 2/27 for respiratory distress triggered SIRS, after HD session, requiring intubation, course complicated by cardiac arrest, vtach arrest requiring amio and pressors. Extubated 3/2 and course c/b by LGIB with hemoglobin of 3.5 requiring multiple transfusions; EGD/colo and CTA w/o evidence of active bleed and hemoglobin subsequently improved to 11's. Transferred to [facility] on 03/17/23 for ICD eval 2/2 Vtach and cardiac cath however on day of arrival patient had episode of BRBPR, flex sign showing localized ulceration and no active bleeding. DAPT resumed on 03/20/23, with plan for cardiac cath on 3/21/2023 - however on 03/21/23 patient febrile to 102F w/ URI symptoms.      63yo M PMHx HTN, HLD, DM, ICM (s/p cath years ago, RCA 99%, never intervened on), HFrEF 25-30%, ESRD s/p failed kidney transplant (Left Arm AVF; HD TTS), R AKA admitted to [facility] 2/27 for respiratory distress triggered SIRS, after HD session, requiring intubation, course complicated by cardiac arrest, vtach arrest requiring amio and pressors. Extubated 3/2 and course c/b by LGIB with hemoglobin of 3.5 requiring multiple transfusions; EGD/colo and CTA w/o evidence of active bleed and hemoglobin subsequently improved to 11's. Transferred to [facility] on 03/17/23 for ICD eval 2/2 Vtach and cardiac cath however on day of arrival patient had episode of BRBPR, flex sign showing localized ulceration and no active bleeding. DAPT resumed on 03/20/23, with plan for cardiac cath on 3/21/2023 - however on 03/21/23 patient febrile to 102F w/ URI symptoms. Infectious workup under way, pending stability with possible plan for cardiac cath on Friday       63yo M PMHx HTN, HLD, DM, ICM (s/p cath years ago, RCA 99%, never intervened on), HFrEF 25-30%, ESRD s/p failed kidney transplant (Left Arm AVF; HD TTS), R AKA admitted to [facility] 2/27 for respiratory distress triggered SIRS, after HD session, requiring intubation, course complicated by cardiac arrest, vtach arrest requiring amio and pressors. Extubated 3/2 and course c/b by LGIB with hemoglobin of 3.5 requiring multiple transfusions; EGD/colo and CTA w/o evidence of active bleed and hemoglobin subsequently improved to 11's. Transferred to [facility] on 03/17/23 for ICD eval 2/2 Vtach and cardiac cath however on day of arrival patient had episode of BRBPR, flex sign showing localized ulceration and no active bleeding. DAPT resumed on 03/20/23, with plan for cardiac cath on 3/21/2023 - however on 03/21/23 patient febrile to 102F w/ URI symptoms. Infectious workup under way, pending stability with possible plan for cardiac cath on Friday       61yo M PMHx HTN, HLD, DM, ICM (s/p cath years ago, RCA 99%, never intervened on), HFrEF 25-30%, ESRD s/p failed kidney transplant (Left Arm AVF; HD TTS), R AKA admitted to [facility] 2/27 for respiratory distress triggered SIRS, after HD session, requiring intubation, course complicated by cardiac arrest, vtach arrest requiring amio and pressors. Extubated 3/2 and course c/b by LGIB with hemoglobin of 3.5 requiring multiple transfusions; EGD/colo and CTA w/o evidence of active bleed and hemoglobin subsequently improved to 11's. Transferred to [facility] on 03/17/23 for ICD eval 2/2 Vtach and cardiac cath however on day of arrival patient had episode of BRBPR, flex sign showing localized ulceration and no active bleeding. DAPT resumed on 03/20/23, with plan for cardiac cath on 3/21/2023 - however on 03/21/23 patient febrile to 102F w/ URI symptoms. Infectious workup under way, pending stability with possible plan for cardiac cath on Friday

## 2023-03-22 NOTE — PROGRESS NOTE ADULT - PROBLEM SELECTOR PLAN 8
SBP on admission 170s  - C/w: home amlodipine 10 mg daily, Hydralazine 50 mg q8h, isordil 20 mg q8h, losartan 100 mg daily

## 2023-03-22 NOTE — CHART NOTE - NSCHARTNOTEFT_GEN_A_CORE
Admitting Diagnosis:   Patient is a 62y old  Male who presents with a chief complaint of transfer from [place] for cath/icd (21 Mar 2023 17:08)      PAST MEDICAL & SURGICAL HISTORY:  HTN (hypertension)      HLD (hyperlipidemia)      DM (diabetes mellitus)      GERD (gastroesophageal reflux disease)      ESRD on dialysis      NSTEMI (non-ST elevation myocardial infarction)      CAD (coronary artery disease)      Fever of unknown origin (FUO)          Current Nutrition Order:  Renal DASH TLC      PO Intake: Good (%) [   ]  Fair (50-75%) [   ] Poor (<25%) [ X  ]--Please See Below     GI Issues:   Flex sig performed at bedside with svc attending 3/17: Brown stool was seen in the rectum and sigmoid colon. Evidence of prior hemoclip was found in the sigmoid colon. Localized ulceration and erosive with no bleeding were noted in the rectum, suggestive of solitary ulcer syndrome.   >> Repeat Endoscopy in 3 months.  Pending Abd U/S 3/22: with no acute pathology.     BM+3/21     Pain:  +Pain Is noted per flow sheets     Skin Integrity:  R buttocks Stage II pressure ulcer  Alex 17   No edema     Labs:   03-21    136  |  97  |  10  ----------------------------<  158<H>  4.0   |  29  |  2.91<H>    Ca    8.1<L>      21 Mar 2023 15:32  Phos  4.6     03-21  Mg     1.7     03-21    TPro  5.4<L>  /  Alb  2.9<L>  /  TBili  0.7  /  DBili  x   /  AST  17  /  ALT  9<L>  /  AlkPhos  142<H>  03-21    CAPILLARY BLOOD GLUCOSE      POCT Blood Glucose.: 93 mg/dL (22 Mar 2023 12:00)  POCT Blood Glucose.: 81 mg/dL (22 Mar 2023 06:22)  POCT Blood Glucose.: 95 mg/dL (22 Mar 2023 00:19)  POCT Blood Glucose.: 114 mg/dL (21 Mar 2023 22:01)  POCT Blood Glucose.: 109 mg/dL (21 Mar 2023 17:29)      Medications:  MEDICATIONS  (STANDING):  aspirin enteric coated 81 milliGRAM(s) Oral daily  atorvastatin 40 milliGRAM(s) Oral at bedtime  chlorhexidine 2% Cloths 1 Application(s) Topical daily  clopidogrel Tablet 75 milliGRAM(s) Oral daily  dextrose 5%. 1000 milliLiter(s) (50 mL/Hr) IV Continuous <Continuous>  dextrose 5%. 1000 milliLiter(s) (100 mL/Hr) IV Continuous <Continuous>  dextrose 50% Injectable 25 Gram(s) IV Push once  dextrose 50% Injectable 12.5 Gram(s) IV Push once  dextrose 50% Injectable 25 Gram(s) IV Push once  ferrous    sulfate 325 milliGRAM(s) Oral daily  glucagon  Injectable 1 milliGRAM(s) IntraMuscular once  hydrALAZINE 50 milliGRAM(s) Oral every 8 hours  insulin lispro (ADMELOG) corrective regimen sliding scale   SubCutaneous every 6 hours  isosorbide   dinitrate Tablet (ISORDIL) 20 milliGRAM(s) Oral three times a day  losartan 100 milliGRAM(s) Oral daily  meropenem  IVPB      meropenem  IVPB 500 milliGRAM(s) IV Intermittent every 24 hours  metoprolol tartrate 12.5 milliGRAM(s) Oral every 12 hours  pantoprazole  Injectable 40 milliGRAM(s) IV Push every 12 hours  polyethylene glycol 3350 17 Gram(s) Oral daily  tacrolimus 2 milliGRAM(s) Oral every 12 hours  tamsulosin 0.8 milliGRAM(s) Oral at bedtime    MEDICATIONS  (PRN):  acetaminophen     Tablet .. 650 milliGRAM(s) Oral every 6 hours PRN Mild Pain (1 - 3), Moderate Pain (4 - 6)  dextrose Oral Gel 15 Gram(s) Oral once PRN Blood Glucose LESS THAN 70 milliGRAM(s)/deciliter  sodium chloride 0.65% Nasal 1 Spray(s) Both Nostrils every 4 hours PRN Nasal Congestion      5'4''  pounds +-10%  ABW s/p R AKA: 113 pounds, %VWM=908     3/21 143.7 pounds, 148.1 pounds     Estimated energy needs:   ABW s/p R AKA for EER as %ABW is greater then 120%  Adjust for age, Fevers, ESRD/HD, Skin/pressure ulcer, CHF; fluids per team    Estimated Energy Needs From (hermilo/kg)	30  Estimated Energy Needs To (hermilo/kg)	35  Estimated Energy Needs Calculated From (hermilo/kg)	1536  Estimated Energy Needs Calculated To (hermilo/kg)	1792    Estimated Protein Needs From (g/kg)	1.2  Estimated Protein Needs To (g/kg)	1.4  Estimated Protein Needs Calculated From (g/kg)	61.44  Estimated Protein Needs Calculated To (g/kg)	71.68    Subjective: 63yo M PMHx HTN, HLD, DM, ICM (s/p cath years ago, RCA 99%, never intervened on), HFrEF 25-30%, ESRD s/p failed kidney transplant (last HD 3/1 via Left Arm AVF; HD TTS), Right AKA presented to [facility] 2/27 for respiratory distress after a dialysis session and admitted to cardiac telemetry 2/27 with SIRS criteria. Pt was placed on BiPAP which failed, where he was then intubated 3/1 and moved to the MICU. He went into cardiac arrest (RoSC achieved after 8mins). Went into Vtach arrest, was placed on amio and pressors, HR ellen into 30s and atropine given twice. Weaned off levophed and sedation and extubated 3/2. Pt developed coffee ground emesis, hemoccult positive, with Hgb dipping to 3.5; he then received 7units PRBCs, 1Unit FFP, 1 unit donor packed platelets. EGD and colonoscopy showing melanosis duodenii but no acute bleed. BP dropped again, transferred to ICU and started on levophed gtt. CTA Abdomen showing no active bleeding into GI lumen, possible focal proctitis; colonoscopy showing rectal ulcer. Started on hydrocortisone suppository and mesalamine. Hgb now stable in 11s (per handoff), now transferred for ICD placement 2/2 Vtach and cardiac cath. Hospital course c/b BRBPR 3/17 AM; type and screen sent, surgery, GI, MICU consulted. Flex sig performed at bedside with svc attending 3/17. DAPT resumed on 3/20/23, with plan for cardiac cath on 3/21/2023 - however 3/21/23 pt febrile to 102F with URI symptoms. Fever with unclear origin. Infectious workup under way, pending stability with possible plan for cardiac cath on Friday. Plan for Nex HD 5/23.     Pt on 5UR. Very Limited RD visit today. Pt had been covered head to toe with blankets and unwilling to come out, of note this occured during time of Prior RD visit. Pt reports he has not been eating 2/2 being cold and needing more blankets-D/w RN. RN unsure of how pt has been eating. Offered pt ONS however declined as he reports they give him diarrhea.   Please see below for nutritions recommendations.    Prior PES: Inadequate Energy Intake RT intake<EER AEB NPO  >> d/c  New PES: Increased needs RT increased demands AEB: ESRD/HD, Skin/pressure ulcer, CHF  Goal: Pt will meet at least 75% of nutrient needs     Recommendations:  1. Cont with Diet.  2. Monitor %PO intake, Diet tolerance.  >> Would add oral nutrition supplements, however pt does not take in setting of reported diarrhea.  >> If PO remains poor, can consider d/c of DASH.  3. Labs: monitor BMP, CBC, glucose, lytes, trend renal indices, LFTs.  4. Pain/GI per team. Monitor Skin, Wts pre/post HD.  5. RD to remain available for additional nutrition interventions as needed.   Education: NA.    Risk Level: High [  X ] Moderate [   ] Low [   ] Admitting Diagnosis:   Patient is a 62y old  Male who presents with a chief complaint of transfer from [place] for cath/icd (21 Mar 2023 17:08)      PAST MEDICAL & SURGICAL HISTORY:  HTN (hypertension)      HLD (hyperlipidemia)      DM (diabetes mellitus)      GERD (gastroesophageal reflux disease)      ESRD on dialysis      NSTEMI (non-ST elevation myocardial infarction)      CAD (coronary artery disease)      Fever of unknown origin (FUO)          Current Nutrition Order:  Renal DASH TLC      PO Intake: Good (%) [   ]  Fair (50-75%) [   ] Poor (<25%) [ X  ]--Please See Below     GI Issues:   Flex sig performed at bedside with svc attending 3/17: Brown stool was seen in the rectum and sigmoid colon. Evidence of prior hemoclip was found in the sigmoid colon. Localized ulceration and erosive with no bleeding were noted in the rectum, suggestive of solitary ulcer syndrome.   >> Repeat Endoscopy in 3 months.  Pending Abd U/S 3/22: with no acute pathology.     BM+3/21     Pain:  +Pain Is noted per flow sheets     Skin Integrity:  R buttocks Stage II pressure ulcer  Alex 17   No edema     Labs:   03-21    136  |  97  |  10  ----------------------------<  158<H>  4.0   |  29  |  2.91<H>    Ca    8.1<L>      21 Mar 2023 15:32  Phos  4.6     03-21  Mg     1.7     03-21    TPro  5.4<L>  /  Alb  2.9<L>  /  TBili  0.7  /  DBili  x   /  AST  17  /  ALT  9<L>  /  AlkPhos  142<H>  03-21    CAPILLARY BLOOD GLUCOSE      POCT Blood Glucose.: 93 mg/dL (22 Mar 2023 12:00)  POCT Blood Glucose.: 81 mg/dL (22 Mar 2023 06:22)  POCT Blood Glucose.: 95 mg/dL (22 Mar 2023 00:19)  POCT Blood Glucose.: 114 mg/dL (21 Mar 2023 22:01)  POCT Blood Glucose.: 109 mg/dL (21 Mar 2023 17:29)      Medications:  MEDICATIONS  (STANDING):  aspirin enteric coated 81 milliGRAM(s) Oral daily  atorvastatin 40 milliGRAM(s) Oral at bedtime  chlorhexidine 2% Cloths 1 Application(s) Topical daily  clopidogrel Tablet 75 milliGRAM(s) Oral daily  dextrose 5%. 1000 milliLiter(s) (50 mL/Hr) IV Continuous <Continuous>  dextrose 5%. 1000 milliLiter(s) (100 mL/Hr) IV Continuous <Continuous>  dextrose 50% Injectable 25 Gram(s) IV Push once  dextrose 50% Injectable 12.5 Gram(s) IV Push once  dextrose 50% Injectable 25 Gram(s) IV Push once  ferrous    sulfate 325 milliGRAM(s) Oral daily  glucagon  Injectable 1 milliGRAM(s) IntraMuscular once  hydrALAZINE 50 milliGRAM(s) Oral every 8 hours  insulin lispro (ADMELOG) corrective regimen sliding scale   SubCutaneous every 6 hours  isosorbide   dinitrate Tablet (ISORDIL) 20 milliGRAM(s) Oral three times a day  losartan 100 milliGRAM(s) Oral daily  meropenem  IVPB      meropenem  IVPB 500 milliGRAM(s) IV Intermittent every 24 hours  metoprolol tartrate 12.5 milliGRAM(s) Oral every 12 hours  pantoprazole  Injectable 40 milliGRAM(s) IV Push every 12 hours  polyethylene glycol 3350 17 Gram(s) Oral daily  tacrolimus 2 milliGRAM(s) Oral every 12 hours  tamsulosin 0.8 milliGRAM(s) Oral at bedtime    MEDICATIONS  (PRN):  acetaminophen     Tablet .. 650 milliGRAM(s) Oral every 6 hours PRN Mild Pain (1 - 3), Moderate Pain (4 - 6)  dextrose Oral Gel 15 Gram(s) Oral once PRN Blood Glucose LESS THAN 70 milliGRAM(s)/deciliter  sodium chloride 0.65% Nasal 1 Spray(s) Both Nostrils every 4 hours PRN Nasal Congestion      5'4''  pounds +-10%  ABW s/p R AKA: 113 pounds, %WJU=129     3/21 143.7 pounds, 148.1 pounds     Estimated energy needs:   ABW s/p R AKA for EER as %ABW is greater then 120%  Adjust for age, Fevers, ESRD/HD, Skin/pressure ulcer, CHF; fluids per team    Estimated Energy Needs From (hermilo/kg)	30  Estimated Energy Needs To (hermilo/kg)	35  Estimated Energy Needs Calculated From (hermilo/kg)	1536  Estimated Energy Needs Calculated To (hermilo/kg)	1792    Estimated Protein Needs From (g/kg)	1.2  Estimated Protein Needs To (g/kg)	1.4  Estimated Protein Needs Calculated From (g/kg)	61.44  Estimated Protein Needs Calculated To (g/kg)	71.68    Subjective: 61yo M PMHx HTN, HLD, DM, ICM (s/p cath years ago, RCA 99%, never intervened on), HFrEF 25-30%, ESRD s/p failed kidney transplant (last HD 3/1 via Left Arm AVF; HD TTS), Right AKA presented to [facility] 2/27 for respiratory distress after a dialysis session and admitted to cardiac telemetry 2/27 with SIRS criteria. Pt was placed on BiPAP which failed, where he was then intubated 3/1 and moved to the MICU. He went into cardiac arrest (RoSC achieved after 8mins). Went into Vtach arrest, was placed on amio and pressors, HR ellen into 30s and atropine given twice. Weaned off levophed and sedation and extubated 3/2. Pt developed coffee ground emesis, hemoccult positive, with Hgb dipping to 3.5; he then received 7units PRBCs, 1Unit FFP, 1 unit donor packed platelets. EGD and colonoscopy showing melanosis duodenii but no acute bleed. BP dropped again, transferred to ICU and started on levophed gtt. CTA Abdomen showing no active bleeding into GI lumen, possible focal proctitis; colonoscopy showing rectal ulcer. Started on hydrocortisone suppository and mesalamine. Hgb now stable in 11s (per handoff), now transferred for ICD placement 2/2 Vtach and cardiac cath. Hospital course c/b BRBPR 3/17 AM; type and screen sent, surgery, GI, MICU consulted. Flex sig performed at bedside with svc attending 3/17. DAPT resumed on 3/20/23, with plan for cardiac cath on 3/21/2023 - however 3/21/23 pt febrile to 102F with URI symptoms. Fever with unclear origin. Infectious workup under way, pending stability with possible plan for cardiac cath on Friday. Plan for Nex HD 5/23.     Pt on 5UR. Very Limited RD visit today. Pt had been covered head to toe with blankets and unwilling to come out, of note this occured during time of Prior RD visit. Pt reports he has not been eating 2/2 being cold and needing more blankets-D/w RN. RN unsure of how pt has been eating. Offered pt ONS however declined as he reports they give him diarrhea.   Please see below for nutritions recommendations.    Prior PES: Inadequate Energy Intake RT intake<EER AEB NPO  >> d/c  New PES: Increased needs RT increased demands AEB: ESRD/HD, Skin/pressure ulcer, CHF  Goal: Pt will meet at least 75% of nutrient needs     Recommendations:  1. Cont with Diet.  2. Monitor %PO intake, Diet tolerance.  >> Would add oral nutrition supplements, however pt does not take in setting of reported diarrhea.  >> If PO remains poor, can consider d/c of DASH.  3. Labs: monitor BMP, CBC, glucose, lytes, trend renal indices, LFTs.  4. Pain/GI per team. Monitor Skin, Wts pre/post HD.  5. RD to remain available for additional nutrition interventions as needed.   Education: NA.    Risk Level: High [  X ] Moderate [   ] Low [   ] Admitting Diagnosis:   Patient is a 62y old  Male who presents with a chief complaint of transfer from [place] for cath/icd (21 Mar 2023 17:08)      PAST MEDICAL & SURGICAL HISTORY:  HTN (hypertension)      HLD (hyperlipidemia)      DM (diabetes mellitus)      GERD (gastroesophageal reflux disease)      ESRD on dialysis      NSTEMI (non-ST elevation myocardial infarction)      CAD (coronary artery disease)      Fever of unknown origin (FUO)          Current Nutrition Order:  Renal DASH TLC      PO Intake: Good (%) [   ]  Fair (50-75%) [   ] Poor (<25%) [ X  ]--Please See Below     GI Issues:   Flex sig performed at bedside with svc attending 3/17: Brown stool was seen in the rectum and sigmoid colon. Evidence of prior hemoclip was found in the sigmoid colon. Localized ulceration and erosive with no bleeding were noted in the rectum, suggestive of solitary ulcer syndrome.   >> Repeat Endoscopy in 3 months.  Pending Abd U/S 3/22: with no acute pathology.     BM+3/21     Pain:  +Pain Is noted per flow sheets     Skin Integrity:  R buttocks Stage II pressure ulcer  Alex 17   No edema     Labs:   03-21    136  |  97  |  10  ----------------------------<  158<H>  4.0   |  29  |  2.91<H>    Ca    8.1<L>      21 Mar 2023 15:32  Phos  4.6     03-21  Mg     1.7     03-21    TPro  5.4<L>  /  Alb  2.9<L>  /  TBili  0.7  /  DBili  x   /  AST  17  /  ALT  9<L>  /  AlkPhos  142<H>  03-21    CAPILLARY BLOOD GLUCOSE      POCT Blood Glucose.: 93 mg/dL (22 Mar 2023 12:00)  POCT Blood Glucose.: 81 mg/dL (22 Mar 2023 06:22)  POCT Blood Glucose.: 95 mg/dL (22 Mar 2023 00:19)  POCT Blood Glucose.: 114 mg/dL (21 Mar 2023 22:01)  POCT Blood Glucose.: 109 mg/dL (21 Mar 2023 17:29)      Medications:  MEDICATIONS  (STANDING):  aspirin enteric coated 81 milliGRAM(s) Oral daily  atorvastatin 40 milliGRAM(s) Oral at bedtime  chlorhexidine 2% Cloths 1 Application(s) Topical daily  clopidogrel Tablet 75 milliGRAM(s) Oral daily  dextrose 5%. 1000 milliLiter(s) (50 mL/Hr) IV Continuous <Continuous>  dextrose 5%. 1000 milliLiter(s) (100 mL/Hr) IV Continuous <Continuous>  dextrose 50% Injectable 25 Gram(s) IV Push once  dextrose 50% Injectable 12.5 Gram(s) IV Push once  dextrose 50% Injectable 25 Gram(s) IV Push once  ferrous    sulfate 325 milliGRAM(s) Oral daily  glucagon  Injectable 1 milliGRAM(s) IntraMuscular once  hydrALAZINE 50 milliGRAM(s) Oral every 8 hours  insulin lispro (ADMELOG) corrective regimen sliding scale   SubCutaneous every 6 hours  isosorbide   dinitrate Tablet (ISORDIL) 20 milliGRAM(s) Oral three times a day  losartan 100 milliGRAM(s) Oral daily  meropenem  IVPB      meropenem  IVPB 500 milliGRAM(s) IV Intermittent every 24 hours  metoprolol tartrate 12.5 milliGRAM(s) Oral every 12 hours  pantoprazole  Injectable 40 milliGRAM(s) IV Push every 12 hours  polyethylene glycol 3350 17 Gram(s) Oral daily  tacrolimus 2 milliGRAM(s) Oral every 12 hours  tamsulosin 0.8 milliGRAM(s) Oral at bedtime    MEDICATIONS  (PRN):  acetaminophen     Tablet .. 650 milliGRAM(s) Oral every 6 hours PRN Mild Pain (1 - 3), Moderate Pain (4 - 6)  dextrose Oral Gel 15 Gram(s) Oral once PRN Blood Glucose LESS THAN 70 milliGRAM(s)/deciliter  sodium chloride 0.65% Nasal 1 Spray(s) Both Nostrils every 4 hours PRN Nasal Congestion      5'4''  pounds +-10%  ABW s/p R AKA: 113 pounds, %RLU=232     3/21 143.7 pounds, 148.1 pounds     Estimated energy needs:   ABW s/p R AKA for EER as %ABW is greater then 120%  Adjust for age, Fevers, ESRD/HD, Skin/pressure ulcer, CHF; fluids per team    Estimated Energy Needs From (hermilo/kg)	30  Estimated Energy Needs To (hermilo/kg)	35  Estimated Energy Needs Calculated From (hermilo/kg)	1536  Estimated Energy Needs Calculated To (hermilo/kg)	1792    Estimated Protein Needs From (g/kg)	1.2  Estimated Protein Needs To (g/kg)	1.4  Estimated Protein Needs Calculated From (g/kg)	61.44  Estimated Protein Needs Calculated To (g/kg)	71.68    Subjective: 63yo M PMHx HTN, HLD, DM, ICM (s/p cath years ago, RCA 99%, never intervened on), HFrEF 25-30%, ESRD s/p failed kidney transplant (last HD 3/1 via Left Arm AVF; HD TTS), Right AKA presented to [facility] 2/27 for respiratory distress after a dialysis session and admitted to cardiac telemetry 2/27 with SIRS criteria. Pt was placed on BiPAP which failed, where he was then intubated 3/1 and moved to the MICU. He went into cardiac arrest (RoSC achieved after 8mins). Went into Vtach arrest, was placed on amio and pressors, HR ellen into 30s and atropine given twice. Weaned off levophed and sedation and extubated 3/2. Pt developed coffee ground emesis, hemoccult positive, with Hgb dipping to 3.5; he then received 7units PRBCs, 1Unit FFP, 1 unit donor packed platelets. EGD and colonoscopy showing melanosis duodenii but no acute bleed. BP dropped again, transferred to ICU and started on levophed gtt. CTA Abdomen showing no active bleeding into GI lumen, possible focal proctitis; colonoscopy showing rectal ulcer. Started on hydrocortisone suppository and mesalamine. Hgb now stable in 11s (per handoff), now transferred for ICD placement 2/2 Vtach and cardiac cath. Hospital course c/b BRBPR 3/17 AM; type and screen sent, surgery, GI, MICU consulted. Flex sig performed at bedside with svc attending 3/17. DAPT resumed on 3/20/23, with plan for cardiac cath on 3/21/2023 - however 3/21/23 pt febrile to 102F with URI symptoms. Fever with unclear origin. Infectious workup under way, pending stability with possible plan for cardiac cath on Friday. Plan for Nex HD 5/23.     Pt on 5UR. Very Limited RD visit today. Pt had been covered head to toe with blankets and unwilling to come out, of note this occured during time of Prior RD visit. Pt reports he has not been eating 2/2 being cold and needing more blankets-D/w RN. RN unsure of how pt has been eating. Offered pt ONS however declined as he reports they give him diarrhea.   Please see below for nutritions recommendations.    Prior PES: Inadequate Energy Intake RT intake<EER AEB NPO  >> d/c  New PES: Increased needs RT increased demands AEB: ESRD/HD, Skin/pressure ulcer, CHF  Goal: Pt will meet at least 75% of nutrient needs     Recommendations:  1. Cont with Diet.  2. Monitor %PO intake, Diet tolerance.  >> Would add oral nutrition supplements, however pt does not take in setting of reported diarrhea.  >> If PO remains poor, can consider d/c of DASH.  3. Labs: monitor BMP, CBC, glucose, lytes, trend renal indices, LFTs.  4. Pain/GI per team. Monitor Skin, Wts pre/post HD.  5. RD to remain available for additional nutrition interventions as needed.   Education: NA.    Risk Level: High [  X ] Moderate [   ] Low [   ] Admitting Diagnosis:   Patient is a 62y old  Male who presents with a chief complaint of transfer from [place] for cath/icd (21 Mar 2023 17:08)      PAST MEDICAL & SURGICAL HISTORY:  HTN (hypertension)       HLD (hyperlipidemia)      DM (diabetes mellitus)      GERD (gastroesophageal reflux disease)      ESRD on dialysis      NSTEMI (non-ST elevation myocardial infarction)      CAD (coronary artery disease)      Fever of unknown origin (FUO)          Current Nutrition Order:  Renal DASH TLC      PO Intake: Good (%) [   ]  Fair (50-75%) [   ] Poor (<25%) [ X  ]--Please See Below     GI Issues:   Flex sig performed at bedside with svc attending 3/17: Brown stool was seen in the rectum and sigmoid colon. Evidence of prior hemoclip was found in the sigmoid colon. Localized ulceration and erosive with no bleeding were noted in the rectum, suggestive of solitary ulcer syndrome.   >> Repeat Endoscopy in 3 months.  Pending Abd U/S 3/22: with no acute pathology.     BM+3/21     Pain:  +Pain Is noted per flow sheets     Skin Integrity:  R buttocks Stage II pressure ulcer  Alex 17   No edema     Labs:   03-21    136  |  97  |  10  ----------------------------<  158<H>  4.0   |  29  |  2.91<H>    Ca    8.1<L>      21 Mar 2023 15:32  Phos  4.6     03-21  Mg     1.7     03-21    TPro  5.4<L>  /  Alb  2.9<L>  /  TBili  0.7  /  DBili  x   /  AST  17  /  ALT  9<L>  /  AlkPhos  142<H>  03-21    CAPILLARY BLOOD GLUCOSE      POCT Blood Glucose.: 93 mg/dL (22 Mar 2023 12:00)  POCT Blood Glucose.: 81 mg/dL (22 Mar 2023 06:22)  POCT Blood Glucose.: 95 mg/dL (22 Mar 2023 00:19)  POCT Blood Glucose.: 114 mg/dL (21 Mar 2023 22:01)  POCT Blood Glucose.: 109 mg/dL (21 Mar 2023 17:29)      Medications:  MEDICATIONS  (STANDING):  aspirin enteric coated 81 milliGRAM(s) Oral daily  atorvastatin 40 milliGRAM(s) Oral at bedtime  chlorhexidine 2% Cloths 1 Application(s) Topical daily  clopidogrel Tablet 75 milliGRAM(s) Oral daily  dextrose 5%. 1000 milliLiter(s) (50 mL/Hr) IV Continuous <Continuous>  dextrose 5%. 1000 milliLiter(s) (100 mL/Hr) IV Continuous <Continuous>  dextrose 50% Injectable 25 Gram(s) IV Push once  dextrose 50% Injectable 12.5 Gram(s) IV Push once  dextrose 50% Injectable 25 Gram(s) IV Push once  ferrous    sulfate 325 milliGRAM(s) Oral daily  glucagon  Injectable 1 milliGRAM(s) IntraMuscular once  hydrALAZINE 50 milliGRAM(s) Oral every 8 hours  insulin lispro (ADMELOG) corrective regimen sliding scale   SubCutaneous every 6 hours  isosorbide   dinitrate Tablet (ISORDIL) 20 milliGRAM(s) Oral three times a day  losartan 100 milliGRAM(s) Oral daily  meropenem  IVPB      meropenem  IVPB 500 milliGRAM(s) IV Intermittent every 24 hours  metoprolol tartrate 12.5 milliGRAM(s) Oral every 12 hours  pantoprazole  Injectable 40 milliGRAM(s) IV Push every 12 hours  polyethylene glycol 3350 17 Gram(s) Oral daily  tacrolimus 2 milliGRAM(s) Oral every 12 hours  tamsulosin 0.8 milliGRAM(s) Oral at bedtime    MEDICATIONS  (PRN):  acetaminophen     Tablet .. 650 milliGRAM(s) Oral every 6 hours PRN Mild Pain (1 - 3), Moderate Pain (4 - 6)  dextrose Oral Gel 15 Gram(s) Oral once PRN Blood Glucose LESS THAN 70 milliGRAM(s)/deciliter  sodium chloride 0.65% Nasal 1 Spray(s) Both Nostrils every 4 hours PRN Nasal Congestion      5'4''  pounds +-10%  ABW s/p R AKA: 113 pounds, %MLA=499     3/21 143.7 pounds, 148.1 pounds     Estimated energy needs:   ABW s/p R AKA for EER as %ABW is greater then 120%  Adjust for age, Fevers, ESRD/HD, Skin/pressure ulcer, CHF; fluids per team    Estimated Energy Needs From (hermilo/kg)	30  Estimated Energy Needs To (hermilo/kg)	35  Estimated Energy Needs Calculated From (hermilo/kg)	1536  Estimated Energy Needs Calculated To (hermilo/kg)	1792    Estimated Protein Needs From (g/kg)	1.2  Estimated Protein Needs To (g/kg)	1.4  Estimated Protein Needs Calculated From (g/kg)	61.44  Estimated Protein Needs Calculated To (g/kg)	71.68    Subjective: 63yo M PMHx HTN, HLD, DM, ICM (s/p cath years ago, RCA 99%, never intervened on), HFrEF 25-30%, ESRD s/p failed kidney transplant (last HD 3/1 via Left Arm AVF; HD TTS), Right AKA presented to [facility] 2/27 for respiratory distress after a dialysis session and admitted to cardiac telemetry 2/27 with SIRS criteria. Pt was placed on BiPAP which failed, where he was then intubated 3/1 and moved to the MICU. He went into cardiac arrest (RoSC achieved after 8mins). Went into Vtach arrest, was placed on amio and pressors, HR ellen into 30s and atropine given twice. Weaned off levophed and sedation and extubated 3/2. Pt developed coffee ground emesis, hemoccult positive, with Hgb dipping to 3.5; he then received 7units PRBCs, 1Unit FFP, 1 unit donor packed platelets. EGD and colonoscopy showing melanosis duodenii but no acute bleed. BP dropped again, transferred to ICU and started on levophed gtt. CTA Abdomen showing no active bleeding into GI lumen, possible focal proctitis; colonoscopy showing rectal ulcer. Started on hydrocortisone suppository and mesalamine. Hgb now stable in 11s (per handoff), now transferred for ICD placement 2/2 Vtach and cardiac cath. Hospital course c/b BRBPR 3/17 AM; type and screen sent, surgery, GI, MICU consulted. Flex sig performed at bedside with svc attending 3/17. DAPT resumed on 3/20/23, with plan for cardiac cath on 3/21/2023 - however 3/21/23 pt febrile to 102F with URI symptoms. Fever with unclear origin. Infectious workup under way, pending stability with possible plan for cardiac cath on Friday. Plan for Nex HD 5/23.     Pt on 5UR. Very Limited RD visit today. Pt had been covered head to toe with blankets and unwilling to come out, of note this occured during time of Prior RD visit. Pt reports he has not been eating 2/2 being cold and needing more blankets-D/w RN. RN unsure of how pt has been eating. Offered pt ONS however declined as he reports they give him diarrhea.   Please see below for nutritions recommendations.    Prior PES: Inadequate Energy Intake RT intake<EER AEB NPO  >> d/c  New PES: Increased needs RT increased demands AEB: ESRD/HD, Skin/pressure ulcer, CHF  Goal: Pt will meet at least 75% of nutrient needs     Recommendations:  1. Cont with Diet.  2. Monitor %PO intake, Diet tolerance.  >> Would add oral nutrition supplements, however pt does not take in setting of reported diarrhea.  >> If PO remains poor, can consider d/c of DASH.  3. Labs: monitor BMP, CBC, glucose, lytes, trend renal indices, LFTs.  4. Pain/GI per team. Monitor Skin, Wts pre/post HD.  5. RD to remain available for additional nutrition interventions as needed.   Education: NA.    Risk Level: High [  X ] Moderate [   ] Low [   ] Admitting Diagnosis:   Patient is a 62y old  Male who presents with a chief complaint of transfer from [place] for cath/icd (21 Mar 2023 17:08)      PAST MEDICAL & SURGICAL HISTORY:  HTN (hypertension)       HLD (hyperlipidemia)      DM (diabetes mellitus)      GERD (gastroesophageal reflux disease)      ESRD on dialysis      NSTEMI (non-ST elevation myocardial infarction)      CAD (coronary artery disease)      Fever of unknown origin (FUO)          Current Nutrition Order:  Renal DASH TLC      PO Intake: Good (%) [   ]  Fair (50-75%) [   ] Poor (<25%) [ X  ]--Please See Below     GI Issues:   Flex sig performed at bedside with svc attending 3/17: Brown stool was seen in the rectum and sigmoid colon. Evidence of prior hemoclip was found in the sigmoid colon. Localized ulceration and erosive with no bleeding were noted in the rectum, suggestive of solitary ulcer syndrome.   >> Repeat Endoscopy in 3 months.  Pending Abd U/S 3/22: with no acute pathology.     BM+3/21     Pain:  +Pain Is noted per flow sheets     Skin Integrity:  R buttocks Stage II pressure ulcer  Alex 17   No edema     Labs:   03-21    136  |  97  |  10  ----------------------------<  158<H>  4.0   |  29  |  2.91<H>    Ca    8.1<L>      21 Mar 2023 15:32  Phos  4.6     03-21  Mg     1.7     03-21    TPro  5.4<L>  /  Alb  2.9<L>  /  TBili  0.7  /  DBili  x   /  AST  17  /  ALT  9<L>  /  AlkPhos  142<H>  03-21    CAPILLARY BLOOD GLUCOSE      POCT Blood Glucose.: 93 mg/dL (22 Mar 2023 12:00)  POCT Blood Glucose.: 81 mg/dL (22 Mar 2023 06:22)  POCT Blood Glucose.: 95 mg/dL (22 Mar 2023 00:19)  POCT Blood Glucose.: 114 mg/dL (21 Mar 2023 22:01)  POCT Blood Glucose.: 109 mg/dL (21 Mar 2023 17:29)      Medications:  MEDICATIONS  (STANDING):  aspirin enteric coated 81 milliGRAM(s) Oral daily  atorvastatin 40 milliGRAM(s) Oral at bedtime  chlorhexidine 2% Cloths 1 Application(s) Topical daily  clopidogrel Tablet 75 milliGRAM(s) Oral daily  dextrose 5%. 1000 milliLiter(s) (50 mL/Hr) IV Continuous <Continuous>  dextrose 5%. 1000 milliLiter(s) (100 mL/Hr) IV Continuous <Continuous>  dextrose 50% Injectable 25 Gram(s) IV Push once  dextrose 50% Injectable 12.5 Gram(s) IV Push once  dextrose 50% Injectable 25 Gram(s) IV Push once  ferrous    sulfate 325 milliGRAM(s) Oral daily  glucagon  Injectable 1 milliGRAM(s) IntraMuscular once  hydrALAZINE 50 milliGRAM(s) Oral every 8 hours  insulin lispro (ADMELOG) corrective regimen sliding scale   SubCutaneous every 6 hours  isosorbide   dinitrate Tablet (ISORDIL) 20 milliGRAM(s) Oral three times a day  losartan 100 milliGRAM(s) Oral daily  meropenem  IVPB      meropenem  IVPB 500 milliGRAM(s) IV Intermittent every 24 hours  metoprolol tartrate 12.5 milliGRAM(s) Oral every 12 hours  pantoprazole  Injectable 40 milliGRAM(s) IV Push every 12 hours  polyethylene glycol 3350 17 Gram(s) Oral daily  tacrolimus 2 milliGRAM(s) Oral every 12 hours  tamsulosin 0.8 milliGRAM(s) Oral at bedtime    MEDICATIONS  (PRN):  acetaminophen     Tablet .. 650 milliGRAM(s) Oral every 6 hours PRN Mild Pain (1 - 3), Moderate Pain (4 - 6)  dextrose Oral Gel 15 Gram(s) Oral once PRN Blood Glucose LESS THAN 70 milliGRAM(s)/deciliter  sodium chloride 0.65% Nasal 1 Spray(s) Both Nostrils every 4 hours PRN Nasal Congestion      5'4''  pounds +-10%  ABW s/p R AKA: 113 pounds, %VZL=001     3/21 143.7 pounds, 148.1 pounds     Estimated energy needs:   ABW s/p R AKA for EER as %ABW is greater then 120%  Adjust for age, Fevers, ESRD/HD, Skin/pressure ulcer, CHF; fluids per team    Estimated Energy Needs From (hermilo/kg)	30  Estimated Energy Needs To (hermilo/kg)	35  Estimated Energy Needs Calculated From (hermilo/kg)	1536  Estimated Energy Needs Calculated To (hermilo/kg)	1792    Estimated Protein Needs From (g/kg)	1.2  Estimated Protein Needs To (g/kg)	1.4  Estimated Protein Needs Calculated From (g/kg)	61.44  Estimated Protein Needs Calculated To (g/kg)	71.68    Subjective: 61yo M PMHx HTN, HLD, DM, ICM (s/p cath years ago, RCA 99%, never intervened on), HFrEF 25-30%, ESRD s/p failed kidney transplant (last HD 3/1 via Left Arm AVF; HD TTS), Right AKA presented to [facility] 2/27 for respiratory distress after a dialysis session and admitted to cardiac telemetry 2/27 with SIRS criteria. Pt was placed on BiPAP which failed, where he was then intubated 3/1 and moved to the MICU. He went into cardiac arrest (RoSC achieved after 8mins). Went into Vtach arrest, was placed on amio and pressors, HR ellen into 30s and atropine given twice. Weaned off levophed and sedation and extubated 3/2. Pt developed coffee ground emesis, hemoccult positive, with Hgb dipping to 3.5; he then received 7units PRBCs, 1Unit FFP, 1 unit donor packed platelets. EGD and colonoscopy showing melanosis duodenii but no acute bleed. BP dropped again, transferred to ICU and started on levophed gtt. CTA Abdomen showing no active bleeding into GI lumen, possible focal proctitis; colonoscopy showing rectal ulcer. Started on hydrocortisone suppository and mesalamine. Hgb now stable in 11s (per handoff), now transferred for ICD placement 2/2 Vtach and cardiac cath. Hospital course c/b BRBPR 3/17 AM; type and screen sent, surgery, GI, MICU consulted. Flex sig performed at bedside with svc attending 3/17. DAPT resumed on 3/20/23, with plan for cardiac cath on 3/21/2023 - however 3/21/23 pt febrile to 102F with URI symptoms. Fever with unclear origin. Infectious workup under way, pending stability with possible plan for cardiac cath on Friday. Plan for Nex HD 5/23.     Pt on 5UR. Very Limited RD visit today. Pt had been covered head to toe with blankets and unwilling to come out, of note this occured during time of Prior RD visit. Pt reports he has not been eating 2/2 being cold and needing more blankets-D/w RN. RN unsure of how pt has been eating. Offered pt ONS however declined as he reports they give him diarrhea.   Please see below for nutritions recommendations.    Prior PES: Inadequate Energy Intake RT intake<EER AEB NPO  >> d/c  New PES: Increased needs RT increased demands AEB: ESRD/HD, Skin/pressure ulcer, CHF  Goal: Pt will meet at least 75% of nutrient needs     Recommendations:  1. Cont with Diet.  2. Monitor %PO intake, Diet tolerance.  >> Would add oral nutrition supplements, however pt does not take in setting of reported diarrhea.  >> If PO remains poor, can consider d/c of DASH.  3. Labs: monitor BMP, CBC, glucose, lytes, trend renal indices, LFTs.  4. Pain/GI per team. Monitor Skin, Wts pre/post HD.  5. RD to remain available for additional nutrition interventions as needed.   Education: NA.    Risk Level: High [  X ] Moderate [   ] Low [   ] Admitting Diagnosis:   Patient is a 62y old  Male who presents with a chief complaint of transfer from [place] for cath/icd (21 Mar 2023 17:08)      PAST MEDICAL & SURGICAL HISTORY:  HTN (hypertension)       HLD (hyperlipidemia)      DM (diabetes mellitus)      GERD (gastroesophageal reflux disease)      ESRD on dialysis      NSTEMI (non-ST elevation myocardial infarction)      CAD (coronary artery disease)      Fever of unknown origin (FUO)          Current Nutrition Order:  Renal DASH TLC      PO Intake: Good (%) [   ]  Fair (50-75%) [   ] Poor (<25%) [ X  ]--Please See Below     GI Issues:   Flex sig performed at bedside with svc attending 3/17: Brown stool was seen in the rectum and sigmoid colon. Evidence of prior hemoclip was found in the sigmoid colon. Localized ulceration and erosive with no bleeding were noted in the rectum, suggestive of solitary ulcer syndrome.   >> Repeat Endoscopy in 3 months.  Pending Abd U/S 3/22: with no acute pathology.     BM+3/21     Pain:  +Pain Is noted per flow sheets     Skin Integrity:  R buttocks Stage II pressure ulcer  Alex 17   No edema     Labs:   03-21    136  |  97  |  10  ----------------------------<  158<H>  4.0   |  29  |  2.91<H>    Ca    8.1<L>      21 Mar 2023 15:32  Phos  4.6     03-21  Mg     1.7     03-21    TPro  5.4<L>  /  Alb  2.9<L>  /  TBili  0.7  /  DBili  x   /  AST  17  /  ALT  9<L>  /  AlkPhos  142<H>  03-21    CAPILLARY BLOOD GLUCOSE      POCT Blood Glucose.: 93 mg/dL (22 Mar 2023 12:00)  POCT Blood Glucose.: 81 mg/dL (22 Mar 2023 06:22)  POCT Blood Glucose.: 95 mg/dL (22 Mar 2023 00:19)  POCT Blood Glucose.: 114 mg/dL (21 Mar 2023 22:01)  POCT Blood Glucose.: 109 mg/dL (21 Mar 2023 17:29)      Medications:  MEDICATIONS  (STANDING):  aspirin enteric coated 81 milliGRAM(s) Oral daily  atorvastatin 40 milliGRAM(s) Oral at bedtime  chlorhexidine 2% Cloths 1 Application(s) Topical daily  clopidogrel Tablet 75 milliGRAM(s) Oral daily  dextrose 5%. 1000 milliLiter(s) (50 mL/Hr) IV Continuous <Continuous>  dextrose 5%. 1000 milliLiter(s) (100 mL/Hr) IV Continuous <Continuous>  dextrose 50% Injectable 25 Gram(s) IV Push once  dextrose 50% Injectable 12.5 Gram(s) IV Push once  dextrose 50% Injectable 25 Gram(s) IV Push once  ferrous    sulfate 325 milliGRAM(s) Oral daily  glucagon  Injectable 1 milliGRAM(s) IntraMuscular once  hydrALAZINE 50 milliGRAM(s) Oral every 8 hours  insulin lispro (ADMELOG) corrective regimen sliding scale   SubCutaneous every 6 hours  isosorbide   dinitrate Tablet (ISORDIL) 20 milliGRAM(s) Oral three times a day  losartan 100 milliGRAM(s) Oral daily  meropenem  IVPB      meropenem  IVPB 500 milliGRAM(s) IV Intermittent every 24 hours  metoprolol tartrate 12.5 milliGRAM(s) Oral every 12 hours  pantoprazole  Injectable 40 milliGRAM(s) IV Push every 12 hours  polyethylene glycol 3350 17 Gram(s) Oral daily  tacrolimus 2 milliGRAM(s) Oral every 12 hours  tamsulosin 0.8 milliGRAM(s) Oral at bedtime    MEDICATIONS  (PRN):  acetaminophen     Tablet .. 650 milliGRAM(s) Oral every 6 hours PRN Mild Pain (1 - 3), Moderate Pain (4 - 6)  dextrose Oral Gel 15 Gram(s) Oral once PRN Blood Glucose LESS THAN 70 milliGRAM(s)/deciliter  sodium chloride 0.65% Nasal 1 Spray(s) Both Nostrils every 4 hours PRN Nasal Congestion      5'4''  pounds +-10%  ABW s/p R AKA: 113 pounds, %PCS=577     3/21 143.7 pounds, 148.1 pounds     Estimated energy needs:   ABW s/p R AKA for EER as %ABW is greater then 120%  Adjust for age, Fevers, ESRD/HD, Skin/pressure ulcer, CHF; fluids per team    Estimated Energy Needs From (hermilo/kg)	30  Estimated Energy Needs To (hermilo/kg)	35  Estimated Energy Needs Calculated From (hermilo/kg)	1536  Estimated Energy Needs Calculated To (hermilo/kg)	1792    Estimated Protein Needs From (g/kg)	1.2  Estimated Protein Needs To (g/kg)	1.4  Estimated Protein Needs Calculated From (g/kg)	61.44  Estimated Protein Needs Calculated To (g/kg)	71.68    Subjective: 61yo M PMHx HTN, HLD, DM, ICM (s/p cath years ago, RCA 99%, never intervened on), HFrEF 25-30%, ESRD s/p failed kidney transplant (last HD 3/1 via Left Arm AVF; HD TTS), Right AKA presented to [facility] 2/27 for respiratory distress after a dialysis session and admitted to cardiac telemetry 2/27 with SIRS criteria. Pt was placed on BiPAP which failed, where he was then intubated 3/1 and moved to the MICU. He went into cardiac arrest (RoSC achieved after 8mins). Went into Vtach arrest, was placed on amio and pressors, HR ellen into 30s and atropine given twice. Weaned off levophed and sedation and extubated 3/2. Pt developed coffee ground emesis, hemoccult positive, with Hgb dipping to 3.5; he then received 7units PRBCs, 1Unit FFP, 1 unit donor packed platelets. EGD and colonoscopy showing melanosis duodenii but no acute bleed. BP dropped again, transferred to ICU and started on levophed gtt. CTA Abdomen showing no active bleeding into GI lumen, possible focal proctitis; colonoscopy showing rectal ulcer. Started on hydrocortisone suppository and mesalamine. Hgb now stable in 11s (per handoff), now transferred for ICD placement 2/2 Vtach and cardiac cath. Hospital course c/b BRBPR 3/17 AM; type and screen sent, surgery, GI, MICU consulted. Flex sig performed at bedside with svc attending 3/17. DAPT resumed on 3/20/23, with plan for cardiac cath on 3/21/2023 - however 3/21/23 pt febrile to 102F with URI symptoms. Fever with unclear origin. Infectious workup under way, pending stability with possible plan for cardiac cath on Friday. Plan for Nex HD 5/23.     Pt on 5UR. Very Limited RD visit today. Pt had been covered head to toe with blankets and unwilling to come out, of note this occured during time of Prior RD visit. Pt reports he has not been eating 2/2 being cold and needing more blankets-D/w RN. RN unsure of how pt has been eating. Offered pt ONS however declined as he reports they give him diarrhea.   Please see below for nutritions recommendations.    Prior PES: Inadequate Energy Intake RT intake<EER AEB NPO  >> d/c  New PES: Increased needs RT increased demands AEB: ESRD/HD, Skin/pressure ulcer, CHF  Goal: Pt will meet at least 75% of nutrient needs     Recommendations:  1. Cont with Diet.  2. Monitor %PO intake, Diet tolerance.  >> Would add oral nutrition supplements, however pt does not take in setting of reported diarrhea.  >> If PO remains poor, can consider d/c of DASH.  3. Labs: monitor BMP, CBC, glucose, lytes, trend renal indices, LFTs.  4. Pain/GI per team. Monitor Skin, Wts pre/post HD.  5. RD to remain available for additional nutrition interventions as needed.   Education: NA.    Risk Level: High [  X ] Moderate [   ] Low [   ]

## 2023-03-22 NOTE — PROGRESS NOTE ADULT - PROBLEM SELECTOR PLAN 6
dialysis [facility], Sat  - last received 3/21/2023; next HD session 3/23/23 dialysis Tues, Thurs, Sat  - last received 3/21/2023; next HD session 3/23/23  - please obtain vanc trough prior to dialysis tomorrow

## 2023-03-22 NOTE — PROGRESS NOTE ADULT - PROBLEM SELECTOR PLAN 5
3/17AM pt passed loose BM along with large amount of BRBPR  - GI following  - s/p Flex sigmoidoscopy: localized ulceration and erosive with no bleeding in the rectum, suggestive of solitary ulcer syndrome, Evidence of prior hemoclip was found in the sigmoid colon.  -Avoid anal digitation and enemas   -Bowel regimen with miralax and dulcolax to avoid constipation.  -Repeat Endoscopy in 3 months

## 2023-03-22 NOTE — PROGRESS NOTE ADULT - PROBLEM SELECTOR PLAN 4
Patient euvolemic on exam   - ECHO as above --> EF 25-30%, likely ischemic in origin   - GDMT: Hydral 50mg q8h, isordil 20 mg TID, Lopressor 12.5 mg BID, Losartan 100 mg daily

## 2023-03-22 NOTE — PROGRESS NOTE ADULT - PROBLEM SELECTOR PLAN 9
total 88, try 158, HDL 44, LDL 12  -c/w atorvastatin 40 mg QHS Anemia    Atrial fibrillation    CAD (coronary artery disease)    COPD (chronic obstructive pulmonary disease)    Gall stones    Gout    Hyperlipidemia    Hypertension    Pneumonia    Pulmonary hypertension    Sinusitis    Spinal fusion failure, initial encounter    Spinal stenosis    Tubal ligation status

## 2023-03-22 NOTE — PROGRESS NOTE ADULT - SUBJECTIVE AND OBJECTIVE BOX
Interventional Cardiology PA Adult Progress Note        Subjective Assessment:  Patient seen and examined at bedside, states he feels well this morning.  Denies fever, chills, chest pain, shortness of breath, cough, abdominal pain, nausea, vomiting, or dysuria.         ROS Negative except as per Subjective and HPI  	  MEDICATIONS:  hydrALAZINE 50 milliGRAM(s) Oral every 8 hours  isosorbide   dinitrate Tablet (ISORDIL) 20 milliGRAM(s) Oral three times a day  losartan 100 milliGRAM(s) Oral daily  metoprolol tartrate 12.5 milliGRAM(s) Oral every 12 hours  meropenem  IVPB      meropenem  IVPB 500 milliGRAM(s) IV Intermittent every 24 hours  acetaminophen     Tablet .. 650 milliGRAM(s) Oral every 6 hours PRN  pantoprazole  Injectable 40 milliGRAM(s) IV Push every 12 hours  polyethylene glycol 3350 17 Gram(s) Oral daily  atorvastatin 40 milliGRAM(s) Oral at bedtime  dextrose 50% Injectable 25 Gram(s) IV Push once  dextrose 50% Injectable 12.5 Gram(s) IV Push once  dextrose 50% Injectable 25 Gram(s) IV Push once  dextrose Oral Gel 15 Gram(s) Oral once PRN  glucagon  Injectable 1 milliGRAM(s) IntraMuscular once  insulin lispro (ADMELOG) corrective regimen sliding scale   SubCutaneous every 6 hours  aspirin enteric coated 81 milliGRAM(s) Oral daily  chlorhexidine 2% Cloths 1 Application(s) Topical daily  clopidogrel Tablet 75 milliGRAM(s) Oral daily  dextrose 5%. 1000 milliLiter(s) IV Continuous <Continuous>  dextrose 5%. 1000 milliLiter(s) IV Continuous <Continuous>  ferrous    sulfate 325 milliGRAM(s) Oral daily  sodium chloride 0.65% Nasal 1 Spray(s) Both Nostrils every 4 hours PRN  tacrolimus 2 milliGRAM(s) Oral every 12 hours  tamsulosin 0.8 milliGRAM(s) Oral at bedtime      	    [PHYSICAL EXAM:  TELEMETRY:  T(C): 37.9 (03-22-23 @ 09:00), Max: 39.6 (03-21-23 @ 17:01)  HR: 57 (03-22-23 @ 08:46) (52 - 79)  BP: 106/53 (03-22-23 @ 08:46) (106/53 - 165/70)  RR: 16 (03-22-23 @ 08:46) (16 - 18)  SpO2: 100% (03-22-23 @ 08:46) (93% - 100%)  Wt(kg): --  I&O's Summary    21 Mar 2023 07:01  -  22 Mar 2023 07:00  --------------------------------------------------------  IN: 1160 mL / OUT: 2775 mL / NET: -1615 mL                                          Appearance: sleeping peacefully in bed    Cardiovascular: Normal S1 S2  Respiratory: Lungs clear to auscultation  Gastrointestinal:  soft, non-tender 	  Skin: warm, no rashes   Extremities: R AKA   Vascular: Peripheral pulse palpable 2+ LLE  Neurologic: Non-focal  Psychiatry: A&O x 3, Mood & affect appropriate      	    ECG:  	  RADIOLOGY:   DIAGNOSTIC TESTING:  [ ] Echocardiogram:  [ ]  Catheterization:  [ ] Stress Test:    [ ] JOSY  OTHER: 	    LABS:	 	  CARDIAC MARKERS:                        10.1   4.30  )-----------( 107      ( 21 Mar 2023 15:32 )             31.0     03-21    136  |  97  |  10  ----------------------------<  158<H>  4.0   |  29  |  2.91<H>    Ca    8.1<L>      21 Mar 2023 15:32  Phos  4.6     03-21  Mg     1.7     03-21    TPro  5.4<L>  /  Alb  2.9<L>  /  TBili  0.7  /  DBili  x   /  AST  17  /  ALT  9<L>  /  AlkPhos  142<H>  03-21    proBNP:   Lipid Profile:   HgA1c:   TSH:       ASSESSMENT/PLAN: 	        DVT ppx:  Dispo:     Interventional Cardiology PA Adult Progress Note    Subjective Assessment:  Patient seen and examined at bedside, states he feels well this morning.  Denies fever, chills, chest pain, shortness of breath, cough, abdominal pain, nausea, vomiting, or dysuria.       ROS Negative except as per Subjective and HPI  	  MEDICATIONS:  hydrALAZINE 50 milliGRAM(s) Oral every 8 hours  isosorbide   dinitrate Tablet (ISORDIL) 20 milliGRAM(s) Oral three times a day  losartan 100 milliGRAM(s) Oral daily  metoprolol tartrate 12.5 milliGRAM(s) Oral every 12 hours  meropenem  IVPB      meropenem  IVPB 500 milliGRAM(s) IV Intermittent every 24 hours  acetaminophen     Tablet .. 650 milliGRAM(s) Oral every 6 hours PRN  pantoprazole  Injectable 40 milliGRAM(s) IV Push every 12 hours  polyethylene glycol 3350 17 Gram(s) Oral daily  atorvastatin 40 milliGRAM(s) Oral at bedtime  dextrose 50% Injectable 25 Gram(s) IV Push once  dextrose 50% Injectable 12.5 Gram(s) IV Push once  dextrose 50% Injectable 25 Gram(s) IV Push once  dextrose Oral Gel 15 Gram(s) Oral once PRN  glucagon  Injectable 1 milliGRAM(s) IntraMuscular once  insulin lispro (ADMELOG) corrective regimen sliding scale   SubCutaneous every 6 hours  aspirin enteric coated 81 milliGRAM(s) Oral daily  chlorhexidine 2% Cloths 1 Application(s) Topical daily  clopidogrel Tablet 75 milliGRAM(s) Oral daily  dextrose 5%. 1000 milliLiter(s) IV Continuous <Continuous>  dextrose 5%. 1000 milliLiter(s) IV Continuous <Continuous>  ferrous    sulfate 325 milliGRAM(s) Oral daily  sodium chloride 0.65% Nasal 1 Spray(s) Both Nostrils every 4 hours PRN  tacrolimus 2 milliGRAM(s) Oral every 12 hours  tamsulosin 0.8 milliGRAM(s) Oral at bedtime      	    [PHYSICAL EXAM:  TELEMETRY:  T(C): 37.9 (03-22-23 @ 09:00), Max: 39.6 (03-21-23 @ 17:01)  HR: 57 (03-22-23 @ 08:46) (52 - 79)  BP: 106/53 (03-22-23 @ 08:46) (106/53 - 165/70)  RR: 16 (03-22-23 @ 08:46) (16 - 18)  SpO2: 100% (03-22-23 @ 08:46) (93% - 100%)  Wt(kg): --  I&O's Summary    21 Mar 2023 07:01  -  22 Mar 2023 07:00  --------------------------------------------------------  IN: 1160 mL / OUT: 2775 mL / NET: -1615 mL                                          Appearance: sleeping peacefully in bed    Cardiovascular: Normal S1 S2  Respiratory: Lungs clear to auscultation  Gastrointestinal:  soft, non-tender 	  Skin: warm, no rashes   Extremities: R AKA   Vascular: Peripheral pulse palpable 2+ LLE  Neurologic: Non-focal  Psychiatry: A&O x 3, Mood & affect appropriate    LABS:	 	  CARDIAC MARKERS:                        10.1   4.30  )-----------( 107      ( 21 Mar 2023 15:32 )             31.0     03-21    136  |  97  |  10  ----------------------------<  158<H>  4.0   |  29  |  2.91<H>    Ca    8.1<L>      21 Mar 2023 15:32  Phos  4.6     03-21  Mg     1.7     03-21    TPro  5.4<L>  /  Alb  2.9<L>  /  TBili  0.7  /  DBili  x   /  AST  17  /  ALT  9<L>  /  AlkPhos  142<H>  03-21    proBNP:   Lipid Profile:   HgA1c:   TSH:       ASSESSMENT/PLAN: 	        DVT ppx:  Dispo:

## 2023-03-22 NOTE — PROGRESS NOTE ADULT - SUBJECTIVE AND OBJECTIVE BOX
OVERNIGHT EVENTS: None    SUBJECTIVE / INTERVAL HPI: Patient seen and examined at bedside.     VITAL SIGNS:  Vital Signs Last 24 Hrs  T(C): 36.9 (22 Mar 2023 13:17), Max: 39.6 (21 Mar 2023 17:01)  T(F): 98.5 (22 Mar 2023 13:17), Max: 103.2 (21 Mar 2023 17:01)  HR: 57 (22 Mar 2023 08:46) (57 - 79)  BP: 137/63 (22 Mar 2023 13:) (106/53 - 165/70)  BP(mean): 96 (22 Mar 2023 07:09) (90 - 99)  RR: 18 (22 Mar 2023 13:) (16 - 18)  SpO2: 98% (22 Mar 2023 13:) (93% - 100%)    Parameters below as of 22 Mar 2023 13:01  Patient On (Oxygen Delivery Method): room air      I&O's Summary    21 Mar 2023 07:01  -  22 Mar 2023 07:00  --------------------------------------------------------  IN: 1160 mL / OUT: 2775 mL / NET: -1615 mL    22 Mar 2023 07:01  -  22 Mar 2023 14:00  --------------------------------------------------------  IN: 210 mL / OUT: 0 mL / NET: 210 mL        PHYSICAL EXAM:    General: NAD  HEENT: NC/AT; PERRL, anicteric sclera; MMM  Neck: supple  Cardiovascular: +S1/S2; RRR  Respiratory: CTA B/L; no W/R/R  Gastrointestinal: soft, NT/ND; +BSx4  Extremities: WWP; no edema, clubbing or cyanosis  Vascular: 2+ radial, DP/PT pulses B/L  Neurological: AAOx3; no focal deficits    MEDICATIONS:  MEDICATIONS  (STANDING):  aspirin enteric coated 81 milliGRAM(s) Oral daily  atorvastatin 40 milliGRAM(s) Oral at bedtime  chlorhexidine 2% Cloths 1 Application(s) Topical daily  clopidogrel Tablet 75 milliGRAM(s) Oral daily  dextrose 5%. 1000 milliLiter(s) (50 mL/Hr) IV Continuous <Continuous>  dextrose 5%. 1000 milliLiter(s) (100 mL/Hr) IV Continuous <Continuous>  dextrose 50% Injectable 25 Gram(s) IV Push once  dextrose 50% Injectable 12.5 Gram(s) IV Push once  dextrose 50% Injectable 25 Gram(s) IV Push once  ferrous    sulfate 325 milliGRAM(s) Oral daily  glucagon  Injectable 1 milliGRAM(s) IntraMuscular once  hydrALAZINE 50 milliGRAM(s) Oral every 8 hours  insulin lispro (ADMELOG) corrective regimen sliding scale   SubCutaneous every 6 hours  isosorbide   dinitrate Tablet (ISORDIL) 20 milliGRAM(s) Oral three times a day  losartan 100 milliGRAM(s) Oral daily  meropenem  IVPB      meropenem  IVPB 500 milliGRAM(s) IV Intermittent every 24 hours  metoprolol tartrate 12.5 milliGRAM(s) Oral every 12 hours  pantoprazole  Injectable 40 milliGRAM(s) IV Push every 12 hours  polyethylene glycol 3350 17 Gram(s) Oral daily  tacrolimus 2 milliGRAM(s) Oral every 12 hours  tamsulosin 0.8 milliGRAM(s) Oral at bedtime    MEDICATIONS  (PRN):  acetaminophen     Tablet .. 650 milliGRAM(s) Oral every 6 hours PRN Mild Pain (1 - 3), Moderate Pain (4 - 6)  dextrose Oral Gel 15 Gram(s) Oral once PRN Blood Glucose LESS THAN 70 milliGRAM(s)/deciliter  sodium chloride 0.65% Nasal 1 Spray(s) Both Nostrils every 4 hours PRN Nasal Congestion      ALLERGIES:  Allergies    No Known Allergies    Intolerances        LABS:                        10.0   7.27  )-----------( 116      ( 22 Mar 2023 12:00 )             32.0     03-21    136  |  97  |  10  ----------------------------<  158<H>  4.0   |  29  |  2.91<H>    Ca    8.1<L>      21 Mar 2023 15:32  Phos  4.6     03-21  Mg     1.7         TPro  5.4<L>  /  Alb  2.9<L>  /  TBili  0.7  /  DBili  x   /  AST  17  /  ALT  9<L>  /  AlkPhos  142<H>        Urinalysis Basic - ( 21 Mar 2023 15:02 )    Color: Yellow / Appearance: Hazy / S.020 / pH: x  Gluc: x / Ketone: NEGATIVE  / Bili: Negative / Urobili: 0.2 E.U./dL   Blood: x / Protein: >=300 mg/dL / Nitrite: NEGATIVE   Leuk Esterase: Moderate / RBC: > 10 /HPF / WBC > 10 /HPF   Sq Epi: x / Non Sq Epi: 0-5 /HPF / Bacteria: Present /HPF      CAPILLARY BLOOD GLUCOSE      POCT Blood Glucose.: 93 mg/dL (22 Mar 2023 12:00)      RADIOLOGY & ADDITIONAL TESTS: Reviewed.

## 2023-03-22 NOTE — PROGRESS NOTE ADULT - SUBJECTIVE AND OBJECTIVE BOX
INTERVAL HPI/OVERNIGHT EVENTS:  Tm 103.2.  Persistently febrile.  Overall, he feels better, no additional rigors.  New pain at base of neck - 8/10 in severity.  Occ cough with brown sputum, no further abd pain.    CONSTITUTIONAL:  As aboves  EYES:  No photophobia or visual changes  CARDIOVASCULAR:  No chest pain  RESPIRATORY:  No wheezing.  Baseline SOB  GASTROINTESTINAL:  No nausea, vomiting, diarrhea, constipation, or abdominal pain  GENITOURINARY:  No frequency, urgency, dysuria or hematuria  NEUROLOGIC:  No headache, lightheadedness      ANTIBIOTICS/RELEVANT:    Vancomycin 3/21  Meropenem 500 mg IV q24h (3/21-present)      Vital Signs Last 24 Hrs  T(C): 38.6 (22 Mar 2023 14:19), Max: 39.6 (21 Mar 2023 17:01)  T(F): 101.4 (22 Mar 2023 14:19), Max: 103.2 (21 Mar 2023 17:01)  HR: 57 (22 Mar 2023 08:46) (57 - 79)  BP: 128/61 (22 Mar 2023 14:30) (106/53 - 165/70)  BP(mean): 96 (22 Mar 2023 07:09) (90 - 99)  RR: 18 (22 Mar 2023 13:01) (16 - 18)  SpO2: 98% (22 Mar 2023 13:01) (93% - 100%)    Parameters below as of 22 Mar 2023 13:01  Patient On (Oxygen Delivery Method): room air        PHYSICAL EXAM:  Seen this morning  Constitutional:  Less toxic appearing, eating  Eyes:  Sclerae anicteric, conjunctivae clear, PERRL  Ear/Nose/Throat:  No nasal exudate or sinus tenderness;  No buccal mucosal lesions, no pharyngeal erythema or exudate	  Neck:  Supple, no adenopathy  Respiratory:  Clear bilaterally  Cardiovascular:  RRR, S1S2, no murmur appreciated  Gastrointestinal:  Normoactive BS, soft, mild tenderness RLQ over transplanted kidney.  No HSM  Extremities:  s/p R AKA, no edema.  R distal forearm IV from OSH still in place, L prox UE AVF without overlying erythema/warmth      LABS:                        10.0   7.27  )-----------( 116      ( 22 Mar 2023 12:00 )             32.0         03-21    136  |  97  |  10  ----------------------------<  158<H>  4.0   |  29  |  2.91<H>    Ca    8.1<L>      21 Mar 2023 15:32  Phos  4.6       Mg     1.7         TPro  5.4<L>  /  Alb  2.9<L>  /  TBili  0.7  /  DBili  x   /  AST  17  /  ALT  9<L>  /  AlkPhos  142<H>        Urinalysis Basic - ( 21 Mar 2023 15:02 )    Color: Yellow / Appearance: Hazy / S.020 / pH: x  Gluc: x / Ketone: NEGATIVE  / Bili: Negative / Urobili: 0.2 E.U./dL   Blood: x / Protein: >=300 mg/dL / Nitrite: NEGATIVE   Leuk Esterase: Moderate / RBC: > 10 /HPF / WBC > 10 /HPF   Sq Epi: x / Non Sq Epi: 0-5 /HPF / Bacteria: Present /HPF        MICROBIOLOGY:    Blood cultures:  3/21 X 1 - NGTD    Urine culture 3/21 - >10^ GNRs    3/21 rRVP ND          RADIOLOGY & ADDITIONAL STUDIES:    < from: US Abdomen Complete (US Abdomen Complete .) (23 @ 11:55) >  FINDINGS:  Liver: Within normal limits.  Bile ducts: Normal caliber. Common bile duct measures 0.6 cm..  Gallbladder: Within normal limits. No stones or gallbladder wall   thickening. No sonographic Ramirez's sign.  Pancreas: Heterogenous echogenicity. Mildly dilated visualized pancreatic   duct 3.5 mm.  Spleen: Enlarged 13 x 3.8 x 4.5 cm.. Within normal limits.  Right kidney: 7.4 cm, echogenic. No hydronephrosis. Parapelvic cyst 2.3   cm.  Left kidney: 9.4 cm, echogenic. No hydronephrosis. Nonobstructing stone   0.4 cm, lower pole  Ascites: None.  Aorta and IVC: Visualized portions are within normal limits.  Additional findings: Small left pleural effusion.    IMPRESSION:  No biliary dilatation or evidence of cholecystitis.  Heterogenous echogenicity pancreas with mildly dilated pancreatic duct.   Recommended lipase correlation and consider further workup with CT or MRI   as clinically warranted.  Small bilateral echogenic kidneys, compatible with chronic medical renal   disease.  Nonobstructing left renal stone.  Splenomegaly.  Small left pleural effusion.    < end of copied text >

## 2023-03-22 NOTE — PROGRESS NOTE ADULT - ATTENDING COMMENTS
61yo M PMHx HTN, HLD, DM, CAD(s/p cath years ago, RCA 99%, never intervened on), HFrEF 25-30%, ESRD s/p failed kidney transplant and required HD x 3yrs (last HD 3/16 via Left Arm AVF; HD TTS), Right AKA initially presented to [facility] 2/27 for respiratory distress after a dialysis, found to be septic, h/c complicated by AHRF requiring intubation for 24hr followed by VT arrest, h/c the complicated by massive LGIB (rectal ulcer seen on colonoscopy) requiring 7u pRBC, 1u FFP and 1u PLT. Transferred to [facility] (3/17) for ICD placement 2/2 Vtach and cardiac cath. Pt noted lassed loose stool with BRBPR, GI and surgery consulted. Flex Sigc ( 3/17) found prior hemoclip in the sigmoid colon, localized ulceration and erosive with no bleeding noted in rectum suggestive of solitary ulcer syndrome. Medicine consulted for comanagement.     #LGIB  #SANTOS  #Solitary ulcer syndrome  - hx coffee ground emesis @ [facility]; s/p EGD/colonoscopy showing rectal ulcer  - 3/17AM pt passed loose BM along with large amount of BRBPR  - per gen surgery no acute surgical intervention   - per GI:      - increased Protonix 40 mg IVP BID      - PIV large bore x2      - s/p flex sig(3/17)  for rectal ulcers, found Evidence of prior hemoclip in the sigmoid colon. Localized ulceration and erosive with no bleeding were noted in the rectum, suggestive of solitary ulcer syndrome           - Avoid anal digitation and enemas           - High-fiber diet and optimize laxatives to avoid constipation using miralax and dulcolax           - No absolute GI contraindications to anticoag/antiplt therapy           - Repeat endoscopic evaluation recommended in 3 months  - Active T&S  - Hgb 10.1 stable  - Transfuse for hgb <8 (active CAD)    #CAD   - Previous cath at [facility] showing oLM 30% and RCA 99% that is not amenable to intervention.  - TTE 3/1/23 at [facility]: mild LVH, EF 25-30%, severe global wall motion dysfunction, mildly dilated LA, RV mildly dilated, all leaflets mild calcified, mod pHTN  - hx coffee ground emesis @ [facility]; s/p EGD/colonoscopy showing rectal ulcer and LHH  flex sig suggestive of solitary ulcer syndrome - per cardiology will hold off loading the patient due to recent LGIB  - No absolute GI contraindications to anticoag/antplt therapy  - start ASA and plavix ( 3/20-), if no bleed in few days, proceed with [facility] possible PCI later this week ( Thurs 3/24) per Cardiologist   - EP consulted for possible ICD placement ? Fri 3/24    #H/O ventricular tachycardia   - Vtach arrest @[facility]  - agree with EP consult for ICD placement    # Fever ? PNA   - Pt was febrile 102*F ( 3/21), pt reports cough w. brown sputum production, CXR w/ RUL opacity  - given immunocompromised/hx Kidney transplant , low threshold to start broad spectrum iv abx   - f/u COVID swab/RVP negative, BCx pending   - ID consult appreciated, c/w Vanco and Meropenem, w/u as delineated by ID     #ESRD on dialysis  - dialysis Tues, Thurs, Sat  - last received Tues 3/21, next HD Thurs 3/23  - f/u nephro recs  - Electrolytes wnl, k 4.3, phos 1.9  - c/w calcium citrate 667mg TID  - on Renvela 800mg TID, f/u with renal as patient is with hypophosphatemia on admission     #S/P kidney transplant.   - per [place] pt takes Tacrolimus 2mg 2 times /day  - f/u tacrolimus serum levels  - f/u nephro recs    #HTN  - SBP on admission 155-170s  - c/w home meds. Amlodipine 10mg qd, Losartan 100mg qd, Hydralazine 50m TID, Isordil 20mg TID    #HLD   - c/w Lipitor 40mg qd  - f/u AM lipid panel.    #DM  - no meds per [place], obtain collaterals for med recs prior [place] stay  - mISS while inpt, goal -180   - A1c 5.9 on admission    DVT PPX: SCDs for now iso GIB    Med consult team continues to follow with you. d/w Dr. Zack Snow . 61yo M PMHx HTN, HLD, DM, CAD(s/p cath years ago, RCA 99%, never intervened on), HFrEF 25-30%, ESRD s/p failed kidney transplant and required HD x 3yrs (last HD 3/16 via Left Arm AVF; HD TTS), Right AKA initially presented to [facility] 2/27 for respiratory distress after a dialysis, found to be septic, h/c complicated by AHRF requiring intubation for 24hr followed by VT arrest, h/c the complicated by massive LGIB (rectal ulcer seen on colonoscopy) requiring 7u pRBC, 1u FFP and 1u PLT. Transferred to [facility] (3/17) for ICD placement 2/2 Vtach and cardiac cath. Pt noted lassed loose stool with BRBPR, GI and surgery consulted. Flex Sigc ( 3/17) found prior hemoclip in the sigmoid colon, localized ulceration and erosive with no bleeding noted in rectum suggestive of solitary ulcer syndrome. Medicine consulted for comanagement.     #LGIB  #SANTOS  #Solitary ulcer syndrome  - hx coffee ground emesis @ [facility]; s/p EGD/colonoscopy showing rectal ulcer  - 3/17AM pt passed loose BM along with large amount of BRBPR  - per gen surgery no acute surgical intervention   - per GI:      - increased Protonix 40 mg IVP BID      - PIV large bore x2      - s/p flex sig(3/17)  for rectal ulcers, found Evidence of prior hemoclip in the sigmoid colon. Localized ulceration and erosive with no bleeding were noted in the rectum, suggestive of solitary ulcer syndrome           - Avoid anal digitation and enemas           - High-fiber diet and optimize laxatives to avoid constipation using miralax and dulcolax           - No absolute GI contraindications to anticoag/antiplt therapy           - Repeat endoscopic evaluation recommended in 3 months  - Active T&S  - Hgb 10.1 stable  - Transfuse for hgb <8 (active CAD)    #CAD   - Previous cath at [facility] showing oLM 30% and RCA 99% that is not amenable to intervention.  - TTE 3/1/23 at [facility]: mild LVH, EF 25-30%, severe global wall motion dysfunction, mildly dilated LA, RV mildly dilated, all leaflets mild calcified, mod pHTN  - hx coffee ground emesis @ [facility]; s/p EGD/colonoscopy showing rectal ulcer and LHH  flex sig suggestive of solitary ulcer syndrome - per cardiology will hold off loading the patient due to recent LGIB  - No absolute GI contraindications to anticoag/antplt therapy  - start ASA and plavix ( 3/20-), if no bleed in few days, proceed with [facility] possible PCI later this week ( Thurs 3/24) per Cardiologist   - EP consulted for possible ICD placement ? Fri 3/24    #H/O ventricular tachycardia   - Vtach arrest @[facility]  - agree with EP consult for ICD placement    # Fever ? PNA   - Pt was febrile 102*F ( 3/21), pt reports cough w. brown sputum production, CXR w/ RUL opacity  - given immunocompromised/hx Kidney transplant , low threshold to start broad spectrum iv abx   - f/u COVID swab/RVP negative, BCx pending   - ID consult appreciated, c/w Vanco and Meropenem, w/u as delineated by ID     #ESRD on dialysis  - dialysis Tues, Thurs, Sat  - last received Tues 3/21, next HD Thurs 3/23  - f/u nephro recs  - Electrolytes wnl, k 4.3, phos 1.9  - c/w calcium citrate 667mg TID  - on Renvela 800mg TID, f/u with renal as patient is with hypophosphatemia on admission     #S/P kidney transplant.   - per [place] pt takes Tacrolimus 2mg 2 times /day  - f/u tacrolimus serum levels  - f/u nephro recs    #HTN  - SBP on admission 155-170s  - c/w home meds. Amlodipine 10mg qd, Losartan 100mg qd, Hydralazine 50m TID, Isordil 20mg TID    #HLD   - c/w Lipitor 40mg qd  - f/u AM lipid panel.    #DM  - no meds per [place], obtain collaterals for med recs prior [place] stay  - mISS while inpt, goal -180   - A1c 5.9 on admission    DVT PPX: SCDs for now iso GIB    Med consult team continues to follow with you. d/w Dr. Zack Snow . 63yo M PMHx HTN, HLD, DM, CAD(s/p cath years ago, RCA 99%, never intervened on), HFrEF 25-30%, ESRD s/p failed kidney transplant and required HD x 3yrs (last HD 3/16 via Left Arm AVF; HD TTS), Right AKA initially presented to [facility] 2/27 for respiratory distress after a dialysis, found to be septic, h/c complicated by AHRF requiring intubation for 24hr followed by VT arrest, h/c the complicated by massive LGIB (rectal ulcer seen on colonoscopy) requiring 7u pRBC, 1u FFP and 1u PLT. Transferred to [facility] (3/17) for ICD placement 2/2 Vtach and cardiac cath. Pt noted lassed loose stool with BRBPR, GI and surgery consulted. Flex Sigc ( 3/17) found prior hemoclip in the sigmoid colon, localized ulceration and erosive with no bleeding noted in rectum suggestive of solitary ulcer syndrome. Medicine consulted for comanagement.     #LGIB  #SANTOS  #Solitary ulcer syndrome  - hx coffee ground emesis @ [facility]; s/p EGD/colonoscopy showing rectal ulcer  - 3/17AM pt passed loose BM along with large amount of BRBPR  - per gen surgery no acute surgical intervention   - per GI:      - increased Protonix 40 mg IVP BID      - PIV large bore x2      - s/p flex sig(3/17)  for rectal ulcers, found Evidence of prior hemoclip in the sigmoid colon. Localized ulceration and erosive with no bleeding were noted in the rectum, suggestive of solitary ulcer syndrome           - Avoid anal digitation and enemas           - High-fiber diet and optimize laxatives to avoid constipation using miralax and dulcolax           - No absolute GI contraindications to anticoag/antiplt therapy           - Repeat endoscopic evaluation recommended in 3 months  - Active T&S  - Hgb 10.1 stable  - Transfuse for hgb <8 (active CAD)    #CAD   - Previous cath at [facility] showing oLM 30% and RCA 99% that is not amenable to intervention.  - TTE 3/1/23 at [facility]: mild LVH, EF 25-30%, severe global wall motion dysfunction, mildly dilated LA, RV mildly dilated, all leaflets mild calcified, mod pHTN  - hx coffee ground emesis @ [facility]; s/p EGD/colonoscopy showing rectal ulcer and LHH  flex sig suggestive of solitary ulcer syndrome - per cardiology will hold off loading the patient due to recent LGIB  - No absolute GI contraindications to anticoag/antplt therapy  - start ASA and plavix ( 3/20-), if no bleed in few days, proceed with [facility] possible PCI later this week ( Thurs 3/24) per Cardiologist   - EP consulted for possible ICD placement ? Fri 3/24    #H/O ventricular tachycardia   - Vtach arrest @[facility]  - agree with EP consult for ICD placement    # Fever ? PNA   - Pt was febrile 102*F ( 3/21), pt reports cough w. brown sputum production, CXR w/ RUL opacity  - given immunocompromised/hx Kidney transplant , low threshold to start broad spectrum iv abx   - f/u COVID swab/RVP negative, BCx pending   - ID consult appreciated, c/w Vanco and Meropenem, w/u as delineated by ID     #ESRD on dialysis  - dialysis Tues, Thurs, Sat  - last received Tues 3/21, next HD Thurs 3/23  - f/u nephro recs  - Electrolytes wnl, k 4.3, phos 1.9  - c/w calcium citrate 667mg TID  - on Renvela 800mg TID, f/u with renal as patient is with hypophosphatemia on admission     #S/P kidney transplant.   - per [place] pt takes Tacrolimus 2mg 2 times /day  - f/u tacrolimus serum levels  - f/u nephro recs    #HTN  - SBP on admission 155-170s  - c/w home meds. Amlodipine 10mg qd, Losartan 100mg qd, Hydralazine 50m TID, Isordil 20mg TID    #HLD   - c/w Lipitor 40mg qd  - f/u AM lipid panel.    #DM  - no meds per [place], obtain collaterals for med recs prior [place] stay  - mISS while inpt, goal -180   - A1c 5.9 on admission    DVT PPX: SCDs for now iso GIB    Med consult team continues to follow with you. d/w Dr. Zack Snow .

## 2023-03-22 NOTE — PROGRESS NOTE ADULT - ASSESSMENT
63 yo M with HTN, DM, HLD, ICM, HFrEF, ESRD s/p failed RT (HD via LUE AVF), R AKA transferred to [facility] from [facility] on 3/17 for ICD placement with fever, no leukocytosis (on low dose tacrolimus).   He has a positive UA, urine culture with GNRs and he has mild tenderness to palpation of transplanted kidney - suspect urinary source.  He has no RUQ tenderness to palpation today and abd US showed no findings to suggest cholecystitis.  No comment was made regarding transplanted kidney.  CXR was poor study yesterday - would repeat.  His neck pain is worrisome - would follow for now.  If persists, will need imaging.  Regarding potential infections in setting of RT, his CMV status is unknown and he has recently received a large number of transfusions.  He is on relatively low immunosuppressive doses - PJP unlikely but given report of CT findings c/c interstitial pneumonia from OSH, would evaluate.    Suggest:  - F/U blood culture X 1 from 3/21.  Please send an additional set.  Blood cultures X 2 sets  - F/U urine culture  - Ask Radiology re:  transplanted kidney  - F/U CMV IgM, IgG - pending.  Please send CMV PCR  - Serum Fungitell  - D/C PIV R distal forearm  - Continue vancomycin for now.  Please send trough prior to HD on 3/23.  If 10-17.9, give 750 mg.  If 18-25, give 500 mg.  - Continue meropenem 500 mg IV q24h.  Needs to be given daily but after HD on the days he is dialyzed.  Recommendations discussed with primary team.  Will follow with you - team 2.   63 yo M with HTN, DM, HLD, ICM, HFrEF, ESRD s/p failed RT (HD via LUE AVF), R AKA transferred to [facility] from [facility] on 3/17 for ICD placement with fever, no leukocytosis (on low dose tacrolimus).   He has a positive UA, urine culture with GNRs and he has mild tenderness to palpation of transplanted kidney - suspect urinary source.  He has no RUQ tenderness to palpation today and abd US showed no findings to suggest cholecystitis.  No comment was made regarding transplanted kidney.  CXR was poor study yesterday - would repeat.  His neck pain is worrisome - would follow for now.  If persists, will need imaging.  Regarding potential infections in setting of RT, his CMV status is unknown and he has recently received a large number of transfusions.  He is on relatively low immunosuppressive doses - PJP unlikely but given report of CT findings c/c interstitial pneumonia from OSH, would evaluate.    Suggest:  - F/U blood culture X 1 from 3/21.  Please send an additional set.  Blood cultures X 2 sets  - F/U urine culture  - Ask Radiology re:  transplanted kidney  - F/U CMV IgM, IgG - pending.  Please send CMV PCR  - Serum Fungitell  - D/C PIV R distal forearm  - Continue vancomycin for now.  Please send trough prior to HD on 3/23.  If 10-17.9, give 750 mg.  If 18-25, give 500 mg.  - Continue meropenem 500 mg IV q24h.  Needs to be given daily but after HD on the days he is dialyzed.  Recommendations discussed with primary team.  Will follow with you - team 2.   61 yo M with HTN, DM, HLD, ICM, HFrEF, ESRD s/p failed RT (HD via LUE AVF), R AKA transferred to [facility] from [facility] on 3/17 for ICD placement with fever, no leukocytosis (on low dose tacrolimus).   He has a positive UA, urine culture with GNRs and he has mild tenderness to palpation of transplanted kidney - suspect urinary source.  He has no RUQ tenderness to palpation today and abd US showed no findings to suggest cholecystitis.  No comment was made regarding transplanted kidney.  CXR was poor study yesterday - would repeat.  His neck pain is worrisome - would follow for now.  If persists, will need imaging.  Regarding potential infections in setting of RT, his CMV status is unknown and he has recently received a large number of transfusions.  He is on relatively low immunosuppressive doses - PJP unlikely but given report of CT findings c/c interstitial pneumonia from OSH, would evaluate.    Suggest:  - F/U blood culture X 1 from 3/21.  Please send an additional set.  Blood cultures X 2 sets  - F/U urine culture  - Ask Radiology re:  transplanted kidney  - F/U CMV IgM, IgG - pending.  Please send CMV PCR  - Serum Fungitell  - D/C PIV R distal forearm  - Continue vancomycin for now.  Please send trough prior to HD on 3/23.  If 10-17.9, give 750 mg.  If 18-25, give 500 mg.  - Continue meropenem 500 mg IV q24h.  Needs to be given daily but after HD on the days he is dialyzed.  Recommendations discussed with primary team.  Will follow with you - team 2.

## 2023-03-22 NOTE — PROGRESS NOTE ADULT - PROBLEM SELECTOR PLAN 2
Patient febrile to 102, associated w/ chills, HA, nausea, and URI sx on 03/21/23 status post dialysis. Lactate 3, otherwise labs unremarkable. CXR w/ opacity most likely CHF  - Per ID recs will start Vancomycin 1g and Meropenem 500 mg Q 24 hrs (first dose of both 3/21/23 in PM)  - Follow up CMV PCR/IgG/IgM, RVP, UA, UCx, BCx, abdominal US   - Medicine following Patient febrile to 102, associated w/ chills, HA, nausea, and URI sx on 03/21/23 status post dialysis. Lactate 3, otherwise labs unremarkable. CXR w/ opacity most likely CHF  - Per ID recs will start Vancomycin 1g and Meropenem 500 mg Q 24 hrs (first dose of both 3/21/23 in PM)  - Follow up infectious workup, thus far CXR with opacity possible PNA, UA negative, Bcx negative   - Abdominal U/S: No evidence of cholecystitis. Heterogenous echogenicity pancreas with mildly dilated pancreatic duct. Nonobstructing left renal stone. Splenomegaly. Small left pleural effusion       - Medicine following Patient febrile to 102, associated w/ chills, HA, nausea, and URI sx on 03/21/23 status post dialysis  - ID following, s/p Vancomycin 1g and receiving Meropenem 500mg Q24 hrs   [] will send trough prior to HD on 03/23/23  [] if 10-17.9, give 750 mg.  If 18-25, give 500 mg  -- BCx NGTD, please follow up additional BCx as patient still spiking fevers   - UCx gram negative rods   - F/U CMV IgM, IgG, CMV PCR, serum fungitell  - Abdominal U/S no pathology

## 2023-03-22 NOTE — PROGRESS NOTE ADULT - NSPROGADDITIONALINFOA_GEN_ALL_CORE
Attending Attestation:  I was physically present for the key portions of the evaluation and management (E/M) service provided.  I agree with the above history, physical, and plan which I have reviewed with the following edits/addendum:    62M w HTN HLP, DMT2, s/p R AKA, HFrEF 25% LVEF on 3/1/23 TTE from [facility] (ICM - chronic RCA 99%, not intervened), ESRD on TTS HD via L AVF s/p failed renal transplant (on low dose Prograf) who was initially admitted to [facility] 2/27 for resp distress post HD, pneumonia sepsis requiring intubation 3/1 then had VT arrest (8 min resusc) while in ICU. Extubated 3/2. Subsequent course c/b hematemesis, GIB dropping hgb to 3.5 s/p 7 u pRBCs, 1 u FFP, 1 u platelets. EGD/cscope showed melanosis duodeni, rectal ulcer. CT abd showed focal proctitis. Given hydrocortisone suppository and mesalamine. 3/17 transferred to [facility] for repeat cath and ICD placement but had recurrent hematochezia 3/17. 3/20 DAPT restarted, no further GIB episodes w hgb stable at 10-11. 3/21 at HD, developed a fever of 102F and chills.     #Fever - unclear origin, had BTs and was on immunosuppression. Blood Cx NGTD, UA clean. Has been coughing and has possible infiltrate on CXR. W/u underway and on broad coverage antbx.  #VT arrest - plan to re-assess coronary anatomy, cath once infection is addressed. EP on board and will place sec prevention ICD once clinically optimized  #CAD - tolerating DAPT without any further active GIB. R/b of stenting/PCI reviewed. on DAPT, high intensity statin  #GIB w rectal ulcer - surgery saw and recs no intervention, managing conservatively with IM. Plan for repeat scope in 3 mos.  #Blood loss anemia - hgb 10-11. plan for BT<8  #ESRD - TTS HD, nephro on board  #failed renal transplant - clarify immunosupp thx    Stan Jones MD  Cardiology    35 minutes spent on total encounter; more than 50% of the visit was spent counseling and/or coordinating care by the attending physician. Attending Attestation:  I was physically present for the key portions of the evaluation and management (E/M) service provided.  I agree with the above history, physical, and plan which I have reviewed with the following edits/addendum:    62M w HTN HLP, DMT2, s/p R AKA, HFrEF 25% LVEF on 3/1/23 TTE from [facility] (ICM - chronic RCA 99%, not intervened), ESRD on TTS HD via L AVF s/p failed renal transplant (on low dose Prograf) who was initially admitted to [facility] 2/27 for resp distress post HD, pneumonia sepsis requiring intubation 3/1 then had VT arrest (8 min resusc) while in ICU. Extubated 3/2. Subsequent course c/b hematemesis, GIB dropping hgb to 3.5 s/p 7 u pRBCs, 1 u FFP, 1 u platelets. EGD/cscope showed melanosis duodeni, rectal ulcer. CT abd showed focal proctitis. Given hydrocortisone suppository and mesalamine. 3/17 transferred to [facility] for repeat cath and ICD placement but had recurrent hematochezia 3/17. 3/20 DAPT restarted, no further GIB episodes w hgb stable at 10-11. 3/21 at HD, developed a fever of 102F and chills.     #Fever - unclear origin, had BTs and was on immunosuppression. Blood Cx NGTD, UA clean. Has been coughing and has possible infiltrate on CXR. W/u underway and on broad coverage antbx.  #VT arrest - plan to re-assess coronary anatomy, cath once infection is addressed. EP on board and will place sec prevention ICD once clinically optimized  #CAD - tolerating DAPT without any further active GIB. R/b of stenting/PCI reviewed. on DAPT, high intensity statin  #GIB w rectal ulcer - surgery saw and recs no intervention, managing conservatively with IM. Plan for repeat scope in 3 mos.  #Blood loss anemia - hgb 10-11. plan for BT<8  #ESRD - TTS HD, nephro on board  #failed renal transplant - clarify immunosupp thx    Stan Jones MD  Cardiology    35 minutes spent on total encounter; more than 50% of the visit was spent counseling and/or coordinating care by the attending physician. Attending Attestation:  I was physically present for the key portions of the evaluation and management (E/M) service provided.  I agree with the above history, physical, and plan which I have reviewed with the following edits/addendum:    62M w HTN HLP, DMT2, s/p R AKA, HFrEF 25% LVEF on 3/1/23 TTE from [facility] (ICM - chronic RCA 99%, not intervened), ESRD on TTS HD via L AVF s/p failed renal transplant (on low dose Prograf) who was initially admitted to [facility] 2/27 for resp distress post HD, pneumonia sepsis requiring intubation 3/1 then had VT arrest (8 min resusc) while in ICU. Extubated 3/2. Subsequent course c/b hematemesis, GIB dropping hgb to 3.5 s/p 7 u pRBCs, 1 u FFP, 1 u platelets. EGD/cscope showed melanosis duodeni, rectal ulcer. CT abd showed focal proctitis. Given hydrocortisone suppository and mesalamine. 3/17 transferred to [facility] for repeat cath and ICD placement but had recurrent hematochezia 3/17. 3/20 DAPT restarted, no further GIB episodes w hgb stable at 10-11. 3/21 at HD, developed a fever of 102F and chills.     #Fever - unclear origin, had BTs and was on immunosuppression. Blood Cx NGTD, UA clean. Has been coughing and has possible infiltrate on CXR. W/u underway and on broad coverage antbx.  #VT arrest - plan to re-assess coronary anatomy, cath once infection is addressed. EP on board and will place sec prevention ICD once clinically optimized  #CAD - tolerating DAPT without any further active GIB. R/b of stenting/PCI reviewed. on DAPT, high intensity statin  #GIB w rectal ulcer - surgery saw and recs no intervention, managing conservatively with IM. Plan for repeat scope in 3 mos.  #Blood loss anemia - hgb 10-11. plan for BT<8  #ESRD - TTS HD, nephro on board  #failed renal transplant - clarify immunosupp thx    Stan Jones MD  Cardiology    35 minutes spent on total encounter; more than 50% of the visit was spent counseling and/or coordinating care by the attending physician. Attending Attestation:  I was physically present for the key portions of the evaluation and management (E/M) service provided.  I agree with the above history, physical, and plan which I have reviewed with the following edits/addendum:    62M w HTN HLP, DMT2, s/p R AKA, HFrEF 25% LVEF on 3/1/23 TTE from [facility] (ICM - chronic RCA 99%, not intervened), ESRD on TTS HD via L AVF s/p failed renal transplant (on low dose tacrolimus) who was initially admitted to [facility] 2/27 for resp distress post HD, pneumonia sepsis requiring intubation 3/1 then had VT arrest (8 min resusc) while in ICU. Extubated 3/2. Subsequent course c/b hematemesis, GIB dropping hgb to 3.5 s/p 7 u pRBCs, 1 u FFP, 1 u platelets. EGD/cscope showed melanosis duodeni, rectal ulcer. CT abd showed focal proctitis. Given hydrocortisone suppository and mesalamine. 3/17 transferred to [facility] for repeat cath and ICD placement but had recurrent hematochezia 3/17. 3/20 DAPT restarted, no further GIB episodes w hgb stable at 10-11. 3/21 at HD, developed a fever of 102F and chills.     #Fever - unclear origin, had BTs and was on immunosuppression. Blood Cx NGTD, UA clean. Has been coughing and has possible infiltrate on CXR. W/u underway and on broad coverage antbx.  #VT arrest - plan to re-assess coronary anatomy, cath once infection is addressed. EP on board and will place sec prevention ICD once clinically optimized  #CAD - tolerating DAPT without any further active GIB. R/b of stenting/PCI reviewed. on DAPT, high intensity statin  #HFrEF - euvolemic clinically. Optimize GDMT. DC amlodipine; Continue isordil/hydral, transition to metop succ, losartan transition to Entresto (K<5, coordinate w renal) if will continue as OP. MRA, SGLT2i defer to outpt HF doc in [place].  #GIB w rectal ulcer - surgery saw and recs no intervention, managing conservatively with IM. Plan for repeat scope in 3 mos.  #Blood loss anemia - hgb 10-11. Transfusion threshold hgb<8  #ESRD - TTS HD, nephro on board  #failed renal transplant - clarify immunosupp tacrolimus thx    Stan Jones MD  Cardiology    35 minutes spent on total encounter; more than 50% of the visit was spent counseling and/or coordinating care by the attending physician. Attending Attestation:  I was physically present for the key portions of the evaluation and management (E/M) service provided.  I agree with the above history, physical, and plan which I have reviewed with the following edits/addendum:    62M w HTN HLP, DMT2, s/p R AKA, HFrEF 25% LVEF on 3/1/23 TTE from [facility] (ICM - chronic RCA 99%, not intervened), ESRD on TTS HD via L AVF s/p failed renal transplant (on low dose tacrolimus) who was initially admitted to [facility] 2/27 for resp distress post HD, pneumonia sepsis requiring intubation 3/1 then had VT arrest (8 min resusc) while in ICU. Extubated 3/2. Subsequent course c/b hematemesis, GIB dropping hgb to 3.5 s/p 7 u pRBCs, 1 u FFP, 1 u platelets. EGD/cscope showed melanosis duodeni, rectal ulcer. CT abd showed focal proctitis. Given hydrocortisone suppository and mesalamine. 3/17 transferred to [facility] for repeat cath and ICD placement but had recurrent hematochezia 3/17. 3/20 DAPT restarted, no further GIB episodes w hgb stable at 10-11. 3/21 at HD, developed a fever of 102F and chills.     #Fever - unclear origin, had BTs and was on immunosuppression. Blood Cx NGTD, UA clean. Has been coughing and has possible infiltrate on CXR. W/u underway and on broad coverage antbx.  #VT arrest - plan to re-assess coronary anatomy, cath once infection is addressed. EP on board and will place sec prevention ICD once clinically optimized  #CAD - tolerating DAPT without any further active GIB. R/b of stenting/PCI reviewed. on DAPT, high intensity statin  #HFrEF - euvolemic clinically. Optimize GDMT. DC amlodipine; Continue isordil/hydral, transition to metop succ, losartan transition to Entresto (K<5, coordinate w renal) if will continue as OP. MRA, SGLT2i defer to outpt HF doc in [place].  #GIB w rectal ulcer - surgery saw and recs no intervention, managing conservatively with IM. Plan for repeat scope in 3 mos.  #Blood loss anemia - hgb 10-11. Transfusion threshold hgb<8  #ESRD - TTS HD, nephro on board  #failed renal transplant - clarify immunosupp tacrolimus thx    Stan Jones MD  Cardiology    35 minutes spent on total encounter; more than 50% of the visit was spent counseling and/or coordinating care by the attending physician. Attending Attestation:  I was physically present for the key portions of the evaluation and management (E/M) service provided.  I agree with the above history, physical, and plan which I have reviewed with the following edits/addendum:    62M w HTN HLP, DMT2, s/p R AKA, HFrEF 25% LVEF on 3/1/23 TTE from [facility] (ICM - chronic RCA 99%, not intervened), ESRD on TTS HD via L AVF s/p failed renal transplant (on low dose tacrolimus) who was initially admitted to [facility] 2/27 for resp distress post HD, pneumonia sepsis requiring intubation 3/1 then had VT arrest (8 min resusc) while in ICU. Extubated 3/2. Subsequent course c/b hematemesis, GIB dropping hgb to 3.5 s/p 7 u pRBCs, 1 u FFP, 1 u platelets. EGD/cscope showed melanosis duodeni, rectal ulcer. CT abd showed focal proctitis. Given hydrocortisone suppository and mesalamine. 3/17 transferred to [facility] for repeat cath and ICD placement but had recurrent hematochezia 3/17. 3/20 DAPT restarted, no further GIB episodes w hgb stable at 10-11. 3/21 at HD, developed a fever of 102F and chills.     #Fever - unclear origin, had BTs and was on immunosuppression. Blood Cx NGTD, UA clean. Has been coughing and has possible infiltrate on CXR. W/u underway and on broad coverage antbx.  #VT arrest - plan to re-assess coronary anatomy, cath once infection is addressed. EP on board and will place sec prevention ICD once clinically optimized  #CAD - tolerating DAPT without any further active GIB. R/b of stenting/PCI reviewed. on DAPT, high intensity statin  #HFrEF - euvolemic clinically. Optimize GDMT. DC amlodipine; Continue isordil/hydral, transition to metop succ, losartan transition to Entresto (K<5, coordinate w renal) if will continue as OP. MRA, SGLT2i defer to outpt HF doc in [place].  #GIB w rectal ulcer - surgery saw and recs no intervention, managing conservatively with IM. Plan for repeat scope in 3 mos.  #Blood loss anemia - hgb 10-11. Transfusion threshold hgb<8  #ESRD - TTS HD, nephro on board  #failed renal transplant - clarify immunosupp tacrolimus thx    Stan Jones MD  Cardiology    35 minutes spent on total encounter; more than 50% of the visit was spent counseling and/or coordinating care by the attending physician.

## 2023-03-22 NOTE — PROGRESS NOTE ADULT - PROBLEM SELECTOR PLAN 3
Vtach arrest @[facility]  - no tele events @ [facility]  - EP consulted for ICD placement but will need ischemic eval/cath first   - started on lopressor 12.5 mg BID per EP recommendations Vtach arrest @[facility]  - no tele events @ [facility]  - EP consulted for ICD placement but will need ischemic eval/cath first   - started on lopressor 12.5 mg BID per EP recommendations Vtach arrest @[facility]  - no tele events @ [facility]  - EP consulted for ICD placement but will need ischemic eval/cath first   - started on lopressor 12.5 mg BID per EP recommendations

## 2023-03-22 NOTE — PROGRESS NOTE ADULT - ASSESSMENT
63yo M PMHx HTN, HLD, DM, CAD (s/p cath years ago, RCA 99%, never intervened on), HFrEF 25-30%, ESRD s/p failed kidney transplant (last HD 3/1 via Left Arm AVF; HD TTS), Right AKA initially presented to [facility] 2/27 for respiratory distress after a dialysis, found to be septic, h/c complicated by AHRF requiring intubation for 24hr followed by VT arrest, h/c the complicated by massive LGIB (rectal ulcer) requiring 7u pRBC, 1u FFP and 1u PLT. Transferred to [facility] for ICD placement 2/2 Vtach and cardiac cath. Medicine consulted for comanagement.     Recommend:    #Fever  - Spiked fever of 101.6 on return from HD today 3/21  - Unclear source of infection  - Removed right forearm IV because was in place from outside hospital  - Abd U/S with no acute pathology  - CXR without infiltrate  - UA without UTI  - F/u BCx, so far NGTD  - C/w Meropenem and Vanco  - ID following    #LGIB  #SANTOS  #Solitary ulcer syndrome  - hx coffee ground emesis @ [facility]; s/p EGD/colonoscopy showing rectal ulcer  - 3/17AM pt passed loose BM along with large amount of BRBPR  - per gen surgery no acute surgical intervention   - per GI:      - c/w Protonix 40 mg IVP BID, low suspicion for UGIB      - PIV large bore x2      - s/p flex sig for rectal ulcers, found Evidence of prior hemoclip in the sigmoid colon. Localized ulceration and erosive with no bleeding were noted in the rectum, suggestive of solitary ulcer syndrome           - Avoid anal digitation and enemas           - High-fiber diet and optimize laxatives to avoid constipation using miralax and dulcolax           - No absolute GI contraindications to anticoag/antplt therapy           - Repeat endoscopic evaluation recommended in 3 months  - Active T&S  - Transfuse for hgb <8 (active CAD)    #CAD   - Previous cath at [facility] showing oLM 30% and RCA 99% that is not amenable to intervention.  - [facility]H, EF 25-30%, severe global wall motion dysfunction, mildly dilated LA, RV mildly dilated, all leaflets mild calcified, mod pHTN  - hx coffee ground emesis @ [facility]; s/p EGD/colonoscopy showing rectal ulcer and LHH  flex sig suggestive of solitary ulcer syndrome - per cardiology will hold off loading the patient due to recent LGIB  - No absolute GI contraindications to anticoag/antplt therapy  - EP consulted for possible ICD placement  - Today for ASA/ Plavix load > if no bleed in the next 48h will get > Angiogram and possible PCI, followed with ICD (since 3/17 hgb of 10-11)    #H/O ventricular tachycardia   - Vtach arrest @[facility]  - agree with EP consult for ICD placement    #ESRD on dialysis  - for dialysis today  - dialysis Tues, Thurs, Sat  - last received 3/16  - f/u nephro recs  - Electrolytes wnl, k 4.3, phos 1.9  - c/w calcium citrate 667mg TID    #S/P kidney transplant.   - per [place] pt takes Tacrolimus 2mg 2 times /day  - Obtain collateral regarding renal transplant, if indeed failed, what indications patient have for c/w tacrolimus 2mg?  - f/u tacrolimus serum levels  - f/u nephro recs    #HTN  - SBP 130s-140s  - c/w home meds. Amlodipine 10mg qd, Losartan 100mg qd, Hydralazine 50m TID, Isordil 20mg TID    #HLD   - c/w Lipitor 40mg qd  - f/u AM lipid panel.    #DM  - no meds per [place], obtain collaterals for med recs prior [facility] stay  - mISS while inpt  - A1c 5.9 on admission    DVT PPX: SCDs for now iso GIB    Case discussed with attending Dr. Lopez, recommendations are final upon attending attestation    61yo M PMHx HTN, HLD, DM, CAD (s/p cath years ago, RCA 99%, never intervened on), HFrEF 25-30%, ESRD s/p failed kidney transplant (last HD 3/1 via Left Arm AVF; HD TTS), Right AKA initially presented to [facility] 2/27 for respiratory distress after a dialysis, found to be septic, h/c complicated by AHRF requiring intubation for 24hr followed by VT arrest, h/c the complicated by massive LGIB (rectal ulcer) requiring 7u pRBC, 1u FFP and 1u PLT. Transferred to [facility] for ICD placement 2/2 Vtach and cardiac cath. Medicine consulted for comanagement.     Recommend:    #Fever  - Spiked fever of 101.6 on return from HD today 3/21  - Unclear source of infection  - Removed right forearm IV because was in place from outside hospital  - Abd U/S with no acute pathology  - CXR without infiltrate  - UA without UTI  - F/u BCx, so far NGTD  - C/w Meropenem and Vanco  - ID following    #LGIB  #SANTOS  #Solitary ulcer syndrome  - hx coffee ground emesis @ [facility]; s/p EGD/colonoscopy showing rectal ulcer  - 3/17AM pt passed loose BM along with large amount of BRBPR  - per gen surgery no acute surgical intervention   - per GI:      - c/w Protonix 40 mg IVP BID, low suspicion for UGIB      - PIV large bore x2      - s/p flex sig for rectal ulcers, found Evidence of prior hemoclip in the sigmoid colon. Localized ulceration and erosive with no bleeding were noted in the rectum, suggestive of solitary ulcer syndrome           - Avoid anal digitation and enemas           - High-fiber diet and optimize laxatives to avoid constipation using miralax and dulcolax           - No absolute GI contraindications to anticoag/antplt therapy           - Repeat endoscopic evaluation recommended in 3 months  - Active T&S  - Transfuse for hgb <8 (active CAD)    #CAD   - Previous cath at [facility] showing oLM 30% and RCA 99% that is not amenable to intervention.  - [facility] LVH, EF 25-30%, severe global wall motion dysfunction, mildly dilated LA, RV mildly dilated, all leaflets mild calcified, mod pHTN  - hx coffee ground emesis @ [facility]; s/p EGD/colonoscopy showing rectal ulcer and LHH  flex sig suggestive of solitary ulcer syndrome - per cardiology will hold off loading the patient due to recent LGIB  - No absolute GI contraindications to anticoag/antplt therapy  - EP consulted for possible ICD placement  - Today for ASA/ Plavix load > if no bleed in the next 48h will get > Angiogram and possible PCI, followed with ICD (since 3/17 hgb of 10-11)    #H/O ventricular tachycardia   - [facility] arrest [facility]  - agree with EP consult for ICD placement    #ESRD on dialysis  - for dialysis today  - dialysis Tues, Thurs, Sat  - last received 3/16  - f/u nephro recs  - Electrolytes wnl, k 4.3, phos 1.9  - c/w calcium citrate 667mg TID    #S/P kidney transplant.   - per [place] pt takes Tacrolimus 2mg 2 times /day  - Obtain collateral regarding renal transplant, if indeed failed, what indications patient have for c/w tacrolimus 2mg?  - f/u tacrolimus serum levels  - f/u nephro recs    #HTN  - SBP 130s-140s  - c/w home meds. Amlodipine 10mg qd, Losartan 100mg qd, Hydralazine 50m TID, Isordil 20mg TID    #HLD   - c/w Lipitor 40mg qd  - f/u AM lipid panel.    #DM  - no meds per [place], obtain collaterals for med recs prior [facility] stay  - mISS while inpt  - A1c 5.9 on admission    DVT PPX: SCDs for now iso GIB    Case discussed with attending Dr. Lopez, recommendations are final upon attending attestation    63yo M PMHx HTN, HLD, DM, CAD (s/p cath years ago, RCA 99%, never intervened on), HFrEF 25-30%, ESRD s/p failed kidney transplant (last HD 3/1 via Left Arm AVF; HD TTS), Right AKA initially presented to [facility] 2/27 for respiratory distress after a dialysis, found to be septic, h/c complicated by AHRF requiring intubation for 24hr followed by VT arrest, h/c the complicated by massive LGIB (rectal ulcer) requiring 7u pRBC, 1u FFP and 1u PLT. Transferred to [facility] for ICD placement 2/2 Vtach and cardiac cath. Medicine consulted for comanagement.     Recommend:    #Fever  - Spiked fever of 101.6 on return from HD today 3/21  - Unclear source of infection  - Removed right forearm IV because was in place from outside hospital  - Abd U/S with no acute pathology  - CXR without infiltrate  - UA without UTI  - F/u BCx, so far NGTD  - C/w Meropenem and Vanco  - ID following    #LGIB  #SANTOS  #Solitary ulcer syndrome  - hx coffee ground emesis @ [facility]; s/p EGD/colonoscopy showing rectal ulcer  - 3/17AM pt passed loose BM along with large amount of BRBPR  - per gen surgery no acute surgical intervention   - per GI:      - c/w Protonix 40 mg IVP BID, low suspicion for UGIB      - PIV large bore x2      - s/p flex sig for rectal ulcers, found Evidence of prior hemoclip in the sigmoid colon. Localized ulceration and erosive with no bleeding were noted in the rectum, suggestive of solitary ulcer syndrome           - Avoid anal digitation and enemas           - High-fiber diet and optimize laxatives to avoid constipation using miralax and dulcolax           - No absolute GI contraindications to anticoag/antplt therapy           - Repeat endoscopic evaluation recommended in 3 months  - Active T&S  - Transfuse for hgb <8 (active CAD)    #CAD   - Previous cath at [facility] showing oLM 30% and RCA 99% that is not amenable to intervention.  - [facility], EF 25-30%, severe global wall motion dysfunction, mildly dilated LA, RV mildly dilated, all leaflets mild calcified, mod pHTN  - hx coffee ground emesis @ [facility]; s/p EGD/colonoscopy showing rectal ulcer and LHH  flex sig suggestive of solitary ulcer syndrome - per cardiology will hold off loading the patient due to recent LGIB  - No absolute GI contraindications to anticoag/antplt therapy  - EP consulted for possible ICD placement  - Today for ASA/ Plavix load > if no bleed in the next 48h will get > Angiogram and possible PCI, followed with ICD (since 3/17 hgb of 10-11)    #H/O ventricular tachycardia   - [facility]  - agree with EP consult for ICD placement    #ESRD on dialysis  - for dialysis today  - dialysis Tues, Thurs, Sat  - last received 3/16  - f/u nephro recs  - Electrolytes wnl, k 4.3, phos 1.9  - c/w calcium citrate 667mg TID    #S/P kidney transplant.   - per [place] pt takes Tacrolimus 2mg 2 times /day  - Obtain collateral regarding renal transplant, if indeed failed, what indications patient have for c/w tacrolimus 2mg?  - f/u tacrolimus serum levels  - f/u nephro recs    #HTN  - SBP 130s-140s  - c/w home meds. Amlodipine 10mg qd, Losartan 100mg qd, Hydralazine 50m TID, Isordil 20mg TID    #HLD   - c/w Lipitor 40mg qd  - f/u AM lipid panel.    #DM  - no meds per [place], obtain collaterals for med recs prior [facility] stay  - mISS while inpt  - A1c 5.9 on admission    DVT PPX: SCDs for now iso GIB    Case discussed with attending Dr. Lopez, recommendations are final upon attending attestation

## 2023-03-22 NOTE — CHART NOTE - NSCHARTNOTEFT_GEN_A_CORE
RN concerned about left sided facial droop which was not seen earlier today.   Patient was seen and evaluated at bedside. Patient has slight left sided facial droop however rest of neurological exam non-focal (no slurred speech, negative pronator drift, no focal weakness, and no tongue deviation). FSG 92, VSS.      On exam patient's skin is warm and was noted to be febrile to 102. Patient's infectious work-up is in process and unclear source of infection at this time. Given above will repeat labs (CBC,CMP, lactate, BCx,) and CXR. Ordered for Tylenol IV as fevers have not been responding to Po. Will continue with Meropenem 500mg Q24 hours and continue to observe closely. RN concerned about new, left sided facial. Patient examined at bedside AOx3, neuro exam intact (no slurred speech, negative pronator drift, no focal weakness, and no tongue deviation). FSG 92, VSS.      On exam patient's skin is warm and was noted to be febrile to 102. Patient's infectious work-up is in process and unclear source of infection at this time. Given above will repeat labs (CBC,CMP, lactate, BCx,) and CXR. Ordered for Tylenol IV as fevers have not been responding to Po. Will continue with Meropenem 500mg Q24 hours and continue to observe closely. RN concerned about new, left sided facial droop. Patient examined at bedside AOx3, neuro exam intact (no slurred speech, negative pronator drift, no focal weakness, and no tongue deviation). FSG 92, VSS.      On exam patient's skin is warm and was noted to be febrile to 102. Patient's infectious work-up is in process and unclear source of infection at this time. Given above will repeat labs (CBC,CMP, lactate, BCx,) and CXR. Ordered for Tylenol IV as fevers have not been responding to Po. Will continue with Meropenem 500mg Q24 hours and continue to observe closely.

## 2023-03-22 NOTE — PROGRESS NOTE ADULT - PROBLEM SELECTOR PLAN 1
Previous cath at [facility] showing oLM 30% and RCA 99% that is not amenable to intervention.  - Patient febrile to 102 afternoon of 03/21/23, pending infectious workup as per ID. ICD placement pending patient's stability/infectious work up  - cleared for by GI for DAPT no bleeding observed for 48 hours and was resumed on ASA 81 mg QD/Plavix 75 mg QD on 03/20/23. H/H stable   - TTE 3/1/23 at [facility]: mild LVH, EF 25-30%, severe global wall motion dysfunction, mildly dilated LA, RV mildly dilated, all leaflets mild calcified, mod pHTN  - Continue Aspirin 81 mg daily, Plavix 75 mg daily, losartan 100 mg daily, and Lopressor 12.5 mg BID Previous cath at [facility] showing oLM 30% and RCA 99% that is not amenable to intervention.  - Patient febrile to 102 afternoon of 03/21/23, pending infectious workup as per ID. ICD placement pending patient's stability/infectious work up  - cleared for by GI for DAPT no bleeding observed for 48 hours and was resumed on ASA 81 mg QD/Plavix 75 mg QD on 03/20/23. H/H stable   - TTE 3/1/23 at [facility]: mild LVH, EF 25-30%, severe global wall motion dysfunction, mildly dilated LA, RV mildly dilated, all leaflets mild calcified, mod pHTN  - Continue Aspirin 81 mg daily, Plavix 75 mg daily, losartan 100 mg daily, and Lopressor 12.5 mg BID Previous cath at [facility] showing oLM 30% and RCA 99% that is not amenable to intervention.  - Patient febrile to 102 afternoon of 03/21/23, pending infectious workup as per ID. ICD placement pending patient's stability/infectious work up  - cleared for by GI for DAPT no bleeding observed for 48 hours and was resumed on ASA 81 mg QD/Plavix 75 mg QD on 03/20/23. H/H stable   - TTE 3/1/23 at [facility]: mild LVH, EF 25-30%, severe global wall motion dysfunction, mildly dilated LA, RV mildly dilated, all leaflets mild calcified, mod pHTN  - Continue Aspirin 81 mg daily, Plavix 75 mg daily, losartan 100 mg daily, and Lopressor 12.5 mg BID

## 2023-03-23 DIAGNOSIS — R33.9 RETENTION OF URINE, UNSPECIFIED: ICD-10-CM

## 2023-03-23 LAB
-  AMPICILLIN/SULBACTAM: SIGNIFICANT CHANGE UP
-  AMPICILLIN: SIGNIFICANT CHANGE UP
-  CEFAZOLIN: SIGNIFICANT CHANGE UP
-  CEFTRIAXONE: SIGNIFICANT CHANGE UP
-  CIPROFLOXACIN: SIGNIFICANT CHANGE UP
-  ERTAPENEM: SIGNIFICANT CHANGE UP
-  GENTAMICIN: SIGNIFICANT CHANGE UP
-  NITROFURANTOIN: SIGNIFICANT CHANGE UP
-  PIPERACILLIN/TAZOBACTAM: SIGNIFICANT CHANGE UP
-  TOBRAMYCIN: SIGNIFICANT CHANGE UP
-  TRIMETHOPRIM/SULFAMETHOXAZOLE: SIGNIFICANT CHANGE UP
ALBUMIN SERPL ELPH-MCNC: 2.6 G/DL — LOW (ref 3.3–5)
ALP SERPL-CCNC: 133 U/L — HIGH (ref 40–120)
ALT FLD-CCNC: 7 U/L — LOW (ref 10–45)
ANION GAP SERPL CALC-SCNC: 5 MMOL/L — SIGNIFICANT CHANGE UP (ref 5–17)
ANION GAP SERPL CALC-SCNC: 8 MMOL/L — SIGNIFICANT CHANGE UP (ref 5–17)
AST SERPL-CCNC: 15 U/L — SIGNIFICANT CHANGE UP (ref 10–40)
BILIRUB SERPL-MCNC: 0.9 MG/DL — SIGNIFICANT CHANGE UP (ref 0.2–1.2)
BLD GP AB SCN SERPL QL: NEGATIVE — SIGNIFICANT CHANGE UP
BUN SERPL-MCNC: 21 MG/DL — SIGNIFICANT CHANGE UP (ref 7–23)
BUN SERPL-MCNC: 8 MG/DL — SIGNIFICANT CHANGE UP (ref 7–23)
CALCIUM SERPL-MCNC: 7.7 MG/DL — LOW (ref 8.4–10.5)
CALCIUM SERPL-MCNC: 8.1 MG/DL — LOW (ref 8.4–10.5)
CHLORIDE SERPL-SCNC: 93 MMOL/L — LOW (ref 96–108)
CHLORIDE SERPL-SCNC: 98 MMOL/L — SIGNIFICANT CHANGE UP (ref 96–108)
CO2 SERPL-SCNC: 26 MMOL/L — SIGNIFICANT CHANGE UP (ref 22–31)
CO2 SERPL-SCNC: 32 MMOL/L — HIGH (ref 22–31)
CREAT SERPL-MCNC: 2.58 MG/DL — HIGH (ref 0.5–1.3)
CREAT SERPL-MCNC: 5.45 MG/DL — HIGH (ref 0.5–1.3)
CULTURE RESULTS: SIGNIFICANT CHANGE UP
EGFR: 11 ML/MIN/1.73M2 — LOW
EGFR: 27 ML/MIN/1.73M2 — LOW
GLUCOSE BLDC GLUCOMTR-MCNC: 67 MG/DL — LOW (ref 70–99)
GLUCOSE BLDC GLUCOMTR-MCNC: 76 MG/DL — SIGNIFICANT CHANGE UP (ref 70–99)
GLUCOSE BLDC GLUCOMTR-MCNC: 90 MG/DL — SIGNIFICANT CHANGE UP (ref 70–99)
GLUCOSE BLDC GLUCOMTR-MCNC: 99 MG/DL — SIGNIFICANT CHANGE UP (ref 70–99)
GLUCOSE SERPL-MCNC: 73 MG/DL — SIGNIFICANT CHANGE UP (ref 70–99)
GLUCOSE SERPL-MCNC: 77 MG/DL — SIGNIFICANT CHANGE UP (ref 70–99)
HCT VFR BLD CALC: 27.8 % — LOW (ref 39–50)
HCT VFR BLD CALC: 29 % — LOW (ref 39–50)
HCT VFR BLD CALC: 30.9 % — LOW (ref 39–50)
HGB BLD-MCNC: 8.7 G/DL — LOW (ref 13–17)
HGB BLD-MCNC: 9.1 G/DL — LOW (ref 13–17)
HGB BLD-MCNC: 9.8 G/DL — LOW (ref 13–17)
LACTATE SERPL-SCNC: 0.7 MMOL/L — SIGNIFICANT CHANGE UP (ref 0.5–2)
MAGNESIUM SERPL-MCNC: 1.9 MG/DL — SIGNIFICANT CHANGE UP (ref 1.6–2.6)
MCHC RBC-ENTMCNC: 28.2 PG — SIGNIFICANT CHANGE UP (ref 27–34)
MCHC RBC-ENTMCNC: 28.9 PG — SIGNIFICANT CHANGE UP (ref 27–34)
MCHC RBC-ENTMCNC: 31.3 GM/DL — LOW (ref 32–36)
MCHC RBC-ENTMCNC: 31.4 GM/DL — LOW (ref 32–36)
MCHC RBC-ENTMCNC: 31.7 GM/DL — LOW (ref 32–36)
MCV RBC AUTO: 90.3 FL — SIGNIFICANT CHANGE UP (ref 80–100)
MCV RBC AUTO: 91.2 FL — SIGNIFICANT CHANGE UP (ref 80–100)
MCV RBC AUTO: 92.1 FL — SIGNIFICANT CHANGE UP (ref 80–100)
METHOD TYPE: SIGNIFICANT CHANGE UP
NRBC # BLD: 0 /100 WBCS — SIGNIFICANT CHANGE UP (ref 0–0)
ORGANISM # SPEC MICROSCOPIC CNT: SIGNIFICANT CHANGE UP
PHOSPHATE SERPL-MCNC: 4 MG/DL — SIGNIFICANT CHANGE UP (ref 2.5–4.5)
PLATELET # BLD AUTO: 101 K/UL — LOW (ref 150–400)
PLATELET # BLD AUTO: 103 K/UL — LOW (ref 150–400)
PLATELET # BLD AUTO: 124 K/UL — LOW (ref 150–400)
POTASSIUM SERPL-MCNC: 3.5 MMOL/L — SIGNIFICANT CHANGE UP (ref 3.5–5.3)
POTASSIUM SERPL-MCNC: 4.6 MMOL/L — SIGNIFICANT CHANGE UP (ref 3.5–5.3)
POTASSIUM SERPL-SCNC: 3.5 MMOL/L — SIGNIFICANT CHANGE UP (ref 3.5–5.3)
POTASSIUM SERPL-SCNC: 4.6 MMOL/L — SIGNIFICANT CHANGE UP (ref 3.5–5.3)
PROT SERPL-MCNC: 5.1 G/DL — LOW (ref 6–8.3)
RBC # BLD: 3.08 M/UL — LOW (ref 4.2–5.8)
RBC # BLD: 3.15 M/UL — LOW (ref 4.2–5.8)
RBC # BLD: 3.39 M/UL — LOW (ref 4.2–5.8)
RBC # FLD: 14.4 % — SIGNIFICANT CHANGE UP (ref 10.3–14.5)
RBC # FLD: 14.5 % — SIGNIFICANT CHANGE UP (ref 10.3–14.5)
RBC # FLD: 14.6 % — HIGH (ref 10.3–14.5)
RH IG SCN BLD-IMP: POSITIVE — SIGNIFICANT CHANGE UP
SODIUM SERPL-SCNC: 127 MMOL/L — LOW (ref 135–145)
SODIUM SERPL-SCNC: 135 MMOL/L — SIGNIFICANT CHANGE UP (ref 135–145)
SPECIMEN SOURCE: SIGNIFICANT CHANGE UP
TACROLIMUS SERPL-MCNC: 3.5 NG/ML — SIGNIFICANT CHANGE UP
VANCOMYCIN TROUGH SERPL-MCNC: 13.4 UG/ML — SIGNIFICANT CHANGE UP (ref 10–20)
WBC # BLD: 5.37 K/UL — SIGNIFICANT CHANGE UP (ref 3.8–10.5)
WBC # BLD: 6.65 K/UL — SIGNIFICANT CHANGE UP (ref 3.8–10.5)
WBC # BLD: 6.82 K/UL — SIGNIFICANT CHANGE UP (ref 3.8–10.5)
WBC # FLD AUTO: 5.37 K/UL — SIGNIFICANT CHANGE UP (ref 3.8–10.5)
WBC # FLD AUTO: 6.65 K/UL — SIGNIFICANT CHANGE UP (ref 3.8–10.5)
WBC # FLD AUTO: 6.82 K/UL — SIGNIFICANT CHANGE UP (ref 3.8–10.5)

## 2023-03-23 PROCEDURE — 99232 SBSQ HOSP IP/OBS MODERATE 35: CPT

## 2023-03-23 PROCEDURE — 90935 HEMODIALYSIS ONE EVALUATION: CPT

## 2023-03-23 RX ORDER — VANCOMYCIN HCL 1 G
750 VIAL (EA) INTRAVENOUS ONCE
Refills: 0 | Status: COMPLETED | OUTPATIENT
Start: 2023-03-23 | End: 2023-03-23

## 2023-03-23 RX ORDER — PANTOPRAZOLE SODIUM 20 MG/1
40 TABLET, DELAYED RELEASE ORAL EVERY 12 HOURS
Refills: 0 | Status: DISCONTINUED | OUTPATIENT
Start: 2023-03-24 | End: 2023-04-06

## 2023-03-23 RX ORDER — PANTOPRAZOLE SODIUM 20 MG/1
40 TABLET, DELAYED RELEASE ORAL ONCE
Refills: 0 | Status: COMPLETED | OUTPATIENT
Start: 2023-03-23 | End: 2023-03-23

## 2023-03-23 RX ORDER — ACETAMINOPHEN 500 MG
1000 TABLET ORAL ONCE
Refills: 0 | Status: COMPLETED | OUTPATIENT
Start: 2023-03-23 | End: 2023-03-23

## 2023-03-23 RX ORDER — ASPIRIN/CALCIUM CARB/MAGNESIUM 324 MG
81 TABLET ORAL DAILY
Refills: 0 | Status: DISCONTINUED | OUTPATIENT
Start: 2023-03-24 | End: 2023-04-12

## 2023-03-23 RX ORDER — ERYTHROPOIETIN 10000 [IU]/ML
6000 INJECTION, SOLUTION INTRAVENOUS; SUBCUTANEOUS ONCE
Refills: 0 | Status: COMPLETED | OUTPATIENT
Start: 2023-03-23 | End: 2023-03-23

## 2023-03-23 RX ORDER — DOXERCALCIFEROL 2.5 UG/1
4 CAPSULE ORAL ONCE
Refills: 0 | Status: COMPLETED | OUTPATIENT
Start: 2023-03-23 | End: 2023-03-23

## 2023-03-23 RX ADMIN — Medication 50 MILLIGRAM(S): at 21:31

## 2023-03-23 RX ADMIN — CLOPIDOGREL BISULFATE 75 MILLIGRAM(S): 75 TABLET, FILM COATED ORAL at 17:19

## 2023-03-23 RX ADMIN — Medication 250 MILLIGRAM(S): at 17:18

## 2023-03-23 RX ADMIN — TACROLIMUS 2 MILLIGRAM(S): 5 CAPSULE ORAL at 05:34

## 2023-03-23 RX ADMIN — Medication 650 MILLIGRAM(S): at 22:05

## 2023-03-23 RX ADMIN — MEROPENEM 100 MILLIGRAM(S): 1 INJECTION INTRAVENOUS at 18:40

## 2023-03-23 RX ADMIN — Medication 12.5 MILLIGRAM(S): at 06:50

## 2023-03-23 RX ADMIN — Medication 81 MILLIGRAM(S): at 17:19

## 2023-03-23 RX ADMIN — Medication 1000 MILLIGRAM(S): at 16:35

## 2023-03-23 RX ADMIN — Medication 650 MILLIGRAM(S): at 14:02

## 2023-03-23 RX ADMIN — PANTOPRAZOLE SODIUM 40 MILLIGRAM(S): 20 TABLET, DELAYED RELEASE ORAL at 16:21

## 2023-03-23 RX ADMIN — Medication 325 MILLIGRAM(S): at 17:19

## 2023-03-23 RX ADMIN — Medication 650 MILLIGRAM(S): at 21:35

## 2023-03-23 RX ADMIN — TACROLIMUS 2 MILLIGRAM(S): 5 CAPSULE ORAL at 18:41

## 2023-03-23 RX ADMIN — PANTOPRAZOLE SODIUM 40 MILLIGRAM(S): 20 TABLET, DELAYED RELEASE ORAL at 07:30

## 2023-03-23 RX ADMIN — ISOSORBIDE DINITRATE 20 MILLIGRAM(S): 5 TABLET ORAL at 05:35

## 2023-03-23 RX ADMIN — LOSARTAN POTASSIUM 100 MILLIGRAM(S): 100 TABLET, FILM COATED ORAL at 06:50

## 2023-03-23 RX ADMIN — DOXERCALCIFEROL 4 MICROGRAM(S): 2.5 CAPSULE ORAL at 14:07

## 2023-03-23 RX ADMIN — Medication 650 MILLIGRAM(S): at 06:35

## 2023-03-23 RX ADMIN — Medication 650 MILLIGRAM(S): at 15:02

## 2023-03-23 RX ADMIN — Medication 650 MILLIGRAM(S): at 05:35

## 2023-03-23 RX ADMIN — Medication 50 MILLIGRAM(S): at 05:34

## 2023-03-23 RX ADMIN — Medication 650 MILLIGRAM(S): at 00:11

## 2023-03-23 RX ADMIN — ERYTHROPOIETIN 6000 UNIT(S): 10000 INJECTION, SOLUTION INTRAVENOUS; SUBCUTANEOUS at 14:07

## 2023-03-23 RX ADMIN — CHLORHEXIDINE GLUCONATE 1 APPLICATION(S): 213 SOLUTION TOPICAL at 07:26

## 2023-03-23 RX ADMIN — Medication 12.5 MILLIGRAM(S): at 17:19

## 2023-03-23 RX ADMIN — ATORVASTATIN CALCIUM 40 MILLIGRAM(S): 80 TABLET, FILM COATED ORAL at 21:32

## 2023-03-23 RX ADMIN — Medication 400 MILLIGRAM(S): at 16:21

## 2023-03-23 RX ADMIN — TAMSULOSIN HYDROCHLORIDE 0.8 MILLIGRAM(S): 0.4 CAPSULE ORAL at 21:31

## 2023-03-23 NOTE — CHART NOTE - NSCHARTNOTEFT_GEN_A_CORE
Pt w/ episode of black tarry stool x2 3/23/23. CBC x2 collected ~5hrs apart, Hgb 8.7 -> 9.8 (stable).   GI reconsulted - discontinued Plavix, okay to continue ASA. Pt placed on clear diet in case pt needs to have repeat scope. F/U GI recs in AM.   Pt remains hemodynamically stable and at baseline status.

## 2023-03-23 NOTE — PROGRESS NOTE ADULT - ATTENDING COMMENTS
pt seen and evaluated while on dialysis   tolerating procedure well.  hemodynamically stable  continue with full treatment as prescribed    63 yo man with ESRD on HD, HTN, DM, s/p failed kidney transplant continues on tacrolimus; transferred to [facility] from [place] for ICD placement and Cardiac Cath after prolonged admission  with cardiac arrest, vtach arrest and hypovolemic shock 2/2 GI bleed pt seen and evaluated while on dialysis   tolerating procedure well.  hemodynamically stable  continue with full treatment as prescribed    63 yo man with ESRD on HD, HTN, DM, s/p failed kidney transplant continues on tacrolimus; transferred to [facility] from [place] for ICD placement and Cardiac Cath after prolonged admission  with cardiac arrest, vtach arrest and hypovolemic shock 2/2 GI bleed pt seen and evaluated while on dialysis   tolerating procedure well.  hemodynamically stable  continue with full treatment as prescribed    61 yo man with ESRD on HD, HTN, DM, s/p failed kidney transplant continues on tacrolimus; transferred to [facility] from [place] for ICD placement and Cardiac Cath after prolonged admission  with cardiac arrest, vtach arrest and hypovolemic shock 2/2 GI bleed

## 2023-03-23 NOTE — PROGRESS NOTE ADULT - PROBLEM SELECTOR PLAN 9
total 88, try 158, HDL 44, LDL 12  -c/w atorvastatin 40 mg QHS - CONT: Hydralazine 50mg PO q8hrs, Isordil 20mg PO q8hrs, Losartan 100mg PO QD, Lopressor 12.5mg PO BID.

## 2023-03-23 NOTE — PROGRESS NOTE ADULT - PROBLEM SELECTOR PLAN 2
Patient febrile to 102, associated w/ chills, HA, nausea, and URI sx on 03/21/23 status post dialysis  - ID following, s/p Vancomycin 1g and receiving Meropenem 500mg Q24 hrs   [] will send trough prior to HD on 03/23/23  [] if 10-17.9, give 750 mg.  If 18-25, give 500 mg  -- BCx NGTD, please follow up additional BCx as patient still spiking fevers   - UCx gram negative rods   - F/U CMV IgM, IgG, CMV PCR, serum fungitell  - Abdominal U/S no pathology - Pt w/ ongoing fevers this admission, infectious work up ongoing.    - Infectious work up ongoing; no clear infectious source identified at this time.    - BCx NGTD; UCx (+) for Gram (-) rods (pt on Meropenem still having fevers).    - Infectious disease following – continue to follow recs.    - CONT: Meropenem 500mg IV QD (**after HD on HD days), Vancomycin dose after HD sessions (based on pre-HD session Vanc troughs per direction of ID).    - Next Vanc trough: Saturday 3/25/23 AM (**before HD session).    - PLAN: ID following, recommend Gallium scan -- **NO Gallium available, and per radiology in setting of multiple blood transfusions Gallium scan quality is decreased – PET SCAN ON SATURDAY (call and coordinate with radiology team; NPO after MN Friday).

## 2023-03-23 NOTE — PHYSICAL THERAPY INITIAL EVALUATION ADULT - LEVEL OF INDEPENDENCE: SIT/STAND, REHAB EVAL
TBA; Held secondary to pt not having R prosthetic, and pt feeling extremely lethargic & declining further mobility

## 2023-03-23 NOTE — PROGRESS NOTE ADULT - SUBJECTIVE AND OBJECTIVE BOX
Patient is a 62y Male seen and evaluated at bedside during dialysis. No acute events overnight. Pt resting comfortably in bed. Denies any symptoms.       Meds:    acetaminophen     Tablet .. 650 every 6 hours PRN  aspirin enteric coated 81 daily  atorvastatin 40 at bedtime  chlorhexidine 2% Cloths 1 daily  clopidogrel Tablet 75 daily  dextrose 5%. 1000 <Continuous>  dextrose 5%. 1000 <Continuous>  dextrose 50% Injectable 25 once  dextrose 50% Injectable 12.5 once  dextrose 50% Injectable 25 once  dextrose Oral Gel 15 once PRN  ferrous    sulfate 325 daily  glucagon  Injectable 1 once  hydrALAZINE 50 every 8 hours  insulin lispro (ADMELOG) corrective regimen sliding scale  every 6 hours  isosorbide   dinitrate Tablet (ISORDIL) 20 three times a day  losartan 100 daily  meropenem  IVPB    meropenem  IVPB 500 every 24 hours  metoprolol tartrate 12.5 every 12 hours  pantoprazole  Injectable 40 every 12 hours  polyethylene glycol 3350 17 daily  sodium chloride 0.65% Nasal 1 every 4 hours PRN  tacrolimus 2 every 12 hours  tamsulosin 0.8 at bedtime      T(C): , Max: 38.9 (23 @ 15:54)  T(F): , Max: 102 (23 @ 15:54)  HR: 52 (23 @ 11:07)  BP: 117/40 (23 @ 11:07)  BP(mean): 70 (23 @ 08:41)  RR: 18 (23 @ 11:07)  SpO2: 94% (23 @ 11:07)  Wt(kg): --     07:01  -   @ 07:00  --------------------------------------------------------  IN: 390 mL / OUT: 175 mL / NET: 215 mL     07:01  -   @ 11:52  --------------------------------------------------------  IN: 180 mL / OUT: 0 mL / NET: 180 mL          Review of Systems:  ROS negative except as per HPI      PHYSICAL EXAM:  GENERAL: Alert, awake, oriented  CHEST/LUNG: Bilateral clear breath sounds, on RA  HEART: S1, S2, RRR  ABDOMEN: Soft, nontender, non distended  EXTREMITIES: no pedal edema  Neurology: AAOx3, no asterixis   ACCESS: LUE AVF w/bruit and thrill        LABS:                        9.1    6.82  )-----------( 101      ( 23 Mar 2023 06:27 )             29.0     03-23    127<L>  |  93<L>  |  21  ----------------------------<  77  4.6   |  26  |  5.45<H>    Ca    7.7<L>      23 Mar 2023 06:27  Phos  4.0     03-23  Mg     1.9     -23    TPro  4.6<L>  /  Alb  2.4<L>  /  TBili  0.8  /  DBili  x   /  AST  16  /  ALT  6<L>  /  AlkPhos  120  03-22        Urinalysis Basic - ( 21 Mar 2023 15:02 )    Color: Yellow / Appearance: Hazy / S.020 / pH: x  Gluc: x / Ketone: NEGATIVE  / Bili: Negative / Urobili: 0.2 E.U./dL   Blood: x / Protein: >=300 mg/dL / Nitrite: NEGATIVE   Leuk Esterase: Moderate / RBC: > 10 /HPF / WBC > 10 /HPF   Sq Epi: x / Non Sq Epi: 0-5 /HPF / Bacteria: Present /HPF            RADIOLOGY & ADDITIONAL STUDIES:

## 2023-03-23 NOTE — PROGRESS NOTE ADULT - ASSESSMENT
**INCOMPLETE / IN PROGRESS NOTE**    63yo M PMHx HTN, HLD, DM, ICM (s/p cath years ago, RCA 99%, never intervened on), HFrEF 25-30%, ESRD s/p failed kidney transplant (Left Arm AVF; HD TTS), R AKA admitted to [facility] 2/27 for respiratory distress triggered SIRS, after HD session, requiring intubation, course complicated by cardiac arrest, vtach arrest requiring amio and pressors. Extubated 3/2 and course c/b by LGIB with hemoglobin of 3.5 requiring multiple transfusions; EGD/colo and CTA w/o evidence of active bleed and hemoglobin subsequently improved to 11's. Transferred to [facility] on 03/17/23 for ICD eval 2/2 Vtach and cardiac cath however on day of arrival patient had episode of BRBPR, flex sign showing localized ulceration and no active bleeding. DAPT resumed on 03/20/23, with plan for cardiac cath on 3/21/2023 - however on 03/21/23 patient febrile to 102F w/ URI symptoms. Infectious workup under way, pending stability with possible plan for cardiac cath on Friday       **INCOMPLETE / IN PROGRESS NOTE**    61yo M PMHx HTN, HLD, DM, ICM (s/p cath years ago, RCA 99%, never intervened on), HFrEF 25-30%, ESRD s/p failed kidney transplant (Left Arm AVF; HD TTS), R AKA admitted to [facility] 2/27 for respiratory distress triggered SIRS, after HD session, requiring intubation, course complicated by cardiac arrest, vtach arrest requiring amio and pressors. Extubated 3/2 and course c/b by LGIB with hemoglobin of 3.5 requiring multiple transfusions; EGD/colo and CTA w/o evidence of active bleed and hemoglobin subsequently improved to 11's. Transferred to [facility] on 03/17/23 for ICD eval 2/2 Vtach and cardiac cath however on day of arrival patient had episode of BRBPR, flex sign showing localized ulceration and no active bleeding. DAPT resumed on 03/20/23, with plan for cardiac cath on 3/21/2023 - however on 03/21/23 patient febrile to 102F w/ URI symptoms. Infectious workup under way, pending stability with possible plan for cardiac cath on Friday       **INCOMPLETE / IN PROGRESS NOTE**    63yo M PMHx HTN, HLD, DM, ICM (s/p cath years ago, RCA 99%, never intervened on), HFrEF 25-30%, ESRD s/p failed kidney transplant (Left Arm AVF; HD TTS), R AKA admitted to [facility] 2/27 for respiratory distress triggered SIRS, after HD session, requiring intubation, course complicated by cardiac arrest, vtach arrest requiring amio and pressors. Extubated 3/2 and course c/b by LGIB with hemoglobin of 3.5 requiring multiple transfusions; EGD/colo and CTA w/o evidence of active bleed and hemoglobin subsequently improved to 11's. Transferred to [facility] on 03/17/23 for ICD eval 2/2 Vtach and cardiac cath however on day of arrival patient had episode of BRBPR, flex sign showing localized ulceration and no active bleeding. DAPT resumed on 03/20/23, with plan for cardiac cath on 3/21/2023 - however on 03/21/23 patient febrile to 102F w/ URI symptoms. Infectious workup under way, pending stability with possible plan for cardiac cath on Friday     61yo M PMHx HTN, HLD, DM, ICM (s/p cath years ago, RCA 99%, never intervened on), HFrEF 25-30%, ESRD s/p failed kidney transplant (Left Arm AVF; HD TTS), R AKA admitted to [facility] 2/27 for respiratory distress triggered SIRS, after HD session, requiring intubation, course complicated by cardiac arrest, vtach arrest requiring amio and pressors. Extubated 3/2 and course c/b by LGIB with hemoglobin of 3.5 requiring multiple transfusions; EGD/colo and CTA w/o evidence of active bleed and hemoglobin subsequently improved to 11's. Transferred to [facility] on 03/17/23 for ICD eval 2/2 Vtach and cardiac cath however on day of arrival patient had episode of BRBPR, flex sign showing localized ulceration and no active bleeding. DAPT resumed on 03/20/23, with plan for cardiac cath on 3/21/2023 - however on 03/21/23 patient febrile to 102F w/ URI symptoms. Infectious workup under way, pending stability with possible plan for cardiac cath on Friday           Problem/Plan - 1:  ·  Problem: CAD (coronary artery disease).    ·  Plan:     Problem/Plan - 2:   ·  Problem: Fever of unknown origin (FUO).    ·  Plan:          Problem/Plan - 3:   ·  Problem: H/O ventricular tachycardia.    ·  Plan:             Problem/Plan - 4:   ·  Problem: Chronic HFrEF (heart failure with reduced ejection fraction).    ·  Plan:         Problem/Plan - 5:   ·  Problem: Bright red blood per rectum.    ·  Plan:             Problem/Plan - 6:   ·  Problem: ESRD on dialysis.    ·  Plan:            Problem/Plan - 7:   ·  Problem: S/P kidney transplant.    ·  Plan:          Problem/Plan - 8:   ·  Problem: HTN (hypertension).    ·  Plan:      Problem/Plan - 9:   ·  Problem: HLD (hyperlipidemia).    ·  Plan:            Problem/Plan - 10:   ·  Problem: DM (diabetes mellitus).    ·  Plan;         Case discussed with Dr. Jones.  61yo M PMHx HTN, HLD, DM, ICM (s/p cath years ago, RCA 99%, never intervened on), HFrEF 25-30%, ESRD s/p failed kidney transplant (Left Arm AVF; HD TTS), R AKA admitted to [facility] 2/27 for respiratory distress triggered SIRS, after HD session, requiring intubation, course complicated by cardiac arrest, vtach arrest requiring amio and pressors. Extubated 3/2 and course c/b by LGIB with hemoglobin of 3.5 requiring multiple transfusions; EGD/colo and CTA w/o evidence of active bleed and hemoglobin subsequently improved to 11's. Transferred to [facility] on 03/17/23 for ICD eval 2/2 Vtach and cardiac cath however on day of arrival patient had episode of BRBPR, flex sign showing localized ulceration and no active bleeding. DAPT resumed on 03/20/23, with plan for cardiac cath on 3/21/2023 - however on 03/21/23 patient febrile to 102F w/ URI symptoms. Infectious workup under way, pending stability with possible plan for cardiac cath on Friday           Problem/Plan - 1:  ·  Problem: CAD (coronary artery disease).    ·  Plan:     Problem/Plan - 2:   ·  Problem: Fever of unknown origin (FUO).    ·  Plan:          Problem/Plan - 3:   ·  Problem: H/O ventricular tachycardia.    ·  Plan:             Problem/Plan - 4:   ·  Problem: Chronic HFrEF (heart failure with reduced ejection fraction).    ·  Plan:         Problem/Plan - 5:   ·  Problem: Bright red blood per rectum.    ·  Plan:             Problem/Plan - 6:   ·  Problem: ESRD on dialysis.    ·  Plan:            Problem/Plan - 7:   ·  Problem: S/P kidney transplant.    ·  Plan:          Problem/Plan - 8:   ·  Problem: HTN (hypertension).    ·  Plan:      Problem/Plan - 9:   ·  Problem: HLD (hyperlipidemia).    ·  Plan:            Problem/Plan - 10:   ·  Problem: DM (diabetes mellitus).    ·  Plan;         Case discussed with Dr. Jones.  63yo M PMHx HTN, HLD, DM, ICM (s/p cath years ago, RCA 99%, never intervened on), HFrEF 25-30%, ESRD s/p failed kidney transplant (Left Arm AVF; HD TTS), R AKA admitted to [facility] 2/27 for respiratory distress triggered SIRS, after HD session, requiring intubation, course complicated by cardiac arrest, vtach arrest requiring amio and pressors. Extubated 3/2 and course c/b by LGIB with hemoglobin of 3.5 requiring multiple transfusions; EGD/colo and CTA w/o evidence of active bleed and hemoglobin subsequently improved to 11's. Transferred to [facility] on 03/17/23 for ICD eval 2/2 Vtach and cardiac cath however on day of arrival patient had episode of BRBPR, flex sign showing localized ulceration and no active bleeding. DAPT resumed on 03/20/23, with plan for cardiac cath on 3/21/2023 - however on 03/21/23 patient febrile to 102F w/ URI symptoms. Infectious workup under way, pending stability with possible plan for cardiac cath on Friday           Problem/Plan - 1:  ·  Problem: CAD (coronary artery disease).    ·  Plan:     Problem/Plan - 2:   ·  Problem: Fever of unknown origin (FUO).    ·  Plan:          Problem/Plan - 3:   ·  Problem: H/O ventricular tachycardia.    ·  Plan:             Problem/Plan - 4:   ·  Problem: Chronic HFrEF (heart failure with reduced ejection fraction).    ·  Plan:         Problem/Plan - 5:   ·  Problem: Bright red blood per rectum.    ·  Plan:             Problem/Plan - 6:   ·  Problem: ESRD on dialysis.    ·  Plan:            Problem/Plan - 7:   ·  Problem: S/P kidney transplant.    ·  Plan:          Problem/Plan - 8:   ·  Problem: HTN (hypertension).    ·  Plan:      Problem/Plan - 9:   ·  Problem: HLD (hyperlipidemia).    ·  Plan:            Problem/Plan - 10:   ·  Problem: DM (diabetes mellitus).    ·  Plan;         Case discussed with Dr. Jones.  63yo M PMHx HTN, HLD, DM, ICM (s/p cath years ago, RCA 99%, never intervened on), HFrEF 25-30%, ESRD s/p failed kidney transplant (Left Arm AVF; HD TTS), R AKA admitted to [facility] 2/27 for respiratory distress triggered SIRS, after HD session, requiring intubation, course complicated by cardiac arrest, vtach arrest requiring amio and pressors. Extubated 3/2 and course c/b by LGIB with hemoglobin of 3.5 requiring multiple transfusions; EGD/colo and CTA w/o evidence of active bleed and hemoglobin subsequently improved to 11's. Transferred to [facility] on 03/17/23 for ICD eval 2/2 Vtach and cardiac cath however on day of arrival patient had episode of BRBPR, flex sign showing localized ulceration and no active bleeding. DAPT resumed on 03/20/23, with plan for cardiac cath on 3/21/2023 - however on 03/21/23 patient febrile to 102F w/ URI symptoms. Infectious workup under way, pending stability with possible plan for cardiac cath on Friday          63yo M PMHx HTN, HLD, DM, ICM (s/p cath years ago, RCA 99%, never intervened on), HFrEF 25-30%, ESRD s/p failed kidney transplant (Left Arm AVF; HD TTS), R AKA admitted to [facility] 2/27 for respiratory distress triggered SIRS, after HD session, requiring intubation, course complicated by cardiac arrest, vtach arrest requiring amio and pressors. Extubated 3/2 and course c/b by LGIB with hemoglobin of 3.5 requiring multiple transfusions; EGD/colo and CTA w/o evidence of active bleed and hemoglobin subsequently improved to 11's. Transferred to [facility] on 03/17/23 for ICD eval 2/2 Vtach and cardiac cath however on day of arrival patient had episode of BRBPR, flex sign showing localized ulceration and no active bleeding. DAPT resumed on 03/20/23, with plan for cardiac cath on 3/21/2023 - however on 03/21/23 patient febrile to 102F w/ URI symptoms. Infectious workup under way, pending stability with possible plan for cardiac cath on Friday          63yo M PMHx HTN, HLD, DM, ICM (s/p cath years ago, RCA 99%, never intervened on), HFrEF 25-30%, ESRD s/p failed kidney transplant (Left Arm AVF; HD TTS), R AKA admitted to [facility] 2/27 for respiratory distress triggered SIRS, after HD session, requiring intubation, course complicated by cardiac arrest, vtach arrest requiring amio and pressors. Extubated 3/2 and course c/b by LGIB with hemoglobin of 3.5 requiring multiple transfusions; EGD/colo and CTA w/o evidence of active bleed and hemoglobin subsequently improved to 11's. Transferred to [facility] on 03/17/23 for ICD eval 2/2 Vtach and cardiac cath however on day of arrival patient had episode of BRBPR, flex sign showing localized ulceration and no active bleeding. DAPT resumed on 03/20/23, with plan for cardiac cath on 3/21/2023 - however on 03/21/23 patient febrile to 102F w/ URI symptoms. Infectious workup under way, pending stability with possible plan for cardiac cath on Friday

## 2023-03-23 NOTE — PROGRESS NOTE ADULT - ASSESSMENT
63yo M PMHx HTN, HLD, DM, CAD (s/p cath years ago, RCA 99%, never intervened on), HFrEF 25-30%, ESRD s/p failed kidney transplant (last HD 3/1 via Left Arm AVF; HD TTS), Right AKA initially presented to [facility] 2/27 for respiratory distress after a dialysis, found to be septic, h/c complicated by AHRF requiring intubation for 24hr followed by VT arrest, h/c the complicated by massive LGIB (rectal ulcer) requiring 7u pRBC, 1u FFP and 1u PLT. Transferred to [facility] for ICD placement 2/2 Vtach and cardiac cath. Medicine consulted for comanagement.     Recommend:    #Fever  - Spiked fever of 101.6 on return from HD today 3/21  - Unclear source of infection  - Removed right forearm IV because was in place from outside hospital  - Abd U/S with no acute pathology  - CXR without infiltrate  - UA without UTI  - F/u BCx, so far NGTD  - C/w Meropenem and Vanco  - ID following    #LGIB  #SANTOS  #Solitary ulcer syndrome  - hx coffee ground emesis @ [facility]; s/p EGD/colonoscopy showing rectal ulcer  - 3/17AM pt passed loose BM along with large amount of BRBPR  - per gen surgery no acute surgical intervention   - per GI:      - c/w Protonix 40 mg IVP BID, low suspicion for UGIB      - PIV large bore x2      - s/p flex sig for rectal ulcers, found Evidence of prior hemoclip in the sigmoid colon. Localized ulceration and erosive with no bleeding were noted in the rectum, suggestive of solitary ulcer syndrome           - Avoid anal digitation and enemas           - High-fiber diet and optimize laxatives to avoid constipation using miralax and dulcolax           - No absolute GI contraindications to anticoag/antplt therapy           - Repeat endoscopic evaluation recommended in 3 months  - Active T&S  - Transfuse for hgb <8 (active CAD)    #CAD   - Previous cath at [facility] showing oLM 30% and RCA 99% that is not amenable to intervention.  - [facility], EF 25-30%, severe global wall motion dysfunction, mildly dilated LA, RV mildly dilated, all leaflets mild calcified, mod pHTN  - hx coffee ground emesis @ [facility]; s/p EGD/colonoscopy showing rectal ulcer and LHH  flex sig suggestive of solitary ulcer syndrome - per cardiology will hold off loading the patient due to recent LGIB  - No absolute GI contraindications to anticoag/antplt therapy  - EP consulted for possible ICD placement  - Today for ASA/ Plavix load > if no bleed in the next 48h will get > Angiogram and possible PCI, followed with ICD (since 3/17 hgb of 10-11)    #H/O ventricular tachycardia   - Vtach arrest @[facility]  - agree with EP consult for ICD placement    #ESRD on dialysis  - for dialysis today  - dialysis Tues, Thurs, Sat  - last received 3/16  - f/u nephro recs  - Electrolytes wnl, k 4.3, phos 1.9  - c/w calcium citrate 667mg TID    #S/P kidney transplant.   - per [place] pt takes Tacrolimus 2mg 2 times /day  - Obtain collateral regarding renal transplant, if indeed failed, what indications patient have for c/w tacrolimus 2mg?  - f/u tacrolimus serum levels  - f/u nephro recs    #HTN  - SBP 130s-140s  - c/w home meds. Amlodipine 10mg qd, Losartan 100mg qd, Hydralazine 50m TID, Isordil 20mg TID    #HLD   - c/w Lipitor 40mg qd  - f/u AM lipid panel.    #DM  - no meds per [place], obtain collaterals for med recs prior [place] stay  - mISS while inpt  - A1c 5.9 on admission 63yo M PMHx HTN, HLD, DM, CAD (s/p cath years ago, RCA 99%, never intervened on), HFrEF 25-30%, ESRD s/p failed kidney transplant (last HD 3/1 via Left Arm AVF; HD TTS), Right AKA initially presented to [facility] 2/27 for respiratory distress after a dialysis, found to be septic, h/c complicated by AHRF requiring intubation for 24hr followed by VT arrest, h/c the complicated by massive LGIB (rectal ulcer) requiring 7u pRBC, 1u FFP and 1u PLT. Transferred to [facility] for ICD placement 2/2 Vtach and cardiac cath. Medicine consulted for comanagement.     Recommend:    #Fever  - Spiked fever of 101.6 on return from HD today 3/21  - Unclear source of infection  - Removed right forearm IV because was in place from outside hospital  - Abd U/S with no acute pathology  - CXR without infiltrate  - UA without UTI  - F/u BCx, so far NGTD  - C/w Meropenem and Vanco  - ID following    #LGIB  #SANTOS  #Solitary ulcer syndrome  - hx coffee ground emesis @ [facility]; s/p EGD/colonoscopy showing rectal ulcer  - 3/17AM pt passed loose BM along with large amount of BRBPR  - per gen surgery no acute surgical intervention   - per GI:      - c/w Protonix 40 mg IVP BID, low suspicion for UGIB      - PIV large bore x2      - s/p flex sig for rectal ulcers, found Evidence of prior hemoclip in the sigmoid colon. Localized ulceration and erosive with no bleeding were noted in the rectum, suggestive of solitary ulcer syndrome           - Avoid anal digitation and enemas           - High-fiber diet and optimize laxatives to avoid constipation using miralax and dulcolax           - No absolute GI contraindications to anticoag/antplt therapy           - Repeat endoscopic evaluation recommended in 3 months  - Active T&S  - Transfuse for hgb <8 (active CAD)    #CAD   - Previous cath at [facility] showing oLM 30% and RCA 99% that is not amenable to intervention.  - [facility], EF 25-30%, severe global wall motion dysfunction, mildly dilated LA, RV mildly dilated, all leaflets mild calcified, mod pHTN  - hx coffee ground emesis @ [facility]; s/p EGD/colonoscopy showing rectal ulcer and LHH  flex sig suggestive of solitary ulcer syndrome - per cardiology will hold off loading the patient due to recent LGIB  - No absolute GI contraindications to anticoag/antplt therapy  - EP consulted for possible ICD placement  - Today for ASA/ Plavix load > if no bleed in the next 48h will get > Angiogram and possible PCI, followed with ICD (since 3/17 hgb of 10-11)    #H/O ventricular tachycardia   - [facility] arrest [facility]  - agree with EP consult for ICD placement    #ESRD on dialysis  - for dialysis today  - dialysis Tues, Thurs, Sat  - last received 3/16  - f/u nephro recs  - Electrolytes wnl, k 4.3, phos 1.9  - c/w calcium citrate 667mg TID    #S/P kidney transplant.   - per [place] pt takes Tacrolimus 2mg 2 times /day  - Obtain collateral regarding renal transplant, if indeed failed, what indications patient have for c/w tacrolimus 2mg?  - f/u tacrolimus serum levels  - f/u nephro recs    #HTN  - SBP 130s-140s  - c/w home meds. Amlodipine 10mg qd, Losartan 100mg qd, Hydralazine 50m TID, Isordil 20mg TID    #HLD   - c/w Lipitor 40mg qd  - f/u AM lipid panel.    #DM  - no meds per [place], obtain collaterals for med recs prior [place] stay  - mISS while inpt  - A1c 5.9 on admission 61yo M PMHx HTN, HLD, DM, CAD (s/p cath years ago, RCA 99%, never intervened on), HFrEF 25-30%, ESRD s/p failed kidney transplant (last HD 3/1 via Left Arm AVF; HD TTS), Right AKA initially presented to [facility] 2/27 for respiratory distress after a dialysis, found to be septic, h/c complicated by AHRF requiring intubation for 24hr followed by VT arrest, h/c the complicated by massive LGIB (rectal ulcer) requiring 7u pRBC, 1u FFP and 1u PLT. Transferred to [facility] for ICD placement 2/2 Vtach and cardiac cath. Medicine consulted for comanagement.     Recommend:    #Fever  - Spiked fever of 101.6 on return from HD today 3/21  - Unclear source of infection  - Removed right forearm IV because was in place from outside hospital  - Abd U/S with no acute pathology  - CXR without infiltrate  - UA without UTI  - F/u BCx, so far NGTD  - C/w Meropenem and Vanco  - ID following    #LGIB  #SANTOS  #Solitary ulcer syndrome  - hx coffee ground emesis @ [facility]; s/p EGD/colonoscopy showing rectal ulcer  - 3/17AM pt passed loose BM along with large amount of BRBPR  - per gen surgery no acute surgical intervention   - per GI:      - c/w Protonix 40 mg IVP BID, low suspicion for UGIB      - PIV large bore x2      - s/p flex sig for rectal ulcers, found Evidence of prior hemoclip in the sigmoid colon. Localized ulceration and erosive with no bleeding were noted in the rectum, suggestive of solitary ulcer syndrome           - Avoid anal digitation and enemas           - High-fiber diet and optimize laxatives to avoid constipation using miralax and dulcolax           - No absolute GI contraindications to anticoag/antplt therapy           - Repeat endoscopic evaluation recommended in 3 months  - Active T&S  - Transfuse for hgb <8 (active CAD)    #CAD   - Previous cath at [facility] showing oLM 30% and RCA 99% that is not amenable to intervention.  - [facility], EF 25-30%, severe global wall motion dysfunction, mildly dilated LA, RV mildly dilated, all leaflets mild calcified, mod pHTN  - hx coffee ground emesis @ [facility]; s/p EGD/colonoscopy showing rectal ulcer and LHH  flex sig suggestive of solitary ulcer syndrome - per cardiology will hold off loading the patient due to recent LGIB  - No absolute GI contraindications to anticoag/antplt therapy  - EP consulted for possible ICD placement  - Today for ASA/ Plavix load > if no bleed in the next 48h will get > Angiogram and possible PCI, followed with ICD (since 3/17 hgb of 10-11)    #H/O ventricular tachycardia   - Vtach arrest @[facility]  - agree with EP consult for ICD placement    #ESRD on dialysis  - for dialysis today  - dialysis Tues, Thurs, Sat  - last received 3/16  - f/u nephro recs  - Electrolytes wnl, k 4.3, phos 1.9  - c/w calcium citrate 667mg TID    #S/P kidney transplant.   - per [place] pt takes Tacrolimus 2mg 2 times /day  - Obtain collateral regarding renal transplant, if indeed failed, what indications patient have for c/w tacrolimus 2mg?  - f/u tacrolimus serum levels  - f/u nephro recs    #HTN  - SBP 130s-140s  - c/w home meds. Amlodipine 10mg qd, Losartan 100mg qd, Hydralazine 50m TID, Isordil 20mg TID    #HLD   - c/w Lipitor 40mg qd  - f/u AM lipid panel.    #DM  - no meds per [place], obtain collaterals for med recs prior [place] stay  - mISS while inpt  - A1c 5.9 on admission

## 2023-03-23 NOTE — PROVIDER CONTACT NOTE (CHANGE IN STATUS NOTIFICATION) - ASSESSMENT
bloody stool
Patient fatigued, but arousable. /58, HR 62, T 101.7 oral, duvall in place, with medium sized episode of loose melena. LAVF dressing C/D/I. No complaints of pain or SOB, however desat to 88% on room air

## 2023-03-23 NOTE — PROGRESS NOTE ADULT - ASSESSMENT
61 yo M with HTN, DM, HLD, ICM, HFrEF, ESRD s/p failed RT (HD via LUE AVF), R AKA transferred to [facility] from [facility] on 3/17 for ICD placement with fever, no leukocytosis (on low dose tacrolimus).   He has a positive UA, urine culture with Klebsiella pneumoniae (S cefazolin).  Had mild tenderness to palpation of transplanted kidney yesterday, not today.  Remains febrile.  Persistence of neck pain is concerning.  CT/MR without contrast will not likely be useful to exclude infection.   Suggest:  - F/U blood culture from 3/21 X 2, 3/22 X 1  - Would obtain gallium scan  - Vancomycin 750 mg IV X 1 today  - Continue meropenem 500 mg IV q24h for now.  Recommendations discussed with primary team.  Will follow with you - team 2.  61 yo M with HTN, DM, HLD, ICM, HFrEF, ESRD s/p failed RT (HD via LUE AVF), R AKA transferred to [facility] from [facility] on 3/17 for ICD placement with fever, no leukocytosis (on low dose tacrolimus).   He has a positive UA, urine culture with Klebsiella pneumoniae (S cefazolin).  Had mild tenderness to palpation of transplanted kidney yesterday, not today.  Remains febrile.  Persistence of neck pain is concerning.  CT/MR without contrast will not likely be useful to exclude infection.   Suggest:  - F/U blood culture from 3/21 X 2, 3/22 X 1  - Would obtain gallium scan  - Vancomycin 750 mg IV X 1 today  - Continue meropenem 500 mg IV q24h for now.  Recommendations discussed with primary team.  Will follow with you - team 2.  61 yo M with HTN, DM, HLD, ICM, HFrEF, ESRD s/p failed RT (HD via LUE AVF), R AKA transferred to [facility] from [facility] on 3/17 for ICD placement with fever, no leukocytosis (on low dose tacrolimus).   He has a positive UA, urine culture with Klebsiella pneumoniae (S cefazolin).  Had mild tenderness to palpation of transplanted kidney yesterday, not today.  Remains febrile.  Persistence of neck pain is concerning.  CT/MR without contrast will not likely be useful to exclude infection.   Suggest:  - F/U blood culture from 3/21 X 2, 3/22 X 1  - Would obtain gallium scan  - Vancomycin 750 mg IV X 1 today  - Continue meropenem 500 mg IV q24h for now.  Recommendations discussed with primary team.  Will follow with you - team 2.

## 2023-03-23 NOTE — PROVIDER CONTACT NOTE (CHANGE IN STATUS NOTIFICATION) - BACKGROUND
h/o rectal ulcer
Patient with extensive cardiac history awaiting, awaiting cardiac cath. However patient has remained febrile since admission, with infectious workup in progress

## 2023-03-23 NOTE — PROGRESS NOTE ADULT - SUBJECTIVE AND OBJECTIVE BOX
INTERVAL HPI/OVERNIGHT EVENTS:  Tm 102.  States he feels "good."    CONSTITUTIONAL:  No fever, chills, night sweats  EYES:  No photophobia or visual changes  CARDIOVASCULAR:  No chest pain  RESPIRATORY:  No cough, wheezing, or SOB   GASTROINTESTINAL:  No nausea, vomiting, diarrhea, constipation, or abdominal pain  GENITOURINARY:  No frequency, urgency, dysuria or hematuria  NEUROLOGIC:  No headache, lightheadedness      ANTIBIOTICS/RELEVANT:          Vital Signs Last 24 Hrs  T(C): 37.9 (23 Mar 2023 11:07), Max: 38.9 (22 Mar 2023 15:54)  T(F): 100.2 (23 Mar 2023 11:07), Max: 102 (22 Mar 2023 15:54)  HR: 52 (23 Mar 2023 11:07) (50 - 67)  BP: 117/40 (23 Mar 2023 11:07) (104/49 - 149/67)  BP(mean): 70 (23 Mar 2023 08:41) (70 - 97)  RR: 18 (23 Mar 2023 11:07) (18 - 20)  SpO2: 94% (23 Mar 2023 11:07) (91% - 98%)    Parameters below as of 23 Mar 2023 11:07  Patient On (Oxygen Delivery Method): room air        PHYSICAL EXAM:  Constitutional:  Alert  Eyes:  Sclerae anicteric, conjunctivae clear, PERRL  Ear/Nose/Throat:  No nasal exudate or sinus tenderness;  No buccal mucosal lesions, no pharyngeal erythema or exudate	  Neck:  Supple, no adenopathy  Respiratory:  Clear bilaterally  Cardiovascular:  RRR, S1S2, no murmur appreciated  Gastrointestinal:  Symmetric, normoactive BS, soft, NT, no masses, guarding or rebound.  No HSM  Extremities:  No edema      LABS:                        9.1    6.82  )-----------( 101      ( 23 Mar 2023 06:27 )             29.0         03-23    127<L>  |  93<L>  |  21  ----------------------------<  77  4.6   |  26  |  5.45<H>    Ca    7.7<L>      23 Mar 2023 06:27  Phos  4.0     03-23  Mg     1.9     03-23    TPro  4.6<L>  /  Alb  2.4<L>  /  TBili  0.8  /  DBili  x   /  AST  16  /  ALT  6<L>  /  AlkPhos  120        Urinalysis Basic - ( 21 Mar 2023 15:02 )    Color: Yellow / Appearance: Hazy / S.020 / pH: x  Gluc: x / Ketone: NEGATIVE  / Bili: Negative / Urobili: 0.2 E.U./dL   Blood: x / Protein: >=300 mg/dL / Nitrite: NEGATIVE   Leuk Esterase: Moderate / RBC: > 10 /HPF / WBC > 10 /HPF   Sq Epi: x / Non Sq Epi: 0-5 /HPF / Bacteria: Present /HPF        MICROBIOLOGY:  Culture Results:   No growth at 12 hours (23 @ 16:02)        RADIOLOGY & ADDITIONAL STUDIES: INTERVAL HPI/OVERNIGHT EVENTS:  Tm 102.  States he feels "good."  Ongoing neck and LBP - neck is 8/10 in severity    CONSTITUTIONAL:  No fever, chills, night sweats  EYES:  No photophobia or visual changes  CARDIOVASCULAR:  No chest pain  RESPIRATORY:  No cough, wheezing, or SOB   GASTROINTESTINAL:  No nausea, vomiting, diarrhea, constipation, or abdominal pain  GENITOURINARY:  No frequency, urgency, dysuria or hematuria  NEUROLOGIC:  No headache, lightheadedness      ANTIBIOTICS/RELEVANT:    Vancomycin 3/21  Meropenem 500 mg IV q24h (3/21-present)      Vital Signs Last 24 Hrs  T(C): 37.9 (23 Mar 2023 11:07), Max: 38.9 (22 Mar 2023 15:54)  T(F): 100.2 (23 Mar 2023 11:07), Max: 102 (22 Mar 2023 15:54)  HR: 52 (23 Mar 2023 11:07) (50 - 67)  BP: 117/40 (23 Mar 2023 11:07) (104/49 - 149/67)  BP(mean): 70 (23 Mar 2023 08:41) (70 - 97)  RR: 18 (23 Mar 2023 11:07) (18 - 20)  SpO2: 94% (23 Mar 2023 11:07) (91% - 98%)    Parameters below as of 23 Mar 2023 11:07  Patient On (Oxygen Delivery Method): room air        PHYSICAL EXAM:  Constitutional:  Alert  Eyes:  Sclerae anicteric, conjunctivae clear, PERRL  Ear/Nose/Throat:  No nasal exudate or sinus tenderness;  No buccal mucosal lesions, no pharyngeal erythema or exudate	  Neck:  Supple, no adenopathy  Respiratory:  Clear bilaterally  Cardiovascular:  RRR, S1S2, no murmur appreciated  Gastrointestinal:  Symmetric, normoactive BS, soft, NT, no masses, guarding or rebound.  No HSM  Extremities:  No edema      LABS:                        9.1    6.82  )-----------( 101      ( 23 Mar 2023 06:27 )             29.0         03-23    127<L>  |  93<L>  |  21  ----------------------------<  77  4.6   |  26  |  5.45<H>    Ca    7.7<L>      23 Mar 2023 06:27  Phos  4.0       Mg     1.9         TPro  4.6<L>  /  Alb  2.4<L>  /  TBili  0.8  /  DBili  x   /  AST  16  /  ALT  6<L>  /  AlkPhos  120        Urinalysis Basic - ( 21 Mar 2023 15:02 )    Color: Yellow / Appearance: Hazy / S.020 / pH: x  Gluc: x / Ketone: NEGATIVE  / Bili: Negative / Urobili: 0.2 E.U./dL   Blood: x / Protein: >=300 mg/dL / Nitrite: NEGATIVE   Leuk Esterase: Moderate / RBC: > 10 /HPF / WBC > 10 /HPF   Sq Epi: x / Non Sq Epi: 0-5 /HPF / Bacteria: Present /HPF        MICROBIOLOGY:  Culture Results:   No growth at 12 hours (23 @ 16:02)        RADIOLOGY & ADDITIONAL STUDIES: INTERVAL HPI/OVERNIGHT EVENTS:  Tm 102.  States he feels "good."  Ongoing neck and LBP - neck is 8/10 in severity    CONSTITUTIONAL:  No fever, chills, night sweats  EYES:  No photophobia or visual changes  CARDIOVASCULAR:  No chest pain  RESPIRATORY:  No cough, wheezing, or SOB   GASTROINTESTINAL:  No nausea, vomiting, diarrhea, constipation, or abdominal pain  GENITOURINARY:  No frequency, urgency, dysuria or hematuria  NEUROLOGIC:  No headache, lightheadedness      ANTIBIOTICS/RELEVANT:    Vancomycin 3/21  Meropenem 500 mg IV q24h (3/21-present)      Vital Signs Last 24 Hrs  T(C): 37.9 (23 Mar 2023 11:07), Max: 38.9 (22 Mar 2023 15:54)  T(F): 100.2 (23 Mar 2023 11:07), Max: 102 (22 Mar 2023 15:54)  HR: 52 (23 Mar 2023 11:07) (50 - 67)  BP: 117/40 (23 Mar 2023 11:07) (104/49 - 149/67)  BP(mean): 70 (23 Mar 2023 08:41) (70 - 97)  RR: 18 (23 Mar 2023 11:07) (18 - 20)  SpO2: 94% (23 Mar 2023 11:07) (91% - 98%)    Parameters below as of 23 Mar 2023 11:07  Patient On (Oxygen Delivery Method): room air        PHYSICAL EXAM:  Seen at HD  Constitutional:  Conversant, lying under blankets  Eyes:  Sclerae anicteric, conjunctivae clear, PERRL  Ear/Nose/Throat:  No nasal exudate or sinus tenderness;  No buccal mucosal lesions, no pharyngeal erythema or exudate	  Neck:  Supple, no adenopathy, no neck tenderness  Respiratory:  Clear bilaterally  Cardiovascular:  RRR, S1S2, no murmur appreciated  Gastrointestinal:  Symmetric, normoactive BS, soft, NT (including transplanted kidney), no masses, guarding or rebound.  Extremities:  s/p R AKA      LABS:                        9.1    6.82  )-----------( 101      ( 23 Mar 2023 06:27 )             29.0         03-23    127<L>  |  93<L>  |  21  ----------------------------<  77  4.6   |  26  |  5.45<H>    Ca    7.7<L>      23 Mar 2023 06:27  Phos  4.0     -  Mg     1.9         TPro  4.6<L>  /  Alb  2.4<L>  /  TBili  0.8  /  DBili  x   /  AST  16  /  ALT  6<L>  /  AlkPhos  120        Urinalysis Basic - ( 21 Mar 2023 15:02 )    Color: Yellow / Appearance: Hazy / S.020 / pH: x  Gluc: x / Ketone: NEGATIVE  / Bili: Negative / Urobili: 0.2 E.U./dL   Blood: x / Protein: >=300 mg/dL / Nitrite: NEGATIVE   Leuk Esterase: Moderate / RBC: > 10 /HPF / WBC > 10 /HPF   Sq Epi: x / Non Sq Epi: 0-5 /HPF / Bacteria: Present /HPF    Vancomycin trough 13.4     MICROBIOLOGY:  Blood cultures:  3/21 X 1 - NGTD;  3/22 X 1 - NGTD    Urine culture 3/21 - >10^ Klebsiella pneumoniae (pansusceptible except amp)    3/21 rRVP ND          RADIOLOGY & ADDITIONAL STUDIES:

## 2023-03-23 NOTE — PHYSICAL THERAPY INITIAL EVALUATION ADULT - LIVES WITH, PROFILE
Pt is a longterm resident at [facility] since May 2020 Pt is a longterm resident at [facility] since May 2020 Pt is a longterm resident at [facility] since May 2020

## 2023-03-23 NOTE — PHYSICAL THERAPY INITIAL EVALUATION ADULT - NSPTDISCHREC_GEN_A_CORE
return to subacute rehab; MILAGRO Richi informed/Sub-acute Rehab return to subacute rehab; MILAGRO Rcihi informed/Sub-acute Rehab

## 2023-03-23 NOTE — PROVIDER CONTACT NOTE (CHANGE IN STATUS NOTIFICATION) - SITUATION
Assumed care of patient. Patient presents with chief complaint of left sided chest pain. Patient reports pain began about 1 hour PTA and woke her up. Patient denies any recent lifting or activity leading to muscle strain or damage. Patient denies SOB and reports pain remains in upper left chest area. Area is tender upon palpation and pain is reproducible. Patient is alert and oriented x4, respirations even and unlabored, VSS. Monitor x3, call bell within reach.
Discharge instructions reviewed with patient. Discharge instructions given to patient per Dr. Heather Diez. Patient able to return/verbalize discharge instructions. Copy of discharge instructions provided. Patient condition stable, respiratory status within normal limits, neuro status intact. Ambulatory out of ER, accompanied by family.
Patient off the floor for CT.
Patient returned from [facility]. Placed back on monitor x3.
Patient found to have incontinent episode of melena. Patient fatigued and feeling weak, febrile at 101.7 oral
Pt with watery loose stool. Along with stool, blood expelled per rectum.

## 2023-03-23 NOTE — PROGRESS NOTE ADULT - ASSESSMENT
62Y M w/ HTN, DM, ESRD s/p failed kidney transplant who was [facility] after prolonged admission c/b cardiac arrest and vtach arrest and hypovolemic shock 2/2 GI bleed. New fever 3/21, source unclear, started on vanc and meropenem . Pending repeat cath and ICD placement once infection cleared.    #Seen on HD today    #ESRD on HD TTS  Seen on HD today. Planned for a 3.5 hr session with F160 dialyzers 2K bath, and 2L UF targeted. Tolerating well. VS stable. Continue as planned  Daily BMP   Continue with Tacro at home dose. Will obtain outpatient records to see if needs to be continued  Next HD 3/25  Per ID, meropenem daily, but after HD on HD days. Also vanc trough prior to HD      HTN:  UF with HD as tolerated   Intermittently hypotensive. Amlodipine dc'ed  Continue losartan 100mg, metoprolol 12.5 BID, hydralazine 50mg TID    Anemia:  Hgb at goal at 9.1  Will obtain outpatient HD records    MBD:  Corrected calcium 7.7  Phos: 4.0  iPTH 431  Check phos twice weekly. Goal <5.5  Will obtain outpatient records       62Y M w/ HTN, DM, ESRD s/p failed kidney transplant who was [facility] after prolonged admission c/b cardiac arrest and vtach arrest and hypovolemic shock 2/2 GI bleed. New fever 3/21, source unclear, started on vanc and meropenem . Pending repeat cath and ICD placement once infection cleared.    #Seen on HD today    #ESRD on HD TTS  Seen on HD today. Planned for a 3.5 hr session with F160 dialyzers 2K bath, and 2L UF targeted. Tolerating well. VS stable. Continue as planned  Daily BMP   Continue with Tacro at home dose. Will obtain outpatient records to see if needs to be continued  Next HD 3/25  Per ID, meropenem daily, but after HD on HD days. Also vanc trough prior to HD      HTN:  UF with HD as tolerated   Intermittently hypotensive. Amlodipine dc'ed  Continue losartan 100mg, metoprolol 12.5 BID, hydralazine 50mg TID    Anemia:  Hgb at goal at 9.1  Will obtain outpatient HD records    MBD:  Corrected calcium 7.7  Phos: 4.0  iPTH 431  Check phos twice weekly. Goal <5.5  Will obtain outpatient records       62Y M w/ HTN, DM, ESRD s/p failed kidney transplant who was [facility] after prolonged admission c/b cardiac arrest and vtach arrest and hypovolemic shock 2/2 GI bleed. New fever 3/21, source unclear, started on vanc and meropenem . Pending repeat cath and ICD placement once infection cleared.    #Seen on HD today    #ESRD on HD TTS  Seen on HD today. Planned for a 3.5 hr session with F160 dialyzers 2K bath, and 2L UF targeted. Tolerating well. VS stable. Continue as planned  Daily BMP   Continue with Tacro at home dose. Will obtain outpatient records to see if needs to be continued  Next HD 3/25  Per ID, meropenem daily, but after HD on HD days. Also vanc trough prior to HD      HTN:  UF with HD as tolerated   Intermittently hypotensive. Amlodipine dc'ed  Continue losartan 100mg, metoprolol 12.5 BID, hydralazine 50mg TID    Anemia:  Hgb at goal at 9.1  Will obtain outpatient HD records    MBD:  Corrected calcium 7.7  Phos: 4.0  iPTH 431  Check phos twice weekly. Goal <5.5  Will obtain outpatient records       62Y M w/ HTN, DM, ESRD s/p failed kidney transplant who was transferred to [facility] (3/17) from [place] for ICD placement and Cardiac Cath after prolonged admission c/b cardiac arrest and vtach arrest and hypovolemic shock 2/2 GI bleed. New fever 3/21, source unclear, started on vanc and meropenem . Pending repeat cath and ICD placement once infection cleared.    #Seen on HD today    #ESRD on HD TTS  Outpatient HD prescription; [id number], F180 dialyzer, 2K 2.5Ca dialysate,  ml/min,  ml/min, EDW 65kg  Seen on HD today. Planned for a 3.5 hr session with F160 dialyzers 2K bath, and 2L UF targeted. Tolerating well. VS stable. Continue as planned  Daily BMP   Continue with Tacro at home dose. Will obtain outpatient records to see if needs to be continued  Next HD 3/25  Per ID, meropenem daily, but after HD on HD days. Also vanc trough prior to HD      HTN:  UF with HD as tolerated   Intermittently hypotensive. Amlodipine dc'ed  Continue losartan 100mg, metoprolol 12.5 BID, hydralazine 50mg TID    Anemia:  Hgb at goal at 9.1  Check iron profile  epo w/ HD    MBD:  Corrected calcium 7.7  Phos: 4.0  iPTH 431  Check phos twice weekly. Goal <5.5  On Hectorol 4mcg TIW as outpatient. Will continue       62Y M w/ HTN, DM, ESRD s/p failed kidney transplant who was transferred to [facility] (3/17) from [place] for ICD placement and Cardiac Cath after prolonged admission c/b cardiac arrest and vtach arrest and hypovolemic shock 2/2 GI bleed. New fever 3/21, source unclear, started on vanc and meropenem . Pending repeat cath and ICD placement once infection cleared.    #Seen on HD today    #ESRD on HD TTS  Outpatient HD prescription; [id number], F180 dialyzer, 2K 2.5Ca dialysate,  ml/min,  ml/min, EDW 65kg  Seen on HD today. Planned for a 3.5 hr session with F160 dialyzers 2K bath, and 2L UF targeted. Tolerating well. VS stable. Continue as planned  Daily BMP   Continue with Tacro at home dose. Will obtain outpatient records to see if needs to be continued  Next HD 3/25  Per ID, meropenem daily, but after HD on HD days. Also vanc trough prior to HD      HTN:  UF with HD as tolerated   Intermittently hypotensive. Amlodipine dc'ed  Continue losartan 100mg, metoprolol 12.5 BID, hydralazine 50mg TID    Anemia:  Hgb at goal at 9.1  Check iron profile  epo w/ HD    MBD:  Corrected calcium 7.7  Phos: 4.0  iPTH 431  Check phos twice weekly. Goal <5.5  On Hectorol 4mcg TIW as outpatient. Will continue       62Y M w/ HTN, DM, ESRD s/p failed kidney transplant who was transferred to [facility] (3/17) from [place] for ICD placement and Cardiac Cath after prolonged admission c/b cardiac arrest and vtach arrest and hypovolemic shock 2/2 GI bleed. New fever 3/21, source unclear, started on vanc and meropenem . Pending repeat cath and ICD placement once infection cleared.    #Seen on HD today    #ESRD on HD TTS  Outpatient HD prescription; [id number], F180 dialyzer, 2K 2.5Ca dialysate,  ml/min,  ml/min, EDW 65kg  Seen on HD today. Planned for a 3.5 hr session with F160 dialyzers 2K bath, and 2L UF targeted. Tolerating well. VS stable. Continue as planned  Daily BMP   Continue with Tacro at home dose. Will obtain outpatient records to see if needs to be continued  Next HD 3/25  Per ID, meropenem daily, but after HD on HD days. Also vanc trough prior to HD      HTN:  UF with HD as tolerated   Intermittently hypotensive. Amlodipine dc'ed  Continue losartan 100mg, metoprolol 12.5 BID, hydralazine 50mg TID    Anemia:  Hgb at goal at 9.1  Check iron profile  epo w/ HD    MBD:  Corrected calcium 7.7  Phos: 4.0  iPTH 431  Check phos twice weekly. Goal <5.5  On Hectorol 4mcg TIW as outpatient. Will continue       62Y M w/ HTN, DM, ESRD s/p failed kidney transplant who was transferred to [facility] (3/17) from [place] for ICD placement and Cardiac Cath after prolonged admission c/b cardiac arrest and vtach arrest and hypovolemic shock 2/2 GI bleed. New fever 3/21, source unclear, started on vanc and meropenem . Pending repeat cath and ICD placement once infection cleared.    #Seen on HD today    #ESRD on HD TTS  Outpatient HD prescription; [id number], F180 dialyzer, 2K 2.5Ca dialysate,  ml/min,  ml/min, EDW 65kg  Seen on HD today. Planned for a 3.5 hr session with F160 dialyzers 2K bath, and 2L UF targeted. Tolerating well. VS stable. Continue as planned  Daily BMP   Continue with Tacro at home dose. Recommend pt follow up with outpatient nephrologist regarding tacro  Next HD 3/25  Per ID, meropenem daily, but after HD on HD days. Also vanc trough prior to HD      HTN:  UF with HD as tolerated   Intermittently hypotensive. Amlodipine dc'ed  Continue losartan 100mg, metoprolol 12.5 BID, hydralazine 50mg TID    Anemia:  Hgb at goal at 9.1  Check iron profile  epo w/ HD    MBD:  Corrected calcium 7.7  Phos: 4.0  iPTH 431  Check phos twice weekly. Goal <5.5  On Hectorol 4mcg TIW as outpatient. Will continue       62Y M w/ HTN, DM, ESRD s/p failed kidney transplant who was transferred to [facility] (3/17) from [place] for ICD placement and Cardiac Cath after prolonged admission c/b cardiac arrest and vtach arrest and hypovolemic shock 2/2 GI bleed. New fever 3/21, source unclear, started on vanc and meropenem . Pending repeat cath and ICD placement once infection cleared.    #Seen on HD today    #ESRD on HD TTS  Outpatient HD prescription; [id number], F180 dialyzer, 2K 2.5Ca dialysate,  ml/min,  ml/min, EDW 65kg  Seen on HD today. Planned for a 3.5 hr session with F160 dialyzers 2K bath, and 2L UF targeted. Tolerating well. VS stable. Continue as planned  Daily BMP   Continue with Tacro at home dose. Recommend pt follow up with outpatient nephrologist regarding tacro  Next HD 3/25  Per ID, meropenem daily, but after HD on HD days. Also vanc trough prior to HD      HTN:  UF with HD as tolerated   Intermittently hypotensive. Amlodipine dc'ed  Continue losartan 100mg, metoprolol 12.5 BID, hydralazine 50mg TID    Anemia:  Hgb at goal at 9.1  Check iron profile  epo w/ HD    MBD:  Corrected calcium 7.7  Phos: 4.0  iPTH 431  Check phos twice weekly. Goal <5.5  On Hectorol 4mcg TIW as outpatient. Will continue       62Y M w/ HTN, DM, ESRD s/p failed kidney transplant who was transferred to [facility] (3/17) from [place] for ICD placement and Cardiac Cath after prolonged admission c/b cardiac arrest and vtach arrest and hypovolemic shock 2/2 GI bleed. New fever 3/21, source unclear, started on vanc and meropenem . Pending repeat cath and ICD placement once infection cleared.    #Seen on HD today    #ESRD on HD TTS  Outpatient HD prescription; [id number], F180 dialyzer, 2K 2.5Ca dialysate,  ml/min,  ml/min, EDW 65kg  Seen on HD today. Planned for a 3.5 hr session with F160 dialyzers 2K bath, and 2L UF targeted. Tolerating well. VS stable. Continue as planned  Daily BMP   Continue with Tacro at home dose. Recommend pt follow up with outpatient nephrologist regarding tacro  Next HD 3/25  Per ID, meropenem daily, but after HD on HD days. Also vanc trough prior to HD      HTN:  UF with HD as tolerated   Intermittently hypotensive. Amlodipine dc'ed  Continue losartan 100mg, metoprolol 12.5 BID, hydralazine 50mg TID    Anemia:  Hgb at goal at 9.1  Check iron profile  epo w/ HD    MBD:  Corrected calcium 7.7  Phos: 4.0  iPTH 431  Check phos twice weekly. Goal <5.5  On Hectorol 4mcg TIW as outpatient. Will continue

## 2023-03-23 NOTE — PROVIDER CONTACT NOTE (CHANGE IN STATUS NOTIFICATION) - ACTION/TREATMENT ORDERED:
Per Angel Sapp, draw stat labs and 2 samples for type and screen. Blood bank called, no transfusion history on file. Pt evaluated by CCU fellow Michelet. Surgery team will be consulted
GILL Ordaz assessed at bedside, IV Tylenol, IV protonix, and blood work completed as ordered. IV Vanc ordered from pharmacy. Will continue to monitor closely.

## 2023-03-23 NOTE — PROGRESS NOTE ADULT - SUBJECTIVE AND OBJECTIVE BOX
OVERNIGHT EVENTS: None    SUBJECTIVE / INTERVAL HPI: Patient seen and examined at bedside. Denies f/c, n/v/d/c, chest pain, shortness of breath, abdominal pain.    VITAL SIGNS:  Vital Signs Last 24 Hrs  T(C): 38.7 (23 Mar 2023 15:30), Max: 38.7 (23 Mar 2023 15:30)  T(F): 101.7 (23 Mar 2023 15:30), Max: 101.7 (23 Mar 2023 15:30)  HR: 62 (23 Mar 2023 15:30) (50 - 62)  BP: 116/50 (23 Mar 2023 15:30) (104/49 - 149/67)  BP(mean): 72 (23 Mar 2023 15:30) (70 - 97)  RR: 18 (23 Mar 2023 15:30) (18 - 18)  SpO2: 98% (23 Mar 2023 15:30) (91% - 98%)    Parameters below as of 23 Mar 2023 15:30  Patient On (Oxygen Delivery Method): room air      I&O's Summary    22 Mar 2023 07:01  -  23 Mar 2023 07:00  --------------------------------------------------------  IN: 390 mL / OUT: 175 mL / NET: 215 mL    23 Mar 2023 07:01  -  23 Mar 2023 16:36  --------------------------------------------------------  IN: 180 mL / OUT: 2000 mL / NET: -1820 mL        PHYSICAL EXAM:    General: NAD  HEENT: NC/AT; PERRL, anicteric sclera; MMM  Neck: supple  Cardiovascular: +S1/S2; RRR  Respiratory: CTA B/L; no W/R/R  Gastrointestinal: soft, NT/ND; +BSx4  Extremities: WWP; no edema, clubbing or cyanosis  Vascular: 2+ radial, DP/PT pulses B/L  Neurological: AAOx3; no focal deficits    MEDICATIONS:  MEDICATIONS  (STANDING):  aspirin enteric coated 81 milliGRAM(s) Oral daily  atorvastatin 40 milliGRAM(s) Oral at bedtime  chlorhexidine 2% Cloths 1 Application(s) Topical daily  clopidogrel Tablet 75 milliGRAM(s) Oral daily  dextrose 5%. 1000 milliLiter(s) (100 mL/Hr) IV Continuous <Continuous>  dextrose 5%. 1000 milliLiter(s) (50 mL/Hr) IV Continuous <Continuous>  dextrose 50% Injectable 25 Gram(s) IV Push once  dextrose 50% Injectable 12.5 Gram(s) IV Push once  dextrose 50% Injectable 25 Gram(s) IV Push once  ferrous    sulfate 325 milliGRAM(s) Oral daily  glucagon  Injectable 1 milliGRAM(s) IntraMuscular once  hydrALAZINE 50 milliGRAM(s) Oral every 8 hours  insulin lispro (ADMELOG) corrective regimen sliding scale   SubCutaneous every 6 hours  isosorbide   dinitrate Tablet (ISORDIL) 20 milliGRAM(s) Oral three times a day  losartan 100 milliGRAM(s) Oral daily  meropenem  IVPB      meropenem  IVPB 500 milliGRAM(s) IV Intermittent every 24 hours  metoprolol tartrate 12.5 milliGRAM(s) Oral every 12 hours  polyethylene glycol 3350 17 Gram(s) Oral daily  tacrolimus 2 milliGRAM(s) Oral every 12 hours  tamsulosin 0.8 milliGRAM(s) Oral at bedtime  vancomycin  IVPB 750 milliGRAM(s) IV Intermittent once    MEDICATIONS  (PRN):  acetaminophen     Tablet .. 650 milliGRAM(s) Oral every 6 hours PRN Mild Pain (1 - 3), Moderate Pain (4 - 6)  dextrose Oral Gel 15 Gram(s) Oral once PRN Blood Glucose LESS THAN 70 milliGRAM(s)/deciliter  sodium chloride 0.65% Nasal 1 Spray(s) Both Nostrils every 4 hours PRN Nasal Congestion      ALLERGIES:  Allergies    No Known Allergies    Intolerances        LABS:                        9.1    6.82  )-----------( 101      ( 23 Mar 2023 06:27 )             29.0     03-23    127<L>  |  93<L>  |  21  ----------------------------<  77  4.6   |  26  |  5.45<H>    Ca    7.7<L>      23 Mar 2023 06:27  Phos  4.0     03-23  Mg     1.9     03-23    TPro  4.6<L>  /  Alb  2.4<L>  /  TBili  0.8  /  DBili  x   /  AST  16  /  ALT  6<L>  /  AlkPhos  120  03-22        CAPILLARY BLOOD GLUCOSE      POCT Blood Glucose.: 76 mg/dL (23 Mar 2023 06:21)      RADIOLOGY & ADDITIONAL TESTS: Reviewed.

## 2023-03-23 NOTE — PROGRESS NOTE ADULT - PROBLEM SELECTOR PLAN 6
dialysis Tues, Thurs, Sat  - last received 3/21/2023; next HD session 3/23/23  - please obtain vanc trough prior to dialysis tomorrow - Pt still produced some urine; urinary retention requiring multiple straight caths; now s/p indwelling duvall placement on 3/21/23.

## 2023-03-23 NOTE — PROGRESS NOTE ADULT - PROBLEM SELECTOR PLAN 4
Patient euvolemic on exam   - ECHO as above --> EF 25-30%, likely ischemic in origin   - GDMT: Hydral 50mg q8h, isordil 20 mg TID, Lopressor 12.5 mg BID, Losartan 100 mg daily - s/p VTach arrest @ [facility]; no tele events noted.    - EP Consulted – plan for ICD placement once pt has ischemic eval / LHC first.    - CONT: Lopressor 12.5mg PO BID. - s/p VTach arrest @ [facility]; no tele events noted.    - EP Consulted – plan for ICD placement once pt has ischemic eval / LHC first.    - CONT: Lopressor 12.5mg PO BID. - s/p VTach arrest @ [facility]; no tele events noted.    - EP Consulted – plan for ICD placement once pt has ischemic eval / LHC first.    - CONT: Lopressor 12.5mg PO BID.

## 2023-03-23 NOTE — PHYSICAL THERAPY INITIAL EVALUATION ADULT - GENERAL OBSERVATIONS, REHAB EVAL
Pt received semi-supine in bed in no acute distress, cleared to treat by GILL Ordaz. Required max encouragement / rationale to participate. +EKG +Heplock +Moody +R GURDEEPA

## 2023-03-23 NOTE — PROGRESS NOTE ADULT - PROBLEM SELECTOR PLAN 8
SBP on admission 170s  - C/w: home amlodipine 10 mg daily, Hydralazine 50 mg q8h, isordil 20 mg q8h, losartan 100 mg daily - s/p failed kidney transplant (per medicine, pt may not require tacrolimus if transplant failed).    - Continue home Tacrolimus 2mg PO BID.    - Tacrolimus Lvls 2.1 -> 4.0 -> 3.5.

## 2023-03-23 NOTE — PROGRESS NOTE ADULT - PROBLEM SELECTOR PLAN 1
- LHC at [place] w/ oLM 30% and RCA 99% that is not amenable to intervention.   - TTE (3/1/23 @ [facility]): mild LVH, LVEF 25-30%, severe global wall motion dysfxn, mild LA dilation, mild RV dilation, mildly calcified leaflets, mod pHTN.   - Pt w/ severe GIB at [facility] requiring multiple PRBC transfusions; at [facility] pt cleared by GI for DAPT, and received ASA/Plavix since 3/20/23 w/ stable H/H and no signs of bleeding; HOWEVER, 3/23/23 pt w/ episode of black tarry stool (H/H stable and no hemodynamic signs of bleeding), Plavix held at this time until cleared by GI.   - Continue ASA 81mg PO QD (cleared by GI).   - PLAN: pending further GI and infectious work up to determine date of LHC. - LHC at [place] w/ oLM 30% and RCA 99% that is not amenable to intervention.   - TTE (3/1/23 @ [facility]): mild LVH, LVEF 25-30%, severe global wall motion dysfxn, mild LA dilation, mild RV dilation, mildly calcified leaflets, mod pHTN.   - Pt w/ severe GIB at [facility] requiring multiple PRBC transfusions; at [facility] pt cleared by GI for DAPT, and received ASA/Plavix since 3/20/23 w/ stable H/H and no signs of bleeding; HOWEVER, 3/23/23 pt w/ episode of black tarry stool (H/H stable and no hemodynamic signs of bleeding), Plavix held at this time until cleared by GI.   - Continue ASA 81mg PO QD (cleared by GI).   - PLAN: pending further GI and infectious work up to determine date of LHC. - LHC at [place] w/ oLM 30% and RCA 99% that is not amenable to intervention.   - TTE (3/1/23 @ [facility]): mild LVH, LVEF 25-30%, severe global wall motion dysfxn, mild LA dilation, mild RV dilation, mildly calcified leaflets, mod pHTN.   - Pt w/ severe GIB at [facility] requiring multiple PRBC transfusions; at [facility] pt cleared by GI for DAPT, and received ASA/Plavix since 3/20/23 w/ stable H/H and no signs of bleeding; HOWEVER, 3/23/23 pt w/ episode of black tarry stool (H/H stable and no hemodynamic signs of bleeding), Plavix held at this time until cleared by GI.   - Continue ASA 81mg PO QD (cleared by GI).   - PLAN: pending further GI and infectious work up to determine date of LHC. - LHC at [place] w/ oLM 30% and RCA 99% that is not amenable to intervention.    - TTE (3/1/23 @ [facility]): mild LVH, LVEF 25-30%, severe global wall motion dysfxn, mild LA dilation, mild RV dilation, mildly calcified leaflets, mod pHTN.    - Pt w/ severe GIB at [facility] requiring multiple PRBC transfusions; at [facility] pt cleared by GI for DAPT, and received ASA/Plavix since 3/20/23 w/ stable H/H and no signs of bleeding; HOWEVER, 3/23/23 pt w/ episode of black tarry stool (H/H stable and no hemodynamic signs of bleeding), Plavix held at this time until cleared by GI.    - Continue ASA 81mg PO QD (cleared by GI).    - PLAN: pending further GI and infectious work up to determine date of LHC. - LHC at [place] w/ oLM 30% and RCA 99% that is not amenable to intervention.    - TTE (3/1/23 @ [facility]): mild LVH, LVEF 25-30%, severe global wall motion dysfxn, mild LA dilation, mild RV dilation, mildly calcified leaflets, mod pHTN.    - Pt w/ severe GIB at [facility] requiring multiple PRBC transfusions; at [facility] pt cleared by GI for DAPT, and received ASA/Plavix since 3/20/23 w/ stable H/H and no signs of bleeding; HOWEVER, 3/23/23 pt w/ episode of black tarry stool (H/H stable and no hemodynamic signs of bleeding), Plavix held at this time until cleared by GI.    - Continue ASA 81mg PO QD (cleared by GI).    - PLAN: pending further GI and infectious work up to determine date of LHC. - LHC at [place] w/ oLM 30% and RCA 99% that is not amenable to intervention.    - TTE (3/1/23 @ [facility]): mild LVH, LVEF 25-30%, severe global wall motion dysfxn, mild LA dilation, mild RV dilation, mildly calcified leaflets, mod pHTN.    - Pt w/ severe GIB at [facility] requiring multiple PRBC transfusions; at [facility] pt cleared by GI for DAPT, and received ASA/Plavix since 3/20/23 w/ stable H/H and no signs of bleeding; HOWEVER, 3/23/23 pt w/ episode of black tarry stool (H/H stable and no hemodynamic signs of bleeding), Plavix held at this time until cleared by GI.    - Continue ASA 81mg PO QD (cleared by GI).    - PLAN: pending further GI and infectious work up to determine date of LHC.

## 2023-03-23 NOTE — PHYSICAL THERAPY INITIAL EVALUATION ADULT - FOLLOWS COMMANDS/ANSWERS QUESTIONS, REHAB EVAL
Required max encouragement / rationale to participate/75% of the time/able to follow single-step instructions

## 2023-03-23 NOTE — PROGRESS NOTE ADULT - PROBLEM SELECTOR PLAN 11
- HgA1c 5.9.    - CONT: MICHELINE.           DVT ppx: holding AC; SCD   Dispo: pending clinical improvement; pending completion of infectious work up; pending LHC; pending GI stabilization; pending ICD placement.

## 2023-03-23 NOTE — PHYSICAL THERAPY INITIAL EVALUATION ADULT - PERTINENT HX OF CURRENT PROBLEM, REHAB EVAL
61yo M PMHx Right AKA presented to [facility] 2/27 for respiratory distress after a dialysis session and admitted to cardiac telemetry on 2/27 with SIRS criteria. Pt was placed on BiPAP which failed, where he was then intubated on 3/1 and moved to the MICU. He went into cardiac arrest, Vtach arrest, HR ellen into 30s and atropine given twice. Extubated 3/2. Now transferred for ICD placement 2/2 Vtach and cardiac cath. 61yo M PMHx Right AKA presented to [facility] 2/27 for respiratory distress after a dialysis session and admitted to cardiac telemetry on 2/27 with SIRS criteria. Pt was placed on BiPAP which failed, where he was then intubated on 3/1 and moved to the MICU. He went into cardiac arrest, Vtach arrest, HR ellen into 30s and atropine given twice. Extubated 3/2. Now transferred for ICD placement 2/2 Vtach and cardiac cath. 61yo M PMHx Right AKA presented to [facility] 2/27 for respiratory distress after a dialysis session and admitted to cardiac telemetry on 2/27 with SIRS criteria. Pt was placed on BiPAP which failed, where he was then intubated on 3/1 and moved to the MICU. He went into cardiac arrest, Vtach arrest, HR ellen into 30s and atropine given twice. Extubated 3/2. Now transferred for ICD placement 2/2 Vtach and cardiac cath.

## 2023-03-23 NOTE — PROGRESS NOTE ADULT - SUBJECTIVE AND OBJECTIVE BOX
Interventional Cardiology PA Adult Progress Note    Subjective Assessment:  	  MEDICATIONS:  hydrALAZINE 50 milliGRAM(s) Oral every 8 hours  isosorbide   dinitrate Tablet (ISORDIL) 20 milliGRAM(s) Oral three times a day  losartan 100 milliGRAM(s) Oral daily  metoprolol tartrate 12.5 milliGRAM(s) Oral every 12 hours    meropenem  IVPB      meropenem  IVPB 500 milliGRAM(s) IV Intermittent every 24 hours      acetaminophen     Tablet .. 650 milliGRAM(s) Oral every 6 hours PRN    polyethylene glycol 3350 17 Gram(s) Oral daily    atorvastatin 40 milliGRAM(s) Oral at bedtime  dextrose 50% Injectable 25 Gram(s) IV Push once  dextrose 50% Injectable 12.5 Gram(s) IV Push once  dextrose 50% Injectable 25 Gram(s) IV Push once  dextrose Oral Gel 15 Gram(s) Oral once PRN  glucagon  Injectable 1 milliGRAM(s) IntraMuscular once  insulin lispro (ADMELOG) corrective regimen sliding scale   SubCutaneous every 6 hours    aspirin enteric coated 81 milliGRAM(s) Oral daily  chlorhexidine 2% Cloths 1 Application(s) Topical daily  clopidogrel Tablet 75 milliGRAM(s) Oral daily  dextrose 5%. 1000 milliLiter(s) IV Continuous <Continuous>  dextrose 5%. 1000 milliLiter(s) IV Continuous <Continuous>  ferrous    sulfate 325 milliGRAM(s) Oral daily  sodium chloride 0.65% Nasal 1 Spray(s) Both Nostrils every 4 hours PRN  tacrolimus 2 milliGRAM(s) Oral every 12 hours  tamsulosin 0.8 milliGRAM(s) Oral at bedtime      	    [PHYSICAL EXAM:  TELEMETRY:  T(C): 37.9 (03-23-23 @ 17:29), Max: 38.7 (03-23-23 @ 15:30)  HR: 62 (03-23-23 @ 16:15) (50 - 62)  BP: 118/58 (03-23-23 @ 16:15) (104/49 - 149/67)  RR: 18 (03-23-23 @ 16:15) (18 - 18)  SpO2: 93% (03-23-23 @ 16:15) (91% - 98%)  Wt(kg): --  I&O's Summary    22 Mar 2023 07:01  -  23 Mar 2023 07:00  --------------------------------------------------------  IN: 390 mL / OUT: 175 mL / NET: 215 mL    23 Mar 2023 07:01  -  23 Mar 2023 17:41  --------------------------------------------------------  IN: 180 mL / OUT: 2000 mL / NET: -1820 mL        Appearance: sleeping peacefully in bed    Cardiovascular: Normal S1 S2  Respiratory: Lungs clear to auscultation  Gastrointestinal:  soft, non-tender 	  Skin: warm, no rashes   Extremities: R AKA   Vascular: Peripheral pulse palpable 2+ LLE  Neurologic: Non-focal  Psychiatry: A&O x 3, Mood & affect appropriate    LABS:	 	  CARDIAC MARKERS:                        8.7    5.37  )-----------( 103      ( 23 Mar 2023 16:36 )             27.8     135  |  98  |  8   ----------------------------<  73  3.5   |  32<H>  |  2.58<H>    Ca    8.1<L>      23 Mar 2023 16:36    Phos  4.0     03-23    Mg     1.9     03-23    TPro  5.1<L>  /  Alb  2.6<L>  /  TBili  0.9  /  DBili  x   /  AST  15  /  ALT  7<L>  /  AlkPhos  133<H>  03-23       Interventional Cardiology PA Adult Progress Note    Subjective Assessment:  	  MEDICATIONS:  hydrALAZINE 50 milliGRAM(s) Oral every 8 hours  isosorbide   dinitrate Tablet (ISORDIL) 20 milliGRAM(s) Oral three times a day  losartan 100 milliGRAM(s) Oral daily  metoprolol tartrate 12.5 milliGRAM(s) Oral every 12 hours  meropenem  IVPB      meropenem  IVPB 500 milliGRAM(s) IV Intermittent every 24 hours  acetaminophen     Tablet .. 650 milliGRAM(s) Oral every 6 hours PRN  polyethylene glycol 3350 17 Gram(s) Oral daily  atorvastatin 40 milliGRAM(s) Oral at bedtime  dextrose 50% Injectable 25 Gram(s) IV Push once  dextrose 50% Injectable 12.5 Gram(s) IV Push once  dextrose 50% Injectable 25 Gram(s) IV Push once  dextrose Oral Gel 15 Gram(s) Oral once PRN  glucagon  Injectable 1 milliGRAM(s) IntraMuscular once  insulin lispro (ADMELOG) corrective regimen sliding scale   SubCutaneous every 6 hours  aspirin enteric coated 81 milliGRAM(s) Oral daily  chlorhexidine 2% Cloths 1 Application(s) Topical daily  clopidogrel Tablet 75 milliGRAM(s) Oral daily  dextrose 5%. 1000 milliLiter(s) IV Continuous <Continuous>  dextrose 5%. 1000 milliLiter(s) IV Continuous <Continuous>  ferrous    sulfate 325 milliGRAM(s) Oral daily  sodium chloride 0.65% Nasal 1 Spray(s) Both Nostrils every 4 hours PRN  tacrolimus 2 milliGRAM(s) Oral every 12 hours  tamsulosin 0.8 milliGRAM(s) Oral at bedtime      PHYSICAL EXAM:  TELEMETRY:  T(C): 37.9 (03-23-23 @ 17:29), Max: 38.7 (03-23-23 @ 15:30)  HR: 62 (03-23-23 @ 16:15) (50 - 62)  BP: 118/58 (03-23-23 @ 16:15) (104/49 - 149/67)  RR: 18 (03-23-23 @ 16:15) (18 - 18)  SpO2: 93% (03-23-23 @ 16:15) (91% - 98%)  Wt(kg): --  I&O's Summary    22 Mar 2023 07:01  -  23 Mar 2023 07:00  --------------------------------------------------------  IN: 390 mL / OUT: 175 mL / NET: 215 mL    23 Mar 2023 07:01  -  23 Mar 2023 17:41  --------------------------------------------------------  IN: 180 mL / OUT: 2000 mL / NET: -1820 mL        Appearance: sleeping peacefully in bed    Cardiovascular: Normal S1 S2  Respiratory: Lungs clear to auscultation  Gastrointestinal:  soft, non-tender 	  Skin: warm, no rashes   Extremities: R AKA   Vascular: Peripheral pulse palpable 2+ LLE  Neurologic: Non-focal  Psychiatry: A&O x 3, Mood & affect appropriate    LABS:	 	  CARDIAC MARKERS:                        8.7    5.37  )-----------( 103      ( 23 Mar 2023 16:36 )             27.8     135  |  98  |  8   ----------------------------<  73  3.5   |  32<H>  |  2.58<H>    Ca    8.1<L>      23 Mar 2023 16:36    Phos  4.0     03-23    Mg     1.9     03-23    TPro  5.1<L>  /  Alb  2.6<L>  /  TBili  0.9  /  DBili  x   /  AST  15  /  ALT  7<L>  /  AlkPhos  133<H>  03-23

## 2023-03-23 NOTE — PROGRESS NOTE ADULT - NSPROGADDITIONALINFOA_GEN_ALL_CORE
Attending Attestation:  I was physically present for the key portions of the evaluation and management (E/M) service provided.  I agree with the above history, physical, and plan which I have reviewed with the following edits/addendum:    62M w HTN HLP, DMT2, s/p R AKA, HFrEF 25% LVEF on 3/1/23 TTE from [facility] (ICM - chronic RCA 99%, not intervened), ESRD on TTS HD via L AVF s/p failed renal transplant (on low dose tacrolimus) who was initially admitted to [facility] 2/27 for resp distress post HD, pneumonia sepsis requiring intubation 3/1 then had VT arrest (8 min resusc) while in ICU. Extubated 3/2. Subsequent course c/b hematemesis, GIB dropping hgb to 3.5 s/p 7 u pRBCs, 1 u FFP, 1 u platelets. EGD/cscope showed melanosis duodeni, rectal ulcer. CT abd showed focal proctitis. Given hydrocortisone suppository and mesalamine. 3/17 transferred to [facility] for repeat cath and ICD placement but had recurrent hematochezia 3/17. 3/20 DAPT restarted, no further GIB episodes w hgb stable at 10-11. 3/21 at HD, developed a fever of 102F and chills.     #Fever - unclear origin, had BTs and was on immunosuppression. Blood Cx NGTD, UA clean.  W/u underway and on broad coverage antbx. Plan for PET scan to locate focus of infxn  #VT arrest - plan to re-assess coronary anatomy, cath once infection is addressed. EP on board and will place sec prevention ICD once clinically optimized  #CAD - tolerating DAPT without any further active GIB. R/b of stenting/PCI reviewed. on DAPT, high intensity statin  #HFrEF - euvolemic clinically. Optimize GDMT. DC amlodipine; Continue isordil/hydral, transition to metop succ, losartan transition to Entresto (K<5, coordinate w renal) if will continue as OP. MRA, SGLT2i defer to outpt HF doc in [place].  #GIB w rectal ulcer - surgery saw and recs no intervention, managing conservatively with IM. Plan for repeat scope in 3 mos. Black tarry stools today  #Blood loss anemia - hgb 10-11. Transfusion threshold hgb<8  #ESRD - TTS HD, nephro on board  #failed renal transplant - clarify immunosupp tacrolimus thx    MARIE on dc    Stan Jones MD  Cardiology    35 minutes spent on total encounter; more than 50% of the visit was spent counseling and/or coordinating care by the attending physician. Attending Attestation:  I was physically present for the key portions of the evaluation and management (E/M) service provided.  I agree with the above history, physical, and plan which I have reviewed with the following edits/addendum:    62M w HTN HLP, DMT2, s/p R AKA, HFrEF 25% LVEF on 3/1/23 TTE from [facility] (ICM - chronic RCA 99%, not intervened), ESRD on TTS HD via L AVF s/p failed renal transplant (on low dose tacrolimus) who was initially admitted to [facility] 2/27 for resp distress post HD, pneumonia sepsis requiring intubation 3/1 then had VT arrest (8 min resusc) while in ICU. Extubated 3/2. Subsequent course c/b hematemesis, GIB dropping hgb to 3.5 s/p 7 u pRBCs, 1 u FFP, 1 u platelets. EGD/cscope showed melanosis duodeni, rectal ulcer. CT abd showed focal proctitis. Given hydrocortisone suppository and mesalamine. 3/17 transferred to [facility] for repeat cath and ICD placement but had recurrent hematochezia 3/17. 3/20 DAPT restarted, no further GIB episodes w hgb stable at 10-11. 3/21 at HD, developed a fever of 102F and chills.     #Fever - unclear origin, had BTs and was on immunosuppression. Blood Cx NGTD, UA clean.  W/u underway and on broad coverage antbx. Plan for PET scan to locate focus of infxn  #VT arrest - plan to re-assess coronary anatomy, cath once infection is addressed. EP on board and will place sec prevention ICD once clinically optimized  #CAD - tolerating DAPT without any further active GIB. R/b of stenting/PCI reviewed. on DAPT, high intensity statin  #HFrEF - euvolemic clinically. Optimize GDMT. DC amlodipine; Continue isordil/hydral, transition to metop succ, losartan transition to Entresto (K<5, coordinate w renal) if will continue as OP. MRA, SGLT2i defer to outpt HF doc in [place].  #GIB w rectal ulcer - surgery saw and recs no intervention, managing conservatively with IM. Plan for repeat scope in 3 mos. Black tarry stools today  #Blood loss anemia - hgb 10-11. Transfusion threshold hgb<8  #ESRD - TTS HD, nephro on board  #failed renal transplant - clarify immunosupp tacrolimus thx    MARIE on dc    Stan Jones MD  Cardiology    35 minutes spent on total encounter; more than 50% of the visit was spent counseling and/or coordinating care by the attending physician. Attending Attestation:  I was physically present for the key portions of the evaluation and management (E/M) service provided.  I agree with the above history, physical, and plan which I have reviewed with the following edits/addendum:    62M w HTN HLP, DMT2, s/p R AKA, HFrEF 25% LVEF on 3/1/23 TTE from [facility] (ICM - chronic RCA 99%, not intervened), ESRD on TTS HD via L AVF s/p failed renal transplant (on low dose tacrolimus) who was initially admitted to [facility] 2/27 for resp distress post HD, pneumonia sepsis requiring intubation 3/1 then had VT arrest (8 min resusc) while in ICU. Extubated 3/2. Subsequent course c/b hematemesis, GIB dropping hgb to 3.5 s/p 7 u pRBCs, 1 u FFP, 1 u platelets. EGD/cscope showed melanosis duodeni, rectal ulcer. CT abd showed focal proctitis. Given hydrocortisone suppository and mesalamine. 3/17 transferred to [facility] for repeat cath and ICD placement but had recurrent hematochezia 3/17. 3/20 DAPT restarted, no further GIB episodes w hgb stable at 10-11. 3/21 at HD, developed a fever of 102F and chills.     #Fever - unclear origin, had BTs and was on immunosuppression. Blood Cx NGTD, UA clean.  W/u underway and on broad coverage antbx. Plan for PET scan to locate focus of infxn  #VT arrest - plan to re-assess coronary anatomy, cath once infection is addressed. EP on board and will place sec prevention ICD once clinically optimized  #CAD - tolerating DAPT without any further active GIB. R/b of stenting/PCI reviewed. on DAPT, high intensity statin  #HFrEF - euvolemic clinically. Optimize GDMT. DC amlodipine; Continue isordil/hydral, transition to metop succ, losartan transition to Entresto (K<5, coordinate w renal) if will continue as OP. MRA, SGLT2i defer to outpt HF doc in [place].  #GIB w rectal ulcer - surgery saw and recs no intervention, managing conservatively with IM. Plan for repeat scope in 3 mos. Black tarry stools today  #Blood loss anemia - hgb 10-11. Transfusion threshold hgb<8  #ESRD - TTS HD, nephro on board  #failed renal transplant - clarify immunosupp tacrolimus thx    MARIE on dc    Stan Jones MD  Cardiology    35 minutes spent on total encounter; more than 50% of the visit was spent counseling and/or coordinating care by the attending physician.

## 2023-03-23 NOTE — PROGRESS NOTE ADULT - PROBLEM SELECTOR PLAN 7
s/p failed kidney transplant (attempt to obtain collateral, per medicine pt may not require tacrolimus if transplant failed)   - Continue home Tacrolimus 2 mg BID  - F/u prograf level - HD T/Th/Sat.    - Last HD 3/23/23, next HD 3/25/23.    - **Needs vancomycin trough prior to HD sessions so that Vanc can be dosed and given *after* HD sessions.

## 2023-03-23 NOTE — PROGRESS NOTE ADULT - PROBLEM SELECTOR PLAN 3
Vtach arrest @[facility]  - no tele events @ [facility]  - EP consulted for ICD placement but will need ischemic eval/cath first   - started on lopressor 12.5 mg BID per EP recommendations Vtach arrest @[facility]  - no tele events @ [facility]  - EP consulted for ICD placement but will need ischemic eval/cath first   - started on lopressor 12.5 mg BID per EP recommendations Vtach arrest @[facility]  - no tele events @ [facility]  - EP consulted for ICD placement but will need ischemic eval/cath first   - started on lopressor 12.5 mg BID per EP recommendations - @ [facility], pt w/ severe GIB w/ Hgb down to 3.5 received total of 7u PRBCs.    - 3/17 AM pt passed loose BM along w/ large amount of BRBPR.    - s/p Flex sigmoidoscopy – Localized ulceration and erosion w/ no bleeding in rectum, suggestive of solitary ulcer syndrome; evidence of prior hemoclip was found in sigmoid colon.    - Avoid anal digitation and eneams.    - Bowel regimen w/ Miralax and Dulcolax to avoid constipation.    - Pt initialy cleared to trial DAPT and received ASA/Plavix 3/20/23 - 3/23/23; on 3/23/23 pt w/ episode of melena, and GI reconsulted. Plavix discontinued but per GI pt can continue ASA 81mg PO QD.    - Transfusion goal Hgb < 8.0. - @ [facility], pt w/ severe GIB w/ Hgb down to 3.5 received total of 7u PRBCs.    - 3/17 AM pt passed loose BM along w/ large amount of BRBPR.    - s/p Flex sigmoidoscopy – Localized ulceration and erosion w/ no bleeding in rectum, suggestive of solitary ulcer syndrome; evidence of prior hemoclip was found in sigmoid colon.    - Avoid anal digitation and eneams.    - Bowel regimen w/ Miralax and Dulcolax to avoid constipation.    - Pt initialy cleared to trial DAPT and received ASA/Plavix 3/20/23 - 3/23/23; on 3/23/23 pt w/ episode of melena, and GI reconsulted. Plavix discontinued but per GI pt can continue ASA 81mg PO QD.    - Transfusion goal Hgb < 8.0. - @ [facility], pt w/ severe GIB w/ Hgb down to 3.5 received total of 7u PRBCs.    - 3/17 AM pt passed loose BM along w/ large amount of BRBPR.    - s/p Flex sigmoidoscopy – Localized ulceration and erosion w/ no bleeding in rectum, suggestive of solitary ulcer syndrome; evidence of prior hemoclip was found in sigmoid colon.    - Avoid anal digitation and eneams.    - Bowel regimen w/ Miralax and Dulcolax to avoid constipation.    - Pt initialy cleared to trial DAPT and received ASA/Plavix 3/20/23 - 3/23/23; on 3/23/23 pt w/ episode of melena, and GI reconsulted. Plavix discontinued but per GI pt can continue ASA 81mg PO QD.    - Transfusion goal Hgb < 8.0.

## 2023-03-23 NOTE — PROGRESS NOTE ADULT - PROBLEM SELECTOR PLAN 10
A1C 5.9  - Continue MICHELINE     F: Oral intake  E: Replete electrolytes as needed for K<4 and Mg<2  N: renal diet  DVT PPX: held for cath/ICD placement  Dispo: pending ICD/cardiac cath  PT ordered, came from rehab     Case discussed with Dr. Jones - Chol 88, , HDL 44, LDL 12.    - CONT: Atorvastatin 40mg PO QHS.

## 2023-03-23 NOTE — PHYSICAL THERAPY INITIAL EVALUATION ADULT - ADDITIONAL COMMENTS
Pt presents lethargic while obtaining history, as per case management doc patient is a long term resident at [facility] since May 2020. As per pt, at rehab he was using R prosthetic to ambulate (prosthetic is at rehab center, did not bring to [facility]) with assistance and RW. Pt used a walk in shower with a shower tub to shower at rehab. Has assistance for ADLs/IADLs. Pt presents lethargic while obtaining history, as per case management doc patient is a long term resident at [facility] since May 2020. As per pt, at rehab he was using R prosthetic to ambulate (prosthetic is at rehab center, did not bring to [facility]) with assistance and RW. Pt used a walk in shower with a shower tub to shower at rehab. Has assistance for ADLs/IADLs. Pt presents lethargic while obtaining history, as per case management doc patient is a long term resident at [facility] since May 2020. As per pt, at rehab he was using R prosthetic to ambulate (prosthetic is at rehab center, did not bring to [facility]) with assistance and RW. Pt used a walk in shower with a shower tub to shower at rehab. Has assistance for ADLs/IADLs.

## 2023-03-24 ENCOUNTER — TRANSCRIPTION ENCOUNTER (OUTPATIENT)
Age: 63
End: 2023-03-24

## 2023-03-24 LAB
ALBUMIN SERPL ELPH-MCNC: 2.6 G/DL — LOW (ref 3.3–5)
ALP SERPL-CCNC: 137 U/L — HIGH (ref 40–120)
ALT FLD-CCNC: 7 U/L — LOW (ref 10–45)
ANION GAP SERPL CALC-SCNC: 8 MMOL/L — SIGNIFICANT CHANGE UP (ref 5–17)
AST SERPL-CCNC: 17 U/L — SIGNIFICANT CHANGE UP (ref 10–40)
BILIRUB SERPL-MCNC: 0.8 MG/DL — SIGNIFICANT CHANGE UP (ref 0.2–1.2)
BUN SERPL-MCNC: 11 MG/DL — SIGNIFICANT CHANGE UP (ref 7–23)
CALCIUM SERPL-MCNC: 8.2 MG/DL — LOW (ref 8.4–10.5)
CHLORIDE SERPL-SCNC: 95 MMOL/L — LOW (ref 96–108)
CMV DNA CSF QL NAA+PROBE: 578 — SIGNIFICANT CHANGE UP
CMV DNA SPEC NAA+PROBE-LOG#: 2.76 LOG10IU/ML — HIGH
CO2 SERPL-SCNC: 30 MMOL/L — SIGNIFICANT CHANGE UP (ref 22–31)
CREAT SERPL-MCNC: 3.74 MG/DL — HIGH (ref 0.5–1.3)
EGFR: 18 ML/MIN/1.73M2 — LOW
FUNGITELL: 52 PG/ML — SIGNIFICANT CHANGE UP
GLUCOSE BLDC GLUCOMTR-MCNC: 106 MG/DL — HIGH (ref 70–99)
GLUCOSE BLDC GLUCOMTR-MCNC: 109 MG/DL — HIGH (ref 70–99)
GLUCOSE BLDC GLUCOMTR-MCNC: 125 MG/DL — HIGH (ref 70–99)
GLUCOSE BLDC GLUCOMTR-MCNC: 60 MG/DL — LOW (ref 70–99)
GLUCOSE BLDC GLUCOMTR-MCNC: 74 MG/DL — SIGNIFICANT CHANGE UP (ref 70–99)
GLUCOSE BLDC GLUCOMTR-MCNC: 83 MG/DL — SIGNIFICANT CHANGE UP (ref 70–99)
GLUCOSE BLDC GLUCOMTR-MCNC: 84 MG/DL — SIGNIFICANT CHANGE UP (ref 70–99)
GLUCOSE SERPL-MCNC: 74 MG/DL — SIGNIFICANT CHANGE UP (ref 70–99)
HCT VFR BLD CALC: 29.1 % — LOW (ref 39–50)
HCT VFR BLD CALC: 30.8 % — LOW (ref 39–50)
HGB BLD-MCNC: 9.1 G/DL — LOW (ref 13–17)
HGB BLD-MCNC: 9.6 G/DL — LOW (ref 13–17)
MAGNESIUM SERPL-MCNC: 1.8 MG/DL — SIGNIFICANT CHANGE UP (ref 1.6–2.6)
MCHC RBC-ENTMCNC: 28.6 PG — SIGNIFICANT CHANGE UP (ref 27–34)
MCHC RBC-ENTMCNC: 28.7 PG — SIGNIFICANT CHANGE UP (ref 27–34)
MCHC RBC-ENTMCNC: 31.2 GM/DL — LOW (ref 32–36)
MCHC RBC-ENTMCNC: 31.3 GM/DL — LOW (ref 32–36)
MCV RBC AUTO: 91.7 FL — SIGNIFICANT CHANGE UP (ref 80–100)
MCV RBC AUTO: 91.8 FL — SIGNIFICANT CHANGE UP (ref 80–100)
NRBC # BLD: 0 /100 WBCS — SIGNIFICANT CHANGE UP (ref 0–0)
OB PNL STL: NEGATIVE — SIGNIFICANT CHANGE UP
PHOSPHATE SERPL-MCNC: 3.2 MG/DL — SIGNIFICANT CHANGE UP (ref 2.5–4.5)
PLATELET # BLD AUTO: 120 K/UL — LOW (ref 150–400)
PLATELET # BLD AUTO: 123 K/UL — LOW (ref 150–400)
POTASSIUM SERPL-MCNC: 3.8 MMOL/L — SIGNIFICANT CHANGE UP (ref 3.5–5.3)
POTASSIUM SERPL-SCNC: 3.8 MMOL/L — SIGNIFICANT CHANGE UP (ref 3.5–5.3)
PROT SERPL-MCNC: 4.9 G/DL — LOW (ref 6–8.3)
RBC # BLD: 3.17 M/UL — LOW (ref 4.2–5.8)
RBC # BLD: 3.36 M/UL — LOW (ref 4.2–5.8)
RBC # FLD: 14.5 % — SIGNIFICANT CHANGE UP (ref 10.3–14.5)
RBC # FLD: 14.6 % — HIGH (ref 10.3–14.5)
SODIUM SERPL-SCNC: 133 MMOL/L — LOW (ref 135–145)
WBC # BLD: 5.86 K/UL — SIGNIFICANT CHANGE UP (ref 3.8–10.5)
WBC # BLD: 6.49 K/UL — SIGNIFICANT CHANGE UP (ref 3.8–10.5)
WBC # FLD AUTO: 5.86 K/UL — SIGNIFICANT CHANGE UP (ref 3.8–10.5)
WBC # FLD AUTO: 6.49 K/UL — SIGNIFICANT CHANGE UP (ref 3.8–10.5)

## 2023-03-24 PROCEDURE — 99232 SBSQ HOSP IP/OBS MODERATE 35: CPT

## 2023-03-24 RX ORDER — DEXTROSE 10 % IN WATER 10 %
125 INTRAVENOUS SOLUTION INTRAVENOUS ONCE
Refills: 0 | Status: COMPLETED | OUTPATIENT
Start: 2023-03-24 | End: 2023-03-24

## 2023-03-24 RX ORDER — CLOPIDOGREL BISULFATE 75 MG/1
75 TABLET, FILM COATED ORAL DAILY
Refills: 0 | Status: DISCONTINUED | OUTPATIENT
Start: 2023-03-24 | End: 2023-04-12

## 2023-03-24 RX ORDER — MAGNESIUM OXIDE 400 MG ORAL TABLET 241.3 MG
800 TABLET ORAL ONCE
Refills: 0 | Status: COMPLETED | OUTPATIENT
Start: 2023-03-24 | End: 2023-03-24

## 2023-03-24 RX ORDER — POTASSIUM CHLORIDE 20 MEQ
20 PACKET (EA) ORAL ONCE
Refills: 0 | Status: COMPLETED | OUTPATIENT
Start: 2023-03-24 | End: 2023-03-24

## 2023-03-24 RX ORDER — DEXTROSE 10 % IN WATER 10 %
1000 INTRAVENOUS SOLUTION INTRAVENOUS
Refills: 0 | Status: DISCONTINUED | OUTPATIENT
Start: 2023-03-24 | End: 2023-03-24

## 2023-03-24 RX ORDER — METOPROLOL TARTRATE 50 MG
25 TABLET ORAL DAILY
Refills: 0 | Status: DISCONTINUED | OUTPATIENT
Start: 2023-03-25 | End: 2023-03-29

## 2023-03-24 RX ORDER — DEXTROSE 50 % IN WATER 50 %
12.5 SYRINGE (ML) INTRAVENOUS ONCE
Refills: 0 | Status: DISCONTINUED | OUTPATIENT
Start: 2023-03-24 | End: 2023-03-24

## 2023-03-24 RX ADMIN — Medication 12.5 MILLIGRAM(S): at 05:22

## 2023-03-24 RX ADMIN — Medication 81 MILLIGRAM(S): at 13:23

## 2023-03-24 RX ADMIN — Medication 325 MILLIGRAM(S): at 13:23

## 2023-03-24 RX ADMIN — TACROLIMUS 2 MILLIGRAM(S): 5 CAPSULE ORAL at 05:22

## 2023-03-24 RX ADMIN — LOSARTAN POTASSIUM 100 MILLIGRAM(S): 100 TABLET, FILM COATED ORAL at 10:13

## 2023-03-24 RX ADMIN — Medication 650 MILLIGRAM(S): at 13:23

## 2023-03-24 RX ADMIN — ISOSORBIDE DINITRATE 20 MILLIGRAM(S): 5 TABLET ORAL at 18:10

## 2023-03-24 RX ADMIN — PANTOPRAZOLE SODIUM 40 MILLIGRAM(S): 20 TABLET, DELAYED RELEASE ORAL at 18:09

## 2023-03-24 RX ADMIN — Medication 20 MILLIEQUIVALENT(S): at 10:14

## 2023-03-24 RX ADMIN — Medication 650 MILLIGRAM(S): at 21:49

## 2023-03-24 RX ADMIN — CLOPIDOGREL BISULFATE 75 MILLIGRAM(S): 75 TABLET, FILM COATED ORAL at 16:06

## 2023-03-24 RX ADMIN — Medication 50 MILLIGRAM(S): at 05:22

## 2023-03-24 RX ADMIN — ISOSORBIDE DINITRATE 20 MILLIGRAM(S): 5 TABLET ORAL at 10:13

## 2023-03-24 RX ADMIN — ATORVASTATIN CALCIUM 40 MILLIGRAM(S): 80 TABLET, FILM COATED ORAL at 21:48

## 2023-03-24 RX ADMIN — CHLORHEXIDINE GLUCONATE 1 APPLICATION(S): 213 SOLUTION TOPICAL at 05:44

## 2023-03-24 RX ADMIN — MAGNESIUM OXIDE 400 MG ORAL TABLET 800 MILLIGRAM(S): 241.3 TABLET ORAL at 10:14

## 2023-03-24 RX ADMIN — PANTOPRAZOLE SODIUM 40 MILLIGRAM(S): 20 TABLET, DELAYED RELEASE ORAL at 05:22

## 2023-03-24 RX ADMIN — Medication 650 MILLIGRAM(S): at 08:04

## 2023-03-24 RX ADMIN — MEROPENEM 100 MILLIGRAM(S): 1 INJECTION INTRAVENOUS at 18:08

## 2023-03-24 RX ADMIN — Medication 650 MILLIGRAM(S): at 14:30

## 2023-03-24 RX ADMIN — Medication 50 MILLIGRAM(S): at 21:48

## 2023-03-24 RX ADMIN — TACROLIMUS 2 MILLIGRAM(S): 5 CAPSULE ORAL at 18:09

## 2023-03-24 RX ADMIN — Medication 650 MILLIGRAM(S): at 07:28

## 2023-03-24 RX ADMIN — TAMSULOSIN HYDROCHLORIDE 0.8 MILLIGRAM(S): 0.4 CAPSULE ORAL at 21:56

## 2023-03-24 NOTE — DISCHARGE NOTE PROVIDER - NSDCMRMEDTOKEN_GEN_ALL_CORE_FT
amLODIPine 10 mg oral tablet: 1 tab(s) orally once a day  atorvastatin 40 mg oral tablet: 1 tab(s) orally once a day  calcium acetate 667 mg oral tablet: 1 tab(s) orally 3 times a day  Colace 50 mg oral capsule: 1 cap(s) orally 2 times a day  ferrous sulfate 324 mg (65 mg elemental iron) oral delayed release tablet: 1 tab(s) orally once a day  Flomax 0.4 mg oral capsule: 1 cap(s) orally once a day  hydrALAZINE 50 mg oral tablet: 1 tab(s) orally every 8 hours  Isordil: 20 milligram(s) sublingual 3 times a day  losartan 100 mg oral tablet: 1 tab(s) orally once a day  predniSONE 5 mg oral tablet: 1 tab(s) orally once a day  Protonix 40 mg oral delayed release tablet: 1 tab(s) orally once a day  Renvela 800 mg oral tablet: 1 tab(s) orally 3 times a day (with meals)  tacrolimus 1 mg oral capsule: 2 cap(s) orally 2 times a day   amLODIPine 10 mg oral tablet: 1 tab(s) orally once a day  atorvastatin 40 mg oral tablet: 1 tab(s) orally once a day  calcium acetate 667 mg oral tablet: 1 tab(s) orally 3 times a day  clopidogrel 75 mg oral tablet: 1 tab(s) orally once a day  Colace 50 mg oral capsule: 1 cap(s) orally 2 times a day  ferrous sulfate 324 mg (65 mg elemental iron) oral delayed release tablet: 1 tab(s) orally once a day  Flomax 0.4 mg oral capsule: 1 cap(s) orally once a day  hydrALAZINE 50 mg oral tablet: 1 tab(s) orally every 8 hours  Isordil: 20 milligram(s) sublingual 3 times a day  losartan 100 mg oral tablet: 1 tab(s) orally once a day  metoprolol succinate 25 mg oral tablet, extended release: 1 tab(s) orally once a day  predniSONE 5 mg oral tablet: 1 tab(s) orally once a day  Protonix 40 mg oral delayed release tablet: 1 tab(s) orally once a day  Renvela 800 mg oral tablet: 1 tab(s) orally 3 times a day (with meals)  tacrolimus 1 mg oral capsule: 2 cap(s) orally 2 times a day   amLODIPine 10 mg oral tablet: 1 tab(s) orally once a day  atorvastatin 40 mg oral tablet: 1 tab(s) orally once a day  calcium acetate 667 mg oral tablet: 1 tab(s) orally 3 times a day  clopidogrel 75 mg oral tablet: 1 tab(s) orally once a day  Colace 50 mg oral capsule: 1 cap(s) orally 2 times a day  ferrous sulfate 324 mg (65 mg elemental iron) oral delayed release tablet: 1 tab(s) orally once a day  Flomax 0.4 mg oral capsule: 1 cap(s) orally once a day  hydrALAZINE 50 mg oral tablet: 1 tab(s) orally every 8 hours  Isordil: 20 milligram(s) sublingual 3 times a day  losartan 100 mg oral tablet: 1 tab(s) orally once a day  metoprolol succinate 25 mg oral tablet, extended release: 1 tab(s) orally once a day  Protonix 40 mg oral delayed release tablet: 1 tab(s) orally once a day  Renvela 800 mg oral tablet: 1 tab(s) orally 3 times a day (with meals)  tacrolimus 1 mg oral capsule: 2 cap(s) orally 2 times a day

## 2023-03-24 NOTE — PROGRESS NOTE ADULT - ASSESSMENT
62Y M w/ HTN, DM, ESRD s/p failed kidney transplant who was transferred to [facility] (3/17) from [place] for ICD placement and Cardiac Cath after prolonged admission c/b cardiac arrest and vtach arrest and hypovolemic shock 2/2 GI bleed. New fever 3/21, source unclear, started on vanc and meropenem, gallium scan recommended. Pending repeat cath and ICD placement once infection cleared. Also w/ intermittent melena on 3/23, GI following pending cardiac clearance for EGD.      #ESRD on HD TTS  Outpatient HD prescription; [id number], F180 dialyzer, 2K 2.5Ca dialysate,  ml/min,  ml/min, EDW 65kg  Last HD yesterday 3/23; Tolerated 3.5 hours with 2 L removed as planned  Next HD 3/25  Daily BMP   Continue with Tacro at home dose. Recommend pt follow up with outpatient nephrologist regarding tacro  Per ID, meropenem daily, but after HD on HD days. Also vanc trough prior to HD      HTN:  UF with HD as tolerated   Intermittently hypotensive. Amlodipine dc'ed  Continue losartan 100mg, metoprolol 12.5 BID, hydralazine 50mg TID    Anemia:  Hgb at goal at 9.1  Check iron profile  epo w/ HD    MBD:  Calcium 8.2  Phos: 3.2  iPTH 431  Check phos twice weekly. Goal <5.5  On Hectorol 4mcg TIW as outpatient. Will continue       62Y M w/ HTN, DM, ESRD s/p failed kidney transplant who was transferred to [facility] (3/17) from [place] for ICD placement and Cardiac Cath after prolonged admission c/b cardiac arrest and vtach arrest and hypovolemic shock 2/2 GI bleed. New fever 3/21, source unclear, started on vanc and meropenem, gallium scan recommended. Pending repeat cath and ICD placement once infection cleared. Also w/ intermittent melena on 3/23, GI following pending cardiac clearance for EGD.      #ESRD on HD TTS  Outpatient HD prescription; [id number], F180 dialyzer, 2K 2.5Ca dialysate,  ml/min,  ml/min, EDW 65kg  Last HD yesterday 3/23; Tolerated 3.5 hours with 2 L removed as planned  Next HD 3/25  Daily BMP   Continue with Tacro at home dose. Recommend pt follow up with outpatient nephrologist regarding tacro  Per ID, meropenem daily, but after HD on HD days. Also vanc trough prior to HD      HTN:  UF with HD as tolerated   Intermittently hypotensive. Amlodipine dc'ed  Continue losartan 100mg, metoprolol 12.5 BID, hydralazine 50mg TID    Anemia:  Hgb at goal at 9.1  Check iron profile  epo w/ HD    MBD:  Calcium 8.2  Phos: 3.2  iPTH 431  Check phos twice weekly. Goal <5.5  On Hectorol 4mcg TIW as outpatient. Will continue       62Y M w/ HTN, DM, ESRD s/p failed kidney transplant who was transferred to [facility] (3/17) from [place] for ICD placement and Cardiac Cath after prolonged admission c/b cardiac arrest and vtach arrest and hypovolemic shock 2/2 GI bleed. New fever 3/21, source unclear, started on vanc and meropenem, gallium scan recommended. Pending repeat cath and ICD placement once infection cleared. Also w/ intermittent melena on 3/23, GI following pending cardiac clearance for EGD.      #ESRD on HD TTS  Outpatient HD prescription; [id number], F180 dialyzer, 2K 2.5Ca dialysate,  ml/min,  ml/min, EDW 65kg  Last HD yesterday 3/23; Tolerated 3.5 hours with 2 L removed as planned  Next HD 3/25  Daily BMP   Continue with Tacro at home dose. Recommend pt follow up with outpatient nephrologist regarding tacro  Per ID, meropenem daily, but after HD on HD days. Also vanc trough prior to HD      HTN:  UF with HD as tolerated   Intermittently hypotensive. Amlodipine dc'ed  Continue losartan 100mg, metoprolol 12.5 BID, hydralazine 50mg TID    Anemia:  Hgb at goal at 9.1  Check iron profile  epo w/ HD    MBD:  Calcium 8.2  Phos: 3.2  iPTH 431  Check phos twice weekly. Goal <5.5  On Hectorol 4mcg TIW as outpatient. Will continue

## 2023-03-24 NOTE — PROGRESS NOTE ADULT - SUBJECTIVE AND OBJECTIVE BOX
Interventional Cardiology PA Adult Progress Note    Subjective Assessment:    ROS negative except as noted above.  	  MEDICATIONS:  hydrALAZINE 50 milliGRAM(s) Oral every 8 hours  isosorbide   dinitrate Tablet (ISORDIL) 20 milliGRAM(s) Oral three times a day  losartan 100 milliGRAM(s) Oral daily  metoprolol tartrate 12.5 milliGRAM(s) Oral every 12 hours    meropenem  IVPB      meropenem  IVPB 500 milliGRAM(s) IV Intermittent every 24 hours      acetaminophen     Tablet .. 650 milliGRAM(s) Oral every 6 hours PRN    pantoprazole  Injectable 40 milliGRAM(s) IV Push every 12 hours  polyethylene glycol 3350 17 Gram(s) Oral daily    atorvastatin 40 milliGRAM(s) Oral at bedtime  dextrose 50% Injectable 25 Gram(s) IV Push once  dextrose 50% Injectable 12.5 Gram(s) IV Push once  dextrose Oral Gel 15 Gram(s) Oral once PRN  insulin lispro (ADMELOG) corrective regimen sliding scale   SubCutaneous every 6 hours    aspirin enteric coated 81 milliGRAM(s) Oral daily  clopidogrel Tablet 75 milliGRAM(s) Oral daily  dextrose 10% Bolus 125 milliLiter(s) IV Bolus once  dextrose 5%. 1000 milliLiter(s) IV Continuous <Continuous>  dextrose 5%. 1000 milliLiter(s) IV Continuous <Continuous>  ferrous    sulfate 325 milliGRAM(s) Oral daily  sodium chloride 0.65% Nasal 1 Spray(s) Both Nostrils every 4 hours PRN  tacrolimus 2 milliGRAM(s) Oral every 12 hours  tamsulosin 0.8 milliGRAM(s) Oral at bedtime      	    [PHYSICAL EXAM:  TELEMETRY:  T(C): 38.4 (03-24-23 @ 13:42), Max: 38.7 (03-23-23 @ 15:30)  HR: 57 (03-24-23 @ 13:15) (50 - 62)  BP: 107/49 (03-24-23 @ 13:15) (107/46 - 146/69)  RR: 17 (03-24-23 @ 13:15) (17 - 18)  SpO2: 96% (03-24-23 @ 13:15) (93% - 99%)  Wt(kg): --  I&O's Summary    23 Mar 2023 07:01  -  24 Mar 2023 07:00  --------------------------------------------------------  IN: 180 mL / OUT: 2225 mL / NET: -2045 mL    24 Mar 2023 07:01  -  24 Mar 2023 13:50  --------------------------------------------------------  IN: 0 mL / OUT: 100 mL / NET: -100 mL                                              Appearance: Normal	  HEENT:   Normal oral mucosa, PERRLA, EOMI	  Neck: Supple, + JVD/ - JVD; Carotid Bruit   Cardiovascular: Normal S1 S2, No JVD, No murmurs,   Respiratory: Lungs clear to auscultation/Decreased Breath Sounds/No Rales, Rhonchi, Wheezing	  Gastrointestinal:  Soft, Non-tender, + BS	  Skin: No rashes, No ecchymoses, No cyanosis  Extremities: Normal range of motion, No clubbing, cyanosis or edema  Vascular: Peripheral pulses palpable 2+ bilaterally  Neurologic: Non-focal  Psychiatry: A & O x 3, Mood & affect appropriate      	    ECG:  	  RADIOLOGY:   DIAGNOSTIC TESTING:  [ ] Echocardiogram:   [ ]  Catheterization:  [ ] Stress Test:    [ ] JOSY  OTHER: 	    LABS:	 	  CARDIAC MARKERS:                                  9.1    6.49  )-----------( 120      ( 24 Mar 2023 12:00 )             29.1     03-24    133<L>  |  95<L>  |  11  ----------------------------<  74  3.8   |  30  |  3.74<H>    Ca    8.2<L>      24 Mar 2023 07:26  Phos  3.2     03-24  Mg     1.8     03-24    TPro  4.9<L>  /  Alb  2.6<L>  /  TBili  0.8  /  DBili  x   /  AST  17  /  ALT  7<L>  /  AlkPhos  137<H>  03-24    proBNP:   Lipid Profile:   HgA1c:   TSH:    Interventional Cardiology PA Adult Progress Note    Subjective Assessment: Seen and examined bedside. Denies chest pain, palpitations, SOB, orthopnea/PND, dizziness, LOC, n/v/d, fever/chills/sick contacts, LE edema. States he is feeling just OK    ROS negative except as noted above.  	  MEDICATIONS:  hydrALAZINE 50 milliGRAM(s) Oral every 8 hours  isosorbide   dinitrate Tablet (ISORDIL) 20 milliGRAM(s) Oral three times a day  losartan 100 milliGRAM(s) Oral daily  metoprolol tartrate 12.5 milliGRAM(s) Oral every 12 hours  meropenem  IVPB      meropenem  IVPB 500 milliGRAM(s) IV Intermittent every 24 hours  acetaminophen     Tablet .. 650 milliGRAM(s) Oral every 6 hours PRN  pantoprazole  Injectable 40 milliGRAM(s) IV Push every 12 hours  polyethylene glycol 3350 17 Gram(s) Oral daily  atorvastatin 40 milliGRAM(s) Oral at bedtime  dextrose 50% Injectable 25 Gram(s) IV Push once  dextrose 50% Injectable 12.5 Gram(s) IV Push once  dextrose Oral Gel 15 Gram(s) Oral once PRN  insulin lispro (ADMELOG) corrective regimen sliding scale   SubCutaneous every 6 hours  aspirin enteric coated 81 milliGRAM(s) Oral daily  clopidogrel Tablet 75 milliGRAM(s) Oral daily  dextrose 10% Bolus 125 milliLiter(s) IV Bolus once  dextrose 5%. 1000 milliLiter(s) IV Continuous <Continuous>  dextrose 5%. 1000 milliLiter(s) IV Continuous <Continuous>  ferrous    sulfate 325 milliGRAM(s) Oral daily  sodium chloride 0.65% Nasal 1 Spray(s) Both Nostrils every 4 hours PRN  tacrolimus 2 milliGRAM(s) Oral every 12 hours  tamsulosin 0.8 milliGRAM(s) Oral at bedtime      	    [PHYSICAL EXAM:  TELEMETRY:   T(C): 38.4 (03-24-23 @ 13:42), Max: 38.7 (03-23-23 @ 15:30)  HR: 57 (03-24-23 @ 13:15) (50 - 62)  BP: 107/49 (03-24-23 @ 13:15) (107/46 - 146/69)  RR: 17 (03-24-23 @ 13:15) (17 - 18)  SpO2: 96% (03-24-23 @ 13:15) (93% - 99%)  Wt(kg): --  I&O's Summary    23 Mar 2023 07:01  -  24 Mar 2023 07:00  --------------------------------------------------------  IN: 180 mL / OUT: 2225 mL / NET: -2045 mL    24 Mar 2023 07:01  -  24 Mar 2023 13:50  --------------------------------------------------------  IN: 0 mL / OUT: 100 mL / NET: -100 mL                                              Appearance: Normal	  HEENT:   Normal oral mucosa, PERRLA, EOMI	  Neck: Supple,  - JVD; Carotid Bruit   Cardiovascular: Normal S1 S2, No JVD, No murmurs,   Respiratory: Lungs clear to auscultation, No Rales, Rhonchi, Wheezing	  Gastrointestinal:  Soft, Non-tender, + BS	  Skin: No rashes, No ecchymoses, No cyanosis  Extremities: Normal range of motion, No clubbing, cyanosis or edema  Vascular: Peripheral pulses palpable 2+ bilaterally  Neurologic: Non-focal  Psychiatry: A & O x 3, Mood & affect appropriate                          9.1    6.49  )-----------( 120      ( 24 Mar 2023 12:00 )             29.1     03-24    133<L>  |  95<L>  |  11  ----------------------------<  74  3.8   |  30  |  3.74<H>    Ca    8.2<L>      24 Mar 2023 07:26  Phos  3.2     03-24  Mg     1.8     03-24    TPro  4.9<L>  /  Alb  2.6<L>  /  TBili  0.8  /  DBili  x   /  AST  17  /  ALT  7<L>  /  AlkPhos  137<H>  03-24

## 2023-03-24 NOTE — PROGRESS NOTE ADULT - ASSESSMENT
61yo M PMHx HTN, HLD, DM,  ICM (s/p cath years ago, RCA 99%, never intervened on), HFrEF 25-30%, ESRD s/p failed kidney transplant (last HD 3/1 via Left Arm AVF; HD TTS), Right AKA with recent admission to [facility] for respiratory failure with course c/b ardiac arrest (RoSC achieved after 8mins)/ Vtach arrest and GIB presumed to be 2/2 rectal ulcer following EGD/Colonoscopy transferred to [facility] for ICD placement with BRBPR on day of transfer 2/2 solitary rectal ulcer    Interval reports of melena on 3/23.  HDS, Hgb stable.  COLTON with dark green stool.    Recommendations:  -Low suspicion for UGIB based on COLTON, stable Hgb  -Continue daily PPI, OK to increase to BID while on DAPT  -May resume antplt therapy/anticoag as indicated.  -Monitor Hgb and evidence of clinical GIB  -May advance diet  -No plan for endoscopic eval at this time based on Hgb and reassuring COLTON    Gastroenterology service will sign off  Case discussed with attending physician  Please recall as needed with any GI related questions or with concern for GIB    Thank you for this consult.     Gee Thorne DO  Gastroenterology Fellow  Pager: [phone]   61yo M PMHx HTN, HLD, DM,  ICM (s/p cath years ago, RCA 99%, never intervened on), HFrEF 25-30%, ESRD s/p failed kidney transplant (last HD 3/1 via Left Arm AVF; HD TTS), Right AKA with recent admission to [facility] for respiratory failure with course c/b ardiac arrest (RoSC achieved after 8mins)/ Vtach arrest and GIB presumed to be 2/2 rectal ulcer following EGD/Colonoscopy transferred to [facility] for ICD placement with BRBPR on day of transfer 2/2 solitary rectal ulcer    Interval reports of melena on 3/23.  HDS, Hgb stable.  COLTON with dark green stool.    Recommendations:  -Low suspicion for UGIB based on COLTON, stable Hgb  -Continue daily PPI, OK to increase to BID while on DAPT  -May resume antplt therapy/anticoag as indicated.  -Monitor Hgb and evidence of clinical GIB  -May advance diet  -No plan for endoscopic eval at this time based on Hgb and reassuring COLTON    Gastroenterology service will sign off  Case discussed with attending physician  Please recall as needed with any GI related questions or with concern for GIB    Thank you for this consult.     Gee Thorne DO  Gastroenterology Fellow  Pager: [phone]   63yo M PMHx HTN, HLD, DM,  ICM (s/p cath years ago, RCA 99%, never intervened on), HFrEF 25-30%, ESRD s/p failed kidney transplant (last HD 3/1 via Left Arm AVF; HD TTS), Right AKA with recent admission to [facility] for respiratory failure with course c/b ardiac arrest (RoSC achieved after 8mins)/ Vtach arrest and GIB presumed to be 2/2 rectal ulcer following EGD/Colonoscopy transferred to [facility] for ICD placement with BRBPR on day of transfer 2/2 solitary rectal ulcer    Interval reports of melena on 3/23.  HDS, Hgb stable.  COLTON with dark green stool.    Recommendations:  -Low suspicion for UGIB based on COLTON, stable Hgb  -Continue daily PPI, OK to increase to BID while on DAPT  -May resume antplt therapy/anticoag as indicated.  -Monitor Hgb and evidence of clinical GIB  -May advance diet  -No plan for endoscopic eval at this time based on Hgb and reassuring COLTON    Gastroenterology service will sign off  Case discussed with attending physician  Please recall as needed with any GI related questions or with concern for GIB    Thank you for this consult.     Gee Thorne DO  Gastroenterology Fellow  Pager: [phone]   61yo M PMHx HTN, HLD, DM,  ICM (s/p cath years ago, RCA 99%, never intervened on), HFrEF 25-30%, ESRD s/p failed kidney transplant (last HD 3/1 via Left Arm AVF; HD TTS), Right AKA with recent admission to [facility] for respiratory failure with course c/b ardiac arrest (RoSC achieved after 8mins)/ Vtach arrest and GIB presumed to be 2/2 rectal ulcer following EGD/Colonoscopy transferred to [facility] for ICD placement with BRBPR on day of transfer 2/2 solitary rectal ulcer    Interval reports of melena on 3/23.  HDS, Hgb stable.  COLTON with dark green stool.    Recommendations:  -Low suspicion for UGIB based on COLTON, stable Hgb  -Continue daily PPI, OK to increase to BID while on DAPT  -Please repeat Hgb at 1300  -May resume antplt therapy/anticoag as indicated.  -Monitor Hgb and evidence of clinical GIB  -May advance diet  -No plan for endoscopic eval at this time based on Hgb and reassuring COLTON        Thank you for this consult.     Gee Thorne DO  Gastroenterology Fellow  Pager: [phone]   63yo M PMHx HTN, HLD, DM,  ICM (s/p cath years ago, RCA 99%, never intervened on), HFrEF 25-30%, ESRD s/p failed kidney transplant (last HD 3/1 via Left Arm AVF; HD TTS), Right AKA with recent admission to [facility] for respiratory failure with course c/b ardiac arrest (RoSC achieved after 8mins)/ Vtach arrest and GIB presumed to be 2/2 rectal ulcer following EGD/Colonoscopy transferred to [facility] for ICD placement with BRBPR on day of transfer 2/2 solitary rectal ulcer    Interval reports of melena on 3/23.  HDS, Hgb stable.  COLTON with dark green stool.    Recommendations:  -Low suspicion for UGIB based on COLTON, stable Hgb  -Continue daily PPI, OK to increase to BID while on DAPT  -Please repeat Hgb at 1300  -May resume antplt therapy/anticoag as indicated.  -Monitor Hgb and evidence of clinical GIB  -May advance diet  -No plan for endoscopic eval at this time based on Hgb and reassuring COLTON        Thank you for this consult.     Gee Thorne DO  Gastroenterology Fellow  Pager: [phone]   63yo M PMHx HTN, HLD, DM,  ICM (s/p cath years ago, RCA 99%, never intervened on), HFrEF 25-30%, ESRD s/p failed kidney transplant (last HD 3/1 via Left Arm AVF; HD TTS), Right AKA with recent admission to [facility] for respiratory failure with course c/b ardiac arrest (RoSC achieved after 8mins)/ Vtach arrest and GIB presumed to be 2/2 rectal ulcer following EGD/Colonoscopy transferred to [facility] for ICD placement with BRBPR on day of transfer 2/2 solitary rectal ulcer    Interval reports of melena on 3/23.  HDS, Hgb stable.  COLTON with dark green stool.    Recommendations:  -Low suspicion for UGIB based on COLTON, stable Hgb  -Continue daily PPI, OK to increase to BID while on DAPT  -Please repeat Hgb at 1300  -May resume antplt therapy/anticoag as indicated.  -Monitor Hgb and evidence of clinical GIB  -May advance diet  -No plan for endoscopic eval at this time based on Hgb and reassuring COLTON        Thank you for this consult.     Gee Thorne DO  Gastroenterology Fellow  Pager: [phone]

## 2023-03-24 NOTE — PROGRESS NOTE ADULT - ASSESSMENT
61yo M PMHx HTN, HLD, DM, CAD (s/p cath years ago, RCA 99%, never intervened on), HFrEF 25-30%, ESRD s/p failed kidney transplant (last HD 3/1 via Left Arm AVF; HD TTS), Right AKA initially presented to [facility] 2/27 for respiratory distress after a dialysis, found to be septic, h/c complicated by AHRF requiring intubation for 24hr followed by VT arrest, h/c the complicated by massive LGIB (rectal ulcer) requiring 7u pRBC, 1u FFP and 1u PLT. Transferred to [facility] for ICD placement 2/2 Vtach and cardiac cath. Medicine consulted for comanagement.     Recommend:    #Fever of Unknown Origin  - Spiked fever of 101.6 on return from HD 3/21  - Unclear source of infection  - Removed right forearm IV because was in place from outside hospital  - Abd U/S with no acute pathology  - CXR without infiltrate  - UA without UTI  - F/u BCx, so far NGTD  - C/w Meropenem and Vanco  - ID following, recommend gallium scan    #LGIB  #SANTOS  #Solitary ulcer syndrome  - hx coffee ground emesis @ [facility]; s/p EGD/colonoscopy showing rectal ulcer  - 3/17AM pt passed loose BM along with large amount of BRBPR  - per gen surgery no acute surgical intervention   - GI following, no endoscopy indicated  - Active T&S  - Transfuse for hgb <8 (active CAD)    #H/O ventricular tachycardia   - Vtach arrest @[facility]  - agree with EP consult for ICD placement    #ESRD on dialysis  - for dialysis today  - dialysis Tues, Thurs, Sat  - last received 3/16  - f/u nephro recs  - Electrolytes wnl, k 4.3, phos 1.9  - c/w calcium citrate 667mg TID    #S/P kidney transplant.   - per [place] pt takes Tacrolimus 2mg 2 times /day  - Obtain collateral regarding renal transplant, if indeed failed, what indications patient have for c/w tacrolimus 2mg?  - f/u tacrolimus serum levels  - f/u nephro recs    #DM  - no meds per [place], obtain collaterals for med recs prior [place] stay  - mISS while inpt  - A1c 5.9 on admission 61yo M PMHx HTN, HLD, DM, CAD (s/p cath years ago, RCA 99%, never intervened on), HFrEF 25-30%, ESRD s/p failed kidney transplant (last HD 3/1 via Left Arm AVF; HD TTS), Right AKA initially presented to [facility] 2/27 for respiratory distress after a dialysis, found to be septic, h/c complicated by AHRF requiring intubation for 24hr followed by VT arrest, h/c the complicated by massive LGIB (rectal ulcer) requiring 7u pRBC, 1u FFP and 1u PLT. Transferred to [facility] for ICD placement 2/2 Vtach and cardiac cath. Medicine consulted for comanagement.     Recommend:    #Fever of Unknown Origin  - Spiked fever of 101.6 on return from HD 3/21  - Unclear source of infection  - Removed right forearm IV because was in place from outside hospital  - Abd U/S with no acute pathology  - CXR without infiltrate  - UA without UTI  - F/u BCx, so far NGTD  - C/w Meropenem and Vanco  - ID following, recommend gallium scan    #LGIB  #SANTOS  #Solitary ulcer syndrome  - hx coffee ground emesis @ [facility]; s/p EGD/colonoscopy showing rectal ulcer  - 3/17AM pt passed loose BM along with large amount of BRBPR  - per gen surgery no acute surgical intervention   - GI following, no endoscopy indicated  - Active T&S  - Transfuse for hgb <8 (active CAD)    #H/O ventricular tachycardia   - [facility]  - agree with EP consult for ICD placement    #ESRD on dialysis  - for dialysis today  - dialysis Tues, Thurs, Sat  - last received 3/16  - f/u nephro recs  - Electrolytes wnl, k 4.3, phos 1.9  - c/w calcium citrate 667mg TID    #S/P kidney transplant.   - per [place] pt takes Tacrolimus 2mg 2 times /day  - Obtain collateral regarding renal transplant, if indeed failed, what indications patient have for c/w tacrolimus 2mg?  - f/u tacrolimus serum levels  - f/u nephro recs    #DM  - no meds per [place], obtain collaterals for med recs prior [place] stay  - mISS while inpt  - A1c 5.9 on admission 61yo M PMHx HTN, HLD, DM, CAD (s/p cath years ago, RCA 99%, never intervened on), HFrEF 25-30%, ESRD s/p failed kidney transplant (last HD 3/1 via Left Arm AVF; HD TTS), Right AKA initially presented to [facility] 2/27 for respiratory distress after a dialysis, found to be septic, h/c complicated by AHRF requiring intubation for 24hr followed by VT arrest, h/c the complicated by massive LGIB (rectal ulcer) requiring 7u pRBC, 1u FFP and 1u PLT. Transferred to [facility] for ICD placement 2/2 Vtach and cardiac cath. Medicine consulted for comanagement.     Recommend:    #Fever of Unknown Origin  - Spiked fever of 101.6 on return from HD 3/21  - Unclear source of infection  - Removed right forearm IV because was in place from outside hospital  - Abd U/S with no acute pathology  - CXR without infiltrate  - UA without UTI  - F/u BCx, so far NGTD  - C/w Meropenem and Vanco  - ID following, recommend gallium scan    #LGIB  #SANTOS  #Solitary ulcer syndrome  - hx coffee ground emesis @ [facility]; s/p EGD/colonoscopy showing rectal ulcer  - 3/17AM pt passed loose BM along with large amount of BRBPR  - per gen surgery no acute surgical intervention   - GI following, no endoscopy indicated  - Active T&S  - Transfuse for hgb <8 (active CAD)    #H/O ventricular tachycardia   - Vtach arrest @[facility]  - agree with EP consult for ICD placement    #ESRD on dialysis  - for dialysis today  - dialysis Tues, Thurs, Sat  - last received 3/16  - f/u nephro recs  - Electrolytes wnl, k 4.3, phos 1.9  - c/w calcium citrate 667mg TID    #S/P kidney transplant.   - per [place] pt takes Tacrolimus 2mg 2 times /day  - Obtain collateral regarding renal transplant, if indeed failed, what indications patient have for c/w tacrolimus 2mg?  - f/u tacrolimus serum levels  - f/u nephro recs    #DM  - no meds per [place], obtain collaterals for med recs prior [place] stay  - mISS while inpt  - A1c 5.9 on admission

## 2023-03-24 NOTE — DISCHARGE NOTE PROVIDER - NSDCCPTREATMENT_GEN_ALL_CORE_FT
PRINCIPAL PROCEDURE  Procedure: Whole body gallium scan  Findings and Treatment: Hazy activity is seen in the left lower thorax posteriorly which probably represents nonspecific activity in pleural fluid. No clear source of residual infection is identified on this study.  Please note that uptake in the bones is somewhat greater than in most cases. One cause of increased bony uptake is diminished iron binding capacity which can limit the sensitivity of gallium imaging.

## 2023-03-24 NOTE — PROGRESS NOTE ADULT - PROBLEM SELECTOR PLAN 11
Alert-The patient is alert, awake and responds to voice. The patient is oriented to time, place, and person. The triage nurse is able to obtain subjective information. - HgA1c 5.9.    - CONT: MICHELINE.           DVT ppx: holding AC; SCD   Dispo: pending clinical improvement; pending completion of infectious work up; pending LHC; pending GI stabilization; pending ICD placement. - HgA1c 5.9.    - CONT: MICHELINE.      DVT ppx: holding AC; SCD   Dispo: pending completion of infectious and bleeding work up -> LHC -> ICD placement.

## 2023-03-24 NOTE — DISCHARGE NOTE PROVIDER - NSDCFUSCHEDAPPT_GEN_ALL_CORE_FT
Kristopher Snow Physician [facility], [facility]  HEART[facility] [address]  Scheduled Appointment: 04/24/2023     Kristopher Snow Physician [facility]  HEART[facility] [address]  Scheduled Appointment: 04/24/2023     Kristopher Snow Physician [facility]  HEARTV[facility] [address]  Scheduled Appointment: 04/24/2023

## 2023-03-24 NOTE — PROGRESS NOTE ADULT - SUBJECTIVE AND OBJECTIVE BOX
INTERVAL HPI/OVERNIGHT EVENTS:  Tm 101.7.  Does not feel fever, no rigors.  Has ongoing mild cough and neck pain 8/10 in severity.    CONSTITUTIONAL:  As above  EYES:  No photophobia or visual changes  CARDIOVASCULAR:  No chest pain  RESPIRATORY:  No wheezing, or SOB   GASTROINTESTINAL:  No nausea, vomiting, diarrhea, constipation, or abdominal pain  GENITOURINARY:  No frequency, urgency, dysuria or hematuria  NEUROLOGIC:  No headache, lightheadedness      ANTIBIOTICS/RELEVANT:    Vancomycin 3/21  Meropenem 500 mg IV q24h (3/21-present)        Vital Signs Last 24 Hrs  T(C): 38.4 (24 Mar 2023 13:42), Max: 38.7 (23 Mar 2023 15:30)  T(F): 101.1 (24 Mar 2023 13:42), Max: 101.7 (23 Mar 2023 15:30)  HR: 57 (24 Mar 2023 13:15) (50 - 62)  BP: 107/49 (24 Mar 2023 13:15) (107/46 - 146/69)  BP(mean): 76 (24 Mar 2023 05:09) (66 - 80)  RR: 17 (24 Mar 2023 13:15) (17 - 18)  SpO2: 96% (24 Mar 2023 13:15) (93% - 99%)    Parameters below as of 24 Mar 2023 13:15  Patient On (Oxygen Delivery Method): room air        PHYSICAL EXAM:  Constitutional:  Alert  Eyes:  Sclerae anicteric, conjunctivae clear, PERRL  Ear/Nose/Throat:  No nasal exudate or sinus tenderness;  No buccal mucosal lesions, no pharyngeal erythema or exudate	  Neck:  Supple, no adenopathy  Respiratory:  Clear bilaterally  Cardiovascular:  RRR, S1S2, no murmur appreciated  Gastrointestinal:  Symmetric, normoactive BS, soft, NT, no masses, guarding or rebound.  No HSM  Extremities:  No edema      LABS:                        9.1    6.49  )-----------( 120      ( 24 Mar 2023 12:00 )             29.1         03-24    133<L>  |  95<L>  |  11  ----------------------------<  74  3.8   |  30  |  3.74<H>    Ca    8.2<L>      24 Mar 2023 07:26  Phos  3.2     03-24  Mg     1.8     03-24    TPro  4.9<L>  /  Alb  2.6<L>  /  TBili  0.8  /  DBili  x   /  AST  17  /  ALT  7<L>  /  AlkPhos  137<H>  03-24          MICROBIOLOGY:        RADIOLOGY & ADDITIONAL STUDIES: INTERVAL HPI/OVERNIGHT EVENTS:  Tm 101.7.  Episode of melena last night without drop in H/H, plan for EGD per GI.  Does not feel fever, no rigors.  Has ongoing mild cough and neck pain 8/10 in severity.    CONSTITUTIONAL:  As above  EYES:  No photophobia or visual changes  CARDIOVASCULAR:  No chest pain  RESPIRATORY:  No wheezing, or SOB   GASTROINTESTINAL:  No nausea, vomiting, diarrhea, constipation, or abdominal pain  GENITOURINARY:  No frequency, urgency, dysuria or hematuria  NEUROLOGIC:  No headache, lightheadedness      ANTIBIOTICS/RELEVANT:    Vancomycin 3/21  Meropenem 500 mg IV q24h (3/21-present)        Vital Signs Last 24 Hrs  T(C): 38.4 (24 Mar 2023 13:42), Max: 38.7 (23 Mar 2023 15:30)  T(F): 101.1 (24 Mar 2023 13:42), Max: 101.7 (23 Mar 2023 15:30)  HR: 57 (24 Mar 2023 13:15) (50 - 62)  BP: 107/49 (24 Mar 2023 13:15) (107/46 - 146/69)  BP(mean): 76 (24 Mar 2023 05:09) (66 - 80)  RR: 17 (24 Mar 2023 13:15) (17 - 18)  SpO2: 96% (24 Mar 2023 13:15) (93% - 99%)    Parameters below as of 24 Mar 2023 13:15  Patient On (Oxygen Delivery Method): room air        PHYSICAL EXAM:  Constitutional:  Lying with multiple blankets over his head and body, nontoxic  Eyes:  Sclerae anicteric, conjunctivae clear, PERRL  Ear/Nose/Throat:  No nasal exudate or sinus tenderness;  No buccal mucosal lesions, no pharyngeal erythema or exudate	  Neck:  Supple, no adenopathy  Respiratory:  Clear bilaterally  Cardiovascular:  RRR, S1S2, no murmur appreciated  Gastrointestinal:  Symmetric, normoactive BS, soft, NT, no masses, guarding or rebound.  No HSM  Extremities:  No edema.  s/p R AKA.  L forearm AVF without erythema/warmth      LABS:                        9.1    6.49  )-----------( 120      ( 24 Mar 2023 12:00 )             29.1         03-24    133<L>  |  95<L>  |  11  ----------------------------<  74  3.8   |  30  |  3.74<H>    Ca    8.2<L>      24 Mar 2023 07:26  Phos  3.2     03-24  Mg     1.8     03-24    TPro  4.9<L>  /  Alb  2.6<L>  /  TBili  0.8  /  DBili  x   /  AST  17  /  ALT  7<L>  /  AlkPhos  137<H>  03-24          MICROBIOLOGY:    Blood cultures:  3/21 X 1 - NGTD;  3/22 X 1 - NGTD    Urine culture 3/21 - >10^ Klebsiella pneumoniae (pansusceptible except amp)    3/21 rRVP ND    RADIOLOGY & ADDITIONAL STUDIES:

## 2023-03-24 NOTE — PROGRESS NOTE ADULT - ATTENDING COMMENTS
63 yo man with ESRD on HD, HTN, DM, s/p failed kidney transplant continues on tacrolimus; [facility] and Cardiac [facility] after prolonged admission  with cardiac arrest, vtach arrest and hypovolemic shock 2/2 GI bleed.  + infection- unclear source- was tender over tx kidney prior- not now- ID has suggested gallium scan to help localize- continues on meropenem   needs his cardiac status evaluated prior to being cleared for endoscopies   no pacer until infection cleared  tolerated dialysis well yesterday  nephrologically stable today; repeat HD tomorrow 3/25 63 yo man with ESRD on HD, HTN, DM, s/p failed kidney transplant continues on tacrolimus; [facility] and [facility] after prolonged admission  with cardiac arrest, vtach arrest and hypovolemic shock 2/2 GI bleed.  + infection- unclear source- was tender over tx kidney prior- not now- ID has suggested gallium scan to help localize- continues on meropenem   needs his cardiac status evaluated prior to being cleared for endoscopies   no pacer until infection cleared  tolerated dialysis well yesterday  nephrologically stable today; repeat HD tomorrow 3/25 61 yo man with ESRD on HD, HTN, DM, s/p failed kidney transplant continues on tacrolimus; transferred [facility] ICD placement and Cardiac Cath after prolonged admission  with cardiac arrest, vtach arrest and hypovolemic shock 2/2 GI bleed.  + infection- unclear source- was tender over tx kidney prior- not now- ID has suggested gallium scan to help localize- continues on meropenem   needs his cardiac status evaluated prior to being cleared for endoscopies   no pacer until infection cleared  tolerated dialysis well yesterday  nephrologically stable today; repeat HD tomorrow 3/25

## 2023-03-24 NOTE — PROGRESS NOTE ADULT - PROBLEM SELECTOR PLAN 6
- Pt still produced some urine; urinary retention requiring multiple straight caths; now s/p indwelling duvall placement on 3/21/23. Pt still produced some urine; urinary retention requiring multiple straight caths; now s/p indwelling duvall placement on 3/21/23.  - plan for TOV 3/25

## 2023-03-24 NOTE — PROGRESS NOTE ADULT - SUBJECTIVE AND OBJECTIVE BOX
Pt seen and examined at bedside.  Melenic stool per cardiology team yesterday.  No BM since yesterday evening.  Hgb stable/uptrending since last night  No nausea/vomiting abd pain.    Allergies    No Known Allergies    Intolerances        MEDICATIONS:  MEDICATIONS  (STANDING):  aspirin enteric coated 81 milliGRAM(s) Oral daily  atorvastatin 40 milliGRAM(s) Oral at bedtime  chlorhexidine 2% Cloths 1 Application(s) Topical daily  dextrose 5%. 1000 milliLiter(s) (100 mL/Hr) IV Continuous <Continuous>  dextrose 5%. 1000 milliLiter(s) (50 mL/Hr) IV Continuous <Continuous>  dextrose 50% Injectable 25 Gram(s) IV Push once  dextrose 50% Injectable 12.5 Gram(s) IV Push once  dextrose 50% Injectable 25 Gram(s) IV Push once  ferrous    sulfate 325 milliGRAM(s) Oral daily  glucagon  Injectable 1 milliGRAM(s) IntraMuscular once  hydrALAZINE 50 milliGRAM(s) Oral every 8 hours  insulin lispro (ADMELOG) corrective regimen sliding scale   SubCutaneous every 6 hours  isosorbide   dinitrate Tablet (ISORDIL) 20 milliGRAM(s) Oral three times a day  losartan 100 milliGRAM(s) Oral daily  magnesium oxide 800 milliGRAM(s) Oral once  meropenem  IVPB      meropenem  IVPB 500 milliGRAM(s) IV Intermittent every 24 hours  metoprolol tartrate 12.5 milliGRAM(s) Oral every 12 hours  pantoprazole  Injectable 40 milliGRAM(s) IV Push every 12 hours  polyethylene glycol 3350 17 Gram(s) Oral daily  potassium chloride    Tablet ER 20 milliEquivalent(s) Oral once  tacrolimus 2 milliGRAM(s) Oral every 12 hours  tamsulosin 0.8 milliGRAM(s) Oral at bedtime    MEDICATIONS  (PRN):  acetaminophen     Tablet .. 650 milliGRAM(s) Oral every 6 hours PRN Mild Pain (1 - 3), Moderate Pain (4 - 6)  dextrose Oral Gel 15 Gram(s) Oral once PRN Blood Glucose LESS THAN 70 milliGRAM(s)/deciliter  sodium chloride 0.65% Nasal 1 Spray(s) Both Nostrils every 4 hours PRN Nasal Congestion      Vital Signs Last 24 Hrs  T(C): 37.3 (24 Mar 2023 09:23), Max: 38.7 (23 Mar 2023 15:30)  T(F): 99.1 (24 Mar 2023 09:23), Max: 101.7 (23 Mar 2023 15:30)  HR: 58 (24 Mar 2023 08:26) (50 - 62)  BP: 125/46 (24 Mar 2023 08:26) (107/46 - 146/69)  BP(mean): 76 (24 Mar 2023 05:09) (66 - 80)  RR: 17 (24 Mar 2023 08:26) (17 - 18)  SpO2: 99% (24 Mar 2023 08:26) (93% - 99%)    Parameters below as of 24 Mar 2023 08:26  Patient On (Oxygen Delivery Method): room air        03-23 @ 07:01  -  03-24 @ 07:00  --------------------------------------------------------  IN: 180 mL / OUT: 2225 mL / NET: -2045 mL    03-24 @ 07:01  -  03-24 @ 09:58  --------------------------------------------------------  IN: 0 mL / OUT: 100 mL / NET: -100 mL        PHYSICAL EXAM:    General: No acute distress  HEENT: MMM, conjunctiva and sclera clear  Gastrointestinal: Soft non-tender non-distended. No rebound or guarding. COLTON with dark green stool  Skin: Warm and dry. No obvious rash    LABS:  CBC Full  -  ( 24 Mar 2023 07:26 )  WBC Count : 5.86 K/uL  RBC Count : 3.36 M/uL  Hemoglobin : 9.6 g/dL  Hematocrit : 30.8 %  Platelet Count - Automated : 123 K/uL  Mean Cell Volume : 91.7 fl  Mean Cell Hemoglobin : 28.6 pg  Mean Cell Hemoglobin Concentration : 31.2 gm/dL  Auto Neutrophil # : x  Auto Lymphocyte # : x  Auto Monocyte # : x  Auto Eosinophil # : x  Auto Basophil # : x  Auto Neutrophil % : x  Auto Lymphocyte % : x  Auto Monocyte % : x  Auto Eosinophil % : x  Auto Basophil % : x    03-24    133<L>  |  95<L>  |  11  ----------------------------<  74  3.8   |  30  |  3.74<H>    Ca    8.2<L>      24 Mar 2023 07:26  Phos  3.2     03-24  Mg     1.8     03-24    TPro  4.9<L>  /  Alb  2.6<L>  /  TBili  0.8  /  DBili  x   /  AST  17  /  ALT  7<L>  /  AlkPhos  137<H>  03-24                      RADIOLOGY & ADDITIONAL STUDIES:

## 2023-03-24 NOTE — PROVIDER CONTACT NOTE (HYPOGLYCEMIA EVENT) - NS PROVIDER CONTACT BACKGROUND-HYPO
Age: 62y    Gender: Male    POCT Blood Glucose:  106 mg/dL (03-24-23 @ 14:25)  83 mg/dL (03-24-23 @ 13:44)  84 mg/dL (03-24-23 @ 13:22)  60 mg/dL (03-24-23 @ 12:17)  74 mg/dL (03-24-23 @ 06:28)  90 mg/dL (03-23-23 @ 21:33)  99 mg/dL (03-23-23 @ 17:44)  67 mg/dL (03-23-23 @ 17:03)      eMAR:atorvastatin   40 milliGRAM(s) Oral (03-23-23 @ 21:32)

## 2023-03-24 NOTE — PROGRESS NOTE ADULT - ASSESSMENT
63yo M PMHx HTN, HLD, DM, ICM (s/p cath years ago, RCA 99%, never intervened on), HFrEF 25-30%, ESRD s/p failed kidney transplant (Left Arm AVF; HD TTS), R AKA admitted to [facility] 2/27 for respiratory distress triggered SIRS, after HD session, requiring intubation, course complicated by cardiac arrest, vtach arrest requiring amio and pressors. Extubated 3/2 and course c/b by LGIB with hemoglobin of 3.5 requiring multiple transfusions; EGD/colo and CTA w/o evidence of active bleed and hemoglobin subsequently improved to 11's. Transferred to [facility] on 03/17/23 for ICD eval 2/2 Vtach and cardiac cath however on day of arrival patient had episode of BRBPR, flex sign showing localized ulceration and no active bleeding. DAPT resumed on 03/20/23, with plan for cardiac cath on 3/21/2023 - however on 03/21/23 patient febrile to 102F w/ URI symptoms. Infectious workup under way, pending stability with possible plan for cardiac cath on Friday          63yo M PMHx HTN, HLD, DM, ICM (s/p cath years ago, RCA 99%, never intervened on), HFrEF 25-30%, ESRD s/p failed kidney transplant (Left Arm AVF; HD TTS), R AKA admitted to [facility] 2/27 for respiratory distress triggered SIRS, after HD session, requiring intubation, course complicated by cardiac arrest, vtach arrest requiring amio and pressors. Extubated 3/2 and course c/b by LGIB with hemoglobin of 3.5 requiring multiple transfusions; EGD/colo and CTA w/o evidence of active bleed and hemoglobin subsequently improved to 11's. Transferred to [facility] on 03/17/23 for ICD eval 2/2 Vtach and cardiac cath however on day of arrival patient had episode of BRBPR, flex sign showing localized ulceration and no active bleeding. DAPT resumed on 03/20/23, with plan for cardiac cath on 3/21/2023 - however on 03/21/23 patient febrile to 102F w/ URI symptoms. Infectious workup under way, pending stability with possible plan for cardiac cath on Friday          63yo M PMHx HTN, HLD, DM, ICM (s/p cath years ago, RCA 99%, never intervened on), HFrEF 25-30%, ESRD s/p failed kidney transplant (Left Arm AVF; HD TTS), R AKA admitted to [facility] 2/27 for respiratory distress triggered SIRS, after HD session, requiring intubation, course complicated by cardiac arrest, vtach arrest requiring amio and pressors. Extubated 3/2 and course c/b by LGIB with hemoglobin of 3.5 requiring multiple transfusions; EGD/colo and CTA w/o evidence of active bleed and hemoglobin subsequently improved to 11's. Transferred to [facility] on 03/17/23 for ICD eval 2/2 Vtach and cardiac cath however on day of arrival patient had episode of BRBPR, flex sign showing localized ulceration and no active bleeding. DAPT resumed on 03/20/23, with plan for cardiac cath on 3/21/2023 - however on 03/21/23 patient febrile to 102F w/ URI symptoms. Infectious workup under way, pending stability with possible plan for cardiac cath on Friday          63yo M PMHx HTN, HLD, DM, ICM (s/p cath years ago, RCA 99%, never intervened on), HFrEF 25-30%, ESRD s/p failed kidney transplant (Left Arm AVF; HD TTS), R AKA admitted to [facility] 2/27 for respiratory distress triggered SIRS, after HD session, requiring intubation, course complicated by cardiac arrest, vtach arrest requiring amio and pressors. Extubated 3/2 and course c/b by LGIB with hemoglobin of 3.5 requiring multiple transfusions; EGD/colo and CTA w/o evidence of active bleed and hemoglobin subsequently improved to 11's. Transferred to [facility] on 03/17/23 for ICD eval 2/2 Vtach and cardiac cath however on day of arrival patient had episode of BRBPR, flex sig showing localized rectal ulcer but no active bleeding. DAPT resumed on 3/20 - however on 3/21 patient febrile to 102F w/ URI symptoms. BCx NGTD with intermittent fevers, on broad spectrum Abx. Fecal occult negative, DAPT resumed 3/24. Medically optimized for LHC and ICD pending infectious and bleeding w/u     63yo M PMHx HTN, HLD, DM, ICM (s/p cath years ago, RCA 99%, never intervened on), HFrEF 25-30%, ESRD s/p failed kidney transplant (Left Arm AVF; HD TTS), R AKA admitted to [facility] 2/27 for respiratory distress triggered SIRS, after HD session, requiring intubation, course complicated by cardiac arrest, vtach arrest requiring amio and pressors. Extubated 3/2 and course c/b by LGIB with hemoglobin of 3.5 requiring multiple transfusions; EGD/colo and CTA w/o evidence of active bleed and hemoglobin subsequently improved to 11's. Transferred to [facility] on 03/17/23 for ICD eval 2/2 Vtach and cardiac cath however on day of arrival patient had episode of BRBPR, flex sig showing localized rectal ulcer but no active bleeding. DAPT resumed on 3/20 - however on 3/21 patient febrile to 102F w/ URI symptoms. BCx NGTD with intermittent fevers, on broad spectrum Abx. Fecal occult negative, DAPT resumed 3/24. Medically optimized for LHC and ICD pending infectious and bleeding w/u     61yo M PMHx HTN, HLD, DM, ICM (s/p cath years ago, RCA 99%, never intervened on), HFrEF 25-30%, ESRD s/p failed kidney transplant (Left Arm AVF; HD TTS), R AKA admitted to [facility] 2/27 for respiratory distress triggered SIRS, after HD session, requiring intubation, course complicated by cardiac arrest, vtach arrest requiring amio and pressors. Extubated 3/2 and course c/b by LGIB with hemoglobin of 3.5 requiring multiple transfusions; EGD/colo and CTA w/o evidence of active bleed and hemoglobin subsequently improved to 11's. Transferred to [facility] on 03/17/23 for ICD eval 2/2 Vtach and cardiac cath however on day of arrival patient had episode of BRBPR, flex sig showing localized rectal ulcer but no active bleeding. DAPT resumed on 3/20 - however on 3/21 patient febrile to 102F w/ URI symptoms. BCx NGTD with intermittent fevers, on broad spectrum Abx. Fecal occult negative, DAPT resumed 3/24. Medically optimized for LHC and ICD pending infectious and bleeding w/u

## 2023-03-24 NOTE — PROGRESS NOTE ADULT - SUBJECTIVE AND OBJECTIVE BOX
OVERNIGHT EVENTS: None    SUBJECTIVE / INTERVAL HPI: Patient seen and examined at bedside. Denies f/c, n/v/d/c, chest pain, shortness of breath, abdominal pain.    VITAL SIGNS:  Vital Signs Last 24 Hrs  T(C): 38.4 (24 Mar 2023 13:42), Max: 38.7 (23 Mar 2023 15:30)  T(F): 101.1 (24 Mar 2023 13:42), Max: 101.7 (23 Mar 2023 15:30)  HR: 57 (24 Mar 2023 13:15) (50 - 62)  BP: 107/49 (24 Mar 2023 13:15) (107/46 - 146/69)  BP(mean): 76 (24 Mar 2023 05:09) (66 - 80)  RR: 17 (24 Mar 2023 13:15) (17 - 18)  SpO2: 96% (24 Mar 2023 13:15) (93% - 99%)    Parameters below as of 24 Mar 2023 13:15  Patient On (Oxygen Delivery Method): room air      I&O's Summary    23 Mar 2023 07:01  -  24 Mar 2023 07:00  --------------------------------------------------------  IN: 180 mL / OUT: 2225 mL / NET: -2045 mL    24 Mar 2023 07:01  -  24 Mar 2023 14:20  --------------------------------------------------------  IN: 0 mL / OUT: 100 mL / NET: -100 mL        PHYSICAL EXAM:    General: NAD  HEENT: NC/AT; PERRL, anicteric sclera; MMM  Neck: supple  Cardiovascular: +S1/S2; RRR  Respiratory: CTA B/L; no W/R/R  Gastrointestinal: soft, NT/ND; +BSx4  Extremities: WWP; no edema, clubbing or cyanosis  Vascular: 2+ radial, DP/PT pulses B/L  Neurological: AAOx3; no focal deficits    MEDICATIONS:  MEDICATIONS  (STANDING):  aspirin enteric coated 81 milliGRAM(s) Oral daily  atorvastatin 40 milliGRAM(s) Oral at bedtime  clopidogrel Tablet 75 milliGRAM(s) Oral daily  dextrose 10% Bolus 125 milliLiter(s) IV Bolus once  dextrose 5%. 1000 milliLiter(s) (50 mL/Hr) IV Continuous <Continuous>  dextrose 5%. 1000 milliLiter(s) (100 mL/Hr) IV Continuous <Continuous>  dextrose 50% Injectable 25 Gram(s) IV Push once  dextrose 50% Injectable 12.5 Gram(s) IV Push once  ferrous    sulfate 325 milliGRAM(s) Oral daily  hydrALAZINE 50 milliGRAM(s) Oral every 8 hours  insulin lispro (ADMELOG) corrective regimen sliding scale   SubCutaneous every 6 hours  isosorbide   dinitrate Tablet (ISORDIL) 20 milliGRAM(s) Oral three times a day  losartan 100 milliGRAM(s) Oral daily  meropenem  IVPB      meropenem  IVPB 500 milliGRAM(s) IV Intermittent every 24 hours  metoprolol tartrate 12.5 milliGRAM(s) Oral every 12 hours  pantoprazole  Injectable 40 milliGRAM(s) IV Push every 12 hours  polyethylene glycol 3350 17 Gram(s) Oral daily  tacrolimus 2 milliGRAM(s) Oral every 12 hours  tamsulosin 0.8 milliGRAM(s) Oral at bedtime    MEDICATIONS  (PRN):  acetaminophen     Tablet .. 650 milliGRAM(s) Oral every 6 hours PRN Mild Pain (1 - 3), Moderate Pain (4 - 6)  dextrose Oral Gel 15 Gram(s) Oral once PRN Blood Glucose LESS THAN 70 milliGRAM(s)/deciliter  sodium chloride 0.65% Nasal 1 Spray(s) Both Nostrils every 4 hours PRN Nasal Congestion      ALLERGIES:  Allergies    No Known Allergies    Intolerances        LABS:                        9.1    6.49  )-----------( 120      ( 24 Mar 2023 12:00 )             29.1     03-24    133<L>  |  95<L>  |  11  ----------------------------<  74  3.8   |  30  |  3.74<H>    Ca    8.2<L>      24 Mar 2023 07:26  Phos  3.2     03-24  Mg     1.8     03-24    TPro  4.9<L>  /  Alb  2.6<L>  /  TBili  0.8  /  DBili  x   /  AST  17  /  ALT  7<L>  /  AlkPhos  137<H>  03-24        CAPILLARY BLOOD GLUCOSE      POCT Blood Glucose.: 83 mg/dL (24 Mar 2023 13:44)      RADIOLOGY & ADDITIONAL TESTS: Reviewed.

## 2023-03-24 NOTE — PROGRESS NOTE ADULT - ATTENDING COMMENTS
63yo M PMHx HTN, HLD, DM, CAD(s/p cath years ago, RCA 99%, never intervened on), HFrEF 25-30%, ESRD s/p failed kidney transplant and required HD x 3yrs (last HD 3/16 via Left Arm AVF; HD TTS), Right AKA initially presented to [facility] 2/27 for respiratory distress after a dialysis, found to be septic, h/c complicated by AHRF requiring intubation for 24hr followed by VT arrest, h/c the complicated by massive LGIB (rectal ulcer seen on colonoscopy) requiring 7u pRBC, 1u FFP and 1u PLT. Transferred to [facility] (3/17) for ICD placement 2/2 Vtach and cardiac cath. Pt noted lassed loose stool with BRBPR, GI and surgery consulted. Flex Sigc ( 3/17) found prior hemoclip in the sigmoid colon, localized ulceration and erosive with no bleeding noted in rectum suggestive of solitary ulcer syndrome. Medicine consulted for comanagement.     -seen and examined with Dr. Snow, being seen by OT- able to be helped to sit up, pain in the neck area -no tenderness on spine, mostly the muscle area on lateral side of spine.   moving all limbs., RRR, good air entry bilaterally, right AKA, no edema on left, avf on left arm    -fever -  #LGIB  #SANTOS  #Solitary ulcer syndrome  - hx coffee ground emesis @ [facility]; s/p EGD/colonoscopy showing rectal ulcer  - 3/17AM pt passed loose BM along with large amount of BRBPR  - per gen surgery no acute surgical intervention   - per GI:      - increased Protonix 40 mg IVP BID      - PIV large bore x2      - s/p flex sig(3/17)  for rectal ulcers, found Evidence of prior hemoclip in the sigmoid colon. Localized ulceration and erosive with no bleeding were noted in the rectum, suggestive of solitary ulcer syndrome           - Avoid anal digitation and enemas           - High-fiber diet and optimize laxatives to avoid constipation using miralax and dulcolax           - No absolute GI contraindications to anticoag/antiplt therapy           - Repeat endoscopic evaluation recommended in 3 months  - Active T&S  - Hgb 10.1 stable  - Transfuse for hgb <8 (active CAD)    #CAD   - Previous cath at [facility] showing oLM 30% and RCA 99% that is not amenable to intervention.  - TTE 3/1/23 at [facility]: mild LVH, EF 25-30%, severe global wall motion dysfunction, mildly dilated LA, RV mildly dilated, all leaflets mild calcified, mod pHTN  - hx coffee ground emesis @ [facility]; s/p EGD/colonoscopy showing rectal ulcer and LHH  flex sig suggestive of solitary ulcer syndrome - per cardiology will hold off loading the patient due to recent LGIB  - evaluated by GI = no further procedure planned.   - plan for cardiac cath -deferred due to concern for GI bleeding.    #H/O ventricular tachycardia   - Vtach arrest @[facility]  - fu EP consult for ICD placement- pending infection work up.    # Fever ? PNA   - Pt was febrile 102*F ( 3/21), pt reports cough w. brown sputum production, CXR w/ RUL opacity  - given immunocompromised/hx Kidney transplant , low threshold to start broad spectrum iv abx   - f/u COVID swab/RVP negative, BCx pending   - ID consult appreciated, c/w Vanco and Meropenem, w/u as delineated by ID -noted plan for PET/CT scan to look for source, would need to rule out infection prior to icd placement.    #ESRD on dialysis  - dialysis Tues, Thurs, Sat  - last received  Thurs 3/23  - f/u nephro recs  - c/w calcium citrate 667mg TID  - on Renvela 800mg TID, f/u with renal as patient is with hypophosphatemia on admission     #S/P kidney transplant.   - per [facility] pt takes Tacrolimus 2mg 2 times /day  - f/u tacrolimus serum levels  - f/u nephro recs    #HTN    - c/w home meds. Amlodipine 10mg qd, Losartan 100mg qd, Hydralazine 50m TID, Isordil 20mg TID with holding parameter    #HLD   - c/w Lipitor 40mg qd    #DM  - no meds per [facility], obtain collaterals for med recs prior [facility] stay  - mISS while inpt, goal -180   - A1c 5.9 on admission    DVT PPX: SCDs for now iso GIB    Med consult team continues to follow with you. d/w Dr. Zack Snow , cardiology 63yo M PMHx HTN, HLD, DM, CAD(s/p cath years ago, RCA 99%, never intervened on), HFrEF 25-30%, ESRD s/p failed kidney transplant and required HD x 3yrs (last HD 3/16 via Left Arm AVF; HD TTS), Right AKA initially presented to [facility] 2/27 for respiratory distress after a dialysis, found to be septic, h/c complicated by AHRF requiring intubation for 24hr followed by VT arrest, h/c the complicated by massive LGIB (rectal ulcer seen on colonoscopy) requiring 7u pRBC, 1u FFP and 1u PLT. Transferred to [facility] (3/17) for ICD placement 2/2 Vtach and cardiac cath. Pt noted lassed loose stool with BRBPR, GI and surgery consulted. Flex Sigc ( 3/17) found prior hemoclip in the sigmoid colon, localized ulceration and erosive with no bleeding noted in rectum suggestive of solitary ulcer syndrome. Medicine consulted for comanagement.     -seen and examined with Dr. Snow, being seen by OT- able to be helped to sit up, pain in the neck area -no tenderness on spine, mostly the muscle area on lateral side of spine.   moving all limbs., RRR, good air entry bilaterally, right AKA, no edema on left, avf on left arm    -fever -  #LGIB  #SANTOS  #Solitary ulcer syndrome  - hx coffee ground emesis @ [facility]; s/p EGD/colonoscopy showing rectal ulcer  - 3/17AM pt passed loose BM along with large amount of BRBPR  - per gen surgery no acute surgical intervention   - per GI:      - increased Protonix 40 mg IVP BID      - PIV large bore x2      - s/p flex sig(3/17)  for rectal ulcers, found Evidence of prior hemoclip in the sigmoid colon. Localized ulceration and erosive with no bleeding were noted in the rectum, suggestive of solitary ulcer syndrome           - Avoid anal digitation and enemas           - High-fiber diet and optimize laxatives to avoid constipation using miralax and dulcolax           - No absolute GI contraindications to anticoag/antiplt therapy           - Repeat endoscopic evaluation recommended in 3 months  - Active T&S  - Hgb 10.1 stable  - Transfuse for hgb <8 (active CAD)    #CAD   - Previous cath at [facility] showing oLM 30% and RCA 99% that is not amenable to intervention.  - TTE 3/1/23 at [facility]: mild LVH, EF 25-30%, severe global wall motion dysfunction, mildly dilated LA, RV mildly dilated, all leaflets mild calcified, mod pHTN  - hx coffee ground emesis @ [facility]; s/p EGD/colonoscopy showing rectal ulcer and LHH  flex sig suggestive of solitary ulcer syndrome - per cardiology will hold off loading the patient due to recent LGIB  - evaluated by GI = no further procedure planned.   - plan for cardiac cath -deferred due to concern for GI bleeding.    #H/O ventricular tachycardia   - Vtach arrest @[facility]  - fu EP consult for ICD placement- pending infection work up.    # Fever ? PNA   - Pt was febrile 102*F ( 3/21), pt reports cough w. brown sputum production, CXR w/ RUL opacity  - given immunocompromised/hx Kidney transplant , low threshold to start broad spectrum iv abx   - f/u COVID swab/RVP negative, BCx pending   - ID consult appreciated, c/w Vanco and Meropenem, w/u as delineated by ID -noted plan for PET/CT scan to look for source, would need to rule out infection prior to icd placement.    #ESRD on dialysis  - dialysis Tues, Thurs, Sat  - last received  Thurs 3/23  - f/u nephro recs  - c/w calcium citrate 667mg TID  - on Renvela 800mg TID, f/u with renal as patient is with hypophosphatemia on admission     #S/P kidney transplant.   - per [facility] pt takes Tacrolimus 2mg 2 times /day  - f/u tacrolimus serum levels  - f/u nephro recs    #HTN    - c/w home meds. Amlodipine 10mg qd, Losartan 100mg qd, Hydralazine 50m TID, Isordil 20mg TID with holding parameter    #HLD   - c/w Lipitor 40mg qd    #DM  - no meds per [facility], obtain collaterals for med recs prior [facility] stay  - mISS while inpt, goal -180   - A1c 5.9 on admission    DVT PPX: SCDs for now iso GIB    Med consult team continues to follow with you. d/w Dr. Zack Snow , cardiology 61yo M PMHx HTN, HLD, DM, CAD(s/p cath years ago, RCA 99%, never intervened on), HFrEF 25-30%, ESRD s/p failed kidney transplant and required HD x 3yrs (last HD 3/16 via Left Arm AVF; HD TTS), Right AKA initially presented to [facility] 2/27 for respiratory distress after a dialysis, found to be septic, h/c complicated by AHRF requiring intubation for 24hr followed by VT arrest, h/c the complicated by massive LGIB (rectal ulcer seen on colonoscopy) requiring 7u pRBC, 1u FFP and 1u PLT. Transferred to [facility] (3/17) for ICD placement 2/2 Vtach and cardiac cath. Pt noted lassed loose stool with BRBPR, GI and surgery consulted. Flex Sigc ( 3/17) found prior hemoclip in the sigmoid colon, localized ulceration and erosive with no bleeding noted in rectum suggestive of solitary ulcer syndrome. Medicine consulted for comanagement.     -seen and examined with Dr. Snow, being seen by OT- able to be helped to sit up, pain in the neck area -no tenderness on spine, mostly the muscle area on lateral side of spine.   moving all limbs., RRR, good air entry bilaterally, right AKA, no edema on left, avf on left arm    -fever -  #LGIB  #SANTOS  #Solitary ulcer syndrome  - hx coffee ground emesis @ [facility]; s/p EGD/colonoscopy showing rectal ulcer  - 3/17AM pt passed loose BM along with large amount of BRBPR  - per gen surgery no acute surgical intervention   - per GI:      - increased Protonix 40 mg IVP BID      - PIV large bore x2      - s/p flex sig(3/17)  for rectal ulcers, found Evidence of prior hemoclip in the sigmoid colon. Localized ulceration and erosive with no bleeding were noted in the rectum, suggestive of solitary ulcer syndrome           - Avoid anal digitation and enemas           - High-fiber diet and optimize laxatives to avoid constipation using miralax and dulcolax           - No absolute GI contraindications to anticoag/antiplt therapy           - Repeat endoscopic evaluation recommended in 3 months  - Active T&S  - Hgb 10.1 stable  - Transfuse for hgb <8 (active CAD)    #CAD   - Previous cath at [facility] showing oLM 30% and RCA 99% that is not amenable to intervention.  - TTE 3/1/23 at [facility]: mild LVH, EF 25-30%, severe global wall motion dysfunction, mildly dilated LA, RV mildly dilated, all leaflets mild calcified, mod pHTN  - hx coffee ground emesis @ [facility]; s/p EGD/colonoscopy showing rectal ulcer and LHH  flex sig suggestive of solitary ulcer syndrome - per cardiology will hold off loading the patient due to recent LGIB  - evaluated by GI = no further procedure planned.   - plan for cardiac cath -deferred due to concern for GI bleeding.    #H/O ventricular tachycardia   - [facility] arrest [facility]  - fu EP consult for ICD placement- pending infection work up.    # Fever ? PNA   - Pt was febrile 102*F ( 3/21), pt reports cough w. brown sputum production, CXR w/ RUL opacity  - given immunocompromised/hx Kidney transplant , low threshold to start broad spectrum iv abx   - f/u COVID swab/RVP negative, BCx pending   - ID consult appreciated, c/w Vanco and Meropenem, w/u as delineated by ID -noted plan for PET/CT scan to look for source, would need to rule out infection prior to icd placement.    #ESRD on dialysis  - dialysis Tues, Thurs, Sat  - last received  Thurs 3/23  - f/u nephro recs  - c/w calcium citrate 667mg TID  - on Renvela 800mg TID, f/u with renal as patient is with hypophosphatemia on admission     #S/P kidney transplant.   - per [facility] pt takes Tacrolimus 2mg 2 times /day  - f/u tacrolimus serum levels  - f/u nephro recs    #HTN    - c/w home meds. Amlodipine 10mg qd, Losartan 100mg qd, Hydralazine 50m TID, Isordil 20mg TID with holding parameter    #HLD   - c/w Lipitor 40mg qd    #DM  - no meds per [facility], obtain collaterals for med recs prior [facility] stay  - mISS while inpt, goal -180   - A1c 5.9 on admission    DVT PPX: SCDs for now iso GIB    Med consult team continues to follow with you. d/w Dr. Zack Snow , cardiology

## 2023-03-24 NOTE — PROGRESS NOTE ADULT - PROBLEM SELECTOR PLAN 1
- LHC at [place] w/ oLM 30% and RCA 99% that is not amenable to intervention.    - TTE (3/1/23 @ [facility]): mild LVH, LVEF 25-30%, severe global wall motion dysfxn, mild LA dilation, mild RV dilation, mildly calcified leaflets, mod pHTN.    - Pt w/ severe GIB at [facility] requiring multiple PRBC transfusions; at [facility] pt cleared by GI for DAPT, and received ASA/Plavix since 3/20/23 w/ stable H/H and no signs of bleeding; HOWEVER, 3/23/23 pt w/ episode of black tarry stool (H/H stable and no hemodynamic signs of bleeding), Plavix held at this time until cleared by GI.    - Continue ASA 81mg PO QD (cleared by GI).    - PLAN: pending further GI and infectious work up to determine date of LHC. - LHC at [place] w/ oLM 30% and RCA 99% that is not amenable to intervention.    - TTE (3/1/23 @ [facility]): mild LVH, LVEF 25-30%, severe global wall motion dysfxn, mild LA dilation, mild RV dilation, mildly calcified leaflets, mod pHTN.    - Pt w/ severe GIB at [facility] requiring multiple PRBC transfusions; at [facility] pt cleared by GI for DAPT, and received ASA/Plavix since 3/20/23 w/ stable H/H and no signs of bleeding; HOWEVER, 3/23/23 pt w/ episode of black tarry stool (H/H stable and no hemodynamic signs of bleeding), Plavix held at this time until cleared by GI.    - Continue ASA 81mg PO QD (cleared by GI).    - PLAN: pending further GI and infectious work up to determine date of LHC. - LHC at [place] w/ oLM 30% and RCA 99% that is not amenable to intervention.    - TTE (3/1/23 @ [facility]): mild LVH, LVEF 25-30%, severe global wall motion dysfxn, mild LA dilation, mild RV dilation, mildly calcified leaflets, mod pHTN.    - Pt w/ severe GIB at [facility] requiring multiple PRBC transfusions; at [facility] pt cleared by GI for DAPT, and received ASA/Plavix since 3/20/23 w/ stable H/H and no signs of bleeding; HOWEVER, 3/23/23 pt w/ episode of black tarry stool (H/H stable and no hemodynamic signs of bleeding), Plavix held at this time until cleared by GI.    - Continue ASA 81mg PO QD (cleared by GI).    - PLAN: pending further GI and infectious work up to determine date of LHC. - LHC at [place] w/ oLM 30% and RCA 99% that is not amenable to intervention.    - TTE (3/1/23 @ [facility]): mild LVH, LVEF 25-30%, severe global wall motion dysfxn, mild LA dilation, mild RV dilation, mildly calcified leaflets, mod pHTN.    - Pt w/ severe GIB at [facility] requiring multiple PRBC transfusions; at [facility] pt cleared by GI for DAPT and on ASA/Plavix 3/20/23 - 3/23/23 however pt w/ episode of black tarry stool (H/H stable and no hemodynamic signs of bleeding)  - fecal occult negative and pt cleared by GI for DAPT  - Continue ASA 81mg, plavix 75mg qd  - PLAN: pending further GI and infectious work up to determine date of LHC. - LHC at [place] w/ oLM 30% and RCA 99% that is not amenable to intervention.    - TTE (3/1/23 @ [facility]): mild LVH, LVEF 25-30%, severe global wall motion dysfxn, mild LA dilation, mild RV dilation, mildly calcified leaflets, mod pHTN.    - Pt w/ severe GIB at [facility] requiring multiple PRBC transfusions; at [facility] pt cleared by GI for DAPT and on ASA/Plavix 3/20/23 - 3/23/23 however pt w/ episode of black tarry stool (H/H stable and no hemodynamic signs of bleeding)  - fecal occult negative and pt cleared by GI for DAPT  - Continue ASA 81mg, plavix 75mg qd  - PLAN: pending further GI and infectious work up to determine date of LHC. - LHC at [place] w/ oLM 30% and RCA 99% that is not amenable to intervention.    - TTE (3/1/23 @ [facility]): mild LVH, LVEF 25-30%, severe global wall motion dysfxn, mild LA dilation, mild RV dilation, mildly calcified leaflets, mod pHTN.    - Pt w/ severe GIB at [facility] requiring multiple PRBC transfusions; at [facility] pt cleared by GI for DAPT and on ASA/Plavix 3/20/23 - 3/23/23 however pt w/ episode of black tarry stool (H/H stable and no hemodynamic signs of bleeding)  - fecal occult negative and pt cleared by GI for DAPT  - Continue ASA 81mg, plavix 75mg qd  - PLAN: pending further GI and infectious work up to determine date of LHC.

## 2023-03-24 NOTE — PROGRESS NOTE ADULT - PROBLEM SELECTOR PLAN 3
- @ [facility], pt w/ severe GIB w/ Hgb down to 3.5 received total of 7u PRBCs.    - 3/17 AM pt passed loose BM along w/ large amount of BRBPR.    - s/p Flex sigmoidoscopy – Localized ulceration and erosion w/ no bleeding in rectum, suggestive of solitary ulcer syndrome; evidence of prior hemoclip was found in sigmoid colon.    - Avoid anal digitation and eneams.    - Bowel regimen w/ Miralax and Dulcolax to avoid constipation.    - Pt initialy cleared to trial DAPT and received ASA/Plavix 3/20/23 - 3/23/23; on 3/23/23 pt w/ episode of melena, and GI reconsulted. Plavix discontinued but per GI pt can continue ASA 81mg PO QD.    - Transfusion goal Hgb < 8.0. - @ [facility], pt w/ severe GIB w/ Hgb down to 3.5 received total of 7u PRBCs.    - 3/17 AM pt passed loose BM along w/ large amount of BRBPR.    - s/p Flex sigmoidoscopy – Localized ulceration and erosion w/ no bleeding in rectum, suggestive of solitary ulcer syndrome; evidence of prior hemoclip was found in sigmoid colon.    - Avoid anal digitation and eneams.    - Bowel regimen w/ Miralax and Dulcolax to avoid constipation.    - Pt initialy cleared to trial DAPT and received ASA/Plavix 3/20/23 - 3/23/23; on 3/23/23 pt w/ episode of melena, and GI reconsulted. Plavix discontinued but per GI pt can continue ASA 81mg PO QD.    - Transfusion goal Hgb < 8.0. - @ [facility], pt w/ severe GIB w/ Hgb down to 3.5 received total of 7u PRBCs.    - 3/17 AM pt passed loose BM along w/ large amount of BRBPR.    - s/p Flex sigmoidoscopy – Localized ulceration and erosion w/ no bleeding in rectum, suggestive of solitary ulcer syndrome; evidence of prior hemoclip was found in sigmoid colon.    - Avoid anal digitation and eneams.    - Bowel regimen w/ Miralax and Dulcolax to avoid constipation.    - Pt initialy cleared to trial DAPT and received ASA/Plavix 3/20/23 - 3/23/23; on 3/23/23 pt w/ episode of melena, and GI reconsulted. Plavix discontinued but per GI pt can continue ASA 81mg PO QD.    - Transfusion goal Hgb < 8.0. - @ [facility], pt w/ severe GIB w/ Hgb down to 3.5 received total of 7u PRBCs.    - 3/17 AM pt passed loose BM along w/ large amount of BRBPR.    - s/p Flex sigmoidoscopy – Localized ulceration and erosion w/ no bleeding in rectum, suggestive of solitary ulcer syndrome; evidence of prior hemoclip was found in sigmoid colon.    - Avoid anal digitation and enemas.    - Bowel regimen w/ Miralax and Dulcolax to avoid constipation.    - now cleared by GI for DAPT  - Transfusion goal Hgb < 8.0. - @ [facility], pt w/ severe GIB w/ Hgb down to 3.5 received total of 7u PRBCs.    - 3/17 AM pt passed loose BM along w/ large amount of BRBPR.    - s/p Flex sigmoidoscopy – Localized ulceration and erosion w/ no bleeding in rectum, suggestive of solitary ulcer syndrome; evidence of prior hemoclip was found in sigmoid colon.    - Avoid anal digitation and enemas.    - Bowel regimen w/ Miralax and Dulcolax to avoid constipation.    - now cleared by GI for DAPT  - Transfusion goal Hgb < 8.0. - @ [facility], pt w/ severe GIB w/ Hgb down to 3.5 received total of 7u PRBCs.    - 3/17 AM pt passed loose BM along w/ large amount of BRBPR.    - s/p Flex sigmoidoscopy – Localized ulceration and erosion w/ no bleeding in rectum, suggestive of solitary ulcer syndrome; evidence of prior hemoclip was found in sigmoid colon.    - Avoid anal digitation and enemas.    - Bowel regimen w/ Miralax and Dulcolax to avoid constipation.    - now cleared by GI for DAPT  - Transfusion goal Hgb < 8.0.

## 2023-03-24 NOTE — PROGRESS NOTE ADULT - SUBJECTIVE AND OBJECTIVE BOX
Patient is a 62y Male seen and evaluated at bedside. Laying comfortably in bed. Denies any SOB, Nausea, vomiting, CP      Meds:    acetaminophen     Tablet .. 650 every 6 hours PRN  aspirin enteric coated 81 daily  atorvastatin 40 at bedtime  dextrose 5%. 1000 <Continuous>  dextrose 5%. 1000 <Continuous>  dextrose 50% Injectable 25 once  dextrose 50% Injectable 12.5 once  dextrose Oral Gel 15 once PRN  ferrous    sulfate 325 daily  hydrALAZINE 50 every 8 hours  insulin lispro (ADMELOG) corrective regimen sliding scale  every 6 hours  isosorbide   dinitrate Tablet (ISORDIL) 20 three times a day  losartan 100 daily  meropenem  IVPB    meropenem  IVPB 500 every 24 hours  metoprolol tartrate 12.5 every 12 hours  pantoprazole  Injectable 40 every 12 hours  polyethylene glycol 3350 17 daily  sodium chloride 0.65% Nasal 1 every 4 hours PRN  tacrolimus 2 every 12 hours  tamsulosin 0.8 at bedtime      T(C): , Max: 38.7 (03-23-23 @ 15:30)  T(F): , Max: 101.7 (03-23-23 @ 15:30)  HR: 58 (03-24-23 @ 08:26)  BP: 125/46 (03-24-23 @ 08:26)  BP(mean): 76 (03-24-23 @ 05:09)  RR: 17 (03-24-23 @ 08:26)  SpO2: 99% (03-24-23 @ 08:26)  Wt(kg): --    03-23 @ 07:01  -  03-24 @ 07:00  --------------------------------------------------------  IN: 180 mL / OUT: 2225 mL / NET: -2045 mL    03-24 @ 07:01  -  03-24 @ 11:57  --------------------------------------------------------  IN: 0 mL / OUT: 100 mL / NET: -100 mL          Review of Systems:  ROS negative except as per HPI      PHYSICAL EXAM:  GENERAL: Alert, awake, oriented  CHEST/LUNG: Bilateral clear breath sounds, on RA  HEART: S1, S2, RRR  ABDOMEN: Soft, nontender, non distended  EXTREMITIES: no pedal edema, Rt AKA  Neurology: AAOx3, no asterixis   ACCESS: LUE AVF w/bruit and thrill      LABS:                        9.6    5.86  )-----------( 123      ( 24 Mar 2023 07:26 )             30.8     03-24    133<L>  |  95<L>  |  11  ----------------------------<  74  3.8   |  30  |  3.74<H>    Ca    8.2<L>      24 Mar 2023 07:26  Phos  3.2     03-24  Mg     1.8     03-24    TPro  4.9<L>  /  Alb  2.6<L>  /  TBili  0.8  /  DBili  x   /  AST  17  /  ALT  7<L>  /  AlkPhos  137<H>  03-24                RADIOLOGY & ADDITIONAL STUDIES:

## 2023-03-24 NOTE — OCCUPATIONAL THERAPY INITIAL EVALUATION ADULT - ADDITIONAL COMMENTS
Pt states that he is a [facility] resident. Pt states that he required assist for ADLs and functional transfers. He was OOB to wheel chair daily w/ SPTs.

## 2023-03-24 NOTE — OCCUPATIONAL THERAPY INITIAL EVALUATION ADULT - MD ORDER
CAD (coronary artery disease)  c/b cardiac arrest and vtach arrest and hypovolemic shock 2/2 GI bleed  Massive LGIB (rectal ulcer)  H/o R BKA CAD (coronary artery disease)  c/b cardiac arrest and vtach arrest and hypovolemic shock 2/2 GI bleed  Massive LGIB (rectal ulcer)  H/o R AKA

## 2023-03-24 NOTE — DISCHARGE NOTE PROVIDER - NSDCFUADDAPPT_GEN_ALL_CORE_FT
(1) Please follow up with Dr. Aleman (cardiology) on April 24th at 3pm at Address [address] ([address]). Please make sure to bring your hospital paperwork and insurance cards, identification    (2) Please follow up with Dr. Goss  (Nephrologist), you will receive a call from him from phone number [phone] within 2 weeks. Please anticipate call from this number at your listed phone number.  (1) Please follow up with Dr. Aleman (cardiology) on April 24th at 3pm at Address [address] ([address]). Please make sure to bring your hospital paperwork and insurance cards, identification    (2) Please follow up with Dr. Goss  (Nephrologist), you will receive a call from him from phone number [phone] within 2 weeks. Please anticipate call from this number at your listed phone number.  (1) Please follow up with Dr. Aleman (cardiology) on April 24th at 3pm at [address] ([address]). Please make sure to bring your hospital paperwork and insurance cards, identification    (2) Please follow up with Dr. Goss  (Nephrologist), you will receive a call from him from phone number [phone] within 2 weeks. Please anticipate call from this number at your listed phone number.

## 2023-03-24 NOTE — DISCHARGE NOTE PROVIDER - CARE PROVIDER_API CALL
PING HERNANDEZ  Cardiology  Phone: [phone]  Fax: ()-  Scheduled Appointment: 04/24/2023 03:00 PM    Job Goss)  [place] [address]  Phone: [phone]  Fax: [phone]  Follow Up Time:    PING HERNANDEZ  Cardiology  Phone: [phone]  Fax: ()-  Scheduled Appointment: 04/24/2023 03:00 PM    Job Goss)  [place] [id number]  Phone: [phone]  Fax: [phone]  Follow Up Time:    PING HERNNADEZ  Cardiology  Phone: [phone]  Fax: ()-  Scheduled Appointment: 04/24/2023 03:00 PM    Job Goss)  [place] [id number]  Phone: [phone]  Fax: [phone]  Follow Up Time:

## 2023-03-24 NOTE — OCCUPATIONAL THERAPY INITIAL EVALUATION ADULT - LEVEL OF INDEPENDENCE: DRESS LOWER BODY, OT EVAL
Duration Of Freeze Thaw-Cycle (Seconds): 5-10
Consent: The patient's consent was obtained including but not limited to risks of crusting, scabbing, blistering, scarring, darker or lighter pigmentary change, recurrence, incomplete removal and infection.
Number Of Freeze-Thaw Cycles: 1 freeze-thaw cycle
Render Post-Care Instructions In Note?: no
Post-Care Instructions: I reviewed with the patient in detail post-care instructions. Patient is to wear sunprotection, and avoid picking at any of the treated lesions. Pt may apply Vaseline to crusted or scabbing areas.
Medical Necessity Clause: This procedure was medically necessary because the lesions that were treated were:
Medical Necessity Information: It is in your best interest to select a reason for this procedure from the list below. All of these items fulfill various CMS LCD requirements except the new and changing color options.
Detail Level: Detailed
Render Post-Care Instructions In Note?: yes
Duration Of Freeze Thaw-Cycle (Seconds): 5
maximum assist (25% patients effort)

## 2023-03-24 NOTE — PROGRESS NOTE ADULT - ATTENDING COMMENTS
GI consult is called for dark stool and anemia.   No clinical sign of ongoing GI bleeding  Dark green stool on exam  No need for EGD at this time

## 2023-03-24 NOTE — PROGRESS NOTE ADULT - PROBLEM SELECTOR PLAN 2
- Pt w/ ongoing fevers this admission, infectious work up ongoing.    - Infectious work up ongoing; no clear infectious source identified at this time.    - BCx NGTD; UCx (+) for Gram (-) rods (pt on Meropenem still having fevers).    - Infectious disease following – continue to follow recs.    - CONT: Meropenem 500mg IV QD (**after HD on HD days), Vancomycin dose after HD sessions (based on pre-HD session Vanc troughs per direction of ID).    - Next Vanc trough: Saturday 3/25/23 AM (**before HD session).    - PLAN: ID following, recommend Gallium scan -- **NO Gallium available, and per radiology in setting of multiple blood transfusions Gallium scan quality is decreased – PET SCAN ON SATURDAY (call and coordinate with radiology team; NPO after MN Friday). - Pt w/ ongoing fevers this admission, infectious work up ongoing.    - Infectious work up ongoing; no clear infectious source identified at this time.    - BCx NGTD; UCx (+) for Gram (-) rods (pt on Meropenem still having fevers).    - Infectious disease following – continue to follow recs.    - CONT: Meropenem 500mg IV QD (**after HD on HD days), Vancomycin dose after HD sessions (based on pre-HD session Vanc troughs per direction of ID).    - Next Vanc trough: Saturday 3/25/23 AM (**before HD session).    - PLAN: ID following, recommend Gallium scan -- **NO Gallium available, and per radiology in setting of multiple blood transfusions Gallium scan quality is decreased – PET SCAN ON 3/25/23 (call and coordinate with radiology team; NPO after MN Friday).

## 2023-03-24 NOTE — DISCHARGE NOTE PROVIDER - HOSPITAL COURSE
INCOMPLETE    61yo M PMHx HTN, HLD, DM,  ICM (s/p cath years ago, RCA 99%, never intervened on), HFrEF 25-30%, ESRD s/p failed kidney transplant (last HD 3/1 via Left Arm AVF; HD TTS), Right AKA presented to [facility] 2/27 for respiratory distress after a dialysis session and admitted to cardiac telemetry on 2/27 with SIRS criteria. Placed on vanc/zosyn. Pt was placed on BiPAP which failed, where he was then intubated on 3/1 and moved to the MICU. He went into cardiac arrest (RoSC achieved after 8mins). Went into Vtach arrest, was placed on amio and pressors, HR ellen into 30s and atropine given twice. Weaned off levophed and sedation and extubated 3/2. Pt developed coffee ground emesis, hemoccult positive, with Hgb dipping to 3.5; he then received 7units PRBCs, 1Unit FFP, 1 unit donor packed platelets. EGD and colonoscopy showing melanosis duodenii but no acute bleed. BP dropped again, transferred to ICU and started on levophed gtt. CTA Abdomen showing no active bleeding into GI lumen, possible focal proctitis; colonoscopy showing rectal ulcer. Started on hydrocortisone suppository and mesalamine. TTE 3/1/23 at [facility]: mild LVH, EF 25-30%, severe global wall motion dysfunction, mildly dilated LA, RV mildly dilated, all leaflets mild calcified, mod pHTN Hgb now stable in 11s, now transferred for ICD placement 2/2 Vtach and cardiac cath. On morning of transfer, pt found to have BRBPR. GI and surgery consulted. Flex sigmoidoscopy showing rectal ulcer that was non-bleeding. Cleared by GI and started on DAPT. Pt noted to start spiking fevers 3/20 with blood cultures showing NGTD ________, RSV negative, COVID negative, UCx gram negative rods. ID consulted and started on broad spectrum Antibiotics meropenem 500mg IV qd x 8 days. PET scan performed 3/25 (in place of gallium scan 2/2 national gallium shortage) showing ______. One episode of melena per nursing team so DAPT held 3/23. GI reconsulted and no concern for GIB, no scope performed and cleared to restart DAPT. INCOMPLETE    63yo M PMHx HTN, HLD, DM,  ICM (s/p cath years ago, RCA 99%, never intervened on), HFrEF 25-30%, ESRD s/p failed kidney transplant (last HD 3/1 via Left Arm AVF; HD TTS), Right AKA presented to [facility] 2/27 for respiratory distress after a dialysis session and admitted to cardiac telemetry on 2/27 with SIRS criteria. Placed on vanc/zosyn. Pt was placed on BiPAP which failed, where he was then intubated on 3/1 and moved to the MICU. He went into cardiac arrest (RoSC achieved after 8mins). Went into Vtach arrest, was placed on amio and pressors, HR ellen into 30s and atropine given twice. Weaned off levophed and sedation and extubated 3/2. Pt developed coffee ground emesis, hemoccult positive, with Hgb dipping to 3.5; he then received 7units PRBCs, 1Unit FFP, 1 unit donor packed platelets. EGD and colonoscopy showing melanosis duodenii but no acute bleed. BP dropped again, transferred to ICU and started on levophed gtt. CTA Abdomen showing no active bleeding into GI lumen, possible focal proctitis; colonoscopy showing rectal ulcer. Started on hydrocortisone suppository and mesalamine. TTE 3/1/23 at [facility]: mild LVH, EF 25-30%, severe global wall motion dysfunction, mildly dilated LA, RV mildly dilated, all leaflets mild calcified, mod pHTN Hgb now stable in 11s, now transferred for ICD placement 2/2 Vtach and cardiac cath. On morning of transfer, pt found to have BRBPR. GI and surgery consulted. Flex sigmoidoscopy showing rectal ulcer that was non-bleeding. Cleared by GI and started on DAPT. Pt noted to start spiking fevers 3/20 with blood cultures showing NGTD ________, RSV negative, COVID negative, UCx gram negative rods. ID consulted and started on broad spectrum Antibiotics meropenem 500mg IV qd x 8 days. PET scan performed 3/25 (in place of gallium scan 2/2 national gallium shortage) showing ______. One episode of melena per nursing team so DAPT held 3/23. GI reconsulted and no concern for GIB, no scope performed and cleared to restart DAPT. INCOMPLETE    63yo M PMHx HTN, HLD, DM,  ICM (s/p cath years ago, RCA 99%, never intervened on), HFrEF 25-30%, ESRD s/p failed kidney transplant (last HD 3/1 via Left Arm AVF; HD TTS), Right AKA presented to [facility] 2/27 for respiratory distress after a dialysis session and admitted to cardiac telemetry on 2/27 with SIRS criteria. Placed on vanc/zosyn. Pt was placed on BiPAP which failed, where he was then intubated on 3/1 and moved to the MICU. He went into cardiac arrest (RoSC achieved after 8mins). Went into Vtach arrest, was placed on amio and pressors, HR ellen into 30s and atropine given twice. Weaned off levophed and sedation and extubated 3/2. Pt developed coffee ground emesis, hemoccult positive, with Hgb dipping to 3.5; he then received 7units PRBCs, 1Unit FFP, 1 unit donor packed platelets. EGD and colonoscopy showing melanosis duodenii but no acute bleed. BP dropped again, transferred to ICU and started on levophed gtt. CTA Abdomen showing no active bleeding into GI lumen, possible focal proctitis; colonoscopy showing rectal ulcer. Started on hydrocortisone suppository and mesalamine. TTE 3/1/23 at [facility]: mild LVH, EF 25-30%, severe global wall motion dysfunction, mildly dilated LA, RV mildly dilated, all leaflets mild calcified, mod pHTN Hgb now stable in 11s, now transferred for ICD placement 2/2 Vtach and cardiac cath. On morning of transfer, pt found to have BRBPR. GI and surgery consulted. Flex sigmoidoscopy showing rectal ulcer that was non-bleeding. Cleared by GI and started on DAPT. Pt noted to start spiking fevers 3/20 with blood cultures showing NGTD ________, RSV negative, COVID negative, UCx gram negative rods. ID consulted and started on broad spectrum Antibiotics meropenem 500mg IV qd x 8 days. PET scan performed 3/25 (in place of gallium scan 2/2 national gallium shortage) showing ______. One episode of melena per nursing team so DAPT held 3/23. GI reconsulted and no concern for GIB, no scope performed and cleared to restart DAPT. 63yo M PMHx HTN, HLD, DM,  ICM (s/p cath years ago, RCA 99%, never intervened on), HFrEF 25-30%, ESRD s/p failed kidney transplant (last HD 3/1 via Left Arm AVF; HD TTS), Right AKA presented to [facility] 2/27 for respiratory distress after a dialysis session and admitted to cardiac telemetry on 2/27 with SIRS criteria. Placed on vanc/zosyn. Pt was placed on BiPAP which failed, where he was then intubated on 3/1 and moved to the MICU. He went into cardiac arrest (RoSC achieved after 8mins). Went into Vtach arrest, was placed on amio and pressors, HR ellen into 30s and atropine given twice. Weaned off levophed and sedation and extubated 3/2. Pt developed coffee ground emesis, hemoccult positive, with Hgb dipping to 3.5; he then received 7units PRBCs, 1Unit FFP, 1 unit donor packed platelets. EGD and colonoscopy showing melanosis duodenii but no acute bleed. BP dropped again, transferred to ICU and started on levophed gtt. CTA Abdomen showing no active bleeding into GI lumen, possible focal proctitis; colonoscopy showing rectal ulcer. Started on hydrocortisone suppository and mesalamine. TTE 3/1/23 at [facility]: mild LVH, EF 25-30%, severe global wall motion dysfunction, mildly dilated LA, RV mildly dilated, all leaflets mild calcified, mod pHTN Hgb now stable in 11s, now transferred for ICD placement 2/2 Vtach and cardiac cath. On morning of transfer, pt found to have BRBPR. GI and surgery consulted. Flex sigmoidoscopy showing rectal ulcer that was non-bleeding. Cleared by GI and started on DAPT. Pt noted to start spiking fevers 3/20 RSV negative, COVID negative, UCx gram negative rods. ID consulted and started on broad spectrum Antibiotics meropenem 500mg IV qd x 8 days. One episode of melena per nursing team so DAPT held 3/23. GI reconsulted and no concern for GIB, no scope performed and cleared to restart DAPT. Patient had trial without abx continues to be afebrile. Will be discharged to [facility] outpatient management.     Fever of unknown origin (FUO).   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 Pyelitis treated with ceftriaxone 2 g IV q24h (3/21-4/4) per ID recs. Patient also had diarrhea with antibiotics. C.diff and GI PCR negative. Unclear source of fever. Overnight 4/5, patient with new Tmax to 104.6 F. WBC 10.82, CRP 54.3, procal 0.97. Abd U/S with no acute pathology, CXR negative, Bcx NGTD. PET scan showing increased uptake in prostate suggesting prostatitis vs. neoplasm. s/p CTX tx for pyelitis vs cystitis w continued elevated t and fevers after abx course completion.   - Blood Culture 4/5 NGTD  - s/p Meropenem 500 mg q24h per ID recs, as patient is immunocompromised  - s/p Vancomycin (w HD)   - Trial without abx, continues to be afebrile     CAD (coronary artery disease).   LHC at [place] w/ oLM 30% and RCA 99% that is not amenable to intervention. Per cardiology, will defer inpatient cath or other intervention on this admission due to persistent fevers   ECHO 4/3: EF 40% w/regional wall motion abnormality. Entire inferior wall & basal & mid inferolateral wall akinetic. Basal& mid anterolateral wall & apical lateral segments hypokinetic. Moderately dilated LA. Mild AR.   - c/w ASA 81mg and Plavix 75mg daily  - F/u cardiology outpatient     H/O ventricular tachycardia.   s/p VTach arrest @ [facility]; no tele events noted. EP Consulted with original plan for SubQ ICD placement once pt had LHC. Will receive life vest instead  - c/w Toprol XL 25mg daily.    HTN (hypertension).   home meds: Amlodipine 10mg qd, Losartan 100mg qd, Hydralazine 50m TID, Isordil 20mg TID   - c/w home meds.    HLD (hyperlipidemia).   Lipitor 40mg qd  - c/w home med.    DM (diabetes mellitus).   no meds per [facility]. A1c 5.9 on admission  - Outpatient f/u    ESRD on dialysis.   On dialysis Tues, Thurs, Sat. Nephro following (hx failed renal transplant)   - f/u nephro outpatient   - c/w Tacrolimus    GI bleed.   History of coffee ground emesis and massive LGIB @ [facility]; s/p EGD/colonoscopy showing rectal ulcer. Surgery and GI consulted on admission. Per gen surgery no acute surgical intervention. s/p signmoidoscopy w/ GI, no active bleed. No further intervention required.   - f/u outpatient    S/P kidney transplant.   S/p failed kidney transplant. Per referring Dr Barrera, pt still requires tacrolimus even if failed transplant due to possibility of graft vs host.  - c/w Tacrolimus 2mg PO BID  - Urology followup upon discharge.    Chronic HFrEF (heart failure with reduced ejection fraction).   - Cardiology f/u          61yo M PMHx HTN, HLD, DM,  ICM (s/p cath years ago, RCA 99%, never intervened on), HFrEF 25-30%, ESRD s/p failed kidney transplant (last HD 3/1 via Left Arm AVF; HD TTS), Right AKA presented to [facility] 2/27 for respiratory distress after a dialysis session and admitted to cardiac telemetry on 2/27 with SIRS criteria. Placed on vanc/zosyn. Pt was placed on BiPAP which failed, where he was then intubated on 3/1 and moved to the MICU. He went into cardiac arrest (RoSC achieved after 8mins). Went into Vtach arrest, was placed on amio and pressors, HR ellen into 30s and atropine given twice. Weaned off levophed and sedation and extubated 3/2. Pt developed coffee ground emesis, hemoccult positive, with Hgb dipping to 3.5; he then received 7units PRBCs, 1Unit FFP, 1 unit donor packed platelets. EGD and colonoscopy showing melanosis duodenii but no acute bleed. BP dropped again, transferred to ICU and started on levophed gtt. CTA Abdomen showing no active bleeding into GI lumen, possible focal proctitis; colonoscopy showing rectal ulcer. Started on hydrocortisone suppository and mesalamine. TTE 3/1/23 at [facility]: mild LVH, EF 25-30%, severe global wall motion dysfunction, mildly dilated LA, RV mildly dilated, all leaflets mild calcified, mod pHTN Hgb now stable in 11s, now transferred for ICD placement 2/2 Vtach and cardiac cath. On morning of transfer, pt found to have BRBPR. GI and surgery consulted. Flex sigmoidoscopy showing rectal ulcer that was non-bleeding. Cleared by GI and started on DAPT. Pt noted to start spiking fevers 3/20 RSV negative, COVID negative, UCx gram negative rods. ID consulted and started on broad spectrum Antibiotics meropenem 500mg IV qd x 8 days. One episode of melena per nursing team so DAPT held 3/23. GI reconsulted and no concern for GIB, no scope performed and cleared to restart DAPT. Patient had trial without abx continues to be afebrile. Will be discharged to [facility] outpatient management.     Fever of unknown origin (FUO).   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 Pyelitis treated with ceftriaxone 2 g IV q24h (3/21-4/4) per ID recs. Patient also had diarrhea with antibiotics. C.diff and GI PCR negative. Unclear source of fever. Overnight 4/5, patient with new Tmax to 104.6 F. WBC 10.82, CRP 54.3, procal 0.97. Abd U/S with no acute pathology, CXR negative, Bcx NGTD. PET scan showing increased uptake in prostate suggesting prostatitis vs. neoplasm. s/p CTX tx for pyelitis vs cystitis w continued elevated t and fevers after abx course completion.   - Blood Culture 4/5 NGTD  - s/p Meropenem 500 mg q24h per ID recs, as patient is immunocompromised  - s/p Vancomycin (w HD)   - Trial without abx, continues to be afebrile     CAD (coronary artery disease).   LHC at [place] w/ oLM 30% and RCA 99% that is not amenable to intervention. Per cardiology, will defer inpatient cath or other intervention on this admission due to persistent fevers   ECHO 4/3: EF 40% w/regional wall motion abnormality. Entire inferior wall & basal & mid inferolateral wall akinetic. Basal& mid anterolateral wall & apical lateral segments hypokinetic. Moderately dilated LA. Mild AR.   - c/w ASA 81mg and Plavix 75mg daily  - F/u cardiology outpatient     H/O ventricular tachycardia.   s/p VTach arrest @ [facility]; no tele events noted. EP Consulted with original plan for SubQ ICD placement once pt had LHC. Will receive life vest instead  - c/w Toprol XL 25mg daily.    HTN (hypertension).   home meds: Amlodipine 10mg qd, Losartan 100mg qd, Hydralazine 50m TID, Isordil 20mg TID   - c/w home meds.    HLD (hyperlipidemia).   Lipitor 40mg qd  - c/w home med.    DM (diabetes mellitus).   no meds per [facility]. A1c 5.9 on admission  - Outpatient f/u    ESRD on dialysis.   On dialysis Tues, Thurs, Sat. Nephro following (hx failed renal transplant)   - f/u nephro outpatient   - c/w Tacrolimus    GI bleed.   History of coffee ground emesis and massive LGIB @ [facility]; s/p EGD/colonoscopy showing rectal ulcer. Surgery and GI consulted on admission. Per gen surgery no acute surgical intervention. s/p signmoidoscopy w/ GI, no active bleed. No further intervention required.   - f/u outpatient    S/P kidney transplant.   S/p failed kidney transplant. Per referring Dr Barrera, pt still requires tacrolimus even if failed transplant due to possibility of graft vs host.  - c/w Tacrolimus 2mg PO BID  - Urology followup upon discharge.    Chronic HFrEF (heart failure with reduced ejection fraction).   - Cardiology f/u          61yo M PMHx HTN, HLD, DM,  ICM (s/p cath years ago, RCA 99%, never intervened on), HFrEF 25-30%, ESRD s/p failed kidney transplant (last HD 3/1 via Left Arm AVF; HD TTS), Right AKA presented to [facility] 2/27 for respiratory distress after a dialysis session and admitted to cardiac telemetry on 2/27 with SIRS criteria. Placed on vanc/zosyn. Pt was placed on BiPAP which failed, where he was then intubated on 3/1 and moved to the MICU. He went into cardiac arrest (RoSC achieved after 8mins). Went into Vtach arrest, was placed on amio and pressors, HR ellen into 30s and atropine given twice. Weaned off levophed and sedation and extubated 3/2. Pt developed coffee ground emesis, hemoccult positive, with Hgb dipping to 3.5; he then received 7units PRBCs, 1Unit FFP, 1 unit donor packed platelets. EGD and colonoscopy showing melanosis duodenii but no acute bleed. BP dropped again, transferred to ICU and started on levophed gtt. CTA Abdomen showing no active bleeding into GI lumen, possible focal proctitis; colonoscopy showing rectal ulcer. Started on hydrocortisone suppository and mesalamine. TTE 3/1/23 at [facility]: mild LVH, EF 25-30%, severe global wall motion dysfunction, mildly dilated LA, RV mildly dilated, all leaflets mild calcified, mod pHTN Hgb now stable in 11s, now transferred for ICD placement 2/2 Vtach and cardiac cath. On morning of transfer, pt found to have BRBPR. GI and surgery consulted. Flex sigmoidoscopy showing rectal ulcer that was non-bleeding. Cleared by GI and started on DAPT. Pt noted to start spiking fevers 3/20 RSV negative, COVID negative, UCx gram negative rods. ID consulted and started on broad spectrum Antibiotics meropenem 500mg IV qd x 8 days. One episode of melena per nursing team so DAPT held 3/23. GI reconsulted and no concern for GIB, no scope performed and cleared to restart DAPT. Patient had trial without abx continues to be afebrile. Will be discharged to [facility] outpatient management.     Fever of unknown origin (FUO).   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 Pyelitis treated with ceftriaxone 2 g IV q24h (3/21-4/4) per ID recs. Patient also had diarrhea with antibiotics. C.diff and GI PCR negative. Unclear source of fever. Overnight 4/5, patient with new Tmax to 104.6 F. WBC 10.82, CRP 54.3, procal 0.97. Abd U/S with no acute pathology, CXR negative, Bcx NGTD. PET scan showing increased uptake in prostate suggesting prostatitis vs. neoplasm. s/p CTX tx for pyelitis vs cystitis w continued elevated t and fevers after abx course completion.   - Blood Culture 4/5 NGTD  - s/p Meropenem 500 mg q24h per ID recs, as patient is immunocompromised  - s/p Vancomycin (w HD)   - Trial without abx, continues to be afebrile     CAD (coronary artery disease).   LHC at [place] w/ oLM 30% and RCA 99% that is not amenable to intervention. Per cardiology, will defer inpatient cath or other intervention on this admission due to persistent fevers   ECHO 4/3: EF 40% w/regional wall motion abnormality. Entire inferior wall & basal & mid inferolateral wall akinetic. Basal& mid anterolateral wall & apical lateral segments hypokinetic. Moderately dilated LA. Mild AR.   - c/w ASA 81mg and Plavix 75mg daily  - F/u cardiology outpatient     H/O ventricular tachycardia.   s/p VTach arrest @ [facility]; no tele events noted. EP Consulted with original plan for SubQ ICD placement once pt had LHC. Will receive life vest instead  - c/w Toprol XL 25mg daily.    HTN (hypertension).   home meds: Amlodipine 10mg qd, Losartan 100mg qd, Hydralazine 50m TID, Isordil 20mg TID   - c/w home meds.    HLD (hyperlipidemia).   Lipitor 40mg qd  - c/w home med.    DM (diabetes mellitus).   no meds per [facility]. A1c 5.9 on admission  - Outpatient f/u    ESRD on dialysis.   On dialysis Tues, Thurs, Sat. Nephro following (hx failed renal transplant)   - f/u nephro outpatient   - c/w Tacrolimus    GI bleed.   History of coffee ground emesis and massive LGIB @ [facility]; s/p EGD/colonoscopy showing rectal ulcer. Surgery and GI consulted on admission. Per gen surgery no acute surgical intervention. s/p signmoidoscopy w/ GI, no active bleed. No further intervention required.   - f/u outpatient    S/P kidney transplant.   S/p failed kidney transplant. Per referring Dr Barrera, pt still requires tacrolimus even if failed transplant due to possibility of graft vs host.  - c/w Tacrolimus 2mg PO BID  - Urology followup upon discharge.    Chronic HFrEF (heart failure with reduced ejection fraction).   - Cardiology f/u

## 2023-03-24 NOTE — CHART NOTE - NSCHARTNOTEFT_GEN_A_CORE
Pt seen at bedside this AM. No abd pain, nausea, vomiting, hematemesis.  Tentative plan for EGD today.  Hgb stable overnight.  No further melenic stools since yesterday evening.    Preliminary Recommendations:  -Cardiology risk assessment regarding optimization for EGD sedation requested  -Please continue protonix 40 mg IVP q12hrs for now  -Monitor Hgb/transfuse as indicated  -Keep NPO    Gee Thorne DO  Gastroenterology Fellow  Pager: [phone] Pt seen at bedside this AM. No abd pain, nausea, vomiting, hematemesis.  Tentative plan for EGD today.  Hgb stable overnight.  No further melenic stools since yesterday evening.    Preliminary Recommendations:  -Cardiology risk assessment regarding optimization for EGD sedation requested  -Please continue protonix 40 mg IVP q12hrs for now  -Monitor Hgb/transfuse as indicated  -Keep NPO    Gee Thorne DO  Gastroenterology Fellow  Pager: [phone] Pt seen at bedside this AM. No abd pain, nausea, vomiting, hematemesis.  Tentative plan for EGD today.  Hgb stable overnight.  No further melenic stools since yesterday evening.    Preliminary Recommendations:  -Cardiology risk assessment regarding optimization for EGD sedation requested  -Please continue protonix 40 mg IVP q12hrs for now  -Monitor Hgb/transfuse as indicated  -Keep NPO    Gee Thorne DO  Gastroenterology Fellow  Pager: [phone] Pt seen at bedside this AM. No abd pain, nausea, vomiting, hematemesis.  Tentative plan for EGD today.  Hgb stable overnight.  No further melenic stools since yesterday evening.    Preliminary Recommendations:  -Cardiology risk assessment regarding optimization for EGD sedation requested  -Please continue protonix 40 mg IVP q12hrs for now  -Monitor Hgb/transfuse as indicated  -Keep NPO        UPDATE AT 1000    **PLEASE REFER TO DAILY PROGRESS NOTE**    No melena on COLTON. Hgb stable  No plan for EGD today    Gee Thorne DO  Gastroenterology Fellow  Pager: [phone] Pt seen at bedside this AM. No abd pain, nausea, vomiting, hematemesis.  Tentative plan for EGD today.  Hgb stable overnight.  No further melenic stools since yesterday evening.    Preliminary Recommendations:  -Cardiology risk assessment regarding optimization for EGD sedation requested  -Please continue protonix 40 mg IVP q12hrs for now  -Monitor Hgb/transfuse as indicated  -Keep NPO        UPDATE AT 1000    **PLEASE REFER TO DAILY PROGRESS NOTE**    No melena on COLTON. Hgb stable  No plan for EGD today    Gee Thorne DO  Gastroenterology Fellow  Pager: [phone] Pt seen at bedside this AM. No abd pain, nausea, vomiting, hematemesis.  Tentative plan for EGD today.  Hgb stable overnight.  No further melenic stools since yesterday evening.    Preliminary Recommendations:  -Cardiology risk assessment regarding optimization for EGD sedation requested  -Please continue protonix 40 mg IVP q12hrs for now  -Monitor Hgb/transfuse as indicated  -Keep NPO        UPDATE AT 1000    **PLEASE REFER TO DAILY PROGRESS NOTE**    No melena on COLTON. Hgb stable  No plan for EGD today    Gee Thorne DO  Gastroenterology Fellow  Pager: [phone]

## 2023-03-24 NOTE — PROGRESS NOTE ADULT - PROBLEM SELECTOR PLAN 4
- s/p VTach arrest @ [facility]; no tele events noted.    - EP Consulted – plan for ICD placement once pt has ischemic eval / LHC first.    - CONT: Lopressor 12.5mg PO BID. - s/p VTach arrest @ [facility]; no tele events noted.    - EP Consulted – plan for ICD placement once pt has ischemic eval / LHC first.    - CONT: Lopressor 12.5mg PO BID. - s/p VTach arrest @ [facility]; no tele events noted.    - EP Consulted – plan for ICD placement once pt has ischemic eval / LHC first.    - CONT: Lopressor 12.5mg PO BID. - s/p VTach arrest @ [facility]; no tele events noted.    - EP Consulted – plan for ICD placement once pt has ischemic eval / LHC first.    - transition from lopressor to toprol 25mg qd - s/p VTach arrest @ [facility]; no tele events noted.    - EP Consulted – plan for ICD placement once pt has ischemic eval / LHC first.    - transition from lopressor to toprol 25mg qd - s/p VTach arrest @ [facility]; no tele events noted.    - EP Consulted – plan for ICD placement once pt has ischemic eval / LHC first.    - transition from lopressor to toprol 25mg qd

## 2023-03-24 NOTE — PROGRESS NOTE ADULT - NSPROGADDITIONALINFOA_GEN_ALL_CORE
62M w HTN HLP, DMT2, s/p R AKA, HFrEF 25% LVEF on 3/1/23 TTE from [facility] (ICM - chronic RCA 99%, not intervened), ESRD on TTS HD via L AVF s/p failed renal transplant (on low dose tacrolimus) who was initially admitted to [facility] 2/27 for resp distress post HD, pneumonia sepsis requiring intubation 3/1 then had VT arrest (8 min resusc) while in ICU. Extubated 3/2. Subsequent course c/b hematemesis, GIB dropping hgb to 3.5 s/p 7 u pRBCs, 1 u FFP, 1 u platelets. EGD/cscope showed melanosis duodeni, rectal ulcer. CT abd showed focal proctitis. Given hydrocortisone suppository and mesalamine. 3/17 transferred to [facility] for repeat cath and ICD placement but had recurrent hematochezia 3/17. 3/20 DAPT restarted, no further GIB episodes w hgb stable at 10-11. 3/21 at HD, developed a fever of 102F and chills.     #Fever - unclear origin, had BTs and was on immunosuppression. Blood Cx NGTD, UA + for Klebsiella.  W/u underway and on broad coverage antbx. Plan for PET scan to locate for other foci of infxn to be done 3/24  #VT arrest - plan to re-assess coronary anatomy, cath once infection is addressed. EP on board and will place sec prevention ICD once clinically optimized  #CAD - tolerating DAPT without any further active GIB. R/b of stenting/PCI reviewed. on DAPT, high intensity statin  #HFrEF - euvolemic clinically. Optimize GDMT. DC amlodipine; Continue isordil/hydral, transition to metop succ, losartan transition to Entresto (K<5, coordinate w renal) if will continue as OP. MRA, SGLT2i defer to outpt HF doc in [place].  #GIB w rectal ulcer - surgery saw and recs no intervention, managing conservatively with IM. Plan for repeat scope in 3 mos. Black tarry stools yesterday but GI saw and OK to restart DAPT since hgb stable.  #Blood loss anemia - hgb 9-11. Transfusion threshold hgb<8  #ESRD - TTS HD, nephro on board  #failed renal transplant - clarify immunosupp tacrolimus thx    MARIE on dc 62M w HTN HLP, DMT2, s/p R AKA, HFrEF 25% LVEF on 3/1/23 TTE from [facility] (ICM - chronic RCA 99%, not intervened), ESRD on TTS HD via L AVF s/p failed renal transplant (on low dose tacrolimus) who was initially admitted to [facility] 2/27 for resp distress post HD, pneumonia sepsis requiring intubation 3/1 then had VT arrest (8 min resusc) while in ICU. Extubated 3/2. Subsequent course c/b hematemesis, GIB dropping hgb to 3.5 s/p 7 u pRBCs, 1 u FFP, 1 u platelets. EGD/cscope showed melanosis duodeni, rectal ulcer. CT abd showed focal proctitis. Given hydrocortisone suppository and mesalamine. 3/17 transferred to [facility] for repeat cath and ICD placement but had recurrent hematochezia 3/17. 3/20 DAPT restarted, no further GIB episodes w hgb stable at 10-11. 3/21 at HD, developed a fever of 102F and chills.     #Fever - unclear origin, had BTs and was on immunosuppression. Blood Cx NGTD, UA + for Klebsiella.  W/u underway and on broad coverage antbx. Plan for PET scan to locate for other foci of infxn to be done 3/24  #VT arrest - plan to re-assess coronary anatomy, cath once infection is addressed. EP on board and will place sec prevention ICD once clinically optimized  #CAD - tolerating DAPT without any further active GIB. R/b of stenting/PCI reviewed. on DAPT, high intensity statin  #HFrEF - euvolemic clinically. Optimize GDMT. DC amlodipine; Continue isordil/hydral, transition to metop succ, losartan transition to Entresto (K<5, coordinate w renal) if will continue as OP. MRA, SGLT2i defer to outpt HF doc in [place].  #GIB w rectal ulcer - surgery saw and recs no intervention, managing conservatively with IM. Plan for repeat scope in 3 mos. Black tarry stools yesterday but GI saw and OK to restart DAPT since hgb stable.  #Blood loss anemia - hgb 9-11. Transfusion threshold hgb<8  #ESRD - TTS HD, nephro on board  #failed renal transplant - clarify immunosupp tacrolimus thx    MARIE on dc 62M w HTN HLP, DMT2, s/p R AKA, HFrEF 25% LVEF on 3/1/23 TTE from [facility] (ICM - chronic RCA 99%, not intervened), ESRD on TTS HD via L AVF s/p failed renal transplant (on low dose tacrolimus) who was initially admitted to [facility] 2/27 for resp distress post HD, pneumonia sepsis requiring intubation 3/1 then had VT arrest (8 min resusc) while in ICU. Extubated 3/2. Subsequent course c/b hematemesis, GIB dropping hgb to 3.5 s/p 7 u pRBCs, 1 u FFP, 1 u platelets. EGD/cscope showed melanosis duodeni, rectal ulcer. CT abd showed focal proctitis. Given hydrocortisone suppository and mesalamine. 3/17 transferred to [facility] for repeat cath and ICD placement but had recurrent hematochezia 3/17. 3/20 DAPT restarted, no further GIB episodes w hgb stable at 10-11. 3/21 at HD, developed a fever of 102F and chills.     #Fever - unclear origin, had BTs and was on immunosuppression. Blood Cx NGTD, UA + for Klebsiella.  W/u underway and on broad coverage antbx. Plan for PET scan to locate for other foci of infxn to be done 3/24  #VT arrest - plan to re-assess coronary anatomy, cath once infection is addressed. EP on board and will place sec prevention ICD once clinically optimized  #CAD - tolerating DAPT without any further active GIB. R/b of stenting/PCI reviewed. on DAPT, high intensity statin  #HFrEF - euvolemic clinically. Optimize GDMT. DC amlodipine; Continue isordil/hydral, transition to metop succ, losartan transition to Entresto (K<5, coordinate w renal) if will continue as OP. MRA, SGLT2i defer to outpt HF doc in [place].  #GIB w rectal ulcer - surgery saw and recs no intervention, managing conservatively with IM. Plan for repeat scope in 3 mos. Black tarry stools yesterday but GI saw and OK to restart DAPT since hgb stable.  #Blood loss anemia - hgb 9-11. Transfusion threshold hgb<8  #ESRD - TTS HD, nephro on board  #failed renal transplant - clarify immunosupp tacrolimus thx    MARIE on dc Attending Attestation:  I was physically present for the key portions of the evaluation and management (E/M) service provided.  I agree with the above history, physical, and plan which I have reviewed with the following edits/addendum:    62M w HTN HLP, DMT2, s/p R AKA, HFrEF 25% LVEF on 3/1/23 TTE from [facility] (ICM - chronic RCA 99%, not intervened), ESRD on TTS HD via L AVF s/p failed renal transplant (on low dose tacrolimus) who was initially admitted to [facility] 2/27 for resp distress post HD, pneumonia sepsis requiring intubation 3/1 then had VT arrest (8 min resusc) while in ICU. Extubated 3/2. Subsequent course c/b hematemesis, GIB dropping hgb to 3.5 s/p 7 u pRBCs, 1 u FFP, 1 u platelets. EGD/cscope showed melanosis duodeni, rectal ulcer. CT abd showed focal proctitis. Given hydrocortisone suppository and mesalamine. 3/17 transferred to [facility] for repeat cath and ICD placement but had recurrent hematochezia 3/17. 3/20 DAPT restarted, no further GIB episodes w hgb stable at 10-11. 3/21 at HD, developed a fever of 102F and chills. 3/22-24 working up fever source (other than UTI) given hx of prograf and BTs    #Fever - unclear origin, had BTs and was on immunosuppression. Blood Cx NGTD, UA + for Klebsiella.  W/u underway and on broad coverage antbx. Plan for PET scan to locate for other foci of infxn to be done 3/24  #VT arrest - plan to re-assess coronary anatomy, cath once infection is addressed. EP on board and will place sec prevention ICD once clinically optimized  #CAD - tolerating DAPT without any further active GIB. R/b of stenting/PCI reviewed. on DAPT, high intensity statin  #HFrEF - euvolemic clinically. Optimize GDMT. DC amlodipine; Continue isordil/hydral, transition to metop succ 3/24, consider losartan transition to Entresto (K<5, will need to coordinate w renal and outpt HF/cardiologist if will continue as OP). MRA, SGLT2i defer to outpt HF doc in [place].  #GIB w rectal ulcer - surgery saw and recs no intervention, managing conservatively. Plan for repeat scope in 3 mos. Black tarry stools 3/23. GI saw and OK to restart DAPT since hgb stable 3/24  #Blood loss anemia - hgb 9-11. Transfusion threshold hgb<8  #ESRD - TTS HD, nephro on board  #failed renal transplant - clarify immunosupp tacrolimus thx  DISPO: MARIE on dc    Stan Jones MD  Cardiology    35 minutes spent on total encounter; more than 50% of the visit was spent counseling and/or coordinating care by the attending physician. Attending Attestation:  I was physically present for the key portions of the evaluation and management (E/M) service provided.  I agree with the above history, physical, and plan which I have reviewed with the following edits/addendum:    62M w HTN HLP, DMT2, s/p R AKA, HFrEF 25% LVEF on 3/1/23 TTE from [facility] (ICM - chronic RCA 99%, not intervened), ESRD on TTS HD via L AVF s/p failed renal transplant (on low dose tacrolimus) who was initially admitted to [facility] 2/27 for resp distress post HD, pneumonia sepsis requiring intubation 3/1 then had VT arrest (8 min resusc) while in ICU. Extubated 3/2. Subsequent course c/b hematemesis, GIB dropping hgb to 3.5 s/p 7 u pRBCs, 1 u FFP, 1 u platelets. EGD/cscope showed melanosis duodeni, rectal ulcer. CT abd showed focal proctitis. Given hydrocortisone suppository and mesalamine. 3/17 transferred to [facility] for repeat cath and ICD placement but had recurrent hematochezia 3/17. 3/20 DAPT restarted, no further GIB episodes w hgb stable at 10-11. 3/21 at HD, developed a fever of 102F and chills. 3/22-24 working up fever source (other than UTI) given hx of prograf and BTs    #Fever - unclear origin, had BTs and was on immunosuppression. Blood Cx NGTD, UA + for Klebsiella.  W/u underway and on broad coverage antbx. Plan for PET scan to locate for other foci of infxn to be done 3/24  #VT arrest - plan to re-assess coronary anatomy, cath once infection is addressed. EP on board and will place sec prevention ICD once clinically optimized  #CAD - tolerating DAPT without any further active GIB. R/b of stenting/PCI reviewed. on DAPT, high intensity statin  #HFrEF - euvolemic clinically. Optimize GDMT. DC amlodipine; Continue isordil/hydral, transition to metop succ 3/24, consider losartan transition to Entresto (K<5, will need to coordinate w renal and outpt HF/cardiologist if will continue as OP). MRA, SGLT2i defer to outpt HF doc in [place].  #GIB w rectal ulcer - surgery saw and recs no intervention, managing conservatively. Plan for repeat scope in 3 mos. Black tarry stools 3/23. GI saw and OK to restart DAPT since hgb stable 3/24  #Blood loss anemia - hgb 9-11. Transfusion threshold hgb<8  #ESRD - TTS HD, nephro on board  #failed renal transplant - clarify immunosupp tacrolimus thx  DISPO: MARIE on dc    Stan Jones MD  Cardiology    35 minutes spent on total encounter; more than 50% of the visit was spent counseling and/or coordinating care by the attending physician. Attending Attestation:  I was physically present for the key portions of the evaluation and management (E/M) service provided.  I agree with the above history, physical, and plan which I have reviewed with the following edits/addendum:    62M w HTN HLP, DMT2, s/p R AKA, HFrEF 25% LVEF on 3/1/23 TTE from [facility] (ICM - chronic RCA 99%, not intervened), ESRD on TTS HD via L AVF s/p failed renal transplant (on low dose tacrolimus) who was initially admitted to [facility] 2/27 for resp distress post HD, pneumonia sepsis requiring intubation 3/1 then had VT arrest (8 min resusc) while in ICU. Extubated 3/2. Subsequent course c/b hematemesis, GIB dropping hgb to 3.5 s/p 7 u pRBCs, 1 u FFP, 1 u platelets. EGD/cscope showed melanosis duodeni, rectal ulcer. CT abd showed focal proctitis. Given hydrocortisone suppository and mesalamine. 3/17 transferred to [facility] for repeat cath and ICD placement but had recurrent hematochezia 3/17. 3/20 DAPT restarted, no further GIB episodes w hgb stable at 10-11. 3/21 at HD, developed a fever of 102F and chills. 3/22-24 working up fever source (other than UTI) given hx of prograf and BTs    #Fever - unclear origin, had BTs and was on immunosuppression. Blood Cx NGTD, UA + for Klebsiella.  W/u underway and on broad coverage antbx. Plan for PET scan to locate for other foci of infxn to be done 3/24  #VT arrest - plan to re-assess coronary anatomy, cath once infection is addressed. EP on board and will place sec prevention ICD once clinically optimized  #CAD - tolerating DAPT without any further active GIB. R/b of stenting/PCI reviewed. on DAPT, high intensity statin  #HFrEF - euvolemic clinically. Optimize GDMT. DC amlodipine; Continue isordil/hydral, transition to metop succ 3/24, consider losartan transition to Entresto (K<5, will need to coordinate w renal and outpt HF/cardiologist if will continue as OP). MRA, SGLT2i defer to outpt HF doc in [place].  #GIB w rectal ulcer - surgery saw and recs no intervention, managing conservatively. Plan for repeat scope in 3 mos. Black tarry stools 3/23. GI saw and OK to restart DAPT since hgb stable 3/24  #Blood loss anemia - hgb 9-11. Transfusion threshold hgb<8  #ESRD - TTS HD, nephro on board  #failed renal transplant - clarify immunosupp tacrolimus thx  DISPO: MARIE on dc    Stan Jones MD  Cardiology    35 minutes spent on total encounter; more than 50% of the visit was spent counseling and/or coordinating care by the attending physician.

## 2023-03-24 NOTE — PROGRESS NOTE ADULT - PROBLEM SELECTOR PLAN 7
- HD T/Th/Sat.    - Last HD 3/23/23, next HD 3/25/23.    - **Needs vancomycin trough prior to HD sessions so that Vanc can be dosed and given *after* HD sessions.

## 2023-03-24 NOTE — DISCHARGE NOTE PROVIDER - ATTENDING DISCHARGE PHYSICAL EXAMINATION:
-Gen: NAD, resting in bed  -HEENT: EOMI, PERRL, no scleral icterus, no JVD  -CV: normal S1 and S2  -Lungs: CTABL, normal respiratory effort   -Ab: soft, NT, ND, normal BS  -Ext: right AKA site looks clean   -Neuro: no focal deficits

## 2023-03-24 NOTE — OCCUPATIONAL THERAPY INITIAL EVALUATION ADULT - GENERAL OBSERVATIONS, REHAB EVAL
Pt received in supine - +telemetry, jadiel mccarthy. Pt w/ good affect, follows directives, agreeable to OT.

## 2023-03-24 NOTE — PROGRESS NOTE ADULT - PROBLEM SELECTOR PLAN 8
- s/p failed kidney transplant (per medicine, pt may not require tacrolimus if transplant failed).    - Continue home Tacrolimus 2mg PO BID.    - Tacrolimus Lvls 2.1 -> 4.0 -> 3.5. - s/p failed kidney transplant   - Continue home Tacrolimus 2mg PO BID.    - per referring Dr Barrera, pt still requires tacrolimus even if failed transplant due to possibility of graft vs host  - Tacrolimus Lvls 2.1 -> 4.0 -> 3.5.

## 2023-03-24 NOTE — OCCUPATIONAL THERAPY INITIAL EVALUATION ADULT - PERTINENT HX OF CURRENT PROBLEM, REHAB EVAL
63yo M PMHx HTN, HLD, DM,  ICM (s/p cath years ago, RCA 99%, never intervened on), HFrEF 25-30%, ESRD s/p failed kidney transplant (last HD 3/1 via Left Arm AVF; HD TTS), Right AKA presented to [facility] 2/27 for respiratory distress after a dialysis session and admitted to cardiac telemetry on 2/27 with SIRS criteria. Placed on vanc/zosyn. Pt was placed on BiPAP which failed, where he was then intubated on 3/1 and moved to the MICU. He went into cardiac arrest (RoSC achieved after 8mins). Went into Vtach arrest, was placed on amio and pressors, HR ellen into 30s and atropine given twice. Weaned off levophed and sedation and extubated 3/2. Pt developed coffee ground emesis, hemoccult positive, with Hgb dipping to 3.5; he then received 7units PRBCs, 1Unit FFP, 1 unit donor packed platelets. EGD and colonoscopy showing melanosis duodenii but no acute bleed. BP dropped again, transferred to ICU and started on levophed gtt. CTA Abdomen showing no active bleeding into GI lumen, possible focal proctitis; colonoscopy showing rectal ulcer. Started on hydrocortisone suppository and mesalamine. Hgb now stable in 11s (per handoff), now transferred for ICD placement 2/2 Vtach and cardiac cath. Hospital course c/b BRBPR 3/17. 61yo M PMHx HTN, HLD, DM,  ICM (s/p cath years ago, RCA 99%, never intervened on), HFrEF 25-30%, ESRD s/p failed kidney transplant (last HD 3/1 via Left Arm AVF; HD TTS), Right AKA presented to [facility] 2/27 for respiratory distress after a dialysis session and admitted to cardiac telemetry on 2/27 with SIRS criteria. Placed on vanc/zosyn. Pt was placed on BiPAP which failed, where he was then intubated on 3/1 and moved to the MICU. He went into cardiac arrest (RoSC achieved after 8mins). Went into Vtach arrest, was placed on amio and pressors, HR ellen into 30s and atropine given twice. Weaned off levophed and sedation and extubated 3/2. Pt developed coffee ground emesis, hemoccult positive, with Hgb dipping to 3.5; he then received 7units PRBCs, 1Unit FFP, 1 unit donor packed platelets. EGD and colonoscopy showing melanosis duodenii but no acute bleed. BP dropped again, transferred to ICU and started on levophed gtt. CTA Abdomen showing no active bleeding into GI lumen, possible focal proctitis; colonoscopy showing rectal ulcer. Started on hydrocortisone suppository and mesalamine. Hgb now stable in 11s (per handoff), now transferred for ICD placement 2/2 Vtach and cardiac cath. Hospital course c/b BRBPR 3/17. 63yo M PMHx HTN, HLD, DM,  ICM (s/p cath years ago, RCA 99%, never intervened on), HFrEF 25-30%, ESRD s/p failed kidney transplant (last HD 3/1 via Left Arm AVF; HD TTS), Right AKA presented to [facility] 2/27 for respiratory distress after a dialysis session and admitted to cardiac telemetry on 2/27 with SIRS criteria. Placed on vanc/zosyn. Pt was placed on BiPAP which failed, where he was then intubated on 3/1 and moved to the MICU. He went into cardiac arrest (RoSC achieved after 8mins). Went into Vtach arrest, was placed on amio and pressors, HR ellen into 30s and atropine given twice. Weaned off levophed and sedation and extubated 3/2. Pt developed coffee ground emesis, hemoccult positive, with Hgb dipping to 3.5; he then received 7units PRBCs, 1Unit FFP, 1 unit donor packed platelets. EGD and colonoscopy showing melanosis duodenii but no acute bleed. BP dropped again, transferred to ICU and started on levophed gtt. CTA Abdomen showing no active bleeding into GI lumen, possible focal proctitis; colonoscopy showing rectal ulcer. Started on hydrocortisone suppository and mesalamine. Hgb now stable in 11s (per handoff), now transferred for ICD placement 2/2 Vtach and cardiac cath. Hospital course c/b BRBPR 3/17. 63yo M PMHx HTN, HLD, DM,  ICM (s/p cath years ago, RCA 99%, never intervened on), HFrEF 25-30%, ESRD s/p failed kidney transplant (last HD 3/1 via Left Arm AVF; HD TTS), Right AKA presented to [facility] 2/27 for respiratory distress after a dialysis session and admitted to cardiac telemetry on 2/27 with SIRS criteria. Pt was placed on BiPAP which failed, where he was then intubated on 3/1 and moved to the MICU. He went into cardiac arrest (RoSC achieved after 8mins). Went into Vtach arrest, extubated 3/2. Now transferred for ICD placement 2/2 Vtach and cardiac cath. Hospital course c/b BRBPR 3/17. 61yo M PMHx HTN, HLD, DM,  ICM (s/p cath years ago, RCA 99%, never intervened on), HFrEF 25-30%, ESRD s/p failed kidney transplant (last HD 3/1 via Left Arm AVF; HD TTS), Right AKA presented to [facility] 2/27 for respiratory distress after a dialysis session and admitted to cardiac telemetry on 2/27 with SIRS criteria. Pt was placed on BiPAP which failed, where he was then intubated on 3/1 and moved to the MICU. He went into cardiac arrest (RoSC achieved after 8mins). Went into Vtach arrest, extubated 3/2. Now transferred for ICD placement 2/2 Vtach and cardiac cath. Hospital course c/b BRBPR 3/17. 63yo M PMHx HTN, HLD, DM,  ICM (s/p cath years ago, RCA 99%, never intervened on), HFrEF 25-30%, ESRD s/p failed kidney transplant (last HD 3/1 via Left Arm AVF; HD TTS), Right AKA presented to [facility] 2/27 for respiratory distress after a dialysis session and admitted to cardiac telemetry on 2/27 with SIRS criteria. Pt was placed on BiPAP which failed, where he was then intubated on 3/1 and moved to the MICU. He went into cardiac arrest (RoSC achieved after 8mins). Went into Vtach arrest, extubated 3/2. Now transferred for ICD placement 2/2 Vtach and cardiac cath. Hospital course c/b BRBPR 3/17.

## 2023-03-24 NOTE — PROGRESS NOTE ADULT - PROBLEM SELECTOR PLAN 9
- CONT: Hydralazine 50mg PO q8hrs, Isordil 20mg PO q8hrs, Losartan 100mg PO QD, Lopressor 12.5mg PO BID. - CONT: Hydralazine 50mg q8hrs, Isordil 20mg q8hrs, Losartan 100mg QD, toprol 25mg qd

## 2023-03-24 NOTE — PROGRESS NOTE ADULT - ASSESSMENT
61 yo M with HTN, DM, HLD, ICM, HFrEF, ESRD s/p failed RT (HD via LUE AVF), R AKA transferred to [facility] from [facility] on 3/17 for ICD placement with fever, no leukocytosis (on low dose tacrolimus).   He has a positive UA, urine culture with Klebsiella pneumoniae (S cefazolin).  Had mild tenderness to palpation of transplanted kidney previously, now resolved.  Remains febrile.  Persistence of neck pain is concerning.  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  Suggest:  - F/U blood culture from 3/21 X 2, 3/22 X 1  - Would obtain gallium scan - discussed with Dr. Paulino, no gallium available- he is to get FDG-PET today  - Vancomycin trough prior to HD.  If 10-17.9, give 750 mg, if 18-25, give 500 mg  - Continue meropenem 500 mg IV q24h for now.  Will follow with you - team 2.      63 yo M with HTN, DM, HLD, ICM, HFrEF, ESRD s/p failed RT (HD via LUE AVF), R AKA transferred to [facility] from [facility] on 3/17 for ICD placement with fever, no leukocytosis (on low dose tacrolimus).   He has a positive UA, urine culture with Klebsiella pneumoniae (S cefazolin).  Had mild tenderness to palpation of transplanted kidney previously, now resolved.  Remains febrile.  Persistence of neck pain is concerning.  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  Suggest:  - F/U blood culture from 3/21 X 2, 3/22 X 1  - Would obtain gallium scan - discussed with Dr. Paluino, no gallium available- he is to get FDG-PET today  - Vancomycin trough prior to HD.  If 10-17.9, give 750 mg, if 18-25, give 500 mg  - Continue meropenem 500 mg IV q24h for now.  Will follow with you - team 2.      63 yo M with HTN, DM, HLD, ICM, HFrEF, ESRD s/p failed RT (HD via LUE AVF), R AKA transferred to [facility] from [facility] on 3/17 for ICD placement with fever, no leukocytosis (on low dose tacrolimus).   He has a positive UA, urine culture with Klebsiella pneumoniae (S cefazolin).  Had mild tenderness to palpation of transplanted kidney previously, now resolved.  Remains febrile.  Persistence of neck pain is concerning.  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  Suggest:  - F/U blood culture from 3/21 X 2, 3/22 X 1  - Would obtain gallium scan - discussed with Dr. Paulino, no gallium available- he is to get FDG-PET today  - Vancomycin trough prior to HD.  If 10-17.9, give 750 mg, if 18-25, give 500 mg  - Continue meropenem 500 mg IV q24h for now.  Will follow with you - team 2.

## 2023-03-24 NOTE — DISCHARGE NOTE PROVIDER - PROVIDER TOKENS
PROVIDER:[TOKEN:[[id number]],SCHEDULEDAPPT:[04/24/2023],SCHEDULEDAPPTTIME:[03:00 PM]],PROVIDER:[TOKEN:[[id number]]] PROVIDER:[TOKEN:[[id number]],SCHEDULEDAPPT:[04/24/2023],SCHEDULEDAPPTTIME:[03:00 PM]],PROVIDER:[TOKEN:[[id number]]] PROVIDER:[TOKEN:[[id number]],SCHEDULEDAPPT:[04/24/2023],SCHEDULEDAPPTTIME:[03:00 PM]],PROVIDER:[TOKEN:[[id number]]]

## 2023-03-24 NOTE — PROGRESS NOTE ADULT - PROBLEM SELECTOR PLAN 5
- LVEF 25-30% (likely ischemic).    - Pt euvolemic on exam.    - CONT: Hydralazine 50mg PO q8hrs, Isordil 20mg PO TID, Lopressor 12.5mg PO BID, Losartan 100mg PO QD. LVEF 25-30% (likely ischemic).    - Pt euvolemic on exam.    - c/w Hydralazine 50mg q8hrs, Isordil 20mg TID, toprol 25mg qd, Losartan 100mg QD.

## 2023-03-24 NOTE — DISCHARGE NOTE PROVIDER - NSDCCPCAREPLAN_GEN_ALL_CORE_FT
PRINCIPAL DISCHARGE DIAGNOSIS  Diagnosis: Fever of unknown origin (FUO)  Assessment and Plan of Treatment: Patient came in with fever and was given antibiotics inpatient. The patient responded to antibiotics and then had cultures for him to assess his blood. The blood cultures eventually were negative after receiving antibiotics. The patient was then assessed without antibiotics and was doing well without any additional fever.      SECONDARY DISCHARGE DIAGNOSES  Diagnosis: ESRD on dialysis  Assessment and Plan of Treatment: Patient with Hemodyalisis recieved once inpatient, important to followup with outpatient appointment. In addition our inpatient nephrology team was consulted without continued outpatient hemodyalisis session. It is important to continue your scheduled hemodyalisis when discharged.

## 2023-03-25 LAB
ANION GAP SERPL CALC-SCNC: 9 MMOL/L — SIGNIFICANT CHANGE UP (ref 5–17)
BUN SERPL-MCNC: 16 MG/DL — SIGNIFICANT CHANGE UP (ref 7–23)
CALCIUM SERPL-MCNC: 7.7 MG/DL — LOW (ref 8.4–10.5)
CHLORIDE SERPL-SCNC: 96 MMOL/L — SIGNIFICANT CHANGE UP (ref 96–108)
CO2 SERPL-SCNC: 26 MMOL/L — SIGNIFICANT CHANGE UP (ref 22–31)
CREAT SERPL-MCNC: 5.03 MG/DL — HIGH (ref 0.5–1.3)
EGFR: 12 ML/MIN/1.73M2 — LOW
GLUCOSE BLDC GLUCOMTR-MCNC: 100 MG/DL — HIGH (ref 70–99)
GLUCOSE BLDC GLUCOMTR-MCNC: 81 MG/DL — SIGNIFICANT CHANGE UP (ref 70–99)
GLUCOSE BLDC GLUCOMTR-MCNC: 91 MG/DL — SIGNIFICANT CHANGE UP (ref 70–99)
GLUCOSE BLDC GLUCOMTR-MCNC: 98 MG/DL — SIGNIFICANT CHANGE UP (ref 70–99)
GLUCOSE SERPL-MCNC: 74 MG/DL — SIGNIFICANT CHANGE UP (ref 70–99)
HCT VFR BLD CALC: 27.2 % — LOW (ref 39–50)
HGB BLD-MCNC: 8.6 G/DL — LOW (ref 13–17)
MAGNESIUM SERPL-MCNC: 1.8 MG/DL — SIGNIFICANT CHANGE UP (ref 1.6–2.6)
MCHC RBC-ENTMCNC: 28.8 PG — SIGNIFICANT CHANGE UP (ref 27–34)
MCHC RBC-ENTMCNC: 31.6 GM/DL — LOW (ref 32–36)
MCV RBC AUTO: 91 FL — SIGNIFICANT CHANGE UP (ref 80–100)
NRBC # BLD: 0 /100 WBCS — SIGNIFICANT CHANGE UP (ref 0–0)
PLATELET # BLD AUTO: 142 K/UL — LOW (ref 150–400)
POTASSIUM SERPL-MCNC: 4.6 MMOL/L — SIGNIFICANT CHANGE UP (ref 3.5–5.3)
POTASSIUM SERPL-SCNC: 4.6 MMOL/L — SIGNIFICANT CHANGE UP (ref 3.5–5.3)
RBC # BLD: 2.99 M/UL — LOW (ref 4.2–5.8)
RBC # FLD: 14.5 % — SIGNIFICANT CHANGE UP (ref 10.3–14.5)
SODIUM SERPL-SCNC: 131 MMOL/L — LOW (ref 135–145)
VANCOMYCIN TROUGH SERPL-MCNC: 17.9 UG/ML — SIGNIFICANT CHANGE UP (ref 10–20)
WBC # BLD: 5.77 K/UL — SIGNIFICANT CHANGE UP (ref 3.8–10.5)
WBC # FLD AUTO: 5.77 K/UL — SIGNIFICANT CHANGE UP (ref 3.8–10.5)

## 2023-03-25 PROCEDURE — 90935 HEMODIALYSIS ONE EVALUATION: CPT

## 2023-03-25 PROCEDURE — 78815 PET IMAGE W/CT SKULL-THIGH: CPT | Mod: 26,PI

## 2023-03-25 PROCEDURE — 99233 SBSQ HOSP IP/OBS HIGH 50: CPT

## 2023-03-25 RX ORDER — VANCOMYCIN HCL 1 G
750 VIAL (EA) INTRAVENOUS ONCE
Refills: 0 | Status: COMPLETED | OUTPATIENT
Start: 2023-03-25 | End: 2023-03-25

## 2023-03-25 RX ORDER — MAGNESIUM OXIDE 400 MG ORAL TABLET 241.3 MG
800 TABLET ORAL ONCE
Refills: 0 | Status: COMPLETED | OUTPATIENT
Start: 2023-03-25 | End: 2023-03-25

## 2023-03-25 RX ORDER — ERYTHROPOIETIN 10000 [IU]/ML
6000 INJECTION, SOLUTION INTRAVENOUS; SUBCUTANEOUS ONCE
Refills: 0 | Status: COMPLETED | OUTPATIENT
Start: 2023-03-25 | End: 2023-03-25

## 2023-03-25 RX ADMIN — Medication 650 MILLIGRAM(S): at 19:09

## 2023-03-25 RX ADMIN — MAGNESIUM OXIDE 400 MG ORAL TABLET 800 MILLIGRAM(S): 241.3 TABLET ORAL at 17:26

## 2023-03-25 RX ADMIN — Medication 25 MILLIGRAM(S): at 06:00

## 2023-03-25 RX ADMIN — Medication 50 MILLIGRAM(S): at 06:00

## 2023-03-25 RX ADMIN — TAMSULOSIN HYDROCHLORIDE 0.8 MILLIGRAM(S): 0.4 CAPSULE ORAL at 22:35

## 2023-03-25 RX ADMIN — Medication 250 MILLIGRAM(S): at 17:36

## 2023-03-25 RX ADMIN — CLOPIDOGREL BISULFATE 75 MILLIGRAM(S): 75 TABLET, FILM COATED ORAL at 17:27

## 2023-03-25 RX ADMIN — Medication 650 MILLIGRAM(S): at 19:43

## 2023-03-25 RX ADMIN — Medication 50 MILLIGRAM(S): at 17:27

## 2023-03-25 RX ADMIN — ATORVASTATIN CALCIUM 40 MILLIGRAM(S): 80 TABLET, FILM COATED ORAL at 22:35

## 2023-03-25 RX ADMIN — PANTOPRAZOLE SODIUM 40 MILLIGRAM(S): 20 TABLET, DELAYED RELEASE ORAL at 17:34

## 2023-03-25 RX ADMIN — ISOSORBIDE DINITRATE 20 MILLIGRAM(S): 5 TABLET ORAL at 06:00

## 2023-03-25 RX ADMIN — ISOSORBIDE DINITRATE 20 MILLIGRAM(S): 5 TABLET ORAL at 17:28

## 2023-03-25 RX ADMIN — PANTOPRAZOLE SODIUM 40 MILLIGRAM(S): 20 TABLET, DELAYED RELEASE ORAL at 06:00

## 2023-03-25 RX ADMIN — MEROPENEM 100 MILLIGRAM(S): 1 INJECTION INTRAVENOUS at 19:09

## 2023-03-25 RX ADMIN — ERYTHROPOIETIN 6000 UNIT(S): 10000 INJECTION, SOLUTION INTRAVENOUS; SUBCUTANEOUS at 11:53

## 2023-03-25 RX ADMIN — LOSARTAN POTASSIUM 100 MILLIGRAM(S): 100 TABLET, FILM COATED ORAL at 06:00

## 2023-03-25 RX ADMIN — Medication 81 MILLIGRAM(S): at 17:27

## 2023-03-25 RX ADMIN — Medication 325 MILLIGRAM(S): at 17:27

## 2023-03-25 RX ADMIN — TACROLIMUS 2 MILLIGRAM(S): 5 CAPSULE ORAL at 17:34

## 2023-03-25 RX ADMIN — TACROLIMUS 2 MILLIGRAM(S): 5 CAPSULE ORAL at 06:00

## 2023-03-25 NOTE — PROGRESS NOTE ADULT - PROBLEM SELECTOR PLAN 7
- HD T/Th/Sat.    - Last HD 3/23/23, next HD 3/25/23.    - **Needs vancomycin trough prior to HD sessions so that Vanc can be dosed and given *after* HD sessions. - HD T/Th/Sat.    - NEXT HD 3/28  - **Needs vancomycin trough prior to HD sessions so that Vanc can be dosed and given AFTER HD sessions.

## 2023-03-25 NOTE — PROGRESS NOTE ADULT - ATTENDING COMMENTS
seen on HD with Dr Pedraza, agree with above  tolerating rx, VSS  cont HD as above   no source fever evident-- AVF looks ok.  f/u cx

## 2023-03-25 NOTE — PROGRESS NOTE ADULT - PROBLEM SELECTOR PLAN 2
Improved - Pt w/ ongoing fevers this admission, infectious work up ongoing.    - Infectious work up ongoing; no clear infectious source identified at this time.    - BCx NGTD; UCx (+) for Gram (-) rods (pt on Meropenem still having fevers).    - Infectious disease following – continue to follow recs.    - CONT: Meropenem 500mg IV QD (**after HD on HD days), Vancomycin dose after HD sessions (based on pre-HD session Vanc troughs per direction of ID).    - Next Vanc trough: Saturday 3/25/23 AM (**before HD session).    - PLAN: ID following, recommend Gallium scan -- **NO Gallium available, and per radiology in setting of multiple blood transfusions Gallium scan quality is decreased – PET SCAN ON 3/25/23 (call and coordinate with radiology team; NPO after MN Friday). LHC at [place] w/ oLM 30% and RCA 99% that is not amenable to intervention.    - TTE (3/1/23 @ [facility]): mild LVH, LVEF 25-30%, severe global wall motion dysfxn, mild LA dilation, mild RV dilation, mildly calcified leaflets, mod pHTN  - evere GIB at [facility] requiring multiple PRBC transfusions; at [facility] pt cleared by GI for DAPT and on ASA/Plavix 3/20/23 - 3/23/23 however pt w/ episode of black tarry stool (H/H stable and no hemodynamic signs of bleeding)  - fecal occult negative and pt cleared by GI for DAPT, now restarted  - Continue ASA 81mg, plavix 75mg qd  - PLAN: pending further GI and infectious work up to determine date of [facility]. LHC at [place] w/ oLM 30% and RCA 99% that is not amenable to intervention.    - TTE (3/1/23 @ [facility]): mild LVH, LVEF 25-30%, severe global wall motion dysfxn, mild LA dilation, mild RV dilation, mildly calcified leaflets, mod pHTN  - evere GIB at [facility] requiring multiple PRBC transfusions; at [facility] pt cleared by GI for DAPT and on ASA/Plavix 3/20/23 - 3/23/23 however pt w/ episode of black tarry stool (H/H stable and no hemodynamic signs of bleeding)  - fecal occult negative and pt cleared by GI for DAPT, now restarted  - Continue ASA 81mg, plavix 75mg qd  - PLAN: pending further GI and infectious work up to determine date of [facility]. LHC at [place] w/ oLM 30% and RCA 99% that is not amenable to intervention.    - TTE (3/1/23 @ [facility]): mild LVH, LVEF 25-30%, severe global wall motion dysfxn, mild LA dilation, mild RV dilation, mildly calcified leaflets, mod pHTN  - evere GIB at [facility] requiring multiple PRBC transfusions; at [facility] pt cleared by GI for DAPT and on ASA/Plavix 3/20/23 - 3/23/23 however pt w/ episode of black tarry stool (H/H stable and no hemodynamic signs of bleeding)  - fecal occult negative and pt cleared by GI for DAPT, now restarted  - Continue ASA 81mg, plavix 75mg qd  - PLAN: pending further GI and infectious work up to determine date of [facility].

## 2023-03-25 NOTE — PROGRESS NOTE ADULT - PROBLEM SELECTOR PLAN 4
- s/p VTach arrest @ [facility]; no tele events noted.    - EP Consulted – plan for ICD placement once pt has ischemic eval / LHC first.    - transition from lopressor to toprol 25mg qd - s/p VTach arrest @ [facility]; no tele events noted.    - EP Consulted – plan for ICD placement once pt has ischemic eval / LHC first.    - transition from lopressor to toprol 25mg qd - s/p VTach arrest @ [facility]; no tele events noted.    - EP Consulted – plan for ICD placement once pt has ischemic eval / LHC first.    - transition from lopressor to toprol 25mg qd @ [facility], severe GIB w/ Hgb down to 3.5 received total of 7u PRBCs.    - 3/17 AM pt passed loose BM along w/ large amount of BRBPR.    - s/p Flex sigmoidoscopy – Localized ulceration and erosion w/ no bleeding in rectum, suggestive of solitary ulcer syndrome; evidence of prior hemoclip was found in sigmoid colon.    - Avoid anal digitation and enemas  - Bowel regimen w/ Miralax and Dulcolax to avoid constipation  - now cleared by GI for DAPT  - Transfusion goal Hgb < 8.0 @ [facility], severe GIB w/ Hgb down to 3.5 received total of 7u PRBCs.    - 3/17 AM pt passed loose BM along w/ large amount of BRBPR.    - s/p Flex sigmoidoscopy – Localized ulceration and erosion w/ no bleeding in rectum, suggestive of solitary ulcer syndrome; evidence of prior hemoclip was found in sigmoid colon.    - Avoid anal digitation and enemas  - Bowel regimen w/ Miralax and Dulcolax to avoid constipation  - now cleared by GI for DAPT  - Transfusion goal Hgb < 8.0 @ [facility], severe GIB w/ Hgb down to 3.5 received total of 7u PRBCs.    - 3/17 AM pt passed loose BM along w/ large amount of BRBPR.    - s/p Flex sigmoidoscopy – Localized ulceration and erosion w/ no bleeding in rectum, suggestive of solitary ulcer syndrome; evidence of prior hemoclip was found in sigmoid colon.    - Avoid anal digitation and enemas  - Bowel regimen w/ Miralax and Dulcolax to avoid constipation  - now cleared by GI for DAPT  - Transfusion goal Hgb < 8.0

## 2023-03-25 NOTE — PROGRESS NOTE ADULT - PROBLEM SELECTOR PLAN 1
- LHC at [place] w/ oLM 30% and RCA 99% that is not amenable to intervention.    - TTE (3/1/23 @ [facility]): mild LVH, LVEF 25-30%, severe global wall motion dysfxn, mild LA dilation, mild RV dilation, mildly calcified leaflets, mod pHTN.    - Pt w/ severe GIB at [facility] requiring multiple PRBC transfusions; at [facility] pt cleared by GI for DAPT and on ASA/Plavix 3/20/23 - 3/23/23 however pt w/ episode of black tarry stool (H/H stable and no hemodynamic signs of bleeding)  - fecal occult negative and pt cleared by GI for DAPT  - Continue ASA 81mg, plavix 75mg qd  - PLAN: pending further GI and infectious work up to determine date of LHC. - LHC at [place] w/ oLM 30% and RCA 99% that is not amenable to intervention.    - TTE (3/1/23 @ [facility]): mild LVH, LVEF 25-30%, severe global wall motion dysfxn, mild LA dilation, mild RV dilation, mildly calcified leaflets, mod pHTN.    - Pt w/ severe GIB at [facility] requiring multiple PRBC transfusions; at [facility] pt cleared by GI for DAPT and on ASA/Plavix 3/20/23 - 3/23/23 however pt w/ episode of black tarry stool (H/H stable and no hemodynamic signs of bleeding)  - fecal occult negative and pt cleared by GI for DAPT  - Continue ASA 81mg, plavix 75mg qd  - PLAN: pending further GI and infectious work up to determine date of LHC. - LHC at [place] w/ oLM 30% and RCA 99% that is not amenable to intervention.    - TTE (3/1/23 @ [facility]): mild LVH, LVEF 25-30%, severe global wall motion dysfxn, mild LA dilation, mild RV dilation, mildly calcified leaflets, mod pHTN.    - Pt w/ severe GIB at [facility] requiring multiple PRBC transfusions; at [facility] pt cleared by GI for DAPT and on ASA/Plavix 3/20/23 - 3/23/23 however pt w/ episode of black tarry stool (H/H stable and no hemodynamic signs of bleeding)  - fecal occult negative and pt cleared by GI for DAPT  - Continue ASA 81mg, plavix 75mg qd  - PLAN: pending further GI and infectious work up to determine date of LHC. - Pt w/ ongoing fevers this admission, infectious work up ongoing.    - Infectious work up ongoing; no clear infectious source identified at this time.    - BCx NGTD; UCx (+) for Gram (-) rods (pt on Meropenem still having fevers).    - Infectious disease following – continue to follow recs.    - CONT: Meropenem 500mg IV QD (**after HD on HD days), Vancomycin dose after HD sessions (based on pre-HD session Vanc troughs per direction of ID).    - Vanc trough 3/25/23: 17.9, given vanco 750mg/hr after dialysis     - PLAN: ID following, recommend Gallium scan -- **NO Gallium available, and per radiology in setting of multiple blood transfusions Gallium scan quality is decreased – PET scan 3/25, f/u results

## 2023-03-25 NOTE — PROGRESS NOTE ADULT - PROBLEM SELECTOR PLAN 5
LVEF 25-30% (likely ischemic).    - Pt euvolemic on exam.    - c/w Hydralazine 50mg q8hrs, Isordil 20mg TID, toprol 25mg qd, Losartan 100mg QD. LVEF 25-30% (likely ischemic).    - euvolemic on exam.    - c/w Hydralazine 50mg q8hrs, Isordil 20mg TID, toprol 25mg qd, Losartan 100mg QD.

## 2023-03-25 NOTE — PROGRESS NOTE ADULT - ASSESSMENT
62Y M w/ HTN, DM, ESRD s/p failed kidney transplant who was transferred to [facility] (3/17) from [place] for ICD placement and Cardiac Cath after prolonged admission c/b cardiac arrest and vtach arrest and hypovolemic shock 2/2 GI bleed. New fever 3/21, source unclear, started on vanc and meropenem, gallium scan recommended. Pending repeat cath and ICD placement once infection cleared. Also w/ intermittent melena on 3/23, GI following pending cardiac clearance for EGD.      #ESRD on HD TTS  Outpatient HD prescription; [id number], F180 dialyzer, 2K 2.5Ca dialysate,  ml/min,  ml/min, EDW 65kg  Last HD yesterday 3/23; Tolerated 3.5 hours with 2 L removed as planned  HD today with a goal of 2L UF  Daily BMP   Continue with Tacro at home dose. Recommend pt follow up with outpatient nephrologist regarding tacro  Per ID, meropenem daily, but after HD on HD days. Also vanc trough prior to HD      HTN:  UF with HD as tolerated   Intermittently hypotensive. Amlodipine dc'ed  Continue losartan 100mg, metoprolol 12.5 BID, hydralazine 50mg TID    Anemia:  Hgb at goal at 9.1  Check iron profile  epo w/ HD    MBD:  Calcium 8.2  Phos: 3.2  iPTH 431  Check phos twice weekly. Goal <5.5    Mayi Pedraza D.O  PGY 5 nephrology fellow  [phone]       62Y M w/ HTN, DM, ESRD s/p failed kidney transplant who was transferred to [facility] (3/17) from [place] for ICD placement and Cardiac Cath after prolonged admission c/b cardiac arrest and vtach arrest and hypovolemic shock 2/2 GI bleed. New fever 3/21, source unclear, started on vanc and meropenem, gallium scan recommended. Pending repeat cath and ICD placement once infection cleared. Also w/ intermittent melena on 3/23, GI following pending cardiac clearance for EGD.      #ESRD on HD TTS  Outpatient HD prescription; [id number], F180 dialyzer, 2K 2.5Ca dialysate,  ml/min,  ml/min, EDW 65kg  Last HD yesterday 3/23; Tolerated 3.5 hours with 2 L removed as planned  HD today with a goal of 2L UF  Daily BMP   Continue with Tacro at home dose. Recommend pt follow up with outpatient nephrologist regarding tacro  Per ID, meropenem daily, but after HD on HD days. Also vanc trough prior to HD      HTN:  UF with HD as tolerated   Intermittently hypotensive. Amlodipine dc'ed  Continue losartan 100mg, metoprolol 12.5 BID, hydralazine 50mg TID    Anemia:  Hgb at goal at 9.1  Check iron profile  epo w/ HD    MBD:  Calcium 8.2  Phos: 3.2  iPTH 431  Check phos twice weekly. Goal <5.5    Mayi Pedraza D.O  PGY 5 nephrology fellow  [phone]       62Y M w/ HTN, DM, ESRD s/p failed kidney transplant who was transferred to [facility] (3/17) from [place] for ICD placement and Cardiac Cath after prolonged admission c/b cardiac arrest and vtach arrest and hypovolemic shock 2/2 GI bleed. New fever 3/21, source unclear, started on vanc and meropenem, gallium scan recommended. Pending repeat cath and ICD placement once infection cleared. Also w/ intermittent melena on 3/23, GI following pending cardiac clearance for EGD.      #ESRD on HD TTS  Outpatient HD prescription; [id number], F180 dialyzer, 2K 2.5Ca dialysate,  ml/min,  ml/min, EDW 65kg  Last HD yesterday 3/23; Tolerated 3.5 hours with 2 L removed as planned  HD today with a goal of 2L UF  Daily BMP   Continue with Tacro at home dose. Recommend pt follow up with outpatient nephrologist regarding tacro  Per ID, meropenem daily, but after HD on HD days. Also vanc trough prior to HD      HTN:  UF with HD as tolerated   Intermittently hypotensive. Amlodipine dc'ed  Continue losartan 100mg, metoprolol 12.5 BID, hydralazine 50mg TID    Anemia:  Hgb at goal at 9.1  Check iron profile  epo w/ HD    MBD:  Calcium 8.2  Phos: 3.2  iPTH 431  Check phos twice weekly. Goal <5.5    Mayi Pedraza D.O  PGY 5 nephrology fellow  [phone]

## 2023-03-25 NOTE — PROGRESS NOTE ADULT - SUBJECTIVE AND OBJECTIVE BOX
Interventional Cardiology PA Adult Progress Note    Subjective Assessment: Seen and examined bedside. Denies chest pain, palpitations, SOB, orthopnea/PND, dizziness, LOC, n/v/d, fever/chills/sick contacts, LE edema.     ROS negative except as noted above.  	  MEDICATIONS:  hydrALAZINE 50 milliGRAM(s) Oral every 8 hours  isosorbide   dinitrate Tablet (ISORDIL) 20 milliGRAM(s) Oral three times a day  losartan 100 milliGRAM(s) Oral daily  metoprolol succinate ER 25 milliGRAM(s) Oral daily  meropenem  IVPB 500 milliGRAM(s) IV Intermittent every 24 hours  meropenem  IVPB      acetaminophen     Tablet .. 650 milliGRAM(s) Oral every 6 hours PRN  pantoprazole  Injectable 40 milliGRAM(s) IV Push every 12 hours  polyethylene glycol 3350 17 Gram(s) Oral daily  atorvastatin 40 milliGRAM(s) Oral at bedtime  dextrose 50% Injectable 25 Gram(s) IV Push once  dextrose 50% Injectable 12.5 Gram(s) IV Push once  dextrose Oral Gel 15 Gram(s) Oral once PRN  insulin lispro (ADMELOG) corrective regimen sliding scale   SubCutaneous every 6 hours  aspirin enteric coated 81 milliGRAM(s) Oral daily  clopidogrel Tablet 75 milliGRAM(s) Oral daily  dextrose 5%. 1000 milliLiter(s) IV Continuous <Continuous>  dextrose 5%. 1000 milliLiter(s) IV Continuous <Continuous>  epoetin janae-epbx (RETACRIT) Injectable 6000 Unit(s) IV Push once  ferrous    sulfate 325 milliGRAM(s) Oral daily  sodium chloride 0.65% Nasal 1 Spray(s) Both Nostrils every 4 hours PRN  tacrolimus 2 milliGRAM(s) Oral every 12 hours  tamsulosin 0.8 milliGRAM(s) Oral at bedtime      	    [PHYSICAL EXAM:  TELEMETRY:  T(C): 38.8 (03-25-23 @ 09:45), Max: 38.8 (03-25-23 @ 09:45)  HR: 57 (03-25-23 @ 09:45) (57 - 118)  BP: 115/46 (03-25-23 @ 09:45) (106/39 - 150/66)  RR: 18 (03-25-23 @ 09:45) (16 - 18)  SpO2: 97% (03-25-23 @ 09:45) (93% - 100%)  Wt(kg): --  I&O's Summary    24 Mar 2023 07:01  -  25 Mar 2023 07:00  --------------------------------------------------------  IN: 120 mL / OUT: 250 mL / NET: -130 mL    25 Mar 2023 07:01  -  25 Mar 2023 11:48  --------------------------------------------------------  IN: 0 mL / OUT: 400 mL / NET: -400 mL                                              Appearance: Normal	  HEENT:   Normal oral mucosa, PERRLA, EOMI	  Neck: Supple,  - JVD; Carotid Bruit   Cardiovascular: Normal S1 S2, No JVD, No murmurs,   Respiratory: Lungs clear to auscultation, No Rales, Rhonchi, Wheezing	  Gastrointestinal:  Soft, Non-tender, + BS	  Skin: No rashes, No ecchymoses, No cyanosis  Extremities: Normal range of motion, No clubbing, cyanosis or edema  Vascular: Peripheral pulses palpable 2+ bilaterally  Neurologic: Non-focal  Psychiatry: A & O x 3, Mood & affect appropriate                          8.6    5.77  )-----------( 142      ( 25 Mar 2023 07:47 )             27.2     03-25    131<L>  |  96  |  16  ----------------------------<  74  4.6   |  26  |  5.03<H>    Ca    7.7<L>      25 Mar 2023 07:47  Phos  3.2     03-24  Mg     1.8     03-25    TPro  4.9<L>  /  Alb  2.6<L>  /  TBili  0.8  /  DBili  x   /  AST  17  /  ALT  7<L>  /  AlkPhos  137<H>  03-24  TSH:

## 2023-03-25 NOTE — PROGRESS NOTE ADULT - ASSESSMENT
61yo M PMHx HTN, HLD, DM, ICM (s/p cath years ago, RCA 99%, never intervened on), HFrEF 25-30%, ESRD s/p failed kidney transplant (Left Arm AVF; HD TTS), R AKA admitted to [facility] 2/27 for respiratory distress triggered SIRS, after HD session, requiring intubation, course complicated by cardiac arrest, vtach arrest requiring amio and pressors. Extubated 3/2 and course c/b by LGIB with hemoglobin of 3.5 requiring multiple transfusions; EGD/colo and CTA w/o evidence of active bleed and hemoglobin subsequently improved to 11's. Transferred to [facility] on 03/17/23 for ICD eval 2/2 Vtach and cardiac cath however on day of arrival patient had episode of BRBPR, flex sig showing localized rectal ulcer but no active bleeding. DAPT resumed on 3/20 - however on 3/21 patient febrile to 102F w/ URI symptoms. BCx NGTD with intermittent fevers, on broad spectrum Abx. Fecal occult negative, DAPT resumed 3/24. Medically optimized for LH and ICD pending infectious and bleeding w/u. PET scan 3/25, f/u results     63yo M PMHx HTN, HLD, DM, ICM (s/p cath years ago, RCA 99%, never intervened on), HFrEF 25-30%, ESRD s/p failed kidney transplant (Left Arm AVF; HD TTS), R AKA admitted to [facility] 2/27 for respiratory distress triggered SIRS, after HD session, requiring intubation, course complicated by cardiac arrest, vtach arrest requiring amio and pressors. Extubated 3/2 and course c/b by LGIB with hemoglobin of 3.5 requiring multiple transfusions; EGD/colo and CTA w/o evidence of active bleed and hemoglobin subsequently improved to 11's. Transferred to [facility] on 03/17/23 for ICD eval 2/2 Vtach and cardiac cath however on day of arrival patient had episode of BRBPR, flex sig showing localized rectal ulcer but no active bleeding. DAPT resumed on 3/20 - however on 3/21 patient febrile to 102F w/ URI symptoms. BCx NGTD with intermittent fevers, on broad spectrum Abx. Fecal occult negative, DAPT resumed 3/24. Medically optimized for LH and ICD pending infectious and bleeding w/u. PET scan 3/25, f/u results     61yo M PMHx HTN, HLD, DM, ICM (s/p cath years ago, RCA 99%, never intervened on), HFrEF 25-30%, ESRD s/p failed kidney transplant (Left Arm AVF; HD TTS), R AKA admitted to [facility] 2/27 for respiratory distress triggered SIRS, after HD session, requiring intubation, course complicated by cardiac arrest, vtach arrest requiring amio and pressors. Extubated 3/2 and course c/b by LGIB with hemoglobin of 3.5 requiring multiple transfusions; EGD/colo and CTA w/o evidence of active bleed and hemoglobin subsequently improved to 11's. Transferred to [facility] on 03/17/23 for ICD eval 2/2 Vtach and cardiac cath however on day of arrival patient had episode of BRBPR, flex sig showing localized rectal ulcer but no active bleeding. DAPT resumed on 3/20 - however on 3/21 patient febrile to 102F w/ URI symptoms. BCx NGTD with intermittent fevers, on broad spectrum Abx. Fecal occult negative, DAPT resumed 3/24. Medically optimized for LH and ICD pending infectious and bleeding w/u. PET scan 3/25, f/u results

## 2023-03-25 NOTE — PROGRESS NOTE ADULT - PROBLEM SELECTOR PLAN 6
Pt still produced some urine; urinary retention requiring multiple straight caths; now s/p indwelling duvall placement on 3/21/23.  - plan for TOV 3/25 Pt still produced some urine; urinary retention requiring multiple straight caths; now s/p indwelling duvall placement on 3/21/23.  - c/w duvall placement

## 2023-03-25 NOTE — PROGRESS NOTE ADULT - PROBLEM SELECTOR PLAN 11
- HgA1c 5.9.    - CONT: MICHELINE.      DVT ppx: holding AC; SCD   Dispo: pending completion of infectious and bleeding work up -> LHC -> ICD placement. - HgA1c 5.9.    - CONT: MICHELINE.      F: Oral intake  E: Replete electrolytes as needed for K<4 and Mg<2  N: DASH Diet    DVT PPX: holding AC; SCD  Dispo: pending completion of infectious and bleeding work up -> LHC -> ICD placement.    Case discussed with Dr. Magallanes

## 2023-03-25 NOTE — PROGRESS NOTE ADULT - PROBLEM SELECTOR PLAN 8
- s/p failed kidney transplant   - Continue home Tacrolimus 2mg PO BID.    - per referring Dr Barrera, pt still requires tacrolimus even if failed transplant due to possibility of graft vs host  - Tacrolimus Lvls 2.1 -> 4.0 -> 3.5.

## 2023-03-25 NOTE — PROGRESS NOTE ADULT - PROBLEM SELECTOR PLAN 3
- @ [facility], pt w/ severe GIB w/ Hgb down to 3.5 received total of 7u PRBCs.    - 3/17 AM pt passed loose BM along w/ large amount of BRBPR.    - s/p Flex sigmoidoscopy – Localized ulceration and erosion w/ no bleeding in rectum, suggestive of solitary ulcer syndrome; evidence of prior hemoclip was found in sigmoid colon.    - Avoid anal digitation and enemas.    - Bowel regimen w/ Miralax and Dulcolax to avoid constipation.    - now cleared by GI for DAPT  - Transfusion goal Hgb < 8.0. - @ [facility], pt w/ severe GIB w/ Hgb down to 3.5 received total of 7u PRBCs.    - 3/17 AM pt passed loose BM along w/ large amount of BRBPR.    - s/p Flex sigmoidoscopy – Localized ulceration and erosion w/ no bleeding in rectum, suggestive of solitary ulcer syndrome; evidence of prior hemoclip was found in sigmoid colon.    - Avoid anal digitation and enemas.    - Bowel regimen w/ Miralax and Dulcolax to avoid constipation.    - now cleared by GI for DAPT  - Transfusion goal Hgb < 8.0. - @ [facility], pt w/ severe GIB w/ Hgb down to 3.5 received total of 7u PRBCs.    - 3/17 AM pt passed loose BM along w/ large amount of BRBPR.    - s/p Flex sigmoidoscopy – Localized ulceration and erosion w/ no bleeding in rectum, suggestive of solitary ulcer syndrome; evidence of prior hemoclip was found in sigmoid colon.    - Avoid anal digitation and enemas.    - Bowel regimen w/ Miralax and Dulcolax to avoid constipation.    - now cleared by GI for DAPT  - Transfusion goal Hgb < 8.0. s/p VTach arrest @ [facility]; no tele events noted.    - EP Consulted – plan for ICD placement once pt has ischemic eval / LHC first.    - c/w toprol 25mg qd s/p VTach arrest @ [facility]; no tele events noted.    - EP Consulted – plan for ICD placement once pt has ischemic eval / LHC first.    - c/w toprol 25mg qd s/p VTach arrest @ [facility]; no tele events noted.    - EP Consulted – plan for ICD placement once pt has ischemic eval / LHC first.    - c/w toprol 25mg qd

## 2023-03-25 NOTE — PROGRESS NOTE ADULT - SUBJECTIVE AND OBJECTIVE BOX
Patient is a 62y Male seen and evaluated at bedside. during hemodialysis patient spiking fevers /57 K 4,6 bicarb 26       Meds:    acetaminophen     Tablet .. 650 every 6 hours PRN  aspirin enteric coated 81 daily  atorvastatin 40 at bedtime  clopidogrel Tablet 75 daily  dextrose 5%. 1000 <Continuous>  dextrose 5%. 1000 <Continuous>  dextrose 50% Injectable 25 once  dextrose 50% Injectable 12.5 once  dextrose Oral Gel 15 once PRN  ferrous    sulfate 325 daily  hydrALAZINE 50 every 8 hours  insulin lispro (ADMELOG) corrective regimen sliding scale  every 6 hours  isosorbide   dinitrate Tablet (ISORDIL) 20 three times a day  losartan 100 daily  magnesium oxide 800 once  meropenem  IVPB 500 every 24 hours  meropenem  IVPB    metoprolol succinate ER 25 daily  pantoprazole  Injectable 40 every 12 hours  polyethylene glycol 3350 17 daily  sodium chloride 0.65% Nasal 1 every 4 hours PRN  tacrolimus 2 every 12 hours  tamsulosin 0.8 at bedtime  vancomycin  IVPB 750 once      T(C): , Max: 38.8 (23 @ 09:45)  T(F): , Max: 101.9 (23 @ 09:45)  HR: 70 (23 @ 13:15)  BP: 118/57 (23 @ 13:15)  BP(mean): --  RR: 19 (23 @ 13:15)  SpO2: 100% (23 @ 13:15)  Wt(kg): --     @ 07:  -   @ 07:00  --------------------------------------------------------  IN: 120 mL / OUT: 250 mL / NET: -130 mL     @ 07:01  -   @ 14:03  --------------------------------------------------------  IN: 500 mL / OUT: 2900 mL / NET: -2400 mL          Review of Systems:  all other ROS negatiev       PHYSICAL EXAM:  GENERAL: well-developed, well nourished, alert, no acute distress at present  CHEST/LUNG: Clear to auscultation bilaterally  HEART: normal S1S2, RRR  ABDOMEN: Soft, Nontender, +BS, No flank tenderness bilateral  EXTREMITIES: No clubbing, cyanosis, or edema   ACCESS: LUE AVF      LABS:                        8.6    5.77  )-----------( 142      ( 25 Mar 2023 07:47 )             27.2         131<L>  |  96  |  16  ----------------------------<  74  4.6   |  26  |  5.03<H>    Ca    7.7<L>      25 Mar 2023 07:47  Phos  3.2       Mg     1.8         TPro  4.9<L>  /  Alb  2.6<L>  /  TBili  0.8  /  DBili  x   /  AST  17  /  ALT  7<L>  /  AlkPhos  137<H>                  RADIOLOGY & ADDITIONAL STUDIES:        Hemoglobin: 8.6 g/dL (23 @ 07:47)  Hemoglobin: 9.1 g/dL (23 @ 12:00)  Hemoglobin: 9.6 g/dL (23 @ 07:26)  Phosphorus Level, Serum: 3.2 mg/dL (23 @ 07:26)    Albumin, Serum: 2.6 g/dL (23 @ 07:26)  Albumin, Serum: 2.6 g/dL (23 @ 16:36)    calcium acetate 667 milliGRAM(s) Oral three times a day with meals, 23 @ 05:13, Routine  doxercalciferol Injectable 4 MICROGram(s) IV Push once, 23 @ 12:20, Routine, Stop order after: 1 Doses  epoetin janae-epbx (RETACRIT) Injectable 6000 Unit(s) IV Push once, 23 @ 11:01, Routine, Stop order after: 1 Doses  epoetin janae-epbx (RETACRIT) Injectable 6000 Unit(s) IV Push once, 23 @ 12:20, Routine, Stop order after: 1 Doses  sevelamer carbonate 800 milliGRAM(s) Oral three times a day with meals, 23 @ 05:13, Routine    Hemodialysis Treatment.:     Schedule: Once, Modality: Hemodialysis, Access: Arteriovenous Fistula    Dialyzer: Optiflux [id number], Time: 210 Min    Blood Flow: 400 mL/Min , Dialysate Flow: 500 mL/Min, Dialysate Temp: 36.5, Tubinmm (Adult)    Target Fluid Removal: 2 Liters    Dialysate Electrolytes (mEq/L): Potassium 3, Calcium 2.5, Sodium 138, Bicarbonate 35 (23 @ 10:59) [Completed]     Patient is a 62y Male seen and evaluated at bedside. during hemodialysis patient spiking fevers /57 K 4,6 bicarb 26       Meds:    acetaminophen     Tablet .. 650 every 6 hours PRN  aspirin enteric coated 81 daily  atorvastatin 40 at bedtime  clopidogrel Tablet 75 daily  dextrose 5%. 1000 <Continuous>  dextrose 5%. 1000 <Continuous>  dextrose 50% Injectable 25 once  dextrose 50% Injectable 12.5 once  dextrose Oral Gel 15 once PRN  ferrous    sulfate 325 daily  hydrALAZINE 50 every 8 hours  insulin lispro (ADMELOG) corrective regimen sliding scale  every 6 hours  isosorbide   dinitrate Tablet (ISORDIL) 20 three times a day  losartan 100 daily  magnesium oxide 800 once  meropenem  IVPB 500 every 24 hours  meropenem  IVPB    metoprolol succinate ER 25 daily  pantoprazole  Injectable 40 every 12 hours  polyethylene glycol 3350 17 daily  sodium chloride 0.65% Nasal 1 every 4 hours PRN  tacrolimus 2 every 12 hours  tamsulosin 0.8 at bedtime  vancomycin  IVPB 750 once      T(C): , Max: 38.8 (23 @ 09:45)  T(F): , Max: 101.9 (23 @ 09:45)  HR: 70 (23 @ 13:15)  BP: 118/57 (23 @ 13:15)  BP(mean): --  RR: 19 (23 @ 13:15)  SpO2: 100% (23 @ 13:15)  Wt(kg): --     @ 07:  -   @ 07:00  --------------------------------------------------------  IN: 120 mL / OUT: 250 mL / NET: -130 mL     @ 07:01  -   @ 14:03  --------------------------------------------------------  IN: 500 mL / OUT: 2900 mL / NET: -2400 mL          Review of Systems:  all other ROS negatiev       PHYSICAL EXAM:  GENERAL: well-developed, well nourished, alert, no acute distress at present  CHEST/LUNG: Clear to auscultation bilaterally  HEART: normal S1S2, RRR  ABDOMEN: Soft, Nontender, +BS, No flank tenderness bilateral  EXTREMITIES: No clubbing, cyanosis, or edema   ACCESS: LUE AVF      LABS:                        8.6    5.77  )-----------( 142      ( 25 Mar 2023 07:47 )             27.2         131<L>  |  96  |  16  ----------------------------<  74  4.6   |  26  |  5.03<H>    Ca    7.7<L>      25 Mar 2023 07:47  Phos  3.2       Mg     1.8         TPro  4.9<L>  /  Alb  2.6<L>  /  TBili  0.8  /  DBili  x   /  AST  17  /  ALT  7<L>  /  AlkPhos  137<H>                  RADIOLOGY & ADDITIONAL STUDIES:        Hemoglobin: 8.6 g/dL (23 @ 07:47)  Hemoglobin: 9.1 g/dL (23 @ 12:00)  Hemoglobin: 9.6 g/dL (23 @ 07:26)  Phosphorus Level, Serum: 3.2 mg/dL (23 @ 07:26)    Albumin, Serum: 2.6 g/dL (23 @ 07:26)  Albumin, Serum: 2.6 g/dL (23 @ 16:36)    calcium acetate 667 milliGRAM(s) Oral three times a day with meals, 23 @ 05:13, Routine  doxercalciferol Injectable 4 MICROGram(s) IV Push once, 23 @ 12:20, Routine, Stop order after: 1 Doses  epoetin janae-epbx (RETACRIT) Injectable 6000 Unit(s) IV Push once, 23 @ 11:01, Routine, Stop order after: 1 Doses  epoetin janae-epbx (RETACRIT) Injectable 6000 Unit(s) IV Push once, 23 @ 12:20, Routine, Stop order after: 1 Doses  sevelamer carbonate 800 milliGRAM(s) Oral three times a day with meals, 23 @ 05:13, Routine    Hemodialysis Treatment.:     Schedule: Once, Modality: Hemodialysis, Access: Arteriovenous Fistula    Dialyzer: Optiflux [id number], Time: 210 Min    Blood Flow: 400 mL/Min , Dialysate Flow: 500 mL/Min, Dialysate Temp: 36.5, Tubinmm (Adult)    Target Fluid Removal: 2 Liters    Dialysate Electrolytes (mEq/L): Potassium 3, Calcium 2.5, Sodium 138, Bicarbonate 35 (23 @ 10:59) [Completed]     Patient is a 62y Male seen and evaluated at bedside. during hemodialysis patient spiking fevers /57 K 4,6 bicarb 26       Meds:    acetaminophen     Tablet .. 650 every 6 hours PRN  aspirin enteric coated 81 daily  atorvastatin 40 at bedtime  clopidogrel Tablet 75 daily  dextrose 5%. 1000 <Continuous>  dextrose 5%. 1000 <Continuous>  dextrose 50% Injectable 25 once  dextrose 50% Injectable 12.5 once  dextrose Oral Gel 15 once PRN  ferrous    sulfate 325 daily  hydrALAZINE 50 every 8 hours  insulin lispro (ADMELOG) corrective regimen sliding scale  every 6 hours  isosorbide   dinitrate Tablet (ISORDIL) 20 three times a day  losartan 100 daily  magnesium oxide 800 once  meropenem  IVPB 500 every 24 hours  meropenem  IVPB    metoprolol succinate ER 25 daily  pantoprazole  Injectable 40 every 12 hours  polyethylene glycol 3350 17 daily  sodium chloride 0.65% Nasal 1 every 4 hours PRN  tacrolimus 2 every 12 hours  tamsulosin 0.8 at bedtime  vancomycin  IVPB 750 once      T(C): , Max: 38.8 (23 @ 09:45)  T(F): , Max: 101.9 (23 @ 09:45)  HR: 70 (23 @ 13:15)  BP: 118/57 (23 @ 13:15)  BP(mean): --  RR: 19 (23 @ 13:15)  SpO2: 100% (23 @ 13:15)  Wt(kg): --     @ 07:  -   @ 07:00  --------------------------------------------------------  IN: 120 mL / OUT: 250 mL / NET: -130 mL     @ 07:01  -   @ 14:03  --------------------------------------------------------  IN: 500 mL / OUT: 2900 mL / NET: -2400 mL          Review of Systems:  all other ROS negatiev       PHYSICAL EXAM:  GENERAL: well-developed, well nourished, alert, no acute distress at present  CHEST/LUNG: Clear to auscultation bilaterally  HEART: normal S1S2, RRR  ABDOMEN: Soft, Nontender, +BS, No flank tenderness bilateral  EXTREMITIES: No clubbing, cyanosis, or edema   ACCESS: LUE AVF      LABS:                        8.6    5.77  )-----------( 142      ( 25 Mar 2023 07:47 )             27.2         131<L>  |  96  |  16  ----------------------------<  74  4.6   |  26  |  5.03<H>    Ca    7.7<L>      25 Mar 2023 07:47  Phos  3.2       Mg     1.8         TPro  4.9<L>  /  Alb  2.6<L>  /  TBili  0.8  /  DBili  x   /  AST  17  /  ALT  7<L>  /  AlkPhos  137<H>                  RADIOLOGY & ADDITIONAL STUDIES:        Hemoglobin: 8.6 g/dL (23 @ 07:47)  Hemoglobin: 9.1 g/dL (23 @ 12:00)  Hemoglobin: 9.6 g/dL (23 @ 07:26)  Phosphorus Level, Serum: 3.2 mg/dL (23 @ 07:26)    Albumin, Serum: 2.6 g/dL (23 @ 07:26)  Albumin, Serum: 2.6 g/dL (23 @ 16:36)    calcium acetate 667 milliGRAM(s) Oral three times a day with meals, 23 @ 05:13, Routine  doxercalciferol Injectable 4 MICROGram(s) IV Push once, 23 @ 12:20, Routine, Stop order after: 1 Doses  epoetin janae-epbx (RETACRIT) Injectable 6000 Unit(s) IV Push once, 23 @ 11:01, Routine, Stop order after: 1 Doses  epoetin janae-epbx (RETACRIT) Injectable 6000 Unit(s) IV Push once, 23 @ 12:20, Routine, Stop order after: 1 Doses  sevelamer carbonate 800 milliGRAM(s) Oral three times a day with meals, 23 @ 05:13, Routine    Hemodialysis Treatment.:     Schedule: Once, Modality: Hemodialysis, Access: Arteriovenous Fistula    Dialyzer: Optiflux [id number], Time: 210 Min    Blood Flow: 400 mL/Min , Dialysate Flow: 500 mL/Min, Dialysate Temp: 36.5, Tubinmm (Adult)    Target Fluid Removal: 2 Liters    Dialysate Electrolytes (mEq/L): Potassium 3, Calcium 2.5, Sodium 138, Bicarbonate 35 (23 @ 10:59) [Completed]

## 2023-03-26 LAB
ANION GAP SERPL CALC-SCNC: 8 MMOL/L — SIGNIFICANT CHANGE UP (ref 5–17)
BUN SERPL-MCNC: 7 MG/DL — SIGNIFICANT CHANGE UP (ref 7–23)
CALCIUM SERPL-MCNC: 7.6 MG/DL — LOW (ref 8.4–10.5)
CHLORIDE SERPL-SCNC: 101 MMOL/L — SIGNIFICANT CHANGE UP (ref 96–108)
CO2 SERPL-SCNC: 28 MMOL/L — SIGNIFICANT CHANGE UP (ref 22–31)
CREAT SERPL-MCNC: 3.54 MG/DL — HIGH (ref 0.5–1.3)
CULTURE RESULTS: SIGNIFICANT CHANGE UP
EGFR: 19 ML/MIN/1.73M2 — LOW
GLUCOSE BLDC GLUCOMTR-MCNC: 103 MG/DL — HIGH (ref 70–99)
GLUCOSE BLDC GLUCOMTR-MCNC: 86 MG/DL — SIGNIFICANT CHANGE UP (ref 70–99)
GLUCOSE BLDC GLUCOMTR-MCNC: 87 MG/DL — SIGNIFICANT CHANGE UP (ref 70–99)
GLUCOSE BLDC GLUCOMTR-MCNC: 90 MG/DL — SIGNIFICANT CHANGE UP (ref 70–99)
GLUCOSE BLDC GLUCOMTR-MCNC: 98 MG/DL — SIGNIFICANT CHANGE UP (ref 70–99)
GLUCOSE SERPL-MCNC: 86 MG/DL — SIGNIFICANT CHANGE UP (ref 70–99)
HCT VFR BLD CALC: 29.4 % — LOW (ref 39–50)
HGB BLD-MCNC: 9.1 G/DL — LOW (ref 13–17)
MAGNESIUM SERPL-MCNC: 1.9 MG/DL — SIGNIFICANT CHANGE UP (ref 1.6–2.6)
MCHC RBC-ENTMCNC: 28.8 PG — SIGNIFICANT CHANGE UP (ref 27–34)
MCHC RBC-ENTMCNC: 31 GM/DL — LOW (ref 32–36)
MCV RBC AUTO: 93 FL — SIGNIFICANT CHANGE UP (ref 80–100)
NRBC # BLD: 0 /100 WBCS — SIGNIFICANT CHANGE UP (ref 0–0)
PLATELET # BLD AUTO: 167 K/UL — SIGNIFICANT CHANGE UP (ref 150–400)
POTASSIUM SERPL-MCNC: 4.2 MMOL/L — SIGNIFICANT CHANGE UP (ref 3.5–5.3)
POTASSIUM SERPL-SCNC: 4.2 MMOL/L — SIGNIFICANT CHANGE UP (ref 3.5–5.3)
RBC # BLD: 3.16 M/UL — LOW (ref 4.2–5.8)
RBC # FLD: 14.7 % — HIGH (ref 10.3–14.5)
SODIUM SERPL-SCNC: 137 MMOL/L — SIGNIFICANT CHANGE UP (ref 135–145)
SPECIMEN SOURCE: SIGNIFICANT CHANGE UP
WBC # BLD: 4.34 K/UL — SIGNIFICANT CHANGE UP (ref 3.8–10.5)
WBC # FLD AUTO: 4.34 K/UL — SIGNIFICANT CHANGE UP (ref 3.8–10.5)

## 2023-03-26 PROCEDURE — 99232 SBSQ HOSP IP/OBS MODERATE 35: CPT

## 2023-03-26 PROCEDURE — 99232 SBSQ HOSP IP/OBS MODERATE 35: CPT | Mod: GC

## 2023-03-26 PROCEDURE — 99233 SBSQ HOSP IP/OBS HIGH 50: CPT

## 2023-03-26 RX ORDER — ISOSORBIDE DINITRATE 5 MG/1
20 TABLET ORAL THREE TIMES A DAY
Refills: 0 | Status: DISCONTINUED | OUTPATIENT
Start: 2023-03-26 | End: 2023-03-28

## 2023-03-26 RX ORDER — HYDRALAZINE HCL 50 MG
50 TABLET ORAL EVERY 8 HOURS
Refills: 0 | Status: DISCONTINUED | OUTPATIENT
Start: 2023-03-26 | End: 2023-03-28

## 2023-03-26 RX ORDER — LOSARTAN POTASSIUM 100 MG/1
100 TABLET, FILM COATED ORAL EVERY 24 HOURS
Refills: 0 | Status: DISCONTINUED | OUTPATIENT
Start: 2023-03-26 | End: 2023-03-28

## 2023-03-26 RX ADMIN — Medication 50 MILLIGRAM(S): at 21:44

## 2023-03-26 RX ADMIN — PANTOPRAZOLE SODIUM 40 MILLIGRAM(S): 20 TABLET, DELAYED RELEASE ORAL at 06:17

## 2023-03-26 RX ADMIN — Medication 650 MILLIGRAM(S): at 22:25

## 2023-03-26 RX ADMIN — PANTOPRAZOLE SODIUM 40 MILLIGRAM(S): 20 TABLET, DELAYED RELEASE ORAL at 17:14

## 2023-03-26 RX ADMIN — Medication 50 MILLIGRAM(S): at 06:17

## 2023-03-26 RX ADMIN — Medication 650 MILLIGRAM(S): at 21:44

## 2023-03-26 RX ADMIN — Medication 325 MILLIGRAM(S): at 10:50

## 2023-03-26 RX ADMIN — CLOPIDOGREL BISULFATE 75 MILLIGRAM(S): 75 TABLET, FILM COATED ORAL at 10:50

## 2023-03-26 RX ADMIN — TACROLIMUS 2 MILLIGRAM(S): 5 CAPSULE ORAL at 17:14

## 2023-03-26 RX ADMIN — LOSARTAN POTASSIUM 100 MILLIGRAM(S): 100 TABLET, FILM COATED ORAL at 12:21

## 2023-03-26 RX ADMIN — LOSARTAN POTASSIUM 100 MILLIGRAM(S): 100 TABLET, FILM COATED ORAL at 08:57

## 2023-03-26 RX ADMIN — TAMSULOSIN HYDROCHLORIDE 0.8 MILLIGRAM(S): 0.4 CAPSULE ORAL at 21:44

## 2023-03-26 RX ADMIN — MEROPENEM 100 MILLIGRAM(S): 1 INJECTION INTRAVENOUS at 17:14

## 2023-03-26 RX ADMIN — Medication 81 MILLIGRAM(S): at 10:50

## 2023-03-26 RX ADMIN — ISOSORBIDE DINITRATE 20 MILLIGRAM(S): 5 TABLET ORAL at 15:41

## 2023-03-26 RX ADMIN — Medication 25 MILLIGRAM(S): at 08:58

## 2023-03-26 RX ADMIN — ISOSORBIDE DINITRATE 20 MILLIGRAM(S): 5 TABLET ORAL at 10:50

## 2023-03-26 RX ADMIN — Medication 50 MILLIGRAM(S): at 15:41

## 2023-03-26 RX ADMIN — ATORVASTATIN CALCIUM 40 MILLIGRAM(S): 80 TABLET, FILM COATED ORAL at 21:44

## 2023-03-26 RX ADMIN — ISOSORBIDE DINITRATE 20 MILLIGRAM(S): 5 TABLET ORAL at 07:39

## 2023-03-26 RX ADMIN — TACROLIMUS 2 MILLIGRAM(S): 5 CAPSULE ORAL at 08:58

## 2023-03-26 NOTE — PROGRESS NOTE ADULT - ASSESSMENT
63yo M PMHx HTN, HLD, DM, ICM (s/p cath years ago, RCA 99%, never intervened on), HFrEF 25-30%, ESRD s/p failed kidney transplant (Left Arm AVF; HD TTS), R AKA admitted to [facility] 2/27 for respiratory distress triggered SIRS, after HD session, requiring intubation, course complicated by cardiac arrest, vtach arrest requiring amio and pressors. Extubated 3/2 and course c/b by LGIB with hemoglobin of 3.5 requiring multiple transfusions; EGD/colo and CTA w/o evidence of active bleed and hemoglobin subsequently improved to 11's. Transferred to [facility] on 03/17/23 for ICD eval 2/2 Vtach and cardiac cath however on day of arrival patient had episode of BRBPR, flex sig showing localized rectal ulcer but no active bleeding. DAPT resumed on 3/20 - however on 3/21 patient febrile to 102F w/ URI symptoms. BCx NGTD with intermittent fevers, on broad spectrum Abx. Fecal occult negative, DAPT resumed 3/24. Medically optimized for LH and ICD pending infectious and bleeding w/u. PET scan 3/25, f/u results     63yo M PMHx HTN, HLD, DM, ICM (s/p cath years ago, RCA 99%, never intervened on), HFrEF 25-30%, ESRD s/p failed kidney transplant (Left Arm AVF; HD TTS), R AKA admitted to [facility] 2/27 for respiratory distress triggered SIRS, after HD session, requiring intubation, course complicated by cardiac arrest, vtach arrest requiring amio and pressors. Extubated 3/2 and course c/b by LGIB with hemoglobin of 3.5 requiring multiple transfusions; EGD/colo and CTA w/o evidence of active bleed and hemoglobin subsequently improved to 11's. Transferred to [facility] on 03/17/23 for ICD eval 2/2 Vtach and cardiac cath however on day of arrival patient had episode of BRBPR, flex sig showing localized rectal ulcer but no active bleeding. DAPT resumed on 3/20 - however on 3/21 patient febrile to 102F w/ URI symptoms. BCx NGTD with intermittent fevers, on broad spectrum Abx. Fecal occult negative, DAPT resumed 3/24. Medically optimized for LH and ICD pending infectious and bleeding w/u. PET scan 3/25, f/u results     61yo M PMHx HTN, HLD, DM, ICM (s/p cath years ago, RCA 99%, never intervened on), HFrEF 25-30%, ESRD s/p failed kidney transplant (Left Arm AVF; HD TTS), R AKA admitted to [facility] 2/27 for respiratory distress triggered SIRS, after HD session, requiring intubation, course complicated by cardiac arrest, vtach arrest requiring amio and pressors. Extubated 3/2 and course c/b by LGIB with hemoglobin of 3.5 requiring multiple transfusions; EGD/colo and CTA w/o evidence of active bleed and hemoglobin subsequently improved to 11's. Transferred to [facility] on 03/17/23 for ICD eval 2/2 Vtach and cardiac cath however on day of arrival patient had episode of BRBPR, flex sig showing localized rectal ulcer but no active bleeding. DAPT resumed on 3/20 - however on 3/21 patient febrile to 102F w/ URI symptoms. BCx NGTD with intermittent fevers, on broad spectrum Abx. Fecal occult negative, DAPT resumed 3/24. Medically optimized for LH and ICD pending infectious and bleeding w/u. PET scan 3/25, f/u results     62y M PMHx HTN, HLD, DM, ICM (s/p cath years ago, RCA 99%, never intervened on), HFrEF 25-30%, ESRD s/p failed kidney transplant (LUE AVF; HD TTS), R AKA admitted to [facility] 2/27 for respiratory distress and SIRS criteria after HD session, requiring intubation. Pt w/ cardiac arrest, vtach arrest requiring amio and pressors. Extubated 3/2 and course c/b by LGIB with hemoglobin of 3.5 requiring multiple transfusions; EGD/colo and CTA w/o evidence of active bleed and Hgb improved to 11's. Transferred to [facility] 03/17/23 for ICD eval 2/2 Vtach and cardiac cath however patient w/ episode of BRBPR, flex sig showing localized rectal ulcer but no active bleeding. DAPT resumed on 3/20. 3/21 patient febrile to 102F w/ URI symptoms. BCx NGTD with intermittent fevers, on broad spectrum Abx w/ ID following. Melena 3/23/23 w/ Fecal occult negative, GI cleared pt and DAPT resumed 3/24. LHC and ICD pending infectious w/u. PET scan 3/25 and awaiting results     62y M PMHx HTN, HLD, DM, ICM (s/p cath years ago, RCA 99%, never intervened on), HFrEF 25-30%, ESRD s/p failed kidney transplant (LUE AVF; HD TTS), R AKA admitted to [facility] 2/27 for respiratory distress and SIRS criteria after HD session, requiring intubation. Pt w/ cardiac arrest, vtach arrest requiring amio and pressors. Extubated 3/2 and course c/b by LGIB with hemoglobin of 3.5 requiring multiple transfusions; EGD/colo and CTA w/o evidence of active bleed and Hgb improved to 11's. Transferred to [facility] 03/17/23 for ICD eval 2/2 Vtach and cardiac cath however patient w/ episode of BRBPR, flex sig showing localized rectal ulcer but no active bleeding. DAPT resumed on 3/20. 3/21 patient febrile to 102F w/ URI symptoms. BCx NGTD with intermittent fevers, on broad spectrum Abx w/ ID following. Melena 3/23/23 w/ Fecal occult negative, GI cleared pt and DAPT resumed 3/24. LHC and ICD pending infectious w/u. PET scan 3/25 and awaiting results     62y M PMHx HTN, HLD, DM, ICM (s/p cath years ago, RCA 99%, never intervened on), HFrEF 25-30%, ESRD s/p failed kidney transplant (LUE AVF; HD TTS), R AKA admitted to [facility] 2/27 for respiratory distress and SIRS criteria after HD session, requiring intubation. Pt w/ cardiac arrest, vtach arrest requiring amio and pressors. Extubated 3/2 and course c/b by LGIB with hemoglobin of 3.5 requiring multiple transfusions; EGD/colo and CTA w/o evidence of active bleed and Hgb improved to 11's. Transferred to [facility] 03/17/23 for ICD eval 2/2 Vtach and cardiac cath however patient w/ episode of BRBPR, flex sig showing localized rectal ulcer but no active bleeding. DAPT resumed on 3/20. 3/21 patient febrile to 102F w/ URI symptoms. BCx NGTD with intermittent fevers, on broad spectrum Abx w/ ID following. Melena 3/23/23 w/ Fecal occult negative, GI cleared pt and DAPT resumed 3/24. LHC and ICD pending infectious w/u. PET scan 3/25 and awaiting results

## 2023-03-26 NOTE — PROGRESS NOTE ADULT - ASSESSMENT
63 yo M with HTN, DM, HLD, ICM, HFrEF, ESRD s/p failed RT (HD via LUE AVF), R AKA transferred to [facility] from [facility] on 3/17 for ICD placement with fever, no leukocytosis (on low dose tacrolimus).   He has a positive UA, urine culture with Klebsiella pneumoniae (S cefazolin).  Had mild tenderness to palpation of transplanted kidney previously, now resolved.  Remains febrile.  Persistence of neck pain is concerning.  He also now is having diarrhea, possibly antibiotic-associated.  If continues or worsens, may need to consider C diff.  Suggest:  - F/U blood culture from 3/22 X 1  - Please send urine for BK virus  - FDG PET done yesterday - f/u read  - Vancomycin trough prior to HD.  If 10-17.9, give 750 mg, if 18-25, give 500 mg  - Continue meropenem 500 mg IV q24h for now.  Recommendations discussed with primary team.  Will follow with you - team 2.      61 yo M with HTN, DM, HLD, ICM, HFrEF, ESRD s/p failed RT (HD via LUE AVF), R AKA transferred to [facility] from [facility] on 3/17 for ICD placement with fever, no leukocytosis (on low dose tacrolimus).   He has a positive UA, urine culture with Klebsiella pneumoniae (S cefazolin).  Had mild tenderness to palpation of transplanted kidney previously, now resolved.  Remains febrile.  Persistence of neck pain is concerning.  He also now is having diarrhea, possibly antibiotic-associated.  If continues or worsens, may need to consider C diff.  Suggest:  - F/U blood culture from 3/22 X 1  - Please send urine for BK virus  - FDG PET done yesterday - f/u read  - Vancomycin trough prior to HD.  If 10-17.9, give 750 mg, if 18-25, give 500 mg  - Continue meropenem 500 mg IV q24h for now.  Recommendations discussed with primary team.  Will follow with you - team 2.      63 yo M with HTN, DM, HLD, ICM, HFrEF, ESRD s/p failed RT (HD via LUE AVF), R AKA transferred to [facility] from [facility] on 3/17 for ICD placement with fever, no leukocytosis (on low dose tacrolimus).   He has a positive UA, urine culture with Klebsiella pneumoniae (S cefazolin).  Had mild tenderness to palpation of transplanted kidney previously, now resolved.  Remains febrile.  Persistence of neck pain is concerning.  He also now is having diarrhea, possibly antibiotic-associated.  If continues or worsens, may need to consider C diff.  Suggest:  - F/U blood culture from 3/22 X 1  - Please send urine for BK virus  - FDG PET done yesterday - f/u read  - Vancomycin trough prior to HD.  If 10-17.9, give 750 mg, if 18-25, give 500 mg  - Continue meropenem 500 mg IV q24h for now.  Recommendations discussed with primary team.  Will follow with you - team 2.

## 2023-03-26 NOTE — PROGRESS NOTE ADULT - PROBLEM SELECTOR PLAN 6
Pt still produced some urine; urinary retention requiring multiple straight caths; now s/p indwelling duvall placement on 3/21/23.  - c/w duvall placement

## 2023-03-26 NOTE — PROGRESS NOTE ADULT - PROBLEM SELECTOR PLAN 3
s/p VTach arrest @ [facility]; no tele events noted.    - EP Consulted – plan for ICD placement once pt has ischemic eval / LHC first.    - c/w toprol 25mg qd s/p VTach arrest @ [facility]; no tele events noted.    - EP Consulted – plan for ICD placement once pt has ischemic eval / LHC first.    - c/w toprol 25mg qd s/p VTach arrest @ [facility]; no tele events noted.    - EP Consulted – plan for ICD placement once pt has ischemic eval / LHC first.    - c/w toprol 25mg qd s/p VTach arrest @ [facility]; no tele events noted.    - EP Consulted – plan for ICD placement once pt has LHC   - c/w toprol 25mg qd s/p VTach arrest @ [facility]; no tele events noted.    - EP Consulted – plan for ICD placement once pt has LHC   - c/w toprol 25mg qd s/p VTach arrest @ [facility]; no tele events noted.    - EP Consulted – plan for ICD placement once pt has LHC   - c/w toprol 25mg qd

## 2023-03-26 NOTE — PROGRESS NOTE ADULT - PROBLEM SELECTOR PLAN 2
LHC at [place] w/ oLM 30% and RCA 99% that is not amenable to intervention.    - TTE (3/1/23 @ [facility]): mild LVH, LVEF 25-30%, severe global wall motion dysfxn, mild LA dilation, mild RV dilation, mildly calcified leaflets, mod pHTN  - evere GIB at [facility] requiring multiple PRBC transfusions; at [facility] pt cleared by GI for DAPT and on ASA/Plavix 3/20/23 - 3/23/23 however pt w/ episode of black tarry stool (H/H stable and no hemodynamic signs of bleeding)  - fecal occult negative and pt cleared by GI for DAPT, now restarted  - Continue ASA 81mg, plavix 75mg qd  - PLAN: pending further GI and infectious work up to determine date of [facility]. LHC at [place] w/ oLM 30% and RCA 99% that is not amenable to intervention.    - TTE (3/1/23 @ [facility]): mild LVH, LVEF 25-30%, severe global wall motion dysfxn, mild LA dilation, mild RV dilation, mildly calcified leaflets, mod pHTN  - evere GIB at [facility] requiring multiple PRBC transfusions; at [facility] pt cleared by GI for DAPT and on ASA/Plavix 3/20/23 - 3/23/23 however pt w/ episode of black tarry stool (H/H stable and no hemodynamic signs of bleeding)  - fecal occult negative and pt cleared by GI for DAPT, now restarted  - Continue ASA 81mg, plavix 75mg qd  - PLAN: pending further GI and infectious work up to determine date of [facility]. LHC at [place] w/ oLM 30% and RCA 99% that is not amenable to intervention.    - TTE (3/1/23 @ [facility]): mild LVH, LVEF 25-30%, severe global wall motion dysfxn, mild LA dilation, mild RV dilation, mildly calcified leaflets, mod pHTN  - evere GIB at [facility] requiring multiple PRBC transfusions; at [facility] pt cleared by GI for DAPT and on ASA/Plavix 3/20/23 - 3/23/23 however pt w/ episode of black tarry stool (H/H stable and no hemodynamic signs of bleeding)  - fecal occult negative and pt cleared by GI for DAPT, now restarted  - Continue ASA 81mg, plavix 75mg qd  - PLAN: pending further GI and infectious work up to determine date of [facility]. LHC at [place] w/ oLM 30% and RCA 99% that is not amenable to intervention.    - TTE 3/1/23 @ [facility]:  mild LVH, LVEF 25-30%, severe global wall motion dysfxn, mild LA dilation, mild RV dilation, mildly calcified leaflets, mod pHTN  - Severe GIB at [facility] requiring multiple PRBC transfusions  - initially cleared by GI for DAPT and on ASA/Plavix 3/20/23.    - 3/23/23 pt w/ episode of black tarry stool (H/H stable and no hemodynamic signs of bleeding),  fecal occult negative and pt cleared by GI for DAPT and restarted 3/24/23  - Continue ASA 81mg, plavix 75mg qd  - PLAN: pending further and infectious work up to determine date of LHC. LHC at [place] w/ oLM 30% and RCA 99% that is not amenable to intervention.    - TTE 3/1/23 @ [facility]:  mild LVH, LVEF 25-30%, severe global wall motion dysfxn, mild LA dilation, mild RV dilation, mildly calcified leaflets, mod pHTN  - Severe GIB at [facility] requiring multiple PRBC transfusions  - initially cleared by GI for DAPT and on ASA/Plavix 3/20/23.    - 3/23/23 pt w/ episode of black tarry stool (H/H stable and no hemodynamic signs of bleeding),  fecal occult negative and pt cleared by GI for DAPT and restarted 3/24/23  - Continue ASA 81mg, plavix 75mg qd  - PLAN: pending further and infectious work up to determine date of LHC. LHC at [place] w/ oLM 30% and RCA 99% that is not amenable to intervention.    - TTE 3/1/23 @ [facility]:  mild LVH, LVEF 25-30%, severe global wall motion dysfxn, mild LA dilation, mild RV dilation, mildly calcified leaflets, mod pHTN  - Severe GIB at [facility] requiring multiple PRBC transfusions  - initially cleared by GI for DAPT and on ASA/Plavix 3/20/23.    - 3/23/23 pt w/ episode of black tarry stool (H/H stable and no hemodynamic signs of bleeding),  fecal occult negative and pt cleared by GI for DAPT and restarted 3/24/23  - Continue ASA 81mg, plavix 75mg qd  - PLAN: pending further and infectious work up to determine date of LHC.

## 2023-03-26 NOTE — PROGRESS NOTE ADULT - SUBJECTIVE AND OBJECTIVE BOX
Interventional Cardiology PA Adult Progress Note    Subjective Assessment: Pt seen and evaluated at bedside this am. Pt endorsing    ROS negative except for subjective and HPI  	  MEDICATIONS:  hydrALAZINE 50 milliGRAM(s) Oral every 8 hours  isosorbide   dinitrate Tablet (ISORDIL) 20 milliGRAM(s) Oral three times a day  losartan 100 milliGRAM(s) Oral daily  metoprolol succinate ER 25 milliGRAM(s) Oral daily    meropenem  IVPB      meropenem  IVPB 500 milliGRAM(s) IV Intermittent every 24 hours      acetaminophen     Tablet .. 650 milliGRAM(s) Oral every 6 hours PRN    pantoprazole  Injectable 40 milliGRAM(s) IV Push every 12 hours  polyethylene glycol 3350 17 Gram(s) Oral daily    atorvastatin 40 milliGRAM(s) Oral at bedtime  dextrose 50% Injectable 12.5 Gram(s) IV Push once  dextrose 50% Injectable 25 Gram(s) IV Push once  dextrose Oral Gel 15 Gram(s) Oral once PRN  insulin lispro (ADMELOG) corrective regimen sliding scale   SubCutaneous every 6 hours    aspirin enteric coated 81 milliGRAM(s) Oral daily  clopidogrel Tablet 75 milliGRAM(s) Oral daily  dextrose 5%. 1000 milliLiter(s) IV Continuous <Continuous>  dextrose 5%. 1000 milliLiter(s) IV Continuous <Continuous>  ferrous    sulfate 325 milliGRAM(s) Oral daily  sodium chloride 0.65% Nasal 1 Spray(s) Both Nostrils every 4 hours PRN  tacrolimus 2 milliGRAM(s) Oral every 12 hours  tamsulosin 0.8 milliGRAM(s) Oral at bedtime      	    [PHYSICAL EXAM:  TELEMETRY:  T(C): 36.8 (03-26-23 @ 06:34), Max: 38.8 (03-25-23 @ 09:45)  HR: 51 (03-26-23 @ 07:20) (51 - 118)  BP: 119/49 (03-26-23 @ 07:20) (87/35 - 159/74)  RR: 16 (03-26-23 @ 07:20) (16 - 19)  SpO2: 100% (03-26-23 @ 07:20) (93% - 100%)  Wt(kg): --  I&O's Summary    25 Mar 2023 07:01  -  26 Mar 2023 07:00  --------------------------------------------------------  IN: 500 mL / OUT: 3200 mL / NET: -2700 mL        Moody:  Central/PICC/Mid Line:                                         Appearance: Normal	  HEENT:   Normal oral mucosa, PERRL, EOMI	  Neck: Supple, + JVD/ - JVD; Carotid Bruit   Cardiovascular: Normal S1 S2, No JVD, No murmurs,   Respiratory: Lungs clear to auscultation/Decreased Breath Sounds/No Rales, Rhonchi, Wheezing	  Gastrointestinal:  Soft, Non-tender, + BS	  Skin: No rashes, No ecchymoses, No cyanosis  Extremities: Normal range of motion, No clubbing, cyanosis or edema  Vascular: Peripheral pulses palpable 2+ bilaterally  Neurologic: Non-focal  Psychiatry: A & O x 3, Mood & affect appropriate      	    ECG:  	  RADIOLOGY:   DIAGNOSTIC TESTING:  [ ] Echocardiogram:  [ ]  Catheterization:  [ ] Stress Test:    [ ] JOSY  OTHER: 	    LABS:	 	  CARDIAC MARKERS:                                  9.1    4.34  )-----------( 167      ( 26 Mar 2023 06:43 )             29.4     03-26    137  |  101  |  7   ----------------------------<  86  4.2   |  28  |  3.54<H>    Ca    7.6<L>      26 Mar 2023 06:43  Mg     1.9     03-26      proBNP:   Lipid Profile:   HgA1c:   TSH:       ASSESSMENT/PLAN: 	        DVT ppx:  Dispo:     Interventional Cardiology PA Adult Progress Note    Subjective Assessment: Pt seen and evaluated at bedside this am. Pt endorsing feeling tired and diffuse vague chest discomfort but comfortable appearing and fell back asleep. Denies SOB, N/V/D, melena.     ROS negative except for subjective and HPI  	  MEDICATIONS:  hydrALAZINE 50 milliGRAM(s) Oral every 8 hours  isosorbide   dinitrate Tablet (ISORDIL) 20 milliGRAM(s) Oral three times a day  losartan 100 milliGRAM(s) Oral daily  metoprolol succinate ER 25 milliGRAM(s) Oral daily    meropenem  IVPB      meropenem  IVPB 500 milliGRAM(s) IV Intermittent every 24 hours      acetaminophen     Tablet .. 650 milliGRAM(s) Oral every 6 hours PRN    pantoprazole  Injectable 40 milliGRAM(s) IV Push every 12 hours  polyethylene glycol 3350 17 Gram(s) Oral daily    atorvastatin 40 milliGRAM(s) Oral at bedtime  dextrose 50% Injectable 12.5 Gram(s) IV Push once  dextrose 50% Injectable 25 Gram(s) IV Push once  dextrose Oral Gel 15 Gram(s) Oral once PRN  insulin lispro (ADMELOG) corrective regimen sliding scale   SubCutaneous every 6 hours    aspirin enteric coated 81 milliGRAM(s) Oral daily  clopidogrel Tablet 75 milliGRAM(s) Oral daily  dextrose 5%. 1000 milliLiter(s) IV Continuous <Continuous>  dextrose 5%. 1000 milliLiter(s) IV Continuous <Continuous>  ferrous    sulfate 325 milliGRAM(s) Oral daily  sodium chloride 0.65% Nasal 1 Spray(s) Both Nostrils every 4 hours PRN  tacrolimus 2 milliGRAM(s) Oral every 12 hours  tamsulosin 0.8 milliGRAM(s) Oral at bedtime      	    [PHYSICAL EXAM:  TELEMETRY:  T(C): 36.8 (03-26-23 @ 06:34), Max: 38.8 (03-25-23 @ 09:45)  HR: 51 (03-26-23 @ 07:20) (51 - 118)  BP: 119/49 (03-26-23 @ 07:20) (87/35 - 159/74)  RR: 16 (03-26-23 @ 07:20) (16 - 19)  SpO2: 100% (03-26-23 @ 07:20) (93% - 100%)  Wt(kg): --  I&O's Summary    25 Mar 2023 07:01  -  26 Mar 2023 07:00  --------------------------------------------------------  IN: 500 mL / OUT: 3200 mL / NET: -2700 mL        Moody:  Central/PICC/Mid Line:                                         Appearance: Normal	  HEENT:   Normal oral mucosa, PERRL, EOMI	  Neck: Supple,  - JVD; Carotid Bruit   Cardiovascular: Normal S1 S2, No JVD, No murmurs  Respiratory: Lungs clear to auscultation, No Rales, Rhonchi, Wheezing	  Gastrointestinal:  Soft, Non-tender, + BS	  Skin: No rashes, No ecchymoses, No cyanosis  Extremities: Normal range of motion, No clubbing, cyanosis or edema. R AKA, MARILEE AVF  Vascular: Peripheral pulses palpable 2+ bilaterally  Neurologic: Non-focal  Psychiatry: A & O x 3, Mood & affect appropriate      	    ECG:  	  RADIOLOGY:   DIAGNOSTIC TESTING:  [ ] Echocardiogram:  [ ]  Catheterization:  [ ] Stress Test:    [ ] JOSY  OTHER: 	    LABS:	 	  CARDIAC MARKERS:                                  9.1    4.34  )-----------( 167      ( 26 Mar 2023 06:43 )             29.4     03-26    137  |  101  |  7   ----------------------------<  86  4.2   |  28  |  3.54<H>    Ca    7.6<L>      26 Mar 2023 06:43  Mg     1.9     03-26      proBNP:   Lipid Profile:   HgA1c:   TSH:       ASSESSMENT/PLAN: 	        DVT ppx:  Dispo:

## 2023-03-26 NOTE — PROGRESS NOTE ADULT - SUBJECTIVE AND OBJECTIVE BOX
ICU Vital Signs Last 24 Hrs  T(C): 37.8 (26 Mar 2023 10:59), Max: 38.4 (25 Mar 2023 18:51)  T(F): 100 (26 Mar 2023 10:59), Max: 101.2 (25 Mar 2023 18:51)  HR: 57 (26 Mar 2023 12:19) (51 - 82)  BP: 118/49 (26 Mar 2023 12:19) (96/45 - 140/87)  BP(mean): 71 (26 Mar 2023 07:20) (62 - 71)  ABP: --  ABP(mean): --  RR: 18 (26 Mar 2023 12:19) (16 - 19)  SpO2: 100% (26 Mar 2023 12:19) (93% - 100%)    O2 Parameters below as of 26 Mar 2023 12:19  Patient On (Oxygen Delivery Method): nasal cannula  O2 Flow (L/min): 2    MEDICATIONS  (STANDING):  aspirin enteric coated 81 milliGRAM(s) Oral daily  atorvastatin 40 milliGRAM(s) Oral at bedtime  clopidogrel Tablet 75 milliGRAM(s) Oral daily  dextrose 5%. 1000 milliLiter(s) (50 mL/Hr) IV Continuous <Continuous>  dextrose 5%. 1000 milliLiter(s) (100 mL/Hr) IV Continuous <Continuous>  dextrose 50% Injectable 25 Gram(s) IV Push once  dextrose 50% Injectable 12.5 Gram(s) IV Push once  ferrous    sulfate 325 milliGRAM(s) Oral daily  hydrALAZINE 50 milliGRAM(s) Oral every 8 hours  insulin lispro (ADMELOG) corrective regimen sliding scale   SubCutaneous every 6 hours  isosorbide   dinitrate Tablet (ISORDIL) 20 milliGRAM(s) Oral three times a day  losartan 100 milliGRAM(s) Oral every 24 hours  meropenem  IVPB      meropenem  IVPB 500 milliGRAM(s) IV Intermittent every 24 hours  metoprolol succinate ER 25 milliGRAM(s) Oral daily  pantoprazole  Injectable 40 milliGRAM(s) IV Push every 12 hours  polyethylene glycol 3350 17 Gram(s) Oral daily  tacrolimus 2 milliGRAM(s) Oral every 12 hours  tamsulosin 0.8 milliGRAM(s) Oral at bedtime    MEDICATIONS  (PRN):  acetaminophen     Tablet .. 650 milliGRAM(s) Oral every 6 hours PRN Mild Pain (1 - 3), Moderate Pain (4 - 6)  dextrose Oral Gel 15 Gram(s) Oral once PRN Blood Glucose LESS THAN 70 milliGRAM(s)/deciliter  sodium chloride 0.65% Nasal 1 Spray(s) Both Nostrils every 4 hours PRN Nasal Congestion

## 2023-03-26 NOTE — PROGRESS NOTE ADULT - SUBJECTIVE AND OBJECTIVE BOX
INTERVAL HPI/OVERNIGHT EVENTS:  Ongoing fevers.  Felt fever and rigor last night and this morning.  Has ongoing cough with brown sputum.  Has diarrhea - had 4 small watery BMs today.  Per Day  Sally, has been going on for >4 days, per RN, knew of yesterday and today.    CONSTITUTIONAL:  As above  EYES:  No photophobia or visual changes  CARDIOVASCULAR:  No chest pain  RESPIRATORY:  No cough, wheezing, or SOB   GASTROINTESTINAL:  No nausea, vomiting, constipation, or abdominal pain  GENITOURINARY:  No frequency, urgency, dysuria or hematuria  NEUROLOGIC:  No headache, lightheadedness      ANTIBIOTICS/RELEVANT:    Vancomycin 3/21, 3/23, 3/25  Meropenem 500 mg IV q24h (3/21-present)      Vital Signs Last 24 Hrs  T(C): 37.7 (26 Mar 2023 13:59), Max: 38.4 (25 Mar 2023 18:51)  T(F): 99.9 (26 Mar 2023 13:59), Max: 101.2 (25 Mar 2023 18:51)  HR: 60 (26 Mar 2023 15:39) (51 - 82)  BP: 129/56 (26 Mar 2023 15:39) (96/45 - 140/87)  BP(mean): 71 (26 Mar 2023 07:20) (62 - 71)  RR: 18 (26 Mar 2023 15:39) (16 - 19)  SpO2: 94% (26 Mar 2023 15:39) (93% - 100%)    Parameters below as of 26 Mar 2023 15:39  Patient On (Oxygen Delivery Method): nasal cannula  O2 Flow (L/min): 2      PHYSICAL EXAM:  Constitutional:  Alert  Eyes:  Sclerae anicteric, conjunctivae clear, PERRL  Ear/Nose/Throat:  No nasal exudate or sinus tenderness;  No buccal mucosal lesions, no pharyngeal erythema or exudate	  Neck:  Supple, no adenopathy  Respiratory:  Clear bilaterally  Cardiovascular:  RRR, S1S2, no murmur appreciated  Gastrointestinal:  Symmetric, normoactive BS, soft, NT, no masses, guarding or rebound.  No HSM  Extremities:  No edema.  s/p R AKA      LABS:                        9.1    4.34  )-----------( 167      ( 26 Mar 2023 06:43 )             29.4         03-26    137  |  101  |  7   ----------------------------<  86  4.2   |  28  |  3.54<H>    Ca    7.6<L>      26 Mar 2023 06:43  Mg     1.9     03-26            MICROBIOLOGY:    Blood cultures:  3/21 X 1 - NG;  3/22 X 1 - NGTD    Urine culture 3/21 - >10^ Klebsiella pneumoniae (pansusceptible except amp)    3/21 rRVP ND      RADIOLOGY & ADDITIONAL STUDIES:

## 2023-03-26 NOTE — PROGRESS NOTE ADULT - ATTENDING COMMENTS
63yo M PMHx HTN, HLD, DM, CAD(s/p cath years ago, RCA 99%, never intervened on), HFrEF 25-30%, ESRD s/p failed kidney transplant and required HD x 3yrs (last HD 3/16 via Left Arm AVF; HD TTS), Right AKA initially presented to [facility] 2/27 for respiratory distress after a dialysis, found to be septic, h/c complicated by AHRF requiring intubation for 24hr followed by VT arrest, h/c the complicated by massive LGIB (rectal ulcer seen on colonoscopy) requiring 7u pRBC, 1u FFP and 1u PLT. Transferred to [facility] (3/17) for ICD placement 2/2 Vtach and cardiac cath. Pt noted lassed loose stool with BRBPR, GI and surgery consulted. Flex Sigc ( 3/17) found prior hemoclip in the sigmoid colon, localized ulceration and erosive with no bleeding noted in rectum suggestive of solitary ulcer syndrome. Medicine consulted for comanagement.     sleeping most of the time, answer questions and would go back to sleep.  reported left calf pain ( no erythema   ate some this morning.   wearing head, oxygen nc, RRR, good air entry, AVF on left arm looks clean, with good thrill.  right AKA, left calf- reported pain.      -fever -  #LGIB  #SANTOS  #Solitary ulcer syndrome  - hx coffee ground emesis @ [facility]; s/p EGD/colonoscopy showing rectal ulcer  - 3/17AM pt passed loose BM along with large amount of BRBPR  - per gen surgery no acute surgical intervention   - per GI:      - increased Protonix 40 mg IVP BID      - PIV large bore x2      - s/p flex sig(3/17)  for rectal ulcers, found Evidence of prior hemoclip in the sigmoid colon. Localized ulceration and erosive with no bleeding were noted in the rectum, suggestive of solitary ulcer syndrome           - Avoid anal digitation and enemas           - High-fiber diet and optimize laxatives to avoid constipation using miralax and dulcolax           - No absolute GI contraindications to anticoag/antiplt therapy           - Repeat endoscopic evaluation recommended in 3 months  - Active T&S  - Hgb  stable-  - Transfuse for hgb <8 (active CAD)    #CAD   - Previous cath at [facility] showing oLM 30% and RCA 99% that is not amenable to intervention.  - TTE 3/1/23 at [facility]: mild LVH, EF 25-30%, severe global wall motion dysfunction, mildly dilated LA, RV mildly dilated, all leaflets mild calcified, mod pHTN  - plan for cardiac cath -deferred due to concern for GI bleeding.    #H/O ventricular tachycardia   - Vtach arrest @[facility]  - fu EP consult for ICD placement- pending infection work up.    # Fever -still no source- t max 100.7  - PET/CT scan done pending read.  - given immunocompromised/hx Kidney transplant    - ID consult appreciated, c/w Vanco and Meropenem,    would need to rule out infection prior to icd placement.    #ESRD on dialysis/ sp renal transplant.   - dialysis Tues, Thurs, Sat  - last received  Thurs 3/23  - f/u nephro recs    #HTN  - c/w home meds. Amlodipine 10mg qd, Losartan 100mg qd, Hydralazine 50m TID, Isordil 20mg TID with holding parameter    #HLD   - c/w Lipitor 40mg qd    #DM  - no meds per [place], obtain collaterals for med recs prior [place] stay  - mISS while inpt, goal -180   - A1c 5.9 on admission    DVT PPX: SCDs for now iso GIB    Med consult team continues to follow with you. d/w  cardiology    - pending PET/CT read. consider LE duplex sonogram of left leg ( reported calf pain ) 61yo M PMHx HTN, HLD, DM, CAD(s/p cath years ago, RCA 99%, never intervened on), HFrEF 25-30%, ESRD s/p failed kidney transplant and required HD x 3yrs (last HD 3/16 via Left Arm AVF; HD TTS), Right AKA initially presented to [facility] 2/27 for respiratory distress after a dialysis, found to be septic, h/c complicated by AHRF requiring intubation for 24hr followed by VT arrest, h/c the complicated by massive LGIB (rectal ulcer seen on colonoscopy) requiring 7u pRBC, 1u FFP and 1u PLT. Transferred to [facility] (3/17) for ICD placement 2/2 Vtach and cardiac cath. Pt noted lassed loose stool with BRBPR, GI and surgery consulted. Flex Sigc ( 3/17) found prior hemoclip in the sigmoid colon, localized ulceration and erosive with no bleeding noted in rectum suggestive of solitary ulcer syndrome. Medicine consulted for comanagement.     sleeping most of the time, answer questions and would go back to sleep.  reported left calf pain ( no erythema   ate some this morning.   wearing head, oxygen nc, RRR, good air entry, AVF on left arm looks clean, with good thrill.  right AKA, left calf- reported pain.      -fever -  #LGIB  #SANTOS  #Solitary ulcer syndrome  - hx coffee ground emesis @ [facility]; s/p EGD/colonoscopy showing rectal ulcer  - 3/17AM pt passed loose BM along with large amount of BRBPR  - per gen surgery no acute surgical intervention   - per GI:      - increased Protonix 40 mg IVP BID      - PIV large bore x2      - s/p flex sig(3/17)  for rectal ulcers, found Evidence of prior hemoclip in the sigmoid colon. Localized ulceration and erosive with no bleeding were noted in the rectum, suggestive of solitary ulcer syndrome           - Avoid anal digitation and enemas           - High-fiber diet and optimize laxatives to avoid constipation using miralax and dulcolax           - No absolute GI contraindications to anticoag/antiplt therapy           - Repeat endoscopic evaluation recommended in 3 months  - Active T&S  - Hgb  stable-  - Transfuse for hgb <8 (active CAD)    #CAD   - Previous cath at [facility] showing oLM 30% and RCA 99% that is not amenable to intervention.  - TTE 3/1/23 at [facility]: mild LVH, EF 25-30%, severe global wall motion dysfunction, mildly dilated LA, RV mildly dilated, all leaflets mild calcified, mod pHTN  - plan for cardiac cath -deferred due to concern for GI bleeding.    #H/O ventricular tachycardia   - Vtach arrest @[facility]  - fu EP consult for ICD placement- pending infection work up.    # Fever -still no source- t max 100.7  - PET/CT scan done pending read.  - given immunocompromised/hx Kidney transplant    - ID consult appreciated, c/w Vanco and Meropenem,    would need to rule out infection prior to icd placement.    #ESRD on dialysis/ sp renal transplant.   - dialysis Tues, Thurs, Sat  - last received  Thurs 3/23  - f/u nephro recs    #HTN  - c/w home meds. Amlodipine 10mg qd, Losartan 100mg qd, Hydralazine 50m TID, Isordil 20mg TID with holding parameter    #HLD   - c/w Lipitor 40mg qd    #DM  - no meds per [place], obtain collaterals for med recs prior [place] stay  - mISS while inpt, goal -180   - A1c 5.9 on admission    DVT PPX: SCDs for now iso GIB    Med consult team continues to follow with you. d/w  cardiology    - pending PET/CT read. consider LE duplex sonogram of left leg ( reported calf pain ) 61yo M PMHx HTN, HLD, DM, CAD(s/p cath years ago, RCA 99%, never intervened on), HFrEF 25-30%, ESRD s/p failed kidney transplant and required HD x 3yrs (last HD 3/16 via Left Arm AVF; HD TTS), Right AKA initially presented to [facility] 2/27 for respiratory distress after a dialysis, found to be septic, h/c complicated by AHRF requiring intubation for 24hr followed by VT arrest, h/c the complicated by massive LGIB (rectal ulcer seen on colonoscopy) requiring 7u pRBC, 1u FFP and 1u PLT. Transferred to [facility] (3/17) for ICD placement 2/2 Vtach and cardiac cath. Pt noted lassed loose stool with BRBPR, GI and surgery consulted. Flex Sigc ( 3/17) found prior hemoclip in the sigmoid colon, localized ulceration and erosive with no bleeding noted in rectum suggestive of solitary ulcer syndrome. Medicine consulted for comanagement.     sleeping most of the time, answer questions and would go back to sleep.  reported left calf pain ( no erythema   ate some this morning.   wearing head, oxygen nc, RRR, good air entry, AVF on left arm looks clean, with good thrill.  right AKA, left calf- reported pain.      -fever -  #LGIB  #SANTOS  #Solitary ulcer syndrome  - hx coffee ground emesis @ [facility]; s/p EGD/colonoscopy showing rectal ulcer  - 3/17AM pt passed loose BM along with large amount of BRBPR  - per gen surgery no acute surgical intervention   - per GI:      - increased Protonix 40 mg IVP BID      - PIV large bore x2      - s/p flex sig(3/17)  for rectal ulcers, found Evidence of prior hemoclip in the sigmoid colon. Localized ulceration and erosive with no bleeding were noted in the rectum, suggestive of solitary ulcer syndrome           - Avoid anal digitation and enemas           - High-fiber diet and optimize laxatives to avoid constipation using miralax and dulcolax           - No absolute GI contraindications to anticoag/antiplt therapy           - Repeat endoscopic evaluation recommended in 3 months  - Active T&S  - Hgb  stable-  - Transfuse for hgb <8 (active CAD)    #CAD   - Previous cath at [facility] showing oLM 30% and RCA 99% that is not amenable to intervention.  - TTE 3/1/23 at [facility]: mild LVH, EF 25-30%, severe global wall motion dysfunction, mildly dilated LA, RV mildly dilated, all leaflets mild calcified, mod pHTN  - plan for cardiac cath -deferred due to concern for GI bleeding.    #H/O ventricular tachycardia   - Vtach arrest @[facility]  - fu EP consult for ICD placement- pending infection work up.    # Fever -still no source- t max 100.7  - PET/CT scan done pending read.  - given immunocompromised/hx Kidney transplant    - ID consult appreciated, c/w Vanco and Meropenem,    would need to rule out infection prior to icd placement.    #ESRD on dialysis/ sp renal transplant.   - dialysis Tues, Thurs, Sat  - last received  Thurs 3/23  - f/u nephro recs    #HTN  - c/w home meds. Amlodipine 10mg qd, Losartan 100mg qd, Hydralazine 50m TID, Isordil 20mg TID with holding parameter    #HLD   - c/w Lipitor 40mg qd    #DM  - no meds per [place], obtain collaterals for med recs prior [place] stay  - mISS while inpt, goal -180   - A1c 5.9 on admission    DVT PPX: SCDs for now iso GIB    Med consult team continues to follow with you. d/w  cardiology    - pending PET/CT read. consider LE duplex sonogram of left leg ( reported calf pain )

## 2023-03-26 NOTE — PROGRESS NOTE ADULT - PROBLEM SELECTOR PLAN 4
@ [facility], severe GIB w/ Hgb down to 3.5 received total of 7u PRBCs.    - 3/17 AM pt passed loose BM along w/ large amount of BRBPR.    - s/p Flex sigmoidoscopy – Localized ulceration and erosion w/ no bleeding in rectum, suggestive of solitary ulcer syndrome; evidence of prior hemoclip was found in sigmoid colon.    - Avoid anal digitation and enemas  - Bowel regimen w/ Miralax and Dulcolax to avoid constipation  - now cleared by GI for DAPT  - Transfusion goal Hgb < 8.0 @ [facility], severe GIB w/ Hgb down to 3.5 received total of 7u PRBCs.    - 3/17 AM pt passed loose BM along w/ large amount of BRBPR.    - s/p Flex sigmoidoscopy – Localized ulceration and erosion w/ no bleeding in rectum, suggestive of solitary ulcer syndrome; evidence of prior hemoclip was found in sigmoid colon.    - Avoid anal digitation and enemas  - Bowel regimen w/ Miralax and Dulcolax to avoid constipation  - now cleared by GI for DAPT  - Transfusion goal Hgb < 8.0 @ [facility], severe GIB w/ Hgb down to 3.5 received total of 7u PRBCs.    - 3/17 AM pt passed loose BM along w/ large amount of BRBPR.    - s/p Flex sigmoidoscopy – Localized ulceration and erosion w/ no bleeding in rectum, suggestive of solitary ulcer syndrome; evidence of prior hemoclip was found in sigmoid colon.    - Avoid anal digitation and enemas  - Bowel regimen w/ Miralax and Dulcolax to avoid constipation  - now cleared by GI for DAPT  - Transfusion goal Hgb < 8.0 @ [facility], severe GIB w/ Hgb down to 3.5 received total of 7u PRBCs.    - 3/17 AM pt passed loose BM along w/ large amount of BRBPR.    - s/p Flex sigmoidoscopy – Localized ulceration and erosion w/ no bleeding in rectum, suggestive of solitary ulcer syndrome; evidence of prior hemoclip was found in sigmoid colon.    - Avoid anal digitation and enemas  - Bowel regimen w/ Miralax and Dulcolax to avoid constipation  - Fecal occult negative 3/24 and cleared by GI for DAPT  - Transfusion goal Hgb < 8.0 @ [facility], severe GIB w/ Hgb down to 3.5 received total of 7u PRBCs.    - 3/17 AM pt passed loose BM along w/ large amount of BRBPR.    - s/p Flex sigmoidoscopy – Localized ulceration and erosion w/ no bleeding in rectum, suggestive of solitary ulcer syndrome; evidence of prior hemoclip was found in sigmoid colon.    - Avoid anal digitation and enemas  - Bowel regimen w/ Miralax and Dulcolax to avoid constipation  - Fecal occult negative 3/24 and cleared by GI for DAPT  - Transfusion goal Hgb < 8.0 @ [facility], severe GIB w/ Hgb down to 3.5 received total of 7u PRBCs.    - 3/17 AM pt passed loose BM along w/ large amount of BRBPR.    - s/p Flex sigmoidoscopy – Localized ulceration and erosion w/ no bleeding in rectum, suggestive of solitary ulcer syndrome; evidence of prior hemoclip was found in sigmoid colon.    - Avoid anal digitation and enemas  - Bowel regimen w/ Miralax and Dulcolax to avoid constipation  - Fecal occult negative 3/24 and cleared by GI for DAPT  - Transfusion goal Hgb < 8.0

## 2023-03-26 NOTE — PROGRESS NOTE ADULT - PROBLEM SELECTOR PLAN 5
LVEF 25-30% (likely ischemic).    - euvolemic on exam.    - c/w Hydralazine 50mg q8hrs, Isordil 20mg TID, toprol 25mg qd, Losartan 100mg QD.

## 2023-03-26 NOTE — PROGRESS NOTE ADULT - PROBLEM SELECTOR PLAN 7
- HD T/Th/Sat.    - NEXT HD 3/28  - **Needs vancomycin trough prior to HD sessions so that Vanc can be dosed and given AFTER HD sessions.

## 2023-03-26 NOTE — PROGRESS NOTE ADULT - PROBLEM SELECTOR PLAN 1
- Pt w/ ongoing fevers this admission, infectious work up ongoing.    - Infectious work up ongoing; no clear infectious source identified at this time.    - BCx NGTD; UCx (+) for Gram (-) rods (pt on Meropenem still having fevers).    - Infectious disease following – continue to follow recs.    - CONT: Meropenem 500mg IV QD (**after HD on HD days), Vancomycin dose after HD sessions (based on pre-HD session Vanc troughs per direction of ID).    - Vanc trough 3/25/23: 17.9, given vanco 750mg/hr after dialysis     - PLAN: ID following, recommend Gallium scan -- **NO Gallium available, and per radiology in setting of multiple blood transfusions Gallium scan quality is decreased – PET scan 3/25, f/u results - Pt w/ ongoing fevers this admission, infectious work up ongoing w/ no clear infectious source identified at this time.    - BCx NGTD; UCx (+) for Gram (-) rods (pt on Meropenem still having fevers).    - ID following   - CONT: Meropenem 500mg IV QD (**after HD on HD days), Vancomycin dose after HD sessions (based on pre-HD session Vanc troughs per ID).    - Vanc trough 3/25/23: 17.9, s/p vanco 750mg/hr post HD 3/25/23  - PLAN: ID recommend Gallium scan -- **NO Gallium available, and per radiology in setting of multiple blood transfusions Gallium scan quality is decreased.  PET scan 3/25, f/u results - Pt w/ ongoing fevers this admission, infectious work up ongoing w/ no clear infectious source identified at this time.    - BCx NGTD; UCx (+) for Gram (-) rods (pt on Meropenem still having fevers).    - ID following   - CONT: Meropenem 500mg IV QD QHS  - Vancomycin after HD sessions (based on pre-HD session Vanc troughs. If 10-17.9, give 750 mg, if 18-25, give 500 mg)   - Vanc trough 3/25/23: 17.9, s/p vanco 750mg/hr post HD 3/25/23  - f/u BK virus result   - PLAN: PET scan 3/25, f/u results    #Diarrhea  -3 brown loose BM's today 3/26, discontinued laxative.   -Continue to monitor, if persists or worsens consult diarrhea tree and send c.diff if appropriate.

## 2023-03-27 LAB
ANION GAP SERPL CALC-SCNC: 8 MMOL/L — SIGNIFICANT CHANGE UP (ref 5–17)
BASOPHILS # BLD AUTO: 0.05 K/UL — SIGNIFICANT CHANGE UP (ref 0–0.2)
BASOPHILS NFR BLD AUTO: 1.1 % — SIGNIFICANT CHANGE UP (ref 0–2)
BUN SERPL-MCNC: 10 MG/DL — SIGNIFICANT CHANGE UP (ref 7–23)
CALCIUM SERPL-MCNC: 7.7 MG/DL — LOW (ref 8.4–10.5)
CHLORIDE SERPL-SCNC: 97 MMOL/L — SIGNIFICANT CHANGE UP (ref 96–108)
CO2 SERPL-SCNC: 26 MMOL/L — SIGNIFICANT CHANGE UP (ref 22–31)
CREAT SERPL-MCNC: 4.84 MG/DL — HIGH (ref 0.5–1.3)
CULTURE RESULTS: SIGNIFICANT CHANGE UP
EGFR: 13 ML/MIN/1.73M2 — LOW
EOSINOPHIL # BLD AUTO: 0.57 K/UL — HIGH (ref 0–0.5)
EOSINOPHIL NFR BLD AUTO: 12.2 % — HIGH (ref 0–6)
GLUCOSE BLDC GLUCOMTR-MCNC: 82 MG/DL — SIGNIFICANT CHANGE UP (ref 70–99)
GLUCOSE BLDC GLUCOMTR-MCNC: 91 MG/DL — SIGNIFICANT CHANGE UP (ref 70–99)
GLUCOSE BLDC GLUCOMTR-MCNC: 92 MG/DL — SIGNIFICANT CHANGE UP (ref 70–99)
GLUCOSE BLDC GLUCOMTR-MCNC: 93 MG/DL — SIGNIFICANT CHANGE UP (ref 70–99)
GLUCOSE BLDC GLUCOMTR-MCNC: 97 MG/DL — SIGNIFICANT CHANGE UP (ref 70–99)
GLUCOSE SERPL-MCNC: 90 MG/DL — SIGNIFICANT CHANGE UP (ref 70–99)
HCT VFR BLD CALC: 29 % — LOW (ref 39–50)
HGB BLD-MCNC: 9.1 G/DL — LOW (ref 13–17)
IMM GRANULOCYTES NFR BLD AUTO: 0.6 % — SIGNIFICANT CHANGE UP (ref 0–0.9)
LYMPHOCYTES # BLD AUTO: 1.26 K/UL — SIGNIFICANT CHANGE UP (ref 1–3.3)
LYMPHOCYTES # BLD AUTO: 27 % — SIGNIFICANT CHANGE UP (ref 13–44)
MAGNESIUM SERPL-MCNC: 1.9 MG/DL — SIGNIFICANT CHANGE UP (ref 1.6–2.6)
MCHC RBC-ENTMCNC: 28.6 PG — SIGNIFICANT CHANGE UP (ref 27–34)
MCHC RBC-ENTMCNC: 31.4 GM/DL — LOW (ref 32–36)
MCV RBC AUTO: 91.2 FL — SIGNIFICANT CHANGE UP (ref 80–100)
MONOCYTES # BLD AUTO: 0.68 K/UL — SIGNIFICANT CHANGE UP (ref 0–0.9)
MONOCYTES NFR BLD AUTO: 14.6 % — HIGH (ref 2–14)
NEUTROPHILS # BLD AUTO: 2.07 K/UL — SIGNIFICANT CHANGE UP (ref 1.8–7.4)
NEUTROPHILS NFR BLD AUTO: 44.5 % — SIGNIFICANT CHANGE UP (ref 43–77)
NRBC # BLD: 0 /100 WBCS — SIGNIFICANT CHANGE UP (ref 0–0)
PLATELET # BLD AUTO: 175 K/UL — SIGNIFICANT CHANGE UP (ref 150–400)
POTASSIUM SERPL-MCNC: 3.8 MMOL/L — SIGNIFICANT CHANGE UP (ref 3.5–5.3)
POTASSIUM SERPL-SCNC: 3.8 MMOL/L — SIGNIFICANT CHANGE UP (ref 3.5–5.3)
RBC # BLD: 3.18 M/UL — LOW (ref 4.2–5.8)
RBC # FLD: 14.8 % — HIGH (ref 10.3–14.5)
SODIUM SERPL-SCNC: 131 MMOL/L — LOW (ref 135–145)
SPECIMEN SOURCE: SIGNIFICANT CHANGE UP
WBC # BLD: 4.66 K/UL — SIGNIFICANT CHANGE UP (ref 3.8–10.5)
WBC # FLD AUTO: 4.66 K/UL — SIGNIFICANT CHANGE UP (ref 3.8–10.5)

## 2023-03-27 PROCEDURE — 99232 SBSQ HOSP IP/OBS MODERATE 35: CPT | Mod: GC

## 2023-03-27 PROCEDURE — 99232 SBSQ HOSP IP/OBS MODERATE 35: CPT

## 2023-03-27 RX ORDER — CEFTRIAXONE 500 MG/1
1000 INJECTION, POWDER, FOR SOLUTION INTRAMUSCULAR; INTRAVENOUS EVERY 24 HOURS
Refills: 0 | Status: DISCONTINUED | OUTPATIENT
Start: 2023-03-27 | End: 2023-03-27

## 2023-03-27 RX ORDER — CEFTRIAXONE 500 MG/1
2000 INJECTION, POWDER, FOR SOLUTION INTRAMUSCULAR; INTRAVENOUS EVERY 24 HOURS
Refills: 0 | Status: COMPLETED | OUTPATIENT
Start: 2023-03-27 | End: 2023-04-02

## 2023-03-27 RX ORDER — POTASSIUM CHLORIDE 20 MEQ
20 PACKET (EA) ORAL ONCE
Refills: 0 | Status: COMPLETED | OUTPATIENT
Start: 2023-03-27 | End: 2023-03-27

## 2023-03-27 RX ORDER — MAGNESIUM OXIDE 400 MG ORAL TABLET 241.3 MG
400 TABLET ORAL ONCE
Refills: 0 | Status: COMPLETED | OUTPATIENT
Start: 2023-03-27 | End: 2023-03-27

## 2023-03-27 RX ADMIN — Medication 650 MILLIGRAM(S): at 23:49

## 2023-03-27 RX ADMIN — TACROLIMUS 2 MILLIGRAM(S): 5 CAPSULE ORAL at 17:33

## 2023-03-27 RX ADMIN — ISOSORBIDE DINITRATE 20 MILLIGRAM(S): 5 TABLET ORAL at 11:29

## 2023-03-27 RX ADMIN — Medication 50 MILLIGRAM(S): at 05:52

## 2023-03-27 RX ADMIN — CEFTRIAXONE 100 MILLIGRAM(S): 500 INJECTION, POWDER, FOR SOLUTION INTRAMUSCULAR; INTRAVENOUS at 23:39

## 2023-03-27 RX ADMIN — CLOPIDOGREL BISULFATE 75 MILLIGRAM(S): 75 TABLET, FILM COATED ORAL at 11:29

## 2023-03-27 RX ADMIN — MAGNESIUM OXIDE 400 MG ORAL TABLET 400 MILLIGRAM(S): 241.3 TABLET ORAL at 14:08

## 2023-03-27 RX ADMIN — TACROLIMUS 2 MILLIGRAM(S): 5 CAPSULE ORAL at 07:00

## 2023-03-27 RX ADMIN — Medication 20 MILLIEQUIVALENT(S): at 17:33

## 2023-03-27 RX ADMIN — PANTOPRAZOLE SODIUM 40 MILLIGRAM(S): 20 TABLET, DELAYED RELEASE ORAL at 17:33

## 2023-03-27 RX ADMIN — LOSARTAN POTASSIUM 100 MILLIGRAM(S): 100 TABLET, FILM COATED ORAL at 11:30

## 2023-03-27 RX ADMIN — ISOSORBIDE DINITRATE 20 MILLIGRAM(S): 5 TABLET ORAL at 06:52

## 2023-03-27 RX ADMIN — Medication 50 MILLIGRAM(S): at 22:29

## 2023-03-27 RX ADMIN — Medication 81 MILLIGRAM(S): at 11:29

## 2023-03-27 RX ADMIN — PANTOPRAZOLE SODIUM 40 MILLIGRAM(S): 20 TABLET, DELAYED RELEASE ORAL at 05:53

## 2023-03-27 RX ADMIN — ATORVASTATIN CALCIUM 40 MILLIGRAM(S): 80 TABLET, FILM COATED ORAL at 22:28

## 2023-03-27 RX ADMIN — TAMSULOSIN HYDROCHLORIDE 0.8 MILLIGRAM(S): 0.4 CAPSULE ORAL at 22:28

## 2023-03-27 RX ADMIN — ISOSORBIDE DINITRATE 20 MILLIGRAM(S): 5 TABLET ORAL at 17:33

## 2023-03-27 RX ADMIN — Medication 325 MILLIGRAM(S): at 11:29

## 2023-03-27 RX ADMIN — Medication 50 MILLIGRAM(S): at 14:08

## 2023-03-27 NOTE — ADVANCED PRACTICE NURSE CONSULT - ASSESSMENT
Right buttocks stage 2 measuring 0.5 cm x 0.5 cm x 0.1 cm draining scant serous exudate.   Left heel DTPI measuring 0.3 cm x 0.5 cm. Patient reports site in tender on palpation.   Patient able to turn in bed independently and wearing heel protector on left lower extremity.  Right buttocks stage 2 pressure injury measuring 0.5 cm x 0.5 cm x 0.1 cm draining scant serous exudate.   Left heel DTPI measuring 0.3 cm x 0.5 cm. Patient reports site is tender on palpation.   Patient able to turn in bed independently and wearing heel protector on left lower extremity.

## 2023-03-27 NOTE — PROGRESS NOTE ADULT - PROBLEM SELECTOR PLAN 4
@ [facility], severe GIB w/ Hgb down to 3.5 received total of 7u PRBCs.    - 3/17 AM pt passed loose BM along w/ large amount of BRBPR.    - s/p Flex sigmoidoscopy – Localized ulceration and erosion w/ no bleeding in rectum, suggestive of solitary ulcer syndrome; evidence of prior hemoclip was found in sigmoid colon.    - Avoid anal digitation and enemas  - Bowel regimen w/ Miralax and Dulcolax to avoid constipation  - Fecal occult negative 3/24 and cleared by GI for DAPT  - Transfusion goal Hgb < 8.0 @ [facility], severe GIB w/ Hgb down to 3.5 received total of 7u PRBCs.    - 3/17 AM pt passed loose BM along w/ large amount of BRBPR.    - s/p Flex sigmoidoscopy – Localized ulceration and erosion w/ no bleeding in rectum, suggestive of solitary ulcer syndrome; evidence of prior hemoclip was found in sigmoid colon.    - Avoid anal digitation and enemas  - Bowel regimen w/ Miralax and Dulcolax to avoid constipation  - Fecal occult negative 3/24 and cleared by GI for DAPT  - Transfusion goal Hgb < 8.0 @ [facility], severe GIB w/ Hgb down to 3.5 received total of 7u PRBCs.    - 3/17 AM pt passed loose BM along w/ large amount of BRBPR.    - s/p Flex sigmoidoscopy – Localized ulceration and erosion w/ no bleeding in rectum, suggestive of solitary ulcer syndrome; evidence of prior hemoclip was found in sigmoid colon.    - Avoid anal digitation and enemas  - Bowel regimen w/ Miralax and Dulcolax to avoid constipation  - Fecal occult negative 3/24 and cleared by GI for DAPT  - Transfusion goal Hgb < 8.0

## 2023-03-27 NOTE — PROGRESS NOTE ADULT - ASSESSMENT
62y M PMHx HTN, HLD, DM, ICM (s/p cath years ago, RCA 99%, never intervened on), HFrEF 25-30%, ESRD s/p failed kidney transplant (LUE AVF; HD TTS), R AKA admitted to [facility] 2/27 for respiratory distress and SIRS criteria after HD session, requiring intubation. Pt w/ cardiac arrest, vtach arrest requiring amio and pressors. Extubated 3/2 and course c/b by LGIB with hemoglobin of 3.5 requiring multiple transfusions; EGD/colo and CTA w/o evidence of active bleed and Hgb improved to 11's. Transferred to [facility] 03/17/23 for ICD eval 2/2 Vtach and cardiac cath however patient w/ episode of BRBPR, flex sig showing localized rectal ulcer but no active bleeding. DAPT resumed on 3/20. 3/21 patient febrile to 102F w/ URI symptoms. BCx NGTD with intermittent fevers, on broad spectrum Abx w/ ID following. Melena 3/23/23 w/ Fecal occult negative, GI cleared pt and DAPT resumed 3/24. LHC and ICD pending infectious w/u. PET scan 3/25 revealing multifocal uptake in prostate gland which could be concerning for abscess per ID 62y M PMHx HTN, HLD, DM, ICM (s/p cath years ago, RCA 99%, never intervened on), HFrEF 25-30%, ESRD s/p failed kidney transplant (LUE AVF; HD TTS), R AKA admitted to [facility] 2/27 for respiratory distress and SIRS criteria after HD session, requiring intubation. Pt w/ cardiac arrest, vtach arrest requiring amio and pressors. Extubated 3/2 and course c/b by LGIB with hemoglobin of 3.5 requiring multiple transfusions; EGD/colo and CTA w/o evidence of active bleed and Hgb improved to 11's. Transferred to [facility] 03/17/23 for ICD eval 2/2 Vtach and cardiac cath however patient w/ episode of BRBPR, flex sig showing localized rectal ulcer but no active bleeding. DAPT resumed on 3/20. 3/21 patient febrile to 102F w/ URI symptoms. BCx NGTD with intermittent fevers, on broad spectrum Abx w/ ID following. Melena 3/23/23 w/ Fecal occult negative, GI cleared pt and DAPT resumed 3/24. LHC and ICD pending infectious w/u. PET scan 3/25 revealing multifocal uptake in prostate gland which could be concerning for abscess per ID 62y M PMHx HTN, HLD, DM, ICM (s/p cath years ago, RCA 99%, never intervened on), HFrEF 25-30%, ESRD s/p failed kidney transplant (LUE AVF; HD TTS), R AKA admitted to [facility] 2/27 for respiratory distress and SIRS criteria after HD session, requiring intubation. Pt w/ cardiac arrest, vtach arrest requiring amio and pressors. Extubated 3/2 and course c/b by LGIB with hemoglobin of 3.5 requiring multiple transfusions; EGD/colo and CTA w/o evidence of active bleed and Hgb improved to 11's. Transferred to [facility] 03/17/23 for ICD eval 2/2 Vtach and cardiac cath however patient w/ episode of BRBPR, flex sig showing localized rectal ulcer but no active bleeding. DAPT resumed on 3/20. 3/21 patient febrile to 102F w/ URI symptoms. BCx NGTD with intermittent fevers, on broad spectrum Abx w/ ID following. Melena 3/23/23 w/ Fecal occult negative, GI cleared pt and DAPT resumed 3/24. LHC and ICD pending infectious w/u. PET scan 3/25 revealing multifocal uptake in prostate gland which could be concerning for abscess per ID 62y M PMHx HTN, HLD, DM, ICM (s/p cath years ago, RCA 99%, never intervened on), HFrEF 25-30%, ESRD s/p failed kidney transplant (LUE AVF; HD TTS), R AKA admitted to [facility] 2/27 for respiratory distress and SIRS criteria after HD session, requiring intubation. Pt w/ cardiac arrest, vtach arrest requiring amio and pressors. Extubated 3/2 and course c/b by LGIB with hemoglobin of 3.5 requiring multiple transfusions; EGD/colo and CTA w/o evidence of active bleed and Hgb improved to 11's. Transferred to [facility] 03/17/23 for ICD eval 2/2 Vtach and cardiac cath however patient w/ episode of BRBPR, flex sig showing localized rectal ulcer but no active bleeding. DAPT resumed on 3/20. 3/21 patient febrile to 102F w/ URI symptoms. BCx NGTD with intermittent fevers, on broad spectrum Abx w/ ID following. Melena 3/23/23 w/ Fecal occult negative, GI cleared pt and DAPT resumed 3/24. LHC and ICD pending infectious w/u. PET scan from 3/25 suspicious for prostatitis vs prostate abscess, ID following along for infectious work up.  62y M PMHx HTN, HLD, DM, ICM (s/p cath years ago, RCA 99%, never intervened on), HFrEF 25-30%, ESRD s/p failed kidney transplant (LUE AVF; HD TTS), R AKA admitted to [facility] 2/27 for respiratory distress and SIRS criteria after HD session, requiring intubation. Pt w/ cardiac arrest, vtach arrest requiring amio and pressors. Extubated 3/2 and course c/b by LGIB with hemoglobin of 3.5 requiring multiple transfusions; EGD/colo and CTA w/o evidence of active bleed and Hgb improved to 11's. Transferred to [facility] 03/17/23 for ICD eval 2/2 Vtach and cardiac cath however patient w/ episode of BRBPR, flex sig showing localized rectal ulcer but no active bleeding. DAPT resumed on 3/20. 3/21 patient febrile to 102F w/ URI symptoms. BCx NGTD with intermittent fevers, on broad spectrum Abx w/ ID following. Melena 3/23/23 w/ Fecal occult negative, GI cleared pt and DAPT resumed 3/24. LHC and ICD pending infectious w/u. PET scan from 3/25 suspicious for prostatitis vs prostate abscess, ID following along for infectious work up.  62y M PMHx HTN, HLD, DM, ICM (s/p cath years ago, RCA 99%, never intervened on), HFrEF 25-30%, ESRD s/p failed kidney transplant (LUE AVF; HD TTS), R AKA admitted to [facility] 2/27 for respiratory distress and SIRS criteria after HD session, requiring intubation. Pt w/ cardiac arrest, vtach arrest requiring amio and pressors. Extubated 3/2 and course c/b by LGIB with hemoglobin of 3.5 requiring multiple transfusions; EGD/colo and CTA w/o evidence of active bleed and Hgb improved to 11's. Transferred to [facility] 03/17/23 for ICD eval 2/2 Vtach and cardiac cath however patient w/ episode of BRBPR, flex sig showing localized rectal ulcer but no active bleeding. DAPT resumed on 3/20. 3/21 patient febrile to 102F w/ URI symptoms. BCx NGTD with intermittent fevers, on broad spectrum Abx w/ ID following. Melena 3/23/23 w/ Fecal occult negative, GI cleared pt and DAPT resumed 3/24. LHC and ICD pending infectious w/u. PET scan from 3/25 suspicious for prostatitis vs prostate abscess, ID following along for infectious work up.

## 2023-03-27 NOTE — PROGRESS NOTE ADULT - PROBLEM SELECTOR PLAN 1
- Pt w/ ongoing fevers this admission, infectious work up ongoing w/ no clear infectious source identified at this time.    - BCx NGTD; UCx (+) for Gram (-) rods (pt on Meropenem still having fevers).    - ID following   - CONT: Meropenem 500mg IV QD QHS  - Vancomycin after HD sessions (based on pre-HD session Vanc troughs. If 10-17.9, give 750 mg, if 18-25, give 500 mg)   - Vanc trough 3/25/23: 17.9, s/p vanco 750mg/hr post HD 3/25/23  - f/u BK virus result   - PLAN: PET scan 3/25, f/u results    #Diarrhea  -3 brown loose BM's today 3/26, discontinued laxative.   -Continue to monitor, if persists or worsens consult diarrhea tree and send c.diff if appropriate. - Pt w/ ongoing fevers this admission, infectious work up ongoing w/ no clear infectious source identified at this time.    - BCx NGTD; UCx (+) for Klebsiella Pneumonia   - Mando D/C Meropenem and Vanc and start Ceftriaxone   - f/u BK virus result   - PLAN: PET scan 3/25, significant for multifocal uptake in the prostate gland, which can be seen in the   setting of prostatitis and neoplastic disease  - Follow up PSA levels and CT Pelvis w and w/o     #Diarrhea  -3 brown loose BM's today 3/26, discontinued laxative.   -Continue to monitor, if persists or worsens consult diarrhea tree and send c.diff if appropriate.

## 2023-03-27 NOTE — ADVANCED PRACTICE NURSE CONSULT - RECOMMEDATIONS
Right buttock pressure injury - cleanse with normal saline, apply foam dressing every other day and prn if soiled.   Left heel DTPI - apply Cavilon ski protectant daily. Continue with heel protector to offload heel.   WOCN discussed assessment and recommendations with Rebekah ALCAZAR.  Right buttock pressure injury - cleanse with normal saline, apply foam dressing every other day and prn if soiled.   Left heel DTPI - apply Cavilon ski protectant daily. Continue with heel protector to offload heel.   WOCN discussed assessment and recommendations with Rebekah ALCAZAR and MITCH GARLAND .  Right buttock pressure injury - cleanse with normal saline, apply Triad cream, cover with foam dressing every other day and prn if soiled.   Left heel DTPI - apply Cavilon skin protectant daily. Continue with heel protector to offload heel.   WOCN discussed assessment and recommendations with Rebekah ALCAZAR and MITCH GARLAND .

## 2023-03-27 NOTE — PROGRESS NOTE ADULT - ASSESSMENT
62M PMHx HTN, HLD, DM, CAD (s/p cath years ago, RCA 99%, never intervened on), HFrEF 25-30%, ESRD s/p failed kidney transplant (last HD 3/1 via Left Arm AVF; HD TTS), Right AKA initially presented to [facility] 2/27 for respiratory distress after a dialysis, found to be septic, h/c complicated by AHRF requiring intubation for 24hr followed by VT arrest, h/c the complicated by massive LGIB (rectal ulcer) requiring 7u pRBC, 1u FFP and 1u PLT. Transferred to [facility] for ICD placement 2/2 Vtach and cardiac cath. Medicine consulted for comanagement.     Recommend:    #Fever of Unknown Origin  - Spiked fever of 101.6 on return from HD 3/21  - Unclear source of infection  - Removed right forearm IV because was in place from outside hospital  - Abd U/S with no acute pathology  - CXR without infiltrate  - UA without UTI  - F/u BCx, so far NGTD  - C/w Meropenem and Vanco  - PET scan showing increased uptake in prostate suggesting prostatitis vs. neoplasm    #LGIB  #SANTOS  #Solitary ulcer syndrome  - hx coffee ground emesis @ [facility]; s/p EGD/colonoscopy showing rectal ulcer  - 3/17AM pt passed loose BM along with large amount of BRBPR  - per gen surgery no acute surgical intervention   - GI following, no endoscopy indicated  - Active T&S  - Transfuse for hgb <8 (active CAD)    #H/O ventricular tachycardia   - V[facility] arrest [facility]  - agree with EP consult for ICD placement    #ESRD on dialysis  - for dialysis today  - dialysis Tues, Thurs, Sat  - last received 3/16  - f/u nephro recs  - Electrolytes wnl, k 4.3, phos 1.9  - c/w calcium citrate 667mg TID    #S/P kidney transplant.   - per [place] pt takes Tacrolimus 2mg 2 times /day  - Obtain collateral regarding renal transplant, if indeed failed, what indications patient have for c/w tacrolimus 2mg?  - f/u tacrolimus serum levels  - f/u nephro recs    #DM  - no meds per [place], obtain collaterals for med recs prior [place] stay  - mISS while inpt  - A1c 5.9 on admission 62M PMHx HTN, HLD, DM, CAD (s/p cath years ago, RCA 99%, never intervened on), HFrEF 25-30%, ESRD s/p failed kidney transplant (last HD 3/1 via Left Arm AVF; HD TTS), Right AKA initially presented to [facility] 2/27 for respiratory distress after a dialysis, found to be septic, h/c complicated by AHRF requiring intubation for 24hr followed by VT arrest, h/c the complicated by massive LGIB (rectal ulcer) requiring 7u pRBC, 1u FFP and 1u PLT. Transferred to [facility] for ICD placement 2/2 Vtach and cardiac cath. Medicine consulted for comanagement.     Recommend:    #Fever of Unknown Origin  - Spiked fever of 101.6 on return from HD 3/21  - Unclear source of infection  - Removed right forearm IV because was in place from outside hospital  - Abd U/S with no acute pathology  - CXR without infiltrate  - UA without UTI  - F/u BCx, so far NGTD  - C/w Meropenem and Vanco  - PET scan showing increased uptake in prostate suggesting prostatitis vs. neoplasm    #LGIB  #SANTOS  #Solitary ulcer syndrome  - hx coffee ground emesis @ [facility]; s/p EGD/colonoscopy showing rectal ulcer  - 3/17AM pt passed loose BM along with large amount of BRBPR  - per gen surgery no acute surgical intervention   - GI following, no endoscopy indicated  - Active T&S  - Transfuse for hgb <8 (active CAD)    #H/O ventricular tachycardia   - V[facility]ach arrest [facility]  - agree with EP consult for ICD placement    #ESRD on dialysis  - for dialysis today  - dialysis Tues, Thurs, Sat  - last received 3/16  - f/u nephro recs  - Electrolytes wnl, k 4.3, phos 1.9  - c/w calcium citrate 667mg TID    #S/P kidney transplant.   - per [place] pt takes Tacrolimus 2mg 2 times /day  - Obtain collateral regarding renal transplant, if indeed failed, what indications patient have for c/w tacrolimus 2mg?  - f/u tacrolimus serum levels  - f/u nephro recs    #DM  - no meds per [place], obtain collaterals for med recs prior [place] stay  - mISS while inpt  - A1c 5.9 on admission 62M PMHx HTN, HLD, DM, CAD (s/p cath years ago, RCA 99%, never intervened on), HFrEF 25-30%, ESRD s/p failed kidney transplant (last HD 3/1 via Left Arm AVF; HD TTS), Right AKA initially presented to [facility] 2/27 for respiratory distress after a dialysis, found to be septic, h/c complicated by AHRF requiring intubation for 24hr followed by VT arrest, h/c the complicated by massive LGIB (rectal ulcer) requiring 7u pRBC, 1u FFP and 1u PLT. Transferred to [facility] for ICD placement 2/2 Vtach and cardiac cath. Medicine consulted for comanagement.     Recommend:    #Fever of Unknown Origin  - Spiked fever of 101.6 on return from HD 3/21  - Unclear source of infection  - Removed right forearm IV because was in place from outside hospital  - Abd U/S with no acute pathology  - CXR without infiltrate  - UA without UTI  - F/u BCx, so far NGTD  - C/w Meropenem and Vanco  - PET scan showing increased uptake in prostate suggesting prostatitis vs. neoplasm    #LGIB  #SANOTS  #Solitary ulcer syndrome  - hx coffee ground emesis @ [facility]; s/p EGD/colonoscopy showing rectal ulcer  - 3/17AM pt passed loose BM along with large amount of BRBPR  - per gen surgery no acute surgical intervention   - GI following, no endoscopy indicated  - Active T&S  - Transfuse for hgb <8 (active CAD)    #H/O ventricular tachycardia   - Vtach arrest @[facility]  - agree with EP consult for ICD placement    #ESRD on dialysis  - for dialysis today  - dialysis Tues, Thurs, Sat  - last received 3/16  - f/u nephro recs  - Electrolytes wnl, k 4.3, phos 1.9  - c/w calcium citrate 667mg TID    #S/P kidney transplant.   - per [place] pt takes Tacrolimus 2mg 2 times /day  - Obtain collateral regarding renal transplant, if indeed failed, what indications patient have for c/w tacrolimus 2mg?  - f/u tacrolimus serum levels  - f/u nephro recs    #DM  - no meds per [place], obtain collaterals for med recs prior [place] stay  - mISS while inpt  - A1c 5.9 on admission

## 2023-03-27 NOTE — PROGRESS NOTE ADULT - SUBJECTIVE AND OBJECTIVE BOX
Interventional Cardiology PA Adult Progress Note      Subjective Assessment:  Patient seen and examined at bedside this morning endorsing SOB, cough w/ brown sputum, and loose BMs.   Denies fever, chills, HA, dizziness, CP, palpitations, abdominal pain, nausea, vomiting, hematuria, or BRBPR.        ROS Negative except as per Subjective and HPI  	  MEDICATIONS:  hydrALAZINE 50 milliGRAM(s) Oral every 8 hours  isosorbide   dinitrate Tablet (ISORDIL) 20 milliGRAM(s) Oral three times a day  losartan 100 milliGRAM(s) Oral every 24 hours  metoprolol succinate ER 25 milliGRAM(s) Oral daily  meropenem  IVPB      meropenem  IVPB 500 milliGRAM(s) IV Intermittent every 24 hours  acetaminophen     Tablet .. 650 milliGRAM(s) Oral every 6 hours PRN  pantoprazole  Injectable 40 milliGRAM(s) IV Push every 12 hours  atorvastatin 40 milliGRAM(s) Oral at bedtime  dextrose 50% Injectable 25 Gram(s) IV Push once  dextrose 50% Injectable 12.5 Gram(s) IV Push once  dextrose Oral Gel 15 Gram(s) Oral once PRN  insulin lispro (ADMELOG) corrective regimen sliding scale   SubCutaneous every 6 hours  aspirin enteric coated 81 milliGRAM(s) Oral daily  clopidogrel Tablet 75 milliGRAM(s) Oral daily  dextrose 5%. 1000 milliLiter(s) IV Continuous <Continuous>  dextrose 5%. 1000 milliLiter(s) IV Continuous <Continuous>  ferrous    sulfate 325 milliGRAM(s) Oral daily  magnesium oxide 400 milliGRAM(s) Oral once  potassium chloride    Tablet ER 20 milliEquivalent(s) Oral once  sodium chloride 0.65% Nasal 1 Spray(s) Both Nostrils every 4 hours PRN  tacrolimus 2 milliGRAM(s) Oral every 12 hours  tamsulosin 0.8 milliGRAM(s) Oral at bedtime      	    [PHYSICAL EXAM:  TELEMETRY:  T(C): 37.7 (03-27-23 @ 10:27), Max: 38.3 (03-26-23 @ 22:04)  HR: 54 (03-27-23 @ 11:35) (48 - 60)  BP: 120/50 (03-27-23 @ 11:35) (116/49 - 129/56)  RR: 16 (03-27-23 @ 11:35) (15 - 18)  SpO2: 100% (03-27-23 @ 11:35) (94% - 100%)  Wt(kg): --  I&O's Summary    26 Mar 2023 07:01  -  27 Mar 2023 07:00  --------------------------------------------------------  IN: 850 mL / OUT: 175 mL / NET: 675 mL    27 Mar 2023 07:01  -  27 Mar 2023 12:54  --------------------------------------------------------  IN: 120 mL / OUT: 0 mL / NET: 120 mL                               Appearance: Normal		  Cardiovascular: normal s1, s2 regular rate and rhythm   Respiratory: lungs clear to auscultation   Gastrointestinal:  Soft, Non-tender, + BS	  Skin: No rashes, No ecchymoses, No cyanosis  Vascular: Peripheral pulse palpable 2+ LLE   Extremities: R AKA  Neurologic: Non-focal  Psychiatry: A&Ox 3, Mood & affect appropriate  	    LABS:	 	  CARDIAC MARKERS:              9.1    4.66  )-----------( 175      ( 27 Mar 2023 05:30 )             29.0   03-27    131<L>  |  97  |  10  ----------------------------<  90  3.8   |  26  |  4.84<H>    Ca    7.7<L>      27 Mar 2023 05:30  Mg     1.9     03-27      ASSESSMENT/PLAN: 	   Interventional Cardiology PA Adult Progress Note      Subjective Assessment:  Patient seen and examined at bedside this morning endorsing SOB, cough w/ brown sputum, and loose BMs.   Denies fever, chills, HA, dizziness, CP, palpitations, abdominal pain, nausea, vomiting, hematuria, or BRBPR.        ROS Negative except as per Subjective and HPI  	  MEDICATIONS:  hydrALAZINE 50 milliGRAM(s) Oral every 8 hours  isosorbide   dinitrate Tablet (ISORDIL) 20 milliGRAM(s) Oral three times a day  losartan 100 milliGRAM(s) Oral every 24 hours  metoprolol succinate ER 25 milliGRAM(s) Oral daily  meropenem  IVPB      meropenem  IVPB 500 milliGRAM(s) IV Intermittent every 24 hours  acetaminophen     Tablet .. 650 milliGRAM(s) Oral every 6 hours PRN  pantoprazole  Injectable 40 milliGRAM(s) IV Push every 12 hours  atorvastatin 40 milliGRAM(s) Oral at bedtime  dextrose 50% Injectable 25 Gram(s) IV Push once  dextrose 50% Injectable 12.5 Gram(s) IV Push once  dextrose Oral Gel 15 Gram(s) Oral once PRN  insulin lispro (ADMELOG) corrective regimen sliding scale   SubCutaneous every 6 hours  aspirin enteric coated 81 milliGRAM(s) Oral daily  clopidogrel Tablet 75 milliGRAM(s) Oral daily  dextrose 5%. 1000 milliLiter(s) IV Continuous <Continuous>  dextrose 5%. 1000 milliLiter(s) IV Continuous <Continuous>  ferrous    sulfate 325 milliGRAM(s) Oral daily  magnesium oxide 400 milliGRAM(s) Oral once  potassium chloride    Tablet ER 20 milliEquivalent(s) Oral once  sodium chloride 0.65% Nasal 1 Spray(s) Both Nostrils every 4 hours PRN  tacrolimus 2 milliGRAM(s) Oral every 12 hours  tamsulosin 0.8 milliGRAM(s) Oral at bedtime      	    [PHYSICAL EXAM:  TELEMETRY:  T(C): 37.7 (03-27-23 @ 10:27), Max: 38.3 (03-26-23 @ 22:04)  HR: 54 (03-27-23 @ 11:35) (48 - 60)  BP: 120/50 (03-27-23 @ 11:35) (116/49 - 129/56)  RR: 16 (03-27-23 @ 11:35) (15 - 18)  SpO2: 100% (03-27-23 @ 11:35) (94% - 100%)  Wt(kg): --  I&O's Summary    26 Mar 2023 07:01  -  27 Mar 2023 07:00  --------------------------------------------------------  IN: 850 mL / OUT: 175 mL / NET: 675 mL    27 Mar 2023 07:01  -  27 Mar 2023 12:54  --------------------------------------------------------  IN: 120 mL / OUT: 0 mL / NET: 120 mL                               Appearance: resting comfortably in bed 		  Cardiovascular: normal s1, s2 regular rate and rhythm   Respiratory: lungs clear to auscultation   Gastrointestinal:  Soft, Non-tender, + BS	  Skin: Left heel ulcer, no rashes, no ecchymoses, no cyanosis  Vascular: Peripheral pulse palpable 2+ LLE   Extremities: R AKA  Neurologic: Non-focal  Psychiatry: A&Ox 3, Mood & affect appropriate      Imaging   PET/CT 03/25/23:   1. Decreased density of the cortex of the right femoral shaft compared to   the left, without associated abnormal uptake, possibly representing a   non-FDG avid infiltrative process or osteopenia of other etiology.   Further evaluation with dedicated CT and/or MRI is recommended.  2. Transplant kidney in the right iliac fossa with tiny pockets of gas in   the upper pole, possibly from recent [facility] catheterization. Underlying   urinary tract infection cannot be excluded. Correlate clinically.  3. Multifocal uptake in the prostate gland, which can be seen in the   setting of prostatitis and neoplastic disease. Correlate with PSA levels.  4. Small bilateral pleural effusions. Mosaic attenuation of the lungs,   most likely representing air trapping, atelectasis, and/or edema.    	    LABS:	 	  CARDIAC MARKERS:              9.1    4.66  )-----------( 175      ( 27 Mar 2023 05:30 )             29.0   03-27    131<L>  |  97  |  10  ----------------------------<  90  3.8   |  26  |  4.84<H>    Ca    7.7<L>      27 Mar 2023 05:30  Mg     1.9     03-27      ASSESSMENT/PLAN:

## 2023-03-27 NOTE — PROGRESS NOTE ADULT - PROBLEM SELECTOR PLAN 2
LHC at [place] w/ oLM 30% and RCA 99% that is not amenable to intervention.    - TTE 3/1/23 @ [facility]:  mild LVH, LVEF 25-30%, severe global wall motion dysfxn, mild LA dilation, mild RV dilation, mildly calcified leaflets, mod pHTN  - Severe GIB at [facility] requiring multiple PRBC transfusions  - initially cleared by GI for DAPT and on ASA/Plavix 3/20/23.    - 3/23/23 pt w/ episode of black tarry stool (H/H stable and no hemodynamic signs of bleeding),  fecal occult negative and pt cleared by GI for DAPT and restarted 3/24/23  - Continue ASA 81mg, plavix 75mg qd  - PLAN: pending further and infectious work up to determine date of LHC. LHC at [place] w/ oLM 30% and RCA 99% that is not amenable to intervention.    - TTE 3/1/23 @ [facility]:  mild LVH, LVEF 25-30%, severe global wall motion dysfxn, mild LA dilation, mild RV dilation, mildly calcified leaflets, mod pHTN  - Severe GIB at [facility] requiring multiple PRBC transfusions  - initially cleared by GI for DAPT and on ASA/Plavix 3/20/23.    - 3/23/23 pt w/ episode of black tarry stool (H/H stable and no hemodynamic signs of bleeding),  fecal occult negative and pt cleared by GI for DAPT and restarted 3/24/23  - Continue ASA 81mg, plavix 75mg qd  - PLAN: pending further and infectious work up to determine date of LHC. LHC at [place] w/ oLM 30% and RCA 99% that is not amenable to intervention.    - TTE 3/1/23 @ [facility]:  mild LVH, LVEF 25-30%, severe global wall motion dysfxn, mild LA dilation, mild RV dilation, mildly calcified leaflets, mod pHTN  - Severe GIB at [facility] requiring multiple PRBC transfusions  - initially cleared by GI for DAPT and on ASA/Plavix 3/20/23.    - 3/23/23 pt w/ episode of black tarry stool (H/H stable and no hemodynamic signs of bleeding),  fecal occult negative and pt cleared by GI for DAPT and restarted 3/24/23  - Continue ASA 81mg, plavix 75mg qd  - PLAN: pending further and infectious work up to determine date of LHC.

## 2023-03-27 NOTE — PROGRESS NOTE ADULT - PROBLEM SELECTOR PLAN 11
- HgA1c 5.9.    - CONT: MICHELINE.      F: Oral intake  E: Replete electrolytes as needed for K<4 and Mg<2  N: DASH Diet    DVT PPX: holding AC; SCD  Dispo: pending infectious work up prior to [facility] w/ subsequent ICD

## 2023-03-27 NOTE — PROGRESS NOTE ADULT - ASSESSMENT
61 yo M with HTN, DM, HLD, ICM, HFrEF, ESRD s/p failed RT (HD via LUE AVF), R AKA transferred to [facility] from [facility] on 3/17 for ICD placement with ongoing fever, no leukocytosis (on low dose tacrolimus).   He had a positive UA, urine culture with Klebsiella pneumoniae (S cefazolin).  NM PET/CT showed multifocal uptake in the prostate gland c/c prostatitis or neoplastic disease.  In view of duration of fever while receiving an antibiotic to which his urine isolate was susceptible, is concerning for prostatic abscess.  Test of choice would be MRI w/wo jennifer.  In view of logistical issues, can start with CT.  No abnormal activity seen in the neck or elsewhere - is reassuring.  Suggest:  - F/U blood culture from 3/22 X 1  - Please send urine for BK virus  - CT pelvis w/wo IVC  - Start ceftriaxone 2 g IV q24h  - D/C vancomycin and meropenem  Recommendations discussed with primary team.  Will follow with you - team 2.      61 yo M with HTN, DM, HLD, ICM, HFrEF, ESRD s/p failed RT (HD via LUE AVF), R AKA transferred to [facility] from [facility] on 3/17 for ICD placement with ongoing fever, no leukocytosis (on low dose tacrolimus).   He had a positive UA, urine culture with Klebsiella pneumoniae (S cefazolin).  NM PET/CT showed multifocal uptake in the prostate gland c/c prostatitis or neoplastic disease.  In view of duration of fever while receiving an antibiotic to which his urine isolate was susceptible, is concerning for prostatic abscess.  Test of choice would be MRI w/wo jennifer.  In view of logistical issues, can start with CT.  No abnormal activity seen in the neck or elsewhere - is reassuring.  Suggest:  - F/U blood culture from 3/22 X 1  - Please send urine for BK virus  - CT pelvis w/wo IVC  - Start ceftriaxone 2 g IV q24h  - D/C vancomycin and meropenem  Recommendations discussed with primary team.  Will follow with you - team 2.      61 yo M with HTN, DM, HLD, ICM, HFrEF, ESRD s/p failed RT (HD via LUE AVF), R AKA transferred to [facility] from [facility] on 3/17 for ICD placement with ongoing fever, no leukocytosis (on low dose tacrolimus).   He had a positive UA, urine culture with Klebsiella pneumoniae (S cefazolin).  NM PET/CT showed multifocal uptake in the prostate gland c/c prostatitis or neoplastic disease.  In view of duration of fever while receiving an antibiotic to which his urine isolate was susceptible, is concerning for prostatic abscess.  Test of choice would be MRI w/wo jennifer.  In view of logistical issues, can start with CT.  No abnormal activity seen in the neck or elsewhere - is reassuring.  Suggest:  - F/U blood culture from 3/22 X 1  - Please send urine for BK virus  - CT pelvis w/wo IVC  - Start ceftriaxone 2 g IV q24h  - D/C vancomycin and meropenem  Recommendations discussed with primary team.  Will follow with you - team 2.

## 2023-03-27 NOTE — PROGRESS NOTE ADULT - ATTENDING COMMENTS
61yo M PMHx HTN, HLD, DM, CAD(s/p cath years ago, RCA 99%, never intervened on), HFrEF 25-30%, ESRD s/p failed kidney transplant and required HD x 3yrs (last HD 3/16 via Left Arm AVF; HD TTS), Right AKA initially presented to [facility] 2/27 for respiratory distress after a dialysis, found to be septic, h/c complicated by AHRF requiring intubation for 24hr followed by VT arrest, h/c the complicated by massive LGIB (rectal ulcer seen on colonoscopy) requiring 7u pRBC, 1u FFP and 1u PLT. Transferred to [facility] (3/17) for ICD placement 2/2 Vtach and cardiac cath. Pt noted lassed loose stool with BRBPR, GI and surgery consulted. Flex Sigc ( 3/17) found prior hemoclip in the sigmoid colon, localized ulceration and erosive with no bleeding noted in rectum suggestive of solitary ulcer syndrome. Medicine consulted for comanagement.     PET/cT result reviewed- no lymphadenopathy, activity in prostate and femur.   pt seen, no complaint of pain in left leg,   comfortable appearing.   RRR  good air entry bilaterally  left AVF with good thrill.  no abdominal tenderness.  no edema no left leg, right AKA       -fever -  #LGIB  #SANTOS  #Solitary ulcer syndrome  - hx coffee ground emesis @ [facility]; s/p EGD/colonoscopy showing rectal ulcer  - per gen surgery no acute surgical intervention   - per GI:      - increased Protonix 40 mg IVP BID      - s/p flex sig(3/17)  for rectal ulcers, found Evidence of prior hemoclip in the sigmoid colon. Localized ulceration and erosive with no bleeding were noted in the rectum, suggestive of solitary ulcer syndrome           - Avoid anal digitation and enemas           - High-fiber diet and optimize laxatives to avoid constipation using miralax and dulcolax           - No absolute GI contraindications to anticoag/antiplt therapy           - Repeat endoscopic evaluation recommended in 3 months  - Active T&S  - Hgb  stable-  - Transfuse for hgb <8 (active CAD)    #CAD   - Previous cath at [facility] showing oLM 30% and RCA 99% that is not amenable to intervention.  - TTE 3/1/23 at [facility]: mild LVH, EF 25-30%, severe global wall motion dysfunction, mildly dilated LA, RV mildly dilated, all leaflets mild calcified, mod pHTN  - plan for cardiac cath -deferred due to concern for GI bleeding.    #H/O ventricular tachycardia   - Vtach arrest @[facility]  - fu EP consult for ICD placement- pending infection work up.    # Fever -still no source- t max 100.7  - PET/CT scan - no Lymphadenopathy to suggest lymphoproliferative disorder, activity in prostate and femur.  - given immunocompromised/hx Kidney transplant    - ID consult appreciated, -fu rec    would need to rule out infection prior to icd placement.    #ESRD on dialysis/ sp renal transplant.   - dialysis Tues, Thurs, Sat  - last received  Thurs 3/23  - f/u nephro recs    #HTN  - c/w home meds. Amlodipine 10mg qd, Losartan 100mg qd, Hydralazine 50m TID, Isordil 20mg TID with holding parameter    #HLD   - c/w Lipitor 40mg qd    #DM  - no meds per [place], obtain collaterals for med recs prior [place] stay  - mISS while inpt, goal -180   - A1c 5.9 on admission    DVT PPX: SCDs for now iso GIB    Med consult team continues to follow with you. 61yo M PMHx HTN, HLD, DM, CAD(s/p cath years ago, RCA 99%, never intervened on), HFrEF 25-30%, ESRD s/p failed kidney transplant and required HD x 3yrs (last HD 3/16 via Left Arm AVF; HD TTS), Right AKA initially presented to [facility] 2/27 for respiratory distress after a dialysis, found to be septic, h/c complicated by AHRF requiring intubation for 24hr followed by VT arrest, h/c the complicated by massive LGIB (rectal ulcer seen on colonoscopy) requiring 7u pRBC, 1u FFP and 1u PLT. Transferred to [facility] (3/17) for ICD placement 2/2 Vtach and cardiac cath. Pt noted lassed loose stool with BRBPR, GI and surgery consulted. Flex Sigc ( 3/17) found prior hemoclip in the sigmoid colon, localized ulceration and erosive with no bleeding noted in rectum suggestive of solitary ulcer syndrome. Medicine consulted for comanagement.     PET/cT result reviewed- no lymphadenopathy, activity in prostate and femur.   pt seen, no complaint of pain in left leg,   comfortable appearing.   RRR  good air entry bilaterally  left AVF with good thrill.  no abdominal tenderness.  no edema no left leg, right AKA       -fever -  #LGIB  #SANTOS  #Solitary ulcer syndrome  - hx coffee ground emesis @ [facility]; s/p EGD/colonoscopy showing rectal ulcer  - per gen surgery no acute surgical intervention   - per GI:      - increased Protonix 40 mg IVP BID      - s/p flex sig(3/17)  for rectal ulcers, found Evidence of prior hemoclip in the sigmoid colon. Localized ulceration and erosive with no bleeding were noted in the rectum, suggestive of solitary ulcer syndrome           - Avoid anal digitation and enemas           - High-fiber diet and optimize laxatives to avoid constipation using miralax and dulcolax           - No absolute GI contraindications to anticoag/antiplt therapy           - Repeat endoscopic evaluation recommended in 3 months  - Active T&S  - Hgb  stable-  - Transfuse for hgb <8 (active CAD)    #CAD   - Previous cath at [facility] showing oLM 30% and RCA 99% that is not amenable to intervention.  - TTE 3/1/23 at [facility]: mild LVH, EF 25-30%, severe global wall motion dysfunction, mildly dilated LA, RV mildly dilated, all leaflets mild calcified, mod pHTN  - plan for cardiac cath -deferred due to concern for GI bleeding.    #H/O ventricular tachycardia   - Vtach arrest @[facility]  - fu EP consult for ICD placement- pending infection work up.    # Fever -still no source- t max 100.7  - PET/CT scan - no Lymphadenopathy to suggest lymphoproliferative disorder, activity in prostate and femur.  - given immunocompromised/hx Kidney transplant    - ID consult appreciated, -fu rec    would need to rule out infection prior to icd placement.    #ESRD on dialysis/ sp renal transplant.   - dialysis Tues, Thurs, Sat  - last received  Thurs 3/23  - f/u nephro recs    #HTN  - c/w home meds. Amlodipine 10mg qd, Losartan 100mg qd, Hydralazine 50m TID, Isordil 20mg TID with holding parameter    #HLD   - c/w Lipitor 40mg qd    #DM  - no meds per [place], obtain collaterals for med recs prior [place] stay  - mISS while inpt, goal -180   - A1c 5.9 on admission    DVT PPX: SCDs for now iso GIB    Med consult team continues to follow with you. 61yo M PMHx HTN, HLD, DM, CAD(s/p cath years ago, RCA 99%, never intervened on), HFrEF 25-30%, ESRD s/p failed kidney transplant and required HD x 3yrs (last HD 3/16 via Left Arm AVF; HD TTS), Right AKA initially presented to [facility] 2/27 for respiratory distress after a dialysis, found to be septic, h/c complicated by AHRF requiring intubation for 24hr followed by VT arrest, h/c the complicated by massive LGIB (rectal ulcer seen on colonoscopy) requiring 7u pRBC, 1u FFP and 1u PLT. Transferred to [facility] (3/17) for ICD placement 2/2 Vtach and cardiac cath. Pt noted lassed loose stool with BRBPR, GI and surgery consulted. Flex Sigc ( 3/17) found prior hemoclip in the sigmoid colon, localized ulceration and erosive with no bleeding noted in rectum suggestive of solitary ulcer syndrome. Medicine consulted for comanagement.     PET/cT result reviewed- no lymphadenopathy, activity in prostate and femur.   pt seen, no complaint of pain in left leg,   comfortable appearing.   RRR  good air entry bilaterally  left AVF with good thrill.  no abdominal tenderness.  no edema no left leg, right AKA       -fever -  #LGIB  #SANTOS  #Solitary ulcer syndrome  - hx coffee ground emesis @ [facility]; s/p EGD/colonoscopy showing rectal ulcer  - per gen surgery no acute surgical intervention   - per GI:      - increased Protonix 40 mg IVP BID      - s/p flex sig(3/17)  for rectal ulcers, found Evidence of prior hemoclip in the sigmoid colon. Localized ulceration and erosive with no bleeding were noted in the rectum, suggestive of solitary ulcer syndrome           - Avoid anal digitation and enemas           - High-fiber diet and optimize laxatives to avoid constipation using miralax and dulcolax           - No absolute GI contraindications to anticoag/antiplt therapy           - Repeat endoscopic evaluation recommended in 3 months  - Active T&S  - Hgb  stable-  - Transfuse for hgb <8 (active CAD)    #CAD   - Previous cath at [facility] showing oLM 30% and RCA 99% that is not amenable to intervention.  - TTE 3/1/23 at [facility]: mild LVH, EF 25-30%, severe global wall motion dysfunction, mildly dilated LA, RV mildly dilated, all leaflets mild calcified, mod pHTN  - plan for cardiac cath -deferred due to concern for GI bleeding.    #H/O ventricular tachycardia   - Vtach arrest @[facility]  - fu EP consult for ICD placement- pending infection work up.    # Fever -still no source- t max 100.7  - PET/CT scan - no Lymphadenopathy to suggest lymphoproliferative disorder, activity in prostate and femur.  - given immunocompromised/hx Kidney transplant    - ID consult appreciated, -fu rec    would need to rule out infection prior to icd placement.    #ESRD on dialysis/ sp renal transplant.   - dialysis Tues, Thurs, Sat  - last received  Thurs 3/23  - f/u nephro recs    #HTN  - c/w home meds. Amlodipine 10mg qd, Losartan 100mg qd, Hydralazine 50m TID, Isordil 20mg TID with holding parameter    #HLD   - c/w Lipitor 40mg qd    #DM  - no meds per [place], obtain collaterals for med recs prior [place] stay  - mISS while inpt, goal -180   - A1c 5.9 on admission    DVT PPX: SCDs for now iso GIB    Med consult team continues to follow with you. 61yo M PMHx HTN, HLD, DM, CAD(s/p cath years ago, RCA 99%, never intervened on), HFrEF 25-30%, ESRD s/p failed kidney transplant and required HD x 3yrs (last HD 3/16 via Left Arm AVF; HD TTS), Right AKA initially presented to [facility] 2/27 for respiratory distress after a dialysis, found to be septic, h/c complicated by AHRF requiring intubation for 24hr followed by VT arrest, h/c the complicated by massive LGIB (rectal ulcer seen on colonoscopy) requiring 7u pRBC, 1u FFP and 1u PLT. Transferred to [facility] (3/17) for ICD placement 2/2 Vtach and cardiac cath. Pt noted lassed loose stool with BRBPR, GI and surgery consulted. Flex Sigc ( 3/17) found prior hemoclip in the sigmoid colon, localized ulceration and erosive with no bleeding noted in rectum suggestive of solitary ulcer syndrome. Medicine consulted for comanagement.     PET/cT result reviewed- no lymphadenopathy, activity in prostate and femur.   pt seen, no complaint of pain in left leg,   comfortable appearing.   RRR  good air entry bilaterally  left AVF with good thrill.  no abdominal tenderness.  no edema no left leg, right AKA       -fever -  #LGIB  #SANTOS  #Solitary ulcer syndrome  - hx coffee ground emesis @ [facility]; s/p EGD/colonoscopy showing rectal ulcer  - per gen surgery no acute surgical intervention   - per GI:      - increased Protonix 40 mg IVP BID      - s/p flex sig(3/17)  for rectal ulcers, found Evidence of prior hemoclip in the sigmoid colon. Localized ulceration and erosive with no bleeding were noted in the rectum, suggestive of solitary ulcer syndrome           - Avoid anal digitation and enemas           - High-fiber diet and optimize laxatives to avoid constipation using miralax and dulcolax           - No absolute GI contraindications to anticoag/antiplt therapy           - Repeat endoscopic evaluation recommended in 3 months  - Active T&S  - Hgb  stable-  - Transfuse for hgb <8 (active CAD)    #CAD   - Previous cath at [facility] showing oLM 30% and RCA 99% that is not amenable to intervention.  - TTE 3/1/23 at [facility]: mild LVH, EF 25-30%, severe global wall motion dysfunction, mildly dilated LA, RV mildly dilated, all leaflets mild calcified, mod pHTN  - plan for cardiac cath -deferred due to concern for GI bleeding.    #H/O ventricular tachycardia   - [facility] arrest [facility]  - fu EP consult for ICD placement- pending infection work up.    # Fever -still no source- t max 100.7  - PET/CT scan - no Lymphadenopathy to suggest lymphoproliferative disorder, activity in prostate and femur.  - given immunocompromised/hx Kidney transplant    - ID consult appreciated, -rec appreciated   "- Please send urine for BK virus  - CT pelvis w/wo IVC  - Start ceftriaxone 2 g IV q24h- concern prostate abscess -3/27)  - D/C vancomycin and meropenem   would need to rule out infection prior to icd placement.    #ESRD on dialysis/ sp renal transplant.   - dialysis Tues, Thurs, Sat  - last received  Thurs 3/23  - f/u nephro recs    #HTN  - c/w home meds. Amlodipine 10mg qd, Losartan 100mg qd, Hydralazine 50m TID, Isordil 20mg TID with holding parameter    #HLD   - c/w Lipitor 40mg qd    #DM  - no meds per [place], obtain collaterals for med recs prior [place] stay  - mISS while inpt, goal -180   - A1c 5.9 on admission    DVT PPX: SCDs for now iso GIB    Med consult team continues to follow with you. 63yo M PMHx HTN, HLD, DM, CAD(s/p cath years ago, RCA 99%, never intervened on), HFrEF 25-30%, ESRD s/p failed kidney transplant and required HD x 3yrs (last HD 3/16 via Left Arm AVF; HD TTS), Right AKA initially presented to [facility] 2/27 for respiratory distress after a dialysis, found to be septic, h/c complicated by AHRF requiring intubation for 24hr followed by VT arrest, h/c the complicated by massive LGIB (rectal ulcer seen on colonoscopy) requiring 7u pRBC, 1u FFP and 1u PLT. Transferred to [facility] (3/17) for ICD placement 2/2 Vtach and cardiac cath. Pt noted lassed loose stool with BRBPR, GI and surgery consulted. Flex Sigc ( 3/17) found prior hemoclip in the sigmoid colon, localized ulceration and erosive with no bleeding noted in rectum suggestive of solitary ulcer syndrome. Medicine consulted for comanagement.     PET/cT result reviewed- no lymphadenopathy, activity in prostate and femur.   pt seen, no complaint of pain in left leg,   comfortable appearing.   RRR  good air entry bilaterally  left AVF with good thrill.  no abdominal tenderness.  no edema no left leg, right AKA       -fever -  #LGIB  #SANTOS  #Solitary ulcer syndrome  - hx coffee ground emesis @ [facility]; s/p EGD/colonoscopy showing rectal ulcer  - per gen surgery no acute surgical intervention   - per GI:      - increased Protonix 40 mg IVP BID      - s/p flex sig(3/17)  for rectal ulcers, found Evidence of prior hemoclip in the sigmoid colon. Localized ulceration and erosive with no bleeding were noted in the rectum, suggestive of solitary ulcer syndrome           - Avoid anal digitation and enemas           - High-fiber diet and optimize laxatives to avoid constipation using miralax and dulcolax           - No absolute GI contraindications to anticoag/antiplt therapy           - Repeat endoscopic evaluation recommended in 3 months  - Active T&S  - Hgb  stable-  - Transfuse for hgb <8 (active CAD)    #CAD   - Previous cath at [facility] showing oLM 30% and RCA 99% that is not amenable to intervention.  - TTE 3/1/23 at [facility]: mild LVH, EF 25-30%, severe global wall motion dysfunction, mildly dilated LA, RV mildly dilated, all leaflets mild calcified, mod pHTN  - plan for cardiac cath -deferred due to concern for GI bleeding.    #H/O ventricular tachycardia   - V[facility]ach arrest @[facility]  - fu EP consult for ICD placement- pending infection work up.    # Fever -still no source- t max 100.7  - PET/CT scan - no Lymphadenopathy to suggest lymphoproliferative disorder, activity in prostate and femur.  - given immunocompromised/hx Kidney transplant    - ID consult appreciated, -rec appreciated   "- Please send urine for BK virus  - CT pelvis w/wo IVC  - Start ceftriaxone 2 g IV q24h- concern prostate abscess -3/27)  - D/C vancomycin and meropenem   would need to rule out infection prior to icd placement.    #ESRD on dialysis/ sp renal transplant.   - dialysis Tues, Thurs, Sat  - last received  Thurs 3/23  - f/u nephro recs    #HTN  - c/w home meds. Amlodipine 10mg qd, Losartan 100mg qd, Hydralazine 50m TID, Isordil 20mg TID with holding parameter    #HLD   - c/w Lipitor 40mg qd    #DM  - no meds per [place], obtain collaterals for med recs prior [place] stay  - mISS while inpt, goal -180   - A1c 5.9 on admission    DVT PPX: SCDs for now iso GIB    Med consult team continues to follow with you. 61yo M PMHx HTN, HLD, DM, CAD(s/p cath years ago, RCA 99%, never intervened on), HFrEF 25-30%, ESRD s/p failed kidney transplant and required HD x 3yrs (last HD 3/16 via Left Arm AVF; HD TTS), Right AKA initially presented to [facility] 2/27 for respiratory distress after a dialysis, found to be septic, h/c complicated by AHRF requiring intubation for 24hr followed by VT arrest, h/c the complicated by massive LGIB (rectal ulcer seen on colonoscopy) requiring 7u pRBC, 1u FFP and 1u PLT. Transferred to [facility] (3/17) for ICD placement 2/2 Vtach and cardiac cath. Pt noted lassed loose stool with BRBPR, GI and surgery consulted. Flex Sigc ( 3/17) found prior hemoclip in the sigmoid colon, localized ulceration and erosive with no bleeding noted in rectum suggestive of solitary ulcer syndrome. Medicine consulted for comanagement.     PET/cT result reviewed- no lymphadenopathy, activity in prostate and femur.   pt seen, no complaint of pain in left leg,   comfortable appearing.   RRR  good air entry bilaterally  left AVF with good thrill.  no abdominal tenderness.  no edema no left leg, right AKA       -fever -  #LGIB  #SANTOS  #Solitary ulcer syndrome  - hx coffee ground emesis @ [facility]; s/p EGD/colonoscopy showing rectal ulcer  - per gen surgery no acute surgical intervention   - per GI:      - increased Protonix 40 mg IVP BID      - s/p flex sig(3/17)  for rectal ulcers, found Evidence of prior hemoclip in the sigmoid colon. Localized ulceration and erosive with no bleeding were noted in the rectum, suggestive of solitary ulcer syndrome           - Avoid anal digitation and enemas           - High-fiber diet and optimize laxatives to avoid constipation using miralax and dulcolax           - No absolute GI contraindications to anticoag/antiplt therapy           - Repeat endoscopic evaluation recommended in 3 months  - Active T&S  - Hgb  stable-  - Transfuse for hgb <8 (active CAD)    #CAD   - Previous cath at [facility] showing oLM 30% and RCA 99% that is not amenable to intervention.  - TTE 3/1/23 at [facility]: mild LVH, EF 25-30%, severe global wall motion dysfunction, mildly dilated LA, RV mildly dilated, all leaflets mild calcified, mod pHTN  - plan for cardiac cath -deferred due to concern for GI bleeding.    #H/O ventricular tachycardia   - V[facility]ach arrest [facility]  - fu EP consult for ICD placement- pending infection work up.    # Fever -still no source- t max 100.7  - PET/CT scan - no Lymphadenopathy to suggest lymphoproliferative disorder, activity in prostate and femur.  - given immunocompromised/hx Kidney transplant    - ID consult appreciated, -rec appreciated   "- Please send urine for BK virus  - CT pelvis w/wo IVC  - Start ceftriaxone 2 g IV q24h- concern prostate abscess -3/27)  - D/C vancomycin and meropenem   would need to rule out infection prior to icd placement.    #ESRD on dialysis/ sp renal transplant.   - dialysis Tues, Thurs, Sat  - last received  Thurs 3/23  - f/u nephro recs    #HTN  - c/w home meds. Amlodipine 10mg qd, Losartan 100mg qd, Hydralazine 50m TID, Isordil 20mg TID with holding parameter    #HLD   - c/w Lipitor 40mg qd    #DM  - no meds per [place], obtain collaterals for med recs prior [place] stay  - mISS while inpt, goal -180   - A1c 5.9 on admission    DVT PPX: SCDs for now iso GIB    Med consult team continues to follow with you.

## 2023-03-27 NOTE — PROGRESS NOTE ADULT - PROBLEM SELECTOR PLAN 6
Pt still produced some urine; urinary retention requiring multiple straight caths; now s/p indwelling duvall placement on 3/21/23.  - c/w duvall placement Pt still produced some urine; urinary retention requiring multiple straight caths; now s/p indwelling duvall placement on 3/21/23.  - c/w duvall placement  - new duvall placed 03/27/23

## 2023-03-27 NOTE — PROGRESS NOTE ADULT - PROBLEM SELECTOR PLAN 3
s/p VTach arrest @ [facility]; no tele events noted.    - EP Consulted – plan for ICD placement once pt has LHC   - c/w toprol 25mg qd s/p VTach arrest @ [facility]; no tele events noted.    - EP Consulted – plan for ICD placement once pt has LHC   - c/w toprol 25mg qd s/p VTach arrest @ [facility]; no tele events noted.    - EP Consulted – plan for ICD placement once pt has LHC   - c/w toprol 25mg qd

## 2023-03-27 NOTE — PROGRESS NOTE ADULT - PROBLEM SELECTOR PLAN 7
- HD T/Th/Sat.    - NEXT HD 3/28  - **Needs vancomycin trough prior to HD sessions so that Vanc can be dosed and given AFTER HD sessions. - HD T/Th/Sat.    - NEXT HD 3/28

## 2023-03-27 NOTE — PROGRESS NOTE ADULT - SUBJECTIVE AND OBJECTIVE BOX
OVERNIGHT EVENTS: None    SUBJECTIVE / INTERVAL HPI: Patient seen and examined at bedside. Denies f/c, n/v/d/c, chest pain, shortness of breath, abdominal pain.    VITAL SIGNS:  Vital Signs Last 24 Hrs  T(C): 37.6 (27 Mar 2023 18:48), Max: 38.3 (26 Mar 2023 22:04)  T(F): 99.7 (27 Mar 2023 18:48), Max: 101 (26 Mar 2023 22:04)  HR: 55 (27 Mar 2023 17:38) (48 - 59)  BP: 126/53 (27 Mar 2023 17:38) (116/49 - 126/53)  BP(mean): 73 (27 Mar 2023 06:49) (71 - 78)  RR: 16 (27 Mar 2023 17:38) (15 - 18)  SpO2: 100% (27 Mar 2023 17:38) (95% - 100%)    Parameters below as of 27 Mar 2023 17:38  Patient On (Oxygen Delivery Method): nasal cannula  O2 Flow (L/min): 2    I&O's Summary    26 Mar 2023 07:01  -  27 Mar 2023 07:00  --------------------------------------------------------  IN: 850 mL / OUT: 175 mL / NET: 675 mL    27 Mar 2023 07:01  -  27 Mar 2023 20:35  --------------------------------------------------------  IN: 300 mL / OUT: 0 mL / NET: 300 mL        PHYSICAL EXAM:    General: NAD  HEENT: NC/AT; PERRL, anicteric sclera; MMM  Neck: supple  Cardiovascular: +S1/S2; RRR  Respiratory: CTA B/L; no W/R/R  Gastrointestinal: soft, NT/ND; +BSx4  Extremities: WWP; no edema, clubbing or cyanosis  Vascular: 2+ radial, DP/PT pulses B/L  Neurological: AAOx3; no focal deficits    MEDICATIONS:  MEDICATIONS  (STANDING):  aspirin enteric coated 81 milliGRAM(s) Oral daily  atorvastatin 40 milliGRAM(s) Oral at bedtime  cefTRIAXone   IVPB 2000 milliGRAM(s) IV Intermittent every 24 hours  clopidogrel Tablet 75 milliGRAM(s) Oral daily  dextrose 5%. 1000 milliLiter(s) (50 mL/Hr) IV Continuous <Continuous>  dextrose 5%. 1000 milliLiter(s) (100 mL/Hr) IV Continuous <Continuous>  dextrose 50% Injectable 25 Gram(s) IV Push once  dextrose 50% Injectable 12.5 Gram(s) IV Push once  ferrous    sulfate 325 milliGRAM(s) Oral daily  hydrALAZINE 50 milliGRAM(s) Oral every 8 hours  insulin lispro (ADMELOG) corrective regimen sliding scale   SubCutaneous every 6 hours  isosorbide   dinitrate Tablet (ISORDIL) 20 milliGRAM(s) Oral three times a day  losartan 100 milliGRAM(s) Oral every 24 hours  metoprolol succinate ER 25 milliGRAM(s) Oral daily  pantoprazole  Injectable 40 milliGRAM(s) IV Push every 12 hours  tacrolimus 2 milliGRAM(s) Oral every 12 hours  tamsulosin 0.8 milliGRAM(s) Oral at bedtime    MEDICATIONS  (PRN):  acetaminophen     Tablet .. 650 milliGRAM(s) Oral every 6 hours PRN Mild Pain (1 - 3), Moderate Pain (4 - 6)  dextrose Oral Gel 15 Gram(s) Oral once PRN Blood Glucose LESS THAN 70 milliGRAM(s)/deciliter  sodium chloride 0.65% Nasal 1 Spray(s) Both Nostrils every 4 hours PRN Nasal Congestion      ALLERGIES:  Allergies    No Known Allergies    Intolerances        LABS:                        9.1    4.66  )-----------( 175      ( 27 Mar 2023 05:30 )             29.0     03-27    131<L>  |  97  |  10  ----------------------------<  90  3.8   |  26  |  4.84<H>    Ca    7.7<L>      27 Mar 2023 05:30  Mg     1.9     03-27          CAPILLARY BLOOD GLUCOSE      POCT Blood Glucose.: 97 mg/dL (27 Mar 2023 18:45)      RADIOLOGY & ADDITIONAL TESTS: Reviewed.

## 2023-03-27 NOTE — PROGRESS NOTE ADULT - SUBJECTIVE AND OBJECTIVE BOX
INTERVAL HPI/OVERNIGHT EVENTS:  Tm 101.  No rigors.  Ongoing neck pain 7/10.  Ongoing cough with brown sputum.  3-4 BMs yesterday, one so far today (11:30 am)    CONSTITUTIONAL: As above  EYES:  No photophobia or visual changes  CARDIOVASCULAR:  No chest pain  RESPIRATORY:  No cough, wheezing, or SOB   GASTROINTESTINAL:  No nausea, vomiting, diarrhea, constipation, or abdominal pain  GENITOURINARY:  No frequency, urgency, dysuria or hematuria  NEUROLOGIC:  No headache, lightheadedness      ANTIBIOTICS/RELEVANT:    Vancomycin 3/21, 3/23, 3/25  Meropenem 500 mg IV q24h (3/21-present)      Vital Signs Last 24 Hrs  T(C): 36.4 (27 Mar 2023 14:03), Max: 38.3 (26 Mar 2023 22:04)  T(F): 97.5 (27 Mar 2023 14:03), Max: 101 (26 Mar 2023 22:04)  HR: 54 (27 Mar 2023 11:35) (48 - 59)  BP: 120/50 (27 Mar 2023 11:35) (116/49 - 123/51)  BP(mean): 73 (27 Mar 2023 06:49) (71 - 78)  RR: 16 (27 Mar 2023 11:35) (15 - 18)  SpO2: 100% (27 Mar 2023 11:35) (95% - 100%)    Parameters below as of 27 Mar 2023 11:35  Patient On (Oxygen Delivery Method): nasal cannula  O2 Flow (L/min): 2      PHYSICAL EXAM:  Constitutional:  Alert  Eyes:  Sclerae anicteric, conjunctivae clear, PERRL  Ear/Nose/Throat:  No nasal exudate or sinus tenderness;  No buccal mucosal lesions, no pharyngeal erythema or exudate	  Neck:  Supple, no adenopathy  Respiratory:  Clear bilaterally  Cardiovascular:  RRR, S1S2, no murmur appreciated  Gastrointestinal:  Symmetric, normoactive BS, soft, NT, no masses, guarding or rebound.  No HSM  Extremities:  No edema      LABS:                        9.1    4.66  )-----------( 175      ( 27 Mar 2023 05:30 )             29.0         03-27    131<L>  |  97  |  10  ----------------------------<  90  3.8   |  26  |  4.84<H>    Ca    7.7<L>      27 Mar 2023 05:30  Mg     1.9     03-27            MICROBIOLOGY:    Blood cultures:  3/21 X 1 - NG;  3/22 X 1 - NGTD    Urine culture 3/21 - >10^ Klebsiella pneumoniae (pansusceptible except amp)    3/21 rRVP ND      RADIOLOGY & ADDITIONAL STUDIES:    < from: NM PET/CT Onc FDG Skull to Thigh, Inital (03.25.23 @ 16:25) >  FINDINGS:  Head and neck: Physiologic uptake is noted in the salivary glands,   oropharynx, larynx, and thyroid. No hypermetabolic cervical or   supraclavicular lymphadenopathy. No hypermetabolic focus in the head and   neck.    Chest: Mosaic attenuation of the lungs. Airspace opacities throughout the   posterior lungs, most likely representing atelectasis, with minimal   associated inflammatory uptake. No hypermetabolic pulmonary nodules.   Small bilateral pleural effusions with adjacent compressive atelectasis.   There is severe atherosclerotic calcification of the visualized aorta and   its branches, including the great vessels. Severe coronary artery   calcification. There is hypoattenuation of the cardiac blood pool   relative to the myocardium, consistent with anemia. Cardiomegaly. No   hypermetabolic mediastinal or axillary lymphadenopathy.    Abdomen and pelvis: Physiologic activity is noted in the liver, spleen,   pancreas, bowel, adrenal glands, and in the urinary collecting system.   Transplant kidney in the right iliac fossa with tiny pockets of gas in   the upper pole, possibly from recent [facility] catheterization. Atrophic   native kidneys. Small cyst in the right kidney. No hypermetabolic   lymphadenopathy or fluid collection. Bladder is collapsed around [place]   catheter. There is circumferential bladder wall thickening and   infiltration of the surrounding perivesicular fat. Multifocal uptake in   the prostate gland. Tiny fat-containing umbilical hernia.    Musculoskeletal: Decreased density of the cortex of the right femoral   shaft compared to the left, without associated abnormal uptake. Focal   uptake at the soft tissues adjacent to the posterior left ischium, most   likely representing inflammatory uptake at a muscular attachment site.      IMPRESSION:  1. Decreased density of the cortex of the right femoral shaft compared to   the left, without associated abnormal uptake, possibly representing a   non-FDG avid infiltrative process or osteopenia of other etiology.   Further evaluation with dedicated CT and/or MRI is recommended.  2. Transplant kidney in the right iliac fossa with tiny pockets of gas in   the upper pole, possibly from recent [facility] catheterization. Underlying   urinary tract infection cannot be excluded. Correlate clinically.  3. Multifocal uptake in the prostate gland, which can be seen inthe   setting of prostatitis and neoplastic disease. Correlate with PSA levels.  4. Small bilateral pleural effusions. Mosaic attenuation of the lungs,   most likely representing air trapping, atelectasis, and/or edema.      < end of copied text >    < from: NM PET/CT Onc FDG Skull to Thigh, Inital (03.25.23 @ 16:25) >  FINDINGS:  Head and neck: Physiologic uptake is noted in the salivary glands,   oropharynx, larynx, and thyroid. No hypermetabolic cervical or   supraclavicular lymphadenopathy. No hypermetabolic focus in the head and   neck.    Chest: Mosaic attenuation of the lungs. Airspace opacities throughout the   posterior lungs, most likely representing atelectasis, with minimal   associated inflammatory uptake. No hypermetabolic pulmonary nodules.   Small bilateral pleural effusions with adjacent compressive atelectasis.   There is severe atherosclerotic calcification of the visualized aorta and   its branches, including the great vessels. Severe coronary artery   calcification. There is hypoattenuation of the cardiac blood pool   relative to the myocardium, consistent with anemia. Cardiomegaly. No   hypermetabolic mediastinal or axillary lymphadenopathy.    Abdomen and pelvis: Physiologic activity is noted in the liver, spleen,   pancreas, bowel, adrenal glands, and in the urinary collecting system.   Transplant kidney in the right iliac fossa with tiny pockets of gas in   the upper pole, possibly from recent [facility] catheterization. Atrophic   native kidneys. Small cyst in the right kidney. No hypermetabolic   lymphadenopathy or fluid collection. Bladder is collapsed around [facility]   catheter. There is circumferential bladder wall thickening and   infiltration of the surrounding perivesicular fat. Multifocal uptake in   the prostate gland. Tiny fat-containing umbilical hernia.    Musculoskeletal: Decreased density of the cortex of the right femoral   shaft compared to the left, without associated abnormal uptake. Focal   uptake at the soft tissues adjacent to the posterior left ischium, most   likely representing inflammatory uptake at a muscular attachment site.      IMPRESSION:  1. Decreased density of the cortex of the right femoral shaft compared to   the left, without associated abnormal uptake, possibly representing a   non-FDG avid infiltrative process or osteopenia of other etiology.   Further evaluation with dedicated CT and/or MRI is recommended.  2. Transplant kidney in the right iliac fossa with tiny pockets of gas in   the upper pole, possibly from recent [facility] catheterization. Underlying   urinary tract infection cannot be excluded. Correlate clinically.  3. Multifocal uptake in the prostate gland, which can be seen inthe   setting of prostatitis and neoplastic disease. Correlate with PSA levels.  4. Small bilateral pleural effusions. Mosaic attenuation of the lungs,   most likely representing air trapping, atelectasis, and/or edema.    < end of copied text >   INTERVAL HPI/OVERNIGHT EVENTS:  Tm 101.  No rigors.  Ongoing neck pain 7/10.  Ongoing cough with brown sputum.  3-4 BMs yesterday, one so far today (11:30 am)    CONSTITUTIONAL: As above  EYES:  No photophobia or visual changes  CARDIOVASCULAR:  No chest pain  RESPIRATORY:  No cough, wheezing, or SOB   GASTROINTESTINAL:  No nausea, vomiting, diarrhea, constipation, or abdominal pain  GENITOURINARY:  No frequency, urgency, dysuria or hematuria  NEUROLOGIC:  No headache, lightheadedness      ANTIBIOTICS/RELEVANT:    Vancomycin 3/21, 3/23, 3/25  Meropenem 500 mg IV q24h (3/21-present)      Vital Signs Last 24 Hrs  T(C): 36.4 (27 Mar 2023 14:03), Max: 38.3 (26 Mar 2023 22:04)  T(F): 97.5 (27 Mar 2023 14:03), Max: 101 (26 Mar 2023 22:04)  HR: 54 (27 Mar 2023 11:35) (48 - 59)  BP: 120/50 (27 Mar 2023 11:35) (116/49 - 123/51)  BP(mean): 73 (27 Mar 2023 06:49) (71 - 78)  RR: 16 (27 Mar 2023 11:35) (15 - 18)  SpO2: 100% (27 Mar 2023 11:35) (95% - 100%)    Parameters below as of 27 Mar 2023 11:35  Patient On (Oxygen Delivery Method): nasal cannula  O2 Flow (L/min): 2      PHYSICAL EXAM:  Constitutional:  Alert  Eyes:  Sclerae anicteric, conjunctivae clear, PERRL  Ear/Nose/Throat:  No nasal exudate or sinus tenderness;  No buccal mucosal lesions, no pharyngeal erythema or exudate	  Neck:  Supple, no adenopathy  Respiratory:  Clear bilaterally  Cardiovascular:  RRR, S1S2, no murmur appreciated  Gastrointestinal:  Symmetric, normoactive BS, soft, NT, no masses, guarding or rebound.  No HSM  Extremities:  No edema      LABS:                        9.1    4.66  )-----------( 175      ( 27 Mar 2023 05:30 )             29.0         03-27    131<L>  |  97  |  10  ----------------------------<  90  3.8   |  26  |  4.84<H>    Ca    7.7<L>      27 Mar 2023 05:30  Mg     1.9     03-27            MICROBIOLOGY:    Blood cultures:  3/21 X 1 - NG;  3/22 X 1 - NGTD    Urine culture 3/21 - >10^ Klebsiella pneumoniae (pansusceptible except amp)    3/21 rRVP ND      RADIOLOGY & ADDITIONAL STUDIES:    < from: NM PET/CT Onc FDG Skull to Thigh, Inital (03.25.23 @ 16:25) >  FINDINGS:  Head and neck: Physiologic uptake is noted in the salivary glands,   oropharynx, larynx, and thyroid. No hypermetabolic cervical or   supraclavicular lymphadenopathy. No hypermetabolic focus in the head and   neck.    Chest: Mosaic attenuation of the lungs. Airspace opacities throughout the   posterior lungs, most likely representing atelectasis, with minimal   associated inflammatory uptake. No hypermetabolic pulmonary nodules.   Small bilateral pleural effusions with adjacent compressive atelectasis.   There is severe atherosclerotic calcification of the visualized aorta and   its branches, including the great vessels. Severe coronary artery   calcification. There is hypoattenuation of the cardiac blood pool   relative to the myocardium, consistent with anemia. Cardiomegaly. No   hypermetabolic mediastinal or axillary lymphadenopathy.    Abdomen and pelvis: Physiologic activity is noted in the liver, spleen,   pancreas, bowel, adrenal glands, and in the urinary collecting system.   Transplant kidney in the right iliac fossa with tiny pockets of gas in   the upper pole, possibly from recent [facility] catheterization. Atrophic   native kidneys. Small cyst in the right kidney. No hypermetabolic   lymphadenopathy or fluid collection. Bladder is collapsed around [facility]   catheter. There is circumferential bladder wall thickening and   infiltration of the surrounding perivesicular fat. Multifocal uptake in   the prostate gland. Tiny fat-containing umbilical hernia.    Musculoskeletal: Decreased density of the cortex of the right femoral   shaft compared to the left, without associated abnormal uptake. Focal   uptake at the soft tissues adjacent to the posterior left ischium, most   likely representing inflammatory uptake at a muscular attachment site.      IMPRESSION:  1. Decreased density of the cortex of the right femoral shaft compared to   the left, without associated abnormal uptake, possibly representing a   non-FDG avid infiltrative process or osteopenia of other etiology.   Further evaluation with dedicated CT and/or MRI is recommended.  2. Transplant kidney in the right iliac fossa with tiny pockets of gas in   the upper pole, possibly from recent [facility] catheterization. Underlying   urinary tract infection cannot be excluded. Correlate clinically.  3. Multifocal uptake in the prostate gland, which can be seen inthe   setting of prostatitis and neoplastic disease. Correlate with PSA levels.  4. Small bilateral pleural effusions. Mosaic attenuation of the lungs,   most likely representing air trapping, atelectasis, and/or edema.      < end of copied text >    < from: NM PET/CT Onc FDG Skull to Thigh, Inital (03.25.23 @ 16:25) >  FINDINGS:  Head and neck: Physiologic uptake is noted in the salivary glands,   oropharynx, larynx, and thyroid. No hypermetabolic cervical or   supraclavicular lymphadenopathy. No hypermetabolic focus in the head and   neck.    Chest: Mosaic attenuation of the lungs. Airspace opacities throughout the   posterior lungs, most likely representing atelectasis, with minimal   associated inflammatory uptake. No hypermetabolic pulmonary nodules.   Small bilateral pleural effusions with adjacent compressive atelectasis.   There is severe atherosclerotic calcification of the visualized aorta and   its branches, including the great vessels. Severe coronary artery   calcification. There is hypoattenuation of the cardiac blood pool   relative to the myocardium, consistent with anemia. Cardiomegaly. No   hypermetabolic mediastinal or axillary lymphadenopathy.    Abdomen and pelvis: Physiologic activity is noted in the liver, spleen,   pancreas, bowel, adrenal glands, and in the urinary collecting system.   Transplant kidney in the right iliac fossa with tiny pockets of gas in   the upper pole, possibly from recent [facility] catheterization. Atrophic   native kidneys. Small cyst in the right kidney. No hypermetabolic   lymphadenopathy or fluid collection. Bladder is collapsed around [facility]   catheter. There is circumferential bladder wall thickening and   infiltration of the surrounding perivesicular fat. Multifocal uptake in   the prostate gland. Tiny fat-containing umbilical hernia.    Musculoskeletal: Decreased density of the cortex of the right femoral   shaft compared to the left, without associated abnormal uptake. Focal   uptake at the soft tissues adjacent to the posterior left ischium, most   likely representing inflammatory uptake at a muscular attachment site.      IMPRESSION:  1. Decreased density of the cortex of the right femoral shaft compared to   the left, without associated abnormal uptake, possibly representing a   non-FDG avid infiltrative process or osteopenia of other etiology.   Further evaluation with dedicated CT and/or MRI is recommended.  2. Transplant kidney in the right iliac fossa with tiny pockets of gas in   the upper pole, possibly from recent [facility] catheterization. Underlying   urinary tract infection cannot be excluded. Correlate clinically.  3. Multifocal uptake in the prostate gland, which can be seen inthe   setting of prostatitis and neoplastic disease. Correlate with PSA levels.  4. Small bilateral pleural effusions. Mosaic attenuation of the lungs,   most likely representing air trapping, atelectasis, and/or edema.    < end of copied text >

## 2023-03-27 NOTE — CHART NOTE - NSCHARTNOTEFT_GEN_A_CORE
Admitting Diagnosis:   Patient is a 62y old  Male who presents with a chief complaint of ICD placement (24 Mar 2023 09:57)      PAST MEDICAL & SURGICAL HISTORY:  HTN (hypertension)      HLD (hyperlipidemia)      DM (diabetes mellitus)      GERD (gastroesophageal reflux disease)      ESRD on dialysis      NSTEMI (non-ST elevation myocardial infarction)      CAD (coronary artery disease)      Fever of unknown origin (FUO)            Current Nutrition Order:  Renal DASH TLC      PO Intake: Good (%) [   ]  Fair (50-75%) [   ] Poor (<25%) [  ]--Please See Below     GI Issues:   - Flex sig performed at bedside with svc attending 3/17: Brown stool was seen in the rectum and sigmoid colon. Evidence of prior hemoclip was found in the sigmoid colon. Localized ulceration and erosive with no bleeding were noted in the rectum, suggestive of solitary ulcer syndrome.   - Pt with Melena 3/23, feccal occult negative  - 3 Loose BM 3/26, per IDR s/p bere use and laxatives now d/c, per RN no BM thus far on shift today 3/27    Pain:  +Pain Is noted per flow sheets     Skin Integrity:  R buttocks Stage II pressure ulcer, L heel STI  Wound care consult Pending   Alex 15, No edema       Labs:   03-27    131<L>  |  97  |  10  ----------------------------<  90  3.8   |  26  |  4.84<H>    Ca    7.7<L>      27 Mar 2023 05:30  Mg     1.9     03-27      CAPILLARY BLOOD GLUCOSE      POCT Blood Glucose.: 92 mg/dL (27 Mar 2023 06:52)  POCT Blood Glucose.: 91 mg/dL (27 Mar 2023 00:22)  POCT Blood Glucose.: 103 mg/dL (26 Mar 2023 21:46)  POCT Blood Glucose.: 87 mg/dL (26 Mar 2023 17:21)      Medications:  MEDICATIONS  (STANDING):  aspirin enteric coated 81 milliGRAM(s) Oral daily  atorvastatin 40 milliGRAM(s) Oral at bedtime  clopidogrel Tablet 75 milliGRAM(s) Oral daily  dextrose 5%. 1000 milliLiter(s) (50 mL/Hr) IV Continuous <Continuous>  dextrose 5%. 1000 milliLiter(s) (100 mL/Hr) IV Continuous <Continuous>  dextrose 50% Injectable 25 Gram(s) IV Push once  dextrose 50% Injectable 12.5 Gram(s) IV Push once  ferrous    sulfate 325 milliGRAM(s) Oral daily  hydrALAZINE 50 milliGRAM(s) Oral every 8 hours  insulin lispro (ADMELOG) corrective regimen sliding scale   SubCutaneous every 6 hours  isosorbide   dinitrate Tablet (ISORDIL) 20 milliGRAM(s) Oral three times a day  losartan 100 milliGRAM(s) Oral every 24 hours  magnesium oxide 400 milliGRAM(s) Oral once  meropenem  IVPB      meropenem  IVPB 500 milliGRAM(s) IV Intermittent every 24 hours  metoprolol succinate ER 25 milliGRAM(s) Oral daily  pantoprazole  Injectable 40 milliGRAM(s) IV Push every 12 hours  potassium chloride    Tablet ER 20 milliEquivalent(s) Oral once  tacrolimus 2 milliGRAM(s) Oral every 12 hours  tamsulosin 0.8 milliGRAM(s) Oral at bedtime    MEDICATIONS  (PRN):  acetaminophen     Tablet .. 650 milliGRAM(s) Oral every 6 hours PRN Mild Pain (1 - 3), Moderate Pain (4 - 6)  dextrose Oral Gel 15 Gram(s) Oral once PRN Blood Glucose LESS THAN 70 milliGRAM(s)/deciliter  sodium chloride 0.65% Nasal 1 Spray(s) Both Nostrils every 4 hours PRN Nasal Congestion        5'4''  pounds +-10%  ABW s/p R AKA: 113 pounds, %DQB=796     3/21 143.7 pounds, 148.1 pounds  3/25 136 pounds, 145.5 pounds      Estimated energy needs:   ABW s/p R AKA for EER as %ABW is greater then 120%  Adjust for age, Fevers, ESRD/HD, Skin/pressure ulcer, CHF; fluids per team    Estimated Energy Needs From (hermilo/kg)	30  Estimated Energy Needs To (hermilo/kg)	35  Estimated Energy Needs Calculated From (hermilo/kg)	1536  Estimated Energy Needs Calculated To (hermilo/kg)	1792    Estimated Protein Needs From (g/kg)	1.2  Estimated Protein Needs To (g/kg)	1.4  Estimated Protein Needs Calculated From (g/kg)	61.44  Estimated Protein Needs Calculated To (g/kg)	71.68    Subjective:  62y M PMHx HTN, HLD, DM, ICM (s/p cath years ago, RCA 99%, never intervened on), HFrEF 25-30%, ESRD s/p failed kidney transplant (LUE AVF; HD TTS), R AKA admitted to [facility] 2/27 for respiratory distress and SIRS criteria after HD session, requiring intubation. Pt with cardiac arrest, vtach arrest requiring amio and pressors. Extubated 3/2 and course c/b by LGIB with hemoglobin of 3.5 requiring multiple transfusions; EGD/colo and CTA w/o evidence of active bleed and Hgb improved to 11's. Transferred to [facility] 3/17/23 for ICD eval 2/2 Vtach and cardiac cath however patient with episode of BRBPR, flex sig showing localized rectal ulcer but no active bleeding. DAPT resumed on 3/20. 3/21 pt febrile to 102F, +URI symptoms. BCx NGTD with intermittent fevers. +Melena 3/23/23, Fecal occult negative, GI cleared pt and DAPT resumed 3/24. LHC and ICD pending infectious w/u. S/p PET scan 3/25 and awaiting results.    Pt on 5UR. Attended IDR. Pt soundly sleeping and had been covered head to toe with blankets. Pt presented in similair fashion during prior RD visit as well and had been unwilling to come out. Pt breakfast seen at bedside which was untouched. Unsure if d/t pt being sleeping during meal drop off vs lack of appetite. Prior pt had reported decreased appetite however declined ONS when offered d/t reported diarrhea.   Please see below for nutritions recommendations.    Prior PES: Increased needs RT increased demands AEB: ESRD/HD, Skin/pressure ulcer, CHF  >> ongoing   Goal: Pt will meet at least 75% of nutrient needs     Recommendations:  1. Cont with Diet.  2. Monitor %PO intake, Diet tolerance.  >> Would add oral nutrition supplements, however pt does not take in setting of reported diarrhea.  >> If PO is confirmed to be at/below 50%, can consider d/c of DASH.  3. Labs: monitor BMP, CBC, glucose, lytes, trend renal indices, LFTs.  4. Pain/GI per team. Monitor Skin, Wts pre/post HD.  5. RD to remain available for additional nutrition interventions as needed.   Education: NA.    Risk Level: High [  X ] Moderate [   ] Low [   ]. Admitting Diagnosis:   Patient is a 62y old  Male who presents with a chief complaint of ICD placement (24 Mar 2023 09:57)      PAST MEDICAL & SURGICAL HISTORY:  HTN (hypertension)      HLD (hyperlipidemia)      DM (diabetes mellitus)      GERD (gastroesophageal reflux disease)      ESRD on dialysis      NSTEMI (non-ST elevation myocardial infarction)      CAD (coronary artery disease)      Fever of unknown origin (FUO)            Current Nutrition Order:  Renal DASH TLC      PO Intake: Good (%) [   ]  Fair (50-75%) [   ] Poor (<25%) [  ]--Please See Below     GI Issues:   - Flex sig performed at bedside with svc attending 3/17: Brown stool was seen in the rectum and sigmoid colon. Evidence of prior hemoclip was found in the sigmoid colon. Localized ulceration and erosive with no bleeding were noted in the rectum, suggestive of solitary ulcer syndrome.   - Pt with Melena 3/23, feccal occult negative  - 3 Loose BM 3/26, per IDR s/p bere use and laxatives now d/c, per RN no BM thus far on shift today 3/27    Pain:  +Pain Is noted per flow sheets     Skin Integrity:  R buttocks Stage II pressure ulcer, L heel STI  Wound care consult Pending   Alex 15, No edema       Labs:   03-27    131<L>  |  97  |  10  ----------------------------<  90  3.8   |  26  |  4.84<H>    Ca    7.7<L>      27 Mar 2023 05:30  Mg     1.9     03-27      CAPILLARY BLOOD GLUCOSE      POCT Blood Glucose.: 92 mg/dL (27 Mar 2023 06:52)  POCT Blood Glucose.: 91 mg/dL (27 Mar 2023 00:22)  POCT Blood Glucose.: 103 mg/dL (26 Mar 2023 21:46)  POCT Blood Glucose.: 87 mg/dL (26 Mar 2023 17:21)      Medications:  MEDICATIONS  (STANDING):  aspirin enteric coated 81 milliGRAM(s) Oral daily  atorvastatin 40 milliGRAM(s) Oral at bedtime  clopidogrel Tablet 75 milliGRAM(s) Oral daily  dextrose 5%. 1000 milliLiter(s) (50 mL/Hr) IV Continuous <Continuous>  dextrose 5%. 1000 milliLiter(s) (100 mL/Hr) IV Continuous <Continuous>  dextrose 50% Injectable 25 Gram(s) IV Push once  dextrose 50% Injectable 12.5 Gram(s) IV Push once  ferrous    sulfate 325 milliGRAM(s) Oral daily  hydrALAZINE 50 milliGRAM(s) Oral every 8 hours  insulin lispro (ADMELOG) corrective regimen sliding scale   SubCutaneous every 6 hours  isosorbide   dinitrate Tablet (ISORDIL) 20 milliGRAM(s) Oral three times a day  losartan 100 milliGRAM(s) Oral every 24 hours  magnesium oxide 400 milliGRAM(s) Oral once  meropenem  IVPB      meropenem  IVPB 500 milliGRAM(s) IV Intermittent every 24 hours  metoprolol succinate ER 25 milliGRAM(s) Oral daily  pantoprazole  Injectable 40 milliGRAM(s) IV Push every 12 hours  potassium chloride    Tablet ER 20 milliEquivalent(s) Oral once  tacrolimus 2 milliGRAM(s) Oral every 12 hours  tamsulosin 0.8 milliGRAM(s) Oral at bedtime    MEDICATIONS  (PRN):  acetaminophen     Tablet .. 650 milliGRAM(s) Oral every 6 hours PRN Mild Pain (1 - 3), Moderate Pain (4 - 6)  dextrose Oral Gel 15 Gram(s) Oral once PRN Blood Glucose LESS THAN 70 milliGRAM(s)/deciliter  sodium chloride 0.65% Nasal 1 Spray(s) Both Nostrils every 4 hours PRN Nasal Congestion        5'4''  pounds +-10%  ABW s/p R AKA: 113 pounds, %GBK=814     3/21 143.7 pounds, 148.1 pounds  3/25 136 pounds, 145.5 pounds      Estimated energy needs:   ABW s/p R AKA for EER as %ABW is greater then 120%  Adjust for age, Fevers, ESRD/HD, Skin/pressure ulcer, CHF; fluids per team    Estimated Energy Needs From (hermilo/kg)	30  Estimated Energy Needs To (hermilo/kg)	35  Estimated Energy Needs Calculated From (hermilo/kg)	1536  Estimated Energy Needs Calculated To (hermilo/kg)	1792    Estimated Protein Needs From (g/kg)	1.2  Estimated Protein Needs To (g/kg)	1.4  Estimated Protein Needs Calculated From (g/kg)	61.44  Estimated Protein Needs Calculated To (g/kg)	71.68    Subjective:  62y M PMHx HTN, HLD, DM, ICM (s/p cath years ago, RCA 99%, never intervened on), HFrEF 25-30%, ESRD s/p failed kidney transplant (LUE AVF; HD TTS), R AKA admitted to [facility] 2/27 for respiratory distress and SIRS criteria after HD session, requiring intubation. Pt with cardiac arrest, vtach arrest requiring amio and pressors. Extubated 3/2 and course c/b by LGIB with hemoglobin of 3.5 requiring multiple transfusions; EGD/colo and CTA w/o evidence of active bleed and Hgb improved to 11's. Transferred to [facility] 3/17/23 for ICD eval 2/2 Vtach and cardiac cath however patient with episode of BRBPR, flex sig showing localized rectal ulcer but no active bleeding. DAPT resumed on 3/20. 3/21 pt febrile to 102F, +URI symptoms. BCx NGTD with intermittent fevers. +Melena 3/23/23, Fecal occult negative, GI cleared pt and DAPT resumed 3/24. LHC and ICD pending infectious w/u. S/p PET scan 3/25 and awaiting results.    Pt on 5UR. Attended IDR. Pt soundly sleeping and had been covered head to toe with blankets. Pt presented in similair fashion during prior RD visit as well and had been unwilling to come out. Pt breakfast seen at bedside which was untouched. Unsure if d/t pt being sleeping during meal drop off vs lack of appetite. Prior pt had reported decreased appetite however declined ONS when offered d/t reported diarrhea.   Please see below for nutritions recommendations.    Prior PES: Increased needs RT increased demands AEB: ESRD/HD, Skin/pressure ulcer, CHF  >> ongoing   Goal: Pt will meet at least 75% of nutrient needs     Recommendations:  1. Cont with Diet.  2. Monitor %PO intake, Diet tolerance.  >> Would add oral nutrition supplements, however pt does not take in setting of reported diarrhea.  >> If PO is confirmed to be at/below 50%, can consider d/c of DASH.  3. Labs: monitor BMP, CBC, glucose, lytes, trend renal indices, LFTs.  4. Pain/GI per team. Monitor Skin, Wts pre/post HD.  5. RD to remain available for additional nutrition interventions as needed.   Education: NA.    Risk Level: High [  X ] Moderate [   ] Low [   ]. Admitting Diagnosis:   Patient is a 62y old  Male who presents with a chief complaint of ICD placement (24 Mar 2023 09:57)      PAST MEDICAL & SURGICAL HISTORY:  HTN (hypertension)      HLD (hyperlipidemia)      DM (diabetes mellitus)      GERD (gastroesophageal reflux disease)      ESRD on dialysis      NSTEMI (non-ST elevation myocardial infarction)      CAD (coronary artery disease)      Fever of unknown origin (FUO)            Current Nutrition Order:  Renal DASH TLC      PO Intake: Good (%) [   ]  Fair (50-75%) [   ] Poor (<25%) [  ]--Please See Below     GI Issues:   - Flex sig performed at bedside with svc attending 3/17: Brown stool was seen in the rectum and sigmoid colon. Evidence of prior hemoclip was found in the sigmoid colon. Localized ulceration and erosive with no bleeding were noted in the rectum, suggestive of solitary ulcer syndrome.   - Pt with Melena 3/23, feccal occult negative  - 3 Loose BM 3/26, per IDR s/p bere use and laxatives now d/c, per RN no BM thus far on shift today 3/27    Pain:  +Pain Is noted per flow sheets     Skin Integrity:  R buttocks Stage II pressure ulcer, L heel STI  Wound care consult Pending   Alex 15, No edema       Labs:   03-27    131<L>  |  97  |  10  ----------------------------<  90  3.8   |  26  |  4.84<H>    Ca    7.7<L>      27 Mar 2023 05:30  Mg     1.9     03-27      CAPILLARY BLOOD GLUCOSE      POCT Blood Glucose.: 92 mg/dL (27 Mar 2023 06:52)  POCT Blood Glucose.: 91 mg/dL (27 Mar 2023 00:22)  POCT Blood Glucose.: 103 mg/dL (26 Mar 2023 21:46)  POCT Blood Glucose.: 87 mg/dL (26 Mar 2023 17:21)      Medications:  MEDICATIONS  (STANDING):  aspirin enteric coated 81 milliGRAM(s) Oral daily  atorvastatin 40 milliGRAM(s) Oral at bedtime  clopidogrel Tablet 75 milliGRAM(s) Oral daily  dextrose 5%. 1000 milliLiter(s) (50 mL/Hr) IV Continuous <Continuous>  dextrose 5%. 1000 milliLiter(s) (100 mL/Hr) IV Continuous <Continuous>  dextrose 50% Injectable 25 Gram(s) IV Push once  dextrose 50% Injectable 12.5 Gram(s) IV Push once  ferrous    sulfate 325 milliGRAM(s) Oral daily  hydrALAZINE 50 milliGRAM(s) Oral every 8 hours  insulin lispro (ADMELOG) corrective regimen sliding scale   SubCutaneous every 6 hours  isosorbide   dinitrate Tablet (ISORDIL) 20 milliGRAM(s) Oral three times a day  losartan 100 milliGRAM(s) Oral every 24 hours  magnesium oxide 400 milliGRAM(s) Oral once  meropenem  IVPB      meropenem  IVPB 500 milliGRAM(s) IV Intermittent every 24 hours  metoprolol succinate ER 25 milliGRAM(s) Oral daily  pantoprazole  Injectable 40 milliGRAM(s) IV Push every 12 hours  potassium chloride    Tablet ER 20 milliEquivalent(s) Oral once  tacrolimus 2 milliGRAM(s) Oral every 12 hours  tamsulosin 0.8 milliGRAM(s) Oral at bedtime    MEDICATIONS  (PRN):  acetaminophen     Tablet .. 650 milliGRAM(s) Oral every 6 hours PRN Mild Pain (1 - 3), Moderate Pain (4 - 6)  dextrose Oral Gel 15 Gram(s) Oral once PRN Blood Glucose LESS THAN 70 milliGRAM(s)/deciliter  sodium chloride 0.65% Nasal 1 Spray(s) Both Nostrils every 4 hours PRN Nasal Congestion        5'4''  pounds +-10%  ABW s/p R AKA: 113 pounds, %SVZ=337     3/21 143.7 pounds, 148.1 pounds  3/25 136 pounds, 145.5 pounds      Estimated energy needs:   ABW s/p R AKA for EER as %ABW is greater then 120%  Adjust for age, Fevers, ESRD/HD, Skin/pressure ulcer, CHF; fluids per team    Estimated Energy Needs From (hermilo/kg)	30  Estimated Energy Needs To (hermilo/kg)	35  Estimated Energy Needs Calculated From (hermilo/kg)	1536  Estimated Energy Needs Calculated To (hermilo/kg)	1792    Estimated Protein Needs From (g/kg)	1.2  Estimated Protein Needs To (g/kg)	1.4  Estimated Protein Needs Calculated From (g/kg)	61.44  Estimated Protein Needs Calculated To (g/kg)	71.68    Subjective:  62y M PMHx HTN, HLD, DM, ICM (s/p cath years ago, RCA 99%, never intervened on), HFrEF 25-30%, ESRD s/p failed kidney transplant (LUE AVF; HD TTS), R AKA admitted to [facility] 2/27 for respiratory distress and SIRS criteria after HD session, requiring intubation. Pt with cardiac arrest, vtach arrest requiring amio and pressors. Extubated 3/2 and course c/b by LGIB with hemoglobin of 3.5 requiring multiple transfusions; EGD/colo and CTA w/o evidence of active bleed and Hgb improved to 11's. Transferred to [facility] 3/17/23 for ICD eval 2/2 Vtach and cardiac cath however patient with episode of BRBPR, flex sig showing localized rectal ulcer but no active bleeding. DAPT resumed on 3/20. 3/21 pt febrile to 102F, +URI symptoms. BCx NGTD with intermittent fevers. +Melena 3/23/23, Fecal occult negative, GI cleared pt and DAPT resumed 3/24. LHC and ICD pending infectious w/u. S/p PET scan 3/25 and awaiting results.    Pt on 5UR. Attended IDR. Pt soundly sleeping and had been covered head to toe with blankets. Pt presented in similair fashion during prior RD visit as well and had been unwilling to come out. Pt breakfast seen at bedside which was untouched. Unsure if d/t pt being sleeping during meal drop off vs lack of appetite. Prior pt had reported decreased appetite however declined ONS when offered d/t reported diarrhea.   Please see below for nutritions recommendations.    Prior PES: Increased needs RT increased demands AEB: ESRD/HD, Skin/pressure ulcer, CHF  >> ongoing   Goal: Pt will meet at least 75% of nutrient needs     Recommendations:  1. Cont with Diet.  2. Monitor %PO intake, Diet tolerance.  >> Would add oral nutrition supplements, however pt does not take in setting of reported diarrhea.  >> If PO is confirmed to be at/below 50%, can consider d/c of DASH.  3. Labs: monitor BMP, CBC, glucose, lytes, trend renal indices, LFTs.  4. Pain/GI per team. Monitor Skin, Wts pre/post HD.  5. RD to remain available for additional nutrition interventions as needed.   Education: NA.    Risk Level: High [  X ] Moderate [   ] Low [   ].

## 2023-03-27 NOTE — ADVANCED PRACTICE NURSE CONSULT - REASON FOR CONSULT
[facility] nurse consult to assess pressure injuries. Per the H&P, the patient is a 63yo M PMHx HTN, HLD, DM,  ICM (s/p cath years ago, RCA 99%, never intervened on), HFrEF 25-30%, ESRD s/p failed kidney transplant (last HD 3/1 via Left Arm AVF; HD TTS), Right AKA presented to [facility] 2/27 for respiratory distress after a dialysis session and admitted to cardiac telemetry on 2/27 with SIRS criteria.  [facility] nurse consult to assess pressure injuries. Per the H&P, the patient is a 63yo M PMHx HTN, HLD, DM,  ICM (s/p cath years ago, RCA 99%, never intervened on), HFrEF 25-30%, ESRD s/p failed kidney transplant (last HD 3/1 via Left Arm AVF; HD TTS), Right AKA presented to [facility] 2/27 for respiratory distress after a dialysis session and admitted to cardiac telemetry on 2/27 with SIRS criteria.  [facility] nurse consult to assess pressure injuries. Per the H&P, the patient is a 63yo M PMHx HTN, HLD, DM,  ICM (s/p cath years ago, RCA 99%, never intervened on), HFrEF 25-30%, ESRD s/p failed kidney transplant (last HD 3/1 via Left Arm AVF; HD TTS), Right AKA presented to [facility] 2/27 for respiratory distress after a dialysis session and admitted to cardiac telemetry on 2/27 with SIRS criteria.

## 2023-03-28 LAB
ANION GAP SERPL CALC-SCNC: 7 MMOL/L — SIGNIFICANT CHANGE UP (ref 5–17)
BUN SERPL-MCNC: 14 MG/DL — SIGNIFICANT CHANGE UP (ref 7–23)
CALCIUM SERPL-MCNC: 8 MG/DL — LOW (ref 8.4–10.5)
CHLORIDE SERPL-SCNC: 94 MMOL/L — LOW (ref 96–108)
CO2 SERPL-SCNC: 27 MMOL/L — SIGNIFICANT CHANGE UP (ref 22–31)
CREAT SERPL-MCNC: 5.81 MG/DL — HIGH (ref 0.5–1.3)
EGFR: 10 ML/MIN/1.73M2 — LOW
GLUCOSE BLDC GLUCOMTR-MCNC: 82 MG/DL — SIGNIFICANT CHANGE UP (ref 70–99)
GLUCOSE BLDC GLUCOMTR-MCNC: 83 MG/DL — SIGNIFICANT CHANGE UP (ref 70–99)
GLUCOSE BLDC GLUCOMTR-MCNC: 92 MG/DL — SIGNIFICANT CHANGE UP (ref 70–99)
GLUCOSE SERPL-MCNC: 82 MG/DL — SIGNIFICANT CHANGE UP (ref 70–99)
HCT VFR BLD CALC: 29.2 % — LOW (ref 39–50)
HGB BLD-MCNC: 9.2 G/DL — LOW (ref 13–17)
MAGNESIUM SERPL-MCNC: 2.2 MG/DL — SIGNIFICANT CHANGE UP (ref 1.6–2.6)
MCHC RBC-ENTMCNC: 28.4 PG — SIGNIFICANT CHANGE UP (ref 27–34)
MCHC RBC-ENTMCNC: 31.5 GM/DL — LOW (ref 32–36)
MCV RBC AUTO: 90.1 FL — SIGNIFICANT CHANGE UP (ref 80–100)
NRBC # BLD: 0 /100 WBCS — SIGNIFICANT CHANGE UP (ref 0–0)
PHOSPHATE SERPL-MCNC: 2.8 MG/DL — SIGNIFICANT CHANGE UP (ref 2.5–4.5)
PLATELET # BLD AUTO: 191 K/UL — SIGNIFICANT CHANGE UP (ref 150–400)
POTASSIUM SERPL-MCNC: 4 MMOL/L — SIGNIFICANT CHANGE UP (ref 3.5–5.3)
POTASSIUM SERPL-SCNC: 4 MMOL/L — SIGNIFICANT CHANGE UP (ref 3.5–5.3)
PSA FLD-MCNC: 12 NG/ML — HIGH (ref 0–4)
RBC # BLD: 3.24 M/UL — LOW (ref 4.2–5.8)
RBC # FLD: 14.8 % — HIGH (ref 10.3–14.5)
SODIUM SERPL-SCNC: 128 MMOL/L — LOW (ref 135–145)
WBC # BLD: 4.17 K/UL — SIGNIFICANT CHANGE UP (ref 3.8–10.5)
WBC # FLD AUTO: 4.17 K/UL — SIGNIFICANT CHANGE UP (ref 3.8–10.5)

## 2023-03-28 PROCEDURE — 72193 CT PELVIS W/DYE: CPT | Mod: 26

## 2023-03-28 PROCEDURE — 99232 SBSQ HOSP IP/OBS MODERATE 35: CPT

## 2023-03-28 PROCEDURE — 90935 HEMODIALYSIS ONE EVALUATION: CPT

## 2023-03-28 PROCEDURE — 93971 EXTREMITY STUDY: CPT | Mod: 26,LT

## 2023-03-28 PROCEDURE — 99233 SBSQ HOSP IP/OBS HIGH 50: CPT

## 2023-03-28 PROCEDURE — 99232 SBSQ HOSP IP/OBS MODERATE 35: CPT | Mod: GC

## 2023-03-28 RX ORDER — ISOSORBIDE DINITRATE 5 MG/1
20 TABLET ORAL THREE TIMES A DAY
Refills: 0 | Status: DISCONTINUED | OUTPATIENT
Start: 2023-03-28 | End: 2023-04-12

## 2023-03-28 RX ORDER — LOSARTAN POTASSIUM 100 MG/1
100 TABLET, FILM COATED ORAL EVERY 24 HOURS
Refills: 0 | Status: DISCONTINUED | OUTPATIENT
Start: 2023-03-28 | End: 2023-04-12

## 2023-03-28 RX ORDER — CHLORHEXIDINE GLUCONATE 213 G/1000ML
1 SOLUTION TOPICAL DAILY
Refills: 0 | Status: DISCONTINUED | OUTPATIENT
Start: 2023-03-28 | End: 2023-04-12

## 2023-03-28 RX ORDER — DOXERCALCIFEROL 2.5 UG/1
4 CAPSULE ORAL ONCE
Refills: 0 | Status: COMPLETED | OUTPATIENT
Start: 2023-03-28 | End: 2023-03-28

## 2023-03-28 RX ORDER — ERYTHROPOIETIN 10000 [IU]/ML
6000 INJECTION, SOLUTION INTRAVENOUS; SUBCUTANEOUS ONCE
Refills: 0 | Status: COMPLETED | OUTPATIENT
Start: 2023-03-28 | End: 2023-03-28

## 2023-03-28 RX ORDER — HYDRALAZINE HCL 50 MG
50 TABLET ORAL EVERY 8 HOURS
Refills: 0 | Status: DISCONTINUED | OUTPATIENT
Start: 2023-03-28 | End: 2023-04-12

## 2023-03-28 RX ADMIN — Medication 50 MILLIGRAM(S): at 14:32

## 2023-03-28 RX ADMIN — ATORVASTATIN CALCIUM 40 MILLIGRAM(S): 80 TABLET, FILM COATED ORAL at 21:55

## 2023-03-28 RX ADMIN — CLOPIDOGREL BISULFATE 75 MILLIGRAM(S): 75 TABLET, FILM COATED ORAL at 14:32

## 2023-03-28 RX ADMIN — Medication 50 MILLIGRAM(S): at 21:55

## 2023-03-28 RX ADMIN — TACROLIMUS 2 MILLIGRAM(S): 5 CAPSULE ORAL at 22:29

## 2023-03-28 RX ADMIN — CHLORHEXIDINE GLUCONATE 1 APPLICATION(S): 213 SOLUTION TOPICAL at 22:23

## 2023-03-28 RX ADMIN — Medication 50 MILLIGRAM(S): at 06:04

## 2023-03-28 RX ADMIN — Medication 325 MILLIGRAM(S): at 14:31

## 2023-03-28 RX ADMIN — ISOSORBIDE DINITRATE 20 MILLIGRAM(S): 5 TABLET ORAL at 21:55

## 2023-03-28 RX ADMIN — TACROLIMUS 2 MILLIGRAM(S): 5 CAPSULE ORAL at 08:55

## 2023-03-28 RX ADMIN — ERYTHROPOIETIN 6000 UNIT(S): 10000 INJECTION, SOLUTION INTRAVENOUS; SUBCUTANEOUS at 18:30

## 2023-03-28 RX ADMIN — PANTOPRAZOLE SODIUM 40 MILLIGRAM(S): 20 TABLET, DELAYED RELEASE ORAL at 21:55

## 2023-03-28 RX ADMIN — DOXERCALCIFEROL 4 MICROGRAM(S): 2.5 CAPSULE ORAL at 18:32

## 2023-03-28 RX ADMIN — PANTOPRAZOLE SODIUM 40 MILLIGRAM(S): 20 TABLET, DELAYED RELEASE ORAL at 06:04

## 2023-03-28 RX ADMIN — LOSARTAN POTASSIUM 100 MILLIGRAM(S): 100 TABLET, FILM COATED ORAL at 14:31

## 2023-03-28 RX ADMIN — ISOSORBIDE DINITRATE 20 MILLIGRAM(S): 5 TABLET ORAL at 14:32

## 2023-03-28 RX ADMIN — TAMSULOSIN HYDROCHLORIDE 0.8 MILLIGRAM(S): 0.4 CAPSULE ORAL at 21:55

## 2023-03-28 RX ADMIN — Medication 650 MILLIGRAM(S): at 00:30

## 2023-03-28 RX ADMIN — Medication 81 MILLIGRAM(S): at 14:31

## 2023-03-28 NOTE — PROGRESS NOTE ADULT - PROBLEM SELECTOR PLAN 4
@ [facility], severe GIB w/ Hgb down to 3.5 received total of 7u PRBCs.    - 3/17 AM pt passed loose BM along w/ large amount of BRBPR.    - s/p Flex sigmoidoscopy – Localized ulceration and erosion w/ no bleeding in rectum, suggestive of solitary ulcer syndrome; evidence of prior hemoclip was found in sigmoid colon.    - Avoid anal digitation and enemas  - Bowel regimen w/ Miralax and Dulcolax to avoid constipation  - Fecal occult negative 3/24 and cleared by GI for DAPT  - Transfusion goal Hgb < 8.0 @ [facility], severe GIB w/ Hgb down to 3.5 received total of 7u PRBCs.    - 3/17 AM pt passed loose BM along w/ large amount of BRBPR.    - s/p Flex sigmoidoscopy – Localized ulceration and erosion w/ no bleeding in rectum, suggestive of solitary ulcer syndrome; evidence of prior hemoclip was found in sigmoid colon.    - Avoid anal digitation and enemas  - Bowel regimen w/ Miralax and Dulcolax to avoid constipation  - Fecal occult negative 3/24 and cleared by GI for DAPT  - Transfusion goal Hgb < 8.0 @ [facility], severe GIB w/ Hgb down to 3.5 received total of 7u PRBCs.    - 3/17 AM pt passed loose BM along w/ large amount of BRBPR.    - s/p Flex sigmoidoscopy – Localized ulceration and erosion w/ no bleeding in rectum, suggestive of solitary ulcer syndrome; evidence of prior hemoclip was found in sigmoid colon.    - Avoid anal digitation and enemas  - Bowel regimen w/ Miralax and Dulcolax to avoid constipation  - Fecal occult negative 3/24 and cleared by GI for DAPT  - Transfusion goal Hgb < 8.0

## 2023-03-28 NOTE — PROGRESS NOTE ADULT - ATTENDING COMMENTS
63yo M PMHx HTN, HLD, DM, CAD(s/p cath years ago, RCA 99%, never intervened on), HFrEF 25-30%, ESRD s/p failed kidney transplant and required HD x 3yrs (last HD 3/16 via Left Arm AVF; HD TTS), Right AKA initially presented to [facility] 2/27 for respiratory distress after a dialysis, found to be septic, h/c complicated by AHRF requiring intubation for 24hr followed by VT arrest, h/c the complicated by massive LGIB (rectal ulcer seen on colonoscopy) requiring 7u pRBC, 1u FFP and 1u PLT. Transferred to [facility] (3/17) for ICD placement 2/2 Vtach and cardiac cath. Pt noted lassed loose stool with BRBPR, GI and surgery consulted. Flex Sigc ( 3/17) found prior hemoclip in the sigmoid colon, localized ulceration and erosive with no bleeding noted in rectum suggestive of solitary ulcer syndrome. Medicine consulted for comanagement.   pt seen with Dr. Snow    no complaint of pain in left leg, no pain in supra-pubic area.  comfortable appearing.   RRR  good air entry bilaterally  left AVF with good thrill.  no abdominal tenderness.  no edema no left leg, right AKA       -fever -  #LGIB  #SANTOS  #Solitary ulcer syndrome  - hx coffee ground emesis @ [facility]; s/p EGD/colonoscopy showing rectal ulcer  - per gen surgery no acute surgical intervention   - per GI:      - increased Protonix 40 mg IVP BID      - s/p flex sig(3/17)  for rectal ulcers, found Evidence of prior hemoclip in the sigmoid colon. Localized ulceration and erosive with no bleeding were noted in the rectum, suggestive of solitary ulcer syndrome           - Avoid anal digitation and enemas           - High-fiber diet and optimize laxatives to avoid constipation using miralax and dulcolax           - No absolute GI contraindications to anticoag/antiplt therapy           - Repeat endoscopic evaluation recommended in 3 months  - Active T&S  - Hgb  stable-  - Transfuse for hgb <8 (active CAD)    #CAD   - Previous cath at [facility] showing oLM 30% and RCA 99% that is not amenable to intervention.  - TTE 3/1/23 at [facility]: mild LVH, EF 25-30%, severe global wall motion dysfunction, mildly dilated LA, RV mildly dilated, all leaflets mild calcified, mod pHTN  - plan for cardiac cath -deferred due to concern for GI bleeding.    #H/O ventricular tachycardia   - Vtach arrest @[facility]  - fu EP consult for ICD placement- pending infection work up.    # Fever -ID concern is prostate abscess -  - PET/CT scan - no Lymphadenopathy to suggest lymphoproliferative disorder, activity in prostate and femur.  - given immunocompromised/hx Kidney transplant    - ID consult appreciated, -rec appreciated   "- Please send urine for BK virus  - CT pelvis w/wo IVC  - Start ceftriaxone 2 g IV q24h- concern prostate abscess -3/27)  - D/C vancomycin and meropenem   would need to rule out infection prior to icd placement.    #ESRD on dialysis/ sp renal transplant.   - dialysis Tues, Thurs, Sat  - last received  Thurs 3/23  - f/u nephro recs    #HTN  - c/w home meds. Amlodipine 10mg qd, Losartan 100mg qd, Hydralazine 50m TID, Isordil 20mg TID with holding parameter    #HLD   - c/w Lipitor 40mg qd    #DM  - no meds per [place], obtain collaterals for med recs prior [place] stay  - mISS while inpt, goal -180   - A1c 5.9 on admission    DVT PPX: SCDs for now iso GIB    Med consult team continues to follow with you. 61yo M PMHx HTN, HLD, DM, CAD(s/p cath years ago, RCA 99%, never intervened on), HFrEF 25-30%, ESRD s/p failed kidney transplant and required HD x 3yrs (last HD 3/16 via Left Arm AVF; HD TTS), Right AKA initially presented to [facility] 2/27 for respiratory distress after a dialysis, found to be septic, h/c complicated by AHRF requiring intubation for 24hr followed by VT arrest, h/c the complicated by massive LGIB (rectal ulcer seen on colonoscopy) requiring 7u pRBC, 1u FFP and 1u PLT. Transferred to [facility] (3/17) for ICD placement 2/2 Vtach and cardiac cath. Pt noted lassed loose stool with BRBPR, GI and surgery consulted. Flex Sigc ( 3/17) found prior hemoclip in the sigmoid colon, localized ulceration and erosive with no bleeding noted in rectum suggestive of solitary ulcer syndrome. Medicine consulted for comanagement.   pt seen with Dr. Snow    no complaint of pain in left leg, no pain in supra-pubic area.  comfortable appearing.   RRR  good air entry bilaterally  left AVF with good thrill.  no abdominal tenderness.  no edema no left leg, right AKA       -fever -  #LGIB  #SANTOS  #Solitary ulcer syndrome  - hx coffee ground emesis @ [facility]; s/p EGD/colonoscopy showing rectal ulcer  - per gen surgery no acute surgical intervention   - per GI:      - increased Protonix 40 mg IVP BID      - s/p flex sig(3/17)  for rectal ulcers, found Evidence of prior hemoclip in the sigmoid colon. Localized ulceration and erosive with no bleeding were noted in the rectum, suggestive of solitary ulcer syndrome           - Avoid anal digitation and enemas           - High-fiber diet and optimize laxatives to avoid constipation using miralax and dulcolax           - No absolute GI contraindications to anticoag/antiplt therapy           - Repeat endoscopic evaluation recommended in 3 months  - Active T&S  - Hgb  stable-  - Transfuse for hgb <8 (active CAD)    #CAD   - Previous cath at [facility] showing oLM 30% and RCA 99% that is not amenable to intervention.  - TTE 3/1/23 at [facility]: mild LVH, EF 25-30%, severe global wall motion dysfunction, mildly dilated LA, RV mildly dilated, all leaflets mild calcified, mod pHTN  - plan for cardiac cath -deferred due to concern for GI bleeding.    #H/O ventricular tachycardia   - Vtach arrest @[facility]  - fu EP consult for ICD placement- pending infection work up.    # Fever -ID concern is prostate abscess -  - PET/CT scan - no Lymphadenopathy to suggest lymphoproliferative disorder, activity in prostate and femur.  - given immunocompromised/hx Kidney transplant    - ID consult appreciated, -rec appreciated   "- Please send urine for BK virus  - CT pelvis w/wo IVC  - Start ceftriaxone 2 g IV q24h- concern prostate abscess -3/27)  - D/C vancomycin and meropenem   would need to rule out infection prior to icd placement.    #ESRD on dialysis/ sp renal transplant.   - dialysis Tues, Thurs, Sat  - last received  Thurs 3/23  - f/u nephro recs    #HTN  - c/w home meds. Amlodipine 10mg qd, Losartan 100mg qd, Hydralazine 50m TID, Isordil 20mg TID with holding parameter    #HLD   - c/w Lipitor 40mg qd    #DM  - no meds per [place], obtain collaterals for med recs prior [place] stay  - mISS while inpt, goal -180   - A1c 5.9 on admission    DVT PPX: SCDs for now iso GIB    Med consult team continues to follow with you. 63yo M PMHx HTN, HLD, DM, CAD(s/p cath years ago, RCA 99%, never intervened on), HFrEF 25-30%, ESRD s/p failed kidney transplant and required HD x 3yrs (last HD 3/16 via Left Arm AVF; HD TTS), Right AKA initially presented to [facility] 2/27 for respiratory distress after a dialysis, found to be septic, h/c complicated by AHRF requiring intubation for 24hr followed by VT arrest, h/c the complicated by massive LGIB (rectal ulcer seen on colonoscopy) requiring 7u pRBC, 1u FFP and 1u PLT. Transferred to [facility] (3/17) for ICD placement 2/2 Vtach and cardiac cath. Pt noted lassed loose stool with BRBPR, GI and surgery consulted. Flex Sigc ( 3/17) found prior hemoclip in the sigmoid colon, localized ulceration and erosive with no bleeding noted in rectum suggestive of solitary ulcer syndrome. Medicine consulted for comanagement.   pt seen with Dr. Snow    no complaint of pain in left leg, no pain in supra-pubic area.  comfortable appearing.   RRR  good air entry bilaterally  left AVF with good thrill.  no abdominal tenderness.  no edema no left leg, right AKA       -fever -  #LGIB  #SANTOS  #Solitary ulcer syndrome  - hx coffee ground emesis @ [facility]; s/p EGD/colonoscopy showing rectal ulcer  - per gen surgery no acute surgical intervention   - per GI:      - increased Protonix 40 mg IVP BID      - s/p flex sig(3/17)  for rectal ulcers, found Evidence of prior hemoclip in the sigmoid colon. Localized ulceration and erosive with no bleeding were noted in the rectum, suggestive of solitary ulcer syndrome           - Avoid anal digitation and enemas           - High-fiber diet and optimize laxatives to avoid constipation using miralax and dulcolax           - No absolute GI contraindications to anticoag/antiplt therapy           - Repeat endoscopic evaluation recommended in 3 months  - Active T&S  - Hgb  stable-  - Transfuse for hgb <8 (active CAD)    #CAD   - Previous cath at [facility] showing oLM 30% and RCA 99% that is not amenable to intervention.  - TTE 3/1/23 at [facility]: mild LVH, EF 25-30%, severe global wall motion dysfunction, mildly dilated LA, RV mildly dilated, all leaflets mild calcified, mod pHTN  - plan for cardiac cath -deferred due to concern for GI bleeding.    #H/O ventricular tachycardia   - Vtach arrest @[facility]  - fu EP consult for ICD placement- pending infection work up.    # Fever -ID concern is prostate abscess -  - PET/CT scan - no Lymphadenopathy to suggest lymphoproliferative disorder, activity in prostate and femur.  - given immunocompromised/hx Kidney transplant    - ID consult appreciated, -rec appreciated   "- Please send urine for BK virus  - CT pelvis w/wo IVC  - Start ceftriaxone 2 g IV q24h- concern prostate abscess -3/27)  - D/C vancomycin and meropenem   would need to rule out infection prior to icd placement.    #ESRD on dialysis/ sp renal transplant.   - dialysis Tues, Thurs, Sat  - last received  Thurs 3/23  - f/u nephro recs    #HTN  - c/w home meds. Amlodipine 10mg qd, Losartan 100mg qd, Hydralazine 50m TID, Isordil 20mg TID with holding parameter    #HLD   - c/w Lipitor 40mg qd    #DM  - no meds per [place], obtain collaterals for med recs prior [place] stay  - mISS while inpt, goal -180   - A1c 5.9 on admission    DVT PPX: SCDs for now iso GIB    Med consult team continues to follow with you. 63yo M PMHx HTN, HLD, DM, CAD(s/p cath years ago, RCA 99%, never intervened on), HFrEF 25-30%, ESRD s/p failed kidney transplant and required HD x 3yrs (last HD 3/16 via Left Arm AVF; HD TTS), Right AKA initially presented to [facility] 2/27 for respiratory distress after a dialysis, found to be septic, h/c complicated by AHRF requiring intubation for 24hr followed by VT arrest, h/c the complicated by massive LGIB (rectal ulcer seen on colonoscopy) requiring 7u pRBC, 1u FFP and 1u PLT. Transferred to [facility] (3/17) for ICD placement 2/2 Vtach and cardiac cath. Pt noted lassed loose stool with BRBPR, GI and surgery consulted. Flex Sigc ( 3/17) found prior hemoclip in the sigmoid colon, localized ulceration and erosive with no bleeding noted in rectum suggestive of solitary ulcer syndrome. Medicine consulted for comanagement.   pt seen with Dr. Snow    no complaint of pain in left leg, no pain in supra-pubic area.  comfortable appearing.   RRR  good air entry bilaterally  left AVF with good thrill.  no abdominal tenderness.  no edema no left leg, right AKA       -fever -  #LGIB  #SANTOS  #Solitary ulcer syndrome  - hx coffee ground emesis @ [facility]; s/p EGD/colonoscopy showing rectal ulcer  - per gen surgery no acute surgical intervention   - per GI:      - increased Protonix 40 mg IVP BID      - s/p flex sig(3/17)  for rectal ulcers, found Evidence of prior hemoclip in the sigmoid colon. Localized ulceration and erosive with no bleeding were noted in the rectum, suggestive of solitary ulcer syndrome           - Avoid anal digitation and enemas           - High-fiber diet and optimize laxatives to avoid constipation using miralax and dulcolax           - No absolute GI contraindications to anticoag/antiplt therapy           - Repeat endoscopic evaluation recommended in 3 months  - Active T&S  - Hgb  stable-  - Transfuse for hgb <8 (active CAD)    #CAD   - Previous cath at [facility] showing oLM 30% and RCA 99% that is not amenable to intervention.  - TTE 3/1/23 at [facility]: mild LVH, EF 25-30%, severe global wall motion dysfunction, mildly dilated LA, RV mildly dilated, all leaflets mild calcified, mod pHTN  - plan for cardiac cath -deferred due to concern for GI bleeding.    #H/O ventricular tachycardia   - Vtach arrest @[facility]  - fu EP consult for ICD placement- pending infection work up.    # Fever -ID concern is prostate abscess -  - PET/CT scan - no Lymphadenopathy to suggest lymphoproliferative disorder, activity in prostate and femur.  - given immunocompromised/hx Kidney transplant    - ID consult appreciated, -rec appreciated   "- Please send urine for BK virus  - CT pelvis w/wo IVC  - Start ceftriaxone 2 g IV q24h- concern prostate abscess -3/27)  - D/C vancomycin and meropenem   would need to rule out infection prior to icd placement.  - Left calf pain, negative for DVT    #ESRD on dialysis/ sp renal transplant.   - dialysis Tues, Thurs, Sat  -HD per renal.    #HTN  - c/w home meds. Amlodipine 10mg qd, Losartan 100mg qd, Hydralazine 50m TID, Isordil 20mg TID with holding parameter    #HLD   - c/w Lipitor 40mg qd    #DM  - no meds per [place], obtain collaterals for med recs prior [place] stay  - mISS while inpt, goal -180   - A1c 5.9 on admission    DVT PPX: SCDs for now iso GIB    Med consult team continues to follow with you. 63yo M PMHx HTN, HLD, DM, CAD(s/p cath years ago, RCA 99%, never intervened on), HFrEF 25-30%, ESRD s/p failed kidney transplant and required HD x 3yrs (last HD 3/16 via Left Arm AVF; HD TTS), Right AKA initially presented to [facility] 2/27 for respiratory distress after a dialysis, found to be septic, h/c complicated by AHRF requiring intubation for 24hr followed by VT arrest, h/c the complicated by massive LGIB (rectal ulcer seen on colonoscopy) requiring 7u pRBC, 1u FFP and 1u PLT. Transferred to [facility] (3/17) for ICD placement 2/2 Vtach and cardiac cath. Pt noted lassed loose stool with BRBPR, GI and surgery consulted. Flex Sigc ( 3/17) found prior hemoclip in the sigmoid colon, localized ulceration and erosive with no bleeding noted in rectum suggestive of solitary ulcer syndrome. Medicine consulted for comanagement.   pt seen with Dr. Snow    no complaint of pain in left leg, no pain in supra-pubic area.  comfortable appearing.   RRR  good air entry bilaterally  left AVF with good thrill.  no abdominal tenderness.  no edema no left leg, right AKA       -fever -  #LGIB  #SANTOS  #Solitary ulcer syndrome  - hx coffee ground emesis @ [facility]; s/p EGD/colonoscopy showing rectal ulcer  - per gen surgery no acute surgical intervention   - per GI:      - increased Protonix 40 mg IVP BID      - s/p flex sig(3/17)  for rectal ulcers, found Evidence of prior hemoclip in the sigmoid colon. Localized ulceration and erosive with no bleeding were noted in the rectum, suggestive of solitary ulcer syndrome           - Avoid anal digitation and enemas           - High-fiber diet and optimize laxatives to avoid constipation using miralax and dulcolax           - No absolute GI contraindications to anticoag/antiplt therapy           - Repeat endoscopic evaluation recommended in 3 months  - Active T&S  - Hgb  stable-  - Transfuse for hgb <8 (active CAD)    #CAD   - Previous cath at [facility] showing oLM 30% and RCA 99% that is not amenable to intervention.  - TTE 3/1/23 at [facility]: mild LVH, EF 25-30%, severe global wall motion dysfunction, mildly dilated LA, RV mildly dilated, all leaflets mild calcified, mod pHTN  - plan for cardiac cath -deferred due to concern for GI bleeding.    #H/O ventricular tachycardia   - Vtach arrest @[facility]  - fu EP consult for ICD placement- pending infection work up.    # Fever -ID concern is prostate abscess -  - PET/CT scan - no Lymphadenopathy to suggest lymphoproliferative disorder, activity in prostate and femur.  - given immunocompromised/hx Kidney transplant    - ID consult appreciated, -rec appreciated   "- Please send urine for BK virus  - CT pelvis w/wo IVC  - Start ceftriaxone 2 g IV q24h- concern prostate abscess -3/27)  - D/C vancomycin and meropenem   would need to rule out infection prior to icd placement.  - Left calf pain, negative for DVT    #ESRD on dialysis/ sp renal transplant.   - dialysis Tues, Thurs, Sat  -HD per renal.    #HTN  - c/w home meds. Amlodipine 10mg qd, Losartan 100mg qd, Hydralazine 50m TID, Isordil 20mg TID with holding parameter    #HLD   - c/w Lipitor 40mg qd    #DM  - no meds per [place], obtain collaterals for med recs prior [place] stay  - mISS while inpt, goal -180   - A1c 5.9 on admission    DVT PPX: SCDs for now iso GIB    Med consult team continues to follow with you. 61yo M PMHx HTN, HLD, DM, CAD(s/p cath years ago, RCA 99%, never intervened on), HFrEF 25-30%, ESRD s/p failed kidney transplant and required HD x 3yrs (last HD 3/16 via Left Arm AVF; HD TTS), Right AKA initially presented to [facility] 2/27 for respiratory distress after a dialysis, found to be septic, h/c complicated by AHRF requiring intubation for 24hr followed by VT arrest, h/c the complicated by massive LGIB (rectal ulcer seen on colonoscopy) requiring 7u pRBC, 1u FFP and 1u PLT. Transferred to [facility] (3/17) for ICD placement 2/2 Vtach and cardiac cath. Pt noted lassed loose stool with BRBPR, GI and surgery consulted. Flex Sigc ( 3/17) found prior hemoclip in the sigmoid colon, localized ulceration and erosive with no bleeding noted in rectum suggestive of solitary ulcer syndrome. Medicine consulted for comanagement.   pt seen with Dr. Snow    no complaint of pain in left leg, no pain in supra-pubic area.  comfortable appearing.   RRR  good air entry bilaterally  left AVF with good thrill.  no abdominal tenderness.  no edema no left leg, right AKA       -fever -  #LGIB  #SANTOS  #Solitary ulcer syndrome  - hx coffee ground emesis @ [facility]; s/p EGD/colonoscopy showing rectal ulcer  - per gen surgery no acute surgical intervention   - per GI:      - increased Protonix 40 mg IVP BID      - s/p flex sig(3/17)  for rectal ulcers, found Evidence of prior hemoclip in the sigmoid colon. Localized ulceration and erosive with no bleeding were noted in the rectum, suggestive of solitary ulcer syndrome           - Avoid anal digitation and enemas           - High-fiber diet and optimize laxatives to avoid constipation using miralax and dulcolax           - No absolute GI contraindications to anticoag/antiplt therapy           - Repeat endoscopic evaluation recommended in 3 months  - Active T&S  - Hgb  stable-  - Transfuse for hgb <8 (active CAD)    #CAD   - Previous cath at [facility] showing oLM 30% and RCA 99% that is not amenable to intervention.  - TTE 3/1/23 at [facility]: mild LVH, EF 25-30%, severe global wall motion dysfunction, mildly dilated LA, RV mildly dilated, all leaflets mild calcified, mod pHTN  - plan for cardiac cath -deferred due to concern for GI bleeding.    #H/O ventricular tachycardia   - Vtach arrest @[facility]  - fu EP consult for ICD placement- pending infection work up.    # Fever -ID concern is prostate abscess -  - PET/CT scan - no Lymphadenopathy to suggest lymphoproliferative disorder, activity in prostate and femur.  - given immunocompromised/hx Kidney transplant    - ID consult appreciated, -rec appreciated   "- Please send urine for BK virus  - CT pelvis w/wo IVC  - Start ceftriaxone 2 g IV q24h- concern prostate abscess -3/27)  - D/C vancomycin and meropenem   would need to rule out infection prior to icd placement.  - Left calf pain, negative for DVT    #ESRD on dialysis/ sp renal transplant.   - dialysis Tues, Thurs, Sat  -HD per renal.    #HTN  - c/w home meds. Amlodipine 10mg qd, Losartan 100mg qd, Hydralazine 50m TID, Isordil 20mg TID with holding parameter    #HLD   - c/w Lipitor 40mg qd    #DM  - no meds per [place], obtain collaterals for med recs prior [place] stay  - mISS while inpt, goal -180   - A1c 5.9 on admission    DVT PPX: SCDs for now iso GIB    Med consult team continues to follow with you.

## 2023-03-28 NOTE — PROGRESS NOTE ADULT - SUBJECTIVE AND OBJECTIVE BOX
OVERNIGHT EVENTS: None    SUBJECTIVE / INTERVAL HPI: Patient seen and examined at bedside. Denies f/c, n/v/d/c, chest pain, shortness of breath, abdominal pain.    VITAL SIGNS:  Vital Signs Last 24 Hrs  T(C): 36.3 (28 Mar 2023 09:28), Max: 37.6 (27 Mar 2023 18:48)  T(F): 97.4 (28 Mar 2023 09:28), Max: 99.7 (27 Mar 2023 18:48)  HR: 51 (28 Mar 2023 14:19) (48 - 56)  BP: 157/70 (28 Mar 2023 14:19) (108/50 - 157/70)  BP(mean): 70 (28 Mar 2023 05:35) (70 - 75)  RR: 16 (28 Mar 2023 14:19) (15 - 17)  SpO2: 100% (28 Mar 2023 14:19) (98% - 100%)    Parameters below as of 28 Mar 2023 14:19  Patient On (Oxygen Delivery Method): nasal cannula  O2 Flow (L/min): 2    I&O's Summary    27 Mar 2023 07:01  -  28 Mar 2023 07:00  --------------------------------------------------------  IN: 740 mL / OUT: 325 mL / NET: 415 mL    28 Mar 2023 07:01  -  28 Mar 2023 17:12  --------------------------------------------------------  IN: 180 mL / OUT: 0 mL / NET: 180 mL        PHYSICAL EXAM:    General: NAD  HEENT: NC/AT; PERRL, anicteric sclera; MMM  Neck: supple  Cardiovascular: +S1/S2; RRR  Respiratory: CTA B/L; no W/R/R  Gastrointestinal: soft, NT/ND; +BSx4  Extremities: WWP; no edema, clubbing or cyanosis  Vascular: 2+ radial, DP/PT pulses B/L  Neurological: AAOx3; no focal deficits    MEDICATIONS:  MEDICATIONS  (STANDING):  aspirin enteric coated 81 milliGRAM(s) Oral daily  atorvastatin 40 milliGRAM(s) Oral at bedtime  cefTRIAXone   IVPB 2000 milliGRAM(s) IV Intermittent every 24 hours  clopidogrel Tablet 75 milliGRAM(s) Oral daily  dextrose 5%. 1000 milliLiter(s) (100 mL/Hr) IV Continuous <Continuous>  dextrose 5%. 1000 milliLiter(s) (50 mL/Hr) IV Continuous <Continuous>  dextrose 50% Injectable 25 Gram(s) IV Push once  dextrose 50% Injectable 12.5 Gram(s) IV Push once  doxercalciferol Injectable 4 MICROGram(s) IV Push once  epoetin janae-epbx (RETACRIT) Injectable 6000 Unit(s) IV Push once  ferrous    sulfate 325 milliGRAM(s) Oral daily  hydrALAZINE 50 milliGRAM(s) Oral every 8 hours  insulin lispro (ADMELOG) corrective regimen sliding scale   SubCutaneous every 6 hours  isosorbide   dinitrate Tablet (ISORDIL) 20 milliGRAM(s) Oral three times a day  losartan 100 milliGRAM(s) Oral every 24 hours  metoprolol succinate ER 25 milliGRAM(s) Oral daily  pantoprazole  Injectable 40 milliGRAM(s) IV Push every 12 hours  tacrolimus 2 milliGRAM(s) Oral every 12 hours  tamsulosin 0.8 milliGRAM(s) Oral at bedtime    MEDICATIONS  (PRN):  acetaminophen     Tablet .. 650 milliGRAM(s) Oral every 6 hours PRN Mild Pain (1 - 3), Moderate Pain (4 - 6)  dextrose Oral Gel 15 Gram(s) Oral once PRN Blood Glucose LESS THAN 70 milliGRAM(s)/deciliter  sodium chloride 0.65% Nasal 1 Spray(s) Both Nostrils every 4 hours PRN Nasal Congestion      ALLERGIES:  Allergies    No Known Allergies    Intolerances        LABS:                        9.2    4.17  )-----------( 191      ( 28 Mar 2023 05:30 )             29.2     03-28    128<L>  |  94<L>  |  14  ----------------------------<  82  4.0   |  27  |  5.81<H>    Ca    8.0<L>      28 Mar 2023 05:30  Phos  2.8     03-28  Mg     2.2     03-28          CAPILLARY BLOOD GLUCOSE      POCT Blood Glucose.: 82 mg/dL (28 Mar 2023 11:39)      RADIOLOGY & ADDITIONAL TESTS: Reviewed.

## 2023-03-28 NOTE — PROGRESS NOTE ADULT - PROBLEM SELECTOR PLAN 3
s/p VTach arrest @ [facility]; no tele events noted.    - EP Consulted – plan for ICD placement once pt has LHC   - c/w toprol 25mg qd s/p VTach arrest @ [facility]; no tele events noted.    - EP Consulted – plan for ICD placement once pt has LHC   - c/w toprol 25mg qd s/p VTach arrest @ [facility]; no tele events noted.    - EP Consulted – plan for ICD placement once pt has LHC   - c/w toprol 25mg qd

## 2023-03-28 NOTE — PROGRESS NOTE ADULT - ASSESSMENT
62Y M w/ HTN, DM, ESRD s/p failed kidney transplant who was transferred to [facility] (3/17) from [place] for ICD placement and Cardiac Cath after prolonged admission c/b cardiac arrest and vtach arrest and hypovolemic shock 2/2 GI bleed. New fever 3/21, s/p NM PET/CT c/w prostatitis vs neoplastic disease, pending further imaging, on CTX per ID. Pending repeat cath and ICD placement once infection cleared. Also w/ intermittent melena on 3/23, GI and surg following but no intervention planned    #Seen on HD    #ESRD on HD TTS  Outpatient HD prescription; [id number], F180 dialyzer, 2K 2.5Ca dialysate,  ml/min,  ml/min, EDW 65kg  Last HD yesterday 3/25; Tolerated 3.5 hours with 2 L removed as planned  Seen on HD today. Tolerating well. Aim is for 3.5 hour session and 2 L UF. Will use F160 dialyzer based on pt's weight and clearance. Will lower UF goal if SBP<110  Daily BMP   Continue with Tacro at home dose. Recommend pt follow up with outpatient nephrologist regarding tacro  Next HD 3/30      HTN:  UF with HD as tolerated   Intermittently hypotensive. Amlodipine dc'ed  Continue losartan 100mg, metoprolol 25mg ER, hydralazine 50mg TID. Hold meds for SBP<110    Anemia:  Hgb at goal at 9.2  epo w/ HD  Transfusion per primary      MBD:  Calcium 8.0  Phos: 2.8  iPTH 431  On Hectorol 4mcg TIW as outpatient. Will continue  Check phos twice weekly. Goal <5.5. Can replete if needed to keep>3         62Y M w/ HTN, DM, ESRD s/p failed kidney transplant who was transferred to [facility] (3/17) from [place] for ICD placement and Cardiac Cath after prolonged admission c/b cardiac arrest and vtach arrest and hypovolemic shock 2/2 GI bleed. New fever 3/21, s/p NM PET/CT c/w prostatitis vs neoplastic disease, pending further imaging, on CTX per ID. Pending repeat cath and ICD placement once infection cleared. Also w/ intermittent melena on 3/23, GI and surg following but no intervention planned    #Seen on HD    #ESRD on HD TTS  Outpatient HD prescription; [id number], F180 dialyzer, 2K 2.5Ca dialysate,  ml/min,  ml/min, EDW 65kg  Last HD yesterday 3/25; Tolerated 3.5 hours with 2 L removed as planned  Seen on HD today. Tolerating well. Aim is for 3.5 hour session and 2 L UF. Will use F160 dialyzer based on pt's weight and clearance. Will lower UF goal if SBP<110  Daily BMP   Continue with Tacro at home dose. Recommend pt follow up with outpatient nephrologist regarding tacro  Next HD 3/30      HTN:  UF with HD as tolerated   Intermittently hypotensive. Amlodipine dc'ed  Continue losartan 100mg, metoprolol 25mg ER, hydralazine 50mg TID. Hold meds for SBP<110    Anemia:  Hgb at goal at 9.2  epo w/ HD  Transfusion per primary      MBD:  Calcium 8.0  Phos: 2.8  iPTH 431  On Hectorol 4mcg TIW as outpatient. Will continue  Check phos twice weekly. Goal <5.5. Can replete if needed to keep>3         62Y M w/ HTN, DM, ESRD s/p failed kidney transplant who was transferred to [facility] (3/17) from [place] for ICD placement and Cardiac Cath after prolonged admission c/b cardiac arrest and vtach arrest and hypovolemic shock 2/2 GI bleed. New fever 3/21, s/p NM PET/CT c/w prostatitis vs neoplastic disease, pending further imaging, on CTX per ID. Pending repeat cath and ICD placement once infection cleared. Also w/ intermittent melena on 3/23, GI and surg following but no intervention planned    #Seen on HD    #ESRD on HD TTS  Outpatient HD prescription; [id number], F180 dialyzer, 2K 2.5Ca dialysate,  ml/min,  ml/min, EDW 65kg  Last HD yesterday 3/25; Tolerated 3.5 hours with 2 L removed as planned  Seen on HD today. Tolerating well. Aim is for 3.5 hour session and 2 L UF. Will use F160 dialyzer based on pt's weight and clearance. Will lower UF goal if SBP<110  Daily BMP   Continue with Tacro at home dose. Recommend pt follow up with outpatient nephrologist regarding tacro  Next HD 3/30      HTN:  UF with HD as tolerated   Intermittently hypotensive. Amlodipine dc'ed  Continue losartan 100mg, metoprolol 25mg ER, hydralazine 50mg TID. Hold meds for SBP<110    Anemia:  Hgb at goal at 9.2  epo w/ HD  Transfusion per primary      MBD:  Calcium 8.0  Phos: 2.8  iPTH 431  On Hectorol 4mcg TIW as outpatient. Will continue  Check phos twice weekly. Goal <5.5. Can replete if needed to keep>3

## 2023-03-28 NOTE — PROGRESS NOTE ADULT - PROBLEM SELECTOR PLAN 2
LHC at [place] w/ oLM 30% and RCA 99% that is not amenable to intervention.    - TTE 3/1/23 @ [facility]:  mild LVH, LVEF 25-30%, severe global wall motion dysfxn, mild LA dilation, mild RV dilation, mildly calcified leaflets, mod pHTN  - Severe GIB at [facility] requiring multiple PRBC transfusions  - initially cleared by GI for DAPT and on ASA/Plavix 3/20/23.    - 3/23/23 pt w/ episode of black tarry stool (H/H stable and no hemodynamic signs of bleeding),  fecal occult negative and pt cleared by GI for DAPT and restarted 3/24/23  - Continue ASA 81mg, plavix 75mg qd  - PLAN: pending further and infectious work up to determine date of LHC. LHC at [place] w/ oLM 30% and RCA 99% that is not amenable to intervention.    - TTE 3/1/23 @ [facility]:  mild LVH, LVEF 25-30%, severe global wall motion dysfxn, mild LA dilation, mild RV dilation, mildly calcified leaflets, mod pHTN  - Severe GIB at [facility] requiring multiple PRBC transfusions  - initially cleared by GI for DAPT and on ASA/Plavix 3/20/23.    - 3/23/23 pt w/ episode of black tarry stool (H/H stable and no hemodynamic signs of bleeding),  fecal occult negative and pt cleared by GI for DAPT and restarted 3/24/23  - Continue ASA 81mg, plavix 75mg qd  - PLAN: pending further and infectious work up to determine date of LHC. LHC at [place] w/ oLM 30% and RCA 99% that is not amenable to intervention.    - TTE 3/1/23 @ [facility]:  mild LVH, LVEF 25-30%, severe global wall motion dysfxn, mild LA dilation, mild RV dilation, mildly calcified leaflets, mod pHTN  - Severe GIB at [facility] requiring multiple PRBC transfusions  - initially cleared by GI for DAPT and on ASA/Plavix 3/20/23.    - 3/23/23 pt w/ episode of black tarry stool (H/H stable and no hemodynamic signs of bleeding),  fecal occult negative and pt cleared by GI for DAPT and restarted 3/24/23  - Continue ASA 81mg, plavix 75mg qd  - PLAN: pending further and infectious work up to determine date of LHC.

## 2023-03-28 NOTE — PROGRESS NOTE ADULT - SUBJECTIVE AND OBJECTIVE BOX
Interventional Cardiology PA Adult Progress Note    Subjective Assessment: Pt feels well today, no acute complaints.   	  MEDICATIONS:  hydrALAZINE 50 milliGRAM(s) Oral every 8 hours  isosorbide   dinitrate Tablet (ISORDIL) 20 milliGRAM(s) Oral three times a day  losartan 100 milliGRAM(s) Oral every 24 hours  metoprolol succinate ER 25 milliGRAM(s) Oral daily  cefTRIAXone   IVPB 2000 milliGRAM(s) IV Intermittent every 24 hours  acetaminophen     Tablet .. 650 milliGRAM(s) Oral every 6 hours PRN  pantoprazole  Injectable 40 milliGRAM(s) IV Push every 12 hours  atorvastatin 40 milliGRAM(s) Oral at bedtime  dextrose 50% Injectable 25 Gram(s) IV Push once  dextrose 50% Injectable 12.5 Gram(s) IV Push once  dextrose Oral Gel 15 Gram(s) Oral once PRN  insulin lispro (ADMELOG) corrective regimen sliding scale   SubCutaneous every 6 hours  aspirin enteric coated 81 milliGRAM(s) Oral daily  clopidogrel Tablet 75 milliGRAM(s) Oral daily  dextrose 5%. 1000 milliLiter(s) IV Continuous <Continuous>  dextrose 5%. 1000 milliLiter(s) IV Continuous <Continuous>  doxercalciferol Injectable 4 MICROGram(s) IV Push once  epoetin janae-epbx (RETACRIT) Injectable 6000 Unit(s) IV Push once  ferrous    sulfate 325 milliGRAM(s) Oral daily  sodium chloride 0.65% Nasal 1 Spray(s) Both Nostrils every 4 hours PRN  tacrolimus 2 milliGRAM(s) Oral every 12 hours  tamsulosin 0.8 milliGRAM(s) Oral at bedtime      PHYSICAL EXAM:  TELEMETRY:  T(C): 36.3 (03-28-23 @ 09:28), Max: 37.6 (03-27-23 @ 18:48)  HR: 51 (03-28-23 @ 09:43) (48 - 56)  BP: 110/48 (03-28-23 @ 09:43) (110/48 - 127/52)  RR: 15 (03-28-23 @ 09:43) (15 - 17)  SpO2: 100% (03-28-23 @ 09:43) (98% - 100%)  Wt(kg): --  I&O's Summary    27 Mar 2023 07:01  -  28 Mar 2023 07:00  --------------------------------------------------------  IN: 740 mL / OUT: 325 mL / NET: 415 mL    28 Mar 2023 07:01  -  28 Mar 2023 13:31  --------------------------------------------------------  IN: 180 mL / OUT: 0 mL / NET: 180 mL      Appearance: resting comfortably in bed 		  Cardiovascular: normal s1, s2 regular rate and rhythm   Respiratory: lungs clear to auscultation   Gastrointestinal:  Soft, Non-tender, + BS	  Skin: Left heel ulcer, no rashes, no ecchymoses, no cyanosis  Vascular: Peripheral pulse palpable 2+ LLE   Extremities: R AKA  Neurologic: Non-focal  Psychiatry: A&Ox 3, Mood & affect appropriate  	    LABS:	 	  CARDIAC MARKERS:                        9.2    4.17  )-----------( 191      ( 28 Mar 2023 05:30 )             29.2     128<L>  |  94<L>  |  14  ----------------------------<  82  4.0   |  27  |  5.81<H>    Ca    8.0<L>      28 Mar 2023 05:30    Phos  2.8     03-28    Mg     2.2     03-28

## 2023-03-28 NOTE — PROGRESS NOTE ADULT - PROBLEM SELECTOR PLAN 6
Pt still produced some urine; urinary retention requiring multiple straight caths; now s/p indwelling duvall placement on 3/21/23.  - c/w duvall placement  - new duvall placed 03/27/23

## 2023-03-28 NOTE — PROGRESS NOTE ADULT - ASSESSMENT
63 yo M with HTN, DM, HLD, ICM, HFrEF, ESRD s/p failed RT (HD via LUE AVF), R AKA transferred to [facility] from [facility] on 3/17 for ICD placement with prolonged fever, no leukocytosis (on low dose tacrolimus).   He now appears to have defervesced.  He had a positive UA, urine culture with Klebsiella pneumoniae (S cefazolin).  NM PET/CT showed multifocal uptake in the prostate gland c/c prostatitis or neoplastic disease.  In view of duration of fever while receiving an antibiotic to which his urine isolate was susceptible, is concerning for prostatic abscess.    Suggest:  - Please send urine for BK virus  - CT pelvis w/wo IVC - has been done but not yet read  - Continue ceftriaxone 2 g IV q24h  Recommendations discussed with primary team.  Will follow with you - team 2.      61 yo M with HTN, DM, HLD, ICM, HFrEF, ESRD s/p failed RT (HD via LUE AVF), R AKA transferred to [facility] from [facility] on 3/17 for ICD placement with prolonged fever, no leukocytosis (on low dose tacrolimus).   He now appears to have defervesced.  He had a positive UA, urine culture with Klebsiella pneumoniae (S cefazolin).  NM PET/CT showed multifocal uptake in the prostate gland c/c prostatitis or neoplastic disease.  In view of duration of fever while receiving an antibiotic to which his urine isolate was susceptible, is concerning for prostatic abscess.    Suggest:  - Please send urine for BK virus  - CT pelvis w/wo IVC - has been done but not yet read  - Continue ceftriaxone 2 g IV q24h  Recommendations discussed with primary team.  Will follow with you - team 2.      63 yo M with HTN, DM, HLD, ICM, HFrEF, ESRD s/p failed RT (HD via LUE AVF), R AKA transferred to [facility] from [facility] on 3/17 for ICD placement with prolonged fever, no leukocytosis (on low dose tacrolimus).   He now appears to have defervesced.  He had a positive UA, urine culture with Klebsiella pneumoniae (S cefazolin).  NM PET/CT showed multifocal uptake in the prostate gland c/c prostatitis or neoplastic disease.  In view of duration of fever while receiving an antibiotic to which his urine isolate was susceptible, is concerning for prostatic abscess.    Suggest:  - Please send urine for BK virus  - CT pelvis w/wo IVC - has been done but not yet read  - Continue ceftriaxone 2 g IV q24h  Recommendations discussed with primary team.  Will follow with you - team 2.

## 2023-03-28 NOTE — PROGRESS NOTE ADULT - ASSESSMENT
62y M PMHx HTN, HLD, DM, ICM (s/p cath years ago, RCA 99%, never intervened on), HFrEF 25-30%, ESRD s/p failed kidney transplant (LUE AVF; HD TTS), R AKA admitted to [facility] 2/27 for respiratory distress and SIRS criteria after HD session, requiring intubation. Pt w/ cardiac arrest, vtach arrest requiring amio and pressors. Extubated 3/2 and course c/b by LGIB with hemoglobin of 3.5 requiring multiple transfusions; EGD/colo and CTA w/o evidence of active bleed and Hgb improved to 11's. Transferred to [facility] 03/17/23 for ICD eval 2/2 Vtach and cardiac cath however patient w/ episode of BRBPR, flex sig showing localized rectal ulcer but no active bleeding. DAPT resumed on 3/20. 3/21 patient febrile to 102F w/ URI symptoms. BCx NGTD with intermittent fevers, on broad spectrum Abx w/ ID following. Melena 3/23/23 w/ Fecal occult negative, GI cleared pt and DAPT resumed 3/24. LHC and ICD pending infectious w/u. PET scan from 3/25 suspicious for prostatitis vs prostate abscess, ID following along for infectious work up.  62y M PMHx HTN, HLD, DM, ICM (s/p cath years ago, RCA 99%, never intervened on), HFrEF 25-30%, ESRD s/p failed kidney transplant (LUE AVF; HD TTS), R AKA admitted to [facility] 2/27 for respiratory distress and SIRS criteria after HD session, requiring intubation. Pt w/ cardiac arrest, vtach arrest requiring amio and pressors. Extubated 3/2 and course c/b by LGIB with hemoglobin of 3.5 requiring multiple transfusions; EGD/colo and CTA w/o evidence of active bleed and Hgb improved to 11's. Transferred to [facility] 03/17/23 for ICD eval 2/2 Vtach and cardiac cath however patient w/ episode of BRBPR, flex sig showing localized rectal ulcer but no active bleeding. DAPT resumed on 3/20. 3/21 patient febrile to 102F w/ URI symptoms. BCx NGTD with intermittent fevers, on broad spectrum Abx w/ ID following. Melena 3/23/23 w/ Fecal occult negative, GI cleared pt and DAPT resumed 3/24. LHC and ICD pending infectious w/u. PET scan from 3/25 suspicious for prostatitis vs prostate abscess, ID following along for infectious work up.  62y M PMHx HTN, HLD, DM, ICM (s/p cath years ago, RCA 99%, never intervened on), HFrEF 25-30%, ESRD s/p failed kidney transplant (LUE AVF; HD TTS), R AKA admitted to [facility] 2/27 for respiratory distress and SIRS criteria after HD session, requiring intubation. Pt w/ cardiac arrest, vtach arrest requiring amio and pressors. Extubated 3/2 and course c/b by LGIB with hemoglobin of 3.5 requiring multiple transfusions; EGD/colo and CTA w/o evidence of active bleed and Hgb improved to 11's. Transferred to [facility] 03/17/23 for ICD eval 2/2 Vtach and cardiac cath however patient w/ episode of BRBPR, flex sig showing localized rectal ulcer but no active bleeding. DAPT resumed on 3/20. 3/21 patient febrile to 102F w/ URI symptoms. BCx NGTD with intermittent fevers, on broad spectrum Abx w/ ID following. Melena 3/23/23 w/ Fecal occult negative, GI cleared pt and DAPT resumed 3/24. LHC and ICD pending infectious w/u. PET scan from 3/25 suspicious for prostatitis vs prostate abscess, ID following along for infectious work up.  62y M PMHx HTN, HLD, DM, ICM (s/p cath years ago, RCA 99%, never intervened on), HFrEF 25-30%, ESRD s/p failed kidney transplant (LUE AVF; HD TTS), R AKA admitted to [facility] 2/27 for respiratory distress and SIRS criteria after HD session, requiring intubation. Pt w/ cardiac arrest, vtach arrest requiring amio and pressors. Extubated 3/2 and course c/b by LGIB with hemoglobin of 3.5 requiring multiple transfusions; EGD/colo and CTA w/o evidence of active bleed and Hgb improved to 11's. Transferred to [facility] 03/17/23 for ICD eval 2/2 Vtach and cardiac cath however patient w/ episode of BRBPR, flex sig showing localized rectal ulcer but no active bleeding. DAPT resumed on 3/20. 3/21 patient febrile to 102F w/ URI symptoms. BCx NGTD with intermittent fevers, on broad spectrum Abx w/ ID following. Melena 3/23/23 w/ Fecal occult negative, GI cleared pt and DAPT resumed 3/24. LHC and ICD pending infectious w/u. PET scan from 3/25 suspicious for prostatitis vs prostate abscess, ID following along for infectious work up.   Once infectious work up complete and treatment plan confirmed, consider proceeding w/ LHC followed by ICD. 62y M PMHx HTN, HLD, DM, ICM (s/p cath years ago, RCA 99%, never intervened on), HFrEF 25-30%, ESRD s/p failed kidney transplant (LUE AVF; HD TTS), R AKA admitted to [facility] 2/27 for respiratory distress and SIRS criteria after HD session, requiring intubation. Pt w/ cardiac arrest, vtach arrest requiring amio and pressors. Extubated 3/2 and course c/b by LGIB with hemoglobin of 3.5 requiring multiple transfusions; EGD/colo and CTA w/o evidence of active bleed and Hgb improved to 11's. Transferred to [facility] 03/17/23 for ICD eval 2/2 Vtach and cardiac cath however patient w/ episode of BRBPR, flex sig showing localized rectal ulcer but no active bleeding. DAPT resumed on 3/20. 3/21 patient febrile to 102F w/ URI symptoms. BCx NGTD with intermittent fevers, on broad spectrum Abx w/ ID following. Melena 3/23/23 w/ Fecal occult negative, GI cleared pt and DAPT resumed 3/24. LHC and ICD pending infectious w/u. PET scan from 3/25 suspicious for prostatitis vs prostate abscess, ID following along for infectious work up.   Once infectious work up complete and treatment plan confirmed, consider proceeding w/ LHC followed by ICD. 62y M PMHx HTN, HLD, DM, ICM (s/p cath years ago, RCA 99%, never intervened on), HFrEF 25-30%, ESRD s/p failed kidney transplant (LUE AVF; HD TTS), R AKA admitted to [facility] 2/27 for respiratory distress and SIRS criteria after HD session, requiring intubation. Pt w/ cardiac arrest, vtach arrest requiring amio and pressors. Extubated 3/2 and course c/b by LGIB with hemoglobin of 3.5 requiring multiple transfusions; EGD/colo and CTA w/o evidence of active bleed and Hgb improved to 11's. Transferred to [facility] 03/17/23 for ICD eval 2/2 Vtach and cardiac cath however patient w/ episode of BRBPR, flex sig showing localized rectal ulcer but no active bleeding. DAPT resumed on 3/20. 3/21 patient febrile to 102F w/ URI symptoms. BCx NGTD with intermittent fevers, on broad spectrum Abx w/ ID following. Melena 3/23/23 w/ Fecal occult negative, GI cleared pt and DAPT resumed 3/24. LHC and ICD pending infectious w/u. PET scan from 3/25 suspicious for prostatitis vs prostate abscess, ID following along for infectious work up.   Once infectious work up complete and treatment plan confirmed, consider proceeding w/ LHC followed by ICD.

## 2023-03-28 NOTE — PROGRESS NOTE ADULT - ATTENDING COMMENTS
63 yo man with ESRD on HD, HTN, DM, s/p failed kidney transplant continues on tacrolimus; transferred to [facility] from [place] for ICD placement and Cardiac Cath after prolonged admission  with cardiac arrest, vtach arrest and hypovolemic shock 2/2 GI bleed.  + infection- unclear source- NM PET/CT findings c/w prostatitis or neoplastic process- for additional studies  no evidence for pyelonephritis  cardiac and GI procedures both on hold for now as these other issues are being clarified.    seen and evaluated while on dialysis   tolerating the procedure well  continue full treatment as outlined above 61 yo man with ESRD on HD, HTN, DM, s/p failed kidney transplant continues on tacrolimus; transferred to [facility] from [facility] for ICD placement and Cardiac Cath after prolonged admission  with cardiac arrest, vtach arrest and hypovolemic shock 2/2 GI bleed.  + infection- unclear source- NM PET/CT findings c/w prostatitis or neoplastic process- for additional studies  no evidence for pyelonephritis  cardiac and GI procedures both on hold for now as these other issues are being clarified.    seen and evaluated while on dialysis   tolerating the procedure well  continue full treatment as outlined above 63 yo man with ESRD on HD, HTN, DM, s/p failed kidney transplant continues on tacrolimus; transferred to [facility] from [place] for ICD placement and Cardiac Cath after prolonged admission  with cardiac arrest, vtach arrest and hypovolemic shock 2/2 GI bleed.  + infection- unclear source- NM PET/CT findings c/w prostatitis or neoplastic process- for additional studies  no evidence for pyelonephritis  cardiac and GI procedures both on hold for now as these other issues are being clarified.    seen and evaluated while on dialysis   tolerating the procedure well  continue full treatment as outlined above

## 2023-03-28 NOTE — PROGRESS NOTE ADULT - PROBLEM SELECTOR PLAN 8
- s/p failed kidney transplant   - Continue home Tacrolimus 2mg PO BID.    - per referring Dr Barrera, pt still requires tacrolimus even if failed transplant due to possibility of graft vs host  - Tacrolimus Lvls 2.1 -> 4.0 -> 3.5. - s/p failed kidney transplant   - Continue home Tacrolimus 2mg PO BID.    - per referring Dr Barrera, pt still requires tacrolimus even if failed transplant due to possibility of graft vs host

## 2023-03-28 NOTE — PROGRESS NOTE ADULT - SUBJECTIVE AND OBJECTIVE BOX
INTERVAL HPI/OVERNIGHT EVENTS:  Tm 99.7.  Had 4 watery BMs yesterday    CONSTITUTIONAL:  No fever, chills, night sweats  EYES:  No photophobia or visual changes  CARDIOVASCULAR:  No chest pain  RESPIRATORY:  No cough, wheezing, or SOB   GASTROINTESTINAL:  No nausea, vomiting, constipation, or abdominal pain  GENITOURINARY:  No frequency, urgency, dysuria or hematuria  NEUROLOGIC:  No headache, lightheadedness      ANTIBIOTICS/RELEVANT:    Ceftriaxone 2 g IV q24h (3/27-present)    Vancomycin 3/21, 3/23, 3/25  Meropenem 3/21-3/27        Vital Signs Last 24 Hrs  T(C): 36.3 (28 Mar 2023 09:28), Max: 37.6 (27 Mar 2023 18:48)  T(F): 97.4 (28 Mar 2023 09:28), Max: 99.7 (27 Mar 2023 18:48)  HR: 51 (28 Mar 2023 14:19) (48 - 56)  BP: 157/70 (28 Mar 2023 14:19) (108/50 - 157/70)  BP(mean): 70 (28 Mar 2023 05:35) (70 - 75)  RR: 16 (28 Mar 2023 14:19) (15 - 17)  SpO2: 100% (28 Mar 2023 14:19) (98% - 100%)    Parameters below as of 28 Mar 2023 14:19  Patient On (Oxygen Delivery Method): nasal cannula  O2 Flow (L/min): 2      PHYSICAL EXAM:  Constitutional:  Alert  Eyes:  Sclerae anicteric, conjunctivae clear, PERRL  Ear/Nose/Throat:  No nasal exudate or sinus tenderness;  No buccal mucosal lesions, no pharyngeal erythema or exudate	  Neck:  Supple, no adenopathy  Respiratory:  Clear bilaterally  Cardiovascular:  RRR, S1S2, no murmur appreciated  Gastrointestinal:  Symmetric, normoactive BS, soft, NT, no masses, guarding or rebound.  No HSM  Extremities:  No edema      LABS:                        9.2    4.17  )-----------( 191      ( 28 Mar 2023 05:30 )             29.2         03-28    128<L>  |  94<L>  |  14  ----------------------------<  82  4.0   |  27  |  5.81<H>    Ca    8.0<L>      28 Mar 2023 05:30  Phos  2.8     03-28  Mg     2.2     03-28            MICROBIOLOGY:    Blood cultures:  3/21 X 1 - NG;  3/22 X 1 - NG    Urine culture 3/21 - >10^ Klebsiella pneumoniae (pansusceptible except amp)    3/21 rRVP ND      RADIOLOGY & ADDITIONAL STUDIES:    < from: US Duplex Venous Lower Ext Ltd, Left (03.28.23 @ 13:20) >  FINDINGS:    There is normal compressibility of the left common femoral, femoral and   popliteal veins.  The right contralateral common femoral vein is patent.  Doppler examination shows normal spontaneous and phasic flow.    No left calf vein thrombosis is detected.    IMPRESSION:  No evidence of left lower extremity deep venous thrombosis.    < end of copied text >

## 2023-03-28 NOTE — PROGRESS NOTE ADULT - ASSESSMENT
62M PMHx HTN, HLD, DM, CAD (s/p cath years ago, RCA 99%, never intervened on), HFrEF 25-30%, ESRD s/p failed kidney transplant (last HD 3/1 via Left Arm AVF; HD TTS), Right AKA initially presented to [facility] 2/27 for respiratory distress after a dialysis, found to be septic, h/c complicated by AHRF requiring intubation for 24hr followed by VT arrest, h/c the complicated by massive LGIB (rectal ulcer) requiring 7u pRBC, 1u FFP and 1u PLT. Transferred to [facility] for ICD placement 2/2 Vtach and cardiac cath. Medicine consulted for comanagement.     Recommend:    #Fever of Unknown Origin  - Spiked fever of 101.6 on return from HD 3/21  - Unclear source of infection  - Removed right forearm IV because was in place from outside hospital  - Abd U/S with no acute pathology  - CXR without infiltrate  - UA without UTI  - F/u BCx, so far NGTD  - PET scan showing increased uptake in prostate suggesting prostatitis vs. neoplasm  - F/u CT A/P for possible prostate abscess  - Ceftriaxone 2g daily per ID    #LGIB  #SANTOS  #Solitary ulcer syndrome  - hx coffee ground emesis @ [facility]; s/p EGD/colonoscopy showing rectal ulcer  - 3/17AM pt passed loose BM along with large amount of BRBPR  - per gen surgery no acute surgical intervention   - GI following, no endoscopy indicated  - Active T&S  - Transfuse for hgb <8 (active CAD)    #H/O ventricular tachycardia   - Vtach arrest @[facility]  - agree with EP consult for ICD placement    #ESRD on dialysis  - for dialysis today  - dialysis Tues, Thurs, Sat  - last received 3/16  - f/u nephro recs  - Electrolytes wnl, k 4.3, phos 1.9  - c/w calcium citrate 667mg TID    #S/P kidney transplant.   - per [place] pt takes Tacrolimus 2mg 2 times /day  - Obtain collateral regarding renal transplant, if indeed failed, what indications patient have for c/w tacrolimus 2mg?  - f/u tacrolimus serum levels  - f/u nephro recs    #DM  - no meds per [place], obtain collaterals for med recs prior [place] stay  - mISS while inpt  - A1c 5.9 on admission 62M PMHx HTN, HLD, DM, CAD (s/p cath years ago, RCA 99%, never intervened on), HFrEF 25-30%, ESRD s/p failed kidney transplant (last HD 3/1 via Left Arm AVF; HD TTS), Right AKA initially presented to [facility] 2/27 for respiratory distress after a dialysis, found to be septic, h/c complicated by AHRF requiring intubation for 24hr followed by VT arrest, h/c the complicated by massive LGIB (rectal ulcer) requiring 7u pRBC, 1u FFP and 1u PLT. Transferred to [facility] for ICD placement 2/2 Vtach and cardiac cath. Medicine consulted for comanagement.     Recommend:    #Fever of Unknown Origin  - Spiked fever of 101.6 on return from HD 3/21  - Unclear source of infection  - Removed right forearm IV because was in place from outside hospital  - Abd U/S with no acute pathology  - CXR without infiltrate  - UA without UTI  - F/u BCx, so far NGTD  - PET scan showing increased uptake in prostate suggesting prostatitis vs. neoplasm  - F/u CT A/P for possible prostate abscess  - Ceftriaxone 2g daily per ID    #LGIB  #SANTOS  #Solitary ulcer syndrome  - hx coffee ground emesis @ [facility]; s/p EGD/colonoscopy showing rectal ulcer  - 3/17AM pt passed loose BM along with large amount of BRBPR  - per gen surgery no acute surgical intervention   - GI following, no endoscopy indicated  - Active T&S  - Transfuse for hgb <8 (active CAD)    #H/O ventricular tachycardia   - V[facility]ach arrest [facility]  - agree with EP consult for ICD placement    #ESRD on dialysis  - for dialysis today  - dialysis Tues, Thurs, Sat  - last received 3/16  - f/u nephro recs  - Electrolytes wnl, k 4.3, phos 1.9  - c/w calcium citrate 667mg TID    #S/P kidney transplant.   - per [place] pt takes Tacrolimus 2mg 2 times /day  - Obtain collateral regarding renal transplant, if indeed failed, what indications patient have for c/w tacrolimus 2mg?  - f/u tacrolimus serum levels  - f/u nephro recs    #DM  - no meds per [place], obtain collaterals for med recs prior [place] stay  - mISS while inpt  - A1c 5.9 on admission 62M PMHx HTN, HLD, DM, CAD (s/p cath years ago, RCA 99%, never intervened on), HFrEF 25-30%, ESRD s/p failed kidney transplant (last HD 3/1 via Left Arm AVF; HD TTS), Right AKA initially presented to [facility] 2/27 for respiratory distress after a dialysis, found to be septic, h/c complicated by AHRF requiring intubation for 24hr followed by VT arrest, h/c the complicated by massive LGIB (rectal ulcer) requiring 7u pRBC, 1u FFP and 1u PLT. Transferred to [facility] for ICD placement 2/2 Vtach and cardiac cath. Medicine consulted for comanagement.     Recommend:    #Fever of Unknown Origin  - Spiked fever of 101.6 on return from HD 3/21  - Unclear source of infection  - Removed right forearm IV because was in place from outside hospital  - Abd U/S with no acute pathology  - CXR without infiltrate  - UA without UTI  - F/u BCx, so far NGTD  - PET scan showing increased uptake in prostate suggesting prostatitis vs. neoplasm  - F/u CT A/P for possible prostate abscess  - Ceftriaxone 2g daily per ID    #LGIB  #SANTOS  #Solitary ulcer syndrome  - hx coffee ground emesis @ [facility]; s/p EGD/colonoscopy showing rectal ulcer  - 3/17AM pt passed loose BM along with large amount of BRBPR  - per gen surgery no acute surgical intervention   - GI following, no endoscopy indicated  - Active T&S  - Transfuse for hgb <8 (active CAD)    #H/O ventricular tachycardia   - [facility]ach arrest [facility]  - agree with EP consult for ICD placement    #ESRD on dialysis  - for dialysis today  - dialysis Tues, Thurs, Sat  - last received 3/16  - f/u nephro recs  - Electrolytes wnl, k 4.3, phos 1.9  - c/w calcium citrate 667mg TID    #S/P kidney transplant.   - per [place] pt takes Tacrolimus 2mg 2 times /day  - Obtain collateral regarding renal transplant, if indeed failed, what indications patient have for c/w tacrolimus 2mg?  - f/u tacrolimus serum levels  - f/u nephro recs    #DM  - no meds per [place], obtain collaterals for med recs prior [place] stay  - mISS while inpt  - A1c 5.9 on admission

## 2023-03-29 DIAGNOSIS — N47.2 PARAPHIMOSIS: ICD-10-CM

## 2023-03-29 LAB
ALBUMIN SERPL ELPH-MCNC: 2.2 G/DL — LOW (ref 3.3–5)
ALBUMIN SERPL ELPH-MCNC: 2.4 G/DL — LOW (ref 3.3–5)
ALP SERPL-CCNC: 122 U/L — HIGH (ref 40–120)
ALP SERPL-CCNC: 142 U/L — HIGH (ref 40–120)
ALT FLD-CCNC: <5 U/L — LOW (ref 10–45)
ANION GAP SERPL CALC-SCNC: 9 MMOL/L — SIGNIFICANT CHANGE UP (ref 5–17)
AST SERPL-CCNC: 15 U/L — SIGNIFICANT CHANGE UP (ref 10–40)
AST SERPL-CCNC: 17 U/L — SIGNIFICANT CHANGE UP (ref 10–40)
BILIRUB SERPL-MCNC: 0.3 MG/DL — SIGNIFICANT CHANGE UP (ref 0.2–1.2)
BILIRUB SERPL-MCNC: 0.4 MG/DL — SIGNIFICANT CHANGE UP (ref 0.2–1.2)
BKV DNA UR QL NAA+PROBE: SIGNIFICANT CHANGE UP
BLD GP AB SCN SERPL QL: NEGATIVE — SIGNIFICANT CHANGE UP
BUN SERPL-MCNC: 6 MG/DL — LOW (ref 7–23)
BUN SERPL-MCNC: 7 MG/DL — SIGNIFICANT CHANGE UP (ref 7–23)
CALCIUM SERPL-MCNC: 7.7 MG/DL — LOW (ref 8.4–10.5)
CALCIUM SERPL-MCNC: 8 MG/DL — LOW (ref 8.4–10.5)
CHLORIDE SERPL-SCNC: 95 MMOL/L — LOW (ref 96–108)
CHLORIDE SERPL-SCNC: 96 MMOL/L — SIGNIFICANT CHANGE UP (ref 96–108)
CO2 SERPL-SCNC: 28 MMOL/L — SIGNIFICANT CHANGE UP (ref 22–31)
CREAT SERPL-MCNC: 3.83 MG/DL — HIGH (ref 0.5–1.3)
CREAT SERPL-MCNC: 4.14 MG/DL — HIGH (ref 0.5–1.3)
EGFR: 16 ML/MIN/1.73M2 — LOW
EGFR: 17 ML/MIN/1.73M2 — LOW
GLUCOSE BLDC GLUCOMTR-MCNC: 109 MG/DL — HIGH (ref 70–99)
GLUCOSE BLDC GLUCOMTR-MCNC: 132 MG/DL — HIGH (ref 70–99)
GLUCOSE BLDC GLUCOMTR-MCNC: 67 MG/DL — LOW (ref 70–99)
GLUCOSE BLDC GLUCOMTR-MCNC: 71 MG/DL — SIGNIFICANT CHANGE UP (ref 70–99)
GLUCOSE BLDC GLUCOMTR-MCNC: 89 MG/DL — SIGNIFICANT CHANGE UP (ref 70–99)
GLUCOSE SERPL-MCNC: 79 MG/DL — SIGNIFICANT CHANGE UP (ref 70–99)
GLUCOSE SERPL-MCNC: 96 MG/DL — SIGNIFICANT CHANGE UP (ref 70–99)
HCT VFR BLD CALC: 28.7 % — LOW (ref 39–50)
HCT VFR BLD CALC: 29.5 % — LOW (ref 39–50)
HGB BLD-MCNC: 9.1 G/DL — LOW (ref 13–17)
HGB BLD-MCNC: 9.3 G/DL — LOW (ref 13–17)
LACTATE SERPL-SCNC: 0.9 MMOL/L — SIGNIFICANT CHANGE UP (ref 0.5–2)
LOG10 BK QUANTITATION PCR URINE: SIGNIFICANT CHANGE UP
MAGNESIUM SERPL-MCNC: 1.8 MG/DL — SIGNIFICANT CHANGE UP (ref 1.6–2.6)
MCHC RBC-ENTMCNC: 28.3 PG — SIGNIFICANT CHANGE UP (ref 27–34)
MCHC RBC-ENTMCNC: 28.7 PG — SIGNIFICANT CHANGE UP (ref 27–34)
MCHC RBC-ENTMCNC: 31.5 GM/DL — LOW (ref 32–36)
MCHC RBC-ENTMCNC: 31.7 GM/DL — LOW (ref 32–36)
MCV RBC AUTO: 89.7 FL — SIGNIFICANT CHANGE UP (ref 80–100)
MCV RBC AUTO: 90.5 FL — SIGNIFICANT CHANGE UP (ref 80–100)
NRBC # BLD: 0 /100 WBCS — SIGNIFICANT CHANGE UP (ref 0–0)
PHOSPHATE SERPL-MCNC: 2.2 MG/DL — LOW (ref 2.5–4.5)
PLATELET # BLD AUTO: 175 K/UL — SIGNIFICANT CHANGE UP (ref 150–400)
PLATELET # BLD AUTO: 192 K/UL — SIGNIFICANT CHANGE UP (ref 150–400)
POTASSIUM SERPL-MCNC: 3.8 MMOL/L — SIGNIFICANT CHANGE UP (ref 3.5–5.3)
POTASSIUM SERPL-MCNC: 4.4 MMOL/L — SIGNIFICANT CHANGE UP (ref 3.5–5.3)
POTASSIUM SERPL-SCNC: 3.8 MMOL/L — SIGNIFICANT CHANGE UP (ref 3.5–5.3)
POTASSIUM SERPL-SCNC: 4.4 MMOL/L — SIGNIFICANT CHANGE UP (ref 3.5–5.3)
PROT SERPL-MCNC: 4.4 G/DL — LOW (ref 6–8.3)
PROT SERPL-MCNC: 4.7 G/DL — LOW (ref 6–8.3)
RBC # BLD: 3.17 M/UL — LOW (ref 4.2–5.8)
RBC # BLD: 3.29 M/UL — LOW (ref 4.2–5.8)
RBC # FLD: 14.8 % — HIGH (ref 10.3–14.5)
RBC # FLD: 15.1 % — HIGH (ref 10.3–14.5)
RH IG SCN BLD-IMP: POSITIVE — SIGNIFICANT CHANGE UP
SODIUM SERPL-SCNC: 132 MMOL/L — LOW (ref 135–145)
SODIUM SERPL-SCNC: 133 MMOL/L — LOW (ref 135–145)
WBC # BLD: 3.74 K/UL — LOW (ref 3.8–10.5)
WBC # BLD: 3.88 K/UL — SIGNIFICANT CHANGE UP (ref 3.8–10.5)
WBC # FLD AUTO: 3.74 K/UL — LOW (ref 3.8–10.5)
WBC # FLD AUTO: 3.88 K/UL — SIGNIFICANT CHANGE UP (ref 3.8–10.5)

## 2023-03-29 PROCEDURE — 99233 SBSQ HOSP IP/OBS HIGH 50: CPT

## 2023-03-29 PROCEDURE — 99232 SBSQ HOSP IP/OBS MODERATE 35: CPT | Mod: GC

## 2023-03-29 PROCEDURE — ZZZZZ: CPT

## 2023-03-29 PROCEDURE — 93010 ELECTROCARDIOGRAM REPORT: CPT

## 2023-03-29 PROCEDURE — 71045 X-RAY EXAM CHEST 1 VIEW: CPT | Mod: 26

## 2023-03-29 RX ORDER — SODIUM,POTASSIUM PHOSPHATES 278-250MG
1 POWDER IN PACKET (EA) ORAL ONCE
Refills: 0 | Status: COMPLETED | OUTPATIENT
Start: 2023-03-29 | End: 2023-03-29

## 2023-03-29 RX ORDER — MAGNESIUM OXIDE 400 MG ORAL TABLET 241.3 MG
400 TABLET ORAL ONCE
Refills: 0 | Status: COMPLETED | OUTPATIENT
Start: 2023-03-29 | End: 2023-03-29

## 2023-03-29 RX ORDER — METOPROLOL TARTRATE 50 MG
25 TABLET ORAL DAILY
Refills: 0 | Status: DISCONTINUED | OUTPATIENT
Start: 2023-03-29 | End: 2023-04-12

## 2023-03-29 RX ADMIN — TAMSULOSIN HYDROCHLORIDE 0.8 MILLIGRAM(S): 0.4 CAPSULE ORAL at 21:31

## 2023-03-29 RX ADMIN — CEFTRIAXONE 100 MILLIGRAM(S): 500 INJECTION, POWDER, FOR SOLUTION INTRAMUSCULAR; INTRAVENOUS at 22:00

## 2023-03-29 RX ADMIN — ATORVASTATIN CALCIUM 40 MILLIGRAM(S): 80 TABLET, FILM COATED ORAL at 21:31

## 2023-03-29 RX ADMIN — Medication 650 MILLIGRAM(S): at 05:48

## 2023-03-29 RX ADMIN — Medication 325 MILLIGRAM(S): at 12:03

## 2023-03-29 RX ADMIN — LOSARTAN POTASSIUM 100 MILLIGRAM(S): 100 TABLET, FILM COATED ORAL at 12:03

## 2023-03-29 RX ADMIN — Medication 1 PACKET(S): at 17:13

## 2023-03-29 RX ADMIN — PANTOPRAZOLE SODIUM 40 MILLIGRAM(S): 20 TABLET, DELAYED RELEASE ORAL at 05:16

## 2023-03-29 RX ADMIN — ISOSORBIDE DINITRATE 20 MILLIGRAM(S): 5 TABLET ORAL at 17:03

## 2023-03-29 RX ADMIN — Medication 25 MILLIGRAM(S): at 05:16

## 2023-03-29 RX ADMIN — ISOSORBIDE DINITRATE 20 MILLIGRAM(S): 5 TABLET ORAL at 05:16

## 2023-03-29 RX ADMIN — Medication 650 MILLIGRAM(S): at 19:28

## 2023-03-29 RX ADMIN — CLOPIDOGREL BISULFATE 75 MILLIGRAM(S): 75 TABLET, FILM COATED ORAL at 12:03

## 2023-03-29 RX ADMIN — CEFTRIAXONE 100 MILLIGRAM(S): 500 INJECTION, POWDER, FOR SOLUTION INTRAMUSCULAR; INTRAVENOUS at 00:30

## 2023-03-29 RX ADMIN — MAGNESIUM OXIDE 400 MG ORAL TABLET 400 MILLIGRAM(S): 241.3 TABLET ORAL at 17:50

## 2023-03-29 RX ADMIN — Medication 650 MILLIGRAM(S): at 20:00

## 2023-03-29 RX ADMIN — PANTOPRAZOLE SODIUM 40 MILLIGRAM(S): 20 TABLET, DELAYED RELEASE ORAL at 17:04

## 2023-03-29 RX ADMIN — CHLORHEXIDINE GLUCONATE 1 APPLICATION(S): 213 SOLUTION TOPICAL at 13:00

## 2023-03-29 RX ADMIN — TACROLIMUS 2 MILLIGRAM(S): 5 CAPSULE ORAL at 05:16

## 2023-03-29 RX ADMIN — TACROLIMUS 2 MILLIGRAM(S): 5 CAPSULE ORAL at 17:03

## 2023-03-29 RX ADMIN — Medication 81 MILLIGRAM(S): at 12:03

## 2023-03-29 RX ADMIN — ISOSORBIDE DINITRATE 20 MILLIGRAM(S): 5 TABLET ORAL at 12:03

## 2023-03-29 RX ADMIN — Medication 50 MILLIGRAM(S): at 05:16

## 2023-03-29 RX ADMIN — Medication 50 MILLIGRAM(S): at 14:22

## 2023-03-29 RX ADMIN — Medication 650 MILLIGRAM(S): at 05:18

## 2023-03-29 RX ADMIN — Medication 50 MILLIGRAM(S): at 21:32

## 2023-03-29 NOTE — PROGRESS NOTE ADULT - PROBLEM SELECTOR PLAN 3
s/p VTach arrest @ [facility]; no tele events noted.    - EP Consulted – plan for ICD placement once pt has LHC   - c/w toprol 25mg qd s/p VTach arrest @ [facility]; no tele events noted.    - EP Consulted – plan for ICD placement once pt has LHC   - c/w toprol 25mg qd s/p VTach arrest @ [facility]; no tele events noted.    - EP Consulted – plan for ICD placement once pt has LHC   - c/w toprol 25mg qd

## 2023-03-29 NOTE — PROGRESS NOTE ADULT - ASSESSMENT
61 yo M with HTN, DM, HLD, ICM, HFrEF, ESRD s/p failed RT (HD via LUE AVF), R AKA transferred to [facility] from [facility] on 3/17 for ICD placement with prolonged fever, no leukocytosis (on low dose tacrolimus).   He now appears to have defervesced - last fever 3/26  He had a positive UA, urine culture with Klebsiella pneumoniae (S cefazolin).  NM PET/CT showed multifocal uptake in the prostate gland c/c prostatitis or neoplastic disease.  In view of duration of fever while receiving an antibiotic to which his urine isolate was susceptible, is concerning for prostatic abscess. He had CT pelvis - showed probable cystitis and pyelitis of transplanted kidney without pyelonephritis or abscess.  Prostatic enlargement was noted without further comment - no abscess or prostatitis read but am trying to verify.  If not, PET findings concerning for malignancy.    Suggest:  - F/U urine for BK virus  - Continue ceftriaxone 2 g IV q24h.  Would plan for two week course from start of active therapy (3/21-4/4).  Will update duration if needed following consultation with Radiology (I have reached out to Dr. Hoffman).  Recommendations discussed with primary team.  Please recall if further ID input is desired - team 2.      63 yo M with HTN, DM, HLD, ICM, HFrEF, ESRD s/p failed RT (HD via LUE AVF), R AKA transferred to [facility] from [facility] on 3/17 for ICD placement with prolonged fever, no leukocytosis (on low dose tacrolimus).   He now appears to have defervesced - last fever 3/26  He had a positive UA, urine culture with Klebsiella pneumoniae (S cefazolin).  NM PET/CT showed multifocal uptake in the prostate gland c/c prostatitis or neoplastic disease.  In view of duration of fever while receiving an antibiotic to which his urine isolate was susceptible, is concerning for prostatic abscess. He had CT pelvis - showed probable cystitis and pyelitis of transplanted kidney without pyelonephritis or abscess.  Prostatic enlargement was noted without further comment - no abscess or prostatitis read but am trying to verify.  If not, PET findings concerning for malignancy.    Suggest:  - F/U urine for BK virus  - Continue ceftriaxone 2 g IV q24h.  Would plan for two week course from start of active therapy (3/21-4/4).  Will update duration if needed following consultation with Radiology (I have reached out to Dr. Hoffman).  Recommendations discussed with primary team.  Please recall if further ID input is desired - team 2.      61 yo M with HTN, DM, HLD, ICM, HFrEF, ESRD s/p failed RT (HD via LUE AVF), R AKA transferred to [facility] from [facility] on 3/17 for ICD placement with prolonged fever, no leukocytosis (on low dose tacrolimus).   He now appears to have defervesced - last fever 3/26  He had a positive UA, urine culture with Klebsiella pneumoniae (S cefazolin).  NM PET/CT showed multifocal uptake in the prostate gland c/c prostatitis or neoplastic disease.  In view of duration of fever while receiving an antibiotic to which his urine isolate was susceptible, is concerning for prostatic abscess. He had CT pelvis - showed probable cystitis and pyelitis of transplanted kidney without pyelonephritis or abscess.  Prostatic enlargement was noted without further comment - no abscess or prostatitis read but am trying to verify.  If not, PET findings concerning for malignancy.    Suggest:  - F/U urine for BK virus  - Continue ceftriaxone 2 g IV q24h.  Would plan for two week course from start of active therapy (3/21-4/4).  Will update duration if needed following consultation with Radiology (I have reached out to Dr. Hoffman).  Recommendations discussed with primary team.  Please recall if further ID input is desired - team 2.

## 2023-03-29 NOTE — PROGRESS NOTE ADULT - ASSESSMENT
61 yo male with paraphimosis 63 yo male with paraphimosis 62M PMHx HTN, HLD, DM, CAD (s/p cath years ago, RCA 99%, never intervened on), HFrEF 25-30%, ESRD s/p failed kidney transplant (last HD 3/1 via Left Arm AVF; HD TTS), Right AKA initially presented to [facility] 2/27 for respiratory distress after a dialysis, found to be septic, h/c complicated by AHRF requiring intubation for 24hr followed by VT arrest, h/c the complicated by massive LGIB (rectal ulcer) requiring 7u pRBC, 1u FFP and 1u PLT. Transferred to [facility] for ICD placement 2/2 Vtach and cardiac cath. Urology consulted for paraphimosis, which was successfully reduced at bedside.     Recs:  No other acute  intervention  Continue rest of care per cardiology  TOV when pt is OOBA/reg diet  Reconsult as needed  Please follow up with the Urology Clinic within 1-2 weeks of discharge.  Call [phone] to schedule your appointment.  The office is located at [address] [place] [address], [address] [place] [address].  Discussed with attending    62M PMHx HTN, HLD, DM, CAD (s/p cath years ago, RCA 99%, never intervened on), HFrEF 25-30%, ESRD s/p failed kidney transplant (last HD 3/1 via Left Arm AVF; HD TTS), Right AKA initially presented to [facility] 2/27 for respiratory distress after a dialysis, found to be septic, h/c complicated by AHRF requiring intubation for 24hr followed by VT arrest, h/c the complicated by massive LGIB (rectal ulcer) requiring 7u pRBC, 1u FFP and 1u PLT. Transferred to [facility] for ICD placement 2/2 Vtach and cardiac cath. Urology consulted for paraphimosis, which was successfully reduced at bedside.     Recs:  No other acute  intervention  Continue rest of care per cardiology  TOV when pt is OOBA/reg diet  Reconsult as needed  Please follow up with the Urology Clinic within 1-2 weeks of discharge.  Call [phone] to schedule your appointment.  The office is located at [address] [place] [address], [address] [place] [address].  Discussed with attending    62M PMHx HTN, HLD, DM, CAD (s/p cath years ago, RCA 99%, never intervened on), HFrEF 25-30%, ESRD s/p failed kidney transplant (last HD 3/1 via Left Arm AVF; HD TTS), Right AKA initially presented to [facility] 2/27 for respiratory distress after a dialysis, found to be septic, h/c complicated by AHRF requiring intubation for 24hr followed by VT arrest, h/c the complicated by massive LGIB (rectal ulcer) requiring 7u pRBC, 1u FFP and 1u PLT. Transferred to [facility] for ICD placement 2/2 Vtach and cardiac cath. Urology consulted for paraphimosis, which was successfully reduced at bedside.     Recs:  No other acute  intervention  Continue rest of care per cardiology  TOV when pt is OOBA/reg diet  Reconsult as needed  Please follow up with the Urology Clinic within 1-2 weeks of discharge.  Call [phone] to schedule your appointment.  The office is located at [address] [place] [address], [address] [place] [address].  Discussed with attending    62M PMHx HTN, HLD, DM, CAD (s/p cath years ago, RCA 99%, never intervened on), HFrEF 25-30%, ESRD s/p failed kidney transplant (last HD 3/1 via Left Arm AVF; HD TTS), Right AKA initially presented to [facility] 2/27 for respiratory distress after a dialysis, found to be septic, h/c complicated by AHRF requiring intubation for 24hr followed by VT arrest, h/c the complicated by massive LGIB (rectal ulcer) requiring 7u pRBC, 1u FFP and 1u PLT. Transferred to [facility] for ICD placement 2/2 Vtach and cardiac cath. Urology consulted for paraphimosis, which was successfully reduced at bedside. PET scan 3/25 shows increased uptake in the prostate. CTAP 3/28 was negative for prostate abscess or prostatitis. PSA on this admission is 12, PSA could be elevated due to UTI versus prostate cancer.     Recs:  Recheck PSA 1 month after infection resolution  Conversation at that time about prostate cancer workup given multiple comorbidities   No other acute  intervention  Continue rest of care per cardiology  TOV when pt is OOBA/reg diet  Please follow up with the Urology Clinic within 1-2 weeks of discharge.  Call [phone] to schedule your appointment.  The office is located at [address] [place] [address], [address], [place] [address].  Discussed with attending    62M PMHx HTN, HLD, DM, CAD (s/p cath years ago, RCA 99%, never intervened on), HFrEF 25-30%, ESRD s/p failed kidney transplant (last HD 3/1 via Left Arm AVF; HD TTS), Right AKA initially presented to [facility] 2/27 for respiratory distress after a dialysis, found to be septic, h/c complicated by AHRF requiring intubation for 24hr followed by VT arrest, h/c the complicated by massive LGIB (rectal ulcer) requiring 7u pRBC, 1u FFP and 1u PLT. Transferred to [facility] for ICD placement 2/2 Vtach and cardiac cath. Urology consulted for paraphimosis, which was successfully reduced at bedside. PET scan 3/25 shows increased uptake in the prostate. CTAP 3/28 was negative for prostate abscess or prostatitis. PSA on this admission is 12, PSA could be elevated due to UTI versus prostate cancer.     Recs:  Recheck PSA 1 month after infection resolution  Conversation at that time about prostate cancer workup given multiple comorbidities   No other acute  intervention  Continue rest of care per cardiology  TOV when pt is OOBA/reg diet  Please follow up with the Urology Clinic within 1-2 weeks of discharge.  Call [phone] to schedule your appointment.  The office is located at [address] [place] [address], [address], [place] [address].  Discussed with attending    62M PMHx HTN, HLD, DM, CAD (s/p cath years ago, RCA 99%, never intervened on), HFrEF 25-30%, ESRD s/p failed kidney transplant (last HD 3/1 via Left Arm AVF; HD TTS), Right AKA initially presented to [facility] 2/27 for respiratory distress after a dialysis, found to be septic, h/c complicated by AHRF requiring intubation for 24hr followed by VT arrest, h/c the complicated by massive LGIB (rectal ulcer) requiring 7u pRBC, 1u FFP and 1u PLT. Transferred to [facility] for ICD placement 2/2 Vtach and cardiac cath. Urology consulted for paraphimosis, which was successfully reduced at bedside. PET scan 3/25 shows increased uptake in the prostate. CTAP 3/28 was negative for prostate abscess or prostatitis. PSA on this admission is 12, PSA could be elevated due to UTI versus prostate cancer.     Recs:  Recheck PSA 1 month after infection resolution  Conversation at that time about prostate cancer workup given multiple comorbidities   No other acute  intervention  Continue rest of care per cardiology  TOV when pt is OOBA/reg diet  Please follow up with the Urology Clinic within 1-2 weeks of discharge.  Call [phone] to schedule your appointment.  The office is located at [address] [place] [address], [address], [place] [address].  Discussed with attending

## 2023-03-29 NOTE — PROGRESS NOTE ADULT - PROBLEM SELECTOR PLAN 11
- HgA1c 5.9.    - CONT: MICHELINE.      F: Oral intake  E: Replete electrolytes as needed for K<4 and Mg<2  N: DASH Diet    DVT PPX: holding AC; SCD  Dispo: pending infectious work up prior to LHC w/ subsequent ICD - HgA1c 5.9.    - CONT: MICHELINE.

## 2023-03-29 NOTE — PROGRESS NOTE ADULT - SUBJECTIVE AND OBJECTIVE BOX
HPI:  61yo M PMHx HTN, HLD, DM,  ICM (s/p cath years ago, RCA 99%, never intervened on), HFrEF 25-30%, ESRD s/p failed kidney transplant (last HD 3/1 via Left Arm AVF; HD TTS), Right AKA presented to [facility] 2/27 for respiratory distress after a dialysis session and admitted to cardiac telemetry on 2/27 with SIRS criteria. Placed on vanc/zosyn. Pt was placed on BiPAP which failed, where he was then intubated on 3/1 and moved to the MICU. He went into cardiac arrest (RoSC achieved after 8mins). Went into Vtach arrest, was placed on amio and pressors, HR ellen into 30s and atropine given twice. Weaned off levophed and sedation and extubated 3/2. Pt developed coffee ground emesis, hemoccult positive, with Hgb dipping to 3.5; he then received 7units PRBCs, 1Unit FFP, 1 unit donor packed platelets. EGD and colonoscopy showing melanosis duodenii but no acute bleed. BP dropped again, transferred to ICU and started on levophed gtt. CTA Abdomen showing no active bleeding into GI lumen, possible focal proctitis; colonoscopy showing rectal ulcer. Started on hydrocortisone suppository and mesalamine. Hgb now stable in 11s (per handoff), now transferred for ICD placement 2/2 Vtach and cardiac cath. (17 Mar 2023 03:32)    : above noted. Called to evaluate for parphimosis. Patient with duvall for over a week now and exchanged last 72 hrs. Patient c/o pain and swelling distal penis x 3 days. Not circumcised. Patient did not have voiding issues prior to hospitalization.       PAST MEDICAL & SURGICAL HISTORY:  HTN (hypertension)      HLD (hyperlipidemia)      DM (diabetes mellitus)      GERD (gastroesophageal reflux disease)      ESRD on dialysis      NSTEMI (non-ST elevation myocardial infarction)      CAD (coronary artery disease)      Fever of unknown origin (FUO)          MEDICATIONS  (STANDING):  aspirin enteric coated 81 milliGRAM(s) Oral daily  atorvastatin 40 milliGRAM(s) Oral at bedtime  cefTRIAXone   IVPB 2000 milliGRAM(s) IV Intermittent every 24 hours  chlorhexidine 2% Cloths 1 Application(s) Topical daily  clopidogrel Tablet 75 milliGRAM(s) Oral daily  dextrose 5%. 1000 milliLiter(s) (100 mL/Hr) IV Continuous <Continuous>  dextrose 5%. 1000 milliLiter(s) (50 mL/Hr) IV Continuous <Continuous>  dextrose 50% Injectable 25 Gram(s) IV Push once  dextrose 50% Injectable 12.5 Gram(s) IV Push once  ferrous    sulfate 325 milliGRAM(s) Oral daily  hydrALAZINE 50 milliGRAM(s) Oral every 8 hours  insulin lispro (ADMELOG) corrective regimen sliding scale   SubCutaneous every 6 hours  isosorbide   dinitrate Tablet (ISORDIL) 20 milliGRAM(s) Oral three times a day  losartan 100 milliGRAM(s) Oral every 24 hours  metoprolol succinate ER 25 milliGRAM(s) Oral daily  pantoprazole  Injectable 40 milliGRAM(s) IV Push every 12 hours  tacrolimus 2 milliGRAM(s) Oral every 12 hours  tamsulosin 0.8 milliGRAM(s) Oral at bedtime    MEDICATIONS  (PRN):  acetaminophen     Tablet .. 650 milliGRAM(s) Oral every 6 hours PRN Mild Pain (1 - 3), Moderate Pain (4 - 6)  dextrose Oral Gel 15 Gram(s) Oral once PRN Blood Glucose LESS THAN 70 milliGRAM(s)/deciliter  sodium chloride 0.65% Nasal 1 Spray(s) Both Nostrils every 4 hours PRN Nasal Congestion      Allergies    No Known Allergies    Intolerances        SOCIAL HISTORY:    FAMILY HISTORY:      Vital Signs Last 24 Hrs  T(C): 36.7 (29 Mar 2023 08:59), Max: 37.4 (29 Mar 2023 04:30)  T(F): 98.1 (29 Mar 2023 08:59), Max: 99.4 (29 Mar 2023 04:30)  HR: 54 (29 Mar 2023 12:01) (51 - 66)  BP: 122/47 (29 Mar 2023 12:01) (116/50 - 157/70)  BP(mean): 68 (29 Mar 2023 12:01) (68 - 96)  RR: 16 (29 Mar 2023 12:01) (16 - 18)  SpO2: 99% (29 Mar 2023 12:01) (96% - 100%)    Parameters below as of 29 Mar 2023 12:01  Patient On (Oxygen Delivery Method): room air        On PE:  General: alert and awake  Abdomen: soft, NT, ND  : +paraphimosis, bilat testes descended, NT      LABS:                        9.1    3.74  )-----------( 175      ( 29 Mar 2023 05:30 )             28.7     03-29    133<L>  |  96  |  6<L>  ----------------------------<  79  3.8   |  28  |  3.83<H>    Ca    8.0<L>      29 Mar 2023 05:30  Phos  2.2     03-29  Mg     1.8     03-29    TPro  4.4<L>  /  Alb  2.2<L>  /  TBili  0.3  /  DBili  x   /  AST  15  /  ALT  <5<L>  /  AlkPhos  122<H>  03-29          RADIOLOGY & ADDITIONAL STUDIES: HPI:  63yo M PMHx HTN, HLD, DM,  ICM (s/p cath years ago, RCA 99%, never intervened on), HFrEF 25-30%, ESRD s/p failed kidney transplant (last HD 3/1 via Left Arm AVF; HD TTS), Right AKA presented to [facility] 2/27 for respiratory distress after a dialysis session and admitted to cardiac telemetry on 2/27 with SIRS criteria. Placed on vanc/zosyn. Pt was placed on BiPAP which failed, where he was then intubated on 3/1 and moved to the MICU. He went into cardiac arrest (RoSC achieved after 8mins). Went into Vtach arrest, was placed on amio and pressors, HR ellen into 30s and atropine given twice. Weaned off levophed and sedation and extubated 3/2. Pt developed coffee ground emesis, hemoccult positive, with Hgb dipping to 3.5; he then received 7units PRBCs, 1Unit FFP, 1 unit donor packed platelets. EGD and colonoscopy showing melanosis duodenii but no acute bleed. BP dropped again, transferred to ICU and started on levophed gtt. CTA Abdomen showing no active bleeding into GI lumen, possible focal proctitis; colonoscopy showing rectal ulcer. Started on hydrocortisone suppository and mesalamine. Hgb now stable in 11s (per handoff), now transferred for ICD placement 2/2 Vtach and cardiac cath. (17 Mar 2023 03:32)    : above noted. Called to evaluate for parphimosis. Patient with duvall for over a week now and exchanged last 72 hrs. Patient c/o pain and swelling distal penis x 3 days. Not circumcised. Patient did not have voiding issues prior to hospitalization.       PAST MEDICAL & SURGICAL HISTORY:  HTN (hypertension)      HLD (hyperlipidemia)      DM (diabetes mellitus)      GERD (gastroesophageal reflux disease)      ESRD on dialysis      NSTEMI (non-ST elevation myocardial infarction)      CAD (coronary artery disease)      Fever of unknown origin (FUO)          MEDICATIONS  (STANDING):  aspirin enteric coated 81 milliGRAM(s) Oral daily  atorvastatin 40 milliGRAM(s) Oral at bedtime  cefTRIAXone   IVPB 2000 milliGRAM(s) IV Intermittent every 24 hours  chlorhexidine 2% Cloths 1 Application(s) Topical daily  clopidogrel Tablet 75 milliGRAM(s) Oral daily  dextrose 5%. 1000 milliLiter(s) (100 mL/Hr) IV Continuous <Continuous>  dextrose 5%. 1000 milliLiter(s) (50 mL/Hr) IV Continuous <Continuous>  dextrose 50% Injectable 25 Gram(s) IV Push once  dextrose 50% Injectable 12.5 Gram(s) IV Push once  ferrous    sulfate 325 milliGRAM(s) Oral daily  hydrALAZINE 50 milliGRAM(s) Oral every 8 hours  insulin lispro (ADMELOG) corrective regimen sliding scale   SubCutaneous every 6 hours  isosorbide   dinitrate Tablet (ISORDIL) 20 milliGRAM(s) Oral three times a day  losartan 100 milliGRAM(s) Oral every 24 hours  metoprolol succinate ER 25 milliGRAM(s) Oral daily  pantoprazole  Injectable 40 milliGRAM(s) IV Push every 12 hours  tacrolimus 2 milliGRAM(s) Oral every 12 hours  tamsulosin 0.8 milliGRAM(s) Oral at bedtime    MEDICATIONS  (PRN):  acetaminophen     Tablet .. 650 milliGRAM(s) Oral every 6 hours PRN Mild Pain (1 - 3), Moderate Pain (4 - 6)  dextrose Oral Gel 15 Gram(s) Oral once PRN Blood Glucose LESS THAN 70 milliGRAM(s)/deciliter  sodium chloride 0.65% Nasal 1 Spray(s) Both Nostrils every 4 hours PRN Nasal Congestion      Allergies    No Known Allergies    Intolerances        SOCIAL HISTORY:    FAMILY HISTORY:      Vital Signs Last 24 Hrs  T(C): 36.7 (29 Mar 2023 08:59), Max: 37.4 (29 Mar 2023 04:30)  T(F): 98.1 (29 Mar 2023 08:59), Max: 99.4 (29 Mar 2023 04:30)  HR: 54 (29 Mar 2023 12:01) (51 - 66)  BP: 122/47 (29 Mar 2023 12:01) (116/50 - 157/70)  BP(mean): 68 (29 Mar 2023 12:01) (68 - 96)  RR: 16 (29 Mar 2023 12:01) (16 - 18)  SpO2: 99% (29 Mar 2023 12:01) (96% - 100%)    Parameters below as of 29 Mar 2023 12:01  Patient On (Oxygen Delivery Method): room air        On PE:  General: alert and awake  Abdomen: soft, NT, ND  : +paraphimosis, bilat testes descended, NT      LABS:                        9.1    3.74  )-----------( 175      ( 29 Mar 2023 05:30 )             28.7     03-29    133<L>  |  96  |  6<L>  ----------------------------<  79  3.8   |  28  |  3.83<H>    Ca    8.0<L>      29 Mar 2023 05:30  Phos  2.2     03-29  Mg     1.8     03-29    TPro  4.4<L>  /  Alb  2.2<L>  /  TBili  0.3  /  DBili  x   /  AST  15  /  ALT  <5<L>  /  AlkPhos  122<H>  03-29          RADIOLOGY & ADDITIONAL STUDIES: HPI:  61yo M PMHx HTN, HLD, DM,  ICM (s/p cath years ago, RCA 99%, never intervened on), HFrEF 25-30%, ESRD s/p failed kidney transplant (last HD 3/1 via Left Arm AVF; HD TTS), Right AKA presented to [facility] 2/27 for respiratory distress after a dialysis session and admitted to cardiac telemetry on 2/27 with SIRS criteria. Placed on vanc/zosyn. Pt was placed on BiPAP which failed, where he was then intubated on 3/1 and moved to the MICU. He went into cardiac arrest (RoSC achieved after 8mins). Went into Vtach arrest, was placed on amio and pressors, HR ellen into 30s and atropine given twice. Weaned off levophed and sedation and extubated 3/2. Pt developed coffee ground emesis, hemoccult positive, with Hgb dipping to 3.5; he then received 7units PRBCs, 1Unit FFP, 1 unit donor packed platelets. EGD and colonoscopy showing melanosis duodenii but no acute bleed. BP dropped again, transferred to ICU and started on levophed gtt. CTA Abdomen showing no active bleeding into GI lumen, possible focal proctitis; colonoscopy showing rectal ulcer. Started on hydrocortisone suppository and mesalamine. Hgb now stable in 11s (per handoff), now transferred for ICD placement 2/2 Vtach and cardiac cath. (17 Mar 2023 03:32)    : above noted. Called to evaluate for parphimosis. Patient with duvall for over a week now and exchanged last 72 hrs. Patient c/o pain and swelling distal penis x 3 days. Not circumcised. Patient did not have voiding issues prior to hospitalization.       PAST MEDICAL & SURGICAL HISTORY:  HTN (hypertension)      HLD (hyperlipidemia)      DM (diabetes mellitus)      GERD (gastroesophageal reflux disease)      ESRD on dialysis      NSTEMI (non-ST elevation myocardial infarction)      CAD (coronary artery disease)      Fever of unknown origin (FUO)          MEDICATIONS  (STANDING):  aspirin enteric coated 81 milliGRAM(s) Oral daily  atorvastatin 40 milliGRAM(s) Oral at bedtime  cefTRIAXone   IVPB 2000 milliGRAM(s) IV Intermittent every 24 hours  chlorhexidine 2% Cloths 1 Application(s) Topical daily  clopidogrel Tablet 75 milliGRAM(s) Oral daily  dextrose 5%. 1000 milliLiter(s) (100 mL/Hr) IV Continuous <Continuous>  dextrose 5%. 1000 milliLiter(s) (50 mL/Hr) IV Continuous <Continuous>  dextrose 50% Injectable 25 Gram(s) IV Push once  dextrose 50% Injectable 12.5 Gram(s) IV Push once  ferrous    sulfate 325 milliGRAM(s) Oral daily  hydrALAZINE 50 milliGRAM(s) Oral every 8 hours  insulin lispro (ADMELOG) corrective regimen sliding scale   SubCutaneous every 6 hours  isosorbide   dinitrate Tablet (ISORDIL) 20 milliGRAM(s) Oral three times a day  losartan 100 milliGRAM(s) Oral every 24 hours  metoprolol succinate ER 25 milliGRAM(s) Oral daily  pantoprazole  Injectable 40 milliGRAM(s) IV Push every 12 hours  tacrolimus 2 milliGRAM(s) Oral every 12 hours  tamsulosin 0.8 milliGRAM(s) Oral at bedtime    MEDICATIONS  (PRN):  acetaminophen     Tablet .. 650 milliGRAM(s) Oral every 6 hours PRN Mild Pain (1 - 3), Moderate Pain (4 - 6)  dextrose Oral Gel 15 Gram(s) Oral once PRN Blood Glucose LESS THAN 70 milliGRAM(s)/deciliter  sodium chloride 0.65% Nasal 1 Spray(s) Both Nostrils every 4 hours PRN Nasal Congestion      Allergies    No Known Allergies    Intolerances        SOCIAL HISTORY:    FAMILY HISTORY:      Vital Signs Last 24 Hrs  T(C): 36.7 (29 Mar 2023 08:59), Max: 37.4 (29 Mar 2023 04:30)  T(F): 98.1 (29 Mar 2023 08:59), Max: 99.4 (29 Mar 2023 04:30)  HR: 54 (29 Mar 2023 12:01) (51 - 66)  BP: 122/47 (29 Mar 2023 12:01) (116/50 - 157/70)  BP(mean): 68 (29 Mar 2023 12:01) (68 - 96)  RR: 16 (29 Mar 2023 12:01) (16 - 18)  SpO2: 99% (29 Mar 2023 12:01) (96% - 100%)    Parameters below as of 29 Mar 2023 12:01  Patient On (Oxygen Delivery Method): room air        On PE:  General: alert and awake  Abdomen: soft, NT, ND  : +paraphimosis, bilat testes descended, NT      LABS:                        9.1    3.74  )-----------( 175      ( 29 Mar 2023 05:30 )             28.7     03-29    133<L>  |  96  |  6<L>  ----------------------------<  79  3.8   |  28  |  3.83<H>    Ca    8.0<L>      29 Mar 2023 05:30  Phos  2.2     03-29  Mg     1.8     03-29    TPro  4.4<L>  /  Alb  2.2<L>  /  TBili  0.3  /  DBili  x   /  AST  15  /  ALT  <5<L>  /  AlkPhos  122<H>  03-29          RADIOLOGY & ADDITIONAL STUDIES: ** CONSULT NOTE:     HPI:  61yo M PMHx HTN, HLD, DM,  ICM (s/p cath years ago, RCA 99%, never intervened on), HFrEF 25-30%, ESRD s/p failed kidney transplant (last HD 3/1 via Left Arm AVF; HD TTS), Right AKA presented to [facility] 2/27 for respiratory distress after a dialysis session and admitted to cardiac telemetry on 2/27 with SIRS criteria. Placed on vanc/zosyn. Pt was placed on BiPAP which failed, where he was then intubated on 3/1 and moved to the MICU. He went into cardiac arrest (RoSC achieved after 8mins). Went into Vtach arrest, was placed on amio and pressors, HR ellen into 30s and atropine given twice. Weaned off levophed and sedation and extubated 3/2. Pt developed coffee ground emesis, hemoccult positive, with Hgb dipping to 3.5; he then received 7units PRBCs, 1Unit FFP, 1 unit donor packed platelets. EGD and colonoscopy showing melanosis duodenii but no acute bleed. BP dropped again, transferred to ICU and started on levophed gtt. CTA Abdomen showing no active bleeding into GI lumen, possible focal proctitis; colonoscopy showing rectal ulcer. Started on hydrocortisone suppository and mesalamine. Hgb now stable in 11s (per handoff), now transferred for ICD placement 2/2 Vtach and cardiac cath. (17 Mar 2023 03:32)    : above noted. Called to evaluate for parphimosis. Patient with duvall for over a week now and exchanged last 72 hrs. Patient c/o pain and swelling distal penis x 3 days. Not circumcised. Patient did not have voiding issues prior to hospitalization.       PAST MEDICAL & SURGICAL HISTORY:  HTN (hypertension)      HLD (hyperlipidemia)      DM (diabetes mellitus)      GERD (gastroesophageal reflux disease)      ESRD on dialysis      NSTEMI (non-ST elevation myocardial infarction)      CAD (coronary artery disease)      Fever of unknown origin (FUO)          MEDICATIONS  (STANDING):  aspirin enteric coated 81 milliGRAM(s) Oral daily  atorvastatin 40 milliGRAM(s) Oral at bedtime  cefTRIAXone   IVPB 2000 milliGRAM(s) IV Intermittent every 24 hours  chlorhexidine 2% Cloths 1 Application(s) Topical daily  clopidogrel Tablet 75 milliGRAM(s) Oral daily  dextrose 5%. 1000 milliLiter(s) (100 mL/Hr) IV Continuous <Continuous>  dextrose 5%. 1000 milliLiter(s) (50 mL/Hr) IV Continuous <Continuous>  dextrose 50% Injectable 25 Gram(s) IV Push once  dextrose 50% Injectable 12.5 Gram(s) IV Push once  ferrous    sulfate 325 milliGRAM(s) Oral daily  hydrALAZINE 50 milliGRAM(s) Oral every 8 hours  insulin lispro (ADMELOG) corrective regimen sliding scale   SubCutaneous every 6 hours  isosorbide   dinitrate Tablet (ISORDIL) 20 milliGRAM(s) Oral three times a day  losartan 100 milliGRAM(s) Oral every 24 hours  metoprolol succinate ER 25 milliGRAM(s) Oral daily  pantoprazole  Injectable 40 milliGRAM(s) IV Push every 12 hours  tacrolimus 2 milliGRAM(s) Oral every 12 hours  tamsulosin 0.8 milliGRAM(s) Oral at bedtime    MEDICATIONS  (PRN):  acetaminophen     Tablet .. 650 milliGRAM(s) Oral every 6 hours PRN Mild Pain (1 - 3), Moderate Pain (4 - 6)  dextrose Oral Gel 15 Gram(s) Oral once PRN Blood Glucose LESS THAN 70 milliGRAM(s)/deciliter  sodium chloride 0.65% Nasal 1 Spray(s) Both Nostrils every 4 hours PRN Nasal Congestion      Allergies    No Known Allergies    Intolerances        SOCIAL HISTORY:    FAMILY HISTORY:      Vital Signs Last 24 Hrs  T(C): 36.7 (29 Mar 2023 08:59), Max: 37.4 (29 Mar 2023 04:30)  T(F): 98.1 (29 Mar 2023 08:59), Max: 99.4 (29 Mar 2023 04:30)  HR: 54 (29 Mar 2023 12:01) (51 - 66)  BP: 122/47 (29 Mar 2023 12:01) (116/50 - 157/70)  BP(mean): 68 (29 Mar 2023 12:01) (68 - 96)  RR: 16 (29 Mar 2023 12:01) (16 - 18)  SpO2: 99% (29 Mar 2023 12:01) (96% - 100%)    Parameters below as of 29 Mar 2023 12:01  Patient On (Oxygen Delivery Method): room air        On PE:  General: alert and awake  Abdomen: soft, NT, ND  : +paraphimosis, bilat testes descended, NT      LABS:                        9.1    3.74  )-----------( 175      ( 29 Mar 2023 05:30 )             28.7     03-29    133<L>  |  96  |  6<L>  ----------------------------<  79  3.8   |  28  |  3.83<H>    Ca    8.0<L>      29 Mar 2023 05:30  Phos  2.2     03-29  Mg     1.8     03-29    TPro  4.4<L>  /  Alb  2.2<L>  /  TBili  0.3  /  DBili  x   /  AST  15  /  ALT  <5<L>  /  AlkPhos  122<H>  03-29          RADIOLOGY & ADDITIONAL STUDIES: ** CONSULT NOTE:     HPI:  63yo M PMHx HTN, HLD, DM,  ICM (s/p cath years ago, RCA 99%, never intervened on), HFrEF 25-30%, ESRD s/p failed kidney transplant (last HD 3/1 via Left Arm AVF; HD TTS), Right AKA presented to [facility] 2/27 for respiratory distress after a dialysis session and admitted to cardiac telemetry on 2/27 with SIRS criteria. Placed on vanc/zosyn. Pt was placed on BiPAP which failed, where he was then intubated on 3/1 and moved to the MICU. He went into cardiac arrest (RoSC achieved after 8mins). Went into Vtach arrest, was placed on amio and pressors, HR ellen into 30s and atropine given twice. Weaned off levophed and sedation and extubated 3/2. Pt developed coffee ground emesis, hemoccult positive, with Hgb dipping to 3.5; he then received 7units PRBCs, 1Unit FFP, 1 unit donor packed platelets. EGD and colonoscopy showing melanosis duodenii but no acute bleed. BP dropped again, transferred to ICU and started on levophed gtt. CTA Abdomen showing no active bleeding into GI lumen, possible focal proctitis; colonoscopy showing rectal ulcer. Started on hydrocortisone suppository and mesalamine. Hgb now stable in 11s (per handoff), now transferred for ICD placement 2/2 Vtach and cardiac cath. (17 Mar 2023 03:32)    : above noted. Called to evaluate for parphimosis. Patient with duvall for over a week now and exchanged last 72 hrs. Patient c/o pain and swelling distal penis x 3 days. Not circumcised. Patient did not have voiding issues prior to hospitalization.       PAST MEDICAL & SURGICAL HISTORY:  HTN (hypertension)      HLD (hyperlipidemia)      DM (diabetes mellitus)      GERD (gastroesophageal reflux disease)      ESRD on dialysis      NSTEMI (non-ST elevation myocardial infarction)      CAD (coronary artery disease)      Fever of unknown origin (FUO)          MEDICATIONS  (STANDING):  aspirin enteric coated 81 milliGRAM(s) Oral daily  atorvastatin 40 milliGRAM(s) Oral at bedtime  cefTRIAXone   IVPB 2000 milliGRAM(s) IV Intermittent every 24 hours  chlorhexidine 2% Cloths 1 Application(s) Topical daily  clopidogrel Tablet 75 milliGRAM(s) Oral daily  dextrose 5%. 1000 milliLiter(s) (100 mL/Hr) IV Continuous <Continuous>  dextrose 5%. 1000 milliLiter(s) (50 mL/Hr) IV Continuous <Continuous>  dextrose 50% Injectable 25 Gram(s) IV Push once  dextrose 50% Injectable 12.5 Gram(s) IV Push once  ferrous    sulfate 325 milliGRAM(s) Oral daily  hydrALAZINE 50 milliGRAM(s) Oral every 8 hours  insulin lispro (ADMELOG) corrective regimen sliding scale   SubCutaneous every 6 hours  isosorbide   dinitrate Tablet (ISORDIL) 20 milliGRAM(s) Oral three times a day  losartan 100 milliGRAM(s) Oral every 24 hours  metoprolol succinate ER 25 milliGRAM(s) Oral daily  pantoprazole  Injectable 40 milliGRAM(s) IV Push every 12 hours  tacrolimus 2 milliGRAM(s) Oral every 12 hours  tamsulosin 0.8 milliGRAM(s) Oral at bedtime    MEDICATIONS  (PRN):  acetaminophen     Tablet .. 650 milliGRAM(s) Oral every 6 hours PRN Mild Pain (1 - 3), Moderate Pain (4 - 6)  dextrose Oral Gel 15 Gram(s) Oral once PRN Blood Glucose LESS THAN 70 milliGRAM(s)/deciliter  sodium chloride 0.65% Nasal 1 Spray(s) Both Nostrils every 4 hours PRN Nasal Congestion      Allergies    No Known Allergies    Intolerances        SOCIAL HISTORY:    FAMILY HISTORY:      Vital Signs Last 24 Hrs  T(C): 36.7 (29 Mar 2023 08:59), Max: 37.4 (29 Mar 2023 04:30)  T(F): 98.1 (29 Mar 2023 08:59), Max: 99.4 (29 Mar 2023 04:30)  HR: 54 (29 Mar 2023 12:01) (51 - 66)  BP: 122/47 (29 Mar 2023 12:01) (116/50 - 157/70)  BP(mean): 68 (29 Mar 2023 12:01) (68 - 96)  RR: 16 (29 Mar 2023 12:01) (16 - 18)  SpO2: 99% (29 Mar 2023 12:01) (96% - 100%)    Parameters below as of 29 Mar 2023 12:01  Patient On (Oxygen Delivery Method): room air        On PE:  General: alert and awake  Abdomen: soft, NT, ND  : +paraphimosis, bilat testes descended, NT      LABS:                        9.1    3.74  )-----------( 175      ( 29 Mar 2023 05:30 )             28.7     03-29    133<L>  |  96  |  6<L>  ----------------------------<  79  3.8   |  28  |  3.83<H>    Ca    8.0<L>      29 Mar 2023 05:30  Phos  2.2     03-29  Mg     1.8     03-29    TPro  4.4<L>  /  Alb  2.2<L>  /  TBili  0.3  /  DBili  x   /  AST  15  /  ALT  <5<L>  /  AlkPhos  122<H>  03-29          RADIOLOGY & ADDITIONAL STUDIES: ** CONSULT NOTE:     HPI:  61yo M PMHx HTN, HLD, DM,  ICM (s/p cath years ago, RCA 99%, never intervened on), HFrEF 25-30%, ESRD s/p failed kidney transplant (last HD 3/1 via Left Arm AVF; HD TTS), Right AKA presented to [facility] 2/27 for respiratory distress after a dialysis session and admitted to cardiac telemetry on 2/27 with SIRS criteria. Placed on vanc/zosyn. Pt was placed on BiPAP which failed, where he was then intubated on 3/1 and moved to the MICU. He went into cardiac arrest (RoSC achieved after 8mins). Went into Vtach arrest, was placed on amio and pressors, HR ellen into 30s and atropine given twice. Weaned off levophed and sedation and extubated 3/2. Pt developed coffee ground emesis, hemoccult positive, with Hgb dipping to 3.5; he then received 7units PRBCs, 1Unit FFP, 1 unit donor packed platelets. EGD and colonoscopy showing melanosis duodenii but no acute bleed. BP dropped again, transferred to ICU and started on levophed gtt. CTA Abdomen showing no active bleeding into GI lumen, possible focal proctitis; colonoscopy showing rectal ulcer. Started on hydrocortisone suppository and mesalamine. Hgb now stable in 11s (per handoff), now transferred for ICD placement 2/2 Vtach and cardiac cath. (17 Mar 2023 03:32)    : above noted. Called to evaluate for parphimosis. Patient with duvall for over a week now and exchanged last 72 hrs. Patient c/o pain and swelling distal penis x 3 days. Not circumcised. Patient did not have voiding issues prior to hospitalization.       PAST MEDICAL & SURGICAL HISTORY:  HTN (hypertension)      HLD (hyperlipidemia)      DM (diabetes mellitus)      GERD (gastroesophageal reflux disease)      ESRD on dialysis      NSTEMI (non-ST elevation myocardial infarction)      CAD (coronary artery disease)      Fever of unknown origin (FUO)          MEDICATIONS  (STANDING):  aspirin enteric coated 81 milliGRAM(s) Oral daily  atorvastatin 40 milliGRAM(s) Oral at bedtime  cefTRIAXone   IVPB 2000 milliGRAM(s) IV Intermittent every 24 hours  chlorhexidine 2% Cloths 1 Application(s) Topical daily  clopidogrel Tablet 75 milliGRAM(s) Oral daily  dextrose 5%. 1000 milliLiter(s) (100 mL/Hr) IV Continuous <Continuous>  dextrose 5%. 1000 milliLiter(s) (50 mL/Hr) IV Continuous <Continuous>  dextrose 50% Injectable 25 Gram(s) IV Push once  dextrose 50% Injectable 12.5 Gram(s) IV Push once  ferrous    sulfate 325 milliGRAM(s) Oral daily  hydrALAZINE 50 milliGRAM(s) Oral every 8 hours  insulin lispro (ADMELOG) corrective regimen sliding scale   SubCutaneous every 6 hours  isosorbide   dinitrate Tablet (ISORDIL) 20 milliGRAM(s) Oral three times a day  losartan 100 milliGRAM(s) Oral every 24 hours  metoprolol succinate ER 25 milliGRAM(s) Oral daily  pantoprazole  Injectable 40 milliGRAM(s) IV Push every 12 hours  tacrolimus 2 milliGRAM(s) Oral every 12 hours  tamsulosin 0.8 milliGRAM(s) Oral at bedtime    MEDICATIONS  (PRN):  acetaminophen     Tablet .. 650 milliGRAM(s) Oral every 6 hours PRN Mild Pain (1 - 3), Moderate Pain (4 - 6)  dextrose Oral Gel 15 Gram(s) Oral once PRN Blood Glucose LESS THAN 70 milliGRAM(s)/deciliter  sodium chloride 0.65% Nasal 1 Spray(s) Both Nostrils every 4 hours PRN Nasal Congestion      Allergies    No Known Allergies    Intolerances        SOCIAL HISTORY:    FAMILY HISTORY:      Vital Signs Last 24 Hrs  T(C): 36.7 (29 Mar 2023 08:59), Max: 37.4 (29 Mar 2023 04:30)  T(F): 98.1 (29 Mar 2023 08:59), Max: 99.4 (29 Mar 2023 04:30)  HR: 54 (29 Mar 2023 12:01) (51 - 66)  BP: 122/47 (29 Mar 2023 12:01) (116/50 - 157/70)  BP(mean): 68 (29 Mar 2023 12:01) (68 - 96)  RR: 16 (29 Mar 2023 12:01) (16 - 18)  SpO2: 99% (29 Mar 2023 12:01) (96% - 100%)    Parameters below as of 29 Mar 2023 12:01  Patient On (Oxygen Delivery Method): room air        On PE:  General: alert and awake  Abdomen: soft, NT, ND  : +paraphimosis, bilat testes descended, NT      LABS:                        9.1    3.74  )-----------( 175      ( 29 Mar 2023 05:30 )             28.7     03-29    133<L>  |  96  |  6<L>  ----------------------------<  79  3.8   |  28  |  3.83<H>    Ca    8.0<L>      29 Mar 2023 05:30  Phos  2.2     03-29  Mg     1.8     03-29    TPro  4.4<L>  /  Alb  2.2<L>  /  TBili  0.3  /  DBili  x   /  AST  15  /  ALT  <5<L>  /  AlkPhos  122<H>  03-29          RADIOLOGY & ADDITIONAL STUDIES:

## 2023-03-29 NOTE — PROGRESS NOTE ADULT - PROBLEM SELECTOR PLAN 1
- Pt w/ ongoing fevers this admission, infectious work up ongoing w/ no clear infectious source identified at this time.    - BCx NGTD; UCx (+) for Klebsiella Pneumonia   - STOPPED CTX and Meropenem 3/27/23  - f/u BK virus result   - PLAN: PET scan 3/25, significant for multifocal uptake in the prostate gland, which can be seen in the   setting of prostatitis and neoplastic disease  - CT Pelvis w/ and w/o contrast completed to eval prostate- read pending.   - CONT: Ceftriaxone 2000mg IV QD (length to be determined).  - ID following - continue to follow recs.      #Diarrhea  -3 brown loose BM's today 3/26, discontinued laxative.   -Continue to monitor, if persists or worsens consult diarrhea tree and send c.diff if appropriate. - Pt w/ ongoing fevers this admission, infectious work up ongoing w/ no clear infectious source identified at this time.    - BCx NGTD; UCx (+) for Klebsiella Pneumonia   - STOPPED Vancomycin and Meropenem 3/27/23  - f/u BK virus result   - PLAN: PET scan 3/25, significant for multifocal uptake in the prostate gland, which can be seen in the   setting of prostatitis and neoplastic disease  - CT Pelvis 3/28. significant for mild urothelial thickening and hyperenhancement suggestive of pyelitis.   No evidence of pyelonephritis or renal abscess. Probable cystitis.  - CONT: Ceftriaxone 2000mg IV QD (length to be confirmed by ID).  - ID following - appreciate recs     #Diarrhea  -3 brown loose BM's today 3/26, discontinued laxative.   -Continue to monitor, if persists or worsens consult diarrhea tree and send c.diff if appropriate. - Pt w/ recurring fevers despite IV ABx. Tmax 101 03/30/23,  infectious work up ongoing w source likely urinary vs prostatitis   - BCx NGTD; UCx (+) for Klebsiella Pneumonia   - STOPPED Vancomycin and Meropenem 3/27/23  - f/u BK virus result   - PLAN: PET scan 3/25, significant for multifocal uptake in the prostate gland, which can be seen in the   setting of prostatitis and neoplastic disease  - CT Pelvis 3/28. significant for mild urothelial thickening and hyperenhancement suggestive of pyelitis.   No evidence of pyelonephritis or renal abscess. Probable cystitis.  - CONT: Ceftriaxone 2000mg IV QD until April 4, 2023  - ID following - appreciate recs     #Diarrhea  -3 brown loose BM's today 3/26, discontinued laxative.   -Continue to monitor, if persists or worsens consult diarrhea tree and send c.diff if appropriate.

## 2023-03-29 NOTE — PROGRESS NOTE ADULT - ASSESSMENT
62y M PMHx HTN, HLD, DM, ICM (s/p cath years ago, RCA 99%, never intervened on), HFrEF 25-30%, ESRD s/p failed kidney transplant (LUE AVF; HD TTS), R AKA admitted to [facility] 2/27 for respiratory distress and SIRS criteria after HD session, requiring intubation. Pt w/ cardiac arrest, vtach arrest requiring amio and pressors. Extubated 3/2 and course c/b by LGIB with hemoglobin of 3.5 requiring multiple transfusions; EGD/colo and CTA w/o evidence of active bleed and Hgb improved to 11's. Transferred to [facility] 03/17/23 for ICD eval 2/2 Vtach and cardiac cath however patient w/ episode of BRBPR, flex sig showing localized rectal ulcer but no active bleeding. DAPT resumed on 3/20. 3/21 patient febrile to 102F w/ URI symptoms. BCx NGTD with intermittent fevers, on broad spectrum Abx w/ ID following. Melena 3/23/23 w/ Fecal occult negative, GI cleared pt and DAPT resumed 3/24. LHC and ICD pending infectious w/u. PET scan from 3/25 suspicious for prostatitis vs prostate abscess, ID following along for infectious work up. Once infectious work up complete and treatment plan confirmed, consider proceeding w/ LHC followed by ICD. 62y M PMHx HTN, HLD, DM, ICM (s/p cath years ago, RCA 99%, never intervened on), HFrEF 25-30%, ESRD s/p failed kidney transplant (LUE AVF; HD TTS), R AKA admitted to [facility] 2/27 for respiratory distress and SIRS criteria after HD session, requiring intubation. Pt w/ cardiac arrest, vtach arrest requiring amio and pressors. Extubated 3/2 and course c/b by LGIB with hemoglobin of 3.5 requiring multiple transfusions; EGD/colo and CTA w/o evidence of active bleed and Hgb improved to 11's. Transferred to [facility] 03/17/23 for ICD eval 2/2 Vtach and cardiac cath however patient w/ episode of BRBPR, flex sig showing localized rectal ulcer but no active bleeding. DAPT resumed on 3/20. 3/21 patient febrile to 102F w/ URI symptoms. BCx NGTD with intermittent fevers, on broad spectrum Abx w/ ID following. Melena 3/23/23 w/ Fecal occult negative, GI cleared pt and DAPT resumed 3/24. LHC and ICD pending infectious w/u. PET scan from 3/25 suspicious for prostatitis vs prostate abscess, ID following along for infectious work up. Once infectious work up complete and treatment plan confirmed, consider proceeding w/ LHC followed by ICD. 62y M PMHx HTN, HLD, DM, ICM (s/p cath years ago, RCA 99%, never intervened on), HFrEF 25-30%, ESRD s/p failed kidney transplant (LUE AVF; HD TTS), R AKA admitted to [facility] 2/27 for respiratory distress and SIRS criteria after HD session, requiring intubation. Pt w/ cardiac arrest, vtach arrest requiring amio and pressors. Extubated 3/2 and course c/b by LGIB with hemoglobin of 3.5 requiring multiple transfusions; EGD/colo and CTA w/o evidence of active bleed and Hgb improved to 11's. Transferred to [facility] 03/17/23 for ICD eval 2/2 Vtach and cardiac cath however patient w/ episode of BRBPR, flex sig showing localized rectal ulcer but no active bleeding. DAPT resumed on 3/20. 3/21 patient febrile to 102F w/ URI symptoms. BCx NGTD with intermittent fevers, on broad spectrum Abx w/ ID following. Melena 3/23/23 w/ Fecal occult negative, GI cleared pt and DAPT resumed 3/24. LHC and ICD pending infectious w/u. PET scan from 3/25 suspicious for prostatitis vs prostate abscess, ID following along for infectious work up. Once infectious work up complete and treatment plan confirmed, consider proceeding w/ LHC followed by ICD. 62y M PMHx HTN, HLD, DM, ICM (s/p cath years ago, RCA 99%, never intervened on), HFrEF 25-30%, ESRD s/p failed kidney transplant (LUE AVF; HD TTS), R AKA admitted to [facility] 2/27 for respiratory distress and SIRS criteria after HD session, requiring intubation. Pt w/ cardiac arrest, vtach arrest requiring amio and pressors. Extubated 3/2 and course c/b by LGIB with hemoglobin of 3.5 requiring multiple transfusions; EGD/colo and CTA w/o evidence of active bleed and Hgb improved to 11's. Transferred to [facility] 03/17/23 for ICD eval 2/2 Vtach and cardiac cath however patient w/ episode of BRBPR, flex sig showing localized rectal ulcer but no active bleeding. DAPT resumed on 3/20. 3/21 patient febrile to 102F w/ URI symptoms. BCx NGTD with intermittent fevers, on broad spectrum Abx w/ ID following. Melena 3/23/23 w/ Fecal occult negative, GI cleared pt and DAPT resumed 3/24. LHC and ICD pending infectious w/u. PET scan from 3/25 suspicious for prostatitis vs prostate abscess, ID following along for infectious work up revealing cystitis and pyelitis confirmed on CT and UCx K. pneumonia with IVAB narrowed down to Ceftriaxone 2mg. Will plan for LHC followed by ICD tomorrow 03/30/23.  62y M PMHx HTN, HLD, DM, ICM (s/p cath years ago, RCA 99%, never intervened on), HFrEF 25-30%, ESRD s/p failed kidney transplant (LUE AVF; HD TTS), R AKA admitted to [facility] 2/27 for respiratory distress and SIRS criteria after HD session, requiring intubation. Pt w/ cardiac arrest, vtach arrest requiring amio and pressors. Extubated 3/2 and course c/b by LGIB with hemoglobin of 3.5 requiring multiple transfusions; EGD/colo and CTA w/o evidence of active bleed and Hgb improved to 11's. Transferred to [facility] 03/17/23 for ICD eval 2/2 Vtach and cardiac cath however patient w/ episode of BRBPR, flex sig showing localized rectal ulcer but no active bleeding. DAPT resumed on 3/20. 3/21 patient febrile to 102F w/ URI symptoms. BCx NGTD with intermittent fevers, on broad spectrum Abx w/ ID following. Melena 3/23/23 w/ Fecal occult negative, GI cleared pt and DAPT resumed 3/24. LHC and ICD pending infectious w/u. PET scan from 3/25 suspicious for prostatitis vs prostate abscess, ID following along for infectious work up revealing cystitis and pyelitis confirmed on CT and UCx K. pneumonia with IVAB narrowed down to Ceftriaxone 2mg. Will plan for LHC followed by ICD tomorrow 03/30/23.  62y M PMHx HTN, HLD, DM, ICM (s/p cath years ago, RCA 99%, never intervened on), HFrEF 25-30%, ESRD s/p failed kidney transplant (LUE AVF; HD TTS), R AKA admitted to [facility] 2/27 for respiratory distress and SIRS criteria after HD session, requiring intubation. Pt w/ cardiac arrest, vtach arrest requiring amio and pressors. Extubated 3/2 and course c/b by LGIB with hemoglobin of 3.5 requiring multiple transfusions; EGD/colo and CTA w/o evidence of active bleed and Hgb improved to 11's. Transferred to [facility] 03/17/23 for ICD eval 2/2 Vtach and cardiac cath however patient w/ episode of BRBPR, flex sig showing localized rectal ulcer but no active bleeding. DAPT resumed on 3/20. 3/21 patient febrile to 102F w/ URI symptoms. BCx NGTD with intermittent fevers, on broad spectrum Abx w/ ID following. Melena 3/23/23 w/ Fecal occult negative, GI cleared pt and DAPT resumed 3/24. LHC and ICD pending infectious w/u. PET scan from 3/25 suspicious for prostatitis vs prostate abscess, ID following along for infectious work up revealing cystitis and pyelitis confirmed on CT and UCx K. pneumonia with IVAB narrowed down to Ceftriaxone 2mg. Will plan for LHC followed by ICD tomorrow 03/30/23.  62y M PMHx HTN, HLD, DM, ICM (s/p cath years ago, RCA 99%, never intervened on), HFrEF 25-30%, ESRD s/p failed kidney transplant (LUE AVF; HD TTS), R AKA admitted to [facility] 2/27 for respiratory distress and SIRS criteria after HD session, requiring intubation. Pt w/ cardiac arrest, vtach arrest requiring amio and pressors. Extubated 3/2 and course c/b by LGIB with hemoglobin of 3.5 requiring multiple transfusions; EGD/colo and CTA w/o evidence of active bleed and Hgb improved to 11's. Transferred to [facility] 03/17/23 for ICD eval 2/2 Vtach and cardiac cath however patient w/ episode of BRBPR, flex sig showing localized rectal ulcer but no active bleeding. DAPT resumed on 3/20. 3/21 patient febrile to 89F w/ URI symptoms. BCx NGTD with intermittent fevers, on broad spectrum Abx w/ ID following. Melena 3/23/23 w/ Fecal occult negative, GI cleared pt and DAPT resumed 3/24. LHC and ICD pending infectious w/u. PET scan from 3/25 suspicious for prostatitis vs prostate abscess, ID following along for infectious work up revealing cystitis and pyelitis confirmed on CT and UCx K. pneumonia with IVAB narrowed down to Ceftriaxone 2mg. Patient once again spiking fevers despite IV ABx, ID made aware, FUO in process. Will continue to follow up work-up and treat accordingly  62y M PMHx HTN, HLD, DM, ICM (s/p cath years ago, RCA 99%, never intervened on), HFrEF 25-30%, ESRD s/p failed kidney transplant (LUE AVF; HD TTS), R AKA admitted to [facility] 2/27 for respiratory distress and SIRS criteria after HD session, requiring intubation. Pt w/ cardiac arrest, vtach arrest requiring amio and pressors. Extubated 3/2 and course c/b by LGIB with hemoglobin of 3.5 requiring multiple transfusions; EGD/colo and CTA w/o evidence of active bleed and Hgb improved to 11's. Transferred to [facility] 03/17/23 for ICD eval 2/2 Vtach and cardiac cath however patient w/ episode of BRBPR, flex sig showing localized rectal ulcer but no active bleeding. DAPT resumed on 3/20. 3/21 patient febrile to 89F w/ URI symptoms. BCx NGTD with intermittent fevers, on broad spectrum Abx w/ ID following. Melena 3/23/23 w/ Fecal occult negative, GI cleared pt and DAPT resumed 3/24. LHC and ICD pending infectious w/u. PET scan from 3/25 suspicious for prostatitis vs prostate abscess, ID following along for infectious work up revealing cystitis and pyelitis confirmed on CT and UCx K. pneumonia with IVAB narrowed down to Ceftriaxone 2mg. Patient once again spiking fevers despite IV ABx, ID made aware, FUO in process. Will continue to follow up work-up and treat accordingly  62y M PMHx HTN, HLD, DM, ICM (s/p cath years ago, RCA 99%, never intervened on), HFrEF 25-30%, ESRD s/p failed kidney transplant (LUE AVF; HD TTS), R AKA admitted to [facility] 2/27 for respiratory distress and SIRS criteria after HD session, requiring intubation. Pt w/ cardiac arrest, vtach arrest requiring amio and pressors. Extubated 3/2 and course c/b by LGIB with hemoglobin of 3.5 requiring multiple transfusions; EGD/colo and CTA w/o evidence of active bleed and Hgb improved to 11's. Transferred to [facility] 03/17/23 for ICD eval 2/2 Vtach and cardiac cath however patient w/ episode of BRBPR, flex sig showing localized rectal ulcer but no active bleeding. DAPT resumed on 3/20. 3/21 patient febrile to 89F w/ URI symptoms. BCx NGTD with intermittent fevers, on broad spectrum Abx w/ ID following. Melena 3/23/23 w/ Fecal occult negative, GI cleared pt and DAPT resumed 3/24. LHC and ICD pending infectious w/u. PET scan from 3/25 suspicious for prostatitis vs prostate abscess, ID following along for infectious work up revealing cystitis and pyelitis confirmed on CT and UCx K. pneumonia with IVAB narrowed down to Ceftriaxone 2mg. Patient once again spiking fevers despite IV ABx, ID made aware, FUO in process. Will continue to follow up work-up and treat accordingly

## 2023-03-29 NOTE — PROGRESS NOTE ADULT - ATTENDING COMMENTS
61yo M PMHx HTN, HLD, DM, CAD(s/p cath years ago, RCA 99%, never intervened on), HFrEF 25-30%, ESRD s/p failed kidney transplant and required HD x 3yrs (last HD 3/16 via Left Arm AVF; HD TTS), Right AKA initially presented to [facility] 2/27 for respiratory distress after a dialysis, found to be septic, h/c complicated by AHRF requiring intubation for 24hr followed by VT arrest, h/c the complicated by massive LGIB (rectal ulcer seen on colonoscopy) requiring 7u pRBC, 1u FFP and 1u PLT. Transferred to [facility] (3/17) for ICD placement 2/2 Vtach and cardiac cath. Pt noted lassed loose stool with BRBPR, GI and surgery consulted. Flex Sigc ( 3/17) found prior hemoclip in the sigmoid colon, localized ulceration and erosive with no bleeding noted in rectum suggestive of solitary ulcer syndrome. Medicine consulted for comanagement.   pt seen with Dr. Snow        -fever -   #LGIB  #SANTOS  #Solitary ulcer syndrome  - hx coffee ground emesis @ [facility]; s/p EGD/colonoscopy showing rectal ulcer  - per gen surgery no acute surgical intervention   - per GI:      - increased Protonix 40 mg IVP BID      - s/p flex sig(3/17)  for rectal ulcers, found Evidence of prior hemoclip in the sigmoid colon. Localized ulceration and erosive with no bleeding were noted in the rectum, suggestive of solitary ulcer syndrome           - Avoid anal digitation and enemas           - High-fiber diet and optimize laxatives to avoid constipation using miralax and dulcolax           - No absolute GI contraindications to anticoag/antiplt therapy           - Repeat endoscopic evaluation recommended in 3 months  - Active T&S  - Hgb  stable-  - Transfuse for hgb <8 (active CAD)    #CAD   - Previous cath at [facility] showing oLM 30% and RCA 99% that is not amenable to intervention.  - TTE 3/1/23 at [facility]: mild LVH, EF 25-30%, severe global wall motion dysfunction, mildly dilated LA, RV mildly dilated, all leaflets mild calcified, mod pHTN  - plan for cardiac cath -deferred due to concern for GI bleeding.    #H/O ventricular tachycardia   - Vtach arrest @[facility]  - fu EP consult for ICD placement- pending infection work up.    # Fever -ID concern is prostate abscess -  - PET/CT scan - no Lymphadenopathy to suggest lymphoproliferative disorder, activity in prostate and femur.  - given immunocompromised/hx Kidney transplant    - ID consult appreciated, -rec appreciated   fu-urine for BK virus  - CT pelvis w/wo IVC- result reviewed, BLADDER: Minimally distended, Moody catheter. Diffuse wall thickening and   perivesical fat stranding.- Prostate is enlarged. no sign of renal abscess.   - Start ceftriaxone 2 g IV q24h- concern prostate abscess -3/27)  - D/C vancomycin and meropenem   would need to rule out infection prior to icd placement.  - Left calf pain, negative for DVT    #ESRD on dialysis/ sp renal transplant.   - dialysis Tues, Thurs, Sat  -HD per renal.    #HTN  - c/w home meds. Amlodipine 10mg qd, Losartan 100mg qd, Hydralazine 50m TID, Isordil 20mg TID with holding parameter    #HLD   - c/w Lipitor 40mg qd    #DM  - no meds per [facility], obtain collaterals for med recs prior [facility] stay  - mISS while inpt, goal -180   - A1c 5.9 on admission    DVT PPX: SCDs for now iso GIB    Med consult team continues to follow with you. 63yo M PMHx HTN, HLD, DM, CAD(s/p cath years ago, RCA 99%, never intervened on), HFrEF 25-30%, ESRD s/p failed kidney transplant and required HD x 3yrs (last HD 3/16 via Left Arm AVF; HD TTS), Right AKA initially presented to [facility] 2/27 for respiratory distress after a dialysis, found to be septic, h/c complicated by AHRF requiring intubation for 24hr followed by VT arrest, h/c the complicated by massive LGIB (rectal ulcer seen on colonoscopy) requiring 7u pRBC, 1u FFP and 1u PLT. Transferred to [facility] (3/17) for ICD placement 2/2 Vtach and cardiac cath. Pt noted lassed loose stool with BRBPR, GI and surgery consulted. Flex Sigc ( 3/17) found prior hemoclip in the sigmoid colon, localized ulceration and erosive with no bleeding noted in rectum suggestive of solitary ulcer syndrome. Medicine consulted for comanagement.   pt seen with Dr. Snow        -fever -   #LGIB  #SANTOS  #Solitary ulcer syndrome  - hx coffee ground emesis @ [facility]; s/p EGD/colonoscopy showing rectal ulcer  - per gen surgery no acute surgical intervention   - per GI:      - increased Protonix 40 mg IVP BID      - s/p flex sig(3/17)  for rectal ulcers, found Evidence of prior hemoclip in the sigmoid colon. Localized ulceration and erosive with no bleeding were noted in the rectum, suggestive of solitary ulcer syndrome           - Avoid anal digitation and enemas           - High-fiber diet and optimize laxatives to avoid constipation using miralax and dulcolax           - No absolute GI contraindications to anticoag/antiplt therapy           - Repeat endoscopic evaluation recommended in 3 months  - Active T&S  - Hgb  stable-  - Transfuse for hgb <8 (active CAD)    #CAD   - Previous cath at [facility] showing oLM 30% and RCA 99% that is not amenable to intervention.  - TTE 3/1/23 at [facility]: mild LVH, EF 25-30%, severe global wall motion dysfunction, mildly dilated LA, RV mildly dilated, all leaflets mild calcified, mod pHTN  - plan for cardiac cath -deferred due to concern for GI bleeding.    #H/O ventricular tachycardia   - Vtach arrest @[facility]  - fu EP consult for ICD placement- pending infection work up.    # Fever -ID concern is prostate abscess -  - PET/CT scan - no Lymphadenopathy to suggest lymphoproliferative disorder, activity in prostate and femur.  - given immunocompromised/hx Kidney transplant    - ID consult appreciated, -rec appreciated   fu-urine for BK virus  - CT pelvis w/wo IVC- result reviewed, BLADDER: Minimally distended, Moody catheter. Diffuse wall thickening and   perivesical fat stranding.- Prostate is enlarged. no sign of renal abscess.   - Start ceftriaxone 2 g IV q24h- concern prostate abscess -3/27)  - D/C vancomycin and meropenem   would need to rule out infection prior to icd placement.  - Left calf pain, negative for DVT    #ESRD on dialysis/ sp renal transplant.   - dialysis Tues, Thurs, Sat  -HD per renal.    #HTN  - c/w home meds. Amlodipine 10mg qd, Losartan 100mg qd, Hydralazine 50m TID, Isordil 20mg TID with holding parameter    #HLD   - c/w Lipitor 40mg qd    #DM  - no meds per [facility], obtain collaterals for med recs prior [facility] stay  - mISS while inpt, goal -180   - A1c 5.9 on admission    DVT PPX: SCDs for now iso GIB    Med consult team continues to follow with you. 63yo M PMHx HTN, HLD, DM, CAD(s/p cath years ago, RCA 99%, never intervened on), HFrEF 25-30%, ESRD s/p failed kidney transplant and required HD x 3yrs (last HD 3/16 via Left Arm AVF; HD TTS), Right AKA initially presented to [facility] 2/27 for respiratory distress after a dialysis, found to be septic, h/c complicated by AHRF requiring intubation for 24hr followed by VT arrest, h/c the complicated by massive LGIB (rectal ulcer seen on colonoscopy) requiring 7u pRBC, 1u FFP and 1u PLT. Transferred to [facility] (3/17) for ICD placement 2/2 Vtach and cardiac cath. Pt noted lassed loose stool with BRBPR, GI and surgery consulted. Flex Sigc ( 3/17) found prior hemoclip in the sigmoid colon, localized ulceration and erosive with no bleeding noted in rectum suggestive of solitary ulcer syndrome. Medicine consulted for comanagement.   pt seen with Dr. Snow        -fever -   #LGIB  #SANTOS  #Solitary ulcer syndrome  - hx coffee ground emesis @ [facility]; s/p EGD/colonoscopy showing rectal ulcer  - per gen surgery no acute surgical intervention   - per GI:      - increased Protonix 40 mg IVP BID      - s/p flex sig(3/17)  for rectal ulcers, found Evidence of prior hemoclip in the sigmoid colon. Localized ulceration and erosive with no bleeding were noted in the rectum, suggestive of solitary ulcer syndrome           - Avoid anal digitation and enemas           - High-fiber diet and optimize laxatives to avoid constipation using miralax and dulcolax           - No absolute GI contraindications to anticoag/antiplt therapy           - Repeat endoscopic evaluation recommended in 3 months  - Active T&S  - Hgb  stable-  - Transfuse for hgb <8 (active CAD)    #CAD   - Previous cath at [facility] showing oLM 30% and RCA 99% that is not amenable to intervention.  - TTE 3/1/23 at [facility]: mild LVH, EF 25-30%, severe global wall motion dysfunction, mildly dilated LA, RV mildly dilated, all leaflets mild calcified, mod pHTN  - plan for cardiac cath -deferred due to concern for GI bleeding.    #H/O ventricular tachycardia   - Vtach arrest @[facility]  - fu EP consult for ICD placement- pending infection work up.    # Fever -ID concern is prostate abscess -  - PET/CT scan - no Lymphadenopathy to suggest lymphoproliferative disorder, activity in prostate and femur.  - given immunocompromised/hx Kidney transplant    - ID consult appreciated, -rec appreciated   fu-urine for BK virus  - CT pelvis w/wo IVC- result reviewed, BLADDER: Minimally distended, Moody catheter. Diffuse wall thickening and   perivesical fat stranding.- Prostate is enlarged. no sign of renal abscess.   - Start ceftriaxone 2 g IV q24h- concern prostate abscess -3/27)  - D/C vancomycin and meropenem   would need to rule out infection prior to icd placement.  - Left calf pain, negative for DVT    #ESRD on dialysis/ sp renal transplant.   - dialysis Tues, Thurs, Sat  -HD per renal.    #HTN  - c/w home meds. Amlodipine 10mg qd, Losartan 100mg qd, Hydralazine 50m TID, Isordil 20mg TID with holding parameter    #HLD   - c/w Lipitor 40mg qd    #DM  - no meds per [facility], obtain collaterals for med recs prior [facility] stay  - mISS while inpt, goal -180   - A1c 5.9 on admission    DVT PPX: SCDs for now iso GIB    Med consult team continues to follow with you.

## 2023-03-29 NOTE — PROGRESS NOTE ADULT - PROBLEM SELECTOR PLAN 8
- s/p failed kidney transplant   - Continue home Tacrolimus 2mg PO BID.    - per referring Dr Barrera, pt still requires tacrolimus even if failed transplant due to possibility of graft vs host

## 2023-03-29 NOTE — PROGRESS NOTE ADULT - SUBJECTIVE AND OBJECTIVE BOX
INTERVAL HPI/OVERNIGHT EVENTS:  Tm 99.4  Reports he feels good.  No further neck pain or cough    CONSTITUTIONAL:  No fever, chills, night sweats  EYES:  No photophobia or visual changes  CARDIOVASCULAR:  No chest pain  RESPIRATORY:  No cough, wheezing, or SOB   GASTROINTESTINAL:  No nausea, vomiting, diarrhea, constipation, or abdominal pain  GENITOURINARY:  No frequency, urgency, dysuria or hematuria  NEUROLOGIC:  No headache, lightheadedness      ANTIBIOTICS/RELEVANT:    Ceftriaxone 2 g IV q24h (3/27-present)    Vancomycin 3/21, 3/23, 3/25  Meropenem 3/21-3/27      Vital Signs Last 24 Hrs  T(C): 37.1 (29 Mar 2023 13:47), Max: 37.4 (29 Mar 2023 04:30)  T(F): 98.8 (29 Mar 2023 13:47), Max: 99.4 (29 Mar 2023 04:30)  HR: 60 (29 Mar 2023 17:04) (54 - 66)  BP: 157/68 (29 Mar 2023 17:04) (116/50 - 166/72)  BP(mean): 98 (29 Mar 2023 17:04) (68 - 104)  RR: 16 (29 Mar 2023 17:04) (16 - 18)  SpO2: 98% (29 Mar 2023 17:04) (96% - 100%)    Parameters below as of 29 Mar 2023 17:04  Patient On (Oxygen Delivery Method): room air        PHYSICAL EXAM:  Constitutional:  Alert, chronically ill-appearing  Eyes:  Sclerae anicteric, conjunctivae clear, PERRL  Ear/Nose/Throat:  No nasal exudate or sinus tenderness;  No buccal mucosal lesions, no pharyngeal erythema or exudate	  Neck:  Supple, no adenopathy  Respiratory:  Clear bilaterally  Cardiovascular:  RRR, S1S2, no murmur appreciated  Gastrointestinal:  Symmetric, normoactive BS, soft, NT, no masses, guarding or rebound.  No HSM  Extremities:  No edema.  s/p R AKA      LABS:                        9.1    3.74  )-----------( 175      ( 29 Mar 2023 05:30 )             28.7         03-29    133<L>  |  96  |  6<L>  ----------------------------<  79  3.8   |  28  |  3.83<H>    Ca    8.0<L>      29 Mar 2023 05:30  Phos  2.2     03-29  Mg     1.8     03-29    TPro  4.4<L>  /  Alb  2.2<L>  /  TBili  0.3  /  DBili  x   /  AST  15  /  ALT  <5<L>  /  AlkPhos  122<H>  03-29          MICROBIOLOGY:    Blood cultures:  3/21 X 1 - NG;  3/22 X 1 - NG    Urine culture 3/21 - >10^ Klebsiella pneumoniae (pansusceptible except amp)    3/21 rRVP ND    RADIOLOGY & ADDITIONAL STUDIES:    < from: CT Pelvis w/ IV Cont (03.28.23 @ 13:51) >  FINDINGS:  BLADDER:Minimally distended, Moody catheter. Diffuse wall thickening and   perivesical fat stranding.  REPRODUCTIVE ORGANS: Prostate is enlarged.  LYMPH NODES: No pelvic lymphadenopathy.    VISUALIZED PORTIONS:  ABDOMINAL ORGANS: Right pelvic renal transplant with extrarenal pelvis   and pelviectasis. Unchanged amount of air in the collecting system. Mild   urothelial thickening and hyperenhancement suggestive of pyelitis. No   evidence of pyelonephritis or renal abscess. No perinephric collection.  BOWEL: Thickened rectum with surrounding fat stranding.  PERITONEUM: Small retroperitoneal and pelvic ascites.  VESSELS: Extensively atherosclerotic.  ABDOMINAL WALL: Within normal limits.  BONES: Within normal limits.    IMPRESSION:    Right pelvic renaltransplant with extrarenal pelvis and pelviectasis.   Unchanged amount of air in the collecting system. Mild urothelial   thickening and hyperenhancement suggestive of pyelitis. No evidence of   pyelonephritis or renal abscess.    Probable cystitis. Other incidental comments as above.    < end of copied text >

## 2023-03-29 NOTE — PROGRESS NOTE ADULT - PROBLEM SELECTOR PLAN 12
- reduced by urology - reduced by urology 03/29/23             F: Oral intake  E: Replete electrolytes as needed for K<4 and Mg<2  N: DASH Diet    DVT PPX: holding AC; SCD  Dispo: pending infectious work up prior to C w/ subsequent ICD

## 2023-03-29 NOTE — PROGRESS NOTE ADULT - SUBJECTIVE AND OBJECTIVE BOX
Interventional Cardiology PA Adult Progress Note      Subjective Assessment:  Patient seen and examined at bedside, no overnight events. Endorsing penile pain x3 days since duvall was exchanged and SOB that has been ongoing during admission.  Denies fever, chills, cough, CP, palpitations, abdominal pain, nausea, vomiting, dysuria, penile discharge, BRBPR, or hematuria.       ROS Negative except as per Subjective and HPI  	  MEDICATIONS:  hydrALAZINE 50 milliGRAM(s) Oral every 8 hours  isosorbide   dinitrate Tablet (ISORDIL) 20 milliGRAM(s) Oral three times a day  losartan 100 milliGRAM(s) Oral every 24 hours  metoprolol succinate ER 25 milliGRAM(s) Oral daily  cefTRIAXone   IVPB 2000 milliGRAM(s) IV Intermittent every 24 hours  acetaminophen     Tablet .. 650 milliGRAM(s) Oral every 6 hours PRN  pantoprazole  Injectable 40 milliGRAM(s) IV Push every 12 hours  atorvastatin 40 milliGRAM(s) Oral at bedtime  dextrose 50% Injectable 25 Gram(s) IV Push once  dextrose 50% Injectable 12.5 Gram(s) IV Push once  dextrose Oral Gel 15 Gram(s) Oral once PRN  insulin lispro (ADMELOG) corrective regimen sliding scale   SubCutaneous every 6 hours  aspirin enteric coated 81 milliGRAM(s) Oral daily  chlorhexidine 2% Cloths 1 Application(s) Topical daily  clopidogrel Tablet 75 milliGRAM(s) Oral daily  dextrose 5%. 1000 milliLiter(s) IV Continuous <Continuous>  dextrose 5%. 1000 milliLiter(s) IV Continuous <Continuous>  ferrous    sulfate 325 milliGRAM(s) Oral daily  sodium chloride 0.65% Nasal 1 Spray(s) Both Nostrils every 4 hours PRN  tacrolimus 2 milliGRAM(s) Oral every 12 hours  tamsulosin 0.8 milliGRAM(s) Oral at bedtime      	    [PHYSICAL EXAM:  TELEMETRY:  T(C): 36.7 (03-29-23 @ 08:59), Max: 37.4 (03-29-23 @ 04:30)  HR: 54 (03-29-23 @ 08:24) (51 - 66)  BP: 130/51 (03-29-23 @ 08:24) (116/50 - 157/70)  RR: 16 (03-29-23 @ 08:24) (16 - 18)  SpO2: 96% (03-29-23 @ 08:24) (96% - 100%)  Wt(kg): --  I&O's Summary    28 Mar 2023 07:01  -  29 Mar 2023 07:00  --------------------------------------------------------  IN: 580 mL / OUT: 2950 mL / NET: -2370 mL                         Appearance: Normal	  Cardiovascular: Normal S1 S2   Respiratory: Lungs clear to auscultation  Gastrointestinal:  Soft, Non-tender, + BS	  : uncircumcised, penile swelling w/ tenderness, unable to retract foreskin    Skin: No rashes, No ecchymoses, No cyanosis  Extremities: R AKA  Vascular: Peripheral pulse palpable 2+ LE  Neurologic: Non-focal  Psychiatry: A & O x 3, Mood & affect appropriate      	    RADIOLOGY:   DIAGNOSTIC TESTING:  [x] CT Pelvis W/WO Contrast:   Right pelvic renal transplant with extrarenal pelvis and pelviectasis.   Unchanged amount of air in the collecting system. Mild urothelial   thickening and hyperenhancement suggestive of pyelitis.   No evidence of pyelonephritis or renal abscess.    Probable cystitis. Other incidental comments as above.    LABS:	 	                     9.1    3.74  )-----------( 175      ( 29 Mar 2023 05:30 )             28.7     03-29    133<L>  |  96  |  6<L>  ----------------------------<  79  3.8   |  28  |  3.83<H>    Ca    8.0<L>      29 Mar 2023 05:30  Phos  2.2     03-29  Mg     1.8     03-29    TPro  4.4<L>  /  Alb  2.2<L>  /  TBili  0.3  /  DBili  x   /  AST  15  /  ALT  <5<L>  /  AlkPhos  122<H>  03-29      ASSESSMENT/PLAN: 	           Interventional Cardiology PA Adult Progress Note      Subjective Assessment:  Patient seen and examined at bedside, no overnight events. Endorsing penile pain x3 days since duvall was exchanged and SOB that has been ongoing during admission.  Denies fever, chills, cough, CP, palpitations, abdominal pain, nausea, vomiting, dysuria, penile discharge, BRBPR, or hematuria.       ROS Negative except as per Subjective and HPI  	  MEDICATIONS:  hydrALAZINE 50 milliGRAM(s) Oral every 8 hours  isosorbide   dinitrate Tablet (ISORDIL) 20 milliGRAM(s) Oral three times a day  losartan 100 milliGRAM(s) Oral every 24 hours  metoprolol succinate ER 25 milliGRAM(s) Oral daily  cefTRIAXone   IVPB 2000 milliGRAM(s) IV Intermittent every 24 hours  acetaminophen     Tablet .. 650 milliGRAM(s) Oral every 6 hours PRN  pantoprazole  Injectable 40 milliGRAM(s) IV Push every 12 hours  atorvastatin 40 milliGRAM(s) Oral at bedtime  dextrose 50% Injectable 25 Gram(s) IV Push once  dextrose 50% Injectable 12.5 Gram(s) IV Push once  dextrose Oral Gel 15 Gram(s) Oral once PRN  insulin lispro (ADMELOG) corrective regimen sliding scale   SubCutaneous every 6 hours  aspirin enteric coated 81 milliGRAM(s) Oral daily  chlorhexidine 2% Cloths 1 Application(s) Topical daily  clopidogrel Tablet 75 milliGRAM(s) Oral daily  dextrose 5%. 1000 milliLiter(s) IV Continuous <Continuous>  dextrose 5%. 1000 milliLiter(s) IV Continuous <Continuous>  ferrous    sulfate 325 milliGRAM(s) Oral daily  sodium chloride 0.65% Nasal 1 Spray(s) Both Nostrils every 4 hours PRN  tacrolimus 2 milliGRAM(s) Oral every 12 hours  tamsulosin 0.8 milliGRAM(s) Oral at bedtime      	    [PHYSICAL EXAM:  TELEMETRY:  T(C): 36.7 (03-29-23 @ 08:59), Max: 37.4 (03-29-23 @ 04:30)  HR: 54 (03-29-23 @ 08:24) (51 - 66)  BP: 130/51 (03-29-23 @ 08:24) (116/50 - 157/70)  RR: 16 (03-29-23 @ 08:24) (16 - 18)  SpO2: 96% (03-29-23 @ 08:24) (96% - 100%)  Wt(kg): --  I&O's Summary    28 Mar 2023 07:01  -  29 Mar 2023 07:00  --------------------------------------------------------  IN: 580 mL / OUT: 2950 mL / NET: -2370 mL                         Appearance: Normal	  Cardiovascular: Normal S1 S2   Respiratory: Lungs clear to auscultation  Gastrointestinal:  Soft, Non-tender, + BS	  : uncircumcised, penile swelling w/ tenderness  Skin: No rashes, No ecchymoses, No cyanosis  Extremities: R AKA  Vascular: Peripheral pulse palpable 2+ LE  Neurologic: Non-focal  Psychiatry: A & O x 3, Mood & affect appropriate      	    RADIOLOGY:   DIAGNOSTIC TESTING:  [x] CT Pelvis W/WO Contrast:   Right pelvic renal transplant with extrarenal pelvis and pelviectasis.   Unchanged amount of air in the collecting system. Mild urothelial   thickening and hyperenhancement suggestive of pyelitis.   No evidence of pyelonephritis or renal abscess.  Probable cystitis. Other incidental comments as above.    LABS:	 	                     9.1    3.74  )-----------( 175      ( 29 Mar 2023 05:30 )             28.7     03-29    133<L>  |  96  |  6<L>  ----------------------------<  79  3.8   |  28  |  3.83<H>    Ca    8.0<L>      29 Mar 2023 05:30  Phos  2.2     03-29  Mg     1.8     03-29    TPro  4.4<L>  /  Alb  2.2<L>  /  TBili  0.3  /  DBili  x   /  AST  15  /  ALT  <5<L>  /  AlkPhos  122<H>  03-29      ASSESSMENT/PLAN:

## 2023-03-29 NOTE — PROGRESS NOTE ADULT - PROBLEM SELECTOR PLAN 2
LHC at [place] w/ oLM 30% and RCA 99% that is not amenable to intervention.    - TTE 3/1/23 @ [facility]:  mild LVH, LVEF 25-30%, severe global wall motion dysfxn, mild LA dilation, mild RV dilation, mildly calcified leaflets, mod pHTN  - Severe GIB at [facility] requiring multiple PRBC transfusions  - initially cleared by GI for DAPT and on ASA/Plavix 3/20/23.    - 3/23/23 pt w/ episode of black tarry stool (H/H stable and no hemodynamic signs of bleeding),  fecal occult negative and pt cleared by GI for DAPT and restarted 3/24/23  - Continue ASA 81mg, plavix 75mg qd  - PLAN: pending further and infectious work up to determine date of LHC. LHC at [place] w/ oLM 30% and RCA 99% that is not amenable to intervention.    - TTE 3/1/23 @ [facility]:  mild LVH, LVEF 25-30%, severe global wall motion dysfxn, mild LA dilation, mild RV dilation, mildly calcified leaflets, mod pHTN  - Severe GIB at [facility] requiring multiple PRBC transfusions  - initially cleared by GI for DAPT and on ASA/Plavix 3/20/23.    - 3/23/23 pt w/ episode of black tarry stool (H/H stable and no hemodynamic signs of bleeding),  fecal occult negative and pt cleared by GI for DAPT and restarted 3/24/23  - Continue ASA 81mg, plavix 75mg qd  - PLAN: pending further and infectious work up to determine date of LHC. LHC at [place] w/ oLM 30% and RCA 99% that is not amenable to intervention.    - TTE 3/1/23 @ [facility]:  mild LVH, LVEF 25-30%, severe global wall motion dysfxn, mild LA dilation, mild RV dilation, mildly calcified leaflets, mod pHTN  - Severe GIB at [facility] requiring multiple PRBC transfusions  - initially cleared by GI for DAPT and on ASA/Plavix 3/20/23.    - 3/23/23 pt w/ episode of black tarry stool (H/H stable and no hemodynamic signs of bleeding),  fecal occult negative and pt cleared by GI for DAPT and restarted 3/24/23  - Continue ASA 81mg, plavix 75mg qd  - PLAN: pending further and infectious work up to determine date of LHC.

## 2023-03-29 NOTE — PROGRESS NOTE ADULT - PROBLEM SELECTOR PLAN 4
@ [facility], severe GIB w/ Hgb down to 3.5 received total of 7u PRBCs.    - 3/17 AM pt passed loose BM along w/ large amount of BRBPR.    - s/p Flex sigmoidoscopy – Localized ulceration and erosion w/ no bleeding in rectum, suggestive of solitary ulcer syndrome; evidence of prior hemoclip was found in sigmoid colon.    - Avoid anal digitation and enemas  - Bowel regimen w/ Miralax and Dulcolax to avoid constipation  - Fecal occult negative 3/24 and cleared by GI for DAPT  - Transfusion goal Hgb < 8.0 @ [facility], severe GIB w/ Hgb down to 3.5 received total of 7u PRBCs.    - 3/17 AM pt passed loose BM along w/ large amount of BRBPR.    - s/p Flex sigmoidoscopy – Localized ulceration and erosion w/ no bleeding in rectum, suggestive of solitary ulcer syndrome; evidence of prior hemoclip was found in sigmoid colon.    - Avoid anal digitation and enemas  - Bowel regimen w/ Miralax and Dulcolax to avoid constipation  - Fecal occult negative 3/24 and cleared by GI for DAPT  - Transfusion goal Hgb < 8.0 @ [facility], severe GIB w/ Hgb down to 3.5 received total of 7u PRBCs.    - 3/17 AM pt passed loose BM along w/ large amount of BRBPR.    - s/p Flex sigmoidoscopy – Localized ulceration and erosion w/ no bleeding in rectum, suggestive of solitary ulcer syndrome; evidence of prior hemoclip was found in sigmoid colon.    - Avoid anal digitation and enemas  - Bowel regimen w/ Miralax and Dulcolax to avoid constipation  - Fecal occult negative 3/24 and cleared by GI for DAPT  - Transfusion goal Hgb < 8.0

## 2023-03-30 LAB
ANION GAP SERPL CALC-SCNC: 9 MMOL/L — SIGNIFICANT CHANGE UP (ref 5–17)
APPEARANCE UR: CLEAR — SIGNIFICANT CHANGE UP
BACTERIA # UR AUTO: PRESENT /HPF
BILIRUB UR-MCNC: NEGATIVE — SIGNIFICANT CHANGE UP
BUN SERPL-MCNC: 7 MG/DL — SIGNIFICANT CHANGE UP (ref 7–23)
CALCIUM SERPL-MCNC: 7.6 MG/DL — LOW (ref 8.4–10.5)
CHLORIDE SERPL-SCNC: 95 MMOL/L — LOW (ref 96–108)
CO2 SERPL-SCNC: 27 MMOL/L — SIGNIFICANT CHANGE UP (ref 22–31)
COLOR SPEC: YELLOW — SIGNIFICANT CHANGE UP
COMMENT - URINE: SIGNIFICANT CHANGE UP
CREAT SERPL-MCNC: 4.66 MG/DL — HIGH (ref 0.5–1.3)
DIFF PNL FLD: ABNORMAL
EGFR: 14 ML/MIN/1.73M2 — LOW
EPI CELLS # UR: SIGNIFICANT CHANGE UP /HPF (ref 0–5)
GLUCOSE BLDC GLUCOMTR-MCNC: 77 MG/DL — SIGNIFICANT CHANGE UP (ref 70–99)
GLUCOSE BLDC GLUCOMTR-MCNC: 78 MG/DL — SIGNIFICANT CHANGE UP (ref 70–99)
GLUCOSE BLDC GLUCOMTR-MCNC: 80 MG/DL — SIGNIFICANT CHANGE UP (ref 70–99)
GLUCOSE BLDC GLUCOMTR-MCNC: 89 MG/DL — SIGNIFICANT CHANGE UP (ref 70–99)
GLUCOSE SERPL-MCNC: 80 MG/DL — SIGNIFICANT CHANGE UP (ref 70–99)
GLUCOSE UR QL: 100
HCT VFR BLD CALC: 28.8 % — LOW (ref 39–50)
HGB BLD-MCNC: 8.8 G/DL — LOW (ref 13–17)
KETONES UR-MCNC: ABNORMAL MG/DL
LEUKOCYTE ESTERASE UR-ACNC: NEGATIVE — SIGNIFICANT CHANGE UP
MAGNESIUM SERPL-MCNC: 1.8 MG/DL — SIGNIFICANT CHANGE UP (ref 1.6–2.6)
MCHC RBC-ENTMCNC: 28.2 PG — SIGNIFICANT CHANGE UP (ref 27–34)
MCHC RBC-ENTMCNC: 30.6 GM/DL — LOW (ref 32–36)
MCV RBC AUTO: 92.3 FL — SIGNIFICANT CHANGE UP (ref 80–100)
NITRITE UR-MCNC: NEGATIVE — SIGNIFICANT CHANGE UP
NRBC # BLD: 0 /100 WBCS — SIGNIFICANT CHANGE UP (ref 0–0)
PH UR: 8.5 — HIGH (ref 5–8)
PLATELET # BLD AUTO: 175 K/UL — SIGNIFICANT CHANGE UP (ref 150–400)
POTASSIUM SERPL-MCNC: 3.9 MMOL/L — SIGNIFICANT CHANGE UP (ref 3.5–5.3)
POTASSIUM SERPL-SCNC: 3.9 MMOL/L — SIGNIFICANT CHANGE UP (ref 3.5–5.3)
PROT UR-MCNC: >=300 MG/DL
RBC # BLD: 3.12 M/UL — LOW (ref 4.2–5.8)
RBC # FLD: 15.2 % — HIGH (ref 10.3–14.5)
RBC CASTS # UR COMP ASSIST: ABNORMAL /HPF
SODIUM SERPL-SCNC: 131 MMOL/L — LOW (ref 135–145)
SP GR SPEC: 1.02 — SIGNIFICANT CHANGE UP (ref 1–1.03)
UROBILINOGEN FLD QL: 0.2 E.U./DL — SIGNIFICANT CHANGE UP
WBC # BLD: 4.11 K/UL — SIGNIFICANT CHANGE UP (ref 3.8–10.5)
WBC # FLD AUTO: 4.11 K/UL — SIGNIFICANT CHANGE UP (ref 3.8–10.5)
WBC UR QL: ABNORMAL /HPF

## 2023-03-30 PROCEDURE — 99232 SBSQ HOSP IP/OBS MODERATE 35: CPT

## 2023-03-30 PROCEDURE — 90935 HEMODIALYSIS ONE EVALUATION: CPT

## 2023-03-30 PROCEDURE — 99233 SBSQ HOSP IP/OBS HIGH 50: CPT

## 2023-03-30 PROCEDURE — 99233 SBSQ HOSP IP/OBS HIGH 50: CPT | Mod: GC

## 2023-03-30 RX ORDER — DOXERCALCIFEROL 2.5 UG/1
4 CAPSULE ORAL ONCE
Refills: 0 | Status: COMPLETED | OUTPATIENT
Start: 2023-03-30 | End: 2023-03-30

## 2023-03-30 RX ORDER — ERYTHROPOIETIN 10000 [IU]/ML
6000 INJECTION, SOLUTION INTRAVENOUS; SUBCUTANEOUS ONCE
Refills: 0 | Status: COMPLETED | OUTPATIENT
Start: 2023-03-30 | End: 2023-03-30

## 2023-03-30 RX ADMIN — DOXERCALCIFEROL 4 MICROGRAM(S): 2.5 CAPSULE ORAL at 10:38

## 2023-03-30 RX ADMIN — Medication 81 MILLIGRAM(S): at 14:48

## 2023-03-30 RX ADMIN — ISOSORBIDE DINITRATE 20 MILLIGRAM(S): 5 TABLET ORAL at 18:37

## 2023-03-30 RX ADMIN — Medication 50 MILLIGRAM(S): at 22:54

## 2023-03-30 RX ADMIN — LOSARTAN POTASSIUM 100 MILLIGRAM(S): 100 TABLET, FILM COATED ORAL at 14:49

## 2023-03-30 RX ADMIN — CEFTRIAXONE 100 MILLIGRAM(S): 500 INJECTION, POWDER, FOR SOLUTION INTRAMUSCULAR; INTRAVENOUS at 22:55

## 2023-03-30 RX ADMIN — PANTOPRAZOLE SODIUM 40 MILLIGRAM(S): 20 TABLET, DELAYED RELEASE ORAL at 18:37

## 2023-03-30 RX ADMIN — ERYTHROPOIETIN 6000 UNIT(S): 10000 INJECTION, SOLUTION INTRAVENOUS; SUBCUTANEOUS at 10:37

## 2023-03-30 RX ADMIN — TACROLIMUS 2 MILLIGRAM(S): 5 CAPSULE ORAL at 18:37

## 2023-03-30 RX ADMIN — ISOSORBIDE DINITRATE 20 MILLIGRAM(S): 5 TABLET ORAL at 05:02

## 2023-03-30 RX ADMIN — PANTOPRAZOLE SODIUM 40 MILLIGRAM(S): 20 TABLET, DELAYED RELEASE ORAL at 05:02

## 2023-03-30 RX ADMIN — Medication 50 MILLIGRAM(S): at 05:02

## 2023-03-30 RX ADMIN — Medication 325 MILLIGRAM(S): at 14:49

## 2023-03-30 RX ADMIN — CLOPIDOGREL BISULFATE 75 MILLIGRAM(S): 75 TABLET, FILM COATED ORAL at 14:49

## 2023-03-30 RX ADMIN — CHLORHEXIDINE GLUCONATE 1 APPLICATION(S): 213 SOLUTION TOPICAL at 18:20

## 2023-03-30 RX ADMIN — TACROLIMUS 2 MILLIGRAM(S): 5 CAPSULE ORAL at 05:02

## 2023-03-30 RX ADMIN — TAMSULOSIN HYDROCHLORIDE 0.8 MILLIGRAM(S): 0.4 CAPSULE ORAL at 22:54

## 2023-03-30 RX ADMIN — ATORVASTATIN CALCIUM 40 MILLIGRAM(S): 80 TABLET, FILM COATED ORAL at 22:55

## 2023-03-30 RX ADMIN — Medication 50 MILLIGRAM(S): at 14:50

## 2023-03-30 NOTE — PROGRESS NOTE ADULT - ASSESSMENT
62Y M w/ HTN, DM, ESRD s/p failed kidney transplant who was transferred to [facility] (3/17) from [place] for ICD placement and Cardiac Cath after prolonged admission c/b cardiac arrest and vtach arrest and hypovolemic shock 2/2 GI bleed. New fever 3/21, s/p NM PET/CT c/w prostatitis, on CTX per ID, BK virus negative. Pending repeat cath and ICD placement once infection cleared. Also w/ intermittent melena on 3/23, GI and surg following but no intervention planned    #Seen on HD    #ESRD on HD TTS  Outpatient HD prescription; [id number], F180 dialyzer, 2K 2.5Ca dialysate,  ml/min,  ml/min, EDW 65kg  Seen on HD today. Tolerating well. Aim was for 3.5 hour session and 2 L UF. Pt tolerating well, BP remains in 150s systolic, so will increase UF goal to 2.5 L (to get to the post-HD Wt of last treatment). Using F160 dialyzer based on pt's weight and clearance. Will lower UF goal if SBP<110  Daily BMP   Continue with Tacro at home dose. Recommend pt follow up with outpatient nephrologist regarding tacro  Next HD 4/1      HTN:  UF with HD as tolerated   Intermittently hypotensive. Amlodipine dc'ed  Continue losartan 100mg, metoprolol 25mg ER, hydralazine 50mg TID. Hold meds for SBP<110    Anemia:  Hgb not at goal at 8.8  epo w/ HD  Defer iron replacement i/s/o active infection, so will not check iron profile at the moment  Transfusion per primary      MBD:  Calcium 7.6  Phos: 2.2  iPTH 431 (3/19)  On Hectorol 4mcg TIW as outpatient. Will continue  Check phos twice weekly. Goal <5.5. Can replete if needed to keep>3         62Y M w/ HTN, DM, ESRD s/p failed kidney transplant who was transferred to [facility] (3/17) from [place] for ICD placement and Cardiac Cath after prolonged admission c/b cardiac arrest and vtach arrest and hypovolemic shock 2/2 GI bleed. New fever 3/21, s/p NM PET/CT c/w prostatitis, on CTX per ID, BK virus negative. Pending repeat cath and ICD placement once infection cleared. Also w/ intermittent melena on 3/23, GI and surg following but no intervention planned    #Seen on HD    #ESRD on HD TTS  Outpatient HD prescription; [id number], F180 dialyzer, 2K 2.5Ca dialysate,  ml/min,  ml/min, EDW 65kg  Seen on HD today. Tolerating well. Aim was for 3.5 hour session and 2 L UF. Pt tolerating well, BP remains in 150s systolic, so will increase UF goal to 2.5 L (to get to the post-HD Wt of last treatment). Using F160 dialyzer based on pt's weight and clearance. Will lower UF goal if SBP<110  Daily BMP   Continue with Tacro at home dose. Recommend pt follow up with outpatient nephrologist regarding tacro  Next HD 4/1      HTN:  UF with HD as tolerated   Intermittently hypotensive. Amlodipine dc'ed  Continue losartan 100mg, metoprolol 25mg ER, hydralazine 50mg TID. Hold meds for SBP<110    Anemia:  Hgb not at goal at 8.8  epo w/ HD  Defer iron replacement i/s/o active infection, so will not check iron profile at the moment  Transfusion per primary      MBD:  Calcium 7.6  Phos: 2.2  iPTH 431 (3/19)  On Hectorol 4mcg TIW as outpatient. Will continue  Check phos twice weekly. Goal <5.5. Can replete if needed to keep>3         62Y M w/ HTN, DM, ESRD s/p failed kidney transplant who was transferred to [facility] (3/17) from [place] for ICD placement and Cardiac Cath after prolonged admission c/b cardiac arrest and vtach arrest and hypovolemic shock 2/2 GI bleed. New fever 3/21, s/p NM PET/CT c/w prostatitis, on CTX per ID, BK virus negative. Pending repeat cath and ICD placement once infection cleared. Also w/ intermittent melena on 3/23, GI and surg following but no intervention planned    #Seen on HD    #ESRD on HD TTS  Outpatient HD prescription; [id number], F180 dialyzer, 2K 2.5Ca dialysate,  ml/min,  ml/min, EDW 65kg  Seen on HD today. Tolerating well. Aim was for 3.5 hour session and 2 L UF. Pt tolerating well, BP remains in 150s systolic, so will increase UF goal to 2.5 L (to get to the post-HD Wt of last treatment). Using F160 dialyzer based on pt's weight and clearance. Will lower UF goal if SBP<110  Daily BMP   Continue with Tacro at home dose. Recommend pt follow up with outpatient nephrologist regarding tacro  Next HD 4/1      HTN:  UF with HD as tolerated   Intermittently hypotensive. Amlodipine dc'ed  Continue losartan 100mg, metoprolol 25mg ER, hydralazine 50mg TID. Hold meds for SBP<110    Anemia:  Hgb not at goal at 8.8  epo w/ HD  Defer iron replacement i/s/o active infection, so will not check iron profile at the moment  Transfusion per primary      MBD:  Calcium 7.6  Phos: 2.2  iPTH 431 (3/19)  On Hectorol 4mcg TIW as outpatient. Will continue  Check phos twice weekly. Goal <5.5. Can replete if needed to keep>3

## 2023-03-30 NOTE — PROGRESS NOTE ADULT - PROBLEM SELECTOR PLAN 2
LHC at [place] w/ oLM 30% and RCA 99% that is not amenable to intervention.    - TTE 3/1/23 @ [facility]:  mild LVH, LVEF 25-30%, severe global wall motion dysfxn, mild LA dilation, mild RV dilation, mildly calcified leaflets, mod pHTN  - Severe GIB at [facility] requiring multiple PRBC transfusions  - initially cleared by GI for DAPT and on ASA/Plavix 3/20/23.    - 3/23/23 pt w/ episode of black tarry stool (H/H stable and no hemodynamic signs of bleeding),  fecal occult negative and pt cleared by GI for DAPT and restarted 3/24/23  - Continue ASA 81mg, plavix 75mg qd  - PLAN: pending further and infectious work up to determine date of LHC. LHC at [place] w/ oLM 30% and RCA 99% that is not amenable to intervention.    - TTE 3/1/23 @ [facility]:  mild LVH, LVEF 25-30%, severe global wall motion dysfxn, mild LA dilation, mild RV dilation, mildly calcified leaflets, mod pHTN  - Severe GIB at [facility] requiring multiple PRBC transfusions  - initially cleared by GI for DAPT and on ASA/Plavix 3/20/23.    - 3/23/23 pt w/ episode of black tarry stool (H/H stable and no hemodynamic signs of bleeding),  fecal occult negative and pt cleared by GI for DAPT and restarted 3/24/23  - Continue ASA 81mg, plavix 75mg qd  - PLAN: pending further and infectious work up to determine date of LHC. LHC at [place] w/ oLM 30% and RCA 99% that is not amenable to intervention.    - TTE 3/1/23 @ [facility]:  mild LVH, LVEF 25-30%, severe global wall motion dysfxn, mild LA dilation, mild RV dilation, mildly calcified leaflets, mod pHTN  - Severe GIB at [facility] requiring multiple PRBC transfusions  - initially cleared by GI for DAPT and on ASA/Plavix 3/20/23.    - 3/23/23 pt w/ episode of black tarry stool (H/H stable and no hemodynamic signs of bleeding),  fecal occult negative and pt cleared by GI for DAPT and restarted 3/24/23  - Continue ASA 81mg, plavix 75mg qd  - PLAN: pending further and infectious work up to determine date of LHC.

## 2023-03-30 NOTE — PROGRESS NOTE ADULT - ATTENDING COMMENTS
63 yo man with ESRD on HD, HTN, DM, s/p failed kidney transplant continues on tacrolimus;  + infection- unclear source- NM PET/CT findings c/w prostatitis or neoplastic process- for additional studies  duvall catheter in place for ensure bladder emptying in setting a paraphimosis- will d/w   no evidence for pyelonephritis  cardiac and GI procedures both on hold for now as these other issues are being clarified.    initially  transferred to [facility] from [place] for ICD placement and Cardiac Cath after prolonged admission  with cardiac arrest, vtach arrest and hypovolemic shock 2/2 GI bleed.    seen and evaluated while on dialysis   tolerating the procedure well  continue full treatment as outlined above 61 yo man with ESRD on HD, HTN, DM, s/p failed kidney transplant continues on tacrolimus;  + infection- unclear source- NM PET/CT findings c/w prostatitis or neoplastic process- for additional studies  duvall catheter in place for ensure bladder emptying in setting a paraphimosis- will d/w   no evidence for pyelonephritis  cardiac and GI procedures both on hold for now as these other issues are being clarified.    initially  transferred to [facility] from [place] for ICD placement and Cardiac Cath after prolonged admission  with cardiac arrest, vtach arrest and hypovolemic shock 2/2 GI bleed.    seen and evaluated while on dialysis   tolerating the procedure well  continue full treatment as outlined above 63 yo man with ESRD on HD, HTN, DM, s/p failed kidney transplant continues on tacrolimus;  + infection- unclear source- NM PET/CT findings c/w prostatitis or neoplastic process- for additional studies  duvall catheter in place for ensure bladder emptying in setting a paraphimosis- will d/w   no evidence for pyelonephritis  cardiac and GI procedures both on hold for now as these other issues are being clarified.    initially  transferred to [facility] from [place] for ICD placement and Cardiac Cath after prolonged admission  with cardiac arrest, vtach arrest and hypovolemic shock 2/2 GI bleed.    seen and evaluated while on dialysis   tolerating the procedure well  continue full treatment as outlined above

## 2023-03-30 NOTE — PROGRESS NOTE ADULT - ASSESSMENT
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 While PET CT showed malignancy of prostate vs prostatitis, CT scan was negative for abscess or prostatitis per Dr. Parra (radiology).  PSA is 12 - prostate ca should be ruled out.      - Cont ceftriaxone 2 g IV q24h.  Would plan for two week course from start of active therapy (3/21-4/4).    -  consult to r/o prostate ca  - since patient is not bacteremic and clinically stable, no contraindication for cardiac cath    Team 2 will follow you.  Case d/w primary team.    Luann Carvajal MD, MS  Infectious Disease attending  work cell [phone]   For any questions during evening/weekend/holiday, please page ID on call  62M h/o ICM, HFrEF, ESRD s/p failed RT (HD via LUE AVF), R AKA, DM transferred to [facility] from [facility] on 3/17 for ICD placement with prolonged fever, found to have cystitis and pyelitis of transplanted kidney due to K.pneumo w/o pyelonephritis or abscess. While PET CT showed malignancy of prostate vs prostatitis, CT scan was negative for abscess or prostatitis per Dr. Parra (radiology).  PSA is 12 - prostate ca should be ruled out.      - Cont ceftriaxone 2 g IV q24h.  Would plan for two week course from start of active therapy (3/21-4/4).    -  consult to r/o prostate ca  - since patient is not bacteremic and clinically stable, no contraindication for cardiac cath    Team 2 will follow you.  Case d/w primary team.    Luann Carvajal MD, MS  Infectious Disease attending  work cell [phone]   For any questions during evening/weekend/holiday, please page ID on call  62M h/o ICM, HFrEF, ESRD s/p failed RT (HD via LUE AVF), R AKA, DM transferred to [facility] from [facility] on 3/17 for ICD placement with prolonged fever, found to have cystitis and pyelitis of transplanted kidney due to K.pneumo w/o pyelonephritis or abscess. While PET CT showed malignancy of prostate vs prostatitis, CT scan was negative for abscess or prostatitis per Dr. Parra (radiology).  PSA is 12 - prostate ca should be ruled out.      - Cont ceftriaxone 2 g IV q24h.  Would plan for two week course from start of active therapy (3/21-4/4).    -  consult to r/o prostate ca  - since patient is not bacteremic and clinically stable, no contraindication for cardiac cath    Team 2 will follow you.  Case d/w primary team.    Luann Carvajal MD, MS  Infectious Disease attending  work cell [phone]   For any questions during evening/weekend/holiday, please page ID on call

## 2023-03-30 NOTE — PROGRESS NOTE ADULT - SUBJECTIVE AND OBJECTIVE BOX
Patient is a 62y Male seen and evaluated at bedside during dialysis. Laying comfortably in bed. Denies any symptoms      Meds:    acetaminophen     Tablet .. 650 every 6 hours PRN  aspirin enteric coated 81 daily  atorvastatin 40 at bedtime  cefTRIAXone   IVPB 2000 every 24 hours  chlorhexidine 2% Cloths 1 daily  clopidogrel Tablet 75 daily  dextrose 5%. 1000 <Continuous>  dextrose 5%. 1000 <Continuous>  dextrose 50% Injectable 25 once  dextrose 50% Injectable 12.5 once  dextrose Oral Gel 15 once PRN  ferrous    sulfate 325 daily  hydrALAZINE 50 every 8 hours  insulin lispro (ADMELOG) corrective regimen sliding scale  every 6 hours  isosorbide   dinitrate Tablet (ISORDIL) 20 three times a day  losartan 100 every 24 hours  metoprolol succinate ER 25 daily  pantoprazole  Injectable 40 every 12 hours  sodium chloride 0.65% Nasal 1 every 4 hours PRN  tacrolimus 2 every 12 hours  tamsulosin 0.8 at bedtime      T(C): , Max: 38.3 (03-29-23 @ 19:30)  T(F): , Max: 100.9 (03-29-23 @ 19:30)  HR: 57 (03-30-23 @ 10:33)  BP: 147/65 (03-30-23 @ 10:33)  BP(mean): 98 (03-29-23 @ 17:04)  RR: 19 (03-30-23 @ 10:33)  SpO2: 100% (03-30-23 @ 10:33)  Wt(kg): --    03-29 @ 07:01  -  03-30 @ 07:00  --------------------------------------------------------  IN: 275 mL / OUT: 325 mL / NET: -50 mL    03-30 @ 07:01  -  03-30 @ 12:36  --------------------------------------------------------  IN: 180 mL / OUT: 250 mL / NET: -70 mL          Review of Systems:  ROS negative except as per HPI      PHYSICAL EXAM:  GENERAL: well-developed, well nourished, alert, no acute distress at present  CHEST/LUNG: Clear to auscultation bilaterally  HEART: normal S1S2, RRR  ABDOMEN: Soft, Nontender, +BS, No flank tenderness bilateral  EXTREMITIES: No clubbing, cyanosis, or edema, Rt AKA  ACCESS: LUE AVF w/ thrill and bruit        LABS:                        8.8    4.11  )-----------( 175      ( 30 Mar 2023 06:46 )             28.8     03-30    131<L>  |  95<L>  |  7   ----------------------------<  80  3.9   |  27  |  4.66<H>    Ca    7.6<L>      30 Mar 2023 06:46  Phos  2.2     03-29  Mg     1.8     03-30    TPro  4.7<L>  /  Alb  2.4<L>  /  TBili  0.4  /  DBili  x   /  AST  17  /  ALT  <5<L>  /  AlkPhos  142<H>  03-29                RADIOLOGY & ADDITIONAL STUDIES:

## 2023-03-30 NOTE — PROGRESS NOTE ADULT - PROBLEM SELECTOR PLAN 3
s/p VTach arrest @ [facility]; no tele events noted.    - EP Consulted – plan for ICD placement once pt has LHC   - c/w toprol 25mg qd s/p VTach arrest @ [facility]; no tele events noted.    - EP Consulted – plan for ICD placement once pt has LHC   - c/w toprol 25mg qd s/p VTach arrest @ [facility]; no tele events noted.    - EP Consulted – plan for ICD placement once pt has LHC   - c/w toprol 25mg qd

## 2023-03-30 NOTE — PROGRESS NOTE ADULT - PROBLEM SELECTOR PLAN 12
- reduced by urology 03/29/23             F: Oral intake  E: Replete electrolytes as needed for K<4 and Mg<2  N: DASH Diet    DVT PPX: holding AC; SCD  Dispo: pending infectious work up prior to C w/ subsequent ICD

## 2023-03-30 NOTE — PROGRESS NOTE ADULT - ASSESSMENT
62y M PMHx HTN, HLD, DM, ICM (s/p cath years ago, RCA 99%, never intervened on), HFrEF 25-30%, ESRD s/p failed kidney transplant (LUE AVF; HD TTS), R AKA admitted to [facility] 2/27 for respiratory distress and SIRS criteria after HD session, requiring intubation. Pt w/ cardiac arrest, vtach arrest requiring amio and pressors. Extubated 3/2 and course c/b by LGIB with hemoglobin of 3.5 requiring multiple transfusions; EGD/colo and CTA w/o evidence of active bleed and Hgb improved to 11's. Transferred to [facility] 03/17/23 for ICD eval 2/2 Vtach and cardiac cath however patient w/ episode of BRBPR, flex sig showing localized rectal ulcer but no active bleeding. DAPT resumed on 3/20. 3/21 patient febrile to 102F w/ URI symptoms. BCx NGTD with intermittent fevers, on broad spectrum Abx w/ ID following. Melena 3/23/23 w/ Fecal occult negative, GI cleared pt and DAPT resumed 3/24. LHC and ICD pending infectious w/u. PET scan from 3/25 suspicious for prostatitis vs prostate abscess, ID following along for infectious work up revealing cystitis and pyelitis confirmed on CT and UCx K. pneumonia with IVAB narrowed down to Ceftriaxone 2mg. Patient once again spiking fevers despite IV ABx, ID made aware, FUO in process. Will continue to follow up work-up and treat accordingly  62y M PMHx HTN, HLD, DM, ICM (s/p cath years ago, RCA 99%, never intervened on), HFrEF 25-30%, ESRD s/p failed kidney transplant (LUE AVF; HD TTS), R AKA admitted to [facility] 2/27 for respiratory distress and SIRS criteria after HD session, requiring intubation. Pt w/ cardiac arrest, vtach arrest requiring amio and pressors. Extubated 3/2 and course c/b by LGIB with hemoglobin of 3.5 requiring multiple transfusions; EGD/colo and CTA w/o evidence of active bleed and Hgb improved to 11's. Transferred to [facility] 03/17/23 for ICD eval 2/2 Vtach and cardiac cath however patient w/ episode of BRBPR, flex sig showing localized rectal ulcer but no active bleeding. DAPT resumed on 3/20. 3/21 patient febrile to 102F w/ URI symptoms. BCx NGTD with intermittent fevers, on broad spectrum Abx w/ ID following. Melena 3/23/23 w/ Fecal occult negative, GI cleared pt and DAPT resumed 3/24. LHC and ICD pending infectious w/u. PET scan from 3/25 suspicious for prostatitis vs prostate abscess, ID following along for infectious work up revealing cystitis and pyelitis confirmed on CT and UCx K. pneumonia with IVAB narrowed down to Ceftriaxone 2mg. Patient once again spiking fevers despite IV ABx, ID made aware, FUO in process. Will continue to follow up work-up and treat accordingly  62y M PMHx HTN, HLD, DM, ICM (s/p cath years ago, RCA 99%, never intervened on), HFrEF 25-30%, ESRD s/p failed kidney transplant (LUE AVF; HD TTS), R AKA admitted to [facility] 2/27 for respiratory distress and SIRS criteria after HD session, requiring intubation. Pt w/ cardiac arrest, vtach arrest requiring amio and pressors. Extubated 3/2 and course c/b by LGIB with hemoglobin of 3.5 requiring multiple transfusions; EGD/colo and CTA w/o evidence of active bleed and Hgb improved to 11's. Transferred to [facility] 03/17/23 for ICD eval 2/2 Vtach and cardiac cath however patient w/ episode of BRBPR, flex sig showing localized rectal ulcer but no active bleeding. DAPT resumed on 3/20. 3/21 patient febrile to 102F w/ URI symptoms. BCx NGTD with intermittent fevers, on broad spectrum Abx w/ ID following. Melena 3/23/23 w/ Fecal occult negative, GI cleared pt and DAPT resumed 3/24. LHC and ICD pending infectious w/u. PET scan from 3/25 suspicious for prostatitis vs prostate abscess, ID following along for infectious work up revealing cystitis and pyelitis confirmed on CT and UCx K. pneumonia with IVAB narrowed down to Ceftriaxone 2mg. Patient once again spiking fevers despite IV ABx, ID made aware, FUO in process. Will continue to follow up work-up and treat accordingly  62y M PMHx HTN, HLD, DM, ICM (s/p cath years ago, RCA 99%, never intervened on), HFrEF 25-30%, ESRD s/p failed kidney transplant (LUE AVF; HD TTS), R AKA admitted to [facility] 2/27 for respiratory distress and SIRS criteria after HD session, requiring intubation. Pt w/ cardiac arrest, vtach arrest requiring amio and pressors. Extubated 3/2 and course c/b by LGIB with hemoglobin of 3.5 requiring multiple transfusions; EGD/colo and CTA w/o evidence of active bleed and Hgb improved to 11's. Transferred to [facility] 03/17/23 for ICD eval 2/2 Vtach and cardiac cath however patient w/ episode of BRBPR, flex sig showing localized rectal ulcer but no active bleeding; DAPT was resumed on 03/20/23.  Patient now with ongoing fevers tmax 102 with constitutional symptoms with infectious work up remarkable for UCx for K. penumoinia         DAPT resumed on 3/20. 3/21 patient febrile to 102F w/ URI symptoms. BCx NGTD with intermittent fevers, on broad spectrum Abx w/ ID following. Melena 3/23/23 w/ Fecal occult negative, GI cleared pt and DAPT resumed 3/24. LHC and ICD pending infectious w/u. PET scan from 3/25 suspicious for prostatitis vs prostate abscess, ID following along for infectious work up revealing cystitis and pyelitis confirmed on CT and UCx K. pneumonia with IVAB narrowed down to Ceftriaxone 2mg. Patient once again spiking fevers despite IV ABx, ID made aware, FUO in process. Will continue to follow up work-up and treat accordingly  62y M PMHx HTN, HLD, DM, ICM (s/p cath years ago, RCA 99%, never intervened on), HFrEF 25-30%, ESRD s/p failed kidney transplant (LUE AVF; HD TTS), R AKA admitted to [facility] 2/27 for respiratory distress and SIRS criteria after HD session, requiring intubation. Pt w/ cardiac arrest, vtach arrest requiring amio and pressors. Extubated 3/2 and course c/b by LGIB with hemoglobin of 3.5 requiring multiple transfusions; EGD/colo and CTA w/o evidence of active bleed and Hgb improved to 11's. Transferred to [facility] 03/17/23 for ICD eval 2/2 Vtach and cardiac cath however patient w/ episode of BRBPR, flex sig showing localized rectal ulcer but no active bleeding; DAPT was resumed on 03/20/23.  Patient now with ongoing fevers tmax 102 with constitutional symptoms with infectious work up remarkable for UCx for K. penumoinia         DAPT resumed on 3/20. 3/21 patient febrile to 102F w/ URI symptoms. BCx NGTD with intermittent fevers, on broad spectrum Abx w/ ID following. Melena 3/23/23 w/ Fecal occult negative, GI cleared pt and DAPT resumed 3/24. LHC and ICD pending infectious w/u. PET scan from 3/25 suspicious for prostatitis vs prostate abscess, ID following along for infectious work up revealing cystitis and pyelitis confirmed on CT and UCx K. pneumonia with IVAB narrowed down to Ceftriaxone 2mg. Patient once again spiking fevers despite IV ABx, ID made aware, FUO in process. Will continue to follow up work-up and treat accordingly  62y M PMHx HTN, HLD, DM, ICM (s/p cath years ago, RCA 99%, never intervened on), HFrEF 25-30%, ESRD s/p failed kidney transplant (LUE AVF; HD TTS), R AKA admitted to [facility] 2/27 for respiratory distress and SIRS criteria after HD session, requiring intubation. Pt w/ cardiac arrest, vtach arrest requiring amio and pressors. Extubated 3/2 and course c/b by LGIB with hemoglobin of 3.5 requiring multiple transfusions; EGD/colo and CTA w/o evidence of active bleed and Hgb improved to 11's. Transferred to [facility] 03/17/23 for ICD eval 2/2 Vtach and cardiac cath however patient w/ episode of BRBPR, flex sig showing localized rectal ulcer but no active bleeding; DAPT was resumed on 03/20/23.  Patient now with ongoing fevers tmax 102 with constitutional symptoms with infectious work up remarkable for UCx for K. penumoinia         DAPT resumed on 3/20. 3/21 patient febrile to 102F w/ URI symptoms. BCx NGTD with intermittent fevers, on broad spectrum Abx w/ ID following. Melena 3/23/23 w/ Fecal occult negative, GI cleared pt and DAPT resumed 3/24. LHC and ICD pending infectious w/u. PET scan from 3/25 suspicious for prostatitis vs prostate abscess, ID following along for infectious work up revealing cystitis and pyelitis confirmed on CT and UCx K. pneumonia with IVAB narrowed down to Ceftriaxone 2mg. Patient once again spiking fevers despite IV ABx, ID made aware, FUO in process. Will continue to follow up work-up and treat accordingly  62y M PMHx HTN, HLD, DM, ICM (s/p cath years ago, RCA 99%, never intervened on), HFrEF 25-30%, ESRD s/p failed kidney transplant (LUE AVF; HD TTS), R AKA admitted to [facility] 2/27 for respiratory distress and SIRS criteria after HD session, requiring intubation. Pt w/ cardiac arrest, vtach arrest requiring amio and pressors. Extubated 3/2 and course c/b by LGIB with hemoglobin of 3.5 requiring multiple transfusions; EGD/colo and CTA w/o evidence of active bleed and Hgb improved to 11's. Transferred to [facility] 03/17/23 for ICD eval 2/2 Vtach and cardiac cath however patient w/ episode of BRBPR, flex sig showing localized rectal ulcer but no active bleeding; DAPT was resumed on 03/20/23.  Patient now with ongoing fevers tmax 102 with constitutional symptoms with infectious work up remarkable for UTI initially covered with broad spectrum now narrowed down to IV ctx      62y M PMHx HTN, HLD, DM, ICM (s/p cath years ago, RCA 99%, never intervened on), HFrEF 25-30%, ESRD s/p failed kidney transplant (LUE AVF; HD TTS), R AKA admitted to [facility] 2/27 for respiratory distress and SIRS criteria after HD session, requiring intubation. Pt w/ cardiac arrest, vtach arrest requiring amio and pressors. Extubated 3/2 and course c/b by LGIB with hemoglobin of 3.5 requiring multiple transfusions; EGD/colo and CTA w/o evidence of active bleed and Hgb improved to 11's. Transferred to [facility] 03/17/23 for ICD eval 2/2 Vtach and cardiac cath however patient w/ episode of BRBPR, flex sig showing localized rectal ulcer but no active bleeding; DAPT was resumed on 03/20/23.  Patient now with ongoing fevers tmax 102 with constitutional symptoms with infectious work up remarkable for UTI initially covered with broad spectrum now narrowed down to IV ctx      62y M PMHx HTN, HLD, DM, ICM (s/p cath years ago, RCA 99%, never intervened on), HFrEF 25-30%, ESRD s/p failed kidney transplant (LUE AVF; HD TTS), R AKA admitted to [facility] 2/27 for respiratory distress and SIRS criteria after HD session, requiring intubation. Pt w/ cardiac arrest, vtach arrest requiring amio and pressors. Extubated 3/2 and course c/b by LGIB with hemoglobin of 3.5 requiring multiple transfusions; EGD/colo and CTA w/o evidence of active bleed and Hgb improved to 11's. Transferred to [facility] 03/17/23 for ICD eval 2/2 Vtach and cardiac cath however patient w/ episode of BRBPR, flex sig showing localized rectal ulcer but no active bleeding; DAPT was resumed on 03/20/23.  Patient now with ongoing fevers tmax 102 with constitutional symptoms with infectious work up remarkable for UTI initially covered with broad spectrum now narrowed down to IV ctx

## 2023-03-30 NOTE — PROGRESS NOTE ADULT - PROBLEM SELECTOR PLAN 1
LOV 04/14/2021  [facility] message has been sent to the patient, to inform them that an appointment is due.     - Pt w/ recurring fevers despite IV ABx. Tmax 101 03/30/23,  infectious work up ongoing w source likely urinary vs prostatitis   - BCx NGTD; UCx (+) for Klebsiella Pneumonia   - STOPPED Vancomycin and Meropenem 3/27/23  - f/u BK virus result   - PLAN: PET scan 3/25, significant for multifocal uptake in the prostate gland, which can be seen in the   setting of prostatitis and neoplastic disease  - CT Pelvis 3/28. significant for mild urothelial thickening and hyperenhancement suggestive of pyelitis.   No evidence of pyelonephritis or renal abscess. Probable cystitis.  - CONT: Ceftriaxone 2000mg IV QD until April 4, 2023  - ID following - appreciate recs     #Diarrhea  -3 brown loose BM's today 3/26, discontinued laxative.   -Continue to monitor, if persists or worsens consult diarrhea tree and send c.diff if appropriate. - Pt w/ recurring fevers despite IV ABx. Tmax 101 03/30/23,  infectious work up ongoing w source likely urinary vs prostatitis   - BCx NGTD; UCx (+) for Klebsiella Pneumonia   - STOPPED Vancomycin and Meropenem 3/27/23  - f/u BK virus result   - PLAN: PET scan 3/25 - PET scan showing increased uptake in prostate suggesting prostatitis vs. neoplasm  - CT Pelvis 3/28. significant for mild urothelial thickening and hyperenhancement suggestive of pyelitis.   No evidence of pyelonephritis or renal abscess. Probable cystitis.  - CONT: Ceftriaxone 2000mg IV QD until April 4, 2023  - ID following - appreciate recs     #Diarrhea  -3 brown loose BM's today 3/26, discontinued laxative.   -Continue to monitor, if persists or worsens consult diarrhea tree and send c.diff if appropriate. - Pt w/ recurring fevers despite IV ABx. Tmax 101 03/30/23,  infectious work up ongoing w source likely urinary vs prostatitis   - BCx NGTD; UCx (+) for Klebsiella Pneumonia   - STOPPED Vancomycin and Meropenem 3/27/23  - f/u BK virus result   - PLAN: PET scan 3/25 - PET scan showing increased uptake in prostate suggesting prostatitis vs. neoplasm]  - Consider urology consult if outpatient records don't indicate elevated levels of PSA  - CT Pelvis 3/28. significant for mild urothelial thickening and hyperenhancement suggestive of pyelitis.   No evidence of pyelonephritis or renal abscess. Probable cystitis.  - CONT: Ceftriaxone 2000mg IV QD until April 4, 2023  - ID following - appreciate recs     #Diarrhea  --history of loose BMs, most recent today on 03/30 ( if >3 loose, foul smelling BMs consider Cdiff)  -Continue to monitor, if persists or worsens consult diarrhea tree and send c.diff if appropriate.

## 2023-03-30 NOTE — PROGRESS NOTE ADULT - SUBJECTIVE AND OBJECTIVE BOX
INFECTIOUS DISEASES CONSULT FOLLOW-UP NOTE    INTERVAL HPI/OVERNIGHT EVENTS:  febrile to 100.4  patient reports diarrhea x 1 since this morning, better than yesterday  denied CP, SOB, n/v, abdominal pain    ROS:   Constitutional, eyes, ENT, cardiovascular, respiratory, gastrointestinal, genitourinary, integumentary, neurological, psychiatric and heme/lymph are otherwise negative other than noted above       ANTIBIOTICS/RELEVANT:    MEDICATIONS  (STANDING):  aspirin enteric coated 81 milliGRAM(s) Oral daily  atorvastatin 40 milliGRAM(s) Oral at bedtime  cefTRIAXone   IVPB 2000 milliGRAM(s) IV Intermittent every 24 hours  chlorhexidine 2% Cloths 1 Application(s) Topical daily  clopidogrel Tablet 75 milliGRAM(s) Oral daily  dextrose 5%. 1000 milliLiter(s) (100 mL/Hr) IV Continuous <Continuous>  dextrose 5%. 1000 milliLiter(s) (50 mL/Hr) IV Continuous <Continuous>  dextrose 50% Injectable 25 Gram(s) IV Push once  dextrose 50% Injectable 12.5 Gram(s) IV Push once  ferrous    sulfate 325 milliGRAM(s) Oral daily  hydrALAZINE 50 milliGRAM(s) Oral every 8 hours  insulin lispro (ADMELOG) corrective regimen sliding scale   SubCutaneous Before meals and at bedtime  isosorbide   dinitrate Tablet (ISORDIL) 20 milliGRAM(s) Oral three times a day  losartan 100 milliGRAM(s) Oral every 24 hours  metoprolol succinate ER 25 milliGRAM(s) Oral daily  pantoprazole  Injectable 40 milliGRAM(s) IV Push every 12 hours  tacrolimus 2 milliGRAM(s) Oral every 12 hours  tamsulosin 0.8 milliGRAM(s) Oral at bedtime    MEDICATIONS  (PRN):  acetaminophen     Tablet .. 650 milliGRAM(s) Oral every 6 hours PRN Mild Pain (1 - 3), Moderate Pain (4 - 6)  dextrose Oral Gel 15 Gram(s) Oral once PRN Blood Glucose LESS THAN 70 milliGRAM(s)/deciliter  sodium chloride 0.65% Nasal 1 Spray(s) Both Nostrils every 4 hours PRN Nasal Congestion        Vital Signs Last 24 Hrs  T(C): 37.8 (30 Mar 2023 14:03), Max: 38.3 (29 Mar 2023 19:30)  T(F): 100 (30 Mar 2023 14:03), Max: 100.9 (29 Mar 2023 19:30)  HR: 65 (30 Mar 2023 14:03) (50 - 65)  BP: 158/65 (30 Mar 2023 14:03) (132/63 - 160/70)  BP(mean): 98 (29 Mar 2023 17:04) (98 - 98)  RR: 19 (30 Mar 2023 14:03) (16 - 19)  SpO2: 98% (30 Mar 2023 14:03) (96% - 100%)    Parameters below as of 30 Mar 2023 14:03  Patient On (Oxygen Delivery Method): room air        03-29-23 @ 07:01  -  03-30-23 @ 07:00  --------------------------------------------------------  IN: 275 mL / OUT: 325 mL / NET: -50 mL    03-30-23 @ 07:01  -  03-30-23 @ 15:21  --------------------------------------------------------  IN: 180 mL / OUT: 2750 mL / NET: -2570 mL      PHYSICAL EXAM:  Constitutional: alert, NAD  Eyes: the sclera and conjunctiva were normal.   ENT: the ears and nose were normal in appearance.   Neck: the appearance of the neck was normal and the neck was supple.   Pulmonary: no respiratory distress and lungs were clear to auscultation bilaterally.   Heart: heart rate was normal and rhythm regular, normal S1 and S2  Vascular:. there was no peripheral edema  Abdomen: normal bowel sounds, soft, non-tender          LABS:                        8.8    4.11  )-----------( 175      ( 30 Mar 2023 06:46 )             28.8     03-30    131<L>  |  95<L>  |  7   ----------------------------<  80  3.9   |  27  |  4.66<H>    Ca    7.6<L>      30 Mar 2023 06:46  Phos  2.2     03-29  Mg     1.8     03-30    TPro  4.7<L>  /  Alb  2.4<L>  /  TBili  0.4  /  DBili  x   /  AST  17  /  ALT  <5<L>  /  AlkPhos  142<H>  03-29          MICROBIOLOGY:  3/29 BCx ngtd   BK virus urine neg    RADIOLOGY & ADDITIONAL STUDIES:  Reviewed

## 2023-03-30 NOTE — PROGRESS NOTE ADULT - PROBLEM SELECTOR PLAN 6
Pt still produced some urine; urinary retention requiring multiple straight caths; now s/p indwelling duvall placement on 3/21/23.  - c/w duvall placement  - new duvall placed 03/27/23 Pt still produced some urine; urinary retention requiring multiple straight caths; now s/p indwelling duvall placement on 3/21/23.  - duvall removed, TOV within 6 hours approximately 2100

## 2023-03-30 NOTE — PROGRESS NOTE ADULT - ASSESSMENT
62M PMHx HTN, HLD, DM, CAD (s/p cath years ago, RCA 99%, never intervened on), HFrEF 25-30%, ESRD s/p failed kidney transplant (last HD 3/1 via Left Arm AVF; HD TTS), Right AKA initially presented to [facility] 2/27 for respiratory distress after a dialysis, found to be septic, h/c complicated by AHRF requiring intubation for 24hr followed by VT arrest, h/c the complicated by massive LGIB (rectal ulcer) requiring 7u pRBC, 1u FFP and 1u PLT. Transferred to [facility] for ICD placement 2/2 Vtach and cardiac cath. Medicine consulted for comanagement.     Recommend:    #Fever of Unknown Origin  - Spiked fever of 101.6 on return from HD 3/21  - Unclear source of infection  - Removed right forearm IV because was in place from outside hospital  - Abd U/S with no acute pathology  - CXR without infiltrate  - UA without UTI  - F/u BCx, so far NGTD  - PET scan showing increased uptake in prostate suggesting prostatitis vs. neoplasm  - F/u CT A/P showing probable cystitis  - Ceftriaxone 2g daily per ID 2 week course (end date 4/4)    #LGIB  #SANTOS  #Solitary ulcer syndrome  - hx coffee ground emesis @ [facility]; s/p EGD/colonoscopy showing rectal ulcer  - 3/17AM pt passed loose BM along with large amount of BRBPR  - per gen surgery no acute surgical intervention   - GI following, no endoscopy indicated  - Active T&S  - Transfuse for hgb <8 (active CAD)    #H/O ventricular tachycardia   - Vtach arrest @[facility]  - EP consult for ICD    #ESRD on dialysis  - for dialysis today  - dialysis Tues, Thurs, Sat  - last received 3/16  - f/u nephro recs  - Electrolytes wnl, k 4.3, phos 1.9  - c/w calcium citrate 667mg TID    #S/P kidney transplant.   - per [place] pt takes Tacrolimus 2mg 2 times /day  - Obtain collateral regarding renal transplant, if indeed failed, what indications patient have for c/w tacrolimus 2mg?  - f/u tacrolimus serum levels  - f/u nephro recs    #DM  - no meds per [place], obtain collaterals for med recs prior [place] stay  - mISS while inpt  - A1c 5.9 on admission 62M PMHx HTN, HLD, DM, CAD (s/p cath years ago, RCA 99%, never intervened on), HFrEF 25-30%, ESRD s/p failed kidney transplant (last HD 3/1 via Left Arm AVF; HD TTS), Right AKA initially presented to [facility] 2/27 for respiratory distress after a dialysis, found to be septic, h/c complicated by AHRF requiring intubation for 24hr followed by VT arrest, h/c the complicated by massive LGIB (rectal ulcer) requiring 7u pRBC, 1u FFP and 1u PLT. Transferred to [facility] for ICD placement 2/2 Vtach and cardiac cath. Medicine consulted for comanagement.     Recommend:    #Fever of Unknown Origin  - Spiked fever of 101.6 on return from HD 3/21  - Unclear source of infection  - Removed right forearm IV because was in place from outside hospital  - Abd U/S with no acute pathology  - CXR without infiltrate  - UA without UTI  - F/u BCx, so far NGTD  - PET scan showing increased uptake in prostate suggesting prostatitis vs. neoplasm  - F/u CT A/P showing probable cystitis  - Ceftriaxone 2g daily per ID 2 week course (end date 4/4)    #LGIB  #SANTOS  #Solitary ulcer syndrome  - hx coffee ground emesis @ [facility]; s/p EGD/colonoscopy showing rectal ulcer  - 3/17AM pt passed loose BM along with large amount of BRBPR  - per gen surgery no acute surgical intervention   - GI following, no endoscopy indicated  - Active T&S  - Transfuse for hgb <8 (active CAD)    #H/O ventricular tachycardia   - Vtach arrest @[facility]  - EP consult for ICD    #ESRD on dialysis  - for dialysis today  - dialysis Tues, Thurs, Sat  - last received 3/16  - f/u nephro recs  - Electrolytes wnl, k 4.3, phos 1.9  - c/w calcium citrate 667mg TID    #S/P kidney transplant.   - per [place] pt takes Tacrolimus 2mg 2 times /day  - Obtain collateral regarding renal transplant, if indeed failed, what indications patient have for c/w tacrolimus 2mg?  - f/u tacrolimus serum levels  - f/u nephro recs    #DM  - no meds per [place], obtain collaterals for med recs prior [place] stay  - mISS while inpt  - A1c 5.9 on admission 62M PMHx HTN, HLD, DM, CAD (s/p cath years ago, RCA 99%, never intervened on), HFrEF 25-30%, ESRD s/p failed kidney transplant (last HD 3/1 via Left Arm AVF; HD TTS), Right AKA initially presented to [facility] 2/27 for respiratory distress after a dialysis, found to be septic, h/c complicated by AHRF requiring intubation for 24hr followed by VT arrest, h/c the complicated by massive LGIB (rectal ulcer) requiring 7u pRBC, 1u FFP and 1u PLT. Transferred to [facility] for ICD placement 2/2 Vtach and cardiac cath. Medicine consulted for comanagement.     Recommend:    #Fever of Unknown Origin  - Spiked fever of 101.6 on return from HD 3/21  - Unclear source of infection  - Removed right forearm IV because was in place from outside hospital  - Abd U/S with no acute pathology  - CXR without infiltrate  - UA without UTI  - F/u BCx, so far NGTD  - PET scan showing increased uptake in prostate suggesting prostatitis vs. neoplasm  - F/u CT A/P showing probable cystitis  - Ceftriaxone 2g daily per ID 2 week course (end date 4/4)    #LGIB  #SANTOS  #Solitary ulcer syndrome  - hx coffee ground emesis @ [facility]; s/p EGD/colonoscopy showing rectal ulcer  - 3/17AM pt passed loose BM along with large amount of BRBPR  - per gen surgery no acute surgical intervention   - GI following, no endoscopy indicated  - Active T&S  - Transfuse for hgb <8 (active CAD)    #H/O ventricular tachycardia   - [facility]  - EP consult for ICD    #ESRD on dialysis  - for dialysis today  - dialysis Tues, Thurs, Sat  - last received 3/16  - f/u nephro recs  - Electrolytes wnl, k 4.3, phos 1.9  - c/w calcium citrate 667mg TID    #S/P kidney transplant.   - per [place] pt takes Tacrolimus 2mg 2 times /day  - Obtain collateral regarding renal transplant, if indeed failed, what indications patient have for c/w tacrolimus 2mg?  - f/u tacrolimus serum levels  - f/u nephro recs    #DM  - no meds per [place], obtain collaterals for med recs prior [place] stay  - mISS while inpt  - A1c 5.9 on admission

## 2023-03-30 NOTE — PROGRESS NOTE ADULT - SUBJECTIVE AND OBJECTIVE BOX
Interventional Cardiology PA Adult Progress Note    C.C.:     Subjective Assessment:      ROS Negative except as per Subjective and HPI  	  MEDICATIONS:  hydrALAZINE 50 milliGRAM(s) Oral every 8 hours  isosorbide   dinitrate Tablet (ISORDIL) 20 milliGRAM(s) Oral three times a day  losartan 100 milliGRAM(s) Oral every 24 hours  metoprolol succinate ER 25 milliGRAM(s) Oral daily    cefTRIAXone   IVPB 2000 milliGRAM(s) IV Intermittent every 24 hours      acetaminophen     Tablet .. 650 milliGRAM(s) Oral every 6 hours PRN    pantoprazole  Injectable 40 milliGRAM(s) IV Push every 12 hours    atorvastatin 40 milliGRAM(s) Oral at bedtime  dextrose 50% Injectable 25 Gram(s) IV Push once  dextrose 50% Injectable 12.5 Gram(s) IV Push once  dextrose Oral Gel 15 Gram(s) Oral once PRN  insulin lispro (ADMELOG) corrective regimen sliding scale   SubCutaneous every 6 hours    aspirin enteric coated 81 milliGRAM(s) Oral daily  chlorhexidine 2% Cloths 1 Application(s) Topical daily  clopidogrel Tablet 75 milliGRAM(s) Oral daily  dextrose 5%. 1000 milliLiter(s) IV Continuous <Continuous>  dextrose 5%. 1000 milliLiter(s) IV Continuous <Continuous>  doxercalciferol Injectable 4 MICROGram(s) IV Push once  epoetin janae-epbx (RETACRIT) Injectable 6000 Unit(s) IV Push once  ferrous    sulfate 325 milliGRAM(s) Oral daily  sodium chloride 0.65% Nasal 1 Spray(s) Both Nostrils every 4 hours PRN  tacrolimus 2 milliGRAM(s) Oral every 12 hours  tamsulosin 0.8 milliGRAM(s) Oral at bedtime      	    [PHYSICAL EXAM:  TELEMETRY:  T(C): 36.6 (03-30-23 @ 04:10), Max: 38.3 (03-29-23 @ 19:30)  HR: 50 (03-30-23 @ 04:58) (50 - 62)  BP: 160/70 (03-30-23 @ 04:58) (122/47 - 166/72)  RR: 19 (03-30-23 @ 04:10) (16 - 19)  SpO2: 100% (03-30-23 @ 04:10) (96% - 100%)  Wt(kg): --  I&O's Summary    29 Mar 2023 07:01  -  30 Mar 2023 07:00  --------------------------------------------------------  IN: 275 mL / OUT: 325 mL / NET: -50 mL        Moody:  Central/PICC/Mid Line:                                         Appearance: Normal		  Cardiovascular: Normal S1 S2, No JVD, No murmurs,   Respiratory: normal work of breathing   Gastrointestinal:  Soft, Non-tender, + BS	  Extremities: R AKA, no LLE edema   Vascular: Peripheral LE pulse palpable 2+   Neurologic: Non-focal  Psychiatry: A & O x 3, Mood & affect appropriate    LABS:	 	  CARDIAC MARKERS:              8.8    4.11  )-----------( 175      ( 30 Mar 2023 06:46 )             28.8   03-30  131<L>  |  95<L>  |  7   ----------------------------<  80  3.9   |  27  |  4.66<H>    Ca    7.6<L>      30 Mar 2023 06:46  Phos  2.2     03-29  Mg     1.8     03-30    TPro  4.7<L>  /  Alb  2.4<L>  /  TBili  0.4  /  DBili  x   /  AST  17  /  ALT  <5<L>  /  AlkPhos  142<H>  03-29    proBNP:   Lipid Profile:   HgA1c:   TSH:       ASSESSMENT/PLAN: 	        DVT ppx:  Dispo:     Interventional Cardiology PA Adult Progress Note      Subjective Assessment:  Patient seen and examined at bedside this morning, no acute events overnight.  Endorses 1 loose BM this AM.  Denies fever, chills, chest pain, shortness of breath, abdominal pain, nausea, vomiting, BRBPR, and hematuria.       ROS Negative except as per Subjective and HPI  	  MEDICATIONS:  hydrALAZINE 50 milliGRAM(s) Oral every 8 hours  isosorbide   dinitrate Tablet (ISORDIL) 20 milliGRAM(s) Oral three times a day  losartan 100 milliGRAM(s) Oral every 24 hours  metoprolol succinate ER 25 milliGRAM(s) Oral daily  cefTRIAXone   IVPB 2000 milliGRAM(s) IV Intermittent every 24 hours  acetaminophen     Tablet .. 650 milliGRAM(s) Oral every 6 hours PRN  pantoprazole  Injectable 40 milliGRAM(s) IV Push every 12 hours  atorvastatin 40 milliGRAM(s) Oral at bedtime  dextrose 50% Injectable 25 Gram(s) IV Push once  dextrose 50% Injectable 12.5 Gram(s) IV Push once  dextrose Oral Gel 15 Gram(s) Oral once PRN  insulin lispro (ADMELOG) corrective regimen sliding scale   SubCutaneous every 6 hours  aspirin enteric coated 81 milliGRAM(s) Oral daily  chlorhexidine 2% Cloths 1 Application(s) Topical daily  clopidogrel Tablet 75 milliGRAM(s) Oral daily  dextrose 5%. 1000 milliLiter(s) IV Continuous <Continuous>  dextrose 5%. 1000 milliLiter(s) IV Continuous <Continuous>  doxercalciferol Injectable 4 MICROGram(s) IV Push once  epoetin janae-epbx (RETACRIT) Injectable 6000 Unit(s) IV Push once  ferrous    sulfate 325 milliGRAM(s) Oral daily  sodium chloride 0.65% Nasal 1 Spray(s) Both Nostrils every 4 hours PRN  tacrolimus 2 milliGRAM(s) Oral every 12 hours  tamsulosin 0.8 milliGRAM(s) Oral at bedtime      	    [PHYSICAL EXAM:  TELEMETRY:  T(C): 36.6 (03-30-23 @ 04:10), Max: 38.3 (03-29-23 @ 19:30)  HR: 50 (03-30-23 @ 04:58) (50 - 62)  BP: 160/70 (03-30-23 @ 04:58) (122/47 - 166/72)  RR: 19 (03-30-23 @ 04:10) (16 - 19)  SpO2: 100% (03-30-23 @ 04:10) (96% - 100%)  Wt(kg): --  I&O's Summary    29 Mar 2023 07:01  -  30 Mar 2023 07:00  --------------------------------------------------------  IN: 275 mL / OUT: 325 mL / NET: -50 mL                        Appearance: sleeping comfortably in bed	  Cardiovascular: Normal S1 S2   Respiratory: normal work of breathing   Gastrointestinal:  Soft, Non-tender, + BS	  Extremities: R AKA, no LLE edema   Vascular: Peripheral LE pulse palpable 2+   Neurologic: Non-focal  Psychiatry: A & O x 3, Mood & affect appropriate    LABS:	 	  CARDIAC MARKERS:              8.8    4.11  )-----------( 175      ( 30 Mar 2023 06:46 )             28.8   03-30  131<L>  |  95<L>  |  7   ----------------------------<  80  3.9   |  27  |  4.66<H>    Ca    7.6<L>      30 Mar 2023 06:46  Phos  2.2     03-29  Mg     1.8     03-30    TPro  4.7<L>  /  Alb  2.4<L>  /  TBili  0.4  /  DBili  x   /  AST  17  /  ALT  <5<L>  /  AlkPhos  142<H>  03-29      ASSESSMENT/PLAN:

## 2023-03-30 NOTE — PROGRESS NOTE ADULT - SUBJECTIVE AND OBJECTIVE BOX
OVERNIGHT EVENTS: None    SUBJECTIVE / INTERVAL HPI: Patient seen and examined at bedside. Denies f/c, n/v/d/c, chest pain, shortness of breath, abdominal pain.    VITAL SIGNS:  Vital Signs Last 24 Hrs  T(C): 37.8 (30 Mar 2023 14:03), Max: 38.3 (29 Mar 2023 19:30)  T(F): 100 (30 Mar 2023 14:03), Max: 100.9 (29 Mar 2023 19:30)  HR: 65 (30 Mar 2023 14:03) (50 - 65)  BP: 158/65 (30 Mar 2023 14:03) (132/63 - 160/70)  BP(mean): 98 (29 Mar 2023 17:04) (98 - 98)  RR: 19 (30 Mar 2023 14:03) (16 - 19)  SpO2: 98% (30 Mar 2023 14:03) (96% - 100%)    Parameters below as of 30 Mar 2023 14:03  Patient On (Oxygen Delivery Method): room air      I&O's Summary    29 Mar 2023 07:01  -  30 Mar 2023 07:00  --------------------------------------------------------  IN: 275 mL / OUT: 325 mL / NET: -50 mL    30 Mar 2023 07:01  -  30 Mar 2023 15:30  --------------------------------------------------------  IN: 180 mL / OUT: 2750 mL / NET: -2570 mL        PHYSICAL EXAM:    General: NAD  HEENT: NC/AT; PERRL, anicteric sclera; MMM  Neck: supple  Cardiovascular: +S1/S2; RRR  Respiratory: CTA B/L; no W/R/R  Gastrointestinal: soft, NT/ND; +BSx4  Extremities: WWP; no edema, clubbing or cyanosis  Vascular: 2+ radial, DP/PT pulses B/L  Neurological: AAOx3; no focal deficits    MEDICATIONS:  MEDICATIONS  (STANDING):  aspirin enteric coated 81 milliGRAM(s) Oral daily  atorvastatin 40 milliGRAM(s) Oral at bedtime  cefTRIAXone   IVPB 2000 milliGRAM(s) IV Intermittent every 24 hours  chlorhexidine 2% Cloths 1 Application(s) Topical daily  clopidogrel Tablet 75 milliGRAM(s) Oral daily  dextrose 5%. 1000 milliLiter(s) (100 mL/Hr) IV Continuous <Continuous>  dextrose 5%. 1000 milliLiter(s) (50 mL/Hr) IV Continuous <Continuous>  dextrose 50% Injectable 25 Gram(s) IV Push once  dextrose 50% Injectable 12.5 Gram(s) IV Push once  ferrous    sulfate 325 milliGRAM(s) Oral daily  hydrALAZINE 50 milliGRAM(s) Oral every 8 hours  insulin lispro (ADMELOG) corrective regimen sliding scale   SubCutaneous Before meals and at bedtime  isosorbide   dinitrate Tablet (ISORDIL) 20 milliGRAM(s) Oral three times a day  losartan 100 milliGRAM(s) Oral every 24 hours  metoprolol succinate ER 25 milliGRAM(s) Oral daily  pantoprazole  Injectable 40 milliGRAM(s) IV Push every 12 hours  tacrolimus 2 milliGRAM(s) Oral every 12 hours  tamsulosin 0.8 milliGRAM(s) Oral at bedtime    MEDICATIONS  (PRN):  acetaminophen     Tablet .. 650 milliGRAM(s) Oral every 6 hours PRN Mild Pain (1 - 3), Moderate Pain (4 - 6)  dextrose Oral Gel 15 Gram(s) Oral once PRN Blood Glucose LESS THAN 70 milliGRAM(s)/deciliter  sodium chloride 0.65% Nasal 1 Spray(s) Both Nostrils every 4 hours PRN Nasal Congestion      ALLERGIES:  Allergies    No Known Allergies    Intolerances        LABS:                        8.8    4.11  )-----------( 175      ( 30 Mar 2023 06:46 )             28.8     03-30    131<L>  |  95<L>  |  7   ----------------------------<  80  3.9   |  27  |  4.66<H>    Ca    7.6<L>      30 Mar 2023 06:46  Phos  2.2     03-29  Mg     1.8     03-30    TPro  4.7<L>  /  Alb  2.4<L>  /  TBili  0.4  /  DBili  x   /  AST  17  /  ALT  <5<L>  /  AlkPhos  142<H>  03-29        CAPILLARY BLOOD GLUCOSE      POCT Blood Glucose.: 89 mg/dL (30 Mar 2023 09:11)      RADIOLOGY & ADDITIONAL TESTS: Reviewed.

## 2023-03-30 NOTE — PROGRESS NOTE ADULT - PROBLEM SELECTOR PLAN 4
@ [facility], severe GIB w/ Hgb down to 3.5 received total of 7u PRBCs.    - 3/17 AM pt passed loose BM along w/ large amount of BRBPR.    - s/p Flex sigmoidoscopy – Localized ulceration and erosion w/ no bleeding in rectum, suggestive of solitary ulcer syndrome; evidence of prior hemoclip was found in sigmoid colon.    - Avoid anal digitation and enemas  - Bowel regimen w/ Miralax and Dulcolax to avoid constipation  - Fecal occult negative 3/24 and cleared by GI for DAPT  - Transfusion goal Hgb < 8.0 @ [facility], severe GIB w/ Hgb down to 3.5 received total of 7u PRBCs.    - 3/17 AM pt passed loose BM along w/ large amount of BRBPR.    - s/p Flex sigmoidoscopy – Localized ulceration and erosion w/ no bleeding in rectum, suggestive of solitary ulcer syndrome; evidence of prior hemoclip was found in sigmoid colon.    - Avoid anal digitation and enemas  - Bowel regimen w/ Miralax and Dulcolax to avoid constipation  - Fecal occult negative 3/24 and cleared by GI for DAPT  - Transfusion goal Hgb < 8.0 @ [facility], severe GIB w/ Hgb down to 3.5 received total of 7u PRBCs.    - 3/17 AM pt passed loose BM along w/ large amount of BRBPR.    - s/p Flex sigmoidoscopy – Localized ulceration and erosion w/ no bleeding in rectum, suggestive of solitary ulcer syndrome; evidence of prior hemoclip was found in sigmoid colon.    - Avoid anal digitation and enemas  - Bowel regimen w/ Miralax and Dulcolax to avoid constipation  - Fecal occult negative 3/24 and cleared by GI for DAPT  - Transfusion goal Hgb < 8.0

## 2023-03-31 LAB
ANION GAP SERPL CALC-SCNC: 4 MMOL/L — LOW (ref 5–17)
BUN SERPL-MCNC: 3 MG/DL — LOW (ref 7–23)
C DIFF GDH STL QL: NEGATIVE — SIGNIFICANT CHANGE UP
C DIFF GDH STL QL: SIGNIFICANT CHANGE UP
CALCIUM SERPL-MCNC: 8.4 MG/DL — SIGNIFICANT CHANGE UP (ref 8.4–10.5)
CHLORIDE SERPL-SCNC: 97 MMOL/L — SIGNIFICANT CHANGE UP (ref 96–108)
CO2 SERPL-SCNC: 31 MMOL/L — SIGNIFICANT CHANGE UP (ref 22–31)
CREAT SERPL-MCNC: 3.56 MG/DL — HIGH (ref 0.5–1.3)
EGFR: 19 ML/MIN/1.73M2 — LOW
GLUCOSE BLDC GLUCOMTR-MCNC: 100 MG/DL — HIGH (ref 70–99)
GLUCOSE BLDC GLUCOMTR-MCNC: 75 MG/DL — SIGNIFICANT CHANGE UP (ref 70–99)
GLUCOSE BLDC GLUCOMTR-MCNC: 77 MG/DL — SIGNIFICANT CHANGE UP (ref 70–99)
GLUCOSE BLDC GLUCOMTR-MCNC: 81 MG/DL — SIGNIFICANT CHANGE UP (ref 70–99)
GLUCOSE SERPL-MCNC: 74 MG/DL — SIGNIFICANT CHANGE UP (ref 70–99)
HCT VFR BLD CALC: 32.3 % — LOW (ref 39–50)
HGB BLD-MCNC: 10 G/DL — LOW (ref 13–17)
MAGNESIUM SERPL-MCNC: 1.8 MG/DL — SIGNIFICANT CHANGE UP (ref 1.6–2.6)
MCHC RBC-ENTMCNC: 28.6 PG — SIGNIFICANT CHANGE UP (ref 27–34)
MCHC RBC-ENTMCNC: 31 GM/DL — LOW (ref 32–36)
MCV RBC AUTO: 92.3 FL — SIGNIFICANT CHANGE UP (ref 80–100)
NRBC # BLD: 0 /100 WBCS — SIGNIFICANT CHANGE UP (ref 0–0)
PLATELET # BLD AUTO: 188 K/UL — SIGNIFICANT CHANGE UP (ref 150–400)
POTASSIUM SERPL-MCNC: 3.7 MMOL/L — SIGNIFICANT CHANGE UP (ref 3.5–5.3)
POTASSIUM SERPL-SCNC: 3.7 MMOL/L — SIGNIFICANT CHANGE UP (ref 3.5–5.3)
RBC # BLD: 3.5 M/UL — LOW (ref 4.2–5.8)
RBC # FLD: 15.6 % — HIGH (ref 10.3–14.5)
SODIUM SERPL-SCNC: 132 MMOL/L — LOW (ref 135–145)
WBC # BLD: 4.38 K/UL — SIGNIFICANT CHANGE UP (ref 3.8–10.5)
WBC # FLD AUTO: 4.38 K/UL — SIGNIFICANT CHANGE UP (ref 3.8–10.5)

## 2023-03-31 PROCEDURE — 99233 SBSQ HOSP IP/OBS HIGH 50: CPT | Mod: GC

## 2023-03-31 PROCEDURE — 99232 SBSQ HOSP IP/OBS MODERATE 35: CPT

## 2023-03-31 PROCEDURE — 99233 SBSQ HOSP IP/OBS HIGH 50: CPT

## 2023-03-31 RX ORDER — MAGNESIUM OXIDE 400 MG ORAL TABLET 241.3 MG
400 TABLET ORAL ONCE
Refills: 0 | Status: COMPLETED | OUTPATIENT
Start: 2023-03-31 | End: 2023-03-31

## 2023-03-31 RX ADMIN — MAGNESIUM OXIDE 400 MG ORAL TABLET 400 MILLIGRAM(S): 241.3 TABLET ORAL at 18:47

## 2023-03-31 RX ADMIN — CEFTRIAXONE 100 MILLIGRAM(S): 500 INJECTION, POWDER, FOR SOLUTION INTRAMUSCULAR; INTRAVENOUS at 21:48

## 2023-03-31 RX ADMIN — ISOSORBIDE DINITRATE 20 MILLIGRAM(S): 5 TABLET ORAL at 12:46

## 2023-03-31 RX ADMIN — LOSARTAN POTASSIUM 100 MILLIGRAM(S): 100 TABLET, FILM COATED ORAL at 12:47

## 2023-03-31 RX ADMIN — Medication 50 MILLIGRAM(S): at 21:45

## 2023-03-31 RX ADMIN — ATORVASTATIN CALCIUM 40 MILLIGRAM(S): 80 TABLET, FILM COATED ORAL at 21:45

## 2023-03-31 RX ADMIN — ISOSORBIDE DINITRATE 20 MILLIGRAM(S): 5 TABLET ORAL at 06:58

## 2023-03-31 RX ADMIN — ISOSORBIDE DINITRATE 20 MILLIGRAM(S): 5 TABLET ORAL at 18:47

## 2023-03-31 RX ADMIN — Medication 325 MILLIGRAM(S): at 12:46

## 2023-03-31 RX ADMIN — Medication 81 MILLIGRAM(S): at 12:47

## 2023-03-31 RX ADMIN — Medication 50 MILLIGRAM(S): at 16:27

## 2023-03-31 RX ADMIN — TAMSULOSIN HYDROCHLORIDE 0.8 MILLIGRAM(S): 0.4 CAPSULE ORAL at 21:45

## 2023-03-31 RX ADMIN — PANTOPRAZOLE SODIUM 40 MILLIGRAM(S): 20 TABLET, DELAYED RELEASE ORAL at 06:59

## 2023-03-31 RX ADMIN — Medication 25 MILLIGRAM(S): at 06:59

## 2023-03-31 RX ADMIN — Medication 50 MILLIGRAM(S): at 06:59

## 2023-03-31 RX ADMIN — TACROLIMUS 2 MILLIGRAM(S): 5 CAPSULE ORAL at 06:58

## 2023-03-31 RX ADMIN — CLOPIDOGREL BISULFATE 75 MILLIGRAM(S): 75 TABLET, FILM COATED ORAL at 12:47

## 2023-03-31 RX ADMIN — TACROLIMUS 2 MILLIGRAM(S): 5 CAPSULE ORAL at 18:47

## 2023-03-31 RX ADMIN — PANTOPRAZOLE SODIUM 40 MILLIGRAM(S): 20 TABLET, DELAYED RELEASE ORAL at 18:47

## 2023-03-31 NOTE — PROGRESS NOTE ADULT - ATTENDING COMMENTS
61yo M PMHx HTN, HLD, DM, CAD(s/p cath years ago, RCA 99%, never intervened on), HFrEF 25-30%, ESRD s/p failed kidney transplant and required HD x 3yrs (last HD 3/16 via Left Arm AVF; HD TTS), Right AKA initially presented to [facility] 2/27 for respiratory distress after a dialysis, found to be septic, h/c complicated by AHRF requiring intubation for 24hr followed by VT arrest, h/c the complicated by massive LGIB (rectal ulcer seen on colonoscopy) requiring 7u pRBC, 1u FFP and 1u PLT. Transferred to [facility] (3/17) for ICD placement 2/2 Vtach and cardiac cath. Pt noted lassed loose stool with BRBPR, GI and surgery consulted. Flex Sigc ( 3/17) found prior hemoclip in the sigmoid colon, localized ulceration and erosive with no bleeding noted in rectum suggestive of solitary ulcer syndrome. Medicine consulted for comanagement.   pt seen with Dr. Snow    -fever -   #LGIB  #SANTOS  #Solitary ulcer syndrome  - hx coffee ground emesis @ [facility]; s/p EGD/colonoscopy showing rectal ulcer  - per gen surgery no acute surgical intervention   - per GI:      - increased Protonix 40 mg IVP BID      - s/p flex sig(3/17)  for rectal ulcers, found Evidence of prior hemoclip in the sigmoid colon. Localized ulceration and erosive with no bleeding were noted in the rectum, suggestive of solitary ulcer syndrome           - Avoid anal digitation and enemas           - High-fiber diet and optimize laxatives to avoid constipation using miralax and dulcolax           - No absolute GI contraindications to anticoag/antiplt therapy           - Repeat endoscopic evaluation recommended in 3 months  - Active T&S  - Hgb  stable-  - Transfuse for hgb <8 (active CAD)    #CAD   - Previous cath at [facility] showing oLM 30% and RCA 99% that is not amenable to intervention.  - TTE 3/1/23 at [facility]: mild LVH, EF 25-30%, severe global wall motion dysfunction, mildly dilated LA, RV mildly dilated, all leaflets mild calcified, mod pHTN  - Pt is cleared for cardiac cath    #H/O ventricular tachycardia   - Vtach arrest @[facility]  - fu EP consult for ICD placement- pending infection work up.    # Fever -ID concern is prostate abscess -  - PET/CT scan - no Lymphadenopathy to suggest lymphoproliferative disorder, activity in prostate and femur.  - given immunocompromised/hx Kidney transplant    - ID f/u appreciated, -rec appreciated- to complete iv abx thru 4/4/23  -  consult to evaluate for prostate Ca    fu-urine for BK virus  - CT pelvis w/wo IVC- result reviewed, BLADDER: Minimally distended, Moody catheter. Diffuse wall thickening and   perivesical fat stranding.- Prostate is enlarged. no sign of renal abscess.   - Start ceftriaxone 2 g IV q24h- concern prostate abscess -3/27)  - D/C vancomycin and meropenem   would need to rule out infection prior to icd placement.  - Left calf pain, negative for DVT    #ESRD on dialysis/ sp renal transplant.   - dialysis Tues, Thurs, Sat  -HD per renal.    #HTN  - c/w home meds. Amlodipine 10mg qd, Losartan 100mg qd, Hydralazine 50m TID, Isordil 20mg TID with holding parameter    #HLD   - c/w Lipitor 40mg qd    #DM  - no meds per [place], obtain collaterals for med recs prior [place] stay  - mISS while inpt, goal -180   - A1c 5.9 on admission    DVT PPX: SCDs for now iso GIB    Med consult team continues to follow with you. 61yo M PMHx HTN, HLD, DM, CAD(s/p cath years ago, RCA 99%, never intervened on), HFrEF 25-30%, ESRD s/p failed kidney transplant and required HD x 3yrs (last HD 3/16 via Left Arm AVF; HD TTS), Right AKA initially presented to [facility] 2/27 for respiratory distress after a dialysis, found to be septic, h/c complicated by AHRF requiring intubation for 24hr followed by VT arrest, h/c the complicated by massive LGIB (rectal ulcer seen on colonoscopy) requiring 7u pRBC, 1u FFP and 1u PLT. Transferred to [facility] (3/17) for ICD placement 2/2 Vtach and cardiac cath. Pt noted lassed loose stool with BRBPR, GI and surgery consulted. Flex Sigc ( 3/17) found prior hemoclip in the sigmoid colon, localized ulceration and erosive with no bleeding noted in rectum suggestive of solitary ulcer syndrome. Medicine consulted for comanagement.   pt seen with Dr. Snow    -fever -   #LGIB  #SANTOS  #Solitary ulcer syndrome  - hx coffee ground emesis @ [facility]; s/p EGD/colonoscopy showing rectal ulcer  - per gen surgery no acute surgical intervention   - per GI:      - increased Protonix 40 mg IVP BID      - s/p flex sig(3/17)  for rectal ulcers, found Evidence of prior hemoclip in the sigmoid colon. Localized ulceration and erosive with no bleeding were noted in the rectum, suggestive of solitary ulcer syndrome           - Avoid anal digitation and enemas           - High-fiber diet and optimize laxatives to avoid constipation using miralax and dulcolax           - No absolute GI contraindications to anticoag/antiplt therapy           - Repeat endoscopic evaluation recommended in 3 months  - Active T&S  - Hgb  stable-  - Transfuse for hgb <8 (active CAD)    #CAD   - Previous cath at [facility] showing oLM 30% and RCA 99% that is not amenable to intervention.  - TTE 3/1/23 at [facility]: mild LVH, EF 25-30%, severe global wall motion dysfunction, mildly dilated LA, RV mildly dilated, all leaflets mild calcified, mod pHTN  - Pt is cleared for cardiac cath    #H/O ventricular tachycardia   - Vtach arrest @[facility]  - fu EP consult for ICD placement- pending infection work up.    # Fever -ID concern is prostate abscess -  - PET/CT scan - no Lymphadenopathy to suggest lymphoproliferative disorder, activity in prostate and femur.  - given immunocompromised/hx Kidney transplant    - ID f/u appreciated, -rec appreciated- to complete iv abx thru 4/4/23  -  consult to evaluate for prostate Ca    fu-urine for BK virus  - CT pelvis w/wo IVC- result reviewed, BLADDER: Minimally distended, Moody catheter. Diffuse wall thickening and   perivesical fat stranding.- Prostate is enlarged. no sign of renal abscess.   - Start ceftriaxone 2 g IV q24h- concern prostate abscess -3/27)  - D/C vancomycin and meropenem   would need to rule out infection prior to icd placement.  - Left calf pain, negative for DVT    #ESRD on dialysis/ sp renal transplant.   - dialysis Tues, Thurs, Sat  -HD per renal.    #HTN  - c/w home meds. Amlodipine 10mg qd, Losartan 100mg qd, Hydralazine 50m TID, Isordil 20mg TID with holding parameter    #HLD   - c/w Lipitor 40mg qd    #DM  - no meds per [place], obtain collaterals for med recs prior [place] stay  - mISS while inpt, goal -180   - A1c 5.9 on admission    DVT PPX: SCDs for now iso GIB    Med consult team continues to follow with you. 61yo M PMHx HTN, HLD, DM, CAD(s/p cath years ago, RCA 99%, never intervened on), HFrEF 25-30%, ESRD s/p failed kidney transplant and required HD x 3yrs (last HD 3/16 via Left Arm AVF; HD TTS), Right AKA initially presented to [facility] 2/27 for respiratory distress after a dialysis, found to be septic, h/c complicated by AHRF requiring intubation for 24hr followed by VT arrest, h/c the complicated by massive LGIB (rectal ulcer seen on colonoscopy) requiring 7u pRBC, 1u FFP and 1u PLT. Transferred to [facility] (3/17) for ICD placement 2/2 Vtach and cardiac cath. Pt noted lassed loose stool with BRBPR, GI and surgery consulted. Flex Sigc ( 3/17) found prior hemoclip in the sigmoid colon, localized ulceration and erosive with no bleeding noted in rectum suggestive of solitary ulcer syndrome. Medicine consulted for comanagement.   pt seen with Dr. Snow    -fever -   #LGIB  #SANTOS  #Solitary ulcer syndrome  - hx coffee ground emesis @ [facility]; s/p EGD/colonoscopy showing rectal ulcer  - per gen surgery no acute surgical intervention   - per GI:      - increased Protonix 40 mg IVP BID      - s/p flex sig(3/17)  for rectal ulcers, found Evidence of prior hemoclip in the sigmoid colon. Localized ulceration and erosive with no bleeding were noted in the rectum, suggestive of solitary ulcer syndrome           - Avoid anal digitation and enemas           - High-fiber diet and optimize laxatives to avoid constipation using miralax and dulcolax           - No absolute GI contraindications to anticoag/antiplt therapy           - Repeat endoscopic evaluation recommended in 3 months  - Active T&S  - Hgb  stable-  - Transfuse for hgb <8 (active CAD)    #CAD   - Previous cath at [facility] showing oLM 30% and RCA 99% that is not amenable to intervention.  - TTE 3/1/23 at [facility]: mild LVH, EF 25-30%, severe global wall motion dysfunction, mildly dilated LA, RV mildly dilated, all leaflets mild calcified, mod pHTN  - Pt is cleared for cardiac cath    #H/O ventricular tachycardia   - Vtach arrest @[facility]  - fu EP consult for ICD placement- pending infection work up.    # Fever -ID concern is prostate abscess -  - PET/CT scan - no Lymphadenopathy to suggest lymphoproliferative disorder, activity in prostate and femur.  - given immunocompromised/hx Kidney transplant    - ID f/u appreciated, -rec appreciated- to complete iv abx thru 4/4/23  -  consult to evaluate for prostate Ca    fu-urine for BK virus  - CT pelvis w/wo IVC- result reviewed, BLADDER: Minimally distended, Moody catheter. Diffuse wall thickening and   perivesical fat stranding.- Prostate is enlarged. no sign of renal abscess.   - Start ceftriaxone 2 g IV q24h- concern prostate abscess -3/27)  - D/C vancomycin and meropenem   would need to rule out infection prior to icd placement.  - Left calf pain, negative for DVT    #ESRD on dialysis/ sp renal transplant.   - dialysis Tues, Thurs, Sat  -HD per renal.    #HTN  - c/w home meds. Amlodipine 10mg qd, Losartan 100mg qd, Hydralazine 50m TID, Isordil 20mg TID with holding parameter    #HLD   - c/w Lipitor 40mg qd    #DM  - no meds per [place], obtain collaterals for med recs prior [place] stay  - mISS while inpt, goal -180   - A1c 5.9 on admission    DVT PPX: SCDs for now iso GIB    Med consult team continues to follow with you.

## 2023-03-31 NOTE — PROGRESS NOTE ADULT - PROBLEM SELECTOR PLAN 2
LHC at [place] w/ oLM 30% and RCA 99% that is not amenable to intervention.    - TTE 3/1/23 @ [facility]:  mild LVH, LVEF 25-30%, severe global wall motion dysfxn, mild LA dilation, mild RV dilation, mildly calcified leaflets, mod pHTN  - Severe GIB at [facility] requiring multiple PRBC transfusions  - initially cleared by GI for DAPT and on ASA/Plavix 3/20/23.    - 3/23/23 pt w/ episode of black tarry stool (H/H stable and no hemodynamic signs of bleeding),  fecal occult negative and pt cleared by GI for DAPT and restarted 3/24/23  - Continue ASA 81mg, plavix 75mg qd  - PLAN: Repeat echo to assess EF; ultimately will need cardiac cath depending on EF to decide if high-risk LHC at [place] w/ oLM 30% and RCA 99% that is not amenable to intervention.    - TTE 3/1/23 @ [facility]:  mild LVH, LVEF 25-30%, severe global wall motion dysfxn, mild LA dilation, mild RV dilation, mildly calcified leaflets, mod pHTN  - Severe GIB at [facility] requiring multiple PRBC transfusions  - initially cleared by GI for DAPT and on ASA/Plavix 3/20/23.    - 3/23/23 pt w/ episode of black tarry stool (H/H stable and no hemodynamic signs of bleeding),  fecal occult negative and pt cleared by GI for DAPT and restarted 3/24/23  - Continue ASA 81mg, plavix 75mg qd  - PLAN: Repeat echo to assess EF; ultimately will need cardiac cath depending on EF to decide if high-risk LHC at [place] w/ oLM 30% and RCA 99% that is not amenable to intervention.    - TTE 3/1/23 @ [facility]:  mild LVH, LVEF 25-30%, severe global wall motion dysfxn, mild LA dilation, mild RV dilation, mildly calcified leaflets, mod pHTN  - Severe GIB at [facility] requiring multiple PRBC transfusions  - initially cleared by GI for DAPT and on ASA/Plavix 3/20/23.    - 3/23/23 pt w/ episode of black tarry stool (H/H stable and no hemodynamic signs of bleeding),  fecal occult negative and pt cleared by GI for DAPT and restarted 3/24/23  - Continue ASA 81mg, plavix 75mg qd  - PLAN: Repeat echo to assess EF; ultimately will need cardiac cath depending on EF to decide if high-risk

## 2023-03-31 NOTE — PROGRESS NOTE ADULT - ASSESSMENT
62M h/o ICM, HFrEF, ESRD s/p failed RT (HD via LUE AVF), R AKA, DM transferred to 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 While PET CT showed malignancy of prostate vs prostatitis, CT scan was negative for abscess or prostatitis per Dr. Parra (radiology).  PSA is 12 - prostate ca should be ruled out.  Patient still with intermittent low grade fever despite appropriate abx for pyelitis, and he now had 5 diarrhea over 24h.  Although he has normal WBC, he is immunocompromised patient.  Thus c.diff needs to be ruled out.    - Cont ceftriaxone 2 g IV q24h.  Would plan for two week course from start of active therapy (3/21-4/4).    - check c.diff today   - obtain collateral info to find out if he has done any prostate cancer screening as outpatient        Team 2 will follow you.  Dr. Medina is covering me this weekend.  Case d/w primary team.    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 While PET CT showed malignancy of prostate vs prostatitis, CT scan was negative for abscess or prostatitis per Dr. Parra (radiology).  PSA is 12 - prostate ca should be ruled out.  Patient still with intermittent low grade fever despite appropriate abx for pyelitis, and he now had 5 diarrhea over 24h.  Although he has normal WBC, he is immunocompromised patient.  Thus c.diff needs to be ruled out.    - Cont ceftriaxone 2 g IV q24h.  Would plan for two week course from start of active therapy (3/21-4/4).    - check c.diff today   - obtain collateral info to find out if he has done any prostate cancer screening as outpatient        Team 2 will follow you.  Dr. Medina is covering me this weekend.  Case d/w primary team.    Luann Carvajal MD, MS  Infectious Disease attending  work cell [phone]   For any questions during evening/weekend/holiday, please page ID on call  62M h/o ICM, HFrEF, ESRD s/p failed RT (HD via LUE AVF), R AKA, DM transferred to 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 While PET CT showed malignancy of prostate vs prostatitis, CT scan was negative for abscess or prostatitis per Dr. Parra (radiology).  PSA is 12 - prostate ca should be ruled out.  Patient still with intermittent low grade fever despite appropriate abx for pyelitis, and he now had 5 diarrhea over 24h.  Although he has normal WBC, he is immunocompromised patient.  Thus c.diff needs to be ruled out.    - Cont ceftriaxone 2 g IV q24h.  Would plan for two week course from start of active therapy (3/21-4/4).    - check c.diff today   - obtain collateral info to find out if he has done any prostate cancer screening as outpatient        Team 2 will follow you.  Dr. Medina is covering me this weekend.  Case d/w primary team.    Luann Carvajal MD, MS  Infectious Disease attending  work cell [phone]   For any questions during evening/weekend/holiday, please page ID on call

## 2023-03-31 NOTE — PROGRESS NOTE ADULT - SUBJECTIVE AND OBJECTIVE BOX
Patient is a 62y old  Male who presents with a chief complaint of ICD evaluation (30 Mar 2023 08:07)    INTERVAL EVENTS: NAEON    SUBJECTIVE:  Patient was seen and examined at bedside. no complaints    Review of systems: No fever, chills, dizziness, HA, Changes in vision, CP, dyspnea, nausea or vomiting, dysuria, changes in bowel movements, LE edema. Rest of 12 point Review of systems negative unless otherwise documented elsewhere in note.     Diet, DASH/TLC:   Sodium & Cholesterol Restricted (23 @ 13:52) [Active]      MEDICATIONS:  MEDICATIONS  (STANDING):  aspirin enteric coated 81 milliGRAM(s) Oral daily  atorvastatin 40 milliGRAM(s) Oral at bedtime  cefTRIAXone   IVPB 2000 milliGRAM(s) IV Intermittent every 24 hours  chlorhexidine 2% Cloths 1 Application(s) Topical daily  clopidogrel Tablet 75 milliGRAM(s) Oral daily  dextrose 5%. 1000 milliLiter(s) (50 mL/Hr) IV Continuous <Continuous>  dextrose 5%. 1000 milliLiter(s) (100 mL/Hr) IV Continuous <Continuous>  dextrose 50% Injectable 25 Gram(s) IV Push once  dextrose 50% Injectable 12.5 Gram(s) IV Push once  ferrous    sulfate 325 milliGRAM(s) Oral daily  hydrALAZINE 50 milliGRAM(s) Oral every 8 hours  insulin lispro (ADMELOG) corrective regimen sliding scale   SubCutaneous Before meals and at bedtime  isosorbide   dinitrate Tablet (ISORDIL) 20 milliGRAM(s) Oral three times a day  losartan 100 milliGRAM(s) Oral every 24 hours  metoprolol succinate ER 25 milliGRAM(s) Oral daily  pantoprazole  Injectable 40 milliGRAM(s) IV Push every 12 hours  tacrolimus 2 milliGRAM(s) Oral every 12 hours  tamsulosin 0.8 milliGRAM(s) Oral at bedtime    MEDICATIONS  (PRN):  acetaminophen     Tablet .. 650 milliGRAM(s) Oral every 6 hours PRN Mild Pain (1 - 3), Moderate Pain (4 - 6)  dextrose Oral Gel 15 Gram(s) Oral once PRN Blood Glucose LESS THAN 70 milliGRAM(s)/deciliter  sodium chloride 0.65% Nasal 1 Spray(s) Both Nostrils every 4 hours PRN Nasal Congestion      Allergies    No Known Allergies    Intolerances        OBJECTIVE:  Vital Signs Last 24 Hrs  T(C): 37.3 (31 Mar 2023 11:03), Max: 38.2 (30 Mar 2023 18:37)  T(F): 99.2 (31 Mar 2023 11:03), Max: 100.8 (30 Mar 2023 18:37)  HR: 54 (31 Mar 2023 12:45) (50 - 65)  BP: 137/60 (31 Mar 2023 12:45) (122/57 - 163/72)  BP(mean): --  RR: 18 (31 Mar 2023 12:45) (17 - 19)  SpO2: 97% (31 Mar 2023 12:45) (97% - 100%)    Parameters below as of 31 Mar 2023 12:45  Patient On (Oxygen Delivery Method): nasal cannula  O2 Flow (L/min): 2    I&O's Summary    30 Mar 2023 07:01  -  31 Mar 2023 07:00  --------------------------------------------------------  IN: 180 mL / OUT: 2800 mL / NET: -2620 mL    31 Mar 2023 07:01  -  31 Mar 2023 13:05  --------------------------------------------------------  IN: 180 mL / OUT: 0 mL / NET: 180 mL        PHYSICAL EXAM:  Gen: Reclining in bed at time of exam, appears stated age  HEENT: NCAT, MMM, clear OP  Neck: supple, trachea at midline  CV: RRR, +S1/S2  Pulm: adequate respiratory effort, no increase in work of breathing  Abd: soft, NTND  Skin: warm and dry,   Ext: WWP, no LE edema  Neuro: AOx3, no gross focal neurological deficits  Psych: affect and behavior appropriate, pleasant at time of interview  :     LABS:                        10.0   4.38  )-----------( 188      ( 31 Mar 2023 08:16 )             32.3     -    132<L>  |  97  |  3<L>  ----------------------------<  74  3.7   |  31  |  3.56<H>    Ca    8.4      31 Mar 2023 08:16  Mg     1.8         TPro  4.7<L>  /  Alb  2.4<L>  /  TBili  0.4  /  DBili  x   /  AST  17  /  ALT  <5<L>  /  AlkPhos  142<H>      LIVER FUNCTIONS - ( 29 Mar 2023 19:25 )  Alb: 2.4 g/dL / Pro: 4.7 g/dL / ALK PHOS: 142 U/L / ALT: <5 U/L / AST: 17 U/L / GGT: x             CAPILLARY BLOOD GLUCOSE      POCT Blood Glucose.: 81 mg/dL (31 Mar 2023 11:52)  POCT Blood Glucose.: 75 mg/dL (31 Mar 2023 06:48)  POCT Blood Glucose.: 80 mg/dL (30 Mar 2023 21:58)  POCT Blood Glucose.: 77 mg/dL (30 Mar 2023 17:46)    Urinalysis Basic - ( 30 Mar 2023 16:12 )    Color: Yellow / Appearance: Clear / S.020 / pH: x  Gluc: x / Ketone: Trace mg/dL  / Bili: Negative / Urobili: 0.2 E.U./dL   Blood: x / Protein: >=300 mg/dL / Nitrite: NEGATIVE   Leuk Esterase: NEGATIVE / RBC: 5-10 /HPF / WBC Many /HPF   Sq Epi: x / Non Sq Epi: 0-5 /HPF / Bacteria: Present /HPF        MICRODATA:    Culture - Urine (collected 30 Mar 2023 16:12)  Source: Catheterized None  Preliminary Report (31 Mar 2023 09:58):    No growth to date    Urinalysis with Rflx Culture (collected 30 Mar 2023 16:12)    Culture - Blood (collected 29 Mar 2023 19:52)  Source: .Blood Blood  Preliminary Report (30 Mar 2023 21:00):    No growth at 1 day.        RADIOLOGY/OTHER STUDIES:    PCP  Pharmacy:   Emergency contact:

## 2023-03-31 NOTE — CHART NOTE - NSCHARTNOTEFT_GEN_A_CORE
Admitting Diagnosis:   Patient is a 62y old  Male who presents with a chief complaint of ICD evaluation (30 Mar 2023 08:07)      PAST MEDICAL & SURGICAL HISTORY:  HTN (hypertension)      HLD (hyperlipidemia)      DM (diabetes mellitus)      GERD (gastroesophageal reflux disease)      ESRD on dialysis      NSTEMI (non-ST elevation myocardial infarction)      CAD (coronary artery disease)      Fever of unknown origin (FUO)          Current Nutrition Order:  DASH    PO Intake: Good (%) [   ]  Fair (50-75%) [   ] Poor (<25%) [ X  ]    GI Issues: No complaints of N/V  +Diarrhea ongoing- ID following for possible r/o C. diff    Pain: Denied complaints of pain     Skin Integrity: Alex 14, no edema recorded  R. buttock stage II pressure injury, L. heel DTI     Labs:   03-31    132<L>  |  97  |  3<L>  ----------------------------<  74  3.7   |  31  |  3.56<H>    Ca    8.4      31 Mar 2023 08:16  Mg     1.8     03-31    TPro  4.7<L>  /  Alb  2.4<L>  /  TBili  0.4  /  DBili  x   /  AST  17  /  ALT  <5<L>  /  AlkPhos  142<H>  03-29    CAPILLARY BLOOD GLUCOSE      POCT Blood Glucose.: 81 mg/dL (31 Mar 2023 11:52)  POCT Blood Glucose.: 75 mg/dL (31 Mar 2023 06:48)  POCT Blood Glucose.: 80 mg/dL (30 Mar 2023 21:58)  POCT Blood Glucose.: 77 mg/dL (30 Mar 2023 17:46)      Medications:  MEDICATIONS  (STANDING):  aspirin enteric coated 81 milliGRAM(s) Oral daily  atorvastatin 40 milliGRAM(s) Oral at bedtime  cefTRIAXone   IVPB 2000 milliGRAM(s) IV Intermittent every 24 hours  chlorhexidine 2% Cloths 1 Application(s) Topical daily  clopidogrel Tablet 75 milliGRAM(s) Oral daily  dextrose 5%. 1000 milliLiter(s) (50 mL/Hr) IV Continuous <Continuous>  dextrose 5%. 1000 milliLiter(s) (100 mL/Hr) IV Continuous <Continuous>  dextrose 50% Injectable 25 Gram(s) IV Push once  dextrose 50% Injectable 12.5 Gram(s) IV Push once  ferrous    sulfate 325 milliGRAM(s) Oral daily  hydrALAZINE 50 milliGRAM(s) Oral every 8 hours  insulin lispro (ADMELOG) corrective regimen sliding scale   SubCutaneous Before meals and at bedtime  isosorbide   dinitrate Tablet (ISORDIL) 20 milliGRAM(s) Oral three times a day  losartan 100 milliGRAM(s) Oral every 24 hours  metoprolol succinate ER 25 milliGRAM(s) Oral daily  pantoprazole  Injectable 40 milliGRAM(s) IV Push every 12 hours  tacrolimus 2 milliGRAM(s) Oral every 12 hours  tamsulosin 0.8 milliGRAM(s) Oral at bedtime    MEDICATIONS  (PRN):  acetaminophen     Tablet .. 650 milliGRAM(s) Oral every 6 hours PRN Mild Pain (1 - 3), Moderate Pain (4 - 6)  dextrose Oral Gel 15 Gram(s) Oral once PRN Blood Glucose LESS THAN 70 milliGRAM(s)/deciliter  sodium chloride 0.65% Nasal 1 Spray(s) Both Nostrils every 4 hours PRN Nasal Congestion      5'4''  pounds +-10%  ABW s/p R AKA: 113 pounds, %FJA=512     3/21 143.7 pounds, 148.1 pounds  3/25 136 pounds, 145.5 pounds    3/30 140.8 pounds, 135.3 pounds     Estimated energy needs:   ABW s/p R AKA for EER as %ABW is greater then 120%  Adjust for age, Fevers, ESRD/HD, Skin/pressure ulcer, CHF; fluids per team    Estimated Energy Needs From (hermilo/kg)	30  Estimated Energy Needs To (hermilo/kg)	35  Estimated Energy Needs Calculated From (hermilo/kg)	1536  Estimated Energy Needs Calculated To (hermilo/kg)	1792    Estimated Protein Needs From (g/kg)	1.2  Estimated Protein Needs To (g/kg)	1.4  Estimated Protein Needs Calculated From (g/kg)	61.44  Estimated Protein Needs Calculated To (g/kg)	71.68    Subjective:  62y M PMHx HTN, HLD, DM, ICM (s/p cath years ago, RCA 99%, never intervened on), HFrEF 25-30%, ESRD s/p failed kidney transplant (LUE AVF; HD TTS), R AKA admitted to [facility] 2/27 for respiratory distress and SIRS criteria after HD session, requiring intubation. Pt with cardiac arrest, vtach arrest requiring amio and pressors. Extubated 3/2 and course c/b by LGIB with hemoglobin of 3.5 requiring multiple transfusions; EGD/colo and CTA w/o evidence of active bleed and Hgb improved to 11's. Transferred to [facility] 3/17/23 for ICD eval 2/2 Vtach and cardiac cath however patient with episode of BRBPR, flex sig showing localized rectal ulcer but no active bleeding. DAPT resumed on 3/20. 3/21 pt febrile to 102F, +URI symptoms. BCx NGTD with intermittent fevers. +Melena 3/23/23, Fecal occult negative, GI cleared pt and DAPT resumed 3/24. LHC and ICD pending infectious w/u. S/p PET scan 3/25- no Lymphadenopathy to suggest lymphoproliferative disorder, activity in prostate and femur. Last HD 3/30, next planned for 4/1.     Pt seen in room, awake, alert, breathing on 2LNC. Pt reports poor PO intake but could not really indicate why he wasn't eating. Stated that all he wants to eat is a fruit cup. Discussed addition of oral nutrition supplementation Nepro, but he stated that this gives him diarrhea. Recommended starting [employer] Renal, which he may tolerate better. Continues to have diarrhea- 3 episodes today per RN. ID following- r/o C. diff i/s/o immunocompromised state, fever. No complaints of pain. Tmax 100.1F overnight. Na 132 (L), Cr 3.56 (H), POC BG 81, 75mg/dL. Will continue to follow per RD protocol.     Prior PES: Increased needs RT increased demands AEB: ESRD/HD, Skin/pressure ulcer, CHF  >> ongoing   Goal: Pt will meet at least 75% of nutrient needs     Recommendations:  1. Cont with current diet and recommend addition of [employer] Renal BID and Probiotic supplementation   2. Monitor %PO intake, Diet tolerance.  3. Labs: monitor BMP, CBC, glucose, lytes, trend renal indices, LFTs.  4. Pain/GI per team. Monitor Skin, Wts pre/post HD.  5. RD to remain available for additional nutrition interventions as needed.   *dw PA    Education: Discussed importance of adequate oral intake, discussed addition of oral nutrition supplementation that will be more easily digested    Risk Level: High [  X ] Moderate [   ] Low [   ]. Admitting Diagnosis:   Patient is a 62y old  Male who presents with a chief complaint of ICD evaluation (30 Mar 2023 08:07)      PAST MEDICAL & SURGICAL HISTORY:  HTN (hypertension)      HLD (hyperlipidemia)      DM (diabetes mellitus)      GERD (gastroesophageal reflux disease)      ESRD on dialysis      NSTEMI (non-ST elevation myocardial infarction)      CAD (coronary artery disease)      Fever of unknown origin (FUO)          Current Nutrition Order:  DASH    PO Intake: Good (%) [   ]  Fair (50-75%) [   ] Poor (<25%) [ X  ]    GI Issues: No complaints of N/V  +Diarrhea ongoing- ID following for possible r/o C. diff    Pain: Denied complaints of pain     Skin Integrity: Alex 14, no edema recorded  R. buttock stage II pressure injury, L. heel DTI     Labs:   03-31    132<L>  |  97  |  3<L>  ----------------------------<  74  3.7   |  31  |  3.56<H>    Ca    8.4      31 Mar 2023 08:16  Mg     1.8     03-31    TPro  4.7<L>  /  Alb  2.4<L>  /  TBili  0.4  /  DBili  x   /  AST  17  /  ALT  <5<L>  /  AlkPhos  142<H>  03-29    CAPILLARY BLOOD GLUCOSE      POCT Blood Glucose.: 81 mg/dL (31 Mar 2023 11:52)  POCT Blood Glucose.: 75 mg/dL (31 Mar 2023 06:48)  POCT Blood Glucose.: 80 mg/dL (30 Mar 2023 21:58)  POCT Blood Glucose.: 77 mg/dL (30 Mar 2023 17:46)      Medications:  MEDICATIONS  (STANDING):  aspirin enteric coated 81 milliGRAM(s) Oral daily  atorvastatin 40 milliGRAM(s) Oral at bedtime  cefTRIAXone   IVPB 2000 milliGRAM(s) IV Intermittent every 24 hours  chlorhexidine 2% Cloths 1 Application(s) Topical daily  clopidogrel Tablet 75 milliGRAM(s) Oral daily  dextrose 5%. 1000 milliLiter(s) (50 mL/Hr) IV Continuous <Continuous>  dextrose 5%. 1000 milliLiter(s) (100 mL/Hr) IV Continuous <Continuous>  dextrose 50% Injectable 25 Gram(s) IV Push once  dextrose 50% Injectable 12.5 Gram(s) IV Push once  ferrous    sulfate 325 milliGRAM(s) Oral daily  hydrALAZINE 50 milliGRAM(s) Oral every 8 hours  insulin lispro (ADMELOG) corrective regimen sliding scale   SubCutaneous Before meals and at bedtime  isosorbide   dinitrate Tablet (ISORDIL) 20 milliGRAM(s) Oral three times a day  losartan 100 milliGRAM(s) Oral every 24 hours  metoprolol succinate ER 25 milliGRAM(s) Oral daily  pantoprazole  Injectable 40 milliGRAM(s) IV Push every 12 hours  tacrolimus 2 milliGRAM(s) Oral every 12 hours  tamsulosin 0.8 milliGRAM(s) Oral at bedtime    MEDICATIONS  (PRN):  acetaminophen     Tablet .. 650 milliGRAM(s) Oral every 6 hours PRN Mild Pain (1 - 3), Moderate Pain (4 - 6)  dextrose Oral Gel 15 Gram(s) Oral once PRN Blood Glucose LESS THAN 70 milliGRAM(s)/deciliter  sodium chloride 0.65% Nasal 1 Spray(s) Both Nostrils every 4 hours PRN Nasal Congestion      5'4''  pounds +-10%  ABW s/p R AKA: 113 pounds, %KST=251     3/21 143.7 pounds, 148.1 pounds  3/25 136 pounds, 145.5 pounds    3/30 140.8 pounds, 135.3 pounds     Estimated energy needs:   ABW s/p R AKA for EER as %ABW is greater then 120%  Adjust for age, Fevers, ESRD/HD, Skin/pressure ulcer, CHF; fluids per team    Estimated Energy Needs From (hermilo/kg)	30  Estimated Energy Needs To (hermilo/kg)	35  Estimated Energy Needs Calculated From (hermilo/kg)	1536  Estimated Energy Needs Calculated To (hermilo/kg)	1792    Estimated Protein Needs From (g/kg)	1.2  Estimated Protein Needs To (g/kg)	1.4  Estimated Protein Needs Calculated From (g/kg)	61.44  Estimated Protein Needs Calculated To (g/kg)	71.68    Subjective:  62y M PMHx HTN, HLD, DM, ICM (s/p cath years ago, RCA 99%, never intervened on), HFrEF 25-30%, ESRD s/p failed kidney transplant (LUE AVF; HD TTS), R AKA admitted to [facility] 2/27 for respiratory distress and SIRS criteria after HD session, requiring intubation. Pt with cardiac arrest, vtach arrest requiring amio and pressors. Extubated 3/2 and course c/b by LGIB with hemoglobin of 3.5 requiring multiple transfusions; EGD/colo and CTA w/o evidence of active bleed and Hgb improved to 11's. Transferred to [facility] 3/17/23 for ICD eval 2/2 Vtach and cardiac cath however patient with episode of BRBPR, flex sig showing localized rectal ulcer but no active bleeding. DAPT resumed on 3/20. 3/21 pt febrile to 102F, +URI symptoms. BCx NGTD with intermittent fevers. +Melena 3/23/23, Fecal occult negative, GI cleared pt and DAPT resumed 3/24. LHC and ICD pending infectious w/u. S/p PET scan 3/25- no Lymphadenopathy to suggest lymphoproliferative disorder, activity in prostate and femur. Last HD 3/30, next planned for 4/1.     Pt seen in room, awake, alert, breathing on 2LNC. Pt reports poor PO intake but could not really indicate why he wasn't eating. Stated that all he wants to eat is a fruit cup. Discussed addition of oral nutrition supplementation Nepro, but he stated that this gives him diarrhea. Recommended starting [employer] Renal, which he may tolerate better. Continues to have diarrhea- 3 episodes today per RN. ID following- r/o C. diff i/s/o immunocompromised state, fever. No complaints of pain. Tmax 100.1F overnight. Na 132 (L), Cr 3.56 (H), POC BG 81, 75mg/dL. Will continue to follow per RD protocol.     Prior PES: Increased needs RT increased demands AEB: ESRD/HD, Skin/pressure ulcer, CHF  >> ongoing   Goal: Pt will meet at least 75% of nutrient needs     Recommendations:  1. Cont with current diet and recommend addition of [employer] Renal BID and Probiotic supplementation   2. Monitor %PO intake, Diet tolerance.  3. Labs: monitor BMP, CBC, glucose, lytes, trend renal indices, LFTs.  4. Pain/GI per team. Monitor Skin, Wts pre/post HD.  5. RD to remain available for additional nutrition interventions as needed.   *dw PA    Education: Discussed importance of adequate oral intake, discussed addition of oral nutrition supplementation that will be more easily digested    Risk Level: High [  X ] Moderate [   ] Low [   ]. Admitting Diagnosis:   Patient is a 62y old  Male who presents with a chief complaint of ICD evaluation (30 Mar 2023 08:07)      PAST MEDICAL & SURGICAL HISTORY:  HTN (hypertension)      HLD (hyperlipidemia)      DM (diabetes mellitus)      GERD (gastroesophageal reflux disease)      ESRD on dialysis      NSTEMI (non-ST elevation myocardial infarction)      CAD (coronary artery disease)      Fever of unknown origin (FUO)          Current Nutrition Order:  DASH    PO Intake: Good (%) [   ]  Fair (50-75%) [   ] Poor (<25%) [ X  ]    GI Issues: No complaints of N/V  +Diarrhea ongoing- ID following for possible r/o C. diff    Pain: Denied complaints of pain     Skin Integrity: Alex 14, no edema recorded  R. buttock stage II pressure injury, L. heel DTI     Labs:   03-31    132<L>  |  97  |  3<L>  ----------------------------<  74  3.7   |  31  |  3.56<H>    Ca    8.4      31 Mar 2023 08:16  Mg     1.8     03-31    TPro  4.7<L>  /  Alb  2.4<L>  /  TBili  0.4  /  DBili  x   /  AST  17  /  ALT  <5<L>  /  AlkPhos  142<H>  03-29    CAPILLARY BLOOD GLUCOSE      POCT Blood Glucose.: 81 mg/dL (31 Mar 2023 11:52)  POCT Blood Glucose.: 75 mg/dL (31 Mar 2023 06:48)  POCT Blood Glucose.: 80 mg/dL (30 Mar 2023 21:58)  POCT Blood Glucose.: 77 mg/dL (30 Mar 2023 17:46)      Medications:  MEDICATIONS  (STANDING):  aspirin enteric coated 81 milliGRAM(s) Oral daily  atorvastatin 40 milliGRAM(s) Oral at bedtime  cefTRIAXone   IVPB 2000 milliGRAM(s) IV Intermittent every 24 hours  chlorhexidine 2% Cloths 1 Application(s) Topical daily  clopidogrel Tablet 75 milliGRAM(s) Oral daily  dextrose 5%. 1000 milliLiter(s) (50 mL/Hr) IV Continuous <Continuous>  dextrose 5%. 1000 milliLiter(s) (100 mL/Hr) IV Continuous <Continuous>  dextrose 50% Injectable 25 Gram(s) IV Push once  dextrose 50% Injectable 12.5 Gram(s) IV Push once  ferrous    sulfate 325 milliGRAM(s) Oral daily  hydrALAZINE 50 milliGRAM(s) Oral every 8 hours  insulin lispro (ADMELOG) corrective regimen sliding scale   SubCutaneous Before meals and at bedtime  isosorbide   dinitrate Tablet (ISORDIL) 20 milliGRAM(s) Oral three times a day  losartan 100 milliGRAM(s) Oral every 24 hours  metoprolol succinate ER 25 milliGRAM(s) Oral daily  pantoprazole  Injectable 40 milliGRAM(s) IV Push every 12 hours  tacrolimus 2 milliGRAM(s) Oral every 12 hours  tamsulosin 0.8 milliGRAM(s) Oral at bedtime    MEDICATIONS  (PRN):  acetaminophen     Tablet .. 650 milliGRAM(s) Oral every 6 hours PRN Mild Pain (1 - 3), Moderate Pain (4 - 6)  dextrose Oral Gel 15 Gram(s) Oral once PRN Blood Glucose LESS THAN 70 milliGRAM(s)/deciliter  sodium chloride 0.65% Nasal 1 Spray(s) Both Nostrils every 4 hours PRN Nasal Congestion      5'4''  pounds +-10%  ABW s/p R AKA: 113 pounds, %OJQ=054     3/21 143.7 pounds, 148.1 pounds  3/25 136 pounds, 145.5 pounds    3/30 140.8 pounds, 135.3 pounds     Estimated energy needs:   ABW s/p R AKA for EER as %ABW is greater then 120%  Adjust for age, Fevers, ESRD/HD, Skin/pressure ulcer, CHF; fluids per team    Estimated Energy Needs From (hermilo/kg)	30  Estimated Energy Needs To (hermilo/kg)	35  Estimated Energy Needs Calculated From (hermilo/kg)	1536  Estimated Energy Needs Calculated To (hermilo/kg)	1792    Estimated Protein Needs From (g/kg)	1.2  Estimated Protein Needs To (g/kg)	1.4  Estimated Protein Needs Calculated From (g/kg)	61.44  Estimated Protein Needs Calculated To (g/kg)	71.68    Subjective:  62y M PMHx HTN, HLD, DM, ICM (s/p cath years ago, RCA 99%, never intervened on), HFrEF 25-30%, ESRD s/p failed kidney transplant (LUE AVF; HD TTS), R AKA admitted to [facility] 2/27 for respiratory distress and SIRS criteria after HD session, requiring intubation. Pt with cardiac arrest, vtach arrest requiring amio and pressors. Extubated 3/2 and course c/b by LGIB with hemoglobin of 3.5 requiring multiple transfusions; EGD/colo and CTA w/o evidence of active bleed and Hgb improved to 11's. Transferred to [facility] 3/17/23 for ICD eval 2/2 Vtach and cardiac cath however patient with episode of BRBPR, flex sig showing localized rectal ulcer but no active bleeding. DAPT resumed on 3/20. 3/21 pt febrile to 102F, +URI symptoms. BCx NGTD with intermittent fevers. +Melena 3/23/23, Fecal occult negative, GI cleared pt and DAPT resumed 3/24. LHC and ICD pending infectious w/u. S/p PET scan 3/25- no Lymphadenopathy to suggest lymphoproliferative disorder, activity in prostate and femur. Last HD 3/30, next planned for 4/1.     Pt seen in room, awake, alert, breathing on 2LNC. Pt reports poor PO intake but could not really indicate why he wasn't eating. Stated that all he wants to eat is a fruit cup. Discussed addition of oral nutrition supplementation Nepro, but he stated that this gives him diarrhea. Recommended starting [employer] Renal, which he may tolerate better. Continues to have diarrhea- 3 episodes today per RN. ID following- r/o C. diff i/s/o immunocompromised state, fever. No complaints of pain. Tmax 100.1F overnight. Na 132 (L), Cr 3.56 (H), POC BG 81, 75mg/dL. Will continue to follow per RD protocol.     Prior PES: Increased needs RT increased demands AEB: ESRD/HD, Skin/pressure ulcer, CHF  >> ongoing   Goal: Pt will meet at least 75% of nutrient needs     Recommendations:  1. Cont with current diet and recommend addition of [employer] Renal BID and Probiotic supplementation   2. Monitor %PO intake, Diet tolerance.  3. Labs: monitor BMP, CBC, glucose, lytes, trend renal indices, LFTs.  4. Pain/GI per team. Monitor Skin, Wts pre/post HD.  5. RD to remain available for additional nutrition interventions as needed.   *dw PA    Education: Discussed importance of adequate oral intake, discussed addition of oral nutrition supplementation that will be more easily digested    Risk Level: High [  X ] Moderate [   ] Low [   ].

## 2023-03-31 NOTE — PROGRESS NOTE ADULT - ASSESSMENT
62y M PMHx HTN, HLD, DM, ICM (s/p cath years ago, RCA 99%, never intervened on), HFrEF 25-30%, ESRD s/p failed kidney transplant (LUE AVF; HD TTS), R AKA admitted to [facility] 2/27 for respiratory distress and SIRS criteria after HD session, requiring intubation. Pt w/ cardiac arrest, vtach arrest requiring amio and pressors. Extubated 3/2 and course c/b by LGIB with hemoglobin of 3.5 requiring multiple transfusions; EGD/colo and CTA w/o evidence of active bleed and Hgb improved to 11's. Transferred to [facility] 03/17/23 for ICD eval 2/2 Vtach and cardiac cath however patient w/ episode of BRBPR, flex sig showing localized rectal ulcer but no active bleeding; DAPT was resumed on 03/20/23.  Patient now with ongoing fevers tmax 102 with constitutional symptoms with infectious work up remarkable for UTI initially covered with broad spectrum now narrowed down to IV ctx (will ultimately need to be treated through 4/4/23). Patient now awaiting echocardiogram to assess for EF prior to cardiac cath. Once patient undergoes cardiac cath, ultimate plan for SUBQ ICD with EP.      62y M PMHx HTN, HLD, DM, ICM (s/p cath years ago, RCA 99%, never intervened on), HFrEF 25-30%, ESRD s/p failed kidney transplant (LUE AVF; HD TTS), R AKA admitted to [facility] 2/27 for respiratory distress and SIRS criteria after HD session, requiring intubation. Pt w/ cardiac arrest, vtach arrest requiring amio and pressors. Extubated 3/2 and course c/b by LGIB with hemoglobin of 3.5 requiring multiple transfusions; EGD/colo and CTA w/o evidence of active bleed and Hgb improved to 11's. Transferred to [facility] 03/17/23 for ICD eval 2/2 Vtach and cardiac cath however patient w/ episode of BRBPR, flex sig showing localized rectal ulcer but no active bleeding; DAPT was resumed on 03/20/23.  Patient now with ongoing fevers tmax 102 with constitutional symptoms with infectious work up remarkable for UTI initially covered with broad spectrum now narrowed down to IV ctx (will ultimately need to be treated through 4/4/23). Patient now awaiting echocardiogram to assess for EF prior to cardiac cath. Once patient undergoes cardiac cath, ultimate plan for SUBQ ICD with EP.      62y M PMHx HTN, HLD, DM, ICM (s/p cath years ago, RCA 99%, never intervened on), HFrEF 25-30%, ESRD s/p failed kidney transplant (LUE AVF; HD TTS), R AKA admitted to [facility] 2/27 for respiratory distress and SIRS criteria after HD session, requiring intubation. Pt w/ cardiac arrest, vtach arrest requiring amio and pressors. Extubated 3/2 and course c/b by LGIB with hemoglobin of 3.5 requiring multiple transfusions; EGD/colo and CTA w/o evidence of active bleed and Hgb improved to 11's. Transferred to [facility] 03/17/23 for ICD eval 2/2 Vtach and cardiac cath however patient w/ episode of BRBPR, flex sig showing localized rectal ulcer but no active bleeding; DAPT was resumed on 03/20/23.  Patient now with ongoing fevers tmax 102 with constitutional symptoms with infectious work up remarkable for UTI initially covered with broad spectrum now narrowed down to IV ctx (will ultimately need to be treated through 4/4/23). Patient now awaiting echocardiogram to assess for EF prior to cardiac cath. Once patient undergoes cardiac cath, ultimate plan for SUBQ ICD with EP.

## 2023-03-31 NOTE — PROGRESS NOTE ADULT - SUBJECTIVE AND OBJECTIVE BOX
EPS Progress Note    S:     infectious issues slowly resolving. No fevers, last day of abx is 4/4.    tele sinus rhythm, overnight has some sinus bradycardia/transient junctional rhythms.     O: T(C): 37.2 (03-31-23 @ 14:32), Max: 38.2 (03-30-23 @ 18:37)  HR: 54 (03-31-23 @ 12:45) (50 - 58)  BP: 137/60 (03-31-23 @ 12:45) (122/57 - 163/72)  RR: 18 (03-31-23 @ 12:45) (17 - 18)  SpO2: 97% (03-31-23 @ 12:45) (97% - 100%)      PHYSICAL  General:  NAD        Chest:  CTA B/L, no w/r/r  Cardiac:  RRR, + s1/s2 , no m/g/r  Abdomen:   soft ND/NT  Extremities: No edema, b/l groin no hematoma/bleeding/oozing  Skin: no rash noted, normal color and pigmentation  Psych: A&Ox3, normal affect and mood  Neuro: no deficit noted     LABS:                        10.0   4.38  )-----------( 188      ( 31 Mar 2023 08:16 )             32.3     03-31    132<L>  |  97  |  3<L>  ----------------------------<  74  3.7   |  31  |  3.56<H>    Ca    8.4      31 Mar 2023 08:16  Mg     1.8     03-31    TPro  4.7<L>  /  Alb  2.4<L>  /  TBili  0.4  /  DBili  x   /  AST  17  /  ALT  <5<L>  /  AlkPhos  142<H>  03-29          MEDICATIONS:  acetaminophen     Tablet .. 650 milliGRAM(s) Oral every 6 hours PRN  aspirin enteric coated 81 milliGRAM(s) Oral daily  atorvastatin 40 milliGRAM(s) Oral at bedtime  cefTRIAXone   IVPB 2000 milliGRAM(s) IV Intermittent every 24 hours  chlorhexidine 2% Cloths 1 Application(s) Topical daily  clopidogrel Tablet 75 milliGRAM(s) Oral daily  dextrose 5%. 1000 milliLiter(s) IV Continuous <Continuous>  dextrose 5%. 1000 milliLiter(s) IV Continuous <Continuous>  dextrose 50% Injectable 25 Gram(s) IV Push once  dextrose 50% Injectable 12.5 Gram(s) IV Push once  dextrose Oral Gel 15 Gram(s) Oral once PRN  ferrous    sulfate 325 milliGRAM(s) Oral daily  hydrALAZINE 50 milliGRAM(s) Oral every 8 hours  insulin lispro (ADMELOG) corrective regimen sliding scale   SubCutaneous Before meals and at bedtime  isosorbide   dinitrate Tablet (ISORDIL) 20 milliGRAM(s) Oral three times a day  losartan 100 milliGRAM(s) Oral every 24 hours  metoprolol succinate ER 25 milliGRAM(s) Oral daily  pantoprazole  Injectable 40 milliGRAM(s) IV Push every 12 hours  sodium chloride 0.65% Nasal 1 Spray(s) Both Nostrils every 4 hours PRN  tacrolimus 2 milliGRAM(s) Oral every 12 hours  tamsulosin 0.8 milliGRAM(s) Oral at bedtime      ASSESSMENT/PLAN  63 y/o M PMHx HTN, HLD, DM, CAD (s/p cath years ago, RCA 99%, never intervened on), ESRD s/p failed kidney transplant, on HD via Left Arm AVF, ESBL bacteremia (2020), and Right AKA presented to [facility] 2/27/23 for respiratory distress after a dialysis session. Intubated in MICU there, had reportedly VT cardiac arrest and ROSC was achieved after 8 mins. Unclear if he made troponin (no such info included in transfer paper).  Was put on Amio gtt, noted ellen when on amio gtt requiring atropine. Echo there showed LVEF 25-30%.  Hospital course c/b GI bleeding and then fever/ infection, possible pyelitis of transplanted kidney. Resolving.    -plan for SUBQ ICD later next week after [facility]        EPS Progress Note    S:     infectious issues slowly resolving. No fevers, last day of abx is 4/4.    tele sinus rhythm, overnight has some sinus bradycardia/transient junctional rhythms.     O: T(C): 37.2 (03-31-23 @ 14:32), Max: 38.2 (03-30-23 @ 18:37)  HR: 54 (03-31-23 @ 12:45) (50 - 58)  BP: 137/60 (03-31-23 @ 12:45) (122/57 - 163/72)  RR: 18 (03-31-23 @ 12:45) (17 - 18)  SpO2: 97% (03-31-23 @ 12:45) (97% - 100%)      PHYSICAL  General:  NAD        Chest:  CTA B/L, no w/r/r  Cardiac:  RRR, + s1/s2 , no m/g/r  Abdomen:   soft ND/NT  Extremities: No edema, b/l groin no hematoma/bleeding/oozing  Skin: no rash noted, normal color and pigmentation  Psych: A&Ox3, normal affect and mood  Neuro: no deficit noted     LABS:                        10.0   4.38  )-----------( 188      ( 31 Mar 2023 08:16 )             32.3     03-31    132<L>  |  97  |  3<L>  ----------------------------<  74  3.7   |  31  |  3.56<H>    Ca    8.4      31 Mar 2023 08:16  Mg     1.8     03-31    TPro  4.7<L>  /  Alb  2.4<L>  /  TBili  0.4  /  DBili  x   /  AST  17  /  ALT  <5<L>  /  AlkPhos  142<H>  03-29          MEDICATIONS:  acetaminophen     Tablet .. 650 milliGRAM(s) Oral every 6 hours PRN  aspirin enteric coated 81 milliGRAM(s) Oral daily  atorvastatin 40 milliGRAM(s) Oral at bedtime  cefTRIAXone   IVPB 2000 milliGRAM(s) IV Intermittent every 24 hours  chlorhexidine 2% Cloths 1 Application(s) Topical daily  clopidogrel Tablet 75 milliGRAM(s) Oral daily  dextrose 5%. 1000 milliLiter(s) IV Continuous <Continuous>  dextrose 5%. 1000 milliLiter(s) IV Continuous <Continuous>  dextrose 50% Injectable 25 Gram(s) IV Push once  dextrose 50% Injectable 12.5 Gram(s) IV Push once  dextrose Oral Gel 15 Gram(s) Oral once PRN  ferrous    sulfate 325 milliGRAM(s) Oral daily  hydrALAZINE 50 milliGRAM(s) Oral every 8 hours  insulin lispro (ADMELOG) corrective regimen sliding scale   SubCutaneous Before meals and at bedtime  isosorbide   dinitrate Tablet (ISORDIL) 20 milliGRAM(s) Oral three times a day  losartan 100 milliGRAM(s) Oral every 24 hours  metoprolol succinate ER 25 milliGRAM(s) Oral daily  pantoprazole  Injectable 40 milliGRAM(s) IV Push every 12 hours  sodium chloride 0.65% Nasal 1 Spray(s) Both Nostrils every 4 hours PRN  tacrolimus 2 milliGRAM(s) Oral every 12 hours  tamsulosin 0.8 milliGRAM(s) Oral at bedtime      ASSESSMENT/PLAN  61 y/o M PMHx HTN, HLD, DM, CAD (s/p cath years ago, RCA 99%, never intervened on), ESRD s/p failed kidney transplant, on HD via Left Arm AVF, ESBL bacteremia (2020), and Right AKA presented to [facility] 2/27/23 for respiratory distress after a dialysis session. Intubated in MICU there, had reportedly VT cardiac arrest and ROSC was achieved after 8 mins. Unclear if he made troponin (no such info included in transfer paper).  Was put on Amio gtt, noted ellen when on amio gtt requiring atropine. Echo there showed LVEF 25-30%.  Hospital course c/b GI bleeding and then fever/ infection, possible pyelitis of transplanted kidney. Resolving.    -plan for SUBQ ICD later next week after [facility]        EPS Progress Note    S:     infectious issues slowly resolving. No fevers, last day of abx is 4/4.    tele sinus rhythm, overnight has some sinus bradycardia/transient junctional rhythms.     O: T(C): 37.2 (03-31-23 @ 14:32), Max: 38.2 (03-30-23 @ 18:37)  HR: 54 (03-31-23 @ 12:45) (50 - 58)  BP: 137/60 (03-31-23 @ 12:45) (122/57 - 163/72)  RR: 18 (03-31-23 @ 12:45) (17 - 18)  SpO2: 97% (03-31-23 @ 12:45) (97% - 100%)      PHYSICAL  General:  NAD        Chest:  CTA B/L, no w/r/r  Cardiac:  RRR, + s1/s2 , no m/g/r  Abdomen:   soft ND/NT  Extremities: No edema, b/l groin no hematoma/bleeding/oozing  Skin: no rash noted, normal color and pigmentation  Psych: A&Ox3, normal affect and mood  Neuro: no deficit noted     LABS:                        10.0   4.38  )-----------( 188      ( 31 Mar 2023 08:16 )             32.3     03-31    132<L>  |  97  |  3<L>  ----------------------------<  74  3.7   |  31  |  3.56<H>    Ca    8.4      31 Mar 2023 08:16  Mg     1.8     03-31    TPro  4.7<L>  /  Alb  2.4<L>  /  TBili  0.4  /  DBili  x   /  AST  17  /  ALT  <5<L>  /  AlkPhos  142<H>  03-29          MEDICATIONS:  acetaminophen     Tablet .. 650 milliGRAM(s) Oral every 6 hours PRN  aspirin enteric coated 81 milliGRAM(s) Oral daily  atorvastatin 40 milliGRAM(s) Oral at bedtime  cefTRIAXone   IVPB 2000 milliGRAM(s) IV Intermittent every 24 hours  chlorhexidine 2% Cloths 1 Application(s) Topical daily  clopidogrel Tablet 75 milliGRAM(s) Oral daily  dextrose 5%. 1000 milliLiter(s) IV Continuous <Continuous>  dextrose 5%. 1000 milliLiter(s) IV Continuous <Continuous>  dextrose 50% Injectable 25 Gram(s) IV Push once  dextrose 50% Injectable 12.5 Gram(s) IV Push once  dextrose Oral Gel 15 Gram(s) Oral once PRN  ferrous    sulfate 325 milliGRAM(s) Oral daily  hydrALAZINE 50 milliGRAM(s) Oral every 8 hours  insulin lispro (ADMELOG) corrective regimen sliding scale   SubCutaneous Before meals and at bedtime  isosorbide   dinitrate Tablet (ISORDIL) 20 milliGRAM(s) Oral three times a day  losartan 100 milliGRAM(s) Oral every 24 hours  metoprolol succinate ER 25 milliGRAM(s) Oral daily  pantoprazole  Injectable 40 milliGRAM(s) IV Push every 12 hours  sodium chloride 0.65% Nasal 1 Spray(s) Both Nostrils every 4 hours PRN  tacrolimus 2 milliGRAM(s) Oral every 12 hours  tamsulosin 0.8 milliGRAM(s) Oral at bedtime      ASSESSMENT/PLAN  61 y/o M PMHx HTN, HLD, DM, CAD (s/p cath years ago, RCA 99%, never intervened on), ESRD s/p failed kidney transplant, on HD via Left Arm AVF, ESBL bacteremia (2020), and Right AKA presented to [facility] 2/27/23 for respiratory distress after a dialysis session. Intubated in MICU there, had reportedly VT cardiac arrest and ROSC was achieved after 8 mins. Unclear if he made troponin (no such info included in transfer paper).  Was put on Amio gtt, noted ellen when on amio gtt requiring atropine. Echo there showed LVEF 25-30%.  Hospital course c/b GI bleeding and then fever/ infection, possible pyelitis of transplanted kidney. Resolving.    -plan for SUBQ ICD later next week after [facility]

## 2023-03-31 NOTE — PROGRESS NOTE ADULT - PROBLEM SELECTOR PLAN 12
- reduced by urology 03/29/23             F: Oral intake  E: Replete electrolytes as needed for K<4 and Mg<2  N: DASH Diet    DVT PPX: holding AC; SCD  Dispo: pending infectious work up/ echo prior to C w/ subsequent ICD

## 2023-03-31 NOTE — PROGRESS NOTE ADULT - PROBLEM SELECTOR PLAN 1
- Pt w/ recurring fevers despite IV ABx. Tmax 101 03/30/23,  infectious work up ongoing w source likely urinary vs prostatitis   - BCx NGTD; BK virus (-); UCx (+) for Klebsiella Pneumonia   - STOPPED Vancomycin and Meropenem 3/27/23  - PLAN: PET scan 3/25 - PET scan showing increased uptake in prostate suggesting prostatitis vs. neoplasm; attempt made to contact wife on 3/31/23 - unclear if patient has outpatient prostate workup   - CT Pelvis 3/28. significant for mild urothelial thickening and hyperenhancement suggestive of pyelitis.   No evidence of pyelonephritis or renal abscess. Probable cystitis.  - Urology consulted - rec to recheck PSA once patient finishes abx for infection, no acute urologic workup needed at this time   - CONT: Ceftriaxone 2000mg IV QD until April 4, 2023  - ID following - appreciate recs     #Diarrhea  --history of loose BMs, > 5 in 24 hours as of 3/31/23   - C-diff culture pending

## 2023-03-31 NOTE — PROGRESS NOTE ADULT - SUBJECTIVE AND OBJECTIVE BOX
INFECTIOUS DISEASES CONSULT FOLLOW-UP NOTE    INTERVAL HPI/OVERNIGHT EVENTS:  febrile to 100.8  patient denied chills, n/v, abdominal pain, dysuria  reports diarrhea, 5 episodes in 24h (per RN, confirmed he had 3 BM since her AM shift)    ROS:   Constitutional, eyes, ENT, cardiovascular, respiratory, gastrointestinal, genitourinary, integumentary, neurological, psychiatric and heme/lymph are otherwise negative other than noted above       ANTIBIOTICS/RELEVANT:    MEDICATIONS  (STANDING):  aspirin enteric coated 81 milliGRAM(s) Oral daily  atorvastatin 40 milliGRAM(s) Oral at bedtime  cefTRIAXone   IVPB 2000 milliGRAM(s) IV Intermittent every 24 hours  chlorhexidine 2% Cloths 1 Application(s) Topical daily  clopidogrel Tablet 75 milliGRAM(s) Oral daily  dextrose 5%. 1000 milliLiter(s) (50 mL/Hr) IV Continuous <Continuous>  dextrose 5%. 1000 milliLiter(s) (100 mL/Hr) IV Continuous <Continuous>  dextrose 50% Injectable 25 Gram(s) IV Push once  dextrose 50% Injectable 12.5 Gram(s) IV Push once  ferrous    sulfate 325 milliGRAM(s) Oral daily  hydrALAZINE 50 milliGRAM(s) Oral every 8 hours  insulin lispro (ADMELOG) corrective regimen sliding scale   SubCutaneous Before meals and at bedtime  isosorbide   dinitrate Tablet (ISORDIL) 20 milliGRAM(s) Oral three times a day  losartan 100 milliGRAM(s) Oral every 24 hours  metoprolol succinate ER 25 milliGRAM(s) Oral daily  pantoprazole  Injectable 40 milliGRAM(s) IV Push every 12 hours  tacrolimus 2 milliGRAM(s) Oral every 12 hours  tamsulosin 0.8 milliGRAM(s) Oral at bedtime    MEDICATIONS  (PRN):  acetaminophen     Tablet .. 650 milliGRAM(s) Oral every 6 hours PRN Mild Pain (1 - 3), Moderate Pain (4 - 6)  dextrose Oral Gel 15 Gram(s) Oral once PRN Blood Glucose LESS THAN 70 milliGRAM(s)/deciliter  sodium chloride 0.65% Nasal 1 Spray(s) Both Nostrils every 4 hours PRN Nasal Congestion        Vital Signs Last 24 Hrs  T(C): 37.2 (31 Mar 2023 14:32), Max: 38.2 (30 Mar 2023 18:37)  T(F): 98.9 (31 Mar 2023 14:32), Max: 100.8 (30 Mar 2023 18:37)  HR: 54 (31 Mar 2023 12:45) (50 - 58)  BP: 137/60 (31 Mar 2023 12:45) (122/57 - 163/72)  BP(mean): --  RR: 18 (31 Mar 2023 12:45) (17 - 18)  SpO2: 97% (31 Mar 2023 12:45) (97% - 100%)    Parameters below as of 31 Mar 2023 12:45  Patient On (Oxygen Delivery Method): nasal cannula  O2 Flow (L/min): 2      23 @ 07:01  -  23 @ 07:00  --------------------------------------------------------  IN: 180 mL / OUT: 2800 mL / NET: -2620 mL    23 @ 07:01  -  23 @ 16:11  --------------------------------------------------------  IN: 360 mL / OUT: 0 mL / NET: 360 mL      PHYSICAL EXAM:  Constitutional: alert, NAD  Eyes: the sclera and conjunctiva were normal.   ENT: the ears and nose were normal in appearance.   Neck: the appearance of the neck was normal and the neck was supple.   Pulmonary: no respiratory distress and lungs were clear to auscultation bilaterally.   Heart: heart rate was normal and rhythm regular, normal S1 and S2  Vascular:. there was no peripheral edema  Abdomen: normal bowel sounds, soft, non-tender        LABS:                        10.0   4.38  )-----------( 188      ( 31 Mar 2023 08:16 )             32.3         132<L>  |  97  |  3<L>  ----------------------------<  74  3.7   |  31  |  3.56<H>    Ca    8.4      31 Mar 2023 08:16  Mg     1.8         TPro  4.7<L>  /  Alb  2.4<L>  /  TBili  0.4  /  DBili  x   /  AST  17  /  ALT  <5<L>  /  AlkPhos  142<H>        Urinalysis Basic - ( 30 Mar 2023 16:12 )    Color: Yellow / Appearance: Clear / S.020 / pH: x  Gluc: x / Ketone: Trace mg/dL  / Bili: Negative / Urobili: 0.2 E.U./dL   Blood: x / Protein: >=300 mg/dL / Nitrite: NEGATIVE   Leuk Esterase: NEGATIVE / RBC: 5-10 /HPF / WBC Many /HPF   Sq Epi: x / Non Sq Epi: 0-5 /HPF / Bacteria: Present /HPF        MICROBIOLOGY:      RADIOLOGY & ADDITIONAL STUDIES:  Reviewed

## 2023-03-31 NOTE — PROGRESS NOTE ADULT - PROBLEM SELECTOR PLAN 4
@ [facility], severe GIB w/ Hgb down to 3.5 received total of 7u PRBCs.    - 3/17 AM pt passed loose BM along w/ large amount of BRBPR.    - s/p Flex sigmoidoscopy – Localized ulceration and erosion w/ no bleeding in rectum, suggestive of solitary ulcer syndrome; evidence of prior hemoclip was found in sigmoid colon.    - Avoid anal digitation and enemas  - Fecal occult negative 3/24 and cleared by GI for DAPT  - Transfusion goal Hgb < 8.0 @ [facility], severe GIB w/ Hgb down to 3.5 received total of 7u PRBCs.    - 3/17 AM pt passed loose BM along w/ large amount of BRBPR.    - s/p Flex sigmoidoscopy – Localized ulceration and erosion w/ no bleeding in rectum, suggestive of solitary ulcer syndrome; evidence of prior hemoclip was found in sigmoid colon.    - Avoid anal digitation and enemas  - Fecal occult negative 3/24 and cleared by GI for DAPT  - Transfusion goal Hgb < 8.0 @ [facility], severe GIB w/ Hgb down to 3.5 received total of 7u PRBCs.    - 3/17 AM pt passed loose BM along w/ large amount of BRBPR.    - s/p Flex sigmoidoscopy – Localized ulceration and erosion w/ no bleeding in rectum, suggestive of solitary ulcer syndrome; evidence of prior hemoclip was found in sigmoid colon.    - Avoid anal digitation and enemas  - Fecal occult negative 3/24 and cleared by GI for DAPT  - Transfusion goal Hgb < 8.0

## 2023-03-31 NOTE — PROGRESS NOTE ADULT - PROBLEM SELECTOR PLAN 6
Pt still produced some urine; urinary retention requiring multiple straight caths; now s/p indwelling duvall placement on 3/21/23.  - duvall removed, voided 300 cc on 3/31/23

## 2023-03-31 NOTE — PROGRESS NOTE ADULT - PROBLEM SELECTOR PLAN 5
LVEF 25-30% (likely ischemic).    - euvolemic on exam  - F/u repeat echo 3/31/23  - c/w Hydralazine 50mg q8hrs, Isordil 20mg TID, toprol 25mg qd, Losartan 100mg QD.

## 2023-03-31 NOTE — PROGRESS NOTE ADULT - SUBJECTIVE AND OBJECTIVE BOX
Interventional Cardiology PA Adult Progress Note    C.C.:     Subjective Assessment:      ROS Negative except as per Subjective and HPI  	  MEDICATIONS:  hydrALAZINE 50 milliGRAM(s) Oral every 8 hours  isosorbide   dinitrate Tablet (ISORDIL) 20 milliGRAM(s) Oral three times a day  losartan 100 milliGRAM(s) Oral every 24 hours  metoprolol succinate ER 25 milliGRAM(s) Oral daily    cefTRIAXone   IVPB 2000 milliGRAM(s) IV Intermittent every 24 hours      acetaminophen     Tablet .. 650 milliGRAM(s) Oral every 6 hours PRN    pantoprazole  Injectable 40 milliGRAM(s) IV Push every 12 hours    atorvastatin 40 milliGRAM(s) Oral at bedtime  dextrose 50% Injectable 25 Gram(s) IV Push once  dextrose 50% Injectable 12.5 Gram(s) IV Push once  dextrose Oral Gel 15 Gram(s) Oral once PRN  insulin lispro (ADMELOG) corrective regimen sliding scale   SubCutaneous Before meals and at bedtime    aspirin enteric coated 81 milliGRAM(s) Oral daily  chlorhexidine 2% Cloths 1 Application(s) Topical daily  clopidogrel Tablet 75 milliGRAM(s) Oral daily  dextrose 5%. 1000 milliLiter(s) IV Continuous <Continuous>  dextrose 5%. 1000 milliLiter(s) IV Continuous <Continuous>  ferrous    sulfate 325 milliGRAM(s) Oral daily  sodium chloride 0.65% Nasal 1 Spray(s) Both Nostrils every 4 hours PRN  tacrolimus 2 milliGRAM(s) Oral every 12 hours  tamsulosin 0.8 milliGRAM(s) Oral at bedtime      	    [PHYSICAL EXAM:  TELEMETRY:  T(C): 37.3 (03-31-23 @ 11:03), Max: 38.2 (03-30-23 @ 18:37)  HR: 54 (03-31-23 @ 12:45) (50 - 65)  BP: 137/60 (03-31-23 @ 12:45) (122/57 - 163/72)  RR: 18 (03-31-23 @ 12:45) (17 - 19)  SpO2: 97% (03-31-23 @ 12:45) (97% - 100%)  Wt(kg): --  I&O's Summary    30 Mar 2023 07:01  -  31 Mar 2023 07:00  --------------------------------------------------------  IN: 180 mL / OUT: 2800 mL / NET: -2620 mL    31 Mar 2023 07:01  -  31 Mar 2023 13:02  --------------------------------------------------------  IN: 180 mL / OUT: 0 mL / NET: 180 mL        Moody:  Central/PICC/Mid Line:                                         Appearance: Normal	  HEENT:   Normal oral mucosa, PERRL, EOMI	  Neck: Supple, + JVD/ - JVD; Carotid Bruit   Cardiovascular: Normal S1 S2, No JVD, No murmurs,   Respiratory: Lungs clear to auscultation/Decreased Breath Sounds/No Rales, Rhonchi, Wheezing	  Gastrointestinal:  Soft, Non-tender, + BS	  Skin: No rashes, No ecchymoses, No cyanosis  Extremities: Normal range of motion, No clubbing, cyanosis or edema  Vascular: Peripheral pulses palpable 2+ bilaterally  Neurologic: Non-focal  Psychiatry: A & O x 3, Mood & affect appropriate      	    ECG:  	  RADIOLOGY:   DIAGNOSTIC TESTING:  [ ] Echocardiogram:  [ ]  Catheterization:  [ ] Stress Test:    [ ] JOSY  OTHER: 	    LABS:	 	  CARDIAC MARKERS:                                  10.0   4.38  )-----------( 188      ( 31 Mar 2023 08:16 )             32.3     03-31    132<L>  |  97  |  3<L>  ----------------------------<  74  3.7   |  31  |  3.56<H>    Ca    8.4      31 Mar 2023 08:16  Mg     1.8     03-31    TPro  4.7<L>  /  Alb  2.4<L>  /  TBili  0.4  /  DBili  x   /  AST  17  /  ALT  <5<L>  /  AlkPhos  142<H>  03-29    proBNP:   Lipid Profile:   HgA1c:   TSH:       ASSESSMENT/PLAN: 	        DVT ppx:  Dispo:     Interventional Cardiology PA Adult Progress Note    Subjective Assessment: Patient seen and examined at bedside. Patient feels ok, no acute complaints at this time.     ROS Negative except as per Subjective and HPI  	  MEDICATIONS:  hydrALAZINE 50 milliGRAM(s) Oral every 8 hours  isosorbide   dinitrate Tablet (ISORDIL) 20 milliGRAM(s) Oral three times a day  losartan 100 milliGRAM(s) Oral every 24 hours  metoprolol succinate ER 25 milliGRAM(s) Oral daily  cefTRIAXone   IVPB 2000 milliGRAM(s) IV Intermittent every 24 hours  acetaminophen     Tablet .. 650 milliGRAM(s) Oral every 6 hours PRN  pantoprazole  Injectable 40 milliGRAM(s) IV Push every 12 hours  atorvastatin 40 milliGRAM(s) Oral at bedtime  dextrose 50% Injectable 25 Gram(s) IV Push once  dextrose 50% Injectable 12.5 Gram(s) IV Push once  dextrose Oral Gel 15 Gram(s) Oral once PRN  insulin lispro (ADMELOG) corrective regimen sliding scale   SubCutaneous Before meals and at bedtime  aspirin enteric coated 81 milliGRAM(s) Oral daily  chlorhexidine 2% Cloths 1 Application(s) Topical daily  clopidogrel Tablet 75 milliGRAM(s) Oral daily  dextrose 5%. 1000 milliLiter(s) IV Continuous <Continuous>  dextrose 5%. 1000 milliLiter(s) IV Continuous <Continuous>  ferrous    sulfate 325 milliGRAM(s) Oral daily  sodium chloride 0.65% Nasal 1 Spray(s) Both Nostrils every 4 hours PRN  tacrolimus 2 milliGRAM(s) Oral every 12 hours  tamsulosin 0.8 milliGRAM(s) Oral at bedtime    [PHYSICAL EXAM:  TELEMETRY:  T(C): 37.3 (03-31-23 @ 11:03), Max: 38.2 (03-30-23 @ 18:37)  HR: 54 (03-31-23 @ 12:45) (50 - 65)  BP: 137/60 (03-31-23 @ 12:45) (122/57 - 163/72)  RR: 18 (03-31-23 @ 12:45) (17 - 19)  SpO2: 97% (03-31-23 @ 12:45) (97% - 100%)  Wt(kg): --  I&O's Summary    30 Mar 2023 07:01  -  31 Mar 2023 07:00  --------------------------------------------------------  IN: 180 mL / OUT: 2800 mL / NET: -2620 mL    31 Mar 2023 07:01  -  31 Mar 2023 13:02  --------------------------------------------------------  IN: 180 mL / OUT: 0 mL / NET: 180 mL      Appearance: Normal, laying in bed, NAD   HEENT:   Normal oral mucosa, PERRL, EOMI	  Neck: Supple  - JVD; Carotid Bruit   Cardiovascular: Normal S1 S2, No JVD, No murmurs,   Respiratory: Lungs clear to auscultation  Gastrointestinal:  Soft, Non-tender, + BS	  Skin: No rashes, No ecchymoses, No cyanosis  Extremities: + R AKA   Vascular: Peripheral pulses palpable 2+ bilaterally  Neurologic: Non-focal  Psychiatry: A & O x 3, Mood & affect appropriate      DIAGNOSTIC TESTING:  [ x] Echocardiogram: awaiting ECHO     LABS:	 	                       10.0   4.38  )-----------( 188      ( 31 Mar 2023 08:16 )             32.3     03-31    132<L>  |  97  |  3<L>  ----------------------------<  74  3.7   |  31  |  3.56<H>    Ca    8.4      31 Mar 2023 08:16  Mg     1.8     03-31    TPro  4.7<L>  /  Alb  2.4<L>  /  TBili  0.4  /  DBili  x   /  AST  17  /  ALT  <5<L>  /  AlkPhos  142<H>  03-29  Lipid Profile: Total cholesterol: 88, LDL: 12, HDL: 48  HgA1c: 5.9  TSH: 7.7

## 2023-03-31 NOTE — PROGRESS NOTE ADULT - PROBLEM SELECTOR PLAN 3
s/p VTach arrest @ [facility]; no tele events noted.    - EP Consulted – plan for ICD placement once pt has LHC   - c/w toprol 25mg qd s/p VTach arrest @ [facility]; no tele events noted.    - EP Consulted – plan for ICD placement once pt has LHC   - c/w toprol 25mg qd s/p VTach arrest @ [facility]; no tele events noted.    - EP Consulted – plan for ICD placement once pt has LHC   - c/w toprol 25mg qd

## 2023-03-31 NOTE — PROGRESS NOTE ADULT - ASSESSMENT
62M PMHx HTN, HLD, DM, CAD (s/p cath years ago, RCA 99%, never intervened on), HFrEF 25-30%, ESRD s/p failed kidney transplant (last HD 3/1 via Left Arm AVF; HD TTS), Right AKA initially presented to [facility] 2/27 for respiratory distress after a dialysis, found to be septic, h/c complicated by AHRF requiring intubation for 24hr followed by VT arrest, h/c the complicated by massive LGIB (rectal ulcer) requiring 7u pRBC, 1u FFP and 1u PLT. Transferred to [facility] for ICD placement 2/2 Vtach and cardiac cath. Medicine consulted for comanagement.     Recommend:    #Fever of Unknown Origin  - Spiked fever of 101.6 on return from HD 3/21  - Unclear source of infection  - Removed right forearm IV because was in place from outside hospital  - Abd U/S with no acute pathology  - CXR without infiltrate  - UA without UTI  - F/u BCx, so far NGTD  - PET scan showing increased uptake in prostate suggesting prostatitis vs. neoplasm  - F/u CT A/P showing probable cystitis  - Ceftriaxone 2g daily per ID 2 week course (end date 4/4)    #LGIB  #SANTOS  #Solitary ulcer syndrome  - hx coffee ground emesis @ [facility]; s/p EGD/colonoscopy showing rectal ulcer  - 3/17AM pt passed loose BM along with large amount of BRBPR  - per gen surgery no acute surgical intervention   - GI following, no endoscopy indicated  - Active T&S  - Transfuse for hgb <8 (active CAD)    #H/O ventricular tachycardia   - Vtach arrest @[facility]  - EP consult for ICD    #ESRD on dialysis  - for dialysis today  - dialysis Tues, Thurs, Sat  - last received 3/16  - f/u nephro recs  - Electrolytes wnl, k 4.3, phos 1.9  - c/w calcium citrate 667mg TID    #S/P kidney transplant.   - per [place] pt takes Tacrolimus 2mg 2 times /day  - Obtain collateral regarding renal transplant, if indeed failed, what indications patient have for c/w tacrolimus 2mg?  - f/u tacrolimus serum levels  - f/u nephro recs    #DM  - no meds per [place], obtain collaterals for med recs prior [place] stay  - mISS while inpt  - A1c 5.9 on admission   62M PMHx HTN, HLD, DM, CAD (s/p cath years ago, RCA 99%, never intervened on), HFrEF 25-30%, ESRD s/p failed kidney transplant (last HD 3/1 via Left Arm AVF; HD TTS), Right AKA initially presented to [facility] 2/27 for respiratory distress after a dialysis, found to be septic, h/c complicated by AHRF requiring intubation for 24hr followed by VT arrest, h/c the complicated by massive LGIB (rectal ulcer) requiring 7u pRBC, 1u FFP and 1u PLT. Transferred to [facility] for ICD placement 2/2 Vtach and cardiac cath. Medicine consulted for comanagement.     Recommend:    #Fever of Unknown Origin  - Spiked fever of 101.6 on return from HD 3/21  - Unclear source of infection  - Removed right forearm IV because was in place from outside hospital  - Abd U/S with no acute pathology  - CXR without infiltrate  - UA without UTI  - F/u BCx, so far NGTD  - PET scan showing increased uptake in prostate suggesting prostatitis vs. neoplasm  - F/u CT A/P showing probable cystitis  - Ceftriaxone 2g daily per ID 2 week course (end date 4/4)    #LGIB  #SANTOS  #Solitary ulcer syndrome  - hx coffee ground emesis @ [facility]; s/p EGD/colonoscopy showing rectal ulcer  - 3/17AM pt passed loose BM along with large amount of BRBPR  - per gen surgery no acute surgical intervention   - GI following, no endoscopy indicated  - Active T&S  - Transfuse for hgb <8 (active CAD)    #H/O ventricular tachycardia   - Vtach arrest @[facility]  - EP consult for ICD    #ESRD on dialysis  - for dialysis today  - dialysis Tues, Thurs, Sat  - last received 3/16  - f/u nephro recs  - Electrolytes wnl, k 4.3, phos 1.9  - c/w calcium citrate 667mg TID    #S/P kidney transplant.   - per [place] pt takes Tacrolimus 2mg 2 times /day  - Obtain collateral regarding renal transplant, if indeed failed, what indications patient have for c/w tacrolimus 2mg?  - f/u tacrolimus serum levels  - f/u nephro recs    #DM  - no meds per [place], obtain collaterals for med recs prior [place] stay  - mISS while inpt  - A1c 5.9 on admission   62M PMHx HTN, HLD, DM, CAD (s/p cath years ago, RCA 99%, never intervened on), HFrEF 25-30%, ESRD s/p failed kidney transplant (last HD 3/1 via Left Arm AVF; HD TTS), Right AKA initially presented to [facility] 2/27 for respiratory distress after a dialysis, found to be septic, h/c complicated by AHRF requiring intubation for 24hr followed by VT arrest, h/c the complicated by massive LGIB (rectal ulcer) requiring 7u pRBC, 1u FFP and 1u PLT. Transferred to [facility] for ICD placement 2/2 Vtach and cardiac cath. Medicine consulted for comanagement.     Recommend:    #Fever of Unknown Origin  - Spiked fever of 101.6 on return from HD 3/21  - Unclear source of infection  - Removed right forearm IV because was in place from outside hospital  - Abd U/S with no acute pathology  - CXR without infiltrate  - UA without UTI  - F/u BCx, so far NGTD  - PET scan showing increased uptake in prostate suggesting prostatitis vs. neoplasm  - F/u CT A/P showing probable cystitis  - Ceftriaxone 2g daily per ID 2 week course (end date 4/4)    #LGIB  #SANTOS  #Solitary ulcer syndrome  - hx coffee ground emesis @ [facility]; s/p EGD/colonoscopy showing rectal ulcer  - 3/17AM pt passed loose BM along with large amount of BRBPR  - per gen surgery no acute surgical intervention   - GI following, no endoscopy indicated  - Active T&S  - Transfuse for hgb <8 (active CAD)    #H/O ventricular tachycardia   - Vtach arrest @[facility]  - EP consult for ICD    #ESRD on dialysis  - for dialysis today  - dialysis Tues, Thurs, Sat  - last received 3/16  - f/u nephro recs  - Electrolytes wnl, k 4.3, phos 1.9  - c/w calcium citrate 667mg TID    #S/P kidney transplant.   - per [place] pt takes Tacrolimus 2mg 2 times /day  - Obtain collateral regarding renal transplant, if indeed failed, what indications patient have for c/w tacrolimus 2mg?  - f/u tacrolimus serum levels  - f/u nephro recs    #DM  - no meds per [place], obtain collaterals for med recs prior [place] stay  - mISS while inpt  - A1c 5.9 on admission

## 2023-04-01 DIAGNOSIS — I46.9 CARDIAC ARREST, CAUSE UNSPECIFIED: ICD-10-CM

## 2023-04-01 LAB
ALBUMIN SERPL ELPH-MCNC: 2.2 G/DL — LOW (ref 3.3–5)
ALP SERPL-CCNC: 142 U/L — HIGH (ref 40–120)
ALT FLD-CCNC: <5 U/L — LOW (ref 10–45)
ANION GAP SERPL CALC-SCNC: 8 MMOL/L — SIGNIFICANT CHANGE UP (ref 5–17)
AST SERPL-CCNC: 19 U/L — SIGNIFICANT CHANGE UP (ref 10–40)
BASOPHILS # BLD AUTO: 0.06 K/UL — SIGNIFICANT CHANGE UP (ref 0–0.2)
BASOPHILS NFR BLD AUTO: 1.2 % — SIGNIFICANT CHANGE UP (ref 0–2)
BILIRUB SERPL-MCNC: 0.4 MG/DL — SIGNIFICANT CHANGE UP (ref 0.2–1.2)
BUN SERPL-MCNC: 4 MG/DL — LOW (ref 7–23)
CALCIUM SERPL-MCNC: 7.9 MG/DL — LOW (ref 8.4–10.5)
CHLORIDE SERPL-SCNC: 99 MMOL/L — SIGNIFICANT CHANGE UP (ref 96–108)
CO2 SERPL-SCNC: 27 MMOL/L — SIGNIFICANT CHANGE UP (ref 22–31)
CREAT SERPL-MCNC: 4.59 MG/DL — HIGH (ref 0.5–1.3)
CULTURE RESULTS: NO GROWTH — SIGNIFICANT CHANGE UP
EGFR: 14 ML/MIN/1.73M2 — LOW
EOSINOPHIL # BLD AUTO: 0.35 K/UL — SIGNIFICANT CHANGE UP (ref 0–0.5)
EOSINOPHIL NFR BLD AUTO: 6.9 % — HIGH (ref 0–6)
GLUCOSE BLDC GLUCOMTR-MCNC: 108 MG/DL — HIGH (ref 70–99)
GLUCOSE BLDC GLUCOMTR-MCNC: 83 MG/DL — SIGNIFICANT CHANGE UP (ref 70–99)
GLUCOSE BLDC GLUCOMTR-MCNC: 86 MG/DL — SIGNIFICANT CHANGE UP (ref 70–99)
GLUCOSE BLDC GLUCOMTR-MCNC: 99 MG/DL — SIGNIFICANT CHANGE UP (ref 70–99)
GLUCOSE SERPL-MCNC: 76 MG/DL — SIGNIFICANT CHANGE UP (ref 70–99)
HCT VFR BLD CALC: 29.3 % — LOW (ref 39–50)
HGB BLD-MCNC: 9 G/DL — LOW (ref 13–17)
IMM GRANULOCYTES NFR BLD AUTO: 0.8 % — SIGNIFICANT CHANGE UP (ref 0–0.9)
LYMPHOCYTES # BLD AUTO: 1.44 K/UL — SIGNIFICANT CHANGE UP (ref 1–3.3)
LYMPHOCYTES # BLD AUTO: 28.3 % — SIGNIFICANT CHANGE UP (ref 13–44)
MAGNESIUM SERPL-MCNC: 1.7 MG/DL — SIGNIFICANT CHANGE UP (ref 1.6–2.6)
MCHC RBC-ENTMCNC: 28.2 PG — SIGNIFICANT CHANGE UP (ref 27–34)
MCHC RBC-ENTMCNC: 30.7 GM/DL — LOW (ref 32–36)
MCV RBC AUTO: 91.8 FL — SIGNIFICANT CHANGE UP (ref 80–100)
MONOCYTES # BLD AUTO: 0.7 K/UL — SIGNIFICANT CHANGE UP (ref 0–0.9)
MONOCYTES NFR BLD AUTO: 13.8 % — SIGNIFICANT CHANGE UP (ref 2–14)
NEUTROPHILS # BLD AUTO: 2.5 K/UL — SIGNIFICANT CHANGE UP (ref 1.8–7.4)
NEUTROPHILS NFR BLD AUTO: 49 % — SIGNIFICANT CHANGE UP (ref 43–77)
NRBC # BLD: 0 /100 WBCS — SIGNIFICANT CHANGE UP (ref 0–0)
PHOSPHATE SERPL-MCNC: 3.1 MG/DL — SIGNIFICANT CHANGE UP (ref 2.5–4.5)
PLATELET # BLD AUTO: 196 K/UL — SIGNIFICANT CHANGE UP (ref 150–400)
POTASSIUM SERPL-MCNC: 3.9 MMOL/L — SIGNIFICANT CHANGE UP (ref 3.5–5.3)
POTASSIUM SERPL-SCNC: 3.9 MMOL/L — SIGNIFICANT CHANGE UP (ref 3.5–5.3)
PROT SERPL-MCNC: 4.6 G/DL — LOW (ref 6–8.3)
RBC # BLD: 3.19 M/UL — LOW (ref 4.2–5.8)
RBC # FLD: 15.5 % — HIGH (ref 10.3–14.5)
SODIUM SERPL-SCNC: 134 MMOL/L — LOW (ref 135–145)
SPECIMEN SOURCE: SIGNIFICANT CHANGE UP
WBC # BLD: 5.09 K/UL — SIGNIFICANT CHANGE UP (ref 3.8–10.5)
WBC # FLD AUTO: 5.09 K/UL — SIGNIFICANT CHANGE UP (ref 3.8–10.5)

## 2023-04-01 PROCEDURE — 99232 SBSQ HOSP IP/OBS MODERATE 35: CPT

## 2023-04-01 PROCEDURE — 99233 SBSQ HOSP IP/OBS HIGH 50: CPT

## 2023-04-01 RX ORDER — ERYTHROPOIETIN 10000 [IU]/ML
6000 INJECTION, SOLUTION INTRAVENOUS; SUBCUTANEOUS ONCE
Refills: 0 | Status: COMPLETED | OUTPATIENT
Start: 2023-04-01 | End: 2023-04-01

## 2023-04-01 RX ADMIN — ATORVASTATIN CALCIUM 40 MILLIGRAM(S): 80 TABLET, FILM COATED ORAL at 22:21

## 2023-04-01 RX ADMIN — CLOPIDOGREL BISULFATE 75 MILLIGRAM(S): 75 TABLET, FILM COATED ORAL at 19:37

## 2023-04-01 RX ADMIN — ISOSORBIDE DINITRATE 20 MILLIGRAM(S): 5 TABLET ORAL at 05:54

## 2023-04-01 RX ADMIN — LOSARTAN POTASSIUM 100 MILLIGRAM(S): 100 TABLET, FILM COATED ORAL at 19:37

## 2023-04-01 RX ADMIN — TACROLIMUS 2 MILLIGRAM(S): 5 CAPSULE ORAL at 05:53

## 2023-04-01 RX ADMIN — CEFTRIAXONE 100 MILLIGRAM(S): 500 INJECTION, POWDER, FOR SOLUTION INTRAMUSCULAR; INTRAVENOUS at 22:21

## 2023-04-01 RX ADMIN — Medication 25 MILLIGRAM(S): at 05:54

## 2023-04-01 RX ADMIN — PANTOPRAZOLE SODIUM 40 MILLIGRAM(S): 20 TABLET, DELAYED RELEASE ORAL at 05:54

## 2023-04-01 RX ADMIN — Medication 81 MILLIGRAM(S): at 19:37

## 2023-04-01 RX ADMIN — ISOSORBIDE DINITRATE 20 MILLIGRAM(S): 5 TABLET ORAL at 19:37

## 2023-04-01 RX ADMIN — Medication 50 MILLIGRAM(S): at 19:37

## 2023-04-01 RX ADMIN — CHLORHEXIDINE GLUCONATE 1 APPLICATION(S): 213 SOLUTION TOPICAL at 13:09

## 2023-04-01 RX ADMIN — PANTOPRAZOLE SODIUM 40 MILLIGRAM(S): 20 TABLET, DELAYED RELEASE ORAL at 19:40

## 2023-04-01 RX ADMIN — ISOSORBIDE DINITRATE 20 MILLIGRAM(S): 5 TABLET ORAL at 22:22

## 2023-04-01 RX ADMIN — TACROLIMUS 2 MILLIGRAM(S): 5 CAPSULE ORAL at 19:37

## 2023-04-01 RX ADMIN — TAMSULOSIN HYDROCHLORIDE 0.8 MILLIGRAM(S): 0.4 CAPSULE ORAL at 22:21

## 2023-04-01 RX ADMIN — Medication 50 MILLIGRAM(S): at 05:54

## 2023-04-01 RX ADMIN — Medication 325 MILLIGRAM(S): at 19:37

## 2023-04-01 RX ADMIN — ERYTHROPOIETIN 6000 UNIT(S): 10000 INJECTION, SOLUTION INTRAVENOUS; SUBCUTANEOUS at 16:39

## 2023-04-01 NOTE — PROGRESS NOTE ADULT - PROBLEM SELECTOR PLAN 12
- reduced by urology 03/29/23             F: Oral intake  E: Replete electrolytes as needed for K<4 and Mg<2  N: DASH Diet    DVT PPX: holding AC; SCD  Dispo: pending infectious work up/ echo prior to C w/ subsequent ICD - reduced by urology 03/29/23             F: Oral intake  E: Replete electrolytes as needed for K<4 and Mg<2  N: DASH Diet    DVT PPX: holding AC; SCD  Dispo: pending infectious work up/ echo prior to C w/ subsequent ICD  Case discussed with Dr. Noland

## 2023-04-01 NOTE — PROGRESS NOTE ADULT - SUBJECTIVE AND OBJECTIVE BOX
Seen on HD tolerating procedure well, no acute distress. Continue HD as prescribed       Meds:    acetaminophen     Tablet .. 650 every 6 hours PRN  aspirin enteric coated 81 daily  atorvastatin 40 at bedtime  cefTRIAXone   IVPB 2000 every 24 hours  chlorhexidine 2% Cloths 1 daily  clopidogrel Tablet 75 daily  dextrose 5%. 1000 <Continuous>  dextrose 5%. 1000 <Continuous>  dextrose 50% Injectable 25 once  dextrose 50% Injectable 12.5 once  dextrose Oral Gel 15 once PRN  epoetin janae-epbx (RETACRIT) Injectable 6000 once  ferrous    sulfate 325 daily  hydrALAZINE 50 every 8 hours  insulin lispro (ADMELOG) corrective regimen sliding scale  Before meals and at bedtime  isosorbide   dinitrate Tablet (ISORDIL) 20 three times a day  losartan 100 every 24 hours  metoprolol succinate ER 25 daily  pantoprazole  Injectable 40 every 12 hours  sodium chloride 0.65% Nasal 1 every 4 hours PRN  tacrolimus 2 every 12 hours  tamsulosin 0.8 at bedtime      T(C): , Max: 37.6 (04-01-23 @ 05:22)  T(F): , Max: 99.7 (04-01-23 @ 05:22)  HR: 53 (04-01-23 @ 14:20)  BP: 156/72 (04-01-23 @ 14:20)  BP(mean): 81 (04-01-23 @ 08:30)  RR: 18 (04-01-23 @ 14:20)  SpO2: 99% (04-01-23 @ 14:20)  Wt(kg): --    03-31 @ 07:01  -  04-01 @ 07:00  --------------------------------------------------------  IN: 480 mL / OUT: 275 mL / NET: 205 mL    04-01 @ 07:01  -  04-01 @ 16:25  --------------------------------------------------------  IN: 180 mL / OUT: 75 mL / NET: 105 mL          Review of Systems:  ROS negative except as per HPI      PHYSICAL EXAM:  GENERAL: well-developed, well nourished, alert, no acute distress at present tolerating HD   CHEST/LUNG: Clear to auscultation bilaterally  HEART: normal S1S2, RRR  ABDOMEN: Soft, Nontender, +BS, No flank tenderness bilateral  EXTREMITIES: No clubbing, cyanosis, or edema, Rt AKA  ACCESS: LUE AVF w/ thrill and bruit, accessed       LABS:                        9.0    5.09  )-----------( 196      ( 01 Apr 2023 05:30 )             29.3     04-01    134<L>  |  99  |  4<L>  ----------------------------<  76  3.9   |  27  |  4.59<H>    Ca    7.9<L>      01 Apr 2023 08:25  Phos  3.1     04-01  Mg     1.7     04-01    TPro  4.6<L>  /  Alb  2.2<L>  /  TBili  0.4  /  DBili  x   /  AST  19  /  ALT  <5<L>  /  AlkPhos  142<H>  04-01                RADIOLOGY & ADDITIONAL STUDIES:

## 2023-04-01 NOTE — PROGRESS NOTE ADULT - ATTENDING COMMENTS
Chart reviewed. Labs appreciated. Pt seen at bedside during renal rounds.  Case d/w Dr. Goins at length.  Agree with details of note above as well as A/P.   HD per schedule as outlined above. Renal service will continue to followl.

## 2023-04-01 NOTE — PROGRESS NOTE ADULT - ASSESSMENT
62y M PMHx HTN, HLD, DM, ICM (s/p cath years ago, RCA 99%, never intervened on), HFrEF 25-30%, ESRD s/p failed kidney transplant (LUE AVF; HD TTS), R AKA admitted to [facility] 2/27 for respiratory distress and SIRS criteria after HD session, requiring intubation. Pt w/ cardiac arrest, vtach arrest requiring amio and pressors. Extubated 3/2 and course c/b by LGIB with hemoglobin of 3.5 requiring multiple transfusions; EGD/colo and CTA w/o evidence of active bleed and Hgb improved to 11's. Transferred to [facility] 03/17/23 for ICD eval 2/2 Vtach and cardiac cath however patient w/ episode of BRBPR, flex sig showing localized rectal ulcer but no active bleeding; DAPT was resumed on 03/20/23.  Patient now with ongoing fevers tmax 102 with constitutional symptoms with infectious work up remarkable for UTI initially covered with broad spectrum now narrowed down to IV ctx (will ultimately need to be treated through 4/4/23). Patient now awaiting echocardiogram to assess for EF prior to cardiac cath. Once patient undergoes cardiac cath, ultimate plan for SUBQ ICD with EP.      62y M PMHx HTN, HLD, DM, ICM (s/p cath years ago, RCA 99%, never intervened on), HFrEF 25-30%, ESRD s/p failed kidney transplant (LUE AVF; HD TTS), R AKA admitted to [facility] 2/27 for respiratory distress and SIRS criteria after HD session, requiring intubation. Pt w/ cardiac arrest, vtach arrest requiring amio and pressors. Extubated 3/2 and course c/b by LGIB with hemoglobin of 3.5 requiring multiple transfusions; EGD/colo and CTA w/o evidence of active bleed and Hgb improved to 11's. Transferred to [facility] 03/17/23 for ICD eval 2/2 Vtach and cardiac cath however patient w/ episode of BRBPR, flex sig showing localized rectal ulcer but no active bleeding; DAPT was resumed on 03/20/23.  Patient now with ongoing fevers tmax 102 with constitutional symptoms with infectious work up remarkable for UTI initially covered with broad spectrum now narrowed down to IV ctx (will ultimately need to be treated through 4/4/23). Patient now awaiting echocardiogram to assess for EF prior to cardiac cath. Once patient undergoes cardiac cath, ultimate plan for SUBQ ICD with EP.      62y M PMHx HTN, HLD, DM, ICM (s/p cath years ago, RCA 99%, never intervened on), HFrEF 25-30%, ESRD s/p failed kidney transplant (LUE AVF; HD TTS), R AKA admitted to [facility] 2/27 for respiratory distress and SIRS criteria after HD session, requiring intubation. Pt w/ cardiac arrest, vtach arrest requiring amio and pressors. Extubated 3/2 and course c/b by LGIB with hemoglobin of 3.5 requiring multiple transfusions; EGD/colo and CTA w/o evidence of active bleed and Hgb improved to 11's. Transferred to [facility] 03/17/23 for ICD eval 2/2 Vtach and cardiac cath however patient w/ episode of BRBPR, flex sig showing localized rectal ulcer but no active bleeding; DAPT was resumed on 03/20/23.  Patient now with ongoing fevers tmax 102 with constitutional symptoms with infectious work up remarkable for UTI initially covered with broad spectrum now narrowed down to IV ctx (will ultimately need to be treated through 4/4/23). Patient now awaiting echocardiogram to assess for EF prior to cardiac cath. Once patient undergoes cardiac cath, ultimate plan for SUBQ ICD with EP.      62y M PMHx HTN, HLD, DM, ICM (s/p cath years ago, RCA 99%, never intervened on), HFrEF 25-30%, ESRD s/p failed kidney transplant (LUE AVF; HD TTS), R AKA admitted to [facility] 2/27 for respiratory distress and SIRS criteria after HD session, requiring intubation. Pt w/ cardiac arrest, vtach arrest requiring amio and pressors. Extubated 3/2 and course c/b by LGIB with hemoglobin of 3.5 requiring multiple transfusions; EGD/colo and CTA w/o evidence of active bleed and Hgb improved to 11's. Transferred to [facility] 03/17/23 for ICD eval 2/2 Vtach and cardiac cath however patient w/ episode of BRBPR, flex sig showing localized rectal ulcer but no active bleeding; DAPT was resumed on 03/20/23.  Hospital course complicated by ongoing fevers tmax 102 with constitutional symptoms with infectious work up remarkable for UTI initially covered with broad spectrum now narrowed down to IV ctx (will ultimately need to be treated through 4/4/23). Patient now awaiting echocardiogram to assess for EF prior to cardiac cath to verify EF. Additionally, urology following for possible prostate cancer workup. Pt may require biopsy, (timing to be determined). IC consulted about cath - plan is for patient to have full infectious workup prior to [facility]. Once patient undergoes cardiac cath, ultimate plan for SUBQ ICD with EP.      62y M PMHx HTN, HLD, DM, ICM (s/p cath years ago, RCA 99%, never intervened on), HFrEF 25-30%, ESRD s/p failed kidney transplant (LUE AVF; HD TTS), R AKA admitted to [facility] 2/27 for respiratory distress and SIRS criteria after HD session, requiring intubation. Pt w/ cardiac arrest, vtach arrest requiring amio and pressors. Extubated 3/2 and course c/b by LGIB with hemoglobin of 3.5 requiring multiple transfusions; EGD/colo and CTA w/o evidence of active bleed and Hgb improved to 11's. Transferred to [facility] 03/17/23 for ICD eval 2/2 Vtach and cardiac cath however patient w/ episode of BRBPR, flex sig showing localized rectal ulcer but no active bleeding; DAPT was resumed on 03/20/23.  Hospital course complicated by ongoing fevers tmax 102 with constitutional symptoms with infectious work up remarkable for UTI initially covered with broad spectrum now narrowed down to IV ctx (will ultimately need to be treated through 4/4/23). Patient now awaiting echocardiogram to assess for EF prior to cardiac cath to verify EF. Additionally, urology following for possible prostate cancer workup. Pt may require biopsy, (timing to be determined). IC consulted about cath - plan is for patient to have full infectious workup prior to [facility]. Once patient undergoes cardiac cath, ultimate plan for SUBQ ICD with EP.      62y M PMHx HTN, HLD, DM, ICM (s/p cath years ago, RCA 99%, never intervened on), HFrEF 25-30%, ESRD s/p failed kidney transplant (LUE AVF; HD TTS), R AKA admitted to [facility] 2/27 for respiratory distress and SIRS criteria after HD session, requiring intubation. Pt w/ cardiac arrest, vtach arrest requiring amio and pressors. Extubated 3/2 and course c/b by LGIB with hemoglobin of 3.5 requiring multiple transfusions; EGD/colo and CTA w/o evidence of active bleed and Hgb improved to 11's. Transferred to [facility] 03/17/23 for ICD eval 2/2 Vtach and cardiac cath however patient w/ episode of BRBPR, flex sig showing localized rectal ulcer but no active bleeding; DAPT was resumed on 03/20/23.  Hospital course complicated by ongoing fevers tmax 102 with constitutional symptoms with infectious work up remarkable for UTI initially covered with broad spectrum now narrowed down to IV ctx (will ultimately need to be treated through 4/4/23). Patient now awaiting echocardiogram to assess for EF prior to cardiac cath to verify EF. Additionally, urology following for possible prostate cancer workup. Pt may require biopsy, (timing to be determined). IC consulted about cath - plan is for patient to have full infectious workup prior to [facility]. Once patient undergoes cardiac cath, ultimate plan for SUBQ ICD with EP.

## 2023-04-01 NOTE — PROGRESS NOTE ADULT - SUBJECTIVE AND OBJECTIVE BOX
Interventional Cardiology PA Adult Progress Note    C.C.:     Subjective Assessment:      ROS Negative except as per Subjective and HPI  	  MEDICATIONS:  hydrALAZINE 50 milliGRAM(s) Oral every 8 hours  isosorbide   dinitrate Tablet (ISORDIL) 20 milliGRAM(s) Oral three times a day  losartan 100 milliGRAM(s) Oral every 24 hours  metoprolol succinate ER 25 milliGRAM(s) Oral daily    cefTRIAXone   IVPB 2000 milliGRAM(s) IV Intermittent every 24 hours      acetaminophen     Tablet .. 650 milliGRAM(s) Oral every 6 hours PRN    pantoprazole  Injectable 40 milliGRAM(s) IV Push every 12 hours    atorvastatin 40 milliGRAM(s) Oral at bedtime  dextrose 50% Injectable 25 Gram(s) IV Push once  dextrose 50% Injectable 12.5 Gram(s) IV Push once  dextrose Oral Gel 15 Gram(s) Oral once PRN  insulin lispro (ADMELOG) corrective regimen sliding scale   SubCutaneous Before meals and at bedtime    aspirin enteric coated 81 milliGRAM(s) Oral daily  chlorhexidine 2% Cloths 1 Application(s) Topical daily  clopidogrel Tablet 75 milliGRAM(s) Oral daily  dextrose 5%. 1000 milliLiter(s) IV Continuous <Continuous>  dextrose 5%. 1000 milliLiter(s) IV Continuous <Continuous>  epoetin janae-epbx (RETACRIT) Injectable 6000 Unit(s) IV Push once  ferrous    sulfate 325 milliGRAM(s) Oral daily  sodium chloride 0.65% Nasal 1 Spray(s) Both Nostrils every 4 hours PRN  tacrolimus 2 milliGRAM(s) Oral every 12 hours  tamsulosin 0.8 milliGRAM(s) Oral at bedtime      	    [PHYSICAL EXAM:  TELEMETRY:  T(C): 37.6 (04-01-23 @ 05:22), Max: 37.6 (04-01-23 @ 05:22)  HR: 54 (04-01-23 @ 05:24) (54 - 58)  BP: 152/90 (04-01-23 @ 05:24) (114/50 - 166/75)  RR: 18 (04-01-23 @ 05:24) (18 - 18)  SpO2: 98% (04-01-23 @ 05:24) (97% - 98%)  Wt(kg): --  I&O's Summary    31 Mar 2023 07:01  -  01 Apr 2023 07:00  --------------------------------------------------------  IN: 480 mL / OUT: 275 mL / NET: 205 mL        Moody:  Central/PICC/Mid Line:                                         Appearance: Normal	  HEENT:   Normal oral mucosa, PERRL, EOMI	  Neck: Supple, + JVD/ - JVD; Carotid Bruit   Cardiovascular: Normal S1 S2, No JVD, No murmurs,   Respiratory: Lungs clear to auscultation/Decreased Breath Sounds/No Rales, Rhonchi, Wheezing	  Gastrointestinal:  Soft, Non-tender, + BS	  Skin: No rashes, No ecchymoses, No cyanosis  Extremities: Normal range of motion, No clubbing, cyanosis or edema  Vascular: Peripheral pulses palpable 2+ bilaterally  Neurologic: Non-focal  Psychiatry: A & O x 3, Mood & affect appropriate      	    ECG:  	  RADIOLOGY:   DIAGNOSTIC TESTING:  [ ] Echocardiogram:  [ ]  Catheterization:  [ ] Stress Test:    [ ] JOSY  OTHER: 	    LABS:	 	  CARDIAC MARKERS:                                  10.0   4.38  )-----------( 188      ( 31 Mar 2023 08:16 )             32.3     04-01    134<L>  |  99  |  4<L>  ----------------------------<  76  3.9   |  27  |  4.59<H>    Ca    7.9<L>      01 Apr 2023 08:25  Phos  3.1     04-01  Mg     1.7     04-01    TPro  4.6<L>  /  Alb  2.2<L>  /  TBili  0.4  /  DBili  x   /  AST  19  /  ALT  <5<L>  /  AlkPhos  142<H>  04-01    proBNP:   Lipid Profile:   HgA1c:   TSH:       ASSESSMENT/PLAN: 	        DVT ppx:  Dispo:     Interventional Cardiology PA Adult Progress Note    Subjective Assessment: Patient seen and examined at bedside. Patient w/ blanket over his head, not willing to participate in interview. Patient denies current complaints.     ROS Negative except as per Subjective and HPI  	  MEDICATIONS:  hydrALAZINE 50 milliGRAM(s) Oral every 8 hours  isosorbide   dinitrate Tablet (ISORDIL) 20 milliGRAM(s) Oral three times a day  losartan 100 milliGRAM(s) Oral every 24 hours  metoprolol succinate ER 25 milliGRAM(s) Oral daily  cefTRIAXone   IVPB 2000 milliGRAM(s) IV Intermittent every 24 hours  acetaminophen     Tablet .. 650 milliGRAM(s) Oral every 6 hours PRN  pantoprazole  Injectable 40 milliGRAM(s) IV Push every 12 hours  atorvastatin 40 milliGRAM(s) Oral at bedtime  dextrose 50% Injectable 25 Gram(s) IV Push once  dextrose 50% Injectable 12.5 Gram(s) IV Push once  dextrose Oral Gel 15 Gram(s) Oral once PRN  insulin lispro (ADMELOG) corrective regimen sliding scale   SubCutaneous Before meals and at bedtime  aspirin enteric coated 81 milliGRAM(s) Oral daily  chlorhexidine 2% Cloths 1 Application(s) Topical daily  clopidogrel Tablet 75 milliGRAM(s) Oral daily  dextrose 5%. 1000 milliLiter(s) IV Continuous <Continuous>  dextrose 5%. 1000 milliLiter(s) IV Continuous <Continuous>  epoetin janae-epbx (RETACRIT) Injectable 6000 Unit(s) IV Push once  ferrous    sulfate 325 milliGRAM(s) Oral daily  sodium chloride 0.65% Nasal 1 Spray(s) Both Nostrils every 4 hours PRN  tacrolimus 2 milliGRAM(s) Oral every 12 hours  tamsulosin 0.8 milliGRAM(s) Oral at bedtime        [PHYSICAL EXAM:  TELEMETRY:  T(C): 37.6 (04-01-23 @ 05:22), Max: 37.6 (04-01-23 @ 05:22)  HR: 54 (04-01-23 @ 05:24) (54 - 58)  BP: 152/90 (04-01-23 @ 05:24) (114/50 - 166/75)  RR: 18 (04-01-23 @ 05:24) (18 - 18)  SpO2: 98% (04-01-23 @ 05:24) (97% - 98%)  Wt(kg): --  I&O's Summary    31 Mar 2023 07:01  -  01 Apr 2023 07:00  --------------------------------------------------------  IN: 480 mL / OUT: 275 mL / NET: 205 mL    Appearance: Normal, laying in bed, NAD   HEENT:   Normal oral mucosa, PERRL, EOMI	  Neck: Supple  - JVD; Carotid Bruit   Cardiovascular: Normal S1 S2, No JVD, No murmurs,   Respiratory: Lungs clear to auscultation  Gastrointestinal:  Soft, Non-tender, + BS	  Skin: No rashes, No ecchymoses, No cyanosis  Extremities: + R AKA   Vascular: Peripheral pulses palpable 2+ bilaterally  Neurologic: Non-focal  Psychiatry: A & O x 3, Mood & affect appropriate      	    ECG:  	  RADIOLOGY:   DIAGNOSTIC TESTING:  [x ] Echocardiogram: awaiting     OTHER: 	    LABS:	 	  CARDIAC MARKERS:                       10.0   4.38  )-----------( 188      ( 31 Mar 2023 08:16 )             32.3     04-01    134<L>  |  99  |  4<L>  ----------------------------<  76  3.9   |  27  |  4.59<H>    Ca    7.9<L>      01 Apr 2023 08:25  Phos  3.1     04-01  Mg     1.7     04-01    TPro  4.6<L>  /  Alb  2.2<L>  /  TBili  0.4  /  DBili  x   /  AST  19  /  ALT  <5<L>  /  AlkPhos  142<H>  04-01    Lipid Profile: Total cholesterol: 88, LDL: 12, HDL: 48  HgA1c: 5.9  TSH: 7.7

## 2023-04-01 NOTE — PROGRESS NOTE ADULT - PROBLEM SELECTOR PLAN 1
- Pt w/ recurring fevers despite IV ABx. Tmax 101 03/30/23,  infectious work up ongoing w source likely urinary vs prostatitis   - BCx NGTD; BK virus (-); UCx (+) for Klebsiella Pneumonia   - STOPPED Vancomycin and Meropenem 3/27/23  - PLAN: PET scan 3/25 - PET scan showing increased uptake in prostate suggesting prostatitis vs. neoplasm; attempt made to contact wife on 3/31/23 - unclear if patient has outpatient prostate workup   - CT Pelvis 3/28. significant for mild urothelial thickening and hyperenhancement suggestive of pyelitis.   No evidence of pyelonephritis or renal abscess. Probable cystitis.  - Urology consulted - rec to recheck PSA once patient finishes abx for infection, no acute urologic workup needed at this time; will most likely perform outpatient. Patient ultimately will need bx, so will confirm with LHC/DAPT situation   - CONT: Ceftriaxone 2000mg IV QD until April 4, 2023  - ID following - appreciate recs     #Diarrhea  --history of loose BMs, > 5 in 24 hours as of 3/31/23   - C-diff culture NEGATIVE Pt w/ recurring fevers despite IV ABx. Tmax 101 03/30/23,  infectious work up ongoing w source likely urinary vs prostatitis   - BCx NGTD; BK virus (-); UCx (+) for Klebsiella Pneumonia   - PLAN: PET scan 3/25 - PET scan showing increased uptake in prostate suggesting prostatitis vs. neoplasm; attempt made to contact wife on 3/31/23 - unclear if patient has outpatient prostate workup   - CT Pelvis 3/28. significant for mild urothelial thickening and hyperenhancement suggestive of pyelitis.   No evidence of pyelonephritis or renal abscess. Probable cystitis.  - Urology consulted - rec to recheck PSA once patient finishes abx for infection, no acute urologic workup needed at this time; will most likely perform outpatient. Patient ultimately will need bx, so will confirm with LHC/DAPT situation   - CONT: Ceftriaxone 2000mg IV QD until April 4, 2023  - ID following - appreciate recs     #Diarrhea  --history of loose BMs, > 5 in 24 hours as of 3/31/23   - C-diff culture NEGATIVE

## 2023-04-01 NOTE — PROGRESS NOTE ADULT - PROBLEM SELECTOR PLAN 3
s/p VTach arrest @ [facility]; no tele events noted.    - EP Consulted – plan for ICD placement once pt has LHC   - c/w toprol 25mg qd s/p VTach arrest @ [facility]; no tele events noted.    - EP Consulted – plan for ICD placement once pt has LHC   - c/w toprol 25mg qd s/p VTach arrest @ [facility]; no tele events noted.    - EP Consulted – plan for ICD placement once pt has LHC   - c/w toprol 25mg qd

## 2023-04-01 NOTE — PROGRESS NOTE ADULT - ASSESSMENT
62Y M w/ HTN, DM, ESRD s/p failed kidney transplant who was transferred to [facility] (3/17) from [place] for ICD placement and Cardiac Cath after prolonged admission c/b cardiac arrest and vtach arrest and hypovolemic shock 2/2 GI bleed. New fever 3/21, s/p NM PET/CT c/w prostatitis, on CTX per ID, BK virus negative. Pending repeat cath and ICD placement once infection cleared. Also w/ intermittent melena on 3/23, GI and surg following but no intervention planned    #Seen on HD    #ESRD on HD TTS  HD as prescribed   Hemodialysis Treatment.:     Schedule: Once, Modality: Hemodialysis, Access: Arteriovenous Fistula    Dialyzer: Optiflux [id number], Time: 210 Min    Blood Flow: 400 mL/Min , Dialysate Flow: 500 mL/Min, Dialysate Temp: 36.5, Tubinmm (Adult)    Target Fluid Removal: 2 Liters    Dialysate Electrolytes (mEq/L): Potassium 3, Calcium 2.5, Sodium 138, Bicarbonate 35    Additional Instructions: epo and hectorol w/ HD    Daily BMP   Continue with Tacro at home dose. Recommend pt follow up with outpatient nephrologist regarding tacro  Next HD       HTN:  UF with HD as tolerated   Intermittently hypotensive. Amlodipine dc'ed  Continue losartan 100mg, metoprolol 25mg ER, hydralazine 50mg TID. Hold meds for SBP<110    Anemia:  Hgb not at goal at 9.0  epo w/ HD  Defer iron replacement i/s/o active infection, so will not check iron profile at the moment  Transfusion per primary      MBD:  Calcium 7.9  Phos: 3.1  iPTH 431 (3/19)  On Hectorol 4mcg TIW as outpatient. Will continue  Check phos twice weekly. Goal <5.5. Can replete if needed to keep>3 62Y M w/ HTN, DM, ESRD s/p failed kidney transplant who was transferred to [facility] (3/17) from [place] for ICD placement and Cardiac Cath after prolonged admission c/b cardiac arrest and vtach arrest and hypovolemic shock 2/2 GI bleed. New fever 3/21, s/p NM PET/CT c/w prostatitis, on CTX per ID, BK virus negative. Pending repeat cath and ICD placement once infection cleared. Also w/ intermittent melena on 3/23, GI and surg following but no intervention planned    #Seen on HD    #ESRD on HD TTS  HD as prescribed   Hemodialysis Treatment.:     Schedule: Once, Modality: Hemodialysis, Access: Arteriovenous Fistula    Dialyzer: Optiflux [id number], Time: 210 Min    Blood Flow: 400 mL/Min , Dialysate Flow: 500 mL/Min, Dialysate Temp: 36.5, Tubinmm (Adult)    Target Fluid Removal: 2 Liters    Dialysate Electrolytes (mEq/L): Potassium 3, Calcium 2.5, Sodium 138, Bicarbonate 35    Additional Instructions: epo and hectorol w/ HD    Daily BMP   Continue with Tacro at home dose. Recommend pt follow up with outpatient nephrologist regarding tacro  Next HD       HTN:  UF with HD as tolerated   Intermittently hypotensive. Amlodipine dc'ed  Continue losartan 100mg, metoprolol 25mg ER, hydralazine 50mg TID. Hold meds for SBP<110    Anemia:  Hgb not at goal at 9.0  epo w/ HD  Defer iron replacement i/s/o active infection, so will not check iron profile at the moment  Transfusion per primary      MBD:  Calcium 7.9  Phos: 3.1  iPTH 431 (3/19)  On Hectorol 4mcg TIW as outpatient. Will continue  Check phos twice weekly. Goal <5.5. Can replete if needed to keep>3 62Y M w/ HTN, DM, ESRD s/p failed kidney transplant who was transferred to [facility] (3/17) from [place] for ICD placement and Cardiac Cath after prolonged admission c/b cardiac arrest and vtach arrest and hypovolemic shock 2/2 GI bleed. New fever 3/21, s/p NM PET/CT c/w prostatitis, on CTX per ID, BK virus negative. Pending repeat cath and ICD placement once infection cleared. Also w/ intermittent melena on 3/23, GI and surg following but no intervention planned    #Seen on HD    #ESRD on HD TTS  HD as prescribed   Hemodialysis Treatment.:     Schedule: Once, Modality: Hemodialysis, Access: Arteriovenous Fistula    Dialyzer: Optiflux [id number], Time: 210 Min    Blood Flow: 400 mL/Min , Dialysate Flow: 500 mL/Min, Dialysate Temp: 36.5, Tubinmm (Adult)    Target Fluid Removal: 2 Liters    Dialysate Electrolytes (mEq/L): Potassium 3, Calcium 2.5, Sodium 138, Bicarbonate 35    Additional Instructions: epo and hectorol w/ HD    Daily BMP   Continue with Tacro at home dose. Recommend pt follow up with outpatient nephrologist regarding tacro  Next HD       HTN:  UF with HD as tolerated   Intermittently hypotensive. Amlodipine dc'ed  Continue losartan 100mg, metoprolol 25mg ER, hydralazine 50mg TID. Hold meds for SBP<110    Anemia:  Hgb not at goal at 9.0  epo w/ HD  Defer iron replacement i/s/o active infection, so will not check iron profile at the moment  Transfusion per primary      MBD:  Calcium 7.9  Phos: 3.1  iPTH 431 (3/19)  On Hectorol 4mcg TIW as outpatient. Will continue  Check phos twice weekly. Goal <5.5. Can replete if needed to keep>3

## 2023-04-01 NOTE — PROGRESS NOTE ADULT - ASSESSMENT
63yo M PMHx HTN, HLD, DM, CAD(s/p cath years ago, RCA 99%, never intervened on), HFrEF 25-30%, ESRD s/p failed kidney transplant and required HD x 3yrs (last HD 3/16 via Left Arm AVF; HD TTS), Right AKA initially presented to [facility] 2/27 for respiratory distress after a dialysis, found to be septic, h/c complicated by AHRF requiring intubation for 24hr followed by VT arrest, h/c the complicated by massive LGIB (rectal ulcer seen on colonoscopy) requiring 7u pRBC, 1u FFP and 1u PLT. Transferred to [facility] (3/17) for ICD placement 2/2 Vtach and cardiac cath. Pt noted lassed loose stool with BRBPR, GI and surgery consulted. Flex Sigc ( 3/17) found prior hemoclip in the sigmoid colon, localized ulceration and erosive with no bleeding noted in rectum suggestive of solitary ulcer syndrome. Medicine consulted for comanagement.   pt seen with Dr. Snow    -fever -   #LGIB  #SANTOS  #Solitary ulcer syndrome  - hx coffee ground emesis @ [facility]; s/p EGD/colonoscopy showing rectal ulcer  - per gen surgery no acute surgical intervention   - per GI:      - increased Protonix 40 mg IVP BID      - s/p flex sig(3/17)  for rectal ulcers, found Evidence of prior hemoclip in the sigmoid colon. Localized ulceration and erosive with no bleeding were noted in the rectum, suggestive of solitary ulcer syndrome           - Avoid anal digitation and enemas           - High-fiber diet and optimize laxatives to avoid constipation using miralax and dulcolax           - No absolute GI contraindications to anticoag/antiplt therapy           - Repeat endoscopic evaluation recommended in 3 months  - Active T&S  - Hgb  stable-  - Transfuse for hgb <8 (active CAD)    #CAD   - Previous cath at [facility] showing oLM 30% and RCA 99% that is not amenable to intervention.  - TTE 3/1/23 at [facility]: mild LVH, EF 25-30%, severe global wall motion dysfunction, mildly dilated LA, RV mildly dilated, all leaflets mild calcified, mod pHTN  - Pt is cleared for cardiac cath    #H/O ventricular tachycardia   - Vtach arrest @[facility]  - fu EP consult for ICD placement- pending infection work up.    # Fever -ID concern is prostate abscess -  - PET/CT scan - no Lymphadenopathy to suggest lymphoproliferative disorder, activity in prostate and femur.  - given immunocompromised/hx Kidney transplant    - ID f/u appreciated, -rec appreciated- to complete iv abx thru 4/4/23  -  consult to evaluate for prostate Ca    fu-urine for BK virus  - CT pelvis w/wo IVC- result reviewed, BLADDER: Minimally distended, Moody catheter. Diffuse wall thickening and   perivesical fat stranding.- Prostate is enlarged. no sign of renal abscess.   - Start ceftriaxone 2 g IV q24h- concern prostate abscess -3/27)  - D/C vancomycin and meropenem   would need to rule out infection prior to icd placement.  - Left calf pain, negative for DVT    #ESRD on dialysis/ sp renal transplant.   - dialysis Tues, Thurs, Sat  -HD per renal.    #HTN  - c/w home meds. Amlodipine 10mg qd, Losartan 100mg qd, Hydralazine 50m TID, Isordil 20mg TID with holding parameter    #HLD   - c/w Lipitor 40mg qd    #DM  - no meds per [place], obtain collaterals for med recs prior [place] stay  - mISS while inpt, goal -180   - A1c 5.9 on admission    DVT PPX: SCDs for now iso GIB    Med consult team continues to follow with you.     61yo M PMHx HTN, HLD, DM, CAD(s/p cath years ago, RCA 99%, never intervened on), HFrEF 25-30%, ESRD s/p failed kidney transplant and required HD x 3yrs (last HD 3/16 via Left Arm AVF; HD TTS), Right AKA initially presented to [facility] 2/27 for respiratory distress after a dialysis, found to be septic, h/c complicated by AHRF requiring intubation for 24hr followed by VT arrest, h/c the complicated by massive LGIB (rectal ulcer seen on colonoscopy) requiring 7u pRBC, 1u FFP and 1u PLT. Transferred to [facility] (3/17) for ICD placement 2/2 Vtach and cardiac cath. Pt noted lassed loose stool with BRBPR, GI and surgery consulted. Flex Sigc ( 3/17) found prior hemoclip in the sigmoid colon, localized ulceration and erosive with no bleeding noted in rectum suggestive of solitary ulcer syndrome. Medicine consulted for comanagement.   pt seen with Dr. Snow    -fever -   #LGIB  #SANTOS  #Solitary ulcer syndrome  - hx coffee ground emesis @ [facility]; s/p EGD/colonoscopy showing rectal ulcer  - per gen surgery no acute surgical intervention   - per GI:      - increased Protonix 40 mg IVP BID      - s/p flex sig(3/17)  for rectal ulcers, found Evidence of prior hemoclip in the sigmoid colon. Localized ulceration and erosive with no bleeding were noted in the rectum, suggestive of solitary ulcer syndrome           - Avoid anal digitation and enemas           - High-fiber diet and optimize laxatives to avoid constipation using miralax and dulcolax           - No absolute GI contraindications to anticoag/antiplt therapy           - Repeat endoscopic evaluation recommended in 3 months  - Active T&S  - Hgb  stable-  - Transfuse for hgb <8 (active CAD)    #CAD   - Previous cath at [facility] showing oLM 30% and RCA 99% that is not amenable to intervention.  - TTE 3/1/23 at [facility]: mild LVH, EF 25-30%, severe global wall motion dysfunction, mildly dilated LA, RV mildly dilated, all leaflets mild calcified, mod pHTN  - Pt is cleared for cardiac cath    #H/O ventricular tachycardia   - Vtach arrest @[facility]  - fu EP consult for ICD placement- pending infection work up.    # Fever -ID concern is prostate abscess -  - PET/CT scan - no Lymphadenopathy to suggest lymphoproliferative disorder, activity in prostate and femur.  - given immunocompromised/hx Kidney transplant    - ID f/u appreciated, -rec appreciated- to complete iv abx thru 4/4/23  -  consult to evaluate for prostate Ca    fu-urine for BK virus  - CT pelvis w/wo IVC- result reviewed, BLADDER: Minimally distended, Moody catheter. Diffuse wall thickening and   perivesical fat stranding.- Prostate is enlarged. no sign of renal abscess.   - Start ceftriaxone 2 g IV q24h- concern prostate abscess -3/27)  - D/C vancomycin and meropenem   would need to rule out infection prior to icd placement.  - Left calf pain, negative for DVT    #ESRD on dialysis/ sp renal transplant.   - dialysis Tues, Thurs, Sat  -HD per renal.    #HTN  - c/w home meds. Amlodipine 10mg qd, Losartan 100mg qd, Hydralazine 50m TID, Isordil 20mg TID with holding parameter    #HLD   - c/w Lipitor 40mg qd    #DM  - no meds per [place], obtain collaterals for med recs prior [place] stay  - mISS while inpt, goal -180   - A1c 5.9 on admission    DVT PPX: SCDs for now iso GIB    Med consult team continues to follow with you.     61yo M PMHx HTN, HLD, DM, CAD(s/p cath years ago, RCA 99%, never intervened on), HFrEF 25-30%, ESRD s/p failed kidney transplant and required HD x 3yrs (last HD 3/16 via Left Arm AVF; HD TTS), Right AKA initially presented to [facility] 2/27 for respiratory distress after a dialysis, found to be septic, h/c complicated by AHRF requiring intubation for 24hr followed by VT arrest, h/c the complicated by massive LGIB (rectal ulcer seen on colonoscopy) requiring 7u pRBC, 1u FFP and 1u PLT. Transferred to [facility] (3/17) for ICD placement 2/2 Vtach and cardiac cath. Pt noted lassed loose stool with BRBPR, GI and surgery consulted. Flex Sigc ( 3/17) found prior hemoclip in the sigmoid colon, localized ulceration and erosive with no bleeding noted in rectum suggestive of solitary ulcer syndrome. Medicine consulted for comanagement.   pt seen with Dr. Snow    -fever -   #LGIB  #SANTOS  #Solitary ulcer syndrome  - hx coffee ground emesis @ [facility]; s/p EGD/colonoscopy showing rectal ulcer  - per gen surgery no acute surgical intervention   - per GI:      - increased Protonix 40 mg IVP BID      - s/p flex sig(3/17)  for rectal ulcers, found Evidence of prior hemoclip in the sigmoid colon. Localized ulceration and erosive with no bleeding were noted in the rectum, suggestive of solitary ulcer syndrome           - Avoid anal digitation and enemas           - High-fiber diet and optimize laxatives to avoid constipation using miralax and dulcolax           - No absolute GI contraindications to anticoag/antiplt therapy           - Repeat endoscopic evaluation recommended in 3 months  - Active T&S  - Hgb  stable-  - Transfuse for hgb <8 (active CAD)    #CAD   - Previous cath at [facility] showing oLM 30% and RCA 99% that is not amenable to intervention.  - TTE 3/1/23 at [facility]: mild LVH, EF 25-30%, severe global wall motion dysfunction, mildly dilated LA, RV mildly dilated, all leaflets mild calcified, mod pHTN  - Pt is cleared for cardiac cath    #H/O ventricular tachycardia   - Vtach arrest @[facility]  - fu EP consult for ICD placement- pending infection work up.    # Fever -ID concern is prostate abscess -  - PET/CT scan - no Lymphadenopathy to suggest lymphoproliferative disorder, activity in prostate and femur.  - given immunocompromised/hx Kidney transplant    - ID f/u appreciated, -rec appreciated- to complete iv abx thru 4/4/23  -  consult to evaluate for prostate Ca    fu-urine for BK virus  - CT pelvis w/wo IVC- result reviewed, BLADDER: Minimally distended, Moody catheter. Diffuse wall thickening and   perivesical fat stranding.- Prostate is enlarged. no sign of renal abscess.   - Start ceftriaxone 2 g IV q24h- concern prostate abscess -3/27)  - D/C vancomycin and meropenem   would need to rule out infection prior to icd placement.  - Left calf pain, negative for DVT    #ESRD on dialysis/ sp renal transplant.   - dialysis Tues, Thurs, Sat  -HD per renal.    #HTN  - c/w home meds. Amlodipine 10mg qd, Losartan 100mg qd, Hydralazine 50m TID, Isordil 20mg TID with holding parameter    #HLD   - c/w Lipitor 40mg qd    #DM  - no meds per [place], obtain collaterals for med recs prior [place] stay  - mISS while inpt, goal -180   - A1c 5.9 on admission    DVT PPX: SCDs for now iso GIB    Med consult team continues to follow with you.

## 2023-04-01 NOTE — PROGRESS NOTE ADULT - ATTENDING COMMENTS
61yo M PMHx HTN, HLD, DM, CAD(s/p cath years ago, RCA 99%, never intervened on), HFrEF 25-30%, ESRD s/p failed kidney transplant and required HD x 3yrs (last HD 3/16 via Left Arm AVF; HD TTS), Right AKA initially presented to [facility] 2/27 for respiratory distress after a dialysis, found to be septic, h/c complicated by AHRF requiring intubation for 24hr followed by VT arrest, h/c the complicated by massive LGIB (rectal ulcer seen on colonoscopy) requiring 7u pRBC, 1u FFP and 1u PLT. Transferred to [facility] (3/17) for ICD placement 2/2 Vtach and cardiac cath. Pt noted lassed loose stool with BRBPR, GI and surgery consulted. Flex Sigc ( 3/17) found prior hemoclip in the sigmoid colon, localized ulceration and erosive with no bleeding noted in rectum suggestive of solitary ulcer syndrome. Medicine consulted for comanagement.   pt seen with Dr. Snow    -fever -   #LGIB  #SANTOS  #Solitary ulcer syndrome  - hx coffee ground emesis @ [facility]; s/p EGD/colonoscopy showing rectal ulcer  - per gen surgery no acute surgical intervention   - per GI:      - increased Protonix 40 mg IVP BID      - s/p flex sig(3/17)  for rectal ulcers, found Evidence of prior hemoclip in the sigmoid colon. Localized ulceration and erosive with no bleeding were noted in the rectum, suggestive of solitary ulcer syndrome           - Avoid anal digitation and enemas           - High-fiber diet and optimize laxatives to avoid constipation using miralax and dulcolax           - No absolute GI contraindications to anticoag/antiplt therapy           - Repeat endoscopic evaluation recommended in 3 months  - Active T&S  - Hgb  stable-  - Transfuse for hgb <8 (active CAD)    #CAD   - Previous cath at [facility] showing oLM 30% and RCA 99% that is not amenable to intervention.  - TTE 3/1/23 at [facility]: mild LVH, EF 25-30%, severe global wall motion dysfunction, mildly dilated LA, RV mildly dilated, all leaflets mild calcified, mod pHTN  - Pt is cleared for cardiac cath    #H/O ventricular tachycardia   - Vtach arrest @[facility]  - fu EP consult for ICD placement- pending infection work up.    # Fever -ID concern is prostate abscess -  - PET/CT scan - no Lymphadenopathy to suggest lymphoproliferative disorder, activity in prostate and femur.  - given immunocompromised/hx Kidney transplant    - ID f/u appreciated, -rec appreciated- to complete iv abx thru 4/4/23  -  consult to evaluate for prostate Ca    fu-urine for BK virus  - CT pelvis w/wo IVC- result reviewed, BLADDER: Minimally distended, Moody catheter. Diffuse wall thickening and   perivesical fat stranding.- Prostate is enlarged. no sign of renal abscess.   - Start ceftriaxone 2 g IV q24h- concern prostate abscess -3/27)  - D/C vancomycin and meropenem   would need to rule out infection prior to icd placement.  - Left calf pain, negative for DVT    #ESRD on dialysis/ sp renal transplant.   - dialysis Tues, Thurs, Sat  -HD per renal.    #HTN  - c/w home meds. Amlodipine 10mg qd, Losartan 100mg qd, Hydralazine 50m TID, Isordil 20mg TID with holding parameter    #HLD   - c/w Lipitor 40mg qd    #DM  - no meds per [place], obtain collaterals for med recs prior [place] stay  - mISS while inpt, goal -180   - A1c 5.9 on admission    DVT PPX: SCDs for now iso GIB    Med consult team continues to follow with you. 63yo M PMHx HTN, HLD, DM, CAD(s/p cath years ago, RCA 99%, never intervened on), HFrEF 25-30%, ESRD s/p failed kidney transplant and required HD x 3yrs (last HD 3/16 via Left Arm AVF; HD TTS), Right AKA initially presented to [facility] 2/27 for respiratory distress after a dialysis, found to be septic, h/c complicated by AHRF requiring intubation for 24hr followed by VT arrest, h/c the complicated by massive LGIB (rectal ulcer seen on colonoscopy) requiring 7u pRBC, 1u FFP and 1u PLT. Transferred to [facility] (3/17) for ICD placement 2/2 Vtach and cardiac cath. Pt noted lassed loose stool with BRBPR, GI and surgery consulted. Flex Sigc ( 3/17) found prior hemoclip in the sigmoid colon, localized ulceration and erosive with no bleeding noted in rectum suggestive of solitary ulcer syndrome. Medicine consulted for comanagement.   pt seen with Dr. Snow    -fever -   #LGIB  #SANTOS  #Solitary ulcer syndrome  - hx coffee ground emesis @ [facility]; s/p EGD/colonoscopy showing rectal ulcer  - per gen surgery no acute surgical intervention   - per GI:      - increased Protonix 40 mg IVP BID      - s/p flex sig(3/17)  for rectal ulcers, found Evidence of prior hemoclip in the sigmoid colon. Localized ulceration and erosive with no bleeding were noted in the rectum, suggestive of solitary ulcer syndrome           - Avoid anal digitation and enemas           - High-fiber diet and optimize laxatives to avoid constipation using miralax and dulcolax           - No absolute GI contraindications to anticoag/antiplt therapy           - Repeat endoscopic evaluation recommended in 3 months  - Active T&S  - Hgb  stable-  - Transfuse for hgb <8 (active CAD)    #CAD   - Previous cath at [facility] showing oLM 30% and RCA 99% that is not amenable to intervention.  - TTE 3/1/23 at [facility]: mild LVH, EF 25-30%, severe global wall motion dysfunction, mildly dilated LA, RV mildly dilated, all leaflets mild calcified, mod pHTN  - Pt is cleared for cardiac cath    #H/O ventricular tachycardia   - Vtach arrest @[facility]  - fu EP consult for ICD placement- pending infection work up.    # Fever -ID concern is prostate abscess -  - PET/CT scan - no Lymphadenopathy to suggest lymphoproliferative disorder, activity in prostate and femur.  - given immunocompromised/hx Kidney transplant    - ID f/u appreciated, -rec appreciated- to complete iv abx thru 4/4/23  -  consult to evaluate for prostate Ca    fu-urine for BK virus  - CT pelvis w/wo IVC- result reviewed, BLADDER: Minimally distended, Moody catheter. Diffuse wall thickening and   perivesical fat stranding.- Prostate is enlarged. no sign of renal abscess.   - Start ceftriaxone 2 g IV q24h- concern prostate abscess -3/27)  - D/C vancomycin and meropenem   would need to rule out infection prior to icd placement.  - Left calf pain, negative for DVT    #ESRD on dialysis/ sp renal transplant.   - dialysis Tues, Thurs, Sat  -HD per renal.    #HTN  - c/w home meds. Amlodipine 10mg qd, Losartan 100mg qd, Hydralazine 50m TID, Isordil 20mg TID with holding parameter    #HLD   - c/w Lipitor 40mg qd    #DM  - no meds per [place], obtain collaterals for med recs prior [place] stay  - mISS while inpt, goal -180   - A1c 5.9 on admission    DVT PPX: SCDs for now iso GIB    Med consult team continues to follow with you. 63yo M PMHx HTN, HLD, DM, CAD(s/p cath years ago, RCA 99%, never intervened on), HFrEF 25-30%, ESRD s/p failed kidney transplant and required HD x 3yrs (last HD 3/16 via Left Arm AVF; HD TTS), Right AKA initially presented to [facility] 2/27 for respiratory distress after a dialysis, found to be septic, h/c complicated by AHRF requiring intubation for 24hr followed by VT arrest, h/c the complicated by massive LGIB (rectal ulcer seen on colonoscopy) requiring 7u pRBC, 1u FFP and 1u PLT. Transferred to [facility] (3/17) for ICD placement 2/2 Vtach and cardiac cath. Pt noted lassed loose stool with BRBPR, GI and surgery consulted. Flex Sigc ( 3/17) found prior hemoclip in the sigmoid colon, localized ulceration and erosive with no bleeding noted in rectum suggestive of solitary ulcer syndrome. Medicine consulted for comanagement.   pt seen with Dr. Snow    -fever -   #LGIB  #SANTOS  #Solitary ulcer syndrome  - hx coffee ground emesis @ [facility]; s/p EGD/colonoscopy showing rectal ulcer  - per gen surgery no acute surgical intervention   - per GI:      - increased Protonix 40 mg IVP BID      - s/p flex sig(3/17)  for rectal ulcers, found Evidence of prior hemoclip in the sigmoid colon. Localized ulceration and erosive with no bleeding were noted in the rectum, suggestive of solitary ulcer syndrome           - Avoid anal digitation and enemas           - High-fiber diet and optimize laxatives to avoid constipation using miralax and dulcolax           - No absolute GI contraindications to anticoag/antiplt therapy           - Repeat endoscopic evaluation recommended in 3 months  - Active T&S  - Hgb  stable-  - Transfuse for hgb <8 (active CAD)    #CAD   - Previous cath at [facility] showing oLM 30% and RCA 99% that is not amenable to intervention.  - TTE 3/1/23 at [facility]: mild LVH, EF 25-30%, severe global wall motion dysfunction, mildly dilated LA, RV mildly dilated, all leaflets mild calcified, mod pHTN  - Pt is cleared for cardiac cath    #H/O ventricular tachycardia   - Vtach arrest @[facility]  - fu EP consult for ICD placement- pending infection work up.    # Fever -ID concern is prostate abscess -  - PET/CT scan - no Lymphadenopathy to suggest lymphoproliferative disorder, activity in prostate and femur.  - given immunocompromised/hx Kidney transplant    - ID f/u appreciated, -rec appreciated- to complete iv abx thru 4/4/23  -  consult to evaluate for prostate Ca    fu-urine for BK virus  - CT pelvis w/wo IVC- result reviewed, BLADDER: Minimally distended, Moody catheter. Diffuse wall thickening and   perivesical fat stranding.- Prostate is enlarged. no sign of renal abscess.   - Start ceftriaxone 2 g IV q24h- concern prostate abscess -3/27)  - D/C vancomycin and meropenem   would need to rule out infection prior to icd placement.  - Left calf pain, negative for DVT    #ESRD on dialysis/ sp renal transplant.   - dialysis Tues, Thurs, Sat  -HD per renal.    #HTN  - c/w home meds. Amlodipine 10mg qd, Losartan 100mg qd, Hydralazine 50m TID, Isordil 20mg TID with holding parameter    #HLD   - c/w Lipitor 40mg qd    #DM  - no meds per [place], obtain collaterals for med recs prior [place] stay  - mISS while inpt, goal -180   - A1c 5.9 on admission    DVT PPX: SCDs for now iso GIB    Med consult team continues to follow with you.

## 2023-04-01 NOTE — PROGRESS NOTE ADULT - SUBJECTIVE AND OBJECTIVE BOX
Patient is a 62y old  Male who presents with a chief complaint of ICD evaluation (30 Mar 2023 08:07)    INTERVAL EVENTS: NAEON     SUBJECTIVE:  Patient was seen and examined at bedside. fair appetite/po intake, no complaints.     Review of systems: No fever, chills, dizziness, HA, Changes in vision, CP, dyspnea, nausea or vomiting, dysuria, changes in bowel movements, LE edema. Rest of 12 point Review of systems negative unless otherwise documented elsewhere in note.     Diet, DASH/TLC:   Sodium & Cholesterol Restricted (23 @ 14:08) [Pending Verification By Attending]  Diet, DASH/TLC:   Sodium & Cholesterol Restricted (23 @ 13:52) [Active]      MEDICATIONS:  MEDICATIONS  (STANDING):  aspirin enteric coated 81 milliGRAM(s) Oral daily  atorvastatin 40 milliGRAM(s) Oral at bedtime  cefTRIAXone   IVPB 2000 milliGRAM(s) IV Intermittent every 24 hours  chlorhexidine 2% Cloths 1 Application(s) Topical daily  clopidogrel Tablet 75 milliGRAM(s) Oral daily  dextrose 5%. 1000 milliLiter(s) (50 mL/Hr) IV Continuous <Continuous>  dextrose 5%. 1000 milliLiter(s) (100 mL/Hr) IV Continuous <Continuous>  dextrose 50% Injectable 25 Gram(s) IV Push once  dextrose 50% Injectable 12.5 Gram(s) IV Push once  epoetin janae-epbx (RETACRIT) Injectable 6000 Unit(s) IV Push once  ferrous    sulfate 325 milliGRAM(s) Oral daily  hydrALAZINE 50 milliGRAM(s) Oral every 8 hours  insulin lispro (ADMELOG) corrective regimen sliding scale   SubCutaneous Before meals and at bedtime  isosorbide   dinitrate Tablet (ISORDIL) 20 milliGRAM(s) Oral three times a day  losartan 100 milliGRAM(s) Oral every 24 hours  metoprolol succinate ER 25 milliGRAM(s) Oral daily  pantoprazole  Injectable 40 milliGRAM(s) IV Push every 12 hours  tacrolimus 2 milliGRAM(s) Oral every 12 hours  tamsulosin 0.8 milliGRAM(s) Oral at bedtime    MEDICATIONS  (PRN):  acetaminophen     Tablet .. 650 milliGRAM(s) Oral every 6 hours PRN Mild Pain (1 - 3), Moderate Pain (4 - 6)  dextrose Oral Gel 15 Gram(s) Oral once PRN Blood Glucose LESS THAN 70 milliGRAM(s)/deciliter  sodium chloride 0.65% Nasal 1 Spray(s) Both Nostrils every 4 hours PRN Nasal Congestion      Allergies    No Known Allergies    Intolerances        OBJECTIVE:  Vital Signs Last 24 Hrs  T(C): 37.6 (2023 05:22), Max: 37.6 (2023 05:22)  T(F): 99.7 (2023 05:22), Max: 99.7 (2023 05:22)  HR: 52 (2023 08:30) (52 - 58)  BP: 144/56 (2023 08:30) (114/50 - 166/75)  BP(mean): 81 (2023 08:30) (81 - 81)  RR: 18 (2023 08:30) (18 - 18)  SpO2: 96% (2023 08:30) (96% - 98%)    Parameters below as of 2023 08:30  Patient On (Oxygen Delivery Method): nasal cannula  O2 Flow (L/min): 2    I&O's Summary    31 Mar 2023 07:01  -  2023 07:00  --------------------------------------------------------  IN: 480 mL / OUT: 275 mL / NET: 205 mL        PHYSICAL EXAM:  Gen: Reclining in bed at time of exam, appears stated age  HEENT: NCAT, MMM, clear OP  Neck: supple, trachea at midline  CV: RRR, +S1/S2  Pulm: adequate respiratory effort, no increase in work of breathing  Abd: soft, NTND  Skin: warm and dry,   Ext: WWP, no LE edema  Neuro: AOx3, no gross focal neurological deficits  Psych: affect and behavior appropriate, pleasant at time of interview  :     LABS:                        10.0   4.38  )-----------( 188      ( 31 Mar 2023 08:16 )             32.3     04-01    134<L>  |  99  |  4<L>  ----------------------------<  76  3.9   |  27  |  4.59<H>    Ca    7.9<L>      2023 08:25  Phos  3.1     04-  Mg     1.7     04-    TPro  4.6<L>  /  Alb  2.2<L>  /  TBili  0.4  /  DBili  x   /  AST  19  /  ALT  <5<L>  /  AlkPhos  142<H>      LIVER FUNCTIONS - ( 2023 08:25 )  Alb: 2.2 g/dL / Pro: 4.6 g/dL / ALK PHOS: 142 U/L / ALT: <5 U/L / AST: 19 U/L / GGT: x             CAPILLARY BLOOD GLUCOSE      POCT Blood Glucose.: 83 mg/dL (2023 06:42)  POCT Blood Glucose.: 100 mg/dL (31 Mar 2023 22:01)  POCT Blood Glucose.: 77 mg/dL (31 Mar 2023 17:05)  POCT Blood Glucose.: 81 mg/dL (31 Mar 2023 11:52)    Urinalysis Basic - ( 30 Mar 2023 16:12 )    Color: Yellow / Appearance: Clear / S.020 / pH: x  Gluc: x / Ketone: Trace mg/dL  / Bili: Negative / Urobili: 0.2 E.U./dL   Blood: x / Protein: >=300 mg/dL / Nitrite: NEGATIVE   Leuk Esterase: NEGATIVE / RBC: 5-10 /HPF / WBC Many /HPF   Sq Epi: x / Non Sq Epi: 0-5 /HPF / Bacteria: Present /HPF        MICRODATA:    Culture - Urine (collected 30 Mar 2023 16:12)  Source: Catheterized None  Preliminary Report (31 Mar 2023 09:58):    No growth to date    Urinalysis with Rflx Culture (collected 30 Mar 2023 16:12)    Culture - Blood (collected 29 Mar 2023 19:52)  Source: .Blood Blood  Preliminary Report (31 Mar 2023 21:00):    No growth at 2 days.        RADIOLOGY/OTHER STUDIES:    PCP  Pharmacy:   Emergency contact:

## 2023-04-01 NOTE — PROGRESS NOTE ADULT - PROBLEM SELECTOR PLAN 2
LHC at [place] w/ oLM 30% and RCA 99% that is not amenable to intervention.    - TTE 3/1/23 @ [facility]:  mild LVH, LVEF 25-30%, severe global wall motion dysfxn, mild LA dilation, mild RV dilation, mildly calcified leaflets, mod pHTN  - Severe GIB at [facility] requiring multiple PRBC transfusions  - initially cleared by GI for DAPT and on ASA/Plavix 3/20/23.    - 3/23/23 pt w/ episode of black tarry stool (H/H stable and no hemodynamic signs of bleeding),  fecal occult negative and pt cleared by GI for DAPT and restarted 3/24/23  - Continue ASA 81mg, plavix 75mg qd  - PLAN: Repeat echo to assess EF; ultimately will need cardiac cath depending on EF to decide if high-risk LHC at [place] w/ oLM 30% and RCA 99% that is not amenable to intervention.    - TTE 3/1/23 @ [facility]:  mild LVH, LVEF 25-30%, severe global wall motion dysfxn, mild LA dilation, mild RV dilation, mildly calcified leaflets, mod pHTN  - Severe GIB at [facility] requiring multiple PRBC transfusions  - initially cleared by GI for DAPT and on ASA/Plavix 3/20/23.    - 3/23/23 pt w/ episode of black tarry stool (H/H stable and no hemodynamic signs of bleeding),  fecal occult negative and pt cleared by GI for DAPT and restarted 3/24/23  - Continue ASA 81mg, plavix 75mg qd  - PLAN: Repeat echo to assess EF; ultimately will need cardiac cath depending on EF to decide if high-risk LHC at [place] w/ oLM 30% and RCA 99% that is not amenable to intervention.    - TTE 3/1/23 @ [facility]:  mild LVH, LVEF 25-30%, severe global wall motion dysfxn, mild LA dilation, mild RV dilation, mildly calcified leaflets, mod pHTN  - Severe GIB at [facility] requiring multiple PRBC transfusions  - initially cleared by GI for DAPT and on ASA/Plavix 3/20/23.    - 3/23/23 pt w/ episode of black tarry stool (H/H stable and no hemodynamic signs of bleeding),  fecal occult negative and pt cleared by GI for DAPT and restarted 3/24/23  - Continue ASA 81mg, plavix 75mg qd  - PLAN: Repeat echo to assess EF; ultimately will need cardiac cath depending on EF to decide if high-risk

## 2023-04-01 NOTE — PROGRESS NOTE ADULT - PROBLEM SELECTOR PLAN 4
@ [facility], severe GIB w/ Hgb down to 3.5 received total of 7u PRBCs.    - 3/17 AM pt passed loose BM along w/ large amount of BRBPR.    - s/p Flex sigmoidoscopy – Localized ulceration and erosion w/ no bleeding in rectum, suggestive of solitary ulcer syndrome; evidence of prior hemoclip was found in sigmoid colon.    - Avoid anal digitation and enemas  - Fecal occult negative 3/24 and cleared by GI for DAPT  - Transfusion goal Hgb < 8.0 @ [facility], severe GIB w/ Hgb down to 3.5 received total of 7u PRBCs.    - 3/17 AM pt passed loose BM along w/ large amount of BRBPR.    - s/p Flex sigmoidoscopy – Localized ulceration and erosion w/ no bleeding in rectum, suggestive of solitary ulcer syndrome; evidence of prior hemoclip was found in sigmoid colon.    - Avoid anal digitation and enemas  - Fecal occult negative 3/24 and cleared by GI for DAPT  - Transfusion goal Hgb < 8.0 @ [facility], severe GIB w/ Hgb down to 3.5 received total of 7u PRBCs.    - 3/17 AM pt passed loose BM along w/ large amount of BRBPR.    - s/p Flex sigmoidoscopy – Localized ulceration and erosion w/ no bleeding in rectum, suggestive of solitary ulcer syndrome; evidence of prior hemoclip was found in sigmoid colon.    - Avoid anal digitation and enemas  - Fecal occult negative 3/24 and cleared by GI for DAPT  - Transfusion goal Hgb < 8.0

## 2023-04-01 NOTE — PROGRESS NOTE ADULT - NS ATTEND AMEND GEN_ALL_CORE FT
See PA note written above, for details. I reviewed the PA documentation.  I have personally seen and examined this patient today. I reviewed vitals, labs, medications, cardiac studies and additional imaging.  I agree with the PA's findings and plans as written above with the following additions/amendments:  Awaiting echocardiogram for EF assessment  ID recs, EP recs requested   Plan for LHC pending TTE results, decision for ICD pending TTE results  Nery Muro M.D.  Cardiology Attending  35 minutes spent on total encounter; more than 50% of the visit was spent counseling and/or coordinating care by the attending physician, with plan of care discussed with the patient and cardiac team.

## 2023-04-02 LAB
ALBUMIN SERPL ELPH-MCNC: 2.2 G/DL — LOW (ref 3.3–5)
ALP SERPL-CCNC: 130 U/L — HIGH (ref 40–120)
ALT FLD-CCNC: 5 U/L — LOW (ref 10–45)
ANION GAP SERPL CALC-SCNC: 9 MMOL/L — SIGNIFICANT CHANGE UP (ref 5–17)
AST SERPL-CCNC: 25 U/L — SIGNIFICANT CHANGE UP (ref 10–40)
BASOPHILS # BLD AUTO: 0.07 K/UL — SIGNIFICANT CHANGE UP (ref 0–0.2)
BASOPHILS NFR BLD AUTO: 1.5 % — SIGNIFICANT CHANGE UP (ref 0–2)
BILIRUB SERPL-MCNC: 0.3 MG/DL — SIGNIFICANT CHANGE UP (ref 0.2–1.2)
BUN SERPL-MCNC: 2 MG/DL — LOW (ref 7–23)
CALCIUM SERPL-MCNC: 7.6 MG/DL — LOW (ref 8.4–10.5)
CHLORIDE SERPL-SCNC: 99 MMOL/L — SIGNIFICANT CHANGE UP (ref 96–108)
CO2 SERPL-SCNC: 26 MMOL/L — SIGNIFICANT CHANGE UP (ref 22–31)
CREAT SERPL-MCNC: 2.98 MG/DL — HIGH (ref 0.5–1.3)
EGFR: 23 ML/MIN/1.73M2 — LOW
EOSINOPHIL # BLD AUTO: 0.33 K/UL — SIGNIFICANT CHANGE UP (ref 0–0.5)
EOSINOPHIL NFR BLD AUTO: 7.2 % — HIGH (ref 0–6)
GLUCOSE BLDC GLUCOMTR-MCNC: 79 MG/DL — SIGNIFICANT CHANGE UP (ref 70–99)
GLUCOSE BLDC GLUCOMTR-MCNC: 80 MG/DL — SIGNIFICANT CHANGE UP (ref 70–99)
GLUCOSE BLDC GLUCOMTR-MCNC: 84 MG/DL — SIGNIFICANT CHANGE UP (ref 70–99)
GLUCOSE BLDC GLUCOMTR-MCNC: 86 MG/DL — SIGNIFICANT CHANGE UP (ref 70–99)
GLUCOSE SERPL-MCNC: 75 MG/DL — SIGNIFICANT CHANGE UP (ref 70–99)
HCT VFR BLD CALC: 30.4 % — LOW (ref 39–50)
HGB BLD-MCNC: 9.2 G/DL — LOW (ref 13–17)
IMM GRANULOCYTES NFR BLD AUTO: 0.9 % — SIGNIFICANT CHANGE UP (ref 0–0.9)
LYMPHOCYTES # BLD AUTO: 1.31 K/UL — SIGNIFICANT CHANGE UP (ref 1–3.3)
LYMPHOCYTES # BLD AUTO: 28.4 % — SIGNIFICANT CHANGE UP (ref 13–44)
MAGNESIUM SERPL-MCNC: 1.8 MG/DL — SIGNIFICANT CHANGE UP (ref 1.6–2.6)
MCHC RBC-ENTMCNC: 28.2 PG — SIGNIFICANT CHANGE UP (ref 27–34)
MCHC RBC-ENTMCNC: 30.3 GM/DL — LOW (ref 32–36)
MCV RBC AUTO: 93.3 FL — SIGNIFICANT CHANGE UP (ref 80–100)
MONOCYTES # BLD AUTO: 0.72 K/UL — SIGNIFICANT CHANGE UP (ref 0–0.9)
MONOCYTES NFR BLD AUTO: 15.6 % — HIGH (ref 2–14)
NEUTROPHILS # BLD AUTO: 2.14 K/UL — SIGNIFICANT CHANGE UP (ref 1.8–7.4)
NEUTROPHILS NFR BLD AUTO: 46.4 % — SIGNIFICANT CHANGE UP (ref 43–77)
NRBC # BLD: 0 /100 WBCS — SIGNIFICANT CHANGE UP (ref 0–0)
PLATELET # BLD AUTO: 189 K/UL — SIGNIFICANT CHANGE UP (ref 150–400)
POTASSIUM SERPL-MCNC: 4.3 MMOL/L — SIGNIFICANT CHANGE UP (ref 3.5–5.3)
POTASSIUM SERPL-SCNC: 4.3 MMOL/L — SIGNIFICANT CHANGE UP (ref 3.5–5.3)
PROT SERPL-MCNC: 4.5 G/DL — LOW (ref 6–8.3)
RBC # BLD: 3.26 M/UL — LOW (ref 4.2–5.8)
RBC # FLD: 15.6 % — HIGH (ref 10.3–14.5)
SODIUM SERPL-SCNC: 134 MMOL/L — LOW (ref 135–145)
WBC # BLD: 4.61 K/UL — SIGNIFICANT CHANGE UP (ref 3.8–10.5)
WBC # FLD AUTO: 4.61 K/UL — SIGNIFICANT CHANGE UP (ref 3.8–10.5)

## 2023-04-02 PROCEDURE — 99232 SBSQ HOSP IP/OBS MODERATE 35: CPT

## 2023-04-02 PROCEDURE — 71045 X-RAY EXAM CHEST 1 VIEW: CPT | Mod: 26

## 2023-04-02 RX ADMIN — LOSARTAN POTASSIUM 100 MILLIGRAM(S): 100 TABLET, FILM COATED ORAL at 15:02

## 2023-04-02 RX ADMIN — ISOSORBIDE DINITRATE 20 MILLIGRAM(S): 5 TABLET ORAL at 06:44

## 2023-04-02 RX ADMIN — ISOSORBIDE DINITRATE 20 MILLIGRAM(S): 5 TABLET ORAL at 15:02

## 2023-04-02 RX ADMIN — CLOPIDOGREL BISULFATE 75 MILLIGRAM(S): 75 TABLET, FILM COATED ORAL at 15:01

## 2023-04-02 RX ADMIN — CEFTRIAXONE 100 MILLIGRAM(S): 500 INJECTION, POWDER, FOR SOLUTION INTRAMUSCULAR; INTRAVENOUS at 22:50

## 2023-04-02 RX ADMIN — TAMSULOSIN HYDROCHLORIDE 0.8 MILLIGRAM(S): 0.4 CAPSULE ORAL at 21:20

## 2023-04-02 RX ADMIN — PANTOPRAZOLE SODIUM 40 MILLIGRAM(S): 20 TABLET, DELAYED RELEASE ORAL at 06:44

## 2023-04-02 RX ADMIN — Medication 50 MILLIGRAM(S): at 21:21

## 2023-04-02 RX ADMIN — TACROLIMUS 2 MILLIGRAM(S): 5 CAPSULE ORAL at 19:17

## 2023-04-02 RX ADMIN — ATORVASTATIN CALCIUM 40 MILLIGRAM(S): 80 TABLET, FILM COATED ORAL at 21:22

## 2023-04-02 RX ADMIN — Medication 325 MILLIGRAM(S): at 15:01

## 2023-04-02 RX ADMIN — Medication 50 MILLIGRAM(S): at 03:22

## 2023-04-02 RX ADMIN — Medication 650 MILLIGRAM(S): at 03:30

## 2023-04-02 RX ADMIN — CHLORHEXIDINE GLUCONATE 1 APPLICATION(S): 213 SOLUTION TOPICAL at 15:01

## 2023-04-02 RX ADMIN — TACROLIMUS 2 MILLIGRAM(S): 5 CAPSULE ORAL at 06:44

## 2023-04-02 RX ADMIN — PANTOPRAZOLE SODIUM 40 MILLIGRAM(S): 20 TABLET, DELAYED RELEASE ORAL at 19:17

## 2023-04-02 RX ADMIN — Medication 81 MILLIGRAM(S): at 15:01

## 2023-04-02 RX ADMIN — Medication 25 MILLIGRAM(S): at 15:02

## 2023-04-02 RX ADMIN — Medication 650 MILLIGRAM(S): at 04:30

## 2023-04-02 NOTE — PROGRESS NOTE ADULT - ASSESSMENT
62y M PMHx HTN, HLD, DM, ICM (s/p cath years ago, RCA 99%, never intervened on), HFrEF 25-30%, ESRD s/p failed kidney transplant (LUE AVF; HD TTS), R AKA admitted to [facility] 2/27 for respiratory distress and SIRS criteria after HD session, requiring intubation. Pt w/ cardiac arrest, vtach arrest requiring amio and pressors. Extubated 3/2 and course c/b by LGIB with hemoglobin of 3.5 requiring multiple transfusions; EGD/colo and CTA w/o evidence of active bleed and Hgb improved to 11's. Transferred to [facility] 03/17/23 for ICD eval 2/2 Vtach and cardiac cath however patient w/ episode of BRBPR, flex sig showing localized rectal ulcer but no active bleeding; DAPT was resumed on 03/20/23.  Hospital course complicated by ongoing fevers tmax 102 with constitutional symptoms with infectious work up remarkable for UTI initially covered with broad spectrum now narrowed down to IV ctx (will ultimately need to be treated through 4/4/23). Patient now awaiting echocardiogram to assess for EF prior to cardiac cath to verify EF. Additionally, urology following for possible prostate cancer workup. Pt may require biopsy, (timing to be determined). IC consulted about cath - plan is for patient to have full infectious workup prior to [facility]. Once patient undergoes cardiac cath, ultimate plan for SUBQ ICD with EP.      62y M PMHx HTN, HLD, DM, ICM (s/p cath years ago, RCA 99%, never intervened on), HFrEF 25-30%, ESRD s/p failed kidney transplant (LUE AVF; HD TTS), R AKA admitted to [facility] 2/27 for respiratory distress and SIRS criteria after HD session, requiring intubation. Pt w/ cardiac arrest, vtach arrest requiring amio and pressors. Extubated 3/2 and course c/b by LGIB with hemoglobin of 3.5 requiring multiple transfusions; EGD/colo and CTA w/o evidence of active bleed and Hgb improved to 11's. Transferred to [facility] 03/17/23 for ICD eval 2/2 Vtach and cardiac cath however patient w/ episode of BRBPR, flex sig showing localized rectal ulcer but no active bleeding; DAPT was resumed on 03/20/23.  Hospital course complicated by ongoing fevers tmax 102 with constitutional symptoms with infectious work up remarkable for UTI initially covered with broad spectrum now narrowed down to IV ctx (will ultimately need to be treated through 4/4/23). Patient now awaiting echocardiogram to assess for EF prior to cardiac cath to verify EF. Additionally, urology following for possible prostate cancer workup. Pt may require biopsy, (timing to be determined). IC consulted about cath - plan is for patient to have full infectious workup prior to [facility]. Once patient undergoes cardiac cath, ultimate plan for SUBQ ICD with EP.      62y M PMHx HTN, HLD, DM, ICM (s/p cath years ago, RCA 99%, never intervened on), HFrEF 25-30%, ESRD s/p failed kidney transplant (LUE AVF; HD TTS), R AKA admitted to [facility] 2/27 for respiratory distress and SIRS criteria after HD session, requiring intubation. Pt w/ cardiac arrest, vtach arrest requiring amio and pressors. Extubated 3/2 and course c/b by LGIB with hemoglobin of 3.5 requiring multiple transfusions; EGD/colo and CTA w/o evidence of active bleed and Hgb improved to 11's. Transferred to [facility] 03/17/23 for ICD eval 2/2 Vtach and cardiac cath however patient w/ episode of BRBPR, flex sig showing localized rectal ulcer but no active bleeding; DAPT was resumed on 03/20/23.  Hospital course complicated by ongoing fevers tmax 102 with constitutional symptoms with infectious work up remarkable for UTI initially covered with broad spectrum now narrowed down to IV ctx (will ultimately need to be treated through 4/4/23). Patient now awaiting echocardiogram to assess for EF prior to cardiac cath to verify EF. Additionally, urology following for possible prostate cancer workup. Pt may require biopsy, (timing to be determined). IC consulted about cath - plan is for patient to have full infectious workup prior to [facility]. Once patient undergoes cardiac cath, ultimate plan for SUBQ ICD with EP.

## 2023-04-02 NOTE — PROGRESS NOTE ADULT - PROBLEM SELECTOR PLAN 3
s/p VTach arrest @ [facility]; no tele events noted.    - EP Consulted – plan for ICD placement once pt has LHC   - c/w toprol 25mg qd s/p VTach arrest @ [facility]; no tele events noted.    - EP Consulted – plan for ICD placement once pt has LHC   - c/w toprol 25mg qd s/p VTach arrest @ [facility]; no tele events noted.    - EP Consulted – plan for ICD placement once pt has LHC   - c/w toprol 25mg qd

## 2023-04-02 NOTE — PROGRESS NOTE ADULT - ASSESSMENT
63yo M PMHx HTN, HLD, DM, CAD(s/p cath years ago, RCA 99%, never intervened on), HFrEF 25-30%, ESRD s/p failed kidney transplant and required HD x 3yrs (last HD 3/16 via Left Arm AVF; HD TTS), Right AKA initially presented to [facility] 2/27 for respiratory distress after a dialysis, found to be septic, h/c complicated by AHRF requiring intubation for 24hr followed by VT arrest, h/c the complicated by massive LGIB (rectal ulcer seen on colonoscopy) requiring 7u pRBC, 1u FFP and 1u PLT. Transferred to [facility] (3/17) for ICD placement 2/2 Vtach and cardiac cath. Pt noted lassed loose stool with BRBPR, GI and surgery consulted. Flex Sigc ( 3/17) found prior hemoclip in the sigmoid colon, localized ulceration and erosive with no bleeding noted in rectum suggestive of solitary ulcer syndrome. Medicine consulted for comanagement.     -fever -   #LGIB  #SANTOS  #Solitary ulcer syndrome  - hx coffee ground emesis @ [facility]; s/p EGD/colonoscopy showing rectal ulcer  - per gen surgery no acute surgical intervention   - per GI:      - increased Protonix 40 mg IVP BID, can be po now       - s/p flex sig(3/17)  for rectal ulcers, found Evidence of prior hemoclip in the sigmoid colon. Localized ulceration and erosive with no bleeding were noted in the rectum, suggestive of solitary ulcer syndrome           - Avoid anal digitation and enemas           - High-fiber diet and optimize laxatives to avoid constipation using miralax and dulcolax           - No absolute GI contraindications to anticoag/antiplt therapy           - Repeat endoscopic evaluation recommended in 3 months  - Active T&S  - Hgb  stable-  - Transfuse for hgb <8 (active CAD)    #CAD   - Previous cath at [facility] showing oLM 30% and RCA 99% that is not amenable to intervention.  - TTE 3/1/23 at [facility]: mild LVH, EF 25-30%, severe global wall motion dysfunction, mildly dilated LA, RV mildly dilated, all leaflets mild calcified, mod pHTN  - Pt is cleared for cardiac cath    #H/O ventricular tachycardia   - Vtach arrest @[facility]  - fu EP consult for ICD placement- pending infection work up.    # Fever -ID concern is prostate abscess -  - PET/CT scan - no Lymphadenopathy to suggest lymphoproliferative disorder, activity in prostate and femur.  - given immunocompromised/hx Kidney transplant    - ID f/u appreciated, -rec appreciated- to complete iv abx Ceftriaxone 2gm iv q24hr  thru 4/4/23 ( Tues)  -  consult appreciated, to repeat PSA upon completion of iv abx    fu-urine for BK virus  - CT pelvis w/wo IVC- result reviewed, BLADDER: Minimally distended, Moody catheter. Diffuse wall thickening and   perivesical fat stranding.- Prostate is enlarged. no sign of renal abscess.   - Start ceftriaxone 2 g IV q24h- concern prostate abscess -3/27)  - D/C vancomycin and meropenem   would need to rule out infection prior to icd placement.  - Left calf pain, negative for DVT    #ESRD on dialysis/ sp renal transplant.   - dialysis Tues, Thurs, Sat  -HD per renal.  - c/w Tacrolimus     #HTN  - c/w home meds. Amlodipine 10mg qd, Losartan 100mg qd, Hydralazine 50m TID, Isordil 20mg TID with holding parameter    #HLD   - c/w Lipitor 40mg qd    #DM  - no meds per [place], obtain collaterals for med recs prior [place] stay  - mISS while inpt, goal -180   - A1c 5.9 on admission    DVT PPX: SCDs for now iso GIB    Med consult team continues to follow with you.     63yo M PMHx HTN, HLD, DM, CAD(s/p cath years ago, RCA 99%, never intervened on), HFrEF 25-30%, ESRD s/p failed kidney transplant and required HD x 3yrs (last HD 3/16 via Left Arm AVF; HD TTS), Right AKA initially presented to [facility] 2/27 for respiratory distress after a dialysis, found to be septic, h/c complicated by AHRF requiring intubation for 24hr followed by VT arrest, h/c the complicated by massive LGIB (rectal ulcer seen on colonoscopy) requiring 7u pRBC, 1u FFP and 1u PLT. Transferred to [facility] (3/17) for ICD placement 2/2 Vtach and cardiac cath. Pt noted lassed loose stool with BRBPR, GI and surgery consulted. Flex Sigc ( 3/17) found prior hemoclip in the sigmoid colon, localized ulceration and erosive with no bleeding noted in rectum suggestive of solitary ulcer syndrome. Medicine consulted for comanagement.     -fever -   #LGIB  #SANTOS  #Solitary ulcer syndrome  - hx coffee ground emesis @ [facility]; s/p EGD/colonoscopy showing rectal ulcer  - per gen surgery no acute surgical intervention   - per GI:      - increased Protonix 40 mg IVP BID, can be po now       - s/p flex sig(3/17)  for rectal ulcers, found Evidence of prior hemoclip in the sigmoid colon. Localized ulceration and erosive with no bleeding were noted in the rectum, suggestive of solitary ulcer syndrome           - Avoid anal digitation and enemas           - High-fiber diet and optimize laxatives to avoid constipation using miralax and dulcolax           - No absolute GI contraindications to anticoag/antiplt therapy           - Repeat endoscopic evaluation recommended in 3 months  - Active T&S  - Hgb  stable-  - Transfuse for hgb <8 (active CAD)    #CAD   - Previous cath at [facility] showing oLM 30% and RCA 99% that is not amenable to intervention.  - TTE 3/1/23 at [facility]: mild LVH, EF 25-30%, severe global wall motion dysfunction, mildly dilated LA, RV mildly dilated, all leaflets mild calcified, mod pHTN  - Pt is cleared for cardiac cath    #H/O ventricular tachycardia   - Vtach arrest @[facility]  - fu EP consult for ICD placement- pending infection work up.    # Fever -ID concern is prostate abscess -  - PET/CT scan - no Lymphadenopathy to suggest lymphoproliferative disorder, activity in prostate and femur.  - given immunocompromised/hx Kidney transplant    - ID f/u appreciated, -rec appreciated- to complete iv abx Ceftriaxone 2gm iv q24hr  thru 4/4/23 ( Tues)  -  consult appreciated, to repeat PSA upon completion of iv abx    fu-urine for BK virus  - CT pelvis w/wo IVC- result reviewed, BLADDER: Minimally distended, Moody catheter. Diffuse wall thickening and   perivesical fat stranding.- Prostate is enlarged. no sign of renal abscess.   - Start ceftriaxone 2 g IV q24h- concern prostate abscess -3/27)  - D/C vancomycin and meropenem   would need to rule out infection prior to icd placement.  - Left calf pain, negative for DVT    #ESRD on dialysis/ sp renal transplant.   - dialysis Tues, Thurs, Sat  -HD per renal.  - c/w Tacrolimus     #HTN  - c/w home meds. Amlodipine 10mg qd, Losartan 100mg qd, Hydralazine 50m TID, Isordil 20mg TID with holding parameter    #HLD   - c/w Lipitor 40mg qd    #DM  - no meds per [place], obtain collaterals for med recs prior [place] stay  - mISS while inpt, goal -180   - A1c 5.9 on admission    DVT PPX: SCDs for now iso GIB    Med consult team continues to follow with you.     63yo M PMHx HTN, HLD, DM, CAD(s/p cath years ago, RCA 99%, never intervened on), HFrEF 25-30%, ESRD s/p failed kidney transplant and required HD x 3yrs (last HD 3/16 via Left Arm AVF; HD TTS), Right AKA initially presented to [facility] 2/27 for respiratory distress after a dialysis, found to be septic, h/c complicated by AHRF requiring intubation for 24hr followed by VT arrest, h/c the complicated by massive LGIB (rectal ulcer seen on colonoscopy) requiring 7u pRBC, 1u FFP and 1u PLT. Transferred to [facility] (3/17) for ICD placement 2/2 Vtach and cardiac cath. Pt noted lassed loose stool with BRBPR, GI and surgery consulted. Flex Sigc ( 3/17) found prior hemoclip in the sigmoid colon, localized ulceration and erosive with no bleeding noted in rectum suggestive of solitary ulcer syndrome. Medicine consulted for comanagement.     -fever -   #LGIB  #SANTOS  #Solitary ulcer syndrome  - hx coffee ground emesis @ [facility]; s/p EGD/colonoscopy showing rectal ulcer  - per gen surgery no acute surgical intervention   - per GI:      - increased Protonix 40 mg IVP BID, can be po now       - s/p flex sig(3/17)  for rectal ulcers, found Evidence of prior hemoclip in the sigmoid colon. Localized ulceration and erosive with no bleeding were noted in the rectum, suggestive of solitary ulcer syndrome           - Avoid anal digitation and enemas           - High-fiber diet and optimize laxatives to avoid constipation using miralax and dulcolax           - No absolute GI contraindications to anticoag/antiplt therapy           - Repeat endoscopic evaluation recommended in 3 months  - Active T&S  - Hgb  stable-  - Transfuse for hgb <8 (active CAD)    #CAD   - Previous cath at [facility] showing oLM 30% and RCA 99% that is not amenable to intervention.  - TTE 3/1/23 at [facility]: mild LVH, EF 25-30%, severe global wall motion dysfunction, mildly dilated LA, RV mildly dilated, all leaflets mild calcified, mod pHTN  - Pt is cleared for cardiac cath    #H/O ventricular tachycardia   - Vtach arrest @[facility]  - fu EP consult for ICD placement- pending infection work up.    # Fever -ID concern is prostate abscess -  - PET/CT scan - no Lymphadenopathy to suggest lymphoproliferative disorder, activity in prostate and femur.  - given immunocompromised/hx Kidney transplant    - ID f/u appreciated, -rec appreciated- to complete iv abx Ceftriaxone 2gm iv q24hr  thru 4/4/23 ( Tues)  -  consult appreciated, to repeat PSA upon completion of iv abx    fu-urine for BK virus  - CT pelvis w/wo IVC- result reviewed, BLADDER: Minimally distended, Moody catheter. Diffuse wall thickening and   perivesical fat stranding.- Prostate is enlarged. no sign of renal abscess.   - Start ceftriaxone 2 g IV q24h- concern prostate abscess -3/27)  - D/C vancomycin and meropenem   would need to rule out infection prior to icd placement.  - Left calf pain, negative for DVT    #ESRD on dialysis/ sp renal transplant.   - dialysis Tues, Thurs, Sat  -HD per renal.  - c/w Tacrolimus     #HTN  - c/w home meds. Amlodipine 10mg qd, Losartan 100mg qd, Hydralazine 50m TID, Isordil 20mg TID with holding parameter    #HLD   - c/w Lipitor 40mg qd    #DM  - no meds per [place], obtain collaterals for med recs prior [place] stay  - mISS while inpt, goal -180   - A1c 5.9 on admission    DVT PPX: SCDs for now iso GIB    Med consult team continues to follow with you.

## 2023-04-02 NOTE — PROGRESS NOTE ADULT - PROBLEM SELECTOR PLAN 1
Pt w/ recurring fevers despite IV ABx. Tmax 101 03/30/23,  infectious work up ongoing w source likely urinary vs prostatitis   - BCx NGTD; BK virus (-); UCx (+) for Klebsiella Pneumonia   - PLAN: PET scan 3/25 - PET scan showing increased uptake in prostate suggesting prostatitis vs. neoplasm; attempt made to contact wife on 3/31/23 - unclear if patient has outpatient prostate workup   - CT Pelvis 3/28. significant for mild urothelial thickening and hyperenhancement suggestive of pyelitis.   No evidence of pyelonephritis or renal abscess. Probable cystitis.  - Urology consulted - rec to recheck PSA once patient finishes abx for infection, no acute urologic workup needed at this time; will most likely perform outpatient. Patient ultimately will need bx, so will confirm with LHC/DAPT situation   - CONT: Ceftriaxone 2000mg IV QD until April 4, 2023  - ID following - appreciate recs     #Diarrhea  --history of loose BMs, > 5 in 24 hours as of 3/31/23   - C-diff culture NEGATIVE Pt w/ recurring fevers despite IV ABx. Tmax 101 03/30/23,  infectious work up ongoing w source likely urinary vs prostatitis   - BCx NGTD; BK virus (-); UCx (+) for Klebsiella Pneumonia   - PLAN: PET scan 3/25 - PET scan showing increased uptake in prostate suggesting prostatitis vs. neoplasm; attempt made to contact wife on 3/31/23 - unclear if patient has outpatient prostate workup   - CT Pelvis 3/28. significant for mild urothelial thickening and hyperenhancement suggestive of pyelitis.   No evidence of pyelonephritis or renal abscess. Probable cystitis.  - Urology consulted - rec to recheck PSA once patient finishes abx for infection, no acute urologic workup needed at this time; will most likely perform outpatient. Patient ultimately will need bx, so will confirm with LHC/DAPT situation   - CONT: Ceftriaxone 2000mg IV QD until April 4, 2023  - ID following - appreciate recs   -- Will consult palliative in AM     #Diarrhea  --history of loose BMs, > 5 in 24 hours as of 3/31/23   - C-diff culture NEGATIVE

## 2023-04-02 NOTE — PROGRESS NOTE ADULT - PROBLEM SELECTOR PLAN 12
- reduced by urology 03/29/23             F: Oral intake  E: Replete electrolytes as needed for K<4 and Mg<2  N: DASH Diet    DVT PPX: holding AC; SCD  Dispo: pending infectious work up/ echo prior to C w/ subsequent ICD  Case discussed with Dr. Noland

## 2023-04-02 NOTE — PROGRESS NOTE ADULT - SUBJECTIVE AND OBJECTIVE BOX
Patient is a 62y old  Male who presents with a chief complaint of ICD evaluation (30 Mar 2023 08:07)    INTERVAL EVENTS: NAEON    SUBJECTIVE:  Patient was seen and examined at bedside. denies CP,     Review of systems: No fever, chills, dizziness, HA, Changes in vision, CP, dyspnea, nausea or vomiting, dysuria, changes in bowel movements, LE edema. Rest of 12 point Review of systems negative unless otherwise documented elsewhere in note.     Diet, DASH/TLC:   Sodium & Cholesterol Restricted (03-31-23 @ 14:08) [Pending Verification By Attending]  Diet, DASH/TLC:   Sodium & Cholesterol Restricted (03-28-23 @ 13:52) [Active]      MEDICATIONS:  MEDICATIONS  (STANDING):  aspirin enteric coated 81 milliGRAM(s) Oral daily  atorvastatin 40 milliGRAM(s) Oral at bedtime  cefTRIAXone   IVPB 2000 milliGRAM(s) IV Intermittent every 24 hours  chlorhexidine 2% Cloths 1 Application(s) Topical daily  clopidogrel Tablet 75 milliGRAM(s) Oral daily  dextrose 5%. 1000 milliLiter(s) (50 mL/Hr) IV Continuous <Continuous>  dextrose 5%. 1000 milliLiter(s) (100 mL/Hr) IV Continuous <Continuous>  dextrose 50% Injectable 25 Gram(s) IV Push once  dextrose 50% Injectable 12.5 Gram(s) IV Push once  ferrous    sulfate 325 milliGRAM(s) Oral daily  hydrALAZINE 50 milliGRAM(s) Oral every 8 hours  insulin lispro (ADMELOG) corrective regimen sliding scale   SubCutaneous Before meals and at bedtime  isosorbide   dinitrate Tablet (ISORDIL) 20 milliGRAM(s) Oral three times a day  losartan 100 milliGRAM(s) Oral every 24 hours  metoprolol succinate ER 25 milliGRAM(s) Oral daily  pantoprazole  Injectable 40 milliGRAM(s) IV Push every 12 hours  tacrolimus 2 milliGRAM(s) Oral every 12 hours  tamsulosin 0.8 milliGRAM(s) Oral at bedtime    MEDICATIONS  (PRN):  acetaminophen     Tablet .. 650 milliGRAM(s) Oral every 6 hours PRN Mild Pain (1 - 3), Moderate Pain (4 - 6)  dextrose Oral Gel 15 Gram(s) Oral once PRN Blood Glucose LESS THAN 70 milliGRAM(s)/deciliter  sodium chloride 0.65% Nasal 1 Spray(s) Both Nostrils every 4 hours PRN Nasal Congestion      Allergies    Allergy Status Unknown    Intolerances        OBJECTIVE:  Vital Signs Last 24 Hrs  T(C): 37.2 (02 Apr 2023 09:38), Max: 37.8 (02 Apr 2023 05:05)  T(F): 98.9 (02 Apr 2023 09:38), Max: 100.1 (02 Apr 2023 05:05)  HR: 52 (02 Apr 2023 08:42) (50 - 62)  BP: 119/51 (02 Apr 2023 08:42) (119/51 - 164/70)  BP(mean): --  RR: 18 (02 Apr 2023 08:42) (17 - 18)  SpO2: 98% (02 Apr 2023 06:32) (97% - 100%)    Parameters below as of 02 Apr 2023 08:42  Patient On (Oxygen Delivery Method): room air      I&O's Summary    01 Apr 2023 07:01  -  02 Apr 2023 07:00  --------------------------------------------------------  IN: 180 mL / OUT: 2075 mL / NET: -1895 mL    02 Apr 2023 07:01  -  02 Apr 2023 13:23  --------------------------------------------------------  IN: 0 mL / OUT: 50 mL / NET: -50 mL        PHYSICAL EXAM:  Gen: Reclining in bed at time of exam, appears stated age  HEENT: NCAT, MMM, clear OP  Neck: supple, trachea at midline  CV: RRR, +S1/S2  Pulm: adequate respiratory effort, no increase in work of breathing  Abd: soft, NTND  Skin: warm and dry,   Ext: WWP, no LE edema  Neuro: AOx3, no gross focal neurological deficits  Psych: affect and behavior appropriate, pleasant at time of interview  :     LABS:                        9.2    4.61  )-----------( 189      ( 02 Apr 2023 05:30 )             30.4     04-02    134<L>  |  99  |  2<L>  ----------------------------<  75  4.3   |  26  |  2.98<H>    Ca    7.6<L>      02 Apr 2023 05:30  Phos  3.1     04-01  Mg     1.8     04-02    TPro  4.5<L>  /  Alb  2.2<L>  /  TBili  0.3  /  DBili  x   /  AST  25  /  ALT  5<L>  /  AlkPhos  130<H>  04-02    LIVER FUNCTIONS - ( 02 Apr 2023 05:30 )  Alb: 2.2 g/dL / Pro: 4.5 g/dL / ALK PHOS: 130 U/L / ALT: 5 U/L / AST: 25 U/L / GGT: x             CAPILLARY BLOOD GLUCOSE      POCT Blood Glucose.: 80 mg/dL (02 Apr 2023 12:20)  POCT Blood Glucose.: 84 mg/dL (02 Apr 2023 06:48)  POCT Blood Glucose.: 108 mg/dL (01 Apr 2023 21:59)  POCT Blood Glucose.: 86 mg/dL (01 Apr 2023 18:04)        MICRODATA:    Culture - Urine (collected 30 Mar 2023 16:12)  Source: Catheterized None  Final Report (01 Apr 2023 10:43):    No growth    Urinalysis with Rflx Culture (collected 30 Mar 2023 16:12)        RADIOLOGY/OTHER STUDIES:

## 2023-04-02 NOTE — PROGRESS NOTE ADULT - SUBJECTIVE AND OBJECTIVE BOX
Interventional Cardiology PA Adult Progress Note    Subjective Assessment:      ROS Negative except as per Subjective and HPI  	  MEDICATIONS:  hydrALAZINE 50 milliGRAM(s) Oral every 8 hours  isosorbide   dinitrate Tablet (ISORDIL) 20 milliGRAM(s) Oral three times a day  losartan 100 milliGRAM(s) Oral every 24 hours  metoprolol succinate ER 25 milliGRAM(s) Oral daily    cefTRIAXone   IVPB 2000 milliGRAM(s) IV Intermittent every 24 hours      acetaminophen     Tablet .. 650 milliGRAM(s) Oral every 6 hours PRN    pantoprazole  Injectable 40 milliGRAM(s) IV Push every 12 hours    atorvastatin 40 milliGRAM(s) Oral at bedtime  dextrose 50% Injectable 25 Gram(s) IV Push once  dextrose 50% Injectable 12.5 Gram(s) IV Push once  dextrose Oral Gel 15 Gram(s) Oral once PRN  insulin lispro (ADMELOG) corrective regimen sliding scale   SubCutaneous Before meals and at bedtime    aspirin enteric coated 81 milliGRAM(s) Oral daily  chlorhexidine 2% Cloths 1 Application(s) Topical daily  clopidogrel Tablet 75 milliGRAM(s) Oral daily  dextrose 5%. 1000 milliLiter(s) IV Continuous <Continuous>  dextrose 5%. 1000 milliLiter(s) IV Continuous <Continuous>  ferrous    sulfate 325 milliGRAM(s) Oral daily  sodium chloride 0.65% Nasal 1 Spray(s) Both Nostrils every 4 hours PRN  tacrolimus 2 milliGRAM(s) Oral every 12 hours  tamsulosin 0.8 milliGRAM(s) Oral at bedtime      	    [PHYSICAL EXAM:  TELEMETRY:  T(C): 37.8 (04-02-23 @ 05:05), Max: 37.8 (04-02-23 @ 05:05)  HR: 50 (04-02-23 @ 06:32) (50 - 62)  BP: 137/62 (04-02-23 @ 06:32) (137/62 - 164/70)  RR: 17 (04-02-23 @ 06:32) (17 - 18)  SpO2: 98% (04-02-23 @ 06:32) (96% - 100%)  Wt(kg): --  I&O's Summary    01 Apr 2023 07:01  -  02 Apr 2023 07:00  --------------------------------------------------------  IN: 180 mL / OUT: 2075 mL / NET: -1895 mL        Moody:  Central/PICC/Mid Line:                                         Appearance: Normal	  HEENT:   Normal oral mucosa, PERRL, EOMI	  Neck: Supple, + JVD/ - JVD; Carotid Bruit   Cardiovascular: Normal S1 S2, No JVD, No murmurs,   Respiratory: Lungs clear to auscultation/Decreased Breath Sounds/No Rales, Rhonchi, Wheezing	  Gastrointestinal:  Soft, Non-tender, + BS	  Skin: No rashes, No ecchymoses, No cyanosis  Extremities: Normal range of motion, No clubbing, cyanosis or edema  Vascular: Peripheral pulses palpable 2+ bilaterally  Neurologic: Non-focal  Psychiatry: A & O x 3, Mood & affect appropriate      	    ECG:  	  RADIOLOGY:   DIAGNOSTIC TESTING:  [ ] Echocardiogram:  [ ]  Catheterization:  [ ] Stress Test:    [ ] JOSY  OTHER: 	    LABS:	 	  CARDIAC MARKERS:                                  9.0    5.09  )-----------( 196      ( 01 Apr 2023 05:30 )             29.3     04-01    134<L>  |  99  |  4<L>  ----------------------------<  76  3.9   |  27  |  4.59<H>    Ca    7.9<L>      01 Apr 2023 08:25  Phos  3.1     04-01  Mg     1.7     04-01    TPro  4.6<L>  /  Alb  2.2<L>  /  TBili  0.4  /  DBili  x   /  AST  19  /  ALT  <5<L>  /  AlkPhos  142<H>  04-01    proBNP:   Lipid Profile:   HgA1c:   TSH:       ASSESSMENT/PLAN: 	        DVT ppx:  Dispo:     Interventional Cardiology PA Adult Progress Note    Subjective Assessment: Patient seen and examined at bedside. Patient feels ok, denies other complaints at this time.     ROS Negative except as per Subjective and HPI  	  MEDICATIONS:  hydrALAZINE 50 milliGRAM(s) Oral every 8 hours  isosorbide   dinitrate Tablet (ISORDIL) 20 milliGRAM(s) Oral three times a day  losartan 100 milliGRAM(s) Oral every 24 hours  metoprolol succinate ER 25 milliGRAM(s) Oral daily  cefTRIAXone   IVPB 2000 milliGRAM(s) IV Intermittent every 24 hours  acetaminophen     Tablet .. 650 milliGRAM(s) Oral every 6 hours PRN  pantoprazole  Injectable 40 milliGRAM(s) IV Push every 12 hours  atorvastatin 40 milliGRAM(s) Oral at bedtime  dextrose 50% Injectable 25 Gram(s) IV Push once  dextrose 50% Injectable 12.5 Gram(s) IV Push once  dextrose Oral Gel 15 Gram(s) Oral once PRN  insulin lispro (ADMELOG) corrective regimen sliding scale   SubCutaneous Before meals and at bedtime  aspirin enteric coated 81 milliGRAM(s) Oral daily  chlorhexidine 2% Cloths 1 Application(s) Topical daily  clopidogrel Tablet 75 milliGRAM(s) Oral daily  dextrose 5%. 1000 milliLiter(s) IV Continuous <Continuous>  dextrose 5%. 1000 milliLiter(s) IV Continuous <Continuous>  ferrous    sulfate 325 milliGRAM(s) Oral daily  sodium chloride 0.65% Nasal 1 Spray(s) Both Nostrils every 4 hours PRN  tacrolimus 2 milliGRAM(s) Oral every 12 hours  tamsulosin 0.8 milliGRAM(s) Oral at bedtime    [PHYSICAL EXAM:  TELEMETRY:  T(C): 37.8 (04-02-23 @ 05:05), Max: 37.8 (04-02-23 @ 05:05)  HR: 50 (04-02-23 @ 06:32) (50 - 62)  BP: 137/62 (04-02-23 @ 06:32) (137/62 - 164/70)  RR: 17 (04-02-23 @ 06:32) (17 - 18)  SpO2: 98% (04-02-23 @ 06:32) (96% - 100%)  Wt(kg): --  I&O's Summary    01 Apr 2023 07:01  -  02 Apr 2023 07:00  --------------------------------------------------------  IN: 180 mL / OUT: 2075 mL / NET: -1895 mL                                      Appearance: Normal, laying in bed, NAD   HEENT:   Normal oral mucosa, PERRL, EOMI	  Neck: Supple  - JVD; Carotid Bruit   Cardiovascular: Normal S1 S2, No JVD, No murmurs,   Respiratory: Lungs clear to auscultation  Gastrointestinal:  Soft, Non-tender, + BS	  Skin: No rashes, No ecchymoses, No cyanosis  Extremities: + R AKA   Vascular: Peripheral pulses palpable  Neurologic: Non-focal  Psychiatry: A & O x 3, Mood & affect appropriate      DIAGNOSTIC TESTING:  [x ] Echocardiogram: awaiting       LABS:	 	  CARDIAC MARKERS:               9.0    5.09  )-----------( 196      ( 01 Apr 2023 05:30 )             29.3     04-01    134<L>  |  99  |  4<L>  ----------------------------<  76  3.9   |  27  |  4.59<H>    Ca    7.9<L>      01 Apr 2023 08:25  Phos  3.1     04-01  Mg     1.7     04-01    TPro  4.6<L>  /  Alb  2.2<L>  /  TBili  0.4  /  DBili  x   /  AST  19  /  ALT  <5<L>  /  AlkPhos  142<H>  04-01    Lipid Profile: Total cholesterol: 88, LDL: 12, HDL: 48  HgA1c: 5.9  TSH: 7.7

## 2023-04-02 NOTE — PROGRESS NOTE ADULT - PROBLEM SELECTOR PLAN 2
LHC at [place] w/ oLM 30% and RCA 99% that is not amenable to intervention.    - TTE 3/1/23 @ [facility]:  mild LVH, LVEF 25-30%, severe global wall motion dysfxn, mild LA dilation, mild RV dilation, mildly calcified leaflets, mod pHTN  - Severe GIB at [facility] requiring multiple PRBC transfusions  - initially cleared by GI for DAPT and on ASA/Plavix 3/20/23.    - 3/23/23 pt w/ episode of black tarry stool (H/H stable and no hemodynamic signs of bleeding),  fecal occult negative and pt cleared by GI for DAPT and restarted 3/24/23  - Continue ASA 81mg, plavix 75mg qd  - PLAN: Repeat echo to assess EF; ultimately will need cardiac cath depending on EF to decide if high-risk LHC at [place] w/ oLM 30% and RCA 99% that is not amenable to intervention.    - TTE 3/1/23 @ [facility]:  mild LVH, LVEF 25-30%, severe global wall motion dysfxn, mild LA dilation, mild RV dilation, mildly calcified leaflets, mod pHTN  - Severe GIB at [facility] requiring multiple PRBC transfusions  - initially cleared by GI for DAPT and on ASA/Plavix 3/20/23.    - 3/23/23 pt w/ episode of black tarry stool (H/H stable and no hemodynamic signs of bleeding),  fecal occult negative and pt cleared by GI for DAPT and restarted 3/24/23  - Continue ASA 81mg, plavix 75mg qd  - PLAN: Repeat echo to assess EF; ultimately will need cardiac cath depending on EF to decide if high-risk LHC at [place] w/ oLM 30% and RCA 99% that is not amenable to intervention.    - TTE 3/1/23 @ [facility]:  mild LVH, LVEF 25-30%, severe global wall motion dysfxn, mild LA dilation, mild RV dilation, mildly calcified leaflets, mod pHTN  - Severe GIB at [facility] requiring multiple PRBC transfusions  - initially cleared by GI for DAPT and on ASA/Plavix 3/20/23.    - 3/23/23 pt w/ episode of black tarry stool (H/H stable and no hemodynamic signs of bleeding),  fecal occult negative and pt cleared by GI for DAPT and restarted 3/24/23  - Continue ASA 81mg, plavix 75mg qd  - PLAN: Repeat echo to assess EF; ultimately will need cardiac cath depending on EF to decide if high-risk

## 2023-04-02 NOTE — PROGRESS NOTE ADULT - NS ATTEND AMEND GEN_ALL_CORE FT
See PA note written above, for details. I reviewed the PA documentation.  I have personally seen and examined this patient today. I reviewed vitals, labs, medications, cardiac studies and additional imaging.  I agree with the PA's findings and plans as written above with the following additions/amendments:  Awaiting echocardiogram for interval EF assessment, will be done 4.3  ID recs continue IV abx through 4.4, EP recs re: ICD pending TTE results  Plan for LHC pending TTE results, per Dr Aleman, no imminent plans for cath  Urology deferring further prostate w/u given limited life expectancy and complex co morbidities, on going discussions with team  Palliative care consult triggered for complex GOC, multiple co morbidities and limited therapeutic options  Nery Muro M.D.  Cardiology Attending  35 minutes spent on total encounter; more than 50% of the visit was spent counseling and/or coordinating care by the attending physician, with plan of care discussed with the patient and cardiac team.

## 2023-04-02 NOTE — CHART NOTE - NSCHARTNOTEFT_GEN_A_CORE
62M PMHx HTN, HLD, DM, CAD (s/p cath years ago, RCA 99%, never intervened on), HFrEF 25-30%, ESRD s/p failed kidney transplant (last HD 3/1 via Left Arm AVF; HD TTS), Right AKA initially presented to [facility] 2/27 for respiratory distress after a dialysis, found to be septic, h/c complicated by AHRF requiring intubation for 24hr followed by VT arrest, h/c the complicated by massive LGIB (rectal ulcer) requiring 7u pRBC, 1u FFP and 1u PLT. Transferred to [facility] for ICD placement 2/2 Vtach and cardiac cath. Urology consulted for paraphimosis, which was successfully reduced at bedside. PET scan 3/25 shows increased uptake in the prostate. CTAP 3/28 was negative for prostate abscess or prostatitis. PSA on this admission is 12, PSA could be elevated due to UTI versus prostate cancer.     Given the patient's concurrent renal and cardiac active co-morbidities requiring significant workup and intervention, and whose acuity is much higher than an elevated PSA with multiple possible etiologies and without radiologic evidence of malignant spread, the urology team defers workup for prostate cancer, including biopsy for tissue diagnosis, to the outpatient setting. The indolent course of prostate cancer poses relatively lower mortality risk than the patient's other conditions contributing to his inpatient clinical picture including active CAD requiring cardiac catheterization and failed renal transplant requiring consistent renal replacement therapy. Guidelines for prostate cancer therapy include statements that patients with life expectancy <5 years do not benefit from prostate cancer screening, diagnosis or treatment; and prostate cancer screening is indicated mostly in patients with a life expectancy between 10-15 years. Resolution of the patient's active conditions and appraisal of the patient's life expectancy following resolution and taking into account the patient's overall clinical condition and chronic medical issues will better inform the path forward with regard to prostate cancer workup. The above was discussed with  attending. Thank you. 62M PMHx HTN, HLD, DM, CAD (s/p cath years ago, RCA 99%, never intervened on), HFrEF 25-30%, ESRD s/p failed kidney transplant (last HD 3/1 via Left Arm AVF; HD TTS), Right AKA initially presented to [facility] 2/27 for respiratory distress after a dialysis, found to be septic, h/c complicated by AHRF requiring intubation for 24hr followed by VT arrest, h/c the complicated by massive LGIB (rectal ulcer) requiring 7u pRBC, 1u FFP and 1u PLT. Transferred to [facility] for ICD placement 2/2 Vtach and cardiac cath. Urology consulted for paraphimosis, which was successfully reduced at bedside. PET scan 3/25 shows increased uptake in the prostate. CTAP 3/28 was negative for prostate abscess or prostatitis. PSA on this admission is 12, PSA could be elevated due to UTI versus prostate cancer.     Given the patient's concurrent renal and cardiac active co-morbidities requiring significant workup and intervention, and whose acuity is much higher than an elevated PSA with multiple possible etiologies and without radiologic evidence of malignant spread, the urology team defers workup for prostate cancer, including biopsy for tissue diagnosis, to the outpatient setting. The indolent course of prostate cancer poses relatively lower mortality risk than the patient's other conditions contributing to his inpatient clinical picture including active CAD requiring cardiac catheterization and failed renal transplant requiring consistent renal replacement therapy. Guidelines for prostate cancer therapy include statements that patients with life expectancy <5 years do not benefit from prostate cancer screening, diagnosis or treatment; and prostate cancer screening is indicated mostly in patients with a life expectancy between 10-15 years. Resolution of the patient's active conditions and appraisal of the patient's life expectancy following resolution and taking into account the patient's overall clinical condition and chronic medical issues will better inform the path forward with regard to prostate cancer workup. The above was discussed with  attending. Thank you. 62M PMHx HTN, HLD, DM, CAD (s/p cath years ago, RCA 99%, never intervened on), HFrEF 25-30%, ESRD s/p failed kidney transplant (last HD 3/1 via Left Arm AVF; HD TTS), Right AKA initially presented to [facility] 2/27 for respiratory distress after a dialysis, found to be septic, h/c complicated by AHRF requiring intubation for 24hr followed by VT arrest, h/c the complicated by massive LGIB (rectal ulcer) requiring 7u pRBC, 1u FFP and 1u PLT. Transferred to [facility] for ICD placement 2/2 Vtach and cardiac cath. Urology consulted for paraphimosis, which was successfully reduced at bedside. PET scan 3/25 shows increased uptake in the prostate. CTAP 3/28 was negative for prostate abscess or prostatitis. PSA on this admission is 12, PSA could be elevated due to UTI versus prostate cancer.     Given the patient's concurrent renal and cardiac active co-morbidities requiring significant workup and intervention, and whose acuity is much higher than an elevated PSA with multiple possible etiologies and without radiologic evidence of malignant spread, the urology team defers workup for prostate cancer, including biopsy for tissue diagnosis, to the outpatient setting. The indolent course of prostate cancer poses relatively lower mortality risk than the patient's other conditions contributing to his inpatient clinical picture including active CAD requiring cardiac catheterization and failed renal transplant requiring consistent renal replacement therapy. Guidelines for prostate cancer therapy include statements that patients with life expectancy <5 years do not benefit from prostate cancer screening, diagnosis or treatment; and prostate cancer screening is indicated mostly in patients with a life expectancy between 10-15 years. Resolution of the patient's active conditions and appraisal of the patient's life expectancy following resolution and taking into account the patient's overall clinical condition and chronic medical issues will better inform the path forward with regard to prostate cancer workup. The above was discussed with  attending. Thank you.

## 2023-04-02 NOTE — PROGRESS NOTE ADULT - PROBLEM SELECTOR PLAN 4
@ [facility], severe GIB w/ Hgb down to 3.5 received total of 7u PRBCs.    - 3/17 AM pt passed loose BM along w/ large amount of BRBPR.    - s/p Flex sigmoidoscopy – Localized ulceration and erosion w/ no bleeding in rectum, suggestive of solitary ulcer syndrome; evidence of prior hemoclip was found in sigmoid colon.    - Avoid anal digitation and enemas  - Fecal occult negative 3/24 and cleared by GI for DAPT  - Transfusion goal Hgb < 8.0 @ [facility], severe GIB w/ Hgb down to 3.5 received total of 7u PRBCs.    - 3/17 AM pt passed loose BM along w/ large amount of BRBPR.    - s/p Flex sigmoidoscopy – Localized ulceration and erosion w/ no bleeding in rectum, suggestive of solitary ulcer syndrome; evidence of prior hemoclip was found in sigmoid colon.    - Avoid anal digitation and enemas  - Fecal occult negative 3/24 and cleared by GI for DAPT  - Transfusion goal Hgb < 8.0 @ [facility], severe GIB w/ Hgb down to 3.5 received total of 7u PRBCs.    - 3/17 AM pt passed loose BM along w/ large amount of BRBPR.    - s/p Flex sigmoidoscopy – Localized ulceration and erosion w/ no bleeding in rectum, suggestive of solitary ulcer syndrome; evidence of prior hemoclip was found in sigmoid colon.    - Avoid anal digitation and enemas  - Fecal occult negative 3/24 and cleared by GI for DAPT  - Transfusion goal Hgb < 8.0

## 2023-04-03 DIAGNOSIS — R53.81 OTHER MALAISE: ICD-10-CM

## 2023-04-03 DIAGNOSIS — Z51.5 ENCOUNTER FOR PALLIATIVE CARE: ICD-10-CM

## 2023-04-03 LAB
ALBUMIN SERPL ELPH-MCNC: 2.5 G/DL — LOW (ref 3.3–5)
ALP SERPL-CCNC: 139 U/L — HIGH (ref 40–120)
ALT FLD-CCNC: 5 U/L — LOW (ref 10–45)
ANION GAP SERPL CALC-SCNC: 8 MMOL/L — SIGNIFICANT CHANGE UP (ref 5–17)
AST SERPL-CCNC: 18 U/L — SIGNIFICANT CHANGE UP (ref 10–40)
BASOPHILS # BLD AUTO: 0.07 K/UL — SIGNIFICANT CHANGE UP (ref 0–0.2)
BASOPHILS NFR BLD AUTO: 1.4 % — SIGNIFICANT CHANGE UP (ref 0–2)
BILIRUB SERPL-MCNC: 0.4 MG/DL — SIGNIFICANT CHANGE UP (ref 0.2–1.2)
BUN SERPL-MCNC: 2 MG/DL — LOW (ref 7–23)
CALCIUM SERPL-MCNC: 8.1 MG/DL — LOW (ref 8.4–10.5)
CHLORIDE SERPL-SCNC: 96 MMOL/L — SIGNIFICANT CHANGE UP (ref 96–108)
CO2 SERPL-SCNC: 27 MMOL/L — SIGNIFICANT CHANGE UP (ref 22–31)
CREAT SERPL-MCNC: 3.96 MG/DL — HIGH (ref 0.5–1.3)
CULTURE RESULTS: SIGNIFICANT CHANGE UP
EGFR: 16 ML/MIN/1.73M2 — LOW
EOSINOPHIL # BLD AUTO: 0.36 K/UL — SIGNIFICANT CHANGE UP (ref 0–0.5)
EOSINOPHIL NFR BLD AUTO: 7.3 % — HIGH (ref 0–6)
GI PCR PANEL: SIGNIFICANT CHANGE UP
GLUCOSE BLDC GLUCOMTR-MCNC: 80 MG/DL — SIGNIFICANT CHANGE UP (ref 70–99)
GLUCOSE BLDC GLUCOMTR-MCNC: 87 MG/DL — SIGNIFICANT CHANGE UP (ref 70–99)
GLUCOSE BLDC GLUCOMTR-MCNC: 99 MG/DL — SIGNIFICANT CHANGE UP (ref 70–99)
GLUCOSE SERPL-MCNC: 73 MG/DL — SIGNIFICANT CHANGE UP (ref 70–99)
HCT VFR BLD CALC: 32.1 % — LOW (ref 39–50)
HGB BLD-MCNC: 9.9 G/DL — LOW (ref 13–17)
IMM GRANULOCYTES NFR BLD AUTO: 0.8 % — SIGNIFICANT CHANGE UP (ref 0–0.9)
LYMPHOCYTES # BLD AUTO: 1.41 K/UL — SIGNIFICANT CHANGE UP (ref 1–3.3)
LYMPHOCYTES # BLD AUTO: 28.4 % — SIGNIFICANT CHANGE UP (ref 13–44)
MAGNESIUM SERPL-MCNC: 1.7 MG/DL — SIGNIFICANT CHANGE UP (ref 1.6–2.6)
MCHC RBC-ENTMCNC: 28.2 PG — SIGNIFICANT CHANGE UP (ref 27–34)
MCHC RBC-ENTMCNC: 30.8 GM/DL — LOW (ref 32–36)
MCV RBC AUTO: 91.5 FL — SIGNIFICANT CHANGE UP (ref 80–100)
MONOCYTES # BLD AUTO: 0.69 K/UL — SIGNIFICANT CHANGE UP (ref 0–0.9)
MONOCYTES NFR BLD AUTO: 13.9 % — SIGNIFICANT CHANGE UP (ref 2–14)
NEUTROPHILS # BLD AUTO: 2.39 K/UL — SIGNIFICANT CHANGE UP (ref 1.8–7.4)
NEUTROPHILS NFR BLD AUTO: 48.2 % — SIGNIFICANT CHANGE UP (ref 43–77)
NRBC # BLD: 0 /100 WBCS — SIGNIFICANT CHANGE UP (ref 0–0)
PLATELET # BLD AUTO: 192 K/UL — SIGNIFICANT CHANGE UP (ref 150–400)
POTASSIUM SERPL-MCNC: 4.2 MMOL/L — SIGNIFICANT CHANGE UP (ref 3.5–5.3)
POTASSIUM SERPL-SCNC: 4.2 MMOL/L — SIGNIFICANT CHANGE UP (ref 3.5–5.3)
PROT SERPL-MCNC: 4.9 G/DL — LOW (ref 6–8.3)
RBC # BLD: 3.51 M/UL — LOW (ref 4.2–5.8)
RBC # FLD: 15.2 % — HIGH (ref 10.3–14.5)
SODIUM SERPL-SCNC: 131 MMOL/L — LOW (ref 135–145)
SPECIMEN SOURCE: SIGNIFICANT CHANGE UP
WBC # BLD: 4.96 K/UL — SIGNIFICANT CHANGE UP (ref 3.8–10.5)
WBC # FLD AUTO: 4.96 K/UL — SIGNIFICANT CHANGE UP (ref 3.8–10.5)

## 2023-04-03 PROCEDURE — 99232 SBSQ HOSP IP/OBS MODERATE 35: CPT

## 2023-04-03 PROCEDURE — 99223 1ST HOSP IP/OBS HIGH 75: CPT

## 2023-04-03 PROCEDURE — 93306 TTE W/DOPPLER COMPLETE: CPT | Mod: 26

## 2023-04-03 PROCEDURE — 99233 SBSQ HOSP IP/OBS HIGH 50: CPT

## 2023-04-03 RX ORDER — CEFTRIAXONE 500 MG/1
2000 INJECTION, POWDER, FOR SOLUTION INTRAMUSCULAR; INTRAVENOUS EVERY 24 HOURS
Refills: 0 | Status: COMPLETED | OUTPATIENT
Start: 2023-04-03 | End: 2023-04-04

## 2023-04-03 RX ORDER — ERYTHROPOIETIN 10000 [IU]/ML
6000 INJECTION, SOLUTION INTRAVENOUS; SUBCUTANEOUS ONCE
Refills: 0 | Status: DISCONTINUED | OUTPATIENT
Start: 2023-04-03 | End: 2023-04-03

## 2023-04-03 RX ORDER — DOXERCALCIFEROL 2.5 UG/1
4 CAPSULE ORAL ONCE
Refills: 0 | Status: COMPLETED | OUTPATIENT
Start: 2023-04-03 | End: 2023-04-04

## 2023-04-03 RX ADMIN — ISOSORBIDE DINITRATE 20 MILLIGRAM(S): 5 TABLET ORAL at 06:28

## 2023-04-03 RX ADMIN — CLOPIDOGREL BISULFATE 75 MILLIGRAM(S): 75 TABLET, FILM COATED ORAL at 12:38

## 2023-04-03 RX ADMIN — Medication 325 MILLIGRAM(S): at 12:39

## 2023-04-03 RX ADMIN — TACROLIMUS 2 MILLIGRAM(S): 5 CAPSULE ORAL at 06:28

## 2023-04-03 RX ADMIN — LOSARTAN POTASSIUM 100 MILLIGRAM(S): 100 TABLET, FILM COATED ORAL at 16:59

## 2023-04-03 RX ADMIN — Medication 50 MILLIGRAM(S): at 14:21

## 2023-04-03 RX ADMIN — ISOSORBIDE DINITRATE 20 MILLIGRAM(S): 5 TABLET ORAL at 12:40

## 2023-04-03 RX ADMIN — Medication 50 MILLIGRAM(S): at 22:26

## 2023-04-03 RX ADMIN — TAMSULOSIN HYDROCHLORIDE 0.8 MILLIGRAM(S): 0.4 CAPSULE ORAL at 22:26

## 2023-04-03 RX ADMIN — PANTOPRAZOLE SODIUM 40 MILLIGRAM(S): 20 TABLET, DELAYED RELEASE ORAL at 17:03

## 2023-04-03 RX ADMIN — CEFTRIAXONE 100 MILLIGRAM(S): 500 INJECTION, POWDER, FOR SOLUTION INTRAMUSCULAR; INTRAVENOUS at 22:26

## 2023-04-03 RX ADMIN — PANTOPRAZOLE SODIUM 40 MILLIGRAM(S): 20 TABLET, DELAYED RELEASE ORAL at 06:29

## 2023-04-03 RX ADMIN — Medication 81 MILLIGRAM(S): at 12:39

## 2023-04-03 RX ADMIN — ATORVASTATIN CALCIUM 40 MILLIGRAM(S): 80 TABLET, FILM COATED ORAL at 22:26

## 2023-04-03 RX ADMIN — Medication 650 MILLIGRAM(S): at 23:30

## 2023-04-03 RX ADMIN — ISOSORBIDE DINITRATE 20 MILLIGRAM(S): 5 TABLET ORAL at 22:27

## 2023-04-03 RX ADMIN — TACROLIMUS 2 MILLIGRAM(S): 5 CAPSULE ORAL at 17:02

## 2023-04-03 NOTE — CONSULT NOTE ADULT - PROBLEM SELECTOR RECOMMENDATION 4
Patient remains full code. Role of palliative medicine was introduced. Patient was thankful for the time spent with him but wanted to continue to rest.   As discussed during the palliative IDT meeting, the patients PSSA screening did not identify any current psychosocial need or spiritual support deficits.  - Worship/Spiritual practice: Unknown   - Coping: [ x] well [ ] with difficulty [ ] poor coping   - Support system: [ ] strong [ x] adequate [ ] inadequate  - All questions answered, emotional support provided  -  primary team   - Please contact Palliative Medicine 24/7 at [phone] for any acute symptoms or further questions  - Will continue to follow with you Patient remains full code. Role of palliative medicine was introduced. Patient was thankful for the time spent with him but wanted to continue to rest.   As discussed during the palliative IDT meeting, the patients PSSA screening did not identify any current psychosocial need or spiritual support deficits.  - Cheondoism/Spiritual practice: Unknown   - Coping: [ x] well [ ] with difficulty [ ] poor coping   - Support system: [ ] strong [ x] adequate [ ] inadequate  - All questions answered, emotional support provided  -  primary team   - Please contact Palliative Medicine 24/7 at [phone] for any acute symptoms or further questions  - Will continue to follow with you Patient remains full code. Role of palliative medicine was introduced. Patient was thankful for the time spent with him but wanted to continue to rest.   As discussed during the palliative IDT meeting, the patients PSSA screening did not identify any current psychosocial need or spiritual support deficits.  - Advent/Spiritual practice: Unknown   - Coping: [ x] well [ ] with difficulty [ ] poor coping   - Support system: [ ] strong [ x] adequate [ ] inadequate  - All questions answered, emotional support provided  -  primary team   - Please contact Palliative Medicine 24/7 at [phone] for any acute symptoms or further questions  - Will continue to follow with you

## 2023-04-03 NOTE — PROGRESS NOTE ADULT - ASSESSMENT
62 y/oM PMHx  HTN, HLD, DM, ICM (s/p cath years ago, RCA 99%, never intervened on), HFrEF 25-30%, ESRD s/p failed kidney transplant (LUE AVF; HD TTS), R AKA initially admitted to [facility] 2/27 for Respiratory Distress 2/2  SIRS s/p HD session whose course c/b cardiac arrest and Vtach. Now s/p extubation/pressors whose course further c/b LGIB (hgb 3.5 s/p multiple transfusions and no active bleed). Transferred to [facility] 3/17 for ICD eval, however, found to have BRBPR on admission s/p flex sig without active bleed. Course further c/b ongoing fevers (Tmax 102) with w/u remarkable for UTIm now on IV abx. Plan for LHC and then SUBQ ICD pending GOC.          62 y/oM PMHx  HTN, HLD, DM, ICM (s/p cath years ago, RCA 99%, never intervened on), HFrEF 25-30%, ESRD s/p failed kidney transplant (LUE AVF; HD TTS), R AKA initially admitted to [facility] 2/27 for Respiratory Distress 2/2  SIRS s/p HD session whose course c/b cardiac arrest and Vtach. Now s/p extubation/pressors whose course further c/b LGIB (hgb 3.5 s/p multiple transfusions and no active bleed). Transferred to [facility] 3/17 for ICD eval, however, found to have BRBPR on admission s/p flex sig without active bleed. Course further c/b ongoing fevers (Tmax 102) with w/u remarkable for UTIm now on IV abx. Plan for LHC and then SUBQ ICD pending GOC.          62 y/oM PMHx  HTN, HLD, DM, ICM (s/p cath years ago, RCA 99%, never intervened on), HFrEF 25-30%, ESRD s/p failed kidney transplant (LUE AVF; HD TTS), R AKA initially admitted to [facility] 2/27 for Respiratory Distress 2/2  SIRS s/p HD session whose course c/b cardiac arrest and Vtach. Now s/p extubation/pressors whose course further c/b LGIB (hgb 3.5 s/p multiple transfusions and no active bleed). Transferred to [facility] 3/17 for ICD eval, however, found to have BRBPR on admission s/p flex sig without active bleed. Course further c/b ongoing fevers (Tmax 102) with w/u remarkable for UTIm now on IV abx. Plan for LHC and then SUBQ ICD pending GOC.          62 y/oM PMHx  HTN, HLD, DM, ICM (s/p cath years ago, RCA 99%, never intervened on), HFrEF 25-30%, ESRD s/p failed kidney transplant (LUE AVF; HD TTS), R AKA initially admitted to [facility] 2/27 for Respiratory Distress 2/2  SIRS s/p HD session whose course c/b cardiac arrest and Vtach. Now s/p extubation/pressors whose course further c/b LGIB (hgb 3.5 s/p multiple transfusions and no active bleed). Transferred to [facility] 3/17 for ICD eval, however, found to have BRBPR on admission s/p flex sig without active bleed. Course further c/b ongoing fevers (Tmax 102) with w/u remarkable for UTIm now on IV abx. Plan for LHC and then SUBQ ICD pending GOC. Pending Palliative Consult and Speech/Swallow Eval          62 y/oM PMHx  HTN, HLD, DM, ICM (s/p cath years ago, RCA 99%, never intervened on), HFrEF 25-30%, ESRD s/p failed kidney transplant (LUE AVF; HD TTS), R AKA initially admitted to [facility] 2/27 for Respiratory Distress 2/2  SIRS s/p HD session whose course c/b cardiac arrest and Vtach. Now s/p extubation/pressors whose course further c/b LGIB (hgb 3.5 s/p multiple transfusions and no active bleed). Transferred to [facility] 3/17 for ICD eval, however, found to have BRBPR on admission s/p flex sig without active bleed. Course further c/b ongoing fevers (Tmax 102) with w/u remarkable for UTIm now on IV abx. Plan for LHC and then SUBQ ICD pending GOC. Pending Palliative Consult and Speech/Swallow Eval          62 y/oM PMHx  HTN, HLD, DM, ICM (s/p cath years ago, RCA 99%, never intervened on), HFrEF 25-30%, ESRD s/p failed kidney transplant (LUE AVF; HD TTS), R AKA initially admitted to [facility] 2/27 for Respiratory Distress 2/2  SIRS s/p HD session whose course c/b cardiac arrest and Vtach. Now s/p extubation/pressors whose course further c/b LGIB (hgb 3.5 s/p multiple transfusions and no active bleed). Transferred to [facility] 3/17 for ICD eval, however, found to have BRBPR on admission s/p flex sig without active bleed. Course further c/b ongoing fevers (Tmax 102) with w/u remarkable for UTIm now on IV abx. Plan for LHC and then SUBQ ICD pending GOC. Pending Palliative Consult and Speech/Swallow Eval

## 2023-04-03 NOTE — PROGRESS NOTE ADULT - SUBJECTIVE AND OBJECTIVE BOX
INFECTIOUS DISEASES CONSULT FOLLOW-UP NOTE    INTERVAL HPI/OVERNIGHT EVENTS:    Patient was seen and examined at bedside. No acute overnight events. He states he is still having diarrhea- 6 episodes last night and 2 episodes this morning. He states stool is still loose/watery; denies blood in still. C.diff was negative. Denies any other complaints.     ROS:   Constitutional, eyes, ENT, cardiovascular, respiratory, gastrointestinal, genitourinary, integumentary, neurological, psychiatric and heme/lymph are otherwise negative other than noted above       ANTIBIOTICS/RELEVANT:    MEDICATIONS  (STANDING):  aspirin enteric coated 81 milliGRAM(s) Oral daily  atorvastatin 40 milliGRAM(s) Oral at bedtime  chlorhexidine 2% Cloths 1 Application(s) Topical daily  clopidogrel Tablet 75 milliGRAM(s) Oral daily  dextrose 5%. 1000 milliLiter(s) (50 mL/Hr) IV Continuous <Continuous>  dextrose 5%. 1000 milliLiter(s) (100 mL/Hr) IV Continuous <Continuous>  dextrose 50% Injectable 25 Gram(s) IV Push once  dextrose 50% Injectable 12.5 Gram(s) IV Push once  doxercalciferol Injectable 4 MICROGram(s) IV Push once  ferrous    sulfate 325 milliGRAM(s) Oral daily  hydrALAZINE 50 milliGRAM(s) Oral every 8 hours  insulin lispro (ADMELOG) corrective regimen sliding scale   SubCutaneous Before meals and at bedtime  isosorbide   dinitrate Tablet (ISORDIL) 20 milliGRAM(s) Oral three times a day  losartan 100 milliGRAM(s) Oral every 24 hours  metoprolol succinate ER 25 milliGRAM(s) Oral daily  pantoprazole  Injectable 40 milliGRAM(s) IV Push every 12 hours  tacrolimus 2 milliGRAM(s) Oral every 12 hours  tamsulosin 0.8 milliGRAM(s) Oral at bedtime    MEDICATIONS  (PRN):  acetaminophen     Tablet .. 650 milliGRAM(s) Oral every 6 hours PRN Mild Pain (1 - 3), Moderate Pain (4 - 6)  dextrose Oral Gel 15 Gram(s) Oral once PRN Blood Glucose LESS THAN 70 milliGRAM(s)/deciliter  sodium chloride 0.65% Nasal 1 Spray(s) Both Nostrils every 4 hours PRN Nasal Congestion        Vital Signs Last 24 Hrs  T(C): 37.3 (03 Apr 2023 09:07), Max: 37.8 (02 Apr 2023 21:44)  T(F): 99.2 (03 Apr 2023 09:07), Max: 100.1 (02 Apr 2023 21:44)  HR: 52 (03 Apr 2023 08:43) (52 - 72)  BP: 163/64 (03 Apr 2023 08:43) (124/73 - 163/64)  BP(mean): 79 (03 Apr 2023 00:43) (79 - 79)  RR: 16 (03 Apr 2023 08:43) (16 - 18)  SpO2: 100% (03 Apr 2023 08:43) (99% - 100%)    Parameters below as of 03 Apr 2023 08:43  Patient On (Oxygen Delivery Method): nasal cannula  O2 Flow (L/min): 2      04-02-23 @ 07:01  -  04-03-23 @ 07:00  --------------------------------------------------------  IN: 420 mL / OUT: 200 mL / NET: 220 mL    04-03-23 @ 07:01  -  04-03-23 @ 11:25  --------------------------------------------------------  IN: 120 mL / OUT: 0 mL / NET: 120 mL      PHYSICAL EXAM:  Constitutional: alert, NAD  Eyes: the sclera and conjunctiva were normal.   ENT: the ears and nose were normal in appearance.   Neck: the appearance of the neck was normal and the neck was supple.   Pulmonary: no respiratory distress and lungs were clear to auscultation bilaterally.   Heart: heart rate was normal and rhythm regular, normal S1 and S2  Vascular:. there was no peripheral edema  Abdomen: normal bowel sounds, soft, non-tender  Neurological: no focal deficits.   Psychiatric: the affect was normal        LABS:                        9.9    4.96  )-----------( 192      ( 03 Apr 2023 07:00 )             32.1     04-03    131<L>  |  96  |  2<L>  ----------------------------<  73  4.2   |  27  |  3.96<H>    Ca    8.1<L>      03 Apr 2023 07:00  Mg     1.7     04-03    TPro  4.9<L>  /  Alb  2.5<L>  /  TBili  0.4  /  DBili  x   /  AST  18  /  ALT  5<L>  /  AlkPhos  139<H>  04-03          MICROBIOLOGY:  Bcxs 3/29 NGTD 4 days     RADIOLOGY & ADDITIONAL STUDIES:  Reviewed

## 2023-04-03 NOTE — PROGRESS NOTE ADULT - NS ATTEND AMEND GEN_ALL_CORE FT
#Pyelitis of transplant kidney/UTI, diarrhea, immunosuppressive status, fever     Low grade temp to 100.1 overnight. patient reports diarrhea 6 times/day now. C.diff neg.  No abdominal pain and abdomen non-tender.   Unclear cause of low grade temp but seems fever curve overall improving.  Cont CTX 2g IV q24h and complete 2 weeks therapy on 4/4.  Check GI PCR.  If negative, then ok to start loperamide to control diarrhea.      Team 2 will follow you.  Case d/w primary team.    Luann Carvajal MD, MS  Infectious Disease attending  work cell [phone]   For any questions during evening/weekend/holiday, please page ID on call #Pyelitis of transplant kidney/UTI, diarrhea, immunosuppressive status, fever     Low grade temp to 100.1 overnight. patient reports diarrhea 6 times/day now. C.diff neg.  No abdominal pain and abdomen non-tender.   Unclear cause of low grade temp but seems fever curve overall improving.  Cont CTX 2g IV q24h and complete 2 weeks therapy on 4/4.  Check GI PCR.  If negative, then ok to start loperamide to control diarrhea.      Team 2 will follow you.  Case d/w primary team.    Luann Carvajal MD, MS  Infectious Disease attending  work cell [phone]   For any questions during evening/weekend/holiday, please page ID on call #Pyelitis of transplant kidney/UTI, diarrhea, immunosuppressive status, fever     Low grade temp to 100.1 overnight. patient reports diarrhea 6 times/day now. C.diff neg.  No abdominal pain and abdomen non-tender.   Unclear cause of low grade temp but seems fever curve overall improving.  Cont CTX 2g IV q24h and complete 2 weeks therapy on 4/4.  Check GI PCR.  If negative, then ok to start loperamide to control diarrhea.      Team 2 will follow you.  Case d/w primary team.    Luann Carvajal MD, MS  Infectious Disease attending  work cell [phone]   For any questions during evening/weekend/holiday, please page ID on call

## 2023-04-03 NOTE — PROGRESS NOTE ADULT - PROBLEM SELECTOR PLAN 5
LVEF 25-30% (likely ischemic).    - euvolemic on exam  - F/u repeat echo 3/31/23  - c/w Hydralazine 50mg q8hrs, Isordil 20mg TID, toprol 25mg qd, Losartan 100mg QD. - ECHOs as above   - Remains euvolemic on exam  - c/w Hydral 50mg TID, Losartan 100mg, Toprol XL 25mg and Isordil 20mg TID

## 2023-04-03 NOTE — PROGRESS NOTE ADULT - PROBLEM SELECTOR PLAN 6
Pt still produced some urine; urinary retention requiring multiple straight caths; now s/p indwelling duvall placement on 3/21/23.  - duvall removed, voided 300 cc on 3/31/23 - HD T/Th/Sat.  NEXT HD 4/4/23

## 2023-04-03 NOTE — PROGRESS NOTE ADULT - PROBLEM SELECTOR PLAN 9
- CONT: Hydralazine 50mg q8hrs, Isordil 20mg q8hrs, Losartan 100mg QD, toprol 25mg qd - Chol 88, , HDL 44, LDL 12.    - c/w Atorvastatin 40mg

## 2023-04-03 NOTE — CONSULT NOTE ADULT - SUBJECTIVE AND OBJECTIVE BOX
WANG ARELLANO          MRN-[id number]            (1960)    HPI:  63yo M PMHx HTN, HLD, DM,  ICM (s/p cath years ago, RCA 99%, never intervened on), HFrEF 25-30%, ESRD s/p failed kidney transplant (last HD 3/1 via Left Arm AVF; HD TTS), Right AKA presented to [facility]  for respiratory distress after a dialysis session and admitted to cardiac telemetry on  with SIRS criteria. Placed on vanc/zosyn. Pt was placed on BiPAP which failed, where he was then intubated on 3/1 and moved to the MICU. He went into cardiac arrest (RoSC achieved after 8mins). Went into Vtach arrest, was placed on amio and pressors, HR anatoly into 30s and atropine given twice. Weaned off levophed and sedation and extubated 3/2. Pt developed coffee ground emesis, hemoccult positive, with Hgb dipping to 3.5; he then received 7units PRBCs, 1Unit FFP, 1 unit donor packed platelets. EGD and colonoscopy showing melanosis duodenii but no acute bleed. BP dropped again, transferred to ICU and started on levophed gtt. CTA Abdomen showing no active bleeding into GI lumen, possible focal proctitis; colonoscopy showing rectal ulcer. Started on hydrocortisone suppository and mesalamine. Hgb now stable in 11s (per handoff), now transferred for ICD placement / Vtach and cardiac cath. (17 Mar 2023 03:32)    PAST MEDICAL & SURGICAL HISTORY:  HTN (hypertension)    HLD (hyperlipidemia)    DM (diabetes mellitus)    GERD (gastroesophageal reflux disease)    ESRD on dialysis    NSTEMI (non-ST elevation myocardial infarction)    CAD (coronary artery disease)    Fever of unknown origin (FUO)    FAMILY HISTORY: No family history of  dialysis         SOCIAL HISTORY:  Patient denied substance abuse, but did not want to talk further.     ROS:    Unable to attain due to:   n/a                     Dyspnea (Leeann 0-10): 0                     N/V (Y/N):             N                 Secretions (Y/N) :    N            Agitation(Y/N): N   Pain (Y/N):      N  -Provocation/Palliation: n/a   -Quality/Quantity: n/a   -Radiating: n/a   -Severity: n/a   -Timing/Frequency: n/a   -Impact on ADLs: n/a     General:  + weakness   HEENT:    Denied  Neck:  Denied  CVS:  Denied  Resp:  Denied  GI:  Denied  :  Denied  Musc:  Denied  Neuro:  Denied  Psych:  Denied  Skin:  Denied  Lymph:  Denied    ALLERGIES:  Allergies    Allergy Status Unknown    Intolerances        Opiate Naive (Y/N): Y  -iStop reviewed (Y/N):Y  (Ref#:    [id number]        )    MEDICATIONS:      MEDICATIONS  (STANDING):  aspirin enteric coated 81 milliGRAM(s) Oral daily  atorvastatin 40 milliGRAM(s) Oral at bedtime  chlorhexidine 2% Cloths 1 Application(s) Topical daily  clopidogrel Tablet 75 milliGRAM(s) Oral daily  dextrose 5%. 1000 milliLiter(s) (50 mL/Hr) IV Continuous <Continuous>  dextrose 5%. 1000 milliLiter(s) (100 mL/Hr) IV Continuous <Continuous>  dextrose 50% Injectable 25 Gram(s) IV Push once  dextrose 50% Injectable 12.5 Gram(s) IV Push once  doxercalciferol Injectable 4 MICROGram(s) IV Push once  ferrous    sulfate 325 milliGRAM(s) Oral daily  hydrALAZINE 50 milliGRAM(s) Oral every 8 hours  insulin lispro (ADMELOG) corrective regimen sliding scale   SubCutaneous Before meals and at bedtime  isosorbide   dinitrate Tablet (ISORDIL) 20 milliGRAM(s) Oral three times a day  losartan 100 milliGRAM(s) Oral every 24 hours  metoprolol succinate ER 25 milliGRAM(s) Oral daily  pantoprazole  Injectable 40 milliGRAM(s) IV Push every 12 hours  tacrolimus 2 milliGRAM(s) Oral every 12 hours  tamsulosin 0.8 milliGRAM(s) Oral at bedtime    MEDICATIONS  (PRN):  acetaminophen     Tablet .. 650 milliGRAM(s) Oral every 6 hours PRN Mild Pain (1 - 3), Moderate Pain (4 - 6)  dextrose Oral Gel 15 Gram(s) Oral once PRN Blood Glucose LESS THAN 70 milliGRAM(s)/deciliter  sodium chloride 0.65% Nasal 1 Spray(s) Both Nostrils every 4 hours PRN Nasal Congestion    LABS:    CBC:                        9.9    4.96  )-----------( 192      ( 2023 07:00 )             32.1     CMP:    04-03    131<L>  |  96  |  2<L>  ----------------------------<  73  4.2   |  27  |  3.96<H>    Ca    8.1<L>      2023 07:00  Mg     1.7         TPro  4.9<L>  /  Alb  2.5<L>  /  TBili  0.4  /  DBili  x   /  AST  18  /  ALT  5<L>  /  AlkPhos  139<H>      IMAGING:   < from: TTE Echo Complete w/o Contrast w/ Doppler (23 @ 10:20) >    ACC: [id number] EXAM:  ECHO TTE WO CON COMP W DOPP                          PROCEDURE DATE:  2023      CONCLUSIONS:     1. The entire inferior wall and basal and mid inferolateral wall are   akinetic and relatively thin compared to the septum. The basal and mid   anterolateral wall and apical lateral segment are hypokinetic. All   remaining scored segments are normal. Mildly dilated left ventricular   size. Left ventricular systolic function is mildly reduced with a   calculated ejection fraction of 40% with regional wall motion   abnormalities.   2. Moderately dilated left atrium.   3. Grade II left ventricular diastolic dysfunction.   4. Normal right ventricular size and systolic function.   5. Mild aortic regurgitation.   6. No pericardial effusion.   7. The aortic root is mildly dilated. The aortic root measures 3.81 cm   at level of the sinuses of Valsalva (normal 3.1-3.7 cm for men, 2.7-3.3   cm for women). The proximal ascending aorta is mildly dilated.The   proximal ascending aorta measures 3.35 cm (normal 2.6-3.4 cm for men,   2.3-3.1 cm for women). The proximal ascending aorta indexed measures 2.00   cm/m² (normal 1.3-1.7 cm/m2 for men, 1.3-1.9 cm/m2 for women).   8. No prior echo is available for comparison.    < end of copied text >    < from: CT Pelvis w/ IV Cont (23 @ 13:51) >  ACC: [id number] EXAM:  CT PELVIS ONLY IC   ORDERED BY: RADHA RAGSDALE     *** ADDENDUM # 1 ***    The prostate gland is normal size but is diffusely hyperenhancing. No   periprostatic inflammation or abscess. Findings are not specific for   prostatitis, neoplasm remains in the differential.    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   Unchanged amount of air in the collecting system. Mild urothelial   thickening and hyperenhancement suggestive of pyelitis. No evidence of   pyelonephritis or renal abscess.    Probable cystitis. Other incidental comments as above.    < end of copied text >    < from: NM PET/CT Onc FDG Skull to Thigh, Inital (23 @ 16:25) >  ACC: [id number] EXAM:  PETCT [facility] ONC FDG INIT   ORDERED BY: GILBERTO WORKMAN     PROCEDURE DATE:  2023  IMPRESSION:  1. Decreased density of the cortex of the right femoral shaft compared to   the left, without associated abnormal uptake, possibly representing a   non-FDG avid infiltrative process or osteopenia of other etiology.   Further evaluation with dedicated CT and/or MRI is recommended.  2. Transplant kidney in the right iliac fossa with tiny pockets of gas in   the upper pole, possibly from recent [facility] catheterization. Underlying   urinary tract infection cannot be excluded. Correlate clinically.  3. Multifocal uptake in the prostate gland, which can be seen inthe   setting of prostatitis and neoplastic disease. Correlate with PSA levels.  4. Small bilateral pleural effusions. Mosaic attenuation of the lungs,   most likely representing air trapping, atelectasis, and/or edema.      < end of copied text >    PHYSICAL EXAM:  T(C): 36.9 (23 @ 14:01), Max: 37.8 (23 @ 21:44)  HR: 52 (23 @ 12:24) (52 - 72)  BP: 158/70 (23 @ 12:24) (124/73 - 163/64)  RR: 14 (23 @ 12:24) (14 - 18)  SpO2: 100% (23 @ 12:24) (99% - 100%)  Wt(kg): 62.3kg  Daily     Daily   CAPILLARY BLOOD GLUCOSE    POCT Blood Glucose.: 80 mg/dL (2023 06:39)    I&O's Summary    2023 07:01  -  2023 07:00  --------------------------------------------------------  IN: 420 mL / OUT: 200 mL / NET: 220 mL    2023 07:01  -  2023 14:18  --------------------------------------------------------  IN: 120 mL / OUT: 250 mL / NET: -130 mL    GENERAL:  [x ]Alert  [x ]Oriented x 3  [ ]Lethargic due to sedation  [ ]Cachexia [x ]Verbal  [ ]Non-Verbal  Behavioral:   [ ] Anxiety  [ ] Delirium [ ] Agitation [ x] Other - calm   HEENT:  [ ]Normal   [x ]Dry mouth   [ ]ET Tube  [ ]Oral lesions  PULMONARY:   [x ]Clear  [ ]Tachypnea  [ ]Audible excessive secretions   [ ]Rhonchi     [ ]Right [ ]Left [ ]Bilateral  [ ]Crackles        [ ]Right [ ]Left [ ]Bilateral  [ ]Wheezing     [ ]Right [ ]Left [ ]Bilateral  CARDIOVASCULAR:    [x ]Regular [ ]Irregular [ ]Tachy  [ ]Anatoly [ ]Murmur [ ]Other  GASTROINTESTINAL:  [ x]Soft  [ ]Distended   [ ]+BS  [x ]Non tender [ ]Tender  [ ]PEG [ ]OGT/NGT  [] flexiseal with output  Last BM:   GENITOURINARY:  [ ]Normal [ ] Incontinent   [x ]Oliguria/Anuria   [ ]Moody  MUSCULOSKELETAL:   [ ]Normal   [x ]Weakness  [ ]Bed/Wheelchair bound [ ]Edema  NEUROLOGIC:   [ x]No focal deficits  [ ] Cognitive impairment  [ ] Dysphagia [ ]Dysarthria [ ] Paresis [  ]Other   SKIN:   [ ]Normal   [x ]Pressure ulcer(s)- Stage 2 Right buttocks  [ ]Rash     Preadmit Karnofsky: 70 %           Current Karnofsky:   50  %  Cachexia (Y/N): N  BMI: 23.6kg/m2     ADVANCED DIRECTIVES:     Full Code     No documented HCP form found on [facility]     No Living will / POA / Advance directives found on [facility] / [facility].     No documented GOC notes on [facility]    DECISION MAKER: The patient is able to participate in symptomatic assessment but may not be able to make complex medical decisions  LEGAL SURROGATE: No documented HCP in paper chart / [facility] / [facility].  Alternate surrogate: Brother -  [phone] / Evi (wife): [phone]    GOALS OF CARE DISCUSSION:       Palliative care info/counseling provided	           Documentation of GOC: 	Full code           REFERRALS:	        Unit SW/Case Mgmt       PT/OT WANG ARELLANO          MRN-[id number]            (1960)    HPI:  61yo M PMHx HTN, HLD, DM,  ICM (s/p cath years ago, RCA 99%, never intervened on), HFrEF 25-30%, ESRD s/p failed kidney transplant (last HD 3/1 via Left Arm AVF; HD TTS), Right AKA presented to [facility]  for respiratory distress after a dialysis session and admitted to cardiac telemetry on  with SIRS criteria. Placed on vanc/zosyn. Pt was placed on BiPAP which failed, where he was then intubated on 3/1 and moved to the MICU. He went into cardiac arrest (RoSC achieved after 8mins). Went into Vtach arrest, was placed on amio and pressors, HR anatoly into 30s and atropine given twice. Weaned off levophed and sedation and extubated 3/2. Pt developed coffee ground emesis, hemoccult positive, with Hgb dipping to 3.5; he then received 7units PRBCs, 1Unit FFP, 1 unit donor packed platelets. EGD and colonoscopy showing melanosis duodenii but no acute bleed. BP dropped again, transferred to ICU and started on levophed gtt. CTA Abdomen showing no active bleeding into GI lumen, possible focal proctitis; colonoscopy showing rectal ulcer. Started on hydrocortisone suppository and mesalamine. Hgb now stable in 11s (per handoff), now transferred for ICD placement / Vtach and cardiac cath. (17 Mar 2023 03:32)    PAST MEDICAL & SURGICAL HISTORY:  HTN (hypertension)    HLD (hyperlipidemia)    DM (diabetes mellitus)    GERD (gastroesophageal reflux disease)    ESRD on dialysis    NSTEMI (non-ST elevation myocardial infarction)    CAD (coronary artery disease)    Fever of unknown origin (FUO)    FAMILY HISTORY: No family history of  dialysis         SOCIAL HISTORY:  Patient denied substance abuse, but did not want to talk further.     ROS:    Unable to attain due to:   n/a                     Dyspnea (Leeann 0-10): 0                     N/V (Y/N):             N                 Secretions (Y/N) :    N            Agitation(Y/N): N   Pain (Y/N):      N  -Provocation/Palliation: n/a   -Quality/Quantity: n/a   -Radiating: n/a   -Severity: n/a   -Timing/Frequency: n/a   -Impact on ADLs: n/a     General:  + weakness   HEENT:    Denied  Neck:  Denied  CVS:  Denied  Resp:  Denied  GI:  Denied  :  Denied  Musc:  Denied  Neuro:  Denied  Psych:  Denied  Skin:  Denied  Lymph:  Denied    ALLERGIES:  Allergies    Allergy Status Unknown    Intolerances        Opiate Naive (Y/N): Y  -iStop reviewed (Y/N):Y  (Ref#:    [id number]        )    MEDICATIONS:      MEDICATIONS  (STANDING):  aspirin enteric coated 81 milliGRAM(s) Oral daily  atorvastatin 40 milliGRAM(s) Oral at bedtime  chlorhexidine 2% Cloths 1 Application(s) Topical daily  clopidogrel Tablet 75 milliGRAM(s) Oral daily  dextrose 5%. 1000 milliLiter(s) (50 mL/Hr) IV Continuous <Continuous>  dextrose 5%. 1000 milliLiter(s) (100 mL/Hr) IV Continuous <Continuous>  dextrose 50% Injectable 25 Gram(s) IV Push once  dextrose 50% Injectable 12.5 Gram(s) IV Push once  doxercalciferol Injectable 4 MICROGram(s) IV Push once  ferrous    sulfate 325 milliGRAM(s) Oral daily  hydrALAZINE 50 milliGRAM(s) Oral every 8 hours  insulin lispro (ADMELOG) corrective regimen sliding scale   SubCutaneous Before meals and at bedtime  isosorbide   dinitrate Tablet (ISORDIL) 20 milliGRAM(s) Oral three times a day  losartan 100 milliGRAM(s) Oral every 24 hours  metoprolol succinate ER 25 milliGRAM(s) Oral daily  pantoprazole  Injectable 40 milliGRAM(s) IV Push every 12 hours  tacrolimus 2 milliGRAM(s) Oral every 12 hours  tamsulosin 0.8 milliGRAM(s) Oral at bedtime    MEDICATIONS  (PRN):  acetaminophen     Tablet .. 650 milliGRAM(s) Oral every 6 hours PRN Mild Pain (1 - 3), Moderate Pain (4 - 6)  dextrose Oral Gel 15 Gram(s) Oral once PRN Blood Glucose LESS THAN 70 milliGRAM(s)/deciliter  sodium chloride 0.65% Nasal 1 Spray(s) Both Nostrils every 4 hours PRN Nasal Congestion    LABS:    CBC:                        9.9    4.96  )-----------( 192      ( 2023 07:00 )             32.1     CMP:    04-03    131<L>  |  96  |  2<L>  ----------------------------<  73  4.2   |  27  |  3.96<H>    Ca    8.1<L>      2023 07:00  Mg     1.7         TPro  4.9<L>  /  Alb  2.5<L>  /  TBili  0.4  /  DBili  x   /  AST  18  /  ALT  5<L>  /  AlkPhos  139<H>      IMAGING:   < from: TTE Echo Complete w/o Contrast w/ Doppler (23 @ 10:20) >    ACC: [id number] EXAM:  ECHO TTE WO CON COMP W DOPP                          PROCEDURE DATE:  2023      CONCLUSIONS:     1. The entire inferior wall and basal and mid inferolateral wall are   akinetic and relatively thin compared to the septum. The basal and mid   anterolateral wall and apical lateral segment are hypokinetic. All   remaining scored segments are normal. Mildly dilated left ventricular   size. Left ventricular systolic function is mildly reduced with a   calculated ejection fraction of 40% with regional wall motion   abnormalities.   2. Moderately dilated left atrium.   3. Grade II left ventricular diastolic dysfunction.   4. Normal right ventricular size and systolic function.   5. Mild aortic regurgitation.   6. No pericardial effusion.   7. The aortic root is mildly dilated. The aortic root measures 3.81 cm   at level of the sinuses of Valsalva (normal 3.1-3.7 cm for men, 2.7-3.3   cm for women). The proximal ascending aorta is mildly dilated.The   proximal ascending aorta measures 3.35 cm (normal 2.6-3.4 cm for men,   2.3-3.1 cm for women). The proximal ascending aorta indexed measures 2.00   cm/m² (normal 1.3-1.7 cm/m2 for men, 1.3-1.9 cm/m2 for women).   8. No prior echo is available for comparison.    < end of copied text >    < from: CT Pelvis w/ IV Cont (23 @ 13:51) >  ACC: [id number] EXAM:  CT PELVIS ONLY IC   ORDERED BY: RADHA RAGSDALE     *** ADDENDUM # 1 ***    The prostate gland is normal size but is diffusely hyperenhancing. No   periprostatic inflammation or abscess. Findings are not specific for   prostatitis, neoplasm remains in the differential.    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   Unchanged amount of air in the collecting system. Mild urothelial   thickening and hyperenhancement suggestive of pyelitis. No evidence of   pyelonephritis or renal abscess.    Probable cystitis. Other incidental comments as above.    < end of copied text >    < from: NM PET/CT Onc FDG Skull to Thigh, Inital (23 @ 16:25) >  ACC: [id number] EXAM:  PETCT [facility] ONC FDG INIT   ORDERED BY: GILBERTO WORKMAN     PROCEDURE DATE:  2023  IMPRESSION:  1. Decreased density of the cortex of the right femoral shaft compared to   the left, without associated abnormal uptake, possibly representing a   non-FDG avid infiltrative process or osteopenia of other etiology.   Further evaluation with dedicated CT and/or MRI is recommended.  2. Transplant kidney in the right iliac fossa with tiny pockets of gas in   the upper pole, possibly from recent [facility] catheterization. Underlying   urinary tract infection cannot be excluded. Correlate clinically.  3. Multifocal uptake in the prostate gland, which can be seen inthe   setting of prostatitis and neoplastic disease. Correlate with PSA levels.  4. Small bilateral pleural effusions. Mosaic attenuation of the lungs,   most likely representing air trapping, atelectasis, and/or edema.      < end of copied text >    PHYSICAL EXAM:  T(C): 36.9 (23 @ 14:01), Max: 37.8 (23 @ 21:44)  HR: 52 (23 @ 12:24) (52 - 72)  BP: 158/70 (23 @ 12:24) (124/73 - 163/64)  RR: 14 (23 @ 12:24) (14 - 18)  SpO2: 100% (23 @ 12:24) (99% - 100%)  Wt(kg): 62.3kg  Daily     Daily   CAPILLARY BLOOD GLUCOSE    POCT Blood Glucose.: 80 mg/dL (2023 06:39)    I&O's Summary    2023 07:01  -  2023 07:00  --------------------------------------------------------  IN: 420 mL / OUT: 200 mL / NET: 220 mL    2023 07:01  -  2023 14:18  --------------------------------------------------------  IN: 120 mL / OUT: 250 mL / NET: -130 mL    GENERAL:  [x ]Alert  [x ]Oriented x 3  [ ]Lethargic due to sedation  [ ]Cachexia [x ]Verbal  [ ]Non-Verbal  Behavioral:   [ ] Anxiety  [ ] Delirium [ ] Agitation [ x] Other - calm   HEENT:  [ ]Normal   [x ]Dry mouth   [ ]ET Tube  [ ]Oral lesions  PULMONARY:   [x ]Clear  [ ]Tachypnea  [ ]Audible excessive secretions   [ ]Rhonchi     [ ]Right [ ]Left [ ]Bilateral  [ ]Crackles        [ ]Right [ ]Left [ ]Bilateral  [ ]Wheezing     [ ]Right [ ]Left [ ]Bilateral  CARDIOVASCULAR:    [x ]Regular [ ]Irregular [ ]Tachy  [ ]Anatoly [ ]Murmur [ ]Other  GASTROINTESTINAL:  [ x]Soft  [ ]Distended   [ ]+BS  [x ]Non tender [ ]Tender  [ ]PEG [ ]OGT/NGT  [] flexiseal with output  Last BM:   GENITOURINARY:  [ ]Normal [ ] Incontinent   [x ]Oliguria/Anuria   [ ]Moody  MUSCULOSKELETAL:   [ ]Normal   [x ]Weakness  [ ]Bed/Wheelchair bound [ ]Edema  NEUROLOGIC:   [ x]No focal deficits  [ ] Cognitive impairment  [ ] Dysphagia [ ]Dysarthria [ ] Paresis [  ]Other   SKIN:   [ ]Normal   [x ]Pressure ulcer(s)- Stage 2 Right buttocks  [ ]Rash     Preadmit Karnofsky: 70 %           Current Karnofsky:   50  %  Cachexia (Y/N): N  BMI: 23.6kg/m2     ADVANCED DIRECTIVES:     Full Code     No documented HCP form found on [facility]     No Living will / POA / Advance directives found on [facility] / [facility].     No documented GOC notes on [facility]    DECISION MAKER: The patient is able to participate in symptomatic assessment but may not be able to make complex medical decisions  LEGAL SURROGATE: No documented HCP in paper chart / [facility] / [facility].  Alternate surrogate: Brother -  [phone] / Evi (wife): [phone]    GOALS OF CARE DISCUSSION:       Palliative care info/counseling provided	           Documentation of GOC: 	Full code           REFERRALS:	        Unit SW/Case Mgmt       PT/OT WANG ARELLANO          MRN-[id number]            (1960)    HPI:  63yo M PMHx HTN, HLD, DM,  ICM (s/p cath years ago, RCA 99%, never intervened on), HFrEF 25-30%, ESRD s/p failed kidney transplant (last HD 3/1 via Left Arm AVF; HD TTS), Right AKA presented to [facility]  for respiratory distress after a dialysis session and admitted to cardiac telemetry on  with SIRS criteria. Placed on vanc/zosyn. Pt was placed on BiPAP which failed, where he was then intubated on 3/1 and moved to the MICU. He went into cardiac arrest (RoSC achieved after 8mins). Went into Vtach arrest, was placed on amio and pressors, HR anatoly into 30s and atropine given twice. Weaned off levophed and sedation and extubated 3/2. Pt developed coffee ground emesis, hemoccult positive, with Hgb dipping to 3.5; he then received 7units PRBCs, 1Unit FFP, 1 unit donor packed platelets. EGD and colonoscopy showing melanosis duodenii but no acute bleed. BP dropped again, transferred to ICU and started on levophed gtt. CTA Abdomen showing no active bleeding into GI lumen, possible focal proctitis; colonoscopy showing rectal ulcer. Started on hydrocortisone suppository and mesalamine. Hgb now stable in 11s (per handoff), now transferred for ICD placement / Vtach and cardiac cath. (17 Mar 2023 03:32)    PAST MEDICAL & SURGICAL HISTORY:  HTN (hypertension)    HLD (hyperlipidemia)    DM (diabetes mellitus)    GERD (gastroesophageal reflux disease)    ESRD on dialysis    NSTEMI (non-ST elevation myocardial infarction)    CAD (coronary artery disease)    Fever of unknown origin (FUO)    FAMILY HISTORY: No family history of  dialysis         SOCIAL HISTORY:  Patient denied substance abuse, but did not want to talk further.     ROS:    Unable to attain due to:   n/a                     Dyspnea (Leeann 0-10): 0                     N/V (Y/N):             N                 Secretions (Y/N) :    N            Agitation(Y/N): N   Pain (Y/N):      N  -Provocation/Palliation: n/a   -Quality/Quantity: n/a   -Radiating: n/a   -Severity: n/a   -Timing/Frequency: n/a   -Impact on ADLs: n/a     General:  + weakness   HEENT:    Denied  Neck:  Denied  CVS:  Denied  Resp:  Denied  GI:  Denied  :  Denied  Musc:  Denied  Neuro:  Denied  Psych:  Denied  Skin:  Denied  Lymph:  Denied    ALLERGIES:  Allergies    Allergy Status Unknown    Intolerances        Opiate Naive (Y/N): Y  -iStop reviewed (Y/N):Y  (Ref#:    [id number]        )    MEDICATIONS:      MEDICATIONS  (STANDING):  aspirin enteric coated 81 milliGRAM(s) Oral daily  atorvastatin 40 milliGRAM(s) Oral at bedtime  chlorhexidine 2% Cloths 1 Application(s) Topical daily  clopidogrel Tablet 75 milliGRAM(s) Oral daily  dextrose 5%. 1000 milliLiter(s) (50 mL/Hr) IV Continuous <Continuous>  dextrose 5%. 1000 milliLiter(s) (100 mL/Hr) IV Continuous <Continuous>  dextrose 50% Injectable 25 Gram(s) IV Push once  dextrose 50% Injectable 12.5 Gram(s) IV Push once  doxercalciferol Injectable 4 MICROGram(s) IV Push once  ferrous    sulfate 325 milliGRAM(s) Oral daily  hydrALAZINE 50 milliGRAM(s) Oral every 8 hours  insulin lispro (ADMELOG) corrective regimen sliding scale   SubCutaneous Before meals and at bedtime  isosorbide   dinitrate Tablet (ISORDIL) 20 milliGRAM(s) Oral three times a day  losartan 100 milliGRAM(s) Oral every 24 hours  metoprolol succinate ER 25 milliGRAM(s) Oral daily  pantoprazole  Injectable 40 milliGRAM(s) IV Push every 12 hours  tacrolimus 2 milliGRAM(s) Oral every 12 hours  tamsulosin 0.8 milliGRAM(s) Oral at bedtime    MEDICATIONS  (PRN):  acetaminophen     Tablet .. 650 milliGRAM(s) Oral every 6 hours PRN Mild Pain (1 - 3), Moderate Pain (4 - 6)  dextrose Oral Gel 15 Gram(s) Oral once PRN Blood Glucose LESS THAN 70 milliGRAM(s)/deciliter  sodium chloride 0.65% Nasal 1 Spray(s) Both Nostrils every 4 hours PRN Nasal Congestion    LABS:    CBC:                        9.9    4.96  )-----------( 192      ( 2023 07:00 )             32.1     CMP:    04-03    131<L>  |  96  |  2<L>  ----------------------------<  73  4.2   |  27  |  3.96<H>    Ca    8.1<L>      2023 07:00  Mg     1.7         TPro  4.9<L>  /  Alb  2.5<L>  /  TBili  0.4  /  DBili  x   /  AST  18  /  ALT  5<L>  /  AlkPhos  139<H>      IMAGING:   < from: TTE Echo Complete w/o Contrast w/ Doppler (23 @ 10:20) >    ACC: [id number] EXAM:  ECHO TTE WO CON COMP W DOPP                          PROCEDURE DATE:  2023      CONCLUSIONS:     1. The entire inferior wall and basal and mid inferolateral wall are   akinetic and relatively thin compared to the septum. The basal and mid   anterolateral wall and apical lateral segment are hypokinetic. All   remaining scored segments are normal. Mildly dilated left ventricular   size. Left ventricular systolic function is mildly reduced with a   calculated ejection fraction of 40% with regional wall motion   abnormalities.   2. Moderately dilated left atrium.   3. Grade II left ventricular diastolic dysfunction.   4. Normal right ventricular size and systolic function.   5. Mild aortic regurgitation.   6. No pericardial effusion.   7. The aortic root is mildly dilated. The aortic root measures 3.81 cm   at level of the sinuses of Valsalva (normal 3.1-3.7 cm for men, 2.7-3.3   cm for women). The proximal ascending aorta is mildly dilated.The   proximal ascending aorta measures 3.35 cm (normal 2.6-3.4 cm for men,   2.3-3.1 cm for women). The proximal ascending aorta indexed measures 2.00   cm/m² (normal 1.3-1.7 cm/m2 for men, 1.3-1.9 cm/m2 for women).   8. No prior echo is available for comparison.    < end of copied text >    < from: CT Pelvis w/ IV Cont (23 @ 13:51) >  ACC: [id number] EXAM:  CT PELVIS ONLY IC   ORDERED BY: RADHA RAGSDALE     *** ADDENDUM # 1 ***    The prostate gland is normal size but is diffusely hyperenhancing. No   periprostatic inflammation or abscess. Findings are not specific for   prostatitis, neoplasm remains in the differential.    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   Unchanged amount of air in the collecting system. Mild urothelial   thickening and hyperenhancement suggestive of pyelitis. No evidence of   pyelonephritis or renal abscess.    Probable cystitis. Other incidental comments as above.    < end of copied text >    < from: NM PET/CT Onc FDG Skull to Thigh, Inital (23 @ 16:25) >  ACC: [id number] EXAM:  PETCT [facility] ONC FDG INIT   ORDERED BY: GILBERTO WORKMAN     PROCEDURE DATE:  2023  IMPRESSION:  1. Decreased density of the cortex of the right femoral shaft compared to   the left, without associated abnormal uptake, possibly representing a   non-FDG avid infiltrative process or osteopenia of other etiology.   Further evaluation with dedicated CT and/or MRI is recommended.  2. Transplant kidney in the right iliac fossa with tiny pockets of gas in   the upper pole, possibly from recent Moody catheterization. Underlying   urinary tract infection cannot be excluded. Correlate clinically.  3. Multifocal uptake in the prostate gland, which can be seen inthe   setting of prostatitis and neoplastic disease. Correlate with PSA levels.  4. Small bilateral pleural effusions. Mosaic attenuation of the lungs,   most likely representing air trapping, atelectasis, and/or edema.      < end of copied text >    PHYSICAL EXAM:  T(C): 36.9 (23 @ 14:01), Max: 37.8 (23 @ 21:44)  HR: 52 (23 @ 12:24) (52 - 72)  BP: 158/70 (23 @ 12:24) (124/73 - 163/64)  RR: 14 (23 @ 12:24) (14 - 18)  SpO2: 100% (23 @ 12:24) (99% - 100%)  Wt(kg): 62.3kg  Daily     Daily   CAPILLARY BLOOD GLUCOSE    POCT Blood Glucose.: 80 mg/dL (2023 06:39)    I&O's Summary    2023 07:01  -  2023 07:00  --------------------------------------------------------  IN: 420 mL / OUT: 200 mL / NET: 220 mL    2023 07:01  -  2023 14:18  --------------------------------------------------------  IN: 120 mL / OUT: 250 mL / NET: -130 mL    GENERAL:  [x ]Alert  [x ]Oriented x 3  [ ]Lethargic due to sedation  [ ]Cachexia [x ]Verbal  [ ]Non-Verbal  Behavioral:   [ ] Anxiety  [ ] Delirium [ ] Agitation [ x] Other - calm   HEENT:  [ ]Normal   [x ]Dry mouth   [ ]ET Tube  [ ]Oral lesions  PULMONARY:   [x ]Clear  [ ]Tachypnea  [ ]Audible excessive secretions   [ ]Rhonchi     [ ]Right [ ]Left [ ]Bilateral  [ ]Crackles        [ ]Right [ ]Left [ ]Bilateral  [ ]Wheezing     [ ]Right [ ]Left [ ]Bilateral  CARDIOVASCULAR:    [x ]Regular [ ]Irregular [ ]Tachy  [ ]Anatoly [ ]Murmur [ ]Other  GASTROINTESTINAL:  [ x]Soft  [ ]Distended   [ ]+BS  [x ]Non tender [ ]Tender  [ ]PEG [ ]OGT/NGT  [] flexiseal with output  Last BM:   GENITOURINARY:  [ ]Normal [ ] Incontinent   [x ]Oliguria/Anuria   [ ]Moody  MUSCULOSKELETAL:   [ ]Normal   [x ]Weakness  [ ]Bed/Wheelchair bound [ ]Edema  NEUROLOGIC:   [ x]No focal deficits  [ ] Cognitive impairment  [ ] Dysphagia [ ]Dysarthria [ ] Paresis [  ]Other   SKIN:   [ ]Normal   [x ]Pressure ulcer(s)- Stage 2 Right buttocks  [ ]Rash     Preadmit Karnofsky: 70 %           Current Karnofsky:   50  %  Cachexia (Y/N): N  BMI: 23.6kg/m2     ADVANCED DIRECTIVES:     Full Code     No documented HCP form found on [facility]     No Living will / POA / Advance directives found on [place] / [facility].     No documented GOC notes on [place]    DECISION MAKER: The patient is able to participate in symptomatic assessment but may not be able to make complex medical decisions  LEGAL SURROGATE: No documented HCP in paper chart / [place] / [facility].  Alternate surrogate: Brother -  [phone] / Evi (wife): [phone]    GOALS OF CARE DISCUSSION:       Palliative care info/counseling provided	           Documentation of GOC: 	Full code           REFERRALS:	        Unit SW/Case Mgmt       PT/OT

## 2023-04-03 NOTE — PROGRESS NOTE ADULT - PROBLEM SELECTOR PLAN 11
- HgA1c 5.9.    - CONT: MICHELINE. - reduced by urology 03/29/23     F: Oral intake  E: Replete electrolytes as needed for K<4 and Mg<2  N: DASH Diet  DVT PPX: holding AC; SCD  Dispo: pending infectious work up/possible LHC w/ subsequent ICD

## 2023-04-03 NOTE — PROGRESS NOTE ADULT - PROBLEM SELECTOR PLAN 3
s/p VTach arrest @ [facility]; no tele events noted.    - EP Consulted – plan for ICD placement once pt has LHC   - c/w toprol 25mg qd s/p VTach arrest @ [facility]; no tele events noted.    - EP Consulted – plan for ICD placement once pt has LHC   - c/w toprol 25mg qd s/p VTach arrest @ [facility]; no tele events noted.    - EP Consulted – plan for ICD placement once pt has LHC   - c/w toprol 25mg qd s/p VTach arrest @ [facility]; no tele events noted.    - EP Consulted – plan for SubQ ICD placement once pt has LHC   - c/w Toprol XL 25mg s/p VTach arrest @ [facility]; no tele events noted.    - EP Consulted – plan for SubQ ICD placement once pt has LHC   - c/w Toprol XL 25mg s/p VTach arrest @ [facility]; no tele events noted.    - EP Consulted – plan for SubQ ICD placement once pt has LHC   - c/w Toprol XL 25mg

## 2023-04-03 NOTE — CONSULT NOTE ADULT - PROBLEM SELECTOR RECOMMENDATION 2
Patient with known heart failure. Latest TTE showed an EF of 40%. Patient is Euvolemic on exam. Continue current medication that he is taking Hydralazine 50mg q8hrs, Isordil 20mg TID, toprol 25mg qd, Losartan 100mg QD. Continue care as per cardiology team.

## 2023-04-03 NOTE — PROGRESS NOTE ADULT - PROBLEM SELECTOR PLAN 10
- Chol 88, , HDL 44, LDL 12.    - CONT: Atorvastatin 40mg PO QHS. - HgA1c 5.9.    - CONT: MICHELINE.

## 2023-04-03 NOTE — PROGRESS NOTE ADULT - PROBLEM SELECTOR PLAN 7
- HD T/Th/Sat.    - NEXT HD 4/1/23 s/p failed kidney transplant   - Continue home Tacrolimus 2mg PO BID.    - per referring Dr Barrera, pt still requires tacrolimus even if failed transplant due to possibility of graft vs host

## 2023-04-03 NOTE — PROGRESS NOTE ADULT - SUBJECTIVE AND OBJECTIVE BOX
CARDIOLOGY NP PROGRESS NOTE    Subjective:  Received pt awake in bed in NAD. Denies CP/SOB, dizziness/diaphoresis, n/v, palpitations.  Remainder ROS otherwise negative.    Overnight Events:  No acute events overnight     TELEMETRY:  SB HR 50s     VITAL SIGNS:  T(C): 37.3 (04-03-23 @ 09:07), Max: 37.8 (04-02-23 @ 21:44)  HR: 52 (04-03-23 @ 12:24) (52 - 72)  BP: 158/70 (04-03-23 @ 12:24) (124/73 - 163/64)  RR: 14 (04-03-23 @ 12:24) (14 - 18)  SpO2: 100% (04-03-23 @ 12:24) (99% - 100%)  Wt(kg): --    I&O's Summary    02 Apr 2023 07:01  -  03 Apr 2023 07:00  --------------------------------------------------------  IN: 420 mL / OUT: 200 mL / NET: 220 mL    03 Apr 2023 07:01  -  03 Apr 2023 12:31  --------------------------------------------------------  IN: 120 mL / OUT: 0 mL / NET: 120 mL          PHYSICAL EXAM:    General: A/ox 3, No acute Distress  Neck: Supple, NO JVD  Cardiac: S1 S2, No M/R/G  Pulmonary: CTAB, Breathing unlabored, No Rhonchi/Rales/Wheezing  Abdomen: Soft, Non -tender, +BS x 4 quads  Extremities: No Rashes, No edema  Neuro: A/o x 3, No focal deficits          LABS:                          9.9    4.96  )-----------( 192      ( 03 Apr 2023 07:00 )             32.1                              04-03    131<L>  |  96  |  2<L>  ----------------------------<  73  4.2   |  27  |  3.96<H>    Ca    8.1<L>      03 Apr 2023 07:00  Mg     1.7     04-03    TPro  4.9<L>  /  Alb  2.5<L>  /  TBili  0.4  /  DBili  x   /  AST  18  /  ALT  5<L>  /  AlkPhos  139<H>  04-03    LIVER FUNCTIONS - ( 03 Apr 2023 07:00 )  Alb: 2.5 g/dL / Pro: 4.9 g/dL / ALK PHOS: 139 U/L / ALT: 5 U/L / AST: 18 U/L / GGT: x                                   CAPILLARY BLOOD GLUCOSE      POCT Blood Glucose.: 80 mg/dL (03 Apr 2023 06:39)  POCT Blood Glucose.: 86 mg/dL (02 Apr 2023 21:27)  POCT Blood Glucose.: 79 mg/dL (02 Apr 2023 17:18)            Allergies:  Allergy Status Unknown    MEDICATIONS  (STANDING):  aspirin enteric coated 81 milliGRAM(s) Oral daily  atorvastatin 40 milliGRAM(s) Oral at bedtime  chlorhexidine 2% Cloths 1 Application(s) Topical daily  clopidogrel Tablet 75 milliGRAM(s) Oral daily  dextrose 5%. 1000 milliLiter(s) (50 mL/Hr) IV Continuous <Continuous>  dextrose 5%. 1000 milliLiter(s) (100 mL/Hr) IV Continuous <Continuous>  dextrose 50% Injectable 25 Gram(s) IV Push once  dextrose 50% Injectable 12.5 Gram(s) IV Push once  doxercalciferol Injectable 4 MICROGram(s) IV Push once  ferrous    sulfate 325 milliGRAM(s) Oral daily  hydrALAZINE 50 milliGRAM(s) Oral every 8 hours  insulin lispro (ADMELOG) corrective regimen sliding scale   SubCutaneous Before meals and at bedtime  isosorbide   dinitrate Tablet (ISORDIL) 20 milliGRAM(s) Oral three times a day  losartan 100 milliGRAM(s) Oral every 24 hours  metoprolol succinate ER 25 milliGRAM(s) Oral daily  pantoprazole  Injectable 40 milliGRAM(s) IV Push every 12 hours  tacrolimus 2 milliGRAM(s) Oral every 12 hours  tamsulosin 0.8 milliGRAM(s) Oral at bedtime    MEDICATIONS  (PRN):  acetaminophen     Tablet .. 650 milliGRAM(s) Oral every 6 hours PRN Mild Pain (1 - 3), Moderate Pain (4 - 6)  dextrose Oral Gel 15 Gram(s) Oral once PRN Blood Glucose LESS THAN 70 milliGRAM(s)/deciliter  sodium chloride 0.65% Nasal 1 Spray(s) Both Nostrils every 4 hours PRN Nasal Congestion        DIAGNOSTIC TESTS:

## 2023-04-03 NOTE — PROGRESS NOTE ADULT - ASSESSMENT
62M h/o ICM, HFrEF, ESRD s/p failed RT (HD via LUE AVF), R AKA, DM transferred to [facility] from [facility] on 3/17 for ICD placement with prolonged fever, found to have cystitis and pyelitis of transplanted kidney due to K.pneumo w/o pyelonephritis or abscess. While PET CT showed malignancy of prostate vs prostatitis, CT scan was negative for abscess or prostatitis per Dr. Parra (radiology).  PSA is 12 - prostate ca should be ruled out.  Patient still with intermittent low grade fever despite appropriate abx for pyelitis, and he now is continuing to have diarrhea (6 episodes last night, 2 this AM).  Although he has normal WBC, he is immunocompromised patient. C. diff negative.       Suggest:  - Obtain GI PCR, if negative can give Loperamide   - F/U Echo  - Cont ceftriaxone 2 g IV q24h.  Would plan for two week course from start of active therapy (3/21-4/4).    - Obtain collateral info to find out if he has done any prostate cancer screening as outpatient    Team 2 will follow you.    Case d/w primary team. Final recommendations pending attending note.      Jaja Baig, Infectious Diseases PA  62M h/o ICM, HFrEF, ESRD s/p failed RT (HD via LUE AVF), R AKA, DM transferred to [facility] from [facility] on 3/17 for ICD placement with prolonged fever, found to have cystitis and pyelitis of transplanted kidney due to K.pneumo w/o pyelonephritis or abscess. While PET CT showed malignancy of prostate vs prostatitis, CT scan was negative for abscess or prostatitis per Dr. Parra (radiology).  PSA is 12 - prostate ca should be ruled out.  Patient still with intermittent low grade fever despite appropriate abx for pyelitis, and he now is continuing to have diarrhea (6 episodes last night, 2 this AM).  Although he has normal WBC, he is immunocompromised patient. C. diff negative.       Suggest:  - Obtain GI PCR, if negative can give Loperamide   - F/U Echo  - Cont ceftriaxone 2 g IV q24h.  Would plan for two week course from start of active therapy (3/21-4/4).    - Obtain collateral info to find out if he has done any prostate cancer screening as outpatient    Team 2 will follow you.    Case d/w primary team. Final recommendations pending attending note.      Jaja Baig, Infectious Diseases PA  62M h/o ICM, HFrEF, ESRD s/p failed RT (HD via LUE AVF), R AKA, DM transferred to [facility] from [facility] on 3/17 for ICD placement with prolonged fever, found to have cystitis and pyelitis of transplanted kidney due to K.pneumo w/o pyelonephritis or abscess. While PET CT showed malignancy of prostate vs prostatitis, CT scan was negative for abscess or prostatitis per Dr. Parra (radiology).  PSA is 12 - prostate ca should be ruled out.  Patient still with intermittent low grade fever despite appropriate abx for pyelitis, and he now is continuing to have diarrhea (6 episodes last night, 2 this AM).  Although he has normal WBC, he is immunocompromised patient. C. diff negative.       Suggest:  - Obtain GI PCR, if negative can give Loperamide   - F/U Echo  - Cont ceftriaxone 2 g IV q24h.  Would plan for two week course from start of active therapy (3/21-4/4).    - Obtain collateral info to find out if he has done any prostate cancer screening as outpatient    Team 2 will follow you.    Case d/w primary team. Final recommendations pending attending note.      Jaja Baig, Infectious Diseases PA

## 2023-04-03 NOTE — PROGRESS NOTE ADULT - ASSESSMENT
62Y M w/ HTN, DM, ESRD s/p failed kidney transplant who was transferred to [facility] (3/17) from [place] for ICD placement and Cardiac Cath after prolonged admission c/b cardiac arrest and vtach arrest and hypovolemic shock 2/2 GI bleed. New fever 3/21, s/p NM PET/CT c/w prostatitis, on CTX per ID, BK virus negative. Pending repeat cath and ICD placement once infection cleared. Also w/ intermittent melena on 3/23, GI and surg following but no intervention planned    #ESRD on HD TTS  HD per schedule on 4/4  Daily BMP   K noted 4.2  Continue with Tacro at home dose. Recommend pt follow up with outpatient nephrologist regarding tacro    HTN:  UF with HD as tolerated   Amlodipine dc'd 2/2 hypotensive episodes  Continue losartan 100mg, metoprolol 25mg ER, hydralazine 50mg TID and Isordil 20mg TID. Hold meds for SBP<110    Anemia:  Hgb within goal  epo w/ HD  Defer iron replacement i/s/o active infection, so will not check iron profile at the moment  Transfusion per primary      MBD:  Calcium corrected wnl  iPTH 431 (3/19)  On Hectorol 4mcg TIW as outpatient. Will continue  Check phos twice weekly. Goal <5.5. Can replete if needed to keep>3   62Y M w/ HTN, DM, ESRD s/p failed kidney transplant who was transferred to [facility] (3/17) from [place] for ICD placement and Cardiac Cath after prolonged admission c/b cardiac arrest and vtach arrest and hypovolemic shock 2/2 GI bleed. New fever 3/21, s/p NM PET/CT c/w prostatitis, on CTX per ID, BK virus negative. Pending repeat cath and ICD placement once infection cleared. Also w/ intermittent melena on 3/23, GI and surg following but no intervention planned    #ESRD on HD TTS  HD per schedule on 4/4  Daily BMP   K noted 4.2  Continue with Tacro at home dose. Recommend pt follow up with outpatient nephrologist regarding tacro    HTN:  UF with HD as tolerated   Amlodipine dc'd 2/2 hypotensive episodes  Continue losartan 100mg, metoprolol 25mg ER, hydralazine 50mg TID and Isordil 20mg TID. Hold meds for SBP<110    Anemia:  Hgb within goal  epo w/ HD  Defer iron replacement i/s/o active infection, so will not check iron profile at the moment  Transfusion per primary      MBD:  Calcium corrected wnl  iPTH 431 (3/19)  On Hectorol 4mcg TIW as outpatient. Will continue  Check phos twice weekly. Goal <5.5. Can replete if needed to keep>3   62Y M w/ HTN, DM, ESRD s/p failed kidney transplant who was transferred to [facility] (3/17) from [place] for ICD placement and Cardiac Cath after prolonged admission c/b cardiac arrest and vtach arrest and hypovolemic shock 2/2 GI bleed. New fever 3/21, s/p NM PET/CT c/w prostatitis, on CTX per ID, BK virus negative. Pending repeat cath and ICD placement once infection cleared. Also w/ intermittent melena on 3/23, GI and surg following but no intervention planned    #ESRD on HD TTS  HD per schedule on 4/4  Daily BMP   K noted 4.2  Continue with Tacro at home dose. Recommend pt follow up with outpatient nephrologist regarding tacro    HTN:  UF with HD as tolerated   Amlodipine dc'd 2/2 hypotensive episodes  Continue losartan 100mg, metoprolol 25mg ER, hydralazine 50mg TID and Isordil 20mg TID. Hold meds for SBP<110    Anemia:  Hgb within goal  epo w/ HD  Defer iron replacement i/s/o active infection, so will not check iron profile at the moment  Transfusion per primary      MBD:  Calcium corrected wnl  iPTH 431 (3/19)  On Hectorol 4mcg TIW as outpatient. Will continue  Check phos twice weekly. Goal <5.5. Can replete if needed to keep>3

## 2023-04-03 NOTE — PROGRESS NOTE ADULT - PROBLEM SELECTOR PLAN 4
@ [facility], severe GIB w/ Hgb down to 3.5 received total of 7u PRBCs.    - 3/17 AM pt passed loose BM along w/ large amount of BRBPR.    - s/p Flex sigmoidoscopy – Localized ulceration and erosion w/ no bleeding in rectum, suggestive of solitary ulcer syndrome; evidence of prior hemoclip was found in sigmoid colon.    - Avoid anal digitation and enemas  - Fecal occult negative 3/24 and cleared by GI for DAPT  - Transfusion goal Hgb < 8.0 @ [facility], severe GIB w/ Hgb down to 3.5 received total of 7u PRBCs.    - 3/17 AM pt passed loose BM along w/ large amount of BRBPR.    - s/p Flex sigmoidoscopy – Localized ulceration and erosion w/ no bleeding in rectum, suggestive of solitary ulcer syndrome; evidence of prior hemoclip was found in sigmoid colon.    - Avoid anal digitation and enemas  - Fecal occult negative 3/24 and cleared by GI for DAPT  - Transfusion goal Hgb < 8.0 @ [facility], severe GIB w/ Hgb down to 3.5 received total of 7u PRBCs.    - 3/17 AM pt passed loose BM along w/ large amount of BRBPR.    - s/p Flex sigmoidoscopy – Localized ulceration and erosion w/ no bleeding in rectum, suggestive of solitary ulcer syndrome; evidence of prior hemoclip was found in sigmoid colon.    - Avoid anal digitation and enemas  - Fecal occult negative 3/24 and cleared by GI for DAPT  - Transfusion goal Hgb < 8.0

## 2023-04-03 NOTE — PROGRESS NOTE ADULT - PROBLEM SELECTOR PLAN 1
Recurrent fevers while on IV ABX. Tmax 102.  - Source 2/2 Urinary vs. Prostatitis   - BCx NGTD; BK virus (-); UCx (+) for Klebsiella Pneumonia   - PET scan 3/25 - PET scan showing increased uptake in prostate suggesting prostatitis vs. neoplasm; attempt made to contact wife on 3/31/23 - unclear if patient has outpatient prostate workup   - CT Pelvis 3/28. significant for mild urothelial thickening and hyperenhancement suggestive of pyelitis.   No evidence of pyelonephritis or renal abscess. Probable cystitis.  - Urology consulted - rec to recheck PSA once patient finishes abx for infection, no acute urologic workup needed at this time; will most likely perform outpatient. Patient ultimately will need bx, so will confirm with LHC/DAPT situation   - CONT: Ceftriaxone 2000mg IV QD until April 4, 2023  - ID following - appreciate recs   -- Will consult palliative in AM     #Diarrhea  --history of loose BMs, > 5 in 24 hours as of 3/31/23   - C-diff culture NEGATIVE Recurrent fevers while on IV ABX. Tmax 102.  - Source 2/2 Urinary vs. Prostatitis   - BCx NGTD; BK virus (-); UCx (+) for Klebsiella Pneumonia   - PET scan 3/25 - PET scan showing increased uptake in prostate suggesting prostatitis vs. neoplasm; attempt made to contact wife on 3/31/23 - unclear if patient has outpatient prostate workup   - CT Pelvis 3/28. significant for mild urothelial thickening and hyperenhancement suggestive of pyelitis.   No evidence of pyelonephritis or renal abscess. Probable cystitis.  - Urology consulted - rec to recheck PSA once patient finishes abx for infection, no acute urologic workup needed at this time; will most likely perform outpatient. Patient ultimately will need bx, so will confirm with LHC/DAPT situation  - ID following, appreciate recs.   - c/w IV Ceftriaxone 2G (last day 4/4)     #Diarrhea  - Has recurrent loose BMs. C-Diff negative  - pending GI PCR. IF negative will give Loperamide.   history of loose BMs, > 5 in 24 hours as of 3/31/23   - C-diff culture NEGATIVE

## 2023-04-03 NOTE — PROGRESS NOTE ADULT - ATTENDING COMMENTS
I agree with the fellow's findings and plans as written above with the following additions/amendments:    Seen and examined at bedside, lyjoi reviewed, no acute indication for HD next HD planned for tomorrow. Further recs as above

## 2023-04-03 NOTE — PROGRESS NOTE ADULT - PROBLEM SELECTOR PLAN 8
- s/p failed kidney transplant   - Continue home Tacrolimus 2mg PO BID.    - per referring Dr Barrera, pt still requires tacrolimus even if failed transplant due to possibility of graft vs host - SBPs 120s-150s  - c/w Hydral 50mg TID, Losartan 100mg, Toprol XL 25mg and Isordil 20mg TID

## 2023-04-03 NOTE — CONSULT NOTE ADULT - PROBLEM SELECTOR RECOMMENDATION 3
Patient is currently getting dialysis Tues/Thur/Sat and tolerating it. Has no interest in discontinuing dialysis. Appreciate Nephrology involvement.

## 2023-04-03 NOTE — PROGRESS NOTE ADULT - PROBLEM SELECTOR PLAN 2
LHC at [place] w/ oLM 30% and RCA 99% that is not amenable to intervention.    - TTE 3/1/23 @ [facility]:  mild LVH, LVEF 25-30%, severe global wall motion dysfxn, mild LA dilation, mild RV dilation, mildly calcified leaflets, mod pHTN  - Severe GIB at [facility] requiring multiple PRBC transfusions  - initially cleared by GI for DAPT and on ASA/Plavix 3/20/23.    - 3/23/23 pt w/ episode of black tarry stool (H/H stable and no hemodynamic signs of bleeding),  fecal occult negative and pt cleared by GI for DAPT and restarted 3/24/23  - Continue ASA 81mg, plavix 75mg qd  - PLAN: Repeat echo to assess EF; ultimately will need cardiac cath depending on EF to decide if high-risk LHC at [place] w/ oLM 30% and RCA 99% that is not amenable to intervention.    - TTE 3/1/23 @ [facility]:  mild LVH, LVEF 25-30%, severe global wall motion dysfxn, mild LA dilation, mild RV dilation, mildly calcified leaflets, mod pHTN  - Severe GIB at [facility] requiring multiple PRBC transfusions  - initially cleared by GI for DAPT and on ASA/Plavix 3/20/23.    - 3/23/23 pt w/ episode of black tarry stool (H/H stable and no hemodynamic signs of bleeding),  fecal occult negative and pt cleared by GI for DAPT and restarted 3/24/23  - Continue ASA 81mg, plavix 75mg qd  - PLAN: Repeat echo to assess EF; ultimately will need cardiac cath depending on EF to decide if high-risk LHC at [place] w/ oLM 30% and RCA 99% that is not amenable to intervention.    - TTE 3/1/23 @ [facility]:  mild LVH, LVEF 25-30%, severe global wall motion dysfxn, mild LA dilation, mild RV dilation, mildly calcified leaflets, mod pHTN  - Severe GIB at [facility] requiring multiple PRBC transfusions  - initially cleared by GI for DAPT and on ASA/Plavix 3/20/23.    - 3/23/23 pt w/ episode of black tarry stool (H/H stable and no hemodynamic signs of bleeding),  fecal occult negative and pt cleared by GI for DAPT and restarted 3/24/23  - Continue ASA 81mg, plavix 75mg qd  - PLAN: Repeat echo to assess EF; ultimately will need cardiac cath depending on EF to decide if high-risk - LHC at [place] w/ oLM 30% and RCA 99% that is not amenable to intervention.    - TTE 3/1/23 @ [facility]:  mild LVH, LVEF 25-30%, severe global wall motion dysfxn, mild LA dilation, mild RV dilation, mildly calcified leaflets, mod pHTN  - ECHO 4/3: EF 40% w/regional wall motion abnormality. Entire inferior wall & basal & mid inferolateral wall akinetic. Basal& mid anterolateral wall & apical lateral segments hypokinetic. Moderately dilated LA. Mild AR.   - c/w ASA 81mg and Plavix 75mg (cleared by GI)   - plan for possible LHC this admission pending infectious work up   - pending Palliative Consul for GOC - LHC at [place] w/ oLM 30% and RCA 99% that is not amenable to intervention.    - TTE 3/1/23 @ [facility]:  mild LVH, LVEF 25-30%, severe global wall motion dysfxn, mild LA dilation, mild RV dilation, mildly calcified leaflets, mod pHTN  - ECHO 4/3: EF 40% w/regional wall motion abnormality. Entire inferior wall & basal & mid inferolateral wall akinetic. Basal& mid anterolateral wall & apical lateral segments hypokinetic. Moderately dilated LA. Mild AR.   - c/w ASA 81mg and Plavix 75mg (cleared by GI)   - plan for possible LHC this admission pending infectious work up   - pending Palliative Consul for GOC - LHC at [place] w/ oLM 30% and RCA 99% that is not amenable to intervention.    - TTE 3/1/23 @ [facility]:  mild LVH, LVEF 25-30%, severe global wall motion dysfxn, mild LA dilation, mild RV dilation, mildly calcified leaflets, mod pHTN  - ECHO 4/3: EF 40% w/regional wall motion abnormality. Entire inferior wall & basal & mid inferolateral wall akinetic. Basal& mid anterolateral wall & apical lateral segments hypokinetic. Moderately dilated LA. Mild AR.   - c/w ASA 81mg and Plavix 75mg (cleared by GI)   - plan for possible LHC this admission pending infectious work up   - pending Palliative Consul for GOC

## 2023-04-03 NOTE — PROGRESS NOTE ADULT - SUBJECTIVE AND OBJECTIVE BOX
--------------------------------------------------------------------------------  Chief Complaint: ESRD/Ongoing hemodialysis requirement    24 hour events/subjective:  Seen and evaluated this AM. Remains stable. Tx for UTI until 4/4, pending repeat Echo and LHC prior to ICD placement with EP.     PAST HISTORY  --------------------------------------------------------------------------------  No significant changes to PMH, PSH, FHx, SHx, unless otherwise noted    ALLERGIES & MEDICATIONS  --------------------------------------------------------------------------------  Allergies    Allergy Status Unknown    Intolerances      Standing Inpatient Medications  aspirin enteric coated 81 milliGRAM(s) Oral daily  atorvastatin 40 milliGRAM(s) Oral at bedtime  chlorhexidine 2% Cloths 1 Application(s) Topical daily  clopidogrel Tablet 75 milliGRAM(s) Oral daily  dextrose 5%. 1000 milliLiter(s) IV Continuous <Continuous>  dextrose 5%. 1000 milliLiter(s) IV Continuous <Continuous>  dextrose 50% Injectable 25 Gram(s) IV Push once  dextrose 50% Injectable 12.5 Gram(s) IV Push once  doxercalciferol Injectable 4 MICROGram(s) IV Push once  ferrous    sulfate 325 milliGRAM(s) Oral daily  hydrALAZINE 50 milliGRAM(s) Oral every 8 hours  insulin lispro (ADMELOG) corrective regimen sliding scale   SubCutaneous Before meals and at bedtime  isosorbide   dinitrate Tablet (ISORDIL) 20 milliGRAM(s) Oral three times a day  losartan 100 milliGRAM(s) Oral every 24 hours  metoprolol succinate ER 25 milliGRAM(s) Oral daily  pantoprazole  Injectable 40 milliGRAM(s) IV Push every 12 hours  tacrolimus 2 milliGRAM(s) Oral every 12 hours  tamsulosin 0.8 milliGRAM(s) Oral at bedtime    PRN Inpatient Medications  acetaminophen     Tablet .. 650 milliGRAM(s) Oral every 6 hours PRN  dextrose Oral Gel 15 Gram(s) Oral once PRN  sodium chloride 0.65% Nasal 1 Spray(s) Both Nostrils every 4 hours PRN      REVIEW OF SYSTEMS  --------------------------------------------------------------------------------  All other systems were reviewed and are negative, except as noted.    VITALS/PHYSICAL EXAM  --------------------------------------------------------------------------------  T(C): 37.3 (04-03-23 @ 09:07), Max: 37.8 (04-02-23 @ 21:44)  HR: 52 (04-03-23 @ 08:43) (52 - 72)  BP: 163/64 (04-03-23 @ 08:43) (124/73 - 163/64)  RR: 16 (04-03-23 @ 08:43) (16 - 18)  SpO2: 100% (04-03-23 @ 08:43) (99% - 100%)  Wt(kg): --  Drug Dosing Weight  Height (cm): 162.6 (17 Mar 2023 04:15)  Weight (kg): 62.3 (17 Mar 2023 04:15)  BMI (kg/m2): 23.6 (17 Mar 2023 04:15)  BSA (m2): 1.67 (17 Mar 2023 04:15)    04-02-23 @ 07:01  -  04-03-23 @ 07:00  --------------------------------------------------------  IN: 420 mL / OUT: 200 mL / NET: 220 mL    PHYSICAL EXAM:  GENERAL: well-developed, well nourished, alert, no acute distress  CHEST/LUNG: Clear to auscultation bilaterally  HEART: normal S1S2, RRR  ABDOMEN: Soft, Nontender, +BS, No flank tenderness bilateral  EXTREMITIES: No clubbing, cyanosis, or edema, Rt AKA  ACCESS: MARILEE TELLEZ w/ thrill and bruit, accessed     LABS/STUDIES  --------------------------------------------------------------------------------              9.9    4.96  >-----------<  192      [04-03-23 @ 07:00]              32.1     131  |  96  |  2   ----------------------------<  73      [04-03-23 @ 07:00]  4.2   |  27  |  3.96        Ca     8.1     [04-03-23 @ 07:00]      Mg     1.7     [04-03-23 @ 07:00]    TPro  4.9  /  Alb  2.5  /  TBili  0.4  /  DBili  x   /  AST  18  /  ALT  5   /  AlkPhos  139  [04-03-23 @ 07:00]          PTH -- (Ca 7.7)      [03-19-23 @ 07:11]   431  Lipid: chol 88, , HDL 44, LDL --      [03-17-23 @ 08:14]    HBsAb Reactive      [03-18-23 @ 14:24]  HBsAg Nonreact      [03-18-23 @ 14:24]  HCV 0.04, Nonreact      [03-17-23 @ 08:14]      RADIOLOGY:  --------------------------------------------------------------------------------------

## 2023-04-03 NOTE — PROGRESS NOTE ADULT - NS ATTEND AMEND GEN_ALL_CORE FT
Initial attending contact date    4/3/23  . See PA note written above for details. I reviewed the PA documentation. I have personally seen and examined this patient. I reviewed vitals, labs, medications, cardiac studies, and additional imaging. I agree with the above PA's findings and plans as written above with the following additions/statements.    -Without active complaints, remains euvolemic on exam  -Still with low grade temp- 100 last night of unclear etiology. ID following and signing off. Last dose of abx 4/4  -H/H has remained stable on asa, plavix without recurrent bleed   -On tele no events  -ECHO today with EF ~ 35% akinetic infero.infero lateral akinesis consistent with known occluded RCA, mild hypokinesis apical/anterior   -Cont to trend fever curve over next 72 hours, if afebrile plan for diagnostic LHC 4/6.  Subq ICD pending LHC results   -Cont ASA, plavix, statin  -HFrEF: euvolemic on exam, cont toprol, isordil/hydral, losartan  -HD per renal  -Appreciate palliative input: pt remains full code  -Dispo: MARIE

## 2023-04-03 NOTE — PROGRESS NOTE ADULT - ATTENDING COMMENTS
63yo M PMHx HTN, HLD, DM, CAD(s/p cath years ago, RCA 99%, never intervened on), HFrEF 25-30%, ESRD s/p failed kidney transplant and required HD x 3yrs (last HD 3/16 via Left Arm AVF; HD TTS), Right AKA initially presented to [facility] 2/27 for respiratory distress after a dialysis, found to be septic, h/c complicated by AHRF requiring intubation for 24hr followed by VT arrest, h/c the complicated by massive LGIB (rectal ulcer seen on colonoscopy) requiring 7u pRBC, 1u FFP and 1u PLT. Transferred to [facility] (3/17) for ICD placement 2/2 Vtach and cardiac cath. Pt noted lassed loose stool with BRBPR, GI and surgery consulted. Flex Sigc ( 3/17) found prior hemoclip in the sigmoid colon, localized ulceration and erosive with no bleeding noted in rectum suggestive of solitary ulcer syndrome. Medicine consulted for comanagement.     -fever -   #LGIB  #SANTOS  #Solitary ulcer syndrome  - hx coffee ground emesis @ [facility]; s/p EGD/colonoscopy showing rectal ulcer  - per gen surgery no acute surgical intervention   - per GI:      - increased Protonix 40 mg IVP BID, can be po now       - s/p flex sig(3/17)  for rectal ulcers, found Evidence of prior hemoclip in the sigmoid colon. Localized ulceration and erosive with no bleeding were noted in the rectum, suggestive of solitary ulcer syndrome           - Avoid anal digitation and enemas           - High-fiber diet and optimize laxatives to avoid constipation using miralax and dulcolax           - No absolute GI contraindications to anticoag/antiplt therapy           - Repeat endoscopic evaluation recommended in 3 months  - Active T&S  - Hgb  stable-  - Transfuse for hgb <8 (active CAD)    #CAD   - Previous cath at [facility] showing oLM 30% and RCA 99% that is not amenable to intervention.  - TTE 3/1/23 at [facility]: mild LVH, EF 25-30%, severe global wall motion dysfunction, mildly dilated LA, RV mildly dilated, all leaflets mild calcified, mod pHTN  - Pt is cleared for cardiac cath    #H/O ventricular tachycardia   - Vtach arrest @[facility]  - fu EP consult for ICD placement- pending infection work up.    # Fever -ID concern is prostate abscess -  - PET/CT scan - no Lymphadenopathy to suggest lymphoproliferative disorder, activity in prostate and femur.  - given immunocompromised/hx Kidney transplant    - ID f/u appreciated, -rec appreciated- to complete iv abx Ceftriaxone 2gm iv q24hr  thru 4/4/23 ( Tues)  -  consult appreciated, to repeat PSA upon completion of iv abx    fu-urine for BK virus  - CT pelvis w/wo IVC- result reviewed, BLADDER: Minimally distended, Moody catheter. Diffuse wall thickening and   perivesical fat stranding.- Prostate is enlarged. no sign of renal abscess.   - Start ceftriaxone 2 g IV q24h- concern prostate abscess -3/27)  - D/C vancomycin and meropenem   would need to rule out infection prior to icd placement.  - Left calf pain, negative for DVT    #ESRD on dialysis/ sp renal transplant.   - dialysis Tues, Thurs, Sat  -HD per renal.  - c/w Tacrolimus     #HTN  - c/w home meds. Amlodipine 10mg qd, Losartan 100mg qd, Hydralazine 50m TID, Isordil 20mg TID with holding parameter    #HLD   - c/w Lipitor 40mg qd    #DM  - no meds per [place], obtain collaterals for med recs prior [place] stay  - mISS while inpt, goal -180   - A1c 5.9 on admission    DVT PPX: SCDs for now iso GIB    Med consult team continues to follow with you. 61yo M PMHx HTN, HLD, DM, CAD(s/p cath years ago, RCA 99%, never intervened on), HFrEF 25-30%, ESRD s/p failed kidney transplant and required HD x 3yrs (last HD 3/16 via Left Arm AVF; HD TTS), Right AKA initially presented to [facility] 2/27 for respiratory distress after a dialysis, found to be septic, h/c complicated by AHRF requiring intubation for 24hr followed by VT arrest, h/c the complicated by massive LGIB (rectal ulcer seen on colonoscopy) requiring 7u pRBC, 1u FFP and 1u PLT. Transferred to [facility] (3/17) for ICD placement 2/2 Vtach and cardiac cath. Pt noted lassed loose stool with BRBPR, GI and surgery consulted. Flex Sigc ( 3/17) found prior hemoclip in the sigmoid colon, localized ulceration and erosive with no bleeding noted in rectum suggestive of solitary ulcer syndrome. Medicine consulted for comanagement.     -fever -   #LGIB  #SANTOS  #Solitary ulcer syndrome  - hx coffee ground emesis @ [facility]; s/p EGD/colonoscopy showing rectal ulcer  - per gen surgery no acute surgical intervention   - per GI:      - increased Protonix 40 mg IVP BID, can be po now       - s/p flex sig(3/17)  for rectal ulcers, found Evidence of prior hemoclip in the sigmoid colon. Localized ulceration and erosive with no bleeding were noted in the rectum, suggestive of solitary ulcer syndrome           - Avoid anal digitation and enemas           - High-fiber diet and optimize laxatives to avoid constipation using miralax and dulcolax           - No absolute GI contraindications to anticoag/antiplt therapy           - Repeat endoscopic evaluation recommended in 3 months  - Active T&S  - Hgb  stable-  - Transfuse for hgb <8 (active CAD)    #CAD   - Previous cath at [facility] showing oLM 30% and RCA 99% that is not amenable to intervention.  - TTE 3/1/23 at [facility]: mild LVH, EF 25-30%, severe global wall motion dysfunction, mildly dilated LA, RV mildly dilated, all leaflets mild calcified, mod pHTN  - Pt is cleared for cardiac cath    #H/O ventricular tachycardia   - Vtach arrest @[facility]  - fu EP consult for ICD placement- pending infection work up.    # Fever -ID concern is prostate abscess -  - PET/CT scan - no Lymphadenopathy to suggest lymphoproliferative disorder, activity in prostate and femur.  - given immunocompromised/hx Kidney transplant    - ID f/u appreciated, -rec appreciated- to complete iv abx Ceftriaxone 2gm iv q24hr  thru 4/4/23 ( Tues)  -  consult appreciated, to repeat PSA upon completion of iv abx    fu-urine for BK virus  - CT pelvis w/wo IVC- result reviewed, BLADDER: Minimally distended, Moody catheter. Diffuse wall thickening and   perivesical fat stranding.- Prostate is enlarged. no sign of renal abscess.   - Start ceftriaxone 2 g IV q24h- concern prostate abscess -3/27)  - D/C vancomycin and meropenem   would need to rule out infection prior to icd placement.  - Left calf pain, negative for DVT    #ESRD on dialysis/ sp renal transplant.   - dialysis Tues, Thurs, Sat  -HD per renal.  - c/w Tacrolimus     #HTN  - c/w home meds. Amlodipine 10mg qd, Losartan 100mg qd, Hydralazine 50m TID, Isordil 20mg TID with holding parameter    #HLD   - c/w Lipitor 40mg qd    #DM  - no meds per [place], obtain collaterals for med recs prior [place] stay  - mISS while inpt, goal -180   - A1c 5.9 on admission    DVT PPX: SCDs for now iso GIB    Med consult team continues to follow with you. 61yo M PMHx HTN, HLD, DM, CAD(s/p cath years ago, RCA 99%, never intervened on), HFrEF 25-30%, ESRD s/p failed kidney transplant and required HD x 3yrs (last HD 3/16 via Left Arm AVF; HD TTS), Right AKA initially presented to [facility] 2/27 for respiratory distress after a dialysis, found to be septic, h/c complicated by AHRF requiring intubation for 24hr followed by VT arrest, h/c the complicated by massive LGIB (rectal ulcer seen on colonoscopy) requiring 7u pRBC, 1u FFP and 1u PLT. Transferred to [facility] (3/17) for ICD placement 2/2 Vtach and cardiac cath. Pt noted lassed loose stool with BRBPR, GI and surgery consulted. Flex Sigc ( 3/17) found prior hemoclip in the sigmoid colon, localized ulceration and erosive with no bleeding noted in rectum suggestive of solitary ulcer syndrome. Medicine consulted for comanagement.     -fever -   #LGIB  #SANTOS  #Solitary ulcer syndrome  - hx coffee ground emesis @ [facility]; s/p EGD/colonoscopy showing rectal ulcer  - per gen surgery no acute surgical intervention   - per GI:      - increased Protonix 40 mg IVP BID, can be po now       - s/p flex sig(3/17)  for rectal ulcers, found Evidence of prior hemoclip in the sigmoid colon. Localized ulceration and erosive with no bleeding were noted in the rectum, suggestive of solitary ulcer syndrome           - Avoid anal digitation and enemas           - High-fiber diet and optimize laxatives to avoid constipation using miralax and dulcolax           - No absolute GI contraindications to anticoag/antiplt therapy           - Repeat endoscopic evaluation recommended in 3 months  - Active T&S  - Hgb  stable-  - Transfuse for hgb <8 (active CAD)    #CAD   - Previous cath at [facility] showing oLM 30% and RCA 99% that is not amenable to intervention.  - TTE 3/1/23 at [facility]: mild LVH, EF 25-30%, severe global wall motion dysfunction, mildly dilated LA, RV mildly dilated, all leaflets mild calcified, mod pHTN  - Pt is cleared for cardiac cath    #H/O ventricular tachycardia   - Vtach arrest @[facility]  - fu EP consult for ICD placement- pending infection work up.    # Fever -ID concern is prostate abscess -  - PET/CT scan - no Lymphadenopathy to suggest lymphoproliferative disorder, activity in prostate and femur.  - given immunocompromised/hx Kidney transplant    - ID f/u appreciated, -rec appreciated- to complete iv abx Ceftriaxone 2gm iv q24hr  thru 4/4/23 ( Tues)  -  consult appreciated, to repeat PSA upon completion of iv abx    fu-urine for BK virus  - CT pelvis w/wo IVC- result reviewed, BLADDER: Minimally distended, Moody catheter. Diffuse wall thickening and   perivesical fat stranding.- Prostate is enlarged. no sign of renal abscess.   - Start ceftriaxone 2 g IV q24h- concern prostate abscess -3/27)  - D/C vancomycin and meropenem   would need to rule out infection prior to icd placement.  - Left calf pain, negative for DVT    #ESRD on dialysis/ sp renal transplant.   - dialysis Tues, Thurs, Sat  -HD per renal.  - c/w Tacrolimus     #HTN  - c/w home meds. Amlodipine 10mg qd, Losartan 100mg qd, Hydralazine 50m TID, Isordil 20mg TID with holding parameter    #HLD   - c/w Lipitor 40mg qd    #DM  - no meds per [place], obtain collaterals for med recs prior [place] stay  - mISS while inpt, goal -180   - A1c 5.9 on admission    DVT PPX: SCDs for now iso GIB    Med consult team continues to follow with you. 63yo M PMHx HTN, HLD, DM, CAD(s/p cath years ago, RCA 99%, never intervened on), HFrEF 25-30%, ESRD s/p failed kidney transplant and required HD x 3yrs (last HD 3/16 via Left Arm AVF; HD TTS), Right AKA initially presented to [facility] 2/27 for respiratory distress after a dialysis, found to be septic, h/c complicated by AHRF requiring intubation for 24hr followed by VT arrest, h/c the complicated by massive LGIB (rectal ulcer seen on colonoscopy) requiring 7u pRBC, 1u FFP and 1u PLT. Transferred to [facility] (3/17) for ICD placement 2/2 Vtach and cardiac cath. Pt noted lassed loose stool with BRBPR, GI and surgery consulted. Flex Sigc ( 3/17) found prior hemoclip in the sigmoid colon, localized ulceration and erosive with no bleeding noted in rectum suggestive of solitary ulcer syndrome. Medicine consulted for comanagement.     -fever -   #LGIB  #SANTOS  #Solitary ulcer syndrome  - hx coffee ground emesis @ [facility]; s/p EGD/colonoscopy showing rectal ulcer  - per gen surgery no acute surgical intervention   - per GI:      - increased Protonix 40 mg IVP BID, can be po now       - s/p flex sig(3/17)  for rectal ulcers, found Evidence of prior hemoclip in the sigmoid colon. Localized ulceration and erosive with no bleeding were noted in the rectum, suggestive of solitary ulcer syndrome           - Avoid anal digitation and enemas           - High-fiber diet and optimize laxatives to avoid constipation using miralax and dulcolax           - No absolute GI contraindications to anticoag/antiplt therapy           - Repeat endoscopic evaluation recommended in 3 months  - Active T&S  - Hgb  stable-  - Transfuse for hgb <8 (active CAD)    #CAD   - Previous cath at [facility] showing oLM 30% and RCA 99% that is not amenable to intervention.  - TTE 3/1/23 at [facility]: mild LVH, EF 25-30%, severe global wall motion dysfunction, mildly dilated LA, RV mildly dilated, all leaflets mild calcified, mod pHTN  - Plan for [facility] upon completion of iv abx tomorrow 4/4     #H/O ventricular tachycardia   - Vtach arrest @[facility]  - fu EP consult for ICD placement- pending infection work up.    # Fever -ID concern is prostate abscess -  - PET/CT scan - no Lymphadenopathy to suggest lymphoproliferative disorder, activity in prostate and femur.  - given immunocompromised/hx Kidney transplant    - ID f/u appreciated, -rec appreciated- to complete iv abx Ceftriaxone 2gm iv q24hr  thru 4/4/23 ( Tues)  -  consult appreciated, to repeat PSA upon completion of iv abx    fu-urine for BK virus  - CT pelvis w/wo IVC- result reviewed, BLADDER: Minimally distended, Moody catheter. Diffuse wall thickening and   perivesical fat stranding.- Prostate is enlarged. no sign of renal abscess.   - Start ceftriaxone 2 g IV q24h- concern prostate abscess -3/27)  - D/C vancomycin and meropenem   would need to rule out infection prior to icd placement.  - Left calf pain, negative for DVT    #ESRD on dialysis/ sp renal transplant.   - dialysis Tues, Thurs, Sat  -HD per renal.  - c/w Tacrolimus     #HTN  - c/w home meds. Amlodipine 10mg qd, Losartan 100mg qd, Hydralazine 50m TID, Isordil 20mg TID with holding parameter    #HLD   - c/w Lipitor 40mg qd    #DM  - no meds per [place], obtain collaterals for med recs prior [place] stay  - mISS while inpt, goal -180   - A1c 5.9 on admission    DVT PPX: SCDs for now iso GIB    Med consult team continues to follow with you. 63yo M PMHx HTN, HLD, DM, CAD(s/p cath years ago, RCA 99%, never intervened on), HFrEF 25-30%, ESRD s/p failed kidney transplant and required HD x 3yrs (last HD 3/16 via Left Arm AVF; HD TTS), Right AKA initially presented to [facility] 2/27 for respiratory distress after a dialysis, found to be septic, h/c complicated by AHRF requiring intubation for 24hr followed by VT arrest, h/c the complicated by massive LGIB (rectal ulcer seen on colonoscopy) requiring 7u pRBC, 1u FFP and 1u PLT. Transferred to [facility] (3/17) for ICD placement 2/2 Vtach and cardiac cath. Pt noted lassed loose stool with BRBPR, GI and surgery consulted. Flex Sigc ( 3/17) found prior hemoclip in the sigmoid colon, localized ulceration and erosive with no bleeding noted in rectum suggestive of solitary ulcer syndrome. Medicine consulted for comanagement.     -fever -   #LGIB  #SANTOS  #Solitary ulcer syndrome  - hx coffee ground emesis @ [facility]; s/p EGD/colonoscopy showing rectal ulcer  - per gen surgery no acute surgical intervention   - per GI:      - increased Protonix 40 mg IVP BID, can be po now       - s/p flex sig(3/17)  for rectal ulcers, found Evidence of prior hemoclip in the sigmoid colon. Localized ulceration and erosive with no bleeding were noted in the rectum, suggestive of solitary ulcer syndrome           - Avoid anal digitation and enemas           - High-fiber diet and optimize laxatives to avoid constipation using miralax and dulcolax           - No absolute GI contraindications to anticoag/antiplt therapy           - Repeat endoscopic evaluation recommended in 3 months  - Active T&S  - Hgb  stable-  - Transfuse for hgb <8 (active CAD)    #CAD   - Previous cath at [facility] showing oLM 30% and RCA 99% that is not amenable to intervention.  - TTE 3/1/23 at [facility]: mild LVH, EF 25-30%, severe global wall motion dysfunction, mildly dilated LA, RV mildly dilated, all leaflets mild calcified, mod pHTN  - Plan for [facility] upon completion of iv abx tomorrow 4/4     #H/O ventricular tachycardia   - Vtach arrest @[facility]  - fu EP consult for ICD placement- pending infection work up.    # Fever -ID concern is prostate abscess -  - PET/CT scan - no Lymphadenopathy to suggest lymphoproliferative disorder, activity in prostate and femur.  - given immunocompromised/hx Kidney transplant    - ID f/u appreciated, -rec appreciated- to complete iv abx Ceftriaxone 2gm iv q24hr  thru 4/4/23 ( Tues)  -  consult appreciated, to repeat PSA upon completion of iv abx    fu-urine for BK virus  - CT pelvis w/wo IVC- result reviewed, BLADDER: Minimally distended, Moody catheter. Diffuse wall thickening and   perivesical fat stranding.- Prostate is enlarged. no sign of renal abscess.   - Start ceftriaxone 2 g IV q24h- concern prostate abscess -3/27)  - D/C vancomycin and meropenem   would need to rule out infection prior to icd placement.  - Left calf pain, negative for DVT    #ESRD on dialysis/ sp renal transplant.   - dialysis Tues, Thurs, Sat  -HD per renal.  - c/w Tacrolimus     #HTN  - c/w home meds. Amlodipine 10mg qd, Losartan 100mg qd, Hydralazine 50m TID, Isordil 20mg TID with holding parameter    #HLD   - c/w Lipitor 40mg qd    #DM  - no meds per [place], obtain collaterals for med recs prior [place] stay  - mISS while inpt, goal -180   - A1c 5.9 on admission    DVT PPX: SCDs for now iso GIB    Med consult team continues to follow with you. 63yo M PMHx HTN, HLD, DM, CAD(s/p cath years ago, RCA 99%, never intervened on), HFrEF 25-30%, ESRD s/p failed kidney transplant and required HD x 3yrs (last HD 3/16 via Left Arm AVF; HD TTS), Right AKA initially presented to [facility] 2/27 for respiratory distress after a dialysis, found to be septic, h/c complicated by AHRF requiring intubation for 24hr followed by VT arrest, h/c the complicated by massive LGIB (rectal ulcer seen on colonoscopy) requiring 7u pRBC, 1u FFP and 1u PLT. Transferred to [facility] (3/17) for ICD placement 2/2 Vtach and cardiac cath. Pt noted lassed loose stool with BRBPR, GI and surgery consulted. Flex Sigc ( 3/17) found prior hemoclip in the sigmoid colon, localized ulceration and erosive with no bleeding noted in rectum suggestive of solitary ulcer syndrome. Medicine consulted for comanagement.     -fever -   #LGIB  #SANTOS  #Solitary ulcer syndrome  - hx coffee ground emesis @ [facility]; s/p EGD/colonoscopy showing rectal ulcer  - per gen surgery no acute surgical intervention   - per GI:      - increased Protonix 40 mg IVP BID, can be po now       - s/p flex sig(3/17)  for rectal ulcers, found Evidence of prior hemoclip in the sigmoid colon. Localized ulceration and erosive with no bleeding were noted in the rectum, suggestive of solitary ulcer syndrome           - Avoid anal digitation and enemas           - High-fiber diet and optimize laxatives to avoid constipation using miralax and dulcolax           - No absolute GI contraindications to anticoag/antiplt therapy           - Repeat endoscopic evaluation recommended in 3 months  - Active T&S  - Hgb  stable-  - Transfuse for hgb <8 (active CAD)    #CAD   - Previous cath at [facility] showing oLM 30% and RCA 99% that is not amenable to intervention.  - TTE 3/1/23 at [facility]: mild LVH, EF 25-30%, severe global wall motion dysfunction, mildly dilated LA, RV mildly dilated, all leaflets mild calcified, mod pHTN  - Plan for [facility] upon completion of iv abx tomorrow 4/4     #H/O ventricular tachycardia   - Vtach arrest @[facility]  - fu EP consult for ICD placement- pending infection work up.    # Fever -ID concern is prostate abscess -  - PET/CT scan - no Lymphadenopathy to suggest lymphoproliferative disorder, activity in prostate and femur.  - given immunocompromised/hx Kidney transplant    - ID f/u appreciated, -rec appreciated- to complete iv abx Ceftriaxone 2gm iv q24hr  thru 4/4/23 ( Tues)  -  consult appreciated, to repeat PSA upon completion of iv abx    fu-urine for BK virus  - CT pelvis w/wo IVC- result reviewed, BLADDER: Minimally distended, Moody catheter. Diffuse wall thickening and   perivesical fat stranding.- Prostate is enlarged. no sign of renal abscess.   - Start ceftriaxone 2 g IV q24h- concern prostate abscess -3/27)  - D/C vancomycin and meropenem   would need to rule out infection prior to icd placement.  - Left calf pain, negative for DVT    #ESRD on dialysis/ sp renal transplant.   - dialysis Tues, Thurs, Sat  -HD per renal.  - c/w Tacrolimus     #HTN  - c/w home meds. Amlodipine 10mg qd, Losartan 100mg qd, Hydralazine 50m TID, Isordil 20mg TID with holding parameter    #HLD   - c/w Lipitor 40mg qd    #DM  - no meds per [place], obtain collaterals for med recs prior [place] stay  - mISS while inpt, goal -180   - A1c 5.9 on admission    DVT PPX: SCDs for now iso GIB    Med consult team continues to follow with you.

## 2023-04-03 NOTE — PROGRESS NOTE ADULT - ASSESSMENT
62M PMHx HTN, HLD, DM, CAD (s/p cath years ago, RCA 99%, never intervened on), HFrEF 25-30%, ESRD s/p failed kidney transplant (last HD 3/1 via Left Arm AVF; HD TTS), Right AKA initially presented to [facility] 2/27 for respiratory distress after a dialysis, found to be septic, h/c complicated by AHRF requiring intubation for 24hr followed by VT arrest, h/c the complicated by massive LGIB (rectal ulcer) requiring 7u pRBC, 1u FFP and 1u PLT. Transferred to [facility] for ICD placement 2/2 Vtach and cardiac cath. Medicine consulted for GIB, fever of unknown origin, and comanagement.     Recommend:    #Fever of Unknown Origin  - Spiked fever of 101.6 on return from HD 3/21  - Unclear source of infection  - Removed right forearm IV because was in place from outside hospital  - Abd U/S with no acute pathology  - CXR without infiltrate  - UA without UTI  - F/u BCx, so far NGTD  - PET scan showing increased uptake in prostate suggesting prostatitis vs. neoplasm  - F/u CT A/P showing probable cystitis  - Ceftriaxone 2g daily per ID 2 week course (end date 4/4) - cards plan for cath after     #LGIB  #SANTOS  #Solitary ulcer syndrome  - hx coffee ground emesis @ [facility]; s/p EGD/colonoscopy showing rectal ulcer  - 3/17AM pt passed loose BM along with large amount of BRBPR  - per gen surgery no acute surgical intervention   - GI following, no endoscopy indicated  - Active T&S  - Transfuse for hgb <8 (active CAD)    #ESRD on dialysis  - for dialysis today  - dialysis Tues, Thurs, Sat  - last received 3/16  - f/u nephro recs  - Electrolytes wnl, k 4.3, phos 1.9  - c/w calcium citrate 667mg TID    #S/P kidney transplant.   - per [place] pt takes Tacrolimus 2mg 2 times /day  - Obtain collateral regarding renal transplant, if indeed failed, what indications patient have for c/w tacrolimus 2mg?  - f/u tacrolimus serum levels  - f/u nephro recs 62M PMHx HTN, HLD, DM, CAD (s/p cath years ago, RCA 99%, never intervened on), HFrEF 25-30%, ESRD s/p failed kidney transplant (last HD 3/1 via Left Arm AVF; HD TTS), Right AKA initially presented to [facility] 2/27 for respiratory distress after a dialysis, found to be septic, h/c complicated by AHRF requiring intubation for 24hr followed by VT arrest, h/c the complicated by massive LGIB (rectal ulcer) requiring 7u pRBC, 1u FFP and 1u PLT. Transferred to [facility] for ICD placement 2/2 Vtach and cardiac cath. Medicine consulted for GIB, fever of unknown origin, and comanagement.     Recommend:    #Fever of Unknown Origin  - Spiked fever of 101.6 on return from HD 3/21  - Unclear source of infection  - Removed right forearm IV because was in place from outside hospital  - Abd U/S with no acute pathology  - CXR without infiltrate  - UA without UTI  - F/u BCx, so far NGTD  - PET scan showing increased uptake in prostate suggesting prostatitis vs. neoplasm  - F/u CT A/P showing probable cystitis  - Ceftriaxone 2g daily per ID 2 week course (end date 4/4) - cards plan for cath after     #LGIB  #SANTOS  #Solitary ulcer syndrome  - hx coffee ground emesis @ [facility]; s/p EGD/colonoscopy showing rectal ulcer  - 3/17AM pt passed loose BM along with large amount of BRBPR  - per gen surgery no acute surgical intervention   - GI following, no endoscopy indicated  - Active T&S  - Transfuse for hgb <8 (active CAD)    #ESRD on dialysis  - for dialysis today  - dialysis Tues, Thurs, Sat  - last received 3/16  - f/u nephro recs  - Electrolytes wnl, k 4.3, phos 1.9  - c/w calcium citrate 667mg TID    #S/P kidney transplant.   - per [place] pt takes Tacrolimus 2mg 2 times /day  - Obtain collateral regarding renal transplant, if indeed failed, what indications patient have for c/w tacrolimus 2mg?  - f/u tacrolimus serum levels  - f/u nephro recs 62M PMHx HTN, HLD, DM, CAD (s/p cath years ago, RCA 99%, never intervened on), HFrEF 25-30%, ESRD s/p failed kidney transplant (last HD 3/1 via Left Arm AVF; HD TTS), Right AKA initially presented to [facility] 2/27 for respiratory distress after a dialysis, found to be septic, h/c complicated by AHRF requiring intubation for 24hr followed by VT arrest, h/c the complicated by massive LGIB (rectal ulcer) requiring 7u pRBC, 1u FFP and 1u PLT. Transferred to [facility] for ICD placement 2/2 Vtach and cardiac cath. Medicine consulted for GIB, fever of unknown origin, and comanagement.     Recommend:    #Fever of Unknown Origin  - Spiked fever of 101.6 on return from HD 3/21  - Unclear source of infection  - Removed right forearm IV because was in place from outside hospital  - Abd U/S with no acute pathology  - CXR without infiltrate  - UA without UTI  - F/u BCx, so far NGTD  - PET scan showing increased uptake in prostate suggesting prostatitis vs. neoplasm  - F/u CT A/P showing probable cystitis  - Ceftriaxone 2g daily per ID 2 week course (end date 4/4) - cards plan for cath after     #LGIB  #SANTOS  #Solitary ulcer syndrome  - hx coffee ground emesis @ [facility]; s/p EGD/colonoscopy showing rectal ulcer  - 3/17AM pt passed loose BM along with large amount of BRBPR  - per gen surgery no acute surgical intervention   - GI following, no endoscopy indicated  - Active T&S  - Transfuse for hgb <8 (active CAD)    #ESRD on dialysis  - for dialysis today  - dialysis Tues, Thurs, Sat  - last received 3/16  - f/u nephro recs  - Electrolytes wnl, k 4.3, phos 1.9  - c/w calcium citrate 667mg TID    #S/P kidney transplant.   - per [place] pt takes Tacrolimus 2mg 2 times /day  - Obtain collateral regarding renal transplant, if indeed failed, what indications patient have for c/w tacrolimus 2mg?  - f/u tacrolimus serum levels  - f/u nephro recs

## 2023-04-03 NOTE — PROGRESS NOTE ADULT - SUBJECTIVE AND OBJECTIVE BOX
OVERNIGHT EVENTS: None    SUBJECTIVE / INTERVAL HPI: Patient seen and examined at bedside. Denies f/c, n/v/d/c, chest pain, shortness of breath, abdominal pain.    VITAL SIGNS:  Vital Signs Last 24 Hrs  T(C): 36.9 (03 Apr 2023 14:01), Max: 37.8 (02 Apr 2023 21:44)  T(F): 98.4 (03 Apr 2023 14:01), Max: 100.1 (02 Apr 2023 21:44)  HR: 53 (03 Apr 2023 14:25) (52 - 56)  BP: 164/72 (03 Apr 2023 14:25) (124/73 - 164/72)  BP(mean): 104 (03 Apr 2023 14:25) (79 - 104)  RR: 14 (03 Apr 2023 14:25) (14 - 18)  SpO2: 98% (03 Apr 2023 14:25) (98% - 100%)    Parameters below as of 03 Apr 2023 14:25  Patient On (Oxygen Delivery Method): nasal cannula  O2 Flow (L/min): 2    I&O's Summary    02 Apr 2023 07:01  -  03 Apr 2023 07:00  --------------------------------------------------------  IN: 420 mL / OUT: 200 mL / NET: 220 mL    03 Apr 2023 07:01  -  03 Apr 2023 16:06  --------------------------------------------------------  IN: 120 mL / OUT: 250 mL / NET: -130 mL        PHYSICAL EXAM:    General: NAD  HEENT: NC/AT; PERRL, anicteric sclera; MMM  Neck: supple  Cardiovascular: +S1/S2; RRR  Respiratory: CTA B/L; no W/R/R  Gastrointestinal: soft, NT/ND; +BSx4  Extremities: WWP; no edema, clubbing or cyanosis  Vascular: 2+ radial, DP/PT pulses B/L  Neurological: AAOx3; no focal deficits    MEDICATIONS:  MEDICATIONS  (STANDING):  aspirin enteric coated 81 milliGRAM(s) Oral daily  atorvastatin 40 milliGRAM(s) Oral at bedtime  cefTRIAXone   IVPB 2000 milliGRAM(s) IV Intermittent every 24 hours  chlorhexidine 2% Cloths 1 Application(s) Topical daily  clopidogrel Tablet 75 milliGRAM(s) Oral daily  dextrose 5%. 1000 milliLiter(s) (50 mL/Hr) IV Continuous <Continuous>  dextrose 5%. 1000 milliLiter(s) (100 mL/Hr) IV Continuous <Continuous>  dextrose 50% Injectable 25 Gram(s) IV Push once  dextrose 50% Injectable 12.5 Gram(s) IV Push once  doxercalciferol Injectable 4 MICROGram(s) IV Push once  ferrous    sulfate 325 milliGRAM(s) Oral daily  hydrALAZINE 50 milliGRAM(s) Oral every 8 hours  insulin lispro (ADMELOG) corrective regimen sliding scale   SubCutaneous Before meals and at bedtime  isosorbide   dinitrate Tablet (ISORDIL) 20 milliGRAM(s) Oral three times a day  losartan 100 milliGRAM(s) Oral every 24 hours  metoprolol succinate ER 25 milliGRAM(s) Oral daily  pantoprazole  Injectable 40 milliGRAM(s) IV Push every 12 hours  tacrolimus 2 milliGRAM(s) Oral every 12 hours  tamsulosin 0.8 milliGRAM(s) Oral at bedtime    MEDICATIONS  (PRN):  acetaminophen     Tablet .. 650 milliGRAM(s) Oral every 6 hours PRN Mild Pain (1 - 3), Moderate Pain (4 - 6)  dextrose Oral Gel 15 Gram(s) Oral once PRN Blood Glucose LESS THAN 70 milliGRAM(s)/deciliter  sodium chloride 0.65% Nasal 1 Spray(s) Both Nostrils every 4 hours PRN Nasal Congestion      ALLERGIES:  Allergies    Allergy Status Unknown    Intolerances        LABS:                        9.9    4.96  )-----------( 192      ( 03 Apr 2023 07:00 )             32.1     04-03    131<L>  |  96  |  2<L>  ----------------------------<  73  4.2   |  27  |  3.96<H>    Ca    8.1<L>      03 Apr 2023 07:00  Mg     1.7     04-03    TPro  4.9<L>  /  Alb  2.5<L>  /  TBili  0.4  /  DBili  x   /  AST  18  /  ALT  5<L>  /  AlkPhos  139<H>  04-03        CAPILLARY BLOOD GLUCOSE      POCT Blood Glucose.: 80 mg/dL (03 Apr 2023 06:39)      RADIOLOGY & ADDITIONAL TESTS: Reviewed.

## 2023-04-04 LAB
ANION GAP SERPL CALC-SCNC: 5 MMOL/L — SIGNIFICANT CHANGE UP (ref 5–17)
BUN SERPL-MCNC: 3 MG/DL — LOW (ref 7–23)
CALCIUM SERPL-MCNC: 7.8 MG/DL — LOW (ref 8.4–10.5)
CHLORIDE SERPL-SCNC: 96 MMOL/L — SIGNIFICANT CHANGE UP (ref 96–108)
CO2 SERPL-SCNC: 29 MMOL/L — SIGNIFICANT CHANGE UP (ref 22–31)
CREAT SERPL-MCNC: 3.27 MG/DL — HIGH (ref 0.5–1.3)
EGFR: 21 ML/MIN/1.73M2 — LOW
GLUCOSE BLDC GLUCOMTR-MCNC: 80 MG/DL — SIGNIFICANT CHANGE UP (ref 70–99)
GLUCOSE BLDC GLUCOMTR-MCNC: 81 MG/DL — SIGNIFICANT CHANGE UP (ref 70–99)
GLUCOSE BLDC GLUCOMTR-MCNC: 87 MG/DL — SIGNIFICANT CHANGE UP (ref 70–99)
GLUCOSE BLDC GLUCOMTR-MCNC: 95 MG/DL — SIGNIFICANT CHANGE UP (ref 70–99)
GLUCOSE SERPL-MCNC: 97 MG/DL — SIGNIFICANT CHANGE UP (ref 70–99)
HCT VFR BLD CALC: 28.6 % — LOW (ref 39–50)
HGB BLD-MCNC: 9.3 G/DL — LOW (ref 13–17)
MAGNESIUM SERPL-MCNC: 1.6 MG/DL — SIGNIFICANT CHANGE UP (ref 1.6–2.6)
MCHC RBC-ENTMCNC: 28.4 PG — SIGNIFICANT CHANGE UP (ref 27–34)
MCHC RBC-ENTMCNC: 32.5 GM/DL — SIGNIFICANT CHANGE UP (ref 32–36)
MCV RBC AUTO: 87.2 FL — SIGNIFICANT CHANGE UP (ref 80–100)
NRBC # BLD: 0 /100 WBCS — SIGNIFICANT CHANGE UP (ref 0–0)
PLATELET # BLD AUTO: 177 K/UL — SIGNIFICANT CHANGE UP (ref 150–400)
POTASSIUM SERPL-MCNC: 3.7 MMOL/L — SIGNIFICANT CHANGE UP (ref 3.5–5.3)
POTASSIUM SERPL-SCNC: 3.7 MMOL/L — SIGNIFICANT CHANGE UP (ref 3.5–5.3)
RBC # BLD: 3.28 M/UL — LOW (ref 4.2–5.8)
RBC # FLD: 15.3 % — HIGH (ref 10.3–14.5)
SODIUM SERPL-SCNC: 130 MMOL/L — LOW (ref 135–145)
WBC # BLD: 4.73 K/UL — SIGNIFICANT CHANGE UP (ref 3.8–10.5)
WBC # FLD AUTO: 4.73 K/UL — SIGNIFICANT CHANGE UP (ref 3.8–10.5)

## 2023-04-04 PROCEDURE — 99232 SBSQ HOSP IP/OBS MODERATE 35: CPT

## 2023-04-04 PROCEDURE — 99232 SBSQ HOSP IP/OBS MODERATE 35: CPT | Mod: GC

## 2023-04-04 PROCEDURE — 90935 HEMODIALYSIS ONE EVALUATION: CPT

## 2023-04-04 PROCEDURE — 99233 SBSQ HOSP IP/OBS HIGH 50: CPT

## 2023-04-04 RX ORDER — DOXERCALCIFEROL 2.5 UG/1
4 CAPSULE ORAL ONCE
Refills: 0 | Status: DISCONTINUED | OUTPATIENT
Start: 2023-04-04 | End: 2023-04-06

## 2023-04-04 RX ORDER — ERYTHROPOIETIN 10000 [IU]/ML
6000 INJECTION, SOLUTION INTRAVENOUS; SUBCUTANEOUS ONCE
Refills: 0 | Status: COMPLETED | OUTPATIENT
Start: 2023-04-04 | End: 2023-04-04

## 2023-04-04 RX ORDER — LOPERAMIDE HCL 2 MG
2 TABLET ORAL THREE TIMES A DAY
Refills: 0 | Status: DISCONTINUED | OUTPATIENT
Start: 2023-04-04 | End: 2023-04-11

## 2023-04-04 RX ADMIN — PANTOPRAZOLE SODIUM 40 MILLIGRAM(S): 20 TABLET, DELAYED RELEASE ORAL at 17:35

## 2023-04-04 RX ADMIN — Medication 81 MILLIGRAM(S): at 14:16

## 2023-04-04 RX ADMIN — CLOPIDOGREL BISULFATE 75 MILLIGRAM(S): 75 TABLET, FILM COATED ORAL at 14:17

## 2023-04-04 RX ADMIN — TAMSULOSIN HYDROCHLORIDE 0.8 MILLIGRAM(S): 0.4 CAPSULE ORAL at 23:19

## 2023-04-04 RX ADMIN — TACROLIMUS 2 MILLIGRAM(S): 5 CAPSULE ORAL at 07:45

## 2023-04-04 RX ADMIN — ERYTHROPOIETIN 6000 UNIT(S): 10000 INJECTION, SOLUTION INTRAVENOUS; SUBCUTANEOUS at 10:28

## 2023-04-04 RX ADMIN — CEFTRIAXONE 100 MILLIGRAM(S): 500 INJECTION, POWDER, FOR SOLUTION INTRAMUSCULAR; INTRAVENOUS at 23:18

## 2023-04-04 RX ADMIN — Medication 650 MILLIGRAM(S): at 18:39

## 2023-04-04 RX ADMIN — Medication 50 MILLIGRAM(S): at 23:19

## 2023-04-04 RX ADMIN — TACROLIMUS 2 MILLIGRAM(S): 5 CAPSULE ORAL at 17:35

## 2023-04-04 RX ADMIN — LOSARTAN POTASSIUM 100 MILLIGRAM(S): 100 TABLET, FILM COATED ORAL at 17:35

## 2023-04-04 RX ADMIN — Medication 650 MILLIGRAM(S): at 00:00

## 2023-04-04 RX ADMIN — Medication 325 MILLIGRAM(S): at 14:17

## 2023-04-04 RX ADMIN — Medication 50 MILLIGRAM(S): at 14:17

## 2023-04-04 RX ADMIN — Medication 25 MILLIGRAM(S): at 14:18

## 2023-04-04 RX ADMIN — PANTOPRAZOLE SODIUM 40 MILLIGRAM(S): 20 TABLET, DELAYED RELEASE ORAL at 07:46

## 2023-04-04 RX ADMIN — ISOSORBIDE DINITRATE 20 MILLIGRAM(S): 5 TABLET ORAL at 23:19

## 2023-04-04 RX ADMIN — ISOSORBIDE DINITRATE 20 MILLIGRAM(S): 5 TABLET ORAL at 14:17

## 2023-04-04 RX ADMIN — ATORVASTATIN CALCIUM 40 MILLIGRAM(S): 80 TABLET, FILM COATED ORAL at 23:19

## 2023-04-04 RX ADMIN — DOXERCALCIFEROL 4 MICROGRAM(S): 2.5 CAPSULE ORAL at 10:30

## 2023-04-04 RX ADMIN — Medication 650 MILLIGRAM(S): at 17:39

## 2023-04-04 NOTE — PROGRESS NOTE ADULT - PROBLEM SELECTOR PLAN 4
@ [facility], severe GIB w/ Hgb down to 3.5 received total of 7u PRBCs.    - 3/17 AM pt passed loose BM along w/ large amount of BRBPR.    - s/p Flex sigmoidoscopy – Localized ulceration and erosion w/ no bleeding in rectum, suggestive of solitary ulcer syndrome; evidence of prior hemoclip was found in sigmoid colon.    - Avoid anal digitation and enemas  - Fecal occult negative 3/24 and cleared by GI for DAPT  - Transfusion goal Hgb < 8.0 @ [facility], severe GIB w/ Hgb down to 3.5 received total of 7u PRBCs.    - 3/17 AM pt passed loose BM along w/ large amount of BRBPR.    - s/p Flex sigmoidoscopy – Localized ulceration and erosion w/ no bleeding in rectum, suggestive of solitary ulcer syndrome; evidence of prior hemoclip was found in sigmoid colon.    - Avoid anal digitation and enemas  - Fecal occult negative 3/24 and cleared by GI for DAPT  - Transfusion goal Hgb < 8.0 @ [facility], severe GIB w/ Hgb down to 3.5 received total of 7u PRBCs.    - 3/17 AM pt passed loose BM along w/ large amount of BRBPR.    - s/p Flex sigmoidoscopy – Localized ulceration and erosion w/ no bleeding in rectum, suggestive of solitary ulcer syndrome; evidence of prior hemoclip was found in sigmoid colon.    - Avoid anal digitation and enemas  - Fecal occult negative 3/24 and cleared by GI for DAPT  - Transfusion goal Hgb < 8.0

## 2023-04-04 NOTE — PROGRESS NOTE ADULT - NS ATTEND AMEND GEN_ALL_CORE FT
Initial attending contact date    4/3/23  . See PA note written above for details. I reviewed the PA documentation. I have personally seen and examined this patient. I reviewed vitals, labs, medications, cardiac studies, and additional imaging. I agree with the above PA's findings and plans as written above with the following additions/statements.    -Without active complaints, remains euvolemic on exam  -Afebrile last 24 hours. ID following and signing off. Last dose of abx 4/4  -H/H has remained stable on asa, plavix without recurrent bleed   -On tele no events  -ECHO with EF ~ 35% akinetic infero.infero lateral akinesis consistent with known occluded RCA, mild hypokinesis apical/anterior   -Plan for diagnostic LHC 4/6.  Subq ICD pending LHC results   -Cont ASA, plavix, statin  -HFrEF: euvolemic on exam, cont toprol, isordil/hydral, losartan  -HD per renal  -Appreciate palliative input: pt remains full code  -Dispo: [facility]

## 2023-04-04 NOTE — PROGRESS NOTE ADULT - ASSESSMENT
62 y/oM PMHx  HTN, HLD, DM, ICM (s/p cath years ago, RCA 99%, never intervened on), HFrEF 25-30%, ESRD s/p failed kidney transplant (LUE AVF; HD TTS), R AKA initially admitted to [facility] 2/27 for Respiratory Distress 2/2  SIRS s/p HD session whose course c/b cardiac arrest and Vtach. Now s/p extubation/pressors whose course further c/b LGIB (hgb 3.5 s/p multiple transfusions and no active bleed). Transferred to [facility] 3/17 for ICD eval, however, found to have BRBPR on admission s/p flex sig without active bleed. Course further c/b ongoing fevers (Tmax 102) with w/u remarkable for UTI, now on IV abx w/last dose 4/4. Plan [facility] 4/6 assuming remains afebrile with plan for SubQ ICD pending [facility] results. PT eval- MARIE.          62 y/oM PMHx  HTN, HLD, DM, ICM (s/p cath years ago, RCA 99%, never intervened on), HFrEF 25-30%, ESRD s/p failed kidney transplant (LUE AVF; HD TTS), R AKA initially admitted to [facility] 2/27 for Respiratory Distress 2/2  SIRS s/p HD session whose course c/b cardiac arrest and Vtach. Now s/p extubation/pressors whose course further c/b LGIB (hgb 3.5 s/p multiple transfusions and no active bleed). Transferred to [facility] 3/17 for ICD eval, however, found to have BRBPR on admission s/p flex sig without active bleed. Course further c/b ongoing fevers (Tmax 102) with w/u remarkable for UTI, now on IV abx w/last dose 4/4. Plan [facility] 4/6 assuming remains afebrile with plan for SubQ ICD pending [facility] results. PT eval- MARIE.          62 y/oM PMHx  HTN, HLD, DM, ICM (s/p cath years ago, RCA 99%, never intervened on), HFrEF 25-30%, ESRD s/p failed kidney transplant (LUE AVF; HD TTS), R AKA initially admitted to [facility] 2/27 for Respiratory Distress 2/2  SIRS s/p HD session whose course c/b cardiac arrest and Vtach. Now s/p extubation/pressors whose course further c/b LGIB (hgb 3.5 s/p multiple transfusions and no active bleed). Transferred to [facility] 3/17 for ICD eval, however, found to have BRBPR on admission s/p flex sig without active bleed. Course further c/b ongoing fevers (Tmax 102) with w/u remarkable for UTI, now on IV abx w/last dose 4/4. Plan [facility] 4/6 assuming remains afebrile with plan for SubQ ICD pending [facility] results. PT eval- MARIE.

## 2023-04-04 NOTE — PROGRESS NOTE ADULT - SUBJECTIVE AND OBJECTIVE BOX
CARDIOLOGY NP PROGRESS NOTE    Subjective: Received pt awake in bed in NAD. Denies CP/SOB, dizziness/diaphoresis, n/v, palpitations.   Remainder ROS otherwise negative.    Overnight Events: Per Palliative consult- patient remains Full Code     TELEMETRY: SB HR 50s        VITAL SIGNS:  T(C): 36 (04-04-23 @ 14:27), Max: 37.3 (04-03-23 @ 22:44)  HR: 83 (04-04-23 @ 13:25) (51 - 83)  BP: 140/63 (04-04-23 @ 13:25) (116/70 - 173/69)  RR: 18 (04-04-23 @ 13:25) (14 - 19)  SpO2: 98% (04-04-23 @ 13:25) (98% - 100%)  Wt(kg): --    I&O's Summary    03 Apr 2023 07:01  -  04 Apr 2023 07:00  --------------------------------------------------------  IN: 450 mL / OUT: 650 mL / NET: -200 mL    04 Apr 2023 07:01  -  04 Apr 2023 15:14  --------------------------------------------------------  IN: 1200 mL / OUT: 2900 mL / NET: -1700 mL          PHYSICAL EXAM:    General: A/ox 3, No acute Distress  Neck: Supple, NO JVD  Cardiac: S1 S2, No M/R/G  Pulmonary: CTAB, Breathing unlabored, No Rhonchi/Rales/Wheezing  Abdomen: Soft, Non -tender, +BS x 4 quads  Extremities: No Rashes, No edema  Neuro: A/o x 3, No focal deficits          LABS:                          9.3    4.73  )-----------( 177      ( 04 Apr 2023 10:45 )             28.6                              04-04    130<L>  |  96  |  3<L>  ----------------------------<  97  3.7   |  29  |  3.27<H>    Ca    7.8<L>      04 Apr 2023 10:45  Mg     1.6     04-04    TPro  4.9<L>  /  Alb  2.5<L>  /  TBili  0.4  /  DBili  x   /  AST  18  /  ALT  5<L>  /  AlkPhos  139<H>  04-03    LIVER FUNCTIONS - ( 03 Apr 2023 07:00 )  Alb: 2.5 g/dL / Pro: 4.9 g/dL / ALK PHOS: 139 U/L / ALT: 5 U/L / AST: 18 U/L / GGT: x                                   CAPILLARY BLOOD GLUCOSE      POCT Blood Glucose.: 80 mg/dL (04 Apr 2023 14:15)  POCT Blood Glucose.: 87 mg/dL (04 Apr 2023 06:43)  POCT Blood Glucose.: 99 mg/dL (03 Apr 2023 22:25)  POCT Blood Glucose.: 87 mg/dL (03 Apr 2023 17:28)            Allergies:  Allergy Status Unknown    MEDICATIONS  (STANDING):  aspirin enteric coated 81 milliGRAM(s) Oral daily  atorvastatin 40 milliGRAM(s) Oral at bedtime  cefTRIAXone   IVPB 2000 milliGRAM(s) IV Intermittent every 24 hours  chlorhexidine 2% Cloths 1 Application(s) Topical daily  clopidogrel Tablet 75 milliGRAM(s) Oral daily  dextrose 5%. 1000 milliLiter(s) (50 mL/Hr) IV Continuous <Continuous>  dextrose 5%. 1000 milliLiter(s) (100 mL/Hr) IV Continuous <Continuous>  dextrose 50% Injectable 25 Gram(s) IV Push once  dextrose 50% Injectable 12.5 Gram(s) IV Push once  doxercalciferol Injectable 4 MICROGram(s) IV Push once  ferrous    sulfate 325 milliGRAM(s) Oral daily  hydrALAZINE 50 milliGRAM(s) Oral every 8 hours  insulin lispro (ADMELOG) corrective regimen sliding scale   SubCutaneous Before meals and at bedtime  isosorbide   dinitrate Tablet (ISORDIL) 20 milliGRAM(s) Oral three times a day  losartan 100 milliGRAM(s) Oral every 24 hours  metoprolol succinate ER 25 milliGRAM(s) Oral daily  pantoprazole  Injectable 40 milliGRAM(s) IV Push every 12 hours  tacrolimus 2 milliGRAM(s) Oral every 12 hours  tamsulosin 0.8 milliGRAM(s) Oral at bedtime    MEDICATIONS  (PRN):  acetaminophen     Tablet .. 650 milliGRAM(s) Oral every 6 hours PRN Mild Pain (1 - 3), Moderate Pain (4 - 6)  dextrose Oral Gel 15 Gram(s) Oral once PRN Blood Glucose LESS THAN 70 milliGRAM(s)/deciliter  loperamide 2 milliGRAM(s) Oral three times a day PRN Diarrhea  sodium chloride 0.65% Nasal 1 Spray(s) Both Nostrils every 4 hours PRN Nasal Congestion        DIAGNOSTIC TESTS:

## 2023-04-04 NOTE — PROGRESS NOTE ADULT - PROBLEM SELECTOR PLAN 3
s/p VTach arrest @ [facility]; no tele events noted.    - EP Consulted – plan for SubQ ICD placement once pt has LHC (pending results)  - c/w Toprol XL 25mg s/p VTach arrest @ [facility]; no tele events noted.    - EP Consulted – plan for SubQ ICD placement once pt has LHC (pending results)  - c/w Toprol XL 25mg s/p VTach arrest @ [facility]; no tele events noted.    - EP Consulted – plan for SubQ ICD placement once pt has LHC (pending results)  - c/w Toprol XL 25mg

## 2023-04-04 NOTE — PROGRESS NOTE ADULT - ASSESSMENT
62M h/o ICM, HFrEF, ESRD s/p failed RT (HD via LUE AVF), R AKA, DM transferred to [facility] from [facility] on 3/17 for ICD placement with prolonged fever, found to have cystitis and pyelitis of transplanted kidney due to K.pneumo w/o pyelonephritis or abscess. While PET CT showed malignancy of prostate vs prostatitis, CT scan was negative for abscess or prostatitis per Dr. Parra (radiology).  PSA is 12 - prostate ca should be ruled out.  Patient still with intermittent low grade fever despite appropriate abx for pyelitis, and he now is continuing to have diarrhea (6 episodes last night, 2 this AM).  Although he has normal WBC, he is immunocompromised patient.     Suggest:  - GI PCR negative, can give Loperamide prn   - Complete ceftriaxone 2 g IV q24h today (3/21-4/4). Will continue to monitor     Team 2 will follow you.    Case d/w primary team. Final recommendations pending attending note.      Jaja Baig, Infectious Diseases PA      62M h/o ICM, HFrEF, ESRD s/p failed RT (HD via LUE AVF), R AKA, DM transferred to [facility] from [facility] on 3/17 for ICD placement with prolonged fever, found to have cystitis and pyelitis of transplanted kidney due to K.pneumo w/o pyelonephritis or abscess. While PET CT showed malignancy of prostate vs prostatitis, CT scan was negative for abscess or prostatitis per Dr. Parra (radiology).  PSA is 12 - prostate ca should be ruled out.  Patient still with intermittent low grade fever despite appropriate abx for pyelitis, and he now is continuing to have diarrhea (6 episodes last night, 2 this AM).  Although he has normal WBC, he is immunocompromised patient.     Suggest:  - GI PCR negative, can give Loperamide prn   - Complete ceftriaxone 2 g IV q24h today (3/21-4/4). Will continue to monitor     Team 2 will follow you.    Case d/w primary team. Final recommendations pending attending note.      Jaja Bagi, Infectious Diseases PA      62M h/o ICM, HFrEF, ESRD s/p failed RT (HD via LUE AVF), R AKA, DM transferred to [facility] from [facility] on 3/17 for ICD placement with prolonged fever, found to have cystitis and pyelitis of transplanted kidney due to K.pneumo w/o pyelonephritis or abscess. While PET CT showed malignancy of prostate vs prostatitis, CT scan was negative for abscess or prostatitis per Dr. Parra (radiology).  PSA is 12 - prostate ca should be ruled out.  Patient still with intermittent low grade fever despite appropriate abx for pyelitis, and he now is continuing to have diarrhea (6 episodes last night, 2 this AM).  Although he has normal WBC, he is immunocompromised patient.     Suggest:  - GI PCR negative, can give Loperamide prn   - Complete ceftriaxone 2 g IV q24h today (3/21-4/4). Will continue to monitor     Team 2 will follow you.    Case d/w primary team. Final recommendations pending attending note.      Jaja Baig, Infectious Diseases PA

## 2023-04-04 NOTE — PROGRESS NOTE ADULT - PROBLEM SELECTOR PLAN 2
- LHC at [place] w/ oLM 30% and RCA 99% that is not amenable to intervention.    - TTE 3/1/23 @ [facility]:  mild LVH, LVEF 25-30%, severe global wall motion dysfxn, mild LA dilation, mild RV dilation, mildly calcified leaflets, mod pHTN  - ECHO 4/3: EF 40% w/regional wall motion abnormality. Entire inferior wall & basal & mid inferolateral wall akinetic. Basal& mid anterolateral wall & apical lateral segments hypokinetic. Moderately dilated LA. Mild AR.   - c/w ASA 81mg and Plavix 75mg (cleared by GI)   - plan for possible LHC 4/6 assuming afebrile x 24H - LHC at [place] w/ oLM 30% and RCA 99% that is not amenable to intervention.    - TTE 3/1/23 @ [facility]:  mild LVH, LVEF 25-30%, severe global wall motion dysfxn, mild LA dilation, mild RV dilation, mildly calcified leaflets, mod pHTN  - ECHO 4/3: EF 40% w/regional wall motion abnormality. Entire inferior wall & basal & mid inferolateral wall akinetic. Basal& mid anterolateral wall & apical lateral segments hypokinetic. Moderately dilated LA. Mild AR.   - c/w ASA 81mg and Plavix 75mg (cleared by GI)   - plan for possible LHC 4/6 assuming afebrile x 24H - LHC at [place] w/ oLM 30% and RCA 99% that is not amenable to intervention.    - TTE 3/1/23 @ [facility]:  mild LVH, LVEF 25-30%, severe global wall motion dysfxn, mild LA dilation, mild RV dilation, mildly calcified leaflets, mod pHTN  - ECHO 4/3: EF 40% w/regional wall motion abnormality. Entire inferior wall & basal & mid inferolateral wall akinetic. Basal& mid anterolateral wall & apical lateral segments hypokinetic. Moderately dilated LA. Mild AR.   - c/w ASA 81mg and Plavix 75mg (cleared by GI)   - plan for possible LHC 4/6 assuming afebrile x 24H

## 2023-04-04 NOTE — PROGRESS NOTE ADULT - SUBJECTIVE AND OBJECTIVE BOX
--------------------------------------------------------------------------------  Chief Complaint: ESRD/Ongoing hemodialysis requirement    24 hour events/subjective:  Seen this morning, doing well on HD no acute complaints.       PAST HISTORY  --------------------------------------------------------------------------------  No significant changes to PMH, PSH, FHx, SHx, unless otherwise noted    ALLERGIES & MEDICATIONS  --------------------------------------------------------------------------------  Allergies    Allergy Status Unknown    Intolerances      Standing Inpatient Medications  aspirin enteric coated 81 milliGRAM(s) Oral daily  atorvastatin 40 milliGRAM(s) Oral at bedtime  cefTRIAXone   IVPB 2000 milliGRAM(s) IV Intermittent every 24 hours  chlorhexidine 2% Cloths 1 Application(s) Topical daily  clopidogrel Tablet 75 milliGRAM(s) Oral daily  dextrose 5%. 1000 milliLiter(s) IV Continuous <Continuous>  dextrose 5%. 1000 milliLiter(s) IV Continuous <Continuous>  dextrose 50% Injectable 12.5 Gram(s) IV Push once  dextrose 50% Injectable 25 Gram(s) IV Push once  doxercalciferol Injectable 4 MICROGram(s) IV Push once  ferrous    sulfate 325 milliGRAM(s) Oral daily  hydrALAZINE 50 milliGRAM(s) Oral every 8 hours  insulin lispro (ADMELOG) corrective regimen sliding scale   SubCutaneous Before meals and at bedtime  isosorbide   dinitrate Tablet (ISORDIL) 20 milliGRAM(s) Oral three times a day  losartan 100 milliGRAM(s) Oral every 24 hours  metoprolol succinate ER 25 milliGRAM(s) Oral daily  pantoprazole  Injectable 40 milliGRAM(s) IV Push every 12 hours  tacrolimus 2 milliGRAM(s) Oral every 12 hours  tamsulosin 0.8 milliGRAM(s) Oral at bedtime    PRN Inpatient Medications  acetaminophen     Tablet .. 650 milliGRAM(s) Oral every 6 hours PRN  dextrose Oral Gel 15 Gram(s) Oral once PRN  loperamide 2 milliGRAM(s) Oral three times a day PRN  sodium chloride 0.65% Nasal 1 Spray(s) Both Nostrils every 4 hours PRN      REVIEW OF SYSTEMS  --------------------------------------------------------------------------------  All other systems were reviewed and are negative, except as noted.    VITALS/PHYSICAL EXAM  --------------------------------------------------------------------------------  T(C): 37.2 (23 @ 09:55), Max: 37.3 (23 @ 22:44)  HR: 74 (23 @ 09:55) (51 - 74)  BP: 155/71 (23 @ 09:55) (116/70 - 173/69)  RR: 19 (23 @ 09:55) (14 - 19)  SpO2: 99% (23 @ 09:55) (98% - 100%)  Wt(kg): --  Drug Dosing Weight  Height (cm): 162.6 (17 Mar 2023 04:15)  Weight (kg): 62.3 (17 Mar 2023 04:15)  BMI (kg/m2): 23.6 (17 Mar 2023 04:15)  BSA (m2): 1.67 (17 Mar 2023 04:15)        23 @ 07:01  -  23 @ 07:00  --------------------------------------------------------  IN: 450 mL / OUT: 650 mL / NET: -200 mL    23 @ 07:01  -  23 @ 10:57  --------------------------------------------------------  IN: 180 mL / OUT: 0 mL / NET: 180 mL    PHYSICAL EXAM:  GENERAL: well-developed, well nourished, alert, no acute distress  CHEST/LUNG: Clear to auscultation bilaterally  HEART: normal S1S2, RRR  ABDOMEN: Soft, Nontender, +BS, No flank tenderness bilateral  EXTREMITIES: No clubbing, cyanosis, or edema, Rt AKA  ACCESS: MARILEE [place]/ thrill and bruit, marcos     LABS/STUDIES  --------------------------------------------------------------------------------              9.9    4.96  >-----------<  192      [23 @ 07:00]              32.1     131  |  96  |  2   ----------------------------<  73      [23 @ 07:00]  4.2   |  27  |  3.96        Ca     8.1     [23 @ 07:00]      Mg     1.7     [23 07:00]    TPro  4.9  /  Alb  2.5  /  TBili  0.4  /  DBili  x   /  AST  18  /  ALT  5   /  AlkPhos  139  [23 07:00]          PTH -- (Ca 7.7)      [23 @ 07:11]   431  Lipid: chol 88, , HDL 44, LDL --      [23 08:14]    HBsAb Reactive      [23 14:24]  HBsAg Nonreact      [23:24]  HCV 0.04, Nonreact      [23 08:14]      RADIOLOGY:  --------------------------------------------------------------------------------------    Hemoglobin: 9.9 g/dL (23 07:00)  Hemoglobin: 9.2 g/dL (23 05:30)  Phosphorus Level, Serum: 3.1 mg/dL (23 @ 08:25)  Hemoglobin: 9.0 g/dL (23 05:30)    Albumin, Serum: 2.5 g/dL (23 07:00)  Albumin, Serum: 2.2 g/dL (23 @ 05:30)    T(C): 37.2 (23 @ 09:55), Max: 37.3 (23 @ 22:44)  HR: 74 (23 09:55) (51 - 74)  BP: 155/71 (23 09:55) (116/70 - 173/69)  RR: 19 (23 09:55) (14 - 19)  SpO2: 99% (23 09:55) (98% - 100%)  calcium acetate 667 milliGRAM(s) Oral three times a day with meals, 23 05:13, Routine  doxercalciferol Injectable 4 MICROGram(s) IV Push once, 04-03-23 @ 05:06, Routine, Stop order after: 1 Doses  doxercalciferol Injectable 4 MICROGram(s) IV Push once, 23 @ 12:20, Routine, Stop order after: 1 Doses  doxercalciferol Injectable 4 MICROGram(s) IV Push once, 23 @ 05:00, Routine, Stop order after: 1 Doses  doxercalciferol Injectable 4 MICROGram(s) IV Push once, 23 @ 07:11, Routine, Stop order after: 1 Doses  doxercalciferol Injectable 4 MICROGram(s) IV Push once, 23 @ 07:12, Routine, Stop order after: 1 Doses  epoetin janae-epbx (RETACRIT) Injectable 6000 Unit(s) IV Push once, 23 @ 07:12, Routine, Stop order after: 1 Doses  epoetin janae-epbx (RETACRIT) Injectable 6000 Unit(s) IV Push once, 23 @ 07:11, Routine, Stop order after: 1 Doses  epoetin janae-epbx (RETACRIT) Injectable 6000 Unit(s) IV Push once, 23 @ 05:00, Routine, Stop order after: 1 Doses  epoetin janae-epbx (RETACRIT) Injectable 6000 Unit(s) IV Push once, 23 @ 08:17, Routine, Stop order after: 1 Doses  epoetin janae-epbx (RETACRIT) Injectable 6000 Unit(s) IV Push once, 23 @ 05:06, Routine, Stop order after: 1 Doses  epoetin janae-epbx (RETACRIT) Injectable 6000 Unit(s) IV Push once, 23 @ 12:20, Routine, Stop order after: 1 Doses  epoetin janae-epbx (RETACRIT) Injectable 6000 Unit(s) IV Push once, 23 @ 11:01, Routine, Stop order after: 1 Doses  sevelamer carbonate 800 milliGRAM(s) Oral three times a day with meals, 23 @ 05:13, Routine      Hemodialysis Treatment.:     Schedule: Once, Modality: Hemodialysis, Access: Arteriovenous Fistula    Dialyzer: Optiflux [id number], Time: 210 Min    Blood Flow: 400 mL/Min , Dialysate Flow: 500 mL/Min, Dialysate Temp: 36.5, Tubinmm (Adult)    Target Fluid Removal: 2 Liters    Dialysate Electrolytes (mEq/L): Potassium 3, Calcium 2.5, Sodium 138, Bicarbonate 35    Additional Instructions: epo and hectorol w/ HD  Turn off UF if SBP<100 (23 @ 05:00) [Completed]    Seen on HD, c/w prescription outlined as above --------------------------------------------------------------------------------  Chief Complaint: ESRD/Ongoing hemodialysis requirement    24 hour events/subjective:  Seen this morning, doing well on HD no acute complaints.       PAST HISTORY  --------------------------------------------------------------------------------  No significant changes to PMH, PSH, FHx, SHx, unless otherwise noted    ALLERGIES & MEDICATIONS  --------------------------------------------------------------------------------  Allergies    Allergy Status Unknown    Intolerances      Standing Inpatient Medications  aspirin enteric coated 81 milliGRAM(s) Oral daily  atorvastatin 40 milliGRAM(s) Oral at bedtime  cefTRIAXone   IVPB 2000 milliGRAM(s) IV Intermittent every 24 hours  chlorhexidine 2% Cloths 1 Application(s) Topical daily  clopidogrel Tablet 75 milliGRAM(s) Oral daily  dextrose 5%. 1000 milliLiter(s) IV Continuous <Continuous>  dextrose 5%. 1000 milliLiter(s) IV Continuous <Continuous>  dextrose 50% Injectable 12.5 Gram(s) IV Push once  dextrose 50% Injectable 25 Gram(s) IV Push once  doxercalciferol Injectable 4 MICROGram(s) IV Push once  ferrous    sulfate 325 milliGRAM(s) Oral daily  hydrALAZINE 50 milliGRAM(s) Oral every 8 hours  insulin lispro (ADMELOG) corrective regimen sliding scale   SubCutaneous Before meals and at bedtime  isosorbide   dinitrate Tablet (ISORDIL) 20 milliGRAM(s) Oral three times a day  losartan 100 milliGRAM(s) Oral every 24 hours  metoprolol succinate ER 25 milliGRAM(s) Oral daily  pantoprazole  Injectable 40 milliGRAM(s) IV Push every 12 hours  tacrolimus 2 milliGRAM(s) Oral every 12 hours  tamsulosin 0.8 milliGRAM(s) Oral at bedtime    PRN Inpatient Medications  acetaminophen     Tablet .. 650 milliGRAM(s) Oral every 6 hours PRN  dextrose Oral Gel 15 Gram(s) Oral once PRN  loperamide 2 milliGRAM(s) Oral three times a day PRN  sodium chloride 0.65% Nasal 1 Spray(s) Both Nostrils every 4 hours PRN      REVIEW OF SYSTEMS  --------------------------------------------------------------------------------  All other systems were reviewed and are negative, except as noted.    VITALS/PHYSICAL EXAM  --------------------------------------------------------------------------------  T(C): 37.2 (23 @ 09:55), Max: 37.3 (23 @ 22:44)  HR: 74 (23 @ 09:55) (51 - 74)  BP: 155/71 (23 @ 09:55) (116/70 - 173/69)  RR: 19 (23 @ 09:55) (14 - 19)  SpO2: 99% (23 @ 09:55) (98% - 100%)  Wt(kg): --  Drug Dosing Weight  Height (cm): 162.6 (17 Mar 2023 04:15)  Weight (kg): 62.3 (17 Mar 2023 04:15)  BMI (kg/m2): 23.6 (17 Mar 2023 04:15)  BSA (m2): 1.67 (17 Mar 2023 04:15)        23 @ 07:01  -  23 @ 07:00  --------------------------------------------------------  IN: 450 mL / OUT: 650 mL / NET: -200 mL    23 @ 07:01  -  23 @ 10:57  --------------------------------------------------------  IN: 180 mL / OUT: 0 mL / NET: 180 mL    PHYSICAL EXAM:  GENERAL: well-developed, well nourished, alert, no acute distress  CHEST/LUNG: Clear to auscultation bilaterally  HEART: normal S1S2, RRR  ABDOMEN: Soft, Nontender, +BS, No flank tenderness bilateral  EXTREMITIES: No clubbing, cyanosis, or edema, Rt AKA  ACCESS: MARILEE [place]/ thrill and bruit, marcos     LABS/STUDIES  --------------------------------------------------------------------------------              9.9    4.96  >-----------<  192      [23 @ 07:00]              32.1     131  |  96  |  2   ----------------------------<  73      [23 @ 07:00]  4.2   |  27  |  3.96        Ca     8.1     [23 @ 07:00]      Mg     1.7     [23 07:00]    TPro  4.9  /  Alb  2.5  /  TBili  0.4  /  DBili  x   /  AST  18  /  ALT  5   /  AlkPhos  139  [23 07:00]          PTH -- (Ca 7.7)      [23 @ 07:11]   431  Lipid: chol 88, , HDL 44, LDL --      [23 08:14]    HBsAb Reactive      [23 14:24]  HBsAg Nonreact      [23:24]  HCV 0.04, Nonreact      [23 08:14]      RADIOLOGY:  --------------------------------------------------------------------------------------    Hemoglobin: 9.9 g/dL (23 07:00)  Hemoglobin: 9.2 g/dL (23 05:30)  Phosphorus Level, Serum: 3.1 mg/dL (23 @ 08:25)  Hemoglobin: 9.0 g/dL (23 05:30)    Albumin, Serum: 2.5 g/dL (23 07:00)  Albumin, Serum: 2.2 g/dL (23 @ 05:30)    T(C): 37.2 (23 @ 09:55), Max: 37.3 (23 @ 22:44)  HR: 74 (23 09:55) (51 - 74)  BP: 155/71 (23 09:55) (116/70 - 173/69)  RR: 19 (23 09:55) (14 - 19)  SpO2: 99% (23 09:55) (98% - 100%)  calcium acetate 667 milliGRAM(s) Oral three times a day with meals, 23 05:13, Routine  doxercalciferol Injectable 4 MICROGram(s) IV Push once, 04-03-23 @ 05:06, Routine, Stop order after: 1 Doses  doxercalciferol Injectable 4 MICROGram(s) IV Push once, 23 @ 12:20, Routine, Stop order after: 1 Doses  doxercalciferol Injectable 4 MICROGram(s) IV Push once, 23 @ 05:00, Routine, Stop order after: 1 Doses  doxercalciferol Injectable 4 MICROGram(s) IV Push once, 23 @ 07:11, Routine, Stop order after: 1 Doses  doxercalciferol Injectable 4 MICROGram(s) IV Push once, 23 @ 07:12, Routine, Stop order after: 1 Doses  epoetin janae-epbx (RETACRIT) Injectable 6000 Unit(s) IV Push once, 23 @ 07:12, Routine, Stop order after: 1 Doses  epoetin janae-epbx (RETACRIT) Injectable 6000 Unit(s) IV Push once, 23 @ 07:11, Routine, Stop order after: 1 Doses  epoetin janae-epbx (RETACRIT) Injectable 6000 Unit(s) IV Push once, 23 @ 05:00, Routine, Stop order after: 1 Doses  epoetin janae-epbx (RETACRIT) Injectable 6000 Unit(s) IV Push once, 23 @ 08:17, Routine, Stop order after: 1 Doses  epoetin janae-epbx (RETACRIT) Injectable 6000 Unit(s) IV Push once, 23 @ 05:06, Routine, Stop order after: 1 Doses  epoetin janae-epbx (RETACRIT) Injectable 6000 Unit(s) IV Push once, 23 @ 12:20, Routine, Stop order after: 1 Doses  epoetin janae-epbx (RETACRIT) Injectable 6000 Unit(s) IV Push once, 23 @ 11:01, Routine, Stop order after: 1 Doses  sevelamer carbonate 800 milliGRAM(s) Oral three times a day with meals, 23 @ 05:13, Routine      Hemodialysis Treatment.:     Schedule: Once, Modality: Hemodialysis, Access: Arteriovenous Fistula    Dialyzer: Optiflux [id number], Time: 210 Min    Blood Flow: 400 mL/Min , Dialysate Flow: 500 mL/Min, Dialysate Temp: 36.5, Tubinmm (Adult)    Target Fluid Removal: 2 Liters    Dialysate Electrolytes (mEq/L): Potassium 3, Calcium 2.5, Sodium 138, Bicarbonate 35    Additional Instructions: epo and hectorol w/ HD  Turn off UF if SBP<100 (23 @ 05:00) [Completed]    Seen on HD, c/w prescription outlined as above --------------------------------------------------------------------------------  Chief Complaint: ESRD/Ongoing hemodialysis requirement    24 hour events/subjective:  Seen this morning, doing well on HD no acute complaints.       PAST HISTORY  --------------------------------------------------------------------------------  No significant changes to PMH, PSH, FHx, SHx, unless otherwise noted    ALLERGIES & MEDICATIONS  --------------------------------------------------------------------------------  Allergies    Allergy Status Unknown    Intolerances      Standing Inpatient Medications  aspirin enteric coated 81 milliGRAM(s) Oral daily  atorvastatin 40 milliGRAM(s) Oral at bedtime  cefTRIAXone   IVPB 2000 milliGRAM(s) IV Intermittent every 24 hours  chlorhexidine 2% Cloths 1 Application(s) Topical daily  clopidogrel Tablet 75 milliGRAM(s) Oral daily  dextrose 5%. 1000 milliLiter(s) IV Continuous <Continuous>  dextrose 5%. 1000 milliLiter(s) IV Continuous <Continuous>  dextrose 50% Injectable 12.5 Gram(s) IV Push once  dextrose 50% Injectable 25 Gram(s) IV Push once  doxercalciferol Injectable 4 MICROGram(s) IV Push once  ferrous    sulfate 325 milliGRAM(s) Oral daily  hydrALAZINE 50 milliGRAM(s) Oral every 8 hours  insulin lispro (ADMELOG) corrective regimen sliding scale   SubCutaneous Before meals and at bedtime  isosorbide   dinitrate Tablet (ISORDIL) 20 milliGRAM(s) Oral three times a day  losartan 100 milliGRAM(s) Oral every 24 hours  metoprolol succinate ER 25 milliGRAM(s) Oral daily  pantoprazole  Injectable 40 milliGRAM(s) IV Push every 12 hours  tacrolimus 2 milliGRAM(s) Oral every 12 hours  tamsulosin 0.8 milliGRAM(s) Oral at bedtime    PRN Inpatient Medications  acetaminophen     Tablet .. 650 milliGRAM(s) Oral every 6 hours PRN  dextrose Oral Gel 15 Gram(s) Oral once PRN  loperamide 2 milliGRAM(s) Oral three times a day PRN  sodium chloride 0.65% Nasal 1 Spray(s) Both Nostrils every 4 hours PRN      REVIEW OF SYSTEMS  --------------------------------------------------------------------------------  All other systems were reviewed and are negative, except as noted.    VITALS/PHYSICAL EXAM  --------------------------------------------------------------------------------  T(C): 37.2 (23 @ 09:55), Max: 37.3 (23 @ 22:44)  HR: 74 (23 @ 09:55) (51 - 74)  BP: 155/71 (23 @ 09:55) (116/70 - 173/69)  RR: 19 (23 @ 09:55) (14 - 19)  SpO2: 99% (23 @ 09:55) (98% - 100%)  Wt(kg): --  Drug Dosing Weight  Height (cm): 162.6 (17 Mar 2023 04:15)  Weight (kg): 62.3 (17 Mar 2023 04:15)  BMI (kg/m2): 23.6 (17 Mar 2023 04:15)  BSA (m2): 1.67 (17 Mar 2023 04:15)        23 @ 07:01  -  23 @ 07:00  --------------------------------------------------------  IN: 450 mL / OUT: 650 mL / NET: -200 mL    23 @ 07:01  -  23 @ 10:57  --------------------------------------------------------  IN: 180 mL / OUT: 0 mL / NET: 180 mL    PHYSICAL EXAM:  GENERAL: well-developed, well nourished, alert, no acute distress  CHEST/LUNG: Clear to auscultation bilaterally  HEART: normal S1S2, RRR  ABDOMEN: Soft, Nontender, +BS, No flank tenderness bilateral  EXTREMITIES: No clubbing, cyanosis, or edema, Rt AKA  ACCESS: MARILEE [place]/ thrill and bruit, marcos     LABS/STUDIES  --------------------------------------------------------------------------------              9.9    4.96  >-----------<  192      [23 @ 07:00]              32.1     131  |  96  |  2   ----------------------------<  73      [23 @ 07:00]  4.2   |  27  |  3.96        Ca     8.1     [23 @ 07:00]      Mg     1.7     [23 07:00]    TPro  4.9  /  Alb  2.5  /  TBili  0.4  /  DBili  x   /  AST  18  /  ALT  5   /  AlkPhos  139  [23 07:00]          PTH -- (Ca 7.7)      [23 @ 07:11]   431  Lipid: chol 88, , HDL 44, LDL --      [23 08:14]    HBsAb Reactive      [23 14:24]  HBsAg Nonreact      [23:24]  HCV 0.04, Nonreact      [23 08:14]      RADIOLOGY:  --------------------------------------------------------------------------------------    Hemoglobin: 9.9 g/dL (23 07:00)  Hemoglobin: 9.2 g/dL (23 05:30)  Phosphorus Level, Serum: 3.1 mg/dL (23 @ 08:25)  Hemoglobin: 9.0 g/dL (23 05:30)    Albumin, Serum: 2.5 g/dL (23 07:00)  Albumin, Serum: 2.2 g/dL (23 @ 05:30)    T(C): 37.2 (23 @ 09:55), Max: 37.3 (23 @ 22:44)  HR: 74 (23 09:55) (51 - 74)  BP: 155/71 (23 09:55) (116/70 - 173/69)  RR: 19 (23 09:55) (14 - 19)  SpO2: 99% (23 09:55) (98% - 100%)  calcium acetate 667 milliGRAM(s) Oral three times a day with meals, 23 05:13, Routine  doxercalciferol Injectable 4 MICROGram(s) IV Push once, 04-03-23 @ 05:06, Routine, Stop order after: 1 Doses  doxercalciferol Injectable 4 MICROGram(s) IV Push once, 23 @ 12:20, Routine, Stop order after: 1 Doses  doxercalciferol Injectable 4 MICROGram(s) IV Push once, 23 @ 05:00, Routine, Stop order after: 1 Doses  doxercalciferol Injectable 4 MICROGram(s) IV Push once, 23 @ 07:11, Routine, Stop order after: 1 Doses  doxercalciferol Injectable 4 MICROGram(s) IV Push once, 23 @ 07:12, Routine, Stop order after: 1 Doses  epoetin janae-epbx (RETACRIT) Injectable 6000 Unit(s) IV Push once, 23 @ 07:12, Routine, Stop order after: 1 Doses  epoetin janae-epbx (RETACRIT) Injectable 6000 Unit(s) IV Push once, 23 @ 07:11, Routine, Stop order after: 1 Doses  epoetin janae-epbx (RETACRIT) Injectable 6000 Unit(s) IV Push once, 23 @ 05:00, Routine, Stop order after: 1 Doses  epoetin janae-epbx (RETACRIT) Injectable 6000 Unit(s) IV Push once, 23 @ 08:17, Routine, Stop order after: 1 Doses  epoetin janae-epbx (RETACRIT) Injectable 6000 Unit(s) IV Push once, 23 @ 05:06, Routine, Stop order after: 1 Doses  epoetin janae-epbx (RETACRIT) Injectable 6000 Unit(s) IV Push once, 23 @ 12:20, Routine, Stop order after: 1 Doses  epoetin janae-epbx (RETACRIT) Injectable 6000 Unit(s) IV Push once, 23 @ 11:01, Routine, Stop order after: 1 Doses  sevelamer carbonate 800 milliGRAM(s) Oral three times a day with meals, 23 @ 05:13, Routine      Hemodialysis Treatment.:     Schedule: Once, Modality: Hemodialysis, Access: Arteriovenous Fistula    Dialyzer: Optiflux [id number], Time: 210 Min    Blood Flow: 400 mL/Min , Dialysate Flow: 500 mL/Min, Dialysate Temp: 36.5, Tubinmm (Adult)    Target Fluid Removal: 2 Liters    Dialysate Electrolytes (mEq/L): Potassium 3, Calcium 2.5, Sodium 138, Bicarbonate 35    Additional Instructions: epo and hectorol w/ HD  Turn off UF if SBP<100 (23 @ 05:00) [Completed]    Seen on HD, c/w prescription outlined as above

## 2023-04-04 NOTE — PROGRESS NOTE ADULT - PROBLEM SELECTOR PLAN 7
s/p failed kidney transplant   - Continue home Tacrolimus 2mg PO BID.    - per referring Dr Barrera, pt still requires tacrolimus even if failed transplant due to possibility of graft vs host

## 2023-04-04 NOTE — PROGRESS NOTE ADULT - SUBJECTIVE AND OBJECTIVE BOX
SUBJECTIVE / INTERVAL HPI: Patient seen and examined at bedside.     VITAL SIGNS:  Vital Signs Last 24 Hrs  T(C): 36.1 (04 Apr 2023 09:37), Max: 37.3 (03 Apr 2023 22:44)  T(F): 97 (04 Apr 2023 09:37), Max: 99.1 (03 Apr 2023 22:44)  HR: 54 (04 Apr 2023 08:32) (51 - 58)  BP: 116/70 (04 Apr 2023 08:32) (116/70 - 173/69)  BP(mean): 92 (04 Apr 2023 06:36) (92 - 111)  RR: 18 (04 Apr 2023 08:32) (14 - 18)  SpO2: 100% (04 Apr 2023 08:32) (98% - 100%)    Parameters below as of 04 Apr 2023 08:32  Patient On (Oxygen Delivery Method): nasal cannula  O2 Flow (L/min): 2      PHYSICAL EXAM:    General: WDWN  HEENT: NC/AT; PERRL, anicteric sclera; MMM  Neck: supple  Cardiovascular: +S1/S2; RRR  Respiratory: CTA B/L; no W/R/R  Gastrointestinal: soft, NT/ND; +BSx4  Extremities: WWP; no edema, clubbing or cyanosis  Vascular: 2+ radial, DP/PT pulses B/L  Neurological: AAOx3; no focal deficits    MEDICATIONS:  MEDICATIONS  (STANDING):  aspirin enteric coated 81 milliGRAM(s) Oral daily  atorvastatin 40 milliGRAM(s) Oral at bedtime  cefTRIAXone   IVPB 2000 milliGRAM(s) IV Intermittent every 24 hours  chlorhexidine 2% Cloths 1 Application(s) Topical daily  clopidogrel Tablet 75 milliGRAM(s) Oral daily  dextrose 5%. 1000 milliLiter(s) (100 mL/Hr) IV Continuous <Continuous>  dextrose 5%. 1000 milliLiter(s) (50 mL/Hr) IV Continuous <Continuous>  dextrose 50% Injectable 25 Gram(s) IV Push once  dextrose 50% Injectable 12.5 Gram(s) IV Push once  doxercalciferol Injectable 4 MICROGram(s) IV Push once  doxercalciferol Injectable 4 MICROGram(s) IV Push once  epoetin janae-epbx (RETACRIT) Injectable 6000 Unit(s) IV Push once  ferrous    sulfate 325 milliGRAM(s) Oral daily  hydrALAZINE 50 milliGRAM(s) Oral every 8 hours  insulin lispro (ADMELOG) corrective regimen sliding scale   SubCutaneous Before meals and at bedtime  isosorbide   dinitrate Tablet (ISORDIL) 20 milliGRAM(s) Oral three times a day  losartan 100 milliGRAM(s) Oral every 24 hours  metoprolol succinate ER 25 milliGRAM(s) Oral daily  pantoprazole  Injectable 40 milliGRAM(s) IV Push every 12 hours  tacrolimus 2 milliGRAM(s) Oral every 12 hours  tamsulosin 0.8 milliGRAM(s) Oral at bedtime    MEDICATIONS  (PRN):  acetaminophen     Tablet .. 650 milliGRAM(s) Oral every 6 hours PRN Mild Pain (1 - 3), Moderate Pain (4 - 6)  dextrose Oral Gel 15 Gram(s) Oral once PRN Blood Glucose LESS THAN 70 milliGRAM(s)/deciliter  loperamide 2 milliGRAM(s) Oral three times a day PRN Diarrhea  sodium chloride 0.65% Nasal 1 Spray(s) Both Nostrils every 4 hours PRN Nasal Congestion      ALLERGIES:  Allergies    Allergy Status Unknown    Intolerances        LABS:                        9.9    4.96  )-----------( 192      ( 03 Apr 2023 07:00 )             32.1     04-03    131<L>  |  96  |  2<L>  ----------------------------<  73  4.2   |  27  |  3.96<H>    Ca    8.1<L>      03 Apr 2023 07:00  Mg     1.7     04-03    TPro  4.9<L>  /  Alb  2.5<L>  /  TBili  0.4  /  DBili  x   /  AST  18  /  ALT  5<L>  /  AlkPhos  139<H>  04-03        CAPILLARY BLOOD GLUCOSE      POCT Blood Glucose.: 87 mg/dL (04 Apr 2023 06:43)      RADIOLOGY & ADDITIONAL TESTS: Reviewed.    ASSESSMENT:    PLAN:  SUBJECTIVE / INTERVAL HPI: Patient seen and examined at bedside.     VITAL SIGNS:  Vital Signs Last 24 Hrs  T(C): 36.1 (04 Apr 2023 09:37), Max: 37.3 (03 Apr 2023 22:44)  T(F): 97 (04 Apr 2023 09:37), Max: 99.1 (03 Apr 2023 22:44)  HR: 54 (04 Apr 2023 08:32) (51 - 58)  BP: 116/70 (04 Apr 2023 08:32) (116/70 - 173/69)  BP(mean): 92 (04 Apr 2023 06:36) (92 - 111)  RR: 18 (04 Apr 2023 08:32) (14 - 18)  SpO2: 100% (04 Apr 2023 08:32) (98% - 100%)    Parameters below as of 04 Apr 2023 08:32  Patient On (Oxygen Delivery Method): nasal cannula  O2 Flow (L/min): 2      PHYSICAL EXAM:    General: WDWN  HEENT: NC/AT; PERRL, anicteric sclera; MMM  Neck: supple  Cardiovascular: +S1/S2; RRR  Respiratory: CTA B/L; no W/R/R  Gastrointestinal: soft, NT/ND; +BSx4  Extremities: WWP; no edema, clubbing or cyanosis  Vascular: 2+ radial, DP/PT pulses B/L  Neurological: AAOx3; no focal deficits    MEDICATIONS:  MEDICATIONS  (STANDING):  aspirin enteric coated 81 milliGRAM(s) Oral daily  atorvastatin 40 milliGRAM(s) Oral at bedtime  cefTRIAXone   IVPB 2000 milliGRAM(s) IV Intermittent every 24 hours  chlorhexidine 2% Cloths 1 Application(s) Topical daily  clopidogrel Tablet 75 milliGRAM(s) Oral daily  dextrose 5%. 1000 milliLiter(s) (100 mL/Hr) IV Continuous <Continuous>  dextrose 5%. 1000 milliLiter(s) (50 mL/Hr) IV Continuous <Continuous>  dextrose 50% Injectable 25 Gram(s) IV Push once  dextrose 50% Injectable 12.5 Gram(s) IV Push once  doxercalciferol Injectable 4 MICROGram(s) IV Push once  doxercalciferol Injectable 4 MICROGram(s) IV Push once  epoetin janea-epbx (RETACRIT) Injectable 6000 Unit(s) IV Push once  ferrous    sulfate 325 milliGRAM(s) Oral daily  hydrALAZINE 50 milliGRAM(s) Oral every 8 hours  insulin lispro (ADMELOG) corrective regimen sliding scale   SubCutaneous Before meals and at bedtime  isosorbide   dinitrate Tablet (ISORDIL) 20 milliGRAM(s) Oral three times a day  losartan 100 milliGRAM(s) Oral every 24 hours  metoprolol succinate ER 25 milliGRAM(s) Oral daily  pantoprazole  Injectable 40 milliGRAM(s) IV Push every 12 hours  tacrolimus 2 milliGRAM(s) Oral every 12 hours  tamsulosin 0.8 milliGRAM(s) Oral at bedtime    MEDICATIONS  (PRN):  acetaminophen     Tablet .. 650 milliGRAM(s) Oral every 6 hours PRN Mild Pain (1 - 3), Moderate Pain (4 - 6)  dextrose Oral Gel 15 Gram(s) Oral once PRN Blood Glucose LESS THAN 70 milliGRAM(s)/deciliter  loperamide 2 milliGRAM(s) Oral three times a day PRN Diarrhea  sodium chloride 0.65% Nasal 1 Spray(s) Both Nostrils every 4 hours PRN Nasal Congestion      ALLERGIES:  Allergies    Allergy Status Unknown    Intolerances        LABS:                        9.9    4.96  )-----------( 192      ( 03 Apr 2023 07:00 )             32.1     04-03    131<L>  |  96  |  2<L>  ----------------------------<  73  4.2   |  27  |  3.96<H>    Ca    8.1<L>      03 Apr 2023 07:00  Mg     1.7     04-03    TPro  4.9<L>  /  Alb  2.5<L>  /  TBili  0.4  /  DBili  x   /  AST  18  /  ALT  5<L>  /  AlkPhos  139<H>  04-03        CAPILLARY BLOOD GLUCOSE      POCT Blood Glucose.: 87 mg/dL (04 Apr 2023 06:43)      RADIOLOGY & ADDITIONAL TESTS: Reviewed.    ASSESSMENT:    PLAN:  SUBJECTIVE / INTERVAL HPI: Patient seen and examined at bedside.     VITAL SIGNS:  Vital Signs Last 24 Hrs  T(C): 36.1 (04 Apr 2023 09:37), Max: 37.3 (03 Apr 2023 22:44)  T(F): 97 (04 Apr 2023 09:37), Max: 99.1 (03 Apr 2023 22:44)  HR: 54 (04 Apr 2023 08:32) (51 - 58)  BP: 116/70 (04 Apr 2023 08:32) (116/70 - 173/69)  BP(mean): 92 (04 Apr 2023 06:36) (92 - 111)  RR: 18 (04 Apr 2023 08:32) (14 - 18)  SpO2: 100% (04 Apr 2023 08:32) (98% - 100%)    Parameters below as of 04 Apr 2023 08:32  Patient On (Oxygen Delivery Method): nasal cannula  O2 Flow (L/min): 2    MEDICATIONS:  MEDICATIONS  (STANDING):  aspirin enteric coated 81 milliGRAM(s) Oral daily  atorvastatin 40 milliGRAM(s) Oral at bedtime  cefTRIAXone   IVPB 2000 milliGRAM(s) IV Intermittent every 24 hours  chlorhexidine 2% Cloths 1 Application(s) Topical daily  clopidogrel Tablet 75 milliGRAM(s) Oral daily  dextrose 5%. 1000 milliLiter(s) (100 mL/Hr) IV Continuous <Continuous>  dextrose 5%. 1000 milliLiter(s) (50 mL/Hr) IV Continuous <Continuous>  dextrose 50% Injectable 25 Gram(s) IV Push once  dextrose 50% Injectable 12.5 Gram(s) IV Push once  doxercalciferol Injectable 4 MICROGram(s) IV Push once  doxercalciferol Injectable 4 MICROGram(s) IV Push once  epoetin janae-epbx (RETACRIT) Injectable 6000 Unit(s) IV Push once  ferrous    sulfate 325 milliGRAM(s) Oral daily  hydrALAZINE 50 milliGRAM(s) Oral every 8 hours  insulin lispro (ADMELOG) corrective regimen sliding scale   SubCutaneous Before meals and at bedtime  isosorbide   dinitrate Tablet (ISORDIL) 20 milliGRAM(s) Oral three times a day  losartan 100 milliGRAM(s) Oral every 24 hours  metoprolol succinate ER 25 milliGRAM(s) Oral daily  pantoprazole  Injectable 40 milliGRAM(s) IV Push every 12 hours  tacrolimus 2 milliGRAM(s) Oral every 12 hours  tamsulosin 0.8 milliGRAM(s) Oral at bedtime    MEDICATIONS  (PRN):  acetaminophen     Tablet .. 650 milliGRAM(s) Oral every 6 hours PRN Mild Pain (1 - 3), Moderate Pain (4 - 6)  dextrose Oral Gel 15 Gram(s) Oral once PRN Blood Glucose LESS THAN 70 milliGRAM(s)/deciliter  loperamide 2 milliGRAM(s) Oral three times a day PRN Diarrhea  sodium chloride 0.65% Nasal 1 Spray(s) Both Nostrils every 4 hours PRN Nasal Congestion      ALLERGIES:  Allergies    Allergy Status Unknown    Intolerances        LABS:                        9.9    4.96  )-----------( 192      ( 03 Apr 2023 07:00 )             32.1     04-03    131<L>  |  96  |  2<L>  ----------------------------<  73  4.2   |  27  |  3.96<H>    Ca    8.1<L>      03 Apr 2023 07:00  Mg     1.7     04-03    TPro  4.9<L>  /  Alb  2.5<L>  /  TBili  0.4  /  DBili  x   /  AST  18  /  ALT  5<L>  /  AlkPhos  139<H>  04-03        CAPILLARY BLOOD GLUCOSE      POCT Blood Glucose.: 87 mg/dL (04 Apr 2023 06:43)      RADIOLOGY & ADDITIONAL TESTS: Reviewed.     SUBJECTIVE / INTERVAL HPI: Patient seen and examined at bedside. Came back from dialysis. Has no new complaints at this time.    VITAL SIGNS:  Vital Signs Last 24 Hrs  T(C): 36.1 (04 Apr 2023 09:37), Max: 37.3 (03 Apr 2023 22:44)  T(F): 97 (04 Apr 2023 09:37), Max: 99.1 (03 Apr 2023 22:44)  HR: 54 (04 Apr 2023 08:32) (51 - 58)  BP: 116/70 (04 Apr 2023 08:32) (116/70 - 173/69)  BP(mean): 92 (04 Apr 2023 06:36) (92 - 111)  RR: 18 (04 Apr 2023 08:32) (14 - 18)  SpO2: 100% (04 Apr 2023 08:32) (98% - 100%)    Parameters below as of 04 Apr 2023 08:32  Patient On (Oxygen Delivery Method): nasal cannula  O2 Flow (L/min): 2    MEDICATIONS:  MEDICATIONS  (STANDING):  aspirin enteric coated 81 milliGRAM(s) Oral daily  atorvastatin 40 milliGRAM(s) Oral at bedtime  cefTRIAXone   IVPB 2000 milliGRAM(s) IV Intermittent every 24 hours  chlorhexidine 2% Cloths 1 Application(s) Topical daily  clopidogrel Tablet 75 milliGRAM(s) Oral daily  dextrose 5%. 1000 milliLiter(s) (100 mL/Hr) IV Continuous <Continuous>  dextrose 5%. 1000 milliLiter(s) (50 mL/Hr) IV Continuous <Continuous>  dextrose 50% Injectable 25 Gram(s) IV Push once  dextrose 50% Injectable 12.5 Gram(s) IV Push once  doxercalciferol Injectable 4 MICROGram(s) IV Push once  doxercalciferol Injectable 4 MICROGram(s) IV Push once  epoetin janae-epbx (RETACRIT) Injectable 6000 Unit(s) IV Push once  ferrous    sulfate 325 milliGRAM(s) Oral daily  hydrALAZINE 50 milliGRAM(s) Oral every 8 hours  insulin lispro (ADMELOG) corrective regimen sliding scale   SubCutaneous Before meals and at bedtime  isosorbide   dinitrate Tablet (ISORDIL) 20 milliGRAM(s) Oral three times a day  losartan 100 milliGRAM(s) Oral every 24 hours  metoprolol succinate ER 25 milliGRAM(s) Oral daily  pantoprazole  Injectable 40 milliGRAM(s) IV Push every 12 hours  tacrolimus 2 milliGRAM(s) Oral every 12 hours  tamsulosin 0.8 milliGRAM(s) Oral at bedtime    MEDICATIONS  (PRN):  acetaminophen     Tablet .. 650 milliGRAM(s) Oral every 6 hours PRN Mild Pain (1 - 3), Moderate Pain (4 - 6)  dextrose Oral Gel 15 Gram(s) Oral once PRN Blood Glucose LESS THAN 70 milliGRAM(s)/deciliter  loperamide 2 milliGRAM(s) Oral three times a day PRN Diarrhea  sodium chloride 0.65% Nasal 1 Spray(s) Both Nostrils every 4 hours PRN Nasal Congestion      ALLERGIES:  Allergies    Allergy Status Unknown    Intolerances        LABS:                        9.9    4.96  )-----------( 192      ( 03 Apr 2023 07:00 )             32.1     04-03    131<L>  |  96  |  2<L>  ----------------------------<  73  4.2   |  27  |  3.96<H>    Ca    8.1<L>      03 Apr 2023 07:00  Mg     1.7     04-03    TPro  4.9<L>  /  Alb  2.5<L>  /  TBili  0.4  /  DBili  x   /  AST  18  /  ALT  5<L>  /  AlkPhos  139<H>  04-03        CAPILLARY BLOOD GLUCOSE      POCT Blood Glucose.: 87 mg/dL (04 Apr 2023 06:43)      RADIOLOGY & ADDITIONAL TESTS: Reviewed.

## 2023-04-04 NOTE — PROGRESS NOTE ADULT - PROBLEM SELECTOR PLAN 5
- ECHOs as above   - Remains euvolemic on exam  - c/w Hydral 50mg TID, Losartan 100mg, Toprol XL 25mg and Isordil 20mg TID

## 2023-04-04 NOTE — PROGRESS NOTE ADULT - ASSESSMENT
62M PMHx HTN, HLD, DM, CAD (s/p cath years ago, RCA 99%, never intervened on), HFrEF 25-30%, ESRD s/p failed kidney transplant (last HD 3/1 via Left Arm AVF; HD TTS), Right AKA initially presented to [facility] 2/27 for respiratory distress after a dialysis, found to be septic, h/c complicated by AHRF requiring intubation for 24hr followed by VT arrest, h/c the complicated by massive LGIB (rectal ulcer) requiring 7u pRBC, 1u FFP and 1u PLT. Transferred to [facility] for ICD placement 2/2 Vtach and cardiac cath. Medicine consulted for GIB, fever of unknown origin, and comanagement.     #Fever of Unknown Origin  - Spiked fever of 101.6 on return from HD 3/21  - Unclear source of infection  - Abd U/S with no acute pathology  - CXR without infiltrate  - UA without UTI  - F/u BCx, so far NGTD  - PET scan showing increased uptake in prostate suggesting prostatitis vs. neoplasm  - F/u CT A/P showing probable cystitis  - Ceftriaxone 2g daily per ID 2 week course (end date 4/4)    #LGIB  #SANTOS  #Solitary ulcer syndrome  - hx coffee ground emesis @ [facility]; s/p EGD/colonoscopy showing rectal ulcer  - 3/17 AM pt passed loose BM along with large amount of BRBPR  - per gen surgery no acute surgical intervention   - GI following, no endoscopy indicated  - Active T&S  - Transfuse for hgb <8 (active CAD)  - Recommend switching from IV BID to Protonix 40 mg PO daily    #ESRD on dialysis  - dialysis Tues, Thurs, Sat  - f/u nephro recs  - c/w calcium citrate 667mg TID  - C/w Tacrolimus for kidney transplant     #HTN  - c/w home meds. Amlodipine 10mg qd, Losartan 100mg qd, Hydralazine 50m TID, Isordil 20mg TID with hold parameters    #HLD   - c/w Lipitor 40mg qd    #DM  - no meds per [place], obtain collaterals for med recs prior [place] stay  - mISS while inpatient, goal -180   - A1c 5.9 on admission 62M PMHx HTN, HLD, DM, CAD (s/p cath years ago, RCA 99%, never intervened on), HFrEF 25-30%, ESRD s/p failed kidney transplant (last HD 3/1 via Left Arm AVF; HD TTS), Right AKA initially presented to [facility] 2/27 for respiratory distress after a dialysis, found to be septic, h/c complicated by AHRF requiring intubation for 24hr followed by VT arrest, h/c the complicated by massive LGIB (rectal ulcer) requiring 7u pRBC, 1u FFP and 1u PLT. Transferred to [facility] for ICD placement 2/2 Vtach and cardiac cath. Medicine consulted for GIB, fever of unknown origin, and comanagement.     #Fever of Unknown Origin  - Spiked fever of 101.6 on return from HD 3/21  - Unclear source of infection  - Abd U/S with no acute pathology  - CXR without infiltrate  - UA without UTI  - F/u BCx, so far NGTD  - PET scan showing increased uptake in prostate suggesting prostatitis vs. neoplasm  - F/u CT A/P showing probable cystitis  - Ceftriaxone 2g daily per ID 2 week course (end date 4/4)    #LGIB  #SANTOS  #Solitary ulcer syndrome  - hx coffee ground emesis @ [facility]; s/p EGD/colonoscopy showing rectal ulcer  - 3/17 AM pt passed loose BM along with large amount of BRBPR  - per gen surgery no acute surgical intervention   - GI following, no endoscopy indicated  - Active T&S  - Transfuse for hgb <8 (active CAD)  - Recommend switching from IV BID to Protonix 40 mg PO daily    #ESRD on dialysis  - dialysis Tues, Thurs, Sat  - f/u nephro recs  - c/w calcium citrate 667mg TID  - C/w Tacrolimus for kidney transplant     #HTN  - c/w home meds. Amlodipine 10mg qd, Losartan 100mg qd, Hydralazine 50m TID, Isordil 20mg TID with hold parameters    #HLD   - c/w Lipitor 40mg qd    #DM  - no meds per [place], obtain collaterals for med recs prior [place] stay  - mISS while inpatient, goal -180   - A1c 5.9 on admission 62M PMHx HTN, HLD, DM, CAD (s/p cath years ago, RCA 99%, never intervened on), HFrEF 25-30%, ESRD s/p failed kidney transplant (last HD 3/1 via Left Arm AVF; HD TTS), Right AKA initially presented to [facility] 2/27 for respiratory distress after a dialysis, found to be septic, h/c complicated by AHRF requiring intubation for 24hr followed by VT arrest, h/c the complicated by massive LGIB (rectal ulcer) requiring 7u pRBC, 1u FFP and 1u PLT. Transferred to [facility] for ICD placement 2/2 Vtach and cardiac cath. Medicine consulted for GIB, fever of unknown origin, and comanagement.     #Fever of Unknown Origin  - Spiked fever of 101.6 on return from HD 3/21  - Unclear source of infection  - Abd U/S with no acute pathology  - CXR without infiltrate  - UA without UTI  - F/u BCx, so far NGTD  - PET scan showing increased uptake in prostate suggesting prostatitis vs. neoplasm  - F/u CT A/P showing probable cystitis  - Ceftriaxone 2g daily per ID 2 week course (end date 4/4)    #LGIB  #SANTOS  #Solitary ulcer syndrome  - hx coffee ground emesis @ [facility]; s/p EGD/colonoscopy showing rectal ulcer  - 3/17 AM pt passed loose BM along with large amount of BRBPR  - per gen surgery no acute surgical intervention   - GI following, no endoscopy indicated  - Active T&S  - Transfuse for hgb <8 (active CAD)  - Recommend switching from IV BID to Protonix 40 mg PO daily    #ESRD on dialysis  - dialysis Tues, Thurs, Sat  - f/u nephro recs  - c/w calcium citrate 667mg TID  - C/w Tacrolimus for kidney transplant     #HTN  - c/w home meds. Amlodipine 10mg qd, Losartan 100mg qd, Hydralazine 50m TID, Isordil 20mg TID with hold parameters    #HLD   - c/w Lipitor 40mg qd    #DM  - no meds per [place], obtain collaterals for med recs prior [place] stay  - mISS while inpatient, goal -180   - A1c 5.9 on admission 62M PMHx HTN, HLD, DM, CAD (s/p cath years ago, RCA 99%, never intervened on), HFrEF 25-30%, ESRD s/p failed kidney transplant (last HD 3/1 via Left Arm AVF; HD TTS), Right AKA initially presented to [facility] 2/27 for respiratory distress after a dialysis, found to be septic, h/c complicated by AHRF requiring intubation for 24hr followed by VT arrest, h/c the complicated by massive LGIB (rectal ulcer) requiring 7u pRBC, 1u FFP and 1u PLT. Transferred to [facility] for ICD placement 2/2 Vtach and cardiac cath. Medicine consulted for GIB, fever of unknown origin, and comanagement.     #Fever of Unknown Origin  - Spiked fever of 101.6 on return from HD 3/21  - Unclear source of infection  - Abd U/S with no acute pathology  - CXR without infiltrate  - UA without UTI  - F/u BCx, so far NGTD  - PET scan showing increased uptake in prostate suggesting prostatitis vs. neoplasm  - F/u CT A/P showing probable cystitis  - Ceftriaxone 2g daily per ID 2 week course (end date 4/4)    #LGIB  #SANTOS  #Solitary ulcer syndrome  - hx coffee ground emesis @ [facility]; s/p EGD/colonoscopy showing rectal ulcer  - 3/17 AM pt passed loose BM along with large amount of BRBPR  - per gen surgery no acute surgical intervention   - GI following, no endoscopy indicated  - Active T&S  - Transfuse for hgb <8 (active CAD)  - Recommend switching from IV BID to Protonix 40 mg PO daily    #ESRD on dialysis  - dialysis Tues, Thurs, Sat  - f/u nephro recs  - c/w calcium citrate 667mg TID  - C/w Tacrolimus for kidney transplant     #HTN  - c/w home meds. Amlodipine 10mg qd, Losartan 100mg qd, Hydralazine 50m TID, Isordil 20mg TID with hold parameters    #HLD   - c/w Lipitor 40mg qd    #DM  - no meds per [place], obtain collaterals for med recs prior [place] stay  - mISS while inpatient, goal -180   - A1c 5.9 on admission    Seen and discussed with attending Dr. Lopez.  Note not final until attested by attending.  62M PMHx HTN, HLD, DM, CAD (s/p cath years ago, RCA 99%, never intervened on), HFrEF 25-30%, ESRD s/p failed kidney transplant (last HD 3/1 via Left Arm AVF; HD TTS), Right AKA initially presented to [facility] 2/27 for respiratory distress after a dialysis, found to be septic, h/c complicated by AHRF requiring intubation for 24hr followed by VT arrest, h/c the complicated by massive LGIB (rectal ulcer) requiring 7u pRBC, 1u FFP and 1u PLT. Transferred to [facility] for ICD placement 2/2 Vtach and cardiac cath. Medicine consulted for GIB, fever of unknown origin, and comanagement.     #Fever of Unknown Origin  - Spiked fever of 101.6 on return from HD 3/21  - Unclear source of infection  - Abd U/S with no acute pathology  - CXR without infiltrate  - UA without UTI  - F/u BCx, so far NGTD  - PET scan showing increased uptake in prostate suggesting prostatitis vs. neoplasm  - F/u CT A/P showing probable cystitis  - Ceftriaxone 2g daily per ID 2 week course (end date 4/4)    #LGIB  #SANTOS  #Solitary ulcer syndrome  - hx coffee ground emesis @ [facility]; s/p EGD/colonoscopy showing rectal ulcer  - 3/17 AM pt passed loose BM along with large amount of BRBPR  - per gen surgery no acute surgical intervention   - GI following, no endoscopy indicated  - Active T&S  - Transfuse for hgb <8 (active CAD)  - Recommend switching from IV BID to Protonix 40 mg PO daily    #ESRD on dialysis  - dialysis Tues, Thurs, Sat  - f/u nephro recs  - c/w calcium citrate 667mg TID  - C/w Tacrolimus for kidney transplant     #HTN  - c/w home meds. Amlodipine 10mg qd, Losartan 100mg qd, Hydralazine 50m TID, Isordil 20mg TID with hold parameters    #HLD   - c/w Lipitor 40mg qd    #DM  - no meds per [place], obtain collaterals for med recs prior [place] stay  - mISS while inpatient, goal -180   - A1c 5.9 on admission    Seen and discussed with attending Dr. Lopez.  Note not final until attested by attending.  62M PMHx HTN, HLD, DM, CAD (s/p cath years ago, RCA 99%, never intervened on), HFrEF 25-30%, ESRD s/p failed kidney transplant (last HD 3/1 via Left Arm AVF; HD TTS), Right AKA initially presented to [facility] 2/27 for respiratory distress after a dialysis, found to be septic, h/c complicated by AHRF requiring intubation for 24hr followed by VT arrest, h/c the complicated by massive LGIB (rectal ulcer) requiring 7u pRBC, 1u FFP and 1u PLT. Transferred to [facility] for ICD placement 2/2 Vtach and cardiac cath. Medicine consulted for GIB, fever of unknown origin, and comanagement.     #Fever of Unknown Origin  - Spiked fever of 101.6 on return from HD 3/21  - Unclear source of infection  - Abd U/S with no acute pathology  - CXR without infiltrate  - UA without UTI  - F/u BCx, so far NGTD  - PET scan showing increased uptake in prostate suggesting prostatitis vs. neoplasm  - F/u CT A/P showing probable cystitis  - Ceftriaxone 2g daily per ID 2 week course (end date 4/4)    #LGIB  #SANTOS  #Solitary ulcer syndrome  - hx coffee ground emesis @ [facility]; s/p EGD/colonoscopy showing rectal ulcer  - 3/17 AM pt passed loose BM along with large amount of BRBPR  - per gen surgery no acute surgical intervention   - GI following, no endoscopy indicated  - Active T&S  - Transfuse for hgb <8 (active CAD)  - Recommend switching from IV BID to Protonix 40 mg PO daily    #ESRD on dialysis  - dialysis Tues, Thurs, Sat  - f/u nephro recs  - c/w calcium citrate 667mg TID  - C/w Tacrolimus for kidney transplant     #HTN  - c/w home meds. Amlodipine 10mg qd, Losartan 100mg qd, Hydralazine 50m TID, Isordil 20mg TID with hold parameters    #HLD   - c/w Lipitor 40mg qd    #DM  - no meds per [place], obtain collaterals for med recs prior [place] stay  - mISS while inpatient, goal -180   - A1c 5.9 on admission    Seen and discussed with attending Dr. Lopez.  Note not final until attested by attending.

## 2023-04-04 NOTE — PROGRESS NOTE ADULT - ASSESSMENT
62Y M w/ HTN, DM, ESRD s/p failed kidney transplant who was transferred to [facility] (3/17) from [place] for ICD placement and Cardiac Cath after prolonged admission c/b cardiac arrest and vtach arrest and hypovolemic shock 2/2 GI bleed. New fever 3/21, s/p NM PET/CT c/w prostatitis, on CTX per ID, BK virus negative. Pending repeat cath and ICD placement once infection cleared. Also w/ intermittent melena on 3/23, GI and surg following but no intervention planned    #ESRD on HD TTS  HD today as per schedule  Daily BMP   K noted 4.2  Continue with Tacro at home dose. Recommend pt follow up with outpatient nephrologist regarding tacro    HTN:  UF with HD as tolerated   Amlodipine dc'd 2/2 hypotensive episodes  Continue losartan 100mg, metoprolol 25mg ER, hydralazine 50mg TID and Isordil 20mg TID. Hold meds for SBP<110    Anemia:  Hgb within goal  epo w/ HD  Defer iron replacement i/s/o active infection, so will not check iron profile at the moment  Transfusion per primary    MBD:  Calcium corrected wnl  iPTH 431 (3/19)  On Hectorol 4mcg TIW as outpatient. Will continue  Check phos twice weekly. Goal <5.5. Can replete if needed to keep>3   62Y M w/ HTN, DM, ESRD s/p failed kidney transplant who was transferred to [facility] (3/17) from [place] for ICD placement and Cardiac Cath after prolonged admission c/b cardiac arrest and vtach arrest and hypovolemic shock 2/2 GI bleed. New fever 3/21, s/p NM PET/CT c/w prostatitis, on CTX per ID, BK virus negative. Pending repeat cath and ICD placement once infection cleared. Also w/ intermittent melena on 3/23, GI and surg following but no intervention planned    #ESRD on HD TTS  HD today as per schedule  Daily BMP   K noted 4.2  Continue with Tacro at home dose. Recommend pt follow up with outpatient nephrologist regarding tacro    HTN:  UF with HD as tolerated   Amlodipine dc'd 2/2 hypotensive episodes  Continue losartan 100mg, metoprolol 25mg ER, hydralazine 50mg TID and Isordil 20mg TID. Hold meds for SBP<110    Anemia:  Hgb within goal  epo w/ HD  Defer iron replacement i/s/o active infection, so will not check iron profile at the moment  Transfusion per primary    MBD:  Calcium corrected wnl  iPTH 431 (3/19)  On Hectorol 4mcg TIW as outpatient. Will continue  Check phos twice weekly. Goal <5.5. Can replete if needed to keep>3   62Y M w/ HTN, DM, ESRD s/p failed kidney transplant who was transferred to [facility] (3/17) from [place] for ICD placement and Cardiac Cath after prolonged admission c/b cardiac arrest and vtach arrest and hypovolemic shock 2/2 GI bleed. New fever 3/21, s/p NM PET/CT c/w prostatitis, on CTX per ID, BK virus negative. Pending repeat cath and ICD placement once infection cleared. Also w/ intermittent melena on 3/23, GI and surg following but no intervention planned    #ESRD on HD TTS  HD today as per schedule  Daily BMP   K noted 4.2  Continue with Tacro at home dose. Recommend pt follow up with outpatient nephrologist regarding tacro    HTN:  UF with HD as tolerated   Amlodipine dc'd 2/2 hypotensive episodes  Continue losartan 100mg, metoprolol 25mg ER, hydralazine 50mg TID and Isordil 20mg TID. Hold meds for SBP<110    Anemia:  Hgb within goal  epo w/ HD  Defer iron replacement i/s/o active infection, so will not check iron profile at the moment  Transfusion per primary    MBD:  Calcium corrected wnl  iPTH 431 (3/19)  On Hectorol 4mcg TIW as outpatient. Will continue  Check phos twice weekly. Goal <5.5. Can replete if needed to keep>3

## 2023-04-04 NOTE — PROGRESS NOTE ADULT - PROBLEM SELECTOR PLAN 1
Recurrent fevers while on IV ABX. Tmax 102.  - Source 2/2 Urinary vs. Prostatitis   - BCx NGTD x 5 days; BK virus (-); UCx (+) for Klebsiella Pneumonia   - PET scan 3/25 - PET scan showing increased uptake in prostate suggesting prostatitis vs. neoplasm; attempt made to contact wife on 3/31/23 - unclear if patient has outpatient prostate workup   - CT Pelvis 3/28. significant for mild urothelial thickening and hyperenhancement suggestive of pyelitis.   No evidence of pyelonephritis or renal abscess. Probable cystitis.  - Urology consulted - rec to recheck PSA once patient finishes abx for infection, no acute urologic workup needed at this time; will most likely perform outpatient. Patient ultimately will need bx, so will confirm with [facility]/DAPT situation  - ID following, appreciate recs.   - c/w IV Ceftriaxone 2G (last day 4/4)     #Diarrhea  - Has recurrent loose BMs. C-Diff negative  - GI PCR negative. Loperamide ordered PRN   - C-diff culture NEGATIVE

## 2023-04-04 NOTE — PROGRESS NOTE ADULT - NS ATTEND AMEND GEN_ALL_CORE FT
#Pyelitis of transplant kidney/UTI, diarrhea, immunosuppressive status, fever     Now afebrile, patient reports diarrhea x 11 times.  Per RN, unable to quantify but his stool started to semi-form.  Abdomen non-tender, WBC normal. GI PCR neg.  BCx ngtd.  Complete 2 weeks course of CTX 2g IV q24h today (last dose) and after that monitor off abx.  Ok to start loperamide to control diarrhea.      Team 2 will follow you.  Case d/w primary team.    Luann Carvajal MD, MS  Infectious Disease attending  work cell [phone]   For any questions during evening/weekend/holiday, please page ID on call #Pyelitis of transplant kidney/UTI, diarrhea, immunosuppressive status, fever     Now afebrile, patient reports diarrhea x 11 times.  Per RN, unable to quantify but his stool started to semi-form.  Abdomen non-tender, WBC normal. GI PCR neg.  BCx ngtd.  Complete 2 weeks course of CTX 2g IV q24h today (last dose) and after that monitor off abx.  Ok to start loperamide to control diarrhea.      Team 2 will follow you.  Case d/w primary team.    Luann Carvajal MD, MS  Infectious Disease attending  work cell [phone]   For any questions during evening/weekend/holiday, please page ID on call #Pyelitis of transplant kidney/UTI, diarrhea, immunosuppressive status, fever     Now afebrile, patient reports diarrhea x 11 times.  Per RN, unable to quantify but his stool started to semi-form.  Abdomen non-tender, WBC normal. GI PCR neg.  BCx ngtd.  Complete 2 weeks course of CTX 2g IV q24h today (last dose) and after that monitor off abx.  Ok to start loperamide to control diarrhea.      Team 2 will follow you.  Case d/w primary team.    Luann Carvajal MD, MS  Infectious Disease attending  work cell [phone]   For any questions during evening/weekend/holiday, please page ID on call

## 2023-04-04 NOTE — PROGRESS NOTE ADULT - PROBLEM SELECTOR PLAN 11
- reduced by urology 03/29/23     F: Oral intake  E: Replete electrolytes as needed for K<4 and Mg<2  N: DASH Diet  DVT PPX: holding AC; SCD  Dispo: [facility] 4/6 - reduced by urology 03/29/23     F: Oral intake  E: Replete electrolytes as needed for K<4 and Mg<2  N: DASH Diet  DVT PPX: holding AC; SCD  Dispo: [facility] 4/6 - reduced by urology 03/29/23     F: Oral intake  E: Replete electrolytes as needed for K<4 and Mg<2  N: DASH Diet  DVT PPX: holding AC; SCD  Dispo: [facility] 4/6

## 2023-04-04 NOTE — PROGRESS NOTE ADULT - ATTENDING COMMENTS
I agree with the fellow's findings and plans as written above with the following additions/amendments:    Seen and examined on HD, tolerating well, continue HD as above, further recs as above

## 2023-04-04 NOTE — PROGRESS NOTE ADULT - SUBJECTIVE AND OBJECTIVE BOX
INFECTIOUS DISEASES CONSULT FOLLOW-UP NOTE    INTERVAL HPI/OVERNIGHT EVENTS:    Patient was seen and examined at bedside. He states he is still having multiple episodes of watery diarrhea, however nurse       ROS:   Constitutional, eyes, ENT, cardiovascular, respiratory, gastrointestinal, genitourinary, integumentary, neurological, psychiatric and heme/lymph are otherwise negative other than noted above       ANTIBIOTICS/RELEVANT:    MEDICATIONS  (STANDING):  aspirin enteric coated 81 milliGRAM(s) Oral daily  atorvastatin 40 milliGRAM(s) Oral at bedtime  cefTRIAXone   IVPB 2000 milliGRAM(s) IV Intermittent every 24 hours  chlorhexidine 2% Cloths 1 Application(s) Topical daily  clopidogrel Tablet 75 milliGRAM(s) Oral daily  dextrose 5%. 1000 milliLiter(s) (100 mL/Hr) IV Continuous <Continuous>  dextrose 5%. 1000 milliLiter(s) (50 mL/Hr) IV Continuous <Continuous>  dextrose 50% Injectable 25 Gram(s) IV Push once  dextrose 50% Injectable 12.5 Gram(s) IV Push once  doxercalciferol Injectable 4 MICROGram(s) IV Push once  ferrous    sulfate 325 milliGRAM(s) Oral daily  hydrALAZINE 50 milliGRAM(s) Oral every 8 hours  insulin lispro (ADMELOG) corrective regimen sliding scale   SubCutaneous Before meals and at bedtime  isosorbide   dinitrate Tablet (ISORDIL) 20 milliGRAM(s) Oral three times a day  losartan 100 milliGRAM(s) Oral every 24 hours  metoprolol succinate ER 25 milliGRAM(s) Oral daily  pantoprazole  Injectable 40 milliGRAM(s) IV Push every 12 hours  tacrolimus 2 milliGRAM(s) Oral every 12 hours  tamsulosin 0.8 milliGRAM(s) Oral at bedtime    MEDICATIONS  (PRN):  acetaminophen     Tablet .. 650 milliGRAM(s) Oral every 6 hours PRN Mild Pain (1 - 3), Moderate Pain (4 - 6)  dextrose Oral Gel 15 Gram(s) Oral once PRN Blood Glucose LESS THAN 70 milliGRAM(s)/deciliter  loperamide 2 milliGRAM(s) Oral three times a day PRN Diarrhea  sodium chloride 0.65% Nasal 1 Spray(s) Both Nostrils every 4 hours PRN Nasal Congestion        Vital Signs Last 24 Hrs  T(C): 37.2 (04 Apr 2023 09:55), Max: 37.3 (03 Apr 2023 22:44)  T(F): 98.9 (04 Apr 2023 09:55), Max: 99.1 (03 Apr 2023 22:44)  HR: 74 (04 Apr 2023 09:55) (51 - 74)  BP: 155/71 (04 Apr 2023 09:55) (116/70 - 173/69)  BP(mean): 92 (04 Apr 2023 06:36) (92 - 111)  RR: 19 (04 Apr 2023 09:55) (14 - 19)  SpO2: 99% (04 Apr 2023 09:55) (98% - 100%)    Parameters below as of 04 Apr 2023 09:55  Patient On (Oxygen Delivery Method): nasal cannula  O2 Flow (L/min): 2      04-03-23 @ 07:01  -  04-04-23 @ 07:00  --------------------------------------------------------  IN: 450 mL / OUT: 650 mL / NET: -200 mL    04-04-23 @ 07:01  -  04-04-23 @ 10:47  --------------------------------------------------------  IN: 180 mL / OUT: 0 mL / NET: 180 mL      PHYSICAL EXAM:  Constitutional: alert, NAD  Eyes: the sclera and conjunctiva were normal.   ENT: the ears and nose were normal in appearance.   Neck: the appearance of the neck was normal and the neck was supple.   Pulmonary: no respiratory distress and lungs were clear to auscultation bilaterally.   Heart: heart rate was normal and rhythm regular, normal S1 and S2  Vascular:. there was no peripheral edema  Abdomen: normal bowel sounds, soft, non-tender  Neurological: no focal deficits.   Psychiatric: the affect was normal        LABS:                        9.9    4.96  )-----------( 192      ( 03 Apr 2023 07:00 )             32.1     04-03    131<L>  |  96  |  2<L>  ----------------------------<  73  4.2   |  27  |  3.96<H>    Ca    8.1<L>      03 Apr 2023 07:00  Mg     1.7     04-03    TPro  4.9<L>  /  Alb  2.5<L>  /  TBili  0.4  /  DBili  x   /  AST  18  /  ALT  5<L>  /  AlkPhos  139<H>  04-03          MICROBIOLOGY:      RADIOLOGY & ADDITIONAL STUDIES:  Reviewed INFECTIOUS DISEASES CONSULT FOLLOW-UP NOTE    INTERVAL HPI/OVERNIGHT EVENTS:    Patient was seen and examined at bedside. No acute o/n events. He states he is still having multiple episodes of watery diarrhea, however nurse states stool more formed overnight. C diff negative. GI PCR negative. Otherwise no complaints     ROS:   Constitutional, eyes, ENT, cardiovascular, respiratory, gastrointestinal, genitourinary, integumentary, neurological, psychiatric and heme/lymph are otherwise negative other than noted above       ANTIBIOTICS/RELEVANT:    MEDICATIONS  (STANDING):  aspirin enteric coated 81 milliGRAM(s) Oral daily  atorvastatin 40 milliGRAM(s) Oral at bedtime  cefTRIAXone   IVPB 2000 milliGRAM(s) IV Intermittent every 24 hours  chlorhexidine 2% Cloths 1 Application(s) Topical daily  clopidogrel Tablet 75 milliGRAM(s) Oral daily  dextrose 5%. 1000 milliLiter(s) (100 mL/Hr) IV Continuous <Continuous>  dextrose 5%. 1000 milliLiter(s) (50 mL/Hr) IV Continuous <Continuous>  dextrose 50% Injectable 25 Gram(s) IV Push once  dextrose 50% Injectable 12.5 Gram(s) IV Push once  doxercalciferol Injectable 4 MICROGram(s) IV Push once  ferrous    sulfate 325 milliGRAM(s) Oral daily  hydrALAZINE 50 milliGRAM(s) Oral every 8 hours  insulin lispro (ADMELOG) corrective regimen sliding scale   SubCutaneous Before meals and at bedtime  isosorbide   dinitrate Tablet (ISORDIL) 20 milliGRAM(s) Oral three times a day  losartan 100 milliGRAM(s) Oral every 24 hours  metoprolol succinate ER 25 milliGRAM(s) Oral daily  pantoprazole  Injectable 40 milliGRAM(s) IV Push every 12 hours  tacrolimus 2 milliGRAM(s) Oral every 12 hours  tamsulosin 0.8 milliGRAM(s) Oral at bedtime    MEDICATIONS  (PRN):  acetaminophen     Tablet .. 650 milliGRAM(s) Oral every 6 hours PRN Mild Pain (1 - 3), Moderate Pain (4 - 6)  dextrose Oral Gel 15 Gram(s) Oral once PRN Blood Glucose LESS THAN 70 milliGRAM(s)/deciliter  loperamide 2 milliGRAM(s) Oral three times a day PRN Diarrhea  sodium chloride 0.65% Nasal 1 Spray(s) Both Nostrils every 4 hours PRN Nasal Congestion        Vital Signs Last 24 Hrs  T(C): 37.2 (04 Apr 2023 09:55), Max: 37.3 (03 Apr 2023 22:44)  T(F): 98.9 (04 Apr 2023 09:55), Max: 99.1 (03 Apr 2023 22:44)  HR: 74 (04 Apr 2023 09:55) (51 - 74)  BP: 155/71 (04 Apr 2023 09:55) (116/70 - 173/69)  BP(mean): 92 (04 Apr 2023 06:36) (92 - 111)  RR: 19 (04 Apr 2023 09:55) (14 - 19)  SpO2: 99% (04 Apr 2023 09:55) (98% - 100%)    Parameters below as of 04 Apr 2023 09:55  Patient On (Oxygen Delivery Method): nasal cannula  O2 Flow (L/min): 2      04-03-23 @ 07:01  -  04-04-23 @ 07:00  --------------------------------------------------------  IN: 450 mL / OUT: 650 mL / NET: -200 mL    04-04-23 @ 07:01  -  04-04-23 @ 10:47  --------------------------------------------------------  IN: 180 mL / OUT: 0 mL / NET: 180 mL      PHYSICAL EXAM:  Constitutional: alert, NAD  Eyes: the sclera and conjunctiva were normal.   ENT: the ears and nose were normal in appearance.   Neck: the appearance of the neck was normal and the neck was supple.   Pulmonary: no respiratory distress and lungs were clear to auscultation bilaterally.   Heart: heart rate was normal and rhythm regular, normal S1 and S2  Vascular:. there was no peripheral edema  Abdomen: normal bowel sounds, soft, non-tender  Neurological: no focal deficits.   Psychiatric: the affect was normal        LABS:                        9.9    4.96  )-----------( 192      ( 03 Apr 2023 07:00 )             32.1     04-03    131<L>  |  96  |  2<L>  ----------------------------<  73  4.2   |  27  |  3.96<H>    Ca    8.1<L>      03 Apr 2023 07:00  Mg     1.7     04-03    TPro  4.9<L>  /  Alb  2.5<L>  /  TBili  0.4  /  DBili  x   /  AST  18  /  ALT  5<L>  /  AlkPhos  139<H>  04-03          MICROBIOLOGY:  Bcxs 3/29 NGTD 5 days     RADIOLOGY & ADDITIONAL STUDIES:  Reviewed

## 2023-04-05 LAB
ALBUMIN SERPL ELPH-MCNC: 2.4 G/DL — LOW (ref 3.3–5)
ALP SERPL-CCNC: 159 U/L — HIGH (ref 40–120)
ALT FLD-CCNC: <5 U/L — LOW (ref 10–45)
ANION GAP SERPL CALC-SCNC: 2 MMOL/L — LOW (ref 5–17)
AST SERPL-CCNC: 17 U/L — SIGNIFICANT CHANGE UP (ref 10–40)
BASOPHILS # BLD AUTO: 0.07 K/UL — SIGNIFICANT CHANGE UP (ref 0–0.2)
BASOPHILS NFR BLD AUTO: 0.8 % — SIGNIFICANT CHANGE UP (ref 0–2)
BILIRUB SERPL-MCNC: 0.3 MG/DL — SIGNIFICANT CHANGE UP (ref 0.2–1.2)
BUN SERPL-MCNC: 2 MG/DL — LOW (ref 7–23)
CALCIUM SERPL-MCNC: 8 MG/DL — LOW (ref 8.4–10.5)
CHLORIDE SERPL-SCNC: 97 MMOL/L — SIGNIFICANT CHANGE UP (ref 96–108)
CO2 SERPL-SCNC: 30 MMOL/L — SIGNIFICANT CHANGE UP (ref 22–31)
CREAT SERPL-MCNC: 3.35 MG/DL — HIGH (ref 0.5–1.3)
EGFR: 20 ML/MIN/1.73M2 — LOW
EOSINOPHIL # BLD AUTO: 0.16 K/UL — SIGNIFICANT CHANGE UP (ref 0–0.5)
EOSINOPHIL NFR BLD AUTO: 1.7 % — SIGNIFICANT CHANGE UP (ref 0–6)
GLUCOSE BLDC GLUCOMTR-MCNC: 100 MG/DL — HIGH (ref 70–99)
GLUCOSE BLDC GLUCOMTR-MCNC: 103 MG/DL — HIGH (ref 70–99)
GLUCOSE BLDC GLUCOMTR-MCNC: 113 MG/DL — HIGH (ref 70–99)
GLUCOSE BLDC GLUCOMTR-MCNC: 155 MG/DL — HIGH (ref 70–99)
GLUCOSE BLDC GLUCOMTR-MCNC: 46 MG/DL — CRITICAL LOW (ref 70–99)
GLUCOSE BLDC GLUCOMTR-MCNC: 99 MG/DL — SIGNIFICANT CHANGE UP (ref 70–99)
GLUCOSE SERPL-MCNC: 97 MG/DL — SIGNIFICANT CHANGE UP (ref 70–99)
HAV IGM SER-ACNC: SIGNIFICANT CHANGE UP
HBV CORE AB SER-ACNC: SIGNIFICANT CHANGE UP
HBV CORE IGM SER-ACNC: SIGNIFICANT CHANGE UP
HBV SURFACE AB SER-ACNC: REACTIVE
HBV SURFACE AG SER-ACNC: SIGNIFICANT CHANGE UP
HCT VFR BLD CALC: 29.8 % — LOW (ref 39–50)
HCT VFR BLD CALC: 31.8 % — LOW (ref 39–50)
HCV AB S/CO SERPL IA: 0.21 S/CO — SIGNIFICANT CHANGE UP (ref 0–0.99)
HCV AB SERPL-IMP: SIGNIFICANT CHANGE UP
HGB BLD-MCNC: 9.5 G/DL — LOW (ref 13–17)
HGB BLD-MCNC: 9.9 G/DL — LOW (ref 13–17)
IMM GRANULOCYTES NFR BLD AUTO: 0.4 % — SIGNIFICANT CHANGE UP (ref 0–0.9)
LYMPHOCYTES # BLD AUTO: 1.34 K/UL — SIGNIFICANT CHANGE UP (ref 1–3.3)
LYMPHOCYTES # BLD AUTO: 14.4 % — SIGNIFICANT CHANGE UP (ref 13–44)
MAGNESIUM SERPL-MCNC: 1.7 MG/DL — SIGNIFICANT CHANGE UP (ref 1.6–2.6)
MCHC RBC-ENTMCNC: 27.9 PG — SIGNIFICANT CHANGE UP (ref 27–34)
MCHC RBC-ENTMCNC: 28.7 PG — SIGNIFICANT CHANGE UP (ref 27–34)
MCHC RBC-ENTMCNC: 31.1 GM/DL — LOW (ref 32–36)
MCHC RBC-ENTMCNC: 31.9 GM/DL — LOW (ref 32–36)
MCV RBC AUTO: 89.6 FL — SIGNIFICANT CHANGE UP (ref 80–100)
MCV RBC AUTO: 90 FL — SIGNIFICANT CHANGE UP (ref 80–100)
MONOCYTES # BLD AUTO: 0.62 K/UL — SIGNIFICANT CHANGE UP (ref 0–0.9)
MONOCYTES NFR BLD AUTO: 6.7 % — SIGNIFICANT CHANGE UP (ref 2–14)
NEUTROPHILS # BLD AUTO: 7.09 K/UL — SIGNIFICANT CHANGE UP (ref 1.8–7.4)
NEUTROPHILS NFR BLD AUTO: 76 % — SIGNIFICANT CHANGE UP (ref 43–77)
NRBC # BLD: 0 /100 WBCS — SIGNIFICANT CHANGE UP (ref 0–0)
PLATELET # BLD AUTO: 190 K/UL — SIGNIFICANT CHANGE UP (ref 150–400)
PLATELET # BLD AUTO: 214 K/UL — SIGNIFICANT CHANGE UP (ref 150–400)
POTASSIUM SERPL-MCNC: 4.1 MMOL/L — SIGNIFICANT CHANGE UP (ref 3.5–5.3)
POTASSIUM SERPL-SCNC: 4.1 MMOL/L — SIGNIFICANT CHANGE UP (ref 3.5–5.3)
PROT SERPL-MCNC: 4.7 G/DL — LOW (ref 6–8.3)
RBC # BLD: 3.31 M/UL — LOW (ref 4.2–5.8)
RBC # BLD: 3.55 M/UL — LOW (ref 4.2–5.8)
RBC # FLD: 15.3 % — HIGH (ref 10.3–14.5)
RBC # FLD: 15.6 % — HIGH (ref 10.3–14.5)
SODIUM SERPL-SCNC: 129 MMOL/L — LOW (ref 135–145)
WBC # BLD: 5.32 K/UL — SIGNIFICANT CHANGE UP (ref 3.8–10.5)
WBC # BLD: 9.32 K/UL — SIGNIFICANT CHANGE UP (ref 3.8–10.5)
WBC # FLD AUTO: 5.32 K/UL — SIGNIFICANT CHANGE UP (ref 3.8–10.5)
WBC # FLD AUTO: 9.32 K/UL — SIGNIFICANT CHANGE UP (ref 3.8–10.5)

## 2023-04-05 PROCEDURE — 99233 SBSQ HOSP IP/OBS HIGH 50: CPT

## 2023-04-05 PROCEDURE — 99232 SBSQ HOSP IP/OBS MODERATE 35: CPT

## 2023-04-05 PROCEDURE — 99232 SBSQ HOSP IP/OBS MODERATE 35: CPT | Mod: GC

## 2023-04-05 PROCEDURE — 71045 X-RAY EXAM CHEST 1 VIEW: CPT | Mod: 26

## 2023-04-05 RX ORDER — ACETAMINOPHEN 500 MG
325 TABLET ORAL ONCE
Refills: 0 | Status: COMPLETED | OUTPATIENT
Start: 2023-04-05 | End: 2023-04-05

## 2023-04-05 RX ORDER — ACETAMINOPHEN 500 MG
650 TABLET ORAL EVERY 6 HOURS
Refills: 0 | Status: DISCONTINUED | OUTPATIENT
Start: 2023-04-05 | End: 2023-04-12

## 2023-04-05 RX ADMIN — PANTOPRAZOLE SODIUM 40 MILLIGRAM(S): 20 TABLET, DELAYED RELEASE ORAL at 06:33

## 2023-04-05 RX ADMIN — Medication 650 MILLIGRAM(S): at 19:45

## 2023-04-05 RX ADMIN — ISOSORBIDE DINITRATE 20 MILLIGRAM(S): 5 TABLET ORAL at 17:59

## 2023-04-05 RX ADMIN — Medication 650 MILLIGRAM(S): at 21:13

## 2023-04-05 RX ADMIN — Medication 325 MILLIGRAM(S): at 12:57

## 2023-04-05 RX ADMIN — ISOSORBIDE DINITRATE 20 MILLIGRAM(S): 5 TABLET ORAL at 06:33

## 2023-04-05 RX ADMIN — CHLORHEXIDINE GLUCONATE 1 APPLICATION(S): 213 SOLUTION TOPICAL at 12:58

## 2023-04-05 RX ADMIN — PANTOPRAZOLE SODIUM 40 MILLIGRAM(S): 20 TABLET, DELAYED RELEASE ORAL at 17:59

## 2023-04-05 RX ADMIN — TAMSULOSIN HYDROCHLORIDE 0.8 MILLIGRAM(S): 0.4 CAPSULE ORAL at 23:24

## 2023-04-05 RX ADMIN — Medication 2 MILLIGRAM(S): at 03:08

## 2023-04-05 RX ADMIN — ATORVASTATIN CALCIUM 40 MILLIGRAM(S): 80 TABLET, FILM COATED ORAL at 23:25

## 2023-04-05 RX ADMIN — TACROLIMUS 2 MILLIGRAM(S): 5 CAPSULE ORAL at 06:33

## 2023-04-05 RX ADMIN — Medication 81 MILLIGRAM(S): at 12:58

## 2023-04-05 RX ADMIN — TACROLIMUS 2 MILLIGRAM(S): 5 CAPSULE ORAL at 17:59

## 2023-04-05 RX ADMIN — Medication 25 MILLIGRAM(S): at 14:21

## 2023-04-05 RX ADMIN — Medication 325 MILLIGRAM(S): at 23:25

## 2023-04-05 RX ADMIN — Medication 2: at 07:48

## 2023-04-05 RX ADMIN — CLOPIDOGREL BISULFATE 75 MILLIGRAM(S): 75 TABLET, FILM COATED ORAL at 12:57

## 2023-04-05 RX ADMIN — ISOSORBIDE DINITRATE 20 MILLIGRAM(S): 5 TABLET ORAL at 12:56

## 2023-04-05 RX ADMIN — Medication 50 MILLIGRAM(S): at 06:34

## 2023-04-05 RX ADMIN — Medication 50 MILLIGRAM(S): at 23:25

## 2023-04-05 RX ADMIN — Medication 50 MILLIGRAM(S): at 14:21

## 2023-04-05 RX ADMIN — LOSARTAN POTASSIUM 100 MILLIGRAM(S): 100 TABLET, FILM COATED ORAL at 17:58

## 2023-04-05 NOTE — PROGRESS NOTE ADULT - SUBJECTIVE AND OBJECTIVE BOX
CARDIOLOGY NP PROGRESS NOTE    Subjective:   Remainder ROS otherwise negative.    Overnight Events:     TELEMETRY:       VITAL SIGNS:  T(C): 36.1 (04-05-23 @ 13:20), Max: 38 (04-04-23 @ 17:34)  HR: 94 (04-05-23 @ 12:17) (54 - 94)  BP: 116/48 (04-05-23 @ 12:17) (113/46 - 159/67)  RR: 17 (04-05-23 @ 12:17) (17 - 18)  SpO2: 100% (04-05-23 @ 12:17) (94% - 100%)  Wt(kg): --    I&O's Summary    04 Apr 2023 07:01  -  05 Apr 2023 07:00  --------------------------------------------------------  IN: 1200 mL / OUT: 3000 mL / NET: -1800 mL    05 Apr 2023 07:01  -  05 Apr 2023 13:40  --------------------------------------------------------  IN: 360 mL / OUT: 0 mL / NET: 360 mL          PHYSICAL EXAM:    General: A/ox 3, No acute Distress  Neck: Supple, NO JVD  Cardiac: S1 S2, No M/R/G  Pulmonary: CTAB, Breathing unlabored, No Rhonchi/Rales/Wheezing  Abdomen: Soft, Non -tender, +BS x 4 quads  Extremities: No Rashes, No edema  Neuro: A/o x 3, No focal deficits          LABS:                          9.9    5.32  )-----------( 214      ( 05 Apr 2023 07:26 )             31.8                              04-05    129<L>  |  97  |  2<L>  ----------------------------<  97  4.1   |  30  |  3.35<H>    Ca    8.0<L>      05 Apr 2023 07:26  Mg     1.7     04-05    TPro  4.7<L>  /  Alb  2.4<L>  /  TBili  0.3  /  DBili  x   /  AST  17  /  ALT  <5<L>  /  AlkPhos  159<H>  04-05    LIVER FUNCTIONS - ( 05 Apr 2023 07:26 )  Alb: 2.4 g/dL / Pro: 4.7 g/dL / ALK PHOS: 159 U/L / ALT: <5 U/L / AST: 17 U/L / GGT: x                                   CAPILLARY BLOOD GLUCOSE      POCT Blood Glucose.: 46 mg/dL (05 Apr 2023 12:07)  POCT Blood Glucose.: 155 mg/dL (05 Apr 2023 07:43)  POCT Blood Glucose.: 95 mg/dL (04 Apr 2023 21:50)  POCT Blood Glucose.: 81 mg/dL (04 Apr 2023 17:26)  POCT Blood Glucose.: 80 mg/dL (04 Apr 2023 14:15)            Allergies:  Allergy Status Unknown    MEDICATIONS  (STANDING):  aspirin enteric coated 81 milliGRAM(s) Oral daily  atorvastatin 40 milliGRAM(s) Oral at bedtime  chlorhexidine 2% Cloths 1 Application(s) Topical daily  clopidogrel Tablet 75 milliGRAM(s) Oral daily  dextrose 5%. 1000 milliLiter(s) (100 mL/Hr) IV Continuous <Continuous>  dextrose 5%. 1000 milliLiter(s) (50 mL/Hr) IV Continuous <Continuous>  dextrose 50% Injectable 25 Gram(s) IV Push once  dextrose 50% Injectable 12.5 Gram(s) IV Push once  doxercalciferol Injectable 4 MICROGram(s) IV Push once  ferrous    sulfate 325 milliGRAM(s) Oral daily  hydrALAZINE 50 milliGRAM(s) Oral every 8 hours  insulin lispro (ADMELOG) corrective regimen sliding scale   SubCutaneous Before meals and at bedtime  isosorbide   dinitrate Tablet (ISORDIL) 20 milliGRAM(s) Oral three times a day  losartan 100 milliGRAM(s) Oral every 24 hours  metoprolol succinate ER 25 milliGRAM(s) Oral daily  pantoprazole  Injectable 40 milliGRAM(s) IV Push every 12 hours  tacrolimus 2 milliGRAM(s) Oral every 12 hours  tamsulosin 0.8 milliGRAM(s) Oral at bedtime    MEDICATIONS  (PRN):  acetaminophen     Tablet .. 650 milliGRAM(s) Oral every 6 hours PRN Mild Pain (1 - 3), Moderate Pain (4 - 6)  dextrose Oral Gel 15 Gram(s) Oral once PRN Blood Glucose LESS THAN 70 milliGRAM(s)/deciliter  loperamide 2 milliGRAM(s) Oral three times a day PRN Diarrhea  sodium chloride 0.65% Nasal 1 Spray(s) Both Nostrils every 4 hours PRN Nasal Congestion        DIAGNOSTIC TESTS:        CARDIOLOGY NP PROGRESS NOTE    Subjective: Pt seen and examined at bedside. Reports feeling well. Denies chest pain, sob, lightheadedness, dizziness, palpitations.  Remainder ROS otherwise negative.    Overnight Events: fever 100.4 yesterday 5pm.    TELEMETRY:        VITAL SIGNS:  T(C): 36.1 (04-05-23 @ 13:20), Max: 38 (04-04-23 @ 17:34)  HR: 94 (04-05-23 @ 12:17) (54 - 94)  BP: 116/48 (04-05-23 @ 12:17) (113/46 - 159/67)  RR: 17 (04-05-23 @ 12:17) (17 - 18)  SpO2: 100% (04-05-23 @ 12:17) (94% - 100%)  Wt(kg): --    I&O's Summary    04 Apr 2023 07:01  -  05 Apr 2023 07:00  --------------------------------------------------------  IN: 1200 mL / OUT: 3000 mL / NET: -1800 mL    05 Apr 2023 07:01  -  05 Apr 2023 13:40  --------------------------------------------------------  IN: 360 mL / OUT: 0 mL / NET: 360 mL          PHYSICAL EXAM:    General: A/ox 3, No acute Distress  Neck: Supple, NO JVD  Cardiac: S1 S2, No M/R/G  Pulmonary: CTAB, Breathing unlabored, No Rhonchi/Rales/Wheezing  Abdomen: Soft, Non -tender, +BS x 4 quads  Extremities: No Rashes, No edema  Neuro: A/o x 3, No focal deficits          LABS:                          9.9    5.32  )-----------( 214      ( 05 Apr 2023 07:26 )             31.8                              04-05    129<L>  |  97  |  2<L>  ----------------------------<  97  4.1   |  30  |  3.35<H>    Ca    8.0<L>      05 Apr 2023 07:26  Mg     1.7     04-05    TPro  4.7<L>  /  Alb  2.4<L>  /  TBili  0.3  /  DBili  x   /  AST  17  /  ALT  <5<L>  /  AlkPhos  159<H>  04-05    LIVER FUNCTIONS - ( 05 Apr 2023 07:26 )  Alb: 2.4 g/dL / Pro: 4.7 g/dL / ALK PHOS: 159 U/L / ALT: <5 U/L / AST: 17 U/L / GGT: x                                   CAPILLARY BLOOD GLUCOSE      POCT Blood Glucose.: 46 mg/dL (05 Apr 2023 12:07)  POCT Blood Glucose.: 155 mg/dL (05 Apr 2023 07:43)  POCT Blood Glucose.: 95 mg/dL (04 Apr 2023 21:50)  POCT Blood Glucose.: 81 mg/dL (04 Apr 2023 17:26)  POCT Blood Glucose.: 80 mg/dL (04 Apr 2023 14:15)            Allergies:  Allergy Status Unknown    MEDICATIONS  (STANDING):  aspirin enteric coated 81 milliGRAM(s) Oral daily  atorvastatin 40 milliGRAM(s) Oral at bedtime  chlorhexidine 2% Cloths 1 Application(s) Topical daily  clopidogrel Tablet 75 milliGRAM(s) Oral daily  dextrose 5%. 1000 milliLiter(s) (100 mL/Hr) IV Continuous <Continuous>  dextrose 5%. 1000 milliLiter(s) (50 mL/Hr) IV Continuous <Continuous>  dextrose 50% Injectable 25 Gram(s) IV Push once  dextrose 50% Injectable 12.5 Gram(s) IV Push once  doxercalciferol Injectable 4 MICROGram(s) IV Push once  ferrous    sulfate 325 milliGRAM(s) Oral daily  hydrALAZINE 50 milliGRAM(s) Oral every 8 hours  insulin lispro (ADMELOG) corrective regimen sliding scale   SubCutaneous Before meals and at bedtime  isosorbide   dinitrate Tablet (ISORDIL) 20 milliGRAM(s) Oral three times a day  losartan 100 milliGRAM(s) Oral every 24 hours  metoprolol succinate ER 25 milliGRAM(s) Oral daily  pantoprazole  Injectable 40 milliGRAM(s) IV Push every 12 hours  tacrolimus 2 milliGRAM(s) Oral every 12 hours  tamsulosin 0.8 milliGRAM(s) Oral at bedtime    MEDICATIONS  (PRN):  acetaminophen     Tablet .. 650 milliGRAM(s) Oral every 6 hours PRN Mild Pain (1 - 3), Moderate Pain (4 - 6)  dextrose Oral Gel 15 Gram(s) Oral once PRN Blood Glucose LESS THAN 70 milliGRAM(s)/deciliter  loperamide 2 milliGRAM(s) Oral three times a day PRN Diarrhea  sodium chloride 0.65% Nasal 1 Spray(s) Both Nostrils every 4 hours PRN Nasal Congestion        DIAGNOSTIC TESTS:        CARDIOLOGY NP PROGRESS NOTE    Subjective: Pt seen and examined at bedside. Reports feeling well. Denies chest pain, sob, lightheadedness, dizziness, palpitations.  Remainder ROS otherwise negative.    Overnight Events: fever 100.4 yesterday 5pm.    TELEMETRY: SR 70s       VITAL SIGNS:  T(C): 36.1 (04-05-23 @ 13:20), Max: 38 (04-04-23 @ 17:34)  HR: 94 (04-05-23 @ 12:17) (54 - 94)  BP: 116/48 (04-05-23 @ 12:17) (113/46 - 159/67)  RR: 17 (04-05-23 @ 12:17) (17 - 18)  SpO2: 100% (04-05-23 @ 12:17) (94% - 100%)  Wt(kg): --    I&O's Summary    04 Apr 2023 07:01  -  05 Apr 2023 07:00  --------------------------------------------------------  IN: 1200 mL / OUT: 3000 mL / NET: -1800 mL    05 Apr 2023 07:01  -  05 Apr 2023 13:40  --------------------------------------------------------  IN: 360 mL / OUT: 0 mL / NET: 360 mL          PHYSICAL EXAM:    General: A/ox 3, No acute Distress, sleepy on exam  Neck: Supple, NO JVD  Cardiac: S1 S2, No M/R/G  Pulmonary: CTAB, Breathing unlabored on RA, No Rhonchi/Rales/Wheezing  Abdomen: Soft, Non -tender, +BS x 4 quads  Extremities: No Rashes, No edema. L forearm +bruit/thrill. R AKA  Vascular: 2+ radial pulses mehnaz, no femoral bruits mehnaz, L doppler DP/PT  Neuro: A/o x 3, No focal deficits          LABS:                          9.9    5.32  )-----------( 214      ( 05 Apr 2023 07:26 )             31.8                              04-05    129<L>  |  97  |  2<L>  ----------------------------<  97  4.1   |  30  |  3.35<H>    Ca    8.0<L>      05 Apr 2023 07:26  Mg     1.7     04-05    TPro  4.7<L>  /  Alb  2.4<L>  /  TBili  0.3  /  DBili  x   /  AST  17  /  ALT  <5<L>  /  AlkPhos  159<H>  04-05    LIVER FUNCTIONS - ( 05 Apr 2023 07:26 )  Alb: 2.4 g/dL / Pro: 4.7 g/dL / ALK PHOS: 159 U/L / ALT: <5 U/L / AST: 17 U/L / GGT: x                                   CAPILLARY BLOOD GLUCOSE      POCT Blood Glucose.: 46 mg/dL (05 Apr 2023 12:07)  POCT Blood Glucose.: 155 mg/dL (05 Apr 2023 07:43)  POCT Blood Glucose.: 95 mg/dL (04 Apr 2023 21:50)  POCT Blood Glucose.: 81 mg/dL (04 Apr 2023 17:26)  POCT Blood Glucose.: 80 mg/dL (04 Apr 2023 14:15)            Allergies:  Allergy Status Unknown    MEDICATIONS  (STANDING):  aspirin enteric coated 81 milliGRAM(s) Oral daily  atorvastatin 40 milliGRAM(s) Oral at bedtime  chlorhexidine 2% Cloths 1 Application(s) Topical daily  clopidogrel Tablet 75 milliGRAM(s) Oral daily  dextrose 5%. 1000 milliLiter(s) (100 mL/Hr) IV Continuous <Continuous>  dextrose 5%. 1000 milliLiter(s) (50 mL/Hr) IV Continuous <Continuous>  dextrose 50% Injectable 25 Gram(s) IV Push once  dextrose 50% Injectable 12.5 Gram(s) IV Push once  doxercalciferol Injectable 4 MICROGram(s) IV Push once  ferrous    sulfate 325 milliGRAM(s) Oral daily  hydrALAZINE 50 milliGRAM(s) Oral every 8 hours  insulin lispro (ADMELOG) corrective regimen sliding scale   SubCutaneous Before meals and at bedtime  isosorbide   dinitrate Tablet (ISORDIL) 20 milliGRAM(s) Oral three times a day  losartan 100 milliGRAM(s) Oral every 24 hours  metoprolol succinate ER 25 milliGRAM(s) Oral daily  pantoprazole  Injectable 40 milliGRAM(s) IV Push every 12 hours  tacrolimus 2 milliGRAM(s) Oral every 12 hours  tamsulosin 0.8 milliGRAM(s) Oral at bedtime    MEDICATIONS  (PRN):  acetaminophen     Tablet .. 650 milliGRAM(s) Oral every 6 hours PRN Mild Pain (1 - 3), Moderate Pain (4 - 6)  dextrose Oral Gel 15 Gram(s) Oral once PRN Blood Glucose LESS THAN 70 milliGRAM(s)/deciliter  loperamide 2 milliGRAM(s) Oral three times a day PRN Diarrhea  sodium chloride 0.65% Nasal 1 Spray(s) Both Nostrils every 4 hours PRN Nasal Congestion        DIAGNOSTIC TESTS:

## 2023-04-05 NOTE — PROGRESS NOTE ADULT - ASSESSMENT
62M PMHx HTN, HLD, DM, CAD (s/p cath years ago, RCA 99%, never intervened on), HFrEF 25-30%, ESRD s/p failed kidney transplant (last HD 3/1 via Left Arm AVF; HD TTS), Right AKA initially presented to [facility] 2/27 for respiratory distress after a dialysis, found to be septic, h/c complicated by AHRF requiring intubation for 24hr followed by VT arrest, h/c the complicated by massive LGIB (rectal ulcer) requiring 7u pRBC, 1u FFP and 1u PLT. Transferred to [facility] for ICD placement 2/2 Vtach and cardiac cath. Medicine consulted for GIB, fever of unknown origin, and comanagement.     #Fever of Unknown Origin  - Spiked fever of 101.6 on return from HD 3/21  - Unclear source of infection  - Abd U/S with no acute pathology  - CXR without infiltrate  - UA without UTI  - F/u BCx, so far NGTD  - PET scan showing increased uptake in prostate suggesting prostatitis vs. neoplasm  - Patient completed Ceftriaxone 2g daily per ID 2 week course (end date 4/4)  - Noted that patient had low grade fever to 100.4 F yesterday evening. Also reported diarrhea per nurse, but none this morning. ID following - continue to monitor off abx and can give Loperamide PRN for diarrhea.      #LGIB  #SANTOS  #Solitary ulcer syndrome  - hx coffee ground emesis @ [facility]; s/p EGD/colonoscopy showing rectal ulcer  - 3/17 AM pt passed loose BM along with large amount of BRBPR  - per gen surgery no acute surgical intervention   - GI following, no endoscopy indicated  - Active T&S  - Transfuse for hgb <8 (active CAD)  - Recommend switching from IV BID to Protonix 40 mg PO daily    #ESRD on dialysis  - dialysis Tues, Thurs, Sat  - f/u nephro recs  - c/w calcium citrate 667mg TID  - C/w Tacrolimus for kidney transplant     #HTN  - c/w home meds: Amlodipine 10mg qd, Losartan 100mg qd, Hydralazine 50m TID, Isordil 20mg TID with hold parameters    #HLD   - c/w Lipitor 40mg qd    #DM  - no meds per [place], obtain collaterals for med recs prior [place] stay  - mISS while inpatient, goal -180   - A1c 5.9 on admission    Seen and discussed with attending Dr. Mehta  Note not final until attested by attending.  62M PMHx HTN, HLD, DM, CAD (s/p cath years ago, RCA 99%, never intervened on), HFrEF 25-30%, ESRD s/p failed kidney transplant (last HD 3/1 via Left Arm AVF; HD TTS), Right AKA initially presented to [facility] 2/27 for respiratory distress after a dialysis, found to be septic, h/c complicated by AHRF requiring intubation for 24hr followed by VT arrest, h/c the complicated by massive LGIB (rectal ulcer) requiring 7u pRBC, 1u FFP and 1u PLT. Transferred to [facility] for ICD placement 2/2 Vtach and cardiac cath. Medicine consulted for GIB, fever of unknown origin, and comanagement.     #Fever of Unknown Origin  - Spiked fever of 101.6 on return from HD 3/21  - Unclear source of infection  - Abd U/S with no acute pathology  - CXR without infiltrate  - UA without UTI  - F/u BCx, so far NGTD  - PET scan showing increased uptake in prostate suggesting prostatitis vs. neoplasm  - Patient completed Ceftriaxone 2g daily per ID 2 week course (end date 4/4)  - Noted that patient had low grade fever to 100.4 F yesterday evening. Also reported diarrhea per nurse, but none this morning. ID following - continue to monitor off abx and can give Loperamide PRN for diarrhea.      #LGIB  #SANTOS  #Solitary ulcer syndrome  - hx coffee ground emesis @ [facility]; s/p EGD/colonoscopy showing rectal ulcer  - 3/17 AM pt passed loose BM along with large amount of BRBPR  - per gen surgery no acute surgical intervention   - GI following, no endoscopy indicated  - Active T&S  - Transfuse for hgb <8 (active CAD)  - Recommend switching from IV BID to Protonix 40 mg PO daily    #ESRD on dialysis  - dialysis Tues, Thurs, Sat  - f/u nephro recs  - c/w calcium citrate 667mg TID  - C/w Tacrolimus for kidney transplant     #HTN  - c/w home meds: Amlodipine 10mg qd, Losartan 100mg qd, Hydralazine 50m TID, Isordil 20mg TID with hold parameters    #HLD   - c/w Lipitor 40mg qd    #DM  - no meds per [place], obtain collaterals for med recs prior [place] stay  - mISS while inpatient, goal -180   - A1c 5.9 on admission    Seen and discussed with attending Dr. Mehta  Note not final until attested by attending.  62M PMHx HTN, HLD, DM, CAD (s/p cath years ago, RCA 99%, never intervened on), HFrEF 25-30%, ESRD s/p failed kidney transplant (last HD 3/1 via Left Arm AVF; HD TTS), Right AKA initially presented to [facility] 2/27 for respiratory distress after a dialysis, found to be septic, h/c complicated by AHRF requiring intubation for 24hr followed by VT arrest, h/c the complicated by massive LGIB (rectal ulcer) requiring 7u pRBC, 1u FFP and 1u PLT. Transferred to [facility] for ICD placement 2/2 Vtach and cardiac cath. Medicine consulted for GIB, fever of unknown origin, and comanagement.     #Fever of Unknown Origin  - Spiked fever of 101.6 on return from HD 3/21  - Unclear source of infection  - Abd U/S with no acute pathology  - CXR without infiltrate  - UA without UTI  - F/u BCx, so far NGTD  - PET scan showing increased uptake in prostate suggesting prostatitis vs. neoplasm  - Patient completed Ceftriaxone 2g daily per ID 2 week course (end date 4/4)  - Noted that patient had low grade fever to 100.4 F yesterday evening. Also reported diarrhea per nurse, but none this morning. ID following - continue to monitor off abx and can give Loperamide PRN for diarrhea.      #LGIB  #SANTOS  #Solitary ulcer syndrome  - hx coffee ground emesis @ [facility]; s/p EGD/colonoscopy showing rectal ulcer  - 3/17 AM pt passed loose BM along with large amount of BRBPR  - per gen surgery no acute surgical intervention   - GI following, no endoscopy indicated  - Active T&S  - Transfuse for hgb <8 (active CAD)  - Recommend switching from IV BID to Protonix 40 mg PO daily    #ESRD on dialysis  - dialysis Tues, Thurs, Sat  - f/u nephro recs  - c/w calcium citrate 667mg TID  - C/w Tacrolimus for kidney transplant     #HTN  - c/w home meds: Amlodipine 10mg qd, Losartan 100mg qd, Hydralazine 50m TID, Isordil 20mg TID with hold parameters    #HLD   - c/w Lipitor 40mg qd    #DM  - no meds per [place], obtain collaterals for med recs prior [place] stay  - mISS while inpatient, goal -180   - A1c 5.9 on admission    Seen and discussed with attending Dr. Mehta  Note not final until attested by attending.

## 2023-04-05 NOTE — PROGRESS NOTE ADULT - ASSESSMENT
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 While PET CT showed malignancy of prostate vs prostatitis, CT scan was negative for abscess or prostatitis per Dr. Parra (radiology).  PSA is 12 - prostate ca should be ruled out.  Patient still with intermittent low grade fever despite appropriate abx for pyelitis. Although he has normal WBC, he is immunocompromised patient. Patient with fever 100.4 yesterday evening while still on ceftriaxone. Stools have become more formed and less frequent per nurse.     Suggest:  - GI PCR negative, can give Loperamide prn   - Patient completed ceftriaxone 2 g IV q24h (3/21-4/4).   - will continue to monitor patient off antibiotic   - Check CRP, procal     Team 2 will follow you.    Case d/w primary team. Final recommendations pending attending note.      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 While PET CT showed malignancy of prostate vs prostatitis, CT scan was negative for abscess or prostatitis per Dr. Parra (radiology).  PSA is 12 - prostate ca should be ruled out.  Patient still with intermittent low grade fever despite appropriate abx for pyelitis. Although he has normal WBC, he is immunocompromised patient. Patient with fever 100.4 yesterday evening while still on ceftriaxone. Stools have become more formed and less frequent per nurse.     Suggest:  - GI PCR negative, can give Loperamide prn   - Patient completed ceftriaxone 2 g IV q24h (3/21-4/4).   - will continue to monitor patient off antibiotic   - Check CRP, procal     Team 2 will follow you.    Case d/w primary team. Final recommendations pending attending note.      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 While PET CT showed malignancy of prostate vs prostatitis, CT scan was negative for abscess or prostatitis per Dr. Parra (radiology).  PSA is 12 - prostate ca should be ruled out.  Patient still with intermittent low grade fever despite appropriate abx for pyelitis. Although he has normal WBC, he is immunocompromised patient. Patient with fever 100.4 yesterday evening while still on ceftriaxone. Stools have become more formed and less frequent per nurse.     Suggest:  - GI PCR negative, can give Loperamide prn   - Patient completed ceftriaxone 2 g IV q24h (3/21-4/4).   - will continue to monitor patient off antibiotic   - Check CRP, procal     Team 2 will follow you.    Case d/w primary team. Final recommendations pending attending note.      Jaja Baig, Infectious Diseases PA

## 2023-04-05 NOTE — PROGRESS NOTE ADULT - PROBLEM SELECTOR PLAN 4
@ [facility], severe GIB w/ Hgb down to 3.5 received total of 7u PRBCs.    - 3/17 AM pt passed loose BM along w/ large amount of BRBPR.    - s/p Flex sigmoidoscopy – Localized ulceration and erosion w/ no bleeding in rectum, suggestive of solitary ulcer syndrome; evidence of prior hemoclip was found in sigmoid colon.    - Avoid anal digitation and enemas  - Fecal occult negative 3/24 and cleared by GI for DAPT  - Transfusion goal Hgb < 8.0 @ [facility], severe GIB w/ Hgb down to 3.5 received total of 7u PRBCs.    - 3/17 AM pt passed loose BM along w/ large amount of BRBPR.    - s/p Flex sigmoidoscopy – Localized ulceration and erosion w/ no bleeding in rectum, suggestive of solitary ulcer syndrome; evidence of prior hemoclip was found in sigmoid colon.    - Avoid anal digitation and enemas  - Fecal occult negative 3/24 and cleared by GI for DAPT  - Transfusion goal Hgb < 8.0 @ [facility], severe GIB w/ Hgb down to 3.5 received total of 7u PRBCs.    - 3/17 AM pt passed loose BM along w/ large amount of BRBPR.    - s/p Flex sigmoidoscopy – Localized ulceration and erosion w/ no bleeding in rectum, suggestive of solitary ulcer syndrome; evidence of prior hemoclip was found in sigmoid colon.    - Avoid anal digitation and enemas  - Fecal occult negative 3/24 and cleared by GI for DAPT  - Transfusion goal Hgb < 8.0

## 2023-04-05 NOTE — PROGRESS NOTE ADULT - PROBLEM SELECTOR PLAN 2
- LHC at [place] w/ oLM 30% and RCA 99% that is not amenable to intervention.    - TTE 3/1/23 @ [facility]:  mild LVH, LVEF 25-30%, severe global wall motion dysfxn, mild LA dilation, mild RV dilation, mildly calcified leaflets, mod pHTN  - ECHO 4/3: EF 40% w/regional wall motion abnormality. Entire inferior wall & basal & mid inferolateral wall akinetic. Basal& mid anterolateral wall & apical lateral segments hypokinetic. Moderately dilated LA. Mild AR.   - c/w ASA 81mg and Plavix 75mg (cleared by GI)   - plan for possible LHC 4/6 assuming afebrile x 24H - LHC at [place] w/ oLM 30% and RCA 99% that is not amenable to intervention.    - TTE 3/1/23 @ [facility]:  mild LVH, LVEF 25-30%, severe global wall motion dysfxn, mild LA dilation, mild RV dilation, mildly calcified leaflets, mod pHTN  - ECHO 4/3: EF 40% w/regional wall motion abnormality. Entire inferior wall & basal & mid inferolateral wall akinetic. Basal& mid anterolateral wall & apical lateral segments hypokinetic. Moderately dilated LA. Mild AR.   - c/w ASA 81mg and Plavix 75mg (cleared by GI)   - plan for possible LHC 4/6 assuming afebrile x 24H - LHC at [place] w/ oLM 30% and RCA 99% that is not amenable to intervention.    - TTE 3/1/23 @ [facility]:  mild LVH, LVEF 25-30%, severe global wall motion dysfxn, mild LA dilation, mild RV dilation, mildly calcified leaflets, mod pHTN  - ECHO 4/3: EF 40% w/regional wall motion abnormality. Entire inferior wall & basal & mid inferolateral wall akinetic. Basal& mid anterolateral wall & apical lateral segments hypokinetic. Moderately dilated LA. Mild AR.   - c/w ASA 81mg and Plavix 75mg (cleared by GI)   - plan for possible LHC 4/6 assuming afebrile x 24H - LHC at [place] w/ oLM 30% and RCA 99% that is not amenable to intervention.    - TTE 3/1/23 @ [facility]:  mild LVH, LVEF 25-30%, severe global wall motion dysfxn, mild LA dilation, mild RV dilation, mildly calcified leaflets, mod pHTN  - ECHO 4/3: EF 40% w/regional wall motion abnormality. Entire inferior wall & basal & mid inferolateral wall akinetic. Basal& mid anterolateral wall & apical lateral segments hypokinetic. Moderately dilated LA. Mild AR.   - c/w ASA 81mg and Plavix 75mg daily (cleared by GI)   - plan for possible LHC 4/6 assuming afebrile x 24H - LHC at [place] w/ oLM 30% and RCA 99% that is not amenable to intervention.    - TTE 3/1/23 @ [facility]:  mild LVH, LVEF 25-30%, severe global wall motion dysfxn, mild LA dilation, mild RV dilation, mildly calcified leaflets, mod pHTN  - ECHO 4/3: EF 40% w/regional wall motion abnormality. Entire inferior wall & basal & mid inferolateral wall akinetic. Basal& mid anterolateral wall & apical lateral segments hypokinetic. Moderately dilated LA. Mild AR.   - c/w ASA 81mg and Plavix 75mg daily (cleared by GI)   - plan for possible LHC 4/6 assuming afebrile x 24H - LHC at [place] w/ oLM 30% and RCA 99% that is not amenable to intervention.    - TTE 3/1/23 @ [facility]:  mild LVH, LVEF 25-30%, severe global wall motion dysfxn, mild LA dilation, mild RV dilation, mildly calcified leaflets, mod pHTN  - ECHO 4/3: EF 40% w/regional wall motion abnormality. Entire inferior wall & basal & mid inferolateral wall akinetic. Basal& mid anterolateral wall & apical lateral segments hypokinetic. Moderately dilated LA. Mild AR.   - c/w ASA 81mg and Plavix 75mg daily (cleared by GI)   - plan for possible LHC 4/6 assuming afebrile x 24H

## 2023-04-05 NOTE — PROGRESS NOTE ADULT - SUBJECTIVE AND OBJECTIVE BOX
INFECTIOUS DISEASES CONSULT FOLLOW-UP NOTE    ***INCOMPLETE     INTERVAL HPI/OVERNIGHT EVENTS:      ROS:   Constitutional, eyes, ENT, cardiovascular, respiratory, gastrointestinal, genitourinary, integumentary, neurological, psychiatric and heme/lymph are otherwise negative other than noted above       ANTIBIOTICS/RELEVANT:    MEDICATIONS  (STANDING):  aspirin enteric coated 81 milliGRAM(s) Oral daily  atorvastatin 40 milliGRAM(s) Oral at bedtime  chlorhexidine 2% Cloths 1 Application(s) Topical daily  clopidogrel Tablet 75 milliGRAM(s) Oral daily  dextrose 5%. 1000 milliLiter(s) (50 mL/Hr) IV Continuous <Continuous>  dextrose 5%. 1000 milliLiter(s) (100 mL/Hr) IV Continuous <Continuous>  dextrose 50% Injectable 25 Gram(s) IV Push once  dextrose 50% Injectable 12.5 Gram(s) IV Push once  doxercalciferol Injectable 4 MICROGram(s) IV Push once  ferrous    sulfate 325 milliGRAM(s) Oral daily  hydrALAZINE 50 milliGRAM(s) Oral every 8 hours  insulin lispro (ADMELOG) corrective regimen sliding scale   SubCutaneous Before meals and at bedtime  isosorbide   dinitrate Tablet (ISORDIL) 20 milliGRAM(s) Oral three times a day  losartan 100 milliGRAM(s) Oral every 24 hours  metoprolol succinate ER 25 milliGRAM(s) Oral daily  pantoprazole  Injectable 40 milliGRAM(s) IV Push every 12 hours  tacrolimus 2 milliGRAM(s) Oral every 12 hours  tamsulosin 0.8 milliGRAM(s) Oral at bedtime    MEDICATIONS  (PRN):  acetaminophen     Tablet .. 650 milliGRAM(s) Oral every 6 hours PRN Mild Pain (1 - 3), Moderate Pain (4 - 6)  dextrose Oral Gel 15 Gram(s) Oral once PRN Blood Glucose LESS THAN 70 milliGRAM(s)/deciliter  loperamide 2 milliGRAM(s) Oral three times a day PRN Diarrhea  sodium chloride 0.65% Nasal 1 Spray(s) Both Nostrils every 4 hours PRN Nasal Congestion        Vital Signs Last 24 Hrs  T(C): 37.2 (05 Apr 2023 09:09), Max: 38 (04 Apr 2023 17:34)  T(F): 99 (05 Apr 2023 09:09), Max: 100.4 (04 Apr 2023 17:34)  HR: 58 (05 Apr 2023 09:19) (54 - 83)  BP: 113/46 (05 Apr 2023 09:19) (113/46 - 159/67)  BP(mean): 93 (05 Apr 2023 05:54) (69 - 96)  RR: 17 (05 Apr 2023 09:19) (17 - 18)  SpO2: 98% (05 Apr 2023 09:19) (94% - 100%)    Parameters below as of 05 Apr 2023 09:19  Patient On (Oxygen Delivery Method): nasal cannula  O2 Flow (L/min): 2      04-04-23 @ 07:01  -  04-05-23 @ 07:00  --------------------------------------------------------  IN: 1200 mL / OUT: 3000 mL / NET: -1800 mL    04-05-23 @ 07:01  -  04-05-23 @ 10:31  --------------------------------------------------------  IN: 180 mL / OUT: 0 mL / NET: 180 mL      PHYSICAL EXAM:  Constitutional: alert, NAD  Eyes: the sclera and conjunctiva were normal.   ENT: the ears and nose were normal in appearance.   Neck: the appearance of the neck was normal and the neck was supple.   Pulmonary: no respiratory distress and lungs were clear to auscultation bilaterally.   Heart: heart rate was normal and rhythm regular, normal S1 and S2  Vascular:. there was no peripheral edema  Abdomen: normal bowel sounds, soft, non-tender  Neurological: no focal deficits.   Psychiatric: the affect was normal        LABS:                        9.9    5.32  )-----------( 214      ( 05 Apr 2023 07:26 )             31.8     04-05    129<L>  |  97  |  2<L>  ----------------------------<  97  4.1   |  30  |  3.35<H>    Ca    8.0<L>      05 Apr 2023 07:26  Mg     1.7     04-05    TPro  4.7<L>  /  Alb  2.4<L>  /  TBili  0.3  /  DBili  x   /  AST  17  /  ALT  <5<L>  /  AlkPhos  159<H>  04-05          MICROBIOLOGY:      RADIOLOGY & ADDITIONAL STUDIES:  Reviewed INFECTIOUS DISEASES CONSULT FOLLOW-UP NOTE    INTERVAL HPI/OVERNIGHT EVENTS:    Patient was seen and examined at bedside. No acute o/n events. He states he is still having diarrhea, however nurse states he only had 1 BM this morning that was formed-no BMs overnight. C diff negative. GI PCR negative. Otherwise no complaints         ROS:   Constitutional, eyes, ENT, cardiovascular, respiratory, gastrointestinal, genitourinary, integumentary, neurological, psychiatric and heme/lymph are otherwise negative other than noted above       ANTIBIOTICS/RELEVANT:    MEDICATIONS  (STANDING):  aspirin enteric coated 81 milliGRAM(s) Oral daily  atorvastatin 40 milliGRAM(s) Oral at bedtime  chlorhexidine 2% Cloths 1 Application(s) Topical daily  clopidogrel Tablet 75 milliGRAM(s) Oral daily  dextrose 5%. 1000 milliLiter(s) (50 mL/Hr) IV Continuous <Continuous>  dextrose 5%. 1000 milliLiter(s) (100 mL/Hr) IV Continuous <Continuous>  dextrose 50% Injectable 25 Gram(s) IV Push once  dextrose 50% Injectable 12.5 Gram(s) IV Push once  doxercalciferol Injectable 4 MICROGram(s) IV Push once  ferrous    sulfate 325 milliGRAM(s) Oral daily  hydrALAZINE 50 milliGRAM(s) Oral every 8 hours  insulin lispro (ADMELOG) corrective regimen sliding scale   SubCutaneous Before meals and at bedtime  isosorbide   dinitrate Tablet (ISORDIL) 20 milliGRAM(s) Oral three times a day  losartan 100 milliGRAM(s) Oral every 24 hours  metoprolol succinate ER 25 milliGRAM(s) Oral daily  pantoprazole  Injectable 40 milliGRAM(s) IV Push every 12 hours  tacrolimus 2 milliGRAM(s) Oral every 12 hours  tamsulosin 0.8 milliGRAM(s) Oral at bedtime    MEDICATIONS  (PRN):  acetaminophen     Tablet .. 650 milliGRAM(s) Oral every 6 hours PRN Mild Pain (1 - 3), Moderate Pain (4 - 6)  dextrose Oral Gel 15 Gram(s) Oral once PRN Blood Glucose LESS THAN 70 milliGRAM(s)/deciliter  loperamide 2 milliGRAM(s) Oral three times a day PRN Diarrhea  sodium chloride 0.65% Nasal 1 Spray(s) Both Nostrils every 4 hours PRN Nasal Congestion        Vital Signs Last 24 Hrs  T(C): 37.2 (05 Apr 2023 09:09), Max: 38 (04 Apr 2023 17:34)  T(F): 99 (05 Apr 2023 09:09), Max: 100.4 (04 Apr 2023 17:34)  HR: 58 (05 Apr 2023 09:19) (54 - 83)  BP: 113/46 (05 Apr 2023 09:19) (113/46 - 159/67)  BP(mean): 93 (05 Apr 2023 05:54) (69 - 96)  RR: 17 (05 Apr 2023 09:19) (17 - 18)  SpO2: 98% (05 Apr 2023 09:19) (94% - 100%)    Parameters below as of 05 Apr 2023 09:19  Patient On (Oxygen Delivery Method): nasal cannula  O2 Flow (L/min): 2      04-04-23 @ 07:01  -  04-05-23 @ 07:00  --------------------------------------------------------  IN: 1200 mL / OUT: 3000 mL / NET: -1800 mL    04-05-23 @ 07:01  -  04-05-23 @ 10:31  --------------------------------------------------------  IN: 180 mL / OUT: 0 mL / NET: 180 mL      PHYSICAL EXAM:  Constitutional: alert, NAD  Eyes: the sclera and conjunctiva were normal.   ENT: the ears and nose were normal in appearance.   Neck: the appearance of the neck was normal and the neck was supple.   Pulmonary: no respiratory distress and lungs were clear to auscultation bilaterally.   Heart: heart rate was normal and rhythm regular, normal S1 and S2  Vascular:. there was no peripheral edema  Abdomen: normal bowel sounds, soft, non-tender  Neurological: no focal deficits.   Psychiatric: the affect was normal        LABS:                        9.9    5.32  )-----------( 214      ( 05 Apr 2023 07:26 )             31.8     04-05    129<L>  |  97  |  2<L>  ----------------------------<  97  4.1   |  30  |  3.35<H>    Ca    8.0<L>      05 Apr 2023 07:26  Mg     1.7     04-05    TPro  4.7<L>  /  Alb  2.4<L>  /  TBili  0.3  /  DBili  x   /  AST  17  /  ALT  <5<L>  /  AlkPhos  159<H>  04-05          MICROBIOLOGY:  Bcxs 3/29 NGTD 5 days     RADIOLOGY & ADDITIONAL STUDIES:  Reviewed

## 2023-04-05 NOTE — PROGRESS NOTE ADULT - ASSESSMENT
62Y M w/ HTN, DM, ESRD s/p failed kidney transplant who was transferred to [facility] (3/17) from [place] for ICD placement and Cardiac Cath after prolonged admission c/b cardiac arrest and vtach arrest and hypovolemic shock 2/2 GI bleed. New fever 3/21, s/p NM PET/CT c/w prostatitis, on CTX per ID, BK virus negative. Pending repeat cath and ICD placement once infection cleared. Also w/ intermittent melena on 3/23, GI and surg following but no intervention planned    #ESRD on HD TTS  HD per schedule 4/6  Daily BMP   K noted 4.1  Continue with Tacro at home dose. Recommend pt follow up with outpatient nephrologist regarding tacro    HTN:  UF with HD as tolerated   Amlodipine dc'd 2/2 hypotensive episodes  Continue losartan 100mg, metoprolol 25mg ER, hydralazine 50mg TID and Isordil 20mg TID. Hold meds for SBP<110    Anemia:  Hgb within goal  epo w/ HD  Defer iron replacement i/s/o active infection, so will not check iron profile at the moment  Transfusion per primary    MBD:  Calcium corrected wnl  iPTH 431 (3/19)  On Hectorol 4mcg TIW as outpatient. Will continue  Check phos twice weekly. Goal <5.5. Can replete if needed to keep>3 62Y M w/ HTN, DM, ESRD s/p failed kidney transplant who was transferred to [facility] (3/17) from [place] for ICD placement and Cardiac Cath after prolonged admission c/b cardiac arrest and vtach arrest and hypovolemic shock 2/2 GI bleed. New fever 3/21, s/p NM PET/CT c/w prostatitis, on CTX per ID, BK virus negative. Pending repeat cath and ICD placement once infection cleared. Also w/ intermittent melena on 3/23, GI and surg following but no intervention planned    #ESRD on HD TTS  HD per schedule 4/6  Daily BMP   K noted 4.1  Continue with Tacro at home dose. Recommend pt follow up with outpatient nephrologist regarding tacro    HTN:  UF with HD as tolerated   Amlodipine dc'd 2/2 hypotensive episodes  Continue losartan 100mg, metoprolol 25mg ER, hydralazine 50mg TID and Isordil 20mg TID. Hold meds for SBP<110    Anemia:  Hgb within goal  epo w/ HD  Defer iron replacement i/s/o active infection, so will not check iron profile at the moment  Transfusion per primary    MBD:  Calcium corrected wnl  iPTH 431 (3/19)  On Hectorol 4mcg TIW as outpatient. Will continue  Check phos twice weekly. Goal <5.5. Can replete if needed to keep>3

## 2023-04-05 NOTE — PROGRESS NOTE ADULT - SUBJECTIVE AND OBJECTIVE BOX
SUBJECTIVE / INTERVAL HPI: Patient seen and examined at bedside. Patient very tired this morning and sleeping during most of interview. However, denied any new complaints. Reports he is eating and using the bathroom normally    VITAL SIGNS:  Vital Signs Last 24 Hrs  T(C): 37.2 (05 Apr 2023 09:09), Max: 38 (04 Apr 2023 17:34)  T(F): 99 (05 Apr 2023 09:09), Max: 100.4 (04 Apr 2023 17:34)  HR: 58 (05 Apr 2023 09:19) (54 - 83)  BP: 113/46 (05 Apr 2023 09:19) (113/46 - 159/67)  BP(mean): 93 (05 Apr 2023 05:54) (69 - 96)  RR: 17 (05 Apr 2023 09:19) (17 - 18)  SpO2: 98% (05 Apr 2023 09:19) (94% - 100%)    Parameters below as of 05 Apr 2023 09:19  Patient On (Oxygen Delivery Method): nasal cannula  O2 Flow (L/min): 2    MEDICATIONS:  MEDICATIONS  (STANDING):  aspirin enteric coated 81 milliGRAM(s) Oral daily  atorvastatin 40 milliGRAM(s) Oral at bedtime  chlorhexidine 2% Cloths 1 Application(s) Topical daily  clopidogrel Tablet 75 milliGRAM(s) Oral daily  dextrose 5%. 1000 milliLiter(s) (50 mL/Hr) IV Continuous <Continuous>  dextrose 5%. 1000 milliLiter(s) (100 mL/Hr) IV Continuous <Continuous>  dextrose 50% Injectable 25 Gram(s) IV Push once  dextrose 50% Injectable 12.5 Gram(s) IV Push once  doxercalciferol Injectable 4 MICROGram(s) IV Push once  ferrous    sulfate 325 milliGRAM(s) Oral daily  hydrALAZINE 50 milliGRAM(s) Oral every 8 hours  insulin lispro (ADMELOG) corrective regimen sliding scale   SubCutaneous Before meals and at bedtime  isosorbide   dinitrate Tablet (ISORDIL) 20 milliGRAM(s) Oral three times a day  losartan 100 milliGRAM(s) Oral every 24 hours  metoprolol succinate ER 25 milliGRAM(s) Oral daily  pantoprazole  Injectable 40 milliGRAM(s) IV Push every 12 hours  tacrolimus 2 milliGRAM(s) Oral every 12 hours  tamsulosin 0.8 milliGRAM(s) Oral at bedtime    MEDICATIONS  (PRN):  acetaminophen     Tablet .. 650 milliGRAM(s) Oral every 6 hours PRN Mild Pain (1 - 3), Moderate Pain (4 - 6)  dextrose Oral Gel 15 Gram(s) Oral once PRN Blood Glucose LESS THAN 70 milliGRAM(s)/deciliter  loperamide 2 milliGRAM(s) Oral three times a day PRN Diarrhea  sodium chloride 0.65% Nasal 1 Spray(s) Both Nostrils every 4 hours PRN Nasal Congestion      ALLERGIES:  Allergies    Allergy Status Unknown    Intolerances        LABS:                        9.9    5.32  )-----------( 214      ( 05 Apr 2023 07:26 )             31.8     04-05    129<L>  |  97  |  2<L>  ----------------------------<  97  4.1   |  30  |  3.35<H>    Ca    8.0<L>      05 Apr 2023 07:26  Mg     1.7     04-05    TPro  4.7<L>  /  Alb  2.4<L>  /  TBili  0.3  /  DBili  x   /  AST  17  /  ALT  <5<L>  /  AlkPhos  159<H>  04-05        CAPILLARY BLOOD GLUCOSE      POCT Blood Glucose.: 155 mg/dL (05 Apr 2023 07:43)      RADIOLOGY & ADDITIONAL TESTS: Reviewed.    ASSESSMENT:    PLAN:

## 2023-04-05 NOTE — PROGRESS NOTE ADULT - NS ATTEND AMEND GEN_ALL_CORE FT
Initial attending contact date    4/3/23  . See PA note written above for details. I reviewed the PA documentation. I have personally seen and examined this patient. I reviewed vitals, labs, medications, cardiac studies, and additional imaging. I agree with the above PA's findings and plans as written above with the following additions/statements.    -Without active complaints, remains euvolemic on exam  -Low grade temp 100.4 yesterday pm, unlikely infectious. Last dose of abx 4/4, ID has signed off   -H/H has remained stable on asa, plavix without recurrent bleed   -On tele no events  -ECHO with EF ~ 35% akinetic infero.infero lateral akinesis consistent with known occluded RCA, mild hypokinesis apical/anterior   -Plan for diagnostic LHC 4/6.  NPO after midnight. Subq ICD pending LHC results   -Cont ASA, plavix, statin  -HFrEF: euvolemic on exam, cont toprol, isordil/hydral, losartan  -HD per renal  -Appreciate palliative input: pt remains full code  -Dispo: [facility]

## 2023-04-05 NOTE — PROGRESS NOTE ADULT - PROBLEM SELECTOR PLAN 3
s/p VTach arrest @ [facility]; no tele events noted.    - EP Consulted – plan for SubQ ICD placement once pt has LHC (pending results)  - c/w Toprol XL 25mg s/p VTach arrest @ [facility]; no tele events noted.    - EP Consulted – plan for SubQ ICD placement once pt has LHC (pending results)  - c/w Toprol XL 25mg s/p VTach arrest @ [facility]; no tele events noted.    - EP Consulted – plan for SubQ ICD placement once pt has LHC (pending results)  - c/w Toprol XL 25mg s/p VTach arrest @ [facility]; no tele events noted.    - EP Consulted – plan for SubQ ICD placement once pt has LHC (pending results)  - c/w Toprol XL 25mg daily s/p VTach arrest @ [facility]; no tele events noted.    - EP Consulted – plan for SubQ ICD placement once pt has LHC (pending results)  - c/w Toprol XL 25mg daily s/p VTach arrest @ [facility]; no tele events noted.    - EP Consulted – plan for SubQ ICD placement once pt has LHC (pending results)  - c/w Toprol XL 25mg daily

## 2023-04-05 NOTE — PROGRESS NOTE ADULT - ATTENDING COMMENTS
I agree with the fellow's findings and plans as written above with the following additions/amendments:    Seen and examined at bedside, no complaints, sleepy, not eating much. No acute indication for HD today, further recs as above

## 2023-04-05 NOTE — PROGRESS NOTE ADULT - PROBLEM SELECTOR PLAN 9
- Chol 88, , HDL 44, LDL 12.    - c/w Atorvastatin 40mg - Chol 88, , HDL 44, LDL 12.    - c/w Atorvastatin 40mg daily

## 2023-04-05 NOTE — CHART NOTE - NSCHARTNOTEFT_GEN_A_CORE
Admitting Diagnosis:   Patient is a 62y old  Male who presents with a chief complaint of ICD placement (05 Apr 2023 10:30)      PAST MEDICAL & SURGICAL HISTORY:  HTN (hypertension)      HLD (hyperlipidemia)      DM (diabetes mellitus)      GERD (gastroesophageal reflux disease)      ESRD on dialysis      NSTEMI (non-ST elevation myocardial infarction)      CAD (coronary artery disease)      Fever of unknown origin (FUO)            Current Nutrition Order:  DASH, puree     PO Intake: Good (%) [   ]  Fair (50-75%) [   ] Poor (<25%) [ ]--Please See Below     GI Issues:  Had been with Diarrhea, Pt Negative for C. diff/GI PCR  Stools have become more formed and less frequent   Imdodium and protonix are ordered     Pain:  None at this time     Skin Integrity:  Alex 14  Nonpitting scrotum Edema  R. buttock stage II pressure injury, L. heel DTI     Labs:   04-05    129<L>  |  97  |  2<L>  ----------------------------<  97  4.1   |  30  |  3.35<H>    Ca    8.0<L>      05 Apr 2023 07:26  Mg     1.7     04-05    TPro  4.7<L>  /  Alb  2.4<L>  /  TBili  0.3  /  DBili  x   /  AST  17  /  ALT  <5<L>  /  AlkPhos  159<H>  04-05    CAPILLARY BLOOD GLUCOSE      POCT Blood Glucose.: 99 mg/dL (05 Apr 2023 14:06)  POCT Blood Glucose.: 46 mg/dL (05 Apr 2023 12:07)  POCT Blood Glucose.: 155 mg/dL (05 Apr 2023 07:43)  POCT Blood Glucose.: 95 mg/dL (04 Apr 2023 21:50)  POCT Blood Glucose.: 81 mg/dL (04 Apr 2023 17:26)      Medications:  MEDICATIONS  (STANDING):  aspirin enteric coated 81 milliGRAM(s) Oral daily  atorvastatin 40 milliGRAM(s) Oral at bedtime  chlorhexidine 2% Cloths 1 Application(s) Topical daily  clopidogrel Tablet 75 milliGRAM(s) Oral daily  dextrose 5%. 1000 milliLiter(s) (100 mL/Hr) IV Continuous <Continuous>  dextrose 5%. 1000 milliLiter(s) (50 mL/Hr) IV Continuous <Continuous>  dextrose 50% Injectable 25 Gram(s) IV Push once  dextrose 50% Injectable 12.5 Gram(s) IV Push once  doxercalciferol Injectable 4 MICROGram(s) IV Push once  ferrous    sulfate 325 milliGRAM(s) Oral daily  hydrALAZINE 50 milliGRAM(s) Oral every 8 hours  insulin lispro (ADMELOG) corrective regimen sliding scale   SubCutaneous Before meals and at bedtime  isosorbide   dinitrate Tablet (ISORDIL) 20 milliGRAM(s) Oral three times a day  losartan 100 milliGRAM(s) Oral every 24 hours  metoprolol succinate ER 25 milliGRAM(s) Oral daily  pantoprazole  Injectable 40 milliGRAM(s) IV Push every 12 hours  tacrolimus 2 milliGRAM(s) Oral every 12 hours  tamsulosin 0.8 milliGRAM(s) Oral at bedtime    MEDICATIONS  (PRN):  acetaminophen     Tablet .. 650 milliGRAM(s) Oral every 6 hours PRN Mild Pain (1 - 3), Moderate Pain (4 - 6)  dextrose Oral Gel 15 Gram(s) Oral once PRN Blood Glucose LESS THAN 70 milliGRAM(s)/deciliter  loperamide 2 milliGRAM(s) Oral three times a day PRN Diarrhea  sodium chloride 0.65% Nasal 1 Spray(s) Both Nostrils every 4 hours PRN Nasal Congestion        5'4''  pounds +-10%  ABW s/p R AKA: 113 pounds, %BPB=368     3/21 143.7 pounds, 148.1 pounds  3/25 136 pounds, 145.5 pounds    3/30 140.8 pounds, 135.3 pounds   4/1 130 pounds  4/5 129.8 pounds, 143.2 pounds     Estimated energy needs:   ABW s/p R AKA for EER as %ABW is greater then 120%  Adjust for age, Fevers, ESRD/HD, Skin/pressure ulcer, CHF; fluids per team    Estimated Energy Needs From (hermilo/kg)	30  Estimated Energy Needs To (hermilo/kg)	35  Estimated Energy Needs Calculated From (hermilo/kg)	1536  Estimated Energy Needs Calculated To (hermilo/kg)	1792    Estimated Protein Needs From (g/kg)	1.2  Estimated Protein Needs To (g/kg)	1.4  Estimated Protein Needs Calculated From (g/kg)	61.44  Estimated Protein Needs Calculated To (g/kg)	71.68    Subjective:  62y M PMHx HTN, HLD, DM, ICM (s/p cath years ago, RCA 99%, never intervened on), HFrEF 25-30%, ESRD s/p failed kidney transplant (LUE AVF; HD TTS), R AKA admitted to [facility] 2/27 for respiratory distress and SIRS criteria after HD session, requiring intubation. Pt with cardiac arrest, vtach arrest requiring amio and pressors. Extubated 3/2 and course c/b by LGIB with hemoglobin of 3.5 requiring multiple transfusions; EGD/colo and CTA w/o evidence of active bleed and Hgb improved to 11's. Transferred to [facility] 3/17/23 for ICD eval 2/2 Vtach and cardiac cath however pt with episode of BRBPR, flex sig showing localized rectal ulcer but no active bleeding. DAPT resumed on 3/20. 3/21 pt febrile to 102F, +URI symptoms. BCx NGTD with intermittent fevers. +Melena 3/23/23, Fecal occult negative, GI cleared pt and DAPT resumed 3/24. LHC and ICD pending infectious w/u. S/p PET scan 3/25- no Lymphadenopathy to suggest lymphoproliferative disorder, activity in prostate and femur. S/p ECHO 4/3: EF 40%. +Fever 4/4. Plan LHC 4/6 assuming remains afebrile with plan for SubQ ICD pending [facility] results.     Pt remains on 5UR. Next HD 4/6. Noted SLP attempted to see pt 4/3, however appears pt refused/ was minimally arousable (?)- SLP noting plan for follow up, however date not stated. Attempted to see this AM, had been soundly sleeping with breakfast angela untouched. Upon following up RN reports pt consumed meal, did not state %PO intake however reports good tolerance. RD attempted to see pt again later in the day however pt seen sleeping. Lunch angela seen at bedside which was untouched assume d/t pt sleeping.   Please see below for nutritions recommendations. Spoke with team.     Prior PES: Increased needs RT increased demands AEB: ESRD/HD, Skin/pressure ulcer, CHF  >> ongoing   Goal: Pt will meet at least 75% of nutrient needs     Recommendations:  1. Current Diet order: DASH Puree.  - Noted pt had been Pending SLP however refused/ was minimally arousable.  - Would recommend SLP f/u to determine most approapite PO diet Cons. If s/s of issues swallowing NPO prior.  - If pt cont to refuse SLP and PO diet to remain, Please defer PO cons to team.  2. Monitor Skin, Wt. Pain/GI per team.  3. Labs: monitor BMP, CBC, glucose, lytes, trend renal indices, LFTs.  4. RD to remain available for additional nutrition interventions as needed.     Education: NA     Risk Level: High [  X ] Moderate [   ] Low [   ]. Admitting Diagnosis:   Patient is a 62y old  Male who presents with a chief complaint of ICD placement (05 Apr 2023 10:30)      PAST MEDICAL & SURGICAL HISTORY:  HTN (hypertension)      HLD (hyperlipidemia)      DM (diabetes mellitus)      GERD (gastroesophageal reflux disease)      ESRD on dialysis      NSTEMI (non-ST elevation myocardial infarction)      CAD (coronary artery disease)      Fever of unknown origin (FUO)            Current Nutrition Order:  DASH, puree     PO Intake: Good (%) [   ]  Fair (50-75%) [   ] Poor (<25%) [ ]--Please See Below     GI Issues:  Had been with Diarrhea, Pt Negative for C. diff/GI PCR  Stools have become more formed and less frequent   Imdodium and protonix are ordered     Pain:  None at this time     Skin Integrity:  Alex 14  Nonpitting scrotum Edema  R. buttock stage II pressure injury, L. heel DTI     Labs:   04-05    129<L>  |  97  |  2<L>  ----------------------------<  97  4.1   |  30  |  3.35<H>    Ca    8.0<L>      05 Apr 2023 07:26  Mg     1.7     04-05    TPro  4.7<L>  /  Alb  2.4<L>  /  TBili  0.3  /  DBili  x   /  AST  17  /  ALT  <5<L>  /  AlkPhos  159<H>  04-05    CAPILLARY BLOOD GLUCOSE      POCT Blood Glucose.: 99 mg/dL (05 Apr 2023 14:06)  POCT Blood Glucose.: 46 mg/dL (05 Apr 2023 12:07)  POCT Blood Glucose.: 155 mg/dL (05 Apr 2023 07:43)  POCT Blood Glucose.: 95 mg/dL (04 Apr 2023 21:50)  POCT Blood Glucose.: 81 mg/dL (04 Apr 2023 17:26)      Medications:  MEDICATIONS  (STANDING):  aspirin enteric coated 81 milliGRAM(s) Oral daily  atorvastatin 40 milliGRAM(s) Oral at bedtime  chlorhexidine 2% Cloths 1 Application(s) Topical daily  clopidogrel Tablet 75 milliGRAM(s) Oral daily  dextrose 5%. 1000 milliLiter(s) (100 mL/Hr) IV Continuous <Continuous>  dextrose 5%. 1000 milliLiter(s) (50 mL/Hr) IV Continuous <Continuous>  dextrose 50% Injectable 25 Gram(s) IV Push once  dextrose 50% Injectable 12.5 Gram(s) IV Push once  doxercalciferol Injectable 4 MICROGram(s) IV Push once  ferrous    sulfate 325 milliGRAM(s) Oral daily  hydrALAZINE 50 milliGRAM(s) Oral every 8 hours  insulin lispro (ADMELOG) corrective regimen sliding scale   SubCutaneous Before meals and at bedtime  isosorbide   dinitrate Tablet (ISORDIL) 20 milliGRAM(s) Oral three times a day  losartan 100 milliGRAM(s) Oral every 24 hours  metoprolol succinate ER 25 milliGRAM(s) Oral daily  pantoprazole  Injectable 40 milliGRAM(s) IV Push every 12 hours  tacrolimus 2 milliGRAM(s) Oral every 12 hours  tamsulosin 0.8 milliGRAM(s) Oral at bedtime    MEDICATIONS  (PRN):  acetaminophen     Tablet .. 650 milliGRAM(s) Oral every 6 hours PRN Mild Pain (1 - 3), Moderate Pain (4 - 6)  dextrose Oral Gel 15 Gram(s) Oral once PRN Blood Glucose LESS THAN 70 milliGRAM(s)/deciliter  loperamide 2 milliGRAM(s) Oral three times a day PRN Diarrhea  sodium chloride 0.65% Nasal 1 Spray(s) Both Nostrils every 4 hours PRN Nasal Congestion        5'4''  pounds +-10%  ABW s/p R AKA: 113 pounds, %ZEY=447     3/21 143.7 pounds, 148.1 pounds  3/25 136 pounds, 145.5 pounds    3/30 140.8 pounds, 135.3 pounds   4/1 130 pounds  4/5 129.8 pounds, 143.2 pounds     Estimated energy needs:   ABW s/p R AKA for EER as %ABW is greater then 120%  Adjust for age, Fevers, ESRD/HD, Skin/pressure ulcer, CHF; fluids per team    Estimated Energy Needs From (hermilo/kg)	30  Estimated Energy Needs To (hermilo/kg)	35  Estimated Energy Needs Calculated From (hermilo/kg)	1536  Estimated Energy Needs Calculated To (hermilo/kg)	1792    Estimated Protein Needs From (g/kg)	1.2  Estimated Protein Needs To (g/kg)	1.4  Estimated Protein Needs Calculated From (g/kg)	61.44  Estimated Protein Needs Calculated To (g/kg)	71.68    Subjective:  62y M PMHx HTN, HLD, DM, ICM (s/p cath years ago, RCA 99%, never intervened on), HFrEF 25-30%, ESRD s/p failed kidney transplant (LUE AVF; HD TTS), R AKA admitted to [facility] 2/27 for respiratory distress and SIRS criteria after HD session, requiring intubation. Pt with cardiac arrest, vtach arrest requiring amio and pressors. Extubated 3/2 and course c/b by LGIB with hemoglobin of 3.5 requiring multiple transfusions; EGD/colo and CTA w/o evidence of active bleed and Hgb improved to 11's. Transferred to [facility] 3/17/23 for ICD eval 2/2 Vtach and cardiac cath however pt with episode of BRBPR, flex sig showing localized rectal ulcer but no active bleeding. DAPT resumed on 3/20. 3/21 pt febrile to 102F, +URI symptoms. BCx NGTD with intermittent fevers. +Melena 3/23/23, Fecal occult negative, GI cleared pt and DAPT resumed 3/24. LHC and ICD pending infectious w/u. S/p PET scan 3/25- no Lymphadenopathy to suggest lymphoproliferative disorder, activity in prostate and femur. S/p ECHO 4/3: EF 40%. +Fever 4/4. Plan LHC 4/6 assuming remains afebrile with plan for SubQ ICD pending [facility] results.     Pt remains on 5UR. Next HD 4/6. Noted SLP attempted to see pt 4/3, however appears pt refused/ was minimally arousable (?)- SLP noting plan for follow up, however date not stated. Attempted to see this AM, had been soundly sleeping with breakfast angela untouched. Upon following up RN reports pt consumed meal, did not state %PO intake however reports good tolerance. RD attempted to see pt again later in the day however pt seen sleeping. Lunch angela seen at bedside which was untouched assume d/t pt sleeping.   Please see below for nutritions recommendations. Spoke with team.     Prior PES: Increased needs RT increased demands AEB: ESRD/HD, Skin/pressure ulcer, CHF  >> ongoing   Goal: Pt will meet at least 75% of nutrient needs     Recommendations:  1. Current Diet order: DASH Puree.  - Noted pt had been Pending SLP however refused/ was minimally arousable.  - Would recommend SLP f/u to determine most approapite PO diet Cons. If s/s of issues swallowing NPO prior.  - If pt cont to refuse SLP and PO diet to remain, Please defer PO cons to team.  2. Monitor Skin, Wt. Pain/GI per team.  3. Labs: monitor BMP, CBC, glucose, lytes, trend renal indices, LFTs.  4. RD to remain available for additional nutrition interventions as needed.     Education: NA     Risk Level: High [  X ] Moderate [   ] Low [   ]. Admitting Diagnosis:   Patient is a 62y old  Male who presents with a chief complaint of ICD placement (05 Apr 2023 10:30)      PAST MEDICAL & SURGICAL HISTORY:  HTN (hypertension)      HLD (hyperlipidemia)      DM (diabetes mellitus)      GERD (gastroesophageal reflux disease)      ESRD on dialysis      NSTEMI (non-ST elevation myocardial infarction)      CAD (coronary artery disease)      Fever of unknown origin (FUO)            Current Nutrition Order:  DASH, puree     PO Intake: Good (%) [   ]  Fair (50-75%) [   ] Poor (<25%) [ ]--Please See Below     GI Issues:  Had been with Diarrhea, Pt Negative for C. diff/GI PCR  Stools have become more formed and less frequent   Imdodium and protonix are ordered     Pain:  None at this time     Skin Integrity:  Alex 14  Nonpitting scrotum Edema  R. buttock stage II pressure injury, L. heel DTI     Labs:   04-05    129<L>  |  97  |  2<L>  ----------------------------<  97  4.1   |  30  |  3.35<H>    Ca    8.0<L>      05 Apr 2023 07:26  Mg     1.7     04-05    TPro  4.7<L>  /  Alb  2.4<L>  /  TBili  0.3  /  DBili  x   /  AST  17  /  ALT  <5<L>  /  AlkPhos  159<H>  04-05    CAPILLARY BLOOD GLUCOSE      POCT Blood Glucose.: 99 mg/dL (05 Apr 2023 14:06)  POCT Blood Glucose.: 46 mg/dL (05 Apr 2023 12:07)  POCT Blood Glucose.: 155 mg/dL (05 Apr 2023 07:43)  POCT Blood Glucose.: 95 mg/dL (04 Apr 2023 21:50)  POCT Blood Glucose.: 81 mg/dL (04 Apr 2023 17:26)      Medications:  MEDICATIONS  (STANDING):  aspirin enteric coated 81 milliGRAM(s) Oral daily  atorvastatin 40 milliGRAM(s) Oral at bedtime  chlorhexidine 2% Cloths 1 Application(s) Topical daily  clopidogrel Tablet 75 milliGRAM(s) Oral daily  dextrose 5%. 1000 milliLiter(s) (100 mL/Hr) IV Continuous <Continuous>  dextrose 5%. 1000 milliLiter(s) (50 mL/Hr) IV Continuous <Continuous>  dextrose 50% Injectable 25 Gram(s) IV Push once  dextrose 50% Injectable 12.5 Gram(s) IV Push once  doxercalciferol Injectable 4 MICROGram(s) IV Push once  ferrous    sulfate 325 milliGRAM(s) Oral daily  hydrALAZINE 50 milliGRAM(s) Oral every 8 hours  insulin lispro (ADMELOG) corrective regimen sliding scale   SubCutaneous Before meals and at bedtime  isosorbide   dinitrate Tablet (ISORDIL) 20 milliGRAM(s) Oral three times a day  losartan 100 milliGRAM(s) Oral every 24 hours  metoprolol succinate ER 25 milliGRAM(s) Oral daily  pantoprazole  Injectable 40 milliGRAM(s) IV Push every 12 hours  tacrolimus 2 milliGRAM(s) Oral every 12 hours  tamsulosin 0.8 milliGRAM(s) Oral at bedtime    MEDICATIONS  (PRN):  acetaminophen     Tablet .. 650 milliGRAM(s) Oral every 6 hours PRN Mild Pain (1 - 3), Moderate Pain (4 - 6)  dextrose Oral Gel 15 Gram(s) Oral once PRN Blood Glucose LESS THAN 70 milliGRAM(s)/deciliter  loperamide 2 milliGRAM(s) Oral three times a day PRN Diarrhea  sodium chloride 0.65% Nasal 1 Spray(s) Both Nostrils every 4 hours PRN Nasal Congestion        5'4''  pounds +-10%  ABW s/p R AKA: 113 pounds, %WJM=206     3/21 143.7 pounds, 148.1 pounds  3/25 136 pounds, 145.5 pounds    3/30 140.8 pounds, 135.3 pounds   4/1 130 pounds  4/5 129.8 pounds, 143.2 pounds     Estimated energy needs:   ABW s/p R AKA for EER as %ABW is greater then 120%  Adjust for age, Fevers, ESRD/HD, Skin/pressure ulcer, CHF; fluids per team    Estimated Energy Needs From (hermilo/kg)	30  Estimated Energy Needs To (hermilo/kg)	35  Estimated Energy Needs Calculated From (hermilo/kg)	1536  Estimated Energy Needs Calculated To (hermilo/kg)	1792    Estimated Protein Needs From (g/kg)	1.2  Estimated Protein Needs To (g/kg)	1.4  Estimated Protein Needs Calculated From (g/kg)	61.44  Estimated Protein Needs Calculated To (g/kg)	71.68    Subjective:  62y M PMHx HTN, HLD, DM, ICM (s/p cath years ago, RCA 99%, never intervened on), HFrEF 25-30%, ESRD s/p failed kidney transplant (LUE AVF; HD TTS), R AKA admitted to [facility] 2/27 for respiratory distress and SIRS criteria after HD session, requiring intubation. Pt with cardiac arrest, vtach arrest requiring amio and pressors. Extubated 3/2 and course c/b by LGIB with hemoglobin of 3.5 requiring multiple transfusions; EGD/colo and CTA w/o evidence of active bleed and Hgb improved to 11's. Transferred to [facility] 3/17/23 for ICD eval 2/2 Vtach and cardiac cath however pt with episode of BRBPR, flex sig showing localized rectal ulcer but no active bleeding. DAPT resumed on 3/20. 3/21 pt febrile to 102F, +URI symptoms. BCx NGTD with intermittent fevers. +Melena 3/23/23, Fecal occult negative, GI cleared pt and DAPT resumed 3/24. LHC and ICD pending infectious w/u. S/p PET scan 3/25- no Lymphadenopathy to suggest lymphoproliferative disorder, activity in prostate and femur. S/p ECHO 4/3: EF 40%. +Fever 4/4. Plan LHC 4/6 assuming remains afebrile with plan for SubQ ICD pending [facility] results.     Pt remains on 5UR. Next HD 4/6. Noted SLP attempted to see pt 4/3, however appears pt refused/ was minimally arousable (?)- SLP noting plan for follow up, however date not stated. Attempted to see this AM, had been soundly sleeping with breakfast angela untouched. Upon following up RN reports pt consumed meal, did not state %PO intake however reports good tolerance. RD attempted to see pt again later in the day however pt seen sleeping. Lunch angela seen at bedside which was untouched assume d/t pt sleeping.   Please see below for nutritions recommendations. Spoke with team.     Prior PES: Increased needs RT increased demands AEB: ESRD/HD, Skin/pressure ulcer, CHF  >> ongoing   Goal: Pt will meet at least 75% of nutrient needs     Recommendations:  1. Current Diet order: DASH Puree.  - Noted pt had been Pending SLP however refused/ was minimally arousable.  - Would recommend SLP f/u to determine most approapite PO diet Cons. If s/s of issues swallowing NPO prior.  - If pt cont to refuse SLP and PO diet to remain, Please defer PO cons to team.  2. Monitor Skin, Wt. Pain/GI per team.  3. Labs: monitor BMP, CBC, glucose, lytes, trend renal indices, LFTs.  4. RD to remain available for additional nutrition interventions as needed.     Education: NA     Risk Level: High [  X ] Moderate [   ] Low [   ].

## 2023-04-05 NOTE — PROGRESS NOTE ADULT - PROBLEM SELECTOR PLAN 5
- ECHOs as above   - Remains euvolemic on exam  - c/w Hydral 50mg TID, Losartan 100mg, Toprol XL 25mg and Isordil 20mg TID - ECHOs as above   - Remains euvolemic on exam  - c/w Hydral 50mg TID, Losartan 100mg daily, Toprol XL 25mg daily and Isordil 20mg TID

## 2023-04-05 NOTE — PROGRESS NOTE ADULT - PROBLEM SELECTOR PLAN 1
Recurrent fevers while on IV ABX. Tmax 102.  - Source 2/2 Urinary vs. Prostatitis   - BCx NGTD x 5 days; BK virus (-); UCx (+) for Klebsiella Pneumonia   - PET scan 3/25 - PET scan showing increased uptake in prostate suggesting prostatitis vs. neoplasm; attempt made to contact wife on 3/31/23 - unclear if patient has outpatient prostate workup   - CT Pelvis 3/28. significant for mild urothelial thickening and hyperenhancement suggestive of pyelitis.   No evidence of pyelonephritis or renal abscess. Probable cystitis.  - Urology consulted - rec to recheck PSA once patient finishes abx for infection, no acute urologic workup needed at this time; will most likely perform outpatient. Patient ultimately will need bx, so will confirm with LHC/DAPT situation  - ID following, appreciate recs.   - c/w IV Ceftriaxone 2G (last day 4/4)     #Diarrhea  - Has recurrent loose BMs. C-Diff negative  - GI PCR negative. Loperamide ordered PRN   - C-diff culture NEGATIVE Recurrent fevers while on IV ABX. Tmax 102.  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 neoplasm; attempt made to contact wife on 3/31/23 - unclear if patient has outpatient prostate workup   - CT Pelvis 3/28. significant for mild urothelial thickening and hyperenhancement suggestive of pyelitis. No evidence of pyelonephritis or renal abscess. Probable cystitis.  - Urology consulted - rec to recheck PSA once patient finishes abx for infection, no acute urologic workup needed at this time; will most likely perform outpatient. Patient ultimately will need bx, so will confirm with LHC/DAPT situation  - ID following, appreciate recs.   - completed 2 wks of IV Abx. Meropenem ->Ceftriaxone 2G q24h (last day 4/4)     #Diarrhea  - Has recurrent loose BMs. C-Diff negative  - GI PCR negative. Loperamide ordered PRN   - C-diff culture NEGATIVE

## 2023-04-05 NOTE — PROGRESS NOTE ADULT - NS ATTEND AMEND GEN_ALL_CORE FT
#Pyelitis of transplant kidney/UTI, diarrhea, immunosuppressive status, fever     low grade temp at 100.4 last night while on CTX.  Patient said he had 5 diarrhea, - Per RN, no diarrhea ovenright and one this AM.  Abdomen non-tender, WBC normal. BCx ngtd.  Monitor off abx.  Loperamide prn for diarrhea.      Team 2 will follow you.  Case d/w primary team.    Luann Carvajal MD, MS  Infectious Disease attending  work cell [phone]   For any questions during evening/weekend/holiday, please page ID on call. #Pyelitis of transplant kidney/UTI, diarrhea, immunosuppressive status, fever     low grade temp at 100.4 last night while on CTX.  Patient said he had 5 diarrhea, - Per RN, no diarrhea ovenright and one this AM.  Abdomen non-tender, WBC normal. BCx ngtd.  Monitor off abx.  Loperamide prn for diarrhea.      Team 2 will follow you.  Case d/w primary team.    Luann Carvajal MD, MS  Infectious Disease attending  work cell [phone]   For any questions during evening/weekend/holiday, please page ID on call. #Pyelitis of transplant kidney/UTI, diarrhea, immunosuppressive status, fever     low grade temp at 100.4 last night while on CTX.  Patient said he had 5 diarrhea, - Per RN, no diarrhea ovenright and one this AM.  Abdomen non-tender, WBC normal. BCx ngtd.  Monitor off abx.  Loperamide prn for diarrhea.      Team 2 will follow you.  Case d/w primary team.    Luann Carvajal MD, MS  Infectious Disease attending  work cell [phone]   For any questions during evening/weekend/holiday, please page ID on call.

## 2023-04-05 NOTE — PROGRESS NOTE ADULT - SUBJECTIVE AND OBJECTIVE BOX
--------------------------------------------------------------------------------  Chief Complaint: ESRD/Ongoing hemodialysis requirement    24 hour events/subjective:  Seen this morning, tolerated HD well yesterday. No acute complaints.  Sleepy this AM, not eating much of his food      PAST HISTORY  --------------------------------------------------------------------------------  No significant changes to PMH, PSH, FHx, SHx, unless otherwise noted    ALLERGIES & MEDICATIONS  --------------------------------------------------------------------------------  Allergies    Allergy Status Unknown    Intolerances      Standing Inpatient Medications  aspirin enteric coated 81 milliGRAM(s) Oral daily  atorvastatin 40 milliGRAM(s) Oral at bedtime  chlorhexidine 2% Cloths 1 Application(s) Topical daily  clopidogrel Tablet 75 milliGRAM(s) Oral daily  dextrose 5%. 1000 milliLiter(s) IV Continuous <Continuous>  dextrose 5%. 1000 milliLiter(s) IV Continuous <Continuous>  dextrose 50% Injectable 25 Gram(s) IV Push once  dextrose 50% Injectable 12.5 Gram(s) IV Push once  doxercalciferol Injectable 4 MICROGram(s) IV Push once  ferrous    sulfate 325 milliGRAM(s) Oral daily  hydrALAZINE 50 milliGRAM(s) Oral every 8 hours  insulin lispro (ADMELOG) corrective regimen sliding scale   SubCutaneous Before meals and at bedtime  isosorbide   dinitrate Tablet (ISORDIL) 20 milliGRAM(s) Oral three times a day  losartan 100 milliGRAM(s) Oral every 24 hours  metoprolol succinate ER 25 milliGRAM(s) Oral daily  pantoprazole  Injectable 40 milliGRAM(s) IV Push every 12 hours  tacrolimus 2 milliGRAM(s) Oral every 12 hours  tamsulosin 0.8 milliGRAM(s) Oral at bedtime    PRN Inpatient Medications  acetaminophen     Tablet .. 650 milliGRAM(s) Oral every 6 hours PRN  dextrose Oral Gel 15 Gram(s) Oral once PRN  loperamide 2 milliGRAM(s) Oral three times a day PRN  sodium chloride 0.65% Nasal 1 Spray(s) Both Nostrils every 4 hours PRN      REVIEW OF SYSTEMS  --------------------------------------------------------------------------------  All other systems were reviewed and are negative, except as noted.    VITALS/PHYSICAL EXAM  --------------------------------------------------------------------------------  T(C): 37.2 (04-05-23 @ 09:09), Max: 38 (04-04-23 @ 17:34)  HR: 58 (04-05-23 @ 09:19) (54 - 83)  BP: 113/46 (04-05-23 @ 09:19) (113/46 - 159/67)  RR: 17 (04-05-23 @ 09:19) (17 - 18)  SpO2: 98% (04-05-23 @ 09:19) (94% - 100%)  Wt(kg): --  Drug Dosing Weight  Height (cm): 162.6 (17 Mar 2023 04:15)  Weight (kg): 62.3 (17 Mar 2023 04:15)  BMI (kg/m2): 23.6 (17 Mar 2023 04:15)  BSA (m2): 1.67 (17 Mar 2023 04:15)    04-04-23 @ 07:01  -  04-05-23 @ 07:00  --------------------------------------------------------  IN: 1200 mL / OUT: 3000 mL / NET: -1800 mL    04-05-23 @ 07:01  -  04-05-23 @ 10:54  --------------------------------------------------------  IN: 180 mL / OUT: 0 mL / NET: 180 mL      PHYSICAL EXAM:  GENERAL: well-developed, well nourished, alert, no acute distress  CHEST/LUNG: Clear to auscultation bilaterally  HEART: normal S1S2, RRR  ABDOMEN: Soft, Nontender, +BS, No flank tenderness bilateral  EXTREMITIES: No clubbing, cyanosis, or edema, Rt AKA  ACCESS: MARILEE angeles/ thrill and bruit, marcos     LABS/STUDIES  --------------------------------------------------------------------------------              9.9    5.32  >-----------<  214      [04-05-23 @ 07:26]              31.8     129  |  97  |  2   ----------------------------<  97      [04-05-23 @ 07:26]  4.1   |  30  |  3.35        Ca     8.0     [04-05-23 @ 07:26]      Mg     1.7     [04-05-23 @ 07:26]    TPro  4.7  /  Alb  2.4  /  TBili  0.3  /  DBili  x   /  AST  17  /  ALT  <5  /  AlkPhos  159  [04-05-23 @ 07:26]      PTH -- (Ca 7.7)      [03-19-23 @ 07:11]   431  Lipid: chol 88, , HDL 44, LDL --      [03-17-23 @ 08:14]    HBsAb Reactive      [04-05-23 @ 07:26]  HBsAg Nonreact      [04-05-23 @ 07:26]  HBcAb Nonreact      [04-05-23 @ 07:26]  HCV 0.04, Nonreact      [03-17-23 @ 08:14]      RADIOLOGY:  --------------------------------------------------------------------------------------

## 2023-04-05 NOTE — PROGRESS NOTE ADULT - PROBLEM SELECTOR PLAN 8
- SBPs 120s-150s  - c/w Hydral 50mg TID, Losartan 100mg, Toprol XL 25mg and Isordil 20mg TID - SBPs 120s-150s  - c/w Hydral 50mg TID, Losartan 100mg daily, Toprol XL 25mg daily and Isordil 20mg TID

## 2023-04-05 NOTE — PROGRESS NOTE ADULT - PROBLEM SELECTOR PLAN 11
- reduced by urology 03/29/23     F: Oral intake  E: Replete electrolytes as needed for K<4 and Mg<2  N: DASH Diet  DVT PPX: holding AC; SCD  Dispo: [facility] 4/6 - reduced by urology 03/29/23     F: Oral intake  E: Replete electrolytes as needed for K<4 and Mg<2  N: DASH Diet  DVT PPX: holding AC; SCD  Dispo: [facility] 4/6 - reduced by urology 03/29/23     F: Oral intake  E: Replete electrolytes as needed for K<4 and Mg<2  N: DASH Diet  DVT PPX: holding AC; SCD  Dispo: [facility] 4/6

## 2023-04-05 NOTE — PROGRESS NOTE ADULT - ATTENDING COMMENTS
61yo M PMHx HTN, HLD, DM, CAD(s/p cath years ago, RCA 99%, never intervened on), HFrEF 25-30%, ESRD s/p failed kidney transplant and required HD x 3yrs (last HD 3/16 via Left Arm AVF; HD TTS), Right AKA initially presented to [facility] 2/27 for respiratory distress after a dialysis, found to be septic, h/c complicated by AHRF requiring intubation for 24hr followed by VT arrest, h/c the complicated by massive LGIB (rectal ulcer seen on colonoscopy) requiring 7u pRBC, 1u FFP and 1u PLT. Transferred to [facility] (3/17) for ICD placement 2/2 Vtach and cardiac cath. Pt noted lassed loose stool with BRBPR, GI and surgery consulted. Flex Sigc ( 3/17) found prior hemoclip in the sigmoid colon, localized ulceration and erosive with no bleeding noted in rectum suggestive of solitary ulcer syndrome. Medicine consulted for comanagement.     -fever -   #LGIB  #SANTOS  #Solitary ulcer syndrome  - hx coffee ground emesis @ [facility]; s/p EGD/colonoscopy showing rectal ulcer  - per gen surgery no acute surgical intervention   - per GI:      - increased Protonix 40 mg IVP BID, can be po now       - s/p flex sig(3/17)  for rectal ulcers, found Evidence of prior hemoclip in the sigmoid colon. Localized ulceration and erosive with no bleeding were noted in the rectum, suggestive of solitary ulcer syndrome           - Avoid anal digitation and enemas           - High-fiber diet and optimize laxatives to avoid constipation using miralax and dulcolax           - No absolute GI contraindications to anticoag/antiplt therapy           - Repeat endoscopic evaluation recommended in 3 months  - Active T&S  - Hgb  stable-  - Transfuse for hgb <8 (active CAD)    #CAD   - Previous cath at [facility] showing oLM 30% and RCA 99% that is not amenable to intervention.  - TTE 3/1/23 at [facility]: mild LVH, EF 25-30%, severe global wall motion dysfunction, mildly dilated LA, RV mildly dilated, all leaflets mild calcified, mod pHTN  - Plan for [facility] upon completion of iv abx tomorrow 4/4     #H/O ventricular tachycardia   - Vtach arrest @[facility]  - fu EP consult for ICD placement- pending infection work up.    # Fever - sp 2 weeks of ceftriazone ( 3/21-4/4 ) for Pyelitis (transplant kidney-right)  as per ID   still with low grade temp 100.4,   ID follow up appreciated.  - monitor off antibiotics for now  - PET/CT scan - no Lymphadenopathy to suggest lymphoproliferative disorder, activity in prostate and femur.  - given immunocompromised/hx Kidney transplant    - ID f/u appreciated, -rec appreciated- to complete iv abx Ceftriaxone 2gm iv q24hr  thru 4/4/23 ( Tues)  - CT pelvis w/wo IVC- result reviewed, pyelitis of right tranplant kidney   would need to rule out infection prior to icd placement.  - Left calf pain, negative for DVT    #ESRD on dialysis/ sp renal transplant.   - dialysis Tues, Thurs, Sat  -HD per renal.  - c/w Tacrolimus     #HTN  - c/w home meds. Amlodipine 10mg qd, Losartan 100mg qd, Hydralazine 50m TID, Isordil 20mg TID with holding parameter    #HLD   - c/w Lipitor 40mg qd    #DM  - no meds per [facility], obtain collaterals for med recs prior [facility] stay  - mISS while inpt, goal -180   - A1c 5.9 on admission    DVT PPX: SCDs for now iso GIB    Med consult team continues to follow with you. 63yo M PMHx HTN, HLD, DM, CAD(s/p cath years ago, RCA 99%, never intervened on), HFrEF 25-30%, ESRD s/p failed kidney transplant and required HD x 3yrs (last HD 3/16 via Left Arm AVF; HD TTS), Right AKA initially presented to [facility] 2/27 for respiratory distress after a dialysis, found to be septic, h/c complicated by AHRF requiring intubation for 24hr followed by VT arrest, h/c the complicated by massive LGIB (rectal ulcer seen on colonoscopy) requiring 7u pRBC, 1u FFP and 1u PLT. Transferred to [facility] (3/17) for ICD placement 2/2 Vtach and cardiac cath. Pt noted lassed loose stool with BRBPR, GI and surgery consulted. Flex Sigc ( 3/17) found prior hemoclip in the sigmoid colon, localized ulceration and erosive with no bleeding noted in rectum suggestive of solitary ulcer syndrome. Medicine consulted for comanagement.     -fever -   #LGIB  #SANTOS  #Solitary ulcer syndrome  - hx coffee ground emesis @ [facility]; s/p EGD/colonoscopy showing rectal ulcer  - per gen surgery no acute surgical intervention   - per GI:      - increased Protonix 40 mg IVP BID, can be po now       - s/p flex sig(3/17)  for rectal ulcers, found Evidence of prior hemoclip in the sigmoid colon. Localized ulceration and erosive with no bleeding were noted in the rectum, suggestive of solitary ulcer syndrome           - Avoid anal digitation and enemas           - High-fiber diet and optimize laxatives to avoid constipation using miralax and dulcolax           - No absolute GI contraindications to anticoag/antiplt therapy           - Repeat endoscopic evaluation recommended in 3 months  - Active T&S  - Hgb  stable-  - Transfuse for hgb <8 (active CAD)    #CAD   - Previous cath at [facility] showing oLM 30% and RCA 99% that is not amenable to intervention.  - TTE 3/1/23 at [facility]: mild LVH, EF 25-30%, severe global wall motion dysfunction, mildly dilated LA, RV mildly dilated, all leaflets mild calcified, mod pHTN  - Plan for [facility] upon completion of iv abx tomorrow 4/4     #H/O ventricular tachycardia   - Vtach arrest @[facility]  - fu EP consult for ICD placement- pending infection work up.    # Fever - sp 2 weeks of ceftriazone ( 3/21-4/4 ) for Pyelitis (transplant kidney-right)  as per ID   still with low grade temp 100.4,   ID follow up appreciated.  - monitor off antibiotics for now  - PET/CT scan - no Lymphadenopathy to suggest lymphoproliferative disorder, activity in prostate and femur.  - given immunocompromised/hx Kidney transplant    - ID f/u appreciated, -rec appreciated- to complete iv abx Ceftriaxone 2gm iv q24hr  thru 4/4/23 ( Tues)  - CT pelvis w/wo IVC- result reviewed, pyelitis of right tranplant kidney   would need to rule out infection prior to icd placement.  - Left calf pain, negative for DVT    #ESRD on dialysis/ sp renal transplant.   - dialysis Tues, Thurs, Sat  -HD per renal.  - c/w Tacrolimus     #HTN  - c/w home meds. Amlodipine 10mg qd, Losartan 100mg qd, Hydralazine 50m TID, Isordil 20mg TID with holding parameter    #HLD   - c/w Lipitor 40mg qd    #DM  - no meds per [facility], obtain collaterals for med recs prior [facility] stay  - mISS while inpt, goal -180   - A1c 5.9 on admission    DVT PPX: SCDs for now iso GIB    Med consult team continues to follow with you. 63yo M PMHx HTN, HLD, DM, CAD(s/p cath years ago, RCA 99%, never intervened on), HFrEF 25-30%, ESRD s/p failed kidney transplant and required HD x 3yrs (last HD 3/16 via Left Arm AVF; HD TTS), Right AKA initially presented to [facility] 2/27 for respiratory distress after a dialysis, found to be septic, h/c complicated by AHRF requiring intubation for 24hr followed by VT arrest, h/c the complicated by massive LGIB (rectal ulcer seen on colonoscopy) requiring 7u pRBC, 1u FFP and 1u PLT. Transferred to [facility] (3/17) for ICD placement 2/2 Vtach and cardiac cath. Pt noted lassed loose stool with BRBPR, GI and surgery consulted. Flex Sigc ( 3/17) found prior hemoclip in the sigmoid colon, localized ulceration and erosive with no bleeding noted in rectum suggestive of solitary ulcer syndrome. Medicine consulted for comanagement.     -fever -   #LGIB  #SANTOS  #Solitary ulcer syndrome  - hx coffee ground emesis @ [facility]; s/p EGD/colonoscopy showing rectal ulcer  - per gen surgery no acute surgical intervention   - per GI:      - increased Protonix 40 mg IVP BID, can be po now       - s/p flex sig(3/17)  for rectal ulcers, found Evidence of prior hemoclip in the sigmoid colon. Localized ulceration and erosive with no bleeding were noted in the rectum, suggestive of solitary ulcer syndrome           - Avoid anal digitation and enemas           - High-fiber diet and optimize laxatives to avoid constipation using miralax and dulcolax           - No absolute GI contraindications to anticoag/antiplt therapy           - Repeat endoscopic evaluation recommended in 3 months  - Active T&S  - Hgb  stable-  - Transfuse for hgb <8 (active CAD)    #CAD   - Previous cath at [facility] showing oLM 30% and RCA 99% that is not amenable to intervention.  - TTE 3/1/23 at [facility]: mild LVH, EF 25-30%, severe global wall motion dysfunction, mildly dilated LA, RV mildly dilated, all leaflets mild calcified, mod pHTN  - Plan for [facility] upon completion of iv abx tomorrow 4/4     #H/O ventricular tachycardia   - Vtach arrest @[facility]  - fu EP consult for ICD placement- pending infection work up.    # Fever - sp 2 weeks of ceftriazone ( 3/21-4/4 ) for Pyelitis (transplant kidney-right)  as per ID   still with low grade temp 100.4,   ID follow up appreciated.  - monitor off antibiotics for now  - PET/CT scan - no Lymphadenopathy to suggest lymphoproliferative disorder, activity in prostate and femur.  - given immunocompromised/hx Kidney transplant    - ID f/u appreciated, -rec appreciated- to complete iv abx Ceftriaxone 2gm iv q24hr  thru 4/4/23 ( Tues)  - CT pelvis w/wo IVC- result reviewed, pyelitis of right tranplant kidney   would need to rule out infection prior to icd placement.  - Left calf pain, negative for DVT    #ESRD on dialysis/ sp renal transplant.   - dialysis Tues, Thurs, Sat  -HD per renal.  - c/w Tacrolimus     #HTN  - c/w home meds. Amlodipine 10mg qd, Losartan 100mg qd, Hydralazine 50m TID, Isordil 20mg TID with holding parameter    #HLD   - c/w Lipitor 40mg qd    #DM  - no meds per [facility], obtain collaterals for med recs prior [facility] stay  - mISS while inpt, goal -180   - A1c 5.9 on admission    DVT PPX: SCDs for now iso GIB    Med consult team continues to follow with you.

## 2023-04-05 NOTE — PROGRESS NOTE ADULT - ASSESSMENT
62 y/oM PMHx  HTN, HLD, DM, ICM (s/p cath years ago, RCA 99%, never intervened on), HFrEF 25-30%, ESRD s/p failed kidney transplant (LUE AVF; HD TTS), R AKA initially admitted to [facility] 2/27 for Respiratory Distress 2/2  SIRS s/p HD session whose course c/b cardiac arrest and Vtach. Now s/p extubation/pressors whose course further c/b LGIB (hgb 3.5 s/p multiple transfusions and no active bleed). Transferred to [facility] 3/17 for ICD eval, however, found to have BRBPR on admission s/p flex sig without active bleed. Course further c/b ongoing fevers (Tmax 102) with w/u remarkable for UTI, now on IV abx w/last dose 4/4. Plan [facility] 4/6 assuming remains afebrile with plan for SubQ ICD pending [facility] results. PT eval- MARIE.          62 y/oM PMHx  HTN, HLD, DM, ICM (s/p cath years ago, RCA 99%, never intervened on), HFrEF 25-30%, ESRD s/p failed kidney transplant (LUE AVF; HD TTS), R AKA initially admitted to [facility] 2/27 for Respiratory Distress 2/2  SIRS s/p HD session whose course c/b cardiac arrest and Vtach. Now s/p extubation/pressors whose course further c/b LGIB (hgb 3.5 s/p multiple transfusions and no active bleed). Transferred to [facility] 3/17 for ICD eval, however, found to have BRBPR on admission s/p flex sig without active bleed. Course further c/b ongoing fevers (Tmax 102) with w/u remarkable for UTI, now on IV abx w/last dose 4/4. Plan [facility] 4/6 assuming remains afebrile with plan for SubQ ICD pending [facility] results. PT eval- MARIE.          62 y/oM PMHx  HTN, HLD, DM, ICM (s/p cath years ago, RCA 99%, never intervened on), HFrEF 25-30%, ESRD s/p failed kidney transplant (LUE AVF; HD TTS), R AKA initially admitted to [facility] 2/27 for Respiratory Distress 2/2  SIRS s/p HD session whose course c/b cardiac arrest and Vtach. Now s/p extubation/pressors whose course further c/b LGIB (hgb 3.5 s/p multiple transfusions and no active bleed). Transferred to [facility] 3/17 for ICD eval, however, found to have BRBPR on admission s/p flex sig without active bleed. Course further c/b ongoing fevers (Tmax 102) with w/u remarkable for UTI, now on IV abx w/last dose 4/4. Plan [facility] 4/6 assuming remains afebrile with plan for SubQ ICD pending [facility] results. PT eval- MARIE.          63 y/o M, PMHx HTN, HLD, DM, ICM (s/p cath years ago, RCA 99%, never intervened on), HFrEF 25-30%, ESRD s/p failed kidney transplant (LUE AVF; HD TTS), R AKA initially admitted to [facility] 2/27 for Respiratory Distress 2/2 SIRS s/p HD session whose course c/b cardiac arrest and Vtach. Now s/p extubation/pressors whose course further c/b LGIB (Hgb 3.5 s/p multiple transfusions and no active bleed). Transferred to [facility] 3/17 for ICD eval, however, found to have BRBPR on admission s/p flex sig without active bleed. Course further c/b ongoing fevers (Tmax 102, last 100.4 on 4/4) with w/u remarkable for pyelitis s/p 2 wks course IV abx on 4/4. Plan [facility] 4/6 assuming remains afebrile with plan for SubQ ICD pending [facility] results. PT reese- MARIE.   63 y/o M, PMHx HTN, HLD, DM, ICM (s/p cath years ago, RCA 99%, never intervened on), HFrEF 25-30%, ESRD s/p failed kidney transplant (LUE AVF; HD TTS), R AKA initially admitted to [facility] 2/27 for Respiratory Distress 2/2 SIRS s/p HD session whose course c/b cardiac arrest and Vtach. Now s/p extubation/pressors whose course further c/b LGIB (Hgb 3.5 s/p multiple transfusions and no active bleed). Transferred to [facility] 3/17 for ICD eval, however, found to have BRBPR on admission s/p flex sig without active bleed. Course further c/b ongoing fevers (Tmax 102, last 100.4 on 4/4) with w/u remarkable for pyelitis s/p 2 wks course IV abx on 4/4. Plan [facility] 4/6 assuming remains afebrile with plan for SubQ ICD pending [facility] results. PT reese- MARIE.   61 y/o M, PMHx HTN, HLD, DM, ICM (s/p cath years ago, RCA 99%, never intervened on), HFrEF 25-30%, ESRD s/p failed kidney transplant (LUE AVF; HD TTS), R AKA initially admitted to [facility] 2/27 for Respiratory Distress 2/2 SIRS s/p HD session whose course c/b cardiac arrest and Vtach. Now s/p extubation/pressors whose course further c/b LGIB (Hgb 3.5 s/p multiple transfusions and no active bleed). Transferred to [facility] 3/17 for ICD eval, however, found to have BRBPR on admission s/p flex sig without active bleed. Course further c/b ongoing fevers (Tmax 102, last 100.4 on 4/4) with w/u remarkable for pyelitis s/p 2 wks course IV abx on 4/4. Plan [facility] 4/6 assuming remains afebrile with plan for SubQ ICD pending [facility] results. PT reese- MARIE.

## 2023-04-06 DIAGNOSIS — R63.8 OTHER SYMPTOMS AND SIGNS CONCERNING FOOD AND FLUID INTAKE: ICD-10-CM

## 2023-04-06 DIAGNOSIS — K92.2 GASTROINTESTINAL HEMORRHAGE, UNSPECIFIED: ICD-10-CM

## 2023-04-06 LAB
ANION GAP SERPL CALC-SCNC: 3 MMOL/L — LOW (ref 5–17)
APTT BLD: 39.9 SEC — HIGH (ref 27.5–35.5)
BASOPHILS # BLD AUTO: 0.11 K/UL — SIGNIFICANT CHANGE UP (ref 0–0.2)
BASOPHILS NFR BLD AUTO: 1 % — SIGNIFICANT CHANGE UP (ref 0–2)
BUN SERPL-MCNC: 4 MG/DL — LOW (ref 7–23)
CALCIUM SERPL-MCNC: 8.2 MG/DL — LOW (ref 8.4–10.5)
CHLORIDE SERPL-SCNC: 96 MMOL/L — SIGNIFICANT CHANGE UP (ref 96–108)
CO2 SERPL-SCNC: 27 MMOL/L — SIGNIFICANT CHANGE UP (ref 22–31)
CREAT SERPL-MCNC: 4.44 MG/DL — HIGH (ref 0.5–1.3)
CRP SERPL-MCNC: 38 MG/L — HIGH
CRP SERPL-MCNC: 54.3 MG/L — HIGH (ref 0–4)
EGFR: 14 ML/MIN/1.73M2 — LOW
EOSINOPHIL # BLD AUTO: 0.21 K/UL — SIGNIFICANT CHANGE UP (ref 0–0.5)
EOSINOPHIL NFR BLD AUTO: 2 % — SIGNIFICANT CHANGE UP (ref 0–6)
GLUCOSE BLDC GLUCOMTR-MCNC: 103 MG/DL — HIGH (ref 70–99)
GLUCOSE BLDC GLUCOMTR-MCNC: 105 MG/DL — HIGH (ref 70–99)
GLUCOSE BLDC GLUCOMTR-MCNC: 107 MG/DL — HIGH (ref 70–99)
GLUCOSE SERPL-MCNC: 109 MG/DL — HIGH (ref 70–99)
HCT VFR BLD CALC: 31.3 % — LOW (ref 39–50)
HGB BLD-MCNC: 10 G/DL — LOW (ref 13–17)
IMM GRANULOCYTES NFR BLD AUTO: 0.3 % — SIGNIFICANT CHANGE UP (ref 0–0.9)
INR BLD: 1.55 — HIGH (ref 0.88–1.16)
LACTATE SERPL-SCNC: 0.7 MMOL/L — SIGNIFICANT CHANGE UP (ref 0.5–2)
LACTATE SERPL-SCNC: 0.8 MMOL/L — SIGNIFICANT CHANGE UP (ref 0.5–2)
LYMPHOCYTES # BLD AUTO: 1.62 K/UL — SIGNIFICANT CHANGE UP (ref 1–3.3)
LYMPHOCYTES # BLD AUTO: 15.3 % — SIGNIFICANT CHANGE UP (ref 13–44)
MAGNESIUM SERPL-MCNC: 1.5 MG/DL — LOW (ref 1.6–2.6)
MCHC RBC-ENTMCNC: 29 PG — SIGNIFICANT CHANGE UP (ref 27–34)
MCHC RBC-ENTMCNC: 31.9 GM/DL — LOW (ref 32–36)
MCV RBC AUTO: 90.7 FL — SIGNIFICANT CHANGE UP (ref 80–100)
MONOCYTES # BLD AUTO: 0.86 K/UL — SIGNIFICANT CHANGE UP (ref 0–0.9)
MONOCYTES NFR BLD AUTO: 8.1 % — SIGNIFICANT CHANGE UP (ref 2–14)
NEUTROPHILS # BLD AUTO: 7.78 K/UL — HIGH (ref 1.8–7.4)
NEUTROPHILS NFR BLD AUTO: 73.3 % — SIGNIFICANT CHANGE UP (ref 43–77)
NRBC # BLD: 0 /100 WBCS — SIGNIFICANT CHANGE UP (ref 0–0)
PHOSPHATE SERPL-MCNC: 2.7 MG/DL — SIGNIFICANT CHANGE UP (ref 2.5–4.5)
PLATELET # BLD AUTO: 202 K/UL — SIGNIFICANT CHANGE UP (ref 150–400)
POTASSIUM SERPL-MCNC: 4.4 MMOL/L — SIGNIFICANT CHANGE UP (ref 3.5–5.3)
POTASSIUM SERPL-SCNC: 4.4 MMOL/L — SIGNIFICANT CHANGE UP (ref 3.5–5.3)
PROCALCITONIN SERPL-MCNC: 0.53 NG/ML — HIGH (ref 0.02–0.1)
PROCALCITONIN SERPL-MCNC: 0.97 NG/ML — HIGH (ref 0.02–0.1)
PROTHROM AB SERPL-ACNC: 18.5 SEC — HIGH (ref 10.5–13.4)
RBC # BLD: 3.45 M/UL — LOW (ref 4.2–5.8)
RBC # FLD: 15.7 % — HIGH (ref 10.3–14.5)
SODIUM SERPL-SCNC: 126 MMOL/L — LOW (ref 135–145)
WBC # BLD: 10.82 K/UL — HIGH (ref 3.8–10.5)
WBC # FLD AUTO: 10.82 K/UL — HIGH (ref 3.8–10.5)

## 2023-04-06 PROCEDURE — 90935 HEMODIALYSIS ONE EVALUATION: CPT

## 2023-04-06 PROCEDURE — 99233 SBSQ HOSP IP/OBS HIGH 50: CPT

## 2023-04-06 PROCEDURE — 99232 SBSQ HOSP IP/OBS MODERATE 35: CPT

## 2023-04-06 PROCEDURE — 99233 SBSQ HOSP IP/OBS HIGH 50: CPT | Mod: GC

## 2023-04-06 RX ORDER — DOXERCALCIFEROL 2.5 UG/1
4 CAPSULE ORAL ONCE
Refills: 0 | Status: COMPLETED | OUTPATIENT
Start: 2023-04-06 | End: 2023-04-06

## 2023-04-06 RX ORDER — PANTOPRAZOLE SODIUM 20 MG/1
40 TABLET, DELAYED RELEASE ORAL EVERY 12 HOURS
Refills: 0 | Status: DISCONTINUED | OUTPATIENT
Start: 2023-04-07 | End: 2023-04-12

## 2023-04-06 RX ORDER — ACETAMINOPHEN 500 MG
1000 TABLET ORAL ONCE
Refills: 0 | Status: COMPLETED | OUTPATIENT
Start: 2023-04-06 | End: 2023-04-06

## 2023-04-06 RX ORDER — ERYTHROPOIETIN 10000 [IU]/ML
6000 INJECTION, SOLUTION INTRAVENOUS; SUBCUTANEOUS ONCE
Refills: 0 | Status: COMPLETED | OUTPATIENT
Start: 2023-04-06 | End: 2023-04-08

## 2023-04-06 RX ORDER — MEROPENEM 1 G/30ML
500 INJECTION INTRAVENOUS EVERY 24 HOURS
Refills: 0 | Status: DISCONTINUED | OUTPATIENT
Start: 2023-04-06 | End: 2023-04-10

## 2023-04-06 RX ORDER — CEFTRIAXONE 500 MG/1
2000 INJECTION, POWDER, FOR SOLUTION INTRAMUSCULAR; INTRAVENOUS ONCE
Refills: 0 | Status: COMPLETED | OUTPATIENT
Start: 2023-04-06 | End: 2023-04-06

## 2023-04-06 RX ADMIN — CEFTRIAXONE 100 MILLIGRAM(S): 500 INJECTION, POWDER, FOR SOLUTION INTRAMUSCULAR; INTRAVENOUS at 01:53

## 2023-04-06 RX ADMIN — Medication 50 MILLIGRAM(S): at 22:52

## 2023-04-06 RX ADMIN — Medication 325 MILLIGRAM(S): at 15:47

## 2023-04-06 RX ADMIN — PANTOPRAZOLE SODIUM 40 MILLIGRAM(S): 20 TABLET, DELAYED RELEASE ORAL at 05:47

## 2023-04-06 RX ADMIN — Medication 81 MILLIGRAM(S): at 15:49

## 2023-04-06 RX ADMIN — TACROLIMUS 2 MILLIGRAM(S): 5 CAPSULE ORAL at 05:48

## 2023-04-06 RX ADMIN — ATORVASTATIN CALCIUM 40 MILLIGRAM(S): 80 TABLET, FILM COATED ORAL at 22:52

## 2023-04-06 RX ADMIN — ISOSORBIDE DINITRATE 20 MILLIGRAM(S): 5 TABLET ORAL at 05:47

## 2023-04-06 RX ADMIN — Medication 50 MILLIGRAM(S): at 15:48

## 2023-04-06 RX ADMIN — PANTOPRAZOLE SODIUM 40 MILLIGRAM(S): 20 TABLET, DELAYED RELEASE ORAL at 19:11

## 2023-04-06 RX ADMIN — MEROPENEM 100 MILLIGRAM(S): 1 INJECTION INTRAVENOUS at 19:10

## 2023-04-06 RX ADMIN — TACROLIMUS 2 MILLIGRAM(S): 5 CAPSULE ORAL at 19:12

## 2023-04-06 RX ADMIN — Medication 25 MILLIGRAM(S): at 15:46

## 2023-04-06 RX ADMIN — DOXERCALCIFEROL 4 MICROGRAM(S): 2.5 CAPSULE ORAL at 12:04

## 2023-04-06 RX ADMIN — ISOSORBIDE DINITRATE 20 MILLIGRAM(S): 5 TABLET ORAL at 15:48

## 2023-04-06 RX ADMIN — CHLORHEXIDINE GLUCONATE 1 APPLICATION(S): 213 SOLUTION TOPICAL at 19:08

## 2023-04-06 RX ADMIN — Medication 325 MILLIGRAM(S): at 00:30

## 2023-04-06 RX ADMIN — CLOPIDOGREL BISULFATE 75 MILLIGRAM(S): 75 TABLET, FILM COATED ORAL at 15:47

## 2023-04-06 RX ADMIN — Medication 650 MILLIGRAM(S): at 11:45

## 2023-04-06 RX ADMIN — TAMSULOSIN HYDROCHLORIDE 0.8 MILLIGRAM(S): 0.4 CAPSULE ORAL at 22:51

## 2023-04-06 RX ADMIN — LOSARTAN POTASSIUM 100 MILLIGRAM(S): 100 TABLET, FILM COATED ORAL at 19:09

## 2023-04-06 RX ADMIN — Medication 1000 MILLIGRAM(S): at 23:51

## 2023-04-06 RX ADMIN — Medication 50 MILLIGRAM(S): at 06:43

## 2023-04-06 RX ADMIN — Medication 650 MILLIGRAM(S): at 10:40

## 2023-04-06 RX ADMIN — Medication 400 MILLIGRAM(S): at 22:51

## 2023-04-06 NOTE — PROGRESS NOTE ADULT - PROBLEM SELECTOR PLAN 3
s/p VTach arrest @ [facility]; no tele events noted.    - EP Consulted – plan for SubQ ICD placement once pt has LHC (pending results)  - c/w Toprol XL 25mg daily.  - Per cards, no plan for ICD placement inpatient due to fevers   - Will need Life Vest on discharge s/p VTach arrest @ [facility]; no tele events noted.    - EP Consulted – plan for SubQ ICD placement once pt has LHC (pending results)  - c/w Toprol XL 25mg daily.  - Per cards, no plan for ICD placement inpatient due to fevers   - Will need Life Vest on discharge s/p VTach arrest @ [facility]; no tele events noted.    - EP Consulted – plan for SubQ ICD placement once pt has LHC (pending results)  - c/w Toprol XL 25mg daily.  - Per cards, no plan for ICD placement inpatient due to fevers   - Will need Life Vest on discharge s/p VTach arrest @ [facility]; no tele events noted. EP Consulted with original plan for SubQ ICD placement once pt had LHC. Will receive life vest instead    Plan:  - c/w Toprol XL 25mg daily. s/p VTach arrest @ [facility]; no tele events noted. EP Consulted with original plan for SubQ ICD placement once pt had LHC. Will receive life vest instead    Plan:  - c/w Toprol XL 25mg daily. s/p VTach arrest @ [facility]; no tele events noted. EP Consulted with original plan for SubQ ICD placement once pt had LHC. Will receive life vest instead    Plan:  - c/w Toprol XL 25mg daily.

## 2023-04-06 NOTE — PROGRESS NOTE ADULT - PROBLEM SELECTOR PLAN 1
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 Pyelitis treated with ceftriaxone 2 g IV q24h (3/21-4/4) per ID recs. Patient also had diarrhea with antibiotics. C.diff and GI PCR negative.   - Unclear source of fever. Abd U/S with no acute pathology, CXR negative, Bcx NGTD.  - PET scan showing increased uptake in prostate suggesting prostatitis vs. neoplasm  - Overnight 4/5, patient with new Tmax to 104.6 F. WBC 10.82, CRP 54.3, procal 0.97.  - F/u repeat Bcx  - F/u UA + urine culture   - ID following - appreciate recs   - Will treat broadly with Meropenem 500 mg q24h per ID recs, as patient is immunocompromised   - F/u Galium scan (ordered)

## 2023-04-06 NOTE — PROGRESS NOTE ADULT - SUBJECTIVE AND OBJECTIVE BOX
INFECTIOUS DISEASES CONSULT FOLLOW-UP NOTE    **iNCOMPLETE    INTERVAL HPI/OVERNIGHT EVENTS:      ROS:   Constitutional, eyes, ENT, cardiovascular, respiratory, gastrointestinal, genitourinary, integumentary, neurological, psychiatric and heme/lymph are otherwise negative other than noted above       ANTIBIOTICS/RELEVANT:    MEDICATIONS  (STANDING):  aspirin enteric coated 81 milliGRAM(s) Oral daily  atorvastatin 40 milliGRAM(s) Oral at bedtime  chlorhexidine 2% Cloths 1 Application(s) Topical daily  clopidogrel Tablet 75 milliGRAM(s) Oral daily  dextrose 5%. 1000 milliLiter(s) (100 mL/Hr) IV Continuous <Continuous>  dextrose 5%. 1000 milliLiter(s) (50 mL/Hr) IV Continuous <Continuous>  dextrose 50% Injectable 25 Gram(s) IV Push once  dextrose 50% Injectable 12.5 Gram(s) IV Push once  doxercalciferol Injectable 4 MICROGram(s) IV Push once  epoetin janae-epbx (RETACRIT) Injectable 6000 Unit(s) IV Push once  ferrous    sulfate 325 milliGRAM(s) Oral daily  hydrALAZINE 50 milliGRAM(s) Oral every 8 hours  insulin lispro (ADMELOG) corrective regimen sliding scale   SubCutaneous Before meals and at bedtime  isosorbide   dinitrate Tablet (ISORDIL) 20 milliGRAM(s) Oral three times a day  losartan 100 milliGRAM(s) Oral every 24 hours  metoprolol succinate ER 25 milliGRAM(s) Oral daily  pantoprazole  Injectable 40 milliGRAM(s) IV Push every 12 hours  tacrolimus 2 milliGRAM(s) Oral every 12 hours  tamsulosin 0.8 milliGRAM(s) Oral at bedtime    MEDICATIONS  (PRN):  acetaminophen     Tablet .. 650 milliGRAM(s) Oral every 6 hours PRN Temp greater or equal to 38C (100.4F), Mild Pain (1 - 3), Moderate Pain (4 - 6)  dextrose Oral Gel 15 Gram(s) Oral once PRN Blood Glucose LESS THAN 70 milliGRAM(s)/deciliter  loperamide 2 milliGRAM(s) Oral three times a day PRN Diarrhea  sodium chloride 0.65% Nasal 1 Spray(s) Both Nostrils every 4 hours PRN Nasal Congestion        Vital Signs Last 24 Hrs  T(C): 39.2 (06 Apr 2023 10:35), Max: 40.3 (05 Apr 2023 21:13)  T(F): 102.6 (06 Apr 2023 10:35), Max: 104.6 (05 Apr 2023 21:13)  HR: 96 (06 Apr 2023 10:35) (56 - 96)  BP: 144/71 (06 Apr 2023 10:35) (116/48 - 161/66)  BP(mean): 93 (06 Apr 2023 10:35) (83 - 108)  RR: 18 (06 Apr 2023 10:35) (17 - 18)  SpO2: 97% (06 Apr 2023 10:35) (94% - 100%)    Parameters below as of 06 Apr 2023 10:35  Patient On (Oxygen Delivery Method): nasal cannula  O2 Flow (L/min): 2      04-05-23 @ 07:01  -  04-06-23 @ 07:00  --------------------------------------------------------  IN: 360 mL / OUT: 900 mL / NET: -540 mL    04-06-23 @ 07:01  -  04-06-23 @ 11:53  --------------------------------------------------------  IN: 0 mL / OUT: 0 mL / NET: 0 mL      PHYSICAL EXAM:  Constitutional: alert, NAD  Eyes: the sclera and conjunctiva were normal.   ENT: the ears and nose were normal in appearance.   Neck: the appearance of the neck was normal and the neck was supple.   Pulmonary: no respiratory distress and lungs were clear to auscultation bilaterally.   Heart: heart rate was normal and rhythm regular, normal S1 and S2  Vascular:. there was no peripheral edema  Abdomen: normal bowel sounds, soft, non-tender  Neurological: no focal deficits.   Psychiatric: the affect was normal        LABS:                        10.0   10.82 )-----------( 202      ( 06 Apr 2023 05:30 )             31.3     04-06    126<L>  |  96  |  4<L>  ----------------------------<  109<H>  4.4   |  27  |  4.44<H>    Ca    8.2<L>      06 Apr 2023 05:30  Phos  2.7     04-06  Mg     1.5     04-06    TPro  4.7<L>  /  Alb  2.4<L>  /  TBili  0.3  /  DBili  x   /  AST  17  /  ALT  <5<L>  /  AlkPhos  159<H>  04-05    PT/INR - ( 06 Apr 2023 05:30 )   PT: 18.5 sec;   INR: 1.55          PTT - ( 06 Apr 2023 05:30 )  PTT:39.9 sec      MICROBIOLOGY:      RADIOLOGY & ADDITIONAL STUDIES:  Reviewed INFECTIOUS DISEASES CONSULT FOLLOW-UP NOTE    INTERVAL HPI/OVERNIGHT EVENTS:    Patient seen and examined at bedside while at HD. Had fevers overnight tmax 104.6. He was given one time dose of ceftriaxone at 1:24 AM. CXR 4/5 stable from prior. Blood cultures and UA with Reflex cx ordered. Patient laying in bed with blankets pulled over face actively having rigors. He states he is nauseous. Denies vomiting, abdominal pain, diarrhea/constipation.     ROS:   Constitutional, eyes, ENT, cardiovascular, respiratory, gastrointestinal, genitourinary, integumentary, neurological, psychiatric and heme/lymph are otherwise negative other than noted above       ANTIBIOTICS/RELEVANT:    MEDICATIONS  (STANDING):  aspirin enteric coated 81 milliGRAM(s) Oral daily  atorvastatin 40 milliGRAM(s) Oral at bedtime  chlorhexidine 2% Cloths 1 Application(s) Topical daily  clopidogrel Tablet 75 milliGRAM(s) Oral daily  dextrose 5%. 1000 milliLiter(s) (100 mL/Hr) IV Continuous <Continuous>  dextrose 5%. 1000 milliLiter(s) (50 mL/Hr) IV Continuous <Continuous>  dextrose 50% Injectable 25 Gram(s) IV Push once  dextrose 50% Injectable 12.5 Gram(s) IV Push once  doxercalciferol Injectable 4 MICROGram(s) IV Push once  epoetin janae-epbx (RETACRIT) Injectable 6000 Unit(s) IV Push once  ferrous    sulfate 325 milliGRAM(s) Oral daily  hydrALAZINE 50 milliGRAM(s) Oral every 8 hours  insulin lispro (ADMELOG) corrective regimen sliding scale   SubCutaneous Before meals and at bedtime  isosorbide   dinitrate Tablet (ISORDIL) 20 milliGRAM(s) Oral three times a day  losartan 100 milliGRAM(s) Oral every 24 hours  metoprolol succinate ER 25 milliGRAM(s) Oral daily  pantoprazole  Injectable 40 milliGRAM(s) IV Push every 12 hours  tacrolimus 2 milliGRAM(s) Oral every 12 hours  tamsulosin 0.8 milliGRAM(s) Oral at bedtime    MEDICATIONS  (PRN):  acetaminophen     Tablet .. 650 milliGRAM(s) Oral every 6 hours PRN Temp greater or equal to 38C (100.4F), Mild Pain (1 - 3), Moderate Pain (4 - 6)  dextrose Oral Gel 15 Gram(s) Oral once PRN Blood Glucose LESS THAN 70 milliGRAM(s)/deciliter  loperamide 2 milliGRAM(s) Oral three times a day PRN Diarrhea  sodium chloride 0.65% Nasal 1 Spray(s) Both Nostrils every 4 hours PRN Nasal Congestion        Vital Signs Last 24 Hrs  T(C): 39.2 (06 Apr 2023 10:35), Max: 40.3 (05 Apr 2023 21:13)  T(F): 102.6 (06 Apr 2023 10:35), Max: 104.6 (05 Apr 2023 21:13)  HR: 96 (06 Apr 2023 10:35) (56 - 96)  BP: 144/71 (06 Apr 2023 10:35) (116/48 - 161/66)  BP(mean): 93 (06 Apr 2023 10:35) (83 - 108)  RR: 18 (06 Apr 2023 10:35) (17 - 18)  SpO2: 97% (06 Apr 2023 10:35) (94% - 100%)    Parameters below as of 06 Apr 2023 10:35  Patient On (Oxygen Delivery Method): nasal cannula  O2 Flow (L/min): 2      04-05-23 @ 07:01  -  04-06-23 @ 07:00  --------------------------------------------------------  IN: 360 mL / OUT: 900 mL / NET: -540 mL    04-06-23 @ 07:01  -  04-06-23 @ 11:53  --------------------------------------------------------  IN: 0 mL / OUT: 0 mL / NET: 0 mL      PHYSICAL EXAM:  Constitutional: laying in bed with blankets covering face. Having active rigors  Eyes: the sclera and conjunctiva were normal.   ENT: the ears and nose were normal in appearance.   Neck: the appearance of the neck was normal and the neck was supple.   Pulmonary: no respiratory distress and lungs were clear to auscultation bilaterally.   Heart: heart rate was normal and rhythm regular, normal S1 and S2  Vascular:. there was no peripheral edema  Abdomen: normal bowel sounds, soft, non-tender  Neurological: no focal deficits.   Psychiatric: the affect was normal        LABS:                        10.0   10.82 )-----------( 202      ( 06 Apr 2023 05:30 )             31.3     04-06    126<L>  |  96  |  4<L>  ----------------------------<  109<H>  4.4   |  27  |  4.44<H>    Ca    8.2<L>      06 Apr 2023 05:30  Phos  2.7     04-06  Mg     1.5     04-06    TPro  4.7<L>  /  Alb  2.4<L>  /  TBili  0.3  /  DBili  x   /  AST  17  /  ALT  <5<L>  /  AlkPhos  159<H>  04-05    PT/INR - ( 06 Apr 2023 05:30 )   PT: 18.5 sec;   INR: 1.55          PTT - ( 06 Apr 2023 05:30 )  PTT:39.9 sec      MICROBIOLOGY:  Bcxs 3/29 NGTD 5 days   BCXs 4/5 pending   UA and UCX pending       RADIOLOGY & ADDITIONAL STUDIES:  Reviewed

## 2023-04-06 NOTE — PROGRESS NOTE ADULT - ASSESSMENT
62Y M w/ HTN, DM, ESRD s/p failed kidney transplant who was transferred to [facility] (3/17) from [place] for ICD placement and Cardiac Cath after prolonged admission c/b cardiac arrest and vtach arrest and hypovolemic shock 2/2 GI bleed. New fever 3/21, s/p NM PET/CT c/w prostatitis, on CTX per ID, BK virus negative. Pending repeat cath and ICD placement once infection cleared. Also w/ intermittent melena on 3/23, GI and surg following but no intervention planned    #ESRD on HD TTS  HD per schedule today  Daily BMP   K noted 4.4  Continue with Tacro at home dose. Recommend pt follow up with outpatient nephrologist regarding tacro    HTN:  UF with HD as tolerated   Amlodipine dc'd 2/2 hypotensive episodes  Continue losartan 100mg, metoprolol 25mg ER, hydralazine 50mg TID and Isordil 20mg TID. Hold meds for SBP<110    Anemia:  Hgb within goal  epo w/ HD  Defer iron replacement i/s/o active infection, so will not check iron profile at the moment  Transfusion per primary    MBD:  Calcium corrected wnl  iPTH 431 (3/19)  On Hectorol 4mcg TIW as outpatient. Will continue  Check phos twice weekly. Goal <5.5. Can replete if needed to keep>3 62Y M w/ HTN, DM, ESRD s/p failed kidney transplant who was transferred to [facility] (3/17) from [place] for ICD placement and Cardiac Cath after prolonged admission c/b cardiac arrest and vtach arrest and hypovolemic shock 2/2 GI bleed. New fever 3/21, s/p NM PET/CT c/w prostatitis, on CTX per ID, BK virus negative. Pending repeat cath and ICD placement once infection cleared. Also w/ intermittent melena on 3/23, GI and surg following but no intervention planned    #ESRD on HD TTS  HD per schedule today  Daily BMP   K noted 4.4  Continue with Tacro at home dose. Recommend pt follow up with outpatient nephrologist regarding tacro    HTN:  UF with HD as tolerated   Amlodipine dc'd 2/2 hypotensive episodes  Continue losartan 100mg, metoprolol 25mg ER, hydralazine 50mg TID and Isordil 20mg TID. Hold meds for SBP<110    Anemia:  Hgb within goal  epo w/ HD  Defer iron replacement i/s/o active infection, so will not check iron profile at the moment  Transfusion per primary    MBD:  Calcium corrected wnl  iPTH 431 (3/19)  On Hectorol 4mcg TIW as outpatient. Will continue  Check phos twice weekly. Goal <5.5. Can replete if needed to keep>3 62Y M w/ HTN, DM, ESRD s/p failed kidney transplant who was transferred to [facility] (3/17) from [place] for ICD placement and Cardiac Cath after prolonged admission c/b cardiac arrest and vtach arrest and hypovolemic shock 2/2 GI bleed. New fever 3/21, s/p NM PET/CT c/w prostatitis, on CTX per ID, BK virus negative. Pending repeat cath and ICD placement once infection cleared. Also w/ intermittent melena on 3/23, GI and surg following but no intervention planned    #ESRD on HD TTS  HD per schedule today  Daily BMP   K noted 4.4  Continue with Tacro at home dose. Recommend pt follow up with outpatient nephrologist regarding tacro    HTN:  UF with HD as tolerated   Amlodipine dc'd 2/2 hypotensive episodes  Continue losartan 100mg, metoprolol 25mg ER, hydralazine 50mg TID and Isordil 20mg TID. Hold meds for SBP<110    Anemia:  Hgb within goal  epo w/ HD  Defer iron replacement i/s/o active infection, so will not check iron profile at the moment  Transfusion per primary    MBD:  Calcium corrected wnl  iPTH 431 (3/19)  On Hectorol 4mcg TIW as outpatient. Will continue  Check phos twice weekly. Goal <5.5. Can replete if needed to keep>3

## 2023-04-06 NOTE — PROGRESS NOTE ADULT - PROBLEM SELECTOR PLAN 1
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 Pyelitis treated with ceftriaxone 2 g IV q24h (3/21-4/4) per ID recs. Patient also had diarrhea with antibiotics. C.diff and GI PCR negative.   - Unclear source of fever. Abd U/S with no acute pathology, CXR negative, Bcx NGTD.  - PET scan showing increased uptake in prostate suggesting prostatitis vs. neoplasm  - Overnight 4/5, patient with new Tmax to 104.6 F. WBC 10.82, CRP 54.3, procal 0.97.  - F/u repeat Bcx  - F/u UA + urine culture   - ID following - appreciate recs   - Will treat broadly with Meropenem 500 mg q24h per ID recs, as patient is immunocompromised   - F/u Galium scan (ordered) Patient found to have prolonged fever on admission, and found to have cystitis and pyelitis of transplanted kidney due to K.pneumo w/o pyelonephritis or abscess. Pyelitis treated with ceftriaxone 2 g IV q24h (3/21-4/4) per ID recs. Patient also had diarrhea with antibiotics. C.diff and GI PCR negative. Unclear source of fever. Overnight 4/5, patient with new Tmax to 104.6 F. WBC 10.82, CRP 54.3, procal 0.97.  - Abd U/S with no acute pathology, CXR negative, Bcx NGTD.  - PET scan showing increased uptake in prostate suggesting prostatitis vs. neoplasm    Plan:  - F/u repeat Bcx from 04/05  - F/u UA + urine culture   - ID following - appreciate recs   - c/w Meropenem 500 mg q24h per ID recs, as patient is immunocompromised   - F/u Galium scan

## 2023-04-06 NOTE — PROGRESS NOTE ADULT - PROBLEM SELECTOR PLAN 2
- LHC at [place] w/ oLM 30% and RCA 99% that is not amenable to intervention.    - TTE 3/1/23 @ [facility]:  mild LVH, LVEF 25-30%, severe global wall motion dysfxn, mild LA dilation, mild RV dilation, mildly calcified leaflets, mod pHTN  - ECHO 4/3: EF 40% w/regional wall motion abnormality. Entire inferior wall & basal & mid inferolateral wall akinetic. Basal& mid anterolateral wall & apical lateral segments hypokinetic. Moderately dilated LA. Mild AR.   - c/w ASA 81mg and Plavix 75mg daily (cleared by GI)   - plan for possible LHC 4/6 assuming afebrile x 24H - LHC at [place] w/ oLM 30% and RCA 99% that is not amenable to intervention.    - TTE 3/1/23 @ [facility]:  mild LVH, LVEF 25-30%, severe global wall motion dysfxn, mild LA dilation, mild RV dilation, mildly calcified leaflets, mod pHTN  - ECHO 4/3: EF 40% w/regional wall motion abnormality. Entire inferior wall & basal & mid inferolateral wall akinetic. Basal& mid anterolateral wall & apical lateral segments hypokinetic. Moderately dilated LA. Mild AR.   - c/w ASA 81mg and Plavix 75mg daily (cleared by GI)   - plan for possible LHC 4/6 assuming afebrile x 24H - LHC at [place] w/ oLM 30% and RCA 99% that is not amenable to intervention.    - TTE 3/1/23 @ [facility]:  mild LVH, LVEF 25-30%, severe global wall motion dysfxn, mild LA dilation, mild RV dilation, mildly calcified leaflets, mod pHTN  - ECHO 4/3: EF 40% w/regional wall motion abnormality. Entire inferior wall & basal & mid inferolateral wall akinetic. Basal& mid anterolateral wall & apical lateral segments hypokinetic. Moderately dilated LA. Mild AR.   - c/w ASA 81mg and Plavix 75mg daily (cleared by GI)   - plan for possible LHC 4/6 assuming afebrile x 24H - LHC at [place] w/ oLM 30% and RCA 99% that is not amenable to intervention.    - TTE 3/1/23 @ [facility]:  mild LVH, LVEF 25-30%, severe global wall motion dysfxn, mild LA dilation, mild RV dilation, mildly calcified leaflets, mod pHTN  - ECHO 4/3: EF 40% w/regional wall motion abnormality. Entire inferior wall & basal & mid inferolateral wall akinetic. Basal& mid anterolateral wall & apical lateral segments hypokinetic. Moderately dilated LA. Mild AR.   - c/w ASA 81mg and Plavix 75mg daily (cleared by GI)   - Initial plan for cardiac cath now deferred indefinitely 2/2 persistent fevers - LHC at [place] w/ oLM 30% and RCA 99% that is not amenable to intervention.    - TTE 3/1/23 @ [facility]:  mild LVH, LVEF 25-30%, severe global wall motion dysfxn, mild LA dilation, mild RV dilation, mildly calcified leaflets, mod pHTN  - ECHO 4/3: EF 40% w/regional wall motion abnormality. Entire inferior wall & basal & mid inferolateral wall akinetic. Basal& mid anterolateral wall & apical lateral segments hypokinetic. Moderately dilated LA. Mild AR.   - c/w ASA 81mg and Plavix 75mg daily (cleared by GI)   - Initial plan for cardiac cath now deferred indefinitely 2/2 persistent fevers - LHC at [place] w/ oLM 30% and RCA 99% that is not amenable to intervention.    - TTE 3/1/23 @ [facility]:  mild LVH, LVEF 25-30%, severe global wall motion dysfxn, mild LA dilation, mild RV dilation, mildly calcified leaflets, mod pHTN  - ECHO 4/3: EF 40% w/regional wall motion abnormality. Entire inferior wall & basal & mid inferolateral wall akinetic. Basal& mid anterolateral wall & apical lateral segments hypokinetic. Moderately dilated LA. Mild AR.   - c/w ASA 81mg and Plavix 75mg daily (cleared by GI)   - Initial plan for cardiac cath now deferred indefinitely 2/2 persistent fevers - LHC at [place] w/ oLM 30% and RCA 99% that is not amenable to intervention.    - TTE 3/1/23 @ [facility]:  mild LVH, LVEF 25-30%, severe global wall motion dysfxn, mild LA dilation, mild RV dilation, mildly calcified leaflets, mod pHTN  - ECHO 4/3: EF 40% w/regional wall motion abnormality. Entire inferior wall & basal & mid inferolateral wall akinetic. Basal& mid anterolateral wall & apical lateral segments hypokinetic. Moderately dilated LA. Mild AR.   - c/w ASA 81mg and Plavix 75mg daily (cleared by GI) -will likely require DAPT x 1year for medical management per Dr Culver  - Initial plan for cardiac cath now deferred indefinitely 2/2 persistent fevers - LHC at [place] w/ oLM 30% and RCA 99% that is not amenable to intervention.    - TTE 3/1/23 @ [facility]:  mild LVH, LVEF 25-30%, severe global wall motion dysfxn, mild LA dilation, mild RV dilation, mildly calcified leaflets, mod pHTN  - ECHO 4/3: EF 40% w/regional wall motion abnormality. Entire inferior wall & basal & mid inferolateral wall akinetic. Basal& mid anterolateral wall & apical lateral segments hypokinetic. Moderately dilated LA. Mild AR.   - c/w ASA 81mg and Plavix 75mg daily (cleared by GI) -will likely require DAPT x 1year for medical management per Dr Culver  - Initial plan for cardiac cath now deferred indefinitely 2/2 persistent fevers - LHC at [place] w/ oLM 30% and RCA 99% that is not amenable to intervention.    - TTE 3/1/23 @ [facility]:  mild LVH, LVEF 25-30%, severe global wall motion dysfxn, mild LA dilation, mild RV dilation, mildly calcified leaflets, mod pHTN  - ECHO 4/3: EF 40% w/regional wall motion abnormality. Entire inferior wall & basal & mid inferolateral wall akinetic. Basal& mid anterolateral wall & apical lateral segments hypokinetic. Moderately dilated LA. Mild AR.   - c/w ASA 81mg and Plavix 75mg daily (cleared by GI) -will likely require DAPT x 1year for medical management per Dr Culver  - Initial plan for cardiac cath now deferred indefinitely 2/2 persistent fevers

## 2023-04-06 NOTE — PROGRESS NOTE ADULT - ATTENDING COMMENTS
I have personally seen and examined the patient in addition to discussed in detail with the housestaff. I agree with the contents of the housestaff's note with the following additions:     62 year old male with extensive PMHx including ischemic cardiomyopathy (EF 25% with recovered EF of 40% this admission) s/p LHC showing 99% RCA occlusion without intervention, HLD, HTN, CM, ESRD s/p failed kidney transplant on HD T/Th/Sat via LUE AVF admitted to [facility] for acute hypoxemic respiratory failure. Course complicated by vtach arrest and cardiac shock s/p extubation and pressors. Course further complicated by acute blood loss anemia s/p multiple transfusions. Subsequently transferred to [facility] for ICD placement, but continued to have persistent acute blood loss anemia. Underwent flex sig with no active bleed. Developed persistent fevers of unknown source with Tmax 104.6 4/5. Extensive work up showed pyelitis; urology, ID, GI, nephrology, cardiology all following with recs appreciated. Plan for LHC and possible ICD placement deferred by cardiology due to persistent fevers. Remains on meropenem; septic work up pending including gallium scan.     VSS, well appearing   Physical exam benign   Labs show increased WBC to 10.82 from 5.32, Na 126, Ca 8.2, Mg 1.5, BUN/Cr 4/4.44  Micro blood cx pending  Imaging: CXR s/p febrile episode 4/5 no new changes. TTE 4/3: EF 40% regional wall motion abnormality, akinetic inferior/basal walls.    A/P:  1. Fever: origin unknown. ID following, recs appreciated. Continue broad spectrum abx with meropenem. F/u septic work up. Judicious use of IVF in setting of ICM. Gallium scan  2. Ischemic cardiomyopathy: cardio recs appreciated. Continue cardiac meds. LifeVest fitting. LHC and ICD placement once cleared from ID perspective. Patient should ideally be on DAPT; defer to cardio/GI for resumption/duration. TTE 4/3 noticed. LHC at [place] showed 99% occlusion to RCA and OM 30% occlusion.   3. Vtach arrest: LifeVest fitting as above. EP consulted; plans for ICD placement once cleared by ID.  4. Acute blood loss anemia: resolved. S/p flex sig with no active bleed. Per GI fine to resume DAPT. Active T&S. Transfuse if Hgb <8. PO PPI.  5. ESRD s/p failed transplant: continue tacrolimus, monitor levels. F/u nephro recs. Continue HD as scheduled  6. Chronic medical conditions: continue home meds. I have personally seen and examined the patient in addition to discussed in detail with the housestaff. I agree with the contents of the housestaff's note with the following additions:     62 year old male with extensive PMHx including ischemic cardiomyopathy (EF 25% with recovered EF of 40% this admission) s/p LHC showing 99% RCA occlusion without intervention, HLD, HTN, CM, ESRD s/p failed kidney transplant on HD T/Th/Sat via LUE AVF admitted to [facility] for acute hypoxemic respiratory failure. Course complicated by vtach arrest and cardiac shock s/p extubation and pressors. Course further complicated by acute blood loss anemia s/p multiple transfusions. Subsequently transferred to [facility] for ICD placement, but continued to have persistent acute blood loss anemia. Underwent flex sig with no active bleed. Developed persistent fevers of unknown source with Tmax 104.6 4/5. Extensive work up showed pyelitis; urology, ID, GI, nephrology, cardiology all following with recs appreciated. Plan for LHC and possible ICD placement deferred by cardiology due to persistent fevers. Remains on meropenem; septic work up pending including gallium scan.     VSS, well appearing   Physical exam benign   Labs show increased WBC to 10.82 from 5.32, Na 126, Ca 8.2, Mg 1.5, BUN/Cr 4/4.44  Micro blood cx pending  Imaging: CXR s/p febrile episode 4/5 no new changes. TTE 4/3: EF 40% regional wall motion abnormality, akinetic inferior/basal walls.    A/P:  1. Fever: origin unknown. ID following, recs appreciated. Continue broad spectrum abx with meropenem. F/u septic work up. Judicious use of IVF in setting of ICM. Gallium scan  2. Ischemic cardiomyopathy: cardio recs appreciated. Continue cardiac meds. LifeVest fitting. LHC and ICD placement once cleared from ID perspective. Patient should ideally be on DAPT; defer to cardio/GI for resumption/duration. TTE 4/3 noticed. LHC at [place] showed 99% occlusion to RCA and OM 30% occlusion.   3. Vtach arrest: LifeVest fitting as above. EP consulted; plans for ICD placement once cleared by ID.  4. Acute blood loss anemia: resolved. S/p flex sig with no active bleed. Per GI fine to resume DAPT. Active T&S. Transfuse if Hgb <8. PO PPI.  5. ESRD s/p failed transplant: continue tacrolimus, monitor levels. F/u nephro recs. Continue HD as scheduled  6. Chronic medical conditions: continue home meds. I have personally seen and examined the patient in addition to discussed in detail with the housestaff. I agree with the contents of the housestaff's note with the following additions:     62 year old male with extensive PMHx including ischemic cardiomyopathy (EF 25% with recovered EF of 40% this admission) s/p LHC showing 99% RCA occlusion without intervention, HLD, HTN, CM, ESRD s/p failed kidney transplant on HD T/Th/Sat via LUE AVF admitted to [facility] for acute hypoxemic respiratory failure. Course complicated by vtach arrest and cardiac shock s/p extubation and pressors. Course further complicated by acute blood loss anemia s/p multiple transfusions. Subsequently transferred to [facility] for ICD placement, but continued to have persistent acute blood loss anemia. Underwent flex sig with no active bleed. Developed persistent fevers of unknown source with Tmax 104.6 4/5. Extensive work up showed pyelitis; urology, ID, GI, nephrology, cardiology all following with recs appreciated. Plan for LHC and possible ICD placement deferred by cardiology due to persistent fevers. Remains on meropenem; septic work up pending including gallium scan.     VSS, well appearing   Physical exam benign   Labs show increased WBC to 10.82 from 5.32, Na 126, Ca 8.2, Mg 1.5, BUN/Cr 4/4.44  Micro blood cx pending  Imaging: CXR s/p febrile episode 4/5 no new changes. TTE 4/3: EF 40% regional wall motion abnormality, akinetic inferior/basal walls.    A/P:  1. Fever: origin unknown. ID following, recs appreciated. Continue broad spectrum abx with meropenem. F/u septic work up. Judicious use of IVF in setting of ICM. Gallium scan  2. Ischemic cardiomyopathy: cardio recs appreciated. Continue cardiac meds. LifeVest fitting. LHC and ICD placement once cleared from ID perspective. Patient should ideally be on DAPT; defer to cardio/GI for resumption/duration. TTE 4/3 noticed. LHC at [place] showed 99% occlusion to RCA and OM 30% occlusion.   3. Vtach arrest: LifeVest fitting as above. EP consulted; plans for ICD placement once cleared by ID.  4. Acute blood loss anemia: resolved. S/p flex sig with no active bleed. Per GI fine to resume DAPT. Active T&S. Transfuse if Hgb <8. PO PPI.  5. ESRD s/p failed transplant: continue tacrolimus, monitor levels. F/u nephro recs. Continue HD as scheduled  6. Chronic medical conditions: continue home meds.

## 2023-04-06 NOTE — PROGRESS NOTE ADULT - ASSESSMENT
63 y/o M, PMHx HTN, HLD, DM, ICM (s/p cath years ago, RCA 99%, never intervened on), HFrEF 25-30%, ESRD s/p failed kidney transplant (LUE AVF; HD TTS), R AKA initially admitted to [facility] 2/27 for Respiratory Distress 2/2 SIRS s/p HD session whose course c/b cardiac arrest and Vtach. Now s/p extubation/pressors whose course further c/b LGIB (Hgb 3.5 s/p multiple transfusions and no active bleed). Transferred to [facility] 3/17 for ICD eval, however, found to have BRBPR on admission s/p flex sig without active bleed. Course further c/b ongoing fevers (Tmax 102, last 100.4 on 4/4) with w/u remarkable for pyelitis s/p 2 wks course IV abx on 4/4. Plan [facility] 4/6 assuming remains afebrile with plan for SubQ ICD pending [facility] results. PT reese- MARIE.   63 y/o M, PMHx HTN, HLD, DM, ICM (s/p cath years ago, RCA 99%, never intervened on), HFrEF 25-30%, ESRD s/p failed kidney transplant (LUE AVF; HD TTS), R AKA initially admitted to [facility] 2/27 for Respiratory Distress 2/2 SIRS s/p HD session whose course c/b cardiac arrest and Vtach. Now s/p extubation/pressors whose course further c/b LGIB (Hgb 3.5 s/p multiple transfusions and no active bleed). Transferred to [facility] 3/17 for ICD eval, however, found to have BRBPR on admission s/p flex sig without active bleed. Course further c/b ongoing fevers (Tmax 102, last 100.4 on 4/4) with w/u remarkable for pyelitis s/p 2 wks course IV abx on 4/4. Plan [facility] 4/6 assuming remains afebrile with plan for SubQ ICD pending [facility] results. PT reese- MARIE.   61 y/o M, PMHx HTN, HLD, DM, ICM (s/p cath years ago, RCA 99%, never intervened on), HFrEF 25-30%, ESRD s/p failed kidney transplant (LUE AVF; HD TTS), R AKA initially admitted to [facility] 2/27 for Respiratory Distress 2/2 SIRS s/p HD session whose course c/b cardiac arrest and Vtach. Now s/p extubation/pressors whose course further c/b LGIB (Hgb 3.5 s/p multiple transfusions and no active bleed). Transferred to [facility] 3/17 for ICD eval, however, found to have BRBPR on admission s/p flex sig without active bleed. Course further c/b ongoing fevers (Tmax 102, last 100.4 on 4/4) with w/u remarkable for pyelitis s/p 2 wks course IV abx on 4/4. Plan [facility] 4/6 assuming remains afebrile with plan for SubQ ICD pending [facility] results. PT reese- MARIE.   61 y/o M, PMHx HTN, HLD, DM, ICM (s/p cath years ago, RCA 99%, never intervened on), HFrEF 25-30%, ESRD s/p failed kidney transplant (LUE AVF; HD TTS), R AKA initially admitted to [facility] 2/27 for Respiratory Distress 2/2 SIRS s/p HD session whose course c/b cardiac arrest and Vtach. Now s/p extubation/pressors whose course further c/b LGIB (Hgb 3.5 s/p multiple transfusions and no active bleed). Transferred to [facility] 3/17 for ICD eval, however, found to have BRBPR on admission s/p flex sig without active bleed. Course further c/b ongoing fevers of unclear etiology, w/u remarkable for pyelitis s/p 2 wks course IV abx on 4/4, now w/ recurrent fever 104.6 on 4/5. Initial plan for LHC and SubQ ICD deferred 2/2 persistent fevers. Now awaiting Lifevest. ID following, pending gallium scan. Pt to be transferred to regional medicine service for ongoing fever w/u. 61 y/o M, PMHx HTN, HLD, DM, ICM (s/p cath years ago, RCA 99%, never intervened on), HFrEF 25-30%, ESRD s/p failed kidney transplant (LUE AVF; HD TTS), R AKA initially admitted to [facility] 2/27 for Respiratory Distress 2/2 SIRS s/p HD session whose course c/b cardiac arrest and Vtach. Now s/p extubation/pressors whose course further c/b LGIB (Hgb 3.5 s/p multiple transfusions and no active bleed). Transferred to [facility] 3/17 for ICD eval, however, found to have BRBPR on admission s/p flex sig without active bleed. Course further c/b ongoing fevers of unclear etiology, w/u remarkable for pyelitis s/p 2 wks course IV abx on 4/4, now w/ recurrent fever 104.6 on 4/5. Initial plan for LHC and SubQ ICD deferred 2/2 persistent fevers. Now awaiting Lifevest. ID following, pending gallium scan. Pt to be transferred to regional medicine service for ongoing fever w/u. 63 y/o M, PMHx HTN, HLD, DM, ICM (s/p cath years ago, RCA 99%, never intervened on), HFrEF 25-30%, ESRD s/p failed kidney transplant (LUE AVF; HD TTS), R AKA initially admitted to [facility] 2/27 for Respiratory Distress 2/2 SIRS s/p HD session whose course c/b cardiac arrest and Vtach. Now s/p extubation/pressors whose course further c/b LGIB (Hgb 3.5 s/p multiple transfusions and no active bleed). Transferred to [facility] 3/17 for ICD eval, however, found to have BRBPR on admission s/p flex sig without active bleed. Course further c/b ongoing fevers of unclear etiology, w/u remarkable for pyelitis s/p 2 wks course IV abx on 4/4, now w/ recurrent fever 104.6 on 4/5. Initial plan for LHC and SubQ ICD deferred 2/2 persistent fevers. Now awaiting Lifevest. ID following, pending gallium scan. Pt to be transferred to regional medicine service for ongoing fever w/u.

## 2023-04-06 NOTE — PROGRESS NOTE ADULT - PROBLEM SELECTOR PLAN 10
- Remains euvolemic on exam  - c/w Hydral 50mg TID, Losartan 100mg daily, Toprol XL 25mg daily and Isordil 20mg TID.

## 2023-04-06 NOTE — PROGRESS NOTE ADULT - ASSESSMENT
62M h/o ICM, HFrEF, ESRD s/p failed RT (HD via LUE AVF), R AKA, DM transferred to [facility] from [facility] on 3/17 for ICD placement with prolonged fever, found to have cystitis and pyelitis of transplanted kidney due to K.pneumo w/o pyelonephritis or abscess. Pyelitis treated with ceftriaxone 2 g IV q24h (3/21-4/4). Patient has had diarrhea with antibiotic. C.diff and GI PCR negative. Per nurse, diarrhea improving with loperamide prn. Patient with fever tmax 104.6 last night with WBC 10.82, CRP 54.3, Procal 0.97. He was given CTX x1 last night. Blood cultures and UA + Rflx Cx ordered. Source of infection unknown at this time- will treat broadly     Suggest:  -Please order UA and UCx separately as patient is renal transplant recipient   -F/U Blood cultures 4/5   -Please order whole body gallium scan   -Start meropenem 500 mg IV Q24h       Team 2 will follow you.    Case d/w primary team. Final recommendations pending attending note.      Jaja Baig, Infectious Diseases PA 62M h/o ICM, HFrEF, ESRD s/p failed RT (HD via LUE AVF), R AKA, DM transferred to [facility] from [facility] on 3/17 for ICD placement with prolonged fever, found to have cystitis and pyelitis of transplanted kidney due to K.pneumo w/o pyelonephritis or abscess. Pyelitis treated with ceftriaxone 2 g IV q24h (3/21-4/4). Patient has had diarrhea with antibiotic. C.diff and GI PCR negative. Per nurse, diarrhea improving with loperamide prn. Patient with fever tmax 104.6 last night with WBC 10.82, CRP 54.3, Procal 0.97. He was given CTX x1 last night. Blood cultures and UA + Rflx Cx ordered. Source of infection unknown at this time- will treat broadly     Suggest:  -Please order UA and UCx separately as patient is renal transplant recipient   -F/U Blood cultures 4/5   -Please order whole body gallium scan   -Start meropenem 500 mg IV Q24h       Team 2 will follow you.    Case d/w primary team. Final recommendations pending attending note.      Jaja Baig, Infectious Diseases PA 62M h/o ICM, HFrEF, ESRD s/p failed RT (HD via LUE AVF), R AKA, DM transferred to [facility] from [facility] on 3/17 for ICD placement with prolonged fever, found to have cystitis and pyelitis of transplanted kidney due to K.pneumo w/o pyelonephritis or abscess. Pyelitis treated with ceftriaxone 2 g IV q24h (3/21-4/4). Patient has had diarrhea with antibiotic. C.diff and GI PCR negative. Per nurse, diarrhea improving with loperamide prn. Patient with fever tmax 104.6 last night with WBC 10.82, CRP 54.3, Procal 0.97. He was given CTX x1 last night. Blood cultures and UA + Rflx Cx ordered. Source of infection unknown at this time- will treat broadly     Suggest:  -Please order UA and UCx separately as patient is renal transplant recipient   -F/U Blood cultures 4/5   -Please order whole body gallium scan   -Start meropenem 500 mg IV Q24h       Team 2 will follow you.    Case d/w primary team. Final recommendations pending attending note.      Jaja Baig, Infectious Diseases PA

## 2023-04-06 NOTE — PROGRESS NOTE ADULT - PROBLEM SELECTOR PLAN 4
@ [facility], severe GIB w/ Hgb down to 3.5 received total of 7u PRBCs.    - 3/17 AM pt passed loose BM along w/ large amount of BRBPR.    - s/p Flex sigmoidoscopy – Localized ulceration and erosion w/ no bleeding in rectum, suggestive of solitary ulcer syndrome; evidence of prior hemoclip was found in sigmoid colon.    - Avoid anal digitation and enemas  - Fecal occult negative 3/24 and cleared by GI for DAPT  - Transfusion goal Hgb < 8.0 @ [facility], severe GIB w/ Hgb down to 3.5 received total of 7u PRBCs.    - 3/17 AM pt passed loose BM along w/ large amount of BRBPR.    - s/p Flex sigmoidoscopy – Localized ulceration and erosion w/ no bleeding in rectum, suggestive of solitary ulcer syndrome; evidence of prior hemoclip was found in sigmoid colon.    - Avoid anal digitation and enemas  - Fecal occult negative 3/24 and cleared by GI for DAPT  - Transfusion goal Hgb < 8.0 @ [facility], severe GIB w/ Hgb down to 3.5 received total of 7u PRBCs.    - 3/17 AM pt passed loose BM along w/ large amount of BRBPR.    - s/p Flex sigmoidoscopy – Localized ulceration and erosion w/ no bleeding in rectum, suggestive of solitary ulcer syndrome; evidence of prior hemoclip was found in sigmoid colon.    - Avoid anal digitation and enemas  - Fecal occult negative 3/24 and cleared by GI for DAPT  - Transfusion goal Hgb < 8.0

## 2023-04-06 NOTE — PROGRESS NOTE ADULT - ASSESSMENT
Patient is a 62M PMHx HTN, HLD, DM, CAD (s/p cath years ago, RCA 99%, never intervened on), HFrEF 25-30%, ESRD s/p failed kidney transplant (last HD 3/1 via Left Arm AVF; HD TTS), Right AKA, initially presented to [facility] on 2/27 for respiratory distress after dialysis, found to be septic, complicated by AHRF requiring intubation for 24hr followed by VT arrest. Course also complicated by massive LGIB (rectal ulcer) requiring 7u pRBC, 1u FFP and 1u PLT. Transferred to [facility] for ICD placement 2/2 Vtach and cardiac cath. Medicine consulted for GIB, fever of unknown origin, and comanagement.  Patient now transferred to medicine for persistent fever of unknown origin.                  Patient is a 62M PMHx HTN, HLD, DM, CAD (s/p cath years ago, RCA 99%, never intervened on), HFrEF 25-30%, ESRD s/p failed kidney transplant (last HD 3/1 via Left Arm AVF; HD TTS), Right AKA, initially presented to [facility] on 2/27 for respiratory distress after dialysis, found to be septic, complicated by AHRF requiring intubation for 24hr followed by VT arrest. Course also complicated by massive LGIB (rectal ulcer) requiring 7u pRBC, 1u FFP and 1u PLT. Transferred to [facility] for ICD placement 2/2 Vtach and cardiac cath. Medicine consulted for GIB, fever of unknown origin, and comanagement.  Patient now transferred to medicine for persistent fever of unknown origin.                  Patient is a 62M PMHx HTN, HLD, DM, CAD (s/p cath years ago, RCA 99%, never intervened on), HFrEF 25-30%, ESRD s/p failed kidney transplant (last HD 3/1 via Left Arm AVF; HD TTS), Right AKA, initially presented to [facility] on 2/27 for respiratory distress after dialysis, found to be septic, complicated by AHRF requiring intubation for 24hr followed by VT arrest. Course also complicated by massive LGIB (rectal ulcer) requiring 7u pRBC, 1u FFP and 1u PLT. Transferred to [facility] for ICD placement 2/2 Vtach and cardiac cath. Medicine consulted for GIB, fever of unknown origin, and comanagement.  Patient now transferred to medicine for persistent fever of unknown origin.

## 2023-04-06 NOTE — PROGRESS NOTE ADULT - ATTENDING COMMENTS
63yo M PMHx HTN, HLD, DM, CAD(s/p cath years ago, RCA 99%, never intervened on), HFrEF 25-30%, ESRD s/p failed kidney transplant ( done at [place] no longer following with transplant program ) and required HD x 3yrs (Left Arm AVF; HD TTS), Right AKA initially presented to [facility] 2/27 for respiratory distress after a dialysis, found to be septic, h/c complicated by AHRF requiring intubation for 24hr followed by VT arrest, h/c the complicated by massive LGIB (rectal ulcer seen on colonoscopy) requiring 7u pRBC, 1u FFP and 1u PLT. Transferred to [facility] (3/17) for ICD placement 2/2 Vtach and cardiac cath. Pt noted loose stool with BRBPR, GI and surgery consulted. Flex Sigc ( 3/17) found prior hemoclip in the sigmoid colon, localized ulceration and erosive with no bleeding noted in rectum suggestive of solitary ulcer syndrome. Medicine consulted for comanagement- work up for Fever of unknown origin ( initially plan for gallium scan=no gallium then-got PET/CT-( no lymphadenopathy to suggest lymphoproliferative disorder which can occur in solid organ transplant recepient-   on prostate and femur, subsequent CT abdomen/pelvis shows Pyelitis of right tranplant kidney =initially BS antibiotics, changed to Ceftriazone 2 gram daily  ( 3/21-4/4 ) for Pyelitis (transplant kidney-right)  as per ID -fever to 104 after antibiotics off , ID rec to restart Meropenem 4/6- urine cx collected 4/6- to follow up, plan for Gallium scan ( to inject today )- left LE duplex negative for DVT=  ( immunocompromised with tacrolimus on board for post renal transplant, ESRD now on HD TTS by renal, cardiology determine stable, no plan for ACID given PUO. pt being transferred to medicine for PUO work up    encounter today with ID, he is lethargic post HD, able to answer questions , hemodynamically stable, able to state when he need to use urinal, no specific complaint, RRR, good air entry bilaterally, abdomen exam -soft, nt, nd, right AKA, left leg-no tenderness, Left AVF with good thrill.     pt accepted to medicine, hospitalist on the team to follow patient, dw cardiology and ID. 61yo M PMHx HTN, HLD, DM, CAD(s/p cath years ago, RCA 99%, never intervened on), HFrEF 25-30%, ESRD s/p failed kidney transplant ( done at [place] no longer following with transplant program ) and required HD x 3yrs (Left Arm AVF; HD TTS), Right AKA initially presented to [facility] 2/27 for respiratory distress after a dialysis, found to be septic, h/c complicated by AHRF requiring intubation for 24hr followed by VT arrest, h/c the complicated by massive LGIB (rectal ulcer seen on colonoscopy) requiring 7u pRBC, 1u FFP and 1u PLT. Transferred to [facility] (3/17) for ICD placement 2/2 Vtach and cardiac cath. Pt noted loose stool with BRBPR, GI and surgery consulted. Flex Sigc ( 3/17) found prior hemoclip in the sigmoid colon, localized ulceration and erosive with no bleeding noted in rectum suggestive of solitary ulcer syndrome. Medicine consulted for comanagement- work up for Fever of unknown origin ( initially plan for gallium scan=no gallium then-got PET/CT-( no lymphadenopathy to suggest lymphoproliferative disorder which can occur in solid organ transplant recepient-   on prostate and femur, subsequent CT abdomen/pelvis shows Pyelitis of right tranplant kidney =initially BS antibiotics, changed to Ceftriazone 2 gram daily  ( 3/21-4/4 ) for Pyelitis (transplant kidney-right)  as per ID -fever to 104 after antibiotics off , ID rec to restart Meropenem 4/6- urine cx collected 4/6- to follow up, plan for Gallium scan ( to inject today )- left LE duplex negative for DVT=  ( immunocompromised with tacrolimus on board for post renal transplant, ESRD now on HD TTS by renal, cardiology determine stable, no plan for ACID given PUO. pt being transferred to medicine for PUO work up    encounter today with ID, he is lethargic post HD, able to answer questions , hemodynamically stable, able to state when he need to use urinal, no specific complaint, RRR, good air entry bilaterally, abdomen exam -soft, nt, nd, right AKA, left leg-no tenderness, Left AVF with good thrill.     pt accepted to medicine, hospitalist on the team to follow patient, dw cardiology and ID. 63yo M PMHx HTN, HLD, DM, CAD(s/p cath years ago, RCA 99%, never intervened on), HFrEF 25-30%, ESRD s/p failed kidney transplant ( done at [place] no longer following with transplant program ) and required HD x 3yrs (Left Arm AVF; HD TTS), Right AKA initially presented to [facility] 2/27 for respiratory distress after a dialysis, found to be septic, h/c complicated by AHRF requiring intubation for 24hr followed by VT arrest, h/c the complicated by massive LGIB (rectal ulcer seen on colonoscopy) requiring 7u pRBC, 1u FFP and 1u PLT. Transferred to [facility] (3/17) for ICD placement 2/2 Vtach and cardiac cath. Pt noted loose stool with BRBPR, GI and surgery consulted. Flex Sigc ( 3/17) found prior hemoclip in the sigmoid colon, localized ulceration and erosive with no bleeding noted in rectum suggestive of solitary ulcer syndrome. Medicine consulted for comanagement- work up for Fever of unknown origin ( initially plan for gallium scan=no gallium then-got PET/CT-( no lymphadenopathy to suggest lymphoproliferative disorder which can occur in solid organ transplant recepient-   on prostate and femur, subsequent CT abdomen/pelvis shows Pyelitis of right tranplant kidney =initially BS antibiotics, changed to Ceftriazone 2 gram daily  ( 3/21-4/4 ) for Pyelitis (transplant kidney-right)  as per ID -fever to 104 after antibiotics off , ID rec to restart Meropenem 4/6- urine cx collected 4/6- to follow up, plan for Gallium scan ( to inject today )- left LE duplex negative for DVT=  ( immunocompromised with tacrolimus on board for post renal transplant, ESRD now on HD TTS by renal, cardiology determine stable, no plan for ACID given PUO. pt being transferred to medicine for PUO work up    encounter today with ID, he is lethargic post HD, able to answer questions , hemodynamically stable, able to state when he need to use urinal, no specific complaint, RRR, good air entry bilaterally, abdomen exam -soft, nt, nd, right AKA, left leg-no tenderness, Left AVF with good thrill.     pt accepted to medicine, hospitalist on the team to follow patient, dw cardiology and ID.

## 2023-04-06 NOTE — PROGRESS NOTE ADULT - PROBLEM SELECTOR PLAN 7
On dialysis Tues, Thurs, Sat  - f/u nephro recs  - c/w calcium citrate 667mg TID  - C/w Tacrolimus for kidney transplant     #Hyponatremia:   - Obtain urine osm, urine Na, serum osm  - F/u renal recs On dialysis [facility], Sat. Nephro following    Plan:  - f/u nephro recs  - c/w calcium citrate 667mg TID  - C/w Tacrolimus for kidney transplant     #Hyponatremia:   - Obtain urine osm, urine Na, serum osm  - F/u renal recs

## 2023-04-06 NOTE — PROGRESS NOTE ADULT - ASSESSMENT
62M PMHx HTN, HLD, DM, CAD (s/p cath years ago, RCA 99%, never intervened on), HFrEF 25-30%, ESRD s/p failed kidney transplant (last HD 3/1 via Left Arm AVF; HD TTS), Right AKA initially presented to [facility] 2/27 for respiratory distress after a dialysis, found to be septic, h/c complicated by AHRF requiring intubation for 24hr followed by VT arrest, h/c the complicated by massive LGIB (rectal ulcer) requiring 7u pRBC, 1u FFP and 1u PLT. Transferred to [facility] for ICD placement 2/2 Vtach and cardiac cath. Medicine consulted for GIB, fever of unknown origin, and comanagement.   Patient now transferred to medicine for persistent fever of unknown origin.                  62M PMHx HTN, HLD, DM, CAD (s/p cath years ago, RCA 99%, never intervened on), HFrEF 25-30%, ESRD s/p failed kidney transplant (last HD 3/1 via Left Arm AVF; HD TTS), Right AKA initially presented to [facility] 2/27 for respiratory distress after a dialysis, found to be septic, h/c complicated by AHRF requiring intubation for 24hr followed by VT arrest, h/c the complicated by massive LGIB (rectal ulcer) requiring 7u pRBC, 1u FFP and 1u PLT. Transferred to [facility] for ICD placement 2/2 Vtach and cardiac cath. Medicine consulted for GIB, fever of unknown origin, and comanagement.   Patient now transferred to medicine for persistent fever of unknown origin.                  62M PMHx HTN, HLD, DM, CAD (s/p cath years ago, RCA 99%, never intervened on), HFrEF 25-30%, ESRD s/p failed kidney transplant (last HD 3/1 via Left Arm AVF; HD TTS), Right AKA initially presented to [facility] 2/27 for respiratory distress after a dialysis, found to be septic, h/c complicated by AHRF requiring intubation for 24hr followed by VT arrest, h/c the complicated by massive LGIB (rectal ulcer) requiring 7u pRBC, 1u FFP and 1u PLT. Transferred to [facility] for ICD placement 2/2 Vtach and cardiac cath. Medicine consulted for GIB, fever of unknown origin, and comanagement.   Patient now transferred to medicine for persistent fever of unknown origin.                  Patient is a 62M PMHx HTN, HLD, DM, CAD (s/p cath years ago, RCA 99%, never intervened on), HFrEF 25-30%, ESRD s/p failed kidney transplant (last HD 3/1 via Left Arm AVF; HD TTS), Right AKA, initially presented to [facility] on 2/27 for respiratory distress after dialysis, found to be septic, complicated by AHRF requiring intubation for 24hr followed by VT arrest. Course also complicated by massive LGIB (rectal ulcer) requiring 7u pRBC, 1u FFP and 1u PLT. Transferred to [facility] for ICD placement 2/2 Vtach and cardiac cath. Medicine consulted for GIB, fever of unknown origin, and comanagement.  Patient now transferred to medicine for persistent fever of unknown origin.                  Patient is a 62M PMHx HTN, HLD, DM, CAD (s/p cath years ago, RCA 99%, never intervened on), HFrEF 25-30%, ESRD s/p failed kidney transplant (last HD 3/1 via Left Arm AVF; HD TTS), Right AKA, initially presented to [facility] on 2/27 for respiratory distress after dialysis, found to be septic, complicated by AHRF requiring intubation for 24hr followed by VT arrest. Course also complicated by massive LGIB (rectal ulcer) requiring 7u pRBC, 1u FFP and 1u PLT. Transferred to [facility] for ICD placement 2/2 Vtach and cardiac cath. Medicine consulted for GIB, fever of unknown origin, and comanagement.  Patient now transferred to medicine for persistent fever of unknown origin.                  Patient is a 62M PMHx HTN, HLD, DM, CAD (s/p cath years ago, RCA 99%, never intervened on), HFrEF 25-30%, ESRD s/p failed kidney transplant (last HD 3/1 via Left Arm AVF; HD TTS), Right AKA, initially presented to [facility] on 2/27 for respiratory distress after dialysis, found to be septic, complicated by AHRF requiring intubation for 24hr followed by VT arrest. Course also complicated by massive LGIB (rectal ulcer) requiring 7u pRBC, 1u FFP and 1u PLT. Transferred to [facility] for ICD placement 2/2 Vtach and cardiac cath. Medicine consulted for GIB, fever of unknown origin, and comanagement.  Patient now transferred to medicine for persistent fever of unknown origin.

## 2023-04-06 NOTE — PROGRESS NOTE ADULT - PROBLEM SELECTOR PLAN 3
s/p VTach arrest @ [facility]; no tele events noted.    - EP Consulted – plan for SubQ ICD placement once pt has LHC (pending results)  - c/w Toprol XL 25mg daily s/p VTach arrest @ [facility]; no tele events noted.    - EP Consulted – plan for SubQ ICD placement once pt has LHC (pending results)  - c/w Toprol XL 25mg daily s/p VTach arrest @ [facility]; no tele events noted.    - EP Consulted – plan for SubQ ICD placement once pt has LHC (pending results)  - c/w Toprol XL 25mg daily s/p VTach arrest @ [facility]; no tele events noted at [facility]  - EP Consulted – initially planed for SubQ ICD placement s/p cardiac cath given cardiac arrest. At this time, ICD implant is contraindicated 2/2 persistent fever of unclear etiology -likely infectious source and may need long term IV Abx.   -Will need Lifevest prior to discharge   - c/w Toprol XL 25mg daily s/p VTach arrest @ [facility]; no tele events noted at [facility]  - EP Consulted – initially planed for SubQ ICD placement s/p cardiac cath given cardiac arrest. At this time, ICD implant is contraindicated 2/2 persistent fever of unclear etiology -likely infectious source and may need long term IV Abx.   -Will need Lifevest prior to discharge   - c/w Toprol XL 25mg daily s/p VTach arrest @ [facility]; no tele events noted at [facility]  - EP Consulted – initially planed for SubQ ICD placement s/p cardiac cath given cardiac arrest. At this time, ICD implant is contraindicated 2/2 persistent fever of unclear etiology -likely infectious source and may need long term IV Abx.   -Will need Lifevest prior to discharge   - c/w Toprol XL 25mg daily

## 2023-04-06 NOTE — PROGRESS NOTE ADULT - SUBJECTIVE AND OBJECTIVE BOX
--------------------------------------------------------------------------------  Chief Complaint: ESRD/Ongoing hemodialysis requirement    24 hour events/subjective:        PAST HISTORY  --------------------------------------------------------------------------------  No significant changes to PMH, PSH, FHx, SHx, unless otherwise noted    ALLERGIES & MEDICATIONS  --------------------------------------------------------------------------------  Allergies    Allergy Status Unknown    Intolerances      Standing Inpatient Medications  aspirin enteric coated 81 milliGRAM(s) Oral daily  atorvastatin 40 milliGRAM(s) Oral at bedtime  chlorhexidine 2% Cloths 1 Application(s) Topical daily  clopidogrel Tablet 75 milliGRAM(s) Oral daily  dextrose 5%. 1000 milliLiter(s) IV Continuous <Continuous>  dextrose 5%. 1000 milliLiter(s) IV Continuous <Continuous>  dextrose 50% Injectable 25 Gram(s) IV Push once  dextrose 50% Injectable 12.5 Gram(s) IV Push once  doxercalciferol Injectable 4 MICROGram(s) IV Push once  ferrous    sulfate 325 milliGRAM(s) Oral daily  hydrALAZINE 50 milliGRAM(s) Oral every 8 hours  insulin lispro (ADMELOG) corrective regimen sliding scale   SubCutaneous Before meals and at bedtime  isosorbide   dinitrate Tablet (ISORDIL) 20 milliGRAM(s) Oral three times a day  losartan 100 milliGRAM(s) Oral every 24 hours  metoprolol succinate ER 25 milliGRAM(s) Oral daily  pantoprazole  Injectable 40 milliGRAM(s) IV Push every 12 hours  tacrolimus 2 milliGRAM(s) Oral every 12 hours  tamsulosin 0.8 milliGRAM(s) Oral at bedtime    PRN Inpatient Medications  acetaminophen     Tablet .. 650 milliGRAM(s) Oral every 6 hours PRN  dextrose Oral Gel 15 Gram(s) Oral once PRN  loperamide 2 milliGRAM(s) Oral three times a day PRN  sodium chloride 0.65% Nasal 1 Spray(s) Both Nostrils every 4 hours PRN      REVIEW OF SYSTEMS  --------------------------------------------------------------------------------  Gen: No weight changes, fatigue, fevers/chills, weakness  Skin: No rashes  Head/Eyes/Ears/Mouth: No headache; Normal hearing; Normal vision w/o blurriness; No sinus pain/discomfort, sore throat  Respiratory: No dyspnea, cough, wheezing, hemoptysis  CV: No chest pain, PND, orthopnea  GI: No abdominal pain, diarrhea, constipation, nausea, vomiting, melena, hematochezia  : No increased frequency, dysuria, hematuria, nocturia  MSK: No joint pain/swelling; no back pain; no edema  Neuro: No dizziness/lightheadedness, weakness, seizures, numbness, tingling  Heme: No easy bruising or bleeding  Endo: No heat/cold intolerance  Psych: No significant nervousness, anxiety, stress, depression    All other systems were reviewed and are negative, except as noted.    VITALS/PHYSICAL EXAM  --------------------------------------------------------------------------------  T(C): 38.1 (04-06-23 @ 08:54), Max: 40.3 (04-05-23 @ 21:13)  HR: 63 (04-06-23 @ 08:54) (56 - 94)  BP: 142/58 (04-06-23 @ 05:41) (116/48 - 161/66)  RR: 18 (04-06-23 @ 08:54) (17 - 18)  SpO2: 99% (04-06-23 @ 08:54) (94% - 100%)  Wt(kg): --  Drug Dosing Weight  Height (cm): 162.6 (17 Mar 2023 04:15)  Weight (kg): 39.3 (06 Apr 2023 05:57)  BMI (kg/m2): 14.9 (06 Apr 2023 05:57)  BSA (m2): 1.37 (06 Apr 2023 05:57)    Weight (kg): 39.3 (04-06-23 @ 05:57)      04-05-23 @ 07:01  -  04-06-23 @ 07:00  --------------------------------------------------------  IN: 360 mL / OUT: 900 mL / NET: -540 mL      Physical Exam:  	Gen: NAD, well-appearing  	HEENT: PERRL, supple neck, clear oropharynx  	Pulm: CTA B/L  	CV: RRR, S1S2; no rub  	Back: No spinal or CVA tenderness; no sacral edema  	Abd: +BS, soft, nontender/nondistended  	: No suprapubic tenderness  	UE: Warm, FROM, no clubbing, intact strength; no edema; no asterixis  	LE: Warm, FROM, no clubbing, intact strength; no edema  	Neuro: No focal deficits, intact gait  	Psych: Normal affect and mood  	Skin: Warm, without rashes  	Vascular access:    LABS/STUDIES  --------------------------------------------------------------------------------              10.0   10.82 >-----------<  202      [04-06-23 @ 05:30]              31.3     126  |  96  |  4   ----------------------------<  109      [04-06-23 @ 05:30]  4.4   |  27  |  4.44        Ca     8.2     [04-06-23 @ 05:30]      Mg     1.5     [04-06-23 @ 05:30]      Phos  2.7     [04-06-23 @ 05:30]    TPro  4.7  /  Alb  2.4  /  TBili  0.3  /  DBili  x   /  AST  17  /  ALT  <5  /  AlkPhos  159  [04-05-23 @ 07:26]    PT/INR: PT 18.5 , INR 1.55       [04-06-23 @ 05:30]  PTT: 39.9       [04-06-23 @ 05:30]      PTH -- (Ca 7.7)      [03-19-23 @ 07:11]   431  Lipid: chol 88, , HDL 44, LDL --      [03-17-23 @ 08:14]    HBsAb Reactive      [04-05-23 @ 07:26]  HBsAg Nonreact      [04-05-23 @ 07:26]  HBcAb Nonreact      [04-05-23 @ 07:26]  HCV 0.21, Nonreact      [04-05-23 @ 07:26]      RADIOLOGY:  -------------------------------------------------------------------------------------- --------------------------------------------------------------------------------  Chief Complaint: ESRD/Ongoing hemodialysis requirement    24 hour events/subjective:    Seen this morning on Hd, discussed with patient his labs are suggestive of him not eating his meals. Pt endorses he is trying.  Noted to have fever at HD and multiple times over the course of the night. ID following.     PAST HISTORY  --------------------------------------------------------------------------------  No significant changes to PMH, PSH, FHx, SHx, unless otherwise noted    ALLERGIES & MEDICATIONS  --------------------------------------------------------------------------------  Allergies    Allergy Status Unknown    Intolerances      Standing Inpatient Medications  aspirin enteric coated 81 milliGRAM(s) Oral daily  atorvastatin 40 milliGRAM(s) Oral at bedtime  chlorhexidine 2% Cloths 1 Application(s) Topical daily  clopidogrel Tablet 75 milliGRAM(s) Oral daily  dextrose 5%. 1000 milliLiter(s) IV Continuous <Continuous>  dextrose 5%. 1000 milliLiter(s) IV Continuous <Continuous>  dextrose 50% Injectable 25 Gram(s) IV Push once  dextrose 50% Injectable 12.5 Gram(s) IV Push once  doxercalciferol Injectable 4 MICROGram(s) IV Push once  ferrous    sulfate 325 milliGRAM(s) Oral daily  hydrALAZINE 50 milliGRAM(s) Oral every 8 hours  insulin lispro (ADMELOG) corrective regimen sliding scale   SubCutaneous Before meals and at bedtime  isosorbide   dinitrate Tablet (ISORDIL) 20 milliGRAM(s) Oral three times a day  losartan 100 milliGRAM(s) Oral every 24 hours  metoprolol succinate ER 25 milliGRAM(s) Oral daily  pantoprazole  Injectable 40 milliGRAM(s) IV Push every 12 hours  tacrolimus 2 milliGRAM(s) Oral every 12 hours  tamsulosin 0.8 milliGRAM(s) Oral at bedtime    PRN Inpatient Medications  acetaminophen     Tablet .. 650 milliGRAM(s) Oral every 6 hours PRN  dextrose Oral Gel 15 Gram(s) Oral once PRN  loperamide 2 milliGRAM(s) Oral three times a day PRN  sodium chloride 0.65% Nasal 1 Spray(s) Both Nostrils every 4 hours PRN      REVIEW OF SYSTEMS  --------------------------------------------------------------------------------  All other systems were reviewed and are negative, except as noted.    VITALS/PHYSICAL EXAM  --------------------------------------------------------------------------------  T(C): 38.1 (23 @ 08:54), Max: 40.3 (23 @ 21:13)  HR: 63 (23 @ 08:54) (56 - 94)  BP: 142/58 (23 @ 05:41) (116/48 - 161/66)  RR: 18 (23 @ 08:54) (17 - 18)  SpO2: 99% (23 @ 08:54) (94% - 100%)  Wt(kg): --  Drug Dosing Weight  Height (cm): 162.6 (17 Mar 2023 04:15)  Weight (kg): 39.3 (2023 05:57)  BMI (kg/m2): 14.9 (2023 05:57)  BSA (m2): 1.37 (2023 05:57)    Weight (kg): 39.3 (23 @ 05:57)      23 @ 07:01  -  23 @ 07:00  --------------------------------------------------------  IN: 360 mL / OUT: 900 mL / NET: -540 mL    PHYSICAL EXAM:  GENERAL: well-developed, well nourished, alert, no acute distress  CHEST/LUNG: Clear to auscultation bilaterally  HEART: normal S1S2, RRR  ABDOMEN: Soft, Nontender, +BS, No flank tenderness bilateral  EXTREMITIES: No clubbing, cyanosis, or edema, Rt AKA  ACCESS: MARILEE TELLEZ w/ thrill and bruit, accessed     LABS/STUDIES  --------------------------------------------------------------------------------              10.0   10.82 >-----------<  202      [23 @ 05:30]              31.3     126  |  96  |  4   ----------------------------<  109      [23 @ 05:30]  4.4   |  27  |  4.44        Ca     8.2     [23 @ 05:30]      Mg     1.5     [23 @ 05:30]      Phos  2.7     [23 05:30]    TPro  4.7  /  Alb  2.4  /  TBili  0.3  /  DBili  x   /  AST  17  /  ALT  <5  /  AlkPhos  159  [23 @ 07:26]    PT/INR: PT 18.5 , INR 1.55       [23 05:30]  PTT: 39.9       [23 05:30]      PTH -- (Ca 7.7)      [23 07:11]   431  Lipid: chol 88, , HDL 44, LDL --      [23 @ 08:14]    HBsAb Reactive      [23:26]  HBsAg Nonreact      [23:]  HBcAb Nonreact      [23:]  HCV 0.21, Nonreact      [23:]      RADIOLOGY:  --------------------------------------------------------------------------------------    Hemoglobin: 10.0 g/dL (23 05:30)  Phosphorus Level, Serum: 2.7 mg/dL (23 05:30)  Hemoglobin: 9.5 g/dL (23:28)  Hemoglobin: 9.9 g/dL (23:26)    Hepatitis B Surface Antigen: Nonreact (23:)  Hepatitis B Surface Antibody: Reactive (23:)    T(C): 37.2 (23 @ 09:47), Max: 40.3 (23 @ 21:13)  HR: 63 (23 @ 08:54) (56 - 94)  BP: 123/65 (23 @ 08:54) (116/48 - 161/66)  RR: 18 (23 08:54) (17 - 18)  SpO2: 99% (23 @ 08:54) (94% - 100%)  calcium acetate 667 milliGRAM(s) Oral three times a day with meals, 23 05:13, Routine  doxercalciferol Injectable 4 MICROGram(s) IV Push once, 23 @ 05:00, Routine, Stop order after: 1 Doses  doxercalciferol Injectable 4 MICROGram(s) IV Push once, 23 @ 05:06, Routine, Stop order after: 1 Doses  doxercalciferol Injectable 4 MICROGram(s) IV Push once, 23 @ 07:12, Routine, Stop order after: 1 Doses  doxercalciferol Injectable 4 MICROGram(s) IV Push once, 23 @ 07:11, Routine, Stop order after: 1 Doses  doxercalciferol Injectable 4 MICROGram(s) IV Push once, 23 @ 12:20, Routine, Stop order after: 1 Doses  epoetin janae-epbx (RETACRIT) Injectable 6000 Unit(s) IV Push once, 23 @ 12:20, Routine, Stop order after: 1 Doses  epoetin janae-epbx (RETACRIT) Injectable 6000 Unit(s) IV Push once, 23 @ 11:01, Routine, Stop order after: 1 Doses  epoetin janae-epbx (RETACRIT) Injectable 6000 Unit(s) IV Push once, 23 @ 07:11, Routine, Stop order after: 1 Doses  epoetin janae-epbx (RETACRIT) Injectable 6000 Unit(s) IV Push once, 23 @ 07:12, Routine, Stop order after: 1 Doses  epoetin janae-epbx (RETACRIT) Injectable 6000 Unit(s) IV Push once, 23 @ 08:17, Routine, Stop order after: 1 Doses  epoetin janae-epbx (RETACRIT) Injectable 6000 Unit(s) IV Push once, 23 @ 05:06, Routine, Stop order after: 1 Doses  epoetin janae-epbx (RETACRIT) Injectable 6000 Unit(s) IV Push once, 23 @ 05:00, Routine, Stop order after: 1 Doses  epoetin janae-epbx (RETACRIT) Injectable 6000 Unit(s) IV Push once, 23 @ 09:25, Routine, Stop order after: 1 Doses  sevelamer carbonate 800 milliGRAM(s) Oral three times a day with meals, 23 @ 05:13, Routine      Hemodialysis Treatment.:     Schedule: Once, Modality: Hemodialysis, Access: Arteriovenous Fistula    Dialyzer: Optiflux [id number], Time: 210 Min    Blood Flow: 400 mL/Min , Dialysate Flow: 500 mL/Min, Dialysate Temp: 36.5, Tubinmm (Adult)    Target Fluid Removal: 1 Liters    Dialysate Electrolytes (mEq/L): Potassium 3, Calcium 2.5, Sodium 138, Bicarbonate 35    Additional Instructions: epo and hectorol w/ HD  Turn off UF if SBP<100 (23 @ 09:25) [Active]     --------------------------------------------------------------------------------  Chief Complaint: ESRD/Ongoing hemodialysis requirement    24 hour events/subjective:    Seen this morning on Hd, discussed with patient his labs are suggestive of him not eating his meals. Pt endorses he is trying.  Noted to have fever at HD and multiple times over the course of the night. ID following.     PAST HISTORY  --------------------------------------------------------------------------------  No significant changes to PMH, PSH, FHx, SHx, unless otherwise noted    ALLERGIES & MEDICATIONS  --------------------------------------------------------------------------------  Allergies    Allergy Status Unknown    Intolerances      Standing Inpatient Medications  aspirin enteric coated 81 milliGRAM(s) Oral daily  atorvastatin 40 milliGRAM(s) Oral at bedtime  chlorhexidine 2% Cloths 1 Application(s) Topical daily  clopidogrel Tablet 75 milliGRAM(s) Oral daily  dextrose 5%. 1000 milliLiter(s) IV Continuous <Continuous>  dextrose 5%. 1000 milliLiter(s) IV Continuous <Continuous>  dextrose 50% Injectable 25 Gram(s) IV Push once  dextrose 50% Injectable 12.5 Gram(s) IV Push once  doxercalciferol Injectable 4 MICROGram(s) IV Push once  ferrous    sulfate 325 milliGRAM(s) Oral daily  hydrALAZINE 50 milliGRAM(s) Oral every 8 hours  insulin lispro (ADMELOG) corrective regimen sliding scale   SubCutaneous Before meals and at bedtime  isosorbide   dinitrate Tablet (ISORDIL) 20 milliGRAM(s) Oral three times a day  losartan 100 milliGRAM(s) Oral every 24 hours  metoprolol succinate ER 25 milliGRAM(s) Oral daily  pantoprazole  Injectable 40 milliGRAM(s) IV Push every 12 hours  tacrolimus 2 milliGRAM(s) Oral every 12 hours  tamsulosin 0.8 milliGRAM(s) Oral at bedtime    PRN Inpatient Medications  acetaminophen     Tablet .. 650 milliGRAM(s) Oral every 6 hours PRN  dextrose Oral Gel 15 Gram(s) Oral once PRN  loperamide 2 milliGRAM(s) Oral three times a day PRN  sodium chloride 0.65% Nasal 1 Spray(s) Both Nostrils every 4 hours PRN      REVIEW OF SYSTEMS  --------------------------------------------------------------------------------  All other systems were reviewed and are negative, except as noted.    VITALS/PHYSICAL EXAM  --------------------------------------------------------------------------------  T(C): 38.1 (23 @ 08:54), Max: 40.3 (23 @ 21:13)  HR: 63 (23 @ 08:54) (56 - 94)  BP: 142/58 (23 @ 05:41) (116/48 - 161/66)  RR: 18 (23 @ 08:54) (17 - 18)  SpO2: 99% (23 @ 08:54) (94% - 100%)  Wt(kg): --  Drug Dosing Weight  Height (cm): 162.6 (17 Mar 2023 04:15)  Weight (kg): 39.3 (2023 05:57)  BMI (kg/m2): 14.9 (2023 05:57)  BSA (m2): 1.37 (2023 05:57)    Weight (kg): 39.3 (23 @ 05:57)      23 @ 07:01  -  23 @ 07:00  --------------------------------------------------------  IN: 360 mL / OUT: 900 mL / NET: -540 mL    PHYSICAL EXAM:  GENERAL: well-developed, well nourished, alert, no acute distress  CHEST/LUNG: Clear to auscultation bilaterally  HEART: normal S1S2, RRR  ABDOMEN: Soft, Nontender, +BS, No flank tenderness bilateral  EXTREMITIES: No clubbing, cyanosis, or edema, Rt AKA  ACCESS: MARILEE TELLEZ w/ thrill and bruit, accessed     LABS/STUDIES  --------------------------------------------------------------------------------              10.0   10.82 >-----------<  202      [23 @ 05:30]              31.3     126  |  96  |  4   ----------------------------<  109      [23 @ 05:30]  4.4   |  27  |  4.44        Ca     8.2     [23 @ 05:30]      Mg     1.5     [23 @ 05:30]      Phos  2.7     [23 05:30]    TPro  4.7  /  Alb  2.4  /  TBili  0.3  /  DBili  x   /  AST  17  /  ALT  <5  /  AlkPhos  159  [23 @ 07:26]    PT/INR: PT 18.5 , INR 1.55       [23 05:30]  PTT: 39.9       [23 05:30]      PTH -- (Ca 7.7)      [23 07:11]   431  Lipid: chol 88, , HDL 44, LDL --      [23 @ 08:14]    HBsAb Reactive      [23:26]  HBsAg Nonreact      [23:]  HBcAb Nonreact      [23:]  HCV 0.21, Nonreact      [23:]      RADIOLOGY:  --------------------------------------------------------------------------------------    Hemoglobin: 10.0 g/dL (23 05:30)  Phosphorus Level, Serum: 2.7 mg/dL (23 05:30)  Hemoglobin: 9.5 g/dL (23:28)  Hemoglobin: 9.9 g/dL (23:26)    Hepatitis B Surface Antigen: Nonreact (23:)  Hepatitis B Surface Antibody: Reactive (23:)    T(C): 37.2 (23 @ 09:47), Max: 40.3 (23 @ 21:13)  HR: 63 (23 @ 08:54) (56 - 94)  BP: 123/65 (23 @ 08:54) (116/48 - 161/66)  RR: 18 (23 08:54) (17 - 18)  SpO2: 99% (23 @ 08:54) (94% - 100%)  calcium acetate 667 milliGRAM(s) Oral three times a day with meals, 23 05:13, Routine  doxercalciferol Injectable 4 MICROGram(s) IV Push once, 23 @ 05:00, Routine, Stop order after: 1 Doses  doxercalciferol Injectable 4 MICROGram(s) IV Push once, 23 @ 05:06, Routine, Stop order after: 1 Doses  doxercalciferol Injectable 4 MICROGram(s) IV Push once, 23 @ 07:12, Routine, Stop order after: 1 Doses  doxercalciferol Injectable 4 MICROGram(s) IV Push once, 23 @ 07:11, Routine, Stop order after: 1 Doses  doxercalciferol Injectable 4 MICROGram(s) IV Push once, 23 @ 12:20, Routine, Stop order after: 1 Doses  epoetin janae-epbx (RETACRIT) Injectable 6000 Unit(s) IV Push once, 23 @ 12:20, Routine, Stop order after: 1 Doses  epoetin janae-epbx (RETACRIT) Injectable 6000 Unit(s) IV Push once, 23 @ 11:01, Routine, Stop order after: 1 Doses  epoetin janae-epbx (RETACRIT) Injectable 6000 Unit(s) IV Push once, 23 @ 07:11, Routine, Stop order after: 1 Doses  epoetin janae-epbx (RETACRIT) Injectable 6000 Unit(s) IV Push once, 23 @ 07:12, Routine, Stop order after: 1 Doses  epoetin janae-epbx (RETACRIT) Injectable 6000 Unit(s) IV Push once, 23 @ 08:17, Routine, Stop order after: 1 Doses  epoetin janae-epbx (RETACRIT) Injectable 6000 Unit(s) IV Push once, 23 @ 05:06, Routine, Stop order after: 1 Doses  epoetin janae-epbx (RETACRIT) Injectable 6000 Unit(s) IV Push once, 23 @ 05:00, Routine, Stop order after: 1 Doses  epoetin janae-epbx (RETACRIT) Injectable 6000 Unit(s) IV Push once, 23 @ 09:25, Routine, Stop order after: 1 Doses  sevelamer carbonate 800 milliGRAM(s) Oral three times a day with meals, 23 @ 05:13, Routine      Hemodialysis Treatment.:     Schedule: Once, Modality: Hemodialysis, Access: Arteriovenous Fistula    Dialyzer: Optiflux [id number], Time: 210 Min    Blood Flow: 400 mL/Min , Dialysate Flow: 500 mL/Min, Dialysate Temp: 36.5, Tubinmm (Adult)    Target Fluid Removal: 1 Liters    Dialysate Electrolytes (mEq/L): Potassium 3, Calcium 2.5, Sodium 138, Bicarbonate 35    Additional Instructions: epo and hectorol w/ HD  Turn off UF if SBP<100 (23 @ 09:25) [Active]     --------------------------------------------------------------------------------  Chief Complaint: ESRD/Ongoing hemodialysis requirement    24 hour events/subjective:    Seen this morning on Hd, discussed with patient his labs are suggestive of him not eating his meals. Pt endorses he is trying.  Noted to have fever at HD and multiple times over the course of the night. ID following.     PAST HISTORY  --------------------------------------------------------------------------------  No significant changes to PMH, PSH, FHx, SHx, unless otherwise noted    ALLERGIES & MEDICATIONS  --------------------------------------------------------------------------------  Allergies    Allergy Status Unknown    Intolerances      Standing Inpatient Medications  aspirin enteric coated 81 milliGRAM(s) Oral daily  atorvastatin 40 milliGRAM(s) Oral at bedtime  chlorhexidine 2% Cloths 1 Application(s) Topical daily  clopidogrel Tablet 75 milliGRAM(s) Oral daily  dextrose 5%. 1000 milliLiter(s) IV Continuous <Continuous>  dextrose 5%. 1000 milliLiter(s) IV Continuous <Continuous>  dextrose 50% Injectable 25 Gram(s) IV Push once  dextrose 50% Injectable 12.5 Gram(s) IV Push once  doxercalciferol Injectable 4 MICROGram(s) IV Push once  ferrous    sulfate 325 milliGRAM(s) Oral daily  hydrALAZINE 50 milliGRAM(s) Oral every 8 hours  insulin lispro (ADMELOG) corrective regimen sliding scale   SubCutaneous Before meals and at bedtime  isosorbide   dinitrate Tablet (ISORDIL) 20 milliGRAM(s) Oral three times a day  losartan 100 milliGRAM(s) Oral every 24 hours  metoprolol succinate ER 25 milliGRAM(s) Oral daily  pantoprazole  Injectable 40 milliGRAM(s) IV Push every 12 hours  tacrolimus 2 milliGRAM(s) Oral every 12 hours  tamsulosin 0.8 milliGRAM(s) Oral at bedtime    PRN Inpatient Medications  acetaminophen     Tablet .. 650 milliGRAM(s) Oral every 6 hours PRN  dextrose Oral Gel 15 Gram(s) Oral once PRN  loperamide 2 milliGRAM(s) Oral three times a day PRN  sodium chloride 0.65% Nasal 1 Spray(s) Both Nostrils every 4 hours PRN      REVIEW OF SYSTEMS  --------------------------------------------------------------------------------  All other systems were reviewed and are negative, except as noted.    VITALS/PHYSICAL EXAM  --------------------------------------------------------------------------------  T(C): 38.1 (23 @ 08:54), Max: 40.3 (23 @ 21:13)  HR: 63 (23 @ 08:54) (56 - 94)  BP: 142/58 (23 @ 05:41) (116/48 - 161/66)  RR: 18 (23 @ 08:54) (17 - 18)  SpO2: 99% (23 @ 08:54) (94% - 100%)  Wt(kg): --  Drug Dosing Weight  Height (cm): 162.6 (17 Mar 2023 04:15)  Weight (kg): 39.3 (2023 05:57)  BMI (kg/m2): 14.9 (2023 05:57)  BSA (m2): 1.37 (2023 05:57)    Weight (kg): 39.3 (23 @ 05:57)      23 @ 07:01  -  23 @ 07:00  --------------------------------------------------------  IN: 360 mL / OUT: 900 mL / NET: -540 mL    PHYSICAL EXAM:  GENERAL: well-developed, well nourished, alert, no acute distress  CHEST/LUNG: Clear to auscultation bilaterally  HEART: normal S1S2, RRR  ABDOMEN: Soft, Nontender, +BS, No flank tenderness bilateral  EXTREMITIES: No clubbing, cyanosis, or edema, Rt AKA  ACCESS: MARILEE TELLEZ w/ thrill and bruit, accessed     LABS/STUDIES  --------------------------------------------------------------------------------              10.0   10.82 >-----------<  202      [23 @ 05:30]              31.3     126  |  96  |  4   ----------------------------<  109      [23 @ 05:30]  4.4   |  27  |  4.44        Ca     8.2     [23 @ 05:30]      Mg     1.5     [23 @ 05:30]      Phos  2.7     [23 05:30]    TPro  4.7  /  Alb  2.4  /  TBili  0.3  /  DBili  x   /  AST  17  /  ALT  <5  /  AlkPhos  159  [23 @ 07:26]    PT/INR: PT 18.5 , INR 1.55       [23 05:30]  PTT: 39.9       [23 05:30]      PTH -- (Ca 7.7)      [23 07:11]   431  Lipid: chol 88, , HDL 44, LDL --      [23 @ 08:14]    HBsAb Reactive      [23:26]  HBsAg Nonreact      [23:]  HBcAb Nonreact      [23:]  HCV 0.21, Nonreact      [23:]      RADIOLOGY:  --------------------------------------------------------------------------------------    Hemoglobin: 10.0 g/dL (23 05:30)  Phosphorus Level, Serum: 2.7 mg/dL (23 05:30)  Hemoglobin: 9.5 g/dL (23:28)  Hemoglobin: 9.9 g/dL (23:26)    Hepatitis B Surface Antigen: Nonreact (23:)  Hepatitis B Surface Antibody: Reactive (23:)    T(C): 37.2 (23 @ 09:47), Max: 40.3 (23 @ 21:13)  HR: 63 (23 @ 08:54) (56 - 94)  BP: 123/65 (23 @ 08:54) (116/48 - 161/66)  RR: 18 (23 08:54) (17 - 18)  SpO2: 99% (23 @ 08:54) (94% - 100%)  calcium acetate 667 milliGRAM(s) Oral three times a day with meals, 23 05:13, Routine  doxercalciferol Injectable 4 MICROGram(s) IV Push once, 23 @ 05:00, Routine, Stop order after: 1 Doses  doxercalciferol Injectable 4 MICROGram(s) IV Push once, 23 @ 05:06, Routine, Stop order after: 1 Doses  doxercalciferol Injectable 4 MICROGram(s) IV Push once, 23 @ 07:12, Routine, Stop order after: 1 Doses  doxercalciferol Injectable 4 MICROGram(s) IV Push once, 23 @ 07:11, Routine, Stop order after: 1 Doses  doxercalciferol Injectable 4 MICROGram(s) IV Push once, 23 @ 12:20, Routine, Stop order after: 1 Doses  epoetin janae-epbx (RETACRIT) Injectable 6000 Unit(s) IV Push once, 23 @ 12:20, Routine, Stop order after: 1 Doses  epoetin janae-epbx (RETACRIT) Injectable 6000 Unit(s) IV Push once, 23 @ 11:01, Routine, Stop order after: 1 Doses  epoetin janae-epbx (RETACRIT) Injectable 6000 Unit(s) IV Push once, 23 @ 07:11, Routine, Stop order after: 1 Doses  epoetin janae-epbx (RETACRIT) Injectable 6000 Unit(s) IV Push once, 23 @ 07:12, Routine, Stop order after: 1 Doses  epoetin janae-epbx (RETACRIT) Injectable 6000 Unit(s) IV Push once, 23 @ 08:17, Routine, Stop order after: 1 Doses  epoetin janae-epbx (RETACRIT) Injectable 6000 Unit(s) IV Push once, 23 @ 05:06, Routine, Stop order after: 1 Doses  epoetin janae-epbx (RETACRIT) Injectable 6000 Unit(s) IV Push once, 23 @ 05:00, Routine, Stop order after: 1 Doses  epoetin janae-epbx (RETACRIT) Injectable 6000 Unit(s) IV Push once, 23 @ 09:25, Routine, Stop order after: 1 Doses  sevelamer carbonate 800 milliGRAM(s) Oral three times a day with meals, 23 @ 05:13, Routine      Hemodialysis Treatment.:     Schedule: Once, Modality: Hemodialysis, Access: Arteriovenous Fistula    Dialyzer: Optiflux [id number], Time: 210 Min    Blood Flow: 400 mL/Min , Dialysate Flow: 500 mL/Min, Dialysate Temp: 36.5, Tubinmm (Adult)    Target Fluid Removal: 1 Liters    Dialysate Electrolytes (mEq/L): Potassium 3, Calcium 2.5, Sodium 138, Bicarbonate 35    Additional Instructions: epo and hectorol w/ HD  Turn off UF if SBP<100 (23 @ 09:25) [Active]

## 2023-04-06 NOTE — PROGRESS NOTE ADULT - PROBLEM SELECTOR PLAN 8
History of coffee ground emesis @ [facility]; s/p EGD/colonoscopy showing rectal ulcer  - per gen surgery no acute surgical intervention   - GI following, no endoscopy indicated  - Active T&S  - Transfuse for hgb <8 (active CAD)  - Recommend switching from IV BID to Protonix 40 mg PO daily History of coffee ground emesis @ [facility]; s/p EGD/colonoscopy showing rectal ulcer  - per gen surgery no acute surgical intervention   - GI following, no endoscopy indicated  - Active T&S  - Transfuse for hgb <8 (active CAD)  - Recommend switching from IV BID to Protonix 40 mg PO daily History of coffee ground emesis @ [facility]; s/p EGD/colonoscopy showing rectal ulcer  - per gen surgery no acute surgical intervention   - GI following, no endoscopy indicated  - Active T&S  - Transfuse for hgb <8 (active CAD)  - Recommend switching from IV BID to Protonix 40 mg PO daily

## 2023-04-06 NOTE — SWALLOW BEDSIDE ASSESSMENT ADULT - SLP PERTINENT HISTORY OF CURRENT PROBLEM
PMHx HTN, DM, CAD, HFrEF 25-30%, ESRD s/p failed kidney transplant, right AKA. Initially presented to OSH on 2/27 for resp. distress fter dialysis, found to be septic, complicated by AHRF requiring intubation for 24hr followed by VT arrest. Course also complicated by massive LGIB. Transferred to [facility] for ICD placement 2/2 Vtach and cardiac cath. Course further c/b fever of unknown origin. PMHx HTN, DM, CAD, HFrEF 25-30%, ESRD s/p failed kidney transplant, right AKA. Initially presented to OSH on 2/27 for resp. distress fter dialysis, found to be septic, complicated by AHRF requiring intubation for 24hr followed by VT arrest. Course also complicated by massive LGIB. Transferred to [facility] for ICD placement 2/2 Vtach and cardiac cath. Course further c/b fever of unknown origin. PMHx HTN, DM, CAD, HFrEF 25-30%, ESRD s/p failed kidney transplant, right AKA. Initially presented to OSH on 2/27 for resp. distress fter dialysis, found to be septic, complicated by AHRF requiring intubation for 24hr followed by VT arrest. Course also complicated by massive LGIB. Transferred to [facility] for ICD placement 2/2 Vtach and cardiac cath. Course further c/b fever of unknown origin.

## 2023-04-06 NOTE — PROGRESS NOTE ADULT - SUBJECTIVE AND OBJECTIVE BOX
CARDIOLOGY NP PROGRESS NOTE    Subjective: Pt seen and examined at bedside. Reports feeling ok. Denies chest pain, sob, lightheadedness, dizziness, palpitations.  Remainder ROS otherwise negative.    Overnight Events: developed rigors yesterday 6pm, fever 102.6 rectal, Tmax 104.6 overnight s/p pan culture. Elevated Procal 0.97.    TELEMETRY: SR 60s       VITAL SIGNS:  T(C): 39.2 (04-06-23 @ 10:35), Max: 40.3 (04-05-23 @ 21:13)  HR: 96 (04-06-23 @ 10:35) (56 - 96)  BP: 144/71 (04-06-23 @ 10:35) (123/65 - 161/66)  RR: 18 (04-06-23 @ 10:35) (17 - 18)  SpO2: 97% (04-06-23 @ 10:35) (94% - 100%)  Wt(kg): --    I&O's Summary    05 Apr 2023 07:01  -  06 Apr 2023 07:00  --------------------------------------------------------  IN: 360 mL / OUT: 900 mL / NET: -540 mL    06 Apr 2023 07:01  -  06 Apr 2023 14:29  --------------------------------------------------------  IN: 0 mL / OUT: 0 mL / NET: 0 mL          PHYSICAL EXAM:    General: A/ox 3, No acute Distress, sleepy on exam  Neck: Supple, NO JVD  Cardiac: S1 S2, No M/R/G  Pulmonary: CTAB, Breathing unlabored on RA, No Rhonchi/Rales/Wheezing  Abdomen: Soft, Non -tender, +BS x 4 quads  Extremities: No Rashes, No edema. L forearm +bruit/thrill. R AKA  Vascular: 2+ radial pulses mehnaz, no femoral bruits mehnaz, L doppler DP/PT  Neuro: A/o x 3, No focal deficits          LABS:                          10.0   10.82 )-----------( 202      ( 06 Apr 2023 05:30 )             31.3                              04-06    126<L>  |  96  |  4<L>  ----------------------------<  109<H>  4.4   |  27  |  4.44<H>    Ca    8.2<L>      06 Apr 2023 05:30  Phos  2.7     04-06  Mg     1.5     04-06    TPro  4.7<L>  /  Alb  2.4<L>  /  TBili  0.3  /  DBili  x   /  AST  17  /  ALT  <5<L>  /  AlkPhos  159<H>  04-05    LIVER FUNCTIONS - ( 05 Apr 2023 07:26 )  Alb: 2.4 g/dL / Pro: 4.7 g/dL / ALK PHOS: 159 U/L / ALT: <5 U/L / AST: 17 U/L / GGT: x                                 PT/INR - ( 06 Apr 2023 05:30 )   PT: 18.5 sec;   INR: 1.55          PTT - ( 06 Apr 2023 05:30 )  PTT:39.9 sec  CAPILLARY BLOOD GLUCOSE      POCT Blood Glucose.: 103 mg/dL (06 Apr 2023 06:18)  POCT Blood Glucose.: 113 mg/dL (05 Apr 2023 23:09)  POCT Blood Glucose.: 103 mg/dL (05 Apr 2023 21:56)  POCT Blood Glucose.: 100 mg/dL (05 Apr 2023 17:17)            Allergies:  Allergy Status Unknown    MEDICATIONS  (STANDING):  aspirin enteric coated 81 milliGRAM(s) Oral daily  atorvastatin 40 milliGRAM(s) Oral at bedtime  chlorhexidine 2% Cloths 1 Application(s) Topical daily  clopidogrel Tablet 75 milliGRAM(s) Oral daily  dextrose 5%. 1000 milliLiter(s) (100 mL/Hr) IV Continuous <Continuous>  dextrose 5%. 1000 milliLiter(s) (50 mL/Hr) IV Continuous <Continuous>  dextrose 50% Injectable 25 Gram(s) IV Push once  dextrose 50% Injectable 12.5 Gram(s) IV Push once  epoetin janae-epbx (RETACRIT) Injectable 6000 Unit(s) IV Push once  ferrous    sulfate 325 milliGRAM(s) Oral daily  hydrALAZINE 50 milliGRAM(s) Oral every 8 hours  insulin lispro (ADMELOG) corrective regimen sliding scale   SubCutaneous Before meals and at bedtime  isosorbide   dinitrate Tablet (ISORDIL) 20 milliGRAM(s) Oral three times a day  losartan 100 milliGRAM(s) Oral every 24 hours  meropenem  IVPB 500 milliGRAM(s) IV Intermittent every 24 hours  metoprolol succinate ER 25 milliGRAM(s) Oral daily  pantoprazole  Injectable 40 milliGRAM(s) IV Push every 12 hours  tacrolimus 2 milliGRAM(s) Oral every 12 hours  tamsulosin 0.8 milliGRAM(s) Oral at bedtime    MEDICATIONS  (PRN):  acetaminophen     Tablet .. 650 milliGRAM(s) Oral every 6 hours PRN Temp greater or equal to 38C (100.4F), Mild Pain (1 - 3), Moderate Pain (4 - 6)  dextrose Oral Gel 15 Gram(s) Oral once PRN Blood Glucose LESS THAN 70 milliGRAM(s)/deciliter  loperamide 2 milliGRAM(s) Oral three times a day PRN Diarrhea  sodium chloride 0.65% Nasal 1 Spray(s) Both Nostrils every 4 hours PRN Nasal Congestion        DIAGNOSTIC TESTS:        CARDIOLOGY NP TRANSFER OFF SERVICE/ PROGRESS NOTE    HOSPITAL COURSE:  63 y/o M, PMHx HTN, HLD, DM, ICM (s/p cath years ago, RCA 99%, never intervened on), HFrEF 25-30%, ESRD s/p failed kidney transplant (LUE AVF; HD TTS), R AKA initially admitted to [facility] 2/27 for Respiratory Distress 2/2 SIRS s/p HD session whose course c/b cardiac arrest and Vtach. Now s/p extubation/pressors whose course further c/b LGIB (Hgb 3.5 s/p multiple transfusions and no active bleed). Transferred to [facility] 3/17 for ICD eval, however, found to have BRBPR on admission s/p flex sig without active bleed. Course further c/b ongoing fevers of unclear etiology throughout hospitalization, w/u remarkable for pyelitis s/p 2 wks course IV abx on 4/4, now w/ recurrent fever 104.6 on 4/5. Initial plan for LHC and SubQ ICD deferred 2/2 persistent fevers. Now awaiting Lifevest fitting. Medicine and ID following, pending gallium scan. Pt does not require further telemetry monitoring, now pending transfer to regional medicine service for ongoing fever w/u.      Subjective: Pt seen and examined at bedside. Reports feeling ok. Denies chest pain, sob, lightheadedness, dizziness, palpitations.  Remainder ROS otherwise negative.    Overnight Events: developed rigors yesterday 6pm, fever 102.6 rectal, Tmax 104.6 overnight s/p pan culture. Elevated Procal 0.97.    TELEMETRY: SR 60s       VITAL SIGNS:  T(C): 39.2 (04-06-23 @ 10:35), Max: 40.3 (04-05-23 @ 21:13)  HR: 96 (04-06-23 @ 10:35) (56 - 96)  BP: 144/71 (04-06-23 @ 10:35) (123/65 - 161/66)  RR: 18 (04-06-23 @ 10:35) (17 - 18)  SpO2: 97% (04-06-23 @ 10:35) (94% - 100%)  Wt(kg): --    I&O's Summary    05 Apr 2023 07:01  -  06 Apr 2023 07:00  --------------------------------------------------------  IN: 360 mL / OUT: 900 mL / NET: -540 mL    06 Apr 2023 07:01  -  06 Apr 2023 14:29  --------------------------------------------------------  IN: 0 mL / OUT: 0 mL / NET: 0 mL          PHYSICAL EXAM:    General: A/ox 3, No acute Distress, sleepy on exam  Neck: Supple, NO JVD  Cardiac: S1 S2, No M/R/G  Pulmonary: CTAB, Breathing unlabored on RA, No Rhonchi/Rales/Wheezing  Abdomen: Soft, Non -tender, +BS x 4 quads  Extremities: No Rashes, No edema. L forearm +bruit/thrill. R AKA  Vascular: 2+ radial pulses mehnaz, no femoral bruits mehnaz, L doppler DP/PT  Neuro: A/o x 3, No focal deficits          LABS:                          10.0   10.82 )-----------( 202      ( 06 Apr 2023 05:30 )             31.3                              04-06    126<L>  |  96  |  4<L>  ----------------------------<  109<H>  4.4   |  27  |  4.44<H>    Ca    8.2<L>      06 Apr 2023 05:30  Phos  2.7     04-06  Mg     1.5     04-06    TPro  4.7<L>  /  Alb  2.4<L>  /  TBili  0.3  /  DBili  x   /  AST  17  /  ALT  <5<L>  /  AlkPhos  159<H>  04-05    LIVER FUNCTIONS - ( 05 Apr 2023 07:26 )  Alb: 2.4 g/dL / Pro: 4.7 g/dL / ALK PHOS: 159 U/L / ALT: <5 U/L / AST: 17 U/L / GGT: x                                 PT/INR - ( 06 Apr 2023 05:30 )   PT: 18.5 sec;   INR: 1.55          PTT - ( 06 Apr 2023 05:30 )  PTT:39.9 sec  CAPILLARY BLOOD GLUCOSE      POCT Blood Glucose.: 103 mg/dL (06 Apr 2023 06:18)  POCT Blood Glucose.: 113 mg/dL (05 Apr 2023 23:09)  POCT Blood Glucose.: 103 mg/dL (05 Apr 2023 21:56)  POCT Blood Glucose.: 100 mg/dL (05 Apr 2023 17:17)            Allergies:  Allergy Status Unknown    MEDICATIONS  (STANDING):  aspirin enteric coated 81 milliGRAM(s) Oral daily  atorvastatin 40 milliGRAM(s) Oral at bedtime  chlorhexidine 2% Cloths 1 Application(s) Topical daily  clopidogrel Tablet 75 milliGRAM(s) Oral daily  dextrose 5%. 1000 milliLiter(s) (100 mL/Hr) IV Continuous <Continuous>  dextrose 5%. 1000 milliLiter(s) (50 mL/Hr) IV Continuous <Continuous>  dextrose 50% Injectable 25 Gram(s) IV Push once  dextrose 50% Injectable 12.5 Gram(s) IV Push once  epoetin janae-epbx (RETACRIT) Injectable 6000 Unit(s) IV Push once  ferrous    sulfate 325 milliGRAM(s) Oral daily  hydrALAZINE 50 milliGRAM(s) Oral every 8 hours  insulin lispro (ADMELOG) corrective regimen sliding scale   SubCutaneous Before meals and at bedtime  isosorbide   dinitrate Tablet (ISORDIL) 20 milliGRAM(s) Oral three times a day  losartan 100 milliGRAM(s) Oral every 24 hours  meropenem  IVPB 500 milliGRAM(s) IV Intermittent every 24 hours  metoprolol succinate ER 25 milliGRAM(s) Oral daily  pantoprazole  Injectable 40 milliGRAM(s) IV Push every 12 hours  tacrolimus 2 milliGRAM(s) Oral every 12 hours  tamsulosin 0.8 milliGRAM(s) Oral at bedtime    MEDICATIONS  (PRN):  acetaminophen     Tablet .. 650 milliGRAM(s) Oral every 6 hours PRN Temp greater or equal to 38C (100.4F), Mild Pain (1 - 3), Moderate Pain (4 - 6)  dextrose Oral Gel 15 Gram(s) Oral once PRN Blood Glucose LESS THAN 70 milliGRAM(s)/deciliter  loperamide 2 milliGRAM(s) Oral three times a day PRN Diarrhea  sodium chloride 0.65% Nasal 1 Spray(s) Both Nostrils every 4 hours PRN Nasal Congestion        DIAGNOSTIC TESTS:        CARDIOLOGY NP TRANSFER OFF SERVICE/ PROGRESS NOTE    HOSPITAL COURSE:  61 y/o M, PMHx HTN, HLD, DM, ICM (s/p cath years ago, RCA 99%, never intervened on), HFrEF 25-30%, ESRD s/p failed kidney transplant (LUE AVF; HD TTS), R AKA initially admitted to [facility] 2/27 for Respiratory Distress 2/2 SIRS s/p HD session whose course c/b cardiac arrest and Vtach. Now s/p extubation/pressors whose course further c/b LGIB (Hgb 3.5 s/p multiple transfusions and no active bleed). Transferred to [facility] 3/17 for ICD eval, however, found to have BRBPR on admission s/p flex sig without active bleed. Course further c/b ongoing fevers of unclear etiology throughout hospitalization, w/u remarkable for pyelitis s/p 2 wks course IV abx on 4/4, now w/ recurrent fever 104.6 on 4/5. Initial plan for LHC and SubQ ICD deferred 2/2 persistent fevers. Now awaiting Lifevest fitting. Medicine and ID following, pending gallium scan. Pt does not require further telemetry monitoring, now pending transfer to regional medicine service for ongoing fever w/u.      Subjective: Pt seen and examined at bedside. Reports feeling ok. Denies chest pain, sob, lightheadedness, dizziness, palpitations.  Remainder ROS otherwise negative.    Overnight Events: developed rigors yesterday 6pm, fever 102.6 rectal, Tmax 104.6 overnight s/p pan culture. Elevated Procal 0.97.    TELEMETRY: SR 60s       VITAL SIGNS:  T(C): 39.2 (04-06-23 @ 10:35), Max: 40.3 (04-05-23 @ 21:13)  HR: 96 (04-06-23 @ 10:35) (56 - 96)  BP: 144/71 (04-06-23 @ 10:35) (123/65 - 161/66)  RR: 18 (04-06-23 @ 10:35) (17 - 18)  SpO2: 97% (04-06-23 @ 10:35) (94% - 100%)  Wt(kg): --    I&O's Summary    05 Apr 2023 07:01  -  06 Apr 2023 07:00  --------------------------------------------------------  IN: 360 mL / OUT: 900 mL / NET: -540 mL    06 Apr 2023 07:01  -  06 Apr 2023 14:29  --------------------------------------------------------  IN: 0 mL / OUT: 0 mL / NET: 0 mL          PHYSICAL EXAM:    General: A/ox 3, No acute Distress, sleepy on exam  Neck: Supple, NO JVD  Cardiac: S1 S2, No M/R/G  Pulmonary: CTAB, Breathing unlabored on RA, No Rhonchi/Rales/Wheezing  Abdomen: Soft, Non -tender, +BS x 4 quads  Extremities: No Rashes, No edema. L forearm +bruit/thrill. R AKA  Vascular: 2+ radial pulses mehnaz, no femoral bruits mehnaz, L doppler DP/PT  Neuro: A/o x 3, No focal deficits          LABS:                          10.0   10.82 )-----------( 202      ( 06 Apr 2023 05:30 )             31.3                              04-06    126<L>  |  96  |  4<L>  ----------------------------<  109<H>  4.4   |  27  |  4.44<H>    Ca    8.2<L>      06 Apr 2023 05:30  Phos  2.7     04-06  Mg     1.5     04-06    TPro  4.7<L>  /  Alb  2.4<L>  /  TBili  0.3  /  DBili  x   /  AST  17  /  ALT  <5<L>  /  AlkPhos  159<H>  04-05    LIVER FUNCTIONS - ( 05 Apr 2023 07:26 )  Alb: 2.4 g/dL / Pro: 4.7 g/dL / ALK PHOS: 159 U/L / ALT: <5 U/L / AST: 17 U/L / GGT: x                                 PT/INR - ( 06 Apr 2023 05:30 )   PT: 18.5 sec;   INR: 1.55          PTT - ( 06 Apr 2023 05:30 )  PTT:39.9 sec  CAPILLARY BLOOD GLUCOSE      POCT Blood Glucose.: 103 mg/dL (06 Apr 2023 06:18)  POCT Blood Glucose.: 113 mg/dL (05 Apr 2023 23:09)  POCT Blood Glucose.: 103 mg/dL (05 Apr 2023 21:56)  POCT Blood Glucose.: 100 mg/dL (05 Apr 2023 17:17)            Allergies:  Allergy Status Unknown    MEDICATIONS  (STANDING):  aspirin enteric coated 81 milliGRAM(s) Oral daily  atorvastatin 40 milliGRAM(s) Oral at bedtime  chlorhexidine 2% Cloths 1 Application(s) Topical daily  clopidogrel Tablet 75 milliGRAM(s) Oral daily  dextrose 5%. 1000 milliLiter(s) (100 mL/Hr) IV Continuous <Continuous>  dextrose 5%. 1000 milliLiter(s) (50 mL/Hr) IV Continuous <Continuous>  dextrose 50% Injectable 25 Gram(s) IV Push once  dextrose 50% Injectable 12.5 Gram(s) IV Push once  epoetin janae-epbx (RETACRIT) Injectable 6000 Unit(s) IV Push once  ferrous    sulfate 325 milliGRAM(s) Oral daily  hydrALAZINE 50 milliGRAM(s) Oral every 8 hours  insulin lispro (ADMELOG) corrective regimen sliding scale   SubCutaneous Before meals and at bedtime  isosorbide   dinitrate Tablet (ISORDIL) 20 milliGRAM(s) Oral three times a day  losartan 100 milliGRAM(s) Oral every 24 hours  meropenem  IVPB 500 milliGRAM(s) IV Intermittent every 24 hours  metoprolol succinate ER 25 milliGRAM(s) Oral daily  pantoprazole  Injectable 40 milliGRAM(s) IV Push every 12 hours  tacrolimus 2 milliGRAM(s) Oral every 12 hours  tamsulosin 0.8 milliGRAM(s) Oral at bedtime    MEDICATIONS  (PRN):  acetaminophen     Tablet .. 650 milliGRAM(s) Oral every 6 hours PRN Temp greater or equal to 38C (100.4F), Mild Pain (1 - 3), Moderate Pain (4 - 6)  dextrose Oral Gel 15 Gram(s) Oral once PRN Blood Glucose LESS THAN 70 milliGRAM(s)/deciliter  loperamide 2 milliGRAM(s) Oral three times a day PRN Diarrhea  sodium chloride 0.65% Nasal 1 Spray(s) Both Nostrils every 4 hours PRN Nasal Congestion        DIAGNOSTIC TESTS:        CARDIOLOGY NP TRANSFER OFF SERVICE/ PROGRESS NOTE    HOSPITAL COURSE:  61 y/o M, PMHx HTN, HLD, DM, ICM (s/p cath years ago, RCA 99%, never intervened on), HFrEF 25-30%, ESRD s/p failed kidney transplant (LUE AVF; HD TTS), R AKA initially admitted to [facility] 2/27 for Respiratory Distress 2/2 SIRS s/p HD session whose course c/b cardiac arrest and Vtach. Now s/p extubation/pressors whose course further c/b LGIB (Hgb 3.5 s/p multiple transfusions and no active bleed). Transferred to [facility] 3/17 for ICD eval, however, found to have BRBPR on admission s/p flex sig without active bleed. Course further c/b ongoing fevers of unclear etiology throughout hospitalization, w/u remarkable for pyelitis s/p 2 wks course IV abx on 4/4, now w/ recurrent fever 104.6 on 4/5. Initial plan for LHC and SubQ ICD deferred 2/2 persistent fevers. Now awaiting Lifevest fitting. Medicine and ID following, pending gallium scan. Pt does not require further telemetry monitoring, now pending transfer to regional medicine service for ongoing fever w/u.      Subjective: Pt seen and examined at bedside. Reports feeling ok. Denies chest pain, sob, lightheadedness, dizziness, palpitations.  Remainder ROS otherwise negative.    Overnight Events: developed rigors yesterday 6pm, fever 102.6 rectal, Tmax 104.6 overnight s/p pan culture. Elevated Procal 0.97.    TELEMETRY: SR 60s       VITAL SIGNS:  T(C): 39.2 (04-06-23 @ 10:35), Max: 40.3 (04-05-23 @ 21:13)  HR: 96 (04-06-23 @ 10:35) (56 - 96)  BP: 144/71 (04-06-23 @ 10:35) (123/65 - 161/66)  RR: 18 (04-06-23 @ 10:35) (17 - 18)  SpO2: 97% (04-06-23 @ 10:35) (94% - 100%)  Wt(kg): --    I&O's Summary    05 Apr 2023 07:01  -  06 Apr 2023 07:00  --------------------------------------------------------  IN: 360 mL / OUT: 900 mL / NET: -540 mL    06 Apr 2023 07:01  -  06 Apr 2023 14:29  --------------------------------------------------------  IN: 0 mL / OUT: 0 mL / NET: 0 mL          PHYSICAL EXAM:    General: A/ox 3, No acute Distress, sleepy on exam  Neck: Supple, NO JVD  Cardiac: S1 S2, No M/R/G  Pulmonary: CTAB, Breathing unlabored on RA, No Rhonchi/Rales/Wheezing  Abdomen: Soft, Non -tender, +BS x 4 quads  Extremities: No Rashes, No edema. L forearm +bruit/thrill. R AKA  Vascular: 2+ radial pulses mehnaz, no femoral bruits mehnaz, L doppler DP/PT  Neuro: A/o x 3, No focal deficits          LABS:                          10.0   10.82 )-----------( 202      ( 06 Apr 2023 05:30 )             31.3                              04-06    126<L>  |  96  |  4<L>  ----------------------------<  109<H>  4.4   |  27  |  4.44<H>    Ca    8.2<L>      06 Apr 2023 05:30  Phos  2.7     04-06  Mg     1.5     04-06    TPro  4.7<L>  /  Alb  2.4<L>  /  TBili  0.3  /  DBili  x   /  AST  17  /  ALT  <5<L>  /  AlkPhos  159<H>  04-05    LIVER FUNCTIONS - ( 05 Apr 2023 07:26 )  Alb: 2.4 g/dL / Pro: 4.7 g/dL / ALK PHOS: 159 U/L / ALT: <5 U/L / AST: 17 U/L / GGT: x                                 PT/INR - ( 06 Apr 2023 05:30 )   PT: 18.5 sec;   INR: 1.55          PTT - ( 06 Apr 2023 05:30 )  PTT:39.9 sec  CAPILLARY BLOOD GLUCOSE      POCT Blood Glucose.: 103 mg/dL (06 Apr 2023 06:18)  POCT Blood Glucose.: 113 mg/dL (05 Apr 2023 23:09)  POCT Blood Glucose.: 103 mg/dL (05 Apr 2023 21:56)  POCT Blood Glucose.: 100 mg/dL (05 Apr 2023 17:17)            Allergies:  Allergy Status Unknown    MEDICATIONS  (STANDING):  aspirin enteric coated 81 milliGRAM(s) Oral daily  atorvastatin 40 milliGRAM(s) Oral at bedtime  chlorhexidine 2% Cloths 1 Application(s) Topical daily  clopidogrel Tablet 75 milliGRAM(s) Oral daily  dextrose 5%. 1000 milliLiter(s) (100 mL/Hr) IV Continuous <Continuous>  dextrose 5%. 1000 milliLiter(s) (50 mL/Hr) IV Continuous <Continuous>  dextrose 50% Injectable 25 Gram(s) IV Push once  dextrose 50% Injectable 12.5 Gram(s) IV Push once  epoetin janae-epbx (RETACRIT) Injectable 6000 Unit(s) IV Push once  ferrous    sulfate 325 milliGRAM(s) Oral daily  hydrALAZINE 50 milliGRAM(s) Oral every 8 hours  insulin lispro (ADMELOG) corrective regimen sliding scale   SubCutaneous Before meals and at bedtime  isosorbide   dinitrate Tablet (ISORDIL) 20 milliGRAM(s) Oral three times a day  losartan 100 milliGRAM(s) Oral every 24 hours  meropenem  IVPB 500 milliGRAM(s) IV Intermittent every 24 hours  metoprolol succinate ER 25 milliGRAM(s) Oral daily  pantoprazole  Injectable 40 milliGRAM(s) IV Push every 12 hours  tacrolimus 2 milliGRAM(s) Oral every 12 hours  tamsulosin 0.8 milliGRAM(s) Oral at bedtime    MEDICATIONS  (PRN):  acetaminophen     Tablet .. 650 milliGRAM(s) Oral every 6 hours PRN Temp greater or equal to 38C (100.4F), Mild Pain (1 - 3), Moderate Pain (4 - 6)  dextrose Oral Gel 15 Gram(s) Oral once PRN Blood Glucose LESS THAN 70 milliGRAM(s)/deciliter  loperamide 2 milliGRAM(s) Oral three times a day PRN Diarrhea  sodium chloride 0.65% Nasal 1 Spray(s) Both Nostrils every 4 hours PRN Nasal Congestion        DIAGNOSTIC TESTS:

## 2023-04-06 NOTE — PROGRESS NOTE ADULT - PROBLEM SELECTOR PLAN 1
Recurrent fevers while on IV ABX. Tmax 102.  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 neoplasm; attempt made to contact wife on 3/31/23 - unclear if patient has outpatient prostate workup   - CT Pelvis 3/28. significant for mild urothelial thickening and hyperenhancement suggestive of pyelitis. No evidence of pyelonephritis or renal abscess. Probable cystitis.  - Urology consulted - rec to recheck PSA once patient finishes abx for infection, no acute urologic workup needed at this time; will most likely perform outpatient. Patient ultimately will need bx, so will confirm with [facility]/DAPT situation  - ID following, appreciate recs.   - completed 2 wks of IV Abx. Meropenem ->Ceftriaxone 2G q24h (last day 4/4)     #Diarrhea  - Has recurrent loose BMs. C-Diff negative  - GI PCR negative. Loperamide ordered PRN   - C-diff culture NEGATIVE Recurrent fevers while on IV ABX during hospitalization. Last fever 104.6 rectally 4/5 PM.  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 neoplasm; attempt made to contact wife on 3/31/23 - unclear if patient has outpatient prostate workup   - CT Pelvis 3/28. significant for mild urothelial thickening and hyperenhancement suggestive of pyelitis. No evidence of pyelonephritis or renal abscess. Probable cystitis.  - Urology consulted - rec to recheck PSA once patient finishes abx for infection, no acute urologic workup needed at this time; will most likely perform outpatient. Patient ultimately will need bx, so will confirm with LHC/DAPT situation  - ID following, appreciate recs.   - S/p 2 wks of IV Abx. Meropenem ->Ceftriaxone 2G q24h (last day 4/4). Developed recurrent fever on 4/5 104.6 rectally  - Start Meropenem 500mg q24hrs per ID  - Pending gallium scan for fever w/u    #Diarrhea  - Has recurrent loose BMs. C-Diff negative  - GI PCR negative. Loperamide ordered PRN   - C-diff culture NEGATIVE

## 2023-04-06 NOTE — SWALLOW BEDSIDE ASSESSMENT ADULT - SWALLOW EVAL: DIAGNOSIS
Mild oral phase deficits likely r/t generalized weakness/deconditioning. Pharyngeal phase was clinically judged to be WFL with no overt s/s of aspiration. However, silent aspiration cannot be r/o at bedside.

## 2023-04-06 NOTE — PROGRESS NOTE ADULT - NS ATTEND AMEND GEN_ALL_CORE FT
#Pyelitis of transplant kidney/UTI, immunosuppressive status, fever     Patient spiked 104 fever for the first time overnight even before he was off abx for >24h. Per RN, no diarrhea overnight.  Pan-cultured.  Patient has intermittent rigor, but no new localizing symptoms.  He was intermittently non-coherent and mumbling the answer, worsening mental status. Abdomen non-tender, lung is clear. WBC 10, CRP 56, procal 0.97.   Unclear source of fever - could be due to pyelitis but he already got 2 weeks of appropriate abx.  He is no longer having diarrhea per RN.  I discussed the case with [facility] attending Dr. Paulino and he recommended gallium scan whole body rather than CT c/a/p.  Start meropenem 500mg IV q24h. send UA and UCx.  f/u Gallium scan.    Team 2 will follow you.  Case d/w primary team.    Luann Carvajal MD, MS  Infectious Disease attending  work cell [phone]   For any questions during evening/weekend/holiday, please page ID on call #Pyelitis of transplant kidney/UTI, immunosuppressive status, fever     Patient spiked 104 fever for the first time overnight even before he was off abx for >24h. Per RN, no diarrhea overnight.  Pan-cultured.  Patient has intermittent rigor, but no new localizing symptoms.  He was intermittently non-coherent and mumbling the answer, worsening mental status. Abdomen non-tender, lung is clear. WBC 10, CRP 56, procal 0.97.   Unclear source of fever - could be due to pyelitis but he already got 2 weeks of appropriate abx.  He is no longer having diarrhea per RN.  I discussed the case with [facility] attending Dr. Paulino and he recommended gallium scan whole body rather than CT c/a/p.  Start meropenem 500mg IV q24h. send UA and UCx.  f/u Gallium scan.    Team 2 will follow you.  Case d/w primary team.    Luann Carvajal MD, MS  Infectious Disease attending  work cell [phone]   For any questions during evening/weekend/holiday, please page ID on call #Pyelitis of transplant kidney/UTI, immunosuppressive status, fever     Patient spiked 104 fever for the first time overnight even before he was off abx for >24h. Per RN, no diarrhea overnight.  Pan-cultured.  Patient has intermittent rigor, but no new localizing symptoms.  He was intermittently non-coherent and mumbling the answer, worsening mental status. Abdomen non-tender, lung is clear. WBC 10, CRP 56, procal 0.97.   Unclear source of fever - could be due to pyelitis but he already got 2 weeks of appropriate abx.  He is no longer having diarrhea per RN.  I discussed the case with [facility] attending Dr. Paulino and he recommended gallium scan whole body rather than CT c/a/p.  Start meropenem 500mg IV q24h. send UA and UCx.  f/u Gallium scan.    Team 2 will follow you.  Case d/w primary team.    Luann Carvajal MD, MS  Infectious Disease attending  work cell [phone]   For any questions during evening/weekend/holiday, please page ID on call

## 2023-04-06 NOTE — PROGRESS NOTE ADULT - PROBLEM SELECTOR PLAN 4
C/w home meds: Amlodipine 10mg qd, Losartan 100mg qd, Hydralazine 50m TID, Isordil 20mg TID with hold parameters home meds: Amlodipine 10mg qd, Losartan 100mg qd, Hydralazine 50m TID, Isordil 20mg TID     Plan:  - c/w home meds

## 2023-04-06 NOTE — PROGRESS NOTE ADULT - SUBJECTIVE AND OBJECTIVE BOX
WANG ARELLANO, 62y, Male  MRN-[id number]  Patient is a 62y old  Male who presents with a chief complaint of ICD placement (06 Apr 2023 11:53)    *** Transfer to [facility] from Cardiology ***    HOSPITAL COURSE:  63 y/o M, PMHx HTN, HLD, DM, ICM (s/p cath years ago, RCA 99%, never intervened on), HFrEF 25-30%, ESRD s/p failed kidney transplant (LUE AVF; HD TTS), R AKA initially admitted to [facility] 2/27 for Respiratory Distress 2/2 SIRS s/p HD session whose course c/b cardiac arrest and Vtach. Now s/p extubation/pressors whose course further c/b LGIB (Hgb 3.5 s/p multiple transfusions and no active bleed). Transferred to [facility] 3/17 for ICD eval, however, found to have BRBPR on admission s/p flex sig without active bleed. Course further c/b ongoing fevers of unclear etiology throughout hospitalization, w/u remarkable for pyelitis s/p 2 wks course IV abx on 4/4, now w/ recurrent fever 104.6 on 4/5. Initial plan for LHC and SubQ ICD deferred 2/2 persistent fevers. Now awaiting Lifevest fitting. Medicine and ID following, pending gallium scan. Pt does not require further telemetry monitoring, now pending transfer to regional medicine service for ongoing fever w/u.    12 Point ROS Negative unless noted otherwise above.  -------------------------------------------------------------------------------  VITAL SIGNS:  Vital Signs Last 24 Hrs  T(C): 39.1 (06 Apr 2023 13:35), Max: 40.3 (05 Apr 2023 21:13)  T(F): 102.4 (06 Apr 2023 13:35), Max: 104.6 (05 Apr 2023 21:13)  HR: 65 (06 Apr 2023 15:34) (56 - 96)  BP: 121/59 (06 Apr 2023 15:34) (121/59 - 146/64)  BP(mean): 81 (06 Apr 2023 13:35) (81 - 108)  RR: 19 (06 Apr 2023 15:34) (18 - 19)  SpO2: 98% (06 Apr 2023 15:34) (94% - 100%)    Parameters below as of 06 Apr 2023 15:34  Patient On (Oxygen Delivery Method): nasal cannula  O2 Flow (L/min): 2    I&O's Summary    05 Apr 2023 07:01  -  06 Apr 2023 07:00  --------------------------------------------------------  IN: 360 mL / OUT: 900 mL / NET: -540 mL    06 Apr 2023 07:01  -  06 Apr 2023 18:01  --------------------------------------------------------  IN: 0 mL / OUT: 0 mL / NET: 0 mL        PHYSICAL EXAM:    General: NAD, well developed  HEENT: NC/AT; EOMI, PERRLA, anicteric sclera; moist mucosal membranes.  Neck: supple, trachea midline  Cardiovascular: RRR, +S1/S2; NO M/R/G  Respiratory: CTA B/L; no W/R/R  Gastrointestinal: soft, NT/ND; +BSx4  Extremities: WWP; no edema or cyanosis  Vascular: 2+ radial, DP/PT pulses B/L  Neurological: AAOx3; no focal deficits    ALLERGIES:  Allergies    Allergy Status Unknown    Intolerances        MEDICATIONS:  MEDICATIONS  (STANDING):  aspirin enteric coated 81 milliGRAM(s) Oral daily  atorvastatin 40 milliGRAM(s) Oral at bedtime  chlorhexidine 2% Cloths 1 Application(s) Topical daily  clopidogrel Tablet 75 milliGRAM(s) Oral daily  dextrose 5%. 1000 milliLiter(s) (100 mL/Hr) IV Continuous <Continuous>  dextrose 5%. 1000 milliLiter(s) (50 mL/Hr) IV Continuous <Continuous>  dextrose 50% Injectable 25 Gram(s) IV Push once  dextrose 50% Injectable 12.5 Gram(s) IV Push once  epoetin janae-epbx (RETACRIT) Injectable 6000 Unit(s) IV Push once  ferrous    sulfate 325 milliGRAM(s) Oral daily  hydrALAZINE 50 milliGRAM(s) Oral every 8 hours  insulin lispro (ADMELOG) corrective regimen sliding scale   SubCutaneous Before meals and at bedtime  isosorbide   dinitrate Tablet (ISORDIL) 20 milliGRAM(s) Oral three times a day  losartan 100 milliGRAM(s) Oral every 24 hours  meropenem  IVPB 500 milliGRAM(s) IV Intermittent every 24 hours  metoprolol succinate ER 25 milliGRAM(s) Oral daily  pantoprazole  Injectable 40 milliGRAM(s) IV Push every 12 hours  tacrolimus 2 milliGRAM(s) Oral every 12 hours  tamsulosin 0.8 milliGRAM(s) Oral at bedtime    MEDICATIONS  (PRN):  acetaminophen     Tablet .. 650 milliGRAM(s) Oral every 6 hours PRN Temp greater or equal to 38C (100.4F), Mild Pain (1 - 3), Moderate Pain (4 - 6)  dextrose Oral Gel 15 Gram(s) Oral once PRN Blood Glucose LESS THAN 70 milliGRAM(s)/deciliter  loperamide 2 milliGRAM(s) Oral three times a day PRN Diarrhea  sodium chloride 0.65% Nasal 1 Spray(s) Both Nostrils every 4 hours PRN Nasal Congestion      -------------------------------------------------------------------------------  LABS:                        10.0   10.82 )-----------( 202 ( 06 Apr 2023 05:30 )             31.3     04-06    126<L>  |  96  |  4<L>  ----------------------------<  109<H>  4.4   |  27  |  4.44<H>    Ca    8.2<L>      06 Apr 2023 05:30  Phos  2.7     04-06  Mg     1.5     04-06    TPro  4.7<L>  /  Alb  2.4<L>  /  TBili  0.3  /  DBili  x   /  AST  17  /  ALT  <5<L>  /  AlkPhos  159<H>  04-05    LIVER FUNCTIONS - ( 05 Apr 2023 07:26 )  Alb: 2.4 g/dL / Pro: 4.7 g/dL / ALK PHOS: 159 U/L / ALT: <5 U/L / AST: 17 U/L / GGT: x           PT/INR - ( 06 Apr 2023 05:30 )   PT: 18.5 sec;   INR: 1.55          PTT - ( 06 Apr 2023 05:30 )  PTT:39.9 sec    CAPILLARY BLOOD GLUCOSE      POCT Blood Glucose.: 107 mg/dL (06 Apr 2023 17:29)      Culture - Blood (collected 05 Apr 2023 23:35)  Source: .Blood Blood-Peripheral  Preliminary Report (06 Apr 2023 12:00):    No growth at 12 hours    Culture - Blood (collected 05 Apr 2023 23:00)  Source: .Blood Blood-Peripheral  Preliminary Report (06 Apr 2023 12:00):    No growth at 12 hours      SARS-CoV-2: NotDetec (21 Mar 2023 18:03)  COVID-19 PCR: NotDetec (17 Mar 2023 03:42)      RADIOLOGY & ADDITIONAL TESTS: Reviewed.       WANG ARELLANO, 62y, Male  MRN-[id number]  Patient is a 62y old  Male who presents with a chief complaint of ICD placement (06 Apr 2023 11:53)    *** Transfer to [facility] from Cardiology ***    HOSPITAL COURSE:  63 y/o M, PMHx HTN, HLD, DM, ICM (s/p cath years ago, RCA 99%, never intervened on), HFrEF 25-30%, ESRD s/p failed kidney transplant (LUE AVF; HD TTS), R AKA initially admitted to [facility] 2/27 for Respiratory Distress 2/2 SIRS s/p HD session whose course c/b cardiac arrest and Vtach. Now s/p extubation/pressors whose course further c/b LGIB (Hgb 3.5 s/p multiple transfusions and no active bleed). Transferred to [facility] 3/17 for ICD eval, however, found to have BRBPR on admission s/p flex sig without active bleed. Course further c/b ongoing fevers of unclear etiology throughout hospitalization, w/u remarkable for pyelitis s/p 2 wks course IV abx on 4/4, now w/ recurrent fever 104.6 on 4/5. Initial plan for LHC and SubQ ICD deferred 2/2 persistent fevers. Now awaiting Lifevest fitting. Medicine and ID following, pending gallium scan. Pt does not require further telemetry monitoring, now pending transfer to regional medicine service for ongoing fever w/u.    12 Point ROS Negative unless noted otherwise above.  -------------------------------------------------------------------------------  VITAL SIGNS:  Vital Signs Last 24 Hrs  T(C): 39.1 (06 Apr 2023 13:35), Max: 40.3 (05 Apr 2023 21:13)  T(F): 102.4 (06 Apr 2023 13:35), Max: 104.6 (05 Apr 2023 21:13)  HR: 65 (06 Apr 2023 15:34) (56 - 96)  BP: 121/59 (06 Apr 2023 15:34) (121/59 - 146/64)  BP(mean): 81 (06 Apr 2023 13:35) (81 - 108)  RR: 19 (06 Apr 2023 15:34) (18 - 19)  SpO2: 98% (06 Apr 2023 15:34) (94% - 100%)    Parameters below as of 06 Apr 2023 15:34  Patient On (Oxygen Delivery Method): nasal cannula  O2 Flow (L/min): 2    I&O's Summary    05 Apr 2023 07:01  -  06 Apr 2023 07:00  --------------------------------------------------------  IN: 360 mL / OUT: 900 mL / NET: -540 mL    06 Apr 2023 07:01  -  06 Apr 2023 18:01  --------------------------------------------------------  IN: 0 mL / OUT: 0 mL / NET: 0 mL        PHYSICAL EXAM:    General: NAD, well developed  HEENT: NC/AT; EOMI, PERRLA, anicteric sclera; moist mucosal membranes.  Neck: supple, trachea midline  Cardiovascular: RRR, +S1/S2; NO M/R/G  Respiratory: CTA B/L; no W/R/R  Gastrointestinal: soft, NT/ND; +BSx4  Extremities: WWP; no edema or cyanosis  Vascular: 2+ radial, DP/PT pulses B/L  Neurological: AAOx3; no focal deficits    ALLERGIES:  Allergies    Allergy Status Unknown    Intolerances        MEDICATIONS:  MEDICATIONS  (STANDING):  aspirin enteric coated 81 milliGRAM(s) Oral daily  atorvastatin 40 milliGRAM(s) Oral at bedtime  chlorhexidine 2% Cloths 1 Application(s) Topical daily  clopidogrel Tablet 75 milliGRAM(s) Oral daily  dextrose 5%. 1000 milliLiter(s) (100 mL/Hr) IV Continuous <Continuous>  dextrose 5%. 1000 milliLiter(s) (50 mL/Hr) IV Continuous <Continuous>  dextrose 50% Injectable 25 Gram(s) IV Push once  dextrose 50% Injectable 12.5 Gram(s) IV Push once  epoetin janae-epbx (RETACRIT) Injectable 6000 Unit(s) IV Push once  ferrous    sulfate 325 milliGRAM(s) Oral daily  hydrALAZINE 50 milliGRAM(s) Oral every 8 hours  insulin lispro (ADMELOG) corrective regimen sliding scale   SubCutaneous Before meals and at bedtime  isosorbide   dinitrate Tablet (ISORDIL) 20 milliGRAM(s) Oral three times a day  losartan 100 milliGRAM(s) Oral every 24 hours  meropenem  IVPB 500 milliGRAM(s) IV Intermittent every 24 hours  metoprolol succinate ER 25 milliGRAM(s) Oral daily  pantoprazole  Injectable 40 milliGRAM(s) IV Push every 12 hours  tacrolimus 2 milliGRAM(s) Oral every 12 hours  tamsulosin 0.8 milliGRAM(s) Oral at bedtime    MEDICATIONS  (PRN):  acetaminophen     Tablet .. 650 milliGRAM(s) Oral every 6 hours PRN Temp greater or equal to 38C (100.4F), Mild Pain (1 - 3), Moderate Pain (4 - 6)  dextrose Oral Gel 15 Gram(s) Oral once PRN Blood Glucose LESS THAN 70 milliGRAM(s)/deciliter  loperamide 2 milliGRAM(s) Oral three times a day PRN Diarrhea  sodium chloride 0.65% Nasal 1 Spray(s) Both Nostrils every 4 hours PRN Nasal Congestion      -------------------------------------------------------------------------------  LABS:                        10.0   10.82 )-----------( 202 ( 06 Apr 2023 05:30 )             31.3     04-06    126<L>  |  96  |  4<L>  ----------------------------<  109<H>  4.4   |  27  |  4.44<H>    Ca    8.2<L>      06 Apr 2023 05:30  Phos  2.7     04-06  Mg     1.5     04-06    TPro  4.7<L>  /  Alb  2.4<L>  /  TBili  0.3  /  DBili  x   /  AST  17  /  ALT  <5<L>  /  AlkPhos  159<H>  04-05    LIVER FUNCTIONS - ( 05 Apr 2023 07:26 )  Alb: 2.4 g/dL / Pro: 4.7 g/dL / ALK PHOS: 159 U/L / ALT: <5 U/L / AST: 17 U/L / GGT: x           PT/INR - ( 06 Apr 2023 05:30 )   PT: 18.5 sec;   INR: 1.55          PTT - ( 06 Apr 2023 05:30 )  PTT:39.9 sec    CAPILLARY BLOOD GLUCOSE      POCT Blood Glucose.: 107 mg/dL (06 Apr 2023 17:29)      Culture - Blood (collected 05 Apr 2023 23:35)  Source: .Blood Blood-Peripheral  Preliminary Report (06 Apr 2023 12:00):    No growth at 12 hours    Culture - Blood (collected 05 Apr 2023 23:00)  Source: .Blood Blood-Peripheral  Preliminary Report (06 Apr 2023 12:00):    No growth at 12 hours      SARS-CoV-2: NotDetec (21 Mar 2023 18:03)  COVID-19 PCR: NotDetec (17 Mar 2023 03:42)      RADIOLOGY & ADDITIONAL TESTS: Reviewed.       WANG ARELLANO, 62y, Male  MRN-[id number]  Patient is a 62y old  Male who presents with a chief complaint of ICD placement (06 Apr 2023 11:53)    *** Transfer to [facility] from Cardiology ***    HOSPITAL COURSE:  61 y/o M, PMHx HTN, HLD, DM, ICM (s/p cath years ago, RCA 99%, never intervened on), HFrEF 25-30%, ESRD s/p failed kidney transplant (LUE AVF; HD TTS), R AKA initially admitted to [facility] 2/27 for Respiratory Distress 2/2 SIRS s/p HD session whose course c/b cardiac arrest and Vtach. Now s/p extubation/pressors whose course further c/b LGIB (Hgb 3.5 s/p multiple transfusions and no active bleed). Transferred to [facility] 3/17 for ICD eval, however, found to have BRBPR on admission s/p flex sig without active bleed. Course further c/b ongoing fevers of unclear etiology throughout hospitalization, w/u remarkable for pyelitis s/p 2 wks course IV abx on 4/4, now w/ recurrent fever 104.6 on 4/5. Initial plan for LHC and SubQ ICD deferred 2/2 persistent fevers. Now awaiting Lifevest fitting. Medicine and ID following, pending gallium scan. Pt does not require further telemetry monitoring, now pending transfer to regional medicine service for ongoing fever w/u.    12 Point ROS Negative unless noted otherwise above.  -------------------------------------------------------------------------------  VITAL SIGNS:  Vital Signs Last 24 Hrs  T(C): 39.1 (06 Apr 2023 13:35), Max: 40.3 (05 Apr 2023 21:13)  T(F): 102.4 (06 Apr 2023 13:35), Max: 104.6 (05 Apr 2023 21:13)  HR: 65 (06 Apr 2023 15:34) (56 - 96)  BP: 121/59 (06 Apr 2023 15:34) (121/59 - 146/64)  BP(mean): 81 (06 Apr 2023 13:35) (81 - 108)  RR: 19 (06 Apr 2023 15:34) (18 - 19)  SpO2: 98% (06 Apr 2023 15:34) (94% - 100%)    Parameters below as of 06 Apr 2023 15:34  Patient On (Oxygen Delivery Method): nasal cannula  O2 Flow (L/min): 2    I&O's Summary    05 Apr 2023 07:01  -  06 Apr 2023 07:00  --------------------------------------------------------  IN: 360 mL / OUT: 900 mL / NET: -540 mL    06 Apr 2023 07:01  -  06 Apr 2023 18:01  --------------------------------------------------------  IN: 0 mL / OUT: 0 mL / NET: 0 mL        PHYSICAL EXAM:    General: NAD, well developed  HEENT: NC/AT; EOMI, PERRLA, anicteric sclera; moist mucosal membranes.  Neck: supple, trachea midline  Cardiovascular: RRR, +S1/S2; NO M/R/G  Respiratory: CTA B/L; no W/R/R  Gastrointestinal: soft, NT/ND; +BSx4  Extremities: WWP; no edema or cyanosis  Vascular: 2+ radial, DP/PT pulses B/L  Neurological: AAOx3; no focal deficits    ALLERGIES:  Allergies    Allergy Status Unknown    Intolerances        MEDICATIONS:  MEDICATIONS  (STANDING):  aspirin enteric coated 81 milliGRAM(s) Oral daily  atorvastatin 40 milliGRAM(s) Oral at bedtime  chlorhexidine 2% Cloths 1 Application(s) Topical daily  clopidogrel Tablet 75 milliGRAM(s) Oral daily  dextrose 5%. 1000 milliLiter(s) (100 mL/Hr) IV Continuous <Continuous>  dextrose 5%. 1000 milliLiter(s) (50 mL/Hr) IV Continuous <Continuous>  dextrose 50% Injectable 25 Gram(s) IV Push once  dextrose 50% Injectable 12.5 Gram(s) IV Push once  epoetin janae-epbx (RETACRIT) Injectable 6000 Unit(s) IV Push once  ferrous    sulfate 325 milliGRAM(s) Oral daily  hydrALAZINE 50 milliGRAM(s) Oral every 8 hours  insulin lispro (ADMELOG) corrective regimen sliding scale   SubCutaneous Before meals and at bedtime  isosorbide   dinitrate Tablet (ISORDIL) 20 milliGRAM(s) Oral three times a day  losartan 100 milliGRAM(s) Oral every 24 hours  meropenem  IVPB 500 milliGRAM(s) IV Intermittent every 24 hours  metoprolol succinate ER 25 milliGRAM(s) Oral daily  pantoprazole  Injectable 40 milliGRAM(s) IV Push every 12 hours  tacrolimus 2 milliGRAM(s) Oral every 12 hours  tamsulosin 0.8 milliGRAM(s) Oral at bedtime    MEDICATIONS  (PRN):  acetaminophen     Tablet .. 650 milliGRAM(s) Oral every 6 hours PRN Temp greater or equal to 38C (100.4F), Mild Pain (1 - 3), Moderate Pain (4 - 6)  dextrose Oral Gel 15 Gram(s) Oral once PRN Blood Glucose LESS THAN 70 milliGRAM(s)/deciliter  loperamide 2 milliGRAM(s) Oral three times a day PRN Diarrhea  sodium chloride 0.65% Nasal 1 Spray(s) Both Nostrils every 4 hours PRN Nasal Congestion      -------------------------------------------------------------------------------  LABS:                        10.0   10.82 )-----------( 202 ( 06 Apr 2023 05:30 )             31.3     04-06    126<L>  |  96  |  4<L>  ----------------------------<  109<H>  4.4   |  27  |  4.44<H>    Ca    8.2<L>      06 Apr 2023 05:30  Phos  2.7     04-06  Mg     1.5     04-06    TPro  4.7<L>  /  Alb  2.4<L>  /  TBili  0.3  /  DBili  x   /  AST  17  /  ALT  <5<L>  /  AlkPhos  159<H>  04-05    LIVER FUNCTIONS - ( 05 Apr 2023 07:26 )  Alb: 2.4 g/dL / Pro: 4.7 g/dL / ALK PHOS: 159 U/L / ALT: <5 U/L / AST: 17 U/L / GGT: x           PT/INR - ( 06 Apr 2023 05:30 )   PT: 18.5 sec;   INR: 1.55          PTT - ( 06 Apr 2023 05:30 )  PTT:39.9 sec    CAPILLARY BLOOD GLUCOSE      POCT Blood Glucose.: 107 mg/dL (06 Apr 2023 17:29)      Culture - Blood (collected 05 Apr 2023 23:35)  Source: .Blood Blood-Peripheral  Preliminary Report (06 Apr 2023 12:00):    No growth at 12 hours    Culture - Blood (collected 05 Apr 2023 23:00)  Source: .Blood Blood-Peripheral  Preliminary Report (06 Apr 2023 12:00):    No growth at 12 hours      SARS-CoV-2: NotDetec (21 Mar 2023 18:03)  COVID-19 PCR: NotDetec (17 Mar 2023 03:42)      RADIOLOGY & ADDITIONAL TESTS: Reviewed.       WANG ARELLANO, 62y, Male  MRN-[id number]  Patient is a 62y old  Male who presents with a chief complaint of ICD placement (06 Apr 2023 11:53)    *** Transfer to [facility] from Cardiology ***    HOSPITAL COURSE:  63 y/o M, PMHx HTN, HLD, DM, ICM (s/p cath years ago, RCA 99%, never intervened on), HFrEF 25-30%, ESRD s/p failed kidney transplant (LUE AVF; HD TTS), R AKA initially admitted to [facility] 2/27 for Respiratory Distress 2/2 SIRS s/p HD session whose course c/b cardiac arrest and Vtach. Now s/p extubation/pressors whose course further c/b LGIB (Hgb 3.5 s/p multiple transfusions and no active bleed). Transferred to [facility] 3/17 for ICD eval, however, found to have BRBPR on admission s/p flex sig without active bleed. Course further c/b ongoing fevers of unclear etiology throughout hospitalization, w/u remarkable for pyelitis s/p 2 wks course IV abx on 4/4, now w/ recurrent fever 104.6 on 4/5. Initial plan for LHC and SubQ ICD deferred 2/2 persistent fevers. Now awaiting Lifevest fitting. Medicine and ID following, pending gallium scan. Pt does not require further telemetry monitoring, now pending transfer to regional medicine service for ongoing fever w/u.    12 Point ROS Negative unless noted otherwise above.  -------------------------------------------------------------------------------  VITAL SIGNS:  Vital Signs Last 24 Hrs  T(C): 39.1 (06 Apr 2023 13:35), Max: 40.3 (05 Apr 2023 21:13)  T(F): 102.4 (06 Apr 2023 13:35), Max: 104.6 (05 Apr 2023 21:13)  HR: 65 (06 Apr 2023 15:34) (56 - 96)  BP: 121/59 (06 Apr 2023 15:34) (121/59 - 146/64)  BP(mean): 81 (06 Apr 2023 13:35) (81 - 108)  RR: 19 (06 Apr 2023 15:34) (18 - 19)  SpO2: 98% (06 Apr 2023 15:34) (94% - 100%)    Parameters below as of 06 Apr 2023 15:34  Patient On (Oxygen Delivery Method): nasal cannula  O2 Flow (L/min): 2    I&O's Summary    05 Apr 2023 07:01  -  06 Apr 2023 07:00  --------------------------------------------------------  IN: 360 mL / OUT: 900 mL / NET: -540 mL    06 Apr 2023 07:01  -  06 Apr 2023 18:01  --------------------------------------------------------  IN: 0 mL / OUT: 0 mL / NET: 0 mL        PHYSICAL EXAM:    Constitutional: warm on exam, resting comfortably in bed; NAD  HEENT: NC/AT, PER, anicteric sclera, no nasal discharge; MMM  Respiratory: CTA B/L; no W/R/R  Cardiac: +S1/S2; RRR; no M/R/G  Gastrointestinal: soft, NT/ND; no rebound or guarding  Extremities: WWP, no clubbing, cyanosis, or peripheral edema; Left arm fistula; right leg AKA  Musculoskeletal: no joint swelling, tenderness or erythema  Vascular: 2+ radial  Dermatologic: skin warm, dry and intact; no rashes, wounds, or scars  Neurologic: AAOx3; CNII-XII grossly intact; no focal deficits      ALLERGIES:  Allergies    Allergy Status Unknown    Intolerances        MEDICATIONS:  MEDICATIONS  (STANDING):  aspirin enteric coated 81 milliGRAM(s) Oral daily  atorvastatin 40 milliGRAM(s) Oral at bedtime  chlorhexidine 2% Cloths 1 Application(s) Topical daily  clopidogrel Tablet 75 milliGRAM(s) Oral daily  dextrose 5%. 1000 milliLiter(s) (100 mL/Hr) IV Continuous <Continuous>  dextrose 5%. 1000 milliLiter(s) (50 mL/Hr) IV Continuous <Continuous>  dextrose 50% Injectable 25 Gram(s) IV Push once  dextrose 50% Injectable 12.5 Gram(s) IV Push once  epoetin janae-epbx (RETACRIT) Injectable 6000 Unit(s) IV Push once  ferrous    sulfate 325 milliGRAM(s) Oral daily  hydrALAZINE 50 milliGRAM(s) Oral every 8 hours  insulin lispro (ADMELOG) corrective regimen sliding scale   SubCutaneous Before meals and at bedtime  isosorbide   dinitrate Tablet (ISORDIL) 20 milliGRAM(s) Oral three times a day  losartan 100 milliGRAM(s) Oral every 24 hours  meropenem  IVPB 500 milliGRAM(s) IV Intermittent every 24 hours  metoprolol succinate ER 25 milliGRAM(s) Oral daily  pantoprazole  Injectable 40 milliGRAM(s) IV Push every 12 hours  tacrolimus 2 milliGRAM(s) Oral every 12 hours  tamsulosin 0.8 milliGRAM(s) Oral at bedtime    MEDICATIONS  (PRN):  acetaminophen     Tablet .. 650 milliGRAM(s) Oral every 6 hours PRN Temp greater or equal to 38C (100.4F), Mild Pain (1 - 3), Moderate Pain (4 - 6)  dextrose Oral Gel 15 Gram(s) Oral once PRN Blood Glucose LESS THAN 70 milliGRAM(s)/deciliter  loperamide 2 milliGRAM(s) Oral three times a day PRN Diarrhea  sodium chloride 0.65% Nasal 1 Spray(s) Both Nostrils every 4 hours PRN Nasal Congestion      -------------------------------------------------------------------------------  LABS:                        10.0   10.82 )-----------( 202      ( 06 Apr 2023 05:30 )             31.3     04-06    126<L>  |  96  |  4<L>  ----------------------------<  109<H>  4.4   |  27  |  4.44<H>    Ca    8.2<L>      06 Apr 2023 05:30  Phos  2.7     04-06  Mg     1.5     04-06    TPro  4.7<L>  /  Alb  2.4<L>  /  TBili  0.3  /  DBili  x   /  AST  17  /  ALT  <5<L>  /  AlkPhos  159<H>  04-05    LIVER FUNCTIONS - ( 05 Apr 2023 07:26 )  Alb: 2.4 g/dL / Pro: 4.7 g/dL / ALK PHOS: 159 U/L / ALT: <5 U/L / AST: 17 U/L / GGT: x           PT/INR - ( 06 Apr 2023 05:30 )   PT: 18.5 sec;   INR: 1.55          PTT - ( 06 Apr 2023 05:30 )  PTT:39.9 sec    CAPILLARY BLOOD GLUCOSE      POCT Blood Glucose.: 107 mg/dL (06 Apr 2023 17:29)      Culture - Blood (collected 05 Apr 2023 23:35)  Source: .Blood Blood-Peripheral  Preliminary Report (06 Apr 2023 12:00):    No growth at 12 hours    Culture - Blood (collected 05 Apr 2023 23:00)  Source: .Blood Blood-Peripheral  Preliminary Report (06 Apr 2023 12:00):    No growth at 12 hours      SARS-CoV-2: NotDetec (21 Mar 2023 18:03)  COVID-19 PCR: NotDetec (17 Mar 2023 03:42)      RADIOLOGY & ADDITIONAL TESTS: Reviewed.       WANG ARELLANO, 62y, Male  MRN-[id number]  Patient is a 62y old  Male who presents with a chief complaint of ICD placement (06 Apr 2023 11:53)    *** Transfer to [facility] from Cardiology ***    HOSPITAL COURSE:  61 y/o M, PMHx HTN, HLD, DM, ICM (s/p cath years ago, RCA 99%, never intervened on), HFrEF 25-30%, ESRD s/p failed kidney transplant (LUE AVF; HD TTS), R AKA initially admitted to [facility] 2/27 for Respiratory Distress 2/2 SIRS s/p HD session whose course c/b cardiac arrest and Vtach. Now s/p extubation/pressors whose course further c/b LGIB (Hgb 3.5 s/p multiple transfusions and no active bleed). Transferred to [facility] 3/17 for ICD eval, however, found to have BRBPR on admission s/p flex sig without active bleed. Course further c/b ongoing fevers of unclear etiology throughout hospitalization, w/u remarkable for pyelitis s/p 2 wks course IV abx on 4/4, now w/ recurrent fever 104.6 on 4/5. Initial plan for LHC and SubQ ICD deferred 2/2 persistent fevers. Now awaiting Lifevest fitting. Medicine and ID following, pending gallium scan. Pt does not require further telemetry monitoring, now pending transfer to regional medicine service for ongoing fever w/u.    12 Point ROS Negative unless noted otherwise above.  -------------------------------------------------------------------------------  VITAL SIGNS:  Vital Signs Last 24 Hrs  T(C): 39.1 (06 Apr 2023 13:35), Max: 40.3 (05 Apr 2023 21:13)  T(F): 102.4 (06 Apr 2023 13:35), Max: 104.6 (05 Apr 2023 21:13)  HR: 65 (06 Apr 2023 15:34) (56 - 96)  BP: 121/59 (06 Apr 2023 15:34) (121/59 - 146/64)  BP(mean): 81 (06 Apr 2023 13:35) (81 - 108)  RR: 19 (06 Apr 2023 15:34) (18 - 19)  SpO2: 98% (06 Apr 2023 15:34) (94% - 100%)    Parameters below as of 06 Apr 2023 15:34  Patient On (Oxygen Delivery Method): nasal cannula  O2 Flow (L/min): 2    I&O's Summary    05 Apr 2023 07:01  -  06 Apr 2023 07:00  --------------------------------------------------------  IN: 360 mL / OUT: 900 mL / NET: -540 mL    06 Apr 2023 07:01  -  06 Apr 2023 18:01  --------------------------------------------------------  IN: 0 mL / OUT: 0 mL / NET: 0 mL        PHYSICAL EXAM:    Constitutional: warm on exam, resting comfortably in bed; NAD  HEENT: NC/AT, PER, anicteric sclera, no nasal discharge; MMM  Respiratory: CTA B/L; no W/R/R  Cardiac: +S1/S2; RRR; no M/R/G  Gastrointestinal: soft, NT/ND; no rebound or guarding  Extremities: WWP, no clubbing, cyanosis, or peripheral edema; Left arm fistula; right leg AKA  Musculoskeletal: no joint swelling, tenderness or erythema  Vascular: 2+ radial  Dermatologic: skin warm, dry and intact; no rashes, wounds, or scars  Neurologic: AAOx3; CNII-XII grossly intact; no focal deficits      ALLERGIES:  Allergies    Allergy Status Unknown    Intolerances        MEDICATIONS:  MEDICATIONS  (STANDING):  aspirin enteric coated 81 milliGRAM(s) Oral daily  atorvastatin 40 milliGRAM(s) Oral at bedtime  chlorhexidine 2% Cloths 1 Application(s) Topical daily  clopidogrel Tablet 75 milliGRAM(s) Oral daily  dextrose 5%. 1000 milliLiter(s) (100 mL/Hr) IV Continuous <Continuous>  dextrose 5%. 1000 milliLiter(s) (50 mL/Hr) IV Continuous <Continuous>  dextrose 50% Injectable 25 Gram(s) IV Push once  dextrose 50% Injectable 12.5 Gram(s) IV Push once  epoetin janae-epbx (RETACRIT) Injectable 6000 Unit(s) IV Push once  ferrous    sulfate 325 milliGRAM(s) Oral daily  hydrALAZINE 50 milliGRAM(s) Oral every 8 hours  insulin lispro (ADMELOG) corrective regimen sliding scale   SubCutaneous Before meals and at bedtime  isosorbide   dinitrate Tablet (ISORDIL) 20 milliGRAM(s) Oral three times a day  losartan 100 milliGRAM(s) Oral every 24 hours  meropenem  IVPB 500 milliGRAM(s) IV Intermittent every 24 hours  metoprolol succinate ER 25 milliGRAM(s) Oral daily  pantoprazole  Injectable 40 milliGRAM(s) IV Push every 12 hours  tacrolimus 2 milliGRAM(s) Oral every 12 hours  tamsulosin 0.8 milliGRAM(s) Oral at bedtime    MEDICATIONS  (PRN):  acetaminophen     Tablet .. 650 milliGRAM(s) Oral every 6 hours PRN Temp greater or equal to 38C (100.4F), Mild Pain (1 - 3), Moderate Pain (4 - 6)  dextrose Oral Gel 15 Gram(s) Oral once PRN Blood Glucose LESS THAN 70 milliGRAM(s)/deciliter  loperamide 2 milliGRAM(s) Oral three times a day PRN Diarrhea  sodium chloride 0.65% Nasal 1 Spray(s) Both Nostrils every 4 hours PRN Nasal Congestion      -------------------------------------------------------------------------------  LABS:                        10.0   10.82 )-----------( 202      ( 06 Apr 2023 05:30 )             31.3     04-06    126<L>  |  96  |  4<L>  ----------------------------<  109<H>  4.4   |  27  |  4.44<H>    Ca    8.2<L>      06 Apr 2023 05:30  Phos  2.7     04-06  Mg     1.5     04-06    TPro  4.7<L>  /  Alb  2.4<L>  /  TBili  0.3  /  DBili  x   /  AST  17  /  ALT  <5<L>  /  AlkPhos  159<H>  04-05    LIVER FUNCTIONS - ( 05 Apr 2023 07:26 )  Alb: 2.4 g/dL / Pro: 4.7 g/dL / ALK PHOS: 159 U/L / ALT: <5 U/L / AST: 17 U/L / GGT: x           PT/INR - ( 06 Apr 2023 05:30 )   PT: 18.5 sec;   INR: 1.55          PTT - ( 06 Apr 2023 05:30 )  PTT:39.9 sec    CAPILLARY BLOOD GLUCOSE      POCT Blood Glucose.: 107 mg/dL (06 Apr 2023 17:29)      Culture - Blood (collected 05 Apr 2023 23:35)  Source: .Blood Blood-Peripheral  Preliminary Report (06 Apr 2023 12:00):    No growth at 12 hours    Culture - Blood (collected 05 Apr 2023 23:00)  Source: .Blood Blood-Peripheral  Preliminary Report (06 Apr 2023 12:00):    No growth at 12 hours      SARS-CoV-2: NotDetec (21 Mar 2023 18:03)  COVID-19 PCR: NotDetec (17 Mar 2023 03:42)      RADIOLOGY & ADDITIONAL TESTS: Reviewed.       WANG ARELLANO, 62y, Male  MRN-[id number]  Patient is a 62y old  Male who presents with a chief complaint of ICD placement (06 Apr 2023 11:53)    *** Transfer to [facility] from Cardiology ***    HOSPITAL COURSE:  61 y/o M, PMHx HTN, HLD, DM, ICM (s/p cath years ago, RCA 99%, never intervened on), HFrEF 25-30%, ESRD s/p failed kidney transplant (LUE AVF; HD TTS), R AKA initially admitted to [facility] 2/27 for Respiratory Distress 2/2 SIRS s/p HD session whose course c/b cardiac arrest and Vtach. Now s/p extubation/pressors whose course further c/b LGIB (Hgb 3.5 s/p multiple transfusions and no active bleed). Transferred to [facility] 3/17 for ICD eval, however, found to have BRBPR on admission s/p flex sig without active bleed. Course further c/b ongoing fevers of unclear etiology throughout hospitalization, w/u remarkable for pyelitis s/p 2 wks course IV abx on 4/4, now w/ recurrent fever 104.6 on 4/5. Initial plan for LHC and SubQ ICD deferred 2/2 persistent fevers. Now awaiting Lifevest fitting. Medicine and ID following, pending gallium scan. Pt does not require further telemetry monitoring, now pending transfer to regional medicine service for ongoing fever w/u.    12 Point ROS Negative unless noted otherwise above.  -------------------------------------------------------------------------------  VITAL SIGNS:  Vital Signs Last 24 Hrs  T(C): 39.1 (06 Apr 2023 13:35), Max: 40.3 (05 Apr 2023 21:13)  T(F): 102.4 (06 Apr 2023 13:35), Max: 104.6 (05 Apr 2023 21:13)  HR: 65 (06 Apr 2023 15:34) (56 - 96)  BP: 121/59 (06 Apr 2023 15:34) (121/59 - 146/64)  BP(mean): 81 (06 Apr 2023 13:35) (81 - 108)  RR: 19 (06 Apr 2023 15:34) (18 - 19)  SpO2: 98% (06 Apr 2023 15:34) (94% - 100%)    Parameters below as of 06 Apr 2023 15:34  Patient On (Oxygen Delivery Method): nasal cannula  O2 Flow (L/min): 2    I&O's Summary    05 Apr 2023 07:01  -  06 Apr 2023 07:00  --------------------------------------------------------  IN: 360 mL / OUT: 900 mL / NET: -540 mL    06 Apr 2023 07:01  -  06 Apr 2023 18:01  --------------------------------------------------------  IN: 0 mL / OUT: 0 mL / NET: 0 mL        PHYSICAL EXAM:    Constitutional: warm on exam, resting comfortably in bed; NAD  HEENT: NC/AT, PER, anicteric sclera, no nasal discharge; MMM  Respiratory: CTA B/L; no W/R/R  Cardiac: +S1/S2; RRR; no M/R/G  Gastrointestinal: soft, NT/ND; no rebound or guarding  Extremities: WWP, no clubbing, cyanosis, or peripheral edema; Left arm fistula; right leg AKA  Musculoskeletal: no joint swelling, tenderness or erythema  Vascular: 2+ radial  Dermatologic: skin warm, dry and intact; no rashes, wounds, or scars  Neurologic: AAOx3; CNII-XII grossly intact; no focal deficits      ALLERGIES:  Allergies    Allergy Status Unknown    Intolerances        MEDICATIONS:  MEDICATIONS  (STANDING):  aspirin enteric coated 81 milliGRAM(s) Oral daily  atorvastatin 40 milliGRAM(s) Oral at bedtime  chlorhexidine 2% Cloths 1 Application(s) Topical daily  clopidogrel Tablet 75 milliGRAM(s) Oral daily  dextrose 5%. 1000 milliLiter(s) (100 mL/Hr) IV Continuous <Continuous>  dextrose 5%. 1000 milliLiter(s) (50 mL/Hr) IV Continuous <Continuous>  dextrose 50% Injectable 25 Gram(s) IV Push once  dextrose 50% Injectable 12.5 Gram(s) IV Push once  epoetin janae-epbx (RETACRIT) Injectable 6000 Unit(s) IV Push once  ferrous    sulfate 325 milliGRAM(s) Oral daily  hydrALAZINE 50 milliGRAM(s) Oral every 8 hours  insulin lispro (ADMELOG) corrective regimen sliding scale   SubCutaneous Before meals and at bedtime  isosorbide   dinitrate Tablet (ISORDIL) 20 milliGRAM(s) Oral three times a day  losartan 100 milliGRAM(s) Oral every 24 hours  meropenem  IVPB 500 milliGRAM(s) IV Intermittent every 24 hours  metoprolol succinate ER 25 milliGRAM(s) Oral daily  pantoprazole  Injectable 40 milliGRAM(s) IV Push every 12 hours  tacrolimus 2 milliGRAM(s) Oral every 12 hours  tamsulosin 0.8 milliGRAM(s) Oral at bedtime    MEDICATIONS  (PRN):  acetaminophen     Tablet .. 650 milliGRAM(s) Oral every 6 hours PRN Temp greater or equal to 38C (100.4F), Mild Pain (1 - 3), Moderate Pain (4 - 6)  dextrose Oral Gel 15 Gram(s) Oral once PRN Blood Glucose LESS THAN 70 milliGRAM(s)/deciliter  loperamide 2 milliGRAM(s) Oral three times a day PRN Diarrhea  sodium chloride 0.65% Nasal 1 Spray(s) Both Nostrils every 4 hours PRN Nasal Congestion      -------------------------------------------------------------------------------  LABS:                        10.0   10.82 )-----------( 202      ( 06 Apr 2023 05:30 )             31.3     04-06    126<L>  |  96  |  4<L>  ----------------------------<  109<H>  4.4   |  27  |  4.44<H>    Ca    8.2<L>      06 Apr 2023 05:30  Phos  2.7     04-06  Mg     1.5     04-06    TPro  4.7<L>  /  Alb  2.4<L>  /  TBili  0.3  /  DBili  x   /  AST  17  /  ALT  <5<L>  /  AlkPhos  159<H>  04-05    LIVER FUNCTIONS - ( 05 Apr 2023 07:26 )  Alb: 2.4 g/dL / Pro: 4.7 g/dL / ALK PHOS: 159 U/L / ALT: <5 U/L / AST: 17 U/L / GGT: x           PT/INR - ( 06 Apr 2023 05:30 )   PT: 18.5 sec;   INR: 1.55          PTT - ( 06 Apr 2023 05:30 )  PTT:39.9 sec    CAPILLARY BLOOD GLUCOSE      POCT Blood Glucose.: 107 mg/dL (06 Apr 2023 17:29)      Culture - Blood (collected 05 Apr 2023 23:35)  Source: .Blood Blood-Peripheral  Preliminary Report (06 Apr 2023 12:00):    No growth at 12 hours    Culture - Blood (collected 05 Apr 2023 23:00)  Source: .Blood Blood-Peripheral  Preliminary Report (06 Apr 2023 12:00):    No growth at 12 hours      SARS-CoV-2: NotDetec (21 Mar 2023 18:03)  COVID-19 PCR: NotDetec (17 Mar 2023 03:42)      RADIOLOGY & ADDITIONAL TESTS: Reviewed.

## 2023-04-06 NOTE — PROGRESS NOTE ADULT - PROBLEM SELECTOR PLAN 11
F: none needed  E: Replete electrolytes as needed for K<4 and Mg<2  N: DASH Diet  DVT PPX: holding AC; SCD  GI prophylaxis: recommend Protonix PO daily F: none needed  E: Careful repletion w/ ESRD  N: DASH Diet    DVT PPX: holding AC; SCD  GI prophylaxis: Protonix PO BID

## 2023-04-06 NOTE — PROGRESS NOTE ADULT - PROBLEM SELECTOR PLAN 4
C/w home meds: Amlodipine 10mg qd, Losartan 100mg qd, Hydralazine 50m TID, Isordil 20mg TID with hold parameters

## 2023-04-06 NOTE — PROGRESS NOTE ADULT - PROBLEM SELECTOR PLAN 11
- reduced by urology 03/29/23     F: Oral intake  E: Replete electrolytes as needed for K<4 and Mg<2  N: DASH Diet  DVT PPX: holding AC; SCD  Dispo: [facility] 4/6 - reduced by urology 03/29/23     F: Oral intake  E: Replete electrolytes as needed for K<4 and Mg<2  N: DASH Diet  DVT PPX: holding AC; SCD  Dispo: [facility] 4/6 - reduced by urology 03/29/23     F: Oral intake  E: Replete electrolytes as needed for K<4 and Mg<2  N: DASH Diet  DVT PPX: holding AC; SCD  Dispo: [facility] 4/6 - reduced by urology 03/29/23     F: Oral intake  E: Replete electrolytes as needed for K<4 and Mg<2  N: DASH Diet  DVT PPX: holding AC; SCD  Dispo: step down to regional medicine

## 2023-04-06 NOTE — PROGRESS NOTE ADULT - PROBLEM SELECTOR PLAN 10
- Remains euvolemic on exam  - c/w Hydral 50mg TID, Losartan 100mg daily, Toprol XL 25mg daily and Isordil 20mg TID. Remains euvolemic on exam  - ECHO 4/3: EF 40% w/regional wall motion abnormality. Entire inferior wall & basal & mid inferolateral wall akinetic. Basal& mid anterolateral wall & apical lateral segments hypokinetic. Moderately dilated LA. Mild AR.     Plan:  - c/w Hydral 50mg TID, Losartan 100mg daily, Toprol XL 25mg daily and Isordil 20mg TID.

## 2023-04-06 NOTE — PROGRESS NOTE ADULT - ATTENDING COMMENTS
I agree with the fellow's findings and plans as written above with the following additions/amendments:    Seen and examined at bedside. Discussed not eating well, continues to have fevers, likely appetite affected by hunger. Otherwise no complaints, further recs as above. I agree with the fellow's findings and plans as written above with the following additions/amendments:    Seen and examined on dialysis. Discussed not eating well, continues to have fevers, likely appetite affected by fever. Tolerating HD otherwise, no complaints, continue HD as above further recs as above.

## 2023-04-06 NOTE — PROGRESS NOTE ADULT - PROBLEM SELECTOR PLAN 9
S/p failed kidney transplant   - Continue home Tacrolimus 2mg PO BID.    - per referring Dr Barrera, pt still requires tacrolimus even if failed transplant due to possibility of graft vs host.

## 2023-04-06 NOTE — PROGRESS NOTE ADULT - PROBLEM SELECTOR PLAN 2
C at [facility] w/ oLM 30% and RCA 99% that is not amenable to intervention.    - TTE 3/1/23 @ [facility]:  mild LVH, LVEF 25-30%, severe global wall motion dysfunction, mild LA dilation, mild RV dilation, mildly calcified leaflets, mod pHTN  - ECHO 4/3: EF 40% w/regional wall motion abnormality. Entire inferior wall & basal & mid inferolateral wall akinetic. Basal& mid anterolateral wall & apical lateral segments hypokinetic. Moderately dilated LA. Mild AR.   - c/w ASA 81mg and Plavix 75mg daily (cleared by GI)   - Per cardiology, will defer inpatient cath or other intervention on this admission due to persistent fevers   - Patient will need Life Vest on discharge   - F/u cards recs C at [place] w/ oLM 30% and RCA 99% that is not amenable to intervention.    - TTE 3/1/23 @ [facility]:  mild LVH, LVEF 25-30%, severe global wall motion dysfunction, mild LA dilation, mild RV dilation, mildly calcified leaflets, mod pHTN  - ECHO 4/3: EF 40% w/regional wall motion abnormality. Entire inferior wall & basal & mid inferolateral wall akinetic. Basal& mid anterolateral wall & apical lateral segments hypokinetic. Moderately dilated LA. Mild AR.   - c/w ASA 81mg and Plavix 75mg daily (cleared by GI)   - Per cardiology, will defer inpatient cath or other intervention on this admission due to persistent fevers   - Patient will need Life Vest on discharge   - F/u cards recs C at [place] w/ oLM 30% and RCA 99% that is not amenable to intervention.    - TTE 3/1/23 @ [facility]:  mild LVH, LVEF 25-30%, severe global wall motion dysfunction, mild LA dilation, mild RV dilation, mildly calcified leaflets, mod pHTN  - ECHO 4/3: EF 40% w/regional wall motion abnormality. Entire inferior wall & basal & mid inferolateral wall akinetic. Basal& mid anterolateral wall & apical lateral segments hypokinetic. Moderately dilated LA. Mild AR.   - c/w ASA 81mg and Plavix 75mg daily (cleared by GI)   - Per cardiology, will defer inpatient cath or other intervention on this admission due to persistent fevers   - Patient will need Life Vest on discharge   - F/u cards recs

## 2023-04-06 NOTE — PROGRESS NOTE ADULT - PROBLEM SELECTOR PLAN 7
On dialysis Tues, Thurs, Sat  - f/u nephro recs  - c/w calcium citrate 667mg TID  - C/w Tacrolimus for kidney transplant     #Hyponatremia:   - Obtain urine osm, urine Na, serum osm  - F/u renal recs

## 2023-04-06 NOTE — PROGRESS NOTE ADULT - PROBLEM SELECTOR PLAN 6
- no meds per [facility]  - mISS while inpatient, goal -180   - A1c 5.9 on admission no meds per [facility]. A1c 5.9 on admission    Plan:  - mISS while inpatient, goal -180

## 2023-04-06 NOTE — PROGRESS NOTE ADULT - PROBLEM SELECTOR PLAN 11
F: none needed  E: Replete electrolytes as needed for K<4 and Mg<2  N: DASH Diet  DVT PPX: holding AC; SCD  GI prophylaxis: recommend Protonix PO daily

## 2023-04-06 NOTE — PROGRESS NOTE ADULT - PROBLEM SELECTOR PLAN 8
History of coffee ground emesis @ [facility]; s/p EGD/colonoscopy showing rectal ulcer  - per gen surgery no acute surgical intervention   - GI following, no endoscopy indicated  - Active T&S  - Transfuse for hgb <8 (active CAD)  - Recommend switching from IV BID to Protonix 40 mg PO daily History of coffee ground emesis @ [facility]; s/p EGD/colonoscopy showing rectal ulcer  - per gen surgery no acute surgical intervention   - GI following, no endoscopy indicated  - Active T&S  - Transfuse for hgb <8 (active CAD)  - Recommend switching from IV BID to Protonix 40 mg PO daily History of coffee ground emesis @ [facility]; s/p EGD/colonoscopy showing rectal ulcer  - per gen surgery no acute surgical intervention   - GI following, no endoscopy indicated  - Active T&S  - Transfuse for hgb <8 (active CAD)  - Recommend switching from IV BID to Protonix 40 mg PO daily History of coffee ground emesis and massive LGIB @ [facility]; s/p EGD/colonoscopy showing rectal ulcer  Surgery and GI consulted on admission. Per gen surgery no acute surgical intervention. s/p signmoidoscopy w/ GI, no active bleed. No further intervention required.    Plan:  - Active T&S  - Transfuse for hgb <8 (active CAD)  - Consider switching from IV BID to Protonix 40 mg PO daily. Per last GI note, can remain on BID PPI while on DAPT History of coffee ground emesis and massive LGIB @ [facility]; s/p EGD/colonoscopy showing rectal ulcer  Surgery and GI consulted on admission. Per gen surgery no acute surgical intervention. s/p signmoidoscopy w/ GI, no active bleed. No further intervention required.    Plan:  - Active T&S  - Transfuse for hgb <8 (active CAD)  - Consider switching from IV BID to Protonix 40 mg PO daily. Per last GI note, can remain on BID PPI while on DAPT History of coffee ground emesis and massive LGIB @ [facility]; s/p EGD/colonoscopy showing rectal ulcer  Surgery and GI consulted on admission. Per gen surgery no acute surgical intervention. s/p signmoidoscopy w/ GI, no active bleed. No further intervention required.    Plan:  - Active T&S  - Transfuse for hgb <8 (active CAD)  - Consider switching from IV BID to Protonix 40 mg PO daily. Per last GI note, can remain on BID PPI while on DAPT

## 2023-04-06 NOTE — PROGRESS NOTE ADULT - PROBLEM SELECTOR PLAN 5
- ECHOs as above   - Remains euvolemic on exam  - c/w Hydral 50mg TID, Losartan 100mg daily, Toprol XL 25mg daily and Isordil 20mg TID

## 2023-04-06 NOTE — PROGRESS NOTE ADULT - SUBJECTIVE AND OBJECTIVE BOX
SUBJECTIVE / INTERVAL HPI: Patient seen and examined at bedside. Patient just returned from HD. Very tired, not as talkative, intermittently confused as compared to baseline. No episodes of diarrhea since yesterday per nurse. However, patient appears more tired with decreased PO intake. Patient did not report any other new complaints at this time.    VITAL SIGNS:  Vital Signs Last 24 Hrs  T(C): 39.1 (06 Apr 2023 13:35), Max: 40.3 (05 Apr 2023 21:13)  T(F): 102.4 (06 Apr 2023 13:35), Max: 104.6 (05 Apr 2023 21:13)  HR: 65 (06 Apr 2023 15:34) (56 - 96)  BP: 121/59 (06 Apr 2023 15:34) (121/59 - 161/66)  BP(mean): 81 (06 Apr 2023 13:35) (81 - 108)  RR: 19 (06 Apr 2023 15:34) (17 - 19)  SpO2: 98% (06 Apr 2023 15:34) (94% - 100%)    Parameters below as of 06 Apr 2023 15:34  Patient On (Oxygen Delivery Method): nasal cannula  O2 Flow (L/min): 2      PHYSICAL EXAM:    General: tired, resting in bed, not in any distress.  Neck: supple  Cardiovascular: +S1/S2; RRR  Respiratory: CTA B/L; no W/R/R. On nasal cannula but not wearing in nose.  Gastrointestinal: soft, NT/ND; +BSx4  Extremities: WWP; no edema, clubbing or cyanosis    MEDICATIONS:  MEDICATIONS  (STANDING):  aspirin enteric coated 81 milliGRAM(s) Oral daily  atorvastatin 40 milliGRAM(s) Oral at bedtime  chlorhexidine 2% Cloths 1 Application(s) Topical daily  clopidogrel Tablet 75 milliGRAM(s) Oral daily  dextrose 5%. 1000 milliLiter(s) (100 mL/Hr) IV Continuous <Continuous>  dextrose 5%. 1000 milliLiter(s) (50 mL/Hr) IV Continuous <Continuous>  dextrose 50% Injectable 25 Gram(s) IV Push once  dextrose 50% Injectable 12.5 Gram(s) IV Push once  epoetin janae-epbx (RETACRIT) Injectable 6000 Unit(s) IV Push once  ferrous    sulfate 325 milliGRAM(s) Oral daily  hydrALAZINE 50 milliGRAM(s) Oral every 8 hours  insulin lispro (ADMELOG) corrective regimen sliding scale   SubCutaneous Before meals and at bedtime  isosorbide   dinitrate Tablet (ISORDIL) 20 milliGRAM(s) Oral three times a day  losartan 100 milliGRAM(s) Oral every 24 hours  meropenem  IVPB 500 milliGRAM(s) IV Intermittent every 24 hours  metoprolol succinate ER 25 milliGRAM(s) Oral daily  pantoprazole  Injectable 40 milliGRAM(s) IV Push every 12 hours  tacrolimus 2 milliGRAM(s) Oral every 12 hours  tamsulosin 0.8 milliGRAM(s) Oral at bedtime    MEDICATIONS  (PRN):  acetaminophen     Tablet .. 650 milliGRAM(s) Oral every 6 hours PRN Temp greater or equal to 38C (100.4F), Mild Pain (1 - 3), Moderate Pain (4 - 6)  dextrose Oral Gel 15 Gram(s) Oral once PRN Blood Glucose LESS THAN 70 milliGRAM(s)/deciliter  loperamide 2 milliGRAM(s) Oral three times a day PRN Diarrhea  sodium chloride 0.65% Nasal 1 Spray(s) Both Nostrils every 4 hours PRN Nasal Congestion      ALLERGIES:  Allergies    Allergy Status Unknown    Intolerances        LABS:                        10.0   10.82 )-----------( 202      ( 06 Apr 2023 05:30 )             31.3     04-06    126<L>  |  96  |  4<L>  ----------------------------<  109<H>  4.4   |  27  |  4.44<H>    Ca    8.2<L>      06 Apr 2023 05:30  Phos  2.7     04-06  Mg     1.5     04-06    TPro  4.7<L>  /  Alb  2.4<L>  /  TBili  0.3  /  DBili  x   /  AST  17  /  ALT  <5<L>  /  AlkPhos  159<H>  04-05    PT/INR - ( 06 Apr 2023 05:30 )   PT: 18.5 sec;   INR: 1.55          PTT - ( 06 Apr 2023 05:30 )  PTT:39.9 sec    CAPILLARY BLOOD GLUCOSE      POCT Blood Glucose.: 103 mg/dL (06 Apr 2023 06:18)      RADIOLOGY & ADDITIONAL TESTS: Reviewed.     Hospital Course:  Patient is a 62M PMHx HTN, HLD, DM, CAD (s/p cath years ago, RCA 99%, never intervened on), HFrEF 25-30%, ESRD s/p failed kidney transplant (last HD 3/1 via Left Arm AVF; HD TTS), Right AKA, initially presented to [facility] on 2/27 for respiratory distress after dialysis, found to be septic, complicated by AHRF requiring intubation for 24hr followed by VT arrest. Course also complicated by massive LGIB (rectal ulcer) requiring 7u pRBC, 1u FFP and 1u PLT. Transferred to [facility] for ICD placement 2/2 Vtach and cardiac cath. Medicine consulted for GIB, fever of unknown origin, and comanagement. Patient's course further complicated by persistent fevers, likely 2/2 cystitis and pyelitis, requiring course of Ceftriaxone 2 g IV q24h (3/21-4/4) per ID recs. Now patient with continued fevers with Tmax 104 F after completion of abx. Per cards, all intervention including LHC and ICD placement canceled inpatient due to fevers. Plan for Life Vest upon discharge. Patient pending gallium scan for further evaluation, now on Meropenem 500 mg qd. Accepted for transfer to medicine.    SUBJECTIVE / INTERVAL HPI: Patient seen and examined at bedside. Patient just returned from HD. Very tired, not as talkative, intermittently confused as compared to baseline. No episodes of diarrhea since yesterday per nurse. However, patient appears more tired with decreased PO intake. Patient did not report any other new complaints at this time.    VITAL SIGNS:  Vital Signs Last 24 Hrs  T(C): 39.1 (06 Apr 2023 13:35), Max: 40.3 (05 Apr 2023 21:13)  T(F): 102.4 (06 Apr 2023 13:35), Max: 104.6 (05 Apr 2023 21:13)  HR: 65 (06 Apr 2023 15:34) (56 - 96)  BP: 121/59 (06 Apr 2023 15:34) (121/59 - 161/66)  BP(mean): 81 (06 Apr 2023 13:35) (81 - 108)  RR: 19 (06 Apr 2023 15:34) (17 - 19)  SpO2: 98% (06 Apr 2023 15:34) (94% - 100%)    Parameters below as of 06 Apr 2023 15:34  Patient On (Oxygen Delivery Method): nasal cannula  O2 Flow (L/min): 2      PHYSICAL EXAM:    General: tired, resting in bed, not in any distress.  Neck: supple  Cardiovascular: +S1/S2; RRR  Respiratory: CTA B/L; no W/R/R. On nasal cannula but not wearing in nose.  Gastrointestinal: soft, NT/ND; +BSx4  Extremities: WWP; no edema, clubbing or cyanosis    MEDICATIONS:  MEDICATIONS  (STANDING):  aspirin enteric coated 81 milliGRAM(s) Oral daily  atorvastatin 40 milliGRAM(s) Oral at bedtime  chlorhexidine 2% Cloths 1 Application(s) Topical daily  clopidogrel Tablet 75 milliGRAM(s) Oral daily  dextrose 5%. 1000 milliLiter(s) (100 mL/Hr) IV Continuous <Continuous>  dextrose 5%. 1000 milliLiter(s) (50 mL/Hr) IV Continuous <Continuous>  dextrose 50% Injectable 25 Gram(s) IV Push once  dextrose 50% Injectable 12.5 Gram(s) IV Push once  epoetin janae-epbx (RETACRIT) Injectable 6000 Unit(s) IV Push once  ferrous    sulfate 325 milliGRAM(s) Oral daily  hydrALAZINE 50 milliGRAM(s) Oral every 8 hours  insulin lispro (ADMELOG) corrective regimen sliding scale   SubCutaneous Before meals and at bedtime  isosorbide   dinitrate Tablet (ISORDIL) 20 milliGRAM(s) Oral three times a day  losartan 100 milliGRAM(s) Oral every 24 hours  meropenem  IVPB 500 milliGRAM(s) IV Intermittent every 24 hours  metoprolol succinate ER 25 milliGRAM(s) Oral daily  pantoprazole  Injectable 40 milliGRAM(s) IV Push every 12 hours  tacrolimus 2 milliGRAM(s) Oral every 12 hours  tamsulosin 0.8 milliGRAM(s) Oral at bedtime    MEDICATIONS  (PRN):  acetaminophen     Tablet .. 650 milliGRAM(s) Oral every 6 hours PRN Temp greater or equal to 38C (100.4F), Mild Pain (1 - 3), Moderate Pain (4 - 6)  dextrose Oral Gel 15 Gram(s) Oral once PRN Blood Glucose LESS THAN 70 milliGRAM(s)/deciliter  loperamide 2 milliGRAM(s) Oral three times a day PRN Diarrhea  sodium chloride 0.65% Nasal 1 Spray(s) Both Nostrils every 4 hours PRN Nasal Congestion      ALLERGIES:  Allergies    Allergy Status Unknown    Intolerances        LABS:                        10.0   10.82 )-----------( 202      ( 06 Apr 2023 05:30 )             31.3     04-06    126<L>  |  96  |  4<L>  ----------------------------<  109<H>  4.4   |  27  |  4.44<H>    Ca    8.2<L>      06 Apr 2023 05:30  Phos  2.7     04-06  Mg     1.5     04-06    TPro  4.7<L>  /  Alb  2.4<L>  /  TBili  0.3  /  DBili  x   /  AST  17  /  ALT  <5<L>  /  AlkPhos  159<H>  04-05    PT/INR - ( 06 Apr 2023 05:30 )   PT: 18.5 sec;   INR: 1.55          PTT - ( 06 Apr 2023 05:30 )  PTT:39.9 sec    CAPILLARY BLOOD GLUCOSE      POCT Blood Glucose.: 103 mg/dL (06 Apr 2023 06:18)      RADIOLOGY & ADDITIONAL TESTS: Reviewed.     Hospital Course:  Patient is a 62M PMHx HTN, HLD, DM, CAD (s/p cath years ago, RCA 99%, never intervened on), HFrEF 25-30%, ESRD s/p failed kidney transplant (last HD 3/1 via Left Arm AVF; HD TTS), Right AKA, initially presented to [facility] on 2/27 for respiratory distress after dialysis, found to be septic, complicated by AHRF requiring intubation for 24hr followed by VT arrest. Course also complicated by massive LGIB (rectal ulcer) requiring 7u pRBC, 1u FFP and 1u PLT. Transferred to [facility] for ICD placement 2/2 Vtach and cardiac cath. Medicine consulted for GIB, fever of unknown origin, and comanagement. Patient's course further complicated by persistent fevers, likely 2/2 cystitis and pyelitis, requiring course of Ceftriaxone 2 g IV q24h (3/21-4/4) per ID recs. Now patient with continued fevers with Tmax 104 F after completion of abx. Per cards, all intervention including LHC and ICD placement canceled inpatient due to fevers. Plan for Life Vest upon discharge. Patient pending gallium scan for further evaluation, now on Meropenem 500 mg qd. Accepted for transfer to medicine.    SUBJECTIVE / INTERVAL HPI: Patient seen and examined at bedside. Patient just returned from HD. Very tired, not as talkative, intermittently confused as compared to baseline. No episodes of diarrhea since yesterday per nurse. However, patient appears more tired with decreased PO intake. Patient did not report any other new complaints at this time.    VITAL SIGNS:  Vital Signs Last 24 Hrs  T(C): 39.1 (06 Apr 2023 13:35), Max: 40.3 (05 Apr 2023 21:13)  T(F): 102.4 (06 Apr 2023 13:35), Max: 104.6 (05 Apr 2023 21:13)  HR: 65 (06 Apr 2023 15:34) (56 - 96)  BP: 121/59 (06 Apr 2023 15:34) (121/59 - 161/66)  BP(mean): 81 (06 Apr 2023 13:35) (81 - 108)  RR: 19 (06 Apr 2023 15:34) (17 - 19)  SpO2: 98% (06 Apr 2023 15:34) (94% - 100%)    Parameters below as of 06 Apr 2023 15:34  Patient On (Oxygen Delivery Method): nasal cannula  O2 Flow (L/min): 2      PHYSICAL EXAM:    General: tired, resting in bed, not in any distress.  Neck: supple  Cardiovascular: +S1/S2; RRR  Respiratory: CTA B/L; no W/R/R. On nasal cannula but not wearing in nose.  Gastrointestinal: soft, NT/ND; +BSx4  Extremities: WWP; no edema, clubbing or cyanosis    MEDICATIONS:  MEDICATIONS  (STANDING):  aspirin enteric coated 81 milliGRAM(s) Oral daily  atorvastatin 40 milliGRAM(s) Oral at bedtime  chlorhexidine 2% Cloths 1 Application(s) Topical daily  clopidogrel Tablet 75 milliGRAM(s) Oral daily  dextrose 5%. 1000 milliLiter(s) (100 mL/Hr) IV Continuous <Continuous>  dextrose 5%. 1000 milliLiter(s) (50 mL/Hr) IV Continuous <Continuous>  dextrose 50% Injectable 25 Gram(s) IV Push once  dextrose 50% Injectable 12.5 Gram(s) IV Push once  epoetin janae-epbx (RETACRIT) Injectable 6000 Unit(s) IV Push once  ferrous    sulfate 325 milliGRAM(s) Oral daily  hydrALAZINE 50 milliGRAM(s) Oral every 8 hours  insulin lispro (ADMELOG) corrective regimen sliding scale   SubCutaneous Before meals and at bedtime  isosorbide   dinitrate Tablet (ISORDIL) 20 milliGRAM(s) Oral three times a day  losartan 100 milliGRAM(s) Oral every 24 hours  meropenem  IVPB 500 milliGRAM(s) IV Intermittent every 24 hours  metoprolol succinate ER 25 milliGRAM(s) Oral daily  pantoprazole  Injectable 40 milliGRAM(s) IV Push every 12 hours  tacrolimus 2 milliGRAM(s) Oral every 12 hours  tamsulosin 0.8 milliGRAM(s) Oral at bedtime    MEDICATIONS  (PRN):  acetaminophen     Tablet .. 650 milliGRAM(s) Oral every 6 hours PRN Temp greater or equal to 38C (100.4F), Mild Pain (1 - 3), Moderate Pain (4 - 6)  dextrose Oral Gel 15 Gram(s) Oral once PRN Blood Glucose LESS THAN 70 milliGRAM(s)/deciliter  loperamide 2 milliGRAM(s) Oral three times a day PRN Diarrhea  sodium chloride 0.65% Nasal 1 Spray(s) Both Nostrils every 4 hours PRN Nasal Congestion      ALLERGIES:  Allergies    Allergy Status Unknown    Intolerances        LABS:                        10.0   10.82 )-----------( 202      ( 06 Apr 2023 05:30 )             31.3     04-06    126<L>  |  96  |  4<L>  ----------------------------<  109<H>  4.4   |  27  |  4.44<H>    Ca    8.2<L>      06 Apr 2023 05:30  Phos  2.7     04-06  Mg     1.5     04-06    TPro  4.7<L>  /  Alb  2.4<L>  /  TBili  0.3  /  DBili  x   /  AST  17  /  ALT  <5<L>  /  AlkPhos  159<H>  04-05    PT/INR - ( 06 Apr 2023 05:30 )   PT: 18.5 sec;   INR: 1.55          PTT - ( 06 Apr 2023 05:30 )  PTT:39.9 sec    CAPILLARY BLOOD GLUCOSE      POCT Blood Glucose.: 103 mg/dL (06 Apr 2023 06:18)      RADIOLOGY & ADDITIONAL TESTS: Reviewed.     Hospital Course:  Patient is a 62M PMHx HTN, HLD, DM, CAD (s/p cath years ago, RCA 99%, never intervened on), HFrEF 25-30%, ESRD s/p failed kidney transplant (last HD 3/1 via Left Arm AVF; HD TTS), Right AKA, initially presented to [facility] on 2/27 for respiratory distress after dialysis, found to be septic, complicated by AHRF requiring intubation for 24hr followed by VT arrest. Course also complicated by massive LGIB (rectal ulcer) requiring 7u pRBC, 1u FFP and 1u PLT. Transferred to [facility] for ICD placement 2/2 Vtach and cardiac cath. Medicine consulted for GIB, fever of unknown origin, and comanagement. Patient's course further complicated by persistent fevers, likely 2/2 cystitis and pyelitis, requiring course of Ceftriaxone 2 g IV q24h (3/21-4/4) per ID recs. Now patient with continued fevers with Tmax 104 F after completion of abx. Per cards, all intervention including LHC and ICD placement canceled inpatient due to fevers. Plan for Life Vest upon discharge. Patient pending gallium scan for further evaluation, now on Meropenem 500 mg qd. Accepted for transfer to medicine.    SUBJECTIVE / INTERVAL HPI: Patient seen and examined at bedside. Patient just returned from HD. Very tired, not as talkative, intermittently confused as compared to baseline. No episodes of diarrhea since yesterday per nurse. However, patient appears more tired with decreased PO intake. Patient did not report any other new complaints at this time.    VITAL SIGNS:  Vital Signs Last 24 Hrs  T(C): 39.1 (06 Apr 2023 13:35), Max: 40.3 (05 Apr 2023 21:13)  T(F): 102.4 (06 Apr 2023 13:35), Max: 104.6 (05 Apr 2023 21:13)  HR: 65 (06 Apr 2023 15:34) (56 - 96)  BP: 121/59 (06 Apr 2023 15:34) (121/59 - 161/66)  BP(mean): 81 (06 Apr 2023 13:35) (81 - 108)  RR: 19 (06 Apr 2023 15:34) (17 - 19)  SpO2: 98% (06 Apr 2023 15:34) (94% - 100%)    Parameters below as of 06 Apr 2023 15:34  Patient On (Oxygen Delivery Method): nasal cannula  O2 Flow (L/min): 2      PHYSICAL EXAM:    General: tired, resting in bed, not in any distress.  Neck: supple  Cardiovascular: +S1/S2; RRR  Respiratory: CTA B/L; no W/R/R. On nasal cannula but not wearing in nose.  Gastrointestinal: soft, NT/ND; +BSx4  Extremities: WWP; no edema, clubbing or cyanosis    MEDICATIONS:  MEDICATIONS  (STANDING):  aspirin enteric coated 81 milliGRAM(s) Oral daily  atorvastatin 40 milliGRAM(s) Oral at bedtime  chlorhexidine 2% Cloths 1 Application(s) Topical daily  clopidogrel Tablet 75 milliGRAM(s) Oral daily  dextrose 5%. 1000 milliLiter(s) (100 mL/Hr) IV Continuous <Continuous>  dextrose 5%. 1000 milliLiter(s) (50 mL/Hr) IV Continuous <Continuous>  dextrose 50% Injectable 25 Gram(s) IV Push once  dextrose 50% Injectable 12.5 Gram(s) IV Push once  epoetin janae-epbx (RETACRIT) Injectable 6000 Unit(s) IV Push once  ferrous    sulfate 325 milliGRAM(s) Oral daily  hydrALAZINE 50 milliGRAM(s) Oral every 8 hours  insulin lispro (ADMELOG) corrective regimen sliding scale   SubCutaneous Before meals and at bedtime  isosorbide   dinitrate Tablet (ISORDIL) 20 milliGRAM(s) Oral three times a day  losartan 100 milliGRAM(s) Oral every 24 hours  meropenem  IVPB 500 milliGRAM(s) IV Intermittent every 24 hours  metoprolol succinate ER 25 milliGRAM(s) Oral daily  pantoprazole  Injectable 40 milliGRAM(s) IV Push every 12 hours  tacrolimus 2 milliGRAM(s) Oral every 12 hours  tamsulosin 0.8 milliGRAM(s) Oral at bedtime    MEDICATIONS  (PRN):  acetaminophen     Tablet .. 650 milliGRAM(s) Oral every 6 hours PRN Temp greater or equal to 38C (100.4F), Mild Pain (1 - 3), Moderate Pain (4 - 6)  dextrose Oral Gel 15 Gram(s) Oral once PRN Blood Glucose LESS THAN 70 milliGRAM(s)/deciliter  loperamide 2 milliGRAM(s) Oral three times a day PRN Diarrhea  sodium chloride 0.65% Nasal 1 Spray(s) Both Nostrils every 4 hours PRN Nasal Congestion      ALLERGIES:  Allergies    Allergy Status Unknown    Intolerances        LABS:                        10.0   10.82 )-----------( 202      ( 06 Apr 2023 05:30 )             31.3     04-06    126<L>  |  96  |  4<L>  ----------------------------<  109<H>  4.4   |  27  |  4.44<H>    Ca    8.2<L>      06 Apr 2023 05:30  Phos  2.7     04-06  Mg     1.5     04-06    TPro  4.7<L>  /  Alb  2.4<L>  /  TBili  0.3  /  DBili  x   /  AST  17  /  ALT  <5<L>  /  AlkPhos  159<H>  04-05    PT/INR - ( 06 Apr 2023 05:30 )   PT: 18.5 sec;   INR: 1.55          PTT - ( 06 Apr 2023 05:30 )  PTT:39.9 sec    CAPILLARY BLOOD GLUCOSE      POCT Blood Glucose.: 103 mg/dL (06 Apr 2023 06:18)      RADIOLOGY & ADDITIONAL TESTS: Reviewed.

## 2023-04-06 NOTE — SWALLOW BEDSIDE ASSESSMENT ADULT - NS SPL SWALLOW CLINIC TRIAL FT
Slow prolonged mastication of solids with diffuse oral residue. Benefited from a liquid wash/finger sweep. Laryngeal movement palpated. No clinical overt s/s of aspiration appreciated.

## 2023-04-06 NOTE — PROGRESS NOTE ADULT - PROBLEM SELECTOR PLAN 9
S/p failed kidney transplant   - Continue home Tacrolimus 2mg PO BID.    - per referring Dr Barrera, pt still requires tacrolimus even if failed transplant due to possibility of graft vs host. S/p failed kidney transplant. Per referring Dr Barrera, pt still requires tacrolimus even if failed transplant due to possibility of graft vs host.    Plan:  - Continue home Tacrolimus 2mg PO BID.

## 2023-04-06 NOTE — PROGRESS NOTE ADULT - PROBLEM SELECTOR PLAN 2
C at [place] w/ oLM 30% and RCA 99% that is not amenable to intervention.    - TTE 3/1/23 @ [facility]:  mild LVH, LVEF 25-30%, severe global wall motion dysfunction, mild LA dilation, mild RV dilation, mildly calcified leaflets, mod pHTN  - ECHO 4/3: EF 40% w/regional wall motion abnormality. Entire inferior wall & basal & mid inferolateral wall akinetic. Basal& mid anterolateral wall & apical lateral segments hypokinetic. Moderately dilated LA. Mild AR.   - c/w ASA 81mg and Plavix 75mg daily (cleared by GI)   - Per cardiology, will defer inpatient cath or other intervention on this admission due to persistent fevers   - Patient will need Life Vest on discharge   - F/u cards recs C at [place] w/ oLM 30% and RCA 99% that is not amenable to intervention.    - TTE 3/1/23 @ [facility]:  mild LVH, LVEF 25-30%, severe global wall motion dysfunction, mild LA dilation, mild RV dilation, mildly calcified leaflets, mod pHTN  - ECHO 4/3: EF 40% w/regional wall motion abnormality. Entire inferior wall & basal & mid inferolateral wall akinetic. Basal& mid anterolateral wall & apical lateral segments hypokinetic. Moderately dilated LA. Mild AR.   - c/w ASA 81mg and Plavix 75mg daily (cleared by GI)   - Per cardiology, will defer inpatient cath or other intervention on this admission due to persistent fevers   - Patient will need Life Vest on discharge   - F/u cards recs C at [place] w/ oLM 30% and RCA 99% that is not amenable to intervention.    - TTE 3/1/23 @ [facility]:  mild LVH, LVEF 25-30%, severe global wall motion dysfunction, mild LA dilation, mild RV dilation, mildly calcified leaflets, mod pHTN  - ECHO 4/3: EF 40% w/regional wall motion abnormality. Entire inferior wall & basal & mid inferolateral wall akinetic. Basal& mid anterolateral wall & apical lateral segments hypokinetic. Moderately dilated LA. Mild AR.   - c/w ASA 81mg and Plavix 75mg daily (cleared by GI)   - Per cardiology, will defer inpatient cath or other intervention on this admission due to persistent fevers   - Patient will need Life Vest on discharge   - F/u cards recs LHC at [place] w/ oLM 30% and RCA 99% that is not amenable to intervention. Per cardiology, will defer inpatient cath or other intervention on this admission due to persistent fevers   - ECHO 4/3: EF 40% w/regional wall motion abnormality. Entire inferior wall & basal & mid inferolateral wall akinetic. Basal& mid anterolateral wall & apical lateral segments hypokinetic. Moderately dilated LA. Mild AR.     Plan:  - c/w ASA 81mg and Plavix 75mg daily (cleared by GI)   - Patient will need Life Vest on discharge   - F/u cards recs

## 2023-04-06 NOTE — PROGRESS NOTE ADULT - PROBLEM SELECTOR PLAN 3
s/p VTach arrest @ [facility]; no tele events noted.    - EP Consulted – plan for SubQ ICD placement once pt has LHC (pending results)  - c/w Toprol XL 25mg daily.  - Per cards, no plan for ICD placement inpatient due to fevers   - Will need Life Vest on discharge s/p VTach arrest @ [facility]; no tele events noted.    - EP Consulted – plan for SubQ ICD placement once pt has LHC (pending results)  - c/w Toprol XL 25mg daily.  - Per cards, no plan for ICD placement inpatient due to fevers   - Will need Life Vest on discharge s/p VTach arrest @ [facility]; no tele events noted.    - EP Consulted – plan for SubQ ICD placement once pt has LHC (pending results)  - c/w Toprol XL 25mg daily.  - Per cards, no plan for ICD placement inpatient due to fevers   - Will need Life Vest on discharge

## 2023-04-06 NOTE — PROGRESS NOTE ADULT - PROBLEM SELECTOR PLAN 7
s/p failed kidney transplant   - Continue home Tacrolimus 2mg PO BID.    - per referring Dr Barrera, pt still requires tacrolimus even if failed transplant due to possibility of graft vs host s/p failed kidney transplant   - Continue home Tacrolimus 2mg PO BID  - per referring Dr Barrera, pt still requires tacrolimus even if failed transplant due to risk of possibility of graft vs host disease

## 2023-04-06 NOTE — PROGRESS NOTE ADULT - PROBLEM SELECTOR PLAN 8
- SBPs 120s-150s  - c/w Hydral 50mg TID, Losartan 100mg daily, Toprol XL 25mg daily and Isordil 20mg TID

## 2023-04-06 NOTE — SWALLOW BEDSIDE ASSESSMENT ADULT - SLP GENERAL OBSERVATIONS
In bed with blankets over face. Alert but inconsistently responding to questions and providing limited information. Receiving 2L supplemental O2.

## 2023-04-06 NOTE — PROGRESS NOTE ADULT - NS ATTEND AMEND GEN_ALL_CORE FT
Initial attending contact date    4/3/23  . See PA note written above for details. I reviewed the PA documentation. I have personally seen and examined this patient. I reviewed vitals, labs, medications, cardiac studies, and additional imaging. I agree with the above PA's findings and plans as written above with the following additions/statements.  -ECHO with EF ~ 35% akinetic infero.infero lateral akinesis consistent with known occluded RCA, mild hypokinesis apical/anterior   -On tele no events  -More lethargic today with temp 104, 102. ID following, plan for gallium, IV meropenem   -Cath deferred indefinitely at this time due to persistent fever. Will need life vest as no plan for ICD given fever, will arrange for life vest fitting   -H/H has remained stable on asa, plavix without recurrent bleed   -Cont ASA, plavix, statin  -HFrEF: euvolemic on exam, cont toprol, isordil/hydral, losartan  -HD per renal  -Appreciate palliative input: pt remains full code  -Dispo: [facility]  -Transfer to medicine, no longer with active cardiac issues   -Will fu along as cardiology consult

## 2023-04-07 LAB
ANION GAP SERPL CALC-SCNC: 7 MMOL/L — SIGNIFICANT CHANGE UP (ref 5–17)
BASOPHILS # BLD AUTO: 0.15 K/UL — SIGNIFICANT CHANGE UP (ref 0–0.2)
BASOPHILS NFR BLD AUTO: 1.3 % — SIGNIFICANT CHANGE UP (ref 0–2)
BUN SERPL-MCNC: 7 MG/DL — SIGNIFICANT CHANGE UP (ref 7–23)
CALCIUM SERPL-MCNC: 8.2 MG/DL — LOW (ref 8.4–10.5)
CHLORIDE SERPL-SCNC: 98 MMOL/L — SIGNIFICANT CHANGE UP (ref 96–108)
CO2 SERPL-SCNC: 28 MMOL/L — SIGNIFICANT CHANGE UP (ref 22–31)
CREAT SERPL-MCNC: 3.14 MG/DL — HIGH (ref 0.5–1.3)
EGFR: 22 ML/MIN/1.73M2 — LOW
EOSINOPHIL # BLD AUTO: 0.31 K/UL — SIGNIFICANT CHANGE UP (ref 0–0.5)
EOSINOPHIL NFR BLD AUTO: 2.7 % — SIGNIFICANT CHANGE UP (ref 0–6)
GLUCOSE BLDC GLUCOMTR-MCNC: 112 MG/DL — HIGH (ref 70–99)
GLUCOSE BLDC GLUCOMTR-MCNC: 113 MG/DL — HIGH (ref 70–99)
GLUCOSE BLDC GLUCOMTR-MCNC: 162 MG/DL — HIGH (ref 70–99)
GLUCOSE BLDC GLUCOMTR-MCNC: 190 MG/DL — HIGH (ref 70–99)
GLUCOSE BLDC GLUCOMTR-MCNC: 93 MG/DL — SIGNIFICANT CHANGE UP (ref 70–99)
GLUCOSE SERPL-MCNC: 92 MG/DL — SIGNIFICANT CHANGE UP (ref 70–99)
HCT VFR BLD CALC: 31.9 % — LOW (ref 39–50)
HGB BLD-MCNC: 9.6 G/DL — LOW (ref 13–17)
IMM GRANULOCYTES NFR BLD AUTO: 0.3 % — SIGNIFICANT CHANGE UP (ref 0–0.9)
LYMPHOCYTES # BLD AUTO: 1.26 K/UL — SIGNIFICANT CHANGE UP (ref 1–3.3)
LYMPHOCYTES # BLD AUTO: 10.9 % — LOW (ref 13–44)
MAGNESIUM SERPL-MCNC: 1.6 MG/DL — SIGNIFICANT CHANGE UP (ref 1.6–2.6)
MCHC RBC-ENTMCNC: 28.2 PG — SIGNIFICANT CHANGE UP (ref 27–34)
MCHC RBC-ENTMCNC: 30.1 GM/DL — LOW (ref 32–36)
MCV RBC AUTO: 93.8 FL — SIGNIFICANT CHANGE UP (ref 80–100)
MONOCYTES # BLD AUTO: 1.29 K/UL — HIGH (ref 0–0.9)
MONOCYTES NFR BLD AUTO: 11.1 % — SIGNIFICANT CHANGE UP (ref 2–14)
NEUTROPHILS # BLD AUTO: 8.54 K/UL — HIGH (ref 1.8–7.4)
NEUTROPHILS NFR BLD AUTO: 73.7 % — SIGNIFICANT CHANGE UP (ref 43–77)
NRBC # BLD: 0 /100 WBCS — SIGNIFICANT CHANGE UP (ref 0–0)
PHOSPHATE SERPL-MCNC: 3.1 MG/DL — SIGNIFICANT CHANGE UP (ref 2.5–4.5)
PLATELET # BLD AUTO: 197 K/UL — SIGNIFICANT CHANGE UP (ref 150–400)
POTASSIUM SERPL-MCNC: 3.6 MMOL/L — SIGNIFICANT CHANGE UP (ref 3.5–5.3)
POTASSIUM SERPL-SCNC: 3.6 MMOL/L — SIGNIFICANT CHANGE UP (ref 3.5–5.3)
RBC # BLD: 3.4 M/UL — LOW (ref 4.2–5.8)
RBC # FLD: 15.9 % — HIGH (ref 10.3–14.5)
SODIUM SERPL-SCNC: 133 MMOL/L — LOW (ref 135–145)
WBC # BLD: 11.59 K/UL — HIGH (ref 3.8–10.5)
WBC # FLD AUTO: 11.59 K/UL — HIGH (ref 3.8–10.5)

## 2023-04-07 PROCEDURE — 99233 SBSQ HOSP IP/OBS HIGH 50: CPT | Mod: GC

## 2023-04-07 PROCEDURE — 99232 SBSQ HOSP IP/OBS MODERATE 35: CPT

## 2023-04-07 PROCEDURE — 99233 SBSQ HOSP IP/OBS HIGH 50: CPT

## 2023-04-07 PROCEDURE — 78802 RP LOCLZJ TUM WHBDY 1 D IMG: CPT | Mod: 26

## 2023-04-07 RX ORDER — ONDANSETRON 8 MG/1
8 TABLET, FILM COATED ORAL DAILY
Refills: 0 | Status: DISCONTINUED | OUTPATIENT
Start: 2023-04-07 | End: 2023-04-12

## 2023-04-07 RX ORDER — VANCOMYCIN HCL 1 G
1000 VIAL (EA) INTRAVENOUS ONCE
Refills: 0 | Status: COMPLETED | OUTPATIENT
Start: 2023-04-07 | End: 2023-04-07

## 2023-04-07 RX ADMIN — ISOSORBIDE DINITRATE 20 MILLIGRAM(S): 5 TABLET ORAL at 19:13

## 2023-04-07 RX ADMIN — PANTOPRAZOLE SODIUM 40 MILLIGRAM(S): 20 TABLET, DELAYED RELEASE ORAL at 07:08

## 2023-04-07 RX ADMIN — TAMSULOSIN HYDROCHLORIDE 0.8 MILLIGRAM(S): 0.4 CAPSULE ORAL at 22:53

## 2023-04-07 RX ADMIN — Medication 250 MILLIGRAM(S): at 16:33

## 2023-04-07 RX ADMIN — Medication 650 MILLIGRAM(S): at 16:41

## 2023-04-07 RX ADMIN — TACROLIMUS 2 MILLIGRAM(S): 5 CAPSULE ORAL at 07:08

## 2023-04-07 RX ADMIN — Medication 50 MILLIGRAM(S): at 16:42

## 2023-04-07 RX ADMIN — ISOSORBIDE DINITRATE 20 MILLIGRAM(S): 5 TABLET ORAL at 12:33

## 2023-04-07 RX ADMIN — ATORVASTATIN CALCIUM 40 MILLIGRAM(S): 80 TABLET, FILM COATED ORAL at 22:53

## 2023-04-07 RX ADMIN — PANTOPRAZOLE SODIUM 40 MILLIGRAM(S): 20 TABLET, DELAYED RELEASE ORAL at 18:24

## 2023-04-07 RX ADMIN — Medication 50 MILLIGRAM(S): at 22:53

## 2023-04-07 RX ADMIN — Medication 650 MILLIGRAM(S): at 19:13

## 2023-04-07 RX ADMIN — Medication 50 MILLIGRAM(S): at 07:08

## 2023-04-07 RX ADMIN — Medication 325 MILLIGRAM(S): at 12:30

## 2023-04-07 RX ADMIN — TACROLIMUS 2 MILLIGRAM(S): 5 CAPSULE ORAL at 20:44

## 2023-04-07 RX ADMIN — MEROPENEM 100 MILLIGRAM(S): 1 INJECTION INTRAVENOUS at 18:24

## 2023-04-07 RX ADMIN — Medication 2: at 19:17

## 2023-04-07 RX ADMIN — Medication 81 MILLIGRAM(S): at 12:30

## 2023-04-07 RX ADMIN — ONDANSETRON 8 MILLIGRAM(S): 8 TABLET, FILM COATED ORAL at 16:42

## 2023-04-07 RX ADMIN — LOSARTAN POTASSIUM 100 MILLIGRAM(S): 100 TABLET, FILM COATED ORAL at 20:44

## 2023-04-07 RX ADMIN — CHLORHEXIDINE GLUCONATE 1 APPLICATION(S): 213 SOLUTION TOPICAL at 18:22

## 2023-04-07 RX ADMIN — ISOSORBIDE DINITRATE 20 MILLIGRAM(S): 5 TABLET ORAL at 07:08

## 2023-04-07 RX ADMIN — CLOPIDOGREL BISULFATE 75 MILLIGRAM(S): 75 TABLET, FILM COATED ORAL at 12:29

## 2023-04-07 NOTE — PROVIDER CONTACT NOTE (OTHER) - ASSESSMENT
Pt AOx3, unwilling to respond to date/year questions. VSS. NSR on tele. Neuro assessment otherwise intact. Pupils pinpoint, reactive.
Pt oral temp 103 F
Pt sent up from [facility] with a non-patent IV from 3/25; site was inflamed and purulent

## 2023-04-07 NOTE — PROGRESS NOTE ADULT - SUBJECTIVE AND OBJECTIVE BOX
--------------------------------------------------------------------------------  Chief Complaint: ESRD/Ongoing hemodialysis requirement    24 hour events/subjective:    Seen this morning, remains stable. No acute complaints. Endorsed to eat more    PAST HISTORY  --------------------------------------------------------------------------------  No significant changes to PMH, PSH, FHx, SHx, unless otherwise noted    ALLERGIES & MEDICATIONS  --------------------------------------------------------------------------------  Allergies    Allergy Status Unknown    Intolerances      Standing Inpatient Medications  aspirin enteric coated 81 milliGRAM(s) Oral daily  atorvastatin 40 milliGRAM(s) Oral at bedtime  chlorhexidine 2% Cloths 1 Application(s) Topical daily  clopidogrel Tablet 75 milliGRAM(s) Oral daily  dextrose 5%. 1000 milliLiter(s) IV Continuous <Continuous>  dextrose 5%. 1000 milliLiter(s) IV Continuous <Continuous>  dextrose 50% Injectable 25 Gram(s) IV Push once  dextrose 50% Injectable 12.5 Gram(s) IV Push once  epoetin janae-epbx (RETACRIT) Injectable 6000 Unit(s) IV Push once  ferrous    sulfate 325 milliGRAM(s) Oral daily  hydrALAZINE 50 milliGRAM(s) Oral every 8 hours  insulin lispro (ADMELOG) corrective regimen sliding scale   SubCutaneous Before meals and at bedtime  isosorbide   dinitrate Tablet (ISORDIL) 20 milliGRAM(s) Oral three times a day  losartan 100 milliGRAM(s) Oral every 24 hours  meropenem  IVPB 500 milliGRAM(s) IV Intermittent every 24 hours  metoprolol succinate ER 25 milliGRAM(s) Oral daily  pantoprazole    Tablet 40 milliGRAM(s) Oral every 12 hours  tacrolimus 2 milliGRAM(s) Oral every 12 hours  tamsulosin 0.8 milliGRAM(s) Oral at bedtime    PRN Inpatient Medications  acetaminophen     Tablet .. 650 milliGRAM(s) Oral every 6 hours PRN  dextrose Oral Gel 15 Gram(s) Oral once PRN  loperamide 2 milliGRAM(s) Oral three times a day PRN  sodium chloride 0.65% Nasal 1 Spray(s) Both Nostrils every 4 hours PRN      REVIEW OF SYSTEMS  --------------------------------------------------------------------------------  All other systems were reviewed and are negative, except as noted.    VITALS/PHYSICAL EXAM  --------------------------------------------------------------------------------  T(C): 36.8 (04-07-23 @ 06:22), Max: 39.4 (04-06-23 @ 20:31)  HR: 50 (04-07-23 @ 06:22) (50 - 70)  BP: 123/55 (04-07-23 @ 06:22) (120/53 - 165/72)  RR: 17 (04-07-23 @ 06:22) (17 - 19)  SpO2: 100% (04-07-23 @ 06:22) (98% - 100%)  Wt(kg): --  Drug Dosing Weight  Height (cm): 162.6 (17 Mar 2023 04:15)  Weight (kg): 59.3 (06 Apr 2023 13:35)  BMI (kg/m2): 22.4 (06 Apr 2023 13:35)  BSA (m2): 1.63 (06 Apr 2023 13:35)    Weight (kg): 59.3 (04-06-23 @ 13:35)      04-06-23 @ 07:01  -  04-07-23 @ 07:00  --------------------------------------------------------  IN: 120 mL / OUT: 0 mL / NET: 120 mL    PHYSICAL EXAM:  GENERAL: well-developed, well nourished, alert, no acute distress  CHEST/LUNG: Clear to auscultation bilaterally  HEART: normal S1S2, RRR  ABDOMEN: Soft, Nontender, +BS, No flank tenderness bilateral  EXTREMITIES: No clubbing, cyanosis, or edema, Rt AKA  ACCESS: MARILEE TELLEZ w/ thrill and bruit, accessed     LABS/STUDIES  --------------------------------------------------------------------------------              9.6    11.59 >-----------<  197      [04-07-23 @ 05:30]              31.9     133  |  98  |  7   ----------------------------<  92      [04-07-23 @ 05:30]  3.6   |  28  |  3.14        Ca     8.2     [04-07-23 @ 05:30]      Mg     1.6     [04-07-23 @ 05:30]      Phos  3.1     [04-07-23 @ 05:30]      PT/INR: PT 18.5 , INR 1.55       [04-06-23 @ 05:30]  PTT: 39.9       [04-06-23 @ 05:30]      PTH -- (Ca 7.7)      [03-19-23 @ 07:11]   431  Lipid: chol 88, , HDL 44, LDL --      [03-17-23 @ 08:14]    HBsAb Reactive      [04-05-23 @ 07:26]  HBsAg Nonreact      [04-05-23 @ 07:26]  HBcAb Nonreact      [04-05-23 @ 07:26]  HCV 0.21, Nonreact      [04-05-23 @ 07:26]      RADIOLOGY:  --------------------------------------------------------------------------------------

## 2023-04-07 NOTE — PROGRESS NOTE ADULT - ATTENDING COMMENTS
I agree with the fellow's findings and plans as written above with the following additions/amendments:    Seen and examined at bedside, encouraged to increase protein intake and mobilization. Next HD planned 4/8, further recs as above

## 2023-04-07 NOTE — PROGRESS NOTE ADULT - SUBJECTIVE AND OBJECTIVE BOX
HARDIAL, TUMESHWAR    O/N: gave IV tyelenol due to temp 103 (TMAX 104). IV removed due to not able to flush, replaced in AM.    Patient seen and examined at bedside. Patient very lethargic this AM, unable to give clear subjective exam.    T(C): 36.8 (04-07-23 @ 06:22), Max: 39.4 (04-06-23 @ 20:31)  HR: 50 (04-07-23 @ 06:22) (50 - 70)  BP: 123/55 (04-07-23 @ 06:22) (120/53 - 165/72)  RR: 17 (04-07-23 @ 06:22) (17 - 19)  SpO2: 100% (04-07-23 @ 06:22) (98% - 100%)  Wt(kg): --Vital Signs Last 24 Hrs  T(C): 36.8 (07 Apr 2023 06:22), Max: 39.4 (06 Apr 2023 20:31)  T(F): 98.3 (07 Apr 2023 06:22), Max: 103 (06 Apr 2023 20:31)  HR: 50 (07 Apr 2023 06:22) (50 - 70)  BP: 123/55 (07 Apr 2023 06:22) (120/53 - 165/72)  BP(mean): 81 (06 Apr 2023 13:35) (81 - 81)  RR: 17 (07 Apr 2023 06:22) (17 - 19)  SpO2: 100% (07 Apr 2023 06:22) (98% - 100%)    Parameters below as of 07 Apr 2023 06:22  Patient On (Oxygen Delivery Method): nasal cannula        PHYSICAL EXAM:  GENERAL: +lethargic   Neuro: CN2-12 grossly intact  HEENT: NC/AT; MMM; neck supple;  Heart: RRR; +s1 and s2; no MRG   Lungs: CTA b/l; normal effort; no accesory muscle use, on NC   GI: + mildly distended   Extremities: +2 pulses in UE and LE b/l; no clubbing  Skin: skin dry and warm; no skin tenting; no rashes or lesions  MSK: normal tone        LABS:                        9.6    11.59 )-----------( 197      ( 07 Apr 2023 05:30 )             31.9     04-07    133<L>  |  98  |  7   ----------------------------<  92  3.6   |  28  |  3.14<H>    Ca    8.2<L>      07 Apr 2023 05:30  Phos  3.1     04-07  Mg     1.6     04-07        PT/INR - ( 06 Apr 2023 05:30 )   PT: 18.5 sec;   INR: 1.55          PTT - ( 06 Apr 2023 05:30 )  PTT:39.9 sec    CAPILLARY BLOOD GLUCOSE      POCT Blood Glucose.: 113 mg/dL (07 Apr 2023 12:22)  POCT Blood Glucose.: 93 mg/dL (07 Apr 2023 08:36)  POCT Blood Glucose.: 105 mg/dL (06 Apr 2023 22:09)  POCT Blood Glucose.: 107 mg/dL (06 Apr 2023 17:29)            RADIOLOGY & ADDITIONAL TESTS:    Imaging Personally Reviewed:  [ ] YES  [ ] NO

## 2023-04-07 NOTE — PROGRESS NOTE ADULT - ASSESSMENT
Patient is a 62M PMHx HTN, HLD, DM, CAD (s/p cath years ago, RCA 99%, never intervened on), HFrEF 25-30%, ESRD s/p failed kidney transplant (last HD 3/1 via Left Arm AVF; HD TTS), Right AKA, initially presented to [facility] on 2/27 for respiratory distress after dialysis, found to be septic, complicated by AHRF requiring intubation for 24hr followed by VT arrest. Course also complicated by massive LGIB (rectal ulcer) requiring 7u pRBC, 1u FFP and 1u PLT. Transferred to [facility] for ICD placement 2/2 Vtach and cardiac cath. Medicine consulted for GIB, fever of unknown origin, and comanagement.  Patient now transferred to medicine for persistent fever of unknown origin.                  Patient is a 62M PMHx HTN, HLD, DM, CAD (s/p cath years ago, RCA 99%, never intervened on), HFrEF 25-30%, ESRD s/p failed kidney transplant (last HD 3/1 via Left Arm AVF; HD TTS), Right AKA, initially presented to [facility] on 2/27 for respiratory distress after dialysis, found to be septic, complicated by AHRF requiring intubation for 24hr followed by VT arrest. Course also complicated by massive LGIB (rectal ulcer) requiring 7u pRBC, 1u FFP and 1u PLT. Transferred to [facility] for ICD placement 2/2 Vtach and cardiac cath. Medicine consulted for GIB, fever of unknown origin, and comanagement.  Patient now transferred to medicine for persistent fever of unknown origin.                  Patient is a 62M PMHx HTN, HLD, DM, CAD (s/p cath years ago, RCA 99%, never intervened on), HFrEF 25-30%, ESRD s/p failed kidney transplant (last HD 3/1 via Left Arm AVF; HD TTS), Right AKA, initially presented to [facility] on 2/27 for respiratory distress after dialysis, found to be septic, complicated by AHRF requiring intubation for 24hr followed by VT arrest. Course also complicated by massive LGIB (rectal ulcer) requiring 7u pRBC, 1u FFP and 1u PLT. Transferred to [facility] for ICD placement 2/2 Vtach and cardiac cath. Medicine consulted for GIB, fever of unknown origin, and comanagement.  Patient now transferred to medicine for persistent fever of unknown origin.

## 2023-04-07 NOTE — PROGRESS NOTE ADULT - ATTENDING COMMENTS
62-year-old male with a PMHx of HTN, HLD, DMII, ICM, HFrEF (EF 20-25%), ESRD (s/p failed kidney transplant, now on HD TThS via LUE AVF) and right AKA who initially presented to OSH for respiratory distress with hospital course c/b VTach cardiac arrest and LGIB, transferred to [facility] cardiology service for ICD evaluation but found to have recurrent LGIB and fevers of unknown origin.      #Fevers    -initially treated with 2-week course of CTX (end date: 4/4) for pyelitis but spiked fevers post-antibiotic course    -repeat infectious workup: BCx (4/5) NGTD, GI PCR negative, Cdiff negative, CXR negative, UA/UCX and Gallium scan pending    -PET (3/25): uptake in prostate concerning for prostatitis or neoplasm (see report for full details)    -ID consulted, started on Meropenem with plans for whole body gallium scan Friday     #CAD   #HFrEF   #Vtach Arrest    #HTN/HLD   -LHC at OSH with significant RCA stenosis not amenable to intervention    -TTE (4/3): EF 40% with RWA   -cardiology consulted, defer inpatient intervention due to persistent fevers    -continue with ASA 81mg daily, Atorvastatin 40mg qhs and Plavix 75mg daily    -continue with metoprolol succinate 25mg daily, isordil 20mg q8hrs, hydralazine 50mg q8hrs and losartan 100mg daily  -will need life vest prior to discharge     #ESRD (on HD TThS via LUE AVF)   #S/p Failed Kidney Transplant    -nephrology consulted, appreciate recommendations, next HD 4/8  -continue with Tacrolimus 2mg BID     #Recurrent GIB 2/2 Rectal Ulcers    #Acute Blood Loss Anemia    -stable, GI signed off, continue to closely monitor hemoglobin while on DAPT    -continue with BID PO PPI     #Concern for Prostatitis vs. Neoplasm  #Elevated PSA  -urology consulted, plan to recheck PSA 1 month post-infection, outpatient urology follow up within 1-2 weeks of discharge     DVT PPx: held while in DAPT in setting of recurrent GIB    Dispo: pending clinical improvement 62-year-old male with a PMHx of HTN, HLD, DMII, ICM, HFrEF (EF 20-25%), ESRD (s/p failed kidney transplant, now on HD TThS via LUE AVF) and right AKA who initially presented to OSH for respiratory distress with hospital course c/b VTach cardiac arrest and LGIB, transferred to [facility] cardiology service for ICD evaluation but found to have recurrent LGIB and fevers of unknown origin.      #Fevers    -initially treated with 2-week course of CTX (end date: 4/4) for pyelitis but spiked fevers post-antibiotic course    -repeat infectious workup: BCx (4/5) NGTD, GI PCR negative, Cdiff negative, CXR negative, UA/UCX and Gallium scan pending    -PET (3/25): uptake in prostate concerning for prostatitis or neoplasm (see report for full details)    -ID consulted, started on Meropenem with plans for whole body gallium scan Friday     #CAD   #HFrEF   #Vtach Arrest    #HTN/HLD   -LHC at OSH with significant RCA stenosis not amenable to intervention    -TTE (4/3): EF 40% with RWA   -cardiology consulted, defer inpatient intervention due to persistent fevers    -continue with ASA 81mg daily, Atorvastatin 40mg qhs and Plavix 75mg daily    -continue with metoprolol succinate 25mg daily, isordil 20mg q8hrs, hydralazine 50mg q8hrs and losartan 100mg daily  -will need life vest prior to discharge     #ESRD (on HD TThS via LUE AVF)   #S/p Failed Kidney Transplant    -nephrology consulted, appreciate recommendations, next HD 4/8  -continue with Tacrolimus 2mg BID     #Recurrent GIB 2/2 Rectal Ulcers    #Acute Blood Loss Anemia    -stable, GI signed off, continue to closely monitor hemoglobin while on DAPT    -continue with BID PO PPI     #Concern for Prostatitis vs. Neoplasm  #Elevated PSA  -urology consulted, plan to recheck PSA 1 month post-infection, outpatient urology follow up within 1-2 weeks of discharge     DVT PPx: held while in DAPT in setting of recurrent GIB    Dispo: pending clinical improvement 62-year-old male with a PMHx of HTN, HLD, DMII, ICM, HFrEF (EF 20-25%), ESRD (s/p failed kidney transplant, now on HD TThS via LUE AVF) and right AKA who initially presented to OSH for respiratory distress with hospital course c/b VTach cardiac arrest and LGIB, transferred to [facility] cardiology service for ICD evaluation but found to have recurrent LGIB and fevers of unknown origin.      #Fevers    -initially treated with 2-week course of CTX (end date: 4/4) for pyelitis but spiked fevers post-antibiotic course    -repeat infectious workup: BCx (4/5) NGTD, GI PCR negative, Cdiff negative, CXR negative, UA/UCX and Gallium scan pending    -PET (3/25): uptake in prostate concerning for prostatitis or neoplasm (see report for full details)    -ID consulted, started on Meropenem with plans for whole body gallium scan Friday     #CAD   #HFrEF   #Vtach Arrest    #HTN/HLD   -LHC at OSH with significant RCA stenosis not amenable to intervention    -TTE (4/3): EF 40% with RWA   -cardiology consulted, defer inpatient intervention due to persistent fevers    -continue with ASA 81mg daily, Atorvastatin 40mg qhs and Plavix 75mg daily    -continue with metoprolol succinate 25mg daily, isordil 20mg q8hrs, hydralazine 50mg q8hrs and losartan 100mg daily  -will need life vest prior to discharge     #ESRD (on HD TThS via LUE AVF)   #S/p Failed Kidney Transplant    -nephrology consulted, appreciate recommendations, next HD 4/8  -continue with Tacrolimus 2mg BID     #Recurrent GIB 2/2 Rectal Ulcers    #Acute Blood Loss Anemia    -stable, GI signed off, continue to closely monitor hemoglobin while on DAPT    -continue with BID PO PPI     #Concern for Prostatitis vs. Neoplasm  #Elevated PSA  -urology consulted, plan to recheck PSA 1 month post-infection, outpatient urology follow up within 1-2 weeks of discharge     DVT PPx: held while in DAPT in setting of recurrent GIB    Dispo: pending clinical improvement

## 2023-04-07 NOTE — CONSULT NOTE ADULT - PROVIDER SPECIALTY LIST ADULT
Cardiology
Critical Care
Surgery
Electrophysiology
Nephrology
Internal Medicine
Infectious Disease
Gastroenterology
Palliative Care

## 2023-04-07 NOTE — PROVIDER CONTACT NOTE (OTHER) - BACKGROUND
Pt is a 62y M admitted from [facility] due to ongoing fevers pending ICD eval and lifevest
pt febrile earlier in shift, 650mg PO given.
htn. hld, DM, ICM, ESRD on HD, R AKA, SIRS +cardiac arrest + GIB at OSH on 3/2

## 2023-04-07 NOTE — PROGRESS NOTE ADULT - NS ATTEND AMEND GEN_ALL_CORE FT
#Pyelitis of transplant kidney/UTI, immunosuppressive status, fever, PIV site infection     Febrile to 103 overnight.  Per RN, patient had leaking IV site which was inflamed and purulence and IV removed.  Patient reports chills, denied SOB, cough, n/v/d, dysuria.  lung clear, abdomen non tender.  R upper arm with 1cm induration but no erythema or thrombophlebitis.  Lab notable for WBC 11.5, BCx 4/5 ngtd.    Unclear source of new fever - could be due to pyelitis but he already got 2 weeks of appropriate abx.  Could be from R arm PIV site infection.   He is no longer having diarrhea per RN.  I discussed the case with NM attending Dr. Paulino and he recommended gallium scan whole body rather than CT c/a/p.  Start vancomycin 1g x 1 and check level before HD.  Cont meropenem 500mg IV q24h. send UA and UCx.  f/u Gallium scan.    Team 2 will follow you.  Case d/w primary team.    Luann Carvajal MD, MS  Infectious Disease attending  work cell [phone]   For any questions during evening/weekend/holiday, please page ID on call. #Pyelitis of transplant kidney/UTI, immunosuppressive status, fever, PIV site infection     Febrile to 103 overnight.  Per RN, patient had leaking IV site which was inflamed and purulence and IV removed.  Patient reports chills, denied SOB, cough, n/v/d, dysuria.  lung clear, abdomen non tender.  R upper arm with 1cm induration but no erythema or thrombophlebitis.  Lab notable for WBC 11.5, BCx 4/5 ngtd.    Unclear source of new fever - could be due to pyelitis but he already got 2 weeks of appropriate abx.  Could be from R arm PIV site infection.   He is no longer having diarrhea per RN.  I discussed the case with NM attending Dr. Paulino and he recommended gallium scan whole body rather than CT c/a/p.  Start vancomycin 1g x 1 and check level before HD.  Cont meropenem 500mg IV q24h. send UA and UCx.  f/u Gallium scan.    Team 2 will follow you.  Case d/w primary team.    Luann Carvajal MD, MS  Infectious Disease attending  work cell [phone]   For any questions during evening/weekend/holiday, please page ID on call. #Pyelitis of transplant kidney/UTI, immunosuppressive status, fever, PIV site infection     Febrile to 103 overnight.  Per RN, patient had leaking IV site which was inflamed and purulence and IV removed.  Patient reports chills, denied SOB, cough, n/v/d, dysuria.  lung clear, abdomen non tender.  R upper arm with 1cm induration but no erythema or thrombophlebitis.  Lab notable for WBC 11.5, BCx 4/5 ngtd.    Unclear source of new fever - could be due to pyelitis but he already got 2 weeks of appropriate abx.  Could be from R arm PIV site infection.   He is no longer having diarrhea per RN.  I discussed the case with NM attending Dr. Paulino and he recommended gallium scan whole body rather than CT c/a/p.  Start vancomycin 1g x 1 and check level before HD.  Cont meropenem 500mg IV q24h. send UA and UCx.  f/u Gallium scan.    Team 2 will follow you.  Case d/w primary team.    Luann Carvajal MD, MS  Infectious Disease attending  work cell [phone]   For any questions during evening/weekend/holiday, please page ID on call.

## 2023-04-07 NOTE — CONSULT NOTE ADULT - SUBJECTIVE AND OBJECTIVE BOX
HPI:  61yo M PMHx HTN, HLD, DM,  ICM (s/p cath years ago, RCA 99%, never intervened on), HFrEF 25-30%, ESRD s/p failed kidney transplant (last HD 3/1 via Left Arm AVF; HD TTS), Right AKA presented to [facility] 2/27 for respiratory distress after a dialysis session and admitted to cardiac telemetry on 2/27 with SIRS criteria. Placed on vanc/zosyn. Pt was placed on BiPAP which failed, where he was then intubated on 3/1 and moved to the MICU. He went into cardiac arrest (RoSC achieved after 8mins). Went into Vtach arrest, was placed on amio and pressors, HR ellen into 30s and atropine given twice. Weaned off levophed and sedation and extubated 3/2. Pt developed coffee ground emesis, hemoccult positive, with Hgb dipping to 3.5; he then received 7units PRBCs, 1Unit FFP, 1 unit donor packed platelets. EGD and colonoscopy showing melanosis duodenii but no acute bleed. BP dropped again, transferred to ICU and started on levophed gtt. CTA Abdomen showing no active bleeding into GI lumen, possible focal proctitis; colonoscopy showing rectal ulcer. Started on hydrocortisone suppository and mesalamine. Hgb now stable in 11s (per handoff), now transferred for ICD placement 2/2 Vtach and cardiac cath. (17 Mar 2023 03:32)      ROS: A 10-point review of systems was otherwise negative.    PAST MEDICAL & SURGICAL HISTORY:  HTN (hypertension)      HLD (hyperlipidemia)      DM (diabetes mellitus)      GERD (gastroesophageal reflux disease)      ESRD on dialysis      NSTEMI (non-ST elevation myocardial infarction)      CAD (coronary artery disease)      Fever of unknown origin (FUO)        SOCIAL HISTORY:  FAMILY HISTORY:    ALLERGIES: 	  Allergy Status Unknown          MEDICATIONS:  acetaminophen     Tablet .. 650 milliGRAM(s) Oral every 6 hours PRN  aspirin enteric coated 81 milliGRAM(s) Oral daily  atorvastatin 40 milliGRAM(s) Oral at bedtime  chlorhexidine 2% Cloths 1 Application(s) Topical daily  clopidogrel Tablet 75 milliGRAM(s) Oral daily  dextrose 5%. 1000 milliLiter(s) IV Continuous <Continuous>  dextrose 5%. 1000 milliLiter(s) IV Continuous <Continuous>  dextrose 50% Injectable 25 Gram(s) IV Push once  dextrose 50% Injectable 12.5 Gram(s) IV Push once  dextrose Oral Gel 15 Gram(s) Oral once PRN  epoetin janae-epbx (RETACRIT) Injectable 6000 Unit(s) IV Push once  ferrous    sulfate 325 milliGRAM(s) Oral daily  hydrALAZINE 50 milliGRAM(s) Oral every 8 hours  insulin lispro (ADMELOG) corrective regimen sliding scale   SubCutaneous Before meals and at bedtime  isosorbide   dinitrate Tablet (ISORDIL) 20 milliGRAM(s) Oral three times a day  loperamide 2 milliGRAM(s) Oral three times a day PRN  losartan 100 milliGRAM(s) Oral every 24 hours  meropenem  IVPB 500 milliGRAM(s) IV Intermittent every 24 hours  metoprolol succinate ER 25 milliGRAM(s) Oral daily  pantoprazole    Tablet 40 milliGRAM(s) Oral every 12 hours  sodium chloride 0.65% Nasal 1 Spray(s) Both Nostrils every 4 hours PRN  tacrolimus 2 milliGRAM(s) Oral every 12 hours  tamsulosin 0.8 milliGRAM(s) Oral at bedtime      PHYSICAL EXAM:  T(C): 36.8 (04-07-23 @ 06:22), Max: 39.4 (04-06-23 @ 20:31)  HR: 50 (04-07-23 @ 06:22) (50 - 70)  BP: 123/55 (04-07-23 @ 06:22) (120/53 - 165/72)  RR: 17 (04-07-23 @ 06:22) (17 - 19)  SpO2: 100% (04-07-23 @ 06:22) (98% - 100%)  Wt(kg): --    GEN: Awake, comfortable. NAD.   HEENT: NCAT, PERRL, EOMI. Mucosa moist. No JVD.   RESP: CTA b/l  CV: RRR, normal s1/s2. No m/r/g.  ABD: Soft, NTND. BS+  EXT: Warm. No edema, clubbing, or cyanosis.   NEURO: AAOx3. No focal deficits.    I&O's Summary    06 Apr 2023 07:01  -  07 Apr 2023 07:00  --------------------------------------------------------  IN: 120 mL / OUT: 0 mL / NET: 120 mL        Weight (kg): 59.3 (04-06 @ 13:35)  LABS:	 	                        9.6    11.59 )-----------( 197      ( 07 Apr 2023 05:30 )             31.9     04-07    133<L>  |  98  |  7   ----------------------------<  92  3.6   |  28  |  3.14<H>    Ca    8.2<L>      07 Apr 2023 05:30  Phos  3.1     04-07  Mg     1.6     04-07       HPI:  63yo M PMHx HTN, HLD, DM,  ICM (s/p cath years ago, RCA 99%, never intervened on), HFrEF 25-30%, ESRD s/p failed kidney transplant (last HD 3/1 via Left Arm AVF; HD TTS), Right AKA presented to [facility] 2/27 for respiratory distress after a dialysis session and admitted to cardiac telemetry on 2/27 with SIRS criteria. Placed on vanc/zosyn. Pt was placed on BiPAP which failed, where he was then intubated on 3/1 and moved to the MICU. He went into cardiac arrest (RoSC achieved after 8mins). Went into Vtach arrest, was placed on amio and pressors, HR ellen into 30s and atropine given twice. Weaned off levophed and sedation and extubated 3/2. Pt developed coffee ground emesis, hemoccult positive, with Hgb dipping to 3.5; he then received 7units PRBCs, 1Unit FFP, 1 unit donor packed platelets. EGD and colonoscopy showing melanosis duodenii but no acute bleed. BP dropped again, transferred to ICU and started on levophed gtt. CTA Abdomen showing no active bleeding into GI lumen, possible focal proctitis; colonoscopy showing rectal ulcer. Started on hydrocortisone suppository and mesalamine. Hgb now stable in 11s (per handoff), now transferred for ICD placement 2/2 Vtach and cardiac cath. (17 Mar 2023 03:32)      ROS: A 10-point review of systems was otherwise negative.    PAST MEDICAL & SURGICAL HISTORY:  HTN (hypertension)      HLD (hyperlipidemia)      DM (diabetes mellitus)      GERD (gastroesophageal reflux disease)      ESRD on dialysis      NSTEMI (non-ST elevation myocardial infarction)      CAD (coronary artery disease)      Fever of unknown origin (FUO)        SOCIAL HISTORY:  FAMILY HISTORY:    ALLERGIES: 	  Allergy Status Unknown          MEDICATIONS:  acetaminophen     Tablet .. 650 milliGRAM(s) Oral every 6 hours PRN  aspirin enteric coated 81 milliGRAM(s) Oral daily  atorvastatin 40 milliGRAM(s) Oral at bedtime  chlorhexidine 2% Cloths 1 Application(s) Topical daily  clopidogrel Tablet 75 milliGRAM(s) Oral daily  dextrose 5%. 1000 milliLiter(s) IV Continuous <Continuous>  dextrose 5%. 1000 milliLiter(s) IV Continuous <Continuous>  dextrose 50% Injectable 25 Gram(s) IV Push once  dextrose 50% Injectable 12.5 Gram(s) IV Push once  dextrose Oral Gel 15 Gram(s) Oral once PRN  epoetin janae-epbx (RETACRIT) Injectable 6000 Unit(s) IV Push once  ferrous    sulfate 325 milliGRAM(s) Oral daily  hydrALAZINE 50 milliGRAM(s) Oral every 8 hours  insulin lispro (ADMELOG) corrective regimen sliding scale   SubCutaneous Before meals and at bedtime  isosorbide   dinitrate Tablet (ISORDIL) 20 milliGRAM(s) Oral three times a day  loperamide 2 milliGRAM(s) Oral three times a day PRN  losartan 100 milliGRAM(s) Oral every 24 hours  meropenem  IVPB 500 milliGRAM(s) IV Intermittent every 24 hours  metoprolol succinate ER 25 milliGRAM(s) Oral daily  pantoprazole    Tablet 40 milliGRAM(s) Oral every 12 hours  sodium chloride 0.65% Nasal 1 Spray(s) Both Nostrils every 4 hours PRN  tacrolimus 2 milliGRAM(s) Oral every 12 hours  tamsulosin 0.8 milliGRAM(s) Oral at bedtime      PHYSICAL EXAM:  T(C): 36.8 (04-07-23 @ 06:22), Max: 39.4 (04-06-23 @ 20:31)  HR: 50 (04-07-23 @ 06:22) (50 - 70)  BP: 123/55 (04-07-23 @ 06:22) (120/53 - 165/72)  RR: 17 (04-07-23 @ 06:22) (17 - 19)  SpO2: 100% (04-07-23 @ 06:22) (98% - 100%)  Wt(kg): --    GEN: Awake, comfortable. NAD.   HEENT: NCAT, PERRL, EOMI. Mucosa moist. No JVD.   RESP: CTA b/l  CV: RRR, normal s1/s2. No m/r/g.  ABD: Soft, NTND. BS+  EXT: Warm. No edema, clubbing, or cyanosis.   NEURO: AAOx3. No focal deficits.    I&O's Summary    06 Apr 2023 07:01  -  07 Apr 2023 07:00  --------------------------------------------------------  IN: 120 mL / OUT: 0 mL / NET: 120 mL        Weight (kg): 59.3 (04-06 @ 13:35)  LABS:	 	                        9.6    11.59 )-----------( 197      ( 07 Apr 2023 05:30 )             31.9     04-07    133<L>  |  98  |  7   ----------------------------<  92  3.6   |  28  |  3.14<H>    Ca    8.2<L>      07 Apr 2023 05:30  Phos  3.1     04-07  Mg     1.6     04-07       HPI:  61yo M PMHx HTN, HLD, DM,  ICM (s/p cath years ago, RCA 99%, never intervened on), HFrEF 25-30%, ESRD s/p failed kidney transplant (last HD 3/1 via Left Arm AVF; HD TTS), Right AKA presented to [facility] 2/27 for respiratory distress after a dialysis session and admitted to cardiac telemetry on 2/27 with SIRS criteria. Placed on vanc/zosyn. Pt was placed on BiPAP which failed, where he was then intubated on 3/1 and moved to the MICU. He went into cardiac arrest (RoSC achieved after 8mins). Went into Vtach arrest, was placed on amio and pressors, HR ellen into 30s and atropine given twice. Weaned off levophed and sedation and extubated 3/2. Pt developed coffee ground emesis, hemoccult positive, with Hgb dipping to 3.5; he then received 7units PRBCs, 1Unit FFP, 1 unit donor packed platelets. EGD and colonoscopy showing melanosis duodenii but no acute bleed. BP dropped again, transferred to ICU and started on levophed gtt. CTA Abdomen showing no active bleeding into GI lumen, possible focal proctitis; colonoscopy showing rectal ulcer. Started on hydrocortisone suppository and mesalamine. Hgb now stable in 11s (per handoff), now transferred for ICD placement 2/2 Vtach and cardiac cath. (17 Mar 2023 03:32)      ROS: A 10-point review of systems was otherwise negative.    PAST MEDICAL & SURGICAL HISTORY:  HTN (hypertension)      HLD (hyperlipidemia)      DM (diabetes mellitus)      GERD (gastroesophageal reflux disease)      ESRD on dialysis      NSTEMI (non-ST elevation myocardial infarction)      CAD (coronary artery disease)      Fever of unknown origin (FUO)        SOCIAL HISTORY:  FAMILY HISTORY:    ALLERGIES: 	  Allergy Status Unknown          MEDICATIONS:  acetaminophen     Tablet .. 650 milliGRAM(s) Oral every 6 hours PRN  aspirin enteric coated 81 milliGRAM(s) Oral daily  atorvastatin 40 milliGRAM(s) Oral at bedtime  chlorhexidine 2% Cloths 1 Application(s) Topical daily  clopidogrel Tablet 75 milliGRAM(s) Oral daily  dextrose 5%. 1000 milliLiter(s) IV Continuous <Continuous>  dextrose 5%. 1000 milliLiter(s) IV Continuous <Continuous>  dextrose 50% Injectable 25 Gram(s) IV Push once  dextrose 50% Injectable 12.5 Gram(s) IV Push once  dextrose Oral Gel 15 Gram(s) Oral once PRN  epoetin janae-epbx (RETACRIT) Injectable 6000 Unit(s) IV Push once  ferrous    sulfate 325 milliGRAM(s) Oral daily  hydrALAZINE 50 milliGRAM(s) Oral every 8 hours  insulin lispro (ADMELOG) corrective regimen sliding scale   SubCutaneous Before meals and at bedtime  isosorbide   dinitrate Tablet (ISORDIL) 20 milliGRAM(s) Oral three times a day  loperamide 2 milliGRAM(s) Oral three times a day PRN  losartan 100 milliGRAM(s) Oral every 24 hours  meropenem  IVPB 500 milliGRAM(s) IV Intermittent every 24 hours  metoprolol succinate ER 25 milliGRAM(s) Oral daily  pantoprazole    Tablet 40 milliGRAM(s) Oral every 12 hours  sodium chloride 0.65% Nasal 1 Spray(s) Both Nostrils every 4 hours PRN  tacrolimus 2 milliGRAM(s) Oral every 12 hours  tamsulosin 0.8 milliGRAM(s) Oral at bedtime      PHYSICAL EXAM:  T(C): 36.8 (04-07-23 @ 06:22), Max: 39.4 (04-06-23 @ 20:31)  HR: 50 (04-07-23 @ 06:22) (50 - 70)  BP: 123/55 (04-07-23 @ 06:22) (120/53 - 165/72)  RR: 17 (04-07-23 @ 06:22) (17 - 19)  SpO2: 100% (04-07-23 @ 06:22) (98% - 100%)  Wt(kg): --    GEN: Awake, comfortable. NAD.   HEENT: NCAT, PERRL, EOMI. Mucosa moist. No JVD.   RESP: CTA b/l  CV: RRR, normal s1/s2. No m/r/g.  ABD: Soft, NTND. BS+  EXT: Warm. No edema, clubbing, or cyanosis.   NEURO: AAOx3. No focal deficits.    I&O's Summary    06 Apr 2023 07:01  -  07 Apr 2023 07:00  --------------------------------------------------------  IN: 120 mL / OUT: 0 mL / NET: 120 mL        Weight (kg): 59.3 (04-06 @ 13:35)  LABS:	 	                        9.6    11.59 )-----------( 197      ( 07 Apr 2023 05:30 )             31.9     04-07    133<L>  |  98  |  7   ----------------------------<  92  3.6   |  28  |  3.14<H>    Ca    8.2<L>      07 Apr 2023 05:30  Phos  3.1     04-07  Mg     1.6     04-07

## 2023-04-07 NOTE — CONSULT NOTE ADULT - TIME BILLING
preparing to see patient, obtaining history, performing physical exam, counseling and educating the patient/family, communicating with other health care providers, documenting in the EMR, independently interpreting results and care coordination.
as noted above
case complexity as above.
Emotional Support/Supportive Care and Clarification of Potential Disease Trajectory related to Heart failure     Time inclusive of chart review, medication ordering, discussion with primary team, clinical documentation, and communication with family/caregiver.

## 2023-04-07 NOTE — PROGRESS NOTE ADULT - PROBLEM SELECTOR PLAN 1
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 Pyelitis treated with ceftriaxone 2 g IV q24h (3/21-4/4) per ID recs. Patient also had diarrhea with antibiotics. C.diff and GI PCR negative. Unclear source of fever. Overnight 4/5, patient with new Tmax to 104.6 F. WBC 10.82, CRP 54.3, procal 0.97. Abd U/S with no acute pathology, CXR negative, Bcx NGTD. PET scan showing increased uptake in prostate suggesting prostatitis vs. neoplasm. s/p CTX tx for pyelitis vs cystitis w continued elevated t and fevers after abx course completion.     Plan:  - Blood Culture 4/5 NGTD abd U/S with no acute pathology, CXR negative, Bcx NGTD  - F/u UA + urine culture, awaiting   - Awaiting Gallium Scan   - ID following - appreciate recs   - c/w Meropenem 500 mg q24h per ID recs, as patient is immunocompromised

## 2023-04-07 NOTE — PROGRESS NOTE ADULT - SUBJECTIVE AND OBJECTIVE BOX
INFECTIOUS DISEASES CONSULT FOLLOW-UP NOTE    INTERVAL HPI/OVERNIGHT EVENTS:    Patient was seen and examined at bedside. Overnight, nurse noticed his IV was leaking, inflamed, and purulent-IV was removed.  Patient was transferred from cardio to regional medicine service for fever workup. He is awaiting lifevest fitting. Pt states he is feeling better today but still having chills and loose stool. Per nurse, patient had one diarrhea this morning and patient is currently having another.     ROS:   Constitutional, eyes, ENT, cardiovascular, respiratory, gastrointestinal, genitourinary, integumentary, neurological, psychiatric and heme/lymph are otherwise negative other than noted above       ANTIBIOTICS/RELEVANT:    MEDICATIONS  (STANDING):  aspirin enteric coated 81 milliGRAM(s) Oral daily  atorvastatin 40 milliGRAM(s) Oral at bedtime  chlorhexidine 2% Cloths 1 Application(s) Topical daily  clopidogrel Tablet 75 milliGRAM(s) Oral daily  dextrose 5%. 1000 milliLiter(s) (100 mL/Hr) IV Continuous <Continuous>  dextrose 5%. 1000 milliLiter(s) (50 mL/Hr) IV Continuous <Continuous>  dextrose 50% Injectable 25 Gram(s) IV Push once  dextrose 50% Injectable 12.5 Gram(s) IV Push once  epoetin janae-epbx (RETACRIT) Injectable 6000 Unit(s) IV Push once  ferrous    sulfate 325 milliGRAM(s) Oral daily  hydrALAZINE 50 milliGRAM(s) Oral every 8 hours  insulin lispro (ADMELOG) corrective regimen sliding scale   SubCutaneous Before meals and at bedtime  isosorbide   dinitrate Tablet (ISORDIL) 20 milliGRAM(s) Oral three times a day  losartan 100 milliGRAM(s) Oral every 24 hours  meropenem  IVPB 500 milliGRAM(s) IV Intermittent every 24 hours  metoprolol succinate ER 25 milliGRAM(s) Oral daily  pantoprazole    Tablet 40 milliGRAM(s) Oral every 12 hours  tacrolimus 2 milliGRAM(s) Oral every 12 hours  tamsulosin 0.8 milliGRAM(s) Oral at bedtime    MEDICATIONS  (PRN):  acetaminophen     Tablet .. 650 milliGRAM(s) Oral every 6 hours PRN Temp greater or equal to 38C (100.4F), Mild Pain (1 - 3), Moderate Pain (4 - 6)  dextrose Oral Gel 15 Gram(s) Oral once PRN Blood Glucose LESS THAN 70 milliGRAM(s)/deciliter  loperamide 2 milliGRAM(s) Oral three times a day PRN Diarrhea  sodium chloride 0.65% Nasal 1 Spray(s) Both Nostrils every 4 hours PRN Nasal Congestion        Vital Signs Last 24 Hrs  T(C): 36.8 (07 Apr 2023 06:22), Max: 39.4 (06 Apr 2023 20:31)  T(F): 98.3 (07 Apr 2023 06:22), Max: 103 (06 Apr 2023 20:31)  HR: 50 (07 Apr 2023 06:22) (50 - 70)  BP: 123/55 (07 Apr 2023 06:22) (120/53 - 165/72)  BP(mean): 81 (06 Apr 2023 13:35) (81 - 81)  RR: 17 (07 Apr 2023 06:22) (17 - 19)  SpO2: 100% (07 Apr 2023 06:22) (98% - 100%)    Parameters below as of 07 Apr 2023 06:22  Patient On (Oxygen Delivery Method): nasal cannula        04-06-23 @ 07:01  -  04-07-23 @ 07:00  --------------------------------------------------------  IN: 120 mL / OUT: 0 mL / NET: 120 mL      PHYSICAL EXAM:  Constitutional: alert, NAD, resting in bed with blankets pulled up to chin   Eyes: the sclera and conjunctiva were normal.   ENT: the ears and nose were normal in appearance.   Neck: the appearance of the neck was normal and the neck was supple.   Pulmonary: no respiratory distress and lungs were clear to auscultation bilaterally.   Heart: heart rate was normal and rhythm regular, normal S1 and S2  Vascular:. there was no peripheral edema  Abdomen: normal bowel sounds, soft, non-tender  Neurological: no focal deficits.   Psychiatric: the affect was normal        LABS:                        9.6    11.59 )-----------( 197      ( 07 Apr 2023 05:30 )             31.9     04-07    133<L>  |  98  |  7   ----------------------------<  92  3.6   |  28  |  3.14<H>    Ca    8.2<L>      07 Apr 2023 05:30  Phos  3.1     04-07  Mg     1.6     04-07      PT/INR - ( 06 Apr 2023 05:30 )   PT: 18.5 sec;   INR: 1.55          PTT - ( 06 Apr 2023 05:30 )  PTT:39.9 sec      MICROBIOLOGY:  BCXs 4/5 ngtd  UA and UCX pending    RADIOLOGY & ADDITIONAL STUDIES:  Reviewed INFECTIOUS DISEASES CONSULT FOLLOW-UP NOTE    INTERVAL HPI/OVERNIGHT EVENTS:    Patient was seen and examined at bedside. Overnight, nurse noticed his IV was leaking, inflamed, and purulent  -IV was removed.  Patient was transferred from cardio to regional medicine service for fever workup. He is awaiting lifevest fitting. Pt states he is feeling better today but still having chills and loose stool. Per nurse, patient had one diarrhea this morning and patient is currently having another.     ROS:   Constitutional, eyes, ENT, cardiovascular, respiratory, gastrointestinal, genitourinary, integumentary, neurological, psychiatric and heme/lymph are otherwise negative other than noted above       ANTIBIOTICS/RELEVANT:    MEDICATIONS  (STANDING):  aspirin enteric coated 81 milliGRAM(s) Oral daily  atorvastatin 40 milliGRAM(s) Oral at bedtime  chlorhexidine 2% Cloths 1 Application(s) Topical daily  clopidogrel Tablet 75 milliGRAM(s) Oral daily  dextrose 5%. 1000 milliLiter(s) (100 mL/Hr) IV Continuous <Continuous>  dextrose 5%. 1000 milliLiter(s) (50 mL/Hr) IV Continuous <Continuous>  dextrose 50% Injectable 25 Gram(s) IV Push once  dextrose 50% Injectable 12.5 Gram(s) IV Push once  epoetin janae-epbx (RETACRIT) Injectable 6000 Unit(s) IV Push once  ferrous    sulfate 325 milliGRAM(s) Oral daily  hydrALAZINE 50 milliGRAM(s) Oral every 8 hours  insulin lispro (ADMELOG) corrective regimen sliding scale   SubCutaneous Before meals and at bedtime  isosorbide   dinitrate Tablet (ISORDIL) 20 milliGRAM(s) Oral three times a day  losartan 100 milliGRAM(s) Oral every 24 hours  meropenem  IVPB 500 milliGRAM(s) IV Intermittent every 24 hours  metoprolol succinate ER 25 milliGRAM(s) Oral daily  pantoprazole    Tablet 40 milliGRAM(s) Oral every 12 hours  tacrolimus 2 milliGRAM(s) Oral every 12 hours  tamsulosin 0.8 milliGRAM(s) Oral at bedtime    MEDICATIONS  (PRN):  acetaminophen     Tablet .. 650 milliGRAM(s) Oral every 6 hours PRN Temp greater or equal to 38C (100.4F), Mild Pain (1 - 3), Moderate Pain (4 - 6)  dextrose Oral Gel 15 Gram(s) Oral once PRN Blood Glucose LESS THAN 70 milliGRAM(s)/deciliter  loperamide 2 milliGRAM(s) Oral three times a day PRN Diarrhea  sodium chloride 0.65% Nasal 1 Spray(s) Both Nostrils every 4 hours PRN Nasal Congestion        Vital Signs Last 24 Hrs  T(C): 36.8 (07 Apr 2023 06:22), Max: 39.4 (06 Apr 2023 20:31)  T(F): 98.3 (07 Apr 2023 06:22), Max: 103 (06 Apr 2023 20:31)  HR: 50 (07 Apr 2023 06:22) (50 - 70)  BP: 123/55 (07 Apr 2023 06:22) (120/53 - 165/72)  BP(mean): 81 (06 Apr 2023 13:35) (81 - 81)  RR: 17 (07 Apr 2023 06:22) (17 - 19)  SpO2: 100% (07 Apr 2023 06:22) (98% - 100%)    Parameters below as of 07 Apr 2023 06:22  Patient On (Oxygen Delivery Method): nasal cannula        04-06-23 @ 07:01  -  04-07-23 @ 07:00  --------------------------------------------------------  IN: 120 mL / OUT: 0 mL / NET: 120 mL      PHYSICAL EXAM:  Constitutional: alert, NAD, resting in bed with blankets pulled up to chin   Eyes: the sclera and conjunctiva were normal.   ENT: the ears and nose were normal in appearance.   Neck: the appearance of the neck was normal and the neck was supple.   Pulmonary: no respiratory distress and lungs were clear to auscultation bilaterally.   Heart: heart rate was normal and rhythm regular, normal S1 and S2  Vascular:. there was no peripheral edema  Abdomen: normal bowel sounds, soft, non-tender  Neurological: no focal deficits.   Psychiatric: the affect was normal        LABS:                        9.6    11.59 )-----------( 197      ( 07 Apr 2023 05:30 )             31.9     04-07    133<L>  |  98  |  7   ----------------------------<  92  3.6   |  28  |  3.14<H>    Ca    8.2<L>      07 Apr 2023 05:30  Phos  3.1     04-07  Mg     1.6     04-07      PT/INR - ( 06 Apr 2023 05:30 )   PT: 18.5 sec;   INR: 1.55          PTT - ( 06 Apr 2023 05:30 )  PTT:39.9 sec      MICROBIOLOGY:  BCXs 4/5 ngtd  UA and UCX pending collection     RADIOLOGY & ADDITIONAL STUDIES:  Reviewed

## 2023-04-07 NOTE — PROGRESS NOTE ADULT - PROBLEM SELECTOR PLAN 7
On dialysis H[facility] Jackman, Sat. Nephro following (hx failed renal transplant)     Plan:  - f/u nephro recs HD on april 8th   - c/w calcium citrate 667mg TID  - C/w Tacrolimus for kidney transplant     #Hyponatremia:   - Obtain urine osm, urine Na, serum osm  - F/u renal recs On dialysis H[facility] Jacmkan, Sat. Nephro following (hx failed renal transplant)     Plan:  - f/u nephro recs HD on april 8th   - c/w calcium citrate 667mg TID  - C/w Tacrolimus for kidney transplant     #Hyponatremia:   - Obtain urine osm, urine Na, serum osm  - F/u renal recs

## 2023-04-07 NOTE — PROGRESS NOTE ADULT - PROBLEM SELECTOR PLAN 4
home meds: Amlodipine 10mg qd, Losartan 100mg qd, Hydralazine 50m TID, Isordil 20mg TID     Plan:  - c/w home meds

## 2023-04-07 NOTE — PROGRESS NOTE ADULT - PROBLEM SELECTOR PLAN 11
F: none needed  E: Careful repletion w/ ESRD  N: soft/bit sized     DVT PPX: holding AC; SCD due to GIB  GI prophylaxis: Protonix PO BID

## 2023-04-07 NOTE — CONSULT NOTE ADULT - CONSULT REQUESTED DATE/TIME
07-Apr-2023
17-Mar-2023 11:29
21-Mar-2023 16:10
17-Mar-2023 08:00
17-Mar-2023 12:23
17-Mar-2023 08:01
18-Mar-2023
17-Mar-2023 12:29
03-Apr-2023 14:13

## 2023-04-07 NOTE — CONSULT NOTE ADULT - ATTENDING COMMENTS
Initial attending contact date    4/3/23  . See PA note written above for details. I reviewed the PA documentation. I have personally seen and examined this patient. I reviewed vitals, labs, medications, cardiac studies, and additional imaging. I agree with the above PA's findings and plans as written above with the following additions/statements.  -ECHO with EF ~ 35% akinetic infero.infero lateral akinesis consistent with known occluded RCA, mild hypokinesis apical/anterior   -With persistent fever,  ID following, plan for gallium, IV meropenem   -Cath deferred indefinitely at this time due to persistent fever. Will need life vest as no plan for ICD given ongoing fever. Attempt made by [employer] company today however pt was too lethargic to take part in fitting. Will attempt again 4/8  -H/H has remained stable on asa, plavix without recurrent bleed   -Cont ASA, plavix, statin  -HFrEF: euvolemic on exam, cont toprol, isordil/hydral, losartan

## 2023-04-07 NOTE — CONSULT NOTE ADULT - ASSESSMENT
63 y/o M, PMHx HTN, HLD, DM, ICM (s/p cath years ago, RCA 99%, never intervened on), HFrEF 25-30%, ESRD s/p failed kidney transplant (LUE AVF; HD TTS), R AKA initially admitted to [facility] 2/27 for Respiratory Distress 2/2 SIRS s/p HD session whose course c/b cardiac arrest and Vtach. Now s/p extubation/pressors whose course further c/b LGIB (Hgb 3.5 s/p multiple transfusions and no active bleed). Transferred to [facility] 3/17 for ICD eval, however, found to have BRBPR on admission s/p flex sig without active bleed. Course further c/b ongoing fevers of unclear etiology throughout hospitalization, w/u remarkable for pyelitis s/p 2 wks course IV abx on 4/4, now w/ recurrent fever 104.6 on 4/5. Initial plan for LHC and SubQ ICD deferred 2/2 persistent fevers. Now awaiting Lifevest fitting. Medicine and ID following, pending gallium scan. Pt does not require further telemetry monitoring, now on medicine pending infectious work up. Cardiology is following for comanagement.    ECHO 4/3: EF 40% w/regional wall motion abnormality. Entire inferior wall & basal & mid inferolateral wall akinetic. Basal& mid anterolateral wall & apical lateral segments hypokinetic. Moderately dilated LA. Mild AR.   LHC at [place] w/ oLM 30% and RCA 99% that is not amenable to intervention.      #CAD  c/w ASA 81mg and Plavix 75mg daily (cleared by GI) -will likely require DAPT x 1year for medical management   Initial plan for cardiac cath now deferred indefinitely 2/2 persistent fevers  c/w Isodil 20 mg tid  c/w losartan 100 mg q24h  c/w atorvastatin 40 mg daily   c/w metoprolol succinate 25 mg daily     # s/p VTach arrest @ [facility]; no tele events noted at [facility]  EP Consulted – initially planed for SubQ ICD placement s/p cardiac cath given cardiac arrest. At this time, ICD implant is contraindicated 2/2 persistent fever of unclear etiology -likely infectious source and may need long term IV Abx.   Pending Lifevest prior to discharge   c/w Toprol XL 25mg daily    #chronic HFrEF  Volume: euvoluemic   c/w Hydral 50mg TID, Losartan 100mg daily, Toprol XL 25mg daily and Isordil 20mg TID    #FOU  Undergoing workup, pending gallium scan  63 y/o M, PMHx HTN, HLD, DM, ICM (s/p cath years ago, RCA 99%, never intervened on), HFrEF 25-30%, ESRD s/p failed kidney transplant (LUE AVF; HD TTS), R AKA initially admitted to [facility] 2/27 for Respiratory Distress 2/2 SIRS s/p HD session whose course c/b cardiac arrest and Vtach. Now s/p extubation/pressors whose course further c/b LGIB (Hgb 3.5 s/p multiple transfusions and no active bleed). Transferred to [facility] 3/17 for ICD eval, however, found to have BRBPR on admission s/p flex sig without active bleed. Course further c/b ongoing fevers of unclear etiology throughout hospitalization, w/u remarkable for pyelitis s/p 2 wks course IV abx on 4/4, now w/ recurrent fever 104.6 on 4/5. Initial plan for LHC and SubQ ICD deferred 2/2 persistent fevers. Now awaiting Lifevest fitting. Medicine and ID following, pending gallium scan. Pt does not require further telemetry monitoring, now on medicine pending infectious work up. Cardiology is following for comanagement.    ECHO 4/3: EF 40% w/regional wall motion abnormality. Entire inferior wall & basal & mid inferolateral wall akinetic. Basal& mid anterolateral wall & apical lateral segments hypokinetic. Moderately dilated LA. Mild AR.   LHC at [place] w/ oLM 30% and RCA 99% that is not amenable to intervention.      #CAD  c/w ASA 81mg and Plavix 75mg daily (cleared by GI) -will likely require DAPT x 1year for medical management   Initial plan for cardiac cath now deferred indefinitely 2/2 persistent fevers  c/w Isodil 20 mg tid  c/w losartan 100 mg q24h  c/w atorvastatin 40 mg daily   c/w metoprolol succinate 25 mg daily     # s/p VTach arrest @ [facility]; no tele events noted at [facility]  EP Consulted – initially planed for SubQ ICD placement s/p cardiac cath given cardiac arrest. At this time, ICD implant is contraindicated 2/2 persistent fever of unclear etiology -likely infectious source and may need long term IV Abx.   Pending Lifevest prior to discharge   c/w Toprol XL 25mg daily    #chronic HFrEF  Volume: euvoluemic   c/w Hydral 50mg TID, Losartan 100mg daily, Toprol XL 25mg daily and Isordil 20mg TID    #FOU  Undergoing workup, pending gallium scan  61 y/o M, PMHx HTN, HLD, DM, ICM (s/p cath years ago, RCA 99%, never intervened on), HFrEF 25-30%, ESRD s/p failed kidney transplant (LUE AVF; HD TTS), R AKA initially admitted to [facility] 2/27 for Respiratory Distress 2/2 SIRS s/p HD session whose course c/b cardiac arrest and Vtach. Now s/p extubation/pressors whose course further c/b LGIB (Hgb 3.5 s/p multiple transfusions and no active bleed). Transferred to [facility] 3/17 for ICD eval, however, found to have BRBPR on admission s/p flex sig without active bleed. Course further c/b ongoing fevers of unclear etiology throughout hospitalization, w/u remarkable for pyelitis s/p 2 wks course IV abx on 4/4, now w/ recurrent fever 104.6 on 4/5. Initial plan for LHC and SubQ ICD deferred 2/2 persistent fevers. Now awaiting Lifevest fitting. Medicine and ID following, pending gallium scan. Pt does not require further telemetry monitoring, now on medicine pending infectious work up. Cardiology is following for comanagement.    ECHO 4/3: EF 40% w/regional wall motion abnormality. Entire inferior wall & basal & mid inferolateral wall akinetic. Basal& mid anterolateral wall & apical lateral segments hypokinetic. Moderately dilated LA. Mild AR.   LHC at [place] w/ oLM 30% and RCA 99% that is not amenable to intervention.      #CAD  c/w ASA 81mg and Plavix 75mg daily (cleared by GI) -will likely require DAPT x 1year for medical management   Initial plan for cardiac cath now deferred indefinitely 2/2 persistent fevers  c/w Isodil 20 mg tid  c/w losartan 100 mg q24h  c/w atorvastatin 40 mg daily   c/w metoprolol succinate 25 mg daily     # s/p VTach arrest @ [facility]; no tele events noted at [facility]  EP Consulted – initially planed for SubQ ICD placement s/p cardiac cath given cardiac arrest. At this time, ICD implant is contraindicated 2/2 persistent fever of unclear etiology -likely infectious source and may need long term IV Abx.   Pending Lifevest prior to discharge   c/w Toprol XL 25mg daily    #chronic HFrEF  Volume: euvoluemic   c/w Hydral 50mg TID, Losartan 100mg daily, Toprol XL 25mg daily and Isordil 20mg TID    #FOU  Undergoing workup, pending gallium scan

## 2023-04-07 NOTE — PROVIDER CONTACT NOTE (OTHER) - RECOMMENDATIONS
Fingerstick checked. Labs checked. PAs at bedside to assess. CXR done. Blood cultures sent. Antibiotic given early. IV tylenol given for temp.
IV tylenol
IV removed

## 2023-04-07 NOTE — PROGRESS NOTE ADULT - ASSESSMENT
62Y M w/ HTN, DM, ESRD s/p failed kidney transplant who was transferred to [facility] (3/17) from [place] for ICD placement and Cardiac Cath after prolonged admission c/b cardiac arrest and vtach arrest and hypovolemic shock 2/2 GI bleed. New fever 3/21, s/p NM PET/CT c/w prostatitis, on CTX per ID, BK virus negative. Pending repeat cath and ICD placement once infection cleared. Also w/ intermittent melena on 3/23, GI and surg following but no intervention planned    #ESRD on HD TTS  HD per schedule 4/8  Daily BMP   K noted 3.6  Continue with Tacro at home dose. Recommend pt follow up with outpatient nephrologist regarding tacro    HTN:  UF with HD as tolerated   Amlodipine dc'd 2/2 hypotensive episodes  Continue losartan 100mg, metoprolol 25mg ER, hydralazine 50mg TID and Isordil 20mg TID. Hold meds for SBP<110    Anemia:  Hgb within goal  epo w/ HD  Defer iron replacement i/s/o active infection, so will not check iron profile at the moment  Transfusion per primary    MBD:  Calcium corrected wnl  iPTH 431 (3/19)  On Hectorol 4mcg TIW as outpatient. Will continue  Check phos twice weekly. Goal <5.5. Can replete if needed to keep>3 62Y M w/ HTN, DM, ESRD s/p failed kidney transplant who was transferred to [facility] (3/17) from [place] for ICD placement and Cardiac Cath after prolonged admission c/b cardiac arrest and vtach arrest and hypovolemic shock 2/2 GI bleed. New fever 3/21, s/p NM PET/CT c/w prostatitis, on CTX per ID, BK virus negative. Pending repeat cath and ICD placement once infection cleared. Also w/ intermittent melena on 3/23, GI and surg following but no intervention planned    #ESRD on HD TTS  HD per schedule 4/8  Daily BMP   K noted 3.6  Continue with Tacro at home dose. Recommend pt follow up with outpatient nephrologist regarding tacro    HTN:  UF with HD as tolerated   Amlodipine dc'd 2/2 hypotensive episodes  Continue losartan 100mg, metoprolol 25mg ER, hydralazine 50mg TID and Isordil 20mg TID. Hold meds for SBP<110    Anemia:  Hgb within goal  epo w/ HD  Defer iron replacement i/s/o active infection, so will not check iron profile at the moment  Transfusion per primary    MBD:  Calcium corrected wnl  iPTH 431 (3/19)  On Hectorol 4mcg TIW as outpatient. Will continue  Check phos twice weekly. Goal <5.5. Can replete if needed to keep>3 62Y M w/ HTN, DM, ESRD s/p failed kidney transplant who was transferred to [facility] (3/17) from [place] for ICD placement and Cardiac Cath after prolonged admission c/b cardiac arrest and vtach arrest and hypovolemic shock 2/2 GI bleed. New fever 3/21, s/p NM PET/CT c/w prostatitis, on CTX per ID, BK virus negative. Pending repeat cath and ICD placement once infection cleared. Also w/ intermittent melena on 3/23, GI and surg following but no intervention planned    #ESRD on HD TTS  HD per schedule 4/8  Daily BMP   K noted 3.6  Continue with Tacro at home dose. Recommend pt follow up with outpatient nephrologist regarding tacro    HTN:  UF with HD as tolerated   Amlodipine dc'd 2/2 hypotensive episodes  Continue losartan 100mg, metoprolol 25mg ER, hydralazine 50mg TID and Isordil 20mg TID. Hold meds for SBP<110    Anemia:  Hgb within goal  epo w/ HD  Defer iron replacement i/s/o active infection, so will not check iron profile at the moment  Transfusion per primary    MBD:  Calcium corrected wnl  iPTH 431 (3/19)  On Hectorol 4mcg TIW as outpatient. Will continue  Check phos twice weekly. Goal <5.5. Can replete if needed to keep>3

## 2023-04-07 NOTE — PROGRESS NOTE ADULT - PROBLEM SELECTOR PLAN 10
Remains euvolemic on exam  - ECHO 4/3: EF 40% w/regional wall motion abnormality. Entire inferior wall & basal & mid inferolateral wall akinetic. Basal& mid anterolateral wall & apical lateral segments hypokinetic. Moderately dilated LA. Mild AR.     Plan:  - c/w Hydral 50mg TID, Losartan 100mg daily, Toprol XL 25mg daily and Isordil 20mg TID.

## 2023-04-07 NOTE — PROGRESS NOTE ADULT - PROBLEM SELECTOR PLAN 8
History of coffee ground emesis and massive LGIB @ [facility]; s/p EGD/colonoscopy showing rectal ulcer  Surgery and GI consulted on admission. Per gen surgery no acute surgical intervention. s/p signmoidoscopy w/ GI, no active bleed. No further intervention required.    Plan:  - Active T&S  - Transfuse for hgb <8 (active CAD)  - Consider switching from IV BID to Protonix 40 mg PO daily. Per last GI note, can remain on BID PPI while on DAPT History of coffee ground emesis and massive LGIB @ [facility]; s/p EGD/colonoscopy showing rectal ulcer  Surgery and GI consulted on admission. Per gen surgery no acute surgical intervention. s/p signmoidoscopy w/ GI, no active bleed. No further intervention required.    Plan:  - Active T&S  - Transfuse for hgb <8 (active CAD)  - Consider switching from IV BID to Protonix 40 mg PO daily. Per last GI note, can remain on BID PPI while on DAPT History of coffee ground emesis and massive LGIB @ [facility]; s/p EGD/colonoscopy showing rectal ulcer  Surgery and GI consulted on admission. Per gen surgery no acute surgical intervention. s/p signmoidoscopy w/ GI, no active bleed. No further intervention required.    Plan:  - Active T&S  - Transfuse for hgb <8 (active CAD)  - Consider switching from IV BID to Protonix 40 mg PO daily. Per last GI note, can remain on BID PPI while on DAPT

## 2023-04-07 NOTE — PROGRESS NOTE ADULT - PROBLEM SELECTOR PLAN 3
s/p VTach arrest @ [facility]; no tele events noted. EP Consulted with original plan for SubQ ICD placement once pt had LHC. Will receive life vest instead    Plan:  - c/w Toprol XL 25mg daily. s/p VTach arrest @ [facility]; no tele events noted. EP Consulted with original plan for SubQ ICD placement once pt had LHC. Will receive life vest instead    Plan:  - c/w Toprol XL 25mg daily. s/p VTach arrest @ [facility]; no tele events noted. EP Consulted with original plan for SubQ ICD placement once pt had LHC. Will receive life vest instead    Plan:  - c/w Toprol XL 25mg daily.

## 2023-04-07 NOTE — PROGRESS NOTE ADULT - PROBLEM SELECTOR PLAN 2
[facility]/ [facility] 30% and RCA 99% that is not amenable to intervention. Per cardiology, will defer inpatient cath or other intervention on this admission due to persistent fevers   - ECHO 4/3: EF 40% w/regional wall motion abnormality. Entire inferior wall & basal & mid inferolateral wall akinetic. Basal& mid anterolateral wall & apical lateral segments hypokinetic. Moderately dilated LA. Mild AR.     Plan:  - c/w ASA 81mg and Plavix 75mg daily (cleared by GI)   - Patient will need Life Vest on discharge   - F/u cards recs, will hold off on treatment until resolution of fevers (plan for life vest on d/c, holding ICD placement) [facility] and RCA 99% that is not amenable to intervention. Per cardiology, will defer inpatient cath or other intervention on this admission due to persistent fevers   - ECHO 4/3: EF 40% w/regional wall motion abnormality. Entire inferior wall & basal & mid inferolateral wall akinetic. Basal& mid anterolateral wall & apical lateral segments hypokinetic. Moderately dilated LA. Mild AR.     Plan:  - c/w ASA 81mg and Plavix 75mg daily (cleared by GI)   - Patient will need Life Vest on discharge   - F/u cards recs, will hold off on treatment until resolution of fevers (plan for life vest on d/c, holding ICD placement) [facility]% and RCA 99% that is not amenable to intervention. Per cardiology, will defer inpatient cath or other intervention on this admission due to persistent fevers   - ECHO 4/3: EF 40% w/regional wall motion abnormality. Entire inferior wall & basal & mid inferolateral wall akinetic. Basal& mid anterolateral wall & apical lateral segments hypokinetic. Moderately dilated LA. Mild AR.     Plan:  - c/w ASA 81mg and Plavix 75mg daily (cleared by GI)   - Patient will need Life Vest on discharge   - F/u cards recs, will hold off on treatment until resolution of fevers (plan for life vest on d/c, holding ICD placement)

## 2023-04-07 NOTE — PROGRESS NOTE ADULT - ASSESSMENT
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 Pyelitis treated with ceftriaxone 2 g IV q24h (3/21-4/4). Patient has had diarrhea with antibiotic. C.diff and GI PCR negative. Per nurse, diarrhea improving with loperamide prn.     Suggest:  -Please order UA and UCx separately as patient is renal transplant recipient   -F/U Blood cultures 4/5   -F/u whole body gallium scan   -Start meropenem 500 mg IV Q24h       Team 2 will follow you.    Case d/w primary team. Final recommendations pending attending note.      Jaja Baig, Infectious Diseases PA 62M h/o ICM, HFrEF, ESRD s/p failed RT (HD via LUE AVF), R AKA, DM transferred to [facility] from [facility] on 3/17 for ICD placement with prolonged fever, found to have cystitis and pyelitis of transplanted kidney due to K.pneumo w/o pyelonephritis or abscess. Pyelitis treated with ceftriaxone 2 g IV q24h (3/21-4/4). Patient has had diarrhea with antibiotic. C.diff and GI PCR negative. Per nurse, diarrhea improving with loperamide prn.     Suggest:  -Please order UA and UCx separately as patient is renal transplant recipient   -F/U Blood cultures 4/5   -F/u whole body gallium scan   -Start meropenem 500 mg IV Q24h       Team 2 will follow you.    Case d/w primary team. Final recommendations pending attending note.      Jaja Baig, Infectious Diseases PA 62M h/o ICM, HFrEF, ESRD s/p failed RT (HD via LUE AVF), R AKA, DM transferred to [facility] from [facility] on 3/17 for ICD placement with prolonged fever, found to have cystitis and pyelitis of transplanted kidney due to K.pneumo w/o pyelonephritis or abscess. Pyelitis treated with ceftriaxone 2 g IV q24h (3/21-4/4). Patient has had diarrhea with antibiotic. C.diff and GI PCR negative. Per nurse, diarrhea improving with loperamide prn.     Suggest:  -Please order UA and UCx separately as patient is renal transplant recipient   -F/U Blood cultures 4/5   -F/u whole body gallium scan   -Start meropenem 500 mg IV Q24h       Team 2 will follow you.    Case d/w primary team. Final recommendations pending attending note.      Jaja Baig, Infectious Diseases PA 62M h/o ICM, HFrEF, ESRD s/p failed RT (HD via LUE AVF), R AKA, DM transferred to [facility] from [facility] on 3/17 for ICD placement with prolonged fever, found to have cystitis and pyelitis of transplanted kidney due to K.pneumo w/o pyelonephritis or abscess. Pyelitis treated with ceftriaxone 2 g IV q24h (3/21-4/4). Patient has had diarrhea with antibiotic. C.diff and GI PCR negative. Per nurse, diarrhea improving with loperamide prn. Patient found to have infected peripheral IV overnight- was removed.     Suggest:  -Please send UA and UCx separately as patient is renal transplant recipient   -F/U Blood cultures 4/5   -F/u whole body gallium scan   -Please order Fungitell and Galactomannan   -Give vancomycin 1g now then check level before HD. Covering for skin soft tissue infection   -Continue meropenem 500 mg IV Q24h       Team 2 will follow you.    Case d/w primary team. Final recommendations pending attending note.      Jaja Baig, Infectious Diseases PA 62M h/o ICM, HFrEF, ESRD s/p failed RT (HD via LUE AVF), R AKA, DM transferred to [facility] from [facility] on 3/17 for ICD placement with prolonged fever, found to have cystitis and pyelitis of transplanted kidney due to K.pneumo w/o pyelonephritis or abscess. Pyelitis treated with ceftriaxone 2 g IV q24h (3/21-4/4). Patient has had diarrhea with antibiotic. C.diff and GI PCR negative. Per nurse, diarrhea improving with loperamide prn. Patient found to have infected peripheral IV overnight- was removed.     Suggest:  -Please send UA and UCx separately as patient is renal transplant recipient   -F/U Blood cultures 4/5   -F/u whole body gallium scan   -Please order Fungitell and Galactomannan   -Give vancomycin 1g now then check level before HD. Covering for skin soft tissue infection   -Continue meropenem 500 mg IV Q24h       Team 2 will follow you.    Case d/w primary team. Final recommendations pending attending note.      Jaja Baig, Infectious Diseases PA 62M h/o ICM, HFrEF, ESRD s/p failed RT (HD via LUE AVF), R AKA, DM transferred to [facility] from [facility] on 3/17 for ICD placement with prolonged fever, found to have cystitis and pyelitis of transplanted kidney due to K.pneumo w/o pyelonephritis or abscess. Pyelitis treated with ceftriaxone 2 g IV q24h (3/21-4/4). Patient has had diarrhea with antibiotic. C.diff and GI PCR negative. Per nurse, diarrhea improving with loperamide prn. Patient found to have infected peripheral IV overnight- was removed.     Suggest:  -Please send UA and UCx separately as patient is renal transplant recipient   -F/U Blood cultures 4/5   -F/u whole body gallium scan   -Please order Fungitell and Galactomannan   -Give vancomycin 1g now then check level before HD. Covering for skin soft tissue infection   -Continue meropenem 500 mg IV Q24h       Team 2 will follow you.    Case d/w primary team. Final recommendations pending attending note.      Jaja Baig, Infectious Diseases PA

## 2023-04-07 NOTE — PROGRESS NOTE ADULT - PROBLEM SELECTOR PLAN 9
S/p failed kidney transplant. Per referring Dr Barrera, pt still requires tacrolimus even if failed transplant due to possibility of graft vs host.    Plan:  - Continue home Tacrolimus 2mg PO BID.  - Urology followup upon discharge.

## 2023-04-08 LAB
ALBUMIN SERPL ELPH-MCNC: 2.1 G/DL — LOW (ref 3.3–5)
ALP SERPL-CCNC: 127 U/L — HIGH (ref 40–120)
ALT FLD-CCNC: 5 U/L — LOW (ref 10–45)
ANION GAP SERPL CALC-SCNC: 8 MMOL/L — SIGNIFICANT CHANGE UP (ref 5–17)
APPEARANCE UR: CLEAR — SIGNIFICANT CHANGE UP
AST SERPL-CCNC: 20 U/L — SIGNIFICANT CHANGE UP (ref 10–40)
BACTERIA # UR AUTO: PRESENT /HPF
BASOPHILS # BLD AUTO: 0.12 K/UL — SIGNIFICANT CHANGE UP (ref 0–0.2)
BASOPHILS NFR BLD AUTO: 1.4 % — SIGNIFICANT CHANGE UP (ref 0–2)
BILIRUB SERPL-MCNC: 0.4 MG/DL — SIGNIFICANT CHANGE UP (ref 0.2–1.2)
BILIRUB UR-MCNC: NEGATIVE — SIGNIFICANT CHANGE UP
BLD GP AB SCN SERPL QL: NEGATIVE — SIGNIFICANT CHANGE UP
BUN SERPL-MCNC: 12 MG/DL — SIGNIFICANT CHANGE UP (ref 7–23)
CALCIUM SERPL-MCNC: 8.2 MG/DL — LOW (ref 8.4–10.5)
CHLORIDE SERPL-SCNC: 97 MMOL/L — SIGNIFICANT CHANGE UP (ref 96–108)
CO2 SERPL-SCNC: 26 MMOL/L — SIGNIFICANT CHANGE UP (ref 22–31)
COLOR SPEC: YELLOW — SIGNIFICANT CHANGE UP
COMMENT - URINE: SIGNIFICANT CHANGE UP
CREAT SERPL-MCNC: 4.42 MG/DL — HIGH (ref 0.5–1.3)
DIFF PNL FLD: ABNORMAL
EGFR: 14 ML/MIN/1.73M2 — LOW
EOSINOPHIL # BLD AUTO: 0.51 K/UL — HIGH (ref 0–0.5)
EOSINOPHIL NFR BLD AUTO: 6.1 % — HIGH (ref 0–6)
EPI CELLS # UR: ABNORMAL /HPF (ref 0–5)
GLUCOSE BLDC GLUCOMTR-MCNC: 122 MG/DL — HIGH (ref 70–99)
GLUCOSE BLDC GLUCOMTR-MCNC: 130 MG/DL — HIGH (ref 70–99)
GLUCOSE BLDC GLUCOMTR-MCNC: 139 MG/DL — HIGH (ref 70–99)
GLUCOSE BLDC GLUCOMTR-MCNC: 97 MG/DL — SIGNIFICANT CHANGE UP (ref 70–99)
GLUCOSE SERPL-MCNC: 100 MG/DL — HIGH (ref 70–99)
GLUCOSE UR QL: 250
HCT VFR BLD CALC: 28.7 % — LOW (ref 39–50)
HGB BLD-MCNC: 8.8 G/DL — LOW (ref 13–17)
IMM GRANULOCYTES NFR BLD AUTO: 0.6 % — SIGNIFICANT CHANGE UP (ref 0–0.9)
KETONES UR-MCNC: ABNORMAL MG/DL
LEUKOCYTE ESTERASE UR-ACNC: ABNORMAL
LYMPHOCYTES # BLD AUTO: 1.47 K/UL — SIGNIFICANT CHANGE UP (ref 1–3.3)
LYMPHOCYTES # BLD AUTO: 17.5 % — SIGNIFICANT CHANGE UP (ref 13–44)
MAGNESIUM SERPL-MCNC: 1.6 MG/DL — SIGNIFICANT CHANGE UP (ref 1.6–2.6)
MCHC RBC-ENTMCNC: 27.8 PG — SIGNIFICANT CHANGE UP (ref 27–34)
MCHC RBC-ENTMCNC: 30.7 GM/DL — LOW (ref 32–36)
MCV RBC AUTO: 90.8 FL — SIGNIFICANT CHANGE UP (ref 80–100)
MONOCYTES # BLD AUTO: 1.02 K/UL — HIGH (ref 0–0.9)
MONOCYTES NFR BLD AUTO: 12.1 % — SIGNIFICANT CHANGE UP (ref 2–14)
NEUTROPHILS # BLD AUTO: 5.23 K/UL — SIGNIFICANT CHANGE UP (ref 1.8–7.4)
NEUTROPHILS NFR BLD AUTO: 62.3 % — SIGNIFICANT CHANGE UP (ref 43–77)
NITRITE UR-MCNC: NEGATIVE — SIGNIFICANT CHANGE UP
NRBC # BLD: 0 /100 WBCS — SIGNIFICANT CHANGE UP (ref 0–0)
PH UR: 8 — SIGNIFICANT CHANGE UP (ref 5–8)
PHOSPHATE SERPL-MCNC: 3.3 MG/DL — SIGNIFICANT CHANGE UP (ref 2.5–4.5)
PLATELET # BLD AUTO: 195 K/UL — SIGNIFICANT CHANGE UP (ref 150–400)
POTASSIUM SERPL-MCNC: 3.5 MMOL/L — SIGNIFICANT CHANGE UP (ref 3.5–5.3)
POTASSIUM SERPL-SCNC: 3.5 MMOL/L — SIGNIFICANT CHANGE UP (ref 3.5–5.3)
PROT SERPL-MCNC: 4.5 G/DL — LOW (ref 6–8.3)
PROT UR-MCNC: >=300 MG/DL
RBC # BLD: 3.16 M/UL — LOW (ref 4.2–5.8)
RBC # FLD: 15.7 % — HIGH (ref 10.3–14.5)
RBC CASTS # UR COMP ASSIST: < 5 /HPF — SIGNIFICANT CHANGE UP
RH IG SCN BLD-IMP: POSITIVE — SIGNIFICANT CHANGE UP
SODIUM SERPL-SCNC: 131 MMOL/L — LOW (ref 135–145)
SP GR SPEC: 1.02 — SIGNIFICANT CHANGE UP (ref 1–1.03)
UROBILINOGEN FLD QL: 0.2 E.U./DL — SIGNIFICANT CHANGE UP
VANCOMYCIN TROUGH SERPL-MCNC: 13.4 UG/ML — SIGNIFICANT CHANGE UP (ref 10–20)
WBC # BLD: 8.4 K/UL — SIGNIFICANT CHANGE UP (ref 3.8–10.5)
WBC # FLD AUTO: 8.4 K/UL — SIGNIFICANT CHANGE UP (ref 3.8–10.5)
WBC UR QL: > 10 /HPF

## 2023-04-08 PROCEDURE — 99232 SBSQ HOSP IP/OBS MODERATE 35: CPT | Mod: GC

## 2023-04-08 PROCEDURE — 99232 SBSQ HOSP IP/OBS MODERATE 35: CPT

## 2023-04-08 PROCEDURE — 90935 HEMODIALYSIS ONE EVALUATION: CPT

## 2023-04-08 RX ORDER — ERYTHROPOIETIN 10000 [IU]/ML
6000 INJECTION, SOLUTION INTRAVENOUS; SUBCUTANEOUS ONCE
Refills: 0 | Status: DISCONTINUED | OUTPATIENT
Start: 2023-04-08 | End: 2023-04-12

## 2023-04-08 RX ORDER — VANCOMYCIN HCL 1 G
1250 VIAL (EA) INTRAVENOUS ONCE
Refills: 0 | Status: COMPLETED | OUTPATIENT
Start: 2023-04-08 | End: 2023-04-08

## 2023-04-08 RX ORDER — VANCOMYCIN HCL 1 G
500 VIAL (EA) INTRAVENOUS ONCE
Refills: 0 | Status: COMPLETED | OUTPATIENT
Start: 2023-04-08 | End: 2023-04-08

## 2023-04-08 RX ORDER — DOXERCALCIFEROL 2.5 UG/1
2 CAPSULE ORAL ONCE
Refills: 0 | Status: COMPLETED | OUTPATIENT
Start: 2023-04-08 | End: 2023-04-08

## 2023-04-08 RX ADMIN — PANTOPRAZOLE SODIUM 40 MILLIGRAM(S): 20 TABLET, DELAYED RELEASE ORAL at 19:05

## 2023-04-08 RX ADMIN — CLOPIDOGREL BISULFATE 75 MILLIGRAM(S): 75 TABLET, FILM COATED ORAL at 14:11

## 2023-04-08 RX ADMIN — ISOSORBIDE DINITRATE 20 MILLIGRAM(S): 5 TABLET ORAL at 06:44

## 2023-04-08 RX ADMIN — Medication 25 MILLIGRAM(S): at 06:45

## 2023-04-08 RX ADMIN — ISOSORBIDE DINITRATE 20 MILLIGRAM(S): 5 TABLET ORAL at 15:02

## 2023-04-08 RX ADMIN — Medication 50 MILLIGRAM(S): at 21:37

## 2023-04-08 RX ADMIN — Medication 50 MILLIGRAM(S): at 16:04

## 2023-04-08 RX ADMIN — TAMSULOSIN HYDROCHLORIDE 0.8 MILLIGRAM(S): 0.4 CAPSULE ORAL at 21:37

## 2023-04-08 RX ADMIN — ATORVASTATIN CALCIUM 40 MILLIGRAM(S): 80 TABLET, FILM COATED ORAL at 21:37

## 2023-04-08 RX ADMIN — LOSARTAN POTASSIUM 100 MILLIGRAM(S): 100 TABLET, FILM COATED ORAL at 19:07

## 2023-04-08 RX ADMIN — TACROLIMUS 2 MILLIGRAM(S): 5 CAPSULE ORAL at 06:44

## 2023-04-08 RX ADMIN — Medication 100 MILLIGRAM(S): at 23:49

## 2023-04-08 RX ADMIN — Medication 650 MILLIGRAM(S): at 06:44

## 2023-04-08 RX ADMIN — PANTOPRAZOLE SODIUM 40 MILLIGRAM(S): 20 TABLET, DELAYED RELEASE ORAL at 06:44

## 2023-04-08 RX ADMIN — Medication 650 MILLIGRAM(S): at 07:44

## 2023-04-08 RX ADMIN — DOXERCALCIFEROL 2 MICROGRAM(S): 2.5 CAPSULE ORAL at 11:35

## 2023-04-08 RX ADMIN — ERYTHROPOIETIN 6000 UNIT(S): 10000 INJECTION, SOLUTION INTRAVENOUS; SUBCUTANEOUS at 11:36

## 2023-04-08 RX ADMIN — TACROLIMUS 2 MILLIGRAM(S): 5 CAPSULE ORAL at 19:05

## 2023-04-08 RX ADMIN — Medication 50 MILLIGRAM(S): at 06:45

## 2023-04-08 RX ADMIN — Medication 81 MILLIGRAM(S): at 14:11

## 2023-04-08 RX ADMIN — Medication 325 MILLIGRAM(S): at 14:11

## 2023-04-08 RX ADMIN — MEROPENEM 100 MILLIGRAM(S): 1 INJECTION INTRAVENOUS at 16:05

## 2023-04-08 RX ADMIN — Medication 2 MILLIGRAM(S): at 20:10

## 2023-04-08 NOTE — PROGRESS NOTE ADULT - SUBJECTIVE AND OBJECTIVE BOX
INFECTIOUS DISEASES CONSULT FOLLOW-UP NOTE    INTERVAL HPI/OVERNIGHT EVENTS:  febrile to 102.9  patient denied n/v/d, abdominal pain, SOB, cough    ROS:   Constitutional, eyes, ENT, cardiovascular, respiratory, gastrointestinal, genitourinary, integumentary, neurological, psychiatric and heme/lymph are otherwise negative other than noted above       ANTIBIOTICS/RELEVANT:    MEDICATIONS  (STANDING):  aspirin enteric coated 81 milliGRAM(s) Oral daily  atorvastatin 40 milliGRAM(s) Oral at bedtime  chlorhexidine 2% Cloths 1 Application(s) Topical daily  clopidogrel Tablet 75 milliGRAM(s) Oral daily  dextrose 5%. 1000 milliLiter(s) (100 mL/Hr) IV Continuous <Continuous>  dextrose 5%. 1000 milliLiter(s) (50 mL/Hr) IV Continuous <Continuous>  dextrose 50% Injectable 25 Gram(s) IV Push once  dextrose 50% Injectable 12.5 Gram(s) IV Push once  epoetin janae-epbx (RETACRIT) Injectable 6000 Unit(s) IV Push once  ferrous    sulfate 325 milliGRAM(s) Oral daily  hydrALAZINE 50 milliGRAM(s) Oral every 8 hours  insulin lispro (ADMELOG) corrective regimen sliding scale   SubCutaneous Before meals and at bedtime  isosorbide   dinitrate Tablet (ISORDIL) 20 milliGRAM(s) Oral three times a day  losartan 100 milliGRAM(s) Oral every 24 hours  meropenem  IVPB 500 milliGRAM(s) IV Intermittent every 24 hours  metoprolol succinate ER 25 milliGRAM(s) Oral daily  pantoprazole    Tablet 40 milliGRAM(s) Oral every 12 hours  tacrolimus 2 milliGRAM(s) Oral every 12 hours  tamsulosin 0.8 milliGRAM(s) Oral at bedtime  vancomycin  IVPB 1250 milliGRAM(s) IV Intermittent once    MEDICATIONS  (PRN):  acetaminophen     Tablet .. 650 milliGRAM(s) Oral every 6 hours PRN Temp greater or equal to 38C (100.4F), Mild Pain (1 - 3), Moderate Pain (4 - 6)  dextrose Oral Gel 15 Gram(s) Oral once PRN Blood Glucose LESS THAN 70 milliGRAM(s)/deciliter  loperamide 2 milliGRAM(s) Oral three times a day PRN Diarrhea  ondansetron    Tablet 8 milliGRAM(s) Oral daily PRN Vomiting  sodium chloride 0.65% Nasal 1 Spray(s) Both Nostrils every 4 hours PRN Nasal Congestion        Vital Signs Last 24 Hrs  T(C): 37.1 (08 Apr 2023 09:25), Max: 39.4 (07 Apr 2023 16:39)  T(F): 98.8 (08 Apr 2023 09:25), Max: 102.9 (07 Apr 2023 16:39)  HR: 53 (08 Apr 2023 09:25) (53 - 83)  BP: 115/57 (08 Apr 2023 09:25) (114/59 - 155/67)  BP(mean): --  RR: 18 (08 Apr 2023 09:25) (18 - 19)  SpO2: 97% (08 Apr 2023 09:25) (97% - 98%)    Parameters below as of 08 Apr 2023 09:25  Patient On (Oxygen Delivery Method): nasal cannula  O2 Flow (L/min): 2      PHYSICAL EXAM:  Constitutional: awake, NAD  Eyes: the sclera and conjunctiva were normal.   ENT: the ears and nose were normal in appearance.   Neck: the appearance of the neck was normal and the neck was supple.   Pulmonary: no respiratory distress and lungs were clear to auscultation bilaterally.   Heart: heart rate was normal and rhythm regular, normal S1 and S2  Vascular:. there was no peripheral edema  Abdomen: normal bowel sounds, soft, non-tender          LABS:                        8.8    8.40  )-----------( 195      ( 08 Apr 2023 07:54 )             28.7     04-08    131<L>  |  97  |  12  ----------------------------<  100<H>  3.5   |  26  |  4.42<H>    Ca    8.2<L>      08 Apr 2023 07:54  Phos  3.3     04-08  Mg     1.6     04-08    TPro  4.5<L>  /  Alb  2.1<L>  /  TBili  0.4  /  DBili  x   /  AST  20  /  ALT  5<L>  /  AlkPhos  127<H>  04-08          MICROBIOLOGY:      RADIOLOGY & ADDITIONAL STUDIES:  Reviewed

## 2023-04-08 NOTE — PROGRESS NOTE ADULT - ASSESSMENT
62M h/o ICM, HFrEF, ESRD s/p failed RT (HD via LUE AVF), R AKA, DM transferred to [facility] from [facility] on 3/17 for ICD placement with prolonged low grade fever, found to have cystitis and pyelitis of transplanted kidney due to K.pneumo w/o pyelonephritis or abscess. Pyelitis treated with ceftriaxone 2 g IV q24h (3/21-4/4).  Now with new high fever since 4/5 of unclear etiology, so far all infectious work-up negative including negative gallium scan (pyelitis resolved), BCx ngtd.    - cont meropenem 500mg IV q24h for now  - give vanco 500mg IV once after HD and check level before next HD  - send UA and UCx separately as patient is renal transplant recipient   - check RVP  - f/u Fungitell and Galactomannan   - f/u BCx    Team 2 will follow you.  Case d/w primary team.    Luann Carvajal MD, MS  Infectious Disease attending  work cell [phone]   For any questions during evening/weekend/holiday, please page ID on call  62M h/o ICM, HFrEF, ESRD s/p failed RT (HD via LUE AVF), R AKA, DM transferred to [facility] from [facility] on 3/17 for ICD placement with prolonged low grade fever, found to have cystitis and pyelitis of transplanted kidney due to K.pneumo w/o pyelonephritis or abscess. Pyelitis treated with ceftriaxone 2 g IV q24h (3/21-4/4).  Now with new high fever since 4/5 of unclear etiology, so far all infectious work-up negative including negative gallium scan (pyelitis resolved), BCx ngtd.    - cont meropenem 500mg IV q24h for now  - give vanco 500mg IV once after HD and check level before next HD  - send UA and UCx separately as patient is renal transplant recipient   - check RVP  - f/u Fungitell and Galactomannan   - f/u BCx    Team 2 will follow you.  Case d/w primary team.    Luann Carvajal MD, MS  Infectious Disease attending  work cell [phone]   For any questions during evening/weekend/holiday, please page ID on call  62M h/o ICM, HFrEF, ESRD s/p failed RT (HD via LUE AVF), R AKA, DM transferred to [facility] from [facility] on 3/17 for ICD placement with prolonged low grade fever, found to have cystitis and pyelitis of transplanted kidney due to K.pneumo w/o pyelonephritis or abscess. Pyelitis treated with ceftriaxone 2 g IV q24h (3/21-4/4).  Now with new high fever since 4/5 of unclear etiology, so far all infectious work-up negative including negative gallium scan (pyelitis resolved), BCx ngtd.    - cont meropenem 500mg IV q24h for now  - give vanco 500mg IV once after HD and check level before next HD  - send UA and UCx separately as patient is renal transplant recipient   - check RVP  - f/u Fungitell and Galactomannan   - f/u BCx    Team 2 will follow you.  Case d/w primary team.    Luann Carvajal MD, MS  Infectious Disease attending  work cell [phone]   For any questions during evening/weekend/holiday, please page ID on call

## 2023-04-08 NOTE — PROGRESS NOTE ADULT - ASSESSMENT
Patient is a 62M PMHx HTN, HLD, DM, CAD (s/p cath years ago, RCA 99%, never intervened on), HFrEF 25-30%, ESRD s/p failed kidney transplant (last HD 3/1 via Left Arm AVF; HD TTS), Right AKA, initially presented to [facility] on 2/27 for respiratory distress after dialysis, found to be septic, complicated by AHRF requiring intubation for 24hr followed by VT arrest. Course also complicated by massive LGIB (rectal ulcer) requiring 7u pRBC, 1u FFP and 1u PLT. Transferred to [facility] for ICD placement 2/2 Vtach and cardiac cath. Medicine consulted for GIB, fever of unknown origin, and comanagement.  Patient now transferred to medicine for persistent fever of unknown origin.                  Patient is a 62M PMHx HTN, HLD, DM, CAD (s/p cath years ago, RCA 99%, never intervened on), HFrEF 25-30%, ESRD s/p failed kidney transplant (last HD 3/1 via Left Arm AVF; HD TTS), Right AKA, initially presented to [facility] on 2/27 for respiratory distress after dialysis, found to be septic, complicated by AHRF requiring intubation for 24hr followed by VT arrest. Course also complicated by massive LGIB (rectal ulcer) requiring 7u pRBC, 1u FFP and 1u PLT. Transferred to [facility] for ICD placement 2/2 Vtach and cardiac cath. Medicine consulted for GIB, fever of unknown origin, and comanagement.  Patient now transferred to medicine for persistent fever of unknown origin.                  Patient is a 62M PMHx HTN, HLD, DM, CAD (s/p cath years ago, RCA 99%, never intervened on), HFrEF 25-30%, ESRD s/p failed kidney transplant (last HD 3/1 via Left Arm AVF; HD TTS), Right AKA, initially presented to [facility] on 2/27 for respiratory distress after dialysis, found to be septic, complicated by AHRF requiring intubation for 24hr followed by VT arrest. Course also complicated by massive LGIB (rectal ulcer) requiring 7u pRBC, 1u FFP and 1u PLT. Transferred to [facility] for ICD placement 2/2 Vtach and cardiac cath. Medicine consulted for GIB, fever of unknown origin, and comanagement.  Patient now transferred to medicine for persistent fever of unknown origin.

## 2023-04-08 NOTE — PROVIDER CONTACT NOTE (OTHER) - SITUATION
Provider made aware of /55; HR 83. Isosorbide to be given.
Provider made aware of /58; HR 49.
Provider made of /61; HR 55.
Pt admitted s/p cardiac arrest for ICD placement now c/b fevers of unknown infectious source. Pt febrile and with new L sided facial/mouth droop.
Pt febrile, being transferred to regional medicine.
Pt is a 62y M admitted from [facility] due to ongoing fevers pending ICD eval and lifevest; went to assess IV and found it was leaking; further assessment showed site was inflamed and purulent; IV removed

## 2023-04-08 NOTE — PROGRESS NOTE ADULT - SUBJECTIVE AND OBJECTIVE BOX
HARDIAL, TUMESHWAR    Overnight: 100.5 this AM (not a new TMAX)  Subjective: Patient examined at bedside no SOB, lightheadedness of subjective fever.         T(C): 36.7 (23 @ 13:40), Max: 38.1 (23 @ 05:34)  HR: 59 (23 @ 16:00) (49 - 59)  BP: 135/65 (23 @ 16:00) (114/59 - 149/59)  RR: 18 (23 @ 13:40) (18 - 19)  SpO2: 100% (23 @ 13:40) (97% - 100%)  Wt(kg): --Vital Signs Last 24 Hrs  T(C): 36.7 (2023 13:40), Max: 38.1 (2023 05:34)  T(F): 98 (2023 13:40), Max: 100.5 (2023 05:34)  HR: 59 (2023 16:00) (49 - 59)  BP: 135/65 (2023 16:00) (114/59 - 149/59)  BP(mean): --  RR: 18 (2023 13:40) (18 - 19)  SpO2: 100% (2023 13:40) (97% - 100%)    PHYSICAL EXAM:  GENERAL: +lethargic   Neuro: CN2-12 grossly intact  HEENT: NC/AT; MMM; neck supple;  Heart: RRR; +s1 and s2; no MRG   Lungs: CTA b/l; normal effort; no accesory muscle use, on NC   GI: + mildly distended   Extremities: +2 pulses in UE and LE b/l; no clubbing  Skin: skin dry and warm; no skin tenting; no rashes or lesions  MSK: normal tone      LABS:                        8.8    8.40  )-----------( 195      ( 2023 07:54 )             28.7     04-08    131<L>  |  97  |  12  ----------------------------<  100<H>  3.5   |  26  |  4.42<H>    Ca    8.2<L>      2023 07:54  Phos  3.3     04-  Mg     1.6     04-    TPro  4.5<L>  /  Alb  2.1<L>  /  TBili  0.4  /  DBili  x   /  AST  20  /  ALT  5<L>  /  AlkPhos  127<H>  -        Urinalysis Basic - ( 2023 15:17 )    Color: Yellow / Appearance: Clear / S.020 / pH: x  Gluc: x / Ketone: Trace mg/dL  / Bili: Negative / Urobili: 0.2 E.U./dL   Blood: x / Protein: >=300 mg/dL / Nitrite: NEGATIVE   Leuk Esterase: Trace / RBC: < 5 /HPF / WBC > 10 /HPF   Sq Epi: x / Non Sq Epi: x / Bacteria: Present /HPF      CAPILLARY BLOOD GLUCOSE      POCT Blood Glucose.: 139 mg/dL (2023 17:23)  POCT Blood Glucose.: 97 mg/dL (2023 13:42)  POCT Blood Glucose.: 139 mg/dL (2023 08:51)  POCT Blood Glucose.: 122 mg/dL (2023 08:07)  POCT Blood Glucose.: 112 mg/dL (2023 22:01)        Urinalysis Basic - ( 2023 15:17 )    Color: Yellow / Appearance: Clear / S.020 / pH: x  Gluc: x / Ketone: Trace mg/dL  / Bili: Negative / Urobili: 0.2 E.U./dL   Blood: x / Protein: >=300 mg/dL / Nitrite: NEGATIVE   Leuk Esterase: Trace / RBC: < 5 /HPF / WBC > 10 /HPF   Sq Epi: x / Non Sq Epi: x / Bacteria: Present /HPF        RADIOLOGY & ADDITIONAL TESTS:    Imaging Personally Reviewed:  [ ] YES  [ ] NO

## 2023-04-08 NOTE — PROGRESS NOTE ADULT - PROBLEM SELECTOR PLAN 2
[facility] and RCA 99% that is not amenable to intervention. Per cardiology, will defer inpatient cath or other intervention on this admission due to persistent fevers   - ECHO 4/3: EF 40% w/regional wall motion abnormality. Entire inferior wall & basal & mid inferolateral wall akinetic. Basal& mid anterolateral wall & apical lateral segments hypokinetic. Moderately dilated LA. Mild AR.     Plan:  - c/w ASA 81mg and Plavix 75mg daily (cleared by GI)   - Patient will need Life Vest on discharge   - F/u cards recs, will hold off on treatment until resolution of fevers (plan for life vest on d/c, holding ICD placement) [facility] and RCA 99% that is not amenable to intervention. Per cardiology, will defer inpatient cath or other intervention on this admission due to persistent fevers   - ECHO 4/3: EF 40% w/regional wall motion abnormality. Entire inferior wall & basal & mid inferolateral wall akinetic. Basal& mid anterolateral wall & apical lateral segments hypokinetic. Moderately dilated LA. Mild AR.     Plan:  - c/w ASA 81mg and Plavix 75mg daily (cleared by GI)   - Patient will need Life Vest on discharge   - F/u cards recs, will hold off on treatment until resolution of fevers (plan for life vest on d/c, holding ICD placement) [facility] and RCA 99% that is not amenable to intervention. Per cardiology, will defer inpatient cath or other intervention on this admission due to persistent fevers   - ECHO 4/3: EF 40% w/regional wall motion abnormality. Entire inferior wall & basal & mid inferolateral wall akinetic. Basal& mid anterolateral wall & apical lateral segments hypokinetic. Moderately dilated LA. Mild AR.     Plan:  - c/w ASA 81mg and Plavix 75mg daily (cleared by GI)   - Patient will need Life Vest on discharge   - F/u cards recs, will hold off on treatment until resolution of fevers (plan for life vest on d/c, holding ICD placement)

## 2023-04-08 NOTE — PROGRESS NOTE ADULT - PROBLEM SELECTOR PLAN 7
On dialysis [facility], Sat. Nephro following (hx failed renal transplant)     Plan:  - f/u nephro recs HD on april 8th   - c/w calcium citrate 667mg TID  - C/w Tacrolimus for kidney transplant     #Hyponatremia:   - Obtain urine osm, urine Na, serum osm  - F/u renal recs

## 2023-04-08 NOTE — PROGRESS NOTE ADULT - PROBLEM SELECTOR PLAN 8
History of coffee ground emesis and massive LGIB @ [facility]; s/p EGD/colonoscopy showing rectal ulcer  Surgery and GI consulted on admission. Per gen surgery no acute surgical intervention. s/p signmoidoscopy w/ GI, no active bleed. No further intervention required.    Plan:  - Active T&S  - Transfuse for hgb <8 (active CAD)  - Consider switching from IV BID to Protonix 40 mg PO daily. Per last GI note, can remain on BID PPI while on DAPT History of coffee ground emesis and massive LGIB @ [facility]; s/p EGD/colonoscopy showing rectal ulcer  Surgery and GI consulted on admission. Per gen surgery no acute surgical intervention. s/p signmoidoscopy w/ GI, no active bleed. No further intervention required.    Plan:  - Active T&S  - Transfuse for hgb <8 (active CAD)  - Consider switching from IV BID to Protonix 40 mg PO daily. Per last GI note, can remain on BID PPI while on DAPT History of coffee ground emesis and massive LGIB @ [facility]; s/p EGD/colonoscopy showing rectal ulcer  Surgery and GI consulted on admission. Per gen surgery no acute surgical intervention. s/p signmoidoscopy w/ GI, no active bleed. No further intervention required.    Plan:  - Active T&S  - Transfuse for hgb <8 (active CAD)  - Consider switching from IV BID to Protonix 40 mg PO daily. Per last GI note, can remain on BID PPI while on DAPT

## 2023-04-08 NOTE — PROGRESS NOTE ADULT - SUBJECTIVE AND OBJECTIVE BOX
NEPHROLOGY PROGRESS NOTE    Subjective: Patient seen and examined on HD, tolerating well    [OBJECTIVE]:    Vital Signs:  T(F): , Max: 102.9 (04-07-23 @ 16:39)  HR:  (49 - 71)  BP:  (114/59 - 155/67)  BP(mean): --  ABP: --  ABP(mean): --  RR:  (18 - 19)  SpO2:  (97% - 100%)  CVP(mm Hg): --  CVP(cm H2O): --      04-08 @ 07:01  -  04-08 @ 15:00  --------------------------------------------------------  IN: 0 mL / OUT: 900 mL / NET: -900 mL      CAPILLARY BLOOD GLUCOSE      POCT Blood Glucose.: 97 mg/dL (08 Apr 2023 13:42)      .  VITAL SIGNS:  T(F): 98 (04-08-23 @ 13:40), Max: 102.9 (04-07-23 @ 16:39)  HR: 55 (04-08-23 @ 14:40) (49 - 71)  BP: 141/61 (04-08-23 @ 14:40) (114/59 - 155/67)  BP(mean): --  RR: 18 (04-08-23 @ 13:40) (18 - 19)  SpO2: 100% (04-08-23 @ 13:40) (97% - 100%)    PHYSICAL EXAM:  Constitutional: Resting comfortably in bed; NAD  HEENT:  EOMI, anicteric sclera; MMM  Respiratory: CTA B/L; no W/R/R, no accessory muscle use  Cardiac: +S1/S2; RRR; no M/R/G  Gastrointestinal: soft, NT/ND; no rebound or guarding; +BS  Extremities: WWP, no clubbing or cyanosis; no peripheral edema  Dermatologic: skin warm, dry and intact; no rashes, wounds, or scars  Access:LUE AVF, accessed    Medications:  MEDICATIONS  (STANDING):  aspirin enteric coated 81 milliGRAM(s) Oral daily  atorvastatin 40 milliGRAM(s) Oral at bedtime  chlorhexidine 2% Cloths 1 Application(s) Topical daily  clopidogrel Tablet 75 milliGRAM(s) Oral daily  dextrose 5%. 1000 milliLiter(s) (100 mL/Hr) IV Continuous <Continuous>  dextrose 5%. 1000 milliLiter(s) (50 mL/Hr) IV Continuous <Continuous>  dextrose 50% Injectable 25 Gram(s) IV Push once  dextrose 50% Injectable 12.5 Gram(s) IV Push once  epoetin janae-epbx (RETACRIT) Injectable 6000 Unit(s) IV Push once  ferrous    sulfate 325 milliGRAM(s) Oral daily  hydrALAZINE 50 milliGRAM(s) Oral every 8 hours  insulin lispro (ADMELOG) corrective regimen sliding scale   SubCutaneous Before meals and at bedtime  isosorbide   dinitrate Tablet (ISORDIL) 20 milliGRAM(s) Oral three times a day  losartan 100 milliGRAM(s) Oral every 24 hours  meropenem  IVPB 500 milliGRAM(s) IV Intermittent every 24 hours  metoprolol succinate ER 25 milliGRAM(s) Oral daily  pantoprazole    Tablet 40 milliGRAM(s) Oral every 12 hours  tacrolimus 2 milliGRAM(s) Oral every 12 hours  tamsulosin 0.8 milliGRAM(s) Oral at bedtime  vancomycin  IVPB 1250 milliGRAM(s) IV Intermittent once    MEDICATIONS  (PRN):  acetaminophen     Tablet .. 650 milliGRAM(s) Oral every 6 hours PRN Temp greater or equal to 38C (100.4F), Mild Pain (1 - 3), Moderate Pain (4 - 6)  dextrose Oral Gel 15 Gram(s) Oral once PRN Blood Glucose LESS THAN 70 milliGRAM(s)/deciliter  loperamide 2 milliGRAM(s) Oral three times a day PRN Diarrhea  ondansetron    Tablet 8 milliGRAM(s) Oral daily PRN Vomiting  sodium chloride 0.65% Nasal 1 Spray(s) Both Nostrils every 4 hours PRN Nasal Congestion      Allergies:  Allergies    Allergy Status Unknown    Intolerances        Labs:                        8.8    8.40  )-----------( 195      ( 08 Apr 2023 07:54 )             28.7     04-08    131<L>  |  97  |  12  ----------------------------<  100<H>  3.5   |  26  |  4.42<H>    Ca    8.2<L>      08 Apr 2023 07:54  Phos  3.3     04-08  Mg     1.6     04-08    TPro  4.5<L>  /  Alb  2.1<L>  /  TBili  0.4  /  DBili  x   /  AST  20  /  ALT  5<L>  /  AlkPhos  127<H>  04-08          Radiology and other tests:

## 2023-04-08 NOTE — PROVIDER CONTACT NOTE (OTHER) - DATE AND TIME:
06-Apr-2023 21:45
08-Apr-2023 05:40
07-Apr-2023 12:17
08-Apr-2023 13:40
07-Apr-2023 02:16
08-Apr-2023 14:49
22-Mar-2023 15:30

## 2023-04-08 NOTE — PROVIDER CONTACT NOTE (OTHER) - ACTION/TREATMENT ORDERED:
Ordered by provider to stagger BP medications, administer hydralazine; and to not give  11:00 isosorbide. Ordered by to administer 16:00 isosorbide.

## 2023-04-08 NOTE — PROVIDER CONTACT NOTE (OTHER) - REASON
Patient BP
patient status
Patient status
Temp 100.5
L side facial/mouth droop
Pt sent up from [facility] with a non-patent IV from 3/25; site was inflamed and purulent
temp 103.0 F

## 2023-04-08 NOTE — PROGRESS NOTE ADULT - PROBLEM SELECTOR PLAN 3
s/p VTach arrest @ [facility]; no tele events noted. EP Consulted with original plan for SubQ ICD placement once pt had LHC. Will receive life vest instead    Plan:  - c/w Toprol XL 25mg daily. s/p VTach arrest @ [facility]; no tele events noted. EP Consulted with original plan for SubQ ICD placement once pt had LHC. Will receive life vest instead    Plan:  - c/w Toprol XL 25mg daily. s/p VTach arrest @ [facility]; no tele events noted. EP Consulted with original plan for SubQ ICD placement once pt had LHC. Will receive life vest instead    Plan:  - c/w Toprol XL 25mg daily.

## 2023-04-08 NOTE — PROVIDER CONTACT NOTE (MEDICATION) - SITUATION
Informed provider that one order of Vancomycin 500 mg has been ordered as well as one order of Vancomycin 1250mg,

## 2023-04-08 NOTE — PROGRESS NOTE ADULT - ASSESSMENT
62Y M w/ HTN, DM, ESRD s/p failed kidney transplant who was transferred to [facility] (3/17) from [place] for ICD placement and Cardiac Cath after prolonged admission c/b cardiac arrest and vtach arrest and hypovolemic shock 2/2 GI bleed. New fever 3/21, s/p NM PET/CT c/w prostatitis, on CTX per ID, BK virus negative. Pending repeat cath and ICD placement once infection cleared. Also w/ intermittent melena on 3/23, GI and surg following but no intervention planned    #ESRD on HD TTS  Continue HD as above  Daily BMP   K noted  Continue with Tacro at home dose. Recommend pt follow up with outpatient nephrologist regarding tacro    HTN:  UF with HD as tolerated   Amlodipine dc'd 2/2 hypotensive episodes  Continue losartan 100mg, metoprolol 25mg ER, hydralazine 50mg TID and Isordil 20mg TID. Hold meds for SBP<110    Anemia:  Hgb within goal  epo w/ HD  Defer iron replacement i/s/o active infection, so will not check iron profile at the moment  Transfusion per primary    MBD:  Calcium corrected wnl  iPTH 431 (3/19)  On Hectorol 4mcg TIW as outpatient. Will continue  Check phos twice weekly. Goal <5.5. Can replete if needed to keep>3 62Y M w/ HTN, DM, ESRD s/p failed kidney transplant who was transferred to [facility] (3/17) from [place] for ICD placement and Cardiac Cath after prolonged admission c/b cardiac arrest and vtach arrest and hypovolemic shock 2/2 GI bleed. New fever 3/21, s/p NM PET/CT c/w prostatitis, on CTX per ID, BK virus negative. Pending repeat cath and ICD placement once infection cleared. Also w/ intermittent melena on 3/23, GI and surg following but no intervention planned    #ESRD on HD TTS  Continue HD as above  Daily BMP   K noted  Continue with Tacro at home dose. Recommend pt follow up with outpatient nephrologist regarding tacro    HTN:  UF with HD as tolerated   Amlodipine dc'd 2/2 hypotensive episodes  Continue losartan 100mg, metoprolol 25mg ER, hydralazine 50mg TID and Isordil 20mg TID. Hold meds for SBP<110    Anemia:  Hgb within goal  epo w/ HD  Defer iron replacement i/s/o active infection, so will not check iron profile at the moment  Transfusion per primary    MBD:  Calcium corrected wnl  iPTH 431 (3/19)  On Hectorol 4mcg TIW as outpatient. Will continue  Check phos twice weekly. Goal <5.5. Can replete if needed to keep>3 62Y M w/ HTN, DM, ESRD s/p failed kidney transplant who was transferred to [facility] (3/17) from [place] for ICD placement and Cardiac Cath after prolonged admission c/b cardiac arrest and vtach arrest and hypovolemic shock 2/2 GI bleed. New fever 3/21, s/p NM PET/CT c/w prostatitis, on CTX per ID, BK virus negative. Pending repeat cath and ICD placement once infection cleared. Also w/ intermittent melena on 3/23, GI and surg following but no intervention planned    #ESRD on HD TTS  Continue HD as above  Daily BMP   K noted  Continue with Tacro at home dose. Recommend pt follow up with outpatient nephrologist regarding tacro    HTN:  UF with HD as tolerated   Amlodipine dc'd 2/2 hypotensive episodes  Continue losartan 100mg, metoprolol 25mg ER, hydralazine 50mg TID and Isordil 20mg TID. Hold meds for SBP<110    Anemia:  Hgb within goal  epo w/ HD  Defer iron replacement i/s/o active infection, so will not check iron profile at the moment  Transfusion per primary    MBD:  Calcium corrected wnl  iPTH 431 (3/19)  On Hectorol 4mcg TIW as outpatient. Will continue  Check phos twice weekly. Goal <5.5. Can replete if needed to keep>3

## 2023-04-09 LAB
ALBUMIN SERPL ELPH-MCNC: 2.2 G/DL — LOW (ref 3.3–5)
ALP SERPL-CCNC: 126 U/L — HIGH (ref 40–120)
ALT FLD-CCNC: 5 U/L — LOW (ref 10–45)
ANION GAP SERPL CALC-SCNC: 3 MMOL/L — LOW (ref 5–17)
AST SERPL-CCNC: 14 U/L — SIGNIFICANT CHANGE UP (ref 10–40)
BASOPHILS # BLD AUTO: 0.1 K/UL — SIGNIFICANT CHANGE UP (ref 0–0.2)
BASOPHILS NFR BLD AUTO: 1.7 % — SIGNIFICANT CHANGE UP (ref 0–2)
BILIRUB SERPL-MCNC: 0.4 MG/DL — SIGNIFICANT CHANGE UP (ref 0.2–1.2)
BUN SERPL-MCNC: 7 MG/DL — SIGNIFICANT CHANGE UP (ref 7–23)
CALCIUM SERPL-MCNC: 8 MG/DL — LOW (ref 8.4–10.5)
CHLORIDE SERPL-SCNC: 98 MMOL/L — SIGNIFICANT CHANGE UP (ref 96–108)
CO2 SERPL-SCNC: 30 MMOL/L — SIGNIFICANT CHANGE UP (ref 22–31)
CREAT SERPL-MCNC: 3.24 MG/DL — HIGH (ref 0.5–1.3)
CRP SERPL-MCNC: 65.7 MG/L — HIGH (ref 0–4)
EGFR: 21 ML/MIN/1.73M2 — LOW
EOSINOPHIL # BLD AUTO: 0.52 K/UL — HIGH (ref 0–0.5)
EOSINOPHIL NFR BLD AUTO: 8.9 % — HIGH (ref 0–6)
ERYTHROCYTE [SEDIMENTATION RATE] IN BLOOD: 7 MM/HR — SIGNIFICANT CHANGE UP
GLUCOSE BLDC GLUCOMTR-MCNC: 105 MG/DL — HIGH (ref 70–99)
GLUCOSE BLDC GLUCOMTR-MCNC: 127 MG/DL — HIGH (ref 70–99)
GLUCOSE BLDC GLUCOMTR-MCNC: 158 MG/DL — HIGH (ref 70–99)
GLUCOSE BLDC GLUCOMTR-MCNC: 79 MG/DL — SIGNIFICANT CHANGE UP (ref 70–99)
GLUCOSE SERPL-MCNC: 106 MG/DL — HIGH (ref 70–99)
HCT VFR BLD CALC: 27.8 % — LOW (ref 39–50)
HGB BLD-MCNC: 8.5 G/DL — LOW (ref 13–17)
IMM GRANULOCYTES NFR BLD AUTO: 0.3 % — SIGNIFICANT CHANGE UP (ref 0–0.9)
LYMPHOCYTES # BLD AUTO: 1.49 K/UL — SIGNIFICANT CHANGE UP (ref 1–3.3)
LYMPHOCYTES # BLD AUTO: 25.6 % — SIGNIFICANT CHANGE UP (ref 13–44)
MAGNESIUM SERPL-MCNC: 1.6 MG/DL — SIGNIFICANT CHANGE UP (ref 1.6–2.6)
MCHC RBC-ENTMCNC: 27.8 PG — SIGNIFICANT CHANGE UP (ref 27–34)
MCHC RBC-ENTMCNC: 30.6 GM/DL — LOW (ref 32–36)
MCV RBC AUTO: 90.8 FL — SIGNIFICANT CHANGE UP (ref 80–100)
MONOCYTES # BLD AUTO: 0.82 K/UL — SIGNIFICANT CHANGE UP (ref 0–0.9)
MONOCYTES NFR BLD AUTO: 14.1 % — HIGH (ref 2–14)
NEUTROPHILS # BLD AUTO: 2.87 K/UL — SIGNIFICANT CHANGE UP (ref 1.8–7.4)
NEUTROPHILS NFR BLD AUTO: 49.4 % — SIGNIFICANT CHANGE UP (ref 43–77)
NRBC # BLD: 0 /100 WBCS — SIGNIFICANT CHANGE UP (ref 0–0)
PHOSPHATE SERPL-MCNC: 2.3 MG/DL — LOW (ref 2.5–4.5)
PLATELET # BLD AUTO: 187 K/UL — SIGNIFICANT CHANGE UP (ref 150–400)
POTASSIUM SERPL-MCNC: 3.6 MMOL/L — SIGNIFICANT CHANGE UP (ref 3.5–5.3)
POTASSIUM SERPL-SCNC: 3.6 MMOL/L — SIGNIFICANT CHANGE UP (ref 3.5–5.3)
PROT SERPL-MCNC: 4.2 G/DL — LOW (ref 6–8.3)
RAPID RVP RESULT: SIGNIFICANT CHANGE UP
RBC # BLD: 3.06 M/UL — LOW (ref 4.2–5.8)
RBC # FLD: 15.9 % — HIGH (ref 10.3–14.5)
SARS-COV-2 RNA SPEC QL NAA+PROBE: SIGNIFICANT CHANGE UP
SODIUM SERPL-SCNC: 131 MMOL/L — LOW (ref 135–145)
VANCOMYCIN FLD-MCNC: 16.2 UG/ML — SIGNIFICANT CHANGE UP
VANCOMYCIN TROUGH SERPL-MCNC: 14.8 UG/ML — SIGNIFICANT CHANGE UP (ref 10–20)
WBC # BLD: 5.82 K/UL — SIGNIFICANT CHANGE UP (ref 3.8–10.5)
WBC # FLD AUTO: 5.82 K/UL — SIGNIFICANT CHANGE UP (ref 3.8–10.5)

## 2023-04-09 PROCEDURE — 99232 SBSQ HOSP IP/OBS MODERATE 35: CPT

## 2023-04-09 PROCEDURE — 99232 SBSQ HOSP IP/OBS MODERATE 35: CPT | Mod: GC

## 2023-04-09 RX ORDER — MAGNESIUM SULFATE 500 MG/ML
2 VIAL (ML) INJECTION ONCE
Refills: 0 | Status: COMPLETED | OUTPATIENT
Start: 2023-04-09 | End: 2023-04-09

## 2023-04-09 RX ORDER — SODIUM,POTASSIUM PHOSPHATES 278-250MG
1 POWDER IN PACKET (EA) ORAL ONCE
Refills: 0 | Status: COMPLETED | OUTPATIENT
Start: 2023-04-09 | End: 2023-04-09

## 2023-04-09 RX ORDER — NYSTATIN CREAM 100000 [USP'U]/G
1 CREAM TOPICAL
Refills: 0 | Status: DISCONTINUED | OUTPATIENT
Start: 2023-04-09 | End: 2023-04-09

## 2023-04-09 RX ORDER — POTASSIUM CHLORIDE 20 MEQ
20 PACKET (EA) ORAL ONCE
Refills: 0 | Status: COMPLETED | OUTPATIENT
Start: 2023-04-09 | End: 2023-04-09

## 2023-04-09 RX ADMIN — CHLORHEXIDINE GLUCONATE 1 APPLICATION(S): 213 SOLUTION TOPICAL at 10:32

## 2023-04-09 RX ADMIN — Medication 25 GRAM(S): at 10:28

## 2023-04-09 RX ADMIN — ISOSORBIDE DINITRATE 20 MILLIGRAM(S): 5 TABLET ORAL at 06:29

## 2023-04-09 RX ADMIN — ISOSORBIDE DINITRATE 20 MILLIGRAM(S): 5 TABLET ORAL at 16:18

## 2023-04-09 RX ADMIN — CLOPIDOGREL BISULFATE 75 MILLIGRAM(S): 75 TABLET, FILM COATED ORAL at 10:31

## 2023-04-09 RX ADMIN — Medication 50 MILLIGRAM(S): at 21:49

## 2023-04-09 RX ADMIN — PANTOPRAZOLE SODIUM 40 MILLIGRAM(S): 20 TABLET, DELAYED RELEASE ORAL at 17:23

## 2023-04-09 RX ADMIN — Medication 325 MILLIGRAM(S): at 10:31

## 2023-04-09 RX ADMIN — ISOSORBIDE DINITRATE 20 MILLIGRAM(S): 5 TABLET ORAL at 10:24

## 2023-04-09 RX ADMIN — MEROPENEM 100 MILLIGRAM(S): 1 INJECTION INTRAVENOUS at 17:21

## 2023-04-09 RX ADMIN — Medication 20 MILLIEQUIVALENT(S): at 10:24

## 2023-04-09 RX ADMIN — ATORVASTATIN CALCIUM 40 MILLIGRAM(S): 80 TABLET, FILM COATED ORAL at 21:49

## 2023-04-09 RX ADMIN — Medication 1 PACKET(S): at 10:29

## 2023-04-09 RX ADMIN — Medication 81 MILLIGRAM(S): at 10:31

## 2023-04-09 RX ADMIN — TACROLIMUS 2 MILLIGRAM(S): 5 CAPSULE ORAL at 06:29

## 2023-04-09 RX ADMIN — LOSARTAN POTASSIUM 100 MILLIGRAM(S): 100 TABLET, FILM COATED ORAL at 12:40

## 2023-04-09 RX ADMIN — Medication 2: at 18:00

## 2023-04-09 RX ADMIN — TACROLIMUS 2 MILLIGRAM(S): 5 CAPSULE ORAL at 17:21

## 2023-04-09 RX ADMIN — Medication 50 MILLIGRAM(S): at 06:29

## 2023-04-09 RX ADMIN — TAMSULOSIN HYDROCHLORIDE 0.8 MILLIGRAM(S): 0.4 CAPSULE ORAL at 21:49

## 2023-04-09 RX ADMIN — PANTOPRAZOLE SODIUM 40 MILLIGRAM(S): 20 TABLET, DELAYED RELEASE ORAL at 06:29

## 2023-04-09 RX ADMIN — Medication 50 MILLIGRAM(S): at 13:08

## 2023-04-09 NOTE — PROGRESS NOTE ADULT - PROBLEM SELECTOR PLAN 8
History of coffee ground emesis and massive LGIB @ [facility]; s/p EGD/colonoscopy showing rectal ulcer  Surgery and GI consulted on admission. Per gen surgery no acute surgical intervention. s/p signmoidoscopy w/ GI, no active bleed. No further intervention required.    Plan:  - Active T&S  - Transfuse for hgb <8 (active CAD)  - Consider switching from IV BID to Protonix 40 mg PO daily. Per last GI note, can remain on BID PPI while on DAPT History of coffee ground emesis and massive LGIB @ [facility]; s/p EGD/colonoscopy showing rectal ulcer  Surgery and GI consulted on admission. Per gen surgery no acute surgical intervention. s/p signmoidoscopy w/ GI, no active bleed. No further intervention required.    Plan:  - Active T&S  - Transfuse for hgb <8 (active CAD)  - Consider switching from IV BID to Protonix 40 mg PO daily. Per last GI note, can remain on BID PPI while on DAPT History of coffee ground emesis and massive LGIB @ [facility]; s/p EGD/colonoscopy showing rectal ulcer  Surgery and GI consulted on admission. Per gen surgery no acute surgical intervention. s/p signmoidoscopy w/ GI, no active bleed. No further intervention required.    Plan:  - Active T&S  - Transfuse for hgb <8 (active CAD)  - Consider switching from IV BID to Protonix 40 mg PO daily. Per last GI note, can remain on BID PPI while on DAPT

## 2023-04-09 NOTE — PROGRESS NOTE ADULT - ASSESSMENT
Patient is a 62M PMHx HTN, HLD, DM, CAD (s/p cath years ago, RCA 99%, never intervened on), HFrEF 25-30%, ESRD s/p failed kidney transplant (last HD 3/1 via Left Arm AVF; HD TTS), Right AKA, initially presented to [facility] on 2/27 for respiratory distress after dialysis, found to be septic, complicated by AHRF requiring intubation for 24hr followed by VT arrest. Course also complicated by massive LGIB (rectal ulcer) requiring 7u pRBC, 1u FFP and 1u PLT. Transferred to [facility] for ICD placement 2/2 Vtach and cardiac cath. Medicine consulted for GIB, fever of unknown origin, and comanagement.  Patient now transferred to medicine for persistent fever of unknown origin.                  Patient is a 62M PMHx HTN, HLD, DM, CAD (s/p cath years ago, RCA 99%, never intervened on), HFrEF 25-30%, ESRD s/p failed kidney transplant (last HD 3/1 via Left Arm AVF; HD TTS), Right AKA, initially presented to [facility] on 2/27 for respiratory distress after dialysis, found to be septic, complicated by AHRF requiring intubation for 24hr followed by VT arrest. Course also complicated by massive LGIB (rectal ulcer) requiring 7u pRBC, 1u FFP and 1u PLT. Transferred to [facility] for ICD placement 2/2 Vtach and cardiac cath. Medicine consulted for GIB, fever of unknown origin, and comanagement.  Patient now transferred to medicine for persistent fever of unknown origin.                  Patient is a 62M PMHx HTN, HLD, DM, CAD (s/p cath years ago, RCA 99%, never intervened on), HFrEF 25-30%, ESRD s/p failed kidney transplant (last HD 3/1 via Left Arm AVF; HD TTS), Right AKA, initially presented to [facility] on 2/27 for respiratory distress after dialysis, found to be septic, complicated by AHRF requiring intubation for 24hr followed by VT arrest. Course also complicated by massive LGIB (rectal ulcer) requiring 7u pRBC, 1u FFP and 1u PLT. Transferred to [facility] for ICD placement 2/2 Vtach and cardiac cath. Medicine consulted for GIB, fever of unknown origin, and comanagement.  Patient now transferred to medicine for persistent fever of unknown origin.

## 2023-04-09 NOTE — PROGRESS NOTE ADULT - PROBLEM SELECTOR PLAN 1
Afebrile  Patient found to have prolonged fever on admission, and found to have cystitis and pyelitis of transplanted kidney due to K.pneumo w/o pyelonephritis or abscess. Pyelitis treated with ceftriaxone 2 g IV q24h (3/21-4/4) per ID recs. Patient also had diarrhea with antibiotics. C.diff and GI PCR negative. Unclear source of fever. Overnight 4/5, patient with new Tmax to 104.6 F. WBC 10.82, CRP 54.3, procal 0.97. Abd U/S with no acute pathology, CXR negative, Bcx NGTD. PET scan showing increased uptake in prostate suggesting prostatitis vs. neoplasm. s/p CTX tx for pyelitis vs cystitis w continued elevated t and fevers after abx course completion.     Plan:  - Blood Culture 4/5 NGTD abd U/S with no acute pathology, CXR negative, Bcx NGTD  - F/u UA + urine culture, awaiting   - Awaiting Gallium Scan   - ID following - appreciate recs   - c/w Meropenem 500 mg q24h per ID recs, as patient is immunocompromised

## 2023-04-09 NOTE — PROGRESS NOTE ADULT - SUBJECTIVE AND OBJECTIVE BOX
HARDIAL, TUMESHWAR    Overnight: ROSITA  Subjective: Patient examined at bedside no SOB, lightheadedness of subjective fever. ROS is otherwise negative        Vital Signs Last 24 Hrs  T(C): 36.3 (2023 04:43), Max: 37.1 (2023 20:49)  T(F): 97.4 (2023 04:43), Max: 98.8 (2023 20:49)  HR: 56 (2023 10:00) (49 - 66)  BP: 146/68 (2023 10:00) (123/57 - 164/68)  BP(mean): --  RR: 17 (2023 04:43) (17 - 18)  SpO2: 99% (2023 04:43) (98% - 100%)    Parameters below as of 2023 04:43  Patient On (Oxygen Delivery Method): nasal cannula  O2 Flow (L/min): 2        PHYSICAL EXAM:  GENERAL: +lethargic   Neuro: CN2-12 grossly intact  HEENT: NC/AT; MMM; neck supple;  Heart: RRR; +s1 and s2; no MRG   Lungs: CTA b/l; normal effort; no accesory muscle use, on NC   GI: + mildly distended   Extremities: +2 pulses in UE and LE b/l; no clubbing  Skin: skin dry and warm; no skin tenting; no rashes or lesions  MSK: normal tone  Psych: normal affect      LABS:                                   8.5    5.82  )-----------( 187      ( 2023 08:03 )             27.8   04    131<L>  |  98  |  7   ----------------------------<  106<H>  3.6   |  30  |  3.24<H>    Ca    8.0<L>      2023 08:03  Phos  2.3     04-09  Mg     1.6     04-09    TPro  4.2<L>  /  Alb  2.2<L>  /  TBili  0.4  /  DBili  x   /  AST  14  /  ALT  5<L>  /  AlkPhos  126<H>  04-09          Urinalysis Basic - ( 2023 15:17 )    Color: Yellow / Appearance: Clear / S.020 / pH: x  Gluc: x / Ketone: Trace mg/dL  / Bili: Negative / Urobili: 0.2 E.U./dL   Blood: x / Protein: >=300 mg/dL / Nitrite: NEGATIVE   Leuk Esterase: Trace / RBC: < 5 /HPF / WBC > 10 /HPF   Sq Epi: x / Non Sq Epi: x / Bacteria: Present /HPF      CAPILLARY BLOOD GLUCOSE      POCT Blood Glucose.: 139 mg/dL (2023 17:23)  POCT Blood Glucose.: 97 mg/dL (2023 13:42)  POCT Blood Glucose.: 139 mg/dL (2023 08:51)  POCT Blood Glucose.: 122 mg/dL (2023 08:07)  POCT Blood Glucose.: 112 mg/dL (2023 22:01)        Urinalysis Basic - ( 2023 15:17 )    Color: Yellow / Appearance: Clear / S.020 / pH: x  Gluc: x / Ketone: Trace mg/dL  / Bili: Negative / Urobili: 0.2 E.U./dL   Blood: x / Protein: >=300 mg/dL / Nitrite: NEGATIVE   Leuk Esterase: Trace / RBC: < 5 /HPF / WBC > 10 /HPF   Sq Epi: x / Non Sq Epi: x / Bacteria: Present /HPF        RADIOLOGY & ADDITIONAL TESTS:    Imaging Personally Reviewed:  [ ] YES  [ ] NO

## 2023-04-09 NOTE — PROGRESS NOTE ADULT - PROBLEM SELECTOR PLAN 3
s/p VTach arrest @ [facility]; no tele events noted. EP Consulted with original plan for SubQ ICD placement once pt had LHC. Will receive life vest instead    Plan:  - c/w Toprol XL 25mg daily. s/p VTach arrest @ [facility]; no tele events noted. EP Consulted with original plan for SubQ ICD placement once pt had LHC. Will receive life vest instead    Plan:  - c/w Toprol XL 25mg daily. s/p VTach arrest @ [facility]; no tele events noted. EP Consulted with original plan for SubQ ICD placement once pt had LHC. Will receive life vest instead    Plan:  - c/w Toprol XL 25mg daily.

## 2023-04-09 NOTE — PROGRESS NOTE ADULT - PROBLEM SELECTOR PLAN 2
[facility] and RCA 99% that is not amenable to intervention. Per cardiology, will defer inpatient cath or other intervention on this admission due to persistent fevers   - ECHO 4/3: EF 40% w/regional wall motion abnormality. Entire inferior wall & basal & mid inferolateral wall akinetic. Basal& mid anterolateral wall & apical lateral segments hypokinetic. Moderately dilated LA. Mild AR.     Plan:  - c/w ASA 81mg and Plavix 75mg daily (cleared by GI)   - Patient will need Life Vest on discharge   - F/u cards recs, will hold off on treatment until resolution of fevers (plan for life vest on d/c, holding ICD placement) [facility] 30% and RCA 99% that is not amenable to intervention. Per cardiology, will defer inpatient cath or other intervention on this admission due to persistent fevers   - ECHO 4/3: EF 40% w/regional wall motion abnormality. Entire inferior wall & basal & mid inferolateral wall akinetic. Basal& mid anterolateral wall & apical lateral segments hypokinetic. Moderately dilated LA. Mild AR.     Plan:  - c/w ASA 81mg and Plavix 75mg daily (cleared by GI)   - Patient will need Life Vest on discharge   - F/u cards recs, will hold off on treatment until resolution of fevers (plan for life vest on d/c, holding ICD placement) [facility]% and RCA 99% that is not amenable to intervention. Per cardiology, will defer inpatient cath or other intervention on this admission due to persistent fevers   - ECHO 4/3: EF 40% w/regional wall motion abnormality. Entire inferior wall & basal & mid inferolateral wall akinetic. Basal& mid anterolateral wall & apical lateral segments hypokinetic. Moderately dilated LA. Mild AR.     Plan:  - c/w ASA 81mg and Plavix 75mg daily (cleared by GI)   - Patient will need Life Vest on discharge   - F/u cards recs, will hold off on treatment until resolution of fevers (plan for life vest on d/c, holding ICD placement)

## 2023-04-09 NOTE — PROGRESS NOTE ADULT - SUBJECTIVE AND OBJECTIVE BOX
INFECTIOUS DISEASES CONSULT FOLLOW-UP NOTE    INTERVAL HPI/OVERNIGHT EVENTS:  no event overnight  patient afebrile, denied fever/chills, n/v/d, ordering food    ROS:   Constitutional, eyes, ENT, cardiovascular, respiratory, gastrointestinal, genitourinary, integumentary, neurological, psychiatric and heme/lymph are otherwise negative other than noted above       ANTIBIOTICS/RELEVANT:    MEDICATIONS  (STANDING):  aspirin enteric coated 81 milliGRAM(s) Oral daily  atorvastatin 40 milliGRAM(s) Oral at bedtime  chlorhexidine 2% Cloths 1 Application(s) Topical daily  clopidogrel Tablet 75 milliGRAM(s) Oral daily  dextrose 5%. 1000 milliLiter(s) (50 mL/Hr) IV Continuous <Continuous>  dextrose 5%. 1000 milliLiter(s) (100 mL/Hr) IV Continuous <Continuous>  dextrose 50% Injectable 25 Gram(s) IV Push once  dextrose 50% Injectable 12.5 Gram(s) IV Push once  epoetin janae-epbx (RETACRIT) Injectable 6000 Unit(s) IV Push once  ferrous    sulfate 325 milliGRAM(s) Oral daily  hydrALAZINE 50 milliGRAM(s) Oral every 8 hours  insulin lispro (ADMELOG) corrective regimen sliding scale   SubCutaneous Before meals and at bedtime  isosorbide   dinitrate Tablet (ISORDIL) 20 milliGRAM(s) Oral three times a day  losartan 100 milliGRAM(s) Oral every 24 hours  meropenem  IVPB 500 milliGRAM(s) IV Intermittent every 24 hours  metoprolol succinate ER 25 milliGRAM(s) Oral daily  nystatin Powder 1 Application(s) Topical two times a day  pantoprazole    Tablet 40 milliGRAM(s) Oral every 12 hours  tacrolimus 2 milliGRAM(s) Oral every 12 hours  tamsulosin 0.8 milliGRAM(s) Oral at bedtime    MEDICATIONS  (PRN):  acetaminophen     Tablet .. 650 milliGRAM(s) Oral every 6 hours PRN Temp greater or equal to 38C (100.4F), Mild Pain (1 - 3), Moderate Pain (4 - 6)  dextrose Oral Gel 15 Gram(s) Oral once PRN Blood Glucose LESS THAN 70 milliGRAM(s)/deciliter  loperamide 2 milliGRAM(s) Oral three times a day PRN Diarrhea  ondansetron    Tablet 8 milliGRAM(s) Oral daily PRN Vomiting  sodium chloride 0.65% Nasal 1 Spray(s) Both Nostrils every 4 hours PRN Nasal Congestion        Vital Signs Last 24 Hrs  T(C): 36.3 (2023 12:29), Max: 37.1 (2023 20:49)  T(F): 97.3 (2023 12:29), Max: 98.8 (2023 20:49)  HR: 53 (2023 12:29) (53 - 66)  BP: 135/52 (2023 12:29) (135/52 - 164/68)  BP(mean): --  RR: 18 (2023 12:29) (17 - 18)  SpO2: 94% (2023 13:17) (94% - 100%)    Parameters below as of 2023 13:17  Patient On (Oxygen Delivery Method): nasal cannula  O2 Flow (L/min): 1      23 @ 07:01  -  23 @ 07:00  --------------------------------------------------------  IN: 0 mL / OUT: 900 mL / NET: -900 mL      PHYSICAL EXAM:  Constitutional: awake, NAD  Eyes: the sclera and conjunctiva were normal.   ENT: the ears and nose were normal in appearance.   Neck: the appearance of the neck was normal and the neck was supple.   Pulmonary: no respiratory distress and lungs were clear to auscultation bilaterally.   Heart: heart rate was normal and rhythm regular, normal S1 and S2  Vascular:. there was no peripheral edema  Abdomen: normal bowel sounds, soft, non-tender          LABS:                        8.5    5.82  )-----------( 187      ( 2023 08:03 )             27.8     04    131<L>  |  98  |  7   ----------------------------<  106<H>  3.6   |  30  |  3.24<H>    Ca    8.0<L>      2023 08:03  Phos  2.3     04  Mg     1.6         TPro  4.2<L>  /  Alb  2.2<L>  /  TBili  0.4  /  DBili  x   /  AST  14  /  ALT  5<L>  /  AlkPhos  126<H>        Urinalysis Basic - ( 2023 15:17 )    Color: Yellow / Appearance: Clear / S.020 / pH: x  Gluc: x / Ketone: Trace mg/dL  / Bili: Negative / Urobili: 0.2 E.U./dL   Blood: x / Protein: >=300 mg/dL / Nitrite: NEGATIVE   Leuk Esterase: Trace / RBC: < 5 /HPF / WBC > 10 /HPF   Sq Epi: x / Non Sq Epi: x / Bacteria: Present /HPF        MICROBIOLOGY:      RADIOLOGY & ADDITIONAL STUDIES:  Reviewed

## 2023-04-09 NOTE — PROGRESS NOTE ADULT - ASSESSMENT
62M h/o ICM, HFrEF, ESRD s/p failed RT (HD via LUE AVF), R AKA, DM transferred to [facility] from [facility] on 3/17 for ICD placement with prolonged low grade fever, found to have cystitis and pyelitis of transplanted kidney due to K.pneumo w/o pyelonephritis or abscess. Pyelitis treated with ceftriaxone 2 g IV q24h (3/21-4/4).  Now with new high fever since 4/5 of unclear etiology, so far all infectious work-up negative including negative gallium scan (pyelitis resolved), BCx ngtd.  In the past 24h, he is afebrile and he feels better, seems to be responding to empiric abx.    - cont meropenem 500mg IV q24h for now  - give vanco 500mg IV once after HD and check level before next HD  - f/u UCx  - f/u Fungitell and Galactomannan   - f/u BCx    Team 2 will follow you.  Case d/w primary team.    Luann Carvajal MD, MS  Infectious Disease attending  work cell [phone]   For any questions during evening/weekend/holiday, please page ID on call        62M h/o ICM, HFrEF, ESRD s/p failed RT (HD via LUE AVF), R AKA, DM transferred to [facility] from [facility] on 3/17 for ICD placement with prolonged low grade fever, found to have cystitis and pyelitis of transplanted kidney due to K.pneumo w/o pyelonephritis or abscess. Pyelitis treated with ceftriaxone 2 g IV q24h (3/21-4/4).  Now with new high fever since 4/5 of unclear etiology, so far all infectious work-up negative including negative gallium scan (pyelitis resolved), BCx ngtd.  In the past 24h, he is afebrile and he feels better, seems to be responding to empiric abx.    - cont meropenem 500mg IV q24h for now  - give vanco 500mg IV once after HD and check level before next HD  - f/u UCx  - f/u Fungitell and Galactomannan   - f/u BCx    Team 2 will follow you.  Case d/w primary team.    Luann Carvajal MD, MS  Infectious Disease attending  work cell [phone]   For any questions during evening/weekend/holiday, please page ID on call        62M h/o ICM, HFrEF, ESRD s/p failed RT (HD via LUE AVF), R AKA, DM transferred to [facility] from [facility] on 3/17 for ICD placement with prolonged low grade fever, found to have cystitis and pyelitis of transplanted kidney due to K.pneumo w/o pyelonephritis or abscess. Pyelitis treated with ceftriaxone 2 g IV q24h (3/21-4/4).  Now with new high fever since 4/5 of unclear etiology, so far all infectious work-up negative including negative gallium scan (pyelitis resolved), BCx ngtd.  In the past 24h, he is afebrile and he feels better, seems to be responding to empiric abx.    - cont meropenem 500mg IV q24h for now  - give vanco 500mg IV once after HD and check level before next HD  - f/u UCx  - f/u Fungitell and Galactomannan   - f/u BCx    Team 2 will follow you.  Case d/w primary team.    Luann Carvajal MD, MS  Infectious Disease attending  work cell [phone]   For any questions during evening/weekend/holiday, please page ID on call

## 2023-04-10 LAB
ANION GAP SERPL CALC-SCNC: 10 MMOL/L — SIGNIFICANT CHANGE UP (ref 5–17)
BUN SERPL-MCNC: 9 MG/DL — SIGNIFICANT CHANGE UP (ref 7–23)
CALCIUM SERPL-MCNC: 8.4 MG/DL — SIGNIFICANT CHANGE UP (ref 8.4–10.5)
CHLORIDE SERPL-SCNC: 94 MMOL/L — LOW (ref 96–108)
CO2 SERPL-SCNC: 26 MMOL/L — SIGNIFICANT CHANGE UP (ref 22–31)
CREAT SERPL-MCNC: 3.96 MG/DL — HIGH (ref 0.5–1.3)
CRP SERPL-MCNC: 51.9 MG/L — HIGH (ref 0–4)
CULTURE RESULTS: SIGNIFICANT CHANGE UP
EGFR: 16 ML/MIN/1.73M2 — LOW
FUNGITELL: 47 PG/ML — SIGNIFICANT CHANGE UP
GLUCOSE BLDC GLUCOMTR-MCNC: 112 MG/DL — HIGH (ref 70–99)
GLUCOSE BLDC GLUCOMTR-MCNC: 128 MG/DL — HIGH (ref 70–99)
GLUCOSE BLDC GLUCOMTR-MCNC: 170 MG/DL — HIGH (ref 70–99)
GLUCOSE BLDC GLUCOMTR-MCNC: 88 MG/DL — SIGNIFICANT CHANGE UP (ref 70–99)
GLUCOSE SERPL-MCNC: 80 MG/DL — SIGNIFICANT CHANGE UP (ref 70–99)
HCT VFR BLD CALC: 30.2 % — LOW (ref 39–50)
HGB BLD-MCNC: 9.4 G/DL — LOW (ref 13–17)
MAGNESIUM SERPL-MCNC: 1.8 MG/DL — SIGNIFICANT CHANGE UP (ref 1.6–2.6)
MCHC RBC-ENTMCNC: 27.6 PG — SIGNIFICANT CHANGE UP (ref 27–34)
MCHC RBC-ENTMCNC: 31.1 GM/DL — LOW (ref 32–36)
MCV RBC AUTO: 88.8 FL — SIGNIFICANT CHANGE UP (ref 80–100)
NRBC # BLD: 0 /100 WBCS — SIGNIFICANT CHANGE UP (ref 0–0)
PHOSPHATE SERPL-MCNC: 2.5 MG/DL — SIGNIFICANT CHANGE UP (ref 2.5–4.5)
PLATELET # BLD AUTO: 199 K/UL — SIGNIFICANT CHANGE UP (ref 150–400)
POTASSIUM SERPL-MCNC: 4.2 MMOL/L — SIGNIFICANT CHANGE UP (ref 3.5–5.3)
POTASSIUM SERPL-SCNC: 4.2 MMOL/L — SIGNIFICANT CHANGE UP (ref 3.5–5.3)
RBC # BLD: 3.4 M/UL — LOW (ref 4.2–5.8)
RBC # FLD: 15.6 % — HIGH (ref 10.3–14.5)
SODIUM SERPL-SCNC: 130 MMOL/L — LOW (ref 135–145)
SPECIMEN SOURCE: SIGNIFICANT CHANGE UP
TACROLIMUS SERPL-MCNC: 3.3 NG/ML — SIGNIFICANT CHANGE UP
VANCOMYCIN TROUGH SERPL-MCNC: 14.3 UG/ML — SIGNIFICANT CHANGE UP (ref 10–20)
WBC # BLD: 5.02 K/UL — SIGNIFICANT CHANGE UP (ref 3.8–10.5)
WBC # FLD AUTO: 5.02 K/UL — SIGNIFICANT CHANGE UP (ref 3.8–10.5)

## 2023-04-10 PROCEDURE — 99232 SBSQ HOSP IP/OBS MODERATE 35: CPT

## 2023-04-10 PROCEDURE — 99233 SBSQ HOSP IP/OBS HIGH 50: CPT

## 2023-04-10 RX ADMIN — ATORVASTATIN CALCIUM 40 MILLIGRAM(S): 80 TABLET, FILM COATED ORAL at 21:43

## 2023-04-10 RX ADMIN — TACROLIMUS 2 MILLIGRAM(S): 5 CAPSULE ORAL at 06:18

## 2023-04-10 RX ADMIN — TAMSULOSIN HYDROCHLORIDE 0.8 MILLIGRAM(S): 0.4 CAPSULE ORAL at 21:43

## 2023-04-10 RX ADMIN — Medication 50 MILLIGRAM(S): at 15:34

## 2023-04-10 RX ADMIN — ISOSORBIDE DINITRATE 20 MILLIGRAM(S): 5 TABLET ORAL at 06:18

## 2023-04-10 RX ADMIN — CHLORHEXIDINE GLUCONATE 1 APPLICATION(S): 213 SOLUTION TOPICAL at 11:36

## 2023-04-10 RX ADMIN — TACROLIMUS 2 MILLIGRAM(S): 5 CAPSULE ORAL at 18:40

## 2023-04-10 RX ADMIN — Medication 81 MILLIGRAM(S): at 11:37

## 2023-04-10 RX ADMIN — ISOSORBIDE DINITRATE 20 MILLIGRAM(S): 5 TABLET ORAL at 11:36

## 2023-04-10 RX ADMIN — ISOSORBIDE DINITRATE 20 MILLIGRAM(S): 5 TABLET ORAL at 18:41

## 2023-04-10 RX ADMIN — LOSARTAN POTASSIUM 100 MILLIGRAM(S): 100 TABLET, FILM COATED ORAL at 15:33

## 2023-04-10 RX ADMIN — Medication 2: at 17:50

## 2023-04-10 RX ADMIN — PANTOPRAZOLE SODIUM 40 MILLIGRAM(S): 20 TABLET, DELAYED RELEASE ORAL at 18:41

## 2023-04-10 RX ADMIN — Medication 50 MILLIGRAM(S): at 06:18

## 2023-04-10 RX ADMIN — Medication 50 MILLIGRAM(S): at 21:43

## 2023-04-10 RX ADMIN — Medication 325 MILLIGRAM(S): at 11:36

## 2023-04-10 RX ADMIN — CLOPIDOGREL BISULFATE 75 MILLIGRAM(S): 75 TABLET, FILM COATED ORAL at 11:36

## 2023-04-10 RX ADMIN — PANTOPRAZOLE SODIUM 40 MILLIGRAM(S): 20 TABLET, DELAYED RELEASE ORAL at 06:18

## 2023-04-10 NOTE — PROGRESS NOTE ADULT - NS ATTEND AMEND GEN_ALL_CORE FT
#Pyelitis of transplant kidney, K.pneumo infection, fever    Now afebrile for 48h.  Patient feels well, participating PT, denied n/v/d, abdominal pain, CP, SOB.  WBC 5, BCx 4/8 ngtd, UCx nege.  Stop bere/vanco and monitor off abx.  f/u BCx.    Team 2 will follow you.  Case d/w primary team.    Luann Carvajal MD, MS  Infectious Disease attending  work cell [phone]   For any questions during evening/weekend/holiday, please page ID on call #Pyelitis of transplant kidney, K.pneumo infection, fever    Now afebrile for 48h.  Patient feels well, participating PT, denied n/v/d, abdominal pain, CP, SOB.  WBC 5, BCx 4/8 ngtd, UCx nege.  Stop bere/vanco and monitor off abx.  f/u BCx.    Team 2 will follow you.  Case d/w primary team.    Luann Carvajal MD, MS  Infectious Disease attending  work cell [phone]   For any questions during evening/weekend/holiday, please page ID on call #Pyelitis of transplant kidney, K.pneumo infection, fever    Now afebrile for 48h.  Patient feels well, participating PT, denied n/v/d, abdominal pain, CP, SOB.  WBC 5, BCx 4/8 ngtd, UCx nege.  Stop bere/vanco and monitor off abx.  f/u BCx.    Team 2 will follow you.  Case d/w primary team.    Luann Carvajal MD, MS  Infectious Disease attending  work cell [phone]   For any questions during evening/weekend/holiday, please page ID on call

## 2023-04-10 NOTE — PROGRESS NOTE ADULT - SUBJECTIVE AND OBJECTIVE BOX
Cardiology Consult    no acute events overnight    OBJECTIVE  Vitals:  T(C): 36.6 (04-10-23 @ 11:57), Max: 37.4 (04-09-23 @ 21:08)  HR: 62 (04-10-23 @ 11:57) (55 - 62)  BP: 136/72 (04-10-23 @ 11:57) (136/72 - 159/65)  RR: 19 (04-10-23 @ 11:57) (17 - 19)  SpO2: 96% (04-10-23 @ 11:57) (96% - 100%)  Wt(kg): --    I/O:  I&O's Summary      PHYSICAL EXAM:  Appearance: NAD. Speaking in full sentences.   HEENT: No pallor noted.  Conjunctiva clear b/l. Moist oral mucosa.  Cardiovascular: RRR with no murmurs.  Respiratory: Lungs CTAB.   Gastrointestinal:  Soft, nontender. Non-distended. Non-rigid.	  Extremities: No edema b/l. No erythema b/l. LE WWP b/l.  Vascular: DP intact  Neurologic:  Alert and awake. Moving all extremities. Following commands.   	  LABS:                        9.4    5.02  )-----------( 199      ( 10 Apr 2023 05:55 )             30.2     04-10    130<L>  |  94<L>  |  9   ----------------------------<  80  4.2   |  26  |  3.96<H>    Ca    8.4      10 Apr 2023 05:55  Phos  2.5     04-10  Mg     1.8     04-10    TPro  4.2<L>  /  Alb  2.2<L>  /  TBili  0.4  /  DBili  x   /  AST  14  /  ALT  5<L>  /  AlkPhos  126<H>  04-09          RADIOLOGY & ADDITIONAL TESTS:  Reviewed .    MEDICATIONS  (STANDING):  aspirin enteric coated 81 milliGRAM(s) Oral daily  atorvastatin 40 milliGRAM(s) Oral at bedtime  chlorhexidine 2% Cloths 1 Application(s) Topical daily  clopidogrel Tablet 75 milliGRAM(s) Oral daily  dextrose 5%. 1000 milliLiter(s) (100 mL/Hr) IV Continuous <Continuous>  dextrose 5%. 1000 milliLiter(s) (50 mL/Hr) IV Continuous <Continuous>  dextrose 50% Injectable 25 Gram(s) IV Push once  dextrose 50% Injectable 12.5 Gram(s) IV Push once  epoetin janae-epbx (RETACRIT) Injectable 6000 Unit(s) IV Push once  ferrous    sulfate 325 milliGRAM(s) Oral daily  hydrALAZINE 50 milliGRAM(s) Oral every 8 hours  insulin lispro (ADMELOG) corrective regimen sliding scale   SubCutaneous Before meals and at bedtime  isosorbide   dinitrate Tablet (ISORDIL) 20 milliGRAM(s) Oral three times a day  losartan 100 milliGRAM(s) Oral every 24 hours  metoprolol succinate ER 25 milliGRAM(s) Oral daily  pantoprazole    Tablet 40 milliGRAM(s) Oral every 12 hours  tacrolimus 2 milliGRAM(s) Oral every 12 hours  tamsulosin 0.8 milliGRAM(s) Oral at bedtime    MEDICATIONS  (PRN):  acetaminophen     Tablet .. 650 milliGRAM(s) Oral every 6 hours PRN Temp greater or equal to 38C (100.4F), Mild Pain (1 - 3), Moderate Pain (4 - 6)  dextrose Oral Gel 15 Gram(s) Oral once PRN Blood Glucose LESS THAN 70 milliGRAM(s)/deciliter  loperamide 2 milliGRAM(s) Oral three times a day PRN Diarrhea  ondansetron    Tablet 8 milliGRAM(s) Oral daily PRN Vomiting  sodium chloride 0.65% Nasal 1 Spray(s) Both Nostrils every 4 hours PRN Nasal Congestion     I will STOP taking the medications listed below when I get home from the hospital:  None

## 2023-04-10 NOTE — PROGRESS NOTE ADULT - ATTENDING COMMENTS
62-year-old male with a PMHx of HTN, HLD, DMII, ICM, HFrEF (EF 20-25%), ESRD (s/p failed kidney transplant, now on HD TThS via LUE AVF) and right AKA who initially presented to OSH for respiratory distress with hospital course c/b VTach cardiac arrest and LGIB, transferred to [facility] cardiology service for ICD evaluation but found to have recurrent LGIB and fevers of unknown origin.      #Fevers    -initially treated with 2-week course of CTX (end date: 4/4) for pyelitis but spiked fevers post-antibiotic course    -repeat infectious workup: BCx (4/5) NGTD, GI PCR negative, Cdiff negative, CXR negative, UA/UCX and Gallium scan pending    -PET (3/25): uptake in prostate concerning for prostatitis or neoplasm (see report for full details)    -ID consulted, started on Meropenem with plans for whole body gallium scan Friday     #CAD   #HFrEF   #Vtach Arrest    #HTN/HLD   -LHC at OSH with significant RCA stenosis not amenable to intervention    -TTE (4/3): EF 40% with RWA   -cardiology consulted, defer inpatient intervention due to persistent fevers    -continue with ASA 81mg daily, Atorvastatin 40mg qhs and Plavix 75mg daily    -continue with metoprolol succinate 25mg daily, isordil 20mg q8hrs, hydralazine 50mg q8hrs and losartan 100mg daily  -will need life vest prior to discharge     #ESRD (on HD TThS via LUE AVF)   #S/p Failed Kidney Transplant    -nephrology consulted, appreciate recommendations, next HD 4/8  -continue with Tacrolimus 2mg BID     #Recurrent GIB 2/2 Rectal Ulcers    #Acute Blood Loss Anemia    -stable, GI signed off, continue to closely monitor hemoglobin while on DAPT    -continue with BID PO PPI     #Concern for Prostatitis vs. Neoplasm  #Elevated PSA  -urology consulted, plan to recheck PSA 1 month post-infection, outpatient urology follow up within 1-2 weeks of discharge     DVT PPx: held while in DAPT in setting of recurrent GIB    Dispo: pending clinical improvement 62-year-old male with a PMHx of HTN, HLD, DMII, ICM, HFrEF (EF 20-25%), ESRD (s/p failed kidney transplant, now on HD TThS via LUE AVF) and right AKA who initially presented to OSH for respiratory distress with hospital course c/b VTach cardiac arrest and LGIB, transferred to [facility] cardiology service for ICD evaluation but found to have recurrent LGIB and fevers of unknown origin.      #Fevers    -initially treated with 2-week course of CTX (end date: 4/4) for pyelitis but spiked fevers post-antibiotic course    -repeat infectious workup: BCx (4/5) NGTD, GI PCR negative, Cdiff negative, CXR negative, UA/UCX and Gallium scan pending    -PET (3/25): uptake in prostate concerning for prostatitis or neoplasm (see report for full details)    -ID consulted, started on Meropenem with plans for whole body gallium scan Friday     #CAD   #HFrEF   #Vtach Arrest    #HTN/HLD   -LHC at OSH with significant RCA stenosis not amenable to intervention    -TTE (4/3): EF 40% with RWA   -cardiology consulted, defer inpatient intervention due to persistent fevers    -continue with ASA 81mg daily, Atorvastatin 40mg qhs and Plavix 75mg daily    -continue with metoprolol succinate 25mg daily, isordil 20mg q8hrs, hydralazine 50mg q8hrs and losartan 100mg daily  -will need life vest prior to discharge     #ESRD (on HD TThS via LUE AVF)   #S/p Failed Kidney Transplant    -nephrology consulted, appreciate recommendations, next HD 4/8  -continue with Tacrolimus 2mg BID     #Recurrent GIB 2/2 Rectal Ulcers    #Acute Blood Loss Anemia    -stable, GI signed off, continue to closely monitor hemoglobin while on DAPT    -continue with BID PO PPI     #Concern for Prostatitis vs. Neoplasm  #Elevated PSA  -urology consulted, plan to recheck PSA 1 month post-infection, outpatient urology follow up within 1-2 weeks of discharge     DVT PPx: held while in DAPT in setting of recurrent GIB    Dispo: pending clinical improvement 62-year-old male with a PMHx of HTN, HLD, DMII, ICM, HFrEF (EF 20-25%), ESRD (s/p failed kidney transplant, now on HD TThS via LUE AVF) and right AKA who initially presented to OSH for respiratory distress with hospital course c/b VTach cardiac arrest and LGIB, transferred to [facility] cardiology service for ICD evaluation but found to have recurrent LGIB and fevers of unknown origin.      #Fevers    -initially treated with 2-week course of CTX (end date: 4/4) for pyelitis but spiked fevers post-antibiotic course    -repeat infectious workup: BCx (4/5) NGTD, GI PCR negative, Cdiff negative, CXR negative, UA/UCX and Gallium scan pending    -PET (3/25): uptake in prostate concerning for prostatitis or neoplasm (see report for full details)    -ID consulted, started on Meropenem with plans for whole body gallium scan Friday     #CAD   #HFrEF   #Vtach Arrest    #HTN/HLD   -LHC at OSH with significant RCA stenosis not amenable to intervention    -TTE (4/3): EF 40% with RWA   -cardiology consulted, defer inpatient intervention due to persistent fevers    -continue with ASA 81mg daily, Atorvastatin 40mg qhs and Plavix 75mg daily    -continue with metoprolol succinate 25mg daily, isordil 20mg q8hrs, hydralazine 50mg q8hrs and losartan 100mg daily  -will need life vest prior to discharge     #ESRD (on HD TThS via LUE AVF)   #S/p Failed Kidney Transplant    -nephrology consulted, appreciate recommendations, next HD 4/8  -continue with Tacrolimus 2mg BID     #Recurrent GIB 2/2 Rectal Ulcers    #Acute Blood Loss Anemia    -stable, GI signed off, continue to closely monitor hemoglobin while on DAPT    -continue with BID PO PPI     #Concern for Prostatitis vs. Neoplasm  #Elevated PSA  -urology consulted, plan to recheck PSA 1 month post-infection, outpatient urology follow up within 1-2 weeks of discharge     DVT PPx: held while in DAPT in setting of recurrent GIB    Dispo: pending clinical improvement 62-year-old male with a PMHx of HTN, HLD, DMII, ICM, HFrEF (EF 20-25%), ESRD (s/p failed kidney transplant, now on HD TThS via LUE AVF) and right AKA who initially presented to OSH for respiratory distress with hospital course c/b VTach cardiac arrest and LGIB, transferred to [facility] cardiology service for ICD evaluation but found to have recurrent LGIB and fevers of unknown origin.       #Fevers     -initially treated with 2-week course of CTX (end date: 4/4) for pyelitis but spiked fevers post-antibiotic course     -PET (3/25): uptake in prostate concerning for prostatitis or neoplasm (see report for full details)     -negative repeat infectious workup: BCx (4/5) NGTD, GI PCR negative, Cdiff negative, CXR negative, gallium scan negative, UCx negative,   -aspergillus and fungitell pending     -ID consulted, discontinue Meropenem and Vancomycin today, monitor off Abx    #CAD    #HFrEF    #Vtach Arrest     #HTN/HLD    -LHC at OSH with significant RCA stenosis not amenable to intervention     -TTE (4/3): EF 40% with RWA    -cardiology consulted, defer inpatient intervention due to persistent fevers     -continue with ASA 81mg daily, Atorvastatin 40mg qhs and Plavix 75mg daily     -continue with metoprolol succinate 25mg daily, isordil 20mg q8hrs, hydralazine 50mg q8hrs and losartan 100mg daily     #ESRD (on HD TThS via LUE AVF)    #S/p Failed Kidney Transplant     -nephrology consulted, appreciate recommendations  -continue with Tacrolimus 2mg BID      #Recurrent GIB 2/2 Rectal Ulcers     #Acute Blood Loss Anemia     -stable, GI signed off, continue to closely monitor hemoglobin while on DAPT     -continue with BID PO PPI      #Concern for Prostatitis vs. Neoplasm   #Elevated PSA   -urology consulted, plan to recheck PSA 1-month post-infection, outpatient urology follow up within 1-2 weeks of discharge     DVT PPx: held while in DAPT in setting of recurrent GIB    Dispo: [facility], possibly in next 24-48 hours pending fever curve off antibiotics 62-year-old male with a PMHx of HTN, HLD, DMII, ICM, HFrEF (EF 20-25%), ESRD (s/p failed kidney transplant, now on HD TThS via LUE AVF) and right AKA who initially presented to OSH for respiratory distress with hospital course c/b VTach cardiac arrest and LGIB, transferred to [facility] cardiology service for ICD evaluation but found to have recurrent LGIB and fevers of unknown origin.       #Fevers     -initially treated with 2-week course of CTX (end date: 4/4) for pyelitis but spiked fevers post-antibiotic course     -PET (3/25): uptake in prostate concerning for prostatitis or neoplasm (see report for full details)     -negative repeat infectious workup: BCx (4/5) NGTD, GI PCR negative, Cdiff negative, CXR negative, gallium scan negative, UCx negative,   -aspergillus and fungitell pending     -ID consulted, discontinue Meropenem and Vancomycin today, monitor off Abx    #CAD    #HFrEF    #Vtach Arrest     #HTN/HLD    -LHC at OSH with significant RCA stenosis not amenable to intervention     -TTE (4/3): EF 40% with RWA    -cardiology consulted, defer inpatient intervention due to persistent fevers     -continue with ASA 81mg daily, Atorvastatin 40mg qhs and Plavix 75mg daily     -continue with metoprolol succinate 25mg daily, isordil 20mg q8hrs, hydralazine 50mg q8hrs and losartan 100mg daily     #ESRD (on HD TThS via LUE AVF)    #S/p Failed Kidney Transplant     -nephrology consulted, appreciate recommendations  -continue with Tacrolimus 2mg BID      #Recurrent GIB 2/2 Rectal Ulcers     #Acute Blood Loss Anemia     -stable, GI signed off, continue to closely monitor hemoglobin while on DAPT     -continue with BID PO PPI      #Concern for Prostatitis vs. Neoplasm   #Elevated PSA   -urology consulted, plan to recheck PSA 1-month post-infection, outpatient urology follow up within 1-2 weeks of discharge     DVT PPx: held while in DAPT in setting of recurrent GIB    Dispo: [facility], possibly in next 24-48 hours pending fever curve off antibiotics 62-year-old male with a PMHx of HTN, HLD, DMII, ICM, HFrEF (EF 20-25%), ESRD (s/p failed kidney transplant, now on HD TThS via LUE AVF) and right AKA who initially presented to OSH for respiratory distress with hospital course c/b VTach cardiac arrest and LGIB, transferred to [facility] cardiology service for ICD evaluation but found to have recurrent LGIB and fevers of unknown origin.       #Fevers     -initially treated with 2-week course of CTX (end date: 4/4) for pyelitis but spiked fevers post-antibiotic course     -PET (3/25): uptake in prostate concerning for prostatitis or neoplasm (see report for full details)     -negative repeat infectious workup: BCx (4/5) NGTD, GI PCR negative, Cdiff negative, CXR negative, gallium scan negative, UCx negative,   -aspergillus and fungitell pending     -ID consulted, discontinue Meropenem and Vancomycin today, monitor off Abx    #CAD    #HFrEF    #Vtach Arrest     #HTN/HLD    -LHC at OSH with significant RCA stenosis not amenable to intervention     -TTE (4/3): EF 40% with RWA    -cardiology consulted, defer inpatient intervention due to persistent fevers     -continue with ASA 81mg daily, Atorvastatin 40mg qhs and Plavix 75mg daily     -continue with metoprolol succinate 25mg daily, isordil 20mg q8hrs, hydralazine 50mg q8hrs and losartan 100mg daily     #ESRD (on HD TThS via LUE AVF)    #S/p Failed Kidney Transplant     -nephrology consulted, appreciate recommendations  -continue with Tacrolimus 2mg BID      #Recurrent GIB 2/2 Rectal Ulcers     #Acute Blood Loss Anemia     -stable, GI signed off, continue to closely monitor hemoglobin while on DAPT     -continue with BID PO PPI      #Concern for Prostatitis vs. Neoplasm   #Elevated PSA   -urology consulted, plan to recheck PSA 1-month post-infection, outpatient urology follow up within 1-2 weeks of discharge     DVT PPx: held while in DAPT in setting of recurrent GIB    Dispo: [facility], possibly in next 24-48 hours pending fever curve off antibiotics

## 2023-04-10 NOTE — PROGRESS NOTE ADULT - SUBJECTIVE AND OBJECTIVE BOX
INFECTIOUS DISEASES CONSULT FOLLOW-UP NOTE    INTERVAL HPI/OVERNIGHT EVENTS:      ROS:   Constitutional, eyes, ENT, cardiovascular, respiratory, gastrointestinal, genitourinary, integumentary, neurological, psychiatric and heme/lymph are otherwise negative other than noted above       ANTIBIOTICS/RELEVANT:    MEDICATIONS  (STANDING):  aspirin enteric coated 81 milliGRAM(s) Oral daily  atorvastatin 40 milliGRAM(s) Oral at bedtime  chlorhexidine 2% Cloths 1 Application(s) Topical daily  clopidogrel Tablet 75 milliGRAM(s) Oral daily  dextrose 5%. 1000 milliLiter(s) (100 mL/Hr) IV Continuous <Continuous>  dextrose 5%. 1000 milliLiter(s) (50 mL/Hr) IV Continuous <Continuous>  dextrose 50% Injectable 25 Gram(s) IV Push once  dextrose 50% Injectable 12.5 Gram(s) IV Push once  epoetin janae-epbx (RETACRIT) Injectable 6000 Unit(s) IV Push once  ferrous    sulfate 325 milliGRAM(s) Oral daily  hydrALAZINE 50 milliGRAM(s) Oral every 8 hours  insulin lispro (ADMELOG) corrective regimen sliding scale   SubCutaneous Before meals and at bedtime  isosorbide   dinitrate Tablet (ISORDIL) 20 milliGRAM(s) Oral three times a day  losartan 100 milliGRAM(s) Oral every 24 hours  meropenem  IVPB 500 milliGRAM(s) IV Intermittent every 24 hours  metoprolol succinate ER 25 milliGRAM(s) Oral daily  pantoprazole    Tablet 40 milliGRAM(s) Oral every 12 hours  tacrolimus 2 milliGRAM(s) Oral every 12 hours  tamsulosin 0.8 milliGRAM(s) Oral at bedtime    MEDICATIONS  (PRN):  acetaminophen     Tablet .. 650 milliGRAM(s) Oral every 6 hours PRN Temp greater or equal to 38C (100.4F), Mild Pain (1 - 3), Moderate Pain (4 - 6)  dextrose Oral Gel 15 Gram(s) Oral once PRN Blood Glucose LESS THAN 70 milliGRAM(s)/deciliter  loperamide 2 milliGRAM(s) Oral three times a day PRN Diarrhea  ondansetron    Tablet 8 milliGRAM(s) Oral daily PRN Vomiting  sodium chloride 0.65% Nasal 1 Spray(s) Both Nostrils every 4 hours PRN Nasal Congestion        Vital Signs Last 24 Hrs  T(C): 37.3 (10 Apr 2023 06:07), Max: 37.4 (2023 21:08)  T(F): 99.1 (10 Apr 2023 06:07), Max: 99.3 (2023 21:08)  HR: 55 (10 Apr 2023 06:07) (53 - 66)  BP: 159/65 (10 Apr 2023 06:07) (131/61 - 159/65)  BP(mean): --  RR: 17 (10 Apr 2023 06:07) (17 - 18)  SpO2: 100% (10 Apr 2023 06:07) (94% - 100%)    Parameters below as of 10 Apr 2023 06:07  Patient On (Oxygen Delivery Method): room air,2        PHYSICAL EXAM:  Constitutional: alert, NAD  Eyes: the sclera and conjunctiva were normal.   ENT: the ears and nose were normal in appearance.   Neck: the appearance of the neck was normal and the neck was supple.   Pulmonary: no respiratory distress and lungs were clear to auscultation bilaterally.   Heart: heart rate was normal and rhythm regular, normal S1 and S2  Vascular:. there was no peripheral edema  Abdomen: normal bowel sounds, soft, non-tender  Neurological: no focal deficits.   Psychiatric: the affect was normal        LABS:                        9.4    5.02  )-----------( 199      ( 10 Apr 2023 05:55 )             30.2     04-10    130<L>  |  94<L>  |  9   ----------------------------<  80  4.2   |  26  |  3.96<H>    Ca    8.4      10 Apr 2023 05:55  Phos  2.5     04-10  Mg     1.8     04-10    TPro  4.2<L>  /  Alb  2.2<L>  /  TBili  0.4  /  DBili  x   /  AST  14  /  ALT  5<L>  /  AlkPhos  126<H>  04-09      Urinalysis Basic - ( 2023 15:17 )    Color: Yellow / Appearance: Clear / S.020 / pH: x  Gluc: x / Ketone: Trace mg/dL  / Bili: Negative / Urobili: 0.2 E.U./dL   Blood: x / Protein: >=300 mg/dL / Nitrite: NEGATIVE   Leuk Esterase: Trace / RBC: < 5 /HPF / WBC > 10 /HPF   Sq Epi: x / Non Sq Epi: x / Bacteria: Present /HPF        MICROBIOLOGY:      RADIOLOGY & ADDITIONAL STUDIES:  Reviewed INFECTIOUS DISEASES CONSULT FOLLOW-UP NOTE    INTERVAL HPI/OVERNIGHT EVENTS:    Patient seen and examined at bedside. No acute overnight events. He denies any complaints       ROS:   Constitutional, eyes, ENT, cardiovascular, respiratory, gastrointestinal, genitourinary, integumentary, neurological, psychiatric and heme/lymph are otherwise negative other than noted above       ANTIBIOTICS/RELEVANT:    MEDICATIONS  (STANDING):  aspirin enteric coated 81 milliGRAM(s) Oral daily  atorvastatin 40 milliGRAM(s) Oral at bedtime  chlorhexidine 2% Cloths 1 Application(s) Topical daily  clopidogrel Tablet 75 milliGRAM(s) Oral daily  dextrose 5%. 1000 milliLiter(s) (100 mL/Hr) IV Continuous <Continuous>  dextrose 5%. 1000 milliLiter(s) (50 mL/Hr) IV Continuous <Continuous>  dextrose 50% Injectable 25 Gram(s) IV Push once  dextrose 50% Injectable 12.5 Gram(s) IV Push once  epoetin janae-epbx (RETACRIT) Injectable 6000 Unit(s) IV Push once  ferrous    sulfate 325 milliGRAM(s) Oral daily  hydrALAZINE 50 milliGRAM(s) Oral every 8 hours  insulin lispro (ADMELOG) corrective regimen sliding scale   SubCutaneous Before meals and at bedtime  isosorbide   dinitrate Tablet (ISORDIL) 20 milliGRAM(s) Oral three times a day  losartan 100 milliGRAM(s) Oral every 24 hours  meropenem  IVPB 500 milliGRAM(s) IV Intermittent every 24 hours  metoprolol succinate ER 25 milliGRAM(s) Oral daily  pantoprazole    Tablet 40 milliGRAM(s) Oral every 12 hours  tacrolimus 2 milliGRAM(s) Oral every 12 hours  tamsulosin 0.8 milliGRAM(s) Oral at bedtime    MEDICATIONS  (PRN):  acetaminophen     Tablet .. 650 milliGRAM(s) Oral every 6 hours PRN Temp greater or equal to 38C (100.4F), Mild Pain (1 - 3), Moderate Pain (4 - 6)  dextrose Oral Gel 15 Gram(s) Oral once PRN Blood Glucose LESS THAN 70 milliGRAM(s)/deciliter  loperamide 2 milliGRAM(s) Oral three times a day PRN Diarrhea  ondansetron    Tablet 8 milliGRAM(s) Oral daily PRN Vomiting  sodium chloride 0.65% Nasal 1 Spray(s) Both Nostrils every 4 hours PRN Nasal Congestion        Vital Signs Last 24 Hrs  T(C): 37.3 (10 Apr 2023 06:07), Max: 37.4 (2023 21:08)  T(F): 99.1 (10 Apr 2023 06:07), Max: 99.3 (2023 21:08)  HR: 55 (10 Apr 2023 06:07) (53 - 66)  BP: 159/65 (10 Apr 2023 06:07) (131/61 - 159/65)  BP(mean): --  RR: 17 (10 Apr 2023 06:07) (17 - 18)  SpO2: 100% (10 Apr 2023 06:07) (94% - 100%)    Parameters below as of 10 Apr 2023 06:07  Patient On (Oxygen Delivery Method): room air,2        PHYSICAL EXAM:  Constitutional: alert, NAD  Eyes: the sclera and conjunctiva were normal.   ENT: the ears and nose were normal in appearance.   Neck: the appearance of the neck was normal and the neck was supple.   Pulmonary: no respiratory distress and lungs were clear to auscultation bilaterally.   Heart: heart rate was normal and rhythm regular, normal S1 and S2  Vascular:. there was no peripheral edema  Abdomen: normal bowel sounds, soft, non-tender  Neurological: no focal deficits.   Psychiatric: the affect was normal        LABS:                        9.4    5.02  )-----------( 199      ( 10 Apr 2023 05:55 )             30.2     04-10    130<L>  |  94<L>  |  9   ----------------------------<  80  4.2   |  26  |  3.96<H>    Ca    8.4      10 Apr 2023 05:55  Phos  2.5     04-10  Mg     1.8     04-10    TPro  4.2<L>  /  Alb  2.2<L>  /  TBili  0.4  /  DBili  x   /  AST  14  /  ALT  5<L>  /  AlkPhos  126<H>  04-09      Urinalysis Basic - ( 2023 15:17 )    Color: Yellow / Appearance: Clear / S.020 / pH: x  Gluc: x / Ketone: Trace mg/dL  / Bili: Negative / Urobili: 0.2 E.U./dL   Blood: x / Protein: >=300 mg/dL / Nitrite: NEGATIVE   Leuk Esterase: Trace / RBC: < 5 /HPF / WBC > 10 /HPF   Sq Epi: x / Non Sq Epi: x / Bacteria: Present /HPF        MICROBIOLOGY:      RADIOLOGY & ADDITIONAL STUDIES:  Reviewed

## 2023-04-10 NOTE — PROGRESS NOTE ADULT - ATTENDING COMMENTS
Initial attending contact date    4/3/23  . See PA note written above for details. I reviewed the PA documentation. I have personally seen and examined this patient. I reviewed vitals, labs, medications, cardiac studies, and additional imaging. I agree with the above PA's findings and plans as written above with the following additions/statements.  -ECHO with EF ~ 35% akinetic infero.infero lateral akinesis consistent with known occluded RCA, mild hypokinesis apical/anterior   -Transferred to medicine [facility] for persistent fever, gallium essentially negative  -Cath deferred indefinitely at this time due to persistent fever. Pt without coordination of strength to have life vest placed.  -H/H has remained stable on asa, plavix without recurrent bleed   -Cont ASA, plavix, statin  -HFrEF: euvolemic on exam, cont toprol, isordil/hydral, losartan   -To fu with Dr Kristopher Aleman for outpatient cardiology fu [phone] Initial attending contact date    4/3/23  . See PA note written above for details. I reviewed the PA documentation. I have personally seen and examined this patient. I reviewed vitals, labs, medications, cardiac studies, and additional imaging. I agree with the above PA's findings and plans as written above with the following additions/statements.  -ECHO with EF ~ 35% akinetic infero.infero lateral akinesis consistent with known occluded RCA, mild hypokinesis apical/anterior   -Transferred to medicine [facility] for persistent fever, gallium essentially negative  -Cath deferred indefinitely at this time due to persistent fever. Pt without coordination of strength to have life vest placed.  -H/H has remained stable on asa, plavix without recurrent bleed   -Cont ASA, plavix, statin  -HFrEF: euvolemic on exam, cont toprol, isordil/hydral, losartan   -To fu with Dr Kristopher Aleman for outpatient cardiology fu [phone] Initial attending contact date    4/3/23  . See PA note written above for details. I reviewed the PA documentation. I have personally seen and examined this patient. I reviewed vitals, labs, medications, cardiac studies, and additional imaging. I agree with the above PA's findings and plans as written above with the following additions/statements.  -ECHO with EF ~ 35% akinetic infero.infero lateral akinesis consistent with known occluded RCA, mild hypokinesis apical/anterior   -Transferred to medicine [facility] for persistent fever, gallium essentially negative  -Cath deferred indefinitely at this time due to persistent fever. Pt without coordination of strength to have life vest placed.  -H/H has remained stable on asa, plavix without recurrent bleed   -Cont ASA, plavix, statin  -HFrEF: euvolemic on exam, cont toprol, isordil/hydral, losartan   -To fu with Dr Kristopher Aleman for outpatient cardiology fu [phone]

## 2023-04-10 NOTE — PROGRESS NOTE ADULT - TIME BILLING
preparing to see patient, obtaining history, performing physical exam, communicating with other health care providers, documenting in the EMR, independently interpreting results.
as noted above

## 2023-04-10 NOTE — PROGRESS NOTE ADULT - ASSESSMENT
61 y/o M, PMHx HTN, HLD, DM, ICM (s/p cath years ago, RCA 99%, never intervened on), HFrEF 25-30%, ESRD s/p failed kidney transplant (LUE AVF; HD TTS), R AKA initially admitted to [facility] 2/27 for Respiratory Distress 2/2 SIRS s/p HD session whose course c/b cardiac arrest and Vtach. Now s/p extubation/pressors whose course further c/b LGIB (Hgb 3.5 s/p multiple transfusions and no active bleed). Transferred to [facility] 3/17 for ICD eval, however, found to have BRBPR on admission s/p flex sig without active bleed. Course further c/b ongoing fevers of unclear etiology throughout hospitalization, w/u remarkable for pyelitis s/p 2 wks course IV abx on 4/4, now w/ recurrent fever 104.6 on 4/5. Initial plan for LHC and SubQ ICD deferred 2/2 persistent fevers. Now awaiting Lifevest fitting. Medicine and ID following, pending gallium scan. Pt does not require further telemetry monitoring, now on medicine pending infectious work up. Cardiology is following for comanagement.    ECHO 4/3: EF 40% w/regional wall motion abnormality. Entire inferior wall & basal & mid inferolateral wall akinetic. Basal& mid anterolateral wall & apical lateral segments hypokinetic. Moderately dilated LA. Mild AR.   LHC at [place] w/ oLM 30% and RCA 99% that is not amenable to intervention.      #CAD  c/w ASA 81mg and Plavix 75mg daily (cleared by GI) -will likely require DAPT x 1year for medical management   Initial plan for cardiac cath now deferred indefinitely 2/2 persistent fevers  c/w Isodil 20 mg tid  c/w losartan 100 mg q24h  c/w atorvastatin 40 mg daily   c/w metoprolol succinate 25 mg daily     # s/p VTach arrest @ [facility]; no tele events noted at [facility]  EP Consulted – initially planed for SubQ ICD placement s/p cardiac cath given cardiac arrest. At this time, ICD implant is contraindicated 2/2 persistent fever of unclear etiology -likely infectious source and may need long term IV Abx.   Pending Lifevest prior to discharge, however, needs to be able to cooperate appropriately to be a candidate.   c/w Toprol XL 25mg daily    #chronic HFrEF  Volume: euvoluemic   c/w Hydral 50mg TID, Losartan 100mg daily, Toprol XL 25mg daily and Isordil 20mg TID    #FOU  Undergoing workup, pending gallium scan     f/u with Kristopher Aleman MD as outpatient  cardiology will sign off at this time, reconsult as needed 61 y/o M, PMHx HTN, HLD, DM, ICM (s/p cath years ago, RCA 99%, never intervened on), HFrEF 25-30%, ESRD s/p failed kidney transplant (LUE AVF; HD TTS), R AKA initially admitted to [facility] 2/27 for Respiratory Distress 2/2 SIRS s/p HD session whose course c/b cardiac arrest and Vtach. Now s/p extubation/pressors whose course further c/b LGIB (Hgb 3.5 s/p multiple transfusions and no active bleed). Transferred to [facility] 3/17 for ICD eval, however, found to have BRBPR on admission s/p flex sig without active bleed. Course further c/b ongoing fevers of unclear etiology throughout hospitalization, w/u remarkable for pyelitis s/p 2 wks course IV abx on 4/4, now w/ recurrent fever 104.6 on 4/5. Initial plan for LHC and SubQ ICD deferred 2/2 persistent fevers. Now awaiting Lifevest fitting. Medicine and ID following, pending gallium scan. Pt does not require further telemetry monitoring, now on medicine pending infectious work up. Cardiology is following for comanagement.    ECHO 4/3: EF 40% w/regional wall motion abnormality. Entire inferior wall & basal & mid inferolateral wall akinetic. Basal& mid anterolateral wall & apical lateral segments hypokinetic. Moderately dilated LA. Mild AR.   LHC at [place] w/ oLM 30% and RCA 99% that is not amenable to intervention.      #CAD  c/w ASA 81mg and Plavix 75mg daily (cleared by GI) -will likely require DAPT x 1year for medical management   Initial plan for cardiac cath now deferred indefinitely 2/2 persistent fevers  c/w Isodil 20 mg tid  c/w losartan 100 mg q24h  c/w atorvastatin 40 mg daily   c/w metoprolol succinate 25 mg daily     # s/p VTach arrest @ [facility]; no tele events noted at [facility]  EP Consulted – initially planed for SubQ ICD placement s/p cardiac cath given cardiac arrest. At this time, ICD implant is contraindicated 2/2 persistent fever of unclear etiology -likely infectious source and may need long term IV Abx.   Pending Lifevest prior to discharge, however, needs to be able to cooperate appropriately to be a candidate.   c/w Toprol XL 25mg daily    #chronic HFrEF  Volume: euvoluemic   c/w Hydral 50mg TID, Losartan 100mg daily, Toprol XL 25mg daily and Isordil 20mg TID    #FOU  Undergoing workup, pending gallium scan     f/u with Kristopher Aleman MD as outpatient  cardiology will sign off at this time, reconsult as needed 63 y/o M, PMHx HTN, HLD, DM, ICM (s/p cath years ago, RCA 99%, never intervened on), HFrEF 25-30%, ESRD s/p failed kidney transplant (LUE AVF; HD TTS), R AKA initially admitted to [facility] 2/27 for Respiratory Distress 2/2 SIRS s/p HD session whose course c/b cardiac arrest and Vtach. Now s/p extubation/pressors whose course further c/b LGIB (Hgb 3.5 s/p multiple transfusions and no active bleed). Transferred to [facility] 3/17 for ICD eval, however, found to have BRBPR on admission s/p flex sig without active bleed. Course further c/b ongoing fevers of unclear etiology throughout hospitalization, w/u remarkable for pyelitis s/p 2 wks course IV abx on 4/4, now w/ recurrent fever 104.6 on 4/5. Initial plan for LHC and SubQ ICD deferred 2/2 persistent fevers. Now awaiting Lifevest fitting. Medicine and ID following, pending gallium scan. Pt does not require further telemetry monitoring, now on medicine pending infectious work up. Cardiology is following for comanagement.    ECHO 4/3: EF 40% w/regional wall motion abnormality. Entire inferior wall & basal & mid inferolateral wall akinetic. Basal& mid anterolateral wall & apical lateral segments hypokinetic. Moderately dilated LA. Mild AR.   LHC at [place] w/ oLM 30% and RCA 99% that is not amenable to intervention.      #CAD  c/w ASA 81mg and Plavix 75mg daily (cleared by GI) -will likely require DAPT x 1year for medical management   Initial plan for cardiac cath now deferred indefinitely 2/2 persistent fevers  c/w Isodil 20 mg tid  c/w losartan 100 mg q24h  c/w atorvastatin 40 mg daily   c/w metoprolol succinate 25 mg daily     # s/p VTach arrest @ [facility]; no tele events noted at [facility]  EP Consulted – initially planed for SubQ ICD placement s/p cardiac cath given cardiac arrest. At this time, ICD implant is contraindicated 2/2 persistent fever of unclear etiology -likely infectious source and may need long term IV Abx.   Pending Lifevest prior to discharge, however, needs to be able to cooperate appropriately to be a candidate.   c/w Toprol XL 25mg daily    #chronic HFrEF  Volume: euvoluemic   c/w Hydral 50mg TID, Losartan 100mg daily, Toprol XL 25mg daily and Isordil 20mg TID    #FOU  Undergoing workup, pending gallium scan     f/u with Kristopher Aleman MD as outpatient  cardiology will sign off at this time, reconsult as needed

## 2023-04-10 NOTE — PROGRESS NOTE ADULT - PROBLEM SELECTOR PLAN 1
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 Pyelitis treated with ceftriaxone 2 g IV q24h (3/21-4/4) per ID recs. Patient also had diarrhea with antibiotics. C.diff and GI PCR negative. Unclear source of fever. Overnight 4/5, patient with new Tmax to 104.6 F. WBC 10.82, CRP 54.3, procal 0.97. Abd U/S with no acute pathology, CXR negative, Bcx NGTD. PET scan showing increased uptake in prostate suggesting prostatitis vs. neoplasm. s/p CTX tx for pyelitis vs cystitis w continued elevated t and fevers after abx course completion.     Plan:  - Blood Culture 4/5 NGTD  - UA 4/8 neg, Cx neg   - c/w Meropenem 500 mg q24h per ID recs, as patient is immunocompromised  - Vancomycin, will be received w HD (next HD 4/11) w vanc trough prior to next HD (4/13) (prior Vanc trough 4/10 14.3)  - ID following - appreciate recs  - f/u Fungitell and Galactomannan Patient found to have prolonged fever on admission, and found to have cystitis and pyelitis of transplanted kidney due to K.pneumo w/o pyelonephritis or abscess. Pyelitis treated with ceftriaxone 2 g IV q24h (3/21-4/4) per ID recs. Patient also had diarrhea with antibiotics. C.diff and GI PCR negative. Unclear source of fever. Overnight 4/5, patient with new Tmax to 104.6 F. WBC 10.82, CRP 54.3, procal 0.97. Abd U/S with no acute pathology, CXR negative, Bcx NGTD. PET scan showing increased uptake in prostate suggesting prostatitis vs. neoplasm. s/p CTX tx for pyelitis vs cystitis w continued elevated t and fevers after abx course completion.     Plan:  - Blood Culture 4/5 NGTD  - UA 4/8 neg, Cx neg   - Monitor OFF abx  - ID following - appreciate recs  - s/p Meropenem 500 mg q24h per ID recs, as patient is immunocompromised  - s/p Vancomycin (w HD)   - f/u Fungitell and Galactomannan

## 2023-04-10 NOTE — PROGRESS NOTE ADULT - PROBLEM SELECTOR PLAN 3
Addended by: AGUSTIN HINTON on: 6/8/2018 04:07 PM     Modules accepted: Orders     s/p VTach arrest @ [facility]; no tele events noted. EP Consulted with original plan for SubQ ICD placement once pt had LHC. Will receive life vest instead    Plan:  - Patient currently WNL HR   - c/w Toprol XL 25mg daily. s/p VTach arrest @ [facility]; no tele events noted. EP Consulted with original plan for SubQ ICD placement once pt had LHC. Will receive life vest instead    Plan:  - Patient currently WNL HR   - c/w Toprol XL 25mg daily. s/p VTach arrest @ [facility]; no tele events noted. EP Consulted with original plan for SubQ ICD placement once pt had LHC. Will receive life vest instead    Plan:  - Patient currently WNL HR   - c/w Toprol XL 25mg daily.

## 2023-04-10 NOTE — PROGRESS NOTE ADULT - ASSESSMENT
Patient is a 62M PMHx HTN, HLD, DM, CAD (s/p cath years ago, RCA 99%, never intervened on), HFrEF 25-30%, ESRD s/p failed kidney transplant (last HD 3/1 via Left Arm AVF; HD TTS), Right AKA, initially presented to [facility] on 2/27 for respiratory distress after dialysis, found to be septic, complicated by AHRF requiring intubation for 24hr followed by VT arrest. Course also complicated by massive LGIB (rectal ulcer) requiring 7u pRBC, 1u FFP and 1u PLT. Transferred to [facility] for ICD placement 2/2 Vtach and cardiac cath. Medicine consulted for GIB, fever of unknown origin, and comanagement.  Patient now transferred to medicine for persistent fever of unknown origin.                  Patient is a 62M PMHx HTN, HLD, DM, CAD (s/p cath years ago, RCA 99%, never intervened on), HFrEF 25-30%, ESRD s/p failed kidney transplant (last HD 3/1 via Left Arm AVF; HD TTS), Right AKA, initially presented to [facility] on 2/27 for respiratory distress after dialysis, found to be septic, complicated by AHRF requiring intubation for 24hr followed by VT arrest. Course also complicated by massive LGIB (rectal ulcer) requiring 7u pRBC, 1u FFP and 1u PLT. Transferred to [facility] for ICD placement 2/2 Vtach and cardiac cath. Medicine consulted for GIB, fever of unknown origin, and comanagement.  Patient now transferred to medicine for persistent fever of unknown origin.                  Patient is a 62M PMHx HTN, HLD, DM, CAD (s/p cath years ago, RCA 99%, never intervened on), HFrEF 25-30%, ESRD s/p failed kidney transplant (last HD 3/1 via Left Arm AVF; HD TTS), Right AKA, initially presented to [facility] on 2/27 for respiratory distress after dialysis, found to be septic, complicated by AHRF requiring intubation for 24hr followed by VT arrest. Course also complicated by massive LGIB (rectal ulcer) requiring 7u pRBC, 1u FFP and 1u PLT. Transferred to [facility] for ICD placement 2/2 Vtach and cardiac cath. Medicine consulted for GIB, fever of unknown origin, and comanagement.  Patient now transferred to medicine for persistent fever of unknown origin.

## 2023-04-10 NOTE — PROGRESS NOTE ADULT - SUBJECTIVE AND OBJECTIVE BOX
[facility]  62y  Male    Patient is a 62y old  Male who presents with a chief complaint of Fever (2023 10:52)      INTERVAL HPI/OVERNIGHT EVENTS:  O/N: NAEON   Subjective:Patient examined at bedside no SOB, lightheadedness of subjective fever. In addition patient denies any HA, cough, wheezing or dizziness. Patient has no other acute complaints     T(C): 36.6 (04-10-23 @ 11:57), Max: 37.4 (23 @ 21:08)  HR: 62 (04-10-23 @ 11:57) (55 - 66)  BP: 136/72 (04-10-23 @ 11:57) (131/61 - 159/65)  RR: 19 (04-10-23 @ 11:57) (17 - 19)  SpO2: 96% (04-10-23 @ 11:57) (94% - 100%)  Wt(kg): --Vital Signs Last 24 Hrs  T(C): 36.6 (10 Apr 2023 11:57), Max: 37.4 (2023 21:08)  T(F): 97.9 (10 Apr 2023 11:57), Max: 99.3 (2023 21:08)  HR: 62 (10 Apr 2023 11:57) (55 - 66)  BP: 136/72 (10 Apr 2023 11:57) (131/61 - 159/65)  BP(mean): --  RR: 19 (10 Apr 2023 11:57) (17 - 19)  SpO2: 96% (10 Apr 2023 11:57) (94% - 100%)    Parameters below as of 10 Apr 2023 11:57  Patient On (Oxygen Delivery Method): room air        PHYSICAL EXAM:  GENERAL: +lethargic, however improved energy this AM  Neuro: CN2-12 grossly intact  HEENT: NC/AT; MMM; neck supple;  Heart: RRR; +s1 and s2; no MRG   Lungs: CTA b/l; normal effort; no accesory muscle use, on NC   GI: + mildly distended w/o tenderness to palpation.   Extremities: +2 pulses in UE and LE b/l; no clubbing  Skin: skin dry and warm; no skin tenting; no rashes or lesions  MSK: normal tone  Psych: AOx2-3         LABS:                        9.4    5.02  )-----------( 199      ( 10 Apr 2023 05:55 )             30.2     04-10    130<L>  |  94<L>  |  9   ----------------------------<  80  4.2   |  26  |  3.96<H>    Ca    8.4      10 Apr 2023 05:55  Phos  2.5     04-10  Mg     1.8     04-10    TPro  4.2<L>  /  Alb  2.2<L>  /  TBili  0.4  /  DBili  x   /  AST  14  /  ALT  5<L>  /  AlkPhos  126<H>          Urinalysis Basic - ( 2023 15:17 )    Color: Yellow / Appearance: Clear / S.020 / pH: x  Gluc: x / Ketone: Trace mg/dL  / Bili: Negative / Urobili: 0.2 E.U./dL   Blood: x / Protein: >=300 mg/dL / Nitrite: NEGATIVE   Leuk Esterase: Trace / RBC: < 5 /HPF / WBC > 10 /HPF   Sq Epi: x / Non Sq Epi: x / Bacteria: Present /HPF      CAPILLARY BLOOD GLUCOSE      POCT Blood Glucose.: 128 mg/dL (10 Apr 2023 12:22)  POCT Blood Glucose.: 88 mg/dL (10 Apr 2023 09:19)  POCT Blood Glucose.: 79 mg/dL (2023 21:31)  POCT Blood Glucose.: 158 mg/dL (2023 17:57)  POCT Blood Glucose.: 127 mg/dL (2023 13:12)        Urinalysis Basic - ( 2023 15:17 )    Color: Yellow / Appearance: Clear / S.020 / pH: x  Gluc: x / Ketone: Trace mg/dL  / Bili: Negative / Urobili: 0.2 E.U./dL   Blood: x / Protein: >=300 mg/dL / Nitrite: NEGATIVE   Leuk Esterase: Trace / RBC: < 5 /HPF / WBC > 10 /HPF   Sq Epi: x / Non Sq Epi: x / Bacteria: Present /HPF        RADIOLOGY & ADDITIONAL TESTS:    Imaging Personally Reviewed:  [ ] YES  [ ] NO   [facility]  62y  Male    Patient is a 62y old  Male who presents with a chief complaint of Fever (2023 10:52)      INTERVAL HPI/OVERNIGHT EVENTS:  O/N: NAEON   Subjective:Patient examined at bedside no SOB, lightheadedness of subjective fever. In addition patient denies any HA, cough, wheezing or dizziness. Patient has no other acute complaints     T(C): 36.6 (04-10-23 @ 11:57), Max: 37.4 (23 @ 21:08)  HR: 62 (04-10-23 @ 11:57) (55 - 66)  BP: 136/72 (04-10-23 @ 11:57) (131/61 - 159/65)  RR: 19 (04-10-23 @ 11:57) (17 - 19)  SpO2: 96% (04-10-23 @ 11:57) (94% - 100%)  Wt(kg): --Vital Signs Last 24 Hrs  T(C): 36.6 (10 Apr 2023 11:57), Max: 37.4 (2023 21:08)  T(F): 97.9 (10 Apr 2023 11:57), Max: 99.3 (2023 21:08)  HR: 62 (10 Apr 2023 11:57) (55 - 66)  BP: 136/72 (10 Apr 2023 11:57) (131/61 - 159/65)  BP(mean): --  RR: 19 (10 Apr 2023 11:57) (17 - 19)  SpO2: 96% (10 Apr 2023 11:57) (94% - 100%)    Parameters below as of 10 Apr 2023 11:57  Patient On (Oxygen Delivery Method): room air        PHYSICAL EXAM:  GENERAL: +lethargic, however improved energy this AM  Neuro: CN2-12 grossly intact  HEENT: NC/AT; MMM; neck supple;  Heart: RRR; +s1 and s2; no MRG   Lungs: CTA b/l; normal effort; no accesory muscle use, on NC   GI: + mildly distended w/o tenderness to palpation.   Extremities: +2 pulses in UE and LE b/l; no clubbing  Skin: skin dry and warm; no skin tenting; no rashes or lesions  MSK: normal tone  Psych: AOx2-3         LABS:                        9.4    5.02  )-----------( 199      ( 10 Apr 2023 05:55 )             30.2     04-10    130<L>  |  94<L>  |  9   ----------------------------<  80  4.2   |  26  |  3.96<H>    Ca    8.4      10 Apr 2023 05:55  Phos  2.5     04-10  Mg     1.8     04-10    TPro  4.2<L>  /  Alb  2.2<L>  /  TBili  0.4  /  DBili  x   /  AST  14  /  ALT  5<L>  /  AlkPhos  126<H>          Urinalysis Basic - ( 2023 15:17 )    Color: Yellow / Appearance: Clear / S.020 / pH: x  Gluc: x / Ketone: Trace mg/dL  / Bili: Negative / Urobili: 0.2 E.U./dL   Blood: x / Protein: >=300 mg/dL / Nitrite: NEGATIVE   Leuk Esterase: Trace / RBC: < 5 /HPF / WBC > 10 /HPF   Sq Epi: x / Non Sq Epi: x / Bacteria: Present /HPF      CAPILLARY BLOOD GLUCOSE      POCT Blood Glucose.: 128 mg/dL (10 Apr 2023 12:22)  POCT Blood Glucose.: 88 mg/dL (10 Apr 2023 09:19)  POCT Blood Glucose.: 79 mg/dL (2023 21:31)  POCT Blood Glucose.: 158 mg/dL (2023 17:57)  POCT Blood Glucose.: 127 mg/dL (2023 13:12)        Urinalysis Basic - ( 2023 15:17 )    Color: Yellow / Appearance: Clear / S.020 / pH: x  Gluc: x / Ketone: Trace mg/dL  / Bili: Negative / Urobili: 0.2 E.U./dL   Blood: x / Protein: >=300 mg/dL / Nitrite: NEGATIVE   Leuk Esterase: Trace / RBC: < 5 /HPF / WBC > 10 /HPF   Sq Epi: x / Non Sq Epi: x / Bacteria: Present /HPF        RADIOLOGY & ADDITIONAL TESTS:    Imaging Personally Reviewed:  [ ] YES  [ ] NO   [facility]  62y  Male    Patient is a 62y old  Male who presents with a chief complaint of Fever (2023 10:52)      INTERVAL HPI/OVERNIGHT EVENTS:  O/N: NAEON   Subjective:Patient examined at bedside no SOB, lightheadedness of subjective fever. In addition patient denies any HA, cough, wheezing or dizziness. Patient has no other acute complaints     T(C): 36.6 (04-10-23 @ 11:57), Max: 37.4 (23 @ 21:08)  HR: 62 (04-10-23 @ 11:57) (55 - 66)  BP: 136/72 (04-10-23 @ 11:57) (131/61 - 159/65)  RR: 19 (04-10-23 @ 11:57) (17 - 19)  SpO2: 96% (04-10-23 @ 11:57) (94% - 100%)  Wt(kg): --Vital Signs Last 24 Hrs  T(C): 36.6 (10 Apr 2023 11:57), Max: 37.4 (2023 21:08)  T(F): 97.9 (10 Apr 2023 11:57), Max: 99.3 (2023 21:08)  HR: 62 (10 Apr 2023 11:57) (55 - 66)  BP: 136/72 (10 Apr 2023 11:57) (131/61 - 159/65)  BP(mean): --  RR: 19 (10 Apr 2023 11:57) (17 - 19)  SpO2: 96% (10 Apr 2023 11:57) (94% - 100%)    Parameters below as of 10 Apr 2023 11:57  Patient On (Oxygen Delivery Method): room air        PHYSICAL EXAM:  GENERAL: +lethargic, however improved energy this AM  Neuro: CN2-12 grossly intact  HEENT: NC/AT; MMM; neck supple;  Heart: RRR; +s1 and s2; no MRG   Lungs: CTA b/l; normal effort; no accesory muscle use, on NC   GI: + mildly distended w/o tenderness to palpation.   Extremities: +2 pulses in UE and LE b/l; no clubbing  Skin: skin dry and warm; no skin tenting; no rashes or lesions  MSK: normal tone  Psych: AOx2-3         LABS:                        9.4    5.02  )-----------( 199      ( 10 Apr 2023 05:55 )             30.2     04-10    130<L>  |  94<L>  |  9   ----------------------------<  80  4.2   |  26  |  3.96<H>    Ca    8.4      10 Apr 2023 05:55  Phos  2.5     04-10  Mg     1.8     04-10    TPro  4.2<L>  /  Alb  2.2<L>  /  TBili  0.4  /  DBili  x   /  AST  14  /  ALT  5<L>  /  AlkPhos  126<H>          Urinalysis Basic - ( 2023 15:17 )    Color: Yellow / Appearance: Clear / S.020 / pH: x  Gluc: x / Ketone: Trace mg/dL  / Bili: Negative / Urobili: 0.2 E.U./dL   Blood: x / Protein: >=300 mg/dL / Nitrite: NEGATIVE   Leuk Esterase: Trace / RBC: < 5 /HPF / WBC > 10 /HPF   Sq Epi: x / Non Sq Epi: x / Bacteria: Present /HPF      CAPILLARY BLOOD GLUCOSE      POCT Blood Glucose.: 128 mg/dL (10 Apr 2023 12:22)  POCT Blood Glucose.: 88 mg/dL (10 Apr 2023 09:19)  POCT Blood Glucose.: 79 mg/dL (2023 21:31)  POCT Blood Glucose.: 158 mg/dL (2023 17:57)  POCT Blood Glucose.: 127 mg/dL (2023 13:12)        Urinalysis Basic - ( 2023 15:17 )    Color: Yellow / Appearance: Clear / S.020 / pH: x  Gluc: x / Ketone: Trace mg/dL  / Bili: Negative / Urobili: 0.2 E.U./dL   Blood: x / Protein: >=300 mg/dL / Nitrite: NEGATIVE   Leuk Esterase: Trace / RBC: < 5 /HPF / WBC > 10 /HPF   Sq Epi: x / Non Sq Epi: x / Bacteria: Present /HPF        RADIOLOGY & ADDITIONAL TESTS:    Imaging Personally Reviewed:  [ ] YES  [ ] NO   HARDIAL, TUMESHWAR    INTERVAL HPI/OVERNIGHT EVENTS:  O/N: NAEON   Subjective:Patient examined at bedside no SOB, lightheadedness of subjective fever. In addition patient denies any HA, cough, wheezing or dizziness. Patient has no other acute complaints     T(C): 36.6 (04-10-23 @ 11:57), Max: 37.4 (23 @ 21:08)  HR: 62 (04-10-23 @ 11:57) (55 - 66)  BP: 136/72 (04-10-23 @ 11:57) (131/61 - 159/65)  RR: 19 (04-10-23 @ 11:57) (17 - 19)  SpO2: 96% (04-10-23 @ 11:57) (94% - 100%)  Wt(kg): --Vital Signs Last 24 Hrs  T(C): 36.6 (10 Apr 2023 11:57), Max: 37.4 (2023 21:08)  T(F): 97.9 (10 Apr 2023 11:57), Max: 99.3 (2023 21:08)  HR: 62 (10 Apr 2023 11:57) (55 - 66)  BP: 136/72 (10 Apr 2023 11:57) (131/61 - 159/65)  BP(mean): --  RR: 19 (10 Apr 2023 11:57) (17 - 19)  SpO2: 96% (10 Apr 2023 11:57) (94% - 100%)    Parameters below as of 10 Apr 2023 11:57  Patient On (Oxygen Delivery Method): room air        PHYSICAL EXAM:  GENERAL: +lethargic, however improved energy this AM  Neuro: CN2-12 grossly intact  HEENT: NC/AT; MMM; neck supple;  Heart: RRR; +s1 and s2; no MRG   Lungs: CTA b/l; normal effort; no accesory muscle use, on NC   GI: + mildly distended w/o tenderness to palpation.   Extremities: +2 pulses in UE and LE b/l; no clubbing  Skin: skin dry and warm; no skin tenting; no rashes or lesions  MSK: normal tone  Psych: AOx2-3         LABS:                        9.4    5.02  )-----------( 199      ( 10 Apr 2023 05:55 )             30.2     04-10    130<L>  |  94<L>  |  9   ----------------------------<  80  4.2   |  26  |  3.96<H>    Ca    8.4      10 Apr 2023 05:55  Phos  2.5     04-10  Mg     1.8     04-10    TPro  4.2<L>  /  Alb  2.2<L>  /  TBili  0.4  /  DBili  x   /  AST  14  /  ALT  5<L>  /  AlkPhos  126<H>  04-09        Urinalysis Basic - ( 2023 15:17 )    Color: Yellow / Appearance: Clear / S.020 / pH: x  Gluc: x / Ketone: Trace mg/dL  / Bili: Negative / Urobili: 0.2 E.U./dL   Blood: x / Protein: >=300 mg/dL / Nitrite: NEGATIVE   Leuk Esterase: Trace / RBC: < 5 /HPF / WBC > 10 /HPF   Sq Epi: x / Non Sq Epi: x / Bacteria: Present /HPF      CAPILLARY BLOOD GLUCOSE      POCT Blood Glucose.: 128 mg/dL (10 Apr 2023 12:22)  POCT Blood Glucose.: 88 mg/dL (10 Apr 2023 09:19)  POCT Blood Glucose.: 79 mg/dL (2023 21:31)  POCT Blood Glucose.: 158 mg/dL (2023 17:57)  POCT Blood Glucose.: 127 mg/dL (2023 13:12)        Urinalysis Basic - ( 2023 15:17 )    Color: Yellow / Appearance: Clear / S.020 / pH: x  Gluc: x / Ketone: Trace mg/dL  / Bili: Negative / Urobili: 0.2 E.U./dL   Blood: x / Protein: >=300 mg/dL / Nitrite: NEGATIVE   Leuk Esterase: Trace / RBC: < 5 /HPF / WBC > 10 /HPF   Sq Epi: x / Non Sq Epi: x / Bacteria: Present /HPF        RADIOLOGY & ADDITIONAL TESTS:    Imaging Personally Reviewed:  [ ] YES  [ ] NO

## 2023-04-10 NOTE — PROGRESS NOTE ADULT - PROBLEM SELECTOR PLAN 9
S/p failed kidney transplant. Per referring Dr Barrera, pt still requires tacrolimus even if failed transplant due to possibility of graft vs host.    Plan:  - c/w Tacrolimus 2mg PO BID (tacro level 4/8, awaiting results (results delayed per lab, likely results by 4/11 AM))  - Urology followup upon discharge.

## 2023-04-10 NOTE — CHART NOTE - NSCHARTNOTEFT_GEN_A_CORE
Admitting Diagnosis:   Patient is a 62y old  Male who presents with a chief complaint of ICD placement      PAST MEDICAL & SURGICAL HISTORY:  HTN (hypertension)      HLD (hyperlipidemia)      DM (diabetes mellitus)      GERD (gastroesophageal reflux disease)      ESRD on dialysis      NSTEMI (non-ST elevation myocardial infarction)      CAD (coronary artery disease)      Fever of unknown origin (FUO)            Current Nutrition Order: DASH/TLC + Renal, soft and bite sized    PO Intake: Good (%) [   ]  Fair (50-75%) [ X ] Poor (<25%) [ ]    GI Issues:  No N/V/C, intermittent diarrhea noted    Pain:  None at this time     Skin Integrity:  Alex 14  Nonpitting scrotum Edema  R. buttock stage II pressure injury, L. heel DTI     Labs:   04-10    130<L>  |  94<L>  |  9   ----------------------------<  80  4.2   |  26  |  3.96<H>    Ca    8.4      10 Apr 2023 05:55  Phos  2.5     04-10  Mg     1.8     04-10    TPro  4.2<L>  /  Alb  2.2<L>  /  TBili  0.4  /  DBili  x   /  AST  14  /  ALT  5<L>  /  AlkPhos  126<H>  04-09    CAPILLARY BLOOD GLUCOSE      POCT Blood Glucose.: 128 mg/dL (10 Apr 2023 12:22)  POCT Blood Glucose.: 88 mg/dL (10 Apr 2023 09:19)  POCT Blood Glucose.: 79 mg/dL (09 Apr 2023 21:31)  POCT Blood Glucose.: 158 mg/dL (09 Apr 2023 17:57)  POCT Blood Glucose.: 127 mg/dL (09 Apr 2023 13:12)      Medications:  MEDICATIONS  (STANDING):  aspirin enteric coated 81 milliGRAM(s) Oral daily  atorvastatin 40 milliGRAM(s) Oral at bedtime  chlorhexidine 2% Cloths 1 Application(s) Topical daily  clopidogrel Tablet 75 milliGRAM(s) Oral daily  dextrose 5%. 1000 milliLiter(s) (50 mL/Hr) IV Continuous <Continuous>  dextrose 5%. 1000 milliLiter(s) (100 mL/Hr) IV Continuous <Continuous>  dextrose 50% Injectable 25 Gram(s) IV Push once  dextrose 50% Injectable 12.5 Gram(s) IV Push once  epoetin janae-epbx (RETACRIT) Injectable 6000 Unit(s) IV Push once  ferrous    sulfate 325 milliGRAM(s) Oral daily  hydrALAZINE 50 milliGRAM(s) Oral every 8 hours  insulin lispro (ADMELOG) corrective regimen sliding scale   SubCutaneous Before meals and at bedtime  isosorbide   dinitrate Tablet (ISORDIL) 20 milliGRAM(s) Oral three times a day  losartan 100 milliGRAM(s) Oral every 24 hours  meropenem  IVPB 500 milliGRAM(s) IV Intermittent every 24 hours  metoprolol succinate ER 25 milliGRAM(s) Oral daily  nystatin Powder 1 Application(s) Topical two times a day  pantoprazole    Tablet 40 milliGRAM(s) Oral every 12 hours  tacrolimus 2 milliGRAM(s) Oral every 12 hours  tamsulosin 0.8 milliGRAM(s) Oral at bedtime    MEDICATIONS  (PRN):  acetaminophen     Tablet .. 650 milliGRAM(s) Oral every 6 hours PRN Temp greater or equal to 38C (100.4F), Mild Pain (1 - 3), Moderate Pain (4 - 6)  dextrose Oral Gel 15 Gram(s) Oral once PRN Blood Glucose LESS THAN 70 milliGRAM(s)/deciliter  loperamide 2 milliGRAM(s) Oral three times a day PRN Diarrhea  ondansetron    Tablet 8 milliGRAM(s) Oral daily PRN Vomiting  sodium chloride 0.65% Nasal 1 Spray(s) Both Nostrils every 4 hours PRN Nasal Congestion        5'4''  pounds +-10%  ABW s/p R AKA: 113 pounds, %VJD=969     3/21 143.7 pounds, 148.1 pounds  3/25 136 pounds, 145.5 pounds    3/30 140.8 pounds, 135.3 pounds   4/1 130 pounds  4/5 129.8 pounds, 143.2 pounds     Estimated energy needs:   ABW s/p R AKA for EER as %ABW is greater then 120%  Adjust for age, Fevers, ESRD/HD, Skin/pressure ulcer, CHF; fluids per team    Estimated Energy Needs From (hermilo/kg)	30  Estimated Energy Needs To (hermilo/kg)	35  Estimated Energy Needs Calculated From (hermilo/kg)	1536  Estimated Energy Needs Calculated To (hermilo/kg)	1792    Estimated Protein Needs From (g/kg)	1.2  Estimated Protein Needs To (g/kg)	1.4  Estimated Protein Needs Calculated From (g/kg)	61.44  Estimated Protein Needs Calculated To (g/kg)	71.68    Subjective:  62M PMHx HTN, HLD, DM, CAD (s/p cath years ago, RCA 99%, never intervened on), HFrEF 25-30%, ESRD s/p failed kidney transplant (last HD 3/1 via Left Arm AVF; HD TTS), Right AKA, initially presented to [facility] on 2/27 for respiratory distress after dialysis, found to be septic, complicated by AHRF requiring intubation for 24hr followed by VT arrest. Course also complicated by massive LGIB (rectal ulcer) requiring 7u pRBC, 1u FFP and 1u PLT. Transferred to [facility] for ICD placement 2/2 Vtach and cardiac cath. Medicine consulted for GIB, fever of unknown origin, and comanagement.    Pt assessed at bedside. Resting in bed. Fair PO intake noted, improved since transition from puree to soft and bite sized diet. Renal restrictions added. Encouraged adequate intake, oral nutrition supplement to support PO intake. RD to follow, nutrition recommendations below.    Prior PES: Increased needs RT increased demands AEB: ESRD/HD, Skin/pressure ulcer, CHF  >> ongoing   Goal: Pt will meet at least 75% of nutrient needs     Recommendations:  1. Continue DASH/TLC+Renal, soft and bite sized  --Recommend add Nepro once/day  2. Monitor Skin, Wt. Pain/GI per team.  3. Labs: monitor BMP, CBC, glucose, lytes, trend renal indices, LFTs.  4. RD to remain available for additional nutrition interventions as needed.     Education: Reviewed adequate intake parameters    Risk Level: High [  X ] Moderate [   ] Low [   ]. Admitting Diagnosis:   Patient is a 62y old  Male who presents with a chief complaint of ICD placement      PAST MEDICAL & SURGICAL HISTORY:  HTN (hypertension)      HLD (hyperlipidemia)      DM (diabetes mellitus)      GERD (gastroesophageal reflux disease)      ESRD on dialysis      NSTEMI (non-ST elevation myocardial infarction)      CAD (coronary artery disease)      Fever of unknown origin (FUO)            Current Nutrition Order: DASH/TLC + Renal, soft and bite sized    PO Intake: Good (%) [   ]  Fair (50-75%) [ X ] Poor (<25%) [ ]    GI Issues:  No N/V/C, intermittent diarrhea noted    Pain:  None at this time     Skin Integrity:  Alex 14  Nonpitting scrotum Edema  R. buttock stage II pressure injury, L. heel DTI     Labs:   04-10    130<L>  |  94<L>  |  9   ----------------------------<  80  4.2   |  26  |  3.96<H>    Ca    8.4      10 Apr 2023 05:55  Phos  2.5     04-10  Mg     1.8     04-10    TPro  4.2<L>  /  Alb  2.2<L>  /  TBili  0.4  /  DBili  x   /  AST  14  /  ALT  5<L>  /  AlkPhos  126<H>  04-09    CAPILLARY BLOOD GLUCOSE      POCT Blood Glucose.: 128 mg/dL (10 Apr 2023 12:22)  POCT Blood Glucose.: 88 mg/dL (10 Apr 2023 09:19)  POCT Blood Glucose.: 79 mg/dL (09 Apr 2023 21:31)  POCT Blood Glucose.: 158 mg/dL (09 Apr 2023 17:57)  POCT Blood Glucose.: 127 mg/dL (09 Apr 2023 13:12)      Medications:  MEDICATIONS  (STANDING):  aspirin enteric coated 81 milliGRAM(s) Oral daily  atorvastatin 40 milliGRAM(s) Oral at bedtime  chlorhexidine 2% Cloths 1 Application(s) Topical daily  clopidogrel Tablet 75 milliGRAM(s) Oral daily  dextrose 5%. 1000 milliLiter(s) (50 mL/Hr) IV Continuous <Continuous>  dextrose 5%. 1000 milliLiter(s) (100 mL/Hr) IV Continuous <Continuous>  dextrose 50% Injectable 25 Gram(s) IV Push once  dextrose 50% Injectable 12.5 Gram(s) IV Push once  epoetin janae-epbx (RETACRIT) Injectable 6000 Unit(s) IV Push once  ferrous    sulfate 325 milliGRAM(s) Oral daily  hydrALAZINE 50 milliGRAM(s) Oral every 8 hours  insulin lispro (ADMELOG) corrective regimen sliding scale   SubCutaneous Before meals and at bedtime  isosorbide   dinitrate Tablet (ISORDIL) 20 milliGRAM(s) Oral three times a day  losartan 100 milliGRAM(s) Oral every 24 hours  meropenem  IVPB 500 milliGRAM(s) IV Intermittent every 24 hours  metoprolol succinate ER 25 milliGRAM(s) Oral daily  nystatin Powder 1 Application(s) Topical two times a day  pantoprazole    Tablet 40 milliGRAM(s) Oral every 12 hours  tacrolimus 2 milliGRAM(s) Oral every 12 hours  tamsulosin 0.8 milliGRAM(s) Oral at bedtime    MEDICATIONS  (PRN):  acetaminophen     Tablet .. 650 milliGRAM(s) Oral every 6 hours PRN Temp greater or equal to 38C (100.4F), Mild Pain (1 - 3), Moderate Pain (4 - 6)  dextrose Oral Gel 15 Gram(s) Oral once PRN Blood Glucose LESS THAN 70 milliGRAM(s)/deciliter  loperamide 2 milliGRAM(s) Oral three times a day PRN Diarrhea  ondansetron    Tablet 8 milliGRAM(s) Oral daily PRN Vomiting  sodium chloride 0.65% Nasal 1 Spray(s) Both Nostrils every 4 hours PRN Nasal Congestion        5'4''  pounds +-10%  ABW s/p R AKA: 113 pounds, %NUL=736     3/21 143.7 pounds, 148.1 pounds  3/25 136 pounds, 145.5 pounds    3/30 140.8 pounds, 135.3 pounds   4/1 130 pounds  4/5 129.8 pounds, 143.2 pounds     Estimated energy needs:   ABW s/p R AKA for EER as %ABW is greater then 120%  Adjust for age, Fevers, ESRD/HD, Skin/pressure ulcer, CHF; fluids per team    Estimated Energy Needs From (hermilo/kg)	30  Estimated Energy Needs To (hermilo/kg)	35  Estimated Energy Needs Calculated From (hermilo/kg)	1536  Estimated Energy Needs Calculated To (hermilo/kg)	1792    Estimated Protein Needs From (g/kg)	1.2  Estimated Protein Needs To (g/kg)	1.4  Estimated Protein Needs Calculated From (g/kg)	61.44  Estimated Protein Needs Calculated To (g/kg)	71.68    Subjective:  62M PMHx HTN, HLD, DM, CAD (s/p cath years ago, RCA 99%, never intervened on), HFrEF 25-30%, ESRD s/p failed kidney transplant (last HD 3/1 via Left Arm AVF; HD TTS), Right AKA, initially presented to [facility] on 2/27 for respiratory distress after dialysis, found to be septic, complicated by AHRF requiring intubation for 24hr followed by VT arrest. Course also complicated by massive LGIB (rectal ulcer) requiring 7u pRBC, 1u FFP and 1u PLT. Transferred to [facility] for ICD placement 2/2 Vtach and cardiac cath. Medicine consulted for GIB, fever of unknown origin, and comanagement.    Pt assessed at bedside. Resting in bed. Fair PO intake noted, improved since transition from puree to soft and bite sized diet. Renal restrictions added. Encouraged adequate intake, oral nutrition supplement to support PO intake. RD to follow, nutrition recommendations below.    Prior PES: Increased needs RT increased demands AEB: ESRD/HD, Skin/pressure ulcer, CHF  >> ongoing   Goal: Pt will meet at least 75% of nutrient needs     Recommendations:  1. Continue DASH/TLC+Renal, soft and bite sized  --Recommend add Nepro once/day  2. Monitor Skin, Wt. Pain/GI per team.  3. Labs: monitor BMP, CBC, glucose, lytes, trend renal indices, LFTs.  4. RD to remain available for additional nutrition interventions as needed.     Education: Reviewed adequate intake parameters    Risk Level: High [  X ] Moderate [   ] Low [   ]. Admitting Diagnosis:   Patient is a 62y old  Male who presents with a chief complaint of ICD placement      PAST MEDICAL & SURGICAL HISTORY:  HTN (hypertension)      HLD (hyperlipidemia)      DM (diabetes mellitus)      GERD (gastroesophageal reflux disease)      ESRD on dialysis      NSTEMI (non-ST elevation myocardial infarction)      CAD (coronary artery disease)      Fever of unknown origin (FUO)            Current Nutrition Order: DASH/TLC + Renal, soft and bite sized    PO Intake: Good (%) [   ]  Fair (50-75%) [ X ] Poor (<25%) [ ]    GI Issues:  No N/V/C, intermittent diarrhea noted    Pain:  None at this time     Skin Integrity:  Alex 14  Nonpitting scrotum Edema  R. buttock stage II pressure injury, L. heel DTI     Labs:   04-10    130<L>  |  94<L>  |  9   ----------------------------<  80  4.2   |  26  |  3.96<H>    Ca    8.4      10 Apr 2023 05:55  Phos  2.5     04-10  Mg     1.8     04-10    TPro  4.2<L>  /  Alb  2.2<L>  /  TBili  0.4  /  DBili  x   /  AST  14  /  ALT  5<L>  /  AlkPhos  126<H>  04-09    CAPILLARY BLOOD GLUCOSE      POCT Blood Glucose.: 128 mg/dL (10 Apr 2023 12:22)  POCT Blood Glucose.: 88 mg/dL (10 Apr 2023 09:19)  POCT Blood Glucose.: 79 mg/dL (09 Apr 2023 21:31)  POCT Blood Glucose.: 158 mg/dL (09 Apr 2023 17:57)  POCT Blood Glucose.: 127 mg/dL (09 Apr 2023 13:12)      Medications:  MEDICATIONS  (STANDING):  aspirin enteric coated 81 milliGRAM(s) Oral daily  atorvastatin 40 milliGRAM(s) Oral at bedtime  chlorhexidine 2% Cloths 1 Application(s) Topical daily  clopidogrel Tablet 75 milliGRAM(s) Oral daily  dextrose 5%. 1000 milliLiter(s) (50 mL/Hr) IV Continuous <Continuous>  dextrose 5%. 1000 milliLiter(s) (100 mL/Hr) IV Continuous <Continuous>  dextrose 50% Injectable 25 Gram(s) IV Push once  dextrose 50% Injectable 12.5 Gram(s) IV Push once  epoetin janae-epbx (RETACRIT) Injectable 6000 Unit(s) IV Push once  ferrous    sulfate 325 milliGRAM(s) Oral daily  hydrALAZINE 50 milliGRAM(s) Oral every 8 hours  insulin lispro (ADMELOG) corrective regimen sliding scale   SubCutaneous Before meals and at bedtime  isosorbide   dinitrate Tablet (ISORDIL) 20 milliGRAM(s) Oral three times a day  losartan 100 milliGRAM(s) Oral every 24 hours  meropenem  IVPB 500 milliGRAM(s) IV Intermittent every 24 hours  metoprolol succinate ER 25 milliGRAM(s) Oral daily  nystatin Powder 1 Application(s) Topical two times a day  pantoprazole    Tablet 40 milliGRAM(s) Oral every 12 hours  tacrolimus 2 milliGRAM(s) Oral every 12 hours  tamsulosin 0.8 milliGRAM(s) Oral at bedtime    MEDICATIONS  (PRN):  acetaminophen     Tablet .. 650 milliGRAM(s) Oral every 6 hours PRN Temp greater or equal to 38C (100.4F), Mild Pain (1 - 3), Moderate Pain (4 - 6)  dextrose Oral Gel 15 Gram(s) Oral once PRN Blood Glucose LESS THAN 70 milliGRAM(s)/deciliter  loperamide 2 milliGRAM(s) Oral three times a day PRN Diarrhea  ondansetron    Tablet 8 milliGRAM(s) Oral daily PRN Vomiting  sodium chloride 0.65% Nasal 1 Spray(s) Both Nostrils every 4 hours PRN Nasal Congestion        5'4''  pounds +-10%  ABW s/p R AKA: 113 pounds, %VDF=847     3/21 143.7 pounds, 148.1 pounds  3/25 136 pounds, 145.5 pounds    3/30 140.8 pounds, 135.3 pounds   4/1 130 pounds  4/5 129.8 pounds, 143.2 pounds     Estimated energy needs:   ABW s/p R AKA for EER as %ABW is greater then 120%  Adjust for age, Fevers, ESRD/HD, Skin/pressure ulcer, CHF; fluids per team    Estimated Energy Needs From (hermilo/kg)	30  Estimated Energy Needs To (hermilo/kg)	35  Estimated Energy Needs Calculated From (hermilo/kg)	1536  Estimated Energy Needs Calculated To (hermilo/kg)	1792    Estimated Protein Needs From (g/kg)	1.2  Estimated Protein Needs To (g/kg)	1.4  Estimated Protein Needs Calculated From (g/kg)	61.44  Estimated Protein Needs Calculated To (g/kg)	71.68    Subjective:  62M PMHx HTN, HLD, DM, CAD (s/p cath years ago, RCA 99%, never intervened on), HFrEF 25-30%, ESRD s/p failed kidney transplant (last HD 3/1 via Left Arm AVF; HD TTS), Right AKA, initially presented to [facility] on 2/27 for respiratory distress after dialysis, found to be septic, complicated by AHRF requiring intubation for 24hr followed by VT arrest. Course also complicated by massive LGIB (rectal ulcer) requiring 7u pRBC, 1u FFP and 1u PLT. Transferred to [facility] for ICD placement 2/2 Vtach and cardiac cath. Medicine consulted for GIB, fever of unknown origin, and comanagement.    Pt assessed at bedside. Resting in bed. Fair PO intake noted, improved since transition from puree to soft and bite sized diet. Renal restrictions added. Encouraged adequate intake, oral nutrition supplement to support PO intake. RD to follow, nutrition recommendations below.    Prior PES: Increased needs RT increased demands AEB: ESRD/HD, Skin/pressure ulcer, CHF  >> ongoing   Goal: Pt will meet at least 75% of nutrient needs     Recommendations:  1. Continue DASH/TLC+Renal, soft and bite sized  --Recommend add Nepro once/day  2. Monitor Skin, Wt. Pain/GI per team.  3. Labs: monitor BMP, CBC, glucose, lytes, trend renal indices, LFTs.  4. RD to remain available for additional nutrition interventions as needed.     Education: Reviewed adequate intake parameters    Risk Level: High [  X ] Moderate [   ] Low [   ].

## 2023-04-10 NOTE — CHART NOTE - NSCHARTNOTESELECT_GEN_ALL_CORE
Event Note
Flex Sig
Follow Up/Nutrition Services
GI
Nutrition Follow Up/Nutrition Services
Nutrition Services
Evaluation/Event Note
Event Note

## 2023-04-10 NOTE — PROGRESS NOTE ADULT - REASON FOR ADMISSION
ICD placement
ICD evaluation
ICD placement
VT arrest
ICD placement
VT arrest
transfer from [place] for cath/icd
Fever
ICD placement
ICD evaluation
ICD placement
ICM eval

## 2023-04-10 NOTE — PROGRESS NOTE ADULT - PROBLEM SELECTOR PLAN 2
LHC at [place] w/ oLM 30% and RCA 99% that is not amenable to intervention. Per cardiology, will defer inpatient cath or other intervention on this admission due to persistent fevers   - ECHO 4/3: EF 40% w/regional wall motion abnormality. Entire inferior wall & basal & mid inferolateral wall akinetic. Basal& mid anterolateral wall & apical lateral segments hypokinetic. Moderately dilated LA. Mild AR.     Plan:  - c/w ASA 81mg and Plavix 75mg daily (cleared by GI)   - Patient will need Life Vest on discharge,   - F/u cards recs, will hold off on treatment until resolution of fevers (plan for life vest on d/c, holding ICD placement) LHC at [place] w/ oLM 30% and RCA 99% that is not amenable to intervention. Per cardiology, will defer inpatient cath or other intervention on this admission due to persistent fevers   - ECHO 4/3: EF 40% w/regional wall motion abnormality. Entire inferior wall & basal & mid inferolateral wall akinetic. Basal& mid anterolateral wall & apical lateral segments hypokinetic. Moderately dilated LA. Mild AR.     Plan:  - c/w ASA 81mg and Plavix 75mg daily (cleared by GI)   - Patient will need Life Vest on discharge,   - F/u cards recs, will hold off on treatment until resolution of fevers (plan for life vest on d/c, holding ICD placement) LHC at [place] w/ oLM 30% and RCA 99% that is not amenable to intervention. Per cardiology, will defer inpatient cath or other intervention on this admission due to persistent fevers   - ECHO 4/3: EF 40% w/regional wall motion abnormality. Entire inferior wall & basal & mid inferolateral wall akinetic. Basal& mid anterolateral wall & apical lateral segments hypokinetic. Moderately dilated LA. Mild AR.     Plan:  - c/w ASA 81mg and Plavix 75mg daily (cleared by GI)   - Patient will need Life Vest on discharge,   - F/u cards recs, will hold off on treatment until resolution of fevers (plan for life vest on d/c, holding ICD placement)

## 2023-04-10 NOTE — PROGRESS NOTE ADULT - NS ATTEND OPT1 GEN_ALL_CORE
I independently performed the documented:
I attest my time as attending is greater than 50% of the total combined time spent on qualifying patient care activities by the PA/NP and attending.
I attest my time as attending is greater than 50% of the total combined time spent on qualifying patient care activities by the PA/NP and attending.
I independently performed the documented:
I attest my time as attending is greater than 50% of the total combined time spent on qualifying patient care activities by the PA/NP and attending.
I independently performed the documented:
I attest my time as attending is greater than 50% of the total combined time spent on qualifying patient care activities by the PA/NP and attending.
I independently performed the documented:

## 2023-04-10 NOTE — PROGRESS NOTE ADULT - PROBLEM SELECTOR PLAN 7
On dialysis [facility], Sat. Nephro following (hx failed renal transplant)     Plan:  - f/u nephro recs  - c/w Tacrolimus for kidney transplant   - Tacro level 4/8, awaiting results (results delayed per lab, likely results by 4/11 AM)    #Hyponatremia:  - pending urine osm, urine Na, serum osm  - F/u renal recs

## 2023-04-10 NOTE — PROGRESS NOTE ADULT - PROBLEM SELECTOR PLAN 8
History of coffee ground emesis and massive LGIB @ [facility]; s/p EGD/colonoscopy showing rectal ulcer. Surgery and GI consulted on admission. Per gen surgery no acute surgical intervention. s/p signmoidoscopy w/ GI, no active bleed. No further intervention required.   - Active T&S  - Transfuse for hgb <8 (active CAD)  - c/w Protonix 40 mg PO daily. Per last GI note, can remain on BID PPI while on DAPT History of coffee ground emesis and massive LGIB @ [facility]; s/p EGD/colonoscopy showing rectal ulcer. Surgery and GI consulted on admission. Per gen surgery no acute surgical intervention. s/p signmoidoscopy w/ GI, no active bleed. No further intervention required.   - Active T&S  - Transfuse for hgb <8 (active CAD)  - c/w Protonix 40 mg PO daily. Per last GI note, can remain on BID PPI while on DAPT History of coffee ground emesis and massive LGIB @ [facility]; s/p EGD/colonoscopy showing rectal ulcer. Surgery and GI consulted on admission. Per gen surgery no acute surgical intervention. s/p signmoidoscopy w/ GI, no active bleed. No further intervention required.   - Active T&S  - Transfuse for hgb <8 (active CAD)  - c/w Protonix 40 mg PO daily. Per last GI note, can remain on BID PPI while on DAPT

## 2023-04-10 NOTE — PROGRESS NOTE ADULT - ASSESSMENT
62M h/o ICM, HFrEF, ESRD s/p failed RT (HD via LUE AVF), R AKA, DM transferred to [facility] from [facility] on 3/17 for ICD placement with prolonged fever, found to have cystitis and pyelitis of transplanted kidney due to K.pneumo w/o pyelonephritis or abscess. Pyelitis treated with ceftriaxone 2 g IV q24h (3/21-4/4). Patient found to have infected peripheral IV - was removed. Gallium scan negative. RVP, COVID negative. WBC normal     Suggest:  -UCx pending  -F/u Fungitell and Galactomannan   -Patient has been afebrile for 48 hrs. Can discontinue vancomycin and meropenem.   -Will continue to monitor off antibiotics       Team 2 will follow you.    Case d/w primary team. Final recommendations pending attending note.      Jaja Baig, Infectious Diseases PA 62M h/o ICM, HFrEF, ESRD s/p failed RT (HD via LUE AVF), R AKA, DM transferred to [facility] from [facility] on 3/17 for ICD placement with prolonged fever, found to have cystitis and pyelitis of transplanted kidney due to K.pneumo w/o pyelonephritis or abscess. Pyelitis treated with ceftriaxone 2 g IV q24h (3/21-4/4). Patient found to have infected peripheral IV - was removed. Gallium scan negative. RVP, COVID negative. WBC normal     Suggest:  -UCx pending  -F/u Fungitell and Galactomannan   -Patient has been afebrile for 48 hrs. Can discontinue vancomycin and meropenem.   -Will continue to monitor off antibiotics       Team 2 will follow you.    Case d/w primary team. Final recommendations pending attending note.      Jaja Baig, Infectious Diseases PA 62M h/o ICM, HFrEF, ESRD s/p failed RT (HD via LUE AVF), R AKA, DM transferred to [facility] from [facility] on 3/17 for ICD placement with prolonged fever, found to have cystitis and pyelitis of transplanted kidney due to K.pneumo w/o pyelonephritis or abscess. Pyelitis treated with ceftriaxone 2 g IV q24h (3/21-4/4). Patient found to have infected peripheral IV - was removed. Gallium scan negative. RVP, COVID negative. WBC normal     Suggest:  -UCx pending  -F/u Fungitell and Galactomannan   -Patient has been afebrile for 48 hrs. Can discontinue vancomycin and meropenem.   -Will continue to monitor off antibiotics       Team 2 will follow you.    Case d/w primary team. Final recommendations pending attending note.      Jaja Baig, Infectious Diseases PA

## 2023-04-11 DIAGNOSIS — E87.1 HYPO-OSMOLALITY AND HYPONATREMIA: ICD-10-CM

## 2023-04-11 LAB
ALBUMIN SERPL ELPH-MCNC: 2 G/DL — LOW (ref 3.3–5)
ALP SERPL-CCNC: 125 U/L — HIGH (ref 40–120)
ALT FLD-CCNC: <5 U/L — LOW (ref 10–45)
ANION GAP SERPL CALC-SCNC: 2 MMOL/L — LOW (ref 5–17)
ANION GAP SERPL CALC-SCNC: 6 MMOL/L — SIGNIFICANT CHANGE UP (ref 5–17)
AST SERPL-CCNC: 15 U/L — SIGNIFICANT CHANGE UP (ref 10–40)
BASOPHILS # BLD AUTO: 0.16 K/UL — SIGNIFICANT CHANGE UP (ref 0–0.2)
BASOPHILS NFR BLD AUTO: 3 % — HIGH (ref 0–2)
BILIRUB SERPL-MCNC: 0.5 MG/DL — SIGNIFICANT CHANGE UP (ref 0.2–1.2)
BLD GP AB SCN SERPL QL: NEGATIVE — SIGNIFICANT CHANGE UP
BUN SERPL-MCNC: 11 MG/DL — SIGNIFICANT CHANGE UP (ref 7–23)
BUN SERPL-MCNC: 4 MG/DL — LOW (ref 7–23)
CALCIUM SERPL-MCNC: 7.8 MG/DL — LOW (ref 8.4–10.5)
CALCIUM SERPL-MCNC: 8.1 MG/DL — LOW (ref 8.4–10.5)
CHLORIDE SERPL-SCNC: 101 MMOL/L — SIGNIFICANT CHANGE UP (ref 96–108)
CHLORIDE SERPL-SCNC: 90 MMOL/L — LOW (ref 96–108)
CO2 SERPL-SCNC: 25 MMOL/L — SIGNIFICANT CHANGE UP (ref 22–31)
CO2 SERPL-SCNC: 30 MMOL/L — SIGNIFICANT CHANGE UP (ref 22–31)
CREAT SERPL-MCNC: 1.98 MG/DL — HIGH (ref 0.5–1.3)
CREAT SERPL-MCNC: 4.62 MG/DL — HIGH (ref 0.5–1.3)
CULTURE RESULTS: SIGNIFICANT CHANGE UP
EGFR: 14 ML/MIN/1.73M2 — LOW
EGFR: 38 ML/MIN/1.73M2 — LOW
EOSINOPHIL # BLD AUTO: 0.4 K/UL — SIGNIFICANT CHANGE UP (ref 0–0.5)
EOSINOPHIL NFR BLD AUTO: 7.6 % — HIGH (ref 0–6)
GLUCOSE BLDC GLUCOMTR-MCNC: 105 MG/DL — HIGH (ref 70–99)
GLUCOSE BLDC GLUCOMTR-MCNC: 81 MG/DL — SIGNIFICANT CHANGE UP (ref 70–99)
GLUCOSE BLDC GLUCOMTR-MCNC: 89 MG/DL — SIGNIFICANT CHANGE UP (ref 70–99)
GLUCOSE SERPL-MCNC: 88 MG/DL — SIGNIFICANT CHANGE UP (ref 70–99)
GLUCOSE SERPL-MCNC: 93 MG/DL — SIGNIFICANT CHANGE UP (ref 70–99)
HCT VFR BLD CALC: 28.1 % — LOW (ref 39–50)
HGB BLD-MCNC: 8.8 G/DL — LOW (ref 13–17)
IMM GRANULOCYTES NFR BLD AUTO: 0.2 % — SIGNIFICANT CHANGE UP (ref 0–0.9)
LYMPHOCYTES # BLD AUTO: 1.9 K/UL — SIGNIFICANT CHANGE UP (ref 1–3.3)
LYMPHOCYTES # BLD AUTO: 36.1 % — SIGNIFICANT CHANGE UP (ref 13–44)
MAGNESIUM SERPL-MCNC: 1.6 MG/DL — SIGNIFICANT CHANGE UP (ref 1.6–2.6)
MCHC RBC-ENTMCNC: 27.2 PG — SIGNIFICANT CHANGE UP (ref 27–34)
MCHC RBC-ENTMCNC: 31.3 GM/DL — LOW (ref 32–36)
MCV RBC AUTO: 86.7 FL — SIGNIFICANT CHANGE UP (ref 80–100)
MONOCYTES # BLD AUTO: 0.84 K/UL — SIGNIFICANT CHANGE UP (ref 0–0.9)
MONOCYTES NFR BLD AUTO: 15.9 % — HIGH (ref 2–14)
NEUTROPHILS # BLD AUTO: 1.96 K/UL — SIGNIFICANT CHANGE UP (ref 1.8–7.4)
NEUTROPHILS NFR BLD AUTO: 37.2 % — LOW (ref 43–77)
NRBC # BLD: 0 /100 WBCS — SIGNIFICANT CHANGE UP (ref 0–0)
OSMOLALITY SERPL: 276 MOSM/KG — LOW (ref 280–301)
PHOSPHATE SERPL-MCNC: 2.6 MG/DL — SIGNIFICANT CHANGE UP (ref 2.5–4.5)
PLATELET # BLD AUTO: 209 K/UL — SIGNIFICANT CHANGE UP (ref 150–400)
POTASSIUM SERPL-MCNC: 3.6 MMOL/L — SIGNIFICANT CHANGE UP (ref 3.5–5.3)
POTASSIUM SERPL-MCNC: 4.2 MMOL/L — SIGNIFICANT CHANGE UP (ref 3.5–5.3)
POTASSIUM SERPL-SCNC: 3.6 MMOL/L — SIGNIFICANT CHANGE UP (ref 3.5–5.3)
POTASSIUM SERPL-SCNC: 4.2 MMOL/L — SIGNIFICANT CHANGE UP (ref 3.5–5.3)
PROT SERPL-MCNC: 4.3 G/DL — LOW (ref 6–8.3)
RBC # BLD: 3.24 M/UL — LOW (ref 4.2–5.8)
RBC # FLD: 15.4 % — HIGH (ref 10.3–14.5)
RH IG SCN BLD-IMP: POSITIVE — SIGNIFICANT CHANGE UP
SODIUM SERPL-SCNC: 121 MMOL/L — LOW (ref 135–145)
SODIUM SERPL-SCNC: 133 MMOL/L — LOW (ref 135–145)
SPECIMEN SOURCE: SIGNIFICANT CHANGE UP
WBC # BLD: 5.27 K/UL — SIGNIFICANT CHANGE UP (ref 3.8–10.5)
WBC # FLD AUTO: 5.27 K/UL — SIGNIFICANT CHANGE UP (ref 3.8–10.5)

## 2023-04-11 PROCEDURE — 99232 SBSQ HOSP IP/OBS MODERATE 35: CPT

## 2023-04-11 PROCEDURE — 90935 HEMODIALYSIS ONE EVALUATION: CPT

## 2023-04-11 PROCEDURE — 99233 SBSQ HOSP IP/OBS HIGH 50: CPT | Mod: GC

## 2023-04-11 RX ORDER — ERYTHROPOIETIN 10000 [IU]/ML
6000 INJECTION, SOLUTION INTRAVENOUS; SUBCUTANEOUS ONCE
Refills: 0 | Status: COMPLETED | OUTPATIENT
Start: 2023-04-11 | End: 2023-04-11

## 2023-04-11 RX ORDER — HEPARIN SODIUM 5000 [USP'U]/ML
5000 INJECTION INTRAVENOUS; SUBCUTANEOUS EVERY 8 HOURS
Refills: 0 | Status: DISCONTINUED | OUTPATIENT
Start: 2023-04-11 | End: 2023-04-11

## 2023-04-11 RX ADMIN — Medication 50 MILLIGRAM(S): at 18:59

## 2023-04-11 RX ADMIN — ISOSORBIDE DINITRATE 20 MILLIGRAM(S): 5 TABLET ORAL at 05:58

## 2023-04-11 RX ADMIN — ISOSORBIDE DINITRATE 20 MILLIGRAM(S): 5 TABLET ORAL at 18:59

## 2023-04-11 RX ADMIN — ISOSORBIDE DINITRATE 20 MILLIGRAM(S): 5 TABLET ORAL at 14:01

## 2023-04-11 RX ADMIN — CHLORHEXIDINE GLUCONATE 1 APPLICATION(S): 213 SOLUTION TOPICAL at 14:03

## 2023-04-11 RX ADMIN — Medication 50 MILLIGRAM(S): at 05:58

## 2023-04-11 RX ADMIN — ERYTHROPOIETIN 6000 UNIT(S): 10000 INJECTION, SOLUTION INTRAVENOUS; SUBCUTANEOUS at 11:53

## 2023-04-11 RX ADMIN — PANTOPRAZOLE SODIUM 40 MILLIGRAM(S): 20 TABLET, DELAYED RELEASE ORAL at 06:18

## 2023-04-11 RX ADMIN — PANTOPRAZOLE SODIUM 40 MILLIGRAM(S): 20 TABLET, DELAYED RELEASE ORAL at 18:59

## 2023-04-11 RX ADMIN — LOSARTAN POTASSIUM 100 MILLIGRAM(S): 100 TABLET, FILM COATED ORAL at 14:02

## 2023-04-11 RX ADMIN — Medication 25 MILLIGRAM(S): at 05:58

## 2023-04-11 RX ADMIN — TAMSULOSIN HYDROCHLORIDE 0.8 MILLIGRAM(S): 0.4 CAPSULE ORAL at 21:51

## 2023-04-11 RX ADMIN — Medication 81 MILLIGRAM(S): at 14:01

## 2023-04-11 RX ADMIN — TACROLIMUS 2 MILLIGRAM(S): 5 CAPSULE ORAL at 18:59

## 2023-04-11 RX ADMIN — TACROLIMUS 2 MILLIGRAM(S): 5 CAPSULE ORAL at 05:58

## 2023-04-11 RX ADMIN — CLOPIDOGREL BISULFATE 75 MILLIGRAM(S): 75 TABLET, FILM COATED ORAL at 14:00

## 2023-04-11 RX ADMIN — Medication 325 MILLIGRAM(S): at 14:02

## 2023-04-11 RX ADMIN — ATORVASTATIN CALCIUM 40 MILLIGRAM(S): 80 TABLET, FILM COATED ORAL at 21:51

## 2023-04-11 NOTE — PROGRESS NOTE ADULT - SUBJECTIVE AND OBJECTIVE BOX
DANAIAL, SVITLANAESHWAR  O/N: NAEON    Subjective:   Patient examined at bedside no SOB, lightheadedness of subjective fever. In addition patient denies any HA, cough, wheezing or dizziness. Patient has no other acute complaints     T(C): 36.8 (04-11-23 @ 11:55), Max: 37.5 (04-11-23 @ 06:19)  HR: 57 (04-11-23 @ 11:55) (57 - 64)  BP: 129/60 (04-11-23 @ 11:55) (129/60 - 164/72)  RR: 18 (04-11-23 @ 11:55) (18 - 18)  SpO2: 99% (04-11-23 @ 11:55) (98% - 99%)  Wt(kg): --Vital Signs Last 24 Hrs  T(C): 36.8 (11 Apr 2023 11:55), Max: 37.5 (11 Apr 2023 06:19)  T(F): 98.2 (11 Apr 2023 11:55), Max: 99.5 (11 Apr 2023 06:19)  HR: 57 (11 Apr 2023 11:55) (57 - 64)  BP: 129/60 (11 Apr 2023 11:55) (129/60 - 164/72)  BP(mean): --  RR: 18 (11 Apr 2023 11:55) (18 - 18)  SpO2: 99% (11 Apr 2023 11:55) (98% - 99%)    Parameters below as of 11 Apr 2023 11:55  Patient On (Oxygen Delivery Method): nasal cannula  O2 Flow (L/min): 2      PHYSICAL EXAM:  GENERAL: +lethargic, +anxious, increased energy   Neuro: CN2-12 grossly intact  HEENT: NC/AT; MMM; neck supple;  Heart: RRR; +s1 and s2; no MRG   Lungs: CTA b/l; normal effort; no accesory muscle use, on NC   GI: + mildly distended w/o tenderness to palpation.   Extremities: +2 pulses in UE and LE b/l; no clubbing  Skin: skin dry and warm; no skin tenting; no rashes or lesions  MSK: normal tone  Psych: AOx2-3     LABS:                        8.8    5.27  )-----------( 209      ( 11 Apr 2023 05:30 )             28.1     04-11    121<L>  |  90<L>  |  11  ----------------------------<  88  4.2   |  25  |  4.62<H>    Ca    8.1<L>      11 Apr 2023 05:30  Phos  2.6     04-11  Mg     1.6     04-11    TPro  4.3<L>  /  Alb  2.0<L>  /  TBili  0.5  /  DBili  x   /  AST  15  /  ALT  <5<L>  /  AlkPhos  125<H>  04-11          CAPILLARY BLOOD GLUCOSE      POCT Blood Glucose.: 112 mg/dL (10 Apr 2023 22:00)  POCT Blood Glucose.: 170 mg/dL (10 Apr 2023 17:02)            RADIOLOGY & ADDITIONAL TESTS:    Imaging Personally Reviewed:  [ ] YES  [ ] NO

## 2023-04-11 NOTE — PROGRESS NOTE ADULT - ATTENDING SUPERVISION STATEMENT
Resident
Fellow
Resident
Fellow
Resident
Fellow
Resident
Resident
Fellow
Resident

## 2023-04-11 NOTE — PROGRESS NOTE ADULT - ATTENDING COMMENTS
62-year-old male with a PMHx of HTN, HLD, DMII, ICM, HFrEF (EF 20-25%), ESRD (s/p failed kidney transplant, now on HD TThS via LUE AVF) and right AKA who initially presented to OSH for respiratory distress with hospital course c/b VTach cardiac arrest and LGIB, transferred to [facility] cardiology service for ICD evaluation but found to have recurrent LGIB and fevers of unknown origin.        #Fevers      -initially treated with 2-week course of CTX (end date: 4/4) for pyelitis but spiked fevers post-antibiotic course      -PET (3/25): uptake in prostate concerning for prostatitis or neoplasm (see report for full details)      -negative repeat infectious workup: BCx (4/5) NGTD, GI PCR negative, Cdiff negative, CXR negative, gallium scan negative, UCx negative, fungitell   -aspergillus pending      -ID consulted, discontinue Meropenem and Vancomycin, monitor off Abx, remains afebrile off Abx x 24 hours      #CAD     #HFrEF     #VTach Arrest      #HTN/HLD     -LHC at OSH with significant RCA stenosis not amenable to intervention      -TTE (4/3): EF 40% with RWA     -cardiology consulted, defer inpatient intervention due to persistent fevers      -continue with ASA 81mg daily, Atorvastatin 40mg qhs and Plavix 75mg daily      -continue with metoprolol succinate 25mg daily, isordil 20mg q8hrs, hydralazine 50mg q8hrs and losartan 100mg daily      #ESRD (on HD TThS via LUE AVF)     #S/p Failed Kidney Transplant      -nephrology consulted, appreciate recommendations   -continue with Tacrolimus 2mg BID       #Hyponatremia   -will follow up labs post-HD      #Recurrent GIB 2/2 Rectal Ulcers      #Acute Blood Loss Anemia      -stable, GI signed off, continue to closely monitor hemoglobin while on DAPT      -continue with BID PO PPI       #Concern for Prostatitis vs. Neoplasm    #Elevated PSA    -urology consulted, plan to recheck PSA 1-month post-infection, outpatient urology follow up within 1-2 weeks of discharge     DVT PPx: held while in DAPT in setting of recurrent GIB    Dispo: [facility], possibly Wednesday if remains afebrile 62-year-old male with a PMHx of HTN, HLD, DMII, ICM, HFrEF (EF 20-25%), ESRD (s/p failed kidney transplant, now on HD TThS via LUE AVF) and right AKA who initially presented to OSH for respiratory distress with hospital course c/b VTach cardiac arrest and LGIB, transferred to [facility] cardiology service for ICD evaluation but found to have recurrent LGIB and fevers of unknown origin.        #Fevers      -initially treated with 2-week course of CTX (end date: 4/4) for pyelitis but spiked fevers post-antibiotic course      -PET (3/25): uptake in prostate concerning for prostatitis or neoplasm (see report for full details)      -negative repeat infectious workup: BCx (4/5) NGTD, GI PCR negative, Cdiff negative, CXR negative, gallium scan negative, UCx negative, fungitell   -aspergillus pending      -ID consulted, discontinue Meropenem and Vancomycin, monitor off Abx, remains afebrile off Abx x 24 hours      #CAD     #HFrEF     #VTach Arrest      #HTN/HLD     -LHC at OSH with significant RCA stenosis not amenable to intervention      -TTE (4/3): EF 40% with RWA     -cardiology consulted, defer inpatient intervention due to persistent fevers      -continue with ASA 81mg daily, Atorvastatin 40mg qhs and Plavix 75mg daily      -continue with metoprolol succinate 25mg daily, isordil 20mg q8hrs, hydralazine 50mg q8hrs and losartan 100mg daily      #ESRD (on HD TThS via LUE AVF)     #S/p Failed Kidney Transplant      -nephrology consulted, appreciate recommendations   -continue with Tacrolimus 2mg BID       #Hyponatremia   -will follow up labs post-HD      #Recurrent GIB 2/2 Rectal Ulcers      #Acute Blood Loss Anemia      -stable, GI signed off, continue to closely monitor hemoglobin while on DAPT      -continue with BID PO PPI       #Concern for Prostatitis vs. Neoplasm    #Elevated PSA    -urology consulted, plan to recheck PSA 1-month post-infection, outpatient urology follow up within 1-2 weeks of discharge     DVT PPx: held while in DAPT in setting of recurrent GIB    Dispo: [facility], possibly Wednesday if remains afebrile 62-year-old male with a PMHx of HTN, HLD, DMII, ICM, HFrEF (EF 20-25%), ESRD (s/p failed kidney transplant, now on HD TThS via LUE AVF) and right AKA who initially presented to OSH for respiratory distress with hospital course c/b VTach cardiac arrest and LGIB, transferred to [facility] cardiology service for ICD evaluation but found to have recurrent LGIB and fevers of unknown origin.        #Fevers      -initially treated with 2-week course of CTX (end date: 4/4) for pyelitis but spiked fevers post-antibiotic course      -PET (3/25): uptake in prostate concerning for prostatitis or neoplasm (see report for full details)      -negative repeat infectious workup: BCx (4/5) NGTD, GI PCR negative, Cdiff negative, CXR negative, gallium scan negative, UCx negative, fungitell   -aspergillus pending      -ID consulted, discontinue Meropenem and Vancomycin, monitor off Abx, remains afebrile off Abx x 24 hours      #CAD     #HFrEF     #VTach Arrest      #HTN/HLD     -LHC at OSH with significant RCA stenosis not amenable to intervention      -TTE (4/3): EF 40% with RWA     -cardiology consulted, defer inpatient intervention due to persistent fevers      -continue with ASA 81mg daily, Atorvastatin 40mg qhs and Plavix 75mg daily      -continue with metoprolol succinate 25mg daily, isordil 20mg q8hrs, hydralazine 50mg q8hrs and losartan 100mg daily      #ESRD (on HD TThS via LUE AVF)     #S/p Failed Kidney Transplant      -nephrology consulted, appreciate recommendations   -continue with Tacrolimus 2mg BID       #Hyponatremia   -will follow up labs post-HD      #Recurrent GIB 2/2 Rectal Ulcers      #Acute Blood Loss Anemia      -stable, GI signed off, continue to closely monitor hemoglobin while on DAPT      -continue with BID PO PPI       #Concern for Prostatitis vs. Neoplasm    #Elevated PSA    -urology consulted, plan to recheck PSA 1-month post-infection, outpatient urology follow up within 1-2 weeks of discharge     DVT PPx: held while in DAPT in setting of recurrent GIB    Dispo: [facility], possibly Wednesday if remains afebrile

## 2023-04-11 NOTE — PROGRESS NOTE ADULT - PROBLEM SELECTOR PLAN 1
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 Pyelitis treated with ceftriaxone 2 g IV q24h (3/21-4/4) per ID recs. Patient also had diarrhea with antibiotics. C.diff and GI PCR negative. Unclear source of fever. Overnight 4/5, patient with new Tmax to 104.6 F. WBC 10.82, CRP 54.3, procal 0.97. Abd U/S with no acute pathology, CXR negative, Bcx NGTD. PET scan showing increased uptake in prostate suggesting prostatitis vs. neoplasm. s/p CTX tx for pyelitis vs cystitis w continued elevated t and fevers after abx course completion. s/p Meropenem 500 mg q24h   - s/p Vancomycin (w HD)     Plan:  - Blood Culture 4/5 NGTD  - UA 4/8 neg, Cx neg   - Monitor off abx (per ID, f/u recs)  - f/u Fungitell (47, 4/8) WNL , f/u Galactomannan

## 2023-04-11 NOTE — PROGRESS NOTE ADULT - ATTENDING COMMENTS
I agree with the fellow's findings and plans as written above with the following additions/amendments:    Seen and examined at bedside on HD, tolerating well, continue HD as above, further recs as above

## 2023-04-11 NOTE — PROGRESS NOTE ADULT - PROBLEM SELECTOR PROBLEM 12
Paraphimosis
Nutrition, metabolism, and development symptoms
Paraphimosis
Paraphimosis

## 2023-04-11 NOTE — PROGRESS NOTE ADULT - ASSESSMENT
62Y M w/ HTN, DM, ESRD s/p failed kidney transplant who was transferred to [facility] (3/17) from [place] for ICD placement and Cardiac Cath after prolonged admission c/b cardiac arrest and vtach arrest and hypovolemic shock 2/2 GI bleed. New fever 3/21, s/p completion of empiric abx, now afebrile    #Seen on HD    #ESRD on HD TTS  Seen on HD. Tolerating well. VS stable. Continue HD as above  Daily BMP   K noted  Continue with Tacro at home dose. Recommend pt follow up with outpatient nephrologist regarding tacro  Next HD 4/13    HTN:  UF with HD as tolerated   Amlodipine dc'd 2/2 hypotensive episodes  Continue losartan 100mg, metoprolol 25mg ER, hydralazine 50mg TID and Isordil 20mg TID. Hold meds for SBP<110    Anemia:  Hgb not at goal  epo w/ HD  Defer iron replacement i/s/o infection, so will not check iron profile at the moment  Transfusion per primary    MBD:  Calcium corrected wnl  Phos 2.6. No indication of phos binder  iPTH 431 (3/19)  On Hectorol 4mcg TIW as outpatient. Will continue  Check phos twice weekly. Goal <5.5. Can replete if needed to keep>3 62Y M w/ HTN, DM, ESRD s/p failed kidney transplant who was transferred to [facility] (3/17) from [place] for ICD placement and Cardiac Cath after prolonged admission c/b cardiac arrest and vtach arrest and hypovolemic shock 2/2 GI bleed. New fever 3/21, s/p completion of empiric abx, now afebrile    #Seen on HD    #ESRD on HD TTS  Seen on HD. Tolerating well. VS stable. Continue HD as above  Daily BMP   K noted  Continue with Tacro at home dose. Recommend pt follow up with outpatient nephrologist regarding tacro  Next HD 4/13    HTN:  UF with HD as tolerated   Amlodipine dc'd 2/2 hypotensive episodes  Continue losartan 100mg, metoprolol 25mg ER, hydralazine 50mg TID and Isordil 20mg TID. Hold meds for SBP<110    Anemia:  Hgb not at goal  epo w/ HD  Defer iron replacement i/s/o infection, so will not check iron profile at the moment  Transfusion per primary    MBD:  Calcium corrected wnl  Phos 2.6. No indication of phos binder  iPTH 431 (3/19)  On Hectorol 4mcg TIW as outpatient. Will continue  Check phos twice weekly. Goal <5.5. Can replete if needed to keep>3 62Y M w/ HTN, DM, ESRD s/p failed kidney transplant who was transferred to [facility] (3/17) from [place] for ICD placement and Cardiac Cath after prolonged admission c/b cardiac arrest and vtach arrest and hypovolemic shock 2/2 GI bleed. New fever 3/21, s/p completion of empiric abx, now afebrile    #Seen on HD    #ESRD on HD TTS  Seen on HD. Tolerating well. VS stable. Continue HD as above  Daily BMP   K noted  Continue with Tacro at home dose. Recommend pt follow up with outpatient nephrologist regarding tacro  Next HD 4/13    HTN:  UF with HD as tolerated   Amlodipine dc'd 2/2 hypotensive episodes  Continue losartan 100mg, metoprolol 25mg ER, hydralazine 50mg TID and Isordil 20mg TID. Hold meds for SBP<110    Anemia:  Hgb not at goal  epo w/ HD  Defer iron replacement i/s/o infection, so will not check iron profile at the moment  Transfusion per primary    MBD:  Calcium corrected wnl  Phos 2.6. No indication of phos binder  iPTH 431 (3/19)  On Hectorol 4mcg TIW as outpatient. Will continue  Check phos twice weekly. Goal <5.5. Can replete if needed to keep>3

## 2023-04-11 NOTE — PROGRESS NOTE ADULT - ASSESSMENT
Patient is a 62M PMHx HTN, HLD, DM, CAD (s/p cath years ago, RCA 99%, never intervened on), HFrEF 25-30%, ESRD s/p failed kidney transplant (last HD 3/1 via Left Arm AVF; HD TTS), Right AKA, initially presented to [facility] on 2/27 for respiratory distress after dialysis, found to be septic, complicated by AHRF requiring intubation for 24hr followed by VT arrest. Course also complicated by massive LGIB (rectal ulcer) requiring 7u pRBC, 1u FFP and 1u PLT. Transferred to [facility] for ICD placement 2/2 Vtach and cardiac cath. Medicine consulted for GIB, fever of unknown origin, and comanagement.  Patient now transferred to medicine for persistent fever of unknown origin.                  Patient is a 62M PMHx HTN, HLD, DM, CAD (s/p cath years ago, RCA 99%, never intervened on), HFrEF 25-30%, ESRD s/p failed kidney transplant (last HD 3/1 via Left Arm AVF; HD TTS), Right AKA, initially presented to [facility] on 2/27 for respiratory distress after dialysis, found to be septic, complicated by AHRF requiring intubation for 24hr followed by VT arrest. Course also complicated by massive LGIB (rectal ulcer) requiring 7u pRBC, 1u FFP and 1u PLT. Transferred to [facility] for ICD placement 2/2 Vtach and cardiac cath. Medicine consulted for GIB, fever of unknown origin, and comanagement.  Patient now transferred to medicine for persistent fever of unknown origin.                  Patient is a 62M PMHx HTN, HLD, DM, CAD (s/p cath years ago, RCA 99%, never intervened on), HFrEF 25-30%, ESRD s/p failed kidney transplant (last HD 3/1 via Left Arm AVF; HD TTS), Right AKA, initially presented to [facility] on 2/27 for respiratory distress after dialysis, found to be septic, complicated by AHRF requiring intubation for 24hr followed by VT arrest. Course also complicated by massive LGIB (rectal ulcer) requiring 7u pRBC, 1u FFP and 1u PLT. Transferred to [facility] for ICD placement 2/2 Vtach and cardiac cath. Medicine consulted for GIB, fever of unknown origin, and comanagement.  Patient now transferred to medicine for persistent fever of unknown origin.

## 2023-04-11 NOTE — PROGRESS NOTE ADULT - PROBLEM SELECTOR PLAN 3
[facility] and RCA 99% that is not amenable to intervention. Per cardiology, will defer inpatient cath or other intervention on this admission due to persistent fevers   - ECHO 4/3: EF 40% w/regional wall motion abnormality. Entire inferior wall & basal & mid inferolateral wall akinetic. Basal& mid anterolateral wall & apical lateral segments hypokinetic. Moderately dilated LA. Mild AR.     - c/w ASA 81mg and Plavix 75mg daily (cleared by GI)   - Cardio signed off - pt unable to have life vest due to mental status (needs to have the mental status to turn on/off vest to not get shocked unnecessarily).   - [facility]/ oLM 30% and RCA 99% that is not amenable to intervention. Per cardiology, will defer inpatient cath or other intervention on this admission due to persistent fevers   - ECHO 4/3: EF 40% w/regional wall motion abnormality. Entire inferior wall & basal & mid inferolateral wall akinetic. Basal& mid anterolateral wall & apical lateral segments hypokinetic. Moderately dilated LA. Mild AR.     - c/w ASA 81mg and Plavix 75mg daily (cleared by GI)   - Cardio signed off - pt unable to have life vest due to mental status (needs to have the mental status to turn on/off vest to not get shocked unnecessarily).   - [facility]/ [facility] 30% and RCA 99% that is not amenable to intervention. Per cardiology, will defer inpatient cath or other intervention on this admission due to persistent fevers   - ECHO 4/3: EF 40% w/regional wall motion abnormality. Entire inferior wall & basal & mid inferolateral wall akinetic. Basal& mid anterolateral wall & apical lateral segments hypokinetic. Moderately dilated LA. Mild AR.     - c/w ASA 81mg and Plavix 75mg daily (cleared by GI)   - Cardio signed off - pt unable to have life vest due to mental status (needs to have the mental status to turn on/off vest to not get shocked unnecessarily).   - f/u cardio outpatient

## 2023-04-11 NOTE — PROGRESS NOTE ADULT - ASSESSMENT
62M h/o ICM, HFrEF, ESRD s/p failed RT (HD via LUE AVF), R AKA, DM transferred to [facility] from [facility] on 3/17 for ICD placement with prolonged low grade fever, found to have cystitis and pyelitis of transplanted kidney due to K.pneumo w/o pyelonephritis or abscess. Pyelitis treated with ceftriaxone 2 g IV q24h (3/21-4/4).  Course c/b new high fever since 4/5 of unclear etiology, all infectious work-up negative including negative gallium scan (pyelitis resolved), BCx ngtd, UCx neg.  He seems to have responded to empiric vanc/bere, now afebrile since 4/8, off abx since 4/10.      - monitor off abx  - f/u Fungitell and Galactomannan   - f/u BCx    Thank you for your consult.  Please re-consult us or call us with questions.  Case d/w primary team.    Luann Carvajal MD, MS  Infectious Disease attending  work cell [phone]  For any questions during evening/weekend/holiday, please page ID on call  62M h/o ICM, HFrEF, ESRD s/p failed RT (HD via LUE AVF), R AKA, DM transferred to [facility] from [facility] on 3/17 for ICD placement with prolonged low grade fever, found to have cystitis and pyelitis of transplanted kidney due to K.pneumo w/o pyelonephritis or abscess. Pyelitis treated with ceftriaxone 2 g IV q24h (3/21-4/4).  Course c/b new high fever since 4/5 of unclear etiology, all infectious work-up negative including negative gallium scan (pyelitis resolved), BCx ngtd, UCx neg.  He seems to have responded to empiric vanc/bere, now afebrile since 4/8, off abx since 4/10.      - monitor off abx  - f/u Fungitell and Galactomannan   - f/u BCx    Thank you for your consult.  Please re-consult us or call us with questions.  Case d/w primary team.    Luann Carvajal MD, MS  Infectious Disease attending  work cell [phone]  For any questions during evening/weekend/holiday, please page ID on call  62M h/o ICM, HFrEF, ESRD s/p failed RT (HD via LUE AVF), R AKA, DM transferred to [facility] from [facility] on 3/17 for ICD placement with prolonged low grade fever, found to have cystitis and pyelitis of transplanted kidney due to K.pneumo w/o pyelonephritis or abscess. Pyelitis treated with ceftriaxone 2 g IV q24h (3/21-4/4).  Course c/b new high fever since 4/5 of unclear etiology, all infectious work-up negative including negative gallium scan (pyelitis resolved), BCx ngtd, UCx neg.  He seems to have responded to empiric vanc/bere, now afebrile since 4/8, off abx since 4/10.      - monitor off abx  - f/u Fungitell and Galactomannan   - f/u BCx    Thank you for your consult.  Please re-consult us or call us with questions.  Case d/w primary team.    Luann Carvajal MD, MS  Infectious Disease attending  work cell [phone]  For any questions during evening/weekend/holiday, please page ID on call

## 2023-04-11 NOTE — PROGRESS NOTE ADULT - PROBLEM SELECTOR PLAN 9
History of coffee ground emesis and massive LGIB @ [facility]; s/p EGD/colonoscopy showing rectal ulcer. Surgery and GI consulted on admission. Per gen surgery no acute surgical intervention. s/p signmoidoscopy w/ GI, no active bleed. No further intervention required.   - Active T&S  - Transfuse for hgb <8 (active CAD)  - c/w Protonix 40 mg PO daily. Per last GI note, can remain on BID PPI while on DAPT History of coffee ground emesis and massive LGIB @ [facility]; s/p EGD/colonoscopy showing rectal ulcer. Surgery and GI consulted on admission. Per gen surgery no acute surgical intervention. s/p signmoidoscopy w/ GI, no active bleed. No further intervention required.   - Active T&S  - Transfuse for hgb <8 (active CAD)  - c/w Protonix 40 mg PO daily. Per last GI note, can remain on BID PPI while on DAPT History of coffee ground emesis and massive LGIB @ [facility]; s/p EGD/colonoscopy showing rectal ulcer. Surgery and GI consulted on admission. Per gen surgery no acute surgical intervention. s/p signmoidoscopy w/ GI, no active bleed. No further intervention required.   - Active T&S  - Transfuse for hgb <8 (active CAD)  - c/w Protonix 40 mg PO daily. Per last GI note, can remain on BID PPI while on DAPT

## 2023-04-11 NOTE — PROGRESS NOTE ADULT - SUBJECTIVE AND OBJECTIVE BOX
Patient is a 62y Male seen and evaluated at bedside during dialysis. Laying comfortably in bed. Denies any symptoms except occasional belching.       Meds:    acetaminophen     Tablet .. 650 every 6 hours PRN  aspirin enteric coated 81 daily  atorvastatin 40 at bedtime  chlorhexidine 2% Cloths 1 daily  clopidogrel Tablet 75 daily  dextrose 5%. 1000 <Continuous>  dextrose 5%. 1000 <Continuous>  dextrose 50% Injectable 25 once  dextrose 50% Injectable 12.5 once  dextrose Oral Gel 15 once PRN  epoetin janae-epbx (RETACRIT) Injectable 6000 once  epoetin janae-epbx (RETACRIT) Injectable 6000 once  ferrous    sulfate 325 daily  hydrALAZINE 50 every 8 hours  insulin lispro (ADMELOG) corrective regimen sliding scale  Before meals and at bedtime  isosorbide   dinitrate Tablet (ISORDIL) 20 three times a day  losartan 100 every 24 hours  metoprolol succinate ER 25 daily  ondansetron    Tablet 8 daily PRN  pantoprazole    Tablet 40 every 12 hours  tacrolimus 2 every 12 hours  tamsulosin 0.8 at bedtime      T(C): , Max: 37.5 (23 @ 06:19)  T(F): , Max: 99.5 (23 @ 06:19)  HR: 57 (23 @ 08:55)  BP: 151/70 (23 @ 08:55)  BP(mean): --  RR: 18 (23 @ 08:55)  SpO2: 99% (23 @ 08:55)  Wt(kg): --        Review of Systems:  ROS negative except as per HPI      PHYSICAL EXAM:  Constitutional: Resting comfortably in bed; NAD  Respiratory: CTA B/L; no W/R/R, no accessory muscle use  Cardiac: +S1/S2; RRR; no M/R/G  Gastrointestinal: soft, NT/ND  Extremities: no peripheral edema, Rt AKA  Access: LUE AVF, accessed  Neuro: Answering appropriately      LABS:                        8.8    5.27  )-----------( 209      ( 2023 05:30 )             28.1     04-    121<L>  |  90<L>  |  11  ----------------------------<  88  4.2   |  25  |  4.62<H>    Ca    8.1<L>      2023 05:30  Phos  2.6       Mg     1.6         TPro  4.3<L>  /  Alb  2.0<L>  /  TBili  0.5  /  DBili  x   /  AST  15  /  ALT  <5<L>  /  AlkPhos  125<H>                  RADIOLOGY & ADDITIONAL STUDIES:          Hemoglobin: 8.8 g/dL (23 @ 05:30)  Phosphorus Level, Serum: 2.6 mg/dL (23 @ 05:30)  Hemoglobin: 9.4 g/dL (04-10-23 @ 05:55)  Phosphorus Level, Serum: 2.5 mg/dL (04-10-23 @ 05:55)    Albumin, Serum: 2.0 g/dL (23 @ 05:30)  Albumin, Serum: 2.2 g/dL (23 @ 08:03)    T(C): 36.5 (23 @ 08:55), Max: 37.5 (23 @ 06:19)  HR: 57 (23 @ 08:55) (57 - 64)  BP: 151/70 (23 @ 08:55) (136/72 - 164/72)  RR: 18 (23 @ 08:55) (18 - 19)  SpO2: 99% (23 @ 08:55) (96% - 99%)      Hemodialysis Treatment.:     Schedule: Once, Modality: Hemodialysis, Access: Arteriovenous Fistula    Dialyzer: Optiflux [id number], Time: 180 Min    Blood Flow: 400 mL/Min , Dialysate Flow: 500 mL/Min, Dialysate Temp: 36.5, Tubinmm (Adult)    Target Fluid Removal: 2 Liters    Dialysate Electrolytes (mEq/L): Potassium 3, Calcium 2.5, Sodium 138, Bicarbonate 35    Additional Instructions: turn off UF if SBP<100 (23 @ 08:07) [Completed]   Patient is a 62y Male seen and evaluated at bedside during dialysis. Laying comfortably in bed. Denies any symptoms except occasional belching.       Meds:    acetaminophen     Tablet .. 650 every 6 hours PRN  aspirin enteric coated 81 daily  atorvastatin 40 at bedtime  chlorhexidine 2% Cloths 1 daily  clopidogrel Tablet 75 daily  dextrose 5%. 1000 <Continuous>  dextrose 5%. 1000 <Continuous>  dextrose 50% Injectable 25 once  dextrose 50% Injectable 12.5 once  dextrose Oral Gel 15 once PRN  epoetin janae-epbx (RETACRIT) Injectable 6000 once  epoetin janae-epbx (RETACRIT) Injectable 6000 once  ferrous    sulfate 325 daily  hydrALAZINE 50 every 8 hours  insulin lispro (ADMELOG) corrective regimen sliding scale  Before meals and at bedtime  isosorbide   dinitrate Tablet (ISORDIL) 20 three times a day  losartan 100 every 24 hours  metoprolol succinate ER 25 daily  ondansetron    Tablet 8 daily PRN  pantoprazole    Tablet 40 every 12 hours  tacrolimus 2 every 12 hours  tamsulosin 0.8 at bedtime      T(C): , Max: 37.5 (23 @ 06:19)  T(F): , Max: 99.5 (23 @ 06:19)  HR: 57 (23 @ 08:55)  BP: 151/70 (23 @ 08:55)  BP(mean): --  RR: 18 (23 @ 08:55)  SpO2: 99% (23 @ 08:55)  Wt(kg): --        Review of Systems:  ROS negative except as per HPI      PHYSICAL EXAM:  Constitutional: Resting comfortably in bed; NAD  Respiratory: CTA B/L; no W/R/R, no accessory muscle use  Cardiac: +S1/S2; RRR; no M/R/G  Gastrointestinal: soft, NT/ND  Extremities: no peripheral edema, Rt AKA  Access: LUE AVF, accessed  Neuro: Answering appropriately      LABS:                        8.8    5.27  )-----------( 209      ( 2023 05:30 )             28.1     04-    121<L>  |  90<L>  |  11  ----------------------------<  88  4.2   |  25  |  4.62<H>    Ca    8.1<L>      2023 05:30  Phos  2.6       Mg     1.6         TPro  4.3<L>  /  Alb  2.0<L>  /  TBili  0.5  /  DBili  x   /  AST  15  /  ALT  <5<L>  /  AlkPhos  125<H>                  RADIOLOGY & ADDITIONAL STUDIES:          Hemoglobin: 8.8 g/dL (23 @ 05:30)  Phosphorus Level, Serum: 2.6 mg/dL (23 @ 05:30)  Hemoglobin: 9.4 g/dL (04-10-23 @ 05:55)  Phosphorus Level, Serum: 2.5 mg/dL (04-10-23 @ 05:55)    Albumin, Serum: 2.0 g/dL (23 @ 05:30)  Albumin, Serum: 2.2 g/dL (23 @ 08:03)    T(C): 36.5 (23 @ 08:55), Max: 37.5 (23 @ 06:19)  HR: 57 (23 @ 08:55) (57 - 64)  BP: 151/70 (23 @ 08:55) (136/72 - 164/72)  RR: 18 (23 @ 08:55) (18 - 19)  SpO2: 99% (23 @ 08:55) (96% - 99%)      Hemodialysis Treatment.:     Schedule: Once, Modality: Hemodialysis, Access: Arteriovenous Fistula    Dialyzer: Optiflux [id number], Time: 180 Min    Blood Flow: 400 mL/Min , Dialysate Flow: 500 mL/Min, Dialysate Temp: 36.5, Tubinmm (Adult)    Target Fluid Removal: 2 Liters    Dialysate Electrolytes (mEq/L): Potassium 3, Calcium 2.5, Sodium 138, Bicarbonate 35    Additional Instructions: turn off UF if SBP<100 (23 @ 08:07) [Completed]   Patient is a 62y Male seen and evaluated at bedside during dialysis. Laying comfortably in bed. Denies any symptoms except occasional belching.       Meds:    acetaminophen     Tablet .. 650 every 6 hours PRN  aspirin enteric coated 81 daily  atorvastatin 40 at bedtime  chlorhexidine 2% Cloths 1 daily  clopidogrel Tablet 75 daily  dextrose 5%. 1000 <Continuous>  dextrose 5%. 1000 <Continuous>  dextrose 50% Injectable 25 once  dextrose 50% Injectable 12.5 once  dextrose Oral Gel 15 once PRN  epoetin janae-epbx (RETACRIT) Injectable 6000 once  epoetin janae-epbx (RETACRIT) Injectable 6000 once  ferrous    sulfate 325 daily  hydrALAZINE 50 every 8 hours  insulin lispro (ADMELOG) corrective regimen sliding scale  Before meals and at bedtime  isosorbide   dinitrate Tablet (ISORDIL) 20 three times a day  losartan 100 every 24 hours  metoprolol succinate ER 25 daily  ondansetron    Tablet 8 daily PRN  pantoprazole    Tablet 40 every 12 hours  tacrolimus 2 every 12 hours  tamsulosin 0.8 at bedtime      T(C): , Max: 37.5 (23 @ 06:19)  T(F): , Max: 99.5 (23 @ 06:19)  HR: 57 (23 @ 08:55)  BP: 151/70 (23 @ 08:55)  BP(mean): --  RR: 18 (23 @ 08:55)  SpO2: 99% (23 @ 08:55)  Wt(kg): --        Review of Systems:  ROS negative except as per HPI      PHYSICAL EXAM:  Constitutional: Resting comfortably in bed; NAD  Respiratory: CTA B/L; no W/R/R, no accessory muscle use  Cardiac: +S1/S2; RRR; no M/R/G  Gastrointestinal: soft, NT/ND  Extremities: no peripheral edema, Rt AKA  Access: LUE AVF, accessed  Neuro: Answering appropriately      LABS:                        8.8    5.27  )-----------( 209      ( 2023 05:30 )             28.1     04-    121<L>  |  90<L>  |  11  ----------------------------<  88  4.2   |  25  |  4.62<H>    Ca    8.1<L>      2023 05:30  Phos  2.6       Mg     1.6         TPro  4.3<L>  /  Alb  2.0<L>  /  TBili  0.5  /  DBili  x   /  AST  15  /  ALT  <5<L>  /  AlkPhos  125<H>                  RADIOLOGY & ADDITIONAL STUDIES:          Hemoglobin: 8.8 g/dL (23 @ 05:30)  Phosphorus Level, Serum: 2.6 mg/dL (23 @ 05:30)  Hemoglobin: 9.4 g/dL (04-10-23 @ 05:55)  Phosphorus Level, Serum: 2.5 mg/dL (04-10-23 @ 05:55)    Albumin, Serum: 2.0 g/dL (23 @ 05:30)  Albumin, Serum: 2.2 g/dL (23 @ 08:03)    T(C): 36.5 (23 @ 08:55), Max: 37.5 (23 @ 06:19)  HR: 57 (23 @ 08:55) (57 - 64)  BP: 151/70 (23 @ 08:55) (136/72 - 164/72)  RR: 18 (23 @ 08:55) (18 - 19)  SpO2: 99% (23 @ 08:55) (96% - 99%)      Hemodialysis Treatment.:     Schedule: Once, Modality: Hemodialysis, Access: Arteriovenous Fistula    Dialyzer: Optiflux [id number], Time: 180 Min    Blood Flow: 400 mL/Min , Dialysate Flow: 500 mL/Min, Dialysate Temp: 36.5, Tubinmm (Adult)    Target Fluid Removal: 2 Liters    Dialysate Electrolytes (mEq/L): Potassium 3, Calcium 2.5, Sodium 138, Bicarbonate 35    Additional Instructions: turn off UF if SBP<100 (23 @ 08:07) [Completed]

## 2023-04-11 NOTE — PROGRESS NOTE ADULT - PROBLEM SELECTOR PLAN 4
s/p VTach arrest @ [facility]; no tele events noted. EP Consulted with original plan for SubQ ICD placement once pt had LHC. Will receive life vest instead    Plan:  - Patient currently WNL HR   - c/w Toprol XL 25mg daily. s/p VTach arrest @ [facility]; no tele events noted. EP Consulted with original plan for SubQ ICD placement once pt had LHC. Will receive life vest instead    Plan:  - Patient currently WNL HR   - c/w Toprol XL 25mg daily. s/p VTach arrest @ [facility]; no tele events noted. EP Consulted with original plan for SubQ ICD placement once pt had LHC. Will receive life vest instead    Plan:  - Patient currently WNL HR   - c/w Toprol XL 25mg daily.

## 2023-04-11 NOTE — PROGRESS NOTE ADULT - SUBJECTIVE AND OBJECTIVE BOX
INFECTIOUS DISEASES CONSULT FOLLOW-UP NOTE    INTERVAL HPI/OVERNIGHT EVENTS:  No event overnight  patient denied fever/chills, n/v/d, abdominal pain, CP, cough, dysuria    ROS:   Constitutional, eyes, ENT, cardiovascular, respiratory, gastrointestinal, genitourinary, integumentary, neurological, psychiatric and heme/lymph are otherwise negative other than noted above       ANTIBIOTICS/RELEVANT:    MEDICATIONS  (STANDING):  aspirin enteric coated 81 milliGRAM(s) Oral daily  atorvastatin 40 milliGRAM(s) Oral at bedtime  chlorhexidine 2% Cloths 1 Application(s) Topical daily  clopidogrel Tablet 75 milliGRAM(s) Oral daily  dextrose 5%. 1000 milliLiter(s) (100 mL/Hr) IV Continuous <Continuous>  dextrose 5%. 1000 milliLiter(s) (50 mL/Hr) IV Continuous <Continuous>  dextrose 50% Injectable 25 Gram(s) IV Push once  dextrose 50% Injectable 12.5 Gram(s) IV Push once  epoetin janae-epbx (RETACRIT) Injectable 6000 Unit(s) IV Push once  epoetin janae-epbx (RETACRIT) Injectable 6000 Unit(s) IV Push once  ferrous    sulfate 325 milliGRAM(s) Oral daily  hydrALAZINE 50 milliGRAM(s) Oral every 8 hours  insulin lispro (ADMELOG) corrective regimen sliding scale   SubCutaneous Before meals and at bedtime  isosorbide   dinitrate Tablet (ISORDIL) 20 milliGRAM(s) Oral three times a day  losartan 100 milliGRAM(s) Oral every 24 hours  metoprolol succinate ER 25 milliGRAM(s) Oral daily  pantoprazole    Tablet 40 milliGRAM(s) Oral every 12 hours  tacrolimus 2 milliGRAM(s) Oral every 12 hours  tamsulosin 0.8 milliGRAM(s) Oral at bedtime    MEDICATIONS  (PRN):  acetaminophen     Tablet .. 650 milliGRAM(s) Oral every 6 hours PRN Temp greater or equal to 38C (100.4F), Mild Pain (1 - 3), Moderate Pain (4 - 6)  dextrose Oral Gel 15 Gram(s) Oral once PRN Blood Glucose LESS THAN 70 milliGRAM(s)/deciliter  ondansetron    Tablet 8 milliGRAM(s) Oral daily PRN Vomiting        Vital Signs Last 24 Hrs  T(C): 36.5 (11 Apr 2023 08:55), Max: 37.5 (11 Apr 2023 06:19)  T(F): 97.7 (11 Apr 2023 08:55), Max: 99.5 (11 Apr 2023 06:19)  HR: 57 (11 Apr 2023 08:55) (57 - 64)  BP: 151/70 (11 Apr 2023 08:55) (136/72 - 164/72)  BP(mean): --  RR: 18 (11 Apr 2023 08:55) (18 - 19)  SpO2: 99% (11 Apr 2023 08:55) (96% - 99%)    Parameters below as of 11 Apr 2023 08:55  Patient On (Oxygen Delivery Method): nasal cannula  O2 Flow (L/min): 2      PHYSICAL EXAM:  Constitutional: awake, NAD  Eyes: the sclera and conjunctiva were normal.   ENT: the ears and nose were normal in appearance.   Neck: the appearance of the neck was normal and the neck was supple.   Pulmonary: no respiratory distress and lungs were clear to auscultation bilaterally.   Heart: heart rate was normal and rhythm regular, normal S1 and S2  Abdomen: normal bowel sounds, soft, non-tender  Ext: L arm with AVF          LABS:                        8.8    5.27  )-----------( 209      ( 11 Apr 2023 05:30 )             28.1     04-11    121<L>  |  90<L>  |  11  ----------------------------<  88  4.2   |  25  |  4.62<H>    Ca    8.1<L>      11 Apr 2023 05:30  Phos  2.6     04-11  Mg     1.6     04-11    TPro  4.3<L>  /  Alb  2.0<L>  /  TBili  0.5  /  DBili  x   /  AST  15  /  ALT  <5<L>  /  AlkPhos  125<H>  04-11          MICROBIOLOGY:      RADIOLOGY & ADDITIONAL STUDIES:  Reviewed

## 2023-04-12 ENCOUNTER — TRANSCRIPTION ENCOUNTER (OUTPATIENT)
Age: 63
End: 2023-04-12

## 2023-04-12 VITALS
SYSTOLIC BLOOD PRESSURE: 156 MMHG | TEMPERATURE: 99 F | DIASTOLIC BLOOD PRESSURE: 69 MMHG | HEART RATE: 60 BPM | OXYGEN SATURATION: 98 % | RESPIRATION RATE: 18 BRPM

## 2023-04-12 PROBLEM — Z00.00 ENCOUNTER FOR PREVENTIVE HEALTH EXAMINATION: Status: ACTIVE | Noted: 2023-04-12

## 2023-04-12 LAB
ALBUMIN SERPL ELPH-MCNC: 2 G/DL — LOW (ref 3.3–5)
ALP SERPL-CCNC: 117 U/L — SIGNIFICANT CHANGE UP (ref 40–120)
ALT FLD-CCNC: <5 U/L — LOW (ref 10–45)
ANION GAP SERPL CALC-SCNC: 5 MMOL/L — SIGNIFICANT CHANGE UP (ref 5–17)
ANISOCYTOSIS BLD QL: SLIGHT — SIGNIFICANT CHANGE UP
AST SERPL-CCNC: 14 U/L — SIGNIFICANT CHANGE UP (ref 10–40)
BASOPHILS # BLD AUTO: 0.27 K/UL — HIGH (ref 0–0.2)
BASOPHILS NFR BLD AUTO: 5.6 % — HIGH (ref 0–2)
BILIRUB SERPL-MCNC: 0.4 MG/DL — SIGNIFICANT CHANGE UP (ref 0.2–1.2)
BUN SERPL-MCNC: 5 MG/DL — LOW (ref 7–23)
BURR CELLS BLD QL SMEAR: PRESENT — SIGNIFICANT CHANGE UP
CALCIUM SERPL-MCNC: 7.9 MG/DL — LOW (ref 8.4–10.5)
CHLORIDE SERPL-SCNC: 97 MMOL/L — SIGNIFICANT CHANGE UP (ref 96–108)
CO2 SERPL-SCNC: 28 MMOL/L — SIGNIFICANT CHANGE UP (ref 22–31)
CREAT SERPL-MCNC: 3.55 MG/DL — HIGH (ref 0.5–1.3)
EGFR: 19 ML/MIN/1.73M2 — LOW
EOSINOPHIL # BLD AUTO: 0.32 K/UL — SIGNIFICANT CHANGE UP (ref 0–0.5)
EOSINOPHIL NFR BLD AUTO: 6.5 % — HIGH (ref 0–6)
GIANT PLATELETS BLD QL SMEAR: PRESENT — SIGNIFICANT CHANGE UP
GLUCOSE BLDC GLUCOMTR-MCNC: 83 MG/DL — SIGNIFICANT CHANGE UP (ref 70–99)
GLUCOSE SERPL-MCNC: 84 MG/DL — SIGNIFICANT CHANGE UP (ref 70–99)
HCT VFR BLD CALC: 29.9 % — LOW (ref 39–50)
HGB BLD-MCNC: 9.3 G/DL — LOW (ref 13–17)
LYMPHOCYTES # BLD AUTO: 0.74 K/UL — LOW (ref 1–3.3)
LYMPHOCYTES # BLD AUTO: 15 % — SIGNIFICANT CHANGE UP (ref 13–44)
MACROCYTES BLD QL: SLIGHT — SIGNIFICANT CHANGE UP
MAGNESIUM SERPL-MCNC: 1.7 MG/DL — SIGNIFICANT CHANGE UP (ref 1.6–2.6)
MANUAL SMEAR VERIFICATION: SIGNIFICANT CHANGE UP
MCHC RBC-ENTMCNC: 27.7 PG — SIGNIFICANT CHANGE UP (ref 27–34)
MCHC RBC-ENTMCNC: 31.1 GM/DL — LOW (ref 32–36)
MCV RBC AUTO: 89 FL — SIGNIFICANT CHANGE UP (ref 80–100)
METAMYELOCYTES # FLD: 0.9 % — HIGH (ref 0–0)
MONOCYTES # BLD AUTO: 0.55 K/UL — SIGNIFICANT CHANGE UP (ref 0–0.9)
MONOCYTES NFR BLD AUTO: 11.2 % — SIGNIFICANT CHANGE UP (ref 2–14)
NEUTROPHILS # BLD AUTO: 2.98 K/UL — SIGNIFICANT CHANGE UP (ref 1.8–7.4)
NEUTROPHILS NFR BLD AUTO: 60.8 % — SIGNIFICANT CHANGE UP (ref 43–77)
OVALOCYTES BLD QL SMEAR: SLIGHT — SIGNIFICANT CHANGE UP
PHOSPHATE SERPL-MCNC: 2.9 MG/DL — SIGNIFICANT CHANGE UP (ref 2.5–4.5)
PLAT MORPH BLD: ABNORMAL
PLATELET # BLD AUTO: 196 K/UL — SIGNIFICANT CHANGE UP (ref 150–400)
POIKILOCYTOSIS BLD QL AUTO: SLIGHT — SIGNIFICANT CHANGE UP
POTASSIUM SERPL-MCNC: 4 MMOL/L — SIGNIFICANT CHANGE UP (ref 3.5–5.3)
POTASSIUM SERPL-SCNC: 4 MMOL/L — SIGNIFICANT CHANGE UP (ref 3.5–5.3)
PROT SERPL-MCNC: 4.2 G/DL — LOW (ref 6–8.3)
RBC # BLD: 3.36 M/UL — LOW (ref 4.2–5.8)
RBC # FLD: 15.8 % — HIGH (ref 10.3–14.5)
RBC BLD AUTO: ABNORMAL
SMUDGE CELLS # BLD: PRESENT — SIGNIFICANT CHANGE UP
SODIUM SERPL-SCNC: 130 MMOL/L — LOW (ref 135–145)
SPHEROCYTES BLD QL SMEAR: SLIGHT — SIGNIFICANT CHANGE UP
WBC # BLD: 4.9 K/UL — SIGNIFICANT CHANGE UP (ref 3.8–10.5)
WBC # FLD AUTO: 4.9 K/UL — SIGNIFICANT CHANGE UP (ref 3.8–10.5)

## 2023-04-12 PROCEDURE — 99239 HOSP IP/OBS DSCHRG MGMT >30: CPT

## 2023-04-12 RX ORDER — METOPROLOL TARTRATE 50 MG
1 TABLET ORAL
Qty: 0 | Refills: 0 | DISCHARGE
Start: 2023-04-12

## 2023-04-12 RX ORDER — CLOPIDOGREL BISULFATE 75 MG/1
1 TABLET, FILM COATED ORAL
Qty: 0 | Refills: 0 | DISCHARGE
Start: 2023-04-12

## 2023-04-12 RX ADMIN — ISOSORBIDE DINITRATE 20 MILLIGRAM(S): 5 TABLET ORAL at 05:51

## 2023-04-12 RX ADMIN — Medication 325 MILLIGRAM(S): at 11:12

## 2023-04-12 RX ADMIN — CHLORHEXIDINE GLUCONATE 1 APPLICATION(S): 213 SOLUTION TOPICAL at 11:12

## 2023-04-12 RX ADMIN — Medication 50 MILLIGRAM(S): at 11:12

## 2023-04-12 RX ADMIN — PANTOPRAZOLE SODIUM 40 MILLIGRAM(S): 20 TABLET, DELAYED RELEASE ORAL at 05:51

## 2023-04-12 RX ADMIN — CLOPIDOGREL BISULFATE 75 MILLIGRAM(S): 75 TABLET, FILM COATED ORAL at 11:11

## 2023-04-12 RX ADMIN — Medication 81 MILLIGRAM(S): at 11:12

## 2023-04-12 RX ADMIN — ISOSORBIDE DINITRATE 20 MILLIGRAM(S): 5 TABLET ORAL at 11:12

## 2023-04-12 RX ADMIN — Medication 50 MILLIGRAM(S): at 04:01

## 2023-04-12 RX ADMIN — TACROLIMUS 2 MILLIGRAM(S): 5 CAPSULE ORAL at 05:51

## 2023-04-12 NOTE — DISCHARGE NOTE NURSING/CASE MANAGEMENT/SOCIAL WORK - NSDCPEFALRISK_GEN_ALL_CORE
For information on Fall & Injury Prevention, visit: [email or web address] OR  [email or web address] OR  https://www.cdc.gov/steadi/patient.html For information on Fall & Injury Prevention, visit: [email or web address].[email or web address]/[email or web address]-[email or web address]-[email or web address]-mobility OR  [email or web address]://www.[email or web address]/news/fall-prevention-tips-to-avoid-injury OR  https://www.cdc.gov/steadi/patient.html For information on Fall & Injury Prevention, visit: [email or web address]/[email or web address]-[email or web address]-health-[email or web address]-mobility OR  [email or web address]://[email or web address] system.[email or web address]/news/fall-prevention-tips-[email or web address]-avoid-injury OR  https://www.cdc.gov/steadi/patient.html

## 2023-04-12 NOTE — PROGRESS NOTE ADULT - PROVIDER SPECIALTY LIST ADULT
Cardiology
Electrophysiology
Electrophysiology
Gastroenterology
Hospitalist
Infectious Disease
Internal Medicine
Nephrology
Surgery
Cardiology
Electrophysiology
Infectious Disease
Nephrology
Surgery
Infectious Disease
Cardiology
Hospitalist
Hospitalist
Infectious Disease
Nephrology
Infectious Disease
Nephrology
Cardiology
Infectious Disease
Nephrology
Urology
Electrophysiology
Internal Medicine
Infectious Disease
Intervent Cardiology
Hospitalist
Cardiology
Cardiology
Intervent Cardiology
Cardiology

## 2023-04-12 NOTE — DISCHARGE NOTE NURSING/CASE MANAGEMENT/SOCIAL WORK - PATIENT PORTAL LINK FT
You can access the FollowMyHealth Patient Portal offered by [facility] by registering at the following website: [email or web address]. By joining [employer]’s FollowMyHealth portal, you will also be able to view your health information using other applications (apps) compatible with our system. You can access the FollowMyHealth Patient Portal offered by [facility] by registering at the following website: [email or web address]. By joining [employer]’s FollowMyHealth portal, you will also be able to view your health information using other applications (apps) compatible with our system. You can access the FollowMyHealth Patient Portal offered by [facility] by registering at the following website: [email or web address]. By joining [employer]’s FollowMyHealth portal, you will also be able to view your health information using other applications (apps) compatible with our system.

## 2023-04-12 NOTE — DISCHARGE NOTE NURSING/CASE MANAGEMENT/SOCIAL WORK - NSDCFUADDAPPT_GEN_ALL_CORE_FT
(1) Please follow up with Dr. Aleman (cardiology) on April 24th at 3pm at Address [address] ([address]). Please make sure to bring your hospital paperwork and insurance cards, identification    (2) Please follow up with Dr. Goss  (Nephrologist), you will receive a call from him from phone number [phone] within 2 weeks. Please anticipate call from this number at your listed phone number.  (1) Please follow up with Dr. Aleman (cardiology) on April 24th at 3pm at [address] ([address]). Please make sure to bring your hospital paperwork and insurance cards, identification    (2) Please follow up with Dr. Goss  (Nephrologist), you will receive a call from him from phone number [phone] within 2 weeks. Please anticipate call from this number at your listed phone number.  (1) Please follow up with Dr. Aleman (cardiology) on April 24th at 3pm at [address] ([address]). Please make sure to bring your hospital paperwork and insurance cards, identification    (2) Please follow up with Dr. Goss  (Nephrologist), you will receive a call from him from phone number [phone] within 2 weeks. Please anticipate call from this number at your listed phone number.

## 2023-04-13 LAB
GALACTOMANNAN AG SERPL-ACNC: 0.06 INDEX — SIGNIFICANT CHANGE UP (ref 0–0.49)

## 2023-04-15 DIAGNOSIS — B96.1 KLEBSIELLA PNEUMONIAE [K. PNEUMONIAE] AS THE CAUSE OF DISEASES CLASSIFIED ELSEWHERE: ICD-10-CM

## 2023-04-15 DIAGNOSIS — I13.2 HYPERTENSIVE HEART AND CHRONIC KIDNEY DISEASE WITH HEART FAILURE AND WITH STAGE 5 CHRONIC KIDNEY DISEASE, OR END STAGE RENAL DISEASE: ICD-10-CM

## 2023-04-15 DIAGNOSIS — E11.22 TYPE 2 DIABETES MELLITUS WITH DIABETIC CHRONIC KIDNEY DISEASE: ICD-10-CM

## 2023-04-15 DIAGNOSIS — T46.1X5A ADVERSE EFFECT OF CALCIUM-CHANNEL BLOCKERS, INITIAL ENCOUNTER: ICD-10-CM

## 2023-04-15 DIAGNOSIS — Z79.52 LONG TERM (CURRENT) USE OF SYSTEMIC STEROIDS: ICD-10-CM

## 2023-04-15 DIAGNOSIS — Z89.611 ACQUIRED ABSENCE OF RIGHT LEG ABOVE KNEE: ICD-10-CM

## 2023-04-15 DIAGNOSIS — E87.1 HYPO-OSMOLALITY AND HYPONATREMIA: ICD-10-CM

## 2023-04-15 DIAGNOSIS — K21.9 GASTRO-ESOPHAGEAL REFLUX DISEASE WITHOUT ESOPHAGITIS: ICD-10-CM

## 2023-04-15 DIAGNOSIS — Z79.621 LONG TERM (CURRENT) USE OF CALCINEURIN INHIBITOR: ICD-10-CM

## 2023-04-15 DIAGNOSIS — D68.32 HEMORRHAGIC DISORDER DUE TO EXTRINSIC CIRCULATING ANTICOAGULANTS: ICD-10-CM

## 2023-04-15 DIAGNOSIS — R50.9 FEVER, UNSPECIFIED: ICD-10-CM

## 2023-04-15 DIAGNOSIS — Z79.02 LONG TERM (CURRENT) USE OF ANTITHROMBOTICS/ANTIPLATELETS: ICD-10-CM
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
## 2023-04-15 DIAGNOSIS — N41.9 INFLAMMATORY DISEASE OF PROSTATE, UNSPECIFIED: ICD-10-CM

## 2023-04-15 DIAGNOSIS — R19.7 DIARRHEA, UNSPECIFIED: ICD-10-CM

## 2023-04-15 DIAGNOSIS — I50.22 CHRONIC SYSTOLIC (CONGESTIVE) HEART FAILURE: ICD-10-CM

## 2023-04-15 DIAGNOSIS — E83.39 OTHER DISORDERS OF PHOSPHORUS METABOLISM: ICD-10-CM

## 2023-04-15 DIAGNOSIS — I95.2 HYPOTENSION DUE TO DRUGS: ICD-10-CM

## 2023-04-15 DIAGNOSIS — R97.20 ELEVATED PROSTATE SPECIFIC ANTIGEN [PSA]: ICD-10-CM

## 2023-04-15 DIAGNOSIS — N12 TUBULO-INTERSTITIAL NEPHRITIS, NOT SPECIFIED AS ACUTE OR CHRONIC: ICD-10-CM

## 2023-04-15 DIAGNOSIS — T86.13 KIDNEY TRANSPLANT INFECTION: ICD-10-CM

## 2023-04-15 DIAGNOSIS — N47.2 PARAPHIMOSIS: ICD-10-CM

## 2023-04-15 DIAGNOSIS — Z79.82 LONG TERM (CURRENT) USE OF ASPIRIN: ICD-10-CM

## 2023-04-15 DIAGNOSIS — L89.626 PRESSURE-INDUCED DEEP TISSUE DAMAGE OF LEFT HEEL: ICD-10-CM

## 2023-04-15 DIAGNOSIS — I25.5 ISCHEMIC CARDIOMYOPATHY: ICD-10-CM

## 2023-04-15 DIAGNOSIS — D84.9 IMMUNODEFICIENCY, UNSPECIFIED: ICD-10-CM

## 2023-04-15 DIAGNOSIS — I25.2 OLD MYOCARDIAL INFARCTION: ICD-10-CM

## 2023-04-15 DIAGNOSIS — I25.10 ATHEROSCLEROTIC HEART DISEASE OF NATIVE CORONARY ARTERY WITHOUT ANGINA PECTORIS: ICD-10-CM

## 2023-04-15 DIAGNOSIS — Z86.74 PERSONAL HISTORY OF SUDDEN CARDIAC ARREST: ICD-10-CM
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
## 2023-04-15 DIAGNOSIS — N18.6 END STAGE RENAL DISEASE: ICD-10-CM

## 2023-04-15 DIAGNOSIS — T86.12 KIDNEY TRANSPLANT FAILURE: ICD-10-CM

## 2023-04-15 DIAGNOSIS — D62 ACUTE POSTHEMORRHAGIC ANEMIA: ICD-10-CM

## 2023-04-15 DIAGNOSIS — Z95.828 PRESENCE OF OTHER VASCULAR IMPLANTS AND GRAFTS: ICD-10-CM

## 2023-04-15 DIAGNOSIS — L89.312 PRESSURE ULCER OF RIGHT BUTTOCK, STAGE 2: ICD-10-CM

## 2023-04-15 DIAGNOSIS — N30.00 ACUTE CYSTITIS WITHOUT HEMATURIA: ICD-10-CM

## 2023-04-15 DIAGNOSIS — K62.6 ULCER OF ANUS AND RECTUM: ICD-10-CM

## 2023-04-15 DIAGNOSIS — T45.515A ADVERSE EFFECT OF ANTICOAGULANTS, INITIAL ENCOUNTER: ICD-10-CM

## 2023-04-15 DIAGNOSIS — K92.1 MELENA: ICD-10-CM

## 2023-04-15 DIAGNOSIS — I27.20 PULMONARY HYPERTENSION, UNSPECIFIED: ICD-10-CM

## 2023-04-15 DIAGNOSIS — E78.5 HYPERLIPIDEMIA, UNSPECIFIED: ICD-10-CM

## 2023-04-15 DIAGNOSIS — I25.82 CHRONIC TOTAL OCCLUSION OF CORONARY ARTERY: ICD-10-CM

## 2023-04-15 DIAGNOSIS — Z99.2 DEPENDENCE ON RENAL DIALYSIS: ICD-10-CM

## 2023-04-15 DIAGNOSIS — I47.20 VENTRICULAR TACHYCARDIA, UNSPECIFIED: ICD-10-CM

## 2023-04-20 PROCEDURE — 83605 ASSAY OF LACTIC ACID: CPT

## 2023-04-20 PROCEDURE — 85025 COMPLETE CBC W/AUTO DIFF WBC: CPT

## 2023-04-20 PROCEDURE — 97162 PT EVAL MOD COMPLEX 30 MIN: CPT

## 2023-04-20 PROCEDURE — 86756 RESPIRATORY VIRUS ANTIBODY: CPT

## 2023-04-20 PROCEDURE — 36415 COLL VENOUS BLD VENIPUNCTURE: CPT

## 2023-04-20 PROCEDURE — 85027 COMPLETE CBC AUTOMATED: CPT

## 2023-04-20 PROCEDURE — 78802 RP LOCLZJ TUM WHBDY 1 D IMG: CPT

## 2023-04-20 PROCEDURE — 93971 EXTREMITY STUDY: CPT

## 2023-04-20 PROCEDURE — 80202 ASSAY OF VANCOMYCIN: CPT

## 2023-04-20 PROCEDURE — A9556: CPT

## 2023-04-20 PROCEDURE — 80197 ASSAY OF TACROLIMUS: CPT

## 2023-04-20 PROCEDURE — 85610 PROTHROMBIN TIME: CPT

## 2023-04-20 PROCEDURE — 87635 SARS-COV-2 COVID-19 AMP PRB: CPT

## 2023-04-20 PROCEDURE — 87799 DETECT AGENT NOS DNA QUANT: CPT

## 2023-04-20 PROCEDURE — 87103 BLOOD FUNGUS CULTURE: CPT

## 2023-04-20 PROCEDURE — 86706 HEP B SURFACE ANTIBODY: CPT

## 2023-04-20 PROCEDURE — A9552: CPT

## 2023-04-20 PROCEDURE — 78815 PET IMAGE W/CT SKULL-THIGH: CPT

## 2023-04-20 PROCEDURE — 86901 BLOOD TYPING SEROLOGIC RH(D): CPT

## 2023-04-20 PROCEDURE — 80048 BASIC METABOLIC PNL TOTAL CA: CPT

## 2023-04-20 PROCEDURE — 97110 THERAPEUTIC EXERCISES: CPT

## 2023-04-20 PROCEDURE — 93005 ELECTROCARDIOGRAM TRACING: CPT

## 2023-04-20 PROCEDURE — 83615 LACTATE (LD) (LDH) ENZYME: CPT

## 2023-04-20 PROCEDURE — 87449 NOS EACH ORGANISM AG IA: CPT

## 2023-04-20 PROCEDURE — 83970 ASSAY OF PARATHORMONE: CPT

## 2023-04-20 PROCEDURE — 84100 ASSAY OF PHOSPHORUS: CPT

## 2023-04-20 PROCEDURE — 86140 C-REACTIVE PROTEIN: CPT

## 2023-04-20 PROCEDURE — 93306 TTE W/DOPPLER COMPLETE: CPT

## 2023-04-20 PROCEDURE — 86704 HEP B CORE ANTIBODY TOTAL: CPT

## 2023-04-20 PROCEDURE — 86709 HEPATITIS A IGM ANTIBODY: CPT

## 2023-04-20 PROCEDURE — 86705 HEP B CORE ANTIBODY IGM: CPT

## 2023-04-20 PROCEDURE — 81001 URINALYSIS AUTO W/SCOPE: CPT

## 2023-04-20 PROCEDURE — 87186 SC STD MICRODIL/AGAR DIL: CPT

## 2023-04-20 PROCEDURE — 80053 COMPREHEN METABOLIC PANEL: CPT

## 2023-04-20 PROCEDURE — 83036 HEMOGLOBIN GLYCOSYLATED A1C: CPT

## 2023-04-20 PROCEDURE — 71045 X-RAY EXAM CHEST 1 VIEW: CPT

## 2023-04-20 PROCEDURE — 90935 HEMODIALYSIS ONE EVALUATION: CPT

## 2023-04-20 PROCEDURE — 82962 GLUCOSE BLOOD TEST: CPT

## 2023-04-20 PROCEDURE — G0103: CPT

## 2023-04-20 PROCEDURE — 82310 ASSAY OF CALCIUM: CPT

## 2023-04-20 PROCEDURE — 85652 RBC SED RATE AUTOMATED: CPT

## 2023-04-20 PROCEDURE — 0225U NFCT DS DNA&RNA 21 SARSCOV2: CPT

## 2023-04-20 PROCEDURE — 72193 CT PELVIS W/DYE: CPT

## 2023-04-20 PROCEDURE — 87086 URINE CULTURE/COLONY COUNT: CPT

## 2023-04-20 PROCEDURE — 76700 US EXAM ABDOM COMPLETE: CPT

## 2023-04-20 PROCEDURE — 80061 LIPID PANEL: CPT

## 2023-04-20 PROCEDURE — 86644 CMV ANTIBODY: CPT

## 2023-04-20 PROCEDURE — 83930 ASSAY OF BLOOD OSMOLALITY: CPT

## 2023-04-20 PROCEDURE — 86900 BLOOD TYPING SEROLOGIC ABO: CPT

## 2023-04-20 PROCEDURE — 97530 THERAPEUTIC ACTIVITIES: CPT

## 2023-04-20 PROCEDURE — 84145 PROCALCITONIN (PCT): CPT

## 2023-04-20 PROCEDURE — 86645 CMV ANTIBODY IGM: CPT

## 2023-04-20 PROCEDURE — 87040 BLOOD CULTURE FOR BACTERIA: CPT

## 2023-04-20 PROCEDURE — 86803 HEPATITIS C AB TEST: CPT

## 2023-04-20 PROCEDURE — 85730 THROMBOPLASTIN TIME PARTIAL: CPT

## 2023-04-20 PROCEDURE — 87340 HEPATITIS B SURFACE AG IA: CPT

## 2023-04-20 PROCEDURE — 87507 IADNA-DNA/RNA PROBE TQ 12-25: CPT

## 2023-04-20 PROCEDURE — 87305 ASPERGILLUS AG IA: CPT

## 2023-04-20 PROCEDURE — 87324 CLOSTRIDIUM AG IA: CPT

## 2023-04-20 PROCEDURE — 83735 ASSAY OF MAGNESIUM: CPT

## 2023-04-20 PROCEDURE — 97112 NEUROMUSCULAR REEDUCATION: CPT

## 2023-04-20 PROCEDURE — 92610 EVALUATE SWALLOWING FUNCTION: CPT

## 2023-04-20 PROCEDURE — 82272 OCCULT BLD FECES 1-3 TESTS: CPT

## 2023-04-20 PROCEDURE — 97165 OT EVAL LOW COMPLEX 30 MIN: CPT

## 2023-04-20 PROCEDURE — 86850 RBC ANTIBODY SCREEN: CPT

## 2023-04-22 LAB
CULTURE RESULTS: SIGNIFICANT CHANGE UP
SPECIMEN SOURCE: SIGNIFICANT CHANGE UP

## 2023-04-24 ENCOUNTER — APPOINTMENT (OUTPATIENT)
Dept: HEART AND VASCULAR | Facility: CLINIC | Age: 63
End: 2023-04-24
Payer: MEDICARE

## 2023-04-24 VITALS
BODY MASS INDEX: 24.16 KG/M2 | HEIGHT: 65 IN | SYSTOLIC BLOOD PRESSURE: 153 MMHG | DIASTOLIC BLOOD PRESSURE: 77 MMHG | HEART RATE: 61 BPM | OXYGEN SATURATION: 99 % | TEMPERATURE: 97.5 F | WEIGHT: 145 LBS

## 2023-04-24 PROCEDURE — 99214 OFFICE O/P EST MOD 30 MIN: CPT

## 2023-04-24 NOTE — PROGRESS NOTE ADULT - PROBLEM SELECTOR PLAN 1
- Pt w/ ongoing fevers this admission, infectious work up ongoing w/ no clear infectious source identified at this time.    - BCx NGTD; UCx (+) for Klebsiella Pneumonia   - Mando D/C Meropenem and Vanc and start Ceftriaxone   - f/u BK virus result   - PLAN: PET scan 3/25, significant for multifocal uptake in the prostate gland, which can be seen in the   setting of prostatitis and neoplastic disease  - Follow up PSA levels and CT Pelvis w and w/o     #Diarrhea  -3 brown loose BM's today 3/26, discontinued laxative.   -Continue to monitor, if persists or worsens consult diarrhea tree and send c.diff if appropriate. - Pt w/ ongoing fevers this admission, infectious work up ongoing w/ no clear infectious source identified at this time.    - BCx NGTD; UCx (+) for Klebsiella Pneumonia   - STOPPED CTX and Meropenem 3/27/23  - f/u BK virus result   - PLAN: PET scan 3/25, significant for multifocal uptake in the prostate gland, which can be seen in the   setting of prostatitis and neoplastic disease  - CT Pelvis w/ and w/o contrast completed to eval prostate- read pending.   - CONT: Ceftriaxone 2000mg IV QD (length to be determined).  - ID following - continue to follow recs.      #Diarrhea  -3 brown loose BM's today 3/26, discontinued laxative.   -Continue to monitor, if persists or worsens consult diarrhea tree and send c.diff if appropriate. Tetracycline Counseling: Patient counseled regarding possible photosensitivity and increased risk for sunburn.  Patient instructed to avoid sunlight, if possible.  When exposed to sunlight, patients should wear protective clothing, sunglasses, and sunscreen.  The patient was instructed to call the office immediately if the following severe adverse effects occur:  hearing changes, easy bruising/bleeding, severe headache, or vision changes.  The patient verbalized understanding of the proper use and possible adverse effects of tetracycline.  All of the patient's questions and concerns were addressed. Patient understands to avoid pregnancy while on therapy due to potential birth defects.

## 2023-04-25 NOTE — ASSESSMENT
[FreeTextEntry1] : 61 yo man with multiple comorbidities including: \par - ESRD on HD \par - DM with RLE amputation\par - Recurrent GI bleeding\par - Recent recurrent unexplained fevers \par - COPD on chronic home oxygen\par - CAD with ICMP and recent VT arrest during critical illness (PNA and GIB)\par \par He is being seen in this clinic for his CMP. According to the daughter he did not have any appointment upon discharge and she actively sought to make this appointment with me (although it was in her paperwork). \par He is on partial GDMT. Will try to optimize her regimen. \par \par - Stop Losartan\par - Stop Amlodipine\par - Start Entresto 49-51 mg bid \par - Continue Isordil/Hydral\par - Continue Toprol XL 25 mg daily \par - Will need to determine need for LHC/ICD given multiple competing causes of death\par \par Kristopher Nixon MD

## 2023-04-25 NOTE — HISTORY OF PRESENT ILLNESS
[FreeTextEntry1] : Date of HF diagnosis: Unclear\par Etiology of CMP: Mixed vs Ischemic \par Date of last hospitalization: 4/2023\par Date of last echocardiogram: 4/2023\par LVEF/LVEDD: 35%/5.95\par Type of ischemic work-up: LHC (remote) \par Prior RHC: No \par \par Diabetes: Yes \par Afib: No \par CKD: Yes (on HD)\par ICD/CRT: No \par \par ACEi/ARB: Yes \par ARNI: No \par MRA: No \par BB: Yes \par SGLT2i:  No \par Nitrates/Hydralazine: No \par \par CardioMEMs: No \par In clinical trial: No \par \par 61 yo with ESRD (failed kidney transplant), RLE amputation, COPD of home O2, recently admitted to [facility] from OSH where he had presented with PNA and developed VT arrest. At [facility] his course was complicated by GIB (flex sig unrevealing) and fevers that did not respond to antibiotics and were of unclear origin (Tmax ~104). Worked up with Galium scan followed by ID, in the end observed off antibiotics.\par \par A LHC and ICD implant were deferred in the midst of all this. He could not handle a Lifevest so he was not discharged with one. Since arrival to the SNF he has been doing rehab and improving slowly. No CP, BYRD, orthopnea or PND. No LE edema.\par \par His current meds are: \par Amlodipine 10\par Hydral 50\par Toprol 25\par Plavix 75 \par Isordil 20 \par Losartan 100 \par Tacro 1 \par Simvastatin 40\par Renvela\par Flomax\par \par He is on HD x3/week.

## 2023-04-25 NOTE — HISTORY OF PRESENT ILLNESS
[FreeTextEntry1] : Date of HF diagnosis: Unclear\par Etiology of CMP: Mixed vs Ischemic \par Date of last hospitalization: 4/2023\par Date of last echocardiogram: 4/2023\par LVEF/LVEDD: 35%/5.95\par Type of ischemic work-up: LHC (remote) \par Prior RHC: No \par \par Diabetes: Yes \par Afib: No \par CKD: Yes (on HD)\par ICD/CRT: No \par \par ACEi/ARB: Yes \par ARNI: No \par MRA: No \par BB: Yes \par SGLT2i:  No \par Nitrates/Hydralazine: No \par \par CardioMEMs: No \par In clinical trial: No \par \par 63 yo with ESRD (failed kidney transplant), RLE amputation, COPD of home O2, recently admitted to [facility] from OSH where he had presented with PNA and developed VT arrest. At [facility] his course was complicated by GIB (flex sig unrevealing) and fevers that did not respond to antibiotics and were of unclear origin (Tmax ~104). Worked up with Galium scan followed by ID, in the end observed off antibiotics.\par \par A LHC and ICD implant were deferred in the midst of all this. He could not handle a Lifevest so he was not discharged with one. Since arrival to the SNF he has been doing rehab and improving slowly. No CP, BYRD, orthopnea or PND. No LE edema.\par \par His current meds are: \par Amlodipine 10\par Hydral 50\par Toprol 25\par Plavix 75 \par Isordil 20 \par Losartan 100 \par Tacro 1 \par Simvastatin 40\par Renvela\par Flomax\par \par He is on HD x3/week.

## 2023-04-27 NOTE — CARDIOLOGY SUMMARY
[de-identified] :  1. The entire inferior wall and basal and mid inferolateral wall are \par akinetic and relatively thin compared to the septum. The basal and mid \par anterolateral wall and apical lateral segment are hypokinetic. All \par remaining scored segments are normal. Mildly dilated left ventricular \par size. Left ventricular systolic function is mildly reduced with a \par calculated ejection fraction of 40% with regional wall motion \par abnormalities.\par  2. Moderately dilated left atrium.\par  3. Grade II left ventricular diastolic dysfunction.\par  4. Normal right ventricular size and systolic function.\par  5. Mild aortic regurgitation.\par  6. No pericardial effusion.\par  7. The aortic root is mildly dilated. The aortic root measures 3.81 cm \par at level of the sinuses of Valsalva (normal 3.1-3.7 cm for men, 2.7-3.3 \par cm for women). The proximal ascending aorta is mildly dilated. The \par proximal ascending aorta measures 3.35 cm (normal 2.6-3.4 cm for men, \par 2.3-3.1 cm for women). The proximal ascending aorta indexed measures 2.00 \par cm/m? (normal 1.3-1.7 cm/m2 for men, 1.3-1.9 cm/m2 for women).\par  8. No prior echo is available for comparison.

## 2023-04-27 NOTE — CARDIOLOGY SUMMARY
[de-identified] :  1. The entire inferior wall and basal and mid inferolateral wall are \par akinetic and relatively thin compared to the septum. The basal and mid \par anterolateral wall and apical lateral segment are hypokinetic. All \par remaining scored segments are normal. Mildly dilated left ventricular \par size. Left ventricular systolic function is mildly reduced with a \par calculated ejection fraction of 40% with regional wall motion \par abnormalities.\par  2. Moderately dilated left atrium.\par  3. Grade II left ventricular diastolic dysfunction.\par  4. Normal right ventricular size and systolic function.\par  5. Mild aortic regurgitation.\par  6. No pericardial effusion.\par  7. The aortic root is mildly dilated. The aortic root measures 3.81 cm \par at level of the sinuses of Valsalva (normal 3.1-3.7 cm for men, 2.7-3.3 \par cm for women). The proximal ascending aorta is mildly dilated. The \par proximal ascending aorta measures 3.35 cm (normal 2.6-3.4 cm for men, \par 2.3-3.1 cm for women). The proximal ascending aorta indexed measures 2.00 \par cm/m? (normal 1.3-1.7 cm/m2 for men, 1.3-1.9 cm/m2 for women).\par  8. No prior echo is available for comparison.

## 2023-06-05 ENCOUNTER — APPOINTMENT (OUTPATIENT)
Dept: HEART AND VASCULAR | Facility: CLINIC | Age: 63
End: 2023-06-05

## 2023-10-24 NOTE — PROGRESS NOTE ADULT - REASON FOR ADMISSION
COVID19 infection H Plasty Text: Given the location of the defect, shape of the defect and the proximity to free margins a H-plasty was deemed most appropriate for repair.  Using a sterile surgical marker, the appropriate advancement arms of the H-plasty were drawn incorporating the defect and placing the expected incisions within the relaxed skin tension lines where possible. The area thus outlined was incised deep to adipose tissue with a #15 scalpel blade. The skin margins were undermined to an appropriate distance in all directions utilizing iris scissors.  The opposing advancement arms were then advanced into place in opposite direction and anchored with interrupted buried subcutaneous sutures.

## 2023-12-12 PROBLEM — E11.9 TYPE 2 DIABETES MELLITUS WITHOUT COMPLICATIONS: Chronic | Status: ACTIVE | Noted: 2020-04-10

## 2023-12-12 PROBLEM — I50.22 CHRONIC SYSTOLIC (CONGESTIVE) HEART FAILURE: Chronic | Status: ACTIVE | Noted: 2020-04-10

## 2023-12-12 PROBLEM — Z94.0 KIDNEY TRANSPLANT STATUS: Chronic | Status: ACTIVE | Noted: 2020-04-10

## 2023-12-12 PROBLEM — E03.9 HYPOTHYROIDISM, UNSPECIFIED: Chronic | Status: ACTIVE | Noted: 2020-04-10

## 2023-12-12 PROBLEM — N40.0 BENIGN PROSTATIC HYPERPLASIA WITHOUT LOWER URINARY TRACT SYMPTOMS: Chronic | Status: ACTIVE | Noted: 2020-04-10

## 2023-12-12 PROBLEM — N18.6 END STAGE RENAL DISEASE: Chronic | Status: ACTIVE | Noted: 2020-04-10

## 2023-12-12 PROBLEM — I25.10 ATHEROSCLEROTIC HEART DISEASE OF NATIVE CORONARY ARTERY WITHOUT ANGINA PECTORIS: Chronic | Status: ACTIVE | Noted: 2020-04-10

## 2023-12-12 PROBLEM — I10 ESSENTIAL (PRIMARY) HYPERTENSION: Chronic | Status: ACTIVE | Noted: 2020-04-10

## 2023-12-12 RX ORDER — ATORVASTATIN CALCIUM 80 MG/1
1 TABLET, FILM COATED ORAL
Qty: 0 | Refills: 0 | DISCHARGE

## 2023-12-12 RX ORDER — ISOSORBIDE DINITRATE 5 MG/1
20 TABLET ORAL
Qty: 0 | Refills: 0 | DISCHARGE

## 2023-12-12 RX ORDER — HYDRALAZINE HCL 50 MG
1 TABLET ORAL
Qty: 0 | Refills: 0 | DISCHARGE

## 2023-12-12 RX ORDER — CALCIUM ACETATE 667 MG
1 TABLET ORAL
Qty: 0 | Refills: 0 | DISCHARGE

## 2023-12-12 RX ORDER — TACROLIMUS 5 MG/1
2 CAPSULE ORAL
Qty: 0 | Refills: 0 | DISCHARGE

## 2023-12-12 RX ORDER — DOCUSATE SODIUM 100 MG
1 CAPSULE ORAL
Qty: 0 | Refills: 0 | DISCHARGE

## 2023-12-12 RX ORDER — FERROUS SULFATE 325(65) MG
1 TABLET ORAL
Qty: 0 | Refills: 0 | DISCHARGE

## 2023-12-12 RX ORDER — SEVELAMER CARBONATE 2400 MG/1
1 POWDER, FOR SUSPENSION ORAL
Qty: 0 | Refills: 0 | DISCHARGE

## 2023-12-12 RX ORDER — PANTOPRAZOLE SODIUM 20 MG/1
1 TABLET, DELAYED RELEASE ORAL
Qty: 0 | Refills: 0 | DISCHARGE

## 2023-12-12 RX ORDER — TAMSULOSIN HYDROCHLORIDE 0.4 MG/1
1 CAPSULE ORAL
Qty: 0 | Refills: 0 | DISCHARGE

## 2023-12-12 RX ORDER — LOSARTAN POTASSIUM 100 MG/1
1 TABLET, FILM COATED ORAL
Qty: 0 | Refills: 0 | DISCHARGE

## 2023-12-12 RX ORDER — AMLODIPINE BESYLATE 2.5 MG/1
1 TABLET ORAL
Qty: 0 | Refills: 0 | DISCHARGE

## 2024-10-21 NOTE — PROGRESS NOTE ADULT - PROBLEM/PLAN-5
Abdomen , soft, nontender, nondistended , no guarding or rigidity , no masses palpable , normal bowel sounds , Liver and Spleen,  no hepatosplenomegaly , liver nontender
DISPLAY PLAN FREE TEXT